# Patient Record
Sex: MALE | Race: WHITE | NOT HISPANIC OR LATINO | Employment: OTHER | URBAN - METROPOLITAN AREA
[De-identification: names, ages, dates, MRNs, and addresses within clinical notes are randomized per-mention and may not be internally consistent; named-entity substitution may affect disease eponyms.]

---

## 2023-03-13 ENCOUNTER — TELEPHONE (OUTPATIENT)
Dept: PRIMARY CARE | Facility: CLINIC | Age: 58
End: 2023-03-13

## 2023-03-14 ENCOUNTER — TELEPHONE (OUTPATIENT)
Dept: PRIMARY CARE | Facility: CLINIC | Age: 58
End: 2023-03-14

## 2023-03-14 NOTE — TELEPHONE ENCOUNTER
----- Message from Mily Leung DO sent at 3/7/2023  8:23 AM EST -----  Your blood counts are stable. Please remember to follow up with GI regarding your bleeding.

## 2023-04-25 ENCOUNTER — TELEPHONE (OUTPATIENT)
Dept: PRIMARY CARE | Facility: CLINIC | Age: 58
End: 2023-04-25

## 2023-04-27 ENCOUNTER — LAB (OUTPATIENT)
Dept: LAB | Facility: LAB | Age: 58
End: 2023-04-27
Payer: COMMERCIAL

## 2023-04-27 ENCOUNTER — OFFICE VISIT (OUTPATIENT)
Dept: PRIMARY CARE | Facility: CLINIC | Age: 58
End: 2023-04-27
Payer: COMMERCIAL

## 2023-04-27 VITALS
OXYGEN SATURATION: 98 % | SYSTOLIC BLOOD PRESSURE: 102 MMHG | RESPIRATION RATE: 18 BRPM | HEART RATE: 92 BPM | DIASTOLIC BLOOD PRESSURE: 64 MMHG

## 2023-04-27 DIAGNOSIS — R93.89 ABNORMAL X-RAY: ICD-10-CM

## 2023-04-27 DIAGNOSIS — R31.9 HEMATURIA, UNSPECIFIED TYPE: ICD-10-CM

## 2023-04-27 DIAGNOSIS — S91.109A OPEN WOUND OF TOE, INITIAL ENCOUNTER: ICD-10-CM

## 2023-04-27 DIAGNOSIS — E11.69 TYPE 2 DIABETES MELLITUS WITH OTHER SPECIFIED COMPLICATION, WITHOUT LONG-TERM CURRENT USE OF INSULIN (MULTI): ICD-10-CM

## 2023-04-27 DIAGNOSIS — R31.9 HEMATURIA, UNSPECIFIED TYPE: Primary | ICD-10-CM

## 2023-04-27 PROBLEM — K92.1 HEMATOCHEZIA: Status: ACTIVE | Noted: 2023-04-27

## 2023-04-27 PROBLEM — S46.012A STRAIN OF LEFT ROTATOR CUFF CAPSULE: Status: ACTIVE | Noted: 2023-04-27

## 2023-04-27 PROBLEM — I63.9 CEREBROVASCULAR ACCIDENT (CVA) (MULTI): Status: ACTIVE | Noted: 2023-04-27

## 2023-04-27 PROBLEM — R06.2 WHEEZING: Status: ACTIVE | Noted: 2023-04-27

## 2023-04-27 PROBLEM — L97.919: Status: ACTIVE | Noted: 2023-04-27

## 2023-04-27 PROBLEM — L02.91 ABSCESS: Status: ACTIVE | Noted: 2023-04-27
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
## 2023-04-27 PROBLEM — G47.00 INSOMNIA: Status: ACTIVE | Noted: 2023-04-27

## 2023-04-27 PROBLEM — M19.012 OSTEOARTHRITIS OF LEFT GLENOHUMERAL JOINT: Status: ACTIVE | Noted: 2023-04-27

## 2023-04-27 PROBLEM — S80.212A ABRASION, LEFT KNEE, INITIAL ENCOUNTER: Status: ACTIVE | Noted: 2023-04-27

## 2023-04-27 PROBLEM — M79.89 LEG SWELLING: Status: ACTIVE | Noted: 2023-04-27

## 2023-04-27 PROBLEM — M25.512 LEFT SHOULDER PAIN: Status: ACTIVE | Noted: 2023-04-27

## 2023-04-27 PROBLEM — I10 BENIGN ESSENTIAL HYPERTENSION: Status: ACTIVE | Noted: 2023-04-27

## 2023-04-27 PROBLEM — S78.112A: Status: ACTIVE | Noted: 2023-04-27

## 2023-04-27 PROBLEM — I63.239 CAROTID ARTERY STENOSIS WITH CEREBRAL INFARCTION (MULTI): Status: ACTIVE | Noted: 2023-04-27

## 2023-04-27 PROBLEM — G62.9 PERIPHERAL NEUROPATHY: Status: ACTIVE | Noted: 2023-04-27

## 2023-04-27 PROBLEM — Z96.612 STATUS POST TOTAL REPLACEMENT OF LEFT SHOULDER: Status: ACTIVE | Noted: 2023-04-27

## 2023-04-27 PROBLEM — E11.9 DIABETES MELLITUS (MULTI): Status: ACTIVE | Noted: 2023-04-27

## 2023-04-27 PROBLEM — K59.00 CONSTIPATION: Status: ACTIVE | Noted: 2023-04-27

## 2023-04-27 LAB
ALBUMIN (G/DL) IN SER/PLAS: 2.9 G/DL (ref 3.4–5)
ANION GAP IN SER/PLAS: 11 MMOL/L (ref 10–20)
BASOPHILS (10*3/UL) IN BLOOD BY AUTOMATED COUNT: 0.04 X10E9/L (ref 0–0.1)
BASOPHILS/100 LEUKOCYTES IN BLOOD BY AUTOMATED COUNT: 0.5 % (ref 0–2)
CALCIUM (MG/DL) IN SER/PLAS: 8.1 MG/DL (ref 8.6–10.3)
CARBON DIOXIDE, TOTAL (MMOL/L) IN SER/PLAS: 22 MMOL/L (ref 21–32)
CHLORIDE (MMOL/L) IN SER/PLAS: 107 MMOL/L (ref 98–107)
CREATININE (MG/DL) IN SER/PLAS: 1.87 MG/DL (ref 0.5–1.3)
EOSINOPHILS (10*3/UL) IN BLOOD BY AUTOMATED COUNT: 0.35 X10E9/L (ref 0–0.7)
EOSINOPHILS/100 LEUKOCYTES IN BLOOD BY AUTOMATED COUNT: 4.4 % (ref 0–6)
ERYTHROCYTE DISTRIBUTION WIDTH (RATIO) BY AUTOMATED COUNT: 14.8 % (ref 11.5–14.5)
ERYTHROCYTE MEAN CORPUSCULAR HEMOGLOBIN CONCENTRATION (G/DL) BY AUTOMATED: 30.6 G/DL (ref 32–36)
ERYTHROCYTE MEAN CORPUSCULAR VOLUME (FL) BY AUTOMATED COUNT: 83 FL (ref 80–100)
ERYTHROCYTES (10*6/UL) IN BLOOD BY AUTOMATED COUNT: 3.55 X10E12/L (ref 4.5–5.9)
GFR MALE: 41 ML/MIN/1.73M2
GLUCOSE (MG/DL) IN SER/PLAS: 121 MG/DL (ref 74–99)
HEMATOCRIT (%) IN BLOOD BY AUTOMATED COUNT: 29.4 % (ref 41–52)
HEMOGLOBIN (G/DL) IN BLOOD: 9 G/DL (ref 13.5–17.5)
IMMATURE GRANULOCYTES/100 LEUKOCYTES IN BLOOD BY AUTOMATED COUNT: 0.3 % (ref 0–0.9)
LEUKOCYTES (10*3/UL) IN BLOOD BY AUTOMATED COUNT: 8 X10E9/L (ref 4.4–11.3)
LYMPHOCYTES (10*3/UL) IN BLOOD BY AUTOMATED COUNT: 1.13 X10E9/L (ref 1.2–4.8)
LYMPHOCYTES/100 LEUKOCYTES IN BLOOD BY AUTOMATED COUNT: 14.2 % (ref 13–44)
MONOCYTES (10*3/UL) IN BLOOD BY AUTOMATED COUNT: 0.87 X10E9/L (ref 0.1–1)
MONOCYTES/100 LEUKOCYTES IN BLOOD BY AUTOMATED COUNT: 10.9 % (ref 2–10)
NEUTROPHILS (10*3/UL) IN BLOOD BY AUTOMATED COUNT: 5.57 X10E9/L (ref 1.2–7.7)
NEUTROPHILS/100 LEUKOCYTES IN BLOOD BY AUTOMATED COUNT: 69.7 % (ref 40–80)
PHOSPHATE (MG/DL) IN SER/PLAS: 4 MG/DL (ref 2.5–4.9)
PLATELETS (10*3/UL) IN BLOOD AUTOMATED COUNT: 318 X10E9/L (ref 150–450)
POTASSIUM (MMOL/L) IN SER/PLAS: 4.4 MMOL/L (ref 3.5–5.3)
SODIUM (MMOL/L) IN SER/PLAS: 136 MMOL/L (ref 136–145)
UREA NITROGEN (MG/DL) IN SER/PLAS: 34 MG/DL (ref 6–23)

## 2023-04-27 PROCEDURE — 36415 COLL VENOUS BLD VENIPUNCTURE: CPT

## 2023-04-27 PROCEDURE — 3078F DIAST BP <80 MM HG: CPT | Performed by: STUDENT IN AN ORGANIZED HEALTH CARE EDUCATION/TRAINING PROGRAM

## 2023-04-27 PROCEDURE — 99214 OFFICE O/P EST MOD 30 MIN: CPT | Performed by: STUDENT IN AN ORGANIZED HEALTH CARE EDUCATION/TRAINING PROGRAM

## 2023-04-27 PROCEDURE — 3074F SYST BP LT 130 MM HG: CPT | Performed by: STUDENT IN AN ORGANIZED HEALTH CARE EDUCATION/TRAINING PROGRAM

## 2023-04-27 PROCEDURE — 80069 RENAL FUNCTION PANEL: CPT

## 2023-04-27 PROCEDURE — 85025 COMPLETE CBC W/AUTO DIFF WBC: CPT

## 2023-04-27 RX ORDER — PANTOPRAZOLE SODIUM 40 MG/1
1 TABLET, DELAYED RELEASE ORAL DAILY
COMMUNITY
Start: 2023-02-13 | End: 2023-04-27

## 2023-04-27 RX ORDER — ERGOCALCIFEROL 1.25 MG/1
CAPSULE ORAL
COMMUNITY
End: 2023-04-27

## 2023-04-27 RX ORDER — BLOOD SUGAR DIAGNOSTIC
STRIP MISCELLANEOUS
COMMUNITY
Start: 2021-07-07 | End: 2024-03-29 | Stop reason: HOSPADM

## 2023-04-27 RX ORDER — FLASH GLUCOSE SENSOR
KIT MISCELLANEOUS
Qty: 1 EACH | Refills: 0 | Status: SHIPPED | OUTPATIENT
Start: 2023-04-27 | End: 2024-04-28 | Stop reason: SDUPTHER

## 2023-04-27 RX ORDER — CALCIUM CARBONATE/MULTIVITAMIN
1 TABLET,CHEWABLE ORAL DAILY
COMMUNITY
End: 2024-03-28 | Stop reason: WASHOUT

## 2023-04-27 RX ORDER — FLASH GLUCOSE SCANNING READER
EACH MISCELLANEOUS
Qty: 1 EACH | Refills: 0 | Status: ON HOLD | OUTPATIENT
Start: 2023-04-27

## 2023-04-27 RX ORDER — INSULIN GLARGINE 100 [IU]/ML
INJECTION, SOLUTION SUBCUTANEOUS
COMMUNITY
End: 2023-04-27

## 2023-04-27 RX ORDER — INSULIN LISPRO 100 [IU]/ML
INJECTION, SOLUTION INTRAVENOUS; SUBCUTANEOUS
COMMUNITY
Start: 2022-03-21 | End: 2023-04-27

## 2023-04-27 RX ORDER — HYDRALAZINE HYDROCHLORIDE 50 MG/1
1 TABLET, FILM COATED ORAL
COMMUNITY
Start: 2023-01-13 | End: 2023-10-03 | Stop reason: SDUPTHER

## 2023-04-27 RX ORDER — ACETAMINOPHEN 500 MG
1 TABLET ORAL NIGHTLY
COMMUNITY
Start: 2021-09-28 | End: 2023-10-19 | Stop reason: ALTCHOICE

## 2023-04-27 RX ORDER — SODIUM BICARBONATE 650 MG/1
1 TABLET ORAL 2 TIMES DAILY
COMMUNITY
Start: 2023-02-14 | End: 2023-04-27

## 2023-04-27 RX ORDER — DOCUSATE SODIUM 100 MG/1
1 CAPSULE, LIQUID FILLED ORAL DAILY
COMMUNITY
Start: 2022-06-01 | End: 2023-10-19 | Stop reason: ALTCHOICE

## 2023-04-27 RX ORDER — ATORVASTATIN CALCIUM 80 MG/1
80 TABLET, FILM COATED ORAL DAILY
COMMUNITY
End: 2023-04-27

## 2023-04-27 RX ORDER — APIXABAN 2.5 MG/1
2.5 TABLET, FILM COATED ORAL 2 TIMES DAILY
COMMUNITY
End: 2023-08-01 | Stop reason: ALTCHOICE

## 2023-04-27 RX ORDER — CALCIUM CITRATE/VITAMIN D3 200MG-6.25
TABLET ORAL 4 TIMES DAILY
COMMUNITY
Start: 2021-07-07 | End: 2023-11-24 | Stop reason: HOSPADM

## 2023-04-27 RX ORDER — CARVEDILOL 25 MG/1
6.25 TABLET ORAL
COMMUNITY
Start: 2023-04-14 | End: 2023-06-29

## 2023-04-27 RX ORDER — FUROSEMIDE 20 MG/1
60 TABLET ORAL DAILY
COMMUNITY
End: 2023-10-26 | Stop reason: ALTCHOICE

## 2023-04-27 RX ORDER — NAPROXEN SODIUM 220 MG/1
1 TABLET, FILM COATED ORAL DAILY
Status: ON HOLD | COMMUNITY
Start: 2021-06-18

## 2023-04-27 ASSESSMENT — ENCOUNTER SYMPTOMS
FEVER: 0
HEMATURIA: 1
FLANK PAIN: 1
FREQUENCY: 0

## 2023-04-27 ASSESSMENT — PATIENT HEALTH QUESTIONNAIRE - PHQ9
2. FEELING DOWN, DEPRESSED OR HOPELESS: SEVERAL DAYS
SUM OF ALL RESPONSES TO PHQ9 QUESTIONS 1 AND 2: 1
10. IF YOU CHECKED OFF ANY PROBLEMS, HOW DIFFICULT HAVE THESE PROBLEMS MADE IT FOR YOU TO DO YOUR WORK, TAKE CARE OF THINGS AT HOME, OR GET ALONG WITH OTHER PEOPLE: NOT DIFFICULT AT ALL
1. LITTLE INTEREST OR PLEASURE IN DOING THINGS: NOT AT ALL

## 2023-04-27 NOTE — PROGRESS NOTES
Subjective   Patient ID: Humphrey Waddell is a 58 y.o. male who presents for Blood in Urine (Pt is here for blood in his urine that he has had for a while.).    Hematuria  No recent antibiotic   Has noted a decrease in his urination       Blood in Urine  This is a new problem. The current episode started 1 to 4 weeks ago. The problem has been waxing and waning since onset. He describes the hematuria as gross hematuria. He reports no clotting in his urine stream. He is experiencing no pain. He describes his urine color as clear. Irritative symptoms do not include frequency or nocturia. Obstructive symptoms include a slower stream. Associated symptoms include flank pain. Pertinent negatives include no fever or genital pain. Risk factors include anticoagulant.       Review of Systems   Constitutional:  Negative for fever.   Genitourinary:  Positive for flank pain and hematuria. Negative for frequency and nocturia.       Objective   Physical Exam  Constitutional:       Appearance: Normal appearance.   Cardiovascular:      Rate and Rhythm: Normal rate and regular rhythm.      Heart sounds: No murmur heard.  Pulmonary:      Effort: Pulmonary effort is normal. No respiratory distress.      Breath sounds: Normal breath sounds. No wheezing.   Musculoskeletal:      Cervical back: Neck supple.      Left lower leg: No edema.   Skin:     Comments: Right great toe is swollen with a puncture wound    Neurological:      Mental Status: He is alert.         Assessment/Plan   Diagnoses and all orders for this visit:  Type 2 diabetes mellitus with other specified complication, without long-term current use of insulin (CMS/[facility])  -     FreeStyle Suzy sensor system (FreeStyle Suzy 2 Sensor) kit; Use as instructed  -     FreeStyle Suzy reader (FreeStyle Suzy 2 [place]) misc; Use as instructed  Hematuria, unspecified type  -     Renal function panel; Future  -     CBC and Auto Differential; Future  -     Urinalysis with Reflex Microscopic;  Future  Open wound of toe, initial encounter  -     Referral to Wound Clinic; Future  -     XR foot right 3+ views; Future

## 2023-04-30 RX ORDER — CLINDAMYCIN HYDROCHLORIDE 300 MG/1
300 CAPSULE ORAL 4 TIMES DAILY
Qty: 40 CAPSULE | Refills: 0 | Status: SHIPPED | OUTPATIENT
Start: 2023-04-30 | End: 2023-05-10

## 2023-06-23 LAB
ALBUMIN (G/DL) IN SER/PLAS: 3.2 G/DL (ref 3.4–5)
ANION GAP IN SER/PLAS: 14 MMOL/L (ref 10–20)
CALCIUM (MG/DL) IN SER/PLAS: 8.2 MG/DL (ref 8.6–10.3)
CARBON DIOXIDE, TOTAL (MMOL/L) IN SER/PLAS: 21 MMOL/L (ref 21–32)
CHLORIDE (MMOL/L) IN SER/PLAS: 109 MMOL/L (ref 98–107)
CREATININE (MG/DL) IN SER/PLAS: 2.47 MG/DL (ref 0.5–1.3)
GFR MALE: 29 ML/MIN/1.73M2
GLUCOSE (MG/DL) IN SER/PLAS: 112 MG/DL (ref 74–99)
PHOSPHATE (MG/DL) IN SER/PLAS: 4.1 MG/DL (ref 2.5–4.9)
POTASSIUM (MMOL/L) IN SER/PLAS: 4.4 MMOL/L (ref 3.5–5.3)
SODIUM (MMOL/L) IN SER/PLAS: 140 MMOL/L (ref 136–145)
UREA NITROGEN (MG/DL) IN SER/PLAS: 33 MG/DL (ref 6–23)

## 2023-06-29 DIAGNOSIS — I63.9 CEREBRAL INFARCTION, UNSPECIFIED (MULTI): ICD-10-CM

## 2023-06-29 DIAGNOSIS — I10 ESSENTIAL (PRIMARY) HYPERTENSION: ICD-10-CM

## 2023-06-29 RX ORDER — CARVEDILOL 25 MG/1
25 TABLET ORAL
Qty: 180 TABLET | Refills: 0 | Status: SHIPPED | OUTPATIENT
Start: 2023-06-29 | End: 2023-10-19 | Stop reason: ALTCHOICE

## 2023-06-29 RX ORDER — ATORVASTATIN CALCIUM 80 MG/1
80 TABLET, FILM COATED ORAL DAILY
Qty: 90 TABLET | Refills: 0 | Status: SHIPPED | OUTPATIENT
Start: 2023-06-29 | End: 2023-10-03 | Stop reason: SDUPTHER

## 2023-08-01 ENCOUNTER — PATIENT OUTREACH (OUTPATIENT)
Dept: PRIMARY CARE | Facility: CLINIC | Age: 58
End: 2023-08-01
Payer: COMMERCIAL

## 2023-08-01 DIAGNOSIS — N17.9 ACUTE RENAL FAILURE, UNSPECIFIED ACUTE RENAL FAILURE TYPE (CMS-HCC): ICD-10-CM

## 2023-08-01 DIAGNOSIS — N18.9 CHRONIC KIDNEY DISEASE, UNSPECIFIED CKD STAGE: ICD-10-CM

## 2023-08-01 RX ORDER — CLOPIDOGREL BISULFATE 75 MG/1
1 TABLET ORAL DAILY
COMMUNITY
Start: 2023-05-18 | End: 2023-10-03 | Stop reason: SDUPTHER

## 2023-08-01 RX ORDER — BISACODYL 10 MG/1
10 SUPPOSITORY RECTAL DAILY PRN
COMMUNITY
Start: 2023-07-28 | End: 2023-10-19 | Stop reason: ALTCHOICE

## 2023-08-01 RX ORDER — POLYETHYLENE GLYCOL 3350 17 G/17G
POWDER, FOR SOLUTION ORAL
COMMUNITY
Start: 2023-07-31 | End: 2023-10-19 | Stop reason: ALTCHOICE

## 2023-08-01 RX ORDER — NYSTATIN 100000 [USP'U]/G
POWDER TOPICAL
COMMUNITY
Start: 2023-07-31 | End: 2023-09-28

## 2023-08-01 RX ORDER — PANTOPRAZOLE SODIUM 40 MG/1
1 TABLET, DELAYED RELEASE ORAL DAILY
COMMUNITY
Start: 2023-07-28 | End: 2023-10-26 | Stop reason: ALTCHOICE

## 2023-08-01 NOTE — PROGRESS NOTES
Discharge Facility: [facility]  Discharge Diagnosis: Acute renal failure superimposed on chronic kidney disease  Admission Date: 7/22/2023  Discharge Date: 7/31/2023    PCP Appointment Date: Not scheduled  Specialist Appointment Date:   8/2/2023 09:00 Dr. Hazel Pettit, Gastroenterology  8/7/2023 10:20 Dr. Yakelin Greenberg, Cardiology,  Dr. Delatorre, Cardiology in 2 weeks,   8/14/2023 13:20 Dr. Agusto Elkins, Vascular [place],  8/16/2023 14:00 Dr. Keller, Vascular Surgery  8/23/2023 14:45 Dr. Fatemeh Ro, Podiatry  9/25/2023 13:00 Urology,  10/26/2023 16:20 Dr. Marco Daniels, Nephrology    Hospital Encounter and Summary: Linked     **Unable to contact pt to complete post discharge assessment. See brief summary below:    Wrap Up  Wrap Up Additional Comments: PMH of HFpEF, CAD, carotid stenosis, CVA, DM, anemia/GIB, DLD, PAD s/p Lt AKA, HTN, depression who was transferred to [facility] after he presented to [facility] ED with multiple complaints including SOB, chest pain, and generalized weakness. DX: acute renal failure superimposed on chronic kidney disease. Hospital course very complex. Recommended SNF placement due to recent left AKA and need for therapy and prosthetics. Pt refused and discharged to home with [facility]. (8/1/2023  2:41 PM)

## 2023-08-17 ENCOUNTER — PATIENT OUTREACH (OUTPATIENT)
Dept: PRIMARY CARE | Facility: CLINIC | Age: 58
End: 2023-08-17
Payer: COMMERCIAL

## 2023-08-17 NOTE — PROGRESS NOTES
Outreach call made to follow up after hospitalization for kidney disease. Pt declines to schedule hospital up with Dr. Leung or further TCM services. Multiple specialty appts scheduled.

## 2023-08-23 LAB
ERYTHROCYTE DISTRIBUTION WIDTH (RATIO) BY AUTOMATED COUNT: 15.3 % (ref 11.5–14.5)
ERYTHROCYTE MEAN CORPUSCULAR HEMOGLOBIN CONCENTRATION (G/DL) BY AUTOMATED: 31.9 G/DL (ref 32–36)
ERYTHROCYTE MEAN CORPUSCULAR VOLUME (FL) BY AUTOMATED COUNT: 88 FL (ref 80–100)
ERYTHROCYTES (10*6/UL) IN BLOOD BY AUTOMATED COUNT: 3.62 X10E12/L (ref 4.5–5.9)
HEMATOCRIT (%) IN BLOOD BY AUTOMATED COUNT: 31.7 % (ref 41–52)
HEMOGLOBIN (G/DL) IN BLOOD: 10.1 G/DL (ref 13.5–17.5)
LEUKOCYTES (10*3/UL) IN BLOOD BY AUTOMATED COUNT: 10.1 X10E9/L (ref 4.4–11.3)
NRBC (PER 100 WBCS) BY AUTOMATED COUNT: 0 /100 WBC (ref 0–0)
PLATELETS (10*3/UL) IN BLOOD AUTOMATED COUNT: 220 X10E9/L (ref 150–450)

## 2023-08-29 ENCOUNTER — HOSPITAL ENCOUNTER (OUTPATIENT)
Dept: DATA CONVERSION | Facility: HOSPITAL | Age: 58
End: 2023-08-29
Attending: INTERNAL MEDICINE | Admitting: INTERNAL MEDICINE
Payer: COMMERCIAL

## 2023-08-29 DIAGNOSIS — I25.82 CHRONIC TOTAL OCCLUSION OF CORONARY ARTERY: ICD-10-CM

## 2023-08-29 DIAGNOSIS — I70.232 ATHEROSCLEROSIS OF NATIVE ARTERIES OF RIGHT LEG WITH ULCERATION OF CALF (MULTI): ICD-10-CM

## 2023-08-29 DIAGNOSIS — I73.9 PERIPHERAL VASCULAR DISEASE, UNSPECIFIED (CMS-HCC): ICD-10-CM

## 2023-09-29 VITALS — HEIGHT: 76 IN | BODY MASS INDEX: 29.88 KG/M2 | WEIGHT: 245.37 LBS

## 2023-09-30 NOTE — H&P
History & Physical Reviewed:   I have reviewed the History and Physical dated:  22-Aug-2023   History and Physical reviewed and relevant findings noted. Patient examined to review pertinent physical  findings.: No significant changes   Home Medications Reviewed: no changes noted   Allergies Reviewed: no changes noted       Airway/Sedation Assessment:  ·  Oropharyngeal Classification Class III   ·  ASA PS Classification ASA III   ·  Sedation Plan moderate sedation       ERAS (Enhanced Recovery After Surgery):  ·  ERAS Patient: no     Consent:   COVID-19 Consent:  ·  COVID-19 Risk Consent Surgeon has reviewed key risks related to the risk of corazon COVID-19 and if they contract COVID-19 what the risks are.     Assessment/Plan:   ·  Assessment and Plan    Risks, benefits, and alternatives explained in full. Written informed consent obtained and placed in the chart. Proceed with peripheral agniography +/- intervention.      Electronic Signatures:  Agusto Elkins)  (Signed 29-Aug-2023 12:24)   Authored: History & Physical Reviewed, Airway/Sedation,  ERAS, Consent, Assessment/Plan, Note Completion      Last Updated: 29-Aug-2023 12:24 by Agusto Elkins)

## 2023-10-03 DIAGNOSIS — I63.9 CEREBRAL INFARCTION, UNSPECIFIED (MULTI): ICD-10-CM

## 2023-10-03 DIAGNOSIS — I25.119 CORONARY ARTERY DISEASE INVOLVING NATIVE HEART WITH ANGINA PECTORIS, UNSPECIFIED VESSEL OR LESION TYPE (CMS-HCC): Primary | ICD-10-CM

## 2023-10-03 RX ORDER — TORSEMIDE 20 MG/1
20 TABLET ORAL DAILY
Qty: 90 TABLET | Refills: 3 | Status: SHIPPED | OUTPATIENT
Start: 2023-10-03 | End: 2023-11-24 | Stop reason: HOSPADM

## 2023-10-03 RX ORDER — CLOPIDOGREL BISULFATE 75 MG/1
75 TABLET ORAL DAILY
Qty: 90 TABLET | Refills: 1 | Status: SHIPPED | OUTPATIENT
Start: 2023-10-03 | End: 2023-10-26 | Stop reason: ALTCHOICE

## 2023-10-03 RX ORDER — HYDRALAZINE HYDROCHLORIDE 50 MG/1
50 TABLET, FILM COATED ORAL EVERY 8 HOURS
Qty: 90 TABLET | Refills: 1 | Status: SHIPPED | OUTPATIENT
Start: 2023-10-03 | End: 2023-10-30

## 2023-10-03 RX ORDER — ATORVASTATIN CALCIUM 80 MG/1
80 TABLET, FILM COATED ORAL DAILY
Qty: 90 TABLET | Refills: 1 | Status: SHIPPED | OUTPATIENT
Start: 2023-10-03 | End: 2024-01-29 | Stop reason: SDUPTHER

## 2023-10-11 DIAGNOSIS — I25.10 CORONARY ARTERY DISEASE INVOLVING NATIVE CORONARY ARTERY OF NATIVE HEART, UNSPECIFIED WHETHER ANGINA PRESENT: ICD-10-CM

## 2023-10-18 PROBLEM — I25.10 ARTERIOSCLEROSIS OF CORONARY ARTERY: Status: ACTIVE | Noted: 2023-10-18

## 2023-10-18 PROBLEM — R53.1 ASTHENIA: Status: ACTIVE | Noted: 2023-10-18

## 2023-10-18 PROBLEM — S09.90XA INJURY OF HEAD: Status: ACTIVE | Noted: 2023-10-18

## 2023-10-18 PROBLEM — F32.A DEPRESSION, UNSPECIFIED: Status: ACTIVE | Noted: 2022-03-22

## 2023-10-18 PROBLEM — M54.9 BACKACHE: Status: ACTIVE | Noted: 2023-10-18

## 2023-10-18 PROBLEM — E11.49 TYPE 2 DIABETES MELLITUS WITH NEUROLOGIC COMPLICATION, WITHOUT LONG-TERM CURRENT USE OF INSULIN (MULTI): Status: ACTIVE | Noted: 2021-05-21

## 2023-10-18 PROBLEM — M62.81 MUSCLE WEAKNESS (GENERALIZED): Status: ACTIVE | Noted: 2022-03-22

## 2023-10-18 PROBLEM — R77.8 OTHER SPECIFIED ABNORMALITIES OF PLASMA PROTEINS: Status: ACTIVE | Noted: 2023-10-18

## 2023-10-18 PROBLEM — K85.90 ACUTE PANCREATITIS (HHS-HCC): Status: ACTIVE | Noted: 2023-10-18

## 2023-10-18 PROBLEM — R10.9 ABDOMINAL PAIN: Status: ACTIVE | Noted: 2023-10-18

## 2023-10-18 PROBLEM — R74.8 HIGH LEVEL OF CARDIAC MARKER: Status: ACTIVE | Noted: 2023-10-18

## 2023-10-18 PROBLEM — M72.6 NECROTIZING FASCIITIS (MULTI): Status: ACTIVE | Noted: 2021-03-07

## 2023-10-18 PROBLEM — I21.4 NSTEMI, INITIAL EPISODE OF CARE (MULTI): Status: ACTIVE | Noted: 2023-10-18

## 2023-10-18 PROBLEM — M79.18 MUSCULOSKELETAL PAIN: Status: ACTIVE | Noted: 2023-10-18

## 2023-10-18 PROBLEM — M79.673 FOOT PAIN: Status: ACTIVE | Noted: 2023-10-18

## 2023-10-18 PROBLEM — K62.5 RECTAL BLEEDING: Status: ACTIVE | Noted: 2023-10-18

## 2023-10-18 PROBLEM — R53.1 WEAKNESS: Status: ACTIVE | Noted: 2021-05-05

## 2023-10-18 PROBLEM — I77.9 PERIPHERAL ARTERIAL OCCLUSIVE DISEASE (CMS-HCC): Status: ACTIVE | Noted: 2023-04-28

## 2023-10-18 PROBLEM — I50.33 ACUTE ON CHRONIC DIASTOLIC HEART FAILURE (MULTI): Status: ACTIVE | Noted: 2023-10-18

## 2023-10-18 PROBLEM — N17.9 AKI (ACUTE KIDNEY INJURY) (CMS-HCC): Status: ACTIVE | Noted: 2023-10-18

## 2023-10-18 PROBLEM — E87.5 HYPERKALEMIA: Status: ACTIVE | Noted: 2023-10-18

## 2023-10-18 PROBLEM — S78.112A ABOVE KNEE AMPUTATION OF LEFT LOWER EXTREMITY (MULTI): Status: ACTIVE | Noted: 2021-03-15

## 2023-10-18 PROBLEM — K92.2 UPPER GASTROINTESTINAL HEMORRHAGE: Status: ACTIVE | Noted: 2023-10-18

## 2023-10-18 PROBLEM — E11.21 DIABETIC RENAL DISEASE (MULTI): Status: ACTIVE | Noted: 2023-10-18

## 2023-10-18 PROBLEM — D50.9 IRON DEFICIENCY ANEMIA: Status: ACTIVE | Noted: 2023-10-18

## 2023-10-18 PROBLEM — I10 HYPERTENSION: Status: ACTIVE | Noted: 2021-05-21

## 2023-10-18 PROBLEM — R51.9 HEADACHE: Status: ACTIVE | Noted: 2023-10-18

## 2023-10-18 PROBLEM — M19.90 CHRONIC OSTEOARTHRITIS: Status: ACTIVE | Noted: 2023-10-18

## 2023-10-18 PROBLEM — M54.50 ACUTE LOW BACK PAIN: Status: ACTIVE | Noted: 2023-10-18

## 2023-10-18 PROBLEM — M86.9 OSTEOMYELITIS (MULTI): Status: ACTIVE | Noted: 2023-10-18

## 2023-10-18 PROBLEM — R07.89 ATYPICAL CHEST PAIN: Status: ACTIVE | Noted: 2023-10-18

## 2023-10-18 PROBLEM — N18.30 CHRONIC KIDNEY DISEASE, STAGE 3 (MULTI): Status: ACTIVE | Noted: 2023-10-18

## 2023-10-18 PROBLEM — R31.9 HEMATURIA: Status: ACTIVE | Noted: 2023-10-18

## 2023-10-18 PROBLEM — F33.1 MAJOR DEPRESSIVE DISORDER, RECURRENT, MODERATE (MULTI): Status: ACTIVE | Noted: 2023-10-18

## 2023-10-18 PROBLEM — R06.00 DYSPNEA: Status: ACTIVE | Noted: 2023-10-18

## 2023-10-18 PROBLEM — M25.561 KNEE PAIN, RIGHT: Status: ACTIVE | Noted: 2021-03-24

## 2023-10-18 PROBLEM — S91.109A OPEN WOUND OF TOE: Status: ACTIVE | Noted: 2023-10-18

## 2023-10-18 PROBLEM — D64.9 ANEMIA: Status: ACTIVE | Noted: 2023-10-18

## 2023-10-18 PROBLEM — I65.23 ATHEROSCLEROSIS OF BOTH CAROTID ARTERIES: Status: ACTIVE | Noted: 2023-10-18

## 2023-10-18 PROBLEM — S81.809A OPEN WOUND OF LOWER EXTREMITY: Status: ACTIVE | Noted: 2023-10-18

## 2023-10-18 PROBLEM — F41.9 ANXIETY DISORDER: Status: ACTIVE | Noted: 2023-10-18

## 2023-10-18 PROBLEM — M19.012 PRIMARY OSTEOARTHRITIS, LEFT SHOULDER: Status: ACTIVE | Noted: 2022-03-22

## 2023-10-18 PROBLEM — R31.0 GROSS HEMATURIA: Status: ACTIVE | Noted: 2023-10-18

## 2023-10-18 RX ORDER — LABETALOL 100 MG/1
1 TABLET, FILM COATED ORAL EVERY 6 HOURS
COMMUNITY
Start: 2022-12-06 | End: 2023-10-26 | Stop reason: ALTCHOICE

## 2023-10-18 RX ORDER — INSULIN GLARGINE 100 [IU]/ML
2 INJECTION, SOLUTION SUBCUTANEOUS NIGHTLY
Status: ON HOLD | COMMUNITY
Start: 2021-05-19

## 2023-10-18 RX ORDER — GABAPENTIN 300 MG/1
2 CAPSULE ORAL 3 TIMES DAILY
COMMUNITY
Start: 2022-11-16 | End: 2023-10-26 | Stop reason: ALTCHOICE

## 2023-10-18 RX ORDER — ERGOCALCIFEROL 1.25 MG/1
50000 CAPSULE ORAL
COMMUNITY
Start: 2021-06-29 | End: 2023-10-31 | Stop reason: SDUPTHER

## 2023-10-18 RX ORDER — GUAIFENESIN 100 MG/5ML
SOLUTION ORAL
COMMUNITY
End: 2023-10-19 | Stop reason: ALTCHOICE

## 2023-10-18 RX ORDER — METFORMIN HYDROCHLORIDE 500 MG/1
1 TABLET ORAL 2 TIMES DAILY
COMMUNITY
Start: 2023-01-01 | End: 2023-10-26 | Stop reason: ALTCHOICE

## 2023-10-18 RX ORDER — UBIDECARENONE 30 MG
CAPSULE ORAL
COMMUNITY
End: 2023-10-19 | Stop reason: ALTCHOICE

## 2023-10-18 RX ORDER — HYDRALAZINE HYDROCHLORIDE 100 MG/1
100 TABLET, FILM COATED ORAL
COMMUNITY
Start: 2023-06-29 | End: 2023-10-19 | Stop reason: ALTCHOICE

## 2023-10-18 RX ORDER — AMLODIPINE BESYLATE 10 MG/1
10 TABLET ORAL DAILY
COMMUNITY
End: 2023-10-26 | Stop reason: ALTCHOICE

## 2023-10-18 RX ORDER — DIPHENHYDRAMINE HCL 25 MG
2 TABLET ORAL NIGHTLY
COMMUNITY
End: 2023-10-26 | Stop reason: ALTCHOICE

## 2023-10-18 RX ORDER — SOD SULF/POT CHLORIDE/MAG SULF 1.479 G
TABLET ORAL DAILY
COMMUNITY
Start: 2023-03-10 | End: 2023-10-19 | Stop reason: ALTCHOICE

## 2023-10-18 RX ORDER — HONEY 100 %
PASTE (ML) TOPICAL
COMMUNITY
End: 2023-10-19 | Stop reason: ALTCHOICE

## 2023-10-18 RX ORDER — SODIUM BICARBONATE 650 MG/1
650 TABLET ORAL EVERY 12 HOURS
COMMUNITY
End: 2023-10-19 | Stop reason: ALTCHOICE

## 2023-10-18 RX ORDER — ESCITALOPRAM OXALATE 10 MG/1
1 TABLET ORAL DAILY
COMMUNITY
Start: 2022-11-16 | End: 2023-10-26 | Stop reason: ALTCHOICE

## 2023-10-19 ENCOUNTER — TELEMEDICINE CLINICAL SUPPORT (OUTPATIENT)
Dept: PREADMISSION TESTING | Facility: HOSPITAL | Age: 58
End: 2023-10-19
Payer: COMMERCIAL

## 2023-10-19 RX ORDER — DEXTROMETHORPHAN HYDROBROMIDE, GUAIFENESIN 5; 100 MG/5ML; MG/5ML
1 LIQUID ORAL EVERY 8 HOURS PRN
Status: ON HOLD | COMMUNITY

## 2023-10-26 ENCOUNTER — PRE-ADMISSION TESTING (OUTPATIENT)
Dept: PREADMISSION TESTING | Facility: HOSPITAL | Age: 58
End: 2023-10-26
Payer: COMMERCIAL

## 2023-10-26 ENCOUNTER — LAB (OUTPATIENT)
Dept: LAB | Facility: LAB | Age: 58
End: 2023-10-26
Payer: COMMERCIAL

## 2023-10-26 ENCOUNTER — HOSPITAL ENCOUNTER (OUTPATIENT)
Dept: RADIOLOGY | Facility: HOSPITAL | Age: 58
Discharge: HOME | End: 2023-10-26
Payer: COMMERCIAL

## 2023-10-26 VITALS
SYSTOLIC BLOOD PRESSURE: 149 MMHG | HEART RATE: 86 BPM | TEMPERATURE: 94.3 F | OXYGEN SATURATION: 100 % | DIASTOLIC BLOOD PRESSURE: 65 MMHG | RESPIRATION RATE: 20 BRPM

## 2023-10-26 DIAGNOSIS — I25.10 CVD (CARDIOVASCULAR DISEASE): Primary | ICD-10-CM

## 2023-10-26 DIAGNOSIS — I25.10 CORONARY ARTERY DISEASE INVOLVING NATIVE CORONARY ARTERY OF NATIVE HEART, UNSPECIFIED WHETHER ANGINA PRESENT: ICD-10-CM

## 2023-10-26 DIAGNOSIS — E11.69 TYPE 2 DIABETES MELLITUS WITH OTHER SPECIFIED COMPLICATION, WITHOUT LONG-TERM CURRENT USE OF INSULIN (MULTI): ICD-10-CM

## 2023-10-26 DIAGNOSIS — I25.10 CVD (CARDIOVASCULAR DISEASE): ICD-10-CM

## 2023-10-26 LAB
ABO GROUP (TYPE) IN BLOOD: NORMAL
ANION GAP SERPL CALC-SCNC: 11 MMOL/L (ref 10–20)
ANTIBODY SCREEN: NORMAL
APPEARANCE UR: CLEAR
APTT PPP: 30 SECONDS (ref 27–38)
ATRIAL RATE: 85 BPM
BACTERIA #/AREA URNS AUTO: ABNORMAL /HPF
BASOPHILS # BLD AUTO: 0.04 X10*3/UL (ref 0–0.1)
BASOPHILS NFR BLD AUTO: 0.5 %
BILIRUB UR STRIP.AUTO-MCNC: NEGATIVE MG/DL
BUN SERPL-MCNC: 76 MG/DL (ref 6–23)
CALCIUM SERPL-MCNC: 8.5 MG/DL (ref 8.6–10.3)
CHLORIDE SERPL-SCNC: 104 MMOL/L (ref 98–107)
CO2 SERPL-SCNC: 25 MMOL/L (ref 21–32)
COLOR UR: ABNORMAL
CREAT SERPL-MCNC: 2.84 MG/DL (ref 0.5–1.3)
CRP SERPL-MCNC: 0.17 MG/DL
EOSINOPHIL # BLD AUTO: 0.48 X10*3/UL (ref 0–0.7)
EOSINOPHIL NFR BLD AUTO: 5.7 %
ERYTHROCYTE [DISTWIDTH] IN BLOOD BY AUTOMATED COUNT: 14.5 % (ref 11.5–14.5)
GFR SERPL CREATININE-BSD FRML MDRD: 25 ML/MIN/1.73M*2
GLUCOSE SERPL-MCNC: 124 MG/DL (ref 74–99)
GLUCOSE UR STRIP.AUTO-MCNC: ABNORMAL MG/DL
HCT VFR BLD AUTO: 28.4 % (ref 41–52)
HGB BLD-MCNC: 9.1 G/DL (ref 13.5–17.5)
IMM GRANULOCYTES # BLD AUTO: 0.05 X10*3/UL (ref 0–0.7)
IMM GRANULOCYTES NFR BLD AUTO: 0.6 % (ref 0–0.9)
INR PPP: 1 (ref 0.9–1.1)
KETONES UR STRIP.AUTO-MCNC: NEGATIVE MG/DL
LEUKOCYTE ESTERASE UR QL STRIP.AUTO: NEGATIVE
LYMPHOCYTES # BLD AUTO: 1.57 X10*3/UL (ref 1.2–4.8)
LYMPHOCYTES NFR BLD AUTO: 18.5 %
MCH RBC QN AUTO: 28.6 PG (ref 26–34)
MCHC RBC AUTO-ENTMCNC: 32 G/DL (ref 32–36)
MCV RBC AUTO: 89 FL (ref 80–100)
MONOCYTES # BLD AUTO: 0.77 X10*3/UL (ref 0.1–1)
MONOCYTES NFR BLD AUTO: 9.1 %
NEUTROPHILS # BLD AUTO: 5.56 X10*3/UL (ref 1.2–7.7)
NEUTROPHILS NFR BLD AUTO: 65.6 %
NITRITE UR QL STRIP.AUTO: NEGATIVE
NRBC BLD-RTO: 0 /100 WBCS (ref 0–0)
P AXIS: 57 DEGREES
P OFFSET: 185 MS
P ONSET: 135 MS
PH UR STRIP.AUTO: 5 [PH]
PLATELET # BLD AUTO: 253 X10*3/UL (ref 150–450)
PMV BLD AUTO: 9.1 FL (ref 7.5–11.5)
POTASSIUM SERPL-SCNC: 5.2 MMOL/L (ref 3.5–5.3)
PR INTERVAL: 184 MS
PROT UR STRIP.AUTO-MCNC: ABNORMAL MG/DL
PROTHROMBIN TIME: 11.3 SECONDS (ref 9.8–12.8)
Q ONSET: 227 MS
QRS COUNT: 14 BEATS
QRS DURATION: 144 MS
QT INTERVAL: 384 MS
QTC CALCULATION(BAZETT): 456 MS
QTC FREDERICIA: 431 MS
R AXIS: 79 DEGREES
RBC # BLD AUTO: 3.18 X10*6/UL (ref 4.5–5.9)
RBC # UR STRIP.AUTO: NEGATIVE /UL
RBC #/AREA URNS AUTO: ABNORMAL /HPF
RH FACTOR (ANTIGEN D): NORMAL
SODIUM SERPL-SCNC: 135 MMOL/L (ref 136–145)
SP GR UR STRIP.AUTO: 1.01
T AXIS: 59 DEGREES
T OFFSET: 419 MS
UROBILINOGEN UR STRIP.AUTO-MCNC: <2 MG/DL
VENTRICULAR RATE: 85 BPM
WBC # BLD AUTO: 8.5 X10*3/UL (ref 4.4–11.3)
WBC #/AREA URNS AUTO: ABNORMAL /HPF

## 2023-10-26 PROCEDURE — 71046 X-RAY EXAM CHEST 2 VIEWS: CPT | Performed by: RADIOLOGY

## 2023-10-26 PROCEDURE — 86140 C-REACTIVE PROTEIN: CPT

## 2023-10-26 PROCEDURE — 71046 X-RAY EXAM CHEST 2 VIEWS: CPT | Mod: FY

## 2023-10-26 PROCEDURE — 86900 BLOOD TYPING SEROLOGIC ABO: CPT

## 2023-10-26 PROCEDURE — 80048 BASIC METABOLIC PNL TOTAL CA: CPT

## 2023-10-26 PROCEDURE — 81001 URINALYSIS AUTO W/SCOPE: CPT

## 2023-10-26 PROCEDURE — 85025 COMPLETE CBC W/AUTO DIFF WBC: CPT

## 2023-10-26 PROCEDURE — 86850 RBC ANTIBODY SCREEN: CPT

## 2023-10-26 PROCEDURE — 99205 OFFICE O/P NEW HI 60 MIN: CPT | Performed by: PHYSICIAN ASSISTANT

## 2023-10-26 PROCEDURE — 86901 BLOOD TYPING SEROLOGIC RH(D): CPT

## 2023-10-26 PROCEDURE — 86920 COMPATIBILITY TEST SPIN: CPT

## 2023-10-26 PROCEDURE — 87081 CULTURE SCREEN ONLY: CPT | Mod: AHULAB | Performed by: PHYSICIAN ASSISTANT

## 2023-10-26 PROCEDURE — 85730 THROMBOPLASTIN TIME PARTIAL: CPT

## 2023-10-26 PROCEDURE — 93010 ELECTROCARDIOGRAM REPORT: CPT | Performed by: INTERNAL MEDICINE

## 2023-10-26 PROCEDURE — 93005 ELECTROCARDIOGRAM TRACING: CPT | Performed by: PHYSICIAN ASSISTANT

## 2023-10-26 PROCEDURE — 83036 HEMOGLOBIN GLYCOSYLATED A1C: CPT

## 2023-10-26 PROCEDURE — 85610 PROTHROMBIN TIME: CPT

## 2023-10-26 PROCEDURE — 36415 COLL VENOUS BLD VENIPUNCTURE: CPT

## 2023-10-26 RX ORDER — PENICILLIN V POTASSIUM 250 MG/1
250 TABLET, FILM COATED ORAL 4 TIMES DAILY
COMMUNITY
End: 2023-11-24 | Stop reason: HOSPADM

## 2023-10-26 RX ORDER — CHLORHEXIDINE GLUCONATE ORAL RINSE 1.2 MG/ML
SOLUTION DENTAL
Qty: 475 ML | Refills: 0 | Status: ON HOLD | OUTPATIENT
Start: 2023-10-26 | End: 2023-11-13 | Stop reason: ALTCHOICE

## 2023-10-26 RX ORDER — CLOPIDOGREL BISULFATE 75 MG/1
1 TABLET ORAL DAILY
COMMUNITY
End: 2024-01-12 | Stop reason: SDUPTHER

## 2023-10-26 ASSESSMENT — ENCOUNTER SYMPTOMS
CONFUSION: 0
NUMBNESS: 0
RHINORRHEA: 0
FEVER: 0
DIFFICULTY URINATING: 0
WOUND: 1
TROUBLE SWALLOWING: 0
NECK STIFFNESS: 0
NECK PAIN: 0
HEMOPTYSIS: 0
DYSPNEA WITH EXERTION: 0
ABDOMINAL PAIN: 0
VOMITING: 0
SHORTNESS OF BREATH: 0
LIMITED RANGE OF MOTION: 0
DYSURIA: 0
PALPITATIONS: 0
EXCESSIVE BLEEDING: 0
SINUS CONGESTION: 0
SKIN CHANGES: 0
ABDOMINAL DISTENTION: 0
CONSTIPATION: 0
WEAKNESS: 0
EYE PAIN: 0
BRUISES/BLEEDS EASILY: 1
CHILLS: 0
EYE DISCHARGE: 0
DOUBLE VISION: 0
BLOOD IN STOOL: 0
MYALGIAS: 1
UNEXPECTED WEIGHT CHANGE: 0
ARTHRALGIAS: 0
DYSPNEA AT REST: 0
WHEEZING: 0
DIARRHEA: 0
VISUAL CHANGE: 0
LIGHT-HEADEDNESS: 0
NAUSEA: 0
COUGH: 0

## 2023-10-26 ASSESSMENT — CHADS2 SCORE
CHADS2 SCORE: 4
DIABETES: YES
AGE GREATER THAN OR EQUAL TO 75: NO
PRIOR STROKE OR TIA OR THROMBOEMBOLISM: YES
CHF: NO
AGE GREATER THAN OR EQUAL TO 75: NO
HYPERTENSION: YES

## 2023-10-26 NOTE — PREPROCEDURE INSTRUCTIONS
Medication List            Accurate as of October 26, 2023  1:31 PM. Always use your most recent med list.                acetaminophen 650 mg ER tablet  Commonly known as: Tylenol 8 HOUR  Medication Adjustments for Surgery: Take morning of surgery with sip of water, no other fluids     aspirin 81 mg chewable tablet  Medication Adjustments for Surgery: Take morning of surgery with sip of water, no other fluids     atorvastatin 80 mg tablet  Commonly known as: Lipitor  Take 1 tablet (80 mg) by mouth once daily. Last rx prior to next refills  Medication Adjustments for Surgery: Take morning of surgery with sip of water, no other fluids     chlorhexidine 0.12 % solution  Commonly known as: Peridex  Swish for 30 seconds and spit 15mL of solution the night before and morning of surgery     clopidogrel 75 mg tablet  Commonly known as: Plavix  Medication Adjustments for Surgery: Stop 7 days before surgery     ergocalciferol 1.25 MG (90847 UT) capsule  Commonly known as: Vitamin D-2  Medication Adjustments for Surgery: Continue until night before surgery     Flintstones Plus Calcium tablet,chewable  Generic drug: multivitamin-calcium carb  Medication Adjustments for Surgery: Stop 7 days before surgery     FreeStyle Suzy 2 [place] misc  Generic drug: FreeStyle Suzy reader  Use as instructed     FreeStyle Suzy 2 Sensor kit  Generic drug: FreeStyle Suzy sensor system  Use as instructed     * FreeStyle Test strip  Generic drug: blood sugar diagnostic     * True Metrix Glucose Test Strip strip  Generic drug: blood sugar diagnostic     hydrALAZINE 50 mg tablet  Commonly known as: Apresoline  TAKE 1 TABLET BY MOUTH EVERY 8 HOURS  Medication Adjustments for Surgery: Take morning of surgery with sip of water, no other fluids     Lantus Solostar U-100 Insulin 100 unit/mL (3 mL) pen  Generic drug: insulin glargine  Notes to patient: Take 1/2 dose the night before surgery     melatonin 3 mg tablet  TAKE 1 TABLET BY MOUTH AT  BEDTIME AT AT 6:00 PM  Medication Adjustments for Surgery: Take morning of surgery with sip of water, no other fluids     penicillin V 250 mg tablet  Commonly known as: Veetid  Medication Adjustments for Surgery: Take morning of surgery with sip of water, no other fluids     torsemide 20 mg tablet  Commonly known as: Demadex  TAKE 1 TABLET BY MOUTH ONCE DAILY  Medication Adjustments for Surgery: Stop 3 days before surgery           * This list has 2 medication(s) that are the same as other medications prescribed for you. Read the directions carefully, and ask your doctor or other care provider to review them with you.                            PAT DISCHARGE INSTRUCTIONS    Check for symptoms daily prior to surgery and notify us if you have any of the following    Fever = 38.0 C (100.4 F)     New respiratory symptoms (e.g. cough, shortness of breath, respiratory distress, sore throat)   Recent loss of taste or smell   Flu like symptoms such as headache, fatigue or gastrointestinal symptoms      Please let your surgeon know if:      You develop any open sores, shingles, burning or painful urination as these may increase your risk of an infection.   You no longer wish to have the surgery.   Any other personal circumstances change that may lead to the need to cancel or defer this surgery-such as being sick or getting admitted to any hospital within one week of your planned procedure.    Your contact details change, such as a change of address or phone number.    Starting now:     Stop smoking. It is well known that quitting smoking can make a huge difference to your health and recovery from surgery. The longer you abstain from smoking, the better your chances of a healthy recovery. If you need help with quitting, call 1-800-QUIT-NOW to be connected to a trained counselor who will discuss the best methods to help you quit.       One week before your surgery:     Please stop all supplements 7 days prior to surgery.  Vitamins A, C and E must be stopped for 7 days but Vitamins B and D may be continued till the day before surgery.    Please stop taking NSAID pain medicine such as Advil and Motrin 7 days before surgery.    If you develop any fever, cough, cold, rashes, cuts, scratches, scrapes, urinary symptoms or infection anywhere on your body (including teeth and gums) prior to surgery, please call your surgeon’s office as soon as possible. This may require treatment to reduce the chance of cancellation on the day of surgery.    The day before your surgery:     DIET- Do not eat any solid food after midnight the night before surgery. Clear liquids (water, tea, black coffee and apple juice) are acceptable up to 2 hours before your surgery.    Get a good night’s rest. Use the special soap for bathing if you have been instructed to use one.    Arrival time is typically 2 hours prior to the time of surgery. The Pre-operative nursing team will call between the hours of 2 p.m. and 6 p.m. the business day before your surgery to provide you with your arrival time. If you have not received a phone call by 6 p.m., please call [phone] for assistance.    Scheduled surgery times may change and you will be notified if this occurs - please check your personal voicemail for any updates.    In the event of an illness or the need to cancel your surgery - Please notify your surgeon and/or [facility] operative services [phone].    On the morning of surgery:   Wear comfortable, loose fitting clothes which open in the front. Please do not wear moisturizers, creams, lotions or perfume.    Please bring with you to surgery:   Photo ID and insurance card   Current list of medicines and allergies   Pacemaker/ Defibrillator/Heart stent cards   CPAP machine and mask    Slings/ splints/ crutches   A copy of your complete advanced directive/DHPOA.    Please do NOT bring with you to surgery:   All jewelry and valuables should be left at  home.   Prosthetic devices such as contact lenses, hearing aids, dentures, eyelash extensions, hairpins and body piercings must be removed prior to going in to the surgical suite.    Parking and where to go when you arrive:      Free  parking is available in the front of the building for all patients scheduled for surgery. Please check in at the desk just behind the main entrance and let them know you are here for surgery and you will be directed to the 2nd floor operating suite.    After outpatient surgery:   A responsible adult MUST accompany you at the time of discharge and stay with you for 24 hours after your surgery. You may NOT drive yourself home after surgery.    Do not drive, operate machinery, make critical decisions or do activities that require co-ordination or balance until after a night’s sleep.   Do not drink alcoholic beverages for 24 hours.   Instructions for resuming your medications will be provided by your surgeon.      CALL YOUR DOCTOR AFTER SURGERY IF YOU HAVE:     Chills and/or a fever of 101° F or higher.    Redness, swelling, pus or drainage from your surgical wound or a bad smell from the wound.    Lightheadedness, fainting or confusion.    Persistent vomiting (throwing up) and are not able to eat or drink for 12 hours.    Three or more loose, watery bowel movements in 24 hours (diarrhea).   Difficulty or pain while urinating( after non-urological surgery)    Pain and swelling in your legs, especially if it is only on one side.    Difficulty breathing or are breathing faster than normal.    Any new concerning symptoms.

## 2023-10-26 NOTE — H&P (VIEW-ONLY)
"[facility] Evaluation       Name: Humphrey Waddell (Humphrey Waddell)  /Age: 1965/58 y.o.       Date of Consult: 10/26/23    Referring Provider: Dr. Delatorre    Surgery, Date, and Length: Creation Bypass Graft Coronary Artery X2-3 LIMA VEIN , 23, 330MIN    Humphrey Waddell is a 58 year-old male who presents to the [facility] for perioperative risk assessment prior to surgery.    Patient presents with a primary diagnosis of CAD.  coronary angiogram from May 2023 Dr. Livingston performed this study at [facility] [place]. This was in the setting of an acute coronary syndrome, and it revealed a left main stenosis of 50%, a mid LAD lesion of 70%, and a circumflex lesion of 95%. He underwent successful stenting of the OM 2. An echo from July demonstrates normal ventricular function with an ejection fraction of 60 to 65%. On this study there was no significant valvular abnormality, no mitral regurgitation and no aortic valve disease.    Per Dr. Delatorre's note:  \"He has many comorbidities and risk factors that make cardiac surgery problematic. The biggest concerns would be his lack of mobility due to his above-knee amputation. He does not ambulate despite having a prosthesis. He is able to transfer from the bed to wheelchair but that is about the limit of his physical capability. He is unable to stand. Secondly he has bilateral carotid stenoses of 70% or greater. He has chronic renal insufficiency and has had a prior stroke. All of these factors together suggest a significant risk of morbidity or mortality with coronary bypass surgery that I would estimate at 3 to 5%. \"       This note was created in part upon personal review of patient's medical records.      Patient is scheduled to have Creation Bypass Graft Coronary Artery X2-3 LIMA VEIN       Pt denies any past history of anesthetic complications such as PONV, awareness, prolonged sedation, dental damage, aspiration, cardiac arrest, difficult intubation, difficult I.V. access or unexpected " hospital admissions.  NO malignant hyperthermia and or pseudocholinesterase deficiency.  +history of blood transfusions ; 2023 due to anemia    The patient is not a [employer] and will accept blood and blood products if medically indicated.   Type and screen sent.      Past Medical History:   Diagnosis Date    Above-knee amputation of left lower extremity (CMS/[facility])     Adverse effect of anesthesia     confusion    Anemia     CAD (coronary artery disease)     Carotid artery disease (CMS/[facility])     bilateral    CVA (cerebral vascular accident) (CMS/[facility]) 2020    Depression     DM (diabetes mellitus) (CMS/[facility])     type 2 with neuropathy    GERD (gastroesophageal reflux disease)     High cholesterol     HTN (hypertension)     Leg ulcer (CMS/[facility])     chronic right lower extremity    Neuropathy     OA (osteoarthritis)     PAD (peripheral artery disease) (CMS/[facility])        Past Surgical History:   Procedure Laterality Date    CT HEAD ANGIO W AND WO IV CONTRAST  06/14/2021    [place] ANGIO W AND WO [place] [facility] [place]    CT NECK ANGIO W AND WO IV CONTRAST  06/14/2021    CT NECK ANGIO W AND WO IV CONTRAST [place] [facility] [place]    LEG SURGERY Right     MR HEAD ANGIO WO IV CONTRAST  12/11/2012    MR HEAD ANGIO WO IV CONTRAST [facility]    MR HEAD ANGIO WO IV CONTRAST  06/12/2021    MR HEAD ANGIO WO IV CONTRAST [facility]    MR HEAD ANGIO WO IV CONTRAST  05/25/2021    MR HEAD ANGIO WO IV CONTRAST [facility]PATIENT LEGAC[facility]    MR NECK ANGIO WO IV CONTRAST  07/18/2023    MR NECK ANGIO WO IV CONTRAST 7/18/2023 [facility] [id number] MRI    OTHER SURGICAL HISTORY  07/07/2021    Knee reconstruction    OTHER SURGICAL HISTORY  07/07/2021    Lower extremity amputation above knee    TOTAL SHOULDER ARTHROPLASTY Left 2020       Patient Sexual activity questions deferred to the physician.    Family History   Problem Relation Name Age of Onset    Other (cardiac disorder) Mother      Hypertension Mother       Esophageal cancer Mother      Hearing loss Father      Hypertension Father      Heart disease Father      COPD Sister       Social History     Socioeconomic History    Marital status:      Spouse name: Not on file    Number of children: Not on file    Years of education: Not on file    Highest education level: Not on file   Occupational History    Not on file   Tobacco Use    Smoking status: Former     Types: Cigars     Quit date: 2023     Years since quittin.5    Smokeless tobacco: Never   Vaping Use    Vaping Use: Never used   Substance and Sexual Activity    Alcohol use: Not Currently    Drug use: Not Currently     Types: Marijuana    Sexual activity: Defer   Other Topics Concern    Not on file   Social History Narrative    Not on file     Social Determinants of Health     Financial Resource Strain: Not on file   Food Insecurity: Not on file   Transportation Needs: Not on file   Physical Activity: Not on file   Stress: Not on file   Social Connections: Not on file   Intimate Partner Violence: Not on file   Housing Stability: Not on file        No Known Allergies    Prior to Admission medications    Medication Sig Start Date End Date Taking? Authorizing Provider   acetaminophen (Tylenol 8 HOUR) 650 mg ER tablet Take 1 tablet (650 mg) by mouth every 8 hours if needed for mild pain (1 - 3). Do not crush, chew, or split.    Historical Provider, MD   amLODIPine (Norvasc) 10 mg tablet Take 1 tablet (10 mg) by mouth once daily.    Historical Provider, MD   apixaban (Eliquis) 2.5 mg tablet Take 1 tablet (2.5 mg) by mouth 2 times a day. 22   Historical Provider, MD   aspirin 81 mg chewable tablet Chew 1 tablet (81 mg) once daily. 21   Historical Provider, MD   atorvastatin (Lipitor) 80 mg tablet Take 1 tablet (80 mg) by mouth once daily. Last rx prior to next refills 10/3/23 10/2/24  Gabriel Higgins MD   blood sugar diagnostic (True Metrix Glucose Test Strip) strip 4 times a day. 21    Historical Provider, MD   carvedilol (Coreg) 12.5 mg tablet TAKE 1 TABLET BY MOUTH TWO TIMES A DAY 7/31/23 7/30/24  CONG Calvo-CNP   cephalexin (Keflex) 500 mg capsule TAKE 1 CAPSULE BY MOUTH 2 TIMES A DAY 5/30/23 5/29/24  Blake Grover MD   chlorhexidine (Peridex) 0.12 % solution Swish for 30 seconds and spit 15mL of solution the night before and morning of surgery 10/26/23   Mariama Schmitt PA-C   clopidogrel (Plavix) 75 mg tablet Take 1 tablet (75 mg) by mouth once daily. 10/3/23 10/2/24  Gabriel Higgins MD   diphenhydrAMINE (Sominex) 25 mg tablet Take 2 tablets (50 mg) by mouth once daily at bedtime.    Historical Provider, MD   ergocalciferol (Vitamin D-2) 1.25 MG (56180 UT) capsule Take 1 capsule (50,000 Units) by mouth 1 (one) time per week. 6/29/21   Historical Provider, MD   escitalopram (Lexapro) 10 mg tablet Take 1 tablet (10 mg) by mouth once daily. 11/16/22   Historical Provider, MD   FreeStyle Suzy reader (FreeStyle Suzy 2 [place]) misc Use as instructed 4/27/23   Mily Leung, DO   FreeStyle Suzy sensor system (FreeStyle Suzy 2 Sensor) kit Use as instructed 4/27/23   Mily Leung, DO   FreeStyle Test strip TEST 3 TIMES DAILY. 7/7/21   Historical Provider, MD   furosemide (Lasix) 20 mg tablet Take 3 tablets (60 mg) by mouth once daily.    Historical Provider, MD   gabapentin (Neurontin) 300 mg capsule Take 2 capsules (600 mg) by mouth 3 times a day. 11/16/22   Historical Provider, MD   hydrALAZINE (Apresoline) 50 mg tablet TAKE 1 TABLET BY MOUTH EVERY 8 HOURS  Patient taking differently: Take 1 tablet (50 mg) by mouth 2 times a day. 10/3/23 10/2/24  Gabriel Higgins MD   insulin glargine (Lantus Solostar U-100 Insulin) 100 unit/mL (3 mL) pen Inject 4 Units under the skin once daily at bedtime. 5/19/21   Historical Provider, MD   labetalol (Normodyne) 100 mg tablet Take 1 tablet (100 mg) by mouth every 6 hours. 12/6/22   Historical Provider, MD   melatonin 3 mg tablet TAKE 1  TABLET BY MOUTH AT BEDTIME AT AT 6:00 PM  Patient taking differently: Take 2 mg by mouth once daily at bedtime. 7/31/23 7/30/24  CONG Calvo-CNP   metFORMIN (Glucophage) 500 mg tablet Take 1 tablet (500 mg) by mouth 2 times a day. 1/1/23   Historical Provider, MD   multivitamin-calcium carb (Flintstones Plus Calcium) tablet,chewable Chew 1 tablet once daily.    Historical Provider, MD   pantoprazole (ProtoNix) 40 mg EC tablet Take 1 tablet (40 mg) by mouth once daily. 7/28/23   Historical Provider, MD   torsemide (Demadex) 20 mg tablet TAKE 1 TABLET BY MOUTH ONCE DAILY 10/3/23 10/2/24  Gabriel Higgins MD        PAT ROS:   Constitutional:    Wheelchair bound   no fever   no chills   no unexpected weight change  Neuro/Psych:    no numbness   no weakness   no light-headedness   no confusion  Eyes:    no discharge   no pain   no vision loss   no diplopia   no visual disturbance  Ears:    no ear pain   no hearing loss   no tinnitus  Nose:    no nasal discharge   no sinus congestion   no epistaxis  Mouth:    no dental issues   no mouth pain   no oral bleeding   no mouth lesions  Throat:    no throat pain   no dysphagia  Neck:    no neck pain   no neck stiffness  Cardio:    no chest pain   no palpitations   no peripheral edema   no dyspnea   no SUAZO  Respiratory:    no cough   no wheezing   no hemoptysis   no shortness of breath  Endocrine:    no cold intolerance   no heat intolerance  GI:    no abdominal distention   no abdominal pain   no constipation   no diarrhea   no nausea   no vomiting   no blood in stool  :    no difficulty urinating   no dysuria   no oliguria  Musculoskeletal:    L AKA with prosthesis in place; b/l hand pain   no arthralgias   myalgias   no decreased ROM  Hematologic:    bruises/bleeds easily (Plavix + ASA)   no excessive bleeding   history of blood transfusion (anemia)   no blood clots  Skin:   Right anterior shin with two small abrasions from wheelchair footrest   no skin changes    sores/wound   no rash      Physical Exam  Constitutional:       General: He is not in acute distress.     Appearance: Normal appearance.   HENT:      Head: Normocephalic and atraumatic.      Nose: Nose normal. No congestion.      Mouth/Throat:      Mouth: Mucous membranes are moist.      Pharynx: No posterior oropharyngeal erythema.   Eyes:      Extraocular Movements: Extraocular movements intact.      Conjunctiva/sclera: Conjunctivae normal.   Cardiovascular:      Rate and Rhythm: Normal rate and regular rhythm.      Pulses: Normal pulses.      Heart sounds: Normal heart sounds. No murmur heard.  Pulmonary:      Effort: Pulmonary effort is normal. No respiratory distress.      Breath sounds: Normal breath sounds. No stridor. No wheezing.   Abdominal:      General: Bowel sounds are normal.      Palpations: Abdomen is soft. There is no mass.      Tenderness: There is no abdominal tenderness. There is no rebound.   Musculoskeletal:      Cervical back: Normal range of motion and neck supple.      Comments: Unable to bear weight; L AKA   Skin:     General: Skin is warm and dry.   Neurological:      General: No focal deficit present.      Mental Status: He is alert and oriented to person, place, and time.   Psychiatric:         Mood and Affect: Mood normal.         Behavior: Behavior normal.          PAT AIRWAY:   Airway:     Mallampati::  II    Neck ROM::  Full   Mutliple missing teeth; full upper denture      Visit Vitals  /65   Pulse 86   Temp 34.6 °C (94.3 °F)   Resp 20   SpO2 100%   Smoking Status Former        DASI Risk Score    No data to display       Caprini DVT Assessment    No data to display       Modified Frailty Index    No data to display       CHADS2 Stroke Risk  Current as of 27 minutes ago        N/A 3 - 100%: High Risk   2 - 3%: Medium Risk   0 - 2%: Low Risk     Last Change: N/A          This score determines the patient's risk of having a stroke if the patient has atrial fibrillation.         This score is not applicable to this patient. Components are not calculated.          Revised Cardiac Risk Index    No data to display       Apfel Simplified Score    No data to display       Risk Analysis Index Results This Encounter    No data found in the last 1 encounters.       LABS:    Lab Results   Component Value Date    WBC 8.5 10/26/2023    HGB 9.1 (L) 10/26/2023    HCT 28.4 (L) 10/26/2023    MCV 89 10/26/2023     10/26/2023      Lab Results   Component Value Date    GLUCOSE 124 (H) 10/26/2023    CALCIUM 8.5 (L) 10/26/2023     (L) 10/26/2023    K 5.2 10/26/2023    CO2 25 10/26/2023     10/26/2023    BUN 76 (H) 10/26/2023    CREATININE 2.84 (H) 10/26/2023      Coag 10/26/23  PT 11.3  INR 1.0  Ptt 30    UA 10/26/23  Proteinuria 2+(pt with h/o diabetes)  No hematurie  No Leukocyte esterase       EKG 10/26/23  NSR  RBBB  Abnormal EKG  Vent rate = 85 bpm    5/11/23 cardiac cath  Coronary Lesion Summary:  Vessel      Stenosis   Vessel Segment  Left Main 50% stenosis     entire  LAD       70% stenosis       mid  OM 2      95% stenosis proximal to mid    Recommendations:  Due to the moderate left main disease and the severe mid LAD disease, patient  will be evaluated in the near future for a MID-CAB for more complete  revascularization.     ____________________________________________________  CONCLUSIONS:  1. Successful OM2 stenting.       ECHO 7/24/23  CONCLUSIONS:  1. Left ventricular systolic function is normal with a 60-65% estimated ejection fraction.  2. Spectral Doppler shows an abnormal pattern of left ventricular diastolic filling.  3. Compared with the prior exam from 4/28/2023 ( [facility]) the Mild-Mod MR and segmental wall motion abnormalities previously seen are not appreciated on todays study. Note that OM2 was stented on 5/11/2023.    Cardiac cath 8/29/23  Cardiac Cath Transition of Care Summary:  Post Procedure Diagnosis: None.  Blood Loss:               Estimated blood loss during  the procedure was 25 mls.  Specimens Removed:        Number of specimen(s) removed: none.     ____________________________________________________  CONCLUSIONS:  1. Right critical limb ischemia, [place] class V.  2. Successful PTA_DCB of the entire right superficial femoral artery.  3. Aspirin 81 mg daily and clopidogrel 75 mg daily.       7/18/23 MR angio neck  Right carotid vessels:  There is expected flow signal in the  visualized portion of the common carotid artery.  There is diminished  flow signal in the bifurcation and proximal internal carotid artery,  however this is due at least in part to motion.     Left carotid vessels:   There is expected flow signal in the  visualized portion of the common carotid artery.  There is diminished  flow signal in the bifurcation and proximal internal carotid artery,  due at least in part to motion     Vertebral vessels:   Diminished flow signal throughout the right  vertebral artery may be secondary to congenital hypoplasia or  proximal narrowing. The visualized left vertebral artery demonstrates  expected flow signal.     IMPRESSION:  Evaluation is limited by artifact, the patient's known carotid  stenoses are not well characterized.    US carotid 5/3/23  CONCLUSIONS:  Right Carotid: Findings are consistent with greater than 70% stenosis of the right proximal ICA. Post stenotic turbulent flow seen by color Doppler. There are elevated velocities in the right ECA that are suggestive of disease. There is a >50% stenosis noted in the right external carotid artery. No evidence of hemodynamically significant stenosis of the right common carotid artery. The right vertebral artery demonstrates a high resistance waveform which may be suggestive of a more distal stenosis or occlusion. No evidence of hemodynamically significant stenosis in the right subclavian artery.  Left Carotid: Findings are consistent with greater than 70% stenosis of the left proximal ICA. Post stenotic  turbulent flow seen by color Doppler. There are elevated velocities in the left ECA that are suggestive of disease. There is a >50% stenosis noted in the left external carotid artery. No evidence of hemodynamically significant stenosis of the left common carotid artery. The left vertebral artery is patent with antegrade flow. There are elevated velocities in the left subclavian artery that are suggestive of disease.    Assessment and Plan:     Patient is a 58-year-old male scheduled for a Creation Bypass Graft Coronary Artery X2-3 LIMA VEIN with Dr. Delatorre on 11/13/23.  Patient has no active cardiac symptoms.   Patient denies any chest pain, tightness, heaviness, pressure, radiating pain, palpitations, irregular heartbeats, lightheadedness, cough, congestion, shortness of breath, SUAZO, PND, near syncope, weight loss or gain.   Functional capacity is quite limited due to being wheelchair bound.     RCRI    3 (type of surgery, insulin tx, crt > 2.0, prior MI/ACS), 15 % Risk of MACE    Cardiac:  HTN - Hold torsemide 3 days prior to dos   Encouraged lifestyle modifications, low-sodium diet, and increase activity as tolerated.  Monitor BP and follow up with managing physician for readings sustaining >140/90.     HLD - cont statin on dos     CAD - OM2 stent placed in May 2023  Left Main 50% stenosis     entire  LAD       70% stenosis       mid  OM 2      95% stenosis proximal to mid    Continus ASA 81mg during perioperative period.  Hold Plavix 7 days prior to dos.     Endocrine:  DMII - hold metformin 3 days prior to dos; 1/2 dose of lantus the night before surgery     Renal:  CKD  7/30/23 Crt 2.90; GFR 24  10/26/23 Crt 2.84; GFR 25    Neuro:  CVA - 2020 - Plavix Stoppage instructions pending from Dr. Singh    Vascular:  PAD - carotid stenosis and L AKA (2020)  Stoppage instructions pending from Dr. Singh    11/2/23 Dr. Elkins provided stoppage instructions - ok to hold Plavix 7 days prior to dos (which is what pt was  instructed to do at time of PAT visit last week)    Hematology:  Anemia  8/22/23 H/H 10.1/31.7%  10/26/23 H/H 9.1/28.4%    Patient instructed to ambulate as soon as possible postoperatively to decrease thromboembolic risk.   Initiate mechanical DVT prophylaxis as soon as possible and initiate chemical prophylaxis when deemed safe from a bleeding standpoint post surgery.     LABS: CBC, BMP, T&S, UA flex, Coag, CXR, MRSA and EKG ordered. Lab results reviewed and show anemia (stable), hyponatremia, CKDIV.    Followup: MRSA and CXR pending    STOP BANG: male, >50, obese = 3    Caprini: 5    Addendum 10/30/23:  CXR is reviewed and wnl    Narrative & Impression   Interpreted By:  Alisson Paz,   STUDY:  Chest, 2 views.      INDICATION:  Signs/Symptoms:preop protocol.      COMPARISON:  CT chest 09/18/2018. Chest radiograph 07/23/2023.      ACCESSION NUMBER(S):  [id number]      ORDERING CLINICIAN:  PATRICK VILLARREAL      FINDINGS:  The cardiomediastinal silhouette size is within normal limits.      There is no focal consolidation, edema or pneumothorax.  No sizeable pleural effusion.      IMPRESSION:  1. No acute cardiopulmonary process.           Risk assessment complete.  Patient is scheduled for a high surgical risk procedure.      Inpatient admission may be the most appropriate care setting for this patient’s surgery. This recommendation is based on thorough review of the patient’s medical records, discussion with the patient, and takes into consideration the patient’s age, medical comorbidities, and mobility impairment.  This allows for in-hospital perioperative monitoring of the patient’s vital signs, hematocrit and electrolytes, and assessment of their potential need for post-acute skilled nursing care.      Preoperative medication instructions were provided and reviewed with the patient.  Any additional testing or evaluation was explained to the patient.  Nothing by mouth instructions were discussed and patient's  questions were answered prior to conclusion to this encounter.  Patient verbalized understanding of preoperative instructions given in preadmission testing; discharge instructions available in EMR.    This note was dictated by a speech recognition.  Minor errors may have been detected in a speech recognition.

## 2023-10-26 NOTE — CPM/PAT H&P
"[facility]/Formerly [facility] Evaluation       Name: Humphrey Waddell (Humphrey Waddell)  /Age: 1965/58 y.o.       Date of Consult: 10/26/23    Referring Provider: Dr. Delatorre    Surgery, Date, and Length: Creation Bypass Graft Coronary Artery X2-3 LIMA VEIN , 23, 330MIN    Humphrey Waddell is a 58 year-old male who presents to the [facility] for perioperative risk assessment prior to surgery.    Patient presents with a primary diagnosis of CAD.  coronary angiogram from May 2023 Dr. Livingston performed this study [place] [facility] [place]. This was in the setting of an acute coronary syndrome, and it revealed a left main stenosis of 50%, a mid LAD lesion of 70%, and a circumflex lesion of 95%. He underwent successful stenting of the OM 2. An echo from July demonstrates normal ventricular function with an ejection fraction of 60 to 65%. On this study there was no significant valvular abnormality, no mitral regurgitation and no aortic valve disease.    Per Dr. Delatorre's note:  \"He has many comorbidities and risk factors that make cardiac surgery problematic. The biggest concerns would be his lack of mobility due to his above-knee amputation. He does not ambulate despite having a prosthesis. He is able to transfer from the bed to wheelchair but that is about the limit of his physical capability. He is unable to stand. Secondly he has bilateral carotid stenoses of 70% or greater. He has chronic renal insufficiency and has had a prior stroke. All of these factors together suggest a significant risk of morbidity or mortality with coronary bypass surgery that I would estimate at 3 to 5%. \"       This note was created in part upon personal review of patient's medical records.      Patient is scheduled to have Creation Bypass Graft Coronary Artery X2-3 LIMA VEIN       Pt denies any past history of anesthetic complications such as PONV, awareness, prolonged sedation, dental damage, aspiration, cardiac arrest, difficult intubation, difficult I.V. access or unexpected " hospital admissions.  NO malignant hyperthermia and or pseudocholinesterase deficiency.  +history of blood transfusions ; 2023 due to anemia    The patient is not a [employer] and will accept blood and blood products if medically indicated.   Type and screen sent.      Past Medical History:   Diagnosis Date    Above-knee amputation of left lower extremity (CMS/Formerly [facility])     Adverse effect of anesthesia     confusion    Anemia     CAD (coronary artery disease)     Carotid artery disease (CMS/Formerly [facility])     bilateral    CVA (cerebral vascular accident) (CMS/Formerly [facility]) 2020    Depression     DM (diabetes mellitus) (CMS/Formerly [facility])     type 2 with neuropathy    GERD (gastroesophageal reflux disease)     High cholesterol     HTN (hypertension)     Leg ulcer (CMS/Formerly [facility])     chronic right lower extremity    Neuropathy     OA (osteoarthritis)     PAD (peripheral artery disease) (CMS/Formerly [facility])        Past Surgical History:   Procedure Laterality Date    CT HEAD ANGIO W AND WO IV CONTRAST  06/14/2021    CT HEAD ANGIO W AND WO IV CONTRAST 6/14/2021 [facility] [place] [facility]    CT NECK ANGIO W AND WO IV CONTRAST  06/14/2021    CT NECK ANGIO W AND WO IV CONTRAST 6/14/2021 [facility] [place]    LEG SURGERY Right     MR HEAD ANGIO WO IV CONTRAST  12/11/2012    MR HEAD ANGIO WO IV CONTRAST [facility]    MR HEAD ANGIO WO IV CONTRAST  06/12/2021    MR HEAD ANGIO WO IV CONTRAST [facility]    MR HEAD ANGIO WO IV CONTRAST  05/25/2021    MR HEAD ANGIO WO IV CONTRAST [facility] INPATIENT LEGAC[facility]    MR NECK ANGIO WO IV CONTRAST  07/18/2023    MR NECK ANGIO WO IV CONTRAST 7/18/2023 [facility] [id number] MRI    OTHER SURGICAL HISTORY  07/07/2021    Knee reconstruction    OTHER SURGICAL HISTORY  07/07/2021    Lower extremity amputation above knee    TOTAL SHOULDER ARTHROPLASTY Left 2020       Patient Sexual activity questions deferred to the physician.    Family History   Problem Relation Name Age of Onset    Other (cardiac disorder) Mother      Hypertension Mother       Esophageal cancer Mother      Hearing loss Father      Hypertension Father      Heart disease Father      COPD Sister       Social History     Socioeconomic History    Marital status:      Spouse name: Not on file    Number of children: Not on file    Years of education: Not on file    Highest education level: Not on file   Occupational History    Not on file   Tobacco Use    Smoking status: Former     Types: Cigars     Quit date: 2023     Years since quittin.5    Smokeless tobacco: Never   Vaping Use    Vaping Use: Never used   Substance and Sexual Activity    Alcohol use: Not Currently    Drug use: Not Currently     Types: Marijuana    Sexual activity: Defer   Other Topics Concern    Not on file   Social History Narrative    Not on file     Social Determinants of Health     Financial Resource Strain: Not on file   Food Insecurity: Not on file   Transportation Needs: Not on file   Physical Activity: Not on file   Stress: Not on file   Social Connections: Not on file   Intimate Partner Violence: Not on file   Housing Stability: Not on file        No Known Allergies    Prior to Admission medications    Medication Sig Start Date End Date Taking? Authorizing Provider   acetaminophen (Tylenol 8 HOUR) 650 mg ER tablet Take 1 tablet (650 mg) by mouth every 8 hours if needed for mild pain (1 - 3). Do not crush, chew, or split.    Historical Provider, MD   amLODIPine (Norvasc) 10 mg tablet Take 1 tablet (10 mg) by mouth once daily.    Historical Provider, MD   apixaban (Eliquis) 2.5 mg tablet Take 1 tablet (2.5 mg) by mouth 2 times a day. 22   Historical Provider, MD   aspirin 81 mg chewable tablet Chew 1 tablet (81 mg) once daily. 21   Historical Provider, MD   atorvastatin (Lipitor) 80 mg tablet Take 1 tablet (80 mg) by mouth once daily. Last rx prior to next refills 10/3/23 10/2/24  Gabriel Higgins MD   blood sugar diagnostic (True Metrix Glucose Test Strip) strip 4 times a day. 21    Historical Provider, MD   carvedilol (Coreg) 12.5 mg tablet TAKE 1 TABLET BY MOUTH TWO TIMES A DAY 7/31/23 7/30/24  CONG Calvo-CNP   cephalexin (Keflex) 500 mg capsule TAKE 1 CAPSULE BY MOUTH 2 TIMES A DAY 5/30/23 5/29/24  Blake Grover MD   chlorhexidine (Peridex) 0.12 % solution Swish for 30 seconds and spit 15mL of solution the night before and morning of surgery 10/26/23   Mariama Schmitt PA-C   clopidogrel (Plavix) 75 mg tablet Take 1 tablet (75 mg) by mouth once daily. 10/3/23 10/2/24  Gabriel Higgins MD   diphenhydrAMINE (Sominex) 25 mg tablet Take 2 tablets (50 mg) by mouth once daily at bedtime.    Historical Provider, MD   ergocalciferol (Vitamin D-2) 1.25 MG (72736 UT) capsule Take 1 capsule (50,000 Units) by mouth 1 (one) time per week. 6/29/21   Historical Provider, MD   escitalopram (Lexapro) 10 mg tablet Take 1 tablet (10 mg) by mouth once daily. 11/16/22   Historical Provider, MD   FreeStyle Suzy reader (FreeStyle Suzy 2 [place]) misc Use as instructed 4/27/23   Mily Leung, DO   FreeStyle Suzy sensor system (FreeStyle Suzy 2 Sensor) kit Use as instructed 4/27/23   Mily Leung, DO   FreeStyle Test strip TEST 3 TIMES DAILY. 7/7/21   Historical Provider, MD   furosemide (Lasix) 20 mg tablet Take 3 tablets (60 mg) by mouth once daily.    Historical Provider, MD   gabapentin (Neurontin) 300 mg capsule Take 2 capsules (600 mg) by mouth 3 times a day. 11/16/22   Historical Provider, MD   hydrALAZINE (Apresoline) 50 mg tablet TAKE 1 TABLET BY MOUTH EVERY 8 HOURS  Patient taking differently: Take 1 tablet (50 mg) by mouth 2 times a day. 10/3/23 10/2/24  Gabriel Higgins MD   insulin glargine (Lantus Solostar U-100 Insulin) 100 unit/mL (3 mL) pen Inject 4 Units under the skin once daily at bedtime. 5/19/21   Historical Provider, MD   labetalol (Normodyne) 100 mg tablet Take 1 tablet (100 mg) by mouth every 6 hours. 12/6/22   Historical Provider, MD   melatonin 3 mg tablet TAKE 1  TABLET BY MOUTH AT BEDTIME AT AT 6:00 PM  Patient taking differently: Take 2 mg by mouth once daily at bedtime. 7/31/23 7/30/24  CONG Calvo-CNP   metFORMIN (Glucophage) 500 mg tablet Take 1 tablet (500 mg) by mouth 2 times a day. 1/1/23   Historical Provider, MD   multivitamin-calcium carb (Flintstones Plus Calcium) tablet,chewable Chew 1 tablet once daily.    Historical Provider, MD   pantoprazole (ProtoNix) 40 mg EC tablet Take 1 tablet (40 mg) by mouth once daily. 7/28/23   Historical Provider, MD   torsemide (Demadex) 20 mg tablet TAKE 1 TABLET BY MOUTH ONCE DAILY 10/3/23 10/2/24  Gabriel Higgins MD        PAT ROS:   Constitutional:    Wheelchair bound   no fever   no chills   no unexpected weight change  Neuro/Psych:    no numbness   no weakness   no light-headedness   no confusion  Eyes:    no discharge   no pain   no vision loss   no diplopia   no visual disturbance  Ears:    no ear pain   no hearing loss   no tinnitus  Nose:    no nasal discharge   no sinus congestion   no epistaxis  Mouth:    no dental issues   no mouth pain   no oral bleeding   no mouth lesions  Throat:    no throat pain   no dysphagia  Neck:    no neck pain   no neck stiffness  Cardio:    no chest pain   no palpitations   no peripheral edema   no dyspnea   no SUAZO  Respiratory:    no cough   no wheezing   no hemoptysis   no shortness of breath  Endocrine:    no cold intolerance   no heat intolerance  GI:    no abdominal distention   no abdominal pain   no constipation   no diarrhea   no nausea   no vomiting   no blood in stool  :    no difficulty urinating   no dysuria   no oliguria  Musculoskeletal:    L AKA with prosthesis in place; b/l hand pain   no arthralgias   myalgias   no decreased ROM  Hematologic:    bruises/bleeds easily (Plavix + ASA)   no excessive bleeding   history of blood transfusion (anemia)   no blood clots  Skin:   Right anterior shin with two small abrasions from wheelchair footrest   no skin changes    sores/wound   no rash      Physical Exam  Constitutional:       General: He is not in acute distress.     Appearance: Normal appearance.   HENT:      Head: Normocephalic and atraumatic.      Nose: Nose normal. No congestion.      Mouth/Throat:      Mouth: Mucous membranes are moist.      Pharynx: No posterior oropharyngeal erythema.   Eyes:      Extraocular Movements: Extraocular movements intact.      Conjunctiva/sclera: Conjunctivae normal.   Cardiovascular:      Rate and Rhythm: Normal rate and regular rhythm.      Pulses: Normal pulses.      Heart sounds: Normal heart sounds. No murmur heard.  Pulmonary:      Effort: Pulmonary effort is normal. No respiratory distress.      Breath sounds: Normal breath sounds. No stridor. No wheezing.   Abdominal:      General: Bowel sounds are normal.      Palpations: Abdomen is soft. There is no mass.      Tenderness: There is no abdominal tenderness. There is no rebound.   Musculoskeletal:      Cervical back: Normal range of motion and neck supple.      Comments: Unable to bear weight; L AKA   Skin:     General: Skin is warm and dry.   Neurological:      General: No focal deficit present.      Mental Status: He is alert and oriented to person, place, and time.   Psychiatric:         Mood and Affect: Mood normal.         Behavior: Behavior normal.          PAT AIRWAY:   Airway:     Mallampati::  II    Neck ROM::  Full   Mutliple missing teeth; full upper denture      Visit Vitals  /65   Pulse 86   Temp 34.6 °C (94.3 °F)   Resp 20   SpO2 100%   Smoking Status Former        DASI Risk Score    No data to display       Caprini DVT Assessment    No data to display       Modified Frailty Index    No data to display       CHADS2 Stroke Risk  Current as of 27 minutes ago        N/A 3 - 100%: High Risk   2 - 3%: Medium Risk   0 - 2%: Low Risk     Last Change: N/A          This score determines the patient's risk of having a stroke if the patient has atrial fibrillation.         This score is not applicable to this patient. Components are not calculated.          Revised Cardiac Risk Index    No data to display       Apfel Simplified Score    No data to display       Risk Analysis Index Results This Encounter    No data found in the last 1 encounters.       LABS:    Lab Results   Component Value Date    WBC 8.5 10/26/2023    HGB 9.1 (L) 10/26/2023    HCT 28.4 (L) 10/26/2023    MCV 89 10/26/2023     10/26/2023      Lab Results   Component Value Date    GLUCOSE 124 (H) 10/26/2023    CALCIUM 8.5 (L) 10/26/2023     (L) 10/26/2023    K 5.2 10/26/2023    CO2 25 10/26/2023     10/26/2023    BUN 76 (H) 10/26/2023    CREATININE 2.84 (H) 10/26/2023      Coag 10/26/23  PT 11.3  INR 1.0  Ptt 30    UA 10/26/23  Proteinuria 2+(pt with h/o diabetes)  No hematurie  No Leukocyte esterase       EKG 10/26/23  NSR  RBBB  Abnormal EKG  Vent rate = 85 bpm    5/11/23 cardiac cath  Coronary Lesion Summary:  Vessel      Stenosis   Vessel Segment  Left Main 50% stenosis     entire  LAD       70% stenosis       mid  OM 2      95% stenosis proximal to mid    Recommendations:  Due to the moderate left main disease and the severe mid LAD disease, patient  will be evaluated in the near future for a MID-CAB for more complete  revascularization.     ____________________________________________________  CONCLUSIONS:  1. Successful OM2 stenting.       ECHO 7/24/23  CONCLUSIONS:  1. Left ventricular systolic function is normal with a 60-65% estimated ejection fraction.  2. Spectral Doppler shows an abnormal pattern of left ventricular diastolic filling.  3. Compared with the prior exam from 4/28/2023 ( [facility]) the Mild-Mod MR and segmental wall motion abnormalities previously seen are not appreciated on todays study. Note that OM2 was stented on 5/11/2023.    Cardiac cath 8/29/23  Cardiac Cath Transition of Care Summary:  Post Procedure Diagnosis: None.  Blood Loss:               Estimated blood loss during  the procedure was 25 mls.  Specimens Removed:        Number of specimen(s) removed: none.     ____________________________________________________  CONCLUSIONS:  1. Right critical limb ischemia, Walsh class V.  2. Successful PTA_DCB of the entire right superficial femoral artery.  3. Aspirin 81 mg daily and clopidogrel 75 mg daily.       7/18/23 MR angio neck  Right carotid vessels:  There is expected flow signal in the  visualized portion of the common carotid artery.  There is diminished  flow signal in the bifurcation and proximal internal carotid artery,  however this is due at least in part to motion.     Left carotid vessels:   There is expected flow signal in the  visualized portion of the common carotid artery.  There is diminished  flow signal in the bifurcation and proximal internal carotid artery,  due at least in part to motion     Vertebral vessels:   Diminished flow signal throughout the right  vertebral artery may be secondary to congenital hypoplasia or  proximal narrowing. The visualized left vertebral artery demonstrates  expected flow signal.     IMPRESSION:  Evaluation is limited by artifact, the patient's known carotid  stenoses are not well characterized.    US carotid 5/3/23  CONCLUSIONS:  Right Carotid: Findings are consistent with greater than 70% stenosis of the right proximal ICA. Post stenotic turbulent flow seen by color Doppler. There are elevated velocities in the right ECA that are suggestive of disease. There is a >50% stenosis noted in the right external carotid artery. No evidence of hemodynamically significant stenosis of the right common carotid artery. The right vertebral artery demonstrates a high resistance waveform which may be suggestive of a more distal stenosis or occlusion. No evidence of hemodynamically significant stenosis in the right subclavian artery.  Left Carotid: Findings are consistent with greater than 70% stenosis of the left proximal ICA. Post stenotic  turbulent flow seen by color Doppler. There are elevated velocities in the left ECA that are suggestive of disease. There is a >50% stenosis noted in the left external carotid artery. No evidence of hemodynamically significant stenosis of the left common carotid artery. The left vertebral artery is patent with antegrade flow. There are elevated velocities in the left subclavian artery that are suggestive of disease.    Assessment and Plan:     Patient is a 58-year-old male scheduled for a Creation Bypass Graft Coronary Artery X2-3 LIMA VEIN with Dr. Delatorre on 11/13/23.  Patient has no active cardiac symptoms.   Patient denies any chest pain, tightness, heaviness, pressure, radiating pain, palpitations, irregular heartbeats, lightheadedness, cough, congestion, shortness of breath, SUAZO, PND, near syncope, weight loss or gain.   Functional capacity is quite limited due to being wheelchair bound.     RCRI    3 (type of surgery, insulin tx, crt > 2.0, prior MI/ACS), 15 % Risk of MACE    Cardiac:  HTN - Hold torsemide 3 days prior to dos   Encouraged lifestyle modifications, low-sodium diet, and increase activity as tolerated.  Monitor BP and follow up with managing physician for readings sustaining >140/90.     HLD - cont statin on dos     CAD - OM2 stent placed in May 2023  Left Main 50% stenosis     entire  LAD       70% stenosis       mid  OM 2      95% stenosis proximal to mid    Continus ASA 81mg during perioperative period.  Hold Plavix 7 days prior to dos.     Endocrine:  DMII - hold metformin 3 days prior to dos; 1/2 dose of lantus the night before surgery     Renal:  CKD  7/30/23 Crt 2.90; GFR 24  10/26/23 Crt 2.84; GFR 25    Neuro:  CVA - 2020 - Plavix Stoppage instructions pending from Dr. Singh    Vascular:  PAD - carotid stenosis and L AKA (2020)  Stoppage instructions pending from Dr. Singh    11/2/23 Dr. Elkins provided stoppage instructions - ok to hold Plavix 7 days prior to dos (which is what pt was  instructed to do at time of PAT visit last week)    Hematology:  Anemia  8/22/23 H/H 10.1/31.7%  10/26/23 H/H 9.1/28.4%    Patient instructed to ambulate as soon as possible postoperatively to decrease thromboembolic risk.   Initiate mechanical DVT prophylaxis as soon as possible and initiate chemical prophylaxis when deemed safe from a bleeding standpoint post surgery.     LABS: CBC, BMP, T&S, UA flex, Coag, CXR, MRSA and EKG ordered. Lab results reviewed and show anemia (stable), hyponatremia, CKDIV.    Followup: MRSA and CXR pending    STOP BANG: male, >50, obese = 3    Caprini: 5    Addendum 10/30/23:  CXR is reviewed and wnl    Narrative & Impression   Interpreted By:  Alisson Paz,   STUDY:  Chest, 2 views.      INDICATION:  Signs/Symptoms:preop protocol.      COMPARISON:  CT chest 09/18/2018. Chest radiograph 07/23/2023.      ACCESSION NUMBER(S):  [id number]      ORDERING CLINICIAN:  PATRICK VILLARREAL      FINDINGS:  The cardiomediastinal silhouette size is within normal limits.      There is no focal consolidation, edema or pneumothorax.  No sizeable pleural effusion.      IMPRESSION:  1. No acute cardiopulmonary process.           Risk assessment complete.  Patient is scheduled for a high surgical risk procedure.      Inpatient admission may be the most appropriate care setting for this patient’s surgery. This recommendation is based on thorough review of the patient’s medical records, discussion with the patient, and takes into consideration the patient’s age, medical comorbidities, and mobility impairment.  This allows for in-hospital perioperative monitoring of the patient’s vital signs, hematocrit and electrolytes, and assessment of their potential need for post-acute skilled nursing care.      Preoperative medication instructions were provided and reviewed with the patient.  Any additional testing or evaluation was explained to the patient.  Nothing by mouth instructions were discussed and patient's  questions were answered prior to conclusion to this encounter.  Patient verbalized understanding of preoperative instructions given in preadmission testing; discharge instructions available in EMR.    This note was dictated by a speech recognition.  Minor errors may have been detected in a speech recognition.

## 2023-10-27 DIAGNOSIS — I25.119 CORONARY ARTERY DISEASE INVOLVING NATIVE HEART WITH ANGINA PECTORIS, UNSPECIFIED VESSEL OR LESION TYPE (CMS-HCC): ICD-10-CM

## 2023-10-28 LAB — STAPHYLOCOCCUS SPEC CULT: NORMAL

## 2023-10-30 RX ORDER — HYDRALAZINE HYDROCHLORIDE 50 MG/1
50 TABLET, FILM COATED ORAL 2 TIMES DAILY
Qty: 180 TABLET | Refills: 3 | Status: SHIPPED | OUTPATIENT
Start: 2023-10-30 | End: 2023-11-24 | Stop reason: HOSPADM

## 2023-10-31 ENCOUNTER — OFFICE VISIT (OUTPATIENT)
Dept: PRIMARY CARE | Facility: CLINIC | Age: 58
End: 2023-10-31
Payer: COMMERCIAL

## 2023-10-31 VITALS
RESPIRATION RATE: 18 BRPM | DIASTOLIC BLOOD PRESSURE: 66 MMHG | SYSTOLIC BLOOD PRESSURE: 126 MMHG | HEART RATE: 79 BPM | OXYGEN SATURATION: 96 %

## 2023-10-31 DIAGNOSIS — Z01.818 PREOPERATIVE CLEARANCE: Primary | ICD-10-CM

## 2023-10-31 DIAGNOSIS — I77.9 PERIPHERAL ARTERIAL OCCLUSIVE DISEASE (CMS-HCC): ICD-10-CM

## 2023-10-31 DIAGNOSIS — E11.40 TYPE 2 DIABETES MELLITUS WITH DIABETIC NEUROPATHY, WITH LONG-TERM CURRENT USE OF INSULIN (MULTI): ICD-10-CM

## 2023-10-31 DIAGNOSIS — N18.9 CHRONIC KIDNEY DISEASE, UNSPECIFIED CKD STAGE: ICD-10-CM

## 2023-10-31 DIAGNOSIS — Z79.4 TYPE 2 DIABETES MELLITUS WITH DIABETIC NEUROPATHY, WITH LONG-TERM CURRENT USE OF INSULIN (MULTI): ICD-10-CM

## 2023-10-31 DIAGNOSIS — E11.69 TYPE 2 DIABETES MELLITUS WITH OTHER SPECIFIED COMPLICATION, WITHOUT LONG-TERM CURRENT USE OF INSULIN (MULTI): ICD-10-CM

## 2023-10-31 PROBLEM — G47.00 INSOMNIA, UNSPECIFIED: Status: ACTIVE | Noted: 2021-06-04

## 2023-10-31 PROBLEM — I69.351 HEMIPLEGIA AND HEMIPARESIS FOLLOWING CEREBRAL INFARCTION AFFECTING RIGHT DOMINANT SIDE (MULTI): Status: ACTIVE | Noted: 2021-06-04

## 2023-10-31 PROBLEM — F41.9 ANXIETY DISORDER, UNSPECIFIED: Status: ACTIVE | Noted: 2021-06-04

## 2023-10-31 PROBLEM — E78.5 HYPERLIPIDEMIA, UNSPECIFIED: Status: ACTIVE | Noted: 2021-06-04

## 2023-10-31 PROBLEM — R41.841 COGNITIVE COMMUNICATION DEFICIT: Status: ACTIVE | Noted: 2021-06-03

## 2023-10-31 PROBLEM — K85.90 ACUTE PANCREATITIS (HHS-HCC): Status: RESOLVED | Noted: 2023-10-18 | Resolved: 2023-10-31

## 2023-10-31 PROBLEM — Z86.16 PERSONAL HISTORY OF COVID-19: Status: ACTIVE | Noted: 2021-06-04

## 2023-10-31 PROBLEM — I10 ESSENTIAL (PRIMARY) HYPERTENSION: Status: ACTIVE | Noted: 2021-06-04

## 2023-10-31 PROBLEM — Z89.512 ACQUIRED ABSENCE OF LEFT LEG BELOW KNEE (MULTI): Status: ACTIVE | Noted: 2021-06-04

## 2023-10-31 LAB
EST. AVERAGE GLUCOSE BLD GHB EST-MCNC: 105 MG/DL
HBA1C MFR BLD: 5.3 %

## 2023-10-31 PROCEDURE — 3066F NEPHROPATHY DOC TX: CPT | Performed by: STUDENT IN AN ORGANIZED HEALTH CARE EDUCATION/TRAINING PROGRAM

## 2023-10-31 PROCEDURE — 3044F HG A1C LEVEL LT 7.0%: CPT | Performed by: STUDENT IN AN ORGANIZED HEALTH CARE EDUCATION/TRAINING PROGRAM

## 2023-10-31 PROCEDURE — 3078F DIAST BP <80 MM HG: CPT | Performed by: STUDENT IN AN ORGANIZED HEALTH CARE EDUCATION/TRAINING PROGRAM

## 2023-10-31 PROCEDURE — 99214 OFFICE O/P EST MOD 30 MIN: CPT | Performed by: STUDENT IN AN ORGANIZED HEALTH CARE EDUCATION/TRAINING PROGRAM

## 2023-10-31 PROCEDURE — 3074F SYST BP LT 130 MM HG: CPT | Performed by: STUDENT IN AN ORGANIZED HEALTH CARE EDUCATION/TRAINING PROGRAM

## 2023-10-31 PROCEDURE — 3008F BODY MASS INDEX DOCD: CPT | Performed by: STUDENT IN AN ORGANIZED HEALTH CARE EDUCATION/TRAINING PROGRAM

## 2023-10-31 RX ORDER — ERGOCALCIFEROL 1.25 MG/1
50000 CAPSULE ORAL
Qty: 12 CAPSULE | Refills: 0 | Status: SHIPPED | OUTPATIENT
Start: 2023-10-31 | End: 2024-03-05 | Stop reason: SDUPTHER

## 2023-10-31 RX ORDER — IBUPROFEN 600 MG/1
TABLET ORAL
COMMUNITY
Start: 2023-10-16 | End: 2023-11-24 | Stop reason: HOSPADM

## 2023-10-31 ASSESSMENT — PATIENT HEALTH QUESTIONNAIRE - PHQ9
2. FEELING DOWN, DEPRESSED OR HOPELESS: NOT AT ALL
SUM OF ALL RESPONSES TO PHQ9 QUESTIONS 1 AND 2: 0
1. LITTLE INTEREST OR PLEASURE IN DOING THINGS: NOT AT ALL

## 2023-10-31 NOTE — ASSESSMENT & PLAN NOTE
Pt has an extensive PMHx with poor functional capacity   Cardiac Risk for tooth extraction is intermediate   He is medically optimized   Will need cardiac and vascular approval prior to stopping the ASA/Plavix the week prior due to recent revascularization of the R leg

## 2023-10-31 NOTE — ASSESSMENT & PLAN NOTE
Controlled  Continue torsemide 20 mg daily  Patient has stopped his Coreg, amlodipine and labetalol

## 2023-10-31 NOTE — PROGRESS NOTES
History Of Present Illness  Humphrey Waddell is a 58 y.o. male presents for clearance for teeth pulling.      PMHX: Carotid stenosis, CVA, Type II MI 2/2 anemia/GI bleeding (February 2023), DM Type II, HLD, PVD, HTN, Depression.   HFpEF, CAD,  anemia/GIB, PAD s/p Lt AKA, CKD stage (previous HD but currently not)     Cardiac concerns:  diffuse proximal LAD disease, mid-LAD 70% stenosis, RCA 70% stenosis, OM2 is 90% stenosis   Pt is planned to have CABG 11/13/23      Diabetes:   Under good control A1C 5.0% 7/22/23  Using lantus 4units daily       CKD Stage 3b  H&H 9.1 & 28.47  GFR 2.84 Cr 2.84       HTN   126/66 mmHg     No signs of GI Bleeds     Right lower Limb PSA  S/p revascularization of the right leg 8/29/23 PTA-DCB of the R SFA  Aspirin 81 mg daily, clopidogrel 75 mg daily, and atorvastatin 80 mg nightly.       Past Medical History  He has a past medical history of Above-knee amputation of left lower extremity (CMS/[facility]), Adverse effect of anesthesia, Anemia, CAD (coronary artery disease), Carotid artery disease (CMS/HCC), CVA (cerebral vascular accident) (CMS/HCC) (2020), Depression, DM (diabetes mellitus) (CMS/HCC), GERD (gastroesophageal reflux disease), High cholesterol, HTN (hypertension), Leg ulcer (CMS/HCC), Neuropathy, OA (osteoarthritis), and PAD (peripheral artery disease) (CMS/[facility]).    Surgical History  He has a past surgical history that includes Other surgical history (07/07/2021); Other surgical history (07/07/2021); CT angio head w and wo IV contrast (06/14/2021); CT angio neck w and wo IV contrast (06/14/2021); MR angio neck wo IV contrast (07/18/2023); MR angio head wo IV contrast (12/11/2012); MR angio head wo IV contrast (06/12/2021); MR angio head wo IV contrast (05/25/2021); Total shoulder arthroplasty (Left, 2020); and Leg Surgery (Right).     Social History  He reports that he quit smoking about 7 months ago. His smoking use included cigars. He has never used smokeless tobacco. He reports that he  does not currently use alcohol. He reports that he does not currently use drugs after having used the following drugs: Marijuana.    Family History  Family History   Problem Relation Name Age of Onset    Other (cardiac disorder) Mother      Hypertension Mother      Esophageal cancer Mother      Hearing loss Father      Hypertension Father      Heart disease Father      COPD Sister          Allergies  Patient has no known allergies.    Review of Systems     Physical Exam  Vitals reviewed.   Constitutional:       Appearance: Normal appearance.   Cardiovascular:      Rate and Rhythm: Normal rate and regular rhythm.      Heart sounds: No murmur heard.  Pulmonary:      Effort: Pulmonary effort is normal. No respiratory distress.      Breath sounds: Normal breath sounds. No wheezing.   Musculoskeletal:      Cervical back: Neck supple.      Left lower leg: No edema.      Comments: Left BKA   Right leg minor abrasions mid chin, mupirocin currently using      Neurological:      Mental Status: He is alert.          Last Recorded Vitals  /66   Pulse 79   Resp 18   SpO2 96%     Relevant Results    Current Outpatient Medications:     acetaminophen (Tylenol 8 HOUR) 650 mg ER tablet, Take 1 tablet (650 mg) by mouth every 8 hours if needed for mild pain (1 - 3). Do not crush, chew, or split., Disp: , Rfl:     aspirin 81 mg chewable tablet, Chew 1 tablet (81 mg) once daily., Disp: , Rfl:     atorvastatin (Lipitor) 80 mg tablet, Take 1 tablet (80 mg) by mouth once daily. Last rx prior to next refills, Disp: 90 tablet, Rfl: 1    blood sugar diagnostic (True Metrix Glucose Test Strip) strip, 4 times a day., Disp: , Rfl:     clopidogrel (Plavix) 75 mg tablet, Take 1 tablet (75 mg) by mouth once daily., Disp: , Rfl:     FreeStyle Suzy reader (FreeStyle Suzy 2 [place]) misc, Use as instructed, Disp: 1 each, Rfl: 0    FreeStyle Suzy sensor system (FreeStyle Suzy 2 Sensor) kit, Use as instructed, Disp: 1 each, Rfl: 0     FreeStyle Test strip, TEST 3 TIMES DAILY., Disp: , Rfl:     hydrALAZINE (Apresoline) 50 mg tablet, Take 1 tablet (50 mg) by mouth 2 times a day., Disp: 180 tablet, Rfl: 3    insulin glargine (Lantus Solostar U-100 Insulin) 100 unit/mL (3 mL) pen, Inject 4 Units under the skin once daily at bedtime., Disp: , Rfl:     melatonin 3 mg tablet, TAKE 1 TABLET BY MOUTH AT BEDTIME AT AT 6:00 PM (Patient taking differently: Take 0.5 tablets (1.5 mg) by mouth once daily at bedtime.), Disp: 30 tablet, Rfl: 1    multivitamin-calcium carb (Flintstones Plus Calcium) tablet,chewable, Chew 1 tablet once daily., Disp: , Rfl:     torsemide (Demadex) 20 mg tablet, TAKE 1 TABLET BY MOUTH ONCE DAILY, Disp: 90 tablet, Rfl: 3    chlorhexidine (Peridex) 0.12 % solution, Swish for 30 seconds and spit 15mL of solution the night before and morning of surgery (Patient not taking: Reported on 10/31/2023), Disp: 475 mL, Rfl: 0    ergocalciferol (Vitamin D-2) 1.25 MG (87311 UT) capsule, Take 1 capsule (50,000 Units) by mouth 1 (one) time per week., Disp: 12 capsule, Rfl: 0    ibuprofen 600 mg tablet, PLEASE SEE ATTACHED FOR DETAILED DIRECTIONS, Disp: , Rfl:     penicillin V (Veetid) 250 mg tablet, Take 1 tablet (250 mg) by mouth 4 times a day., Disp: , Rfl:        Assessment/Plan   Problem List Items Addressed This Visit             ICD-10-CM    Peripheral arterial occlusive disease (CMS/[facility]) I77.9     S/p revascularization of the right leg 8/29/23 PTA-DCB of the R SFA  Aspirin 81 mg daily, clopidogrel 75 mg daily, and atorvastatin 80 mg nightly.          Type 2 diabetes mellitus with diabetic neuropathy, unspecified (CMS/HCC) E11.40     Under good control A1C 5.0% 7/22/23  Using lantus 4units daily          Preoperative clearance - Primary Z01.818     Pt has an extensive PMHx with poor functional capacity   Cardiac Risk for tooth extraction is intermediate   He is medically optimized   Will need cardiac and vascular approval prior to stopping the  ASA/Plavix the week prior due to recent revascularization of the R leg          Other Visit Diagnoses         Codes    Chronic kidney disease, unspecified CKD stage     N18.9    Relevant Medications    ergocalciferol (Vitamin D-2) 1.25 MG (66777 UT) capsule    Type 2 diabetes mellitus with other specified complication, without long-term current use of insulin (CMS/[facility])     E11.69    Relevant Orders    Hemoglobin A1C                   Mily Leung DO

## 2023-10-31 NOTE — ASSESSMENT & PLAN NOTE
S/p revascularization of the right leg 8/29/23 PTA-DCB of the R SFA  Aspirin 81 mg daily, clopidogrel 75 mg daily, and atorvastatin 80 mg nightly.

## 2023-11-10 ENCOUNTER — ANESTHESIA EVENT (OUTPATIENT)
Dept: OPERATING ROOM | Facility: HOSPITAL | Age: 58
End: 2023-11-10
Payer: COMMERCIAL

## 2023-11-13 ENCOUNTER — ANESTHESIA (OUTPATIENT)
Dept: OPERATING ROOM | Facility: HOSPITAL | Age: 58
End: 2023-11-13
Payer: COMMERCIAL

## 2023-11-13 ENCOUNTER — APPOINTMENT (OUTPATIENT)
Dept: RADIOLOGY | Facility: HOSPITAL | Age: 58
End: 2023-11-13
Payer: COMMERCIAL

## 2023-11-13 ENCOUNTER — HOSPITAL ENCOUNTER (OUTPATIENT)
Dept: CARDIOLOGY | Facility: HOSPITAL | Age: 58
Discharge: HOME | End: 2023-11-13
Payer: COMMERCIAL

## 2023-11-13 ENCOUNTER — ANESTHESIA EVENT (OUTPATIENT)
Dept: OPERATING ROOM | Facility: HOSPITAL | Age: 58
End: 2023-11-13
Payer: COMMERCIAL

## 2023-11-13 ENCOUNTER — APPOINTMENT (OUTPATIENT)
Dept: CARDIOLOGY | Facility: HOSPITAL | Age: 58
End: 2023-11-13
Payer: COMMERCIAL

## 2023-11-13 ENCOUNTER — HOSPITAL ENCOUNTER (INPATIENT)
Facility: HOSPITAL | Age: 58
LOS: 11 days | Discharge: ANOTHER HOSPITAL | End: 2023-11-24
Attending: THORACIC SURGERY (CARDIOTHORACIC VASCULAR SURGERY) | Admitting: THORACIC SURGERY (CARDIOTHORACIC VASCULAR SURGERY)
Payer: COMMERCIAL

## 2023-11-13 DIAGNOSIS — I25.10 ARTERIOSCLEROSIS OF CORONARY ARTERY: ICD-10-CM

## 2023-11-13 DIAGNOSIS — R07.9 CHEST PAIN, UNSPECIFIED TYPE: ICD-10-CM

## 2023-11-13 DIAGNOSIS — I25.10 CAD, MULTIPLE VESSEL: ICD-10-CM

## 2023-11-13 DIAGNOSIS — T81.10XA POSTPROCEDURAL SHOCK UNSPECIFIED, INITIAL ENCOUNTER: ICD-10-CM

## 2023-11-13 DIAGNOSIS — I21.4 NSTEMI, INITIAL EPISODE OF CARE (MULTI): ICD-10-CM

## 2023-11-13 DIAGNOSIS — R57.8 OTHER SHOCK (MULTI): ICD-10-CM

## 2023-11-13 DIAGNOSIS — I25.10 ATHEROSCLEROSIS OF NATIVE CORONARY ARTERY OF NATIVE HEART WITHOUT ANGINA PECTORIS: ICD-10-CM

## 2023-11-13 DIAGNOSIS — Z95.1 S/P CABG (CORONARY ARTERY BYPASS GRAFT): Primary | ICD-10-CM

## 2023-11-13 LAB
ABO GROUP (TYPE) IN BLOOD: NORMAL
ALBUMIN SERPL BCP-MCNC: 2.5 G/DL (ref 3.4–5)
ALBUMIN SERPL BCP-MCNC: 2.6 G/DL (ref 3.4–5)
ALBUMIN SERPL BCP-MCNC: 2.7 G/DL (ref 3.4–5)
ANION GAP BLDA CALCULATED.4IONS-SCNC: 10 MMO/L (ref 10–25)
ANION GAP BLDA CALCULATED.4IONS-SCNC: 10 MMO/L (ref 10–25)
ANION GAP BLDA CALCULATED.4IONS-SCNC: 13 MMO/L (ref 10–25)
ANION GAP BLDA CALCULATED.4IONS-SCNC: 13 MMO/L (ref 10–25)
ANION GAP BLDA CALCULATED.4IONS-SCNC: 9 MMO/L (ref 10–25)
ANION GAP BLDA CALCULATED.4IONS-SCNC: 9 MMO/L (ref 10–25)
ANION GAP SERPL CALC-SCNC: 11 MMOL/L (ref 10–20)
ANION GAP SERPL CALC-SCNC: 16 MMOL/L (ref 10–20)
ANION GAP SERPL CALC-SCNC: 16 MMOL/L (ref 10–20)
APTT PPP: 22 SECONDS (ref 27–38)
APTT PPP: 29 SECONDS (ref 27–38)
BASE EXCESS BLDA CALC-SCNC: -2.6 MMOL/L (ref -2–3)
BASE EXCESS BLDA CALC-SCNC: -3.8 MMOL/L (ref -2–3)
BASE EXCESS BLDA CALC-SCNC: -4.4 MMOL/L (ref -2–3)
BASE EXCESS BLDA CALC-SCNC: -4.8 MMOL/L (ref -2–3)
BASE EXCESS BLDA CALC-SCNC: -4.9 MMOL/L (ref -2–3)
BASE EXCESS BLDA CALC-SCNC: -8.3 MMOL/L (ref -2–3)
BODY TEMPERATURE: 37 DEGREES CELSIUS
BUN SERPL-MCNC: 44 MG/DL (ref 6–23)
BUN SERPL-MCNC: 45 MG/DL (ref 6–23)
BUN SERPL-MCNC: 45 MG/DL (ref 6–23)
CA-I BLD-SCNC: 1.17 MMOL/L (ref 1.1–1.33)
CA-I BLDA-SCNC: 1.13 MMOL/L (ref 1.1–1.33)
CA-I BLDA-SCNC: 1.15 MMOL/L (ref 1.1–1.33)
CA-I BLDA-SCNC: 1.16 MMOL/L (ref 1.1–1.33)
CA-I BLDA-SCNC: 1.21 MMOL/L (ref 1.1–1.33)
CA-I BLDA-SCNC: 1.24 MMOL/L (ref 1.1–1.33)
CA-I BLDA-SCNC: 1.33 MMOL/L (ref 1.1–1.33)
CALCIUM SERPL-MCNC: 7.7 MG/DL (ref 8.6–10.3)
CALCIUM SERPL-MCNC: 7.7 MG/DL (ref 8.6–10.3)
CALCIUM SERPL-MCNC: 7.8 MG/DL (ref 8.6–10.3)
CHLORIDE BLDA-SCNC: 108 MMOL/L (ref 98–107)
CHLORIDE BLDA-SCNC: 110 MMOL/L (ref 98–107)
CHLORIDE BLDA-SCNC: 110 MMOL/L (ref 98–107)
CHLORIDE BLDA-SCNC: 111 MMOL/L (ref 98–107)
CHLORIDE BLDA-SCNC: 111 MMOL/L (ref 98–107)
CHLORIDE BLDA-SCNC: 113 MMOL/L (ref 98–107)
CHLORIDE SERPL-SCNC: 106 MMOL/L (ref 98–107)
CHLORIDE SERPL-SCNC: 109 MMOL/L (ref 98–107)
CHLORIDE SERPL-SCNC: 110 MMOL/L (ref 98–107)
CO2 SERPL-SCNC: 18 MMOL/L (ref 21–32)
CO2 SERPL-SCNC: 21 MMOL/L (ref 21–32)
CO2 SERPL-SCNC: 22 MMOL/L (ref 21–32)
COHGB MFR BLDA: 0.7 %
COHGB MFR BLDA: 0.8 %
COHGB MFR BLDA: 1.1 %
CREAT SERPL-MCNC: 2.51 MG/DL (ref 0.5–1.3)
CREAT SERPL-MCNC: 2.6 MG/DL (ref 0.5–1.3)
CREAT SERPL-MCNC: 2.76 MG/DL (ref 0.5–1.3)
DO-HGB MFR BLDA: 0.2 % (ref 0–5)
DO-HGB MFR BLDA: 0.6 % (ref 0–5)
DO-HGB MFR BLDA: 1.3 % (ref 0–5)
ERYTHROCYTE [DISTWIDTH] IN BLOOD BY AUTOMATED COUNT: 14.1 % (ref 11.5–14.5)
ERYTHROCYTE [DISTWIDTH] IN BLOOD BY AUTOMATED COUNT: 14.4 % (ref 11.5–14.5)
ERYTHROCYTE [DISTWIDTH] IN BLOOD BY AUTOMATED COUNT: 14.4 % (ref 11.5–14.5)
GFR SERPL CREATININE-BSD FRML MDRD: 25 ML/MIN/1.73M*2
GFR SERPL CREATININE-BSD FRML MDRD: 27 ML/MIN/1.73M*2
GFR SERPL CREATININE-BSD FRML MDRD: 29 ML/MIN/1.73M*2
GLUCOSE BLD MANUAL STRIP-MCNC: 116 MG/DL (ref 74–99)
GLUCOSE BLD MANUAL STRIP-MCNC: 143 MG/DL (ref 74–99)
GLUCOSE BLD MANUAL STRIP-MCNC: 166 MG/DL (ref 74–99)
GLUCOSE BLD MANUAL STRIP-MCNC: 210 MG/DL (ref 74–99)
GLUCOSE BLD MANUAL STRIP-MCNC: 88 MG/DL (ref 74–99)
GLUCOSE BLDA-MCNC: 113 MG/DL (ref 74–99)
GLUCOSE BLDA-MCNC: 121 MG/DL (ref 74–99)
GLUCOSE BLDA-MCNC: 131 MG/DL (ref 74–99)
GLUCOSE BLDA-MCNC: 185 MG/DL (ref 74–99)
GLUCOSE BLDA-MCNC: 216 MG/DL (ref 74–99)
GLUCOSE BLDA-MCNC: 82 MG/DL (ref 74–99)
GLUCOSE SERPL-MCNC: 114 MG/DL (ref 74–99)
GLUCOSE SERPL-MCNC: 188 MG/DL (ref 74–99)
GLUCOSE SERPL-MCNC: 211 MG/DL (ref 74–99)
HCO3 BLDA-SCNC: 17.9 MMOL/L (ref 22–26)
HCO3 BLDA-SCNC: 20.5 MMOL/L (ref 22–26)
HCO3 BLDA-SCNC: 21 MMOL/L (ref 22–26)
HCO3 BLDA-SCNC: 21.1 MMOL/L (ref 22–26)
HCO3 BLDA-SCNC: 21.6 MMOL/L (ref 22–26)
HCO3 BLDA-SCNC: 22.5 MMOL/L (ref 22–26)
HCT VFR BLD AUTO: 23.7 % (ref 41–52)
HCT VFR BLD AUTO: 25.2 % (ref 41–52)
HCT VFR BLD AUTO: 26.6 % (ref 41–52)
HCT VFR BLD EST: 21 % (ref 41–52)
HCT VFR BLD EST: 22 % (ref 41–52)
HCT VFR BLD EST: 23 % (ref 41–52)
HCT VFR BLD EST: 23 % (ref 41–52)
HCT VFR BLD EST: 27 % (ref 41–52)
HCT VFR BLD EST: 27 % (ref 41–52)
HGB BLD-MCNC: 7.6 G/DL (ref 13.5–17.5)
HGB BLD-MCNC: 8.1 G/DL (ref 13.5–17.5)
HGB BLD-MCNC: 8.6 G/DL (ref 13.5–17.5)
HGB BLDA-MCNC: 7 G/DL (ref 13.5–17.5)
HGB BLDA-MCNC: 7 G/DL (ref 13.5–17.5)
HGB BLDA-MCNC: 7.2 G/DL (ref 13.5–17.5)
HGB BLDA-MCNC: 7.2 G/DL (ref 13.5–17.5)
HGB BLDA-MCNC: 7.5 G/DL (ref 13.5–17.5)
HGB BLDA-MCNC: 7.5 G/DL (ref 13.5–17.5)
HGB BLDA-MCNC: 7.7 G/DL (ref 13.5–17.5)
HGB BLDA-MCNC: 9 G/DL (ref 13.5–17.5)
HGB BLDA-MCNC: 9.1 G/DL (ref 13.5–17.5)
INHALED O2 CONCENTRATION: 40 %
INHALED O2 CONCENTRATION: 50 %
INHALED O2 CONCENTRATION: 60 %
INHALED O2 CONCENTRATION: 60 %
INHALED O2 CONCENTRATION: 80 %
INHALED O2 CONCENTRATION: 80 %
INR PPP: 1.1 (ref 0.9–1.1)
INR PPP: 1.2 (ref 0.9–1.1)
LACTATE BLDA-SCNC: 1 MMOL/L (ref 0.4–2)
LACTATE BLDA-SCNC: 1.1 MMOL/L (ref 0.4–2)
LACTATE BLDA-SCNC: 1.2 MMOL/L (ref 0.4–2)
LACTATE BLDA-SCNC: 1.5 MMOL/L (ref 0.4–2)
LACTATE BLDA-SCNC: 2.7 MMOL/L (ref 0.4–2)
LACTATE BLDA-SCNC: 2.9 MMOL/L (ref 0.4–2)
MAGNESIUM SERPL-MCNC: 2.9 MG/DL (ref 1.6–2.4)
MAGNESIUM SERPL-MCNC: 3 MG/DL (ref 1.6–2.4)
MCH RBC QN AUTO: 28.7 PG (ref 26–34)
MCH RBC QN AUTO: 29.2 PG (ref 26–34)
MCH RBC QN AUTO: 29.7 PG (ref 26–34)
MCHC RBC AUTO-ENTMCNC: 32.1 G/DL (ref 32–36)
MCHC RBC AUTO-ENTMCNC: 32.1 G/DL (ref 32–36)
MCHC RBC AUTO-ENTMCNC: 32.3 G/DL (ref 32–36)
MCV RBC AUTO: 89 FL (ref 80–100)
MCV RBC AUTO: 91 FL (ref 80–100)
MCV RBC AUTO: 92 FL (ref 80–100)
METHGB MFR BLDA: 0 % (ref 0–1.5)
METHGB MFR BLDA: 0 % (ref 0–1.5)
METHGB MFR BLDA: 0.7 % (ref 0–1.5)
NRBC BLD-RTO: 0 /100 WBCS (ref 0–0)
OXYHGB MFR BLDA: 97.4 % (ref 94–98)
OXYHGB MFR BLDA: 97.4 % (ref 94–98)
OXYHGB MFR BLDA: 97.6 % (ref 94–98)
OXYHGB MFR BLDA: 97.6 % (ref 94–98)
OXYHGB MFR BLDA: 98 % (ref 94–98)
OXYHGB MFR BLDA: 98.7 % (ref 94–98)
PCO2 BLDA: 38 MM HG (ref 38–42)
PCO2 BLDA: 39 MM HG (ref 38–42)
PCO2 BLDA: 40 MM HG (ref 38–42)
PCO2 BLDA: 42 MM HG (ref 38–42)
PEEP CMH2O: 5 CM H2O
PEEP CMH2O: 5 CM H2O
PH BLDA: 7.27 PH (ref 7.38–7.42)
PH BLDA: 7.31 PH (ref 7.38–7.42)
PH BLDA: 7.34 PH (ref 7.38–7.42)
PH BLDA: 7.37 PH (ref 7.38–7.42)
PHOSPHATE SERPL-MCNC: 2.9 MG/DL (ref 2.5–4.9)
PHOSPHATE SERPL-MCNC: 3.3 MG/DL (ref 2.5–4.9)
PHOSPHATE SERPL-MCNC: 4 MG/DL (ref 2.5–4.9)
PLATELET # BLD AUTO: 141 X10*3/UL (ref 150–450)
PLATELET # BLD AUTO: 197 X10*3/UL (ref 150–450)
PLATELET # BLD AUTO: 326 X10*3/UL (ref 150–450)
PO2 BLDA: 130 MM HG (ref 85–95)
PO2 BLDA: 164 MM HG (ref 85–95)
PO2 BLDA: 184 MM HG (ref 85–95)
PO2 BLDA: 210 MM HG (ref 85–95)
PO2 BLDA: 295 MM HG (ref 85–95)
PO2 BLDA: 307 MM HG (ref 85–95)
POTASSIUM BLDA-SCNC: 5.4 MMOL/L (ref 3.5–5.3)
POTASSIUM BLDA-SCNC: 5.5 MMOL/L (ref 3.5–5.3)
POTASSIUM BLDA-SCNC: 6.3 MMOL/L (ref 3.5–5.3)
POTASSIUM BLDA-SCNC: 7.3 MMOL/L (ref 3.5–5.3)
POTASSIUM SERPL-SCNC: 5.4 MMOL/L (ref 3.5–5.3)
POTASSIUM SERPL-SCNC: 5.4 MMOL/L (ref 3.5–5.3)
POTASSIUM SERPL-SCNC: 6.2 MMOL/L (ref 3.5–5.3)
PROTHROMBIN TIME: 12.2 SECONDS (ref 9.8–12.8)
PROTHROMBIN TIME: 13.2 SECONDS (ref 9.8–12.8)
RBC # BLD AUTO: 2.6 X10*6/UL (ref 4.5–5.9)
RBC # BLD AUTO: 2.73 X10*6/UL (ref 4.5–5.9)
RBC # BLD AUTO: 3 X10*6/UL (ref 4.5–5.9)
RH FACTOR (ANTIGEN D): NORMAL
SAO2 % BLDA: 100 % (ref 94–100)
SAO2 % BLDA: 99 % (ref 94–100)
SAO2 % BLDA: 99 % (ref 94–100)
SODIUM BLDA-SCNC: 135 MMOL/L (ref 136–145)
SODIUM BLDA-SCNC: 135 MMOL/L (ref 136–145)
SODIUM BLDA-SCNC: 136 MMOL/L (ref 136–145)
SODIUM BLDA-SCNC: 136 MMOL/L (ref 136–145)
SODIUM BLDA-SCNC: 137 MMOL/L (ref 136–145)
SODIUM BLDA-SCNC: 137 MMOL/L (ref 136–145)
SODIUM SERPL-SCNC: 137 MMOL/L (ref 136–145)
SODIUM SERPL-SCNC: 138 MMOL/L (ref 136–145)
SODIUM SERPL-SCNC: 138 MMOL/L (ref 136–145)
TIDAL VOLUME: 600 ML
TIDAL VOLUME: 600 ML
VENTILATOR MODE: ABNORMAL
VENTILATOR MODE: ABNORMAL
VENTILATOR RATE: 16 BPM
VENTILATOR RATE: 16 BPM
WBC # BLD AUTO: 13.8 X10*3/UL (ref 4.4–11.3)
WBC # BLD AUTO: 18.9 X10*3/UL (ref 4.4–11.3)
WBC # BLD AUTO: 24.4 X10*3/UL (ref 4.4–11.3)

## 2023-11-13 PROCEDURE — 85730 THROMBOPLASTIN TIME PARTIAL: CPT | Performed by: STUDENT IN AN ORGANIZED HEALTH CARE EDUCATION/TRAINING PROGRAM

## 2023-11-13 PROCEDURE — 2780000003 HC OR 278 NO HCPCS: Performed by: THORACIC SURGERY (CARDIOTHORACIC VASCULAR SURGERY)

## 2023-11-13 PROCEDURE — 85027 COMPLETE CBC AUTOMATED: CPT | Performed by: NURSE PRACTITIONER

## 2023-11-13 PROCEDURE — C9248 INJ, CLEVIDIPINE BUTYRATE: HCPCS | Performed by: ANESTHESIOLOGIST ASSISTANT

## 2023-11-13 PROCEDURE — A33535 PR CABG, ARTERIAL, THREE: Performed by: ANESTHESIOLOGIST ASSISTANT

## 2023-11-13 PROCEDURE — 82330 ASSAY OF CALCIUM: CPT

## 2023-11-13 PROCEDURE — 2500000002 HC RX 250 W HCPCS SELF ADMINISTERED DRUGS (ALT 637 FOR MEDICARE OP, ALT 636 FOR OP/ED): Performed by: NURSE PRACTITIONER

## 2023-11-13 PROCEDURE — 2500000005 HC RX 250 GENERAL PHARMACY W/O HCPCS: Performed by: NURSE PRACTITIONER

## 2023-11-13 PROCEDURE — 33533 CABG ARTERIAL SINGLE: CPT | Performed by: THORACIC SURGERY (CARDIOTHORACIC VASCULAR SURGERY)

## 2023-11-13 PROCEDURE — 82947 ASSAY GLUCOSE BLOOD QUANT: CPT

## 2023-11-13 PROCEDURE — 71045 X-RAY EXAM CHEST 1 VIEW: CPT | Performed by: RADIOLOGY

## 2023-11-13 PROCEDURE — 85730 THROMBOPLASTIN TIME PARTIAL: CPT | Performed by: NURSE PRACTITIONER

## 2023-11-13 PROCEDURE — 82805 BLOOD GASES W/O2 SATURATION: CPT | Performed by: NURSE PRACTITIONER

## 2023-11-13 PROCEDURE — 94002 VENT MGMT INPAT INIT DAY: CPT

## 2023-11-13 PROCEDURE — 83735 ASSAY OF MAGNESIUM: CPT | Performed by: STUDENT IN AN ORGANIZED HEALTH CARE EDUCATION/TRAINING PROGRAM

## 2023-11-13 PROCEDURE — 2500000004 HC RX 250 GENERAL PHARMACY W/ HCPCS (ALT 636 FOR OP/ED): Performed by: THORACIC SURGERY (CARDIOTHORACIC VASCULAR SURGERY)

## 2023-11-13 PROCEDURE — 84132 ASSAY OF SERUM POTASSIUM: CPT | Performed by: NURSE PRACTITIONER

## 2023-11-13 PROCEDURE — 82435 ASSAY OF BLOOD CHLORIDE: CPT | Performed by: NURSE PRACTITIONER

## 2023-11-13 PROCEDURE — 3E033XZ INTRODUCTION OF VASOPRESSOR INTO PERIPHERAL VEIN, PERCUTANEOUS APPROACH: ICD-10-PCS | Performed by: THORACIC SURGERY (CARDIOTHORACIC VASCULAR SURGERY)

## 2023-11-13 PROCEDURE — 02100Z9 BYPASS CORONARY ARTERY, ONE ARTERY FROM LEFT INTERNAL MAMMARY, OPEN APPROACH: ICD-10-PCS | Performed by: THORACIC SURGERY (CARDIOTHORACIC VASCULAR SURGERY)

## 2023-11-13 PROCEDURE — 3600000018 HC OR TIME - INITIAL BASE CHARGE - PROCEDURE LEVEL SIX: Performed by: THORACIC SURGERY (CARDIOTHORACIC VASCULAR SURGERY)

## 2023-11-13 PROCEDURE — C1713 ANCHOR/SCREW BN/BN,TIS/BN: HCPCS | Performed by: THORACIC SURGERY (CARDIOTHORACIC VASCULAR SURGERY)

## 2023-11-13 PROCEDURE — 82330 ASSAY OF CALCIUM: CPT | Performed by: NURSE PRACTITIONER

## 2023-11-13 PROCEDURE — 83050 HGB METHEMOGLOBIN QUAN: CPT

## 2023-11-13 PROCEDURE — 0WCD0ZZ EXTIRPATION OF MATTER FROM PERICARDIAL CAVITY, OPEN APPROACH: ICD-10-PCS | Performed by: THORACIC SURGERY (CARDIOTHORACIC VASCULAR SURGERY)

## 2023-11-13 PROCEDURE — 021109W BYPASS CORONARY ARTERY, TWO ARTERIES FROM AORTA WITH AUTOLOGOUS VENOUS TISSUE, OPEN APPROACH: ICD-10-PCS | Performed by: THORACIC SURGERY (CARDIOTHORACIC VASCULAR SURGERY)

## 2023-11-13 PROCEDURE — 84295 ASSAY OF SERUM SODIUM: CPT

## 2023-11-13 PROCEDURE — 71045 X-RAY EXAM CHEST 1 VIEW: CPT

## 2023-11-13 PROCEDURE — 3600000012 HC PERFUSION TIME - EACH INCREMENTAL 1 MINUTE: Performed by: THORACIC SURGERY (CARDIOTHORACIC VASCULAR SURGERY)

## 2023-11-13 PROCEDURE — 2500000004 HC RX 250 GENERAL PHARMACY W/ HCPCS (ALT 636 FOR OP/ED)

## 2023-11-13 PROCEDURE — 2500000004 HC RX 250 GENERAL PHARMACY W/ HCPCS (ALT 636 FOR OP/ED): Performed by: ANESTHESIOLOGIST ASSISTANT

## 2023-11-13 PROCEDURE — A4217 STERILE WATER/SALINE, 500 ML: HCPCS | Performed by: THORACIC SURGERY (CARDIOTHORACIC VASCULAR SURGERY)

## 2023-11-13 PROCEDURE — P9016 RBC LEUKOCYTES REDUCED: HCPCS

## 2023-11-13 PROCEDURE — 93010 ELECTROCARDIOGRAM REPORT: CPT | Performed by: INTERNAL MEDICINE

## 2023-11-13 PROCEDURE — 82330 ASSAY OF CALCIUM: CPT | Performed by: STUDENT IN AN ORGANIZED HEALTH CARE EDUCATION/TRAINING PROGRAM

## 2023-11-13 PROCEDURE — 2020000001 HC ICU ROOM DAILY

## 2023-11-13 PROCEDURE — 0W9D0ZZ DRAINAGE OF PERICARDIAL CAVITY, OPEN APPROACH: ICD-10-PCS | Performed by: THORACIC SURGERY (CARDIOTHORACIC VASCULAR SURGERY)

## 2023-11-13 PROCEDURE — 2720000007 HC OR 272 NO HCPCS: Performed by: THORACIC SURGERY (CARDIOTHORACIC VASCULAR SURGERY)

## 2023-11-13 PROCEDURE — 83735 ASSAY OF MAGNESIUM: CPT | Performed by: NURSE PRACTITIONER

## 2023-11-13 PROCEDURE — 82435 ASSAY OF BLOOD CHLORIDE: CPT | Performed by: STUDENT IN AN ORGANIZED HEALTH CARE EDUCATION/TRAINING PROGRAM

## 2023-11-13 PROCEDURE — 2500000004 HC RX 250 GENERAL PHARMACY W/ HCPCS (ALT 636 FOR OP/ED): Performed by: NURSE PRACTITIONER

## 2023-11-13 PROCEDURE — S0109 METHADONE ORAL 5MG: HCPCS | Performed by: ANESTHESIOLOGIST ASSISTANT

## 2023-11-13 PROCEDURE — 82435 ASSAY OF BLOOD CHLORIDE: CPT

## 2023-11-13 PROCEDURE — 37799 UNLISTED PX VASCULAR SURGERY: CPT | Performed by: STUDENT IN AN ORGANIZED HEALTH CARE EDUCATION/TRAINING PROGRAM

## 2023-11-13 PROCEDURE — 93306 TTE W/DOPPLER COMPLETE: CPT | Performed by: STUDENT IN AN ORGANIZED HEALTH CARE EDUCATION/TRAINING PROGRAM

## 2023-11-13 PROCEDURE — 02Q00ZZ REPAIR CORONARY ARTERY, ONE ARTERY, OPEN APPROACH: ICD-10-PCS | Performed by: THORACIC SURGERY (CARDIOTHORACIC VASCULAR SURGERY)

## 2023-11-13 PROCEDURE — 36430 TRANSFUSION BLD/BLD COMPNT: CPT

## 2023-11-13 PROCEDURE — 3700000001 HC GENERAL ANESTHESIA TIME - INITIAL BASE CHARGE: Performed by: THORACIC SURGERY (CARDIOTHORACIC VASCULAR SURGERY)

## 2023-11-13 PROCEDURE — 99291 CRITICAL CARE FIRST HOUR: CPT | Performed by: STUDENT IN AN ORGANIZED HEALTH CARE EDUCATION/TRAINING PROGRAM

## 2023-11-13 PROCEDURE — P9045 ALBUMIN (HUMAN), 5%, 250 ML: HCPCS | Mod: JZ

## 2023-11-13 PROCEDURE — 33518 CABG ARTERY-VEIN TWO: CPT | Performed by: THORACIC SURGERY (CARDIOTHORACIC VASCULAR SURGERY)

## 2023-11-13 PROCEDURE — 74018 RADEX ABDOMEN 1 VIEW: CPT | Mod: FY

## 2023-11-13 PROCEDURE — 0PU00JZ SUPPLEMENT STERNUM WITH SYNTHETIC SUBSTITUTE, OPEN APPROACH: ICD-10-PCS | Performed by: THORACIC SURGERY (CARDIOTHORACIC VASCULAR SURGERY)

## 2023-11-13 PROCEDURE — 85347 COAGULATION TIME ACTIVATED: CPT

## 2023-11-13 PROCEDURE — 3700000002 HC GENERAL ANESTHESIA TIME - EACH INCREMENTAL 1 MINUTE: Performed by: THORACIC SURGERY (CARDIOTHORACIC VASCULAR SURGERY)

## 2023-11-13 PROCEDURE — 85610 PROTHROMBIN TIME: CPT | Performed by: STUDENT IN AN ORGANIZED HEALTH CARE EDUCATION/TRAINING PROGRAM

## 2023-11-13 PROCEDURE — 84132 ASSAY OF SERUM POTASSIUM: CPT

## 2023-11-13 PROCEDURE — 2500000004 HC RX 250 GENERAL PHARMACY W/ HCPCS (ALT 636 FOR OP/ED): Performed by: STUDENT IN AN ORGANIZED HEALTH CARE EDUCATION/TRAINING PROGRAM

## 2023-11-13 PROCEDURE — P9045 ALBUMIN (HUMAN), 5%, 250 ML: HCPCS | Mod: JZ | Performed by: ANESTHESIOLOGIST ASSISTANT

## 2023-11-13 PROCEDURE — 33508 ENDOSCOPIC VEIN HARVEST: CPT | Performed by: THORACIC SURGERY (CARDIOTHORACIC VASCULAR SURGERY)

## 2023-11-13 PROCEDURE — 3600000011 HC PERFUSION TIME - INITIAL BASE CHARGE: Performed by: THORACIC SURGERY (CARDIOTHORACIC VASCULAR SURGERY)

## 2023-11-13 PROCEDURE — 99291 CRITICAL CARE FIRST HOUR: CPT | Performed by: NURSE PRACTITIONER

## 2023-11-13 PROCEDURE — 74018 RADEX ABDOMEN 1 VIEW: CPT | Performed by: RADIOLOGY

## 2023-11-13 PROCEDURE — 82375 ASSAY CARBOXYHB QUANT: CPT

## 2023-11-13 PROCEDURE — 06BP4ZZ EXCISION OF RIGHT SAPHENOUS VEIN, PERCUTANEOUS ENDOSCOPIC APPROACH: ICD-10-PCS | Performed by: THORACIC SURGERY (CARDIOTHORACIC VASCULAR SURGERY)

## 2023-11-13 PROCEDURE — 2500000005 HC RX 250 GENERAL PHARMACY W/O HCPCS: Performed by: ANESTHESIOLOGIST ASSISTANT

## 2023-11-13 PROCEDURE — 93306 TTE W/DOPPLER COMPLETE: CPT

## 2023-11-13 PROCEDURE — 2500000002 HC RX 250 W HCPCS SELF ADMINISTERED DRUGS (ALT 637 FOR MEDICARE OP, ALT 636 FOR OP/ED): Performed by: ANESTHESIOLOGIST ASSISTANT

## 2023-11-13 PROCEDURE — A4649 SURGICAL SUPPLIES: HCPCS | Performed by: THORACIC SURGERY (CARDIOTHORACIC VASCULAR SURGERY)

## 2023-11-13 PROCEDURE — 71045 X-RAY EXAM CHEST 1 VIEW: CPT | Mod: FY

## 2023-11-13 PROCEDURE — 5A1221Z PERFORMANCE OF CARDIAC OUTPUT, CONTINUOUS: ICD-10-PCS | Performed by: THORACIC SURGERY (CARDIOTHORACIC VASCULAR SURGERY)

## 2023-11-13 PROCEDURE — 2500000005 HC RX 250 GENERAL PHARMACY W/O HCPCS: Performed by: STUDENT IN AN ORGANIZED HEALTH CARE EDUCATION/TRAINING PROGRAM

## 2023-11-13 PROCEDURE — 37799 UNLISTED PX VASCULAR SURGERY: CPT | Performed by: NURSE PRACTITIONER

## 2023-11-13 PROCEDURE — A33535 PR CABG, ARTERIAL, THREE: Performed by: ANESTHESIOLOGY

## 2023-11-13 PROCEDURE — 85027 COMPLETE CBC AUTOMATED: CPT | Performed by: STUDENT IN AN ORGANIZED HEALTH CARE EDUCATION/TRAINING PROGRAM

## 2023-11-13 PROCEDURE — 3600000017 HC OR TIME - EACH INCREMENTAL 1 MINUTE - PROCEDURE LEVEL SIX: Performed by: THORACIC SURGERY (CARDIOTHORACIC VASCULAR SURGERY)

## 2023-11-13 PROCEDURE — 2500000005 HC RX 250 GENERAL PHARMACY W/O HCPCS

## 2023-11-13 PROCEDURE — 2500000001 HC RX 250 WO HCPCS SELF ADMINISTERED DRUGS (ALT 637 FOR MEDICARE OP): Performed by: STUDENT IN AN ORGANIZED HEALTH CARE EDUCATION/TRAINING PROGRAM

## 2023-11-13 PROCEDURE — 36415 COLL VENOUS BLD VENIPUNCTURE: CPT | Performed by: THORACIC SURGERY (CARDIOTHORACIC VASCULAR SURGERY)

## 2023-11-13 PROCEDURE — 2500000005 HC RX 250 GENERAL PHARMACY W/O HCPCS: Performed by: THORACIC SURGERY (CARDIOTHORACIC VASCULAR SURGERY)

## 2023-11-13 DEVICE — PLATE, LP BOX: Type: IMPLANTABLE DEVICE | Site: CHEST | Status: FUNCTIONAL

## 2023-11-13 DEVICE — SCREW, SD LOCKING, 2.3 X 14MM: Type: IMPLANTABLE DEVICE | Site: CHEST | Status: FUNCTIONAL

## 2023-11-13 DEVICE — IMPLANTABLE DEVICE: Type: IMPLANTABLE DEVICE | Site: STERNUM | Status: FUNCTIONAL

## 2023-11-13 DEVICE — LEAD, PACING, MYOCARDIAL, BIPOLAR, TEMPORARY: Type: IMPLANTABLE DEVICE | Site: HEART | Status: NON-FUNCTIONAL

## 2023-11-13 DEVICE — PLATE, LP MANUBRIUM: Type: IMPLANTABLE DEVICE | Site: CHEST | Status: FUNCTIONAL

## 2023-11-13 DEVICE — SCREW, SD LOCKING, 2.3 X 13MM: Type: IMPLANTABLE DEVICE | Site: STERNUM | Status: FUNCTIONAL

## 2023-11-13 DEVICE — SCREW, SD LOCKING, 2.3 X 12MM: Type: IMPLANTABLE DEVICE | Site: CHEST | Status: FUNCTIONAL

## 2023-11-13 RX ORDER — EPINEPHRINE HCL IN 0.9 % NACL 4MG/250ML
PLASTIC BAG, INJECTION (ML) INTRAVENOUS CONTINUOUS PRN
Status: DISCONTINUED | OUTPATIENT
Start: 2023-11-13 | End: 2023-11-13

## 2023-11-13 RX ORDER — MAGNESIUM SULFATE HEPTAHYDRATE 500 MG/ML
INJECTION, SOLUTION INTRAMUSCULAR; INTRAVENOUS AS NEEDED
Status: DISCONTINUED | OUTPATIENT
Start: 2023-11-13 | End: 2023-11-13

## 2023-11-13 RX ORDER — LIDOCAINE HYDROCHLORIDE 10 MG/ML
INJECTION INFILTRATION; PERINEURAL AS NEEDED
Status: DISCONTINUED | OUTPATIENT
Start: 2023-11-13 | End: 2023-11-13

## 2023-11-13 RX ORDER — DEXTROSE 50 % IN WATER (D50W) INTRAVENOUS SYRINGE
25 ONCE
Status: COMPLETED | OUTPATIENT
Start: 2023-11-13 | End: 2023-11-13

## 2023-11-13 RX ORDER — DIPHENHYDRAMINE HYDROCHLORIDE 50 MG/ML
INJECTION INTRAMUSCULAR; INTRAVENOUS AS NEEDED
Status: DISCONTINUED | OUTPATIENT
Start: 2023-11-13 | End: 2023-11-13

## 2023-11-13 RX ORDER — NOREPINEPHRINE BITARTRATE/D5W 8 MG/250ML
0-3.3 PLASTIC BAG, INJECTION (ML) INTRAVENOUS CONTINUOUS
Status: DISCONTINUED | OUTPATIENT
Start: 2023-11-13 | End: 2023-11-13

## 2023-11-13 RX ORDER — ROCURONIUM BROMIDE 10 MG/ML
INJECTION, SOLUTION INTRAVENOUS AS NEEDED
Status: DISCONTINUED | OUTPATIENT
Start: 2023-11-13 | End: 2023-11-13

## 2023-11-13 RX ORDER — ATORVASTATIN CALCIUM 80 MG/1
80 TABLET, FILM COATED ORAL NIGHTLY
Status: DISCONTINUED | OUTPATIENT
Start: 2023-11-13 | End: 2023-11-13

## 2023-11-13 RX ORDER — SODIUM CHLORIDE, SODIUM GLUCONATE, SODIUM ACETATE, POTASSIUM CHLORIDE AND MAGNESIUM CHLORIDE 30; 37; 368; 526; 502 MG/100ML; MG/100ML; MG/100ML; MG/100ML; MG/100ML
INJECTION, SOLUTION INTRAVENOUS CONTINUOUS PRN
Status: DISCONTINUED | OUTPATIENT
Start: 2023-11-13 | End: 2023-11-13

## 2023-11-13 RX ORDER — INDOMETHACIN 25 MG/1
CAPSULE ORAL AS NEEDED
Status: DISCONTINUED | OUTPATIENT
Start: 2023-11-13 | End: 2023-11-13

## 2023-11-13 RX ORDER — NALOXONE HYDROCHLORIDE 0.4 MG/ML
0.2 INJECTION, SOLUTION INTRAMUSCULAR; INTRAVENOUS; SUBCUTANEOUS EVERY 5 MIN PRN
Status: DISCONTINUED | OUTPATIENT
Start: 2023-11-13 | End: 2023-11-13 | Stop reason: SDUPTHER

## 2023-11-13 RX ORDER — NAPROXEN SODIUM 220 MG/1
81 TABLET, FILM COATED ORAL ONCE
Status: COMPLETED | OUTPATIENT
Start: 2023-11-13 | End: 2023-11-13

## 2023-11-13 RX ORDER — CALCIUM CHLORIDE INJECTION 100 MG/ML
INJECTION, SOLUTION INTRAVENOUS AS NEEDED
Status: DISCONTINUED | OUTPATIENT
Start: 2023-11-13 | End: 2023-11-13

## 2023-11-13 RX ORDER — DEXTROSE MONOHYDRATE 100 MG/ML
50 INJECTION, SOLUTION INTRAVENOUS CONTINUOUS
Status: DISCONTINUED | OUTPATIENT
Start: 2023-11-13 | End: 2023-11-13

## 2023-11-13 RX ORDER — FENTANYL CITRATE-0.9 % NACL/PF 10 MCG/ML
PLASTIC BAG, INJECTION (ML) INTRAVENOUS CONTINUOUS PRN
Status: DISCONTINUED | OUTPATIENT
Start: 2023-11-13 | End: 2023-11-13

## 2023-11-13 RX ORDER — ATORVASTATIN CALCIUM 80 MG/1
80 TABLET, FILM COATED ORAL DAILY
Status: DISCONTINUED | OUTPATIENT
Start: 2023-11-14 | End: 2023-11-24 | Stop reason: HOSPADM

## 2023-11-13 RX ORDER — ALBUMIN HUMAN 50 G/1000ML
SOLUTION INTRAVENOUS
Status: COMPLETED
Start: 2023-11-13 | End: 2023-11-14

## 2023-11-13 RX ORDER — PHENYLEPHRINE 10 MG/250 ML(40 MCG/ML)IN 0.9 % SOD.CHLORIDE INTRAVENOUS
.1-2 CONTINUOUS
Status: DISCONTINUED | OUTPATIENT
Start: 2023-11-13 | End: 2023-11-13

## 2023-11-13 RX ORDER — MIDAZOLAM HYDROCHLORIDE 1 MG/ML
INJECTION, SOLUTION INTRAMUSCULAR; INTRAVENOUS AS NEEDED
Status: DISCONTINUED | OUTPATIENT
Start: 2023-11-13 | End: 2023-11-13

## 2023-11-13 RX ORDER — ALBUMIN HUMAN 50 G/1000ML
12.5 SOLUTION INTRAVENOUS ONCE
Status: COMPLETED | OUTPATIENT
Start: 2023-11-13 | End: 2023-11-14

## 2023-11-13 RX ORDER — METHADONE HYDROCHLORIDE 10 MG/1
TABLET ORAL AS NEEDED
Status: DISCONTINUED | OUTPATIENT
Start: 2023-11-13 | End: 2023-11-13

## 2023-11-13 RX ORDER — PROPOFOL 10 MG/ML
INJECTION, EMULSION INTRAVENOUS
Status: COMPLETED
Start: 2023-11-13 | End: 2023-11-13

## 2023-11-13 RX ORDER — MUPIROCIN 20 MG/G
1 OINTMENT TOPICAL 2 TIMES DAILY
Status: DISCONTINUED | OUTPATIENT
Start: 2023-11-13 | End: 2023-11-15

## 2023-11-13 RX ORDER — AMOXICILLIN 250 MG
2 CAPSULE ORAL 2 TIMES DAILY
Status: DISCONTINUED | OUTPATIENT
Start: 2023-11-13 | End: 2023-11-24 | Stop reason: HOSPADM

## 2023-11-13 RX ORDER — FENTANYL CITRATE 50 UG/ML
INJECTION, SOLUTION INTRAMUSCULAR; INTRAVENOUS AS NEEDED
Status: DISCONTINUED | OUTPATIENT
Start: 2023-11-13 | End: 2023-11-13

## 2023-11-13 RX ORDER — PAPAVERINE HYDROCHLORIDE 30 MG/ML
INJECTION INTRAMUSCULAR; INTRAVENOUS AS NEEDED
Status: DISCONTINUED | OUTPATIENT
Start: 2023-11-13 | End: 2023-11-13 | Stop reason: HOSPADM

## 2023-11-13 RX ORDER — ACETAMINOPHEN 325 MG/1
650 TABLET ORAL EVERY 6 HOURS
Status: DISCONTINUED | OUTPATIENT
Start: 2023-11-13 | End: 2023-11-24 | Stop reason: HOSPADM

## 2023-11-13 RX ORDER — ESMOLOL HYDROCHLORIDE 10 MG/ML
INJECTION INTRAVENOUS AS NEEDED
Status: DISCONTINUED | OUTPATIENT
Start: 2023-11-13 | End: 2023-11-13

## 2023-11-13 RX ORDER — PHENYLEPHRINE HYDROCHLORIDE 10 MG/ML
INJECTION INTRAVENOUS AS NEEDED
Status: DISCONTINUED | OUTPATIENT
Start: 2023-11-13 | End: 2023-11-13

## 2023-11-13 RX ORDER — OXYCODONE HYDROCHLORIDE 5 MG/1
5 TABLET ORAL EVERY 4 HOURS PRN
Status: DISCONTINUED | OUTPATIENT
Start: 2023-11-13 | End: 2023-11-16

## 2023-11-13 RX ORDER — NALOXONE HYDROCHLORIDE 0.4 MG/ML
0.2 INJECTION, SOLUTION INTRAMUSCULAR; INTRAVENOUS; SUBCUTANEOUS EVERY 5 MIN PRN
Status: DISCONTINUED | OUTPATIENT
Start: 2023-11-13 | End: 2023-11-24 | Stop reason: HOSPADM

## 2023-11-13 RX ORDER — NAPROXEN SODIUM 220 MG/1
81 TABLET, FILM COATED ORAL DAILY
Status: DISCONTINUED | OUTPATIENT
Start: 2023-11-14 | End: 2023-11-24 | Stop reason: HOSPADM

## 2023-11-13 RX ORDER — PROTAMINE SULFATE 10 MG/ML
INJECTION, SOLUTION INTRAVENOUS AS NEEDED
Status: DISCONTINUED | OUTPATIENT
Start: 2023-11-13 | End: 2023-11-13

## 2023-11-13 RX ORDER — PANTOPRAZOLE SODIUM 40 MG/1
40 TABLET, DELAYED RELEASE ORAL
Status: DISCONTINUED | OUTPATIENT
Start: 2023-11-14 | End: 2023-11-24 | Stop reason: HOSPADM

## 2023-11-13 RX ORDER — HEPARIN SODIUM 1000 [USP'U]/ML
INJECTION, SOLUTION INTRAVENOUS; SUBCUTANEOUS AS NEEDED
Status: DISCONTINUED | OUTPATIENT
Start: 2023-11-13 | End: 2023-11-13 | Stop reason: HOSPADM

## 2023-11-13 RX ORDER — SODIUM CHLORIDE, SODIUM LACTATE, POTASSIUM CHLORIDE, CALCIUM CHLORIDE 600; 310; 30; 20 MG/100ML; MG/100ML; MG/100ML; MG/100ML
5 INJECTION, SOLUTION INTRAVENOUS CONTINUOUS
Status: DISCONTINUED | OUTPATIENT
Start: 2023-11-13 | End: 2023-11-15

## 2023-11-13 RX ORDER — ALBUMIN HUMAN 50 G/1000ML
SOLUTION INTRAVENOUS AS NEEDED
Status: DISCONTINUED | OUTPATIENT
Start: 2023-11-13 | End: 2023-11-13

## 2023-11-13 RX ORDER — PHENYLEPHRINE HCL IN 0.9% NACL 1 MG/10 ML
SYRINGE (ML) INTRAVENOUS
Status: DISPENSED
Start: 2023-11-13 | End: 2023-11-14

## 2023-11-13 RX ORDER — METHADONE HYDROCHLORIDE 10 MG/ML
INJECTION, SOLUTION INTRAMUSCULAR; INTRAVENOUS; SUBCUTANEOUS AS NEEDED
Status: DISCONTINUED | OUTPATIENT
Start: 2023-11-13 | End: 2023-11-13

## 2023-11-13 RX ORDER — ACETAMINOPHEN 650 MG/1
650 SUPPOSITORY RECTAL EVERY 6 HOURS
Status: DISCONTINUED | OUTPATIENT
Start: 2023-11-13 | End: 2023-11-15

## 2023-11-13 RX ORDER — CEFAZOLIN 1 G/1
INJECTION, POWDER, FOR SOLUTION INTRAVENOUS AS NEEDED
Status: DISCONTINUED | OUTPATIENT
Start: 2023-11-13 | End: 2023-11-13

## 2023-11-13 RX ORDER — POLYETHYLENE GLYCOL 3350 17 G/17G
17 POWDER, FOR SOLUTION ORAL DAILY
Status: DISCONTINUED | OUTPATIENT
Start: 2023-11-13 | End: 2023-11-24 | Stop reason: HOSPADM

## 2023-11-13 RX ORDER — LIDOCAINE 560 MG/1
1 PATCH PERCUTANEOUS; TOPICAL; TRANSDERMAL EVERY 24 HOURS
Status: DISCONTINUED | OUTPATIENT
Start: 2023-11-13 | End: 2023-11-15

## 2023-11-13 RX ORDER — CLOPIDOGREL BISULFATE 75 MG/1
75 TABLET ORAL DAILY
Status: DISCONTINUED | OUTPATIENT
Start: 2023-11-13 | End: 2023-11-15

## 2023-11-13 RX ORDER — ASPIRIN 300 MG/1
150 SUPPOSITORY RECTAL ONCE
Status: COMPLETED | OUTPATIENT
Start: 2023-11-13 | End: 2023-11-13

## 2023-11-13 RX ORDER — PROPOFOL 10 MG/ML
INJECTION, EMULSION INTRAVENOUS AS NEEDED
Status: DISCONTINUED | OUTPATIENT
Start: 2023-11-13 | End: 2023-11-13

## 2023-11-13 RX ORDER — CEFAZOLIN SODIUM 2 G/100ML
2 INJECTION, SOLUTION INTRAVENOUS EVERY 8 HOURS
Status: COMPLETED | OUTPATIENT
Start: 2023-11-13 | End: 2023-11-15

## 2023-11-13 RX ORDER — DEXTROSE 50 % IN WATER (D50W) INTRAVENOUS SYRINGE
25
Status: DISCONTINUED | OUTPATIENT
Start: 2023-11-13 | End: 2023-11-24 | Stop reason: HOSPADM

## 2023-11-13 RX ORDER — INSULIN LISPRO 100 [IU]/ML
0-15 INJECTION, SOLUTION INTRAVENOUS; SUBCUTANEOUS EVERY 4 HOURS
Status: DISCONTINUED | OUTPATIENT
Start: 2023-11-13 | End: 2023-11-15

## 2023-11-13 RX ORDER — LIDOCAINE HYDROCHLORIDE 20 MG/ML
INJECTION, SOLUTION INFILTRATION; PERINEURAL AS NEEDED
Status: DISCONTINUED | OUTPATIENT
Start: 2023-11-13 | End: 2023-11-13

## 2023-11-13 RX ORDER — DEXTROSE 50 % IN WATER (D50W) INTRAVENOUS SYRINGE
AS NEEDED
Status: DISCONTINUED | OUTPATIENT
Start: 2023-11-13 | End: 2023-11-13

## 2023-11-13 RX ORDER — PANTOPRAZOLE SODIUM 40 MG/10ML
40 INJECTION, POWDER, LYOPHILIZED, FOR SOLUTION INTRAVENOUS
Status: DISCONTINUED | OUTPATIENT
Start: 2023-11-14 | End: 2023-11-15

## 2023-11-13 RX ORDER — SODIUM CHLORIDE 0.9 G/100ML
IRRIGANT IRRIGATION AS NEEDED
Status: DISCONTINUED | OUTPATIENT
Start: 2023-11-13 | End: 2023-11-13 | Stop reason: HOSPADM

## 2023-11-13 RX ORDER — PROPOFOL 10 MG/ML
INJECTION, EMULSION INTRAVENOUS CONTINUOUS PRN
Status: DISCONTINUED | OUTPATIENT
Start: 2023-11-13 | End: 2023-11-13

## 2023-11-13 RX ORDER — PHENYLEPHRINE HCL IN 0.9% NACL 1 MG/10 ML
SYRINGE (ML) INTRAVENOUS AS NEEDED
Status: DISCONTINUED | OUTPATIENT
Start: 2023-11-13 | End: 2023-11-13

## 2023-11-13 RX ORDER — SODIUM BICARBONATE 1 MEQ/ML
SYRINGE (ML) INTRAVENOUS
Status: COMPLETED
Start: 2023-11-13 | End: 2023-11-13

## 2023-11-13 RX ORDER — NOREPINEPHRINE BITARTRATE 0.03 MG/ML
INJECTION, SOLUTION INTRAVENOUS CONTINUOUS PRN
Status: DISCONTINUED | OUTPATIENT
Start: 2023-11-13 | End: 2023-11-13

## 2023-11-13 RX ORDER — FUROSEMIDE 10 MG/ML
INJECTION INTRAMUSCULAR; INTRAVENOUS AS NEEDED
Status: DISCONTINUED | OUTPATIENT
Start: 2023-11-13 | End: 2023-11-13

## 2023-11-13 RX ORDER — NOREPINEPHRINE BITARTRATE/D5W 8 MG/250ML
.01-1 PLASTIC BAG, INJECTION (ML) INTRAVENOUS CONTINUOUS
Status: DISCONTINUED | OUTPATIENT
Start: 2023-11-13 | End: 2023-11-14

## 2023-11-13 RX ORDER — PROPOFOL 10 MG/ML
5-50 INJECTION, EMULSION INTRAVENOUS CONTINUOUS
Status: DISCONTINUED | OUTPATIENT
Start: 2023-11-13 | End: 2023-11-14

## 2023-11-13 RX ORDER — TALC
3 POWDER (GRAM) TOPICAL NIGHTLY PRN
Status: DISCONTINUED | OUTPATIENT
Start: 2023-11-13 | End: 2023-11-24 | Stop reason: HOSPADM

## 2023-11-13 RX ADMIN — ROCURONIUM BROMIDE 30 MG: 10 INJECTION, SOLUTION INTRAVENOUS at 19:52

## 2023-11-13 RX ADMIN — Medication 100 MCG: at 08:43

## 2023-11-13 RX ADMIN — CEFAZOLIN SODIUM 3 G: 1 POWDER, FOR SOLUTION INTRAMUSCULAR; INTRAVENOUS at 12:20

## 2023-11-13 RX ADMIN — FUROSEMIDE 10 MG: 10 INJECTION, SOLUTION INTRAMUSCULAR; INTRAVENOUS at 09:49

## 2023-11-13 RX ADMIN — ALBUMIN HUMAN 12.5 G: 0.05 INJECTION, SOLUTION INTRAVENOUS at 23:30

## 2023-11-13 RX ADMIN — ROCURONIUM BROMIDE 50 MG: 10 INJECTION, SOLUTION INTRAVENOUS at 12:10

## 2023-11-13 RX ADMIN — EPINEPHRINE 0.08 MCG/KG/MIN: 1 INJECTION INTRAMUSCULAR; INTRAVENOUS; SUBCUTANEOUS at 17:22

## 2023-11-13 RX ADMIN — PROPOFOL 50 MG: 10 INJECTION, EMULSION INTRAVENOUS at 07:53

## 2023-11-13 RX ADMIN — ALBUMIN HUMAN 12.5 G: 50 SOLUTION INTRAVENOUS at 23:30

## 2023-11-13 RX ADMIN — SODIUM BICARBONATE 50 MEQ: 84 INJECTION, SOLUTION INTRAVENOUS at 10:41

## 2023-11-13 RX ADMIN — PHENYLEPHRINE 10 MG/250 ML(40 MCG/ML)IN 0.9 % SOD.CHLORIDE INTRAVENOUS 0.3 MCG/KG/MIN: at 07:50

## 2023-11-13 RX ADMIN — FENTANYL CITRATE 250 MCG: 50 INJECTION, SOLUTION INTRAMUSCULAR; INTRAVENOUS at 07:53

## 2023-11-13 RX ADMIN — PROTAMINE SULFATE 400 MG: 10 INJECTION, SOLUTION INTRAVENOUS at 11:40

## 2023-11-13 RX ADMIN — FENTANYL CITRATE 100 MCG: 50 INJECTION, SOLUTION INTRAMUSCULAR; INTRAVENOUS at 19:00

## 2023-11-13 RX ADMIN — Medication 16 MCG: at 12:16

## 2023-11-13 RX ADMIN — MAGNESIUM SULFATE HEPTAHYDRATE 2 G: 500 INJECTION, SOLUTION INTRAMUSCULAR; INTRAVENOUS at 11:21

## 2023-11-13 RX ADMIN — ROCURONIUM BROMIDE 50 MG: 10 INJECTION, SOLUTION INTRAVENOUS at 18:04

## 2023-11-13 RX ADMIN — Medication 0.02 MCG/KG/MIN: at 18:33

## 2023-11-13 RX ADMIN — CLEVIPIDINE 0.25 MG: 0.5 EMULSION INTRAVENOUS at 11:45

## 2023-11-13 RX ADMIN — CEFAZOLIN SODIUM 3 G: 1 POWDER, FOR SOLUTION INTRAMUSCULAR; INTRAVENOUS at 08:21

## 2023-11-13 RX ADMIN — Medication 16 MCG: at 08:20

## 2023-11-13 RX ADMIN — FENTANYL CITRATE 100 MCG: 50 INJECTION, SOLUTION INTRAMUSCULAR; INTRAVENOUS at 08:49

## 2023-11-13 RX ADMIN — SODIUM CHLORIDE, POTASSIUM CHLORIDE, SODIUM LACTATE AND CALCIUM CHLORIDE 500 ML: 600; 310; 30; 20 INJECTION, SOLUTION INTRAVENOUS at 14:55

## 2023-11-13 RX ADMIN — CEFAZOLIN SODIUM 2 G: 2 INJECTION, SOLUTION INTRAVENOUS at 21:39

## 2023-11-13 RX ADMIN — HYDROMORPHONE HYDROCHLORIDE 0.2 MG: 0.2 INJECTION, SOLUTION INTRAMUSCULAR; INTRAVENOUS; SUBCUTANEOUS at 17:22

## 2023-11-13 RX ADMIN — MIDAZOLAM HYDROCHLORIDE 1 MG: 1 INJECTION, SOLUTION INTRAMUSCULAR; INTRAVENOUS at 07:45

## 2023-11-13 RX ADMIN — DIPHENHYDRAMINE HYDROCHLORIDE 25 MG: 50 INJECTION INTRAMUSCULAR; INTRAVENOUS at 08:36

## 2023-11-13 RX ADMIN — INSULIN HUMAN 10 UNITS: 100 INJECTION, SOLUTION PARENTERAL at 21:23

## 2023-11-13 RX ADMIN — PROPOFOL 40 MCG/KG/MIN: 10 INJECTION, EMULSION INTRAVENOUS at 13:03

## 2023-11-13 RX ADMIN — NOREPINEPHRINE BITARTRATE 0.02 MCG/KG/MIN: 0.03 INJECTION, SOLUTION INTRAVENOUS at 18:31

## 2023-11-13 RX ADMIN — SODIUM CHLORIDE, POTASSIUM CHLORIDE, SODIUM LACTATE AND CALCIUM CHLORIDE 5 ML/HR: 600; 310; 30; 20 INJECTION, SOLUTION INTRAVENOUS at 14:56

## 2023-11-13 RX ADMIN — Medication 100 MCG: at 09:53

## 2023-11-13 RX ADMIN — PHENYLEPHRINE HYDROCHLORIDE 22 ML: 10 INJECTION INTRAVENOUS at 11:39

## 2023-11-13 RX ADMIN — PROTAMINE SULFATE 25 MG: 10 INJECTION, SOLUTION INTRAVENOUS at 12:26

## 2023-11-13 RX ADMIN — ALBUMIN (HUMAN) 250 ML: 2.5 SOLUTION INTRAVENOUS at 18:45

## 2023-11-13 RX ADMIN — INSULIN HUMAN 10 UNITS: 100 INJECTION, SOLUTION PARENTERAL at 18:43

## 2023-11-13 RX ADMIN — CEFAZOLIN SODIUM 2 G: 1 POWDER, FOR SOLUTION INTRAMUSCULAR; INTRAVENOUS at 18:29

## 2023-11-13 RX ADMIN — INSULIN HUMAN 5 UNITS/HR: 100 INJECTION, SOLUTION PARENTERAL at 08:36

## 2023-11-13 RX ADMIN — HEPARIN SODIUM 40000 UNITS: 1000 INJECTION, SOLUTION INTRAVENOUS; SUBCUTANEOUS at 09:32

## 2023-11-13 RX ADMIN — LIDOCAINE HYDROCHLORIDE 100 MG: 10 INJECTION, SOLUTION INFILTRATION; PERINEURAL at 11:20

## 2023-11-13 RX ADMIN — HYDROMORPHONE HYDROCHLORIDE 0.2 MG: 0.2 INJECTION, SOLUTION INTRAMUSCULAR; INTRAVENOUS; SUBCUTANEOUS at 23:39

## 2023-11-13 RX ADMIN — TRANEXAMIC ACID 1.5 MG/KG/HR: 100 INJECTION, SOLUTION INTRAVENOUS at 08:07

## 2023-11-13 RX ADMIN — SODIUM CHLORIDE, SODIUM GLUCONATE, SODIUM ACETATE, POTASSIUM CHLORIDE AND MAGNESIUM CHLORIDE: 526; 502; 368; 37; 30 INJECTION, SOLUTION INTRAVENOUS at 18:29

## 2023-11-13 RX ADMIN — ESMOLOL HYDROCHLORIDE 20 MG: 10 INJECTION, SOLUTION INTRAVENOUS at 07:56

## 2023-11-13 RX ADMIN — LIDOCAINE HYDROCHLORIDE 100 MG: 20 INJECTION, SOLUTION INFILTRATION; PERINEURAL at 07:53

## 2023-11-13 RX ADMIN — NOREPINEPHRINE BITARTRATE 0.04 MCG/KG/MIN: 8 INJECTION, SOLUTION INTRAVENOUS at 14:57

## 2023-11-13 RX ADMIN — MIDAZOLAM HYDROCHLORIDE 2 MG: 1 INJECTION, SOLUTION INTRAMUSCULAR; INTRAVENOUS at 07:40

## 2023-11-13 RX ADMIN — Medication 100 MCG: at 07:53

## 2023-11-13 RX ADMIN — Medication 100 MCG: at 09:34

## 2023-11-13 RX ADMIN — CALCIUM CHLORIDE 500 MG: 100 INJECTION INTRAVENOUS; INTRAVENTRICULAR at 19:34

## 2023-11-13 RX ADMIN — CALCIUM CHLORIDE 1000 MG: 100 INJECTION INTRAVENOUS; INTRAVENTRICULAR at 11:41

## 2023-11-13 RX ADMIN — HYDROMORPHONE HYDROCHLORIDE 0.2 MG: 0.2 INJECTION, SOLUTION INTRAMUSCULAR; INTRAVENOUS; SUBCUTANEOUS at 20:50

## 2023-11-13 RX ADMIN — SODIUM CHLORIDE, SODIUM GLUCONATE, SODIUM ACETATE, POTASSIUM CHLORIDE AND MAGNESIUM CHLORIDE: 526; 502; 368; 37; 30 INJECTION, SOLUTION INTRAVENOUS at 08:15

## 2023-11-13 RX ADMIN — PROPOFOL 30 MCG/KG/MIN: 10 INJECTION, EMULSION INTRAVENOUS at 21:10

## 2023-11-13 RX ADMIN — SODIUM BICARBONATE 50 MEQ: 84 INJECTION INTRAVENOUS at 19:03

## 2023-11-13 RX ADMIN — DEXTROSE MONOHYDRATE 12.5 G: 500 INJECTION PARENTERAL at 10:19

## 2023-11-13 RX ADMIN — ESMOLOL HYDROCHLORIDE 20 MG: 10 INJECTION, SOLUTION INTRAVENOUS at 08:49

## 2023-11-13 RX ADMIN — Medication 40 PERCENT: at 20:44

## 2023-11-13 RX ADMIN — CLEVIPIDINE 0.12 MG: 0.5 EMULSION INTRAVENOUS at 20:18

## 2023-11-13 RX ADMIN — SODIUM ZIRCONIUM CYCLOSILICATE 10 G: 10 POWDER, FOR SUSPENSION ORAL at 21:40

## 2023-11-13 RX ADMIN — ASPIRIN 81 MG CHEWABLE TABLET 81 MG: 81 TABLET CHEWABLE at 16:21

## 2023-11-13 RX ADMIN — METHADONE HYDROCHLORIDE 15 MG: 10 TABLET ORAL at 07:49

## 2023-11-13 RX ADMIN — ROCURONIUM BROMIDE 50 MG: 10 INJECTION, SOLUTION INTRAVENOUS at 09:48

## 2023-11-13 RX ADMIN — Medication 16 MCG: at 08:08

## 2023-11-13 RX ADMIN — NOREPINEPHRINE BITARTRATE 0.04 MCG/KG/MIN: 0.03 INJECTION, SOLUTION INTRAVENOUS at 10:14

## 2023-11-13 RX ADMIN — SODIUM CHLORIDE, POTASSIUM CHLORIDE, SODIUM LACTATE AND CALCIUM CHLORIDE 1000 ML: 600; 310; 30; 20 INJECTION, SOLUTION INTRAVENOUS at 14:54

## 2023-11-13 RX ADMIN — VASOPRESSIN 0.03 UNITS/MIN: 20 INJECTION INTRAVENOUS at 18:33

## 2023-11-13 RX ADMIN — ROCURONIUM BROMIDE 80 MG: 10 INJECTION, SOLUTION INTRAVENOUS at 07:53

## 2023-11-13 RX ADMIN — Medication 16 MCG: at 10:00

## 2023-11-13 RX ADMIN — SODIUM BICARBONATE 50 MEQ: 84 INJECTION, SOLUTION INTRAVENOUS at 11:07

## 2023-11-13 RX ADMIN — PROPOFOL 35 MCG/KG/MIN: 10 INJECTION, EMULSION INTRAVENOUS at 14:56

## 2023-11-13 RX ADMIN — FENTANYL CITRATE 50 MCG: 50 INJECTION, SOLUTION INTRAMUSCULAR; INTRAVENOUS at 19:26

## 2023-11-13 RX ADMIN — MIDAZOLAM HYDROCHLORIDE 2 MG: 1 INJECTION, SOLUTION INTRAMUSCULAR; INTRAVENOUS at 07:33

## 2023-11-13 RX ADMIN — SODIUM CHLORIDE, SODIUM LACTATE, POTASSIUM CHLORIDE, AND CALCIUM CHLORIDE: 600; 310; 30; 20 INJECTION, SOLUTION INTRAVENOUS at 07:17

## 2023-11-13 RX ADMIN — DEXTROSE MONOHYDRATE 25 G: 25 INJECTION, SOLUTION INTRAVENOUS at 21:24

## 2023-11-13 RX ADMIN — SODIUM BICARBONATE 50 MEQ: 84 INJECTION INTRAVENOUS at 17:22

## 2023-11-13 RX ADMIN — FENTANYL CITRATE 100 MCG: 50 INJECTION, SOLUTION INTRAMUSCULAR; INTRAVENOUS at 19:34

## 2023-11-13 ASSESSMENT — PAIN SCALES - GENERAL
PAINLEVEL_OUTOF10: 0 - NO PAIN
PAIN_LEVEL: 0
PAINLEVEL_OUTOF10: 0 - NO PAIN

## 2023-11-13 ASSESSMENT — PAIN - FUNCTIONAL ASSESSMENT
PAIN_FUNCTIONAL_ASSESSMENT: CPOT (CRITICAL CARE PAIN OBSERVATION TOOL)
PAIN_FUNCTIONAL_ASSESSMENT: 0-10
PAIN_FUNCTIONAL_ASSESSMENT: CPOT (CRITICAL CARE PAIN OBSERVATION TOOL)
PAIN_FUNCTIONAL_ASSESSMENT: CPOT (CRITICAL CARE PAIN OBSERVATION TOOL)

## 2023-11-13 ASSESSMENT — COLUMBIA-SUICIDE SEVERITY RATING SCALE - C-SSRS
6. HAVE YOU EVER DONE ANYTHING, STARTED TO DO ANYTHING, OR PREPARED TO DO ANYTHING TO END YOUR LIFE?: NO
1. IN THE PAST MONTH, HAVE YOU WISHED YOU WERE DEAD OR WISHED YOU COULD GO TO SLEEP AND NOT WAKE UP?: NO
2. HAVE YOU ACTUALLY HAD ANY THOUGHTS OF KILLING YOURSELF?: NO

## 2023-11-13 NOTE — OP NOTE
Coronary Artery Bypass Graft LIMA->LAD; R EVH SVG->OM; R EVH SVG->PDA; POSTERIOR PERICARDIOTOMY Operative Note     Date: 2023  OR Location: [facility] A OR    Name: Humphrey Waddell : 1965, Age: 58 y.o., MRN: [id number], Sex: male    Diagnosis  Pre-op Diagnosis     * Atherosclerotic heart disease of native coronary artery without angina pectoris [I25.10] Post-op Diagnosis     * Atherosclerotic heart disease of native coronary artery without angina pectoris [I25.10]   PREOPERATIVE DIAGNOSIS:  Pre-Op Diagnosis Codes:     * Atherosclerotic heart disease of native coronary artery without angina pectoris [I25.10]  Severe peripheral vascular disease  Bilateral carotid artery disease  Left above-knee amputation  POSTOPERATIVE DIAGNOSIS:  same    OPERATION/PROCEDURE:  1. Coronary Artery Bypass Graft LIMA->LAD; R EVH SVG->OM; R EVH SVG->PDA; POSTERIOR PERICARDIOTOMY  66919 - IN CABG W/ARTERIAL GRAFT SINGLE ARTERIAL GRAFT  CABG x3, LIMA to LAD, vein graft to OM1, vein graft to PDA  2. Posterior pericardial window  3. Medi Stim flow probe assessment of bypass grafts  4. Endoscopic Vein Harvesting  5. Sternal plating     SURGEON:     * Francisco Delatorre - Primary     ASSISTANT(S):  Circulator: Bella Hoskins RN; Reg Mas RN  Relief Circulator: Kimberlee Morin RN  Relief Scrub: Gisel Jade RN  Scrub Person: Domo Castaneda RN  Surgeon(s) and Role:   SHERRILL Boss PA    ANESTHESIA TYPE:  General    ANESTHESIA STAFF  Anesthesiologist: Juan Khan MD  C-AA: BRUNILDA Vasquez  Perfusionist: Orion Tobar     OPERATIVE INDICATIONS:  The patient is a 58 y.o. year-old male with symptomatic coronary artery disease.  Coronary angiography demonstrated significant three-vessel disease.  Echocardiography demonstrated normal left ventricular function and no significant valvular heart disease.  The patient was referred for coronary artery bypass grafting.  The procedural risks and benefits  were discussed with the patient prior to surgery.  The patient understood this clearly and requested to proceed.    This was a difficult decision.  The patient has multiple comorbidities.  He was discussed extensively with several interventional cardiologists.  He was not felt to have any good percutaneous options with a 50% left main lesion and a complex heavily calcified proximal LAD lesion.  He had normal ventricular function and was felt to be acceptable, but high risk for standard coronary bypass surgery.    OPERATIVE FINDINGS:  There were no pericardial adhesions or effusions.  The ascending aorta was soft without evidence of atherosclerosis.  The heart demonstrated normal ventricular function.  The patient had severe diffuse coronary artery disease, however we were able to find locations on the LAD, obtuse marginal, and PDA that were suitable for grafting.  His tissues were friable, but at the end of the case we had excellent flows in all of his bypass grafts and normal ventricular function.  His radial arterial line was blunted, and we inserted a right femoral arterial line.  Vein was harvested from the right leg, it was acceptable quality.    OPERATIVE PROCEDURE:  The patient was brought to the operating room, placed on the operating table in supine position.  General endotracheal anesthesia and hemodynamic monitoring were established.  The patient was prepped and draped in standard fashion. A median sternotomy was performed.  The left internal mammary artery was harvested in a skeletonized manner. The right greater saphenous vein was harvested endoscopically and was of good.  The thymus was divided and the pericardium was opened and suspended.  The ascending aorta was palpated and there was no evidence of calcification.  The patient was heparinized.  The ascending aorta and right atrium were cannulated for cardiopulmonary bypass and an antegrade cardioplegia cannula was placed.  The patient was placed on  cardiopulmonary bypass, the ascending aorta was crossclamped, cold blood antegrade cardioplegia was given, and the heart was arrested in diastole.  During the remainder of the cross-clamp time, cold blood cardioplegia was administered every 10-15 minutes.  The following procedures were performed.    The heart was retracted and a posterior pericardial window was performed from the diaphragm to near the left inferior pulmonary vein.  The goal of this is to reduce the risk of postoperative atrial fibrillation and pericardial effusions.     GRAFTS:   Graft 1, saphenous vein graft to PDA:  This vessel was longitudinally and measured approximately 2.0 mm in diameter.  The target was Good, and a segment of reverse saphenous vein was anastomosed end-to-side using continuous 7-0 Prolene.  The proximal anastomosis was placed to the ascending aorta with 6-0 Prolene.    Graft 2, saphenous vein graft to OM:    This vessel was longitudinally and measured approximately 2.0 mm in diameter.  The target was Good, and a segment of reverse saphenous vein was anastomosed end-to-side using continuous 7-0 Prolene.  The proximal anastomosis was placed to the ascending aorta with 6-0 Prolene.    Graft 3, LIMA to LAD:    The LAD was opened in its mid to distal third and measured approximately 1.75 mm in diameter. The LIMA was anastomosed end-to-side using continuous 7-0 Prolene.     A terminal hot shot was given and the cross clamped was removed.  A biventricular pacing wire was placed, along with 3 chest tubes, one in the mediastinum and a one in each pleural cavity.    All bypasses were assessed thoroughly by Medi Stim Flow probe duplex.    Flow in the LIMA to LAD was 90 with a Pulsatility Index of 3.4.  Flow in the vein to the OM1 was 150 with a Pulsatility Index of 2.0.  Flow in the vein to the PDA was 75 with a Pulsatility Index of 1.5.    The patient was then weaned from cardiopulmonary bypass without difficulty.  The cannulas were  removed and the cannulation sites were over sewn with 4-0 Prolene.  We oversewed the cardioplegia site on the aorta with a pledgeted 4-0 Prolene.  We had bleeding from the proximal vein graft going to the OM, which was fixed with 6-0 Prolene.  Protamine was administered to reverse systemic the heparin effect. Hemostasis was achieved, and we then proceeded to chest closure.    The upper pericardium was re approximated with 2-0 Vicryl.  The sternum was closed with interrupted stainless steel wires and supplemented with sternal plates to provide additional stability.  The fascia layers were closed with 0 Vicryl, the subcutaneous layers were closed using 2-0 Vicryl and the skin was closed using subcuticular Vicryl.      The sponge and instrument counts were correct at the end of the procedure.  The patient remained stable and was transferred to the Intensive Care Unit in stable condition.  As the attending surgeon, I was present or immediately available for all aspects of the operation.      Francisco Delatorre MD, MSc, [facility]  [phone]

## 2023-11-13 NOTE — SIGNIFICANT EVENT
ABG drawn from artline, patient on vent VC+600 AC16 50% 5P    results relayed to Glendy Quiroz PAC   decrease FiO2 to 40%

## 2023-11-13 NOTE — SIGNIFICANT EVENT
Notified by RN that patient now on max dose epinephrine. Arrived to room, patient with significant hypotension (SBP ~80s-100, MAPs 60-70) with minimal responsiveness to 2.5L LR since OR and increasing doses of vasopressor medications (currently levo epi vaso at max dosage). STAT CBC, RFP, ABG, echo, EKG, CXR, 2u prbc and 1 amp bicarb ordered and given.    Findings:  ABG - pH 7.27 / pCO2 39 / pO2 130 / Hgb 7.7 / lactate 2.7  CBC - WBC 24.4 / Hgb 8.1 / hct25.2 / Plt 326  RFP - K 5.4 / Cr 2.6 / bicarb 18  Coags - PT 13.2 / INR 1.2  CXR similar to immediate postop, no evidence of tamponade or tension PTX  EKG similar to immediate postop, no new or significant ST elevation, T wave inversion or elevation, QRS widening  TTE with concern for newly hypokinetic anterior wall    Discussed with Dr. Delatorre at bedside. Decision made to take patient back to OR for exploration.

## 2023-11-13 NOTE — H&P
History Of Present Illness  Humphrey Waddell is a 58 y.o. male presenting with CAD, hx NSTEMI and HFpEF. PMHx includes NIDDM2, Lt AKA, chronic RLE ulcer, GERD, carotid stenosis, hx CVA, hx GIB, hx anemia req'ing blood transfusions (2023), and hx confusion of anesthesia. Pt admitted to [facility] ICU s/p CABGx3 (LIMA>LAD, SVG>OM1, SVG>PDA), posterior pericardotomy, sternal wires and plating with Dr. Delatorre on 11/13/23.     OR Course:  Other: Full RLE EVH wrapped, dry. Oozy intraop    ETT: easy mask easy bag, 7.5cm @ 21cm  Access: RIJ MAC TL, Lt brachial Monie, Lt 18g  Crystalloid: 1500mL  C/s: 250mL  UOP: 460mL  No products  Wires: V wires backup VVI 50, SR 90s  Drips: propofol @ 40  EF: preop 55-60%, normal function, mild MR // postop 65%    Versed: 5mg  Fentanyl: 350mcg last dose pre pump  Pa: 180mg last 50mg @ 1210  Abx: 3g Ancef @ 1220  Other: methadone 15mg PO    Past Medical History  Past Medical History:   Diagnosis Date    Above-knee amputation of left lower extremity (CMS/[facility])     Adverse effect of anesthesia     confusion    Anemia     CAD (coronary artery disease)     Carotid artery disease (CMS/[facility])     bilateral    CVA (cerebral vascular accident) (CMS/[facility]) 2020    Depression     DM (diabetes mellitus) (CMS/[facility])     type 2 with neuropathy    GERD (gastroesophageal reflux disease)     High cholesterol     HTN (hypertension)     Leg ulcer (CMS/HCC)     chronic right lower extremity    Neuropathy     OA (osteoarthritis)     PAD (peripheral artery disease) (CMS/[facility])        Surgical History  Past Surgical History:   Procedure Laterality Date    CT HEAD ANGIO W AND WO IV CONTRAST  06/14/2021    CT HEAD ANGIO W AND WO IV CONTRAST 6/14/2021 [facility] INPATIENT LEGACY    CT NECK ANGIO W AND WO IV CONTRAST  06/14/2021    CT NECK ANGIO W AND WO IV CONTRAST 6/14/2021 [facility] INPATIENT LEGACY    LEG SURGERY Right     MR HEAD ANGIO WO IV CONTRAST  12/11/2012    MR HEAD ANGIO WO IV CONTRAST [facility] CLINICAL LEGACY    MR HEAD ANGIO WO IV  CONTRAST  06/12/2021    MR HEAD ANGIO WO IV CONTRAST [facility] EMERGENCY LEGACY    MR HEAD ANGIO WO IV CONTRAST  05/25/2021    MR HEAD ANGIO WO IV CONTRAST [facility] INPATIENT LEGACY    MR NECK ANGIO WO IV CONTRAST  07/18/2023    MR NECK ANGIO WO IV CONTRAST 7/18/2023 [facility] [id number] MRI    OTHER SURGICAL HISTORY  07/07/2021    Knee reconstruction    OTHER SURGICAL HISTORY  07/07/2021    Lower extremity amputation above knee    TOTAL SHOULDER ARTHROPLASTY Left 2020        Social History  He reports that he quit smoking about 7 months ago. His smoking use included cigars. He has never used smokeless tobacco. He reports that he does not currently use alcohol. He reports that he does not currently use drugs after having used the following drugs: Marijuana.    Family History  Family History   Problem Relation Name Age of Onset    Other (cardiac disorder) Mother      Hypertension Mother      Esophageal cancer Mother      Hearing loss Father      Hypertension Father      Heart disease Father      COPD Sister          Allergies  Patient has no known allergies.    Review of Systems   Reason unable to perform ROS: Sedated and intubated.      Physical Exam  Constitutional:       General: He is not in acute distress.     Appearance: He is obese.      Comments: Intubated sedated   Cardiovascular:      Rate and Rhythm: Normal rate and regular rhythm.   Pulmonary:      Effort: No respiratory distress.      Breath sounds: No wheezing or rales.      Comments: Coarse, intubated. CTs with minimal sanguinous drainage  Abdominal:      General: There is no distension.      Palpations: Abdomen is soft. There is no mass.   Musculoskeletal:      Comments: Lt AKA. RLE wrapped with ACE entire leg, soft, dry   Skin:     General: Skin is warm and dry.      Comments: Well-approximated MSI stable to palpation           Last Recorded Vitals  Blood pressure (!) 189/71, pulse 98, temperature 36.3 °C (97.3 °F), temperature source Temporal, resp. rate 20, height  "1.93 m (6' 4\"), weight 117 kg (257 lb 8 oz), SpO2 99 %.    Relevant Results    Scheduled medications     Continuous medications  EPINEPHrine (Adrenalin) 4 mg in sodium chloride 0.9% 250 mL (0.016 mg/mL) infusion, 0.01-1 mcg/kg/min  insulin regular infusion for cardiac surgery, 0-15 Units/hr  norepinephrine (Levophed) 16 mg in sodium chloride 0.9% 250 mL (0.064 mg/mL) infusion, 0.01-0.5 mcg/kg/min  phenylephrine (Frank-Synephrine) 10 mg in dextrose 5 % in water (D5W) 250 mL (0.04 mg/mL) infusion, 0.1-2 mcg/kg/min      PRN medications     Assessment/Plan   Principal Problem:    CAD, multiple vessel  Humphrey Waddell is a 58 y.o. male presenting with CAD, hx NSTEMI and HFpEF. PMHx includes NIDDM2, Lt AKA, chronic RLE ulcer, GERD, carotid stenosis, hx CVA, hx GIB, hx anemia req'ing blood transfusions (2023), and hx confusion of anesthesia. Pt admitted to [facility] ICU s/p CABGx3 (LIMA>LAD, SVG>OM1, SVG>PDA), posterior pericardotomy, sternal wires and plating with Dr. Delatorre on 11/13/23.    Neuro: Sedated on propofol. Expected acute postop pain. Hx CVA. Anxiety, depression  - Serial neuro and pain assessments  - Scheduled Tylenol, PRN Oxy, PRN Dilaudid while CTs are in  - PT/OT/MITT  - Hold home melatonin 3mg    CV: S/p CABGx3 (LIMA>LAD, SVG>OM1, SVG>PDA). HFpEF. Hx NSTEMI. Bilateral carotid stenosis.  On propofol on arrival to ICU  LVEF pre 60%, post 65%  V wires backup VVI 50, SR 90s  - Continuous EKG and NIBP  - Maintain MAP 65-90  - Levophed  - Volume resuscitate as needed  - ASA/statin  - Hold BB until HDs permit  - Hold home Plavix, hydralazine, torsemide 20mg for now    Pulm: Intubated. Former smoker  - Stat postop CXR  - Wean FiO2 to SpO2 > 92%  - ABGs prn  - Bronch hygiene    GI:   - NPO until extubated and swallow eval  - Bowel regimen  - Zofran PRN    Renal: CKD (baseline Cr 2.84)  - RFP now and prn  - Replete lytes prn    Heme: Acute on chronic anemia  - CBC now and prn  - SCDs for DVTppx  - Lovenox POD1 if " appropriate    Endo: IDDM2  - Maintain BG < 180, ISS per ICU protocol  - Hold home Lantus 4u at bedtime, ? on home metformin    ID: Afebrile, no s/s infection  - Trend temp and WBCs q4  - Periop abx x 48hrs  - Hold home PCN V 250mg 4x daily    Dispo: admit to ICU       I spent 120 minutes in the professional and overall care of this patient.      Glendy Quiroz PA-C

## 2023-11-13 NOTE — BRIEF OP NOTE
Date: 2023  OR Location: [facility] OR    Name: Humphrey Waddell, : 1965, Age: 58 y.o., MRN: [id number], Sex: male    Diagnosis  Pre-op Diagnosis     * Atherosclerotic heart disease of native coronary artery without angina pectoris [I25.10] Post-op Diagnosis     * Atherosclerotic heart disease of native coronary artery without angina pectoris [I25.10]     Procedures  Coronary Artery Bypass Graft LIMA->LAD; R EVH SVG->OM; R EVH SVG->PDA; POSTERIOR PERICARDIOTOMY  71134 - [place] CABG W/ARTERIAL GRAFT SINGLE ARTERIAL GRAFT        Chest Tubes/Drains: Bilateral pleuralm mediastinal x 1 to single pleuravac       Pacing Wires: Ventricular     Sternotomy performed by: Juan Ramon    Conduit Harvested by: Ana  Closure:Nurys  Sternal Wires placed by: Juan Ramon  Sternal plating: Ana    Cardio Pulmonary Bypass Time:  91 min  Cross-clamp Time: 76 min      Is patient candidate for Emergency Re-sternotomy? Yes   -If yes, POD #10 is -    Surgeons      * Farncisco Delatorre - Primary    Resident/Fellow/Other Assistant:  Surgeon(s) and Role:  Nurys Perez SA RNFA    Procedure Summary  Anesthesia: General  ASA: III  Anesthesia Staff: Anesthesiologist: Juan Khan MD  C-AA: BRUNILDA Vasquez  Perfusionist: Orion Tobar  Estimated Blood Loss: 250mL  Intra-op Medications:   Medication Name Total Dose   sodium chloride 0.9 % irrigation solution 5,000 mL   papaverine injection 60 mg   heparin 1,000 unit/mL injection 50,000 Units   phenylephrine (Frank-Synephrine) 10 mg in sodium chloride 0.9% 250 mL (0.04 mg/mL) infusion (premix) 1.64 mg              Anesthesia Record               Intraprocedure I/O Totals          Intake    .00 mL    electrolyte-A (Plasmalyte-A) 1000.00 mL    Norepinephrine Drip 0.00 mL    The total shown is the total volume documented since Anesthesia Start was filed.    Clevidipine Drip 0.00 mL    The total shown is the total volume documented since Anesthesia Start was filed.     Propofol Drip 0.00 mL    The total shown is the total volume documented since Anesthesia Start was filed.    Phenylephrine Drip 0.00 mL    The total shown is the total volume documented since Anesthesia Start was filed.    Cell Saver 160 mL    Total Intake 1660 mL       Output    Urine 460 mL    Est. Blood Loss 250 mL    Total Output 710 mL       Net    Net Volume 950 mL          Specimen: No specimens collected     Staff:   Circulator: Bella Hoskins RN; Reg Mas RN  Relief Circulator: Kimberlee Morin RN  Relief Scrub: Gisel Jade RN  Scrub Person: Domo Castaneda RN          Findings: *      Complications:  None; patient tolerated the procedure well.     Disposition: ICU - extubated and stable.  Condition: stable  Specimens Collected: No specimens collected  Attending Attestation: I was present and scrubbed for the key portions of the procedure.    Francisco Delatorre  Phone Number: [phone]

## 2023-11-13 NOTE — ANESTHESIA PROCEDURE NOTES
Airway  Date/Time: 11/13/2023 7:56 AM  Urgency: elective    Airway not difficult    Staffing  Performed: BALDEV   Authorized by: Juan Khan MD    Performed by: BRUNILDA Vasquez  Patient location during procedure: OR    Indications and Patient Condition  Indications for airway management: anesthesia and airway protection  Spontaneous Ventilation: absent  Sedation level: deep  Preoxygenated: yes  Patient position: sniffing  Mask difficulty assessment: 1 - vent by mask  No planned trial extubation    Final Airway Details  Final airway type: endotracheal airway      Successful airway: ETT  Cuffed: yes   Successful intubation technique: direct laryngoscopy  Facilitating devices/methods: intubating stylet  Blade: Azul  Blade size: #4  ETT size (mm): 7.5  Cormack-Lehane Classification: grade I - full view of glottis  Placement verified by: chest auscultation and capnometry   Measured from: gums  ETT to gums (cm): 23  Number of attempts at approach: 1

## 2023-11-13 NOTE — ANESTHESIA PROCEDURE NOTES
Central Venous Line:    Date/Time: 11/13/2023 8:15 AM    A central venous line was placed in the OR for the following indication(s): central venous access and CVP monitoring.  Staffing  Performed: attending and BALDEV   Authorized by: Juan Khan MD    Performed by: BRUNILDA Vasquez    Sterility preparation included the following: provider hand hygiene performed prior to central venous catheter insertion, all 5 sterile barriers used (gloves, gown, cap, mask, large sterile drape) during central venous catheter insertion, antiseptic used during central venous catheter insertion and skin prep agent completely dried prior to procedure.  The patient was placed in Trendelenburg position.    Right internal jugular vein was prepped.    The site was prepped with Chlorhexidine.  Size: 9 Fr (8 Fr)   Length: 20  Catheter type: introducer   Number of Lumens: double lumen    This catheter was not an oximetric catheter.    During the procedure, the following specific steps were taken: target vein identified, needle advanced into vein and blood aspirated and guidewire advanced into vein.  Seldinger technique used.  Procedure performed using ultrasound guidance and surface landmarks.  Sterile gel and probe cover used in ultrasound-guided central venous catheter insertion.    Intravenous verification was obtained by ultrasound, venous blood return and manometry.      Post insertion care included: all ports aspirated, all ports flushed easily, guidewire removed intact, Biopatch applied, line sutured in place and dressing applied.    During the procedure the patient experienced: patient tolerated procedure well with no complications.           images stored in chart    Additional notes:  9Fr MAC w TL mini-MAC

## 2023-11-13 NOTE — ANESTHESIA POSTPROCEDURE EVALUATION
Patient: Humphrey Waddell    Procedure Summary       Date: 11/13/23 Room / Location: [facility] OR 10 / Virtual [facility] OR    Anesthesia Start: 0717 Anesthesia Stop: 1336    Procedure: Coronary Artery Bypass Graft LIMA->LAD; R EVH SVG->OM; R EVH SVG->PDA; POSTERIOR PERICARDIOTOMY Diagnosis:       Atherosclerotic heart disease of native coronary artery without angina pectoris      (Atherosclerotic heart disease of native coronary artery without angina pectoris [I25.10])    Surgeons: Francisco Delatorre MD Responsible Provider: Juan Khan MD    Anesthesia Type: general ASA Status: 3            Anesthesia Type: general    Vitals Value Taken Time   /59 11/13/23 1415   Temp 36.4 °C (97.5 °F) 11/13/23 1415   Pulse 91 11/13/23 1423   Resp 16 11/13/23 1423   SpO2 100 % 11/13/23 1423   Vitals shown include unvalidated device data.    Anesthesia Post Evaluation    Patient location during evaluation: ICU  Patient participation: complete - patient cannot participate  Level of consciousness: obtunded/minimal responses (sedated on propofol)  Pain management: adequate  Cardiovascular status: acceptable  Respiratory status: acceptable  Hydration status: acceptable  Comments: No nausea        No notable events documented.

## 2023-11-13 NOTE — ANESTHESIA PREPROCEDURE EVALUATION
Patient: Humphrey Waddell    Procedure Information       Date/Time: 11/13/23 0730    Procedure: Coronary Artery Bypass Graft x2-3; LIMA; VEIN    Location: [facility] A OR 10 / [facility] A OR    Surgeons: Francisco Delatorre MD            Relevant Problems   Anesthesia (within normal limits)      Cardiovascular   (+) Acute on chronic diastolic heart failure (CMS/[facility])   (+) Arteriosclerosis of coronary artery   (+) Atherosclerosis of both carotid arteries   (+) Atypical chest pain   (+) Benign essential hypertension   (+) CAD, multiple vessel   (+) Carotid artery stenosis with cerebral infarction (CMS/[facility])   (+) Essential (primary) hypertension   (+) Hyperlipidemia, unspecified   (+) Hypertension   (+) NSTEMI, initial episode of care (CMS/HCC)   (+) Peripheral arterial occlusive disease (CMS/[facility])      Endocrine   (+) Diabetic renal disease (CMS/[facility])   (+) Type 2 diabetes mellitus with diabetic neuropathy, unspecified (CMS/[facility])   (+) Type 2 diabetes mellitus with neurologic complication, without long-term current use of insulin (CMS/[facility])      GI   (+) Rectal bleeding   (+) Upper gastrointestinal hemorrhage      /Renal   (+) IDANIA (acute kidney injury) (CMS/[facility])   (+) Chronic kidney disease, stage 3 (CMS/[facility])   (+) Diabetic renal disease (CMS/[facility])      Neuro/Psych   (+) Anxiety disorder, unspecified   (+) Atherosclerosis of both carotid arteries   (+) Carotid artery stenosis with cerebral infarction (CMS/[facility])   (+) Cerebrovascular accident (CVA) (CMS/[facility])   (+) Depression, unspecified   (+) Major depressive disorder, recurrent, moderate (CMS/[facility])   (+) Peripheral neuropathy      Hematology   (+) Anemia   (+) Iron deficiency anemia      Musculoskeletal   (+) Chronic osteoarthritis   (+) Primary osteoarthritis, left shoulder      Infectious Disease   (+) Necrotizing fasciitis (CMS/[facility])   (+) Osteomyelitis (CMS/[facility])      Other   (+) Osteomyelitis (CMS/[facility])       Clinical information reviewed:   Tobacco  Allergies  Meds   Med Hx   Surg Hx   Fam Hx  Soc Hx        NPO Detail:  NPO/Void Status  Carbonhydrate Drink Given Prior to Surgery? : N  Date of Last Liquid: 11/13/23  Time of Last Liquid: 0600  Date of Last Solid: 11/12/23  Time of Last Solid: 2000  Last Intake Type: Clear fluids  Time of Last Void: 0629         Physical Exam    Airway  Mallampati: II  TM distance: >3 FB  Neck ROM: full     Cardiovascular   Rhythm: regular  Rate: normal     Dental   (+) upper dentures     Pulmonary   Breath sounds clear to auscultation     Abdominal            Anesthesia Plan    ASA 3     general   (Plan lefft brachial art line and right internal jugular CVP.)  intravenous induction   Postoperative administration of opioids is intended.  Anesthetic plan and risks discussed with patient.  Use of blood products discussed with patient who.    Plan discussed with CAA.

## 2023-11-13 NOTE — ANESTHESIA PROCEDURE NOTES
Peripheral IV  Date/Time: 11/13/2023 8:16 AM  Inserted by: BRUNILDA Vasquez    Placement  Needle size: 18 G  Laterality: left  Location: arm  Site prep: alcohol  Technique: anatomical landmarks  Attempts: 2

## 2023-11-13 NOTE — ANESTHESIA PROCEDURE NOTES
Arterial Line:    Date/Time: 11/13/2023 7:47 AM    Staffing  Performed: CAA and BALDEV   Authorized by: Juan Khan MD    Performed by: BRUNILDA Vasquez    An arterial line was placed. Procedure performed using ultrasound guidance.in the OR for the following indication(s): continuous blood pressure monitoring and blood sampling needed.    A 20 gauge (size), 1 and 3/4 inch (length), Arrow (type) catheter was placed into the Left brachial artery, secured by Tegaderm,   Seldinger technique used.  Events:  patient tolerated procedure well with no complications.       images stored in chart

## 2023-11-14 ENCOUNTER — APPOINTMENT (OUTPATIENT)
Dept: CARDIOLOGY | Facility: HOSPITAL | Age: 58
End: 2023-11-14
Payer: COMMERCIAL

## 2023-11-14 ENCOUNTER — APPOINTMENT (OUTPATIENT)
Dept: RADIOLOGY | Facility: HOSPITAL | Age: 58
End: 2023-11-14
Payer: COMMERCIAL

## 2023-11-14 LAB
ACT BLD: 105 SEC (ref 96–152)
ACT BLD: 106 SEC (ref 96–152)
ACT BLD: 107 SEC (ref 96–152)
ACT BLD: 440 SEC (ref 96–152)
ACT BLD: 470 SEC (ref 96–152)
ACT BLD: 490 SEC (ref 96–152)
ACT BLD: 563 SEC (ref 96–152)
ALBUMIN SERPL BCP-MCNC: 2.3 G/DL (ref 3.4–5)
ALBUMIN SERPL BCP-MCNC: 2.7 G/DL (ref 3.4–5)
ALBUMIN SERPL BCP-MCNC: 2.8 G/DL (ref 3.4–5)
ALBUMIN SERPL BCP-MCNC: 2.9 G/DL (ref 3.4–5)
ANION GAP BLDA CALCULATED.4IONS-SCNC: 10 MMO/L (ref 10–25)
ANION GAP BLDA CALCULATED.4IONS-SCNC: 12 MMO/L (ref 10–25)
ANION GAP BLDA CALCULATED.4IONS-SCNC: 12 MMO/L (ref 10–25)
ANION GAP BLDA CALCULATED.4IONS-SCNC: 16 MMO/L (ref 10–25)
ANION GAP BLDA CALCULATED.4IONS-SCNC: 8 MMO/L (ref 10–25)
ANION GAP BLDA CALCULATED.4IONS-SCNC: 8 MMO/L (ref 10–25)
ANION GAP SERPL CALC-SCNC: 13 MMOL/L (ref 10–20)
ANION GAP SERPL CALC-SCNC: 13 MMOL/L (ref 10–20)
ANION GAP SERPL CALC-SCNC: 14 MMOL/L (ref 10–20)
ANION GAP SERPL CALC-SCNC: 14 MMOL/L (ref 10–20)
ATRIAL RATE: 118 BPM
ATRIAL RATE: 99 BPM
BASE EXCESS BLDA CALC-SCNC: -3.2 MMOL/L (ref -2–3)
BASE EXCESS BLDA CALC-SCNC: -3.2 MMOL/L (ref -2–3)
BASE EXCESS BLDA CALC-SCNC: -3.3 MMOL/L (ref -2–3)
BASE EXCESS BLDA CALC-SCNC: -4.1 MMOL/L (ref -2–3)
BASE EXCESS BLDA CALC-SCNC: -4.5 MMOL/L (ref -2–3)
BASE EXCESS BLDA CALC-SCNC: -9.4 MMOL/L (ref -2–3)
BODY TEMPERATURE: 37 DEGREES CELSIUS
BUN SERPL-MCNC: 42 MG/DL (ref 6–23)
BUN SERPL-MCNC: 52 MG/DL (ref 6–23)
BUN SERPL-MCNC: 53 MG/DL (ref 6–23)
BUN SERPL-MCNC: 55 MG/DL (ref 6–23)
CA-I BLDA-SCNC: 1.05 MMOL/L (ref 1.1–1.33)
CA-I BLDA-SCNC: 1.12 MMOL/L (ref 1.1–1.33)
CA-I BLDA-SCNC: 1.13 MMOL/L (ref 1.1–1.33)
CA-I BLDA-SCNC: 1.16 MMOL/L (ref 1.1–1.33)
CA-I BLDA-SCNC: 1.2 MMOL/L (ref 1.1–1.33)
CA-I BLDA-SCNC: 1.23 MMOL/L (ref 1.1–1.33)
CALCIUM SERPL-MCNC: 7 MG/DL (ref 8.6–10.3)
CALCIUM SERPL-MCNC: 7.4 MG/DL (ref 8.6–10.3)
CALCIUM SERPL-MCNC: 7.5 MG/DL (ref 8.6–10.3)
CALCIUM SERPL-MCNC: 7.6 MG/DL (ref 8.6–10.3)
CHLORIDE BLDA-SCNC: 107 MMOL/L (ref 98–107)
CHLORIDE BLDA-SCNC: 108 MMOL/L (ref 98–107)
CHLORIDE BLDA-SCNC: 109 MMOL/L (ref 98–107)
CHLORIDE BLDA-SCNC: 110 MMOL/L (ref 98–107)
CHLORIDE BLDA-SCNC: 110 MMOL/L (ref 98–107)
CHLORIDE BLDA-SCNC: 111 MMOL/L (ref 98–107)
CHLORIDE SERPL-SCNC: 106 MMOL/L (ref 98–107)
CHLORIDE SERPL-SCNC: 107 MMOL/L (ref 98–107)
CHLORIDE SERPL-SCNC: 108 MMOL/L (ref 98–107)
CHLORIDE SERPL-SCNC: 110 MMOL/L (ref 98–107)
CO2 SERPL-SCNC: 20 MMOL/L (ref 21–32)
CO2 SERPL-SCNC: 23 MMOL/L (ref 21–32)
CO2 SERPL-SCNC: 24 MMOL/L (ref 21–32)
CO2 SERPL-SCNC: 25 MMOL/L (ref 21–32)
COHGB MFR BLDA: 0.6 %
COHGB MFR BLDA: 0.7 %
COHGB MFR BLDA: 0.8 %
COHGB MFR BLDA: 0.9 %
COHGB MFR BLDA: 1.2 %
CREAT SERPL-MCNC: 2.6 MG/DL (ref 0.5–1.3)
CREAT SERPL-MCNC: 3.17 MG/DL (ref 0.5–1.3)
CREAT SERPL-MCNC: 3.27 MG/DL (ref 0.5–1.3)
CREAT SERPL-MCNC: 3.46 MG/DL (ref 0.5–1.3)
DO-HGB MFR BLDA: 0.4 % (ref 0–5)
DO-HGB MFR BLDA: 0.5 % (ref 0–5)
DO-HGB MFR BLDA: 0.8 % (ref 0–5)
DO-HGB MFR BLDA: 1 % (ref 0–5)
DO-HGB MFR BLDA: 1.1 % (ref 0–5)
ERYTHROCYTE [DISTWIDTH] IN BLOOD BY AUTOMATED COUNT: 15.3 % (ref 11.5–14.5)
ERYTHROCYTE [DISTWIDTH] IN BLOOD BY AUTOMATED COUNT: 15.5 % (ref 11.5–14.5)
GFR SERPL CREATININE-BSD FRML MDRD: 19 ML/MIN/1.73M*2
GFR SERPL CREATININE-BSD FRML MDRD: 21 ML/MIN/1.73M*2
GFR SERPL CREATININE-BSD FRML MDRD: 22 ML/MIN/1.73M*2
GFR SERPL CREATININE-BSD FRML MDRD: 27 ML/MIN/1.73M*2
GLUCOSE BLD MANUAL STRIP-MCNC: 119 MG/DL (ref 74–99)
GLUCOSE BLD MANUAL STRIP-MCNC: 126 MG/DL (ref 74–99)
GLUCOSE BLD MANUAL STRIP-MCNC: 132 MG/DL (ref 74–99)
GLUCOSE BLD MANUAL STRIP-MCNC: 133 MG/DL (ref 74–99)
GLUCOSE BLD MANUAL STRIP-MCNC: 137 MG/DL (ref 74–99)
GLUCOSE BLD MANUAL STRIP-MCNC: 144 MG/DL (ref 74–99)
GLUCOSE BLDA-MCNC: 126 MG/DL (ref 74–99)
GLUCOSE BLDA-MCNC: 145 MG/DL (ref 74–99)
GLUCOSE BLDA-MCNC: 165 MG/DL (ref 74–99)
GLUCOSE BLDA-MCNC: 169 MG/DL (ref 74–99)
GLUCOSE BLDA-MCNC: 226 MG/DL (ref 74–99)
GLUCOSE BLDA-MCNC: 277 MG/DL (ref 74–99)
GLUCOSE SERPL-MCNC: 131 MG/DL (ref 74–99)
GLUCOSE SERPL-MCNC: 138 MG/DL (ref 74–99)
GLUCOSE SERPL-MCNC: 140 MG/DL (ref 74–99)
GLUCOSE SERPL-MCNC: 199 MG/DL (ref 74–99)
HCO3 BLDA-SCNC: 17.6 MMOL/L (ref 22–26)
HCO3 BLDA-SCNC: 21.6 MMOL/L (ref 22–26)
HCO3 BLDA-SCNC: 22 MMOL/L (ref 22–26)
HCO3 BLDA-SCNC: 22 MMOL/L (ref 22–26)
HCO3 BLDA-SCNC: 22.1 MMOL/L (ref 22–26)
HCO3 BLDA-SCNC: 22.4 MMOL/L (ref 22–26)
HCT VFR BLD AUTO: 21.5 % (ref 41–52)
HCT VFR BLD AUTO: 22 % (ref 41–52)
HCT VFR BLD EST: 21 % (ref 41–52)
HCT VFR BLD EST: 25 % (ref 41–52)
HCT VFR BLD EST: 25 % (ref 41–52)
HCT VFR BLD EST: 27 % (ref 41–52)
HCT VFR BLD EST: 27 % (ref 41–52)
HCT VFR BLD EST: 28 % (ref 41–52)
HGB BLD-MCNC: 6.7 G/DL (ref 13.5–17.5)
HGB BLD-MCNC: 7.1 G/DL (ref 13.5–17.5)
HGB BLDA-MCNC: 6.9 G/DL (ref 13.5–17.5)
HGB BLDA-MCNC: 6.9 G/DL (ref 13.5–17.5)
HGB BLDA-MCNC: 8.2 G/DL (ref 13.5–17.5)
HGB BLDA-MCNC: 8.2 G/DL (ref 13.5–17.5)
HGB BLDA-MCNC: 8.3 G/DL (ref 13.5–17.5)
HGB BLDA-MCNC: 8.9 G/DL (ref 13.5–17.5)
HGB BLDA-MCNC: 8.9 G/DL (ref 13.5–17.5)
HGB BLDA-MCNC: 9 G/DL (ref 13.5–17.5)
HGB BLDA-MCNC: 9 G/DL (ref 13.5–17.5)
HGB BLDA-MCNC: 9.3 G/DL (ref 13.5–17.5)
HGB BLDA-MCNC: 9.3 G/DL (ref 13.5–17.5)
INHALED O2 CONCENTRATION: 100 %
INHALED O2 CONCENTRATION: 40 %
INHALED O2 CONCENTRATION: 60 %
INHALED O2 CONCENTRATION: 80 %
LACTATE BLDA-SCNC: 0.6 MMOL/L (ref 0.4–2)
LACTATE BLDA-SCNC: 0.6 MMOL/L (ref 0.4–2)
LACTATE BLDA-SCNC: 1.9 MMOL/L (ref 0.4–2)
LACTATE BLDA-SCNC: 1.9 MMOL/L (ref 0.4–2)
LACTATE BLDA-SCNC: 3.7 MMOL/L (ref 0.4–2)
LACTATE BLDA-SCNC: 4.2 MMOL/L (ref 0.4–2)
MAGNESIUM SERPL-MCNC: 2.6 MG/DL (ref 1.6–2.4)
MCH RBC QN AUTO: 28.2 PG (ref 26–34)
MCH RBC QN AUTO: 28.5 PG (ref 26–34)
MCHC RBC AUTO-ENTMCNC: 31.2 G/DL (ref 32–36)
MCHC RBC AUTO-ENTMCNC: 32.3 G/DL (ref 32–36)
MCV RBC AUTO: 88 FL (ref 80–100)
MCV RBC AUTO: 90 FL (ref 80–100)
METHGB MFR BLDA: 0 % (ref 0–1.5)
METHGB MFR BLDA: 0.2 % (ref 0–1.5)
METHGB MFR BLDA: 0.4 % (ref 0–1.5)
NRBC BLD-RTO: 0 /100 WBCS (ref 0–0)
NRBC BLD-RTO: 0 /100 WBCS (ref 0–0)
OXYHGB MFR BLDA: 97.6 % (ref 94–98)
OXYHGB MFR BLDA: 97.9 % (ref 94–98)
OXYHGB MFR BLDA: 97.9 % (ref 94–98)
OXYHGB MFR BLDA: 98.1 % (ref 94–98)
OXYHGB MFR BLDA: 98.2 % (ref 94–98)
OXYHGB MFR BLDA: 98.2 % (ref 94–98)
OXYHGB MFR BLDA: 98.7 % (ref 94–98)
OXYHGB MFR BLDA: 98.7 % (ref 94–98)
P AXIS: 54 DEGREES
P AXIS: 7 DEGREES
P OFFSET: 157 MS
P OFFSET: 191 MS
P ONSET: 103 MS
P ONSET: 158 MS
PCO2 BLDA: 39 MM HG (ref 38–42)
PCO2 BLDA: 39 MM HG (ref 38–42)
PCO2 BLDA: 40 MM HG (ref 38–42)
PCO2 BLDA: 41 MM HG (ref 38–42)
PCO2 BLDA: 42 MM HG (ref 38–42)
PCO2 BLDA: 51 MM HG (ref 38–42)
PH BLDA: 7.23 PH (ref 7.38–7.42)
PH BLDA: 7.25 PH (ref 7.38–7.42)
PH BLDA: 7.33 PH (ref 7.38–7.42)
PH BLDA: 7.35 PH (ref 7.38–7.42)
PH BLDA: 7.36 PH (ref 7.38–7.42)
PH BLDA: 7.36 PH (ref 7.38–7.42)
PHOSPHATE SERPL-MCNC: 3.3 MG/DL (ref 2.5–4.9)
PHOSPHATE SERPL-MCNC: 3.6 MG/DL (ref 2.5–4.9)
PHOSPHATE SERPL-MCNC: 3.6 MG/DL (ref 2.5–4.9)
PHOSPHATE SERPL-MCNC: 4.2 MG/DL (ref 2.5–4.9)
PLATELET # BLD AUTO: 165 X10*3/UL (ref 150–450)
PLATELET # BLD AUTO: 192 X10*3/UL (ref 150–450)
PO2 BLDA: 157 MM HG (ref 85–95)
PO2 BLDA: 158 MM HG (ref 85–95)
PO2 BLDA: 198 MM HG (ref 85–95)
PO2 BLDA: 200 MM HG (ref 85–95)
PO2 BLDA: 239 MM HG (ref 85–95)
PO2 BLDA: 294 MM HG (ref 85–95)
POTASSIUM BLDA-SCNC: 5.3 MMOL/L (ref 3.5–5.3)
POTASSIUM BLDA-SCNC: 5.4 MMOL/L (ref 3.5–5.3)
POTASSIUM BLDA-SCNC: 5.4 MMOL/L (ref 3.5–5.3)
POTASSIUM BLDA-SCNC: 5.6 MMOL/L (ref 3.5–5.3)
POTASSIUM BLDA-SCNC: 5.8 MMOL/L (ref 3.5–5.3)
POTASSIUM BLDA-SCNC: 5.9 MMOL/L (ref 3.5–5.3)
POTASSIUM SERPL-SCNC: 5.7 MMOL/L (ref 3.5–5.3)
POTASSIUM SERPL-SCNC: 5.8 MMOL/L (ref 3.5–5.3)
POTASSIUM SERPL-SCNC: 5.8 MMOL/L (ref 3.5–5.3)
POTASSIUM SERPL-SCNC: 6.2 MMOL/L (ref 3.5–5.3)
PR INTERVAL: 126 MS
PR INTERVAL: 180 MS
Q ONSET: 193 MS
Q ONSET: 221 MS
QRS COUNT: 16 BEATS
QRS COUNT: 19 BEATS
QRS DURATION: 152 MS
QRS DURATION: 154 MS
QT INTERVAL: 354 MS
QT INTERVAL: 400 MS
QTC CALCULATION(BAZETT): 496 MS
QTC CALCULATION(BAZETT): 513 MS
QTC FREDERICIA: 443 MS
QTC FREDERICIA: 472 MS
R AXIS: 103 DEGREES
R AXIS: 104 DEGREES
RBC # BLD AUTO: 2.38 X10*6/UL (ref 4.5–5.9)
RBC # BLD AUTO: 2.49 X10*6/UL (ref 4.5–5.9)
SAO2 % BLDA: 100 % (ref 94–100)
SAO2 % BLDA: 100 % (ref 94–100)
SAO2 % BLDA: 99 % (ref 94–100)
SODIUM BLDA-SCNC: 135 MMOL/L (ref 136–145)
SODIUM BLDA-SCNC: 136 MMOL/L (ref 136–145)
SODIUM BLDA-SCNC: 137 MMOL/L (ref 136–145)
SODIUM BLDA-SCNC: 138 MMOL/L (ref 136–145)
SODIUM SERPL-SCNC: 138 MMOL/L (ref 136–145)
SODIUM SERPL-SCNC: 139 MMOL/L (ref 136–145)
T AXIS: 46 DEGREES
T AXIS: 52 DEGREES
T OFFSET: 393 MS
T OFFSET: 398 MS
VENTRICULAR RATE: 118 BPM
VENTRICULAR RATE: 99 BPM
WBC # BLD AUTO: 15.2 X10*3/UL (ref 4.4–11.3)
WBC # BLD AUTO: 17.5 X10*3/UL (ref 4.4–11.3)

## 2023-11-14 PROCEDURE — 82805 BLOOD GASES W/O2 SATURATION: CPT | Performed by: NURSE PRACTITIONER

## 2023-11-14 PROCEDURE — C9113 INJ PANTOPRAZOLE SODIUM, VIA: HCPCS | Performed by: STUDENT IN AN ORGANIZED HEALTH CARE EDUCATION/TRAINING PROGRAM

## 2023-11-14 PROCEDURE — 36430 TRANSFUSION BLD/BLD COMPNT: CPT

## 2023-11-14 PROCEDURE — 85027 COMPLETE CBC AUTOMATED: CPT | Performed by: STUDENT IN AN ORGANIZED HEALTH CARE EDUCATION/TRAINING PROGRAM

## 2023-11-14 PROCEDURE — 37799 UNLISTED PX VASCULAR SURGERY: CPT | Performed by: STUDENT IN AN ORGANIZED HEALTH CARE EDUCATION/TRAINING PROGRAM

## 2023-11-14 PROCEDURE — 71045 X-RAY EXAM CHEST 1 VIEW: CPT | Mod: FY

## 2023-11-14 PROCEDURE — 2500000001 HC RX 250 WO HCPCS SELF ADMINISTERED DRUGS (ALT 637 FOR MEDICARE OP): Performed by: STUDENT IN AN ORGANIZED HEALTH CARE EDUCATION/TRAINING PROGRAM

## 2023-11-14 PROCEDURE — 2500000005 HC RX 250 GENERAL PHARMACY W/O HCPCS: Performed by: STUDENT IN AN ORGANIZED HEALTH CARE EDUCATION/TRAINING PROGRAM

## 2023-11-14 PROCEDURE — 82435 ASSAY OF BLOOD CHLORIDE: CPT | Performed by: STUDENT IN AN ORGANIZED HEALTH CARE EDUCATION/TRAINING PROGRAM

## 2023-11-14 PROCEDURE — 2500000004 HC RX 250 GENERAL PHARMACY W/ HCPCS (ALT 636 FOR OP/ED): Performed by: STUDENT IN AN ORGANIZED HEALTH CARE EDUCATION/TRAINING PROGRAM

## 2023-11-14 PROCEDURE — P9016 RBC LEUKOCYTES REDUCED: HCPCS

## 2023-11-14 PROCEDURE — 2500000004 HC RX 250 GENERAL PHARMACY W/ HCPCS (ALT 636 FOR OP/ED)

## 2023-11-14 PROCEDURE — 94667 MNPJ CHEST WALL 1ST: CPT

## 2023-11-14 PROCEDURE — 2500000004 HC RX 250 GENERAL PHARMACY W/ HCPCS (ALT 636 FOR OP/ED): Performed by: NURSE PRACTITIONER

## 2023-11-14 PROCEDURE — 99291 CRITICAL CARE FIRST HOUR: CPT | Performed by: STUDENT IN AN ORGANIZED HEALTH CARE EDUCATION/TRAINING PROGRAM

## 2023-11-14 PROCEDURE — 96372 THER/PROPH/DIAG INJ SC/IM: CPT | Performed by: STUDENT IN AN ORGANIZED HEALTH CARE EDUCATION/TRAINING PROGRAM

## 2023-11-14 PROCEDURE — 97162 PT EVAL MOD COMPLEX 30 MIN: CPT | Mod: GP

## 2023-11-14 PROCEDURE — 2020000001 HC ICU ROOM DAILY

## 2023-11-14 PROCEDURE — 84132 ASSAY OF SERUM POTASSIUM: CPT | Performed by: STUDENT IN AN ORGANIZED HEALTH CARE EDUCATION/TRAINING PROGRAM

## 2023-11-14 PROCEDURE — 93005 ELECTROCARDIOGRAM TRACING: CPT

## 2023-11-14 PROCEDURE — 94660 CPAP INITIATION&MGMT: CPT

## 2023-11-14 PROCEDURE — 82947 ASSAY GLUCOSE BLOOD QUANT: CPT

## 2023-11-14 PROCEDURE — 2500000002 HC RX 250 W HCPCS SELF ADMINISTERED DRUGS (ALT 637 FOR MEDICARE OP, ALT 636 FOR OP/ED): Performed by: STUDENT IN AN ORGANIZED HEALTH CARE EDUCATION/TRAINING PROGRAM

## 2023-11-14 PROCEDURE — 97530 THERAPEUTIC ACTIVITIES: CPT | Mod: GO

## 2023-11-14 PROCEDURE — 94668 MNPJ CHEST WALL SBSQ: CPT

## 2023-11-14 PROCEDURE — 97165 OT EVAL LOW COMPLEX 30 MIN: CPT | Mod: GO

## 2023-11-14 PROCEDURE — 83735 ASSAY OF MAGNESIUM: CPT | Performed by: STUDENT IN AN ORGANIZED HEALTH CARE EDUCATION/TRAINING PROGRAM

## 2023-11-14 PROCEDURE — P9045 ALBUMIN (HUMAN), 5%, 250 ML: HCPCS | Mod: JZ

## 2023-11-14 PROCEDURE — 71045 X-RAY EXAM CHEST 1 VIEW: CPT | Performed by: RADIOLOGY

## 2023-11-14 PROCEDURE — 97112 NEUROMUSCULAR REEDUCATION: CPT | Mod: GP

## 2023-11-14 PROCEDURE — 94003 VENT MGMT INPAT SUBQ DAY: CPT

## 2023-11-14 RX ORDER — OXYCODONE HYDROCHLORIDE 5 MG/1
10 TABLET ORAL EVERY 4 HOURS PRN
Status: DISCONTINUED | OUTPATIENT
Start: 2023-11-14 | End: 2023-11-16

## 2023-11-14 RX ORDER — DEXTROSE MONOHYDRATE 100 MG/ML
50 INJECTION, SOLUTION INTRAVENOUS CONTINUOUS
Status: DISPENSED | OUTPATIENT
Start: 2023-11-14 | End: 2023-11-14

## 2023-11-14 RX ORDER — HEPARIN SODIUM 5000 [USP'U]/ML
5000 INJECTION, SOLUTION INTRAVENOUS; SUBCUTANEOUS EVERY 8 HOURS
Status: DISCONTINUED | OUTPATIENT
Start: 2023-11-14 | End: 2023-11-24 | Stop reason: HOSPADM

## 2023-11-14 RX ORDER — PROCHLORPERAZINE 25 MG/1
25 SUPPOSITORY RECTAL EVERY 12 HOURS PRN
Status: DISCONTINUED | OUTPATIENT
Start: 2023-11-14 | End: 2023-11-15

## 2023-11-14 RX ORDER — NOREPINEPHRINE BITARTRATE/D5W 8 MG/250ML
.01-1 PLASTIC BAG, INJECTION (ML) INTRAVENOUS CONTINUOUS
Status: DISCONTINUED | OUTPATIENT
Start: 2023-11-14 | End: 2023-11-15

## 2023-11-14 RX ORDER — PROCHLORPERAZINE MALEATE 10 MG
10 TABLET ORAL EVERY 6 HOURS PRN
Status: DISCONTINUED | OUTPATIENT
Start: 2023-11-14 | End: 2023-11-15

## 2023-11-14 RX ORDER — DEXTROSE 50 % IN WATER (D50W) INTRAVENOUS SYRINGE
25 ONCE
Status: COMPLETED | OUTPATIENT
Start: 2023-11-14 | End: 2023-11-14

## 2023-11-14 RX ORDER — ONDANSETRON HYDROCHLORIDE 2 MG/ML
INJECTION, SOLUTION INTRAVENOUS
Status: COMPLETED
Start: 2023-11-14 | End: 2023-11-14

## 2023-11-14 RX ORDER — PROCHLORPERAZINE EDISYLATE 5 MG/ML
10 INJECTION INTRAMUSCULAR; INTRAVENOUS EVERY 6 HOURS PRN
Status: DISCONTINUED | OUTPATIENT
Start: 2023-11-14 | End: 2023-11-15

## 2023-11-14 RX ORDER — ALBUMIN HUMAN 50 G/1000ML
12.5 SOLUTION INTRAVENOUS ONCE
Status: COMPLETED | OUTPATIENT
Start: 2023-11-14 | End: 2023-11-14

## 2023-11-14 RX ORDER — ALBUMIN HUMAN 50 G/1000ML
SOLUTION INTRAVENOUS
Status: COMPLETED
Start: 2023-11-14 | End: 2023-11-14

## 2023-11-14 RX ORDER — ONDANSETRON HYDROCHLORIDE 2 MG/ML
4 INJECTION, SOLUTION INTRAVENOUS EVERY 6 HOURS PRN
Status: DISCONTINUED | OUTPATIENT
Start: 2023-11-14 | End: 2023-11-24 | Stop reason: HOSPADM

## 2023-11-14 RX ADMIN — HYDROMORPHONE HYDROCHLORIDE 0.5 MG: 1 INJECTION, SOLUTION INTRAMUSCULAR; INTRAVENOUS; SUBCUTANEOUS at 04:49

## 2023-11-14 RX ADMIN — HYDROMORPHONE HYDROCHLORIDE 0.5 MG: 1 INJECTION, SOLUTION INTRAMUSCULAR; INTRAVENOUS; SUBCUTANEOUS at 12:52

## 2023-11-14 RX ADMIN — HEPARIN SODIUM 5000 UNITS: 5000 INJECTION INTRAVENOUS; SUBCUTANEOUS at 17:45

## 2023-11-14 RX ADMIN — PANTOPRAZOLE SODIUM 40 MG: 40 INJECTION, POWDER, FOR SOLUTION INTRAVENOUS at 06:15

## 2023-11-14 RX ADMIN — CEFAZOLIN SODIUM 2 G: 2 INJECTION, SOLUTION INTRAVENOUS at 11:44

## 2023-11-14 RX ADMIN — SODIUM ZIRCONIUM CYCLOSILICATE 10 G: 10 POWDER, FOR SUSPENSION ORAL at 05:15

## 2023-11-14 RX ADMIN — DEXTROSE MONOHYDRATE 25 G: 25 INJECTION, SOLUTION INTRAVENOUS at 10:23

## 2023-11-14 RX ADMIN — LIDOCAINE 1 PATCH: 4 PATCH TOPICAL at 13:45

## 2023-11-14 RX ADMIN — HYDROMORPHONE HYDROCHLORIDE 0.2 MG: 0.2 INJECTION, SOLUTION INTRAMUSCULAR; INTRAVENOUS; SUBCUTANEOUS at 00:12

## 2023-11-14 RX ADMIN — HYDROMORPHONE HYDROCHLORIDE 0.5 MG: 1 INJECTION, SOLUTION INTRAMUSCULAR; INTRAVENOUS; SUBCUTANEOUS at 02:12

## 2023-11-14 RX ADMIN — ONDANSETRON 4 MG: 2 INJECTION INTRAMUSCULAR; INTRAVENOUS at 04:49

## 2023-11-14 RX ADMIN — SODIUM ZIRCONIUM CYCLOSILICATE 10 G: 10 POWDER, FOR SUSPENSION ORAL at 17:45

## 2023-11-14 RX ADMIN — PROCHLORPERAZINE EDISYLATE 10 MG: 5 INJECTION INTRAMUSCULAR; INTRAVENOUS at 11:44

## 2023-11-14 RX ADMIN — INSULIN LISPRO 10 UNITS: 100 INJECTION, SOLUTION INTRAVENOUS; SUBCUTANEOUS at 01:02

## 2023-11-14 RX ADMIN — ACETAMINOPHEN 650 MG: 325 TABLET ORAL at 19:45

## 2023-11-14 RX ADMIN — INSULIN LISPRO 4 UNITS: 100 INJECTION, SOLUTION INTRAVENOUS; SUBCUTANEOUS at 21:23

## 2023-11-14 RX ADMIN — SODIUM ZIRCONIUM CYCLOSILICATE 10 G: 10 POWDER, FOR SUSPENSION ORAL at 12:51

## 2023-11-14 RX ADMIN — Medication 2.5 L/MIN: at 05:40

## 2023-11-14 RX ADMIN — HYDROMORPHONE HYDROCHLORIDE 0.5 MG: 1 INJECTION, SOLUTION INTRAMUSCULAR; INTRAVENOUS; SUBCUTANEOUS at 23:56

## 2023-11-14 RX ADMIN — ALBUMIN HUMAN 12.5 G: 50 SOLUTION INTRAVENOUS at 15:48

## 2023-11-14 RX ADMIN — MUPIROCIN 1 APPLICATION: 20 OINTMENT TOPICAL at 21:26

## 2023-11-14 RX ADMIN — Medication 40 PERCENT: at 01:57

## 2023-11-14 RX ADMIN — Medication 40 PERCENT: at 00:09

## 2023-11-14 RX ADMIN — PROPOFOL 20 MCG/KG/MIN: 10 INJECTION, EMULSION INTRAVENOUS at 01:09

## 2023-11-14 RX ADMIN — CEFAZOLIN SODIUM 2 G: 2 INJECTION, SOLUTION INTRAVENOUS at 05:18

## 2023-11-14 RX ADMIN — NOREPINEPHRINE BITARTRATE 0.03 MCG/KG/MIN: 8 INJECTION, SOLUTION INTRAVENOUS at 15:49

## 2023-11-14 RX ADMIN — CEFAZOLIN SODIUM 2 G: 2 INJECTION, SOLUTION INTRAVENOUS at 21:23

## 2023-11-14 RX ADMIN — SODIUM CHLORIDE, POTASSIUM CHLORIDE, SODIUM LACTATE AND CALCIUM CHLORIDE 250 ML: 600; 310; 30; 20 INJECTION, SOLUTION INTRAVENOUS at 04:00

## 2023-11-14 RX ADMIN — INSULIN HUMAN 10 UNITS: 100 INJECTION, SOLUTION PARENTERAL at 10:23

## 2023-11-14 RX ADMIN — HEPARIN SODIUM 5000 UNITS: 5000 INJECTION INTRAVENOUS; SUBCUTANEOUS at 11:37

## 2023-11-14 RX ADMIN — ALBUMIN HUMAN 12.5 G: 0.05 INJECTION, SOLUTION INTRAVENOUS at 15:48

## 2023-11-14 RX ADMIN — OXYCODONE HYDROCHLORIDE 10 MG: 5 TABLET ORAL at 12:51

## 2023-11-14 RX ADMIN — HYDROMORPHONE HYDROCHLORIDE 0.5 MG: 1 INJECTION, SOLUTION INTRAMUSCULAR; INTRAVENOUS; SUBCUTANEOUS at 02:49

## 2023-11-14 RX ADMIN — ACETAMINOPHEN 650 MG: 325 TABLET ORAL at 06:15

## 2023-11-14 ASSESSMENT — COGNITIVE AND FUNCTIONAL STATUS - GENERAL
DAILY ACTIVITIY SCORE: 10
PERSONAL GROOMING: A LOT
DRESSING REGULAR UPPER BODY CLOTHING: A LOT
TOILETING: TOTAL
CLIMB 3 TO 5 STEPS WITH RAILING: TOTAL
MOVING TO AND FROM BED TO CHAIR: TOTAL
HELP NEEDED FOR BATHING: A LOT
DRESSING REGULAR LOWER BODY CLOTHING: TOTAL
MOVING FROM LYING ON BACK TO SITTING ON SIDE OF FLAT BED WITH BEDRAILS: TOTAL
WALKING IN HOSPITAL ROOM: TOTAL
EATING MEALS: A LOT
MOBILITY SCORE: 6
STANDING UP FROM CHAIR USING ARMS: TOTAL
TURNING FROM BACK TO SIDE WHILE IN FLAT BAD: TOTAL

## 2023-11-14 ASSESSMENT — PAIN - FUNCTIONAL ASSESSMENT
PAIN_FUNCTIONAL_ASSESSMENT: 0-10
PAIN_FUNCTIONAL_ASSESSMENT: 0-10
PAIN_FUNCTIONAL_ASSESSMENT: CPOT (CRITICAL CARE PAIN OBSERVATION TOOL)
PAIN_FUNCTIONAL_ASSESSMENT: 0-10

## 2023-11-14 ASSESSMENT — ACTIVITIES OF DAILY LIVING (ADL)
ADL_ASSISTANCE: NEEDS ASSISTANCE
LACK_OF_TRANSPORTATION: NO
BATHING_ASSISTANCE: MINIMAL

## 2023-11-14 ASSESSMENT — PAIN SCALES - GENERAL
PAINLEVEL_OUTOF10: 0 - NO PAIN
PAINLEVEL_OUTOF10: 8
PAINLEVEL_OUTOF10: 6
PAINLEVEL_OUTOF10: 6

## 2023-11-14 ASSESSMENT — PAIN DESCRIPTION - DESCRIPTORS
DESCRIPTORS: ACHING
DESCRIPTORS: ACHING

## 2023-11-14 NOTE — BRIEF OP NOTE
Date: 2023  OR Location: [facility] OR    Name: Humphrey Waddell, : 1965, Age: 58 y.o., MRN: [id number], Sex: male    Diagnosis  Pre-op Diagnosis     * CAD, multiple vessel [I25.10] Post-op Diagnosis     * CAD, multiple vessel [I25.10]     Procedures  Creation Bypass Graft Coronary Artery  26649 - DE CORONARY ARTERY BYPASS 1 CORONARY VENOUS GRAFT    DE MEDIAST W/EXPL DRG RMVL FB/BX TTHRC APPR [48799]  Mediastinal exploration  Sternal closure with wires & plates    Chest Tubes/Drains: CT X3 (bilateral pleural & 1 mediastinal)       Pacing Wires: V wires     Sternotomy performed by: CONSUELO    Conduit Harvested by: CONSUELO    Sternal Wires & plates placed by: Kings HERNANDEZ    Arm/Leg/Groin Closure/Cutdown performed by: chest closure by Kings HERNANDEZ & Aida PA-C    Cardio Pulmonary Bypass Time:NA  Cross-clamp Time:NA   Circulatory Arrest Time:NA     Surgeons      * Francisco Kovacs, Melba Hall    Resident/Fellow/Other Assistant:  Surgeon(s) and Role:Kings HERNANDEZ, Aida MCCARTNEY-CLEMENT    Procedure Summary  Anesthesia: General  ASA: IV  Anesthesia Staff: Anesthesiologist: Juan Khan MD  C-AA: BRUNILDA Humphrey; BRUNILDA Lino  Perfusionist: Alex Barbosa  Estimated Blood Loss: 100mL  Intra-op Medications:   Medication Name Total Dose   EPINEPHrine (Adrenalin) 4,000 mcg in dextrose 5 % in water (D5W) 250 mL (16 mcg/mL) infusion 1.34 mg   HYDROmorphone PF (Dilaudid) injection 0.2 mg 0.2 mg   lactated Ringer's infusion 24.08 mL   norepinephrine (Levophed) 8 mg in dextrose 5% 250 mL (0.032 mg/mL) infusion (premix) 1.35 mg   oxygen (O2) therapy Cannot be calculated   phenylephrine in NS (Frank-Synephrine) syringe  - Omnicell Override Pull Cannot be calculated   propofol (Diprivan) infusion 1,114.43 mg   lactated Ringer's bolus 1,000 mL 1,000 mL   lactated Ringer's bolus 500 mL 500 mL   sodium bicarbonate  - Omnicell Override Pull 50 mEq   vasopressin (Vasostrict) 0.2 unit/mL infusion Cannot be calculated               Anesthesia Record               Intraprocedure I/O Totals          Intake    Norepinephrine Drip 0.00 mL    The total shown is the total volume documented since Anesthesia Start was filed.    Epinephrine Drip 0.00 mL    The total shown is the total volume documented since Anesthesia Start was filed.    Propofol Drip 0.00 mL    The total shown is the total volume documented since Anesthesia Start was filed.    Vasopressin Drip 0.00 mL    The total shown is the total volume documented since Anesthesia Start was filed.    Total Intake 0 mL       Output    Urine 70 mL    Est. Blood Loss 100 mL    Total Output 170 mL       Net    Net Volume -170 mL          Specimen: No specimens collected     Staff:   Circulator: Kimberlee Morin RN  Scrub Person: Gisel Jade RN          Findings: postoperative bleeding    Complications:  None; patient tolerated the procedure well.     Disposition: ICU - intubated and hemodynamically stable.  Condition: stable  Specimens Collected: No specimens collected  Attending Attestation: I was present and scrubbed for the key portions of the procedure.    Francisco Delatorre  Phone Number: [phone]

## 2023-11-14 NOTE — OP NOTE
PREOPERATIVE DIAGNOSIS:  Unexplained hypotension  Concern about early graft failure  Hypotension    POSTOPERATIVE DIAGNOSIS:  Localized tamponade  Bleeding from base of branch on vein graft to OM1    OPERATION/PROCEDURE:  1.  Mediastinal exploration  2.  Evacuation of pericardial tamponade  3.  Repair of vein graft to obtuse marginal branch  4.  Medistim flow probe analysis of bypass grafts  5.  Sternal plating    SURGEON:     Idalia Delatorre - Primary     ASSISTANT(S):  Circulator: Bella Hoskins RN; Reg Mas RN  Relief Circulator: Kimberlee Morin RN  Relief Scrub: Gisel Jade RN  Scrub Person: Domo Castaneda RN  Surgeon(s) and Role:   Edgar Parra    ANESTHESIA TYPE:  General    ANESTHESIA STAFF  Anesthesiologist: Juan Khan MD  C-AA: BRUNILDA Vasquez  Perfusionist: Orion Tobar       OPERATIVE INDICATIONS and FINDINGS:  This patient had undergone surgery earlier in the day, a triple bypass.  We had left the operating room with a mediastinum that appeared to be dry, with a posterior pericardial window.  There had been a period of significant hypertension, but no bleeding had been identified prior to closure.  Upon arrival to the ICU he was on minimal inotropic support, but this required escalation over the next 2 to 3 hours, adding Levophed and epinephrine and vasopressin.  An echo was done at the bedside which suggested hypokinesis of the anterior wall and hypokinesis of the left ventricle.  With this information, we felt that a graft had possibly thrombosed, and we felt the need to stabilize the situation and chose to bring him to the operating room after discussing options with our interventional cardiology colleagues.  In the operating room, we found that the left ventricle was in fact hyperdynamic, and there was significant clot on the lateral aspect of the left ventricle.  The sternum was opened, and ventricular function appeared to be good for  both ventricles.  However when we exposed the lateral aspect of the left ventricle, there was significant blood clot which was pressing on the left ventricle.  We identified the source of bleeding as a branch of the vein graft going to the OM 1.  It was bleeding from the base of this vein graft, the clip was still in position but the base of the branch had torn from the main vein itself.  The hole was small, perhaps the size of a 25-gauge needle, but it was pulsatile arterial blood.  This was repaired with a 7-0 Prolene.  There was no other significant area of bleeding.  We tested flows in all the grafts and they were excellent.  Flow in the LIMA was 70 mL/min with a pulse index of 2.0, flow in the vein graft to the OM1 was 100 mL/min with a pulse index of 2.3 and flow in the vein graft to the PDA was 85 mL/min with a pulse index of 2.0.    OPERATIVE PROCEDURE:  Under general anesthesia the patient was prepped and draped in the appropriate fashion.  The previous incision was opened, and the chest tubes were removed.  A sternal retractor was inserted.  The pericardium was opened and suspended.  We saw excellent biventricular function.  There was a small amount of clot in the anterior mediastinum which was cleared.  We tested flows in the graft which were excellent.  We saw significant hematoma on the anterior lateral aspect of the left ventricle.  In retracting the heart towards the right, we saw a large blood clots in the pericardial sac.  We removed all of these blood clots, and then identified the source which was bleeding from the vein graft going to the OM branch.  The site of bleeding was about skilled nursing between the aorta and the coronary artery.  It was at the base of 1 small clip that had been placed on a very small branch, and the bleeding was coming from underneath the clip on the side of the main vein.  We repaired this with a horizontal mattress of 7-0 Prolene.  There was another vasa vasorum branch that  was oozy and we repaired this as well but there was no other significant bleeding.    We irrigated with warm saline.  We then identified all of our surgical areas and could not see or identify any other source of bleeding.  We ran all the vein grafts and the mammary, with no bleeding from any site.    3 new chest tubes were placed.  The upper and lower parts of the pericardium were closed with interrupted sutures of 2-0 Vicryl.  The sternum was closed with interrupted stainless steel wires and supplemented with sternal plates to provide additional stability.  The fascia layers were closed with 0 Vicryl, the subcutaneous layers were closed using 2-0 Vicryl and the skin was closed using subcuticular Vicryl.      The sponge and instrument counts were correct at the end of the procedure.  The patient remained stable and was transferred to the Intensive Care Unit in stable condition.  As the attending surgeon, I was present or immediately available for all aspects of the operation.      Francisco Delatorre MD, MSc, [facility]  [phone]

## 2023-11-14 NOTE — PROGRESS NOTES
Interval Note    Humphrey Waddell is a 58 y.o. male Pmhx  with CAD, hx NSTEMI and HFpEF. PMHx includes NIDDM2, Lt AKA, chronic RLE ulcer, GERD, carotid stenosis, hx CVA, hx GIB, hx anemia req'ing blood transfusions (2023), and hx confusion of anesthesia. Pt admitted to [facility] ICU s/p CABGx3 (LIMA>LAD, SVG>OM1, SVG>PDA), posterior pericardotomy, sternal wires and plating with Dr. Delatorre on 11/13/23.     OR Course:  Other: Full RLE EVH wrapped, dry. Oozy intraop     ETT: easy mask easy bag, 7.5cm @ 21cm  Access: RIJ MAC TL, Lt brachial [place], Lt 18g  Crystalloid: 1500mL  C/s: 250mL  UOP: 460mL  No products  Wires: V wires backup VVI 50, SR 90s  Drips: propofol @ 40  EF: preop 55-60%, normal function, mild MR // postop 65%     Versed: 5mg  Fentanyl: 350mcg last dose pre pump  Pa: 180mg last 50mg @ 1210  Abx: 3g Ancef @ 1220  Other: methadone 15mg PO    Was Brought back to the OR for   Mediastinal exploration  Sternal closure with wires & plates     Arrived back Here  in the ICU @ 2038     Infusions: Propofol  Pacing: VVI @ 50     Past Medical History   Past Medical History:   Diagnosis Date    Above-knee amputation of left lower extremity (CMS/[facility])     Adverse effect of anesthesia     confusion    Anemia     CAD (coronary artery disease)     Carotid artery disease (CMS/[facility])     bilateral    CVA (cerebral vascular accident) (CMS/[facility]) 2020    Depression     DM (diabetes mellitus) (CMS/[facility])     type 2 with neuropathy    GERD (gastroesophageal reflux disease)     High cholesterol     HTN (hypertension)     Leg ulcer (CMS/HCC)     chronic right lower extremity    Neuropathy     OA (osteoarthritis)     PAD (peripheral artery disease) (CMS/[facility])         Surgical History  Past Surgical History:   Procedure Laterality Date    CT ANGIO NECK  06/14/2021    CT NECK ANGIO W AND WO IV CONTRAST 6/14/2021 [facility] INPATIENT LEGACY    CT HEAD ANGIO W AND WO IV CONTRAST  06/14/2021    CT HEAD ANGIO W AND WO IV CONTRAST 6/14/2021 [facility] INPATIENT  LEGACY    LEG SURGERY Right     MR HEAD ANGIO WO IV CONTRAST  12/11/2012    MR HEAD ANGIO WO IV CONTRAST [facility]    MR HEAD ANGIO WO IV CONTRAST  06/12/2021    MR HEAD ANGIO WO IV CONTRAST [facility]    MR HEAD ANGIO WO IV CONTRAST  05/25/2021    MR HEAD ANGIO WO IV CONTRAST [facility]    MR NECK ANGIO WO IV CONTRAST  07/18/2023    MR NECK ANGIO WO IV CONTRAST 7/18/2023 [id number] MRI    OTHER SURGICAL HISTORY  07/07/2021    Knee reconstruction    OTHER SURGICAL HISTORY  07/07/2021    Lower extremity amputation above knee    TOTAL SHOULDER ARTHROPLASTY Left 2020        Social History   reports that he quit smoking about 7 months ago. His smoking use included cigars. He has never used smokeless tobacco. He reports that he does not currently use alcohol. He reports that he does not currently use drugs after having used the following drugs: Marijuana.    Family History   Family History   Problem Relation Name Age of Onset    Other (cardiac disorder) Mother      Hypertension Mother      Esophageal cancer Mother      Hearing loss Father      Hypertension Father      Heart disease Father      COPD Sister         Allergies  Patient has no known allergies.     Home Meds  Medications Prior to Admission   Medication Sig Dispense Refill Last Dose    acetaminophen (Tylenol 8 HOUR) 650 mg ER tablet Take 1 tablet (650 mg) by mouth every 8 hours if needed for mild pain (1 - 3). Do not crush, chew, or split.   Past Month    aspirin 81 mg chewable tablet Chew 1 tablet (81 mg) once daily.   11/13/2023    atorvastatin (Lipitor) 80 mg tablet Take 1 tablet (80 mg) by mouth once daily. Last rx prior to next refills 90 tablet 1 11/12/2023    clopidogrel (Plavix) 75 mg tablet Take 1 tablet (75 mg) by mouth once daily.   Past Week    ergocalciferol (Vitamin D-2) 1.25 MG (07418 UT) capsule Take 1 capsule (50,000 Units) by mouth 1 (one) time per week. 12 capsule 0 Past Week    hydrALAZINE (Apresoline) 50 mg  tablet Take 1 tablet (50 mg) by mouth 2 times a day. 180 tablet 3 11/13/2023    ibuprofen 600 mg tablet PLEASE SEE ATTACHED FOR DETAILED DIRECTIONS   Past Month    insulin glargine (Lantus Solostar U-100 Insulin) 100 unit/mL (3 mL) pen Inject 4 Units under the skin once daily at bedtime.   11/12/2023    melatonin 3 mg tablet TAKE 1 TABLET BY MOUTH AT BEDTIME AT AT 6:00 PM (Patient taking differently: Take 0.5 tablets (1.5 mg) by mouth once daily at bedtime.) 30 tablet 1 11/12/2023    multivitamin-calcium carb (Flintstones Plus Calcium) tablet,chewable Chew 1 tablet once daily.   11/13/2023    torsemide (Demadex) 20 mg tablet TAKE 1 TABLET BY MOUTH ONCE DAILY 90 tablet 3 11/12/2023    blood sugar diagnostic (True Metrix Glucose Test Strip) strip 4 times a day.   Unknown    FreeStyle Suzy reader (FreeStyle Suzy 2 [place]) misc Use as instructed 1 each 0 Unknown    FreeStyle Suzy sensor system (FreeStyle Suzy 2 Sensor) kit Use as instructed 1 each 0 Unknown    FreeStyle Test strip TEST 3 TIMES DAILY.   Unknown    penicillin V (Veetid) 250 mg tablet Take 1 tablet (250 mg) by mouth 4 times a day.   Not Taking       Review of Systems    Scheduled Medications:   acetaminophen, 650 mg, oral, q6h   Or  acetaminophen, 650 mg, rectal, q6h  [START ON 11/14/2023] aspirin, 81 mg, oral, Daily  [START ON 11/14/2023] atorvastatin, 80 mg, oral, Daily  ceFAZolin, 2 g, intravenous, q8h  [Held by provider] clopidogrel, 75 mg, oral, Daily  insulin lispro, 0-15 Units, subcutaneous, q4h  lidocaine, 1 patch, transdermal, q24h  mupirocin, 1 Application, Each Nostril, BID  [START ON 11/14/2023] pantoprazole, 40 mg, oral, Daily before breakfast   Or  [START ON 11/14/2023] pantoprazole, 40 mg, intravenous, Daily before breakfast  phenylephrine in NS, , ,   polyethylene glycol, 17 g, oral, Daily  sennosides-docusate sodium, 2 tablet, oral, BID  sodium zirconium cyclosilicate, 10 g, oral, q8h         Continuous Medications:   EPINEPHrine  (Adrenalin) 4,000 mcg in dextrose 5 % in water (D5W) 250 mL (16 mcg/mL) infusion, 0.01-1 mcg/kg/min, Last Rate: 0.08 mcg/kg/min (11/13/23 1747)  lactated Ringer's, 5 mL/hr, Last Rate: 5 mL/hr (11/13/23 1723)  norepinephrine, 0.01-1 mcg/kg/min, Last Rate: 0.08 mcg/kg/min (11/13/23 1745)  propofol, 5-50 mcg/kg/min, Last Rate: 30 mcg/kg/min (11/13/23 2239)         PRN Medications:   PRN medications: dextrose **OR** glucagon, HYDROmorphone, [Held by provider] melatonin, naloxone, oxyCODONE, oxygen, [MAR Hold] oxygen, phenylephrine in NS    Objective   Vitals:  Most Recent:  Vitals:    11/13/23 2200   BP: 95/52   Pulse: 102   Resp: 16   Temp:    SpO2: 100%       24hr Min/Max:  Temp  Min: 36.3 °C (97.3 °F)  Max: 36.7 °C (98.1 °F)  Pulse  Min: 78  Max: 132  BP  Min: 56/35  Max: 189/71  Resp  Min: 14  Max: 20  SpO2  Min: 88 %  Max: 100 %    LDA:   CVC 11/13/23 Double lumen Right Internal jugular (Active)   Placement Date/Time: 11/13/23 (c) 0815   Hand Hygiene Performed Prior to CVC Insertion: Yes  Site Prep: Chlorhexidine   Site Prep Agent has Completely Dried Before Insertion: Yes  All 5 Sterile Barriers Used (Gloves, Gown, Cap, Mask, Large Sterile Indigo...   Number of days: 0       Arterial Line 11/13/23 (Active)   Placement Date/Time: 11/13/23 (c) 1949     Number of days: 0       ETT  7.5 mm (Active)   Placement Date/Time: 11/13/23 (c) 2506   Mask Ventilation: Vent by mask  Technique: Direct laryngoscopy  ETT Type: ETT - single  Single Lumen Tube Size: 7.5 mm  Cuffed: Yes  Laryngoscope: Azul  Blade Size: 4  Grade View: Full view of the glottis ...   Number of days: 0       Urethral Catheter Temperature probe;Straight-tip 16 Fr. (Active)   Placement Date/Time: 11/13/23 0800   Placed by: Brayan Perez  Catheter Type: Temperature probe;Straight-tip  Tube Size (Fr.): 16 Fr.  Catheter Balloon Size: 10 mL  Urine Returned: Yes   Number of days: 0       Chest Tube 1 Other (Comment) Other (Comment) 28 Fr (Active)    Placement Date/Time: 11/13/23 1200   Placed by: or  Hand Hygiene Completed: Yes  Tube Number: 1  Chest Tube Orientation: Other (Comment)  Chest Tube Location: (c) Other (Comment)  Chest Tube Drain Tube Size (Fr): 28 Fr  Chest Tube Drainage System: Suc...   Number of days: 0         Vent settings:  Vent Mode: Assist control/Volume control plus  FiO2 (%):  [40 %-50 %] 40 %  S RR:  [16] 16  S VT:  [600 mL] 600 mL  PEEP/CPAP (cm H2O):  [5 cm H20] 5 cm H20  MAP (cm H2O):  [8.7-11] 11    Hemodynamic parameters for last 24 hours:  CVP:  [16 mmHg-22 mmHg] 22 mmHg    I/O:  I/O last 2 completed shifts:  In: 2945.1 (25.2 mL/kg) [I.V.:1195.1 (10.2 mL/kg); Blood:250; IV Piggyback:1500]  Out: 1130 (9.7 mL/kg) [Urine:550 (0.2 mL/kg/hr); Blood:250; Chest Tube:330]  Weight: 117 kg     Physical Exam    Lab/Radiology/Diagnostic Review:  Results for orders placed or performed during the hospital encounter of 11/13/23 (from the past 24 hour(s))   VERIFY ABO/Rh Group Test   Result Value Ref Range    ABO TYPE A     Rh TYPE NEG    POCT GLUCOSE   Result Value Ref Range    POCT Glucose 143 (H) 74 - 99 mg/dL   Prepare RBC: 4 Units   Result Value Ref Range    PRODUCT CODE Z8025F89     Unit Number [id number]     Unit ABO A     Unit RH NEG     XM INTEP COMP     Dispense Status XM     Blood Expiration Date December 07, 2023 23:59 EST     PRODUCT BLOOD TYPE 0600     UNIT VOLUME 350     PRODUCT CODE S0198T28     Unit Number [id number]     Unit ABO A     Unit RH NEG     XM INTEP COMP     Dispense Status IS     Blood Expiration Date December 07, 2023 23:59 EST     PRODUCT BLOOD TYPE 0600     UNIT VOLUME 350     PRODUCT CODE F5131U55     Unit Number [id number]     Unit ABO A     Unit RH NEG     XM INTEP COMP     Dispense Status XM     Blood Expiration Date December 07, 2023 23:59 EST     PRODUCT BLOOD TYPE 0600     UNIT VOLUME 350     PRODUCT CODE A9179J51     Unit Number [id number]     Unit ABO A     Unit RH NEG     XM INTEP  COMP     Dispense Status IS     Blood Expiration Date December 07, 2023 23:59 EST     PRODUCT BLOOD TYPE 0600     UNIT VOLUME 350    POCT GLUCOSE   Result Value Ref Range    POCT Glucose 88 74 - 99 mg/dL   Blood Gas Arterial Full Panel   Result Value Ref Range    POCT pH, Arterial 7.31 (L) 7.38 - 7.42 pH    POCT pCO2, Arterial 42 38 - 42 mm Hg    POCT pO2, Arterial 184 (H) 85 - 95 mm Hg    POCT SO2, Arterial 100 94 - 100 %    POCT Oxy Hemoglobin, Arterial 98.0 94.0 - 98.0 %    POCT Hematocrit Calculated, Arterial 27.0 (L) 41.0 - 52.0 %    POCT Sodium, Arterial 136 136 - 145 mmol/L    POCT Potassium, Arterial 5.5 (H) 3.5 - 5.3 mmol/L    POCT Chloride, Arterial 110 (H) 98 - 107 mmol/L    POCT Ionized Calcium, Arterial 1.21 1.10 - 1.33 mmol/L    POCT Glucose, Arterial 113 (H) 74 - 99 mg/dL    POCT Lactate, Arterial 1.2 0.4 - 2.0 mmol/L    POCT Base Excess, Arterial -4.9 (L) -2.0 - 3.0 mmol/L    POCT HCO3 Calculated, Arterial 21.1 (L) 22.0 - 26.0 mmol/L    POCT Hemoglobin, Arterial 9.1 (L) 13.5 - 17.5 g/dL    POCT Anion Gap, Arterial 10 10 - 25 mmo/L    Patient Temperature 37.0 degrees Celsius    FiO2 50 %    Ventilator Mode A/C     Ventilator Rate 16 bpm    Tidal Volume 600 mL    Peep CHM2O 5.0 cm H2O   Calcium, Ionized   Result Value Ref Range    POCT Calcium, Ionized 1.17 1.1 - 1.33 mmol/L   Magnesium   Result Value Ref Range    Magnesium 3.00 (H) 1.60 - 2.40 mg/dL   CBC   Result Value Ref Range    WBC 18.9 (H) 4.4 - 11.3 x10*3/uL    nRBC 0.0 0.0 - 0.0 /100 WBCs    RBC 2.60 (L) 4.50 - 5.90 x10*6/uL    Hemoglobin 7.6 (L) 13.5 - 17.5 g/dL    Hematocrit 23.7 (L) 41.0 - 52.0 %    MCV 91 80 - 100 fL    MCH 29.2 26.0 - 34.0 pg    MCHC 32.1 32.0 - 36.0 g/dL    RDW 14.1 11.5 - 14.5 %    Platelets 197 150 - 450 x10*3/uL   Renal Function Panel   Result Value Ref Range    Glucose 114 (H) 74 - 99 mg/dL    Sodium 137 136 - 145 mmol/L    Potassium 5.4 (H) 3.5 - 5.3 mmol/L    Chloride 110 (H) 98 - 107 mmol/L    Bicarbonate 21 21 -  32 mmol/L    Anion Gap 11 10 - 20 mmol/L    Urea Nitrogen 45 (H) 6 - 23 mg/dL    Creatinine 2.51 (H) 0.50 - 1.30 mg/dL    eGFR 29 (L) >60 mL/min/1.73m*2    Calcium 7.8 (L) 8.6 - 10.3 mg/dL    Phosphorus 2.9 2.5 - 4.9 mg/dL    Albumin 2.7 (L) 3.4 - 5.0 g/dL   Coagulation Screen   Result Value Ref Range    Protime 13.2 (H) 9.8 - 12.8 seconds    INR 1.2 (H) 0.9 - 1.1    aPTT 29 27 - 38 seconds   POCT GLUCOSE   Result Value Ref Range    POCT Glucose 116 (H) 74 - 99 mg/dL   Light Blue Top   Result Value Ref Range    Extra Tube Hold for add-ons.    Blood Gas Arterial Full Panel   Result Value Ref Range    POCT pH, Arterial 7.27 (L) 7.38 - 7.42 pH    POCT pCO2, Arterial 39 38 - 42 mm Hg    POCT pO2, Arterial 130 (H) 85 - 95 mm Hg    POCT SO2, Arterial 100 94 - 100 %    POCT Oxy Hemoglobin, Arterial 97.6 94.0 - 98.0 %    POCT Hematocrit Calculated, Arterial 23.0 (L) 41.0 - 52.0 %    POCT Sodium, Arterial 135 (L) 136 - 145 mmol/L    POCT Potassium, Arterial 5.5 (H) 3.5 - 5.3 mmol/L    POCT Chloride, Arterial 110 (H) 98 - 107 mmol/L    POCT Ionized Calcium, Arterial 1.16 1.10 - 1.33 mmol/L    POCT Glucose, Arterial 216 (H) 74 - 99 mg/dL    POCT Lactate, Arterial 2.7 (H) 0.4 - 2.0 mmol/L    POCT Base Excess, Arterial -8.3 (L) -2.0 - 3.0 mmol/L    POCT HCO3 Calculated, Arterial 17.9 (L) 22.0 - 26.0 mmol/L    POCT Hemoglobin, Arterial 7.7 (L) 13.5 - 17.5 g/dL    POCT Anion Gap, Arterial 13 10 - 25 mmo/L    Patient Temperature 37.0 degrees Celsius    FiO2 40 %    Ventilator Mode A/C     Ventilator Rate 16 bpm    Tidal Volume 600 mL    Peep CHM2O 5.0 cm H2O   CBC   Result Value Ref Range    WBC 24.4 (H) 4.4 - 11.3 x10*3/uL    nRBC 0.0 0.0 - 0.0 /100 WBCs    RBC 2.73 (L) 4.50 - 5.90 x10*6/uL    Hemoglobin 8.1 (L) 13.5 - 17.5 g/dL    Hematocrit 25.2 (L) 41.0 - 52.0 %    MCV 92 80 - 100 fL    MCH 29.7 26.0 - 34.0 pg    MCHC 32.1 32.0 - 36.0 g/dL    RDW 14.4 11.5 - 14.5 %    Platelets 326 150 - 450 x10*3/uL   Renal Function Panel    Result Value Ref Range    Glucose 211 (H) 74 - 99 mg/dL    Sodium 138 136 - 145 mmol/L    Potassium 5.4 (H) 3.5 - 5.3 mmol/L    Chloride 109 (H) 98 - 107 mmol/L    Bicarbonate 18 (L) 21 - 32 mmol/L    Anion Gap 16 10 - 20 mmol/L    Urea Nitrogen 44 (H) 6 - 23 mg/dL    Creatinine 2.60 (H) 0.50 - 1.30 mg/dL    eGFR 27 (L) >60 mL/min/1.73m*2    Calcium 7.7 (L) 8.6 - 10.3 mg/dL    Phosphorus 3.3 2.5 - 4.9 mg/dL    Albumin 2.5 (L) 3.4 - 5.0 g/dL   Prepare RBC: 2 Units   Result Value Ref Range    PRODUCT CODE F0192R61     Unit Number [id number]     Unit ABO A     Unit RH NEG     XM INTEP COMP     Dispense Status XM     Blood Expiration Date November 17, 2023 23:59 EST     PRODUCT BLOOD TYPE 0600     UNIT VOLUME 400     PRODUCT CODE E0621C19     Unit Number [id number]     Unit ABO A     Unit RH NEG     XM INTEP COMP     Dispense Status XM     Blood Expiration Date December 07, 2023 23:59 EST     PRODUCT BLOOD TYPE 0600     UNIT VOLUME 350    POCT GLUCOSE   Result Value Ref Range    POCT Glucose 166 (H) 74 - 99 mg/dL   Blood Gas Arterial Full Panel   Result Value Ref Range    POCT pH, Arterial 7.34 (L) 7.38 - 7.42 pH    POCT pCO2, Arterial 39 38 - 42 mm Hg    POCT pO2, Arterial 295 (H) 85 - 95 mm Hg    POCT SO2, Arterial 100 94 - 100 %    POCT Oxy Hemoglobin, Arterial 97.6 94.0 - 98.0 %    POCT Hematocrit Calculated, Arterial 27.0 (L) 41.0 - 52.0 %    POCT Sodium, Arterial 136 136 - 145 mmol/L    POCT Potassium, Arterial 6.3 (HH) 3.5 - 5.3 mmol/L    POCT Chloride, Arterial 108 (H) 98 - 107 mmol/L    POCT Ionized Calcium, Arterial 1.13 1.10 - 1.33 mmol/L    POCT Glucose, Arterial 185 (H) 74 - 99 mg/dL    POCT Lactate, Arterial 2.9 (H) 0.4 - 2.0 mmol/L    POCT Base Excess, Arterial -4.4 (L) -2.0 - 3.0 mmol/L    POCT HCO3 Calculated, Arterial 21.0 (L) 22.0 - 26.0 mmol/L    POCT Hemoglobin, Arterial 9.0 (L) 13.5 - 17.5 g/dL    POCT Anion Gap, Arterial 13 10 - 25 mmo/L    Patient Temperature 37.0 degrees Celsius     FiO2 60 %   CBC   Result Value Ref Range    WBC 13.8 (H) 4.4 - 11.3 x10*3/uL    nRBC 0.0 0.0 - 0.0 /100 WBCs    RBC 3.00 (L) 4.50 - 5.90 x10*6/uL    Hemoglobin 8.6 (L) 13.5 - 17.5 g/dL    Hematocrit 26.6 (L) 41.0 - 52.0 %    MCV 89 80 - 100 fL    MCH 28.7 26.0 - 34.0 pg    MCHC 32.3 32.0 - 36.0 g/dL    RDW 14.4 11.5 - 14.5 %    Platelets 141 (L) 150 - 450 x10*3/uL   Renal Function Panel   Result Value Ref Range    Glucose 188 (H) 74 - 99 mg/dL    Sodium 138 136 - 145 mmol/L    Potassium 6.2 (HH) 3.5 - 5.3 mmol/L    Chloride 106 98 - 107 mmol/L    Bicarbonate 22 21 - 32 mmol/L    Anion Gap 16 10 - 20 mmol/L    Urea Nitrogen 45 (H) 6 - 23 mg/dL    Creatinine 2.76 (H) 0.50 - 1.30 mg/dL    eGFR 25 (L) >60 mL/min/1.73m*2    Calcium 7.7 (L) 8.6 - 10.3 mg/dL    Phosphorus 4.0 2.5 - 4.9 mg/dL    Albumin 2.6 (L) 3.4 - 5.0 g/dL   Magnesium   Result Value Ref Range    Magnesium 2.90 (H) 1.60 - 2.40 mg/dL   Coagulation Screen   Result Value Ref Range    Protime 12.2 9.8 - 12.8 seconds    INR 1.1 0.9 - 1.1    aPTT 22 (L) 27 - 38 seconds   POCT GLUCOSE   Result Value Ref Range    POCT Glucose 210 (H) 74 - 99 mg/dL     Anesthesia Intraoperative Transesophageal Echocardiogram    Result Date: 11/13/2023  [facility] Dept of Anesthesiology, [address] [place] [address]                     Tel [phone] and Fax [phone] TRANSESOPHAGEAL ECHOCARDIOGRAM REPORT  Patient Name:      LEXIE NULL    Negrita Physician:  [id number] Lopez MD Study Date:        11/13/2023   Ordering Provider:  [id number] KARIN LOPEZ MRN/PID:           [id number]     Fellow: Accession#:        [id number] Nurse: Date of Birth/Age: 1965     Sonographer: Gender:            M            Additional Staff: Admit Date:                     Encounter#:         [id number] Admission Status:               Departent Location: [place] Anesthesia Height:                         Study Type:         ANESTHESIA INTRAOPERATIVE                                                      LARS MONITORING ONLY Diagnosis/ICD: Other shock-R57.8; Postprocedural shock unspecified, initial                encounter-T81.10XA Indication:    chest exploration CPT Codes:     Echocardiography, LARS for monitoring-11700 PHYSICIAN INTERPRETATION: Left Ventricle: LV has no WMAs, and is small in size. There is significant clot present in the pericardium. There is normal left ventricular function noted globally. The left ventricular cavity size is decreased. Left Atrium: Left atrial appendage was not assessed. Right Ventricle: There is normal right ventricular global systolic function. Additional Comments: AFter chest opened and clot removed, heart filling better and able to wean pressors. Still no WMAs. In comparison to the previous echocardiogram(s): Not performed on intraoperative study.  CONCLUSIONS:  1. Normal left ventricular function.  2. The left ventricular cavity size is decreased. QUANTITATIVE DATA SUMMARY:  [id number] Juan Khan MD Electronically signed on 11/13/2023 at 8:06:02 PM ** Final **     XR chest 1 view    Result Date: 11/13/2023  Interpreted By:  Nunu Keith, STUDY: XR CHEST 1 VIEW;  11/13/2023 5:27 pm   INDICATION: Signs/Symptoms:hds unstable.   COMPARISON: 11/13/2023   ACCESSION NUMBER(S): [id number]   ORDERING CLINICIAN: YAS SCOTT   FINDINGS: AP radiograph of the chest was provided.   Postsurgical changes from median sternotomy as well as monitoring leads over the chest partially limits evaluation. There is appearance of bilateral chest tubes and median sternotomy 2. Endotracheal tube terminating approximately 5.5 cm above the kyle. Enteric tube with tip in the left upper quadrant. Right IJ central venous catheter.   CARDIOMEDIASTINAL SILHOUETTE: Enlarged, similar to prior imaging given differences in technique and inspiratory effort.   LUNGS: Perihilar interstitial opacities. No focal consolidation, pleural effusion or sizable pneumothorax seen.   ABDOMEN:  No remarkable upper abdominal findings.   BONES: No acute osseous changes.       1.  Findings consistent with recent postsurgical changes with multiple medical devices is detailed. 2. Low lung volumes with borderline cardiomediastinal enlargement and possible pulmonary vascular congestion. Edema or atypical infection not excluded.       MACRO: None   Signed by: Nunu Keith 11/13/2023 8:05 PM Dictation workstation:   [id number]    Transthoracic Echo (TTE) Complete    Result Date: 11/13/2023   [facility], [address] [place] [address]              Tel [phone] and Fax [phone] TRANSTHORACIC ECHOCARDIOGRAM REPORT  Patient Name:      LEXIE NULL          Reading Physician:   [id number] Richmond Smith MD Study Date:        11/13/2023         Ordering Provider:   [id number] GILLIAN EDGAR MRN/PID:           [id number]           Fellow: Accession#:        [id number]       Nurse: Date of Birth/Age: 1965 / 58      Sonographer:         Reuebn Staley RDCS                    years Gender:            M                  Additional Staff: Height:            193.00 cm          Admit Date:          11/13/2023 Weight:            117.00 kg          Admission Status:    Inpatient - STAT BSA:               2.47 m2            Encounter#:          [id number]                                       Department Location: [facility] ICU Blood Pressure: 56 /35 mmHg Study Type:    TRANSTHORACIC ECHO (TTE) COMPLETE Diagnosis/ICD: Non ST elevation (NSTEMI) myocardial infarction-I21.4 Indication:    NSTEMI CPT Code:      Doppler Limited-79716; Color Doppler-11767; Echo Limited-81789 Patient History: Pertinent History: CAD. S/p CABG;hypotension. Study Detail: The following Echo studies were performed: 2D, M-Mode, Doppler and               color flow. Technically challenging study due to body habitus.               Definity used as a contrast agent for endocardial  border               definition. Total contrast used for this procedure was 3 mL via IV               push.  PHYSICIAN INTERPRETATION: Left Ventricle: The left ventricular systolic function is mildly decreased, with an estimated ejection fraction of 45-50%. There are multiple wall motion abnormalities. The left ventricular cavity size is normal. Left ventricular diastolic filling was not assessed. LV Wall Scoring: The mid and apical anterior wall, mid anterolateral segment, apical lateral segment, and apex are hypokinetic. All remaining scored segments are normal. Left Atrium: The left atrium is normal in size. Right Ventricle: The right ventricle is normal in size. There is reduced right ventricular systolic function. Right Atrium: The right atrium is normal in size. Aortic Valve: The aortic valve appears structurally normal. There is no evidence of aortic valve regurgitation. Mitral Valve: The mitral valve is normal in structure. There is no evidence of mitral valve regurgitation. Tricuspid Valve: The tricuspid valve is structurally normal. No evidence of tricuspid regurgitation. Pulmonic Valve: The pulmonic valve was not assessed. Pulmonic valve regurgitation was not assessed. Pericardium: There is a small pericardial effusion posterior to the left ventricle. Aorta: The aortic root is normal. In comparison to the previous echocardiogram(s): Compared with study from 11/13/2023, there is a reduction in LVEF with regional wall motion abnormalities.  CONCLUSIONS:  1. Left ventricular systolic function is mildly decreased with a 45-50% estimated ejection fraction.  2. Mid and apical anterior wall, mid anterolateral segment, apical lateral segment, and apex are abnormal.  3. There are multiple wall motion abnormalities.  4. Poorly visualized anatomical structures due to suboptimal image quality.  5. There is reduced right ventricular systolic function. QUANTITATIVE DATA SUMMARY:  [id number] Richmond Smith MD Electronically  signed on 11/13/2023 at 5:45:15 PM  Wall Scoring  ** Final **     Anesthesia Intraoperative Transesophageal Echocardiogram    Result Date: 11/13/2023   [facility] - Dept of Anesthesiology, [address] [place] [address]                      Tel [phone] and Fax [phone] TRANSESOPHAGEAL ECHOCARDIOGRAM REPORT  Patient Name:     LEXIE NULL          Reading Physician:   [id number] Juan Lopez MD Study Date:       11/13/2023         Ordering Provider:   [id number] JUAN LOPEZ MRN/PID:          [id number]           Fellow: Accession#:       [id number]       Nurse: Date of           1965 / 58      Sonographer: Birth/Age:        years Gender:           M                  Additional Staff: BSA:              m2                 Encounter#:          [id number]                                      Department Location: [facility] OR Surgery Study Type:    ANESTHESIA INTRAOPERATIVE LARS Diagnosis/ICD: Atherosclerotic heart disease of native coronary artery without                angina pectoris-I25.10 Indication:    cabg CPT Code:      LARS Complete-96054; Doppler Limited-67150; Color Doppler-14609 PHYSICIAN INTERPRETATION: Left Ventricle: The left ventricular systolic function is normal, with an estimated ejection fraction of 55-60%. The left ventricular cavity size is normal. Left ventricular diastolic filling was not assessed. Left Atrium: The left atrium is mildly dilated. There is no evidence of a patent foramen ovale. There is no mass visualized in the left atrial appendage and the left atrial appendage Doppler velocities are normal. Right Ventricle: The right ventricle is normal in size. There is normal right ventricular global systolic function. Right Atrium: The right atrium is normal in size. Aortic Valve: The aortic valve appears structurally normal. There is no evidence of aortic valve stenosis. There is  no evidence of aortic valve regurgitation. Mitral Valve: The mitral valve is normal in structure. There is no evidence of mitral valve stenosis. There is mild mitral valve regurgitation. Tricuspid Valve: The tricuspid valve is structurally normal. There is trace tricuspid regurgitation. Pulmonic Valve: The pulmonic valve is structurally normal. There is no indication of pulmonic valve regurgitation. Pericardium: There is no pericardial effusion noted. Aorta: The aortic root is normal. In comparison to the previous echocardiogram(s): Not performed on intraoperative study.  CONCLUSIONS:  1. Left ventricular systolic function is normal with a 55-60% estimated ejection fraction.  2. Aortic valve stenosis is not present. POST CARDIOPULMONARY BYPASS REPORT: Patient has a normal sinus rhythm. Patient is on no inotropic support. Global LV function is normal. RV function is unchanged from pre-pump exam. No evidence of post aortic cannulation dissection.  QUANTITATIVE DATA SUMMARY:  [id number] Juan Khan MD Electronically signed on 11/13/2023 at 3:32:51 PM  ** Final **     XR chest 1 view    Result Date: 11/13/2023  Interpreted By:  Jeanine Gonzalez, STUDY: XR CHEST 1 VIEW;  11/13/2023 1:59 pm   INDICATION: Signs/Symptoms:Post op CABG.   COMPARISON: 10/26/2023   ACCESSION NUMBER(S): [id number]   ORDERING CLINICIAN: YAS SCOTT   FINDINGS: Artifact is present on the films. Scratch sternotomy wires and sternotomy plates are seen. Right-sided chest tube is identified. Right IJ catheter is seen with the distal tip overlying the SVC. Nasogastric tube is present with the distal tip off the film.   Endotracheal tube is seen with the distal tip above the kyle.   The heart is enlarged. No definite pleural effusion or pneumothorax is seen.   Perihilar streaky densities are noted, which may be related to atelectasis.       Cardiomegaly.   Perihilar streaky densities, which may be related to atelectasis.   No definite pneumothorax.        MACRO: None   Signed by: Jeanine Gonzalez 11/13/2023 2:33 PM Dictation workstation:   [id number]    XR chest 2 views    Result Date: 10/28/2023  Interpreted By:  Alisson Paz, STUDY: Chest, 2 views.   INDICATION: Signs/Symptoms:preop protocol.   COMPARISON: CT chest 09/18/2018. Chest radiograph 07/23/2023.   ACCESSION NUMBER(S): [id number]   ORDERING CLINICIAN: PATRICK VILLARREAL   FINDINGS: The cardiomediastinal silhouette size is within normal limits.   There is no focal consolidation, edema or pneumothorax. No sizeable pleural effusion.       1. No acute cardiopulmonary process.   MACRO: None.   Signed by: Alisson Paz 10/28/2023 1:03 PM Dictation workstation:   [id number]    ECG 12 lead (Clinic Performed)    Result Date: 10/26/2023  Normal sinus rhythm Right bundle branch block Abnormal ECG When compared with ECG of 13-JUN-2023 10:01, No significant change was found Confirmed by Elliott Contreras ([id number]) on 10/26/2023 4:23:47 PM        Past Cardiac Tests:  EKG:  ECG 12 lead (Clinic Performed) 10/26/2023    Echo:  Anesthesia Intraoperative Transesophageal Echocardiogram 11/13/2023      Transthoracic Echo (TTE) Complete 11/13/2023      Anesthesia Intraoperative Transesophageal Echocardiogram 11/13/2023    Ejection Fractions:  Pre-op 55-60%, normal Function, Mild MR    Post operative: Ef 65%    Cath:  No results found for this or any previous visit from the past 1095 days.    Stress Test:  No results found for this or any previous visit from the past 1095 days.    Cardiac Imaging:  No results found for this or any previous visit from the past 1095 days.      Assessment /Plan    Principal Problem:    CAD, multiple vessel    NEURO: Patient is intubated and sedated on propofol infusion. Expected acute postop pain  - Serial neuro and pain assessments   - wean sedation for extubation once hemodynamically stable  - Prn dilaudid for pain  - Scheduled Tylenol   - Lidoderm patches  - PT/OT Consult, OOB to chair once  extubated, MITT precautions     CV:  CAD with MVD       S/p  LV function EF 65%  - Maintain MAP goal MAP 70-90   - Propofol  - Titrate gtts to maintain:  MAP 70-80mmhg  - maintain & monitor CTs, CT to wall sxn @ -20cm  - pacing wires  VVI @50  - asa/ statin , BB when hemodynamics permit    PULM:  Currently intubated on ventilator  Postop CXR negative for acute process    - Begin CPAP trials and extubate when criteria met  - Wean FiO2 maintaining SpO2 >92%.   - ABGs PRN  - Acapella, IS once extubated    :  history of renal disease, baseline creatinine 2.8. Hyperkalemia  - Scr 2.76, UOP last hour was 30ml   - Continue gerardo catheter for strict I/Os.    - Check renal function panel and replete electrolytes as needed.  - Maintain a potassium > 4, magnesium > 2.  -Treat hyperkalemia, Serial RFP     GI:    - NPO  - Swallow eval prior to PO  - Bowel regimen- senokot and miralax  - Zofran PRN     HEME:  Acute post op blood loss anemia     - Monitor drain output volume and characteristics  - CBC, coags, and fibrinogen as clinically indicated  - Start 81mg ASA  - SCDs for DVT prophylaxis     ENDO:  No history of DM  - Maintain BG <180, insulin SSI per cardiac surgery protocol.    ID:  Afebrile, no current indications of infection.   - Periop Ancef 1.5g x 48 hours    Line: Arterial Line, RIJ CVC, ET, GERARDO, x2 Bilateral Pleural CT and 1 Meds          Dispo: Admit to CTICU         I spent  75 minutes in the professional and overall care of this patient.      Roshan Hastings, APRN-CNP

## 2023-11-14 NOTE — PROGRESS NOTES
Occupational Therapy    Evaluation/Treatment    Patient Name: Humphrey Waddell  MRN: [id number]  : 1965  Today's Date: 23  Time Calculation  Start Time: 1311  Stop Time: 1335  Time Calculation (min): 24 min       Assessment:  Prognosis: Good  End of Session Communication: Bedside nurse  End of Session Patient Position: Bed, 3 rail up, Alarm on  Prognosis: Good  Strengths: Housing layout, Access to adaptive/assistive products  Barriers to Participation: Ability to acquire knowledge, Insight into problems, Comorbidities  Plan:  Treatment Interventions: ADL retraining, Functional transfer training, Endurance training  OT Frequency: 3 times per week  OT Discharge Recommendations: High intensity level of continued care  Equipment Recommended upon Discharge: Slide board, Wheeled walker, Wheelchair  Treatment Interventions: ADL retraining, Functional transfer training, Endurance training    Subjective   Current Problem:  1. CAD, multiple vessel  Case Request Operating Room: Creation Bypass Graft Coronary Artery    Case Request Operating Room: Creation Bypass Graft Coronary Artery    Anesthesia Intraoperative Transesophageal Echocardiogram    Anesthesia Intraoperative Transesophageal Echocardiogram      2. Arteriosclerosis of coronary artery  Anesthesia Intraoperative Transesophageal Echocardiogram    Anesthesia Intraoperative Transesophageal Echocardiogram      3. Chest pain, unspecified type        4. NSTEMI, initial episode of care (CMS/HCC)  Transthoracic Echo (TTE) Complete    Transthoracic Echo (TTE) Complete      5. Atherosclerosis of native coronary artery of native heart without angina pectoris        6. Other shock (CMS/HCC)  Anesthesia Intraoperative Transesophageal Echocardiogram      7. Postprocedural shock unspecified, initial encounter  Anesthesia Intraoperative Transesophageal Echocardiogram        General:   OT Received On: 23  General  Reason for Referral: impaired ADLs with CABG x3 and return to  OR same day for mediastinal exploration with sternal wires/plates closure  Referred By: SVETA Quiroz  Past Medical History Relevant to Rehab: NSTEMI, CAD, L AKA, chronic RLE ulcer, GERD, carotid stenosis, HFpEF, CVA, GIB  Family/Caregiver Present: No  Co-Treatment: PT  Co-Treatment Reason: co-tx with OT to maximize safety and promote functional I.  Prior to Session Communication: Bedside nurse (RN Jak present at bedside during session)  Patient Position Received: Bed, 3 rail up, Alarm on  Preferred Learning Style: verbal, visual, auditory  General Comment: patient recieved in supine. supplemental O2, tele, pacer VVI @ 50, gerardo, chest tube to suction, gerardo, RIJ MAC, A-line, PIV, levo @ 0.06. recently provided with IV pain meds from RN.  Precautions:  Medical Precautions: Fall precautions, Cardiac precautions, Oxygen therapy device and L/min  Post-Surgical Precautions: Move in the Tube  Splinting:  (Cardiac pillow)  Vital Signs:  Heart Rate: 96 (96)  Heart Rate Source: Monitor  Resp: 16  SpO2: 95 %  BP: 107/61  MAP (mmHg): 81  BP Method: Arterial line  Patient Position: Lying  Pain:  Pain Assessment  Pain Assessment: 0-10  Pain Score:  (Pt. reported pain between 5-7/10 prior to mobility)  Pain Type: Surgical pain, Acute pain  Pain Location: Chest  Pain Descriptors: Aching  Pain Frequency: Constant/continuous  Pain Onset: Ongoing  Clinical Progression: Gradually worsening  Pain Interventions: Medication (See MAR)    Objective   Cognition:  Overall Cognitive Status: Impaired  Orientation Level: Oriented X4  Insight: Mild  Processing Speed: Delayed           Home Living:  Type of Home: Condo  Lives With: Significant other (Charline)  Home Adaptive Equipment: Wheelchair-power, Walker rolling or standard  Home Layout: One level  Home Access: No concerns  Bathroom Shower/Tub: Tub/shower unit  Bathroom Toilet: Standard  Bathroom Equipment: Tub transfer bench  Home Living Comments: reports home is wheelchair accessible for  "him and that he has needed equipment  Prior Function:  Receives Help From:  (Significant other)  ADL Assistance: Needs assistance  Bath: Minimal  Hand Dominance: Right  Prior Function Comments: patient nonambulatory at baseline; pivot transfers to/from power wheelchair with  FWW vs. slideboard per pt report; pivots to for shower transfers as well. previously employed as a nurse; no hobbies currently.  IADL History:  Occupation: Retired  Type of Occupation:  (Nurse)  ADL:  LE Dressing Assistance: Total  LE Dressing Deficit: Don/doff R sock  Activities of Daily Living: LE Dressing  LE Dressing: Yes  Sock Level of Assistance: Dependent  LE Dressing Where Assessed: Bed level  Activity Tolerance:  Endurance: Decreased tolerance for upright activites       Bed Mobility/Transfers: Bed Mobility  Bed Mobility: Yes  Bed Mobility 1  Bed Mobility 1: Supine to sitting  Level of Assistance 1: Dependent, Maximum verbal cues, Maximum tactile cues  Bed Mobility Comments 1: HOB elevated and cues with draw sheet for trunk and pelvic stability, cues to \"walk\" hips and RLE laterally to EOB with extended time for all tasks.  Bed Mobility 2  Bed Mobility  2: Sitting to supine  Level of Assistance 2: Maximum assistance, Maximum verbal cues, Maximum tactile cues  Bed Mobility Comments 2: Log roll  Bed Mobility 3  Bed Mobility 3: Rolling left  Level of Assistance 3: Maximum assistance, Maximum verbal cues, Maximum tactile cues  Bed Mobility 4  Bed Mobility 4: Scooting  Level of Assistance 4: Dependent, Maximum verbal cues, Maximum tactile cues  Bed Mobility Comments 4: Use of draw sheet         Sensation:  Sensation Comment: B UE baseline neuropathy        Hand Function:  Hand Function  Gross Grasp: Functional  Extremities: JAVI CALDWELL :  (3/5) and GURDEEP MACKENZIE:  (3-/5)      Outcome Measures: [facility] Daily Activity  Putting on and taking off regular lower body clothing: Total  Bathing (including washing, rinsing, drying): A lot  Putting on and " taking off regular upper body clothing: A lot  Toileting, which includes using toilet, bedpan or urinal: Total  Taking care of personal grooming such as brushing teeth: A lot  Eating Meals: A lot  Daily Activity - Total Score: 10        Education Documentation  Handouts, taught by April Stewart OT at 11/14/2023  3:50 PM.  Learner: Patient  Readiness: Acceptance  Method: Explanation, Demonstration, Handout  Response: Verbalizes Understanding, Demonstrated Understanding    Body Mechanics, taught by April Stewart OT at 11/14/2023  3:50 PM.  Learner: Patient  Readiness: Acceptance  Method: Explanation, Demonstration, Handout  Response: Verbalizes Understanding, Demonstrated Understanding    Precautions, taught by April Stewart OT at 11/14/2023  3:50 PM.  Learner: Patient  Readiness: Acceptance  Method: Explanation, Demonstration, Handout  Response: Verbalizes Understanding, Demonstrated Understanding    ADL Training, taught by April Stewart OT at 11/14/2023  3:50 PM.  Learner: Patient  Readiness: Acceptance  Method: Explanation, Demonstration, Handout  Response: Verbalizes Understanding, Demonstrated Understanding    Education Comments  No comments found.        OP EDUCATION:  Education  Individual(s) Educated: Patient    Goals:  Encounter Problems       Encounter Problems (Active)       ADLs       Patient with complete upper body dressing with minimal assist  level of assistance donning and doffing all UE clothes with PRN adaptive equipment while supported sitting       Start:  11/14/23            Patient with complete lower body dressing with moderate assist level of assistance donning and doffing all LE clothes  with shoe horn and sock-aid while supported sitting       Start:  11/14/23            Patient will complete daily grooming tasks brushing teeth, shaving, and washing face/hair with set-up and supervision level of assistance and PRN adaptive equipment while supported sitting.       Start:  11/14/23             Patient will complete toileting including hygiene clothing management/hygiene with moderate assist level of assistance and raised toilet seat and grab bars.       Start:  11/14/23                 TRANSFERS       Patient will perform bed mobility moderate assist level of assistance and bed rails and draw sheet in order to improve safety and independence with mobility       Start:  11/14/23            Patient will complete functional transfer with slide board transfer least restrictive device with contact guard assist level of assistance.       Start:  11/14/23

## 2023-11-14 NOTE — ANESTHESIA POSTPROCEDURE EVALUATION
Patient: Humphrey Waddell    Procedure Summary       Date: 11/13/23 Room / Location: [facility] OR 10 / Virtual [facility] OR    Anesthesia Start: 1814 Anesthesia Stop: 2041    Procedure: Creation Bypass Graft Coronary Artery (Chest) Diagnosis:       CAD, multiple vessel      (CAD, multiple vessel [I25.10])    Surgeons: Francisco Delatorre MD Responsible Provider: Juan Khan MD    Anesthesia Type: general ASA Status: 4            Anesthesia Type: general    Vitals Value Taken Time   /52 11/13/23 2048   Temp 36.7 11/13/23 2048   Pulse 115 11/13/23 2047   Resp 7 11/13/23 2047   SpO2 100 11/13/23 2048   Vitals shown include unvalidated device data.    Anesthesia Post Evaluation    Patient location during evaluation: ICU  Patient participation: complete - patient cannot participate  Post-procedure mental status: sedated.  Pain score: 0  Pain management: adequate  Airway patency: patent  Cardiovascular status: acceptable  Respiratory status: acceptable  Hydration status: acceptable  Postoperative Nausea and Vomiting: none        No notable events documented.

## 2023-11-14 NOTE — PROGRESS NOTES
Physical Therapy                 Therapy Communication Note    Patient Name: Humphrey Waddell  MRN: [id number]  Today's Date: 11/14/2023     Discipline: Physical Therapy    Missed Visit Reason: Missed Visit Reason:  (RN Jak reporting patient currently drowsy and requiring nursing care, following OR x2 on 11/13.  PT to defer at this time.)    Missed Time: Attempt    Comment:

## 2023-11-14 NOTE — ANESTHESIA PROCEDURE NOTES
Arterial Line:      Staffing  Performed: [facility]   Authorized by: Juan Khan MD    Performed by: BRUNILDA Humphrey    An arterial line was placed.  for the following indication(s): .      Additional notes:  Arterial line already in place

## 2023-11-14 NOTE — PROGRESS NOTES
"Humphrey Waddell is a 58 y.o. male on day 1 of admission presenting with CAD, multiple vessel.    Subjective   Sitting up in ICU bed  Extubated 0200  Nauseous, vomiting       Objective     Physical Exam  Constitutional:       Appearance: Normal appearance.      Comments: Appears nauseous, has vomited several times   Cardiovascular:      Rate and Rhythm: Normal rate and regular rhythm.   Pulmonary:      Effort: Pulmonary effort is normal.      Breath sounds: Normal breath sounds.   Abdominal:      General: There is no distension.      Palpations: Abdomen is soft.      Tenderness: There is no abdominal tenderness. There is no guarding.      Comments: obese   Genitourinary:     Comments: Clear yellow urine in Lee  Skin:     General: Skin is warm and dry.      Comments: Well-approximated MSI stable to palpation no erythema edema or purulence   Neurological:      General: No focal deficit present.      Mental Status: He is alert and oriented to person, place, and time.   Psychiatric:         Mood and Affect: Mood normal.         Behavior: Behavior normal.       Last Recorded Vitals  Blood pressure 136/78, pulse 86, temperature 37.6 °C (99.7 °F), temperature source Bladder, resp. rate 12, height 1.93 m (6' 3.98\"), weight 127 kg (280 lb 12.8 oz), SpO2 97 %.  Intake/Output last 3 Shifts:  I/O last 3 completed shifts:  In: 2945.1 (23.1 mL/kg) [I.V.:1195.1 (9.4 mL/kg); Blood:250; IV Piggyback:1500]  Out: 1820 (14.3 mL/kg) [Urine:880 (0.2 mL/kg/hr); Blood:350; Chest Tube:590]  Weight: 127.4 kg     Relevant Results                Scheduled medications  acetaminophen, 650 mg, oral, q6h   Or  acetaminophen, 650 mg, rectal, q6h  aspirin, 81 mg, oral, Daily  atorvastatin, 80 mg, oral, Daily  ceFAZolin, 2 g, intravenous, q8h  [Held by provider] clopidogrel, 75 mg, oral, Daily  heparin (porcine), 5,000 Units, subcutaneous, q8h  insulin lispro, 0-15 Units, subcutaneous, q4h  lidocaine, 1 patch, transdermal, q24h  mupirocin, 1 Application, " Each Nostril, BID  pantoprazole, 40 mg, oral, Daily before breakfast   Or  pantoprazole, 40 mg, intravenous, Daily before breakfast  polyethylene glycol, 17 g, oral, Daily  sennosides-docusate sodium, 2 tablet, oral, BID  sodium zirconium cyclosilicate, 10 g, oral, q8h      Continuous medications  dextrose 10 % in water (D10W), 50 mL/hr  lactated Ringer's, 5 mL/hr, Last Rate: 5 mL/hr (11/13/23 1723)  norepinephrine, 0.01-1 mcg/kg/min, Last Rate: Stopped (11/14/23 0815)      PRN medications  PRN medications: dextrose **OR** glucagon, HYDROmorphone, [Held by provider] melatonin, naloxone, ondansetron, oxyCODONE, oxyCODONE, oxygen    XR chest 1 view 11/14/2023    Narrative  Interpreted By:  Johanna Martinez,  STUDY:  XR CHEST 1 VIEW;  11/14/2023 4:55 am    INDICATION:  Signs/Symptoms:Post op cardiac surgery.    COMPARISON:  4:15 a.m. the same day    ACCESSION NUMBER(S):  [id number]    ORDERING CLINICIAN:  YAS SCOTT    FINDINGS:  Interval removal of ET tube and NG tube. Bilateral chest tubes, right  jugular central line and probable mediastinal drain remain in place.  Additional presumed overlying monitoring/support devices.  Questionable hazy left upper chest opacity versus overlapping soft  tissues/projection. No obvious pneumothorax. Curvilinear interface  over the right apex perhaps surgical/stent material. The cardiac  silhouette is within normal limits for size. Median sternotomy wires  and sternal plates again seen.    Impression  Interval removal of ET tube and NG tube. Questionable developing left  upper chest infiltrate versus overlapping soft tissues/projection.    MACRO:  None.    Signed by: Johanna Martinez 11/14/2023 8:06 AM  Dictation workstation:   [id number]           Assessment/Plan   Principal Problem:    CAD, multiple vessel  Humphrey Waddell is a 58 y.o. male presenting with CAD, hx NSTEMI and HFpEF. PMHx includes NIDDM2, Lt AKA, chronic RLE ulcer, GERD, carotid stenosis, hx CVA, hx GIB, hx anemia  req'ing blood transfusions (2023), and hx confusion of anesthesia. Pt admitted to [facility] ICU s/p CABGx3 (LIMA>LAD, SVG>OM1, SVG>PDA), posterior pericardotomy, sternal wires and plating with Dr. Delatorre on 11/13/23.     Neuro/MSK: Expected acute postop pain. Hx CVA. Lt AKA. Anxiety, depression  - Serial neuro and pain assessments  - Scheduled Tylenol, PRN Oxy, PRN Dilaudid while CTs are in  - PT/OT/MITT     -> Limited mobility at baseline: only transfers  - Hold home melatonin 3mg     CV: S/p CABGx3. HFpEF. Hx NSTEMI. Bilateral carotid stenosis.  LVEF pre 60%, post 65%  V wires backup VVI 40, SR 80s  - Continuous EKG and ABP  - Titrate drips to maintain SBP > 110, MAP > 70  - Maintain and monitor CT drainage; remove tomorrow if appropriate  - Volume resuscitate as needed  - ASA/statin  - Hold BB until HDs permit  - Hold home Plavix, hydralazine, torsemide 20mg for now     Pulm: Oxygenating well on 2.5L NC. Former smoker  - Wean FiO2 to SpO2 > 92%  - ABGs prn  - Bronch hygiene     GI: PONV  - Regular diet  - Bowel regimen  - Zofran, Compazine PRN  - PPI     Renal: Oliguric IDANIA on CKD (baseline Cr 2.84)  - RFP daily  - Replete lytes prn  - Maintain Lee     Heme: Acute on chronic anemia (baseline Hgb 9.1)  11/14 2u prbc  - CBC daily  - SCDs, SQH for DVTppx  - Transfuse if Hgb < 7.0 per Dr. Delatorre     Endo: IDDM2  A1C 5.3%  - Maintain BG < 180, ISS per ICU protocol  - Hold home Lantus 4u at bedtime     ID: Afebrile, no s/s infection  - Trend temp and WBCs q4  - Periop abx x 48hrs    Dispo: ICU       I spent 90 minutes in the professional and overall care of this patient.      Glendy Quiroz PA-C

## 2023-11-14 NOTE — PROGRESS NOTES
11/14/23 1642   Discharge Planning   Living Arrangements Spouse/significant other   Support Systems Spouse/significant other   Assistance Needed Assist   Type of Residence Private residence   Number of Stairs to Enter Residence 0   Number of Stairs Within Residence 0   Do you have animals or pets at home? No   Who is requesting discharge planning? Provider   Home or Post Acute Services In home services   Type of Home Care Services Home nursing visits;Home OT;Home PT   Patient expects to be discharged to: [facility]   Does the patient need discharge transport arranged? Yes   RoundTrip coordination needed? Yes   Has discharge transport been arranged? No   Financial Resource Strain   How hard is it for you to pay for the very basics like food, housing, medical care, and heating? Not hard   Housing Stability   In the last 12 months, was there a time when you were not able to pay the mortgage or rent on time? N   In the last 12 months, how many places have you lived? 1   In the last 12 months, was there a time when you did not have a steady place to sleep or slept in a shelter (including now)? N   Transportation Needs   In the past 12 months, has lack of transportation kept you from medical appointments or from getting medications? no   In the past 12 months, has lack of transportation kept you from meetings, work, or from getting things needed for daily living? No   Patient Choice   Provider Choice list and CMS website (https://medicare.gov/care-compare#search) for post-acute Quality and Resource Measure Data were provided and reviewed with: Patient   Patient / Family choosing to utilize agency / facility established prior to hospitalization No           Met with patient at bedside and explained my role as care coordinator. Patient lives with his significant other in a condominium. Patient has left [facility] and he is assisted with his care by his sig other Violetta ( [phone] ). He is getting prosthesis for his left leg. He  does have motorized wheelchair at home. No oxygen in use at home, no HD. His PCP is Dr. Madhu Minor and he seen her couple weeks ago. Pharmacy he uses is [facility] in [place]. Explained to patient about HHC after cardiac surgery and going home. Patient's choice was [facility]. Also waiting for PT/OT to see patient and for recommendations.

## 2023-11-14 NOTE — PROCEDURES
ARTERIAL LINE PLACEMENT      Indication(s):  -Ability to obtain multiple ABGs.  -Continuous BP monitoring in hemodynamically unstable patient.     Site: right brachial artery.     Catheter:    -20 Ga x 12 cm radiopaque FEP    Sedation: on propofol  Sterility: Chlorhexidine skin prep. Hat and mask on myself and assistant(s). Antiseptic hand foam. Sterile gloves and drape.  Local anesthesia: 0mL of 1% lidocaine subcutaneously.  Ultrasound-guided insertion:  -YES (with sterile ultrasound probe cover)     Seldinger technique with  -20 Ga x 2 in introducer needle. Guidewire thread easily into artery. Catheter thread easily over guidewire. Guidewire removed.  Arterial puncture x 1.     Catheter secured with  -Steri-Strip.  -sutures.  Transparent film dressing  -with CHG.  Sterility maintained throughout procedure. No complications. Patient tolerated well.     Other details: None.     ARROW Arterial Catheterization Kit  Lot # [id number]  Exp: 2024-12-31

## 2023-11-14 NOTE — SIGNIFICANT EVENT
01:57 Pt switched to CPAP/PS (+5/5 PS)  for spontaneous breathing trial per Roshan Hastings CNP.  02:26: Ran ABG sample from A-line and result given to MILAN Hastings without critical values.   02:38 Pt passed the SBT. Per Roshan Hastings CNP pt ok to extubate.    0239: Pt extubated, pt placed on 4L NC, spo2 97%, pt was verbal and no stridor noted, no respiratory distress.

## 2023-11-14 NOTE — ANESTHESIA PREPROCEDURE EVALUATION
Patient: Humphrey Waddell    Procedure Information       Anesthesia Start Date/Time: 11/13/23 1814    Procedure: Creation Bypass Graft Coronary Artery (Chest)    Location: [facility] OR 10 / Virtual [facility]    Surgeons: Francisco Delatorre MD            Relevant Problems   No relevant active problems       Clinical information reviewed:   Tobacco  Allergies  Meds  Problems  Med Hx  Surg Hx   Fam Hx  Soc   Hx        NPO Detail:  NPO/Void Status  Carbonhydrate Drink Given Prior to Surgery? : N  Date of Last Liquid: 11/13/23  Time of Last Liquid: 0600  Date of Last Solid: 11/12/23  Time of Last Solid: 2000  Last Intake Type: Clear fluids  Time of Last Void: 0629         PHYSICAL EXAM    Anesthesia Plan    ASA 4     general   (Patient known to me from this morning.  Unstable in ICU and returning to OR for exploration. Plan LARS and resuscitate. )  intravenous induction   Postoperative administration of opioids is intended.    Plan discussed with CAA.

## 2023-11-14 NOTE — PROGRESS NOTES
Physical Therapy    Physical Therapy Evaluation & Treatment    Patient Name: Humphrey Waddell  MRN: [id number]  Today's Date: 11/14/2023   Time Calculation  Start Time: 1312  Stop Time: 1335  Time Calculation (min): 23 min    Assessment/Plan   PT Assessment  PT Assessment Results: Decreased mobility, Decreased strength, Decreased range of motion, Decreased endurance, Impaired balance, Decreased coordination, Decreased safety awareness, Pain  Rehab Prognosis: Good  Barriers to Discharge: patient appeared drowsy, POD #1 s/p cardiac surgery with multiple lines/tubes  Evaluation/Treatment Tolerance: Patient limited by fatigue, Patient limited by pain  Medical Staff Made Aware: Yes  Strengths: Housing layout, Support and attitude of living partners, Access to adaptive/assistive products  Barriers to Participation: Ability to acquire knowledge, Comorbidities, Premorbid level of function  End of Session Communication: Bedside nurse (ABDOULAYE Quiroz PA-C)  Assessment Comment: Patient presents s/p CABG x3 with MITT, falls and cardiac precautions. Of note, pt with baseline L AKA and multiple medical comorbidities.  Patient is currently functioning below reported baseline and has new postop precautions and weakness, limited activity tolerance.  He will benefit from ongoing PT in house and followind DC to maximize functional I and safety.  End of Session Patient Position: Bed, 3 rail up, Alarm on  IP OR SWING BED PT PLAN  Inpatient or Swing Bed: Inpatient  PT Plan  Treatment/Interventions: Bed mobility, Transfer training, Balance training, Neuromuscular re-education, Strengthening, Endurance training, Therapeutic exercise, Therapeutic activity, Home exercise program, Positioning, Postural re-education, Wheelchair management  PT Plan: Skilled PT  PT Frequency: 4 times per week  PT Discharge Recommendations: High intensity level of continued care (patient will require physical A/S of caregivers with new complexity and MITT  precautions)  Equipment Recommended upon Discharge: Slide board, Wheeled walker, Wheelchair  PT Recommended Transfer Status: Total assist      Subjective     General Visit Information:  General  Reason for Referral: impaired mobility with CABG x3 and return to OR same day for mediastinal exploration with sternal wires/plates closure  Referred By: SVETA Quiroz  Past Medical History Relevant to Rehab: NSTEMI, CAD, L AKA, chronic RLE ulcer, GERD, carotid stenosis, HFpEF, CVA, GIB  Family/Caregiver Present: No  Co-Treatment: OT  Co-Treatment Reason: co-tx with OT to maximize safety and promote functional I.  Prior to Session Communication: Bedside nurse (RN Jak present at bedside during session)  Patient Position Received: Bed, 3 rail up, Alarm on  Preferred Learning Style: verbal, visual, auditory  General Comment: patient recieved in supine. supplemental O2, tele, pacer VVI @ 50, gerardo, chest tube to suction, gerardo, RIJ MAC, A-line, PIV, levo @ 0.06. recently provided with IV pain meds from RN.  Home Living:  Home Living  Type of Home: Condo  Lives With: Significant other (Charline)  Home Adaptive Equipment: Wheelchair-power, Walker rolling or standard  Home Layout: One level  Home Access: No concerns  Bathroom Shower/Tub: Tub/shower unit  Home Living Comments: reports home is wheelchair accessible for him and that he has needed equipment  Prior Level of Function:  Prior Function Per Pt/Caregiver Report  Receives Help From:  (significant other)  Prior Function Comments: patient nonambulatory at baseline; pivot transfers to/from power wheelchair with  FWW vs. slideboard per pt report; pivots to for shower transfers as well. previously employed as a nurse; no hobbies currently.  Precautions:  Precautions  Medical Precautions: Fall precautions, Cardiac precautions, Oxygen therapy device and L/min (LLE AKA)  Post-Surgical Precautions: Move in the Tube  Vital Signs:  Vital Signs  Heart Rate: 96  Heart Rate Source:  Monitor  Resp: 16  SpO2: 95 %  BP: 107/61  MAP (mmHg): 81  BP Method: Arterial line  Patient Position: Lying (vitals stable throughout: end of session - , RR 21, SpO2 95%, ABP 93/84 (88))    Objective   Pain:  Pain Assessment  Pain Assessment: 0-10  Pain Score:  (rated incisional pain 5-7/10 prior to mobility, reported increased pain (not rated) following mobility and return to supine.)  Pain Type: Surgical pain, Acute pain (also has baseline neuropathy and phantom limb pain)  Pain Location: Chest  Pain Descriptors: Aching  Pain Frequency: Constant/continuous  Pain Onset: Ongoing  Clinical Progression: Gradually worsening  Effect of Pain on Daily Activities: patient premedicated by RN; PT to tolerance  Pain Interventions: Medication (See MAR), Repositioned, Ambulation/increased activity, Rest, Distraction (encouraged compression of incision)  Response to Interventions: patient remained with elevated pain reports and provided with repositioning and PT to tolerance.  Cognition:  Cognition  Overall Cognitive Status: Impaired (delayed responses at times, followed ~50-75% simple commands with multimodal cues and repetition.)    General Assessments:  General Observation  General Observation: patient hemodynamically stable at EOB with sitting but reporting symptoms of dizziness; (+) postural instability despite cues and limited activity tolerance. pt required return to supine and repositoining for comfort.             Activity Tolerance  Endurance: Decreased tolerance for upright activites  Early Mobility/Exercise Safety Screen: Proceed with mobilization - No exclusion criteria met  Activity Tolerance Comments: fair-    Sensation  Sensation Comment: baseline neuropathy    Postural Control  Postural Control: Impaired  Trunk Control: retro lean  Righting Reactions: impaired, mod A to max A to correct to midline despite pt effort  Posture Comment: patient challenged with mobility attempts/sitting EOB this date, unable  "to balance without mod to max A    Static Sitting Balance  Static Sitting-Balance Support: Bilateral upper extremity supported (R foot supported)  Static Sitting-Level of Assistance: Moderate assistance  Static Sitting-Comment/Number of Minutes: 8  Dynamic Sitting Balance  Dynamic Sitting-Balance Support: Bilateral upper extremity supported (R foot supported)  Dynamic Sitting-Balance: Trunk control activities  Dynamic Sitting-Comments: max A  Functional Assessments:  Bed Mobility  Bed Mobility: Yes  Bed Mobility 1  Bed Mobility 1: Supine to sitting  Level of Assistance 1: Dependent, Maximum verbal cues, Maximum tactile cues  Bed Mobility Comments 1: HOB elevated and cues with draw sheet for trunk and pelvic stability, cues to \"walk\" hips and RLE laterally to EOB with extended time for all tasks. (patient unable attempt mobility without physical support, increased time and physical support.)  Bed Mobility 2  Bed Mobility  2: Sitting to supine  Level of Assistance 2: Maximum assistance, Maximum verbal cues, Maximum tactile cues (x2 person A)  Bed Mobility Comments 2: via partial logroll  Bed Mobility 3  Bed Mobility 3: Rolling left  Level of Assistance 3: Maximum assistance, Maximum verbal cues, Maximum tactile cues  Bed Mobility Comments 3: cues for repositoining  Bed Mobility 4  Bed Mobility 4: Scooting  Level of Assistance 4: Dependent, Maximum verbal cues, Maximum tactile cues  Bed Mobility Comments 4: use of draw sheet support with trunk assist    Transfers  Transfer: No    Ambulation/Gait Training  Ambulation/Gait Training Performed: No       Treatments:       Balance/Neuromuscular Re-Education  Balance/Neuromuscular Re-Education Activity Performed: Yes  Balance/Neuromuscular Re-Education Activity 1: patient performed static/dynamic sitting at EOB with heavy cues and assistance to correct for retropulsion or lateral instability; patient with mod to max A x1 to complete upright sitting. required max A x2 for " "reciprocal scooting attempts/repositioning to EOB with BUE and RLE support.    Bed Mobility  Bed Mobility: Yes  Bed Mobility 1  Bed Mobility 1: Supine to sitting  Level of Assistance 1: Dependent, Maximum verbal cues, Maximum tactile cues  Bed Mobility Comments 1: HOB elevated and cues with draw sheet for trunk and pelvic stability, cues to \"walk\" hips and RLE laterally to EOB with extended time for all tasks. (patient unable attempt mobility without physical support, increased time and physical support.)  Bed Mobility 2  Bed Mobility  2: Sitting to supine  Level of Assistance 2: Maximum assistance, Maximum verbal cues, Maximum tactile cues (x2 person A)  Bed Mobility Comments 2: via partial logroll  Bed Mobility 3  Bed Mobility 3: Rolling left  Level of Assistance 3: Maximum assistance, Maximum verbal cues, Maximum tactile cues  Bed Mobility Comments 3: cues for repositoining  Bed Mobility 4  Bed Mobility 4: Scooting  Level of Assistance 4: Dependent, Maximum verbal cues, Maximum tactile cues  Bed Mobility Comments 4: use of draw sheet support with trunk assist  Outcome Measures:  [facility] Basic Mobility  Turning from your back to your side while in a flat bed without using bedrails: Total  Moving from lying on your back to sitting on the side of a flat bed without using bedrails: Total  Moving to and from bed to chair (including a wheelchair): Total  Standing up from a chair using your arms (e.g. wheelchair or bedside chair): Total  To walk in hospital room: Total  Climbing 3-5 steps with railing: Total  Basic Mobility - Total Score: 6    Brief Confusion Assessment Method (bCAM)  Feature 1: Altered Mental Status or Fluctuating Course: Yes/unable to assess  CAM Result: CAM +                  FSS-ICU  Ambulation: Unable to attempt due to weakness  Rolling: Total assistance (performs 25% or requires another person)  Sitting: Maximal assistance (performs 25% - 49% of task)  Transfer Sit-to-Stand: Unable to " perform  Transfer Supine-to-Sit: Total assistance (performs 25% or requires another person)  Total Score: 4      ICU Mobility Screen  Early Mobility/Exercise Safety Screen: Proceed with mobilization - No exclusion criteria met  E = Exercise and Early Mobility  Early Mobility/Exercise Safety Screen: Proceed with mobilization - No exclusion criteria met  Current Activity: Sitting at edge of bed, Activity encouraged  Documentation of an Acceptable Level of Exercise/Mobilization Performed: Dangle - side of bed    Encounter Problems       Encounter Problems (Active)       Balance       STG - Maintains dynamic sitting balance with upper extremity support       Start:  11/14/23    Expected End:  11/28/23       INTERVENTIONS:  1. Practice sitting on the edge of a bed/mat with minimal support.  2. Educate patient about maintining total hip precautions while maintaining balance.  3. Educate patient about pressure relief.  4. Educate patient about use of assistive device.            Mobility       STG - Patient will propel the wheelchair       Start:  11/14/23    Expected End:  11/28/23       Utilizing safe technique with adherence to MITT precautions and with SBA support/VCs            Transfers       STG - Transfer from bed to chair       Start:  11/14/23    Expected End:  11/28/23       Mod A x1 with LRAD, using safe technique and adherence to MITT precautions,          STG - Patient will perform bed mobility       Start:  11/14/23    Expected End:  11/28/23       Via logroll with mod A and safe technique         Goal 1       Start:  11/14/23    Expected End:  11/28/23       Patient will demonstrate stable vitals response on room air with all activities.                Education Documentation  Handouts, taught by Jayde York, PT at 11/14/2023  2:41 PM.  Learner: Patient  Readiness: Acceptance  Method: Handout, Demonstration, Explanation  Response: Needs Reinforcement    Precautions, taught by Jayde York, PT at  11/14/2023  2:41 PM.  Learner: Patient  Readiness: Acceptance  Method: Handout, Demonstration, Explanation  Response: Needs Reinforcement    Body Mechanics, taught by Jayde York, PT at 11/14/2023  2:41 PM.  Learner: Patient  Readiness: Acceptance  Method: Handout, Demonstration, Explanation  Response: Needs Reinforcement    Home Exercise Program, taught by Jayde York, PT at 11/14/2023  2:41 PM.  Learner: Patient  Readiness: Acceptance  Method: Handout, Demonstration, Explanation  Response: Needs Reinforcement    Mobility Training, taught by Jayde York, PT at 11/14/2023  2:41 PM.  Learner: Patient  Readiness: Acceptance  Method: Handout, Demonstration, Explanation  Response: Needs Reinforcement    Education Comments  No comments found.

## 2023-11-15 LAB
ALBUMIN SERPL BCP-MCNC: 2.7 G/DL (ref 3.4–5)
ALBUMIN SERPL BCP-MCNC: 3 G/DL (ref 3.4–5)
ANION GAP SERPL CALC-SCNC: 12 MMOL/L (ref 10–20)
ANION GAP SERPL CALC-SCNC: 15 MMOL/L (ref 10–20)
APPEARANCE UR: ABNORMAL
BACTERIA #/AREA URNS AUTO: ABNORMAL /HPF
BILIRUB UR STRIP.AUTO-MCNC: NEGATIVE MG/DL
BLOOD EXPIRATION DATE: NORMAL
BLOOD EXPIRATION DATE: NORMAL
BUN SERPL-MCNC: 52 MG/DL (ref 6–23)
BUN SERPL-MCNC: 53 MG/DL (ref 6–23)
CALCIUM SERPL-MCNC: 7.6 MG/DL (ref 8.6–10.3)
CALCIUM SERPL-MCNC: 7.6 MG/DL (ref 8.6–10.3)
CHLORIDE SERPL-SCNC: 101 MMOL/L (ref 98–107)
CHLORIDE SERPL-SCNC: 104 MMOL/L (ref 98–107)
CO2 SERPL-SCNC: 24 MMOL/L (ref 21–32)
CO2 SERPL-SCNC: 25 MMOL/L (ref 21–32)
COLOR UR: YELLOW
CREAT SERPL-MCNC: 3.51 MG/DL (ref 0.5–1.3)
CREAT SERPL-MCNC: 3.55 MG/DL (ref 0.5–1.3)
CREAT UR-MCNC: 99.4 MG/DL (ref 20–370)
DISPENSE STATUS: NORMAL
DISPENSE STATUS: NORMAL
ERYTHROCYTE [DISTWIDTH] IN BLOOD BY AUTOMATED COUNT: 15.8 % (ref 11.5–14.5)
GFR SERPL CREATININE-BSD FRML MDRD: 19 ML/MIN/1.73M*2
GFR SERPL CREATININE-BSD FRML MDRD: 19 ML/MIN/1.73M*2
GLUCOSE BLD MANUAL STRIP-MCNC: 106 MG/DL (ref 74–99)
GLUCOSE BLD MANUAL STRIP-MCNC: 112 MG/DL (ref 74–99)
GLUCOSE BLD MANUAL STRIP-MCNC: 114 MG/DL (ref 74–99)
GLUCOSE BLD MANUAL STRIP-MCNC: 115 MG/DL (ref 74–99)
GLUCOSE BLD MANUAL STRIP-MCNC: 138 MG/DL (ref 74–99)
GLUCOSE BLD MANUAL STRIP-MCNC: 160 MG/DL (ref 74–99)
GLUCOSE SERPL-MCNC: 120 MG/DL (ref 74–99)
GLUCOSE SERPL-MCNC: 140 MG/DL (ref 74–99)
GLUCOSE UR STRIP.AUTO-MCNC: NEGATIVE MG/DL
HCT VFR BLD AUTO: 23.4 % (ref 41–52)
HGB BLD-MCNC: 7.5 G/DL (ref 13.5–17.5)
KETONES UR STRIP.AUTO-MCNC: NEGATIVE MG/DL
LEUKOCYTE ESTERASE UR QL STRIP.AUTO: NEGATIVE
MAGNESIUM SERPL-MCNC: 2.6 MG/DL (ref 1.6–2.4)
MCH RBC QN AUTO: 28.7 PG (ref 26–34)
MCHC RBC AUTO-ENTMCNC: 32.1 G/DL (ref 32–36)
MCV RBC AUTO: 90 FL (ref 80–100)
MUCOUS THREADS #/AREA URNS AUTO: ABNORMAL /LPF
NITRITE UR QL STRIP.AUTO: NEGATIVE
NRBC BLD-RTO: 0 /100 WBCS (ref 0–0)
PH UR STRIP.AUTO: 5 [PH]
PHOSPHATE SERPL-MCNC: 4.6 MG/DL (ref 2.5–4.9)
PHOSPHATE SERPL-MCNC: 4.7 MG/DL (ref 2.5–4.9)
PLATELET # BLD AUTO: 128 X10*3/UL (ref 150–450)
POTASSIUM SERPL-SCNC: 5.3 MMOL/L (ref 3.5–5.3)
POTASSIUM SERPL-SCNC: 5.3 MMOL/L (ref 3.5–5.3)
PRODUCT BLOOD TYPE: 600
PRODUCT BLOOD TYPE: 600
PRODUCT CODE: NORMAL
PRODUCT CODE: NORMAL
PROT UR STRIP.AUTO-MCNC: ABNORMAL MG/DL
RBC # BLD AUTO: 2.61 X10*6/UL (ref 4.5–5.9)
RBC # UR STRIP.AUTO: ABNORMAL /UL
RBC #/AREA URNS AUTO: ABNORMAL /HPF
SODIUM SERPL-SCNC: 135 MMOL/L (ref 136–145)
SODIUM SERPL-SCNC: 136 MMOL/L (ref 136–145)
SODIUM UR-SCNC: 34 MMOL/L
SODIUM/CREAT UR-RTO: 34 MMOL/G CREAT
SP GR UR STRIP.AUTO: 1.02
UNIT ABO: NORMAL
UNIT ABO: NORMAL
UNIT NUMBER: NORMAL
UNIT NUMBER: NORMAL
UNIT RH: NORMAL
UNIT RH: NORMAL
UNIT VOLUME: 350
UNIT VOLUME: 400
UROBILINOGEN UR STRIP.AUTO-MCNC: <2 MG/DL
WBC # BLD AUTO: 13.1 X10*3/UL (ref 4.4–11.3)
WBC #/AREA URNS AUTO: ABNORMAL /HPF
XM INTEP: NORMAL
XM INTEP: NORMAL

## 2023-11-15 PROCEDURE — 2500000001 HC RX 250 WO HCPCS SELF ADMINISTERED DRUGS (ALT 637 FOR MEDICARE OP): Performed by: REGISTERED NURSE

## 2023-11-15 PROCEDURE — 2060000001 HC INTERMEDIATE ICU ROOM DAILY

## 2023-11-15 PROCEDURE — 96372 THER/PROPH/DIAG INJ SC/IM: CPT | Performed by: STUDENT IN AN ORGANIZED HEALTH CARE EDUCATION/TRAINING PROGRAM

## 2023-11-15 PROCEDURE — 37799 UNLISTED PX VASCULAR SURGERY: CPT | Performed by: REGISTERED NURSE

## 2023-11-15 PROCEDURE — 6350000001 HC RX 635 EPOETIN >10,000 UNITS: Mod: JZ | Performed by: INTERNAL MEDICINE

## 2023-11-15 PROCEDURE — 81003 URINALYSIS AUTO W/O SCOPE: CPT | Performed by: NURSE PRACTITIONER

## 2023-11-15 PROCEDURE — 99291 CRITICAL CARE FIRST HOUR: CPT | Performed by: REGISTERED NURSE

## 2023-11-15 PROCEDURE — 96372 THER/PROPH/DIAG INJ SC/IM: CPT | Performed by: INTERNAL MEDICINE

## 2023-11-15 PROCEDURE — 2500000004 HC RX 250 GENERAL PHARMACY W/ HCPCS (ALT 636 FOR OP/ED): Performed by: NURSE PRACTITIONER

## 2023-11-15 PROCEDURE — 80069 RENAL FUNCTION PANEL: CPT | Performed by: REGISTERED NURSE

## 2023-11-15 PROCEDURE — 94660 CPAP INITIATION&MGMT: CPT

## 2023-11-15 PROCEDURE — 82947 ASSAY GLUCOSE BLOOD QUANT: CPT

## 2023-11-15 PROCEDURE — 85027 COMPLETE CBC AUTOMATED: CPT | Performed by: STUDENT IN AN ORGANIZED HEALTH CARE EDUCATION/TRAINING PROGRAM

## 2023-11-15 PROCEDURE — 2500000004 HC RX 250 GENERAL PHARMACY W/ HCPCS (ALT 636 FOR OP/ED): Performed by: REGISTERED NURSE

## 2023-11-15 PROCEDURE — 82570 ASSAY OF URINE CREATININE: CPT | Performed by: NURSE PRACTITIONER

## 2023-11-15 PROCEDURE — 80069 RENAL FUNCTION PANEL: CPT | Performed by: STUDENT IN AN ORGANIZED HEALTH CARE EDUCATION/TRAINING PROGRAM

## 2023-11-15 PROCEDURE — 94668 MNPJ CHEST WALL SBSQ: CPT

## 2023-11-15 PROCEDURE — 2500000002 HC RX 250 W HCPCS SELF ADMINISTERED DRUGS (ALT 637 FOR MEDICARE OP, ALT 636 FOR OP/ED): Performed by: REGISTERED NURSE

## 2023-11-15 PROCEDURE — 2500000004 HC RX 250 GENERAL PHARMACY W/ HCPCS (ALT 636 FOR OP/ED): Performed by: STUDENT IN AN ORGANIZED HEALTH CARE EDUCATION/TRAINING PROGRAM

## 2023-11-15 PROCEDURE — 37799 UNLISTED PX VASCULAR SURGERY: CPT | Performed by: STUDENT IN AN ORGANIZED HEALTH CARE EDUCATION/TRAINING PROGRAM

## 2023-11-15 PROCEDURE — 96372 THER/PROPH/DIAG INJ SC/IM: CPT | Performed by: REGISTERED NURSE

## 2023-11-15 PROCEDURE — 83735 ASSAY OF MAGNESIUM: CPT | Performed by: STUDENT IN AN ORGANIZED HEALTH CARE EDUCATION/TRAINING PROGRAM

## 2023-11-15 PROCEDURE — 2500000005 HC RX 250 GENERAL PHARMACY W/O HCPCS: Performed by: STUDENT IN AN ORGANIZED HEALTH CARE EDUCATION/TRAINING PROGRAM

## 2023-11-15 PROCEDURE — 2500000004 HC RX 250 GENERAL PHARMACY W/ HCPCS (ALT 636 FOR OP/ED): Performed by: INTERNAL MEDICINE

## 2023-11-15 PROCEDURE — 2500000001 HC RX 250 WO HCPCS SELF ADMINISTERED DRUGS (ALT 637 FOR MEDICARE OP): Performed by: STUDENT IN AN ORGANIZED HEALTH CARE EDUCATION/TRAINING PROGRAM

## 2023-11-15 RX ORDER — FUROSEMIDE 10 MG/ML
80 INJECTION INTRAMUSCULAR; INTRAVENOUS ONCE
Status: COMPLETED | OUTPATIENT
Start: 2023-11-15 | End: 2023-11-15

## 2023-11-15 RX ORDER — METOPROLOL TARTRATE 25 MG/1
25 TABLET, FILM COATED ORAL 2 TIMES DAILY
Status: DISCONTINUED | OUTPATIENT
Start: 2023-11-15 | End: 2023-11-17

## 2023-11-15 RX ORDER — INSULIN LISPRO 100 [IU]/ML
0-15 INJECTION, SOLUTION INTRAVENOUS; SUBCUTANEOUS
Status: DISCONTINUED | OUTPATIENT
Start: 2023-11-15 | End: 2023-11-24 | Stop reason: HOSPADM

## 2023-11-15 RX ORDER — FUROSEMIDE 10 MG/ML
40 INJECTION INTRAMUSCULAR; INTRAVENOUS ONCE
Status: COMPLETED | OUTPATIENT
Start: 2023-11-15 | End: 2023-11-15

## 2023-11-15 RX ADMIN — HYDROMORPHONE HYDROCHLORIDE 0.5 MG: 1 INJECTION, SOLUTION INTRAMUSCULAR; INTRAVENOUS; SUBCUTANEOUS at 03:06

## 2023-11-15 RX ADMIN — ATORVASTATIN CALCIUM 80 MG: 80 TABLET ORAL at 08:26

## 2023-11-15 RX ADMIN — ACETAMINOPHEN 650 MG: 325 TABLET ORAL at 21:25

## 2023-11-15 RX ADMIN — HYDROMORPHONE HYDROCHLORIDE 0.5 MG: 1 INJECTION, SOLUTION INTRAMUSCULAR; INTRAVENOUS; SUBCUTANEOUS at 06:45

## 2023-11-15 RX ADMIN — HEPARIN SODIUM 5000 UNITS: 5000 INJECTION INTRAVENOUS; SUBCUTANEOUS at 08:37

## 2023-11-15 RX ADMIN — PANTOPRAZOLE SODIUM 40 MG: 40 TABLET, DELAYED RELEASE ORAL at 08:26

## 2023-11-15 RX ADMIN — ACETAMINOPHEN 650 MG: 325 TABLET ORAL at 08:26

## 2023-11-15 RX ADMIN — ACETAMINOPHEN 650 MG: 325 TABLET ORAL at 13:16

## 2023-11-15 RX ADMIN — ACETAMINOPHEN 650 MG: 325 TABLET ORAL at 03:06

## 2023-11-15 RX ADMIN — ONDANSETRON 4 MG: 2 INJECTION INTRAMUSCULAR; INTRAVENOUS at 08:38

## 2023-11-15 RX ADMIN — LIDOCAINE 1 PATCH: 4 PATCH TOPICAL at 13:16

## 2023-11-15 RX ADMIN — OXYCODONE HYDROCHLORIDE 5 MG: 5 TABLET ORAL at 21:25

## 2023-11-15 RX ADMIN — EPOETIN ALFA-EPBX 10000 UNITS: 10000 INJECTION, SOLUTION INTRAVENOUS; SUBCUTANEOUS at 17:16

## 2023-11-15 RX ADMIN — CEFAZOLIN SODIUM 2 G: 2 INJECTION, SOLUTION INTRAVENOUS at 03:30

## 2023-11-15 RX ADMIN — POLYETHYLENE GLYCOL 3350 17 G: 17 POWDER, FOR SOLUTION ORAL at 08:26

## 2023-11-15 RX ADMIN — METOPROLOL TARTRATE 25 MG: 25 TABLET, FILM COATED ORAL at 21:25

## 2023-11-15 RX ADMIN — FUROSEMIDE 80 MG: 10 INJECTION, SOLUTION INTRAMUSCULAR; INTRAVENOUS at 17:16

## 2023-11-15 RX ADMIN — HEPARIN SODIUM 5000 UNITS: 5000 INJECTION INTRAVENOUS; SUBCUTANEOUS at 16:52

## 2023-11-15 RX ADMIN — METOPROLOL TARTRATE 25 MG: 25 TABLET, FILM COATED ORAL at 13:16

## 2023-11-15 RX ADMIN — HEPARIN SODIUM 5000 UNITS: 5000 INJECTION INTRAVENOUS; SUBCUTANEOUS at 03:29

## 2023-11-15 RX ADMIN — SENNOSIDES AND DOCUSATE SODIUM 2 TABLET: 8.6; 5 TABLET ORAL at 08:26

## 2023-11-15 RX ADMIN — OXYCODONE HYDROCHLORIDE 10 MG: 5 TABLET ORAL at 08:28

## 2023-11-15 RX ADMIN — ASPIRIN 81 MG CHEWABLE TABLET 81 MG: 81 TABLET CHEWABLE at 08:26

## 2023-11-15 RX ADMIN — INSULIN LISPRO 5 UNITS: 100 INJECTION, SOLUTION INTRAVENOUS; SUBCUTANEOUS at 12:30

## 2023-11-15 RX ADMIN — MUPIROCIN 1 APPLICATION: 20 OINTMENT TOPICAL at 08:40

## 2023-11-15 RX ADMIN — FUROSEMIDE 40 MG: 10 INJECTION, SOLUTION INTRAMUSCULAR; INTRAVENOUS at 10:23

## 2023-11-15 RX ADMIN — SODIUM ZIRCONIUM CYCLOSILICATE 10 G: 10 POWDER, FOR SUSPENSION ORAL at 10:23

## 2023-11-15 ASSESSMENT — COGNITIVE AND FUNCTIONAL STATUS - GENERAL
CLIMB 3 TO 5 STEPS WITH RAILING: TOTAL
HELP NEEDED FOR BATHING: A LOT
STANDING UP FROM CHAIR USING ARMS: TOTAL
MOVING FROM LYING ON BACK TO SITTING ON SIDE OF FLAT BED WITH BEDRAILS: A LOT
DAILY ACTIVITIY SCORE: 11
WALKING IN HOSPITAL ROOM: TOTAL
DRESSING REGULAR LOWER BODY CLOTHING: TOTAL
MOVING TO AND FROM BED TO CHAIR: A LOT
EATING MEALS: A LOT
DRESSING REGULAR UPPER BODY CLOTHING: A LOT
PERSONAL GROOMING: A LOT
MOBILITY SCORE: 9
TOILETING: A LOT
TURNING FROM BACK TO SIDE WHILE IN FLAT BAD: A LOT

## 2023-11-15 ASSESSMENT — PAIN SCALES - GENERAL
PAINLEVEL_OUTOF10: 2
PAINLEVEL_OUTOF10: 0 - NO PAIN
PAINLEVEL_OUTOF10: 6
PAINLEVEL_OUTOF10: 0 - NO PAIN
PAINLEVEL_OUTOF10: 6
PAINLEVEL_OUTOF10: 3
PAINLEVEL_OUTOF10: 7

## 2023-11-15 ASSESSMENT — PAIN DESCRIPTION - LOCATION: LOCATION: OTHER (COMMENT)

## 2023-11-15 NOTE — CARE PLAN
Problem: Pain  Goal: My pain/discomfort is manageable  Outcome: Progressing     Problem: ACS/CP/NSTEMI/STEMI  Goal: Promote self management  Outcome: Progressing   The patient's goals for the shift include      The clinical goals for the shift include ;patient will be heamodynamically stable during shift    Over the shift, the patient did make progress towards the goal. Patient stayed in sinus rhythm. Pain under good control. Chest tubes , central and arterial line , gerardo removed per orders.

## 2023-11-15 NOTE — PROGRESS NOTES
Occupational Therapy                 Therapy Communication Note    Patient Name: Humphrey Waddell  MRN: [id number]  Today's Date: 11/15/2023     Discipline: Occupational Therapy    Missed Visit Reason: Missed Visit Reason: Other (Comment) (OT attempted though RN pulling pt chest tubes. Reports pt was OOB to chair earlier and pt needs to lay flat x1 hour at this time.)    Missed Time: Attempt

## 2023-11-15 NOTE — CONSULTS
Reason For Consult  Kody on ckd    History Of Present Illness  Humphrey Waddell is a 58 y.o. male with PMH of HFpEF, CAD, carotid stenosis, CVA, DM, anemia, GIB, DJD, PAD s/p Lt AKA, HTN, depression, CKD (Creatinine 2.84 mg/dL), previous transfusion pRBC,  presenting for a scheduled CABG x3 LIMA to LAD Rt EVH SVG to OM Rt EVH SVG to PDA pericardial window and sternal plating on 11/13. OR course 1500ml crystalloid, 250mL Cellsaver. Upon arrival to ICU had significant hypotension requiring return to OR. EBL for return to OR was 170mL. Returned to ICU in stable condition. KODY noted oliguric. We are now consulted for renal care.      Past Medical History  He has a past medical history of Above-knee amputation of left lower extremity (CMS/[facility]), Adverse effect of anesthesia, Anemia, CAD (coronary artery disease), Carotid artery disease (CMS/[facility]), CVA (cerebral vascular accident) (CMS/[facility]) (2020), Depression, DM (diabetes mellitus) (CMS/[facility]), GERD (gastroesophageal reflux disease), High cholesterol, HTN (hypertension), Leg ulcer (CMS/[facility]), Neuropathy, OA (osteoarthritis), and PAD (peripheral artery disease) (CMS/[facility]).    Surgical History  He has a past surgical history that includes Other surgical history (07/07/2021); Other surgical history (07/07/2021); CT angio head w and wo IV contrast (06/14/2021); CT angio neck (06/14/2021); MR angio neck wo IV contrast (07/18/2023); MR angio head wo IV contrast (12/11/2012); MR angio head wo IV contrast (06/12/2021); MR angio head wo IV contrast (05/25/2021); Total shoulder arthroplasty (Left, 2020); and Leg Surgery (Right).     Social History  He reports that he quit smoking about 7 months ago. His smoking use included cigars. He has never used smokeless tobacco. He reports that he does not currently use alcohol. He reports that he does not currently use drugs after having used the following drugs: Marijuana.    Family History  Family History   Problem Relation Name Age of Onset    Other  (cardiac disorder) Mother      Hypertension Mother      Esophageal cancer Mother      Hearing loss Father      Hypertension Father      Heart disease Father      COPD Sister          Allergies  Patient has no known allergies.    Review of Systems  Review of Systems:  Negative for chills and fatigue. Negative for cough, chest tightness and shortness of breath. Negative for chest pain. Negative for abdominal pain and blood in stool. Negative for decreased urine volume, dysuria, flank pain and hematuria. Negative for dizziness. Negative for confusion.  All other systems negative.        Physical Exam  Physical Exam  Constitutional:    Alert and oriented x2-3, appears in no distress, spacey  HENT:      Clear oropharynx, moist mucous membranes  Cardiovascular:   Normal rate and regular rhythm; Normal pulses; Normal heart sounds. No murmur heard; No friction rub. CT x 3 no air leak noted, midsternal incision   Pulmonary:   Pulmonary effort is normal. No respiratory distress. Normal breath sounds. No stridor. No wheezing or rhonchi.    Abdominal:   Bowel sounds are normal. There is no distension. Abdomen is soft. There is no tenderness or guarding. Lee in place.   Musculoskeletal:      +1 edema. Moves all extremities, Lt ALA with intact stump site  Skin:  Skin is warm and dry. Capillary refill takes less than 2 seconds. No lesion or rash.   Neurological:   No focal deficit present. Alert and oriented to person, place, and time. Takes quite some time to answer questions  Psych:  withdrawn            I&O 24HR    Intake/Output Summary (Last 24 hours) at 11/15/2023 0929  Last data filed at 11/15/2023 0600  Gross per 24 hour   Intake 1468.05 ml   Output 1310 ml   Net 158.05 ml       Vitals 24HR  Heart Rate:  []   Temp:  [36.7 °C (98 °F)-37.8 °C (100 °F)]   Resp:  [0-23]   BP: ()/()   Weight:  [133 kg (292 lb 15.9 oz)]   SpO2:  [93 %-98 %]          Relevant Results  reviewed     Assessment/Plan       Humphrey  Nadira is a 58 y.o. male with PMH of HFpEF, CAD, carotid stenosis, CVA, DM, anemia, GIB, DJD, PAD s/p Lt AKA, HTN, depression, CKD (Creatinine 2.84 mg/dL), previous transfusion pRBC,  presenting for a scheduled CABG x3 LIMA to LAD Rt EVH SVG to OM Rt EVH SVG to PDA pericardial window and sternal plating on 11/13. OR course 1500ml crystalloid, 250mL Cellsaver. Upon arrival to ICU had significant hypotension requiring return to OR. EBL for return to OR was 170mL. Returned to ICU in stable condition. IDANIA noted oliguric. We are now consulted for renal care.      Will send urine studies and calculate FeNa. Creatinine looks to have plateaued. Mildly increased weight. Will give IVP Lasix. Labs as followed.     Principal Problem:    CAD, multiple vessel      Fatemeh Abraham, APRN-CNP    I have read the above and agree.  Mr. Waddell is a 58-year-old man with a history of coronary artery disease, and may he suffered an acute coronary syndrome, had a 50% left main, mid LAD at 70%, 95% circumflex.  He had a stenting of the OM 2.  In July echo revealed normal systolic function, no valvular abnormalities.  He was seen by CT surgery.  It is noted that he has had the above-the-knee amputation, that was a risk factor for his rehab.  He has diabetes, hypertension, stage 4 chronic kidney disease and the peripheral vascular disease.  He otherwise has known carotid stenosis, has had a stroke, GI bleed, anemia requiring blood transfusions.    On November 13 he underwent a three-vessel bypass, posterior pericardiotomy.  Blood pressure was high postoperatively, was on epinephrine, insulin infusion, norepinephrine and phenylephrine.  Blood pressures then dropped, late on November 13 he was on a max dose of epinephrine, blood pressures were in the 80s systolic, minimal responsiveness to 2.5 L of lactated Ringer's.  TTE suggested a newly hypokinetic anterior wall.  He went back to the operating room.  Drains were left in place.    By November 14  was extubated.  On November 15 had nausea and vomiting, was slow to respond.  Had received 1 unit of blood the night prior, norepinephrine was weaned off.    He has suffered acute kidney injury which is no surprise given his vascular instability, anemia.  He has significant chronic kidney disease this is unlikely to autoregulate.  I will give him a dose of erythropoietin, I will give him another dose of Lasix 80 mg IV now based on his exam.  I was in touch with his nephrologist, Dr. Nestor Parsons.    60 minutes in care of Mr. Waddell

## 2023-11-15 NOTE — CONSULTS
Diabetes Education Consult    Missed attempt, patient POD#2 CABG, Patient is still drowsy, RT working with patient for chest tube removal.  Will try to revisit for consult.     Ita Espinosa MSN, RN. [facility] [place]  Certified Diabetes Educator  [facility]  [phone]

## 2023-11-15 NOTE — PROGRESS NOTES
Physical Therapy                 Therapy Communication Note    Patient Name: Humphrey Waddell  MRN: [id number]  Today's Date: 11/15/2023     Discipline: Physical Therapy    Missed Visit Reason: Missed Visit Reason: Other (Comment) (RN present in room upon PT/OT entrance removing chest tube.)    Missed Time: Attempt    Comment:

## 2023-11-15 NOTE — PROGRESS NOTES
"Humphrey Waddell is a 58 y.o. male on day 2 of admission presenting with CAD, multiple vessel.    Subjective   Nausea/vomiting, only able to eat minimal amounts   Slow to respond   Pain controlled on regimen   Received 1u PRBCs overnight, levophed weaned off    Objective     Physical Exam  Constitutional: male patient, resting in bed, slow to respond   Neurological: A&Ox3, follows commands, no focal sensory deficits   Skin: Warm and dry throughout. MSI- well approximated, sternum stable. Right SVG site- well approximated, mild ecchymosis   Eyes: Anicteric sclera, PERRL  Head/Neck: Right IJ MAC, dressing c,d,i - no hematoma or bleeding  Respiratory/Thorax: Breath sounds clear with diminished bases.   Chest tube to -20cm suction, minimal serosanguinous drainage    Cardiovascular: S1, S2, regular rate and rhythm. V epicardial pacing wires present. No murmurs appreciated. Sternum stable   Gastrointestinal: Obese, Abdomen soft, nontender, nondistended, hypoactive bowel sounds  Genitourinary: Lee in place draining small clear, yellow urine  Musculoskeletal: left AKA, moves all other extremities   Extremities: +2 generalized edema, 1+ bilateral radial and DP. Capillary refill <3 seconds. Left brachial arterial line, dressing c,d,i - no hematoma   Last Recorded Vitals  Blood pressure 128/63, pulse 94, temperature 37.7 °C (99.9 °F), temperature source Bladder, resp. rate 15, height 1.93 m (6' 3.98\"), weight 133 kg (292 lb 15.9 oz), SpO2 94 %.  Intake/Output last 3 Shifts:  I/O last 3 completed shifts:  In: 1468.1 (11 mL/kg) [I.V.:18.1 (0.1 mL/kg); Blood:1100; IV Piggyback:350]  Out: 2150 (16.2 mL/kg) [Urine:1630 (0.3 mL/kg/hr); Blood:100; Chest Tube:420]  Weight: 132.9 kg     Relevant Results     Scheduled medications  acetaminophen, 650 mg, oral, q6h  aspirin, 81 mg, oral, Daily  atorvastatin, 80 mg, oral, Daily  [Held by provider] clopidogrel, 75 mg, oral, Daily  heparin (porcine), 5,000 Units, subcutaneous, q8h  insulin " lispro, 0-15 Units, subcutaneous, TID with meals  lidocaine, 1 patch, transdermal, q24h  mupirocin, 1 Application, Each Nostril, BID  pantoprazole, 40 mg, oral, Daily before breakfast  polyethylene glycol, 17 g, oral, Daily  sennosides-docusate sodium, 2 tablet, oral, BID  sodium zirconium cyclosilicate, 10 g, oral, q8h      Continuous medications  lactated Ringer's, 5 mL/hr, Last Rate: 5 mL/hr (11/13/23 1723)      PRN medications  PRN medications: dextrose **OR** glucagon, HYDROmorphone, [Held by provider] melatonin, naloxone, ondansetron, oxyCODONE, oxyCODONE, oxygen, prochlorperazine **OR** prochlorperazine **OR** prochlorperazine  Results for orders placed or performed during the hospital encounter of 11/13/23 (from the past 24 hour(s))   POCT GLUCOSE   Result Value Ref Range    POCT Glucose 133 (H) 74 - 99 mg/dL   Renal Function Panel   Result Value Ref Range    Glucose 131 (H) 74 - 99 mg/dL    Sodium 138 136 - 145 mmol/L    Potassium 5.8 (H) 3.5 - 5.3 mmol/L    Chloride 108 (H) 98 - 107 mmol/L    Bicarbonate 23 21 - 32 mmol/L    Anion Gap 13 10 - 20 mmol/L    Urea Nitrogen 55 (H) 6 - 23 mg/dL    Creatinine 3.27 (H) 0.50 - 1.30 mg/dL    eGFR 21 (L) >60 mL/min/1.73m*2    Calcium 7.4 (L) 8.6 - 10.3 mg/dL    Phosphorus 3.6 2.5 - 4.9 mg/dL    Albumin 2.8 (L) 3.4 - 5.0 g/dL   POCT GLUCOSE   Result Value Ref Range    POCT Glucose 119 (H) 74 - 99 mg/dL   Renal Function Panel   Result Value Ref Range    Glucose 138 (H) 74 - 99 mg/dL    Sodium 138 136 - 145 mmol/L    Potassium 5.7 (H) 3.5 - 5.3 mmol/L    Chloride 106 98 - 107 mmol/L    Bicarbonate 24 21 - 32 mmol/L    Anion Gap 14 10 - 20 mmol/L    Urea Nitrogen 52 (H) 6 - 23 mg/dL    Creatinine 3.46 (H) 0.50 - 1.30 mg/dL    eGFR 19 (L) >60 mL/min/1.73m*2    Calcium 7.6 (L) 8.6 - 10.3 mg/dL    Phosphorus 4.2 2.5 - 4.9 mg/dL    Albumin 2.7 (L) 3.4 - 5.0 g/dL   CBC   Result Value Ref Range    WBC 17.5 (H) 4.4 - 11.3 x10*3/uL    nRBC 0.0 0.0 - 0.0 /100 WBCs    RBC 2.38 (L)  4.50 - 5.90 x10*6/uL    Hemoglobin 6.7 (L) 13.5 - 17.5 g/dL    Hematocrit 21.5 (L) 41.0 - 52.0 %    MCV 90 80 - 100 fL    MCH 28.2 26.0 - 34.0 pg    MCHC 31.2 (L) 32.0 - 36.0 g/dL    RDW 15.5 (H) 11.5 - 14.5 %    Platelets 165 150 - 450 x10*3/uL   POCT GLUCOSE   Result Value Ref Range    POCT Glucose 144 (H) 74 - 99 mg/dL   POCT GLUCOSE   Result Value Ref Range    POCT Glucose 126 (H) 74 - 99 mg/dL   Magnesium   Result Value Ref Range    Magnesium 2.60 (H) 1.60 - 2.40 mg/dL   CBC   Result Value Ref Range    WBC 13.1 (H) 4.4 - 11.3 x10*3/uL    nRBC 0.0 0.0 - 0.0 /100 WBCs    RBC 2.61 (L) 4.50 - 5.90 x10*6/uL    Hemoglobin 7.5 (L) 13.5 - 17.5 g/dL    Hematocrit 23.4 (L) 41.0 - 52.0 %    MCV 90 80 - 100 fL    MCH 28.7 26.0 - 34.0 pg    MCHC 32.1 32.0 - 36.0 g/dL    RDW 15.8 (H) 11.5 - 14.5 %    Platelets 128 (L) 150 - 450 x10*3/uL   Renal Function Panel   Result Value Ref Range    Glucose 120 (H) 74 - 99 mg/dL    Sodium 136 136 - 145 mmol/L    Potassium 5.3 3.5 - 5.3 mmol/L    Chloride 104 98 - 107 mmol/L    Bicarbonate 25 21 - 32 mmol/L    Anion Gap 12 10 - 20 mmol/L    Urea Nitrogen 52 (H) 6 - 23 mg/dL    Creatinine 3.51 (H) 0.50 - 1.30 mg/dL    eGFR 19 (L) >60 mL/min/1.73m*2    Calcium 7.6 (L) 8.6 - 10.3 mg/dL    Phosphorus 4.6 2.5 - 4.9 mg/dL    Albumin 2.7 (L) 3.4 - 5.0 g/dL   Urinalysis with Reflex Microscopic   Result Value Ref Range    Color, Urine Yellow Straw, Yellow    Appearance, Urine Hazy (N) Clear    Specific Gravity, Urine 1.018 1.005 - 1.035    pH, Urine 5.0 5.0, 5.5, 6.0, 6.5, 7.0, 7.5, 8.0    Protein, Urine 100 (2+) (N) NEGATIVE mg/dL    Glucose, Urine NEGATIVE NEGATIVE mg/dL    Blood, Urine MODERATE (2+) (A) NEGATIVE    Ketones, Urine NEGATIVE NEGATIVE mg/dL    Bilirubin, Urine NEGATIVE NEGATIVE    Urobilinogen, Urine <2.0 <2.0 mg/dL    Nitrite, Urine NEGATIVE NEGATIVE    Leukocyte Esterase, Urine NEGATIVE NEGATIVE   Sodium, Urine Random   Result Value Ref Range    Sodium, Urine Random 34 mmol/L     Creatinine, Urine Random 99.4 20.0 - 370.0 mg/dL    Sodium/Creatinine Ratio 34 Not established. mmol/g Creat   Microscopic Only, Urine   Result Value Ref Range    WBC, Urine 1-5 1-5, NONE /HPF    RBC, Urine 11-20 (A) NONE, 1-2, 3-5 /HPF    Bacteria, Urine 1+ (A) NONE SEEN /HPF    Mucus, Urine 1+ Reference range not established. /LPF     XR chest 1 view   Final Result   Interval removal of ET tube and NG tube. Questionable developing left   upper chest infiltrate versus overlapping soft tissues/projection.        MACRO:   None.        Signed by: Johanna Martinez 11/14/2023 8:06 AM   Dictation workstation:   [id number]      XR chest abdomen for OG NG placement   Final Result   Medical devices as detailed.        Right upper lobe consolidation and/or collapse, new from prior seen   day imaging. Clinical correlation suggested.        Nonspecific, nonobstructive bowel gas pattern.        MACRO:   None        Signed by: Nunu Keith 11/13/2023 10:52 PM   Dictation workstation:   [id number]      Anesthesia Intraoperative Transesophageal Echocardiogram   Final Result      XR chest 1 view   Final Result   1.  Findings consistent with recent postsurgical changes with   multiple medical devices is detailed.   2. Low lung volumes with borderline cardiomediastinal enlargement and   possible pulmonary vascular congestion. Edema or atypical infection   not excluded.                  MACRO:   None        Signed by: Nunu Keith 11/13/2023 8:05 PM   Dictation workstation:   [id number]      Transthoracic Echo (TTE) Complete   Final Result      XR chest 1 view   Final Result   Cardiomegaly.        Perihilar streaky densities, which may be related to atelectasis.        No definite pneumothorax.                  MACRO:   None        Signed by: Jeanine Gonzalez 11/13/2023 2:33 PM   Dictation workstation:   [id number]      Anesthesia Intraoperative Transesophageal Echocardiogram   Final Result      XR chest 2 views    (Results  Pending)                      Assessment/Plan   Principal Problem:    CAD, multiple vessel    Humphrey Waddell is a 58 y.o. male presenting with CAD, hx NSTEMI and HFpEF. PMHx includes NIDDM2, Lt AKA, chronic RLE ulcer, GERD, carotid stenosis, hx CVA, hx GIB, hx anemia req'ing blood transfusions (2023), and hx confusion of anesthesia. Pt admitted to [facility] ICU s/p CABGx3 (LIMA>LAD, SVG>OM1, SVG>PDA), posterior pericardotomy, sternal wires and plating with Dr. Delatorre on 11/13/23.     Neuro/MSK: Expected acute postop pain. Hx CVA. Lt AKA. Anxiety, depression  - Serial neuro and pain assessments  - Scheduled Tylenol, PRN Oxy, PRN Dilaudid while CTs are in  - PT/OT/MITT     -> Limited mobility at baseline: only transfers  - Hold home melatonin 3mg     CV: S/p CABGx3. HFpEF. Hx NSTEMI. Bilateral carotid stenosis.  Postop vasoplegic shock requiring levophed- resolved, weaned off POD 2   LVEF pre 60%, post 65%  V wires- grounded, SR 80s  - Continuous EKG and ABP  - Titrate drips to maintain SBP > 110, MAP > 70  - Maintain and monitor CT drainage; remove today  - Volume resuscitate as needed  - ASA/statin  - Hold BB until HDs permit  - Hold home Plavix, hydralazine, torsemide 20mg for now     Pulm: Oxygenating well on 1L NC. Former smoker  - Wean FiO2 to SpO2 > 92%  - Bronch hygiene     GI: postoperative nausea  - Regular diet, add nutrition supplement   - Bowel regimen  - Zofran, Compazine PRN  - PPI     Renal: Oliguric IDANIA on CKD (baseline Cr 2.84)  - RFP daily  - Replete lytes prn  - Remove gerardo catheter today  - Nephrology following, appreciate recs     Heme: Acute on chronic anemia (baseline Hgb 9.1)  11/14 2u prbc  - CBC daily  - SCDs, SQH for DVTppx  - Transfuse if Hgb < 7.0 per Dr. Delatorre     Endo: IDDM2  A1C 5.3%  - Maintain BG < 180, ISS per ICU protocol  - Hold home Lantus 4u at bedtime     ID: Afebrile, no s/s infection  - Trend temp and WBCs q4  - Periop abx x 48hrs     Patient seen with . Plan of  care reviewed with patient and questions answered.   Dispo: transfer to SDU status later today        I spent 70 minutes in the professional and overall care of this patient.      Ariella Hoffmann, APRN-CNP, DNP

## 2023-11-16 ENCOUNTER — APPOINTMENT (OUTPATIENT)
Dept: RADIOLOGY | Facility: HOSPITAL | Age: 58
End: 2023-11-16
Payer: COMMERCIAL

## 2023-11-16 LAB
ALBUMIN SERPL BCP-MCNC: 2.7 G/DL (ref 3.4–5)
ANION GAP SERPL CALC-SCNC: 15 MMOL/L (ref 10–20)
BUN SERPL-MCNC: 56 MG/DL (ref 6–23)
CALCIUM SERPL-MCNC: 7.4 MG/DL (ref 8.6–10.3)
CHLORIDE SERPL-SCNC: 101 MMOL/L (ref 98–107)
CO2 SERPL-SCNC: 24 MMOL/L (ref 21–32)
CREAT SERPL-MCNC: 3.78 MG/DL (ref 0.5–1.3)
ERYTHROCYTE [DISTWIDTH] IN BLOOD BY AUTOMATED COUNT: 14.6 % (ref 11.5–14.5)
ERYTHROCYTE [DISTWIDTH] IN BLOOD BY AUTOMATED COUNT: 14.8 % (ref 11.5–14.5)
GFR SERPL CREATININE-BSD FRML MDRD: 17 ML/MIN/1.73M*2
GLUCOSE BLD MANUAL STRIP-MCNC: 102 MG/DL (ref 74–99)
GLUCOSE BLD MANUAL STRIP-MCNC: 115 MG/DL (ref 74–99)
GLUCOSE BLD MANUAL STRIP-MCNC: 127 MG/DL (ref 74–99)
GLUCOSE BLD MANUAL STRIP-MCNC: 99 MG/DL (ref 74–99)
GLUCOSE SERPL-MCNC: 106 MG/DL (ref 74–99)
HCT VFR BLD AUTO: 20.5 % (ref 41–52)
HCT VFR BLD AUTO: 20.8 % (ref 41–52)
HGB BLD-MCNC: 6.5 G/DL (ref 13.5–17.5)
HGB BLD-MCNC: 6.6 G/DL (ref 13.5–17.5)
MAGNESIUM SERPL-MCNC: 2.6 MG/DL (ref 1.6–2.4)
MCH RBC QN AUTO: 28.4 PG (ref 26–34)
MCH RBC QN AUTO: 29.1 PG (ref 26–34)
MCHC RBC AUTO-ENTMCNC: 31.3 G/DL (ref 32–36)
MCHC RBC AUTO-ENTMCNC: 32.2 G/DL (ref 32–36)
MCV RBC AUTO: 90 FL (ref 80–100)
MCV RBC AUTO: 91 FL (ref 80–100)
NRBC BLD-RTO: 0 /100 WBCS (ref 0–0)
NRBC BLD-RTO: 0 /100 WBCS (ref 0–0)
PHOSPHATE SERPL-MCNC: 4.8 MG/DL (ref 2.5–4.9)
PLATELET # BLD AUTO: 140 X10*3/UL (ref 150–450)
PLATELET # BLD AUTO: 151 X10*3/UL (ref 150–450)
POTASSIUM SERPL-SCNC: 5 MMOL/L (ref 3.5–5.3)
RBC # BLD AUTO: 2.27 X10*6/UL (ref 4.5–5.9)
RBC # BLD AUTO: 2.29 X10*6/UL (ref 4.5–5.9)
SODIUM SERPL-SCNC: 135 MMOL/L (ref 136–145)
WBC # BLD AUTO: 10.2 X10*3/UL (ref 4.4–11.3)
WBC # BLD AUTO: 10.3 X10*3/UL (ref 4.4–11.3)

## 2023-11-16 PROCEDURE — P9016 RBC LEUKOCYTES REDUCED: HCPCS

## 2023-11-16 PROCEDURE — 85027 COMPLETE CBC AUTOMATED: CPT | Performed by: REGISTERED NURSE

## 2023-11-16 PROCEDURE — 2500000004 HC RX 250 GENERAL PHARMACY W/ HCPCS (ALT 636 FOR OP/ED): Performed by: REGISTERED NURSE

## 2023-11-16 PROCEDURE — 2500000001 HC RX 250 WO HCPCS SELF ADMINISTERED DRUGS (ALT 637 FOR MEDICARE OP): Performed by: REGISTERED NURSE

## 2023-11-16 PROCEDURE — 82947 ASSAY GLUCOSE BLOOD QUANT: CPT | Performed by: REGISTERED NURSE

## 2023-11-16 PROCEDURE — 96372 THER/PROPH/DIAG INJ SC/IM: CPT | Performed by: REGISTERED NURSE

## 2023-11-16 PROCEDURE — 94660 CPAP INITIATION&MGMT: CPT

## 2023-11-16 PROCEDURE — 71046 X-RAY EXAM CHEST 2 VIEWS: CPT | Performed by: RADIOLOGY

## 2023-11-16 PROCEDURE — 36430 TRANSFUSION BLD/BLD COMPNT: CPT

## 2023-11-16 PROCEDURE — 71046 X-RAY EXAM CHEST 2 VIEWS: CPT | Mod: FY

## 2023-11-16 PROCEDURE — 99232 SBSQ HOSP IP/OBS MODERATE 35: CPT | Performed by: REGISTERED NURSE

## 2023-11-16 PROCEDURE — 36415 COLL VENOUS BLD VENIPUNCTURE: CPT | Performed by: REGISTERED NURSE

## 2023-11-16 PROCEDURE — 2500000004 HC RX 250 GENERAL PHARMACY W/ HCPCS (ALT 636 FOR OP/ED): Performed by: INTERNAL MEDICINE

## 2023-11-16 PROCEDURE — 2060000001 HC INTERMEDIATE ICU ROOM DAILY

## 2023-11-16 PROCEDURE — 82947 ASSAY GLUCOSE BLOOD QUANT: CPT

## 2023-11-16 PROCEDURE — 80069 RENAL FUNCTION PANEL: CPT | Performed by: REGISTERED NURSE

## 2023-11-16 PROCEDURE — 83735 ASSAY OF MAGNESIUM: CPT | Performed by: REGISTERED NURSE

## 2023-11-16 PROCEDURE — 94668 MNPJ CHEST WALL SBSQ: CPT

## 2023-11-16 RX ORDER — TRAMADOL HYDROCHLORIDE 50 MG/1
25 TABLET ORAL EVERY 12 HOURS PRN
Status: DISCONTINUED | OUTPATIENT
Start: 2023-11-16 | End: 2023-11-24 | Stop reason: HOSPADM

## 2023-11-16 RX ORDER — CLOPIDOGREL BISULFATE 75 MG/1
75 TABLET ORAL DAILY
Status: DISCONTINUED | OUTPATIENT
Start: 2023-11-16 | End: 2023-11-24 | Stop reason: HOSPADM

## 2023-11-16 RX ORDER — HYDRALAZINE HYDROCHLORIDE 20 MG/ML
10 INJECTION INTRAMUSCULAR; INTRAVENOUS ONCE
Status: COMPLETED | OUTPATIENT
Start: 2023-11-16 | End: 2023-11-16

## 2023-11-16 RX ORDER — FUROSEMIDE 10 MG/ML
80 INJECTION INTRAMUSCULAR; INTRAVENOUS 2 TIMES DAILY
Status: DISCONTINUED | OUTPATIENT
Start: 2023-11-16 | End: 2023-11-20

## 2023-11-16 RX ORDER — HYDRALAZINE HYDROCHLORIDE 50 MG/1
50 TABLET, FILM COATED ORAL 2 TIMES DAILY
Status: DISCONTINUED | OUTPATIENT
Start: 2023-11-16 | End: 2023-11-17

## 2023-11-16 RX ORDER — TRAMADOL HYDROCHLORIDE 50 MG/1
50 TABLET ORAL EVERY 12 HOURS PRN
Status: DISCONTINUED | OUTPATIENT
Start: 2023-11-16 | End: 2023-11-24 | Stop reason: HOSPADM

## 2023-11-16 RX ADMIN — CLOPIDOGREL 75 MG: 75 TABLET ORAL at 08:56

## 2023-11-16 RX ADMIN — OXYCODONE HYDROCHLORIDE 5 MG: 5 TABLET ORAL at 13:49

## 2023-11-16 RX ADMIN — ACETAMINOPHEN 650 MG: 325 TABLET ORAL at 01:45

## 2023-11-16 RX ADMIN — METOPROLOL TARTRATE 25 MG: 25 TABLET, FILM COATED ORAL at 21:22

## 2023-11-16 RX ADMIN — HYDRALAZINE HYDROCHLORIDE 10 MG: 20 INJECTION INTRAMUSCULAR; INTRAVENOUS at 12:11

## 2023-11-16 RX ADMIN — PANTOPRAZOLE SODIUM 40 MG: 40 TABLET, DELAYED RELEASE ORAL at 08:56

## 2023-11-16 RX ADMIN — SENNOSIDES AND DOCUSATE SODIUM 2 TABLET: 8.6; 5 TABLET ORAL at 08:56

## 2023-11-16 RX ADMIN — HEPARIN SODIUM 5000 UNITS: 5000 INJECTION INTRAVENOUS; SUBCUTANEOUS at 08:57

## 2023-11-16 RX ADMIN — METOPROLOL TARTRATE 25 MG: 25 TABLET, FILM COATED ORAL at 08:56

## 2023-11-16 RX ADMIN — FUROSEMIDE 80 MG: 10 INJECTION, SOLUTION INTRAMUSCULAR; INTRAVENOUS at 16:53

## 2023-11-16 RX ADMIN — ASPIRIN 81 MG CHEWABLE TABLET 81 MG: 81 TABLET CHEWABLE at 08:56

## 2023-11-16 RX ADMIN — ACETAMINOPHEN 650 MG: 325 TABLET ORAL at 13:48

## 2023-11-16 RX ADMIN — FUROSEMIDE 80 MG: 10 INJECTION, SOLUTION INTRAMUSCULAR; INTRAVENOUS at 08:57

## 2023-11-16 RX ADMIN — ACETAMINOPHEN 650 MG: 325 TABLET ORAL at 08:56

## 2023-11-16 RX ADMIN — IRON SUCROSE 200 MG: 20 INJECTION, SOLUTION INTRAVENOUS at 08:57

## 2023-11-16 RX ADMIN — HEPARIN SODIUM 5000 UNITS: 5000 INJECTION INTRAVENOUS; SUBCUTANEOUS at 01:45

## 2023-11-16 RX ADMIN — HYDRALAZINE HYDROCHLORIDE 50 MG: 50 TABLET, FILM COATED ORAL at 08:56

## 2023-11-16 RX ADMIN — HEPARIN SODIUM 5000 UNITS: 5000 INJECTION INTRAVENOUS; SUBCUTANEOUS at 16:53

## 2023-11-16 RX ADMIN — HYDRALAZINE HYDROCHLORIDE 50 MG: 50 TABLET, FILM COATED ORAL at 21:22

## 2023-11-16 RX ADMIN — ATORVASTATIN CALCIUM 80 MG: 80 TABLET ORAL at 08:56

## 2023-11-16 ASSESSMENT — PAIN - FUNCTIONAL ASSESSMENT
PAIN_FUNCTIONAL_ASSESSMENT: 0-10

## 2023-11-16 ASSESSMENT — COGNITIVE AND FUNCTIONAL STATUS - GENERAL
EATING MEALS: A LITTLE
TURNING FROM BACK TO SIDE WHILE IN FLAT BAD: A LOT
STANDING UP FROM CHAIR USING ARMS: TOTAL
TOILETING: A LOT
DRESSING REGULAR UPPER BODY CLOTHING: A LOT
DAILY ACTIVITIY SCORE: 14
MOBILITY SCORE: 9
WALKING IN HOSPITAL ROOM: TOTAL
DRESSING REGULAR LOWER BODY CLOTHING: A LOT
HELP NEEDED FOR BATHING: A LOT
MOVING FROM LYING ON BACK TO SITTING ON SIDE OF FLAT BED WITH BEDRAILS: A LOT
PERSONAL GROOMING: A LITTLE
CLIMB 3 TO 5 STEPS WITH RAILING: TOTAL
MOVING TO AND FROM BED TO CHAIR: A LOT

## 2023-11-16 ASSESSMENT — PAIN SCALES - GENERAL
PAINLEVEL_OUTOF10: 2
PAINLEVEL_OUTOF10: 5 - MODERATE PAIN
PAINLEVEL_OUTOF10: 0 - NO PAIN
PAINLEVEL_OUTOF10: 2
PAINLEVEL_OUTOF10: 10 - WORST POSSIBLE PAIN

## 2023-11-16 ASSESSMENT — VISUAL ACUITY: OU: 1

## 2023-11-16 NOTE — CONSULTS
Inpatient Diabetes Education Consult    Reason for Visit:  Humphrey Waddell is a 58 y.o. male who presents for CAD    Consulting Service/Provider: Francisco Delatorre MD    Visit Type: Initial visit    Visit Modality: In-person    Discharge Equipment/Supply Needs:       Patient has supplies at home:glucometer, freestyle suzy 2    Patient History and Assessment:  New diagnosis:  no  Previous diagnosis: Type 2  Patient known to Diabetes Education department: No  Treatment prior to hospital admission: Insulin: Long acting  Complications: cardiovascular disease and CAD s/p    PTA Medications:    Current Outpatient Medications   Medication Instructions    acetaminophen (TYLENOL 8 HOUR) 650 mg, oral, Every 8 hours PRN, Do not crush, chew, or split.    aspirin 81 mg chewable tablet 1 tablet, oral, Daily    atorvastatin (LIPITOR) 80 mg, oral, Daily, Last rx prior to next refills    blood sugar diagnostic (True Metrix Glucose Test Strip) strip 4 times daily    clopidogrel (PLAVIX) 75 mg, oral, Daily    ergocalciferol (VITAMIN D-2) 50,000 Units, oral, Weekly    FreeStyle Suzy reader (FreeStyle Suzy 2 [place]) misc Use as instructed    FreeStyle Suzy sensor system (FreeStyle Suzy 2 Sensor) kit Use as instructed    FreeStyle Test strip TEST 3 TIMES DAILY.    hydrALAZINE (APRESOLINE) 50 mg, oral, 2 times daily    ibuprofen 600 mg tablet PLEASE SEE ATTACHED FOR DETAILED DIRECTIONS    insulin glargine (LANTUS SOLOSTAR U-100 INSULIN) 4 Units, subcutaneous, Nightly    melatonin 3 mg tablet TAKE 1 TABLET BY MOUTH AT BEDTIME AT AT 6:00 PM    multivitamin-calcium carb (Flintstones Plus Calcium) tablet,chewable 1 tablet, oral, Daily    penicillin V (VEETID) 250 mg, oral, 4 times daily    torsemide (Demadex) 20 mg tablet TAKE 1 TABLET BY MOUTH ONCE DAILY       Glucose   Date/Time Value Ref Range Status   11/16/2023 04:10  (H) 74 - 99 mg/dL Final   11/15/2023 02:59  (H) 74 - 99 mg/dL Final   11/15/2023 03:20  (H) 74 - 99  "mg/dL Final   11/14/2023 05:47  (H) 74 - 99 mg/dL Final   11/14/2023 12:50  (H) 74 - 99 mg/dL Final   11/14/2023 09:24  (H) 74 - 99 mg/dL Final   11/13/2023 11:31  (H) 74 - 99 mg/dL Final   11/13/2023 09:05  (H) 74 - 99 mg/dL Final   11/13/2023 05:01  (H) 74 - 99 mg/dL Final   11/13/2023 02:17  (H) 74 - 99 mg/dL Final     No results found for: \"CPEPTIDE\"  Hemoglobin A1C   Date Value Ref Range Status   10/26/2023 5.3 see below % Final   07/22/2023 5.0 % Final     Comment:          Diagnosis of Diabetes-Adults   Non-Diabetic: < or = 5.6%   Increased risk for developing diabetes: 5.7-6.4%   Diagnostic of diabetes: > or = 6.5%  .       Monitoring of Diabetes                Age (y)     Therapeutic Goal (%)   Adults:          >18           <7.0   Pediatrics:    13-18           <7.5                   7-12           <8.0                   0- 6            7.5-8.5   American Diabetes Association. Diabetes Care 33(S1), Jan 2010.     05/03/2023 5.9 (A) % Final     Comment:          Diagnosis of Diabetes-Adults   Non-Diabetic: < or = 5.6%   Increased risk for developing diabetes: 5.7-6.4%   Diagnostic of diabetes: > or = 6.5%  .       Monitoring of Diabetes                Age (y)     Therapeutic Goal (%)   Adults:          >18           <7.0   Pediatrics:    13-18           <7.5                   7-12           <8.0                   0- 6            7.5-8.5   American Diabetes Association. Diabetes Care 33(S1), Jan 2010.     12/02/2022 5.7 4.0 - 6.0 % Final     Comment:     Hemoglobin A1C levels are related to mean blood glucose during the   preceding 2-3 months. The relationship table below may be used as a   general guide. Each 1% increase in HGB A1C is a reflection of an   increase in mean glucose of approximately 30 mg/dl.   Reference: Diabetes Care, volume 29, supplement 1 Jan. 2006                        HGB A1C ................. Approx. Mean Glucose   " _______________________________________________   6%   ...............................  120 mg/dl   7%   ...............................  150 mg/dl   8%   ...............................  180 mg/dl   9%   ...............................  210 mg/dl   10%  ...............................  240 mg/dl  Performed at [address] [address]     03/09/2022 5.9 (A) % Final     Comment:          Diagnosis of Diabetes-Adults   Non-Diabetic: < or = 5.6%   Increased risk for developing diabetes: 5.7-6.4%   Diagnostic of diabetes: > or = 6.5%  .       Monitoring of Diabetes                Age (y)     Therapeutic Goal (%)   Adults:          >18           <7.0   Pediatrics:    13-18           <7.5                   7-12           <8.0                   0- 6            7.5-8.5   American Diabetes Association. Diabetes Care 33(S1), Jan 2010.         Patient Learning/Readiness Assessment:  unable    Interventions/Topics Covered:  See After Visit Summary for handouts/information sheets provided to patient.   Patient was sitting up in chair sleeping and was not able to participate for diabetes education.      Additional topics covered: I provided the patient with the   Diabetes Management Booklet     Education Outcome/Recommendations:        Recommendations for Providers: Follow-up w/ PCP and/or Endocrinology    Additional Comments: This is the second attempt to consult Mr. Waddell on diabetes education.  Patient is unable to participate at this time.      Ita Espinosa MSN, RN. [facility]  Certified Diabetes Educator  [facility]  [phone]

## 2023-11-16 NOTE — PROGRESS NOTES
Physical Therapy                 Therapy Communication Note    Patient Name: Humphrey Waddell  MRN: [id number]  Today's Date: 11/16/2023     Discipline: Physical Therapy    Missed Visit Reason: Missed Visit Reason:  (pt with low H&H 6.2 &20.2 per RN pt to get 1 unit of PRBC)    Missed Time: Attempt

## 2023-11-16 NOTE — CONSULTS
Reason For Consult  Kody on ckd    History Of Present Illness  Humphrey Waddell is a 58 y.o. male with PMH of HFpEF, CAD, carotid stenosis, CVA, DM, anemia, GIB, DJD, PAD s/p Lt AKA, HTN, depression, CKD (Creatinine 2.84 mg/dL), previous transfusion pRBC,  presenting for a scheduled CABG x3 LIMA to LAD Rt EVH SVG to OM Rt EVH SVG to PDA pericardial window and sternal plating on 11/13. OR course 1500ml crystalloid, 250mL Cellsaver. Upon arrival to ICU had significant hypotension requiring return to OR. EBL for return to OR was 170mL. Returned to ICU in stable condition. KODY noted oliguric. We are now consulted for renal care.  No overnight events.  Responded to the Lasix.     Past Medical History  He has a past medical history of Above-knee amputation of left lower extremity (CMS/[facility]), Adverse effect of anesthesia, Anemia, CAD (coronary artery disease), Carotid artery disease (CMS/[facility]), CVA (cerebral vascular accident) (CMS/[facility]) (2020), Depression, DM (diabetes mellitus) (CMS/[facility]), GERD (gastroesophageal reflux disease), High cholesterol, HTN (hypertension), Leg ulcer (CMS/[facility]), Neuropathy, OA (osteoarthritis), and PAD (peripheral artery disease) (CMS/[facility]).    Surgical History  He has a past surgical history that includes Other surgical history (07/07/2021); Other surgical history (07/07/2021); CT angio head w and wo IV contrast (06/14/2021); CT angio neck (06/14/2021); MR angio neck wo IV contrast (07/18/2023); MR angio head wo IV contrast (12/11/2012); MR angio head wo IV contrast (06/12/2021); MR angio head wo IV contrast (05/25/2021); Total shoulder arthroplasty (Left, 2020); and Leg Surgery (Right).     Social History  He reports that he quit smoking about 7 months ago. His smoking use included cigars. He has never used smokeless tobacco. He reports that he does not currently use alcohol. He reports that he does not currently use drugs after having used the following drugs: Marijuana.    Family History  Family History    Problem Relation Name Age of Onset    Other (cardiac disorder) Mother      Hypertension Mother      Esophageal cancer Mother      Hearing loss Father      Hypertension Father      Heart disease Father      COPD Sister          Allergies  Patient has no known allergies.    Review of Systems  Review of Systems:  Negative for chills and fatigue. Negative for cough, chest tightness and shortness of breath. Negative for chest pain. Negative for abdominal pain and blood in stool. Negative for decreased urine volume, dysuria, flank pain and hematuria. Negative for dizziness. Negative for confusion.  All other systems negative.        Physical Exam  Physical Exam  Constitutional:    Alert and oriented x2-3, appears in no distress, spacey  HENT:      Clear oropharynx, moist mucous membranes  Cardiovascular:   Normal rate and regular rhythm; Normal pulses; Normal heart sounds. No murmur heard; No friction rub. CT x 3 no air leak noted, midsternal incision   Pulmonary:   Pulmonary effort is normal. No respiratory distress. Normal breath sounds. No stridor. No wheezing or rhonchi.    Abdominal:   Bowel sounds are normal. There is no distension. Abdomen is soft. There is no tenderness or guarding. Lee in place.   Musculoskeletal:      +1 edema. Moves all extremities, Lt ALA with intact stump site  Skin:  Skin is warm and dry. Capillary refill takes less than 2 seconds. No lesion or rash.   Neurological:   No focal deficit present. Alert and oriented to person, place, and time. Takes quite some time to answer questions  Psych:  withdrawn            I&O 24HR    Intake/Output Summary (Last 24 hours) at 11/16/2023 0835  Last data filed at 11/16/2023 0410  Gross per 24 hour   Intake 240 ml   Output 2245 ml   Net -2005 ml         Vitals 24HR  Heart Rate:  [71-90]   Temp:  [36.2 °C (97.2 °F)-37.3 °C (99.1 °F)]   Resp:  [15-23]   BP: (130-149)/(59-94)   Weight:  [125 kg (275 lb 5.7 oz)-127 kg (280 lb)]   SpO2:  [87 %-100 %]           Relevant Results  reviewed     Assessment/Plan     Mr. Waddell is a 58-year-old man with a history of coronary artery disease, and may he suffered an acute coronary syndrome, had a 50% left main, mid LAD at 70%, 95% circumflex.  He had a stenting of the OM 2.  In July echo revealed normal systolic function, no valvular abnormalities.  He was seen by CT surgery.  It is noted that he has had the above-the-knee amputation, that was a risk factor for his rehab.  He has diabetes, hypertension, stage 4 chronic kidney disease and the peripheral vascular disease.  He otherwise has known carotid stenosis, has had a stroke, GI bleed, anemia requiring blood transfusions.    On November 13 he underwent a three-vessel bypass, posterior pericardiotomy.  Blood pressure was high postoperatively, was on epinephrine, insulin infusion, norepinephrine and phenylephrine.  Blood pressures then dropped, late on November 13 he was on a max dose of epinephrine, blood pressures were in the 80s systolic, minimal responsiveness to 2.5 L of lactated Ringer's.  TTE suggested a newly hypokinetic anterior wall.  He went back to the operating room.  Drains were left in place.    By November 14 was extubated.  On November 15 had nausea and vomiting, was slow to respond.  Had received 1 unit of blood the night prior, norepinephrine was weaned off.    He has suffered acute kidney injury which is no surprise given his vascular instability, anemia.  He has significant chronic kidney disease and is unlikely to autoregulate.  We gave him a dose of erythropoietin, I will give him 200 mg of IV iron now.  Based on his exam he will get Lasix again.  Hoping to avoid a blood transfusion but it may be necessary.      Principal Problem:    CAD, multiple vessel    50 minutes in care of Mr. Waddell.    Juancho Mascorro MD

## 2023-11-16 NOTE — PROGRESS NOTES
Humphrey Waddell is a 58 y.o. male on day 3 of admission presenting with CAD, multiple vessel.    Subjective   Nausea/vomiting resolved.  Quicker to respond today.  Pain controlled on current regimen.   Hgb 6.6, receiving 1 unit PRBCs      Objective     Physical Exam  Constitutional:       General: He is awake.      Appearance: Normal appearance. He is obese.      Interventions: Nasal cannula in place.   HENT:      Head: Normocephalic.      Nose: Nose normal.      Mouth/Throat:      Mouth: Mucous membranes are moist.   Eyes:      General: Vision grossly intact. Gaze aligned appropriately.      Extraocular Movements: Extraocular movements intact.      Conjunctiva/sclera: Conjunctivae normal.   Neck:      Trachea: Trachea normal.   Cardiovascular:      Rate and Rhythm: Normal rate and regular rhythm.      Pulses: Normal pulses.           Dorsalis pedis pulses are 2+ on the right side.      Heart sounds: Normal heart sounds.   Pulmonary:      Effort: Pulmonary effort is normal.      Breath sounds: Examination of the right-lower field reveals wheezing. Examination of the left-lower field reveals wheezing. Decreased breath sounds and wheezing present.   Chest:      Comments: MSI REAL, clean, dry, intact, no drainage present.   Abdominal:      General: Bowel sounds are decreased. There is distension.      Palpations: Abdomen is soft.   Genitourinary:     Comments: External male catheter in place  Musculoskeletal:      Cervical back: Normal range of motion.      Right lower le+ Edema present.      Left lower le+ Edema present.      Left Lower Extremity: Left leg is amputated above knee.   Feet:      Right foot:      Skin integrity: Skin integrity normal.   Skin:     General: Skin is warm and dry.      Capillary Refill: Capillary refill takes less than 2 seconds.   Neurological:      General: No focal deficit present.      Mental Status: He is alert and oriented to person, place, and time. Mental status is at baseline.  "  Psychiatric:         Mood and Affect: Mood normal.         Behavior: Behavior normal. Behavior is cooperative.         Thought Content: Thought content normal.         Judgment: Judgment normal.         Last Recorded Vitals  Blood pressure 150/82, pulse 89, temperature 37.2 °C (98.9 °F), temperature source Temporal, resp. rate 19, height 1.93 m (6' 3.98\"), weight 127 kg (280 lb), SpO2 99 %.  Intake/Output last 3 Shifts:  I/O last 3 completed shifts:  In: 1460 (11.5 mL/kg) [P.O.:360; Blood:1100]  Out: 3180 (25 mL/kg) [Urine:3090 (0.7 mL/kg/hr); Chest Tube:90]  Weight: 127 kg     Relevant Results  Scheduled medications  acetaminophen, 650 mg, oral, q6h  aspirin, 81 mg, oral, Daily  atorvastatin, 80 mg, oral, Daily  clopidogrel, 75 mg, oral, Daily  furosemide, 80 mg, intravenous, BID  heparin (porcine), 5,000 Units, subcutaneous, q8h  hydrALAZINE, 50 mg, oral, BID  insulin lispro, 0-15 Units, subcutaneous, TID with meals  metoprolol tartrate, 25 mg, oral, BID  pantoprazole, 40 mg, oral, Daily before breakfast  polyethylene glycol, 17 g, oral, Daily  sennosides-docusate sodium, 2 tablet, oral, BID      Continuous medications     PRN medications  PRN medications: dextrose **OR** [DISCONTINUED] glucagon, [Held by provider] melatonin, naloxone, ondansetron, oxyCODONE, oxyCODONE, oxygen   Results for orders placed or performed during the hospital encounter of 11/13/23 (from the past 24 hour(s))   Urinalysis with Reflex Microscopic   Result Value Ref Range    Color, Urine Yellow Straw, Yellow    Appearance, Urine Hazy (N) Clear    Specific Gravity, Urine 1.018 1.005 - 1.035    pH, Urine 5.0 5.0, 5.5, 6.0, 6.5, 7.0, 7.5, 8.0    Protein, Urine 100 (2+) (N) NEGATIVE mg/dL    Glucose, Urine NEGATIVE NEGATIVE mg/dL    Blood, Urine MODERATE (2+) (A) NEGATIVE    Ketones, Urine NEGATIVE NEGATIVE mg/dL    Bilirubin, Urine NEGATIVE NEGATIVE    Urobilinogen, Urine <2.0 <2.0 mg/dL    Nitrite, Urine NEGATIVE NEGATIVE    Leukocyte " Esterase, Urine NEGATIVE NEGATIVE   Sodium, Urine Random   Result Value Ref Range    Sodium, Urine Random 34 mmol/L    Creatinine, Urine Random 99.4 20.0 - 370.0 mg/dL    Sodium/Creatinine Ratio 34 Not established. mmol/g Creat   Microscopic Only, Urine   Result Value Ref Range    WBC, Urine 1-5 1-5, NONE /HPF    RBC, Urine 11-20 (A) NONE, 1-2, 3-5 /HPF    Bacteria, Urine 1+ (A) NONE SEEN /HPF    Mucus, Urine 1+ Reference range not established. /LPF   POCT GLUCOSE   Result Value Ref Range    POCT Glucose 138 (H) 74 - 99 mg/dL   POCT GLUCOSE   Result Value Ref Range    POCT Glucose 160 (H) 74 - 99 mg/dL   Renal function panel   Result Value Ref Range    Glucose 140 (H) 74 - 99 mg/dL    Sodium 135 (L) 136 - 145 mmol/L    Potassium 5.3 3.5 - 5.3 mmol/L    Chloride 101 98 - 107 mmol/L    Bicarbonate 24 21 - 32 mmol/L    Anion Gap 15 10 - 20 mmol/L    Urea Nitrogen 53 (H) 6 - 23 mg/dL    Creatinine 3.55 (H) 0.50 - 1.30 mg/dL    eGFR 19 (L) >60 mL/min/1.73m*2    Calcium 7.6 (L) 8.6 - 10.3 mg/dL    Phosphorus 4.7 2.5 - 4.9 mg/dL    Albumin 3.0 (L) 3.4 - 5.0 g/dL   POCT GLUCOSE   Result Value Ref Range    POCT Glucose 115 (H) 74 - 99 mg/dL   POCT GLUCOSE   Result Value Ref Range    POCT Glucose 106 (H) 74 - 99 mg/dL   POCT GLUCOSE   Result Value Ref Range    POCT Glucose 114 (H) 74 - 99 mg/dL   POCT GLUCOSE   Result Value Ref Range    POCT Glucose 112 (H) 74 - 99 mg/dL   POCT GLUCOSE   Result Value Ref Range    POCT Glucose 115 (H) 74 - 99 mg/dL   Magnesium   Result Value Ref Range    Magnesium 2.60 (H) 1.60 - 2.40 mg/dL   CBC   Result Value Ref Range    WBC 10.2 4.4 - 11.3 x10*3/uL    nRBC 0.0 0.0 - 0.0 /100 WBCs    RBC 2.27 (L) 4.50 - 5.90 x10*6/uL    Hemoglobin 6.6 (L) 13.5 - 17.5 g/dL    Hematocrit 20.5 (L) 41.0 - 52.0 %    MCV 90 80 - 100 fL    MCH 29.1 26.0 - 34.0 pg    MCHC 32.2 32.0 - 36.0 g/dL    RDW 14.8 (H) 11.5 - 14.5 %    Platelets 140 (L) 150 - 450 x10*3/uL   Renal function panel   Result Value Ref Range     Glucose 106 (H) 74 - 99 mg/dL    Sodium 135 (L) 136 - 145 mmol/L    Potassium 5.0 3.5 - 5.3 mmol/L    Chloride 101 98 - 107 mmol/L    Bicarbonate 24 21 - 32 mmol/L    Anion Gap 15 10 - 20 mmol/L    Urea Nitrogen 56 (H) 6 - 23 mg/dL    Creatinine 3.78 (H) 0.50 - 1.30 mg/dL    eGFR 17 (L) >60 mL/min/1.73m*2    Calcium 7.4 (L) 8.6 - 10.3 mg/dL    Phosphorus 4.8 2.5 - 4.9 mg/dL    Albumin 2.7 (L) 3.4 - 5.0 g/dL   POCT GLUCOSE   Result Value Ref Range    POCT Glucose 102 (H) 74 - 99 mg/dL   CBC   Result Value Ref Range    WBC 10.3 4.4 - 11.3 x10*3/uL    nRBC 0.0 0.0 - 0.0 /100 WBCs    RBC 2.29 (L) 4.50 - 5.90 x10*6/uL    Hemoglobin 6.5 (LL) 13.5 - 17.5 g/dL    Hematocrit 20.8 (L) 41.0 - 52.0 %    MCV 91 80 - 100 fL    MCH 28.4 26.0 - 34.0 pg    MCHC 31.3 (L) 32.0 - 36.0 g/dL    RDW 14.6 (H) 11.5 - 14.5 %    Platelets 151 150 - 450 x10*3/uL     XR chest 2 views   Final Result   Status post recent CABG. All tubes and lines have been removed. No   pneumothorax.        Small bilateral pleural effusions with bibasilar atelectasis or small   infiltrates as described.        Cardiomegaly.        Signed by: Juancho Valencia 11/16/2023 9:16 AM   Dictation workstation:   [id number]      XR chest 1 view   Final Result   Interval removal of ET tube and NG tube. Questionable developing left   upper chest infiltrate versus overlapping soft tissues/projection.        MACRO:   None.        Signed by: Johanna Martinez 11/14/2023 8:06 AM   Dictation workstation:   [id number]      XR chest abdomen for OG NG placement   Final Result   Medical devices as detailed.        Right upper lobe consolidation and/or collapse, new from prior seen   day imaging. Clinical correlation suggested.        Nonspecific, nonobstructive bowel gas pattern.        MACRO:   None        Signed by: Nunu Keith 11/13/2023 10:52 PM   Dictation workstation:   [id number]      Anesthesia Intraoperative Transesophageal Echocardiogram   Final Result      XR chest 1 view    Final Result   1.  Findings consistent with recent postsurgical changes with   multiple medical devices is detailed.   2. Low lung volumes with borderline cardiomediastinal enlargement and   possible pulmonary vascular congestion. Edema or atypical infection   not excluded.                  MACRO:   None        Signed by: Nunu Keith 11/13/2023 8:05 PM   Dictation workstation:   [id number]      Transthoracic Echo (TTE) Complete   Final Result      XR chest 1 view   Final Result   Cardiomegaly.        Perihilar streaky densities, which may be related to atelectasis.        No definite pneumothorax.                  MACRO:   None        Signed by: Jeanine Gonzalez 11/13/2023 2:33 PM   Dictation workstation:   [id number]      Anesthesia Intraoperative Transesophageal Echocardiogram   Final Result           Assessment/Plan   Principal Problem:    CAD, multiple vessel    Humphrey Waddell is a 58 y.o. male presenting with CAD, hx NSTEMI and HFpEF. PMHx includes NIDDM2, Lt AKA, chronic RLE ulcer, GERD, carotid stenosis, hx CVA, hx GIB, hx anemia req'ing blood transfusions (2023), and hx confusion of anesthesia. Pt admitted to [facility] ICU s/p CABGx3 (LIMA>LAD, SVG>OM1, SVG>PDA), posterior pericardotomy, sternal wires and plating with Dr. Delatorre on 11/13/23. Postoperative vasoplegic shock. Levophed weaned off POD 2.      Neuro/MSK: Expected acute postop pain. Hx CVA. Lt AKA. Anxiety, depression  - Serial neuro and pain assessments  - Scheduled Tylenol, PRN Oxy, PRN Dilaudid while CTs are in  - PT/OT/MITT     -> Limited mobility at baseline: wheelchair bound   - Hold home melatonin 3mg     CV: S/p CABGx3. HFpEF. Hx NSTEMI. Bilateral carotid stenosis.  Postop vasoplegic shock requiring levophed- resolved, weaned off POD 2   LVEF pre 60%, post 65%  V wires- grounded, SR 80s  - Continuous EKG and ABP  - ASA/statin  - Metoprolol 25mg   - Restarted Plavix, hydralazine  -Hold home torsemide      Pulm: Oxygenating well on 1L NC.  Former smoker  - Wean FiO2 to SpO2 > 92%  - Bronch hygiene     GI: postoperative nausea- improving   - Regular diet, add nutrition supplement   - Bowel regimen  - Zofran, Compazine PRN  - PPI     Renal: Oliguric IDANIA on CKD (baseline Cr 2.84)  - RFP daily  - Replete lytes prn  -Lasix 80mg BID  - Nephrology following, appreciate recs     Heme: Acute on chronic anemia (baseline Hgb 9.1)  11/14 2u prbc, 11/15 1u prbc, 11/16 1u prbc  - CBC daily  - SCDs, SQH for DVTppx  - Transfuse if Hgb < 7.0 per Dr. Delatorre     Endo: IDDM2  A1C 5.3%  - Maintain BG < 180, ISS per ICU protocol  - Hold home Lantus 4u at bedtime     ID: Afebrile, no s/s infection  - Trend temp and WBCs q4  - Periop abx x 48hrs     Patient seen with Dr. Delatorre. Plan of care reviewed with patient and questions answered.   Dispo: SDU status, discharge to SNF when appropriate.            LEX COLLAZO    I was present with the student who participated in the documentation of this note. I personally saw and evaluated the patient and performed my own history and examination. I discussed the case with the student. I have reviewed, verified, and revised the note as necessary and agree with the content and plan as written by the student.     Constitutional: male patient, resting in bed, responding appropriately  Neurological: A&Ox3, follows commands, no focal sensory deficits   Skin: Warm and dry throughout. MSI- well approximated, sternum stable. Right SVG site- well approximated, mild ecchymosis   Eyes: Anicteric sclera, PERRL  Respiratory/Thorax: Breath sounds clear with diminished bases and expiratory wheezing  Cardiovascular: S1, S2, regular rate and rhythm. V epicardial pacing wires present. No murmurs appreciated. Sternum stable   Gastrointestinal: Obese, Abdomen soft, nontender, nondistended, hypoactive bowel sounds  Musculoskeletal: left AKA, moves all other extremities   Extremities: +2 generalized edema, 1+ bilateral radial and DP. Capillary refill  <3 seconds    Ariella Hoffmann, APRN-CNP, DNP

## 2023-11-16 NOTE — PROGRESS NOTES
Met with pt at bedside. Pt notified that [facility] is unable to accept. Pt reported that he would like to go home with [facility] and is not interested in any alternative AR.

## 2023-11-17 LAB
ABO GROUP (TYPE) IN BLOOD: NORMAL
ALBUMIN SERPL BCP-MCNC: 2.9 G/DL (ref 3.4–5)
ANION GAP SERPL CALC-SCNC: 11 MMOL/L (ref 10–20)
ANTIBODY SCREEN: NORMAL
BLOOD EXPIRATION DATE: NORMAL
BUN SERPL-MCNC: 56 MG/DL (ref 6–23)
CALCIUM SERPL-MCNC: 7.5 MG/DL (ref 8.6–10.3)
CHLORIDE SERPL-SCNC: 98 MMOL/L (ref 98–107)
CO2 SERPL-SCNC: 28 MMOL/L (ref 21–32)
CREAT SERPL-MCNC: 3.41 MG/DL (ref 0.5–1.3)
DISPENSE STATUS: NORMAL
ERYTHROCYTE [DISTWIDTH] IN BLOOD BY AUTOMATED COUNT: 14.5 % (ref 11.5–14.5)
GFR SERPL CREATININE-BSD FRML MDRD: 20 ML/MIN/1.73M*2
GLUCOSE BLD MANUAL STRIP-MCNC: 107 MG/DL (ref 74–99)
GLUCOSE BLD MANUAL STRIP-MCNC: 119 MG/DL (ref 74–99)
GLUCOSE BLD MANUAL STRIP-MCNC: 128 MG/DL (ref 74–99)
GLUCOSE BLD MANUAL STRIP-MCNC: 166 MG/DL (ref 74–99)
GLUCOSE SERPL-MCNC: 104 MG/DL (ref 74–99)
HCT VFR BLD AUTO: 26.9 % (ref 41–52)
HGB BLD-MCNC: 8.3 G/DL (ref 13.5–17.5)
MAGNESIUM SERPL-MCNC: 2.3 MG/DL (ref 1.6–2.4)
MCH RBC QN AUTO: 28.2 PG (ref 26–34)
MCHC RBC AUTO-ENTMCNC: 30.9 G/DL (ref 32–36)
MCV RBC AUTO: 92 FL (ref 80–100)
NRBC BLD-RTO: 0 /100 WBCS (ref 0–0)
PHOSPHATE SERPL-MCNC: 3.6 MG/DL (ref 2.5–4.9)
PLATELET # BLD AUTO: 162 X10*3/UL (ref 150–450)
POTASSIUM SERPL-SCNC: 4.3 MMOL/L (ref 3.5–5.3)
PRODUCT BLOOD TYPE: 600
PRODUCT CODE: NORMAL
RBC # BLD AUTO: 2.94 X10*6/UL (ref 4.5–5.9)
RH FACTOR (ANTIGEN D): NORMAL
SODIUM SERPL-SCNC: 133 MMOL/L (ref 136–145)
UNIT ABO: NORMAL
UNIT NUMBER: NORMAL
UNIT RH: NORMAL
UNIT VOLUME: 350
WBC # BLD AUTO: 10.3 X10*3/UL (ref 4.4–11.3)
XM INTEP: NORMAL

## 2023-11-17 PROCEDURE — 2060000001 HC INTERMEDIATE ICU ROOM DAILY

## 2023-11-17 PROCEDURE — 80069 RENAL FUNCTION PANEL: CPT | Performed by: REGISTERED NURSE

## 2023-11-17 PROCEDURE — 2500000002 HC RX 250 W HCPCS SELF ADMINISTERED DRUGS (ALT 637 FOR MEDICARE OP, ALT 636 FOR OP/ED): Performed by: REGISTERED NURSE

## 2023-11-17 PROCEDURE — 36415 COLL VENOUS BLD VENIPUNCTURE: CPT | Performed by: REGISTERED NURSE

## 2023-11-17 PROCEDURE — 2500000001 HC RX 250 WO HCPCS SELF ADMINISTERED DRUGS (ALT 637 FOR MEDICARE OP): Performed by: REGISTERED NURSE

## 2023-11-17 PROCEDURE — 2500000001 HC RX 250 WO HCPCS SELF ADMINISTERED DRUGS (ALT 637 FOR MEDICARE OP): Performed by: NURSE PRACTITIONER

## 2023-11-17 PROCEDURE — 9420000001 HC RT PATIENT EDUCATION 5 MIN

## 2023-11-17 PROCEDURE — 97530 THERAPEUTIC ACTIVITIES: CPT | Mod: GO

## 2023-11-17 PROCEDURE — 85027 COMPLETE CBC AUTOMATED: CPT | Performed by: REGISTERED NURSE

## 2023-11-17 PROCEDURE — 94660 CPAP INITIATION&MGMT: CPT

## 2023-11-17 PROCEDURE — 2500000001 HC RX 250 WO HCPCS SELF ADMINISTERED DRUGS (ALT 637 FOR MEDICARE OP): Performed by: PHYSICIAN ASSISTANT

## 2023-11-17 PROCEDURE — 83735 ASSAY OF MAGNESIUM: CPT | Performed by: REGISTERED NURSE

## 2023-11-17 PROCEDURE — 99231 SBSQ HOSP IP/OBS SF/LOW 25: CPT | Performed by: PHYSICIAN ASSISTANT

## 2023-11-17 PROCEDURE — 86850 RBC ANTIBODY SCREEN: CPT | Performed by: REGISTERED NURSE

## 2023-11-17 PROCEDURE — 2500000004 HC RX 250 GENERAL PHARMACY W/ HCPCS (ALT 636 FOR OP/ED): Performed by: REGISTERED NURSE

## 2023-11-17 PROCEDURE — 94668 MNPJ CHEST WALL SBSQ: CPT

## 2023-11-17 PROCEDURE — 82947 ASSAY GLUCOSE BLOOD QUANT: CPT

## 2023-11-17 PROCEDURE — 96372 THER/PROPH/DIAG INJ SC/IM: CPT | Performed by: REGISTERED NURSE

## 2023-11-17 PROCEDURE — 94760 N-INVAS EAR/PLS OXIMETRY 1: CPT

## 2023-11-17 PROCEDURE — 2500000004 HC RX 250 GENERAL PHARMACY W/ HCPCS (ALT 636 FOR OP/ED): Performed by: INTERNAL MEDICINE

## 2023-11-17 PROCEDURE — 97112 NEUROMUSCULAR REEDUCATION: CPT | Mod: GP

## 2023-11-17 RX ORDER — METOPROLOL TARTRATE 50 MG/1
50 TABLET ORAL 2 TIMES DAILY
Status: DISCONTINUED | OUTPATIENT
Start: 2023-11-17 | End: 2023-11-24 | Stop reason: HOSPADM

## 2023-11-17 RX ORDER — HYDRALAZINE HYDROCHLORIDE 50 MG/1
50 TABLET, FILM COATED ORAL 3 TIMES DAILY
Status: DISCONTINUED | OUTPATIENT
Start: 2023-11-17 | End: 2023-11-24 | Stop reason: HOSPADM

## 2023-11-17 RX ADMIN — PANTOPRAZOLE SODIUM 40 MG: 40 TABLET, DELAYED RELEASE ORAL at 06:00

## 2023-11-17 RX ADMIN — ACETAMINOPHEN 650 MG: 325 TABLET ORAL at 06:00

## 2023-11-17 RX ADMIN — ACETAMINOPHEN 650 MG: 325 TABLET ORAL at 20:18

## 2023-11-17 RX ADMIN — ACETAMINOPHEN 650 MG: 325 TABLET ORAL at 13:58

## 2023-11-17 RX ADMIN — FUROSEMIDE 80 MG: 10 INJECTION, SOLUTION INTRAMUSCULAR; INTRAVENOUS at 11:01

## 2023-11-17 RX ADMIN — HYDRALAZINE HYDROCHLORIDE 50 MG: 50 TABLET, FILM COATED ORAL at 11:03

## 2023-11-17 RX ADMIN — INSULIN LISPRO 5 UNITS: 100 INJECTION, SOLUTION INTRAVENOUS; SUBCUTANEOUS at 14:00

## 2023-11-17 RX ADMIN — ATORVASTATIN CALCIUM 80 MG: 80 TABLET ORAL at 11:03

## 2023-11-17 RX ADMIN — CLOPIDOGREL 75 MG: 75 TABLET ORAL at 11:03

## 2023-11-17 RX ADMIN — HYDRALAZINE HYDROCHLORIDE 50 MG: 50 TABLET, FILM COATED ORAL at 20:18

## 2023-11-17 RX ADMIN — HEPARIN SODIUM 5000 UNITS: 5000 INJECTION INTRAVENOUS; SUBCUTANEOUS at 17:11

## 2023-11-17 RX ADMIN — TRAMADOL HYDROCHLORIDE 50 MG: 50 TABLET, COATED ORAL at 13:37

## 2023-11-17 RX ADMIN — ASPIRIN 81 MG CHEWABLE TABLET 81 MG: 81 TABLET CHEWABLE at 11:03

## 2023-11-17 RX ADMIN — IRON SUCROSE 200 MG: 20 INJECTION, SOLUTION INTRAVENOUS at 11:02

## 2023-11-17 RX ADMIN — FUROSEMIDE 80 MG: 10 INJECTION, SOLUTION INTRAMUSCULAR; INTRAVENOUS at 17:11

## 2023-11-17 RX ADMIN — HYDRALAZINE HYDROCHLORIDE 50 MG: 50 TABLET, FILM COATED ORAL at 15:30

## 2023-11-17 RX ADMIN — METOPROLOL TARTRATE 50 MG: 50 TABLET, FILM COATED ORAL at 20:18

## 2023-11-17 RX ADMIN — HEPARIN SODIUM 5000 UNITS: 5000 INJECTION INTRAVENOUS; SUBCUTANEOUS at 06:00

## 2023-11-17 RX ADMIN — METOPROLOL TARTRATE 50 MG: 50 TABLET, FILM COATED ORAL at 13:01

## 2023-11-17 RX ADMIN — SENNOSIDES AND DOCUSATE SODIUM 2 TABLET: 8.6; 5 TABLET ORAL at 20:18

## 2023-11-17 ASSESSMENT — COGNITIVE AND FUNCTIONAL STATUS - GENERAL
DRESSING REGULAR UPPER BODY CLOTHING: A LITTLE
TURNING FROM BACK TO SIDE WHILE IN FLAT BAD: TOTAL
TOILETING: A LOT
STANDING UP FROM CHAIR USING ARMS: TOTAL
MOVING TO AND FROM BED TO CHAIR: TOTAL
WALKING IN HOSPITAL ROOM: TOTAL
MOVING FROM LYING ON BACK TO SITTING ON SIDE OF FLAT BED WITH BEDRAILS: A LOT
DRESSING REGULAR LOWER BODY CLOTHING: A LOT
CLIMB 3 TO 5 STEPS WITH RAILING: TOTAL
DAILY ACTIVITIY SCORE: 17
HELP NEEDED FOR BATHING: A LOT
MOBILITY SCORE: 7

## 2023-11-17 ASSESSMENT — PAIN - FUNCTIONAL ASSESSMENT
PAIN_FUNCTIONAL_ASSESSMENT: 0-10

## 2023-11-17 ASSESSMENT — PAIN SCALES - GENERAL
PAINLEVEL_OUTOF10: 8
PAINLEVEL_OUTOF10: 9
PAINLEVEL_OUTOF10: 4
PAINLEVEL_OUTOF10: 8
PAINLEVEL_OUTOF10: 9

## 2023-11-17 ASSESSMENT — PAIN DESCRIPTION - DESCRIPTORS: DESCRIPTORS: ACHING;SORE

## 2023-11-17 NOTE — PROGRESS NOTES
Physical Therapy    Physical Therapy Treatment    Patient Name: Humphrey Waddell  MRN: [id number]  Today's Date: 11/17/2023  Time Calculation  Start Time: 1405  Stop Time: 1423  Time Calculation (min): 18 min       Assessment/Plan   PT Assessment  PT Assessment Results: Decreased mobility, Decreased strength, Decreased range of motion, Decreased endurance, Impaired balance, Decreased coordination, Decreased safety awareness, Pain  Rehab Prognosis: Good  Evaluation/Treatment Tolerance: Patient limited by fatigue  Medical Staff Made Aware: Yes  End of Session Communication: Bedside nurse (SVETA Rodriguez)  Assessment Comment: patient POD #4 s/p CABG with impaired mobility/transfers, balance strength from baseline. he will benefit from ongoing PT in house and HIGH intensity PT at DC.  End of Session Patient Position: Alarm on, Up in chair  PT Plan  Inpatient/Swing Bed or Outpatient: Inpatient  PT Plan  Treatment/Interventions: Bed mobility, Transfer training, Balance training, Neuromuscular re-education, Strengthening, Endurance training, Therapeutic exercise, Therapeutic activity, Home exercise program, Positioning, Postural re-education, Wheelchair management  PT Plan: Skilled PT  PT Frequency: 4 times per week  PT Discharge Recommendations: High intensity level of continued care  Equipment Recommended upon Discharge: Slide board, Wheeled walker, Wheelchair  PT Recommended Transfer Status: Assist x2 (sit pivot vs. cardiac chair transfer with draw sheet (placed under pt in chair; RN aware))  PT - OK to Discharge: Yes (per PT POC; rec high intensity PT)      General Visit Information:   PT  Visit  PT Received On: 11/17/23  General  Co-Treatment: OT  Co-Treatment Reason: cotx with OT to promote functional transfers and safety; requiring 2 skilled hands on providers  Prior to Session Communication: Bedside nurse  Patient Position Received: Bed, 3 rail up, Alarm on  Preferred Learning Style: verbal, visual, auditory  General  "Comment: recieved in supine. patient cooperative with OOB activity; patient declined use of L AKA prosthesis \"I just took it off a little while ago.\"    Subjective   Precautions:  Precautions  Medical Precautions: Fall precautions, Cardiac precautions (L AKA with prosthesis)  Post-Surgical Precautions: Move in the Tube  Precautions Comment: reviewed precautions concurrent with activity (gerber avoiding \"chest fly\" UE position)  Vital Signs:  Vital Signs  Heart Rate: 81  Heart Rate Source: Monitor  Resp: 18    Objective   Pain:  Pain Assessment  Pain Assessment: 0-10  Pain Score: 9  Pain Type: Surgical pain  Pain Location: Chest  Pain Orientation:  (sternal incision)  Pain Descriptors: Aching, Sore  Pain Frequency: Constant/continuous  Pain Onset: Ongoing  Effect of Pain on Daily Activities: premedicated by RN for pain; PT to tolerance  Response to Interventions: decreased discomfort with rest and repositioning  Cognition:  Cognition  Overall Cognitive Status: Within Functional Limits  Orientation Level: Oriented X4  Postural Control:     Extremity/Trunk Assessments:              Activity Tolerance:  Activity Tolerance  Endurance: Decreased tolerance for upright activites  Early Mobility/Exercise Safety Screen: Proceed with mobilization - No exclusion criteria met  Activity Tolerance Comments: fair  Treatments:       Therapeutic Activity  Therapeutic Activity Performed: Yes  Therapeutic Activity 1: bed mobility, transfer training, postural corrections, paired breathing, postural corrections and MITT precautions reinforced throughout session. patient reports compliance with incentive spirometer, discussed diaphragmatic excursion with cardiac pillow spilinting    Balance/Neuromuscular Re-Education  Balance/Neuromuscular Re-Education Activity Performed: Yes  Balance/Neuromuscular Re-Education Activity 1: lateral transfer OOB to chair with support at trunk, pelvis and BUE repositioning to \"bump\" over to chair in small " increments, max A x2 with draw sheet support. reciprocal A/P scooting completed with postural cues and corrections to adhere to MITT precautions. good carryover and pt engagement    Bed Mobility  Bed Mobility: Yes  Bed Mobility 1  Bed Mobility 1: Supine to sitting  Level of Assistance 1: Dependent, Maximum verbal cues, Maximum tactile cues  Bed Mobility Comments 1: logroll technique encouraged with BUE support and hand over hand placement, draw sheet to advacne L hip forward prior to sitting up from R sidelying, HOB elevated for assistance, max A x2 to complete  Bed Mobility 2  Bed Mobility  2: Sitting to supine  Level of Assistance 2: Maximum assistance, Maximum verbal cues, Maximum tactile cues (x2 person A)  Bed Mobility Comments 2: scooting and repositioning with draw sheet and cues for hand placement/weightshifting mod A x2  Bed Mobility 3  Bed Mobility 3: Rolling left  Level of Assistance 3: Maximum assistance, Maximum verbal cues, Maximum tactile cues  Bed Mobility Comments 3: cues for repositoining  Bed Mobility 4  Bed Mobility 4: Scooting  Level of Assistance 4: Dependent, Maximum verbal cues, Maximum tactile cues  Bed Mobility Comments 4: use of draw sheet support with trunk assist    Transfers  Transfer: Yes  Transfer 1  Technique 1: Sit to stand, Stand to sit  Transfer Device 1: Gait belt  Transfer Level of Assistance 1: Arm in arm assistance, Maximum assistance, Minimal tactile cues, Maximum tactile cues  Trials/Comments 1: completed standing trials from elevated surface x2 with RLE block and cues for sequencing. pt with limited standing capability this date, elevated pain in chest.  Transfers 2  Transfer From 2: Bed to  Transfer to 2: Chair with drop arm  Technique 2: Sit pivot  Transfer Device 2:  (draw sheet)  Transfer Level of Assistance 2: Maximum assistance, Arm in arm assistance, Maximum verbal cues, Maximum tactile cues  Trials/Comments 2: RN present to support chair and assist with facilitating  transfer to bedside chair. cues for sequencing and repositionng    Outcome Measures:  [facility] Basic Mobility  Turning from your back to your side while in a flat bed without using bedrails: A lot  Moving from lying on your back to sitting on the side of a flat bed without using bedrails: Total  Moving to and from bed to chair (including a wheelchair): Total  Standing up from a chair using your arms (e.g. wheelchair or bedside chair): Total  To walk in hospital room: Total  Climbing 3-5 steps with railing: Total  Basic Mobility - Total Score: 7    Brief Confusion Assessment Method (bCAM)  Feature 1: Altered Mental Status or Fluctuating Course: No  Feature 2: Inattention: 0-1 error  CAM Result: CAM -      Prevention/Intervention  Prevention/Intervention: Early and frequent mobilization, Familiar objects/people at bedside, Maintain normal sleep/wake cycle (blinds open during day, out of bed for meals, decrease noise), Education and support family engagement, Promote least restrictive environment              FSS-ICU  Ambulation: Unable to attempt due to weakness  Rolling: Total assistance (performs 25% or requires another person)  Sitting: Minimal assistance (performs 75% or more of task)  Transfer Sit-to-Stand: Total assistance (performs 25% or requires another person)  Transfer Supine-to-Sit: Total assistance (performs 25% or requires another person)  Total Score: 7      ICU Mobility Screen  Early Mobility/Exercise Safety Screen: Proceed with mobilization - No exclusion criteria met  E = Exercise and Early Mobility  Early Mobility/Exercise Safety Screen: Proceed with mobilization - No exclusion criteria met  Current Activity: Sitting in chair  Documentation of an Acceptable Level of Exercise/Mobilization Performed: Dangle - side of bed, Active transfer - out of bed to chair, Stand at side of bed    Education Documentation  Handouts, taught by Jayde York, PT at 11/17/2023  3:54 PM.  Learner: Patient  Readiness:  Acceptance  Method: Explanation, Demonstration, Handout  Response: Demonstrated Understanding, Verbalizes Understanding, Needs Reinforcement    Precautions, taught by Jayde York PT at 11/17/2023  3:54 PM.  Learner: Patient  Readiness: Acceptance  Method: Explanation, Demonstration, Handout  Response: Demonstrated Understanding, Verbalizes Understanding, Needs Reinforcement    Body Mechanics, taught by Jayde York PT at 11/17/2023  3:54 PM.  Learner: Patient  Readiness: Acceptance  Method: Explanation, Demonstration, Handout  Response: Demonstrated Understanding, Verbalizes Understanding, Needs Reinforcement    Home Exercise Program, taught by Jayde York PT at 11/17/2023  3:54 PM.  Learner: Patient  Readiness: Acceptance  Method: Explanation, Demonstration, Handout  Response: Demonstrated Understanding, Verbalizes Understanding, Needs Reinforcement    Mobility Training, taught by Jayde York PT at 11/17/2023  3:54 PM.  Learner: Patient  Readiness: Acceptance  Method: Explanation, Demonstration, Handout  Response: Demonstrated Understanding, Verbalizes Understanding, Needs Reinforcement    Education Comments  No comments found.        OP EDUCATION:  Outpatient Education  Individual(s) Educated: Patient  Education Provided: Anatomy, Body Mechanics, Fall Risk, Home Exercise Program, POC, Post-Op Precautions, Posture  Patient/Caregiver Demonstrated Understanding: yes  Plan of Care Discussed and Agreed Upon: yes  Patient Response to Education: Patient/Caregiver Performed Return Demonstration of Exercises/Activities, Patient/Caregiver Verbalized Understanding of Information, Patient/Caregiver Asked Appropriate Questions    Encounter Problems       Encounter Problems (Active)       Balance       STG - Maintains dynamic sitting balance with upper extremity support (Progressing)       Start:  11/14/23    Expected End:  11/28/23       INTERVENTIONS:  1. Practice sitting on the edge of a bed/mat with  minimal support.  2. Educate patient about maintining total hip precautions while maintaining balance.  3. Educate patient about pressure relief.  4. Educate patient about use of assistive device.            Mobility       STG - Patient will propel the wheelchair (Not Progressing)       Start:  11/14/23    Expected End:  11/28/23       Utilizing safe technique with adherence to MITT precautions and with SBA support/VCs            Transfers       STG - Transfer from bed to chair (Progressing)       Start:  11/14/23    Expected End:  11/28/23       Mod A x1 with LRAD, using safe technique and adherence to MITT precautions,          STG - Patient will perform bed mobility (Progressing)       Start:  11/14/23    Expected End:  11/28/23       Via logroll with mod A and safe technique         Goal 1 (Progressing)       Start:  11/14/23    Expected End:  11/28/23       Patient will demonstrate stable vitals response on room air with all activities.

## 2023-11-17 NOTE — PROGRESS NOTES
"Humphrey Waddell is a 58 y.o. male on day 4 of admission presenting with CAD, multiple vessel.    Subjective   No acute events overnigh       Objective     Physical Exam  Constitutional:       General: He is not in acute distress.     Appearance: He is not toxic-appearing.   Eyes:      Pupils: Pupils are equal, round, and reactive to light.   Cardiovascular:      Rate and Rhythm: Normal rate and regular rhythm.      Pulses: Normal pulses.      Comments: Midsternal incision well approximated and stable to palpation, no erythema or drainage,  Pulmonary:      Effort: Pulmonary effort is normal.   Abdominal:      General: Abdomen is flat. There is no distension.      Palpations: Abdomen is soft.      Tenderness: There is no abdominal tenderness.   Skin:     General: Skin is warm and dry.      Comments: Right SVG sites well approximated without drainage or erythema   Neurological:      General: No focal deficit present.      Mental Status: He is alert and oriented to person, place, and time.   Psychiatric:         Mood and Affect: Mood normal.         Behavior: Behavior normal.         Last Recorded Vitals  Blood pressure 150/77, pulse 91, temperature 37.3 °C (99.1 °F), temperature source Temporal, resp. rate 15, height 1.93 m (6' 3.98\"), weight 123 kg (271 lb 8 oz), SpO2 97 %.  Intake/Output last 3 Shifts:  I/O last 3 completed shifts:  In: 810 (6.6 mL/kg) [P.O.:480; Blood:330]  Out: 4050 (32.9 mL/kg) [Urine:4050 (0.9 mL/kg/hr)]  Weight: 123.2 kg     Relevant Results  Scheduled medications  acetaminophen, 650 mg, oral, q6h  aspirin, 81 mg, oral, Daily  atorvastatin, 80 mg, oral, Daily  clopidogrel, 75 mg, oral, Daily  furosemide, 80 mg, intravenous, BID  heparin (porcine), 5,000 Units, subcutaneous, q8h  hydrALAZINE, 50 mg, oral, TID  insulin lispro, 0-15 Units, subcutaneous, TID with meals  metoprolol tartrate, 50 mg, oral, BID  pantoprazole, 40 mg, oral, Daily before breakfast  polyethylene glycol, 17 g, oral, " Daily  sennosides-docusate sodium, 2 tablet, oral, BID      Continuous medications     PRN medications  PRN medications: dextrose **OR** [DISCONTINUED] glucagon, [Held by provider] melatonin, naloxone, ondansetron, oxygen, traMADol, traMADol     LABS:  CMP:  Results from last 7 days   Lab Units 11/17/23  0513 11/16/23  0410 11/15/23  1459 11/15/23  0320 11/14/23  1747 11/14/23  1250 11/14/23  0924 11/14/23  0453 11/13/23  2331 11/13/23  2105 11/13/23  1701 11/13/23  1417   SODIUM mmol/L 133* 135* 135* 136 138 138 139  --  138 138 138 137   POTASSIUM mmol/L 4.3 5.0 5.3 5.3 5.7* 5.8* 6.2*  --  5.8* 6.2* 5.4* 5.4*   CHLORIDE mmol/L 98 101 101 104 106 108* 107  --  110* 106 109* 110*   CO2 mmol/L 28 24 24 25 24 23 25  --  20* 22 18* 21   ANION GAP mmol/L 11 15 15 12 14 13 13  --  14 16 16 11   BUN mg/dL 56* 56* 53* 52* 52* 55* 53*  --  42* 45* 44* 45*   CREATININE mg/dL 3.41* 3.78* 3.55* 3.51* 3.46* 3.27* 3.17*  --  2.60* 2.76* 2.60* 2.51*   EGFR mL/min/1.73m*2 20* 17* 19* 19* 19* 21* 22*  --  27* 25* 27* 29*   MAGNESIUM mg/dL 2.30 2.60*  --  2.60*  --   --   --  2.60*  --  2.90*  --  3.00*   ALBUMIN g/dL 2.9* 2.7* 3.0* 2.7* 2.7* 2.8* 2.9*  --  2.3* 2.6* 2.5* 2.7*     CBC:  Results from last 7 days   Lab Units 11/17/23  0513 11/16/23  0814 11/16/23  0410 11/15/23  0320 11/14/23  1747 11/14/23  0453 11/13/23  2105 11/13/23  1701   WBC AUTO x10*3/uL 10.3 10.3 10.2 13.1* 17.5* 15.2* 13.8* 24.4*   HEMOGLOBIN g/dL 8.3* 6.5* 6.6* 7.5* 6.7* 7.1* 8.6* 8.1*   HEMATOCRIT % 26.9* 20.8* 20.5* 23.4* 21.5* 22.0* 26.6* 25.2*   PLATELETS AUTO x10*3/uL 162 151 140* 128* 165 192 141* 326   MCV fL 92 91 90 90 90 88 89 92     COAG:   Results from last 7 days   Lab Units 11/13/23  2105 11/13/23  1417   INR  1.1 1.2*     HEME/ENDO:     CARDIAC:             Assessment/Plan   Principal Problem:    CAD, multiple vessel    Humphrey Waddell is a 58 y.o. male presenting with CAD, hx NSTEMI and HFpEF. PMHx includes NIDDM2, Lt AKA, chronic RLE ulcer, GERD,  carotid stenosis, hx CVA, hx GIB, hx anemia req'ing blood transfusions (2023), and hx confusion of anesthesia. Pt admitted to [facility] ICU s/p CABGx3 (LIMA>LAD, SVG>OM1, SVG>PDA), posterior pericardotomy, sternal wires and plating with Dr. Delatorre on 11/13/23. Postoperative vasoplegic shock. Levophed weaned off POD 2.      Neuro/MSK:   Expected acute postop pain well controlled  hx CVA  Lt AKA  Anxiety  depression  - Serial neuro and pain assessments  - Scheduled Tylenol  - Tramadol per patient request  - PT/OT/MITT     -> Limited mobility at baseline: wheelchair bound- ok to use prosthesis  - Hold home melatonin 3mg     CV:   S/p CABGx3  HFpEF  LVEF pre 60%, post 65%  HTN  - Continuous EKG and ABP  - ASA/statin  - Metoprolol 25mg   - Plavix  - Increase hydralazine to TID     Pulm:  Oxygenating well o nRA  - Bronch hygiene     GI:   postoperative nausea- resolved  - Regular diet, add nutrition supplement   - Bowel regimen  - Zofran, Compazine PRN  - PPI     Renal:    IDANIA on CKD (baseline Cr 2.84)  - RFP daily  - Replete lytes prn  - Nephrology following, continue lasix     Heme: Acute on chronic anemia (baseline Hgb 9.1)  11/14 2u prbc  11/15 1u prbc  11/16 1u prbc  - CBC daily  - SCDs, SQH for DVTppx     Endo:   IDDM2  A1C 5.3%  - Hold home Lantus 4u at bedtime     ID:   - Trend temp and WBCs q4  - Periop abx x 48hrs     Dispo: awaiting snf/rehab placement    Fletcher Rodriguez PA-C

## 2023-11-17 NOTE — PROGRESS NOTES
Reason For Consult  Kody on ckd    History Of Present Illness  Humphrey Waddell is a 58 y.o. male with PMH of HFpEF, CAD, carotid stenosis, CVA, DM, anemia, GIB, DJD, PAD s/p Lt AKA, HTN, depression, CKD (Creatinine 2.84 mg/dL), previous transfusion pRBC,  presenting for a scheduled CABG x3 LIMA to LAD Rt EVH SVG to OM Rt EVH SVG to PDA pericardial window and sternal plating on 11/13. OR course 1500ml crystalloid, 250mL Cellsaver. Upon arrival to ICU had significant hypotension requiring return to OR. EBL for return to OR was 170mL. Returned to ICU in stable condition. KODY noted oliguric. We are now consulted for renal care.  No overnight events.  Responded to the Lasix.     Past Medical History  He has a past medical history of Above-knee amputation of left lower extremity (CMS/Formerly [facility]), Adverse effect of anesthesia, Anemia, CAD (coronary artery disease), Carotid artery disease (CMS/Formerly [facility]), CVA (cerebral vascular accident) (CMS/Formerly [facility]) (2020), Depression, DM (diabetes mellitus) (CMS/Formerly [facility]), GERD (gastroesophageal reflux disease), High cholesterol, HTN (hypertension), Leg ulcer (CMS/Formerly [facility]), Neuropathy, OA (osteoarthritis), and PAD (peripheral artery disease) (CMS/Formerly [facility]).    Surgical History  He has a past surgical history that includes Other surgical history (07/07/2021); Other surgical history (07/07/2021); CT angio head w and wo IV contrast (06/14/2021); CT angio neck (06/14/2021); MR angio neck wo IV contrast (07/18/2023); MR angio head wo IV contrast (12/11/2012); MR angio head wo IV contrast (06/12/2021); MR angio head wo IV contrast (05/25/2021); Total shoulder arthroplasty (Left, 2020); and Leg Surgery (Right).     Social History  He reports that he quit smoking about 7 months ago. His smoking use included cigars. He has never used smokeless tobacco. He reports that he does not currently use alcohol. He reports that he does not currently use drugs after having used the following drugs: Marijuana.    Family History  Family History    Problem Relation Name Age of Onset    Other (cardiac disorder) Mother      Hypertension Mother      Esophageal cancer Mother      Hearing loss Father      Hypertension Father      Heart disease Father      COPD Sister          Allergies  Patient has no known allergies.    Review of Systems  Review of Systems:  Negative for chills and fatigue. Negative for cough, chest tightness and shortness of breath. Negative for chest pain. Negative for abdominal pain and blood in stool. Negative for decreased urine volume, dysuria, flank pain and hematuria. Negative for dizziness. Negative for confusion.  All other systems negative.        Physical Exam  Physical Exam  Constitutional:    Alert and oriented x2-3, appears in no distress, spacey  HENT:      Clear oropharynx, moist mucous membranes  Cardiovascular:   Normal rate and regular rhythm; Normal pulses; Normal heart sounds. No murmur heard; No friction rub. CT x 3 no air leak noted, midsternal incision   Pulmonary:   Pulmonary effort is normal. No respiratory distress. Normal breath sounds. No stridor. No wheezing or rhonchi.    Abdominal:   Bowel sounds are normal. There is no distension. Abdomen is soft. There is no tenderness or guarding. Lee in place.   Musculoskeletal:      +1 edema. Moves all extremities, Lt ALA with intact stump site  Skin:  Skin is warm and dry. Capillary refill takes less than 2 seconds. No lesion or rash.   Neurological:   No focal deficit present. Alert and oriented to person, place, and time. Takes quite some time to answer questions  Psych:  withdrawn            I&O 24HR    Intake/Output Summary (Last 24 hours) at 11/17/2023 0907  Last data filed at 11/17/2023 0400  Gross per 24 hour   Intake 570 ml   Output 3200 ml   Net -2630 ml         Vitals 24HR  Heart Rate:  [71-94]   Temp:  [36.4 °C (97.5 °F)-36.7 °C (98.1 °F)]   Resp:  [10-24]   BP: (134-173)/(54-98)   Weight:  [123 kg (271 lb 8 oz)]   SpO2:  [93 %-97 %]          Relevant  Results  reviewed     Assessment/Plan     Mr. Waddell is a 58-year-old man with a history of coronary artery disease, and may he suffered an acute coronary syndrome, had a 50% left main, mid LAD at 70%, 95% circumflex.  He had a stenting of the OM 2.  In July echo revealed normal systolic function, no valvular abnormalities.  He was seen by CT surgery.  It is noted that he has had the above-the-knee amputation, that was a risk factor for his rehab.  He has diabetes, hypertension, stage 4 chronic kidney disease and the peripheral vascular disease.  He otherwise has known carotid stenosis, has had a stroke, GI bleed, anemia requiring blood transfusions.    On November 13 he underwent a three-vessel bypass, posterior pericardiotomy.  Blood pressure was high postoperatively, was on epinephrine, insulin infusion, norepinephrine and phenylephrine.  Blood pressures then dropped, late on November 13 he was on a max dose of epinephrine, blood pressures were in the 80s systolic, minimal responsiveness to 2.5 L of lactated Ringer's.  TTE suggested a newly hypokinetic anterior wall.  He went back to the operating room.  Drains were left in place.    By November 14 was extubated.  On November 15 had nausea and vomiting, was slow to respond.  Had received 1 unit of blood the night prior, norepinephrine was weaned off.    He has suffered acute kidney injury which is no surprise given his vascular instability, anemia.  He has significant chronic kidney disease and is unlikely to autoregulate.  We gave him a dose of erythropoietin, I will give him 200 mg of IV iron again today.  We will continue the Lasix based on his exam, he is not certain what his weight was prior to the procedure.  At 1 point he was for 90 pounds, he is down to 270 pounds.  Acute kidney injury improving.    Principal Problem:    CAD, multiple vessel    50 minutes in care of Mr. Waddell.    Juancho Mascorro MD

## 2023-11-17 NOTE — PROGRESS NOTES
Occupational Therapy    Occupational Therapy Treatment    Name: Humphrey Waddell  MRN: [id number]  : 1965  Date: 23  Time Calculation  Start Time: 1404  Stop Time: 1423  Time Calculation (min): 19 min         Plan:  Treatment Interventions: ADL retraining, Compensatory technique education, UE strengthening/ROM, Functional transfer training, Endurance training  OT Frequency: 3 times per week  OT Discharge Recommendations: High intensity level of continued care  Equipment Recommended upon Discharge: Slide board, Wheeled walker, Wheelchair  OT - OK to Discharge: Yes (HIGH)    Subjective   Previous Visit Info:  OT Last Visit  OT Received On: 23  General:  General  Co-Treatment: PT  Co-Treatment Reason: co-tx with PT to maximize safety and promote functional I.  Prior to Session Communication: Bedside nurse, PCT/NA/CTA  Patient Position Received: Bed, 3 rail up, Alarm off, not on at start of session  General Comment: supine, agreeable to therapy with encouragement; declined to don prosthetic LE, prefers to attempt stand vs sit pivot to/from chair without prosthetic  Precautions:  Medical Precautions: Fall precautions, Cardiac precautions, Oxygen therapy device and L/min  Post-Surgical Precautions: Move in the Tube  Vitals:     Pain Assessment:  Pain Assessment  Pain Assessment: 0-10  Pain Score: 8  Pain Type: Surgical pain  Pain Location: Chest (incision)     Functional Standing Tolerance:     Bed Mobility/Transfers: Bed Mobility  Bed Mobility: Yes  Bed Mobility 1  Bed Mobility 1: Rolling right  Level of Assistance 1: Moderate assistance  Bed Mobility Comments 1: x2  Bed Mobility 2  Bed Mobility  2: Supine to sitting  Level of Assistance 2: Maximum assistance  Bed Mobility Comments 2: x2 (use of draw sheet, max v/t cues for log roll technique and integration of BUEs)    Transfers  Transfer: Yes  Transfer 1  Transfer From 1:  (edge of bed)  Transfer to 1: Cardiac chair (beltran rize chair with arm rest moved out of  the way)  Technique 1: Sit pivot  Transfer Device 1:  (tap sheet utilized, anterior and posterior support via sheet; max v/t cues for pt to lean and weight shift, pt assisting as able with UEs, cues for UE positioning for MITT adherence)  Transfer Level of Assistance 1: +2, Maximum assistance, Maximum verbal cues  Trials/Comments 1:  (attempted standing twice, though pt unable to safely clear from EOB)    Sitting Balance:  Static Sitting Balance  Static Sitting-Level of Assistance: Close supervision  Dynamic Sitting Balance  Dynamic Sitting-Balance:  (close supervision)     Strength:  Strength  Strength Comments: improved core strength    Outcome Measures:  [facility] Daily Activity  Putting on and taking off regular lower body clothing: A lot  Bathing (including washing, rinsing, drying): A lot  Putting on and taking off regular upper body clothing: A little  Toileting, which includes using toilet, bedpan or urinal: A lot  Taking care of personal grooming such as brushing teeth: None  Eating Meals: None  Daily Activity - Total Score: 17        Education Documentation  Body Mechanics, taught by Cezar Blanc OT at 11/17/2023  3:09 PM.  Learner: Patient  Readiness: Acceptance  Method: Explanation  Response: Verbalizes Understanding, Needs Reinforcement    Precautions, taught by Cezar Blanc OT at 11/17/2023  3:09 PM.  Learner: Patient  Readiness: Acceptance  Method: Explanation  Response: Verbalizes Understanding, Needs Reinforcement    Education Comments  No comments found.      Goals:  Encounter Problems       Encounter Problems (Active)       ADLs       Patient with complete upper body dressing with minimal assist  level of assistance donning and doffing all UE clothes with PRN adaptive equipment while supported sitting (Not Progressing)       Start:  11/14/23            Patient with complete lower body dressing with moderate assist level of assistance donning and doffing all LE clothes  with shoe horn and  sock-aid while supported sitting (Not Progressing)       Start:  11/14/23            Patient will complete daily grooming tasks brushing teeth, shaving, and washing face/hair with set-up and supervision level of assistance and PRN adaptive equipment while supported sitting. (Not Progressing)       Start:  11/14/23            Patient will complete toileting including hygiene clothing management/hygiene with moderate assist level of assistance and raised toilet seat and grab bars. (Not Progressing)       Start:  11/14/23                 TRANSFERS       Patient will perform bed mobility moderate assist level of assistance and bed rails and draw sheet in order to improve safety and independence with mobility (Progressing)       Start:  11/14/23            Patient will complete functional transfer with slide board transfer least restrictive device with contact guard assist level of assistance. (Progressing)       Start:  11/14/23

## 2023-11-17 NOTE — PROGRESS NOTES
Transitional Care Coordinator Note:  Met with patient at the bedside to discuss discharge plan.  [facility] cannot accept patient and he is not interested in [facility] or [facility] due to being to far from his home.  Patient is not interested in SNF.  He would like to get [facility] for RN, PT, OT upon discharge.  Patient not med ready at this time.  ADOD: 3 days  Ree Vera RN

## 2023-11-17 NOTE — DISCHARGE INSTRUCTIONS
Do not lift/push/pull more than 10lbs for 3 months (sternal precautions).    You may shower. Recommend using shower chair and showering with lukewarm water at first. Do not scrub incision; let soapy water run down. Pat to dry. Use fragrance-free soaps, detergents, etc. Do not apply scented oils, cologne, perfumes, detergents, soaps, etc to incision sites.    Do not drive in the front seat ( or Passenger side) until you are cleared by your surgeon in 4-6 weeks or while taking opioids.    Please call 911 or go to the Emergency Department if you experience any of the following: extreme shortness of breath, severe or crushing chest pain, coughing up bloody or frothy sputum, or any other concerning symptoms.  PLEASE TAKE ONLY MEDICINES THAT ARE LISTED ON YOUR DISCHARGE PAPERWORK.  Do not restart any other medicines that are not listed.  If you have any questions or concerns please call the ICU ([phone]) to speak with the Nurse Practitioner or Physician Assistant.    “Keep Your Move in the Tube!!”  and think of a T. You dinosaur!  Please refer to the “Move in the Tube” handout.  Load bearing activities can be completed if you are “staying in the tube.” If you are attempting load bearing activities, let pain be your guide with when trying an activity “out of the tube”. If an activity hurts or is uncomfortable go back to doing it while you stay “in the tube”.  There is no time limit to “stay in the tube”.    Non-load bearing activities which are your activities of daily living can be completed with your arms “out of the tube” as long as you remain pain free. Some activities of daily living examples are dressing, personal care, showering, washing hair, and toilet hygiene.    We are unable to provide any refills for narcotic pain medicine once you are discharged. We recommend you use acetaminophen as directed once you have finished your narcotic pain medicine.    Continue 15-20 minute walks 5 times a day, you have  no limitations on stairs.    Please keep a log of your temperature, blood pressure, and weights.  If you have diabetes please keep a log of your blood glucoses also.  Please bring your log to your follow up appointments.    Call the Cardiac Surgery Nurse Practitioner/ Physician Assistant ([phone]) if you gain 3 or more pounds in 1 day or 5 or more pounds in 1 week; shortness of breath not related to exercise, while lying flat, or that wakes you up from sleep; increase in swelling feet, ankles or abdomen.    If you begin to notice an increase your mouth feeling dry or dizziness when changing your position please call to speak with the nurse practitioner, you may need your furosemide (Lasix) adjusted.    Your temporary pacing wires were cut. It is safe to have a MRI if ever needed. Please call and schedule an appointment with your cardiac surgeon if you notice the wires poking through your skin or any drainage from the site where pacing wires were located.    Your sternum (breast bone) was closed with a sternal locking system.  The system is a combination of screws & plates that will stay in permanently except if your surgeon feels there would be a need to remove them.  This system helps improve the sternum healing, keeps the sternum together, decreases problems with your sternum healing and has been shown to improve the recovery period.

## 2023-11-18 LAB
ALBUMIN SERPL BCP-MCNC: 2.7 G/DL (ref 3.4–5)
ALBUMIN SERPL BCP-MCNC: 2.9 G/DL (ref 3.4–5)
ANION GAP SERPL CALC-SCNC: 13 MMOL/L (ref 10–20)
ANION GAP SERPL CALC-SCNC: 14 MMOL/L (ref 10–20)
BUN SERPL-MCNC: 57 MG/DL (ref 6–23)
BUN SERPL-MCNC: 57 MG/DL (ref 6–23)
CALCIUM SERPL-MCNC: 7.2 MG/DL (ref 8.6–10.3)
CALCIUM SERPL-MCNC: 7.4 MG/DL (ref 8.6–10.3)
CHLORIDE SERPL-SCNC: 97 MMOL/L (ref 98–107)
CHLORIDE SERPL-SCNC: 98 MMOL/L (ref 98–107)
CO2 SERPL-SCNC: 26 MMOL/L (ref 21–32)
CO2 SERPL-SCNC: 28 MMOL/L (ref 21–32)
CREAT SERPL-MCNC: 3.12 MG/DL (ref 0.5–1.3)
CREAT SERPL-MCNC: 3.19 MG/DL (ref 0.5–1.3)
ERYTHROCYTE [DISTWIDTH] IN BLOOD BY AUTOMATED COUNT: 13.9 % (ref 11.5–14.5)
ERYTHROCYTE [DISTWIDTH] IN BLOOD BY AUTOMATED COUNT: 14.2 % (ref 11.5–14.5)
GFR SERPL CREATININE-BSD FRML MDRD: 21 ML/MIN/1.73M*2
GFR SERPL CREATININE-BSD FRML MDRD: 22 ML/MIN/1.73M*2
GLUCOSE BLD MANUAL STRIP-MCNC: 114 MG/DL (ref 74–99)
GLUCOSE BLD MANUAL STRIP-MCNC: 119 MG/DL (ref 74–99)
GLUCOSE BLD MANUAL STRIP-MCNC: 127 MG/DL (ref 74–99)
GLUCOSE BLD MANUAL STRIP-MCNC: 180 MG/DL (ref 74–99)
GLUCOSE SERPL-MCNC: 105 MG/DL (ref 74–99)
GLUCOSE SERPL-MCNC: 219 MG/DL (ref 74–99)
HCT VFR BLD AUTO: 26 % (ref 41–52)
HCT VFR BLD AUTO: 28.2 % (ref 41–52)
HGB BLD-MCNC: 8.1 G/DL (ref 13.5–17.5)
HGB BLD-MCNC: 8.8 G/DL (ref 13.5–17.5)
HOLD SPECIMEN: NORMAL
MAGNESIUM SERPL-MCNC: 1.9 MG/DL (ref 1.6–2.4)
MAGNESIUM SERPL-MCNC: 2 MG/DL (ref 1.6–2.4)
MCH RBC QN AUTO: 28.2 PG (ref 26–34)
MCH RBC QN AUTO: 28.9 PG (ref 26–34)
MCHC RBC AUTO-ENTMCNC: 31.2 G/DL (ref 32–36)
MCHC RBC AUTO-ENTMCNC: 31.2 G/DL (ref 32–36)
MCV RBC AUTO: 91 FL (ref 80–100)
MCV RBC AUTO: 93 FL (ref 80–100)
NRBC BLD-RTO: 0 /100 WBCS (ref 0–0)
NRBC BLD-RTO: 0 /100 WBCS (ref 0–0)
PHOSPHATE SERPL-MCNC: 2.8 MG/DL (ref 2.5–4.9)
PHOSPHATE SERPL-MCNC: 2.9 MG/DL (ref 2.5–4.9)
PLATELET # BLD AUTO: 179 X10*3/UL (ref 150–450)
PLATELET # BLD AUTO: 189 X10*3/UL (ref 150–450)
POTASSIUM SERPL-SCNC: 3.8 MMOL/L (ref 3.5–5.3)
POTASSIUM SERPL-SCNC: 4.3 MMOL/L (ref 3.5–5.3)
RBC # BLD AUTO: 2.87 X10*6/UL (ref 4.5–5.9)
RBC # BLD AUTO: 3.04 X10*6/UL (ref 4.5–5.9)
SODIUM SERPL-SCNC: 134 MMOL/L (ref 136–145)
SODIUM SERPL-SCNC: 134 MMOL/L (ref 136–145)
WBC # BLD AUTO: 8.5 X10*3/UL (ref 4.4–11.3)
WBC # BLD AUTO: 8.6 X10*3/UL (ref 4.4–11.3)

## 2023-11-18 PROCEDURE — 2500000001 HC RX 250 WO HCPCS SELF ADMINISTERED DRUGS (ALT 637 FOR MEDICARE OP): Performed by: REGISTERED NURSE

## 2023-11-18 PROCEDURE — 96372 THER/PROPH/DIAG INJ SC/IM: CPT | Performed by: REGISTERED NURSE

## 2023-11-18 PROCEDURE — 82947 ASSAY GLUCOSE BLOOD QUANT: CPT

## 2023-11-18 PROCEDURE — 83735 ASSAY OF MAGNESIUM: CPT | Performed by: REGISTERED NURSE

## 2023-11-18 PROCEDURE — 80069 RENAL FUNCTION PANEL: CPT | Performed by: REGISTERED NURSE

## 2023-11-18 PROCEDURE — 2500000004 HC RX 250 GENERAL PHARMACY W/ HCPCS (ALT 636 FOR OP/ED): Performed by: REGISTERED NURSE

## 2023-11-18 PROCEDURE — 85027 COMPLETE CBC AUTOMATED: CPT | Performed by: NURSE PRACTITIONER

## 2023-11-18 PROCEDURE — 2500000001 HC RX 250 WO HCPCS SELF ADMINISTERED DRUGS (ALT 637 FOR MEDICARE OP): Performed by: NURSE PRACTITIONER

## 2023-11-18 PROCEDURE — 9420000001 HC RT PATIENT EDUCATION 5 MIN

## 2023-11-18 PROCEDURE — 99232 SBSQ HOSP IP/OBS MODERATE 35: CPT | Performed by: NURSE PRACTITIONER

## 2023-11-18 PROCEDURE — 36415 COLL VENOUS BLD VENIPUNCTURE: CPT | Performed by: NURSE PRACTITIONER

## 2023-11-18 PROCEDURE — 94668 MNPJ CHEST WALL SBSQ: CPT

## 2023-11-18 PROCEDURE — 2060000001 HC INTERMEDIATE ICU ROOM DAILY

## 2023-11-18 PROCEDURE — 2500000001 HC RX 250 WO HCPCS SELF ADMINISTERED DRUGS (ALT 637 FOR MEDICARE OP): Performed by: PHYSICIAN ASSISTANT

## 2023-11-18 PROCEDURE — 80069 RENAL FUNCTION PANEL: CPT | Performed by: NURSE PRACTITIONER

## 2023-11-18 PROCEDURE — 36415 COLL VENOUS BLD VENIPUNCTURE: CPT | Performed by: REGISTERED NURSE

## 2023-11-18 PROCEDURE — 83735 ASSAY OF MAGNESIUM: CPT | Performed by: NURSE PRACTITIONER

## 2023-11-18 PROCEDURE — 85027 COMPLETE CBC AUTOMATED: CPT | Performed by: REGISTERED NURSE

## 2023-11-18 PROCEDURE — 2500000002 HC RX 250 W HCPCS SELF ADMINISTERED DRUGS (ALT 637 FOR MEDICARE OP, ALT 636 FOR OP/ED): Performed by: REGISTERED NURSE

## 2023-11-18 RX ORDER — BISACODYL 10 MG/1
10 SUPPOSITORY RECTAL ONCE
Status: DISCONTINUED | OUTPATIENT
Start: 2023-11-18 | End: 2023-11-18

## 2023-11-18 RX ORDER — BISACODYL 5 MG
10 TABLET, DELAYED RELEASE (ENTERIC COATED) ORAL ONCE
Status: COMPLETED | OUTPATIENT
Start: 2023-11-18 | End: 2023-11-18

## 2023-11-18 RX ADMIN — ASPIRIN 81 MG CHEWABLE TABLET 81 MG: 81 TABLET CHEWABLE at 09:03

## 2023-11-18 RX ADMIN — FUROSEMIDE 80 MG: 10 INJECTION, SOLUTION INTRAMUSCULAR; INTRAVENOUS at 17:00

## 2023-11-18 RX ADMIN — INSULIN LISPRO 5 UNITS: 100 INJECTION, SOLUTION INTRAVENOUS; SUBCUTANEOUS at 12:00

## 2023-11-18 RX ADMIN — TRAMADOL HYDROCHLORIDE 50 MG: 50 TABLET, COATED ORAL at 05:34

## 2023-11-18 RX ADMIN — CLOPIDOGREL 75 MG: 75 TABLET ORAL at 09:03

## 2023-11-18 RX ADMIN — ACETAMINOPHEN 650 MG: 325 TABLET ORAL at 21:09

## 2023-11-18 RX ADMIN — METOPROLOL TARTRATE 50 MG: 50 TABLET, FILM COATED ORAL at 21:09

## 2023-11-18 RX ADMIN — METOPROLOL TARTRATE 50 MG: 50 TABLET, FILM COATED ORAL at 09:02

## 2023-11-18 RX ADMIN — POLYETHYLENE GLYCOL 3350 17 G: 17 POWDER, FOR SOLUTION ORAL at 09:03

## 2023-11-18 RX ADMIN — SENNOSIDES AND DOCUSATE SODIUM 2 TABLET: 8.6; 5 TABLET ORAL at 09:03

## 2023-11-18 RX ADMIN — HYDRALAZINE HYDROCHLORIDE 50 MG: 50 TABLET, FILM COATED ORAL at 21:09

## 2023-11-18 RX ADMIN — SENNOSIDES AND DOCUSATE SODIUM 2 TABLET: 8.6; 5 TABLET ORAL at 21:09

## 2023-11-18 RX ADMIN — ATORVASTATIN CALCIUM 80 MG: 80 TABLET ORAL at 09:02

## 2023-11-18 RX ADMIN — BISACODYL 10 MG: 5 TABLET, COATED ORAL at 09:03

## 2023-11-18 RX ADMIN — ACETAMINOPHEN 650 MG: 325 TABLET ORAL at 13:45

## 2023-11-18 RX ADMIN — PANTOPRAZOLE SODIUM 40 MG: 40 TABLET, DELAYED RELEASE ORAL at 07:00

## 2023-11-18 RX ADMIN — HEPARIN SODIUM 5000 UNITS: 5000 INJECTION INTRAVENOUS; SUBCUTANEOUS at 17:45

## 2023-11-18 RX ADMIN — ACETAMINOPHEN 650 MG: 325 TABLET ORAL at 09:09

## 2023-11-18 RX ADMIN — HYDRALAZINE HYDROCHLORIDE 50 MG: 50 TABLET, FILM COATED ORAL at 15:00

## 2023-11-18 RX ADMIN — FUROSEMIDE 80 MG: 10 INJECTION, SOLUTION INTRAMUSCULAR; INTRAVENOUS at 09:03

## 2023-11-18 RX ADMIN — HEPARIN SODIUM 5000 UNITS: 5000 INJECTION INTRAVENOUS; SUBCUTANEOUS at 09:03

## 2023-11-18 RX ADMIN — HYDRALAZINE HYDROCHLORIDE 50 MG: 50 TABLET, FILM COATED ORAL at 09:03

## 2023-11-18 ASSESSMENT — PAIN - FUNCTIONAL ASSESSMENT: PAIN_FUNCTIONAL_ASSESSMENT: 0-10

## 2023-11-18 ASSESSMENT — PAIN DESCRIPTION - LOCATION: LOCATION: BACK

## 2023-11-18 ASSESSMENT — PAIN SCALES - GENERAL
PAINLEVEL_OUTOF10: 0 - NO PAIN
PAINLEVEL_OUTOF10: 0 - NO PAIN
PAINLEVEL_OUTOF10: 7

## 2023-11-18 NOTE — PROGRESS NOTES
Reason For Consult  Kody on ckd    History Of Present Illness  Humphrey Waddell is a 58 y.o. male with PMH of HFpEF, CAD, carotid stenosis, CVA, DM, anemia, GIB, DJD, PAD s/p Lt AKA, HTN, depression, CKD (Creatinine 2.84 mg/dL), previous transfusion pRBC,  presenting for a scheduled CABG x3 LIMA to LAD Rt EVH SVG to OM Rt EVH SVG to PDA pericardial window and sternal plating on 11/13. OR course 1500ml crystalloid, 250mL Cellsaver. Upon arrival to ICU had significant hypotension requiring return to OR. EBL for return to OR was 170mL. Returned to ICU in stable condition. KODY noted oliguric. We are now consulted for renal care.      Resting comfortably in bed.  No acute events.  He does not offer specific complaints.  On Lasix drip at 20 mg/an hour.  Chart/labs/meds/imaging/notes/VS reviewed.    Physical Exam  Constitutional:    Alert and oriented x 3, appears in no distress  HENT:      Clear oropharynx, moist mucous membranes  Cardiovascular:   Normal rate and regular rhythm; Normal pulses; Normal heart sounds. No murmur heard; No friction rub, midsternal incision   Pulmonary:   Pulmonary effort is normal. No respiratory distress. Normal breath sounds. No stridor. No wheezing or rhonchi.    Abdominal:   Bowel sounds are normal. There is no distension. Abdomen is soft. There is no tenderness or guarding. Lee in place.   Musculoskeletal:      +1 edema. Moves all extremities, Lt ALA with intact stump site  Skin:  Skin is warm and dry. Capillary refill takes less than 2 seconds. No lesion or rash.   Neurological:   No focal deficit present. Alert and oriented to person, place, and time. Takes quite some time to answer questions  Psych:  withdrawn        I&O 24HR    Intake/Output Summary (Last 24 hours) at 11/18/2023 1416  Last data filed at 11/18/2023 1051  Gross per 24 hour   Intake --   Output 1700 ml   Net -1700 ml         Vitals 24HR  Heart Rate:  [65-87]   Temp:  [36.6 °C (97.9 °F)-37.2 °C (98.9 °F)]   Resp:  [14-18]   BP:  (129-179)/(62-86)   Weight:  [121 kg (267 lb 3.2 oz)]   SpO2:  [96 %-99 %]     Lab Results   Component Value Date    WBC 8.5 11/18/2023    HGB 8.1 (L) 11/18/2023    HCT 26.0 (L) 11/18/2023    MCV 91 11/18/2023     11/18/2023      Lab Results   Component Value Date    GLUCOSE 219 (H) 11/18/2023    CALCIUM 7.2 (L) 11/18/2023     (L) 11/18/2023    K 4.3 11/18/2023    CO2 28 11/18/2023    CL 97 (L) 11/18/2023    BUN 57 (H) 11/18/2023    CREATININE 3.19 (H) 11/18/2023        Assessment/Plan     Mr. Waddell is a 58-year-old man with a history of coronary artery disease, and may he suffered an acute coronary syndrome, had a 50% left main, mid LAD at 70%, 95% circumflex.  He had a stenting of the OM 2.  In July echo revealed normal systolic function, no valvular abnormalities.  He was seen by CT surgery.  It is noted that he has had the above-the-knee amputation, that was a risk factor for his rehab.  He has diabetes, hypertension, stage 4 chronic kidney disease and the peripheral vascular disease.  He otherwise has known carotid stenosis, has had a stroke, GI bleed, anemia requiring blood transfusions.    On November 13th he underwent a three-vessel bypass, posterior pericardiotomy.  Blood pressure was high postoperatively, was on epinephrine, insulin infusion, norepinephrine and phenylephrine.  Blood pressures then dropped, late on November 13 he was on a max dose of epinephrine, blood pressures were in the 80s systolic, minimal responsiveness to 2.5 L of lactated Ringer's.  TTE suggested a newly hypokinetic anterior wall.  He went back to the operating room.  Drains were left in place.    By November 14th he was extubated.  On November 15 had nausea and vomiting, was slow to respond.  Had received 1 unit of blood the night prior, norepinephrine was weaned off.    He suffered acute kidney injury which is no surprise given his vascular instability, anemia.  He has significant chronic kidney disease and is unlikely  to autoregulate.  He has received erythropoietin and IV iron.  We will continue the Lasix based on his exam, still has sacral edema. Acute kidney injury improving. Trend his RFP. Will follow.     Principal Problem:    CAD, multiple vessel      Darryl Lopez, DO

## 2023-11-18 NOTE — PROGRESS NOTES
"Humphrey Waddell is a 58 y.o. male on day 5 of admission presenting with CAD, multiple vessel.    Subjective   CATARINO reported       Objective     Physical Exam  Constitutional:       Appearance: Normal appearance. He is obese.   Cardiovascular:      Rate and Rhythm: Normal rate and regular rhythm.      Pulses: Normal pulses.   Pulmonary:      Effort: Pulmonary effort is normal.      Breath sounds: Normal air entry. Decreased breath sounds present.   Abdominal:      General: There is no distension.      Palpations: Abdomen is soft.      Tenderness: There is no abdominal tenderness.   Genitourinary:     Comments: External catheter clear yellow urine.  Musculoskeletal:      Right lower le+ Edema present.      Comments: Left AKA   Skin:     Capillary Refill: Capillary refill takes less than 2 seconds.      Comments: Sternotomy incision well approximated, stable to palpation, no erythema noted.   Right SVG incisions well approximated, no drainage noted   Neurological:      General: No focal deficit present.      Mental Status: He is alert and oriented to person, place, and time.   Psychiatric:         Mood and Affect: Mood normal.         Last Recorded Vitals  Blood pressure 179/72, pulse 74, temperature 37.2 °C (98.9 °F), resp. rate 16, height 1.93 m (6' 3.98\"), weight 121 kg (267 lb 3.2 oz), SpO2 96 %.  Intake/Output last 3 Shifts:  I/O last 3 completed shifts:  In: - (0 mL/kg)   Out: 3600 (29.7 mL/kg) [Urine:3600 (0.8 mL/kg/hr)]  Weight: 121.2 kg     Relevant Results           This patient currently has cardiac telemetry ordered; if you would like to modify or discontinue the telemetry order, click here to go to the orders activity to modify/discontinue the order.        Scheduled medications  acetaminophen, 650 mg, oral, q6h  aspirin, 81 mg, oral, Daily  atorvastatin, 80 mg, oral, Daily  bisacodyl, 10 mg, rectal, Once  clopidogrel, 75 mg, oral, Daily  furosemide, 80 mg, intravenous, BID  heparin (porcine), 5,000 Units, " subcutaneous, q8h  hydrALAZINE, 50 mg, oral, TID  insulin lispro, 0-15 Units, subcutaneous, TID with meals  metoprolol tartrate, 50 mg, oral, BID  pantoprazole, 40 mg, oral, Daily before breakfast  polyethylene glycol, 17 g, oral, Daily  sennosides-docusate sodium, 2 tablet, oral, BID      Continuous medications     PRN medications  PRN medications: dextrose **OR** [DISCONTINUED] glucagon, [Held by provider] melatonin, naloxone, ondansetron, oxygen, traMADol, traMADol  XR chest 2 views    Result Date: 11/16/2023  Interpreted By:  Juancho Valencia, STUDY: XR CHEST 2 VIEWS;  11/16/2023 8:57 am   INDICATION: Signs/Symptoms:POD3 CXR.   COMPARISON: CT scan chest from 09/18/2018. Previous chest x-rays, most recent from 11/14/2023.   ACCESSION NUMBER(S): [id number]   ORDERING CLINICIAN: MERCEDES TREADWELL   TECHNIQUE: PA and lateral views of the chest were obtained.   FINDINGS: MEDIASTINUM/LUNGS/MELINDA: Status post recent heart surgery. All tubes and lines have not been removed. There are areas of hazy and band like opacity at the lung bases, progressed on the left and new on the right. Cardiomegaly without gross vascular congestion. Mild blunting of the costophrenic sulci, best seen on the lateral view.   No pneumothorax. No tracheal deviation. No abnormal hilar fullness or gross mass on either side.   BONES: No lytic or blastic destructive bone lesion.   UPPER ABDOMEN: Grossly intact.       Status post recent CABG. All tubes and lines have been removed. No pneumothorax.   Small bilateral pleural effusions with bibasilar atelectasis or small infiltrates as described.   Cardiomegaly.   Signed by: Juancho Valencia 11/16/2023 9:16 AM Dictation workstation:   [id number]        Results for orders placed or performed during the hospital encounter of 11/13/23 (from the past 24 hour(s))   POCT GLUCOSE   Result Value Ref Range    POCT Glucose 166 (H) 74 - 99 mg/dL   POCT GLUCOSE   Result Value Ref Range    POCT Glucose 119 (H) 74 - 99 mg/dL    POCT GLUCOSE   Result Value Ref Range    POCT Glucose 128 (H) 74 - 99 mg/dL   POCT GLUCOSE   Result Value Ref Range    POCT Glucose 114 (H) 74 - 99 mg/dL   Magnesium   Result Value Ref Range    Magnesium 2.00 1.60 - 2.40 mg/dL   CBC   Result Value Ref Range    WBC 8.6 4.4 - 11.3 x10*3/uL    nRBC 0.0 0.0 - 0.0 /100 WBCs    RBC 3.04 (L) 4.50 - 5.90 x10*6/uL    Hemoglobin 8.8 (L) 13.5 - 17.5 g/dL    Hematocrit 28.2 (L) 41.0 - 52.0 %    MCV 93 80 - 100 fL    MCH 28.9 26.0 - 34.0 pg    MCHC 31.2 (L) 32.0 - 36.0 g/dL    RDW 14.2 11.5 - 14.5 %    Platelets 189 150 - 450 x10*3/uL   Renal function panel   Result Value Ref Range    Glucose 105 (H) 74 - 99 mg/dL    Sodium 134 (L) 136 - 145 mmol/L    Potassium 3.8 3.5 - 5.3 mmol/L    Chloride 98 98 - 107 mmol/L    Bicarbonate 26 21 - 32 mmol/L    Anion Gap 14 10 - 20 mmol/L    Urea Nitrogen 57 (H) 6 - 23 mg/dL    Creatinine 3.12 (H) 0.50 - 1.30 mg/dL    eGFR 22 (L) >60 mL/min/1.73m*2    Calcium 7.4 (L) 8.6 - 10.3 mg/dL    Phosphorus 2.8 2.5 - 4.9 mg/dL    Albumin 2.9 (L) 3.4 - 5.0 g/dL   POCT GLUCOSE   Result Value Ref Range    POCT Glucose 119 (H) 74 - 99 mg/dL   SST TOP   Result Value Ref Range    Extra Tube Hold for add-ons.    CBC   Result Value Ref Range    WBC 8.5 4.4 - 11.3 x10*3/uL    nRBC 0.0 0.0 - 0.0 /100 WBCs    RBC 2.87 (L) 4.50 - 5.90 x10*6/uL    Hemoglobin 8.1 (L) 13.5 - 17.5 g/dL    Hematocrit 26.0 (L) 41.0 - 52.0 %    MCV 91 80 - 100 fL    MCH 28.2 26.0 - 34.0 pg    MCHC 31.2 (L) 32.0 - 36.0 g/dL    RDW 13.9 11.5 - 14.5 %    Platelets 179 150 - 450 x10*3/uL           Assessment/Plan   Principal Problem:    CAD, multiple vessel    Humphrey Waddell is a 58 y.o. male presenting with CAD, hx NSTEMI and HFpEF. PMHx includes NIDDM2, Lt AKA, chronic RLE ulcer, GERD, carotid stenosis, hx CVA, hx GIB, hx anemia req'ing blood transfusions (2023), and hx confusion of anesthesia. Pt admitted to [facility] ICU s/p CABGx3 (LIMA>LAD, SVG>OM1, SVG>PDA), posterior pericardotomy, sternal  wires and plating with Dr. Delatorre on 11/13/23. Postoperative vasoplegic shock. Levophed weaned off POD 2.      Neuro/MSK:   Expected acute postop pain well controlled  hx CVA  Lt AKA  Anxiety  depression  - Serial neuro and pain assessments  - Scheduled Tylenol  - Tramadol per patient request  - PT/OT/MITT     -> Limited mobility at baseline: wheelchair bound- ok to use prosthesis  - Hold home melatonin 3mg     CV:   S/p CABGx3  HFpEF  LVEF pre 60%, post 65%  HTN  - Continuous EKG and ABP  - ASA/statin/plavix  - Metoprolol 50mg BID  - Hydralazine 50mg TID     Pulm:  Oxygenating well o nRA  - Bronch hygiene     GI:   postoperative nausea- resolved  - Regular diet c ensure  - Bowel regimen, no bm in couple of days, add one time dulcolax  - Zofran, Compazine PRN  - PPI     Renal:    IDANIA on CKD (baseline Cr 2.84)  - RFP daily  - Replete lytes prn  - Nephrology following, continue lasix     Heme: Acute on chronic anemia (baseline Hgb 9.1)  11/14 2u prbc  11/15 1u prbc  11/16 1u prbc  - CBC daily  - SCDs, SQH for DVTppx     Endo:   IDDM2  A1C 5.3%  - Continue to hold home Lantus 4u at bedtime     ID:   - Trend temp and WBCs q4  - Periop abx x 48hrs     Dispo: awaiting snf/rehab placement     Discussed with Dr. Delatorre via telephone rounds.      I spent 60 minutes in the professional and overall care of this patient.      Tico Frausto, APRN-CNP

## 2023-11-18 NOTE — CARE PLAN
Problem: Pain  Goal: My pain/discomfort is manageable  Outcome: Progressing     Problem: Safety  Goal: Patient will be injury free during hospitalization  Outcome: Progressing  Goal: I will remain free of falls  Outcome: Progressing     Problem: Daily Care  Goal: Daily care needs are met  Outcome: Progressing     Problem: Psychosocial Needs  Goal: Demonstrates ability to cope with hospitalization/illness  Outcome: Progressing     Problem: Skin  Goal: Prevent/manage excess moisture  Outcome: Progressing  Goal: Prevent/minimize sheer/friction injuries  Outcome: Progressing   The patient's goals for the shift include      The clinical goals for the shift include mobility

## 2023-11-19 LAB
ALBUMIN SERPL BCP-MCNC: 2.8 G/DL (ref 3.4–5)
ANION GAP SERPL CALC-SCNC: 16 MMOL/L (ref 10–20)
BUN SERPL-MCNC: 61 MG/DL (ref 6–23)
CALCIUM SERPL-MCNC: 7.3 MG/DL (ref 8.6–10.3)
CHLORIDE SERPL-SCNC: 98 MMOL/L (ref 98–107)
CO2 SERPL-SCNC: 25 MMOL/L (ref 21–32)
CREAT SERPL-MCNC: 3.16 MG/DL (ref 0.5–1.3)
ERYTHROCYTE [DISTWIDTH] IN BLOOD BY AUTOMATED COUNT: 14 % (ref 11.5–14.5)
GFR SERPL CREATININE-BSD FRML MDRD: 22 ML/MIN/1.73M*2
GLUCOSE BLD MANUAL STRIP-MCNC: 148 MG/DL (ref 74–99)
GLUCOSE BLD MANUAL STRIP-MCNC: 172 MG/DL (ref 74–99)
GLUCOSE BLD MANUAL STRIP-MCNC: 197 MG/DL (ref 74–99)
GLUCOSE SERPL-MCNC: 143 MG/DL (ref 74–99)
HCT VFR BLD AUTO: 28.3 % (ref 41–52)
HGB BLD-MCNC: 8.7 G/DL (ref 13.5–17.5)
MAGNESIUM SERPL-MCNC: 2 MG/DL (ref 1.6–2.4)
MCH RBC QN AUTO: 28.6 PG (ref 26–34)
MCHC RBC AUTO-ENTMCNC: 30.7 G/DL (ref 32–36)
MCV RBC AUTO: 93 FL (ref 80–100)
NRBC BLD-RTO: 0 /100 WBCS (ref 0–0)
PHOSPHATE SERPL-MCNC: 3.2 MG/DL (ref 2.5–4.9)
PLATELET # BLD AUTO: 207 X10*3/UL (ref 150–450)
POTASSIUM SERPL-SCNC: 3.9 MMOL/L (ref 3.5–5.3)
RBC # BLD AUTO: 3.04 X10*6/UL (ref 4.5–5.9)
SODIUM SERPL-SCNC: 135 MMOL/L (ref 136–145)
WBC # BLD AUTO: 9.3 X10*3/UL (ref 4.4–11.3)

## 2023-11-19 PROCEDURE — 94668 MNPJ CHEST WALL SBSQ: CPT

## 2023-11-19 PROCEDURE — 82947 ASSAY GLUCOSE BLOOD QUANT: CPT

## 2023-11-19 PROCEDURE — 2500000002 HC RX 250 W HCPCS SELF ADMINISTERED DRUGS (ALT 637 FOR MEDICARE OP, ALT 636 FOR OP/ED): Performed by: REGISTERED NURSE

## 2023-11-19 PROCEDURE — 2500000001 HC RX 250 WO HCPCS SELF ADMINISTERED DRUGS (ALT 637 FOR MEDICARE OP): Performed by: REGISTERED NURSE

## 2023-11-19 PROCEDURE — 2500000001 HC RX 250 WO HCPCS SELF ADMINISTERED DRUGS (ALT 637 FOR MEDICARE OP): Performed by: NURSE PRACTITIONER

## 2023-11-19 PROCEDURE — 94760 N-INVAS EAR/PLS OXIMETRY 1: CPT

## 2023-11-19 PROCEDURE — 36415 COLL VENOUS BLD VENIPUNCTURE: CPT | Performed by: REGISTERED NURSE

## 2023-11-19 PROCEDURE — 83735 ASSAY OF MAGNESIUM: CPT | Performed by: REGISTERED NURSE

## 2023-11-19 PROCEDURE — 2500000004 HC RX 250 GENERAL PHARMACY W/ HCPCS (ALT 636 FOR OP/ED): Performed by: REGISTERED NURSE

## 2023-11-19 PROCEDURE — 96372 THER/PROPH/DIAG INJ SC/IM: CPT | Performed by: REGISTERED NURSE

## 2023-11-19 PROCEDURE — 99232 SBSQ HOSP IP/OBS MODERATE 35: CPT | Performed by: NURSE PRACTITIONER

## 2023-11-19 PROCEDURE — 85027 COMPLETE CBC AUTOMATED: CPT | Performed by: REGISTERED NURSE

## 2023-11-19 PROCEDURE — 80069 RENAL FUNCTION PANEL: CPT | Performed by: NURSE PRACTITIONER

## 2023-11-19 PROCEDURE — 97112 NEUROMUSCULAR REEDUCATION: CPT | Mod: GP,CQ

## 2023-11-19 PROCEDURE — 2500000001 HC RX 250 WO HCPCS SELF ADMINISTERED DRUGS (ALT 637 FOR MEDICARE OP): Performed by: PHYSICIAN ASSISTANT

## 2023-11-19 PROCEDURE — 97110 THERAPEUTIC EXERCISES: CPT | Mod: GP,CQ

## 2023-11-19 PROCEDURE — 2060000001 HC INTERMEDIATE ICU ROOM DAILY

## 2023-11-19 RX ORDER — BISACODYL 5 MG
10 TABLET, DELAYED RELEASE (ENTERIC COATED) ORAL ONCE
Status: COMPLETED | OUTPATIENT
Start: 2023-11-19 | End: 2023-11-19

## 2023-11-19 RX ORDER — TALC
5 POWDER (GRAM) TOPICAL NIGHTLY
Status: DISCONTINUED | OUTPATIENT
Start: 2023-11-19 | End: 2023-11-24 | Stop reason: HOSPADM

## 2023-11-19 RX ADMIN — HYDRALAZINE HYDROCHLORIDE 50 MG: 50 TABLET, FILM COATED ORAL at 08:44

## 2023-11-19 RX ADMIN — INSULIN LISPRO 5 UNITS: 100 INJECTION, SOLUTION INTRAVENOUS; SUBCUTANEOUS at 13:16

## 2023-11-19 RX ADMIN — METOPROLOL TARTRATE 50 MG: 50 TABLET, FILM COATED ORAL at 08:44

## 2023-11-19 RX ADMIN — Medication 4.5 MG: at 20:09

## 2023-11-19 RX ADMIN — ACETAMINOPHEN 650 MG: 325 TABLET ORAL at 01:08

## 2023-11-19 RX ADMIN — CLOPIDOGREL 75 MG: 75 TABLET ORAL at 08:44

## 2023-11-19 RX ADMIN — INSULIN LISPRO 10 UNITS: 100 INJECTION, SOLUTION INTRAVENOUS; SUBCUTANEOUS at 16:38

## 2023-11-19 RX ADMIN — ACETAMINOPHEN 650 MG: 325 TABLET ORAL at 07:45

## 2023-11-19 RX ADMIN — HEPARIN SODIUM 5000 UNITS: 5000 INJECTION INTRAVENOUS; SUBCUTANEOUS at 01:08

## 2023-11-19 RX ADMIN — SENNOSIDES AND DOCUSATE SODIUM 2 TABLET: 8.6; 5 TABLET ORAL at 08:44

## 2023-11-19 RX ADMIN — HEPARIN SODIUM 5000 UNITS: 5000 INJECTION INTRAVENOUS; SUBCUTANEOUS at 17:45

## 2023-11-19 RX ADMIN — METOPROLOL TARTRATE 50 MG: 50 TABLET, FILM COATED ORAL at 20:09

## 2023-11-19 RX ADMIN — HYDRALAZINE HYDROCHLORIDE 50 MG: 50 TABLET, FILM COATED ORAL at 16:39

## 2023-11-19 RX ADMIN — BISACODYL 10 MG: 5 TABLET, COATED ORAL at 08:43

## 2023-11-19 RX ADMIN — ACETAMINOPHEN 650 MG: 325 TABLET ORAL at 20:08

## 2023-11-19 RX ADMIN — HEPARIN SODIUM 5000 UNITS: 5000 INJECTION INTRAVENOUS; SUBCUTANEOUS at 08:45

## 2023-11-19 RX ADMIN — SENNOSIDES AND DOCUSATE SODIUM 2 TABLET: 8.6; 5 TABLET ORAL at 20:09

## 2023-11-19 RX ADMIN — PANTOPRAZOLE SODIUM 40 MG: 40 TABLET, DELAYED RELEASE ORAL at 06:31

## 2023-11-19 RX ADMIN — POLYETHYLENE GLYCOL 3350 17 G: 17 POWDER, FOR SOLUTION ORAL at 08:44

## 2023-11-19 RX ADMIN — HYDRALAZINE HYDROCHLORIDE 50 MG: 50 TABLET, FILM COATED ORAL at 20:09

## 2023-11-19 RX ADMIN — FUROSEMIDE 80 MG: 10 INJECTION, SOLUTION INTRAMUSCULAR; INTRAVENOUS at 08:44

## 2023-11-19 RX ADMIN — ACETAMINOPHEN 650 MG: 325 TABLET ORAL at 13:16

## 2023-11-19 RX ADMIN — TRAMADOL HYDROCHLORIDE 50 MG: 50 TABLET, COATED ORAL at 17:12

## 2023-11-19 RX ADMIN — FUROSEMIDE 80 MG: 10 INJECTION, SOLUTION INTRAMUSCULAR; INTRAVENOUS at 16:39

## 2023-11-19 RX ADMIN — ATORVASTATIN CALCIUM 80 MG: 80 TABLET ORAL at 08:43

## 2023-11-19 RX ADMIN — ASPIRIN 81 MG CHEWABLE TABLET 81 MG: 81 TABLET CHEWABLE at 08:44

## 2023-11-19 RX ADMIN — INSULIN LISPRO 5 UNITS: 100 INJECTION, SOLUTION INTRAVENOUS; SUBCUTANEOUS at 08:45

## 2023-11-19 ASSESSMENT — COGNITIVE AND FUNCTIONAL STATUS - GENERAL
MOBILITY SCORE: 9
MOBILITY SCORE: 10
DRESSING REGULAR LOWER BODY CLOTHING: A LOT
STANDING UP FROM CHAIR USING ARMS: TOTAL
MOVING TO AND FROM BED TO CHAIR: TOTAL
MOVING TO AND FROM BED TO CHAIR: TOTAL
MOVING FROM LYING ON BACK TO SITTING ON SIDE OF FLAT BED WITH BEDRAILS: A LOT
HELP NEEDED FOR BATHING: A LOT
DRESSING REGULAR UPPER BODY CLOTHING: A LITTLE
CLIMB 3 TO 5 STEPS WITH RAILING: TOTAL
STANDING UP FROM CHAIR USING ARMS: TOTAL
WALKING IN HOSPITAL ROOM: TOTAL
TURNING FROM BACK TO SIDE WHILE IN FLAT BAD: A LITTLE
TURNING FROM BACK TO SIDE WHILE IN FLAT BAD: A LOT
WALKING IN HOSPITAL ROOM: A LOT
CLIMB 3 TO 5 STEPS WITH RAILING: TOTAL
DAILY ACTIVITIY SCORE: 16
TOILETING: A LOT
PERSONAL GROOMING: A LITTLE
MOVING FROM LYING ON BACK TO SITTING ON SIDE OF FLAT BED WITH BEDRAILS: A LITTLE

## 2023-11-19 ASSESSMENT — PAIN SCALES - GENERAL
PAINLEVEL_OUTOF10: 0 - NO PAIN
PAINLEVEL_OUTOF10: 0 - NO PAIN
PAINLEVEL_OUTOF10: 3
PAINLEVEL_OUTOF10: 10 - WORST POSSIBLE PAIN

## 2023-11-19 ASSESSMENT — PAIN - FUNCTIONAL ASSESSMENT: PAIN_FUNCTIONAL_ASSESSMENT: 0-10

## 2023-11-19 NOTE — PROGRESS NOTES
Reason For Consult  Kody on ckd    History Of Present Illness  Humphrey Waddell is a 58 y.o. male with PMH of HFpEF, CAD, carotid stenosis, CVA, DM, anemia, GIB, DJD, PAD s/p Lt AKA, HTN, depression, CKD (Creatinine 2.84 mg/dL), previous transfusion pRBC,  presenting for a scheduled CABG x3 LIMA to LAD Rt EVH SVG to OM Rt EVH SVG to PDA pericardial window and sternal plating on 11/13. OR course 1500ml crystalloid, 250mL Cellsaver. Upon arrival to ICU had significant hypotension requiring return to OR. EBL for return to OR was 170mL. Returned to ICU in stable condition. KODY noted oliguric. We are now consulted for renal care.      Sitting up in bed. Denies shortness of breath or chest discomfort.   No acute events.  He does not offer specific complaints.  Chart/labs/meds/imaging/notes/VS reviewed.    Physical Exam  Constitutional:    Alert and oriented x 3, appears in no distress  HENT:      Clear oropharynx, moist mucous membranes  Cardiovascular:   Normal rate and regular rhythm; Normal pulses; Normal heart sounds. No murmur heard; No friction rub, midsternal incision   Pulmonary:   Pulmonary effort is normal. No respiratory distress. Normal breath sounds. No stridor. No wheezing or rhonchi.    Abdominal:   Bowel sounds are normal. There is no distension. Abdomen is soft. There is no tenderness or guarding. Lee in place.   Musculoskeletal:      +1 edema. Moves all extremities, Lt ALA with intact stump site  Skin:  Skin is warm and dry. Capillary refill takes less than 2 seconds. No lesion or rash.   Neurological:   No focal deficit present. Alert and oriented to person, place, and time. Takes quite some time to answer questions  Psych:  withdrawn        I&O 24HR    Intake/Output Summary (Last 24 hours) at 11/19/2023 1133  Last data filed at 11/19/2023 0600  Gross per 24 hour   Intake --   Output 1850 ml   Net -1850 ml         Vitals 24HR  Heart Rate:  [69-79]   Temp:  [36.9 °C (98.5 °F)-37.2 °C (98.9 °F)]   Resp:  [18]    BP: (148-166)/(68-74)   Weight:  [121 kg (266 lb 14.4 oz)]   SpO2:  [95 %-100 %]     Lab Results   Component Value Date    WBC 9.3 11/19/2023    HGB 8.7 (L) 11/19/2023    HCT 28.3 (L) 11/19/2023    MCV 93 11/19/2023     11/19/2023      Lab Results   Component Value Date    GLUCOSE 143 (H) 11/19/2023    CALCIUM 7.3 (L) 11/19/2023     (L) 11/19/2023    K 3.9 11/19/2023    CO2 25 11/19/2023    CL 98 11/19/2023    BUN 61 (H) 11/19/2023    CREATININE 3.16 (H) 11/19/2023        Assessment/Plan     Mr. Waddell is a 58-year-old man with a history of coronary artery disease, and may he suffered an acute coronary syndrome, had a 50% left main, mid LAD at 70%, 95% circumflex.  He had a stenting of the OM 2.  In July echo revealed normal systolic function, no valvular abnormalities.  He was seen by CT surgery.  It is noted that he has had the above-the-knee amputation, that was a risk factor for his rehab.  He has diabetes, hypertension, stage 4 chronic kidney disease and the peripheral vascular disease.  He otherwise has known carotid stenosis, has had a stroke, GI bleed, anemia requiring blood transfusions.    On November 13th he underwent a three-vessel bypass, posterior pericardiotomy.  Blood pressure was high postoperatively, was on epinephrine, insulin infusion, norepinephrine and phenylephrine.  Blood pressures then dropped, late on November 13 he was on a max dose of epinephrine, blood pressures were in the 80s systolic, minimal responsiveness to 2.5 L of lactated Ringer's.  TTE suggested a newly hypokinetic anterior wall.  He went back to the operating room.  Drains were left in place. By November 14th he was extubated.  On November 15 had nausea and vomiting, was slow to respond.  Had received 1 unit of blood the night prior, norepinephrine was weaned off.    He suffered acute kidney injury which is no surprise given his vascular instability, anemia.  He has significant chronic kidney disease and is  unlikely to autoregulate.  He has received erythropoietin and IV iron.  He is receiving IVP Lasix 80 mg BID based on his exam, still has sacral edema. Acute kidney injury is slowly improving. I anticipate transitioning him to oral diuretics as early as tomorrow. Trend his RFP. Will follow.     Principal Problem:    CAD, multiple vessel      Darryl Lopez, DO

## 2023-11-19 NOTE — PROGRESS NOTES
"Humphrey Waddell is a 58 y.o. male on day 6 of admission presenting with CAD, multiple vessel.    Subjective   CATARINO reported  Post op pain controlled  No BM yet       Objective     Physical Exam  Constitutional:       General: He is not in acute distress.  Cardiovascular:      Rate and Rhythm: Normal rate and regular rhythm.      Pulses: Normal pulses.   Pulmonary:      Effort: Pulmonary effort is normal.      Breath sounds: Normal air entry. Decreased breath sounds present.   Abdominal:      General: Abdomen is flat. There is no distension.      Palpations: Abdomen is soft.      Tenderness: There is no abdominal tenderness.   Musculoskeletal:         General: Normal range of motion.      Comments: Left AKA   Skin:     General: Skin is warm and dry.      Capillary Refill: Capillary refill takes less than 2 seconds.      Comments: Sternotomy incision well approximated, stable to palpation, no erythema noted.   Right SVG incisions well approximated, no drainage noted      Neurological:      General: No focal deficit present.      Mental Status: He is alert and oriented to person, place, and time.   Psychiatric:         Mood and Affect: Mood normal.         Last Recorded Vitals  Blood pressure 148/70, pulse 69, temperature 37 °C (98.6 °F), resp. rate 16, height 1.93 m (6' 3.98\"), weight 121 kg (266 lb 14.4 oz), SpO2 95 %.  Intake/Output last 3 Shifts:  I/O last 3 completed shifts:  In: - (0 mL/kg)   Out: 3550 (29.3 mL/kg) [Urine:3550 (0.8 mL/kg/hr)]  Weight: 121.1 kg     Relevant Results           This patient currently has cardiac telemetry ordered; if you would like to modify or discontinue the telemetry order, click here to go to the orders activity to modify/discontinue the order.  Scheduled medications  acetaminophen, 650 mg, oral, q6h  aspirin, 81 mg, oral, Daily  atorvastatin, 80 mg, oral, Daily  clopidogrel, 75 mg, oral, Daily  furosemide, 80 mg, intravenous, BID  heparin (porcine), 5,000 Units, subcutaneous, " q8h  hydrALAZINE, 50 mg, oral, TID  insulin lispro, 0-15 Units, subcutaneous, TID with meals  metoprolol tartrate, 50 mg, oral, BID  pantoprazole, 40 mg, oral, Daily before breakfast  polyethylene glycol, 17 g, oral, Daily  sennosides-docusate sodium, 2 tablet, oral, BID      Continuous medications     PRN medications  PRN medications: dextrose **OR** [DISCONTINUED] glucagon, [Held by provider] melatonin, naloxone, ondansetron, oxygen, traMADol, traMADol        Results for orders placed or performed during the hospital encounter of 11/13/23 (from the past 24 hour(s))   POCT GLUCOSE   Result Value Ref Range    POCT Glucose 119 (H) 74 - 99 mg/dL   SST TOP   Result Value Ref Range    Extra Tube Hold for add-ons.    Renal Function Panel   Result Value Ref Range    Glucose 219 (H) 74 - 99 mg/dL    Sodium 134 (L) 136 - 145 mmol/L    Potassium 4.3 3.5 - 5.3 mmol/L    Chloride 97 (L) 98 - 107 mmol/L    Bicarbonate 28 21 - 32 mmol/L    Anion Gap 13 10 - 20 mmol/L    Urea Nitrogen 57 (H) 6 - 23 mg/dL    Creatinine 3.19 (H) 0.50 - 1.30 mg/dL    eGFR 21 (L) >60 mL/min/1.73m*2    Calcium 7.2 (L) 8.6 - 10.3 mg/dL    Phosphorus 2.9 2.5 - 4.9 mg/dL    Albumin 2.7 (L) 3.4 - 5.0 g/dL   CBC   Result Value Ref Range    WBC 8.5 4.4 - 11.3 x10*3/uL    nRBC 0.0 0.0 - 0.0 /100 WBCs    RBC 2.87 (L) 4.50 - 5.90 x10*6/uL    Hemoglobin 8.1 (L) 13.5 - 17.5 g/dL    Hematocrit 26.0 (L) 41.0 - 52.0 %    MCV 91 80 - 100 fL    MCH 28.2 26.0 - 34.0 pg    MCHC 31.2 (L) 32.0 - 36.0 g/dL    RDW 13.9 11.5 - 14.5 %    Platelets 179 150 - 450 x10*3/uL   Magnesium   Result Value Ref Range    Magnesium 1.90 1.60 - 2.40 mg/dL   POCT GLUCOSE   Result Value Ref Range    POCT Glucose 180 (H) 74 - 99 mg/dL   POCT GLUCOSE   Result Value Ref Range    POCT Glucose 127 (H) 74 - 99 mg/dL   CBC   Result Value Ref Range    WBC 9.3 4.4 - 11.3 x10*3/uL    nRBC 0.0 0.0 - 0.0 /100 WBCs    RBC 3.04 (L) 4.50 - 5.90 x10*6/uL    Hemoglobin 8.7 (L) 13.5 - 17.5 g/dL    Hematocrit  28.3 (L) 41.0 - 52.0 %    MCV 93 80 - 100 fL    MCH 28.6 26.0 - 34.0 pg    MCHC 30.7 (L) 32.0 - 36.0 g/dL    RDW 14.0 11.5 - 14.5 %    Platelets 207 150 - 450 x10*3/uL              Assessment/Plan   Principal Problem:    CAD, multiple vessel    Humphrey Waddell is a 58 y.o. male presenting with CAD, hx NSTEMI and HFpEF. PMHx includes NIDDM2, Lt AKA, chronic RLE ulcer, GERD, carotid stenosis, hx CVA, hx GIB, hx anemia req'ing blood transfusions (2023), and hx confusion of anesthesia. Pt admitted to [facility] ICU s/p CABGx3 (LIMA>LAD, SVG>OM1, SVG>PDA), posterior pericardotomy, sternal wires and plating with Dr. Delatorre on 11/13/23. Postoperative vasoplegic shock. Levophed weaned off POD 2.      Neuro/MSK:   Expected acute postop pain well controlled  hx CVA  Lt AKA  Anxiety  depression  - Serial neuro and pain assessments  - Scheduled Tylenol  - Tramadol per patient request  - PT/OT/MITT     -> Limited mobility at baseline: wheelchair bound- ok to use prosthesis  - Hold home melatonin 3mg     CV:   S/p CABGx3  HFpEF  LVEF pre 60%, post 65%  HTN  - Continuous EKG and ABP  - ASA/statin/plavix  - Metoprolol 50mg BID  - Hydralazine 50mg TID     Pulm:  Oxygenating well o nRA  - Bronch hygiene     GI:   - Regular diet c ensure  - Bowel regimen, no bm in couple of days, add one time dulcolax  - Zofran, Compazine PRN  - PPI     Renal:    IDANIA on CKD (baseline Cr 2.84), creatinine downtrending  - RFP daily  - Replete lytes prn  - Nephrology following, continue lasix     Heme: Acute on chronic anemia (baseline Hgb 9.1)  11/14 2u prbc  11/15 1u prbc  11/16 1u prbc  - CBC daily  - SCDs, SQH for DVTppx     Endo:   IDDM2  A1C 5.3%  - Continue to hold home Lantus 4u at bedtime     ID:   - Trend temp and WBCs q4     Dispo: awaiting snf/rehab placement        Discussed with Dr. Delatorre via telephone rounds.        I spent 60 minutes in the professional and overall care of this patient       I spent 60 minutes in the professional and overall  care of this patient.      Tico Frausto, APRN-CNP

## 2023-11-20 LAB
ALBUMIN SERPL BCP-MCNC: 2.7 G/DL (ref 3.4–5)
ANION GAP SERPL CALC-SCNC: 13 MMOL/L (ref 10–20)
BUN SERPL-MCNC: 60 MG/DL (ref 6–23)
CALCIUM SERPL-MCNC: 7 MG/DL (ref 8.6–10.3)
CHLORIDE SERPL-SCNC: 98 MMOL/L (ref 98–107)
CO2 SERPL-SCNC: 27 MMOL/L (ref 21–32)
CREAT SERPL-MCNC: 2.94 MG/DL (ref 0.5–1.3)
ERYTHROCYTE [DISTWIDTH] IN BLOOD BY AUTOMATED COUNT: 14 % (ref 11.5–14.5)
FERRITIN SERPL-MCNC: 441 NG/ML (ref 20–300)
GFR SERPL CREATININE-BSD FRML MDRD: 24 ML/MIN/1.73M*2
GLUCOSE BLD MANUAL STRIP-MCNC: 126 MG/DL (ref 74–99)
GLUCOSE BLD MANUAL STRIP-MCNC: 212 MG/DL (ref 74–99)
GLUCOSE SERPL-MCNC: 144 MG/DL (ref 74–99)
HCT VFR BLD AUTO: 26.6 % (ref 41–52)
HGB BLD-MCNC: 8.3 G/DL (ref 13.5–17.5)
IRON SATN MFR SERPL: 17 % (ref 25–45)
IRON SERPL-MCNC: 29 UG/DL (ref 35–150)
MAGNESIUM SERPL-MCNC: 1.9 MG/DL (ref 1.6–2.4)
MCH RBC QN AUTO: 28.3 PG (ref 26–34)
MCHC RBC AUTO-ENTMCNC: 31.2 G/DL (ref 32–36)
MCV RBC AUTO: 91 FL (ref 80–100)
NRBC BLD-RTO: 0 /100 WBCS (ref 0–0)
PHOSPHATE SERPL-MCNC: 3 MG/DL (ref 2.5–4.9)
PLATELET # BLD AUTO: 213 X10*3/UL (ref 150–450)
POTASSIUM SERPL-SCNC: 4.2 MMOL/L (ref 3.5–5.3)
RBC # BLD AUTO: 2.93 X10*6/UL (ref 4.5–5.9)
SODIUM SERPL-SCNC: 134 MMOL/L (ref 136–145)
TIBC SERPL-MCNC: 172 UG/DL (ref 240–445)
UIBC SERPL-MCNC: 143 UG/DL (ref 110–370)
WBC # BLD AUTO: 11 X10*3/UL (ref 4.4–11.3)

## 2023-11-20 PROCEDURE — 2500000004 HC RX 250 GENERAL PHARMACY W/ HCPCS (ALT 636 FOR OP/ED): Performed by: REGISTERED NURSE

## 2023-11-20 PROCEDURE — 2500000001 HC RX 250 WO HCPCS SELF ADMINISTERED DRUGS (ALT 637 FOR MEDICARE OP): Performed by: INTERNAL MEDICINE

## 2023-11-20 PROCEDURE — 96372 THER/PROPH/DIAG INJ SC/IM: CPT | Performed by: REGISTERED NURSE

## 2023-11-20 PROCEDURE — 83735 ASSAY OF MAGNESIUM: CPT | Performed by: REGISTERED NURSE

## 2023-11-20 PROCEDURE — 85027 COMPLETE CBC AUTOMATED: CPT | Performed by: REGISTERED NURSE

## 2023-11-20 PROCEDURE — 2500000002 HC RX 250 W HCPCS SELF ADMINISTERED DRUGS (ALT 637 FOR MEDICARE OP, ALT 636 FOR OP/ED): Performed by: STUDENT IN AN ORGANIZED HEALTH CARE EDUCATION/TRAINING PROGRAM

## 2023-11-20 PROCEDURE — 2500000001 HC RX 250 WO HCPCS SELF ADMINISTERED DRUGS (ALT 637 FOR MEDICARE OP): Performed by: PHYSICIAN ASSISTANT

## 2023-11-20 PROCEDURE — 2500000001 HC RX 250 WO HCPCS SELF ADMINISTERED DRUGS (ALT 637 FOR MEDICARE OP): Performed by: REGISTERED NURSE

## 2023-11-20 PROCEDURE — 94668 MNPJ CHEST WALL SBSQ: CPT

## 2023-11-20 PROCEDURE — 2060000001 HC INTERMEDIATE ICU ROOM DAILY

## 2023-11-20 PROCEDURE — 99232 SBSQ HOSP IP/OBS MODERATE 35: CPT | Performed by: STUDENT IN AN ORGANIZED HEALTH CARE EDUCATION/TRAINING PROGRAM

## 2023-11-20 PROCEDURE — 2500000001 HC RX 250 WO HCPCS SELF ADMINISTERED DRUGS (ALT 637 FOR MEDICARE OP): Performed by: NURSE PRACTITIONER

## 2023-11-20 PROCEDURE — 80069 RENAL FUNCTION PANEL: CPT | Performed by: STUDENT IN AN ORGANIZED HEALTH CARE EDUCATION/TRAINING PROGRAM

## 2023-11-20 PROCEDURE — 36415 COLL VENOUS BLD VENIPUNCTURE: CPT | Performed by: REGISTERED NURSE

## 2023-11-20 PROCEDURE — 82728 ASSAY OF FERRITIN: CPT | Mod: AHULAB | Performed by: INTERNAL MEDICINE

## 2023-11-20 PROCEDURE — 82947 ASSAY GLUCOSE BLOOD QUANT: CPT

## 2023-11-20 PROCEDURE — 83550 IRON BINDING TEST: CPT | Performed by: INTERNAL MEDICINE

## 2023-11-20 RX ORDER — LANOLIN ALCOHOL/MO/W.PET/CERES
400 CREAM (GRAM) TOPICAL DAILY
Status: DISCONTINUED | OUTPATIENT
Start: 2023-11-20 | End: 2023-11-24 | Stop reason: HOSPADM

## 2023-11-20 RX ORDER — DEXTROSE MONOHYDRATE 100 MG/ML
0.3 INJECTION, SOLUTION INTRAVENOUS ONCE AS NEEDED
Status: DISCONTINUED | OUTPATIENT
Start: 2023-11-20 | End: 2023-11-24 | Stop reason: HOSPADM

## 2023-11-20 RX ORDER — INSULIN GLARGINE 100 [IU]/ML
4 INJECTION, SOLUTION SUBCUTANEOUS NIGHTLY
Status: DISCONTINUED | OUTPATIENT
Start: 2023-11-20 | End: 2023-11-24 | Stop reason: HOSPADM

## 2023-11-20 RX ORDER — TORSEMIDE 20 MG/1
40 TABLET ORAL DAILY
Status: DISCONTINUED | OUTPATIENT
Start: 2023-11-20 | End: 2023-11-24 | Stop reason: HOSPADM

## 2023-11-20 RX ORDER — DEXTROSE 50 % IN WATER (D50W) INTRAVENOUS SYRINGE
25
Status: DISCONTINUED | OUTPATIENT
Start: 2023-11-20 | End: 2023-11-24 | Stop reason: HOSPADM

## 2023-11-20 RX ADMIN — METOPROLOL TARTRATE 50 MG: 50 TABLET, FILM COATED ORAL at 11:23

## 2023-11-20 RX ADMIN — HEPARIN SODIUM 5000 UNITS: 5000 INJECTION INTRAVENOUS; SUBCUTANEOUS at 11:26

## 2023-11-20 RX ADMIN — INSULIN GLARGINE 4 UNITS: 100 INJECTION, SOLUTION SUBCUTANEOUS at 20:42

## 2023-11-20 RX ADMIN — CLOPIDOGREL 75 MG: 75 TABLET ORAL at 11:13

## 2023-11-20 RX ADMIN — POLYETHYLENE GLYCOL 3350 17 G: 17 POWDER, FOR SOLUTION ORAL at 11:24

## 2023-11-20 RX ADMIN — ACETAMINOPHEN 650 MG: 325 TABLET ORAL at 11:24

## 2023-11-20 RX ADMIN — ACETAMINOPHEN 650 MG: 325 TABLET ORAL at 01:32

## 2023-11-20 RX ADMIN — ATORVASTATIN CALCIUM 80 MG: 80 TABLET ORAL at 09:00

## 2023-11-20 RX ADMIN — SENNOSIDES AND DOCUSATE SODIUM 2 TABLET: 8.6; 5 TABLET ORAL at 11:27

## 2023-11-20 RX ADMIN — HYDRALAZINE HYDROCHLORIDE 50 MG: 50 TABLET, FILM COATED ORAL at 11:25

## 2023-11-20 RX ADMIN — TORSEMIDE 40 MG: 20 TABLET ORAL at 11:13

## 2023-11-20 RX ADMIN — Medication 4.5 MG: at 20:18

## 2023-11-20 RX ADMIN — HYDRALAZINE HYDROCHLORIDE 50 MG: 50 TABLET, FILM COATED ORAL at 20:18

## 2023-11-20 RX ADMIN — ASPIRIN 81 MG CHEWABLE TABLET 81 MG: 81 TABLET CHEWABLE at 11:24

## 2023-11-20 RX ADMIN — ACETAMINOPHEN 650 MG: 325 TABLET ORAL at 20:18

## 2023-11-20 RX ADMIN — HEPARIN SODIUM 5000 UNITS: 5000 INJECTION INTRAVENOUS; SUBCUTANEOUS at 01:32

## 2023-11-20 RX ADMIN — PANTOPRAZOLE SODIUM 40 MG: 40 TABLET, DELAYED RELEASE ORAL at 06:06

## 2023-11-20 RX ADMIN — METOPROLOL TARTRATE 50 MG: 50 TABLET, FILM COATED ORAL at 20:18

## 2023-11-20 RX ADMIN — SENNOSIDES AND DOCUSATE SODIUM 2 TABLET: 8.6; 5 TABLET ORAL at 20:18

## 2023-11-20 RX ADMIN — HEPARIN SODIUM 5000 UNITS: 5000 INJECTION INTRAVENOUS; SUBCUTANEOUS at 17:45

## 2023-11-20 RX ADMIN — TRAMADOL HYDROCHLORIDE 50 MG: 50 TABLET, COATED ORAL at 12:24

## 2023-11-20 ASSESSMENT — PAIN SCALES - GENERAL
PAINLEVEL_OUTOF10: 0 - NO PAIN

## 2023-11-20 NOTE — PROGRESS NOTES
"Occupational Therapy                 Therapy Communication Note    Patient Name: uHmphrey Waddell  MRN: [id number]  Today's Date: 11/20/2023     Discipline: Occupational Therapy    Missed Visit Reason: Missed Visit Reason: Patient refused (Pt. edu on OT role/POC. Pt. verbalized having increased pain and discomfort d/t not having had a BM in \"awhile\", had one earlier this date which pt. stated as \"worst pain I have ever felt\". Pt. requesting OT to defer a this time. RN made aware.)    Missed Time: Attempt    "

## 2023-11-20 NOTE — PROGRESS NOTES
Humphrey Waddell is a 58 y.o. male on day 7 of admission presenting with CAD, multiple vessel.          This TCC got notice from patient's nurse that patient changed his mind and is requesting [facility]. Requested from [facility] to send referral to [facility]. Will follow for acceptance.

## 2023-11-20 NOTE — PROGRESS NOTES
"Humphrey Waddell is a 58 y.o. male on day 7 of admission presenting with CAD, multiple vessel.    Subjective   Feeling \"tired\"       Objective     Physical Exam  Cardiovascular:      Rate and Rhythm: Normal rate and regular rhythm.   Pulmonary:      Effort: Pulmonary effort is normal.      Breath sounds: Normal breath sounds.   Abdominal:      General: There is no distension.      Palpations: Abdomen is soft.      Tenderness: There is no abdominal tenderness.      Comments: Obese. BM yesterday   Musculoskeletal:      Comments: Lt AKA   Skin:     General: Skin is warm and dry.      Comments: Well approximated MSI stable to palpation no erythema edema or purulence   Neurological:      General: No focal deficit present.      Mental Status: He is alert and oriented to person, place, and time.   Psychiatric:         Mood and Affect: Mood normal.         Behavior: Behavior normal.           Last Recorded Vitals  Blood pressure 154/63, pulse 69, temperature 36.7 °C (98 °F), resp. rate 20, height 1.93 m (6' 3.98\"), weight 121 kg (267 lb), SpO2 95 %.  Intake/Output last 3 Shifts:  I/O last 3 completed shifts:  In: - (0 mL/kg)   Out: 4000 (33 mL/kg) [Urine:4000 (0.9 mL/kg/hr)]  Weight: 121.1 kg     Relevant Results           This patient currently has cardiac telemetry ordered; if you would like to modify or discontinue the telemetry order, click here to go to the orders activity to modify/discontinue the order.  Scheduled medications  acetaminophen, 650 mg, oral, q6h  aspirin, 81 mg, oral, Daily  atorvastatin, 80 mg, oral, Daily  clopidogrel, 75 mg, oral, Daily  furosemide, 80 mg, intravenous, BID  heparin (porcine), 5,000 Units, subcutaneous, q8h  hydrALAZINE, 50 mg, oral, TID  insulin lispro, 0-15 Units, subcutaneous, TID with meals  melatonin, 4.5 mg, oral, Nightly  metoprolol tartrate, 50 mg, oral, BID  pantoprazole, 40 mg, oral, Daily before breakfast  polyethylene glycol, 17 g, oral, Daily  sennosides-docusate sodium, 2 " tablet, oral, BID      Continuous medications     PRN medications  PRN medications: dextrose **OR** [DISCONTINUED] glucagon, [Held by provider] melatonin, naloxone, ondansetron, oxygen, traMADol, traMADol    LABS:  CMP:  Results from last 7 days   Lab Units 11/20/23  0506 11/19/23  0522 11/18/23  1027 11/18/23  0519 11/17/23  0513 11/16/23  0410 11/15/23  1459 11/15/23  0320 11/14/23  1747 11/14/23  1250 11/14/23  0924 11/14/23  0453 11/13/23  2331 11/13/23  2105 11/13/23  1701 11/13/23  1417   SODIUM mmol/L  --  135* 134* 134* 133* 135* 135* 136 138 138 139  --    < > 138   < > 137   POTASSIUM mmol/L  --  3.9 4.3 3.8 4.3 5.0 5.3 5.3 5.7* 5.8* 6.2*  --    < > 6.2*   < > 5.4*   CHLORIDE mmol/L  --  98 97* 98 98 101 101 104 106 108* 107  --    < > 106   < > 110*   CO2 mmol/L  --  25 28 26 28 24 24 25 24 23 25  --    < > 22   < > 21   ANION GAP mmol/L  --  16 13 14 11 15 15 12 14 13 13  --    < > 16   < > 11   BUN mg/dL  --  61* 57* 57* 56* 56* 53* 52* 52* 55* 53*  --    < > 45*   < > 45*   CREATININE mg/dL  --  3.16* 3.19* 3.12* 3.41* 3.78* 3.55* 3.51* 3.46* 3.27* 3.17*  --    < > 2.76*   < > 2.51*   EGFR mL/min/1.73m*2  --  22* 21* 22* 20* 17* 19* 19* 19* 21* 22*  --    < > 25*   < > 29*   MAGNESIUM mg/dL 1.90 2.00 1.90 2.00 2.30 2.60*  --  2.60*  --   --   --  2.60*  --  2.90*  --  3.00*   ALBUMIN g/dL  --  2.8* 2.7* 2.9* 2.9* 2.7* 3.0* 2.7* 2.7* 2.8* 2.9*  --    < > 2.6*   < > 2.7*    < > = values in this interval not displayed.     CBC:  Results from last 7 days   Lab Units 11/20/23  0505 11/19/23  0522 11/18/23  1027 11/18/23  0519 11/17/23  0513 11/16/23  0814 11/16/23  0410 11/15/23  0320   WBC AUTO x10*3/uL 11.0 9.3 8.5 8.6 10.3 10.3 10.2 13.1*   HEMOGLOBIN g/dL 8.3* 8.7* 8.1* 8.8* 8.3* 6.5* 6.6* 7.5*   HEMATOCRIT % 26.6* 28.3* 26.0* 28.2* 26.9* 20.8* 20.5* 23.4*   PLATELETS AUTO x10*3/uL 213 207 179 189 162 151 140* 128*   MCV fL 91 93 91 93 92 91 90 90     COAG:   Results from last 7 days   Lab Units  11/13/23  2105 11/13/23  1417   INR  1.1 1.2*     HEME/ENDO:     CARDIAC:              Assessment/Plan   Principal Problem:    CAD, multiple vessel    Humphrey Waddell is a 58 y.o. male presenting with CAD, hx NSTEMI and HFpEF. PMHx includes NIDDM2, Lt AKA, chronic RLE ulcer, GERD, carotid stenosis, hx CVA, hx GIB, hx anemia req'ing blood transfusions (2023), and hx confusion of anesthesia. Pt admitted to [facility] ICU s/p CABGx3 (LIMA>LAD, SVG>OM1, SVG>PDA), posterior pericardotomy, sternal wires and plating with Dr. Delatorre on 11/13/23. Postoperative vasoplegic shock. Levophed weaned off POD 2.      Neuro/MSK: Expected acute postop pain - well controlled. Hx CVA. Lt AKA. Anxiety, depression  - Serial neuro and pain assessments  - Scheduled Tylenol  - PRN Tramadol per patient request  - PT/OT/MITT     -> Limited mobility at baseline: wheelchair bound- ok to use prosthesis  - Home melatonin     CV: CAD s/p CABGx3. HFpEF. HTN  LVEF pre 60%, post 65%  - Continuous EKG and NIBP  - ASA/statin/plavix  - Metoprolol 50mg BID  - Home hydralazine 50mg TID     Pulm: Oxygenating well on RA  - Bronch hygiene     GI:   - Regular diet c ensure  - Bowel regimen  - Zofran PRN  - PPI     Renal: IDANIA on CKD (baseline Cr 2.84), Cr stable  - RFP daily  - Replete lytes prn  - Nephrology following     -> Continue IV lasix 80mg bid for now     Heme: Acute on chronic anemia (baseline Hgb 9.1)  11/14 2u prbc  11/15 1u prbc  11/16 1u prbc  - CBC daily  - SCDs, SQH for DVTppx     Endo: IDDM2  A1C 5.3%  - Resume home Lantus 4u  - ISS per protocol, POCT BG 4x daily     ID: Postop reactive leukocytosis - resolved. Afebrile, no s/s infection  - Trend temp and WBCs     Dispo: awaiting snf/rehab placement       I spent 60 minutes in the professional and overall care of this patient.      Glendy Quiroz PA-C

## 2023-11-20 NOTE — PROGRESS NOTES
Reason For Consult  Kody on ckd    History Of Present Illness  Humphrey Waddell is a 58 y.o. male with PMH of HFpEF, CAD, carotid stenosis, CVA, DM, anemia, GIB, DJD, PAD s/p Lt AKA, HTN, depression, CKD (Creatinine 2.84 mg/dL), previous transfusion pRBC,  presenting for a scheduled CABG x3 LIMA to LAD Rt EVH SVG to OM Rt EVH SVG to PDA pericardial window and sternal plating on 11/13. OR course 1500ml crystalloid, 250mL Cellsaver. Upon arrival to ICU had significant hypotension requiring return to OR. EBL for return to OR was 170mL. Returned to ICU in stable condition. KODY noted oliguric. We are now consulted for renal care.      Resting comfortably in bed.   No acute events.  He does not offer specific complaints.  Chart/labs/meds/imaging/notes/VS reviewed.    Physical Exam  Constitutional:    Alert and oriented x 3, appears in no distress  HENT:      Clear oropharynx, moist mucous membranes  Cardiovascular:   Normal rate and regular rhythm; Normal pulses; Normal heart sounds. No murmur heard; No friction rub, midsternal incision   Pulmonary:   Pulmonary effort is normal. No respiratory distress. Normal breath sounds. No stridor. No wheezing or rhonchi.    Abdominal:   Bowel sounds are normal. There is no distension. Abdomen is soft. There is no tenderness or guarding. Lee in place.   Musculoskeletal:      +1 edema. Moves all extremities, Lt ALA with intact stump site  Skin:  Skin is warm and dry. Capillary refill takes less than 2 seconds. No lesion or rash.   Neurological:   No focal deficit present. Alert and oriented to person, place, and time. Takes quite some time to answer questions  Psych:  withdrawn        I&O 24HR    Intake/Output Summary (Last 24 hours) at 11/20/2023 1054  Last data filed at 11/20/2023 0600  Gross per 24 hour   Intake --   Output 2150 ml   Net -2150 ml         Vitals 24HR  Heart Rate:  [66-78]   Temp:  [36.5 °C (97.7 °F)-36.8 °C (98.2 °F)]   Resp:  [20]   BP: (142-159)/(56-68)   Weight:  [121  kg (266 lb 14.4 oz)-121 kg (267 lb)]   SpO2:  [95 %-99 %]     Lab Results   Component Value Date    WBC 11.0 11/20/2023    HGB 8.3 (L) 11/20/2023    HCT 26.6 (L) 11/20/2023    MCV 91 11/20/2023     11/20/2023      Lab Results   Component Value Date    GLUCOSE 144 (H) 11/20/2023    CALCIUM 7.0 (L) 11/20/2023     (L) 11/20/2023    K 4.2 11/20/2023    CO2 27 11/20/2023    CL 98 11/20/2023    BUN 60 (H) 11/20/2023    CREATININE 2.94 (H) 11/20/2023      Scheduled medications  acetaminophen, 650 mg, oral, q6h  aspirin, 81 mg, oral, Daily  atorvastatin, 80 mg, oral, Daily  clopidogrel, 75 mg, oral, Daily  heparin (porcine), 5,000 Units, subcutaneous, q8h  hydrALAZINE, 50 mg, oral, TID  insulin glargine, 4 Units, subcutaneous, Nightly  insulin lispro, 0-15 Units, subcutaneous, TID with meals  melatonin, 4.5 mg, oral, Nightly  metoprolol tartrate, 50 mg, oral, BID  pantoprazole, 40 mg, oral, Daily before breakfast  polyethylene glycol, 17 g, oral, Daily  sennosides-docusate sodium, 2 tablet, oral, BID  torsemide, 40 mg, oral, Daily      Continuous medications     PRN medications  PRN medications: dextrose 10 % in water (D10W), dextrose **OR** [DISCONTINUED] glucagon, dextrose, glucagon, [Held by provider] melatonin, naloxone, ondansetron, oxygen, traMADol, traMADol     Assessment/Plan     Mr. Waddell is a 58-year-old man with a history of coronary artery disease, and may he suffered an acute coronary syndrome, had a 50% left main, mid LAD at 70%, 95% circumflex.  He had a stenting of the OM 2.  In July echo revealed normal systolic function, no valvular abnormalities.  He was seen by CT surgery.  It is noted that he has had the above-the-knee amputation, that was a risk factor for his rehab.  He has diabetes, hypertension, stage 4 chronic kidney disease and the peripheral vascular disease.  He otherwise has known carotid stenosis, has had a stroke, GI bleed, anemia requiring blood transfusions.    On November 13th  he underwent a three-vessel bypass, posterior pericardiotomy.  Blood pressure was high postoperatively, was on epinephrine, insulin infusion, norepinephrine and phenylephrine.  Blood pressures then dropped, late on November 13 he was on a max dose of epinephrine, blood pressures were in the 80s systolic, minimal responsiveness to 2.5 L of lactated Ringer's.  TTE suggested a newly hypokinetic anterior wall.  He went back to the operating room.  Drains were left in place. By November 14th he was extubated.  On November 15 had nausea and vomiting, was slow to respond.  Had received 1 unit of blood the night prior, norepinephrine was weaned off.    He suffered acute kidney injury which is no surprise given his vascular instability, anemia.  He has significant chronic kidney disease and is unlikely to autoregulate.  He has received erythropoietin and IV iron.  He has required IVP Lasix 80 mg BID due to hypervolemia. He is down approximately 10 L by net Is/Os and has much less edema on exam. His creatinine has improved to near the top end of his dilma. I will stop the IVP Lasix and transition his to torsemide 40 mg daily (previously on 20 mg daily). I will send iron stores, ferritin, vitamin D, intact PTH and quantify his proteinuria. His blood pressures are trending higher than we would want. We may need to adjust his medications. Trend his RFP. Will follow.     Principal Problem:    CAD, multiple vessel      Darryl Lopez, DO

## 2023-11-20 NOTE — PROGRESS NOTES
Physical Therapy                 Therapy Communication Note    Patient Name: Humphrey Waddell  MRN: [id number]  Today's Date: 11/20/2023     Discipline: Physical Therapy    Missed Visit Reason: Missed Visit Reason: Patient refused (2/2 abdominal discomfort; PT to defer at this time. OT notified RN.)    Missed Time: Attempt    Comment:

## 2023-11-21 LAB
25(OH)D3 SERPL-MCNC: 43 NG/ML (ref 30–100)
ALBUMIN SERPL BCP-MCNC: 2.8 G/DL (ref 3.4–5)
ANION GAP SERPL CALC-SCNC: 11 MMOL/L (ref 10–20)
BUN SERPL-MCNC: 58 MG/DL (ref 6–23)
CALCIUM SERPL-MCNC: 7.8 MG/DL (ref 8.6–10.3)
CHLORIDE SERPL-SCNC: 100 MMOL/L (ref 98–107)
CO2 SERPL-SCNC: 27 MMOL/L (ref 21–32)
CREAT SERPL-MCNC: 2.64 MG/DL (ref 0.5–1.3)
ERYTHROCYTE [DISTWIDTH] IN BLOOD BY AUTOMATED COUNT: 14.6 % (ref 11.5–14.5)
GFR SERPL CREATININE-BSD FRML MDRD: 27 ML/MIN/1.73M*2
GLUCOSE BLD MANUAL STRIP-MCNC: 116 MG/DL (ref 74–99)
GLUCOSE BLD MANUAL STRIP-MCNC: 121 MG/DL (ref 74–99)
GLUCOSE BLD MANUAL STRIP-MCNC: 131 MG/DL (ref 74–99)
GLUCOSE BLD MANUAL STRIP-MCNC: 229 MG/DL (ref 74–99)
GLUCOSE SERPL-MCNC: 116 MG/DL (ref 74–99)
HCT VFR BLD AUTO: 29.2 % (ref 41–52)
HGB BLD-MCNC: 8.9 G/DL (ref 13.5–17.5)
MCH RBC QN AUTO: 28.2 PG (ref 26–34)
MCHC RBC AUTO-ENTMCNC: 30.5 G/DL (ref 32–36)
MCV RBC AUTO: 92 FL (ref 80–100)
NRBC BLD-RTO: 0 /100 WBCS (ref 0–0)
PHOSPHATE SERPL-MCNC: 3.8 MG/DL (ref 2.5–4.9)
PLATELET # BLD AUTO: 227 X10*3/UL (ref 150–450)
POTASSIUM SERPL-SCNC: 4.2 MMOL/L (ref 3.5–5.3)
PTH-INTACT SERPL-MCNC: 176.9 PG/ML (ref 18.5–88)
RBC # BLD AUTO: 3.16 X10*6/UL (ref 4.5–5.9)
SODIUM SERPL-SCNC: 134 MMOL/L (ref 136–145)
WBC # BLD AUTO: 11.1 X10*3/UL (ref 4.4–11.3)

## 2023-11-21 PROCEDURE — 36415 COLL VENOUS BLD VENIPUNCTURE: CPT | Performed by: NURSE PRACTITIONER

## 2023-11-21 PROCEDURE — 96372 THER/PROPH/DIAG INJ SC/IM: CPT | Performed by: REGISTERED NURSE

## 2023-11-21 PROCEDURE — 2500000001 HC RX 250 WO HCPCS SELF ADMINISTERED DRUGS (ALT 637 FOR MEDICARE OP): Performed by: PHYSICIAN ASSISTANT

## 2023-11-21 PROCEDURE — 97535 SELF CARE MNGMENT TRAINING: CPT | Mod: GO

## 2023-11-21 PROCEDURE — 97530 THERAPEUTIC ACTIVITIES: CPT | Mod: GO

## 2023-11-21 PROCEDURE — 83970 ASSAY OF PARATHORMONE: CPT | Mod: AHULAB | Performed by: INTERNAL MEDICINE

## 2023-11-21 PROCEDURE — 36415 COLL VENOUS BLD VENIPUNCTURE: CPT | Mod: AHULAB | Performed by: INTERNAL MEDICINE

## 2023-11-21 PROCEDURE — 84100 ASSAY OF PHOSPHORUS: CPT | Performed by: NURSE PRACTITIONER

## 2023-11-21 PROCEDURE — 97112 NEUROMUSCULAR REEDUCATION: CPT | Mod: GP

## 2023-11-21 PROCEDURE — 82947 ASSAY GLUCOSE BLOOD QUANT: CPT

## 2023-11-21 PROCEDURE — 99232 SBSQ HOSP IP/OBS MODERATE 35: CPT | Performed by: STUDENT IN AN ORGANIZED HEALTH CARE EDUCATION/TRAINING PROGRAM

## 2023-11-21 PROCEDURE — 97530 THERAPEUTIC ACTIVITIES: CPT | Mod: GP

## 2023-11-21 PROCEDURE — 82306 VITAMIN D 25 HYDROXY: CPT | Mod: AHULAB | Performed by: INTERNAL MEDICINE

## 2023-11-21 PROCEDURE — 2500000001 HC RX 250 WO HCPCS SELF ADMINISTERED DRUGS (ALT 637 FOR MEDICARE OP): Performed by: INTERNAL MEDICINE

## 2023-11-21 PROCEDURE — 2500000001 HC RX 250 WO HCPCS SELF ADMINISTERED DRUGS (ALT 637 FOR MEDICARE OP): Performed by: REGISTERED NURSE

## 2023-11-21 PROCEDURE — 85027 COMPLETE CBC AUTOMATED: CPT | Performed by: NURSE PRACTITIONER

## 2023-11-21 PROCEDURE — 2500000004 HC RX 250 GENERAL PHARMACY W/ HCPCS (ALT 636 FOR OP/ED): Performed by: STUDENT IN AN ORGANIZED HEALTH CARE EDUCATION/TRAINING PROGRAM

## 2023-11-21 PROCEDURE — 2500000004 HC RX 250 GENERAL PHARMACY W/ HCPCS (ALT 636 FOR OP/ED): Performed by: INTERNAL MEDICINE

## 2023-11-21 PROCEDURE — 2060000001 HC INTERMEDIATE ICU ROOM DAILY

## 2023-11-21 PROCEDURE — 2500000001 HC RX 250 WO HCPCS SELF ADMINISTERED DRUGS (ALT 637 FOR MEDICARE OP): Performed by: NURSE PRACTITIONER

## 2023-11-21 PROCEDURE — 2500000004 HC RX 250 GENERAL PHARMACY W/ HCPCS (ALT 636 FOR OP/ED): Performed by: REGISTERED NURSE

## 2023-11-21 PROCEDURE — 2500000002 HC RX 250 W HCPCS SELF ADMINISTERED DRUGS (ALT 637 FOR MEDICARE OP, ALT 636 FOR OP/ED): Performed by: STUDENT IN AN ORGANIZED HEALTH CARE EDUCATION/TRAINING PROGRAM

## 2023-11-21 PROCEDURE — 94668 MNPJ CHEST WALL SBSQ: CPT

## 2023-11-21 RX ADMIN — POLYETHYLENE GLYCOL 3350 17 G: 17 POWDER, FOR SOLUTION ORAL at 13:25

## 2023-11-21 RX ADMIN — ACETAMINOPHEN 650 MG: 325 TABLET ORAL at 13:24

## 2023-11-21 RX ADMIN — SENNOSIDES AND DOCUSATE SODIUM 2 TABLET: 8.6; 5 TABLET ORAL at 13:25

## 2023-11-21 RX ADMIN — PANTOPRAZOLE SODIUM 40 MG: 40 TABLET, DELAYED RELEASE ORAL at 13:24

## 2023-11-21 RX ADMIN — HEPARIN SODIUM 5000 UNITS: 5000 INJECTION INTRAVENOUS; SUBCUTANEOUS at 01:32

## 2023-11-21 RX ADMIN — CLOPIDOGREL 75 MG: 75 TABLET ORAL at 13:24

## 2023-11-21 RX ADMIN — ASPIRIN 81 MG CHEWABLE TABLET 81 MG: 81 TABLET CHEWABLE at 13:24

## 2023-11-21 RX ADMIN — HEPARIN SODIUM 5000 UNITS: 5000 INJECTION INTRAVENOUS; SUBCUTANEOUS at 17:45

## 2023-11-21 RX ADMIN — TRAMADOL HYDROCHLORIDE 50 MG: 50 TABLET, COATED ORAL at 21:30

## 2023-11-21 RX ADMIN — IRON SUCROSE 200 MG: 20 INJECTION, SOLUTION INTRAVENOUS at 12:45

## 2023-11-21 RX ADMIN — MAGNESIUM OXIDE TAB 400 MG (241.3 MG ELEMENTAL MG) 400 MG: 400 (241.3 MG) TAB at 13:25

## 2023-11-21 RX ADMIN — ACETAMINOPHEN 650 MG: 325 TABLET ORAL at 01:32

## 2023-11-21 RX ADMIN — METOPROLOL TARTRATE 50 MG: 50 TABLET, FILM COATED ORAL at 09:00

## 2023-11-21 RX ADMIN — TORSEMIDE 40 MG: 20 TABLET ORAL at 13:25

## 2023-11-21 RX ADMIN — INSULIN GLARGINE 4 UNITS: 100 INJECTION, SOLUTION SUBCUTANEOUS at 21:24

## 2023-11-21 RX ADMIN — ACETAMINOPHEN 650 MG: 325 TABLET ORAL at 21:26

## 2023-11-21 RX ADMIN — HYDRALAZINE HYDROCHLORIDE 50 MG: 50 TABLET, FILM COATED ORAL at 15:00

## 2023-11-21 RX ADMIN — METOPROLOL TARTRATE 50 MG: 50 TABLET, FILM COATED ORAL at 21:25

## 2023-11-21 RX ADMIN — Medication 4.5 MG: at 21:25

## 2023-11-21 RX ADMIN — HYDRALAZINE HYDROCHLORIDE 50 MG: 50 TABLET, FILM COATED ORAL at 21:26

## 2023-11-21 RX ADMIN — ATORVASTATIN CALCIUM 80 MG: 80 TABLET ORAL at 13:25

## 2023-11-21 RX ADMIN — HEPARIN SODIUM 5000 UNITS: 5000 INJECTION INTRAVENOUS; SUBCUTANEOUS at 13:25

## 2023-11-21 ASSESSMENT — COGNITIVE AND FUNCTIONAL STATUS - GENERAL
MOVING FROM LYING ON BACK TO SITTING ON SIDE OF FLAT BED WITH BEDRAILS: A LOT
MOBILITY SCORE: 10
DRESSING REGULAR LOWER BODY CLOTHING: A LOT
PERSONAL GROOMING: A LITTLE
STANDING UP FROM CHAIR USING ARMS: A LOT
WALKING IN HOSPITAL ROOM: TOTAL
TOILETING: A LOT
CLIMB 3 TO 5 STEPS WITH RAILING: TOTAL
DRESSING REGULAR UPPER BODY CLOTHING: A LITTLE
DAILY ACTIVITIY SCORE: 16
TURNING FROM BACK TO SIDE WHILE IN FLAT BAD: A LOT
MOVING TO AND FROM BED TO CHAIR: A LOT
HELP NEEDED FOR BATHING: A LOT

## 2023-11-21 ASSESSMENT — PAIN - FUNCTIONAL ASSESSMENT
PAIN_FUNCTIONAL_ASSESSMENT: WONG-BAKER FACES
PAIN_FUNCTIONAL_ASSESSMENT: WONG-BAKER FACES

## 2023-11-21 ASSESSMENT — ACTIVITIES OF DAILY LIVING (ADL): HOME_MANAGEMENT_TIME_ENTRY: 23

## 2023-11-21 ASSESSMENT — PAIN DESCRIPTION - DESCRIPTORS: DESCRIPTORS: CRAMPING

## 2023-11-21 ASSESSMENT — PAIN SCALES - WONG BAKER
WONGBAKER_NUMERICALRESPONSE: HURTS EVEN MORE
WONGBAKER_NUMERICALRESPONSE: HURTS EVEN MORE

## 2023-11-21 ASSESSMENT — PAIN SCALES - GENERAL
PAINLEVEL_OUTOF10: 0 - NO PAIN
PAINLEVEL_OUTOF10: 0 - NO PAIN
PAINLEVEL_OUTOF10: 7
PAINLEVEL_OUTOF10: 0 - NO PAIN
PAINLEVEL_OUTOF10: 0 - NO PAIN

## 2023-11-21 NOTE — PROGRESS NOTES
Humphrey Waddell is a 58 y.o. male on day 8 of admission presenting with CAD, multiple vessel.         Revisited patient and [facility] We were waiting for PT/OT notes to be updated so facility can start auth. Patient is medically ready for discharge, pending auth.  Will continue to follow for discharge needs.

## 2023-11-21 NOTE — PROGRESS NOTES
Physical Therapy    Physical Therapy Treatment    Patient Name: Humphrey Waddell  MRN: [id number]  Today's Date: 11/21/2023  Time Calculation  Start Time: 1259  Stop Time: 1342  Time Calculation (min): 43 min       Assessment/Plan   PT Assessment  PT Assessment Results: Decreased mobility, Decreased strength, Decreased range of motion, Decreased endurance, Impaired balance, Decreased coordination, Decreased safety awareness, Pain  Rehab Prognosis: Good  Evaluation/Treatment Tolerance: Patient limited by fatigue, Patient limited by pain  Medical Staff Made Aware: Yes  End of Session Communication: Bedside nurse (Mohsen)  Assessment Comment: Patient remains in house s/o CABG x3 with MITT, falls and cardiac precautions. Patient is functioning below baseline and will benefit from ongoing PT interventions in house and following DC.  End of Session Patient Position: Up in chair, Alarm on  PT Plan  Inpatient/Swing Bed or Outpatient: Inpatient  PT Plan  Treatment/Interventions: Bed mobility, Transfer training, Balance training, Neuromuscular re-education, Strengthening, Endurance training, Therapeutic exercise, Therapeutic activity, Home exercise program, Positioning, Postural re-education, Wheelchair management  PT Plan: Skilled PT  PT Frequency: 4 times per week  PT Discharge Recommendations: High intensity level of continued care  Equipment Recommended upon Discharge: Slide board, Wheeled walker, Wheelchair  PT Recommended Transfer Status: Assist x2  PT - OK to Discharge: Yes (per POC)      General Visit Information:   PT  Visit  PT Received On: 11/21/23  Response to Previous Treatment: Patient with no complaints from previous session.  General  Missed Visit: Yes  Missed Visit Reason: Patient refused (2/2 abdominal discomfort; PT to defer at this time. OT notified RN.)  Co-Treatment: OT  Co-Treatment Reason: cotx with OT to promote functional transfers and safety; requiring 2 skilled hands on providers  Prior to Session  Communication: Bedside nurse  Patient Position Received: Bed, 3 rail up, Alarm on  Preferred Learning Style: verbal, visual, auditory, kinesthetic  General Comment: recieved in supine. room air, tele, PIV heplocked. LLE AKA prosthesis present but not fitting pt currently, per his report. patient reports he was OOB earlier this date as well.    Subjective   Precautions:  Precautions  Medical Precautions: Cardiac precautions, Fall precautions  Post-Surgical Precautions: Move in the Tube  Precautions Comment: L AKA; reinforced MITT precautions with all mobility, encouraged paired breathing as pt with tendency to hold his breath at times.  Vital Signs:  Vital Signs  Heart Rate: 82  Heart Rate Source: Monitor  Patient Position: Sitting    Objective   Pain:  Pain Assessment  Pain Assessment: Pena-Baker FACES  Pena-Baker FACES Pain Rating: Hurts even more  Pain Type: Surgical pain  Pain Location: Incision (abdomen - patient having BM during session x2; chest incision discomfort)  Pain Onset: Ongoing  Clinical Progression: Gradually improving  Effect of Pain on Daily Activities: PT to tolerance, improving abdominal discomfort with BM  Pain Interventions: Medication (See MAR), Repositioned, Ambulation/increased activity, Distraction, Emotional support, Rest  Cognition:  Cognition  Overall Cognitive Status: Within Functional Limits  Orientation Level: Oriented X4 (joking with staff)  Postural Control:  Postural Control  Postural Control: Impaired  Trunk Control: mild instability with dynamic sitting and static/dynamic standing  Righting Reactions: fair  Posture Comment: min A to mod A for righting      Activity Tolerance:  Activity Tolerance  Endurance: Decreased tolerance for upright activites  Early Mobility/Exercise Safety Screen: Proceed with mobilization - No exclusion criteria met  Activity Tolerance Comments: fair  Treatments:       Therapeutic Activity  Therapeutic Activity Performed: Yes  Therapeutic Activity 1: bed  "mobility, transfer training, postural corrections, paired breathing, postural corrections and MITT precautions reinforced throughout session.    Balance/Neuromuscular Re-Education  Balance/Neuromuscular Re-Education Activity Performed: Yes  Balance/Neuromuscular Re-Education Activity 1: static sitting at EOB x10 minutes with CGA to close superivision, cues for \"squaring up\" hips  Balance/Neuromuscular Re-Education Activity 2: reciprocal scooting attempts/repositioning to EOB with BUE and RLE support. CGA.  Balance/Neuromuscular Re-Education Activity 3: standing with walker and/or bedrail support, mod A to secure pt with standing x3 minutes with flexed posture    Bed Mobility  Bed Mobility: Yes  Bed Mobility 1  Bed Mobility 1: Rolling right, Rolling left  Level of Assistance 1: Minimum assistance, Moderate verbal cues, Moderate tactile cues  Bed Mobility Comments 1: multiple trials of L/R rolling and cues for sequencing, ensuring that he was fully sidelying to adhere to MITT precautions.  Bed Mobility 2  Bed Mobility  2: Supine to sitting  Level of Assistance 2: Moderate assistance, Moderate verbal cues, Moderate tactile cues  Bed Mobility Comments 2: cues for logroll technique, 2 trials with pt experiencing abdominal pain and needing to return to supine to finish having a BM during session. cues to avoid pulling with LUE and to ensure pt on his R side prior to commencing transfer  Bed Mobility 3  Bed Mobility 3: Scooting  Level of Assistance 3: Minimum assistance, Moderate verbal cues, Moderate tactile cues  Bed Mobility Comments 3: cues for sequencing and safety with adherence to MITT precautions  Bed Mobility 4  Bed Mobility 4: Scooting  Level of Assistance 4: Dependent, Maximum verbal cues, Maximum tactile cues  Bed Mobility Comments 4: use of draw sheet support with trunk assist    Ambulation/Gait Training  Ambulation/Gait Training Performed: No  Transfers  Transfer: Yes  Transfer 1  Technique 1: Sit to " stand  Transfer Device 1: Walker  Transfer Level of Assistance 1: Moderate assistance, +1 to manage equipment, Moderate tactile cues, Moderate verbal cues  Trials/Comments 1: from elevated surface height with cues for hand placement, safety/sequencing, flexed posture  Transfers 2  Transfer From 2: Bed to  Transfer to 2: Chair with drop arm  Technique 2: Stand to sit  Transfer Device 2: Walker  Transfer Level of Assistance 2: Moderate assistance, Moderate verbal cues, Moderate tactile cues, +1 to manage equipment  Trials/Comments 2: patient with impaired eccentric control, hand placement and sequencing, cues to maintain upright.  Transfers 3  Transfer From 3: Bed to  Transfer to 3: Chair with drop arm  Technique 3: Lateral  Transfer Level of Assistance 3: Moderate assistance, Moderate verbal cues, Moderate tactile cues, +1 to manage equipment  Trials/Comments 3: cues for avoiding reaching excessively, adhering to MITT precautions. advised to attempt small lateral scoots to/from chair on subsequent trials.    Outcome Measures:  [facility] Basic Mobility  Turning from your back to your side while in a flat bed without using bedrails: A lot  Moving from lying on your back to sitting on the side of a flat bed without using bedrails: A lot  Moving to and from bed to chair (including a wheelchair): A lot  Standing up from a chair using your arms (e.g. wheelchair or bedside chair): A lot  To walk in hospital room: Total  Climbing 3-5 steps with railing: Total  Basic Mobility - Total Score: 10    Prevention/Intervention  Prevention/Intervention: Early and frequent mobilization, Education and support family engagement, Promote least restrictive environment  FSS-ICU  Ambulation: Unable to attempt due to weakness  Rolling: Minimal assistance (performs 75% or more of task)  Sitting: Supervision or set-up only  Transfer Sit-to-Stand: Moderate assistance (performs 50 - 74% of task)  Transfer Supine-to-Sit: Moderate assistance (performs  50 - 74% of task)  Total Score: 15      ICU Mobility Screen  Early Mobility/Exercise Safety Screen: Proceed with mobilization - No exclusion criteria met  E = Exercise and Early Mobility  Early Mobility/Exercise Safety Screen: Proceed with mobilization - No exclusion criteria met  Current Activity: Sitting in chair  Documentation of an Acceptable Level of Exercise/Mobilization Performed: Active transfer - out of bed to chair, Stand at side of bed, Dangle - side of bed    Education Documentation  Handouts, taught by Jayde York PT at 11/21/2023  2:21 PM.  Learner: Patient  Readiness: Acceptance  Method: Explanation, Demonstration  Response: Verbalizes Understanding, Demonstrated Understanding    Precautions, taught by Jayde York PT at 11/21/2023  2:21 PM.  Learner: Patient  Readiness: Acceptance  Method: Explanation, Demonstration  Response: Verbalizes Understanding, Demonstrated Understanding    Body Mechanics, taught by Jayde York PT at 11/21/2023  2:21 PM.  Learner: Patient  Readiness: Acceptance  Method: Explanation, Demonstration  Response: Verbalizes Understanding, Demonstrated Understanding    Home Exercise Program, taught by Jayde York PT at 11/21/2023  2:21 PM.  Learner: Patient  Readiness: Acceptance  Method: Explanation, Demonstration  Response: Verbalizes Understanding, Demonstrated Understanding    Mobility Training, taught by Jayde York PT at 11/21/2023  2:21 PM.  Learner: Patient  Readiness: Acceptance  Method: Explanation, Demonstration  Response: Verbalizes Understanding, Demonstrated Understanding    Education Comments  No comments found.        OP EDUCATION:  Outpatient Education  Individual(s) Educated: Patient  Education Provided: Anatomy, Body Mechanics, Fall Risk, Home Exercise Program, POC, Post-Op Precautions, Posture  Risk and Benefits Discussed with Patient/Caregiver/Other: yes  Patient/Caregiver Demonstrated Understanding: yes  Patient Response to Education:  Patient/Caregiver Verbalized Understanding of Information, Patient/Caregiver Performed Return Demonstration of Exercises/Activities, Patient/Caregiver Asked Appropriate Questions    Encounter Problems       Encounter Problems (Active)       Balance       STG - Maintains dynamic sitting balance with upper extremity support (Progressing)       Start:  11/14/23    Expected End:  11/28/23       INTERVENTIONS:  1. Practice sitting on the edge of a bed/mat with minimal support.  2. Educate patient about maintining total hip precautions while maintaining balance.  3. Educate patient about pressure relief.  4. Educate patient about use of assistive device.            Mobility       STG - Patient will propel the wheelchair (Not Progressing)       Start:  11/14/23    Expected End:  11/28/23       Utilizing safe technique with adherence to MITT precautions and with SBA support/VCs            Transfers       STG - Transfer from bed to chair (Progressing)       Start:  11/14/23    Expected End:  11/28/23       Mod A x1 with LRAD, using safe technique and adherence to MITT precautions,          STG - Patient will perform bed mobility (Progressing)       Start:  11/14/23    Expected End:  11/28/23       Via logroll with mod A and safe technique         Goal 1 (Progressing)       Start:  11/14/23    Expected End:  11/28/23       Patient will demonstrate stable vitals response on room air with all activities.

## 2023-11-21 NOTE — PROGRESS NOTES
"Occupational Therapy    OT Treatment    Patient Name: Humphrey Waddell  MRN: [id number]  Today's Date: 11/21/2023  Time Calculation  Start Time: 1300  Stop Time: 1343  Time Calculation (min): 43 min         Assessment:  Prognosis: Good  End of Session Communication: Bedside nurse  End of Session Patient Position: Up in chair, Alarm on  Prognosis: Good  Strengths: Attitude of self, Access to adaptive/assistive products, Insight into problems, Housing layout  Barriers to Participation: Ability to acquire knowledge  Plan:  Treatment Interventions: ADL retraining, Compensatory technique education  OT Frequency: 3 times per week  OT Discharge Recommendations: High intensity level of continued care  Equipment Recommended upon Discharge: Slide board, Wheeled walker, Wheelchair  OT - OK to Discharge: Yes (HIGH)  Treatment Interventions: ADL retraining, Compensatory technique education    Subjective   Previous Visit Info:  OT Last Visit  OT Received On: 11/21/23  General:  General  Reason for Referral: impaired mobility with CABG x3 and return to OR same day for mediastinal exploration with sternal wires/plates closure  Referred By: SVETA Quiroz  Past Medical History Relevant to Rehab: NSTEMI, CAD, L AKA, chronic RLE ulcer, GERD, carotid stenosis, HFpEF, CVA, GIB  Missed Visit: Yes  Missed Visit Reason: Patient refused (Pt. edu on OT role/POC. Pt. verbalized having increased pain and discomfort d/t not having had a BM in \"awhile\", had one earlier this date which pt. stated as \"worst pain I have ever felt\". Pt. requesting OT to defer a this time. RN made aware.)  Family/Caregiver Present: No  Co-Treatment: PT  Co-Treatment Reason: cotx with PT to promote functional transfers and safety; requiring 2 skilled hands on providers  Prior to Session Communication: Bedside nurse  Patient Position Received: Bed, 3 rail up, Alarm on  Preferred Learning Style: verbal, visual, auditory  General Comment: Patient agreeable to OT but declines " transfers this session.  Precautions:  Medical Precautions: Cardiac precautions, Fall precautions  Post-Surgical Precautions: Move in the Tube  Precautions Comment: L AKA; reinforced MITT precautions with all mobility, encouraged paired breathing as pt with tendency to hold his breath at times.  Vital Signs:     Pain:  Pain Assessment  Pain Assessment: Pena-Baker FACES  Pena-Baker FACES Pain Rating: Hurts even more  Pain Type: Surgical pain  Pain Location: Abdomen (chest at sx site)  Pain Descriptors: Cramping  Pain Frequency: Constant/continuous  Clinical Progression: Gradually improving  Pain Interventions: Medication (See MAR)    Objective    Cognition:  Cognition  Overall Cognitive Status: Within Functional Limits  Orientation Level: Oriented X4  Attention: Within Functional Limits  Problem Solving: Within Functional Limits  Insight: Mild  Processing Speed: Delayed  Coordination:     Activities of Daily Living: Toileting  Toileting Level of Assistance: Dependent  Where Assessed: Bed level (Also in standing d/t 2x BM)  Functional Standing Tolerance:     Bed Mobility/Transfers: Bed Mobility  Bed Mobility: Yes  Bed Mobility 1  Bed Mobility 1: Rolling right, Rolling left  Level of Assistance 1: Minimum assistance, Moderate verbal cues, Moderate tactile cues  Bed Mobility Comments 1: multiple trials of L/R rolling and cues for sequencing, ensuring that he was fully sidelying to adhere to MITT precautions.  Bed Mobility 2  Bed Mobility  2: Supine to sitting  Level of Assistance 2: Moderate assistance, Moderate verbal cues, Moderate tactile cues  Bed Mobility Comments 2: cues for logroll technique, 2 trials with pt experiencing abdominal pain and needing to return to supine to finish having a BM during session. cues to avoid pulling with LUE and to ensure pt on his R side prior to commencing transfer  Bed Mobility 3  Bed Mobility 3: Scooting  Level of Assistance 3: Minimum assistance, Moderate verbal cues, Moderate  tactile cues  Bed Mobility 4  Bed Mobility 4: Scooting  Level of Assistance 4: Dependent, Maximum verbal cues, Maximum tactile cues  Bed Mobility Comments 4: Use of draw sheet    Transfers  Transfer: Yes  Transfer 1  Transfer to 1:  (Trurize chair)  Technique 1: Sit to stand  Transfer Device 1: Walker  Transfer Level of Assistance 1: Moderate assistance, +1 to manage equipment, Moderate tactile cues, Moderate verbal cues  Trials/Comments 1: from elevated surface height with cues for hand placement, safety/sequencing, flexed posture  Transfers 2  Technique 2: Stand to sit  Transfer Device 2: Walker  Transfer Level of Assistance 2: Moderate assistance, Moderate verbal cues, Moderate tactile cues, +1 to manage equipment  Trials/Comments 2: patient with impaired eccentric control, hand placement and sequencing, cues to maintain upright.  Transfers 3  Transfer to 3: Chair with drop arm  Technique 3: Lateral  Transfer Level of Assistance 3: Moderate assistance, Moderate verbal cues, Moderate tactile cues, +1 to manage equipment  Trials/Comments 3: cues for avoiding reaching excessively, adhering to MITT precautions. advised to attempt small lateral scoots to/from chair on subsequent trials.    Therapy/Activity: Therapeutic Activity  Therapeutic Activity Performed: Yes  Therapeutic Activity 1: bed mobility, transfer training, postural corrections, paired breathing, postural corrections and MITT precautions reinforced throughout session.         JAVI CALDWELL :  (3/5) and GURDEEP MACKENZIE:  (3-/5)        Outcome Measures:[facility] Daily Activity  Putting on and taking off regular lower body clothing: A lot  Bathing (including washing, rinsing, drying): A lot  Putting on and taking off regular upper body clothing: A little  Toileting, which includes using toilet, bedpan or urinal: A lot  Taking care of personal grooming such as brushing teeth: A little  Eating Meals: None  Daily Activity - Total Score: 16        Education  Documentation  Handouts, taught by April Stewart OT at 11/21/2023  2:31 PM.  Learner: Patient  Readiness: Acceptance  Method: Explanation, Demonstration  Response: Verbalizes Understanding, Demonstrated Understanding    Body Mechanics, taught by April Stewart OT at 11/21/2023  2:31 PM.  Learner: Patient  Readiness: Acceptance  Method: Explanation, Demonstration  Response: Verbalizes Understanding, Demonstrated Understanding    Precautions, taught by April Stewart OT at 11/21/2023  2:31 PM.  Learner: Patient  Readiness: Acceptance  Method: Explanation, Demonstration  Response: Verbalizes Understanding, Demonstrated Understanding    ADL Training, taught by April Stewart OT at 11/21/2023  2:31 PM.  Learner: Patient  Readiness: Acceptance  Method: Explanation, Demonstration  Response: Verbalizes Understanding, Demonstrated Understanding    Education Comments  No comments found.        OP EDUCATION:  Education  Individual(s) Educated: Patient    Goals:  Encounter Problems       Encounter Problems (Active)       ADLs       Patient with complete upper body dressing with minimal assist  level of assistance donning and doffing all UE clothes with PRN adaptive equipment while supported sitting (Not Progressing)       Start:  11/14/23            Patient with complete lower body dressing with moderate assist level of assistance donning and doffing all LE clothes  with shoe horn and sock-aid while supported sitting (Not Progressing)       Start:  11/14/23            Patient will complete daily grooming tasks brushing teeth, shaving, and washing face/hair with set-up and supervision level of assistance and PRN adaptive equipment while supported sitting. (Not Progressing)       Start:  11/14/23            Patient will complete toileting including hygiene clothing management/hygiene with moderate assist level of assistance and raised toilet seat and grab bars. (Not Progressing)       Start:  11/14/23                                TRANSFERS       Patient will perform bed mobility moderate assist level of assistance and bed rails and draw sheet in order to improve safety and independence with mobility (Progressing)       Start:  11/14/23            Patient will complete functional transfer with slide board transfer least restrictive device with contact guard assist level of assistance. (Progressing)       Start:  11/14/23

## 2023-11-21 NOTE — PROGRESS NOTES
"Humphrey Waddell is a 58 y.o. male on day 8 of admission presenting with CAD, multiple vessel.    Subjective   Feeling ok today, still awaiting placement  Multiple solid BMs today       Objective     Physical Exam  Constitutional:       Appearance: Normal appearance.   Cardiovascular:      Rate and Rhythm: Normal rate and regular rhythm.   Pulmonary:      Effort: Pulmonary effort is normal. No respiratory distress.      Breath sounds: No wheezing.   Abdominal:      Palpations: Abdomen is soft.      Comments: Obese. + BM   Skin:     General: Skin is warm and dry.      Comments: Well approx MSI stable to palpation no e/e/p   Neurological:      General: No focal deficit present.      Mental Status: He is alert and oriented to person, place, and time.       Last Recorded Vitals  Blood pressure 168/62, pulse 86, temperature 36.7 °C (98 °F), resp. rate 22, height 1.93 m (6' 3.98\"), weight 116 kg (256 lb 9.9 oz), SpO2 94 %.  Intake/Output last 3 Shifts:  I/O last 3 completed shifts:  In: 1460 (12.5 mL/kg) [P.O.:1460]  Out: 3300 (28.4 mL/kg) [Urine:3300 (0.8 mL/kg/hr)]  Weight: 116.4 kg     Relevant Results           This patient currently has cardiac telemetry ordered; if you would like to modify or discontinue the telemetry order, click here to go to the orders activity to modify/discontinue the order.  Scheduled medications  acetaminophen, 650 mg, oral, q6h  aspirin, 81 mg, oral, Daily  atorvastatin, 80 mg, oral, Daily  clopidogrel, 75 mg, oral, Daily  heparin (porcine), 5,000 Units, subcutaneous, q8h  hydrALAZINE, 50 mg, oral, TID  insulin glargine, 4 Units, subcutaneous, Nightly  insulin lispro, 0-15 Units, subcutaneous, TID with meals  magnesium oxide, 400 mg, oral, Daily  melatonin, 4.5 mg, oral, Nightly  metoprolol tartrate, 50 mg, oral, BID  pantoprazole, 40 mg, oral, Daily before breakfast  [Held by provider] polyethylene glycol, 17 g, oral, Daily  [Held by provider] sennosides-docusate sodium, 2 tablet, oral, " BID  torsemide, 40 mg, oral, Daily      Continuous medications     PRN medications  PRN medications: dextrose 10 % in water (D10W), dextrose **OR** [DISCONTINUED] glucagon, dextrose, glucagon, [Held by provider] melatonin, naloxone, ondansetron, oxygen, traMADol, traMADol           Assessment/Plan   Principal Problem:    CAD, multiple vessel    Humphrey Waddell is a 58 y.o. male presenting with CAD, hx NSTEMI and HFpEF. PMHx includes NIDDM2, Lt AKA, chronic RLE ulcer, GERD, carotid stenosis, hx CVA, hx GIB, hx anemia req'ing blood transfusions (2023), and hx confusion of anesthesia. Pt admitted to [facility] ICU s/p CABGx3 (LIMA>LAD, SVG>OM1, SVG>PDA), posterior pericardotomy, sternal wires and plating with Dr. Delatorre on 11/13/23. Postoperative vasoplegic shock. Levophed weaned off POD 2.      Neuro/MSK: Expected acute postop pain - well controlled. Hx CVA. Lt AKA. Anxiety, depression  - Serial neuro and pain assessments  - Scheduled Tylenol  - PRN Tramadol per patient request  - PT/OT/MITT     -> Limited mobility at baseline: wheelchair bound- ok to use prosthesis  - Home melatonin     CV: CAD s/p CABGx3. HFpEF. HTN  LVEF pre 60%, post 65%  - Continuous EKG and NIBP  - ASA/statin/plavix  - Metoprolol 50mg BID  - Home hydralazine 50mg TID     Pulm: Oxygenating well on RA  - Bronch hygiene     GI:   - Regular diet c ensure  - Hold bowel regimen d/t multiple solid BMs  - Zofran PRN  - PPI     Renal: IDANIA on CKD (baseline Cr 2.84) - resolved  - RFP daily  - Replete lytes prn  - Nephrology following     -> Torsemide 40mg     Heme: Acute on chronic anemia (baseline Hgb 9.1)  11/14 2u prbc  11/15 1u prbc  11/16 1u prbc  - CBC daily  - SCDs, SQH for DVTppx     Endo: IDDM2  A1C 5.3%  - Home Lantus 4u  - ISS per protocol, POCT BG 4x daily     ID: Postop reactive leukocytosis - resolved. Afebrile, no s/s infection  - Trend temp and WBCs     Dispo: awaiting snf/rehab placement       I spent 45 minutes in the professional and overall  care of this patient.      Glendy Quiroz PA-C

## 2023-11-21 NOTE — PROGRESS NOTES
Reason For Consult  Kody on ckd    History Of Present Illness  Humphrey Waddell is a 58 y.o. male with PMH of HFpEF, CAD, carotid stenosis, CVA, DM, anemia, GIB, DJD, PAD s/p Lt AKA, HTN, depression, CKD (Creatinine 2.84 mg/dL), previous transfusion pRBC,  presenting for a scheduled CABG x3 LIMA to LAD Rt EVH SVG to OM Rt EVH SVG to PDA pericardial window and sternal plating on 11/13. OR course 1500ml crystalloid, 250mL Cellsaver. Upon arrival to ICU had significant hypotension requiring return to OR. EBL for return to OR was 170mL. Returned to ICU in stable condition. KODY noted oliguric. We are now consulted for renal care.      Resting comfortably in bed.   No acute events.  He does not offer specific complaints.  Chart/labs/meds/imaging/notes/VS reviewed.    Physical Exam  Constitutional:    Alert and oriented x 3, appears in no distress  HENT:      Clear oropharynx, moist mucous membranes  Cardiovascular:   Normal rate and regular rhythm; Normal pulses; Normal heart sounds. No murmur heard; No friction rub, midsternal incision   Pulmonary:   Pulmonary effort is normal. No respiratory distress. Normal breath sounds. No stridor. No wheezing or rhonchi.    Abdominal:   Bowel sounds are normal. There is no distension. Abdomen is soft. There is no tenderness or guarding. Lee in place.   Musculoskeletal:      +1 edema. Moves all extremities, Lt ALA with intact stump site  Skin:  Skin is warm and dry. Capillary refill takes less than 2 seconds. No lesion or rash.   Neurological:   No focal deficit present. Alert and oriented to person, place, and time. Takes quite some time to answer questions  Psych:  withdrawn        I&O 24HR    Intake/Output Summary (Last 24 hours) at 11/21/2023 1233  Last data filed at 11/21/2023 0600  Gross per 24 hour   Intake 480 ml   Output 1450 ml   Net -970 ml         Vitals 24HR  Heart Rate:  [62-81]   Temp:  [36.5 °C (97.7 °F)-36.8 °C (98.2 °F)]   Resp:  [22]   BP: (118-151)/(71-94)   Weight:   [116 kg (256 lb 9.9 oz)]   SpO2:  [94 %-96 %]     Lab Results   Component Value Date    WBC 11.1 11/21/2023    HGB 8.9 (L) 11/21/2023    HCT 29.2 (L) 11/21/2023    MCV 92 11/21/2023     11/21/2023      Lab Results   Component Value Date    GLUCOSE 116 (H) 11/21/2023    CALCIUM 7.8 (L) 11/21/2023     (L) 11/21/2023    K 4.2 11/21/2023    CO2 27 11/21/2023     11/21/2023    BUN 58 (H) 11/21/2023    CREATININE 2.64 (H) 11/21/2023      Scheduled medications  acetaminophen, 650 mg, oral, q6h  aspirin, 81 mg, oral, Daily  atorvastatin, 80 mg, oral, Daily  clopidogrel, 75 mg, oral, Daily  heparin (porcine), 5,000 Units, subcutaneous, q8h  hydrALAZINE, 50 mg, oral, TID  insulin glargine, 4 Units, subcutaneous, Nightly  insulin lispro, 0-15 Units, subcutaneous, TID with meals  magnesium oxide, 400 mg, oral, Daily  melatonin, 4.5 mg, oral, Nightly  metoprolol tartrate, 50 mg, oral, BID  pantoprazole, 40 mg, oral, Daily before breakfast  polyethylene glycol, 17 g, oral, Daily  sennosides-docusate sodium, 2 tablet, oral, BID  torsemide, 40 mg, oral, Daily      Continuous medications     PRN medications  PRN medications: dextrose 10 % in water (D10W), dextrose **OR** [DISCONTINUED] glucagon, dextrose, glucagon, [Held by provider] melatonin, naloxone, ondansetron, oxygen, traMADol, traMADol     Assessment/Plan     Mr. Waddell is a 58-year-old man with a history of coronary artery disease, and may he suffered an acute coronary syndrome, had a 50% left main, mid LAD at 70%, 95% circumflex.  He had a stenting of the OM 2.  In July echo revealed normal systolic function, no valvular abnormalities.  He was seen by CT surgery.  It is noted that he has had the above-the-knee amputation, that was a risk factor for his rehab.  He has diabetes, hypertension, stage 4 chronic kidney disease and the peripheral vascular disease.  He otherwise has known carotid stenosis, has had a stroke, GI bleed, anemia requiring blood  transfusions.    On November 13th he underwent a three-vessel bypass, posterior pericardiotomy.  Blood pressure was high postoperatively, was on epinephrine, insulin infusion, norepinephrine and phenylephrine.  Blood pressures then dropped, late on November 13 he was on a max dose of epinephrine, blood pressures were in the 80s systolic, minimal responsiveness to 2.5 L of lactated Ringer's.  TTE suggested a newly hypokinetic anterior wall.  He went back to the operating room.  Drains were left in place. By November 14th he was extubated.  On November 15 had nausea and vomiting, was slow to respond.  Had received 1 unit of blood the night prior, norepinephrine was weaned off.    He suffered acute kidney injury which is no surprise given his vascular instability, anemia.  He has significant chronic kidney disease and is unlikely to autoregulate.  He has received erythropoietin and IV iron.  He has required IVP Lasix 80 mg BID due to hypervolemia. He is down approximately 10 L by net Is/Os and has much less edema on exam. His creatinine is back within his baseline. He is off IVP Lasix and back on torsemide at 40 mg daily (previously on 20 mg daily). I sent iron stores and ferritin and will give him 200 mg IV venofer. I anticipate giving him a dose of Epo. vitamin D is acceptable, intact PTH is pending. Proteinuria quantification is ordered. His blood pressures may be trending higher than we would want. We may need to adjust his medications. Trend his RFP. Will follow.     Principal Problem:    CAD, multiple vessel      Darryl Lopez, DO

## 2023-11-22 ENCOUNTER — APPOINTMENT (OUTPATIENT)
Dept: CARDIAC SURGERY | Facility: HOSPITAL | Age: 58
End: 2023-11-22
Payer: COMMERCIAL

## 2023-11-22 LAB
ALBUMIN SERPL BCP-MCNC: 2.7 G/DL (ref 3.4–5)
ANION GAP SERPL CALC-SCNC: 14 MMOL/L (ref 10–20)
BUN SERPL-MCNC: 59 MG/DL (ref 6–23)
CALCIUM SERPL-MCNC: 7.1 MG/DL (ref 8.6–10.3)
CHLORIDE SERPL-SCNC: 100 MMOL/L (ref 98–107)
CO2 SERPL-SCNC: 26 MMOL/L (ref 21–32)
CREAT SERPL-MCNC: 2.62 MG/DL (ref 0.5–1.3)
ERYTHROCYTE [DISTWIDTH] IN BLOOD BY AUTOMATED COUNT: 14.6 % (ref 11.5–14.5)
GFR SERPL CREATININE-BSD FRML MDRD: 27 ML/MIN/1.73M*2
GLUCOSE BLD MANUAL STRIP-MCNC: 113 MG/DL (ref 74–99)
GLUCOSE BLD MANUAL STRIP-MCNC: 135 MG/DL (ref 74–99)
GLUCOSE BLD MANUAL STRIP-MCNC: 176 MG/DL (ref 74–99)
GLUCOSE BLD MANUAL STRIP-MCNC: 177 MG/DL (ref 74–99)
GLUCOSE BLD MANUAL STRIP-MCNC: 186 MG/DL (ref 74–99)
GLUCOSE SERPL-MCNC: 143 MG/DL (ref 74–99)
HCT VFR BLD AUTO: 26.9 % (ref 41–52)
HGB BLD-MCNC: 8.5 G/DL (ref 13.5–17.5)
MCH RBC QN AUTO: 29.2 PG (ref 26–34)
MCHC RBC AUTO-ENTMCNC: 31.6 G/DL (ref 32–36)
MCV RBC AUTO: 92 FL (ref 80–100)
NRBC BLD-RTO: 0 /100 WBCS (ref 0–0)
PHOSPHATE SERPL-MCNC: 3.8 MG/DL (ref 2.5–4.9)
PLATELET # BLD AUTO: 259 X10*3/UL (ref 150–450)
POTASSIUM SERPL-SCNC: 4.2 MMOL/L (ref 3.5–5.3)
RBC # BLD AUTO: 2.91 X10*6/UL (ref 4.5–5.9)
SODIUM SERPL-SCNC: 136 MMOL/L (ref 136–145)
WBC # BLD AUTO: 10.3 X10*3/UL (ref 4.4–11.3)

## 2023-11-22 PROCEDURE — 2500000002 HC RX 250 W HCPCS SELF ADMINISTERED DRUGS (ALT 637 FOR MEDICARE OP, ALT 636 FOR OP/ED): Performed by: REGISTERED NURSE

## 2023-11-22 PROCEDURE — 2500000001 HC RX 250 WO HCPCS SELF ADMINISTERED DRUGS (ALT 637 FOR MEDICARE OP): Performed by: REGISTERED NURSE

## 2023-11-22 PROCEDURE — 97535 SELF CARE MNGMENT TRAINING: CPT | Mod: GO,CO

## 2023-11-22 PROCEDURE — 97530 THERAPEUTIC ACTIVITIES: CPT | Mod: GP,CQ

## 2023-11-22 PROCEDURE — 2500000004 HC RX 250 GENERAL PHARMACY W/ HCPCS (ALT 636 FOR OP/ED): Performed by: STUDENT IN AN ORGANIZED HEALTH CARE EDUCATION/TRAINING PROGRAM

## 2023-11-22 PROCEDURE — 2500000002 HC RX 250 W HCPCS SELF ADMINISTERED DRUGS (ALT 637 FOR MEDICARE OP, ALT 636 FOR OP/ED): Performed by: STUDENT IN AN ORGANIZED HEALTH CARE EDUCATION/TRAINING PROGRAM

## 2023-11-22 PROCEDURE — 2500000001 HC RX 250 WO HCPCS SELF ADMINISTERED DRUGS (ALT 637 FOR MEDICARE OP): Performed by: NURSE PRACTITIONER

## 2023-11-22 PROCEDURE — 2500000001 HC RX 250 WO HCPCS SELF ADMINISTERED DRUGS (ALT 637 FOR MEDICARE OP): Performed by: INTERNAL MEDICINE

## 2023-11-22 PROCEDURE — 2500000004 HC RX 250 GENERAL PHARMACY W/ HCPCS (ALT 636 FOR OP/ED): Performed by: REGISTERED NURSE

## 2023-11-22 PROCEDURE — 36415 COLL VENOUS BLD VENIPUNCTURE: CPT | Performed by: NURSE PRACTITIONER

## 2023-11-22 PROCEDURE — 84100 ASSAY OF PHOSPHORUS: CPT | Performed by: NURSE PRACTITIONER

## 2023-11-22 PROCEDURE — 85027 COMPLETE CBC AUTOMATED: CPT | Performed by: NURSE PRACTITIONER

## 2023-11-22 PROCEDURE — 94668 MNPJ CHEST WALL SBSQ: CPT

## 2023-11-22 PROCEDURE — 82947 ASSAY GLUCOSE BLOOD QUANT: CPT

## 2023-11-22 PROCEDURE — 96372 THER/PROPH/DIAG INJ SC/IM: CPT | Performed by: REGISTERED NURSE

## 2023-11-22 PROCEDURE — 2060000001 HC INTERMEDIATE ICU ROOM DAILY

## 2023-11-22 PROCEDURE — 2500000001 HC RX 250 WO HCPCS SELF ADMINISTERED DRUGS (ALT 637 FOR MEDICARE OP): Performed by: PHYSICIAN ASSISTANT

## 2023-11-22 PROCEDURE — 99231 SBSQ HOSP IP/OBS SF/LOW 25: CPT | Performed by: PHYSICIAN ASSISTANT

## 2023-11-22 PROCEDURE — 97530 THERAPEUTIC ACTIVITIES: CPT | Mod: GO,CO

## 2023-11-22 RX ADMIN — HEPARIN SODIUM 5000 UNITS: 5000 INJECTION INTRAVENOUS; SUBCUTANEOUS at 09:12

## 2023-11-22 RX ADMIN — HYDRALAZINE HYDROCHLORIDE 50 MG: 50 TABLET, FILM COATED ORAL at 20:07

## 2023-11-22 RX ADMIN — Medication 4.5 MG: at 20:06

## 2023-11-22 RX ADMIN — TORSEMIDE 40 MG: 20 TABLET ORAL at 09:12

## 2023-11-22 RX ADMIN — ASPIRIN 81 MG CHEWABLE TABLET 81 MG: 81 TABLET CHEWABLE at 09:12

## 2023-11-22 RX ADMIN — METOPROLOL TARTRATE 50 MG: 50 TABLET, FILM COATED ORAL at 09:12

## 2023-11-22 RX ADMIN — ACETAMINOPHEN 650 MG: 325 TABLET ORAL at 01:56

## 2023-11-22 RX ADMIN — MAGNESIUM OXIDE TAB 400 MG (241.3 MG ELEMENTAL MG) 400 MG: 400 (241.3 MG) TAB at 09:12

## 2023-11-22 RX ADMIN — HEPARIN SODIUM 5000 UNITS: 5000 INJECTION INTRAVENOUS; SUBCUTANEOUS at 01:56

## 2023-11-22 RX ADMIN — PANTOPRAZOLE SODIUM 40 MG: 40 TABLET, DELAYED RELEASE ORAL at 06:16

## 2023-11-22 RX ADMIN — HEPARIN SODIUM 5000 UNITS: 5000 INJECTION INTRAVENOUS; SUBCUTANEOUS at 17:42

## 2023-11-22 RX ADMIN — HYDRALAZINE HYDROCHLORIDE 50 MG: 50 TABLET, FILM COATED ORAL at 14:04

## 2023-11-22 RX ADMIN — ACETAMINOPHEN 650 MG: 325 TABLET ORAL at 19:58

## 2023-11-22 RX ADMIN — ACETAMINOPHEN 650 MG: 325 TABLET ORAL at 06:16

## 2023-11-22 RX ADMIN — ACETAMINOPHEN 650 MG: 325 TABLET ORAL at 13:26

## 2023-11-22 RX ADMIN — METOPROLOL TARTRATE 50 MG: 50 TABLET, FILM COATED ORAL at 20:07

## 2023-11-22 RX ADMIN — INSULIN LISPRO 5 UNITS: 100 INJECTION, SOLUTION INTRAVENOUS; SUBCUTANEOUS at 12:59

## 2023-11-22 RX ADMIN — TRAMADOL HYDROCHLORIDE 50 MG: 50 TABLET, COATED ORAL at 19:52

## 2023-11-22 RX ADMIN — CLOPIDOGREL 75 MG: 75 TABLET ORAL at 09:12

## 2023-11-22 RX ADMIN — INSULIN GLARGINE 4 UNITS: 100 INJECTION, SOLUTION SUBCUTANEOUS at 21:00

## 2023-11-22 RX ADMIN — ATORVASTATIN CALCIUM 80 MG: 80 TABLET ORAL at 09:12

## 2023-11-22 RX ADMIN — HYDRALAZINE HYDROCHLORIDE 50 MG: 50 TABLET, FILM COATED ORAL at 09:12

## 2023-11-22 SDOH — SOCIAL STABILITY: SOCIAL INSECURITY: WERE YOU ABLE TO COMPLETE ALL THE BEHAVIORAL HEALTH SCREENINGS?: YES

## 2023-11-22 SDOH — SOCIAL STABILITY: SOCIAL INSECURITY: ARE YOU OR HAVE YOU BEEN THREATENED OR ABUSED PHYSICALLY, EMOTIONALLY, OR SEXUALLY BY ANYONE?: NO

## 2023-11-22 SDOH — SOCIAL STABILITY: SOCIAL INSECURITY: DOES ANYONE TRY TO KEEP YOU FROM HAVING/CONTACTING OTHER FRIENDS OR DOING THINGS OUTSIDE YOUR HOME?: NO

## 2023-11-22 SDOH — SOCIAL STABILITY: SOCIAL INSECURITY: HAS ANYONE EVER THREATENED TO HURT YOUR FAMILY OR YOUR PETS?: NO

## 2023-11-22 SDOH — SOCIAL STABILITY: SOCIAL INSECURITY: ARE THERE ANY APPARENT SIGNS OF INJURIES/BEHAVIORS THAT COULD BE RELATED TO ABUSE/NEGLECT?: NO

## 2023-11-22 SDOH — SOCIAL STABILITY: SOCIAL INSECURITY: DO YOU FEEL ANYONE HAS EXPLOITED OR TAKEN ADVANTAGE OF YOU FINANCIALLY OR OF YOUR PERSONAL PROPERTY?: NO

## 2023-11-22 SDOH — SOCIAL STABILITY: SOCIAL INSECURITY: HAVE YOU HAD THOUGHTS OF HARMING ANYONE ELSE?: NO

## 2023-11-22 SDOH — SOCIAL STABILITY: SOCIAL INSECURITY: ABUSE: ADULT

## 2023-11-22 SDOH — SOCIAL STABILITY: SOCIAL INSECURITY: DO YOU FEEL UNSAFE GOING BACK TO THE PLACE WHERE YOU ARE LIVING?: NO

## 2023-11-22 ASSESSMENT — ACTIVITIES OF DAILY LIVING (ADL)
WALKS IN HOME: NEEDS ASSISTANCE
BATHING: NEEDS ASSISTANCE
GROOMING: NEEDS ASSISTANCE
ASSISTIVE_DEVICE: WALKER;PROSTHESIS
PATIENT'S MEMORY ADEQUATE TO SAFELY COMPLETE DAILY ACTIVITIES?: YES
DRESSING YOURSELF: NEEDS ASSISTANCE
HEARING - LEFT EAR: FUNCTIONAL
FEEDING YOURSELF: INDEPENDENT
JUDGMENT_ADEQUATE_SAFELY_COMPLETE_DAILY_ACTIVITIES: YES
HOME_MANAGEMENT_TIME_ENTRY: 8
TOILETING: NEEDS ASSISTANCE
HEARING - RIGHT EAR: FUNCTIONAL
ADEQUATE_TO_COMPLETE_ADL: YES

## 2023-11-22 ASSESSMENT — COGNITIVE AND FUNCTIONAL STATUS - GENERAL
DAILY ACTIVITIY SCORE: 15
HELP NEEDED FOR BATHING: A LOT
TOILETING: A LOT
MOVING FROM LYING ON BACK TO SITTING ON SIDE OF FLAT BED WITH BEDRAILS: A LOT
MOBILITY SCORE: 12
WALKING IN HOSPITAL ROOM: TOTAL
MOBILITY SCORE: 11
STANDING UP FROM CHAIR USING ARMS: A LITTLE
WALKING IN HOSPITAL ROOM: TOTAL
CLIMB 3 TO 5 STEPS WITH RAILING: TOTAL
PERSONAL GROOMING: A LITTLE
DRESSING REGULAR UPPER BODY CLOTHING: A LOT
TURNING FROM BACK TO SIDE WHILE IN FLAT BAD: A LOT
TURNING FROM BACK TO SIDE WHILE IN FLAT BAD: A LITTLE
MOBILITY SCORE: 12
DRESSING REGULAR UPPER BODY CLOTHING: A LITTLE
DRESSING REGULAR UPPER BODY CLOTHING: A LOT
DRESSING REGULAR LOWER BODY CLOTHING: A LOT
STANDING UP FROM CHAIR USING ARMS: A LOT
MOVING TO AND FROM BED TO CHAIR: A LOT
MOVING TO AND FROM BED TO CHAIR: A LITTLE
DRESSING REGULAR LOWER BODY CLOTHING: TOTAL
WALKING IN HOSPITAL ROOM: TOTAL
HELP NEEDED FOR BATHING: A LOT
TOILETING: A LOT
MOVING FROM LYING ON BACK TO SITTING ON SIDE OF FLAT BED WITH BEDRAILS: A LITTLE
DAILY ACTIVITIY SCORE: 16
CLIMB 3 TO 5 STEPS WITH RAILING: TOTAL
MOVING FROM LYING ON BACK TO SITTING ON SIDE OF FLAT BED WITH BEDRAILS: A LITTLE
TURNING FROM BACK TO SIDE WHILE IN FLAT BAD: A LOT
PERSONAL GROOMING: A LITTLE
TOILETING: TOTAL
STANDING UP FROM CHAIR USING ARMS: A LOT
PATIENT BASELINE BEDBOUND: NO
HELP NEEDED FOR BATHING: A LOT
DRESSING REGULAR LOWER BODY CLOTHING: A LOT
DAILY ACTIVITIY SCORE: 14
CLIMB 3 TO 5 STEPS WITH RAILING: TOTAL
MOVING TO AND FROM BED TO CHAIR: A LOT

## 2023-11-22 ASSESSMENT — LIFESTYLE VARIABLES
AUDIT-C TOTAL SCORE: 0
HOW OFTEN DO YOU HAVE 6 OR MORE DRINKS ON ONE OCCASION: NEVER
HOW OFTEN DO YOU HAVE A DRINK CONTAINING ALCOHOL: NEVER
HOW MANY STANDARD DRINKS CONTAINING ALCOHOL DO YOU HAVE ON A TYPICAL DAY: PATIENT DOES NOT DRINK
AUDIT-C TOTAL SCORE: 0
SKIP TO QUESTIONS 9-10: 1

## 2023-11-22 ASSESSMENT — PATIENT HEALTH QUESTIONNAIRE - PHQ9
2. FEELING DOWN, DEPRESSED OR HOPELESS: NOT AT ALL
SUM OF ALL RESPONSES TO PHQ9 QUESTIONS 1 & 2: 1
1. LITTLE INTEREST OR PLEASURE IN DOING THINGS: SEVERAL DAYS

## 2023-11-22 ASSESSMENT — PAIN - FUNCTIONAL ASSESSMENT
PAIN_FUNCTIONAL_ASSESSMENT: 0-10
PAIN_FUNCTIONAL_ASSESSMENT: FLACC (FACE, LEGS, ACTIVITY, CRY, CONSOLABILITY)
PAIN_FUNCTIONAL_ASSESSMENT: 0-10
PAIN_FUNCTIONAL_ASSESSMENT: 0-10

## 2023-11-22 ASSESSMENT — PAIN SCALES - GENERAL
PAINLEVEL_OUTOF10: 3
PAINLEVEL_OUTOF10: 10 - WORST POSSIBLE PAIN
PAINLEVEL_OUTOF10: 0 - NO PAIN

## 2023-11-22 NOTE — PROGRESS NOTES
Occupational Therapy    Occupational Therapy Treatment    Name: Humphrey Waddell  MRN: [id number]  : 1965  Date: 23  Time Calculation  Start Time: 918  Stop Time: 941  Time Calculation (min): 23 min      Plan:  Treatment Interventions: ADL retraining, Compensatory technique education  OT Frequency: 3 times per week  OT Discharge Recommendations: High intensity level of continued care  Equipment Recommended upon Discharge: Slide board, Wheeled walker, Wheelchair  OT - OK to Discharge: Yes (per POC)    Subjective   Previous Visit Info:  OT Last Visit  OT Received On: 23  General:  General  Reason for Referral: impaired mobility with CABG x3 and return to OR same day for mediastinal exploration with sternal wires/plates closure  Co-Treatment: PT  Co-Treatment Reason: cotx with PT to promote functional transfers and safety; requiring 2 skilled hands on providers  Prior to Session Communication: Bedside nurse  Patient Position Received: Up in chair  Preferred Learning Style: verbal, visual, kinesthetic  General Comment: Pt cooperate and compliant with session.  Precautions:  Post-Surgical Precautions: Move in the Tube  Vitals:  Vital Signs  Heart Rate: 88 (HR ranged from 63-88 BPM)  Heart Rate Source: Monitor  Pain Assessment:  Pain Assessment  Pain Assessment: FLACC (Face, Legs, Activity, Cry, Consolability)  Pain Score: 3  Pain Type: Surgical pain  Pain Location: Chest       Bed Mobility/Transfers: Transfers  Transfer: Yes  Transfer 1  Transfer From 1: Chair with drop arm to  Transfer to 1: Stand  Technique 1: Sit to stand, Stand to sit  Transfer Device 1: Walker  Transfer Level of Assistance 1: Minimum assistance, +2  Trials/Comments 1: x 2 with VC/TC for hand placement and optimal posture. Pt tolerated standing ~20/30 seconds. (Pt presents poor safety awareness and fixated on L shoulder pain.)    Therapy/Activity: Therapeutic Activity  Therapeutic Activity Performed: Yes  Therapeutic Activity 1: Pt  "demonstrates behaviors of depression, advising, \"I lost my twin boys a few years back and lost my brothers the day I was admitted here.\" Therapist provided therapeutic use of self listenging to pt's concerns by offering emotional support. Submitted music therapy consult. RN notified.    Outcome Measures:  [facility] Daily Activity  Putting on and taking off regular lower body clothing: Total  Bathing (including washing, rinsing, drying): A lot  Putting on and taking off regular upper body clothing: A little  Toileting, which includes using toilet, bedpan or urinal: A lot  Taking care of personal grooming such as brushing teeth: A little  Eating Meals: None  Daily Activity - Total Score: 15      Education Documentation  Handouts, taught by JOSÉ LUIS Armendariz at 11/22/2023 10:36 AM.  Learner: Patient  Readiness: Acceptance  Method: Explanation, Demonstration  Response: Needs Reinforcement, Verbalizes Understanding    Body Mechanics, taught by JOSÉ LUIS Armendariz at 11/22/2023 10:36 AM.  Learner: Patient  Readiness: Acceptance  Method: Explanation, Demonstration  Response: Needs Reinforcement, Verbalizes Understanding    Precautions, taught by JOSÉ LUIS Armendariz at 11/22/2023 10:36 AM.  Learner: Patient  Readiness: Acceptance  Method: Explanation, Demonstration  Response: Needs Reinforcement, Verbalizes Understanding    ADL Training, taught by JOSÉ LUIS Armendariz at 11/22/2023 10:36 AM.  Learner: Patient  Readiness: Acceptance  Method: Explanation, Demonstration  Response: Needs Reinforcement, Verbalizes Understanding    Education Comments  No comments found.      Goals:  Encounter Problems       Encounter Problems (Active)       ADLs       Patient with complete upper body dressing with minimal assist  level of assistance donning and doffing all UE clothes with PRN adaptive equipment while supported sitting (Not Progressing)       Start:  11/14/23            Patient with complete lower body dressing with " moderate assist level of assistance donning and doffing all LE clothes  with shoe horn and sock-aid while supported sitting (Progressing)       Start:  11/14/23            Patient will complete daily grooming tasks brushing teeth, shaving, and washing face/hair with set-up and supervision level of assistance and PRN adaptive equipment while supported sitting. (Not Progressing)       Start:  11/14/23            Patient will complete toileting including hygiene clothing management/hygiene with moderate assist level of assistance and raised toilet seat and grab bars. (Not Progressing)       Start:  11/14/23                   TRANSFERS       Patient will perform bed mobility moderate assist level of assistance and bed rails and draw sheet in order to improve safety and independence with mobility (Not Progressing)       Start:  11/14/23            Patient will complete functional transfer with slide board transfer least restrictive device with contact guard assist level of assistance. (Progressing)       Start:  11/14/23

## 2023-11-22 NOTE — PROGRESS NOTES
Physical Therapy    Physical Therapy Treatment    Patient Name: Humphrey Waddell  MRN: [id number]  Today's Date: 11/22/2023  Time Calculation  Start Time: 0917  Stop Time: 0940  Time Calculation (min): 23 min       Assessment/Plan      PT Plan  Inpatient/Swing Bed or Outpatient: Inpatient  PT Plan  Treatment/Interventions: Transfer training  PT Plan: Skilled PT  PT Frequency: 4 times per week  PT Discharge Recommendations: High intensity level of continued care  PT Recommended Transfer Status: Assist x1 (mod assist)  PT - OK to Discharge: Yes (per POC)      General Visit Information:   PT  Visit  PT Received On: 11/22/23  General  Reason for Referral: impaired mobility with CABG x3 and return to OR same day for mediastinal exploration with sternal wires/plates closure  Co-Treatment: OT  Co-Treatment Reason: cotx with PT to promote functional transfers and safety; requiring 2 skilled hands on providers  Prior to Session Communication: Bedside nurse  Patient Position Received: Up in chair  General Comment: pt req increased time with tasks 2/2 pain and fatigue. Pt also req redirecting throughout to focus on task at hand.    Subjective   Precautions:  Precautions  Post-Surgical Precautions: Move in the Tube  Vital Signs:  Vital Signs  Heart Rate: 88 (63-88)    Objective   Pain:  Pain Assessment  Pain Score:  (pt stated 10 however number not matching body lanage and expression)  Pain Type: Surgical pain  Pain Location: Chest (also reports pain in R shoulder)  Pain Interventions: Medication (See MAR), Prayers  Cognition:  Cognition  Overall Cognitive Status: Within Functional Limits  Postural Control:     Extremity/Trunk Assessments:    Activity Tolerance:     Treatments:  Therapeutic Activity  Therapeutic Activity Performed: Yes (pt performed static standing balance with UE support at RW  x2 trials.  rest break req between trials.)    Transfers  Transfer: Yes  Transfer 1  Technique 1: Sit to stand, Stand to sit  Transfer Device  1: Walker  Transfer Level of Assistance 1: Minimum assistance  Trials/Comments 1: VC for hand placement for safety and to maintain precautions    Outcome Measures:  [facility] Basic Mobility  Turning from your back to your side while in a flat bed without using bedrails: A lot  Moving from lying on your back to sitting on the side of a flat bed without using bedrails: A lot  Moving to and from bed to chair (including a wheelchair): A little  Standing up from a chair using your arms (e.g. wheelchair or bedside chair): A little  To walk in hospital room: Total  Climbing 3-5 steps with railing: Total  Basic Mobility - Total Score: 12    Education Documentation  Precautions, taught by Troy Leavitt PTA at 11/22/2023  3:17 PM.  Learner: Patient  Readiness: Acceptance  Method: Explanation  Response: Verbalizes Understanding    Mobility Training, taught by Troy Leavitt PTA at 11/22/2023  3:17 PM.  Learner: Patient  Readiness: Acceptance  Method: Explanation  Response: Verbalizes Understanding    Education Comments  No comments found.        OP EDUCATION:  Outpatient Education  Education Comment: educated pt at length on precautions, pt stated understanding    Encounter Problems       Encounter Problems (Active)       Balance       STG - Maintains dynamic sitting balance with upper extremity support (Progressing)       Start:  11/14/23    Expected End:  11/28/23       INTERVENTIONS:  1. Practice sitting on the edge of a bed/mat with minimal support.  2. Educate patient about maintining total hip precautions while maintaining balance.  3. Educate patient about pressure relief.  4. Educate patient about use of assistive device.            Mobility       STG - Patient will propel the wheelchair (Not Progressing)       Start:  11/14/23    Expected End:  11/28/23       Utilizing safe technique with adherence to MITT precautions and with SBA support/VCs            Transfers       STG - Transfer from bed to chair (Progressing)        Start:  11/14/23    Expected End:  11/28/23       Mod A x1 with LRAD, using safe technique and adherence to MITT precautions,          STG - Patient will perform bed mobility (Progressing)       Start:  11/14/23    Expected End:  11/28/23       Via logroll with mod A and safe technique         Goal 1 (Progressing)       Start:  11/14/23    Expected End:  11/28/23       Patient will demonstrate stable vitals response on room air with all activities.

## 2023-11-22 NOTE — PROGRESS NOTES
Humphrey Waddell is a 58 y.o. male on day 9 of admission presenting with CAD, multiple vessel.      Patient got auth, but per care port no bed available till Friday. SHERRILL Bynum made aware. Patient is med ready for discharge.
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
## 2023-11-22 NOTE — PROGRESS NOTES
"Humphrey Waddell is a 58 y.o. male on day 9 of admission presenting with CAD, multiple vessel.    Subjective   Overnight no issues       Objective     Physical Exam  Constitutional:       General: He is not in acute distress.     Appearance: He is not toxic-appearing.   Eyes:      Pupils: Pupils are equal, round, and reactive to light.   Cardiovascular:      Rate and Rhythm: Normal rate and regular rhythm.      Pulses: Normal pulses.      Comments: Midsternal incision well approximated with no drainage and stable to palpation, chest tube sites healing well  Pulmonary:      Effort: Pulmonary effort is normal.   Abdominal:      General: Abdomen is flat. There is no distension.      Palpations: Abdomen is soft.      Tenderness: There is no abdominal tenderness.   Skin:     General: Skin is warm and dry.      Comments: Right SVG site well approximated without erythema or drainage   Neurological:      General: No focal deficit present.      Mental Status: He is alert and oriented to person, place, and time.   Psychiatric:         Mood and Affect: Mood normal.         Behavior: Behavior normal.         Last Recorded Vitals  Blood pressure 152/63, pulse 66, temperature 36 °C (96.8 °F), resp. rate 18, height 1.93 m (6' 3.98\"), weight 116 kg (256 lb 9.9 oz), SpO2 97 %.  Intake/Output last 3 Shifts:  I/O last 3 completed shifts:  In: 1240 (10.7 mL/kg) [P.O.:1240]  Out: 4100 (35.2 mL/kg) [Urine:4100 (1 mL/kg/hr)]  Weight: 116.4 kg     Relevant Results  LABS:  CMP:  Results from last 7 days   Lab Units 11/22/23  0357 11/21/23  0529 11/20/23  0506 11/19/23  0522 11/18/23  1027 11/18/23  0519 11/17/23  0513 11/16/23  0410 11/15/23  1459   SODIUM mmol/L 136 134* 134* 135* 134* 134* 133* 135* 135*   POTASSIUM mmol/L 4.2 4.2 4.2 3.9 4.3 3.8 4.3 5.0 5.3   CHLORIDE mmol/L 100 100 98 98 97* 98 98 101 101   CO2 mmol/L 26 27 27 25 28 26 28 24 24   ANION GAP mmol/L 14 11 13 16 13 14 11 15 15   BUN mg/dL 59* 58* 60* 61* 57* 57* 56* 56* 53* "   CREATININE mg/dL 2.62* 2.64* 2.94* 3.16* 3.19* 3.12* 3.41* 3.78* 3.55*   EGFR mL/min/1.73m*2 27* 27* 24* 22* 21* 22* 20* 17* 19*   MAGNESIUM mg/dL  --   --  1.90 2.00 1.90 2.00 2.30 2.60*  --    ALBUMIN g/dL 2.7* 2.8* 2.7* 2.8* 2.7* 2.9* 2.9* 2.7* 3.0*     CBC:  Results from last 7 days   Lab Units 11/22/23  0357 11/21/23  0529 11/20/23  0505 11/19/23  0522 11/18/23  1027 11/18/23  0519 11/17/23  0513 11/16/23  0814   WBC AUTO x10*3/uL 10.3 11.1 11.0 9.3 8.5 8.6 10.3 10.3   HEMOGLOBIN g/dL 8.5* 8.9* 8.3* 8.7* 8.1* 8.8* 8.3* 6.5*   HEMATOCRIT % 26.9* 29.2* 26.6* 28.3* 26.0* 28.2* 26.9* 20.8*   PLATELETS AUTO x10*3/uL 259 227 213 207 179 189 162 151   MCV fL 92 92 91 93 91 93 92 91     COAG:     HEME/ENDO:     CARDIAC:       Scheduled medications  acetaminophen, 650 mg, oral, q6h  aspirin, 81 mg, oral, Daily  atorvastatin, 80 mg, oral, Daily  clopidogrel, 75 mg, oral, Daily  heparin (porcine), 5,000 Units, subcutaneous, q8h  hydrALAZINE, 50 mg, oral, TID  insulin glargine, 4 Units, subcutaneous, Nightly  insulin lispro, 0-15 Units, subcutaneous, TID with meals  magnesium oxide, 400 mg, oral, Daily  melatonin, 4.5 mg, oral, Nightly  metoprolol tartrate, 50 mg, oral, BID  pantoprazole, 40 mg, oral, Daily before breakfast  [Held by provider] polyethylene glycol, 17 g, oral, Daily  [Held by provider] sennosides-docusate sodium, 2 tablet, oral, BID  torsemide, 40 mg, oral, Daily      Continuous medications     PRN medications  PRN medications: dextrose 10 % in water (D10W), dextrose **OR** [DISCONTINUED] glucagon, dextrose, glucagon, [Held by provider] melatonin, naloxone, ondansetron, oxygen, traMADol, traMADol             Assessment/Plan   Principal Problem:    CAD, multiple vessel    Humphrey Waddell is a 58 y.o. male presenting with CAD, hx NSTEMI and HFpEF. PMHx includes NIDDM2, Lt AKA, chronic RLE ulcer, GERD, carotid stenosis, hx CVA, hx GIB, hx anemia req'ing blood transfusions (2023), and hx confusion of anesthesia. Pt  admitted to [facility] ICU s/p CABGx3 (LIMA>LAD, SVG>OM1, SVG>PDA), posterior pericardotomy, sternal wires and plating with Dr. Delatorre on 11/13/23. Postoperative vasoplegic shock. Levophed weaned off POD 2.      Neuro/MSK:   Expected acute postop pain - well controlled  Hx CVA  Lt AKA  Anxiety  depression  - Serial neuro and pain assessments  - Scheduled Tylenol  - PRN Tramadol per patient request  - PT/OT/MITT     -> Limited mobility at baseline: wheelchair bound- ok to use prosthesis  - Home melatonin     CV:   CAD s/p CABGx3  HFpEF. HTN  LVEF pre 60%, post 65%  Epicardial pacing wire cut during exam today, no immediate complications or rhythm changes  - Continuous EKG and NIBP  - ASA/statin/plavix  - Metoprolol 50mg BID  - Home hydralazine 50mg TID     Pulm:   Oxygenating well on RA  - Bronch hygiene     GI:   - Regular diet c ensure  - PPI     Renal:   - RFP daily  - Replete lytes prn  - Nephrology following     -> Torsemide 40mg     Heme: Acute on chronic anemia (baseline Hgb 9.1)  11/14 2u prbc  11/15 1u prbc  11/16 1u prbc  - CBC daily  - SCDs, SQH for DVTppx     Endo: IDDM2  A1C 5.3%  - Home Lantus 4u with ISS     Dispo: awaiting snf/rehab placement           I spent 35 minutes with patient care coordination of care and counseling      Fletcher Rodriguez PA-C

## 2023-11-23 PROBLEM — I25.10 CAD, MULTIPLE VESSEL: Status: RESOLVED | Noted: 2023-11-13 | Resolved: 2023-11-23

## 2023-11-23 PROBLEM — Z95.1 S/P CABG (CORONARY ARTERY BYPASS GRAFT): Status: ACTIVE | Noted: 2023-11-23

## 2023-11-23 LAB
ALBUMIN SERPL BCP-MCNC: 2.8 G/DL (ref 3.4–5)
ANION GAP SERPL CALC-SCNC: 12 MMOL/L (ref 10–20)
BUN SERPL-MCNC: 59 MG/DL (ref 6–23)
CALCIUM SERPL-MCNC: 7.5 MG/DL (ref 8.6–10.3)
CHLORIDE SERPL-SCNC: 100 MMOL/L (ref 98–107)
CO2 SERPL-SCNC: 26 MMOL/L (ref 21–32)
CREAT SERPL-MCNC: 2.84 MG/DL (ref 0.5–1.3)
ERYTHROCYTE [DISTWIDTH] IN BLOOD BY AUTOMATED COUNT: 14.5 % (ref 11.5–14.5)
GFR SERPL CREATININE-BSD FRML MDRD: 25 ML/MIN/1.73M*2
GLUCOSE BLD MANUAL STRIP-MCNC: 115 MG/DL (ref 74–99)
GLUCOSE BLD MANUAL STRIP-MCNC: 139 MG/DL (ref 74–99)
GLUCOSE BLD MANUAL STRIP-MCNC: 186 MG/DL (ref 74–99)
GLUCOSE BLD MANUAL STRIP-MCNC: 188 MG/DL (ref 74–99)
GLUCOSE SERPL-MCNC: 135 MG/DL (ref 74–99)
HCT VFR BLD AUTO: 29.5 % (ref 41–52)
HGB BLD-MCNC: 9.1 G/DL (ref 13.5–17.5)
MCH RBC QN AUTO: 29 PG (ref 26–34)
MCHC RBC AUTO-ENTMCNC: 30.8 G/DL (ref 32–36)
MCV RBC AUTO: 94 FL (ref 80–100)
NRBC BLD-RTO: 0 /100 WBCS (ref 0–0)
PHOSPHATE SERPL-MCNC: 3.6 MG/DL (ref 2.5–4.9)
PLATELET # BLD AUTO: 281 X10*3/UL (ref 150–450)
POTASSIUM SERPL-SCNC: 4.2 MMOL/L (ref 3.5–5.3)
RBC # BLD AUTO: 3.14 X10*6/UL (ref 4.5–5.9)
SODIUM SERPL-SCNC: 134 MMOL/L (ref 136–145)
WBC # BLD AUTO: 10.8 X10*3/UL (ref 4.4–11.3)

## 2023-11-23 PROCEDURE — 2500000001 HC RX 250 WO HCPCS SELF ADMINISTERED DRUGS (ALT 637 FOR MEDICARE OP): Performed by: PHYSICIAN ASSISTANT

## 2023-11-23 PROCEDURE — 2500000001 HC RX 250 WO HCPCS SELF ADMINISTERED DRUGS (ALT 637 FOR MEDICARE OP): Performed by: INTERNAL MEDICINE

## 2023-11-23 PROCEDURE — 2500000001 HC RX 250 WO HCPCS SELF ADMINISTERED DRUGS (ALT 637 FOR MEDICARE OP): Performed by: REGISTERED NURSE

## 2023-11-23 PROCEDURE — 36415 COLL VENOUS BLD VENIPUNCTURE: CPT | Performed by: NURSE PRACTITIONER

## 2023-11-23 PROCEDURE — 96372 THER/PROPH/DIAG INJ SC/IM: CPT | Performed by: REGISTERED NURSE

## 2023-11-23 PROCEDURE — 2500000002 HC RX 250 W HCPCS SELF ADMINISTERED DRUGS (ALT 637 FOR MEDICARE OP, ALT 636 FOR OP/ED): Performed by: STUDENT IN AN ORGANIZED HEALTH CARE EDUCATION/TRAINING PROGRAM

## 2023-11-23 PROCEDURE — 80069 RENAL FUNCTION PANEL: CPT | Performed by: NURSE PRACTITIONER

## 2023-11-23 PROCEDURE — 2500000002 HC RX 250 W HCPCS SELF ADMINISTERED DRUGS (ALT 637 FOR MEDICARE OP, ALT 636 FOR OP/ED): Performed by: REGISTERED NURSE

## 2023-11-23 PROCEDURE — 85027 COMPLETE CBC AUTOMATED: CPT | Performed by: NURSE PRACTITIONER

## 2023-11-23 PROCEDURE — 2060000001 HC INTERMEDIATE ICU ROOM DAILY

## 2023-11-23 PROCEDURE — 2500000001 HC RX 250 WO HCPCS SELF ADMINISTERED DRUGS (ALT 637 FOR MEDICARE OP): Performed by: NURSE PRACTITIONER

## 2023-11-23 PROCEDURE — 82947 ASSAY GLUCOSE BLOOD QUANT: CPT

## 2023-11-23 PROCEDURE — 2500000004 HC RX 250 GENERAL PHARMACY W/ HCPCS (ALT 636 FOR OP/ED): Performed by: STUDENT IN AN ORGANIZED HEALTH CARE EDUCATION/TRAINING PROGRAM

## 2023-11-23 PROCEDURE — 94760 N-INVAS EAR/PLS OXIMETRY 1: CPT

## 2023-11-23 PROCEDURE — 2500000004 HC RX 250 GENERAL PHARMACY W/ HCPCS (ALT 636 FOR OP/ED): Performed by: REGISTERED NURSE

## 2023-11-23 RX ORDER — METOPROLOL TARTRATE 50 MG/1
50 TABLET ORAL 2 TIMES DAILY
Start: 2023-11-23 | End: 2024-01-12 | Stop reason: SDUPTHER

## 2023-11-23 RX ORDER — TRAMADOL HYDROCHLORIDE 50 MG/1
25 TABLET ORAL EVERY 12 HOURS PRN
Qty: 15 TABLET | Refills: 0
Start: 2023-11-23 | End: 2024-03-28 | Stop reason: WASHOUT

## 2023-11-23 RX ORDER — LANOLIN ALCOHOL/MO/W.PET/CERES
400 CREAM (GRAM) TOPICAL DAILY
Start: 2023-11-24 | End: 2024-03-28 | Stop reason: WASHOUT

## 2023-11-23 RX ORDER — PANTOPRAZOLE SODIUM 40 MG/1
40 TABLET, DELAYED RELEASE ORAL
Start: 2023-11-24 | End: 2024-01-12 | Stop reason: SDUPTHER

## 2023-11-23 RX ORDER — ACETAMINOPHEN 500 MG
5 TABLET ORAL NIGHTLY
Start: 2023-11-23 | End: 2024-03-28 | Stop reason: DRUGHIGH

## 2023-11-23 RX ORDER — INSULIN LISPRO 100 [IU]/ML
0-15 INJECTION, SOLUTION INTRAVENOUS; SUBCUTANEOUS
Start: 2023-11-23 | End: 2024-03-28 | Stop reason: WASHOUT

## 2023-11-23 RX ADMIN — ACETAMINOPHEN 650 MG: 325 TABLET ORAL at 08:34

## 2023-11-23 RX ADMIN — HYDRALAZINE HYDROCHLORIDE 50 MG: 50 TABLET, FILM COATED ORAL at 21:07

## 2023-11-23 RX ADMIN — HEPARIN SODIUM 5000 UNITS: 5000 INJECTION INTRAVENOUS; SUBCUTANEOUS at 17:11

## 2023-11-23 RX ADMIN — INSULIN GLARGINE 4 UNITS: 100 INJECTION, SOLUTION SUBCUTANEOUS at 21:08

## 2023-11-23 RX ADMIN — ACETAMINOPHEN 650 MG: 325 TABLET ORAL at 21:07

## 2023-11-23 RX ADMIN — ASPIRIN 81 MG CHEWABLE TABLET 81 MG: 81 TABLET CHEWABLE at 08:34

## 2023-11-23 RX ADMIN — CLOPIDOGREL 75 MG: 75 TABLET ORAL at 08:35

## 2023-11-23 RX ADMIN — HEPARIN SODIUM 5000 UNITS: 5000 INJECTION INTRAVENOUS; SUBCUTANEOUS at 00:22

## 2023-11-23 RX ADMIN — PANTOPRAZOLE SODIUM 40 MG: 40 TABLET, DELAYED RELEASE ORAL at 08:35

## 2023-11-23 RX ADMIN — Medication 4.5 MG: at 21:07

## 2023-11-23 RX ADMIN — INSULIN LISPRO 10 UNITS: 100 INJECTION, SOLUTION INTRAVENOUS; SUBCUTANEOUS at 12:41

## 2023-11-23 RX ADMIN — MAGNESIUM OXIDE TAB 400 MG (241.3 MG ELEMENTAL MG) 400 MG: 400 (241.3 MG) TAB at 08:35

## 2023-11-23 RX ADMIN — HEPARIN SODIUM 5000 UNITS: 5000 INJECTION INTRAVENOUS; SUBCUTANEOUS at 08:37

## 2023-11-23 RX ADMIN — METOPROLOL TARTRATE 50 MG: 50 TABLET, FILM COATED ORAL at 21:07

## 2023-11-23 RX ADMIN — TORSEMIDE 40 MG: 20 TABLET ORAL at 08:35

## 2023-11-23 RX ADMIN — ACETAMINOPHEN 650 MG: 325 TABLET ORAL at 13:28

## 2023-11-23 RX ADMIN — ATORVASTATIN CALCIUM 80 MG: 80 TABLET ORAL at 08:33

## 2023-11-23 RX ADMIN — HYDRALAZINE HYDROCHLORIDE 50 MG: 50 TABLET, FILM COATED ORAL at 08:35

## 2023-11-23 RX ADMIN — METOPROLOL TARTRATE 50 MG: 50 TABLET, FILM COATED ORAL at 08:33

## 2023-11-23 RX ADMIN — HYDRALAZINE HYDROCHLORIDE 50 MG: 50 TABLET, FILM COATED ORAL at 15:44

## 2023-11-23 ASSESSMENT — PAIN SCALES - GENERAL
PAINLEVEL_OUTOF10: 0 - NO PAIN

## 2023-11-23 ASSESSMENT — PAIN - FUNCTIONAL ASSESSMENT
PAIN_FUNCTIONAL_ASSESSMENT: 0-10

## 2023-11-23 NOTE — PROGRESS NOTES
Reason For Consult  Kody on ckd    History Of Present Illness  Humphrey Waddell is a 58 y.o. male with PMH of HFpEF, CAD, carotid stenosis, CVA, DM, anemia, GIB, DJD, PAD s/p Lt AKA, HTN, depression, CKD (Creatinine 2.84 mg/dL), previous transfusion pRBC,  presenting for a scheduled CABG x3 LIMA to LAD Rt EVH SVG to OM Rt EVH SVG to PDA pericardial window and sternal plating on 11/13. OR course 1500ml crystalloid, 250mL Cellsaver. Upon arrival to ICU had significant hypotension requiring return to OR. EBL for return to OR was 170mL. Returned to ICU in stable condition. KODY noted oliguric. We are now consulted for renal care.      No overnight events.  Chart/labs/meds/imaging/notes/VS reviewed.    Physical Exam  Constitutional:    Alert and oriented x 3, appears in no distress  HENT:      Clear oropharynx, moist mucous membranes  Cardiovascular:   Normal rate and regular rhythm; Normal pulses; Normal heart sounds. No murmur heard; No friction rub, midsternal incision   Pulmonary:   Pulmonary effort is normal. No respiratory distress. Normal breath sounds. No stridor. No wheezing or rhonchi.    Abdominal:   Bowel sounds are normal. There is no distension. Abdomen is soft. There is no tenderness or guarding. Lee in place.   Musculoskeletal:      +1 edema. Moves all extremities, Lt ALA with intact stump site  Skin:  Skin is warm and dry. Capillary refill takes less than 2 seconds. No lesion or rash.   Neurological:   No focal deficit present. Alert and oriented to person, place, and time. Takes quite some time to answer questions  Psych:  withdrawn        I&O 24HR    Intake/Output Summary (Last 24 hours) at 11/23/2023 0956  Last data filed at 11/23/2023 0000  Gross per 24 hour   Intake --   Output 1500 ml   Net -1500 ml         Vitals 24HR  Heart Rate:  [63-74]   Temp:  [36.3 °C (97.4 °F)-36.8 °C (98.2 °F)]   Resp:  [16]   BP: (123-175)/(60-75)   SpO2:  [97 %-100 %]     Lab Results   Component Value Date    WBC 10.8  11/23/2023    HGB 9.1 (L) 11/23/2023    HCT 29.5 (L) 11/23/2023    MCV 94 11/23/2023     11/23/2023      Lab Results   Component Value Date    GLUCOSE 135 (H) 11/23/2023    CALCIUM 7.5 (L) 11/23/2023     (L) 11/23/2023    K 4.2 11/23/2023    CO2 26 11/23/2023     11/23/2023    BUN 59 (H) 11/23/2023    CREATININE 2.84 (H) 11/23/2023      Scheduled medications  acetaminophen, 650 mg, oral, q6h  amiodarone in dextrose,iso-osm, , ,   aspirin, 81 mg, oral, Daily  atorvastatin, 80 mg, oral, Daily  clopidogrel, 75 mg, oral, Daily  heparin (porcine), 5,000 Units, subcutaneous, q8h  hydrALAZINE, 50 mg, oral, TID  insulin glargine, 4 Units, subcutaneous, Nightly  insulin lispro, 0-15 Units, subcutaneous, TID with meals  magnesium oxide, 400 mg, oral, Daily  melatonin, 4.5 mg, oral, Nightly  metoprolol tartrate, 50 mg, oral, BID  pantoprazole, 40 mg, oral, Daily before breakfast  [Held by provider] polyethylene glycol, 17 g, oral, Daily  [Held by provider] sennosides-docusate sodium, 2 tablet, oral, BID  torsemide, 40 mg, oral, Daily      Continuous medications     PRN medications  PRN medications: amiodarone in dextrose,iso-osm, dextrose 10 % in water (D10W), dextrose **OR** [DISCONTINUED] glucagon, dextrose, glucagon, [Held by provider] melatonin, naloxone, ondansetron, oxygen, traMADol, traMADol     Assessment/Plan     Mr. Waddell is a 58-year-old man with a history of coronary artery disease, and may he suffered an acute coronary syndrome, had a 50% left main, mid LAD at 70%, 95% circumflex.  He had a stenting of the OM 2.  In July echo revealed normal systolic function, no valvular abnormalities.  He was seen by CT surgery.  It is noted that he has had the above-the-knee amputation, that was a risk factor for his rehab.  He has diabetes, hypertension, stage 4 chronic kidney disease and the peripheral vascular disease.  He otherwise has known carotid stenosis, has had a stroke, GI bleed, anemia requiring  blood transfusions.    On November 13th he underwent a three-vessel bypass, posterior pericardiotomy.  Blood pressure was high postoperatively, was on epinephrine, insulin infusion, norepinephrine and phenylephrine.  Blood pressures then dropped, late on November 13 he was on a max dose of epinephrine, blood pressures were in the 80s systolic, minimal responsiveness to 2.5 L of lactated Ringer's.  TTE suggested a newly hypokinetic anterior wall.  He went back to the operating room.  Drains were left in place. By November 14th he was extubated.  On November 15 had nausea and vomiting, was slow to respond.  Had received 1 unit of blood the night prior, norepinephrine was weaned off.    He suffered acute kidney injury which is no surprise given his vascular instability, anemia.  He has significant chronic kidney disease and is unlikely to autoregulate.  He has received erythropoietin and IV iron.  He has required IVP Lasix 80 mg BID due to hypervolemia. He is down approximately 10 L by net Is/Os and has much less edema on exam. His creatinine is back within his baseline. He is off IVP Lasix and back on torsemide at 40 mg daily (previously on 20 mg daily).  He was given IV iron, hemoglobin is coming up.  I sent iron stores and ferritin and will give him 200 mg IV venofer.     Principal Problem:    CAD, multiple vessel      Juancho Mascorro MD

## 2023-11-24 VITALS
BODY MASS INDEX: 31.92 KG/M2 | DIASTOLIC BLOOD PRESSURE: 62 MMHG | WEIGHT: 262.1 LBS | RESPIRATION RATE: 18 BRPM | TEMPERATURE: 98.1 F | OXYGEN SATURATION: 97 % | SYSTOLIC BLOOD PRESSURE: 138 MMHG | HEART RATE: 70 BPM | HEIGHT: 76 IN

## 2023-11-24 LAB
BODY SURFACE AREA: 2.52 M2
ERYTHROCYTE [DISTWIDTH] IN BLOOD BY AUTOMATED COUNT: 14.4 % (ref 11.5–14.5)
GLUCOSE BLD MANUAL STRIP-MCNC: 114 MG/DL (ref 74–99)
GLUCOSE BLD MANUAL STRIP-MCNC: 173 MG/DL (ref 74–99)
HCT VFR BLD AUTO: 28.4 % (ref 41–52)
HGB BLD-MCNC: 8.7 G/DL (ref 13.5–17.5)
MCH RBC QN AUTO: 28.3 PG (ref 26–34)
MCHC RBC AUTO-ENTMCNC: 30.6 G/DL (ref 32–36)
MCV RBC AUTO: 93 FL (ref 80–100)
NRBC BLD-RTO: 0 /100 WBCS (ref 0–0)
PLATELET # BLD AUTO: 290 X10*3/UL (ref 150–450)
RBC # BLD AUTO: 3.07 X10*6/UL (ref 4.5–5.9)
WBC # BLD AUTO: 11.3 X10*3/UL (ref 4.4–11.3)

## 2023-11-24 PROCEDURE — 96372 THER/PROPH/DIAG INJ SC/IM: CPT | Performed by: INTERNAL MEDICINE

## 2023-11-24 PROCEDURE — 96372 THER/PROPH/DIAG INJ SC/IM: CPT | Performed by: REGISTERED NURSE

## 2023-11-24 PROCEDURE — 82947 ASSAY GLUCOSE BLOOD QUANT: CPT

## 2023-11-24 PROCEDURE — 2500000001 HC RX 250 WO HCPCS SELF ADMINISTERED DRUGS (ALT 637 FOR MEDICARE OP): Performed by: INTERNAL MEDICINE

## 2023-11-24 PROCEDURE — 2500000002 HC RX 250 W HCPCS SELF ADMINISTERED DRUGS (ALT 637 FOR MEDICARE OP, ALT 636 FOR OP/ED): Performed by: REGISTERED NURSE

## 2023-11-24 PROCEDURE — 36415 COLL VENOUS BLD VENIPUNCTURE: CPT | Performed by: INTERNAL MEDICINE

## 2023-11-24 PROCEDURE — 85027 COMPLETE CBC AUTOMATED: CPT | Performed by: INTERNAL MEDICINE

## 2023-11-24 PROCEDURE — 2500000004 HC RX 250 GENERAL PHARMACY W/ HCPCS (ALT 636 FOR OP/ED): Performed by: REGISTERED NURSE

## 2023-11-24 PROCEDURE — 2500000001 HC RX 250 WO HCPCS SELF ADMINISTERED DRUGS (ALT 637 FOR MEDICARE OP): Performed by: REGISTERED NURSE

## 2023-11-24 PROCEDURE — 99239 HOSP IP/OBS DSCHRG MGMT >30: CPT | Performed by: NURSE PRACTITIONER

## 2023-11-24 PROCEDURE — 6350000001 HC RX 635 EPOETIN >10,000 UNITS: Mod: JZ | Performed by: INTERNAL MEDICINE

## 2023-11-24 PROCEDURE — 2500000004 HC RX 250 GENERAL PHARMACY W/ HCPCS (ALT 636 FOR OP/ED): Performed by: STUDENT IN AN ORGANIZED HEALTH CARE EDUCATION/TRAINING PROGRAM

## 2023-11-24 PROCEDURE — 2500000001 HC RX 250 WO HCPCS SELF ADMINISTERED DRUGS (ALT 637 FOR MEDICARE OP): Performed by: PHYSICIAN ASSISTANT

## 2023-11-24 PROCEDURE — 2500000001 HC RX 250 WO HCPCS SELF ADMINISTERED DRUGS (ALT 637 FOR MEDICARE OP): Performed by: NURSE PRACTITIONER

## 2023-11-24 RX ADMIN — EPOETIN ALFA-EPBX 10000 UNITS: 10000 INJECTION, SOLUTION INTRAVENOUS; SUBCUTANEOUS at 14:56

## 2023-11-24 RX ADMIN — HYDRALAZINE HYDROCHLORIDE 50 MG: 50 TABLET, FILM COATED ORAL at 08:47

## 2023-11-24 RX ADMIN — TORSEMIDE 40 MG: 20 TABLET ORAL at 08:47

## 2023-11-24 RX ADMIN — ATORVASTATIN CALCIUM 80 MG: 80 TABLET ORAL at 08:47

## 2023-11-24 RX ADMIN — PANTOPRAZOLE SODIUM 40 MG: 40 TABLET, DELAYED RELEASE ORAL at 06:10

## 2023-11-24 RX ADMIN — HEPARIN SODIUM 5000 UNITS: 5000 INJECTION INTRAVENOUS; SUBCUTANEOUS at 02:26

## 2023-11-24 RX ADMIN — METOPROLOL TARTRATE 50 MG: 50 TABLET, FILM COATED ORAL at 08:47

## 2023-11-24 RX ADMIN — HEPARIN SODIUM 5000 UNITS: 5000 INJECTION INTRAVENOUS; SUBCUTANEOUS at 08:47

## 2023-11-24 RX ADMIN — TRAMADOL HYDROCHLORIDE 50 MG: 50 TABLET, COATED ORAL at 08:47

## 2023-11-24 RX ADMIN — INSULIN LISPRO 5 UNITS: 100 INJECTION, SOLUTION INTRAVENOUS; SUBCUTANEOUS at 12:20

## 2023-11-24 RX ADMIN — ACETAMINOPHEN 650 MG: 325 TABLET ORAL at 12:30

## 2023-11-24 RX ADMIN — HYDRALAZINE HYDROCHLORIDE 50 MG: 50 TABLET, FILM COATED ORAL at 14:56

## 2023-11-24 RX ADMIN — CLOPIDOGREL 75 MG: 75 TABLET ORAL at 08:47

## 2023-11-24 RX ADMIN — ACETAMINOPHEN 650 MG: 325 TABLET ORAL at 06:10

## 2023-11-24 RX ADMIN — ASPIRIN 81 MG CHEWABLE TABLET 81 MG: 81 TABLET CHEWABLE at 08:47

## 2023-11-24 RX ADMIN — MAGNESIUM OXIDE TAB 400 MG (241.3 MG ELEMENTAL MG) 400 MG: 400 (241.3 MG) TAB at 08:47

## 2023-11-24 ASSESSMENT — PAIN - FUNCTIONAL ASSESSMENT
PAIN_FUNCTIONAL_ASSESSMENT: 0-10
PAIN_FUNCTIONAL_ASSESSMENT: 0-10

## 2023-11-24 ASSESSMENT — PAIN DESCRIPTION - DESCRIPTORS: DESCRIPTORS: CRAMPING

## 2023-11-24 ASSESSMENT — PAIN SCALES - PAIN ASSESSMENT IN ADVANCED DEMENTIA (PAINAD)
BREATHING: NORMAL
FACIALEXPRESSION: SMILING OR INEXPRESSIVE
CONSOLABILITY: NO NEED TO CONSOLE
TOTALSCORE: 0
BODYLANGUAGE: RELAXED

## 2023-11-24 ASSESSMENT — PAIN SCALES - GENERAL
PAINLEVEL_OUTOF10: 2
PAINLEVEL_OUTOF10: 0 - NO PAIN
PAINLEVEL_OUTOF10: 0 - NO PAIN

## 2023-11-24 ASSESSMENT — PAIN SCALES - WONG BAKER: WONGBAKER_NUMERICALRESPONSE: NO HURT

## 2023-11-24 NOTE — PROGRESS NOTES
Reason For Consult  Kody on ckd    History Of Present Illness  Humphrey Waddell is a 58 y.o. male with PMH of HFpEF, CAD, carotid stenosis, CVA, DM, anemia, GIB, DJD, PAD s/p Lt AKA, HTN, depression, CKD (Creatinine 2.84 mg/dL), previous transfusion pRBC,  presenting for a scheduled CABG x3 LIMA to LAD Rt EVH SVG to OM Rt EVH SVG to PDA pericardial window and sternal plating on 11/13. OR course 1500ml crystalloid, 250mL Cellsaver. Upon arrival to ICU had significant hypotension requiring return to OR. EBL for return to OR was 170mL. Returned to ICU in stable condition. KODY noted oliguric. We are now consulted for renal care.      No overnight events.  Chart/labs/meds/imaging/notes/VS reviewed.    Physical Exam  Constitutional:    Alert and oriented x 3, appears in no distress  HENT:      Clear oropharynx, moist mucous membranes  Cardiovascular:   Normal rate and regular rhythm; Normal pulses; Normal heart sounds. No murmur heard; No friction rub, midsternal incision   Pulmonary:   Pulmonary effort is normal. No respiratory distress. Normal breath sounds. No stridor. No wheezing or rhonchi.    Abdominal:   Bowel sounds are normal. There is no distension. Abdomen is soft. There is no tenderness or guarding. Lee in place.   Musculoskeletal:      +1 edema. Moves all extremities, Lt ALA with intact stump site  Skin:  Skin is warm and dry. Capillary refill takes less than 2 seconds. No lesion or rash.   Neurological:   No focal deficit present. Alert and oriented to person, place, and time. Takes quite some time to answer questions  Psych:  withdrawn        I&O 24HR    Intake/Output Summary (Last 24 hours) at 11/24/2023 1301  Last data filed at 11/24/2023 0355  Gross per 24 hour   Intake 300 ml   Output 1050 ml   Net -750 ml         Vitals 24HR  Heart Rate:  [62-79]   Temp:  [36.3 °C (97.3 °F)-36.7 °C (98.1 °F)]   Resp:  [16-20]   BP: (123-173)/(53-77)   Weight:  [119 kg (262 lb 1.6 oz)]   SpO2:  [95 %-98 %]     Lab Results    Component Value Date    WBC 11.3 11/24/2023    HGB 8.7 (L) 11/24/2023    HCT 28.4 (L) 11/24/2023    MCV 93 11/24/2023     11/24/2023      Lab Results   Component Value Date    GLUCOSE 135 (H) 11/23/2023    CALCIUM 7.5 (L) 11/23/2023     (L) 11/23/2023    K 4.2 11/23/2023    CO2 26 11/23/2023     11/23/2023    BUN 59 (H) 11/23/2023    CREATININE 2.84 (H) 11/23/2023      Scheduled medications  acetaminophen, 650 mg, oral, q6h  aspirin, 81 mg, oral, Daily  atorvastatin, 80 mg, oral, Daily  clopidogrel, 75 mg, oral, Daily  heparin (porcine), 5,000 Units, subcutaneous, q8h  hydrALAZINE, 50 mg, oral, TID  insulin glargine, 4 Units, subcutaneous, Nightly  insulin lispro, 0-15 Units, subcutaneous, TID with meals  magnesium oxide, 400 mg, oral, Daily  melatonin, 4.5 mg, oral, Nightly  metoprolol tartrate, 50 mg, oral, BID  pantoprazole, 40 mg, oral, Daily before breakfast  [Held by provider] polyethylene glycol, 17 g, oral, Daily  [Held by provider] sennosides-docusate sodium, 2 tablet, oral, BID  torsemide, 40 mg, oral, Daily      Continuous medications     PRN medications  PRN medications: dextrose 10 % in water (D10W), dextrose **OR** [DISCONTINUED] glucagon, dextrose, glucagon, [Held by provider] melatonin, naloxone, ondansetron, oxygen, traMADol, traMADol     Assessment/Plan     Mr. Waddell is a 58-year-old man with a history of coronary artery disease, and may he suffered an acute coronary syndrome, had a 50% left main, mid LAD at 70%, 95% circumflex.  He had a stenting of the OM 2.  In July echo revealed normal systolic function, no valvular abnormalities.  He was seen by CT surgery.  It is noted that he has had the above-the-knee amputation, that was a risk factor for his rehab.  He has diabetes, hypertension, stage 4 chronic kidney disease and the peripheral vascular disease.  He otherwise has known carotid stenosis, has had a stroke, GI bleed, anemia requiring blood transfusions.    On November  13th he underwent a three-vessel bypass, posterior pericardiotomy.  Blood pressure was high postoperatively, was on epinephrine, insulin infusion, norepinephrine and phenylephrine.  Blood pressures then dropped, late on November 13 he was on a max dose of epinephrine, blood pressures were in the 80s systolic, minimal responsiveness to 2.5 L of lactated Ringer's.  TTE suggested a newly hypokinetic anterior wall.  He went back to the operating room.  Drains were left in place. By November 14th he was extubated.  On November 15 had nausea and vomiting, was slow to respond.  Had received 1 unit of blood the night prior, norepinephrine was weaned off.    He suffered acute kidney injury which is no surprise given his vascular instability, anemia.  He has significant chronic kidney disease and is unlikely to autoregulate.  He has received erythropoietin and IV iron.  He has required IVP Lasix 80 mg BID due to hypervolemia. He is down approximately 10 L by net Is/Os and has much less edema on exam. His creatinine is back within his baseline. He is off IVP Lasix and back on torsemide at 40 mg daily (previously on 20 mg daily).  He was given IV iron, hemoglobin is coming up.  I gave him intravenous iron yesterday, I will make sure he gets a dose of erythropoietin.  I will alert Dr. Shahid that he is going to [facility] rehab.    Active Problems:    Benign essential hypertension    Type 2 diabetes mellitus with neurologic complication, without long-term current use of insulin (CMS/[facility])    Diabetic renal disease (CMS/[facility])    S/P CABG (coronary artery bypass graft)      Juancho Mascorro MD

## 2023-11-24 NOTE — PROGRESS NOTES
Physical Therapy                 Therapy Communication Note    Patient Name: Humphrey Waddell  MRN: [id number]  Today's Date: 11/24/2023     Discipline: Physical Therapy    Missed Visit Reason: Missed Visit Reason:  (pt stated that he had a bad night and wants to rest until he is discharging.  RN notified)    Missed Time: Attempt

## 2023-11-24 NOTE — PROGRESS NOTES
Humphrey Waddell is a 58 y.o. male on day 11 of admission presenting with CAD, multiple vessel.          Patient has active discharge orders. Requested transport from [facility] and was confirmed for 1 pm. Diane NP, bedside nurse, , facility, patient and sig other at bedside and they all made aware of transport set pu for 1 pm to go to [facility].

## 2023-11-24 NOTE — DISCHARGE SUMMARY
Discharge Diagnosis  CAD, multiple vessel      Test Results Pending At Discharge  Pending Labs       Order Current Status    Extra Tubes Preliminary result    Extra Tubes Preliminary result    Light Blue Top Preliminary result    PST Top Preliminary result            Hospital Course   Humphrey Waddell is a 58 y.o. male presenting with CAD, hx NSTEMI and HFpEF. PMHx includes NIDDM2, Lt AKA, chronic RLE ulcer, GERD, carotid stenosis, hx CVA, hx GIB, hx anemia req'ing blood transfusions (2023), and hx confusion of anesthesia. Pt admitted to [facility] ICU s/p CABGx3 (LIMA>LAD, SVG>OM1, SVG>PDA), posterior pericardotomy, sternal wires and plating with Dr. Delatorre on 11/13/23. Postoperative vasoplegic shock. Levophed weaned off POD 2.      Neuro/MSK:   Expected acute postop pain - well controlled  Hx CVA  Lt AKA  Anxiety  depression  - Serial neuro and pain assessments  - Scheduled Tylenol  - PRN Tramadol per patient request  - PT/OT/MITT     -> Limited mobility at baseline: wheelchair bound- ok to use prosthesis  - Home melatonin     CV:   CAD s/p CABGx3  HFpEF. HTN  LVEF pre 60%, post 65%  Epicardial pacing wire cut, no immediate complications or rhythm changes  - Continuous EKG and NIBP  - ASA/statin/plavix  - Metoprolol 50mg BID  - Home hydralazine 50mg TID     Pulm:   Oxygenating well on RA  - Bronch hygiene     GI:   - Regular diet c ensure  - PPI     Renal:   - RFP daily  - Replete lytes prn  - Nephrology following     -> Torsemide 40mg     Heme: Acute on chronic anemia (baseline Hgb 9.1)  11/14 2u prbc  11/15 1u prbc  11/16 1u prbc  - CBC daily  - SCDs, SQH for DVTppx     Endo: IDDM2  A1C 5.3%  - Home Lantus 4u with ISS     Discharge to [facility] acute rehab    Pertinent Physical Exam At Time of Discharge  Physical Exam  Constitutional:       General: He is not in acute distress.     Appearance: He is obese. He is not ill-appearing.   Cardiovascular:      Rate and Rhythm: Normal rate and regular rhythm.      Pulses: Normal  pulses.      Comments: MSI stable w/o click, well approximated w/o drainage or hematoma   Right leg graft sites well approximated without hematoma, redness or drainage  Pulmonary:      Effort: Pulmonary effort is normal.      Comments: Chest tube sites with granulated tissue, w/o drainage or redness  Abdominal:      General: There is no distension.      Palpations: Abdomen is soft.      Tenderness: There is no abdominal tenderness.   Genitourinary:     Comments: Incontinent   Musculoskeletal:      Comments: baseline   Skin:     General: Skin is warm and dry.   Neurological:      General: No focal deficit present.      Mental Status: He is alert.   Psychiatric:         Mood and Affect: Mood normal.         Behavior: Behavior normal.         Home Medications     Medication List      START taking these medications     insulin lispro 100 unit/mL injection; Commonly known as: HumaLOG; Inject   0-0.15 mL (0-15 Units) under the skin 3 times a day with meals. Take as   directed per insulin instructions.   magnesium oxide 400 mg (241.3 mg magnesium) tablet; Commonly known as:   Mag-Ox; Take 1 tablet (400 mg) by mouth once daily. Do not start before   November 24, 2023.   metoprolol tartrate 50 mg tablet; Commonly known as: Lopressor; Take 1   tablet by mouth 2 times a day.   pantoprazole 40 mg EC tablet; Commonly known as: ProtoNix; Take 1 tablet   (40 mg) by mouth once daily in the morning. Take before meals. Do not   crush, chew, or split. Do not start before November 24, 2023.   traMADol 50 mg tablet; Commonly known as: Ultram; Take 0.5 tablets (25   mg) by mouth every 12 hours if needed for moderate pain (4 - 6) or severe   pain (7 - 10).     CHANGE how you take these medications     FreeStyle Test strip; Generic drug: blood sugar diagnostic; What   changed: Another medication with the same name was removed. Continue   taking this medication, and follow the directions you see here.   melatonin 5 mg tablet; Take 1 tablet  (5 mg) by mouth once daily at   bedtime.; What changed: medication strength, how much to take, how to take   this, when to take this   torsemide 40 mg tablet; Take 40 mg by mouth once daily. Do not start   before November 24, 2023.; What changed: medication strength, how much to   take     CONTINUE taking these medications     acetaminophen 650 mg ER tablet; Commonly known as: Tylenol 8 HOUR   aspirin 81 mg chewable tablet   atorvastatin 80 mg tablet; Commonly known as: Lipitor; Take 1 tablet (80   mg) by mouth once daily. Last rx prior to next refills   clopidogrel 75 mg tablet; Commonly known as: Plavix   ergocalciferol 1.25 MG (94782 UT) capsule; Commonly known as: Vitamin   D-2; Take 1 capsule (50,000 Units) by mouth 1 (one) time per week.   Flintstones Plus Calcium tablet,chewable; Generic drug:   multivitamin-calcium carb   FreeStyle Suzy 2 Lamoni misc; Generic drug: FreeStyle Suzy reader; Use   as instructed   FreeStyle Suzy 2 Sensor kit; Generic drug: FreeStyle Suzy sensor   system; Use as instructed   Lantus Solostar U-100 Insulin 100 unit/mL (3 mL) pen; Generic drug:   insulin glargine     STOP taking these medications     hydrALAZINE 50 mg tablet; Commonly known as: Apresoline   ibuprofen 600 mg tablet   penicillin V 250 mg tablet; Commonly known as: Veetid       Outpatient Follow-Up  Future Appointments   Date Time Provider Department Center   11/27/2023  1:45 PM DO DIANA Rosenberg [facility]   12/19/2023  1:30 PM Francisco Delatorre MD [id number] [facility]   10/11/2024  8:00 AM GEA VASC 1 [facility] TAT[facility]    10/11/2024  9:00 AM Agusto Elkins MD [id number] [facility]       Mariama Murray, APRN-CNP

## 2023-11-24 NOTE — PROGRESS NOTES
"Occupational Therapy                 Therapy Communication Note    Patient Name: Humphrey Waddell  MRN: [id number]  Today's Date: 11/24/2023     Discipline: Occupational Therapy    Missed Visit Reason: Missed Visit Reason: Patient refused (Attempted OT tx, per conversation with PTA, \"pt stated that he had a bad night and wants to rest until he is discharging. Doesn't want to be bothered by anyone. RN notified\")    Missed Time: Attempt      "

## 2023-11-30 ENCOUNTER — APPOINTMENT (OUTPATIENT)
Dept: CARDIAC SURGERY | Facility: HOSPITAL | Age: 58
End: 2023-11-30
Payer: COMMERCIAL

## 2023-12-01 NOTE — DOCUMENTATION CLARIFICATION NOTE
"    PATIENT:               LEXIE NULL  ACCT #:                  [id number]  MRN:                       [id number]  :                       1965  ADMIT DATE:       2023 5:54 AM  DISCH DATE:        2023 3:15 PM  RESPONDING PROVIDER #:        [id number]          PROVIDER RESPONSE TEXT:    Low platelets not requiring treatment or evaluation    CDI QUERY TEXT:    [facility]_Abnormal Studies    Based on your assessment of the patient and the clinical information, please provide the requested documentation by clicking on the appropriate radio button and enter any additional information if prompted.    Question: Is there a diagnosis indicative of the lab values or image study    When answering this query, please exercise your independent professional judgment. The fact that a question is being asked, does not imply that any particular answer is desired or expected.    The patient's clinical indicators include:  Clinical Information: 58 year old male admitted  for CABG. Clarification is requested for abnormal lab findings.    Clinical Indicators:    PLATELETS  - 23: 197, 326, 141, 192, 165, 128, 140, 151    OP NOTE 23: \" We had bleeding from the proximal vein graft going to the OM, which was fixed with 6-0 Prolene. Protamine was administered to reverse systemic the heparin effect.\"    Treatment: PRBC x 3 units , PRBC x 1 unit , monitoring platelets    Risk Factors: surgery, intraop bleeding, postop anemia  Options provided:  -- Thrombocytopenia is clinically significant and required treatment/monitoring  -- Low platelets not requiring treatment or evaluation  -- Other - I will add my own diagnosis  -- Refer to Clinical Documentation Reviewer    Query created by: Kavya Navas on 2023 11:40 AM      Electronically signed by:  MERCEDES GAR-CNP 2023 3:28 PM          "

## 2023-12-04 ENCOUNTER — HOME HEALTH ADMISSION (OUTPATIENT)
Dept: HOME HEALTH SERVICES | Facility: HOME HEALTH | Age: 58
End: 2023-12-04
Payer: COMMERCIAL

## 2023-12-13 DIAGNOSIS — I25.10 CORONARY ARTERY DISEASE INVOLVING NATIVE CORONARY ARTERY OF NATIVE HEART WITHOUT ANGINA PECTORIS: ICD-10-CM

## 2023-12-13 LAB
HOLD SPECIMEN: NORMAL
HOLD SPECIMEN: NORMAL

## 2023-12-18 RX ORDER — SODIUM BICARBONATE 650 MG/1
2 TABLET ORAL 2 TIMES DAILY
COMMUNITY
Start: 2023-12-15 | End: 2024-01-29 | Stop reason: WASHOUT

## 2023-12-18 RX ORDER — LIDOCAINE 560 MG/1
1 PATCH PERCUTANEOUS; TOPICAL; TRANSDERMAL
COMMUNITY
Start: 2023-12-15 | End: 2023-12-25

## 2023-12-19 ENCOUNTER — OFFICE VISIT (OUTPATIENT)
Dept: CARDIAC SURGERY | Facility: HOSPITAL | Age: 58
End: 2023-12-19
Payer: COMMERCIAL

## 2023-12-19 ENCOUNTER — HOSPITAL ENCOUNTER (OUTPATIENT)
Dept: RADIOLOGY | Facility: HOSPITAL | Age: 58
Discharge: HOME | End: 2023-12-19
Payer: COMMERCIAL

## 2023-12-19 ENCOUNTER — HOSPITAL ENCOUNTER (OUTPATIENT)
Dept: CARDIOLOGY | Facility: HOSPITAL | Age: 58
Discharge: HOME | End: 2023-12-19
Payer: COMMERCIAL

## 2023-12-19 VITALS
OXYGEN SATURATION: 97 % | SYSTOLIC BLOOD PRESSURE: 108 MMHG | HEIGHT: 76 IN | WEIGHT: 234 LBS | BODY MASS INDEX: 28.49 KG/M2 | DIASTOLIC BLOOD PRESSURE: 56 MMHG | HEART RATE: 71 BPM

## 2023-12-19 DIAGNOSIS — I25.10 CORONARY ARTERY DISEASE INVOLVING NATIVE CORONARY ARTERY OF NATIVE HEART WITHOUT ANGINA PECTORIS: ICD-10-CM

## 2023-12-19 PROCEDURE — 71046 X-RAY EXAM CHEST 2 VIEWS: CPT

## 2023-12-19 PROCEDURE — 3060F POS MICROALBUMINURIA REV: CPT | Performed by: THORACIC SURGERY (CARDIOTHORACIC VASCULAR SURGERY)

## 2023-12-19 PROCEDURE — 71046 X-RAY EXAM CHEST 2 VIEWS: CPT | Performed by: RADIOLOGY

## 2023-12-19 PROCEDURE — 3044F HG A1C LEVEL LT 7.0%: CPT | Performed by: THORACIC SURGERY (CARDIOTHORACIC VASCULAR SURGERY)

## 2023-12-19 PROCEDURE — 3074F SYST BP LT 130 MM HG: CPT | Performed by: THORACIC SURGERY (CARDIOTHORACIC VASCULAR SURGERY)

## 2023-12-19 PROCEDURE — 3066F NEPHROPATHY DOC TX: CPT | Performed by: THORACIC SURGERY (CARDIOTHORACIC VASCULAR SURGERY)

## 2023-12-19 PROCEDURE — 93010 ELECTROCARDIOGRAM REPORT: CPT | Performed by: STUDENT IN AN ORGANIZED HEALTH CARE EDUCATION/TRAINING PROGRAM

## 2023-12-19 PROCEDURE — 3008F BODY MASS INDEX DOCD: CPT | Performed by: THORACIC SURGERY (CARDIOTHORACIC VASCULAR SURGERY)

## 2023-12-19 PROCEDURE — 99024 POSTOP FOLLOW-UP VISIT: CPT | Performed by: THORACIC SURGERY (CARDIOTHORACIC VASCULAR SURGERY)

## 2023-12-19 PROCEDURE — 93005 ELECTROCARDIOGRAM TRACING: CPT

## 2023-12-19 PROCEDURE — 3078F DIAST BP <80 MM HG: CPT | Performed by: THORACIC SURGERY (CARDIOTHORACIC VASCULAR SURGERY)

## 2023-12-19 ASSESSMENT — PAIN SCALES - GENERAL: PAINLEVEL: 0-NO PAIN

## 2023-12-19 NOTE — PROGRESS NOTES
Chief Complaint  POST OP Evaluation    HPI:   Mr. Humphrey Waddell is a 58 y.o. male, who presents for post-operative evaluation.   He is now 1 month out from his operation, and is recovering relatively well.  He has resumed his baseline activities and his appetite has returned to normal.  He has no chest pain, no shortness of breath and denies palpitations, dizziness, or syncope.     Duane has several important comorbidities, and his mobility is limited.  He was discharged to an acute rehab facility where he spent approximately 2 weeks.  From the discharge summary of that facility it appears that he received excellent care involving OT PT and other support services.  He continues to receive physical therapy at home.  He is able to manage transfers from his wheelchair to a bed using a board.  He is unable to use his arms very much because of bilateral shoulder issues.    He does have an ongoing carotid stenosis that is being followed by vascular surgery.  He may require an intervention at some point, I will leave this to the discretion of our vascular surgery colleagues.    Chest x-ray today is normal.  No pleural effusion or edema.  EKG is normal.    Past Medical History:   Diagnosis Date    Above-knee amputation of left lower extremity (CMS/[facility])     Adverse effect of anesthesia     confusion    Anemia     CAD (coronary artery disease)     Carotid artery disease (CMS/HCC)     bilateral    CVA (cerebral vascular accident) (CMS/[facility]) 2020    Depression     DM (diabetes mellitus) (CMS/HCC)     type 2 with neuropathy    GERD (gastroesophageal reflux disease)     High cholesterol     HTN (hypertension)     Leg ulcer (CMS/HCC)     chronic right lower extremity    Neuropathy     OA (osteoarthritis)     PAD (peripheral artery disease) (CMS/[facility])        Past Surgical History:   Procedure Laterality Date    CT ANGIO NECK  06/14/2021    CT NECK ANGIO W AND WO IV CONTRAST 6/14/2021 [facility] [place] [facility]    CT HEAD ANGIO W AND WO IV  CONTRAST  2021    CT HEAD ANGIO W [place] IV [place] [facility] [place]    LEG SURGERY Right     MR HEAD ANGIO WO IV CONTRAST  2012    MR HEAD ANGIO WO IV CONTRAST [facility]    MR HEAD ANGIO WO IV CONTRAST  2021    MR HEAD ANGIO WO IV CONTRAST [facility]    MR HEAD ANGIO WO IV CONTRAST  2021    MR HEAD ANGIO WO IV CONTRAST [facility]    MR NECK ANGIO WO IV CONTRAST  2023    MR NECK ANGIO WO IV CONTRAST 2023 [facility] [id number] MRI    OTHER SURGICAL HISTORY  2021    Knee reconstruction    OTHER SURGICAL HISTORY  2021    Lower extremity amputation above knee    TOTAL SHOULDER ARTHROPLASTY Left 2020       Family History   Problem Relation Name Age of Onset    Other (cardiac disorder) Mother      Hypertension Mother      Esophageal cancer Mother      Hearing loss Father      Hypertension Father      Heart disease Father      COPD Sister         Social History     Socioeconomic History    Marital status:      Spouse name: Not on file    Number of children: Not on file    Years of education: Not on file    Highest education level: Not on file   Occupational History    Not on file   Tobacco Use    Smoking status: Former     Types: Cigars     Quit date: 2023     Years since quittin.7    Smokeless tobacco: Never   Vaping Use    Vaping Use: Never used   Substance and Sexual Activity    Alcohol use: Not Currently    Drug use: Not Currently     Types: Marijuana    Sexual activity: Defer   Other Topics Concern    Not on file   Social History Narrative    Not on file     Social Determinants of Health     Financial Resource Strain: Low Risk  (2023)    Overall Financial Resource Strain (CARDIA)     Difficulty of Paying Living Expenses: Not hard at all   Food Insecurity: Not on file   Transportation Needs: No Transportation Needs (2023)    PRAPARE - Transportation     Lack of Transportation (Medical): No     Lack of  Transportation (Non-Medical): No   Physical Activity: Not on file   Stress: Not on file   Social Connections: Not on file   Intimate Partner Violence: Not on file   Housing Stability: Low Risk  (11/14/2023)    Housing Stability Vital Sign     Unable to Pay for Housing in the Last Year: No     Number of Places Lived in the Last Year: 1     Unstable Housing in the Last Year: No       No Known Allergies    Outpatient Encounter Medications as of 12/19/2023   Medication Sig Dispense Refill    lidocaine 4 % patch Place 1 patch on the skin once daily.      sodium bicarbonate 650 mg tablet Take 2 tablets (1,300 mg) by mouth twice a day.      acetaminophen (Tylenol 8 HOUR) 650 mg ER tablet Take 1 tablet (650 mg) by mouth every 8 hours if needed for mild pain (1 - 3). Do not crush, chew, or split.      aspirin 81 mg chewable tablet Chew 1 tablet (81 mg) once daily.      atorvastatin (Lipitor) 80 mg tablet Take 1 tablet (80 mg) by mouth once daily. Last rx prior to next refills 90 tablet 1    clopidogrel (Plavix) 75 mg tablet Take 1 tablet (75 mg) by mouth once daily.      ergocalciferol (Vitamin D-2) 1.25 MG (83395 UT) capsule Take 1 capsule (50,000 Units) by mouth 1 (one) time per week. 12 capsule 0    FreeStyle Suzy reader (FreeStyle Suzy 2 [place]) misc Use as instructed 1 each 0    FreeStyle Suzy sensor system (FreeStyle Suzy 2 Sensor) kit Use as instructed 1 each 0    FreeStyle Test strip TEST 3 TIMES DAILY.      insulin glargine (Lantus Solostar U-100 Insulin) 100 unit/mL (3 mL) pen Inject 4 Units under the skin once daily at bedtime.      insulin lispro (HumaLOG) 100 unit/mL injection Inject 0-0.15 mL (0-15 Units) under the skin 3 times a day with meals. Take as directed per insulin instructions.      magnesium oxide (Mag-Ox) 400 mg (241.3 mg magnesium) tablet Take 1 tablet (400 mg) by mouth once daily. Do not start before November 24, 2023.      melatonin 5 mg tablet Take 1 tablet (5 mg) by mouth once daily at  bedtime.      metoprolol tartrate (Lopressor) 50 mg tablet Take 1 tablet by mouth 2 times a day.      multivitamin-calcium carb (Flintstones Plus Calcium) tablet,chewable Chew 1 tablet once daily.      pantoprazole (ProtoNix) 40 mg EC tablet Take 1 tablet (40 mg) by mouth once daily in the morning. Take before meals. Do not crush, chew, or split. Do not start before November 24, 2023.      torsemide 40 mg tablet Take 40 mg by mouth once daily. Do not start before November 24, 2023.      traMADol (Ultram) 50 mg tablet Take 0.5 tablets (25 mg) by mouth every 12 hours if needed for moderate pain (4 - 6) or severe pain (7 - 10). 15 tablet 0     No facility-administered encounter medications on file as of 12/19/2023.       Physical Exam  Vitals reviewed.   Constitutional:       Appearance: He is obese.   Cardiovascular:      Rate and Rhythm: Normal rate and regular rhythm.      Pulses: Normal pulses.      Heart sounds: Normal heart sounds.   Pulmonary:      Effort: Pulmonary effort is normal.      Breath sounds: Normal breath sounds.   Neurological:      Mental Status: He is alert and oriented to person, place, and time.         Encounter Date: 11/13/23   Electrocardiogram, 12-lead PRN ACS symptoms   Result Value    Ventricular Rate 99    Atrial Rate 99    NE Interval 180    QRS Duration 154    QT Interval 400    QTC Calculation(Bazett) 513    P Axis 54    R Axis 104    T Axis 46    QRS Count 16    Q Onset 193    P Onset 103    P Offset 157    T Offset 393    QTC Fredericia 472    Narrative    Sinus rhythm with occasional Premature ventricular complexes  Right bundle branch block  Abnormal ECG  Confirmed by Alfonzo Anne ([id number]) on 11/14/2023 8:59:44 AM       Assessment and Plan:    Mr. Humphrey Waddell is a 58 y.o. male, who is recovering relatively well after surgery.  In fact, given his multiple comorbidities, I am extremely pleased with his progress.  I was quite concerned about his perioperative risk, and he has  survived the operation, the hospitalization and a stent and rehabilitation.  I have released him  from sternal precautions.  He is able to use his arms for transfer and no other heavy lifting activities.  Unfortunately his mobility is extremely limited.  He will continue cardiovascular management with his cardiologist, Dr. Higgins.  I have not made any changes to his medications today.  I am always happy to see him for any reason, but at the current time have not scheduled any further follow-up visits.

## 2023-12-20 ENCOUNTER — HOME CARE VISIT (OUTPATIENT)
Dept: HOME HEALTH SERVICES | Facility: HOME HEALTH | Age: 58
End: 2023-12-20
Payer: COMMERCIAL

## 2023-12-20 VITALS
TEMPERATURE: 96.8 F | HEART RATE: 68 BPM | RESPIRATION RATE: 16 BRPM | OXYGEN SATURATION: 97 % | DIASTOLIC BLOOD PRESSURE: 64 MMHG | WEIGHT: 237 LBS | HEIGHT: 76 IN | SYSTOLIC BLOOD PRESSURE: 122 MMHG | BODY MASS INDEX: 28.86 KG/M2

## 2023-12-20 PROCEDURE — G0299 HHS/HOSPICE OF RN EA 15 MIN: HCPCS

## 2023-12-20 PROCEDURE — 0023 HH SOC

## 2023-12-20 SDOH — ECONOMIC STABILITY: FOOD INSECURITY: MEALS PER DAY: 2

## 2023-12-20 ASSESSMENT — ENCOUNTER SYMPTOMS
PAIN LOCATION - PAIN FREQUENCY: FREQUENT
LAST BOWEL MOVEMENT: 66827
FATIGUE: 1
OCCASIONAL FEELINGS OF UNSTEADINESS: 1
MUSCLE WEAKNESS: 1
CHANGE IN APPETITE: DECREASED
LOSS OF VISUAL FIELD: 1
LIMITED RANGE OF MOTION: 1
DIZZINESS: 1
LOSS OF SENSATION IN FEET: 1
PERSON REPORTING PAIN: PATIENT
DYSPNEA ACTIVITY LEVEL: WHILE SPEAKING
LOWEST PAIN SEVERITY IN PAST 24 HOURS: 5/10
PAIN SEVERITY GOAL: 0/10
PAIN LOCATION - EXACERBATING FACTORS: MOVEMENT
SUBJECTIVE PAIN PROGRESSION: WAXING AND WANING
SHORTNESS OF BREATH: 1
DEPRESSION: 0
STOOL FREQUENCY: DAILY
HIGHEST PAIN SEVERITY IN PAST 24 HOURS: 8/10
PAIN LOCATION - PAIN SEVERITY: 8/10
PAIN LOCATION - PAIN DURATION: DAILY
PAIN LOCATION: CHEST
APPETITE LEVEL: FAIR
PAIN: 1
PAIN LOCATION - PAIN QUALITY: PRESSURE PAIN

## 2023-12-20 ASSESSMENT — PAIN SCALES - PAIN ASSESSMENT IN ADVANCED DEMENTIA (PAINAD)
FACIALEXPRESSION: 0 - SMILING OR INEXPRESSIVE.
BREATHING: 0
BODYLANGUAGE: 0 - RELAXED.
TOTALSCORE: 0
CONSOLABILITY: 0
FACIALEXPRESSION: 0
NEGVOCALIZATION: 0 - NONE.
BODYLANGUAGE: 0
CONSOLABILITY: 0 - NO NEED TO CONSOLE.
NEGVOCALIZATION: 0

## 2023-12-20 ASSESSMENT — ACTIVITIES OF DAILY LIVING (ADL)
CURRENT_FUNCTION: ONE PERSON
CURRENT_FUNCTION: STAND BY ASSIST
AMBULATION ASSISTANCE: NON-AMBULATORY
OASIS_M1830: 06
ENTERING_EXITING_HOME: TOTAL DEPENDENCE

## 2023-12-21 ENCOUNTER — HOME CARE VISIT (OUTPATIENT)
Dept: HOME HEALTH SERVICES | Facility: HOME HEALTH | Age: 58
End: 2023-12-21
Payer: COMMERCIAL

## 2023-12-21 PROCEDURE — G0151 HHCP-SERV OF PT,EA 15 MIN: HCPCS

## 2023-12-21 ASSESSMENT — ACTIVITIES OF DAILY LIVING (ADL)
CURRENT_FUNCTION: MAXIMUM ASSIST
AMBULATION ASSISTANCE: 1
PHYSICAL TRANSFERS ASSESSED: 1
CURRENT_FUNCTION: ONE PERSON

## 2023-12-21 ASSESSMENT — ENCOUNTER SYMPTOMS
PAIN: 1
PAIN LOCATION: RIGHT SHOULDER
PAIN LOCATION - PAIN QUALITY: ACHING
PAIN LOCATION - RELIEVING FACTORS: LIDOCAINE PATCH
PAIN LOCATION: LEFT SHOULDER
PAIN LOCATION: CHEST
PAIN LOCATION - PAIN QUALITY: ACHING
LOWEST PAIN SEVERITY IN PAST 24 HOURS: 5/10
PAIN SEVERITY GOAL: 0/10
HIGHEST PAIN SEVERITY IN PAST 24 HOURS: 9/10
SUBJECTIVE PAIN PROGRESSION: GRADUALLY IMPROVING
PERSON REPORTING PAIN: PATIENT
PAIN LOCATION - PAIN SEVERITY: 8/10
PAIN LOCATION - PAIN QUALITY: ACHING

## 2023-12-21 NOTE — HOME HEALTH
"Upon my arrival patient found between his power wheelchair and the couch, stating that he is \"stuck\" and that he was attempting to get to the couch about 3 hours ago when his sliding board fell between the two surfaces and he was unable to move since then.  He realized after the fact that he failed to place something between the skin of his thigh and the sliding board and this friction led to the sliding board being shifted and then he became caught.  Of course assisted him (max assist of one) to continue to sit-pivot onto the couch and we then discussed his status and his needs for Home Care Physical Therapy.  This patient is well known to this therapist due to episodes which took place over the past few years, SP amputation L AKA about 3 years ago.    3 way bypass within the past month.  Went to [facility] in [place].  P.T. was great.  Nursing sucked.  Left AKA about 3 years ago.  Has prosthetic but does not wear it since there are problems with the fitting.  Patient expressed frustration with the Prosthetic company and how many changes and adjustments are necessary but I indicated that this is very common until things get settled with the prosthesis.  He begrudgingly accepted my point of view.    [facility] to get it fixed up."

## 2023-12-22 ENCOUNTER — HOME CARE VISIT (OUTPATIENT)
Dept: HOME HEALTH SERVICES | Facility: HOME HEALTH | Age: 58
End: 2023-12-22
Payer: COMMERCIAL

## 2023-12-22 VITALS — HEART RATE: 68 BPM | DIASTOLIC BLOOD PRESSURE: 64 MMHG | SYSTOLIC BLOOD PRESSURE: 133 MMHG | RESPIRATION RATE: 18 BRPM

## 2023-12-22 PROCEDURE — G0153 HHCP-SVS OF S/L PATH,EA 15MN: HCPCS

## 2023-12-22 ASSESSMENT — ENCOUNTER SYMPTOMS
PERSON REPORTING PAIN: PATIENT
DENIES PAIN: 1

## 2023-12-23 SDOH — HEALTH STABILITY: PHYSICAL HEALTH: EXERCISE COMMENTS: OMMENDATIONS SPECIFICALLY WILL BE INDICATED AND PROVIDED IN WRITING DURING FUTURE VISITS.

## 2023-12-23 SDOH — HEALTH STABILITY: PHYSICAL HEALTH
EXERCISE COMMENTS: SPOKE WITH PATIENT TODAY IN GENERAL TERMS ABOUT LIKELY HOME PROGRAM ACTIVITIES WHICH CAN CERTAINLY INCLUDE TRADITIONAL SEATED, SUPINE EXERCISES BUT EVEN STANDING WITH PROSTHETIC (AS LONG AS IT FITS HIM AND IS USEFUL).  INDICATED THAT HOME PROGRAM REC

## 2023-12-23 ASSESSMENT — ENCOUNTER SYMPTOMS
DEPRESSION: 1
OCCASIONAL FEELINGS OF UNSTEADINESS: 1
LOSS OF SENSATION IN FEET: 1

## 2023-12-23 ASSESSMENT — ACTIVITIES OF DAILY LIVING (ADL): AMBULATION_DISTANCE/DURATION_TOLERATED: 0

## 2023-12-28 ENCOUNTER — HOME CARE VISIT (OUTPATIENT)
Dept: HOME HEALTH SERVICES | Facility: HOME HEALTH | Age: 58
End: 2023-12-28
Payer: COMMERCIAL

## 2023-12-28 VITALS
DIASTOLIC BLOOD PRESSURE: 70 MMHG | OXYGEN SATURATION: 97 % | HEART RATE: 72 BPM | SYSTOLIC BLOOD PRESSURE: 131 MMHG | RESPIRATION RATE: 18 BRPM

## 2023-12-28 VITALS
TEMPERATURE: 96.9 F | RESPIRATION RATE: 16 BRPM | DIASTOLIC BLOOD PRESSURE: 62 MMHG | HEART RATE: 69 BPM | SYSTOLIC BLOOD PRESSURE: 102 MMHG | OXYGEN SATURATION: 98 %

## 2023-12-28 PROCEDURE — G0299 HHS/HOSPICE OF RN EA 15 MIN: HCPCS

## 2023-12-28 PROCEDURE — G0153 HHCP-SVS OF S/L PATH,EA 15MN: HCPCS

## 2023-12-28 PROCEDURE — G0156 HHCP-SVS OF AIDE,EA 15 MIN: HCPCS

## 2023-12-28 SDOH — ECONOMIC STABILITY: FOOD INSECURITY: MEALS PER DAY: 2

## 2023-12-28 ASSESSMENT — ENCOUNTER SYMPTOMS
PAIN LOCATION - PAIN FREQUENCY: CONSTANT
STOOL FREQUENCY: LESS THAN DAILY
PAIN SEVERITY GOAL: 0/10
PAIN LOCATION - PAIN QUALITY: ACHE
PAIN LOCATION - RELIEVING FACTORS: PAIN MEDS AND REST
LAST BOWEL MOVEMENT: 66835
PAIN: 1
HIGHEST PAIN SEVERITY IN PAST 24 HOURS: 9/10
PAIN LOCATION - PAIN DURATION: DAILY
FATIGUES EASILY: 1
LOWEST PAIN SEVERITY IN PAST 24 HOURS: 5/10
SUBJECTIVE PAIN PROGRESSION: WAXING AND WANING
PAIN LOCATION - PAIN FREQUENCY: CONSTANT
PAIN LOCATION - PAIN SEVERITY: 7/10
PAIN LOCATION - PAIN QUALITY: ACHING
PERSON REPORTING PAIN: PATIENT
PAIN LOCATION: RIGHT SHOULDER
PAIN LOCATION: LEFT SHOULDER
APPETITE LEVEL: GOOD
PERSON REPORTING PAIN: PATIENT
PAIN LOCATION: GENERALIZED
PAIN LOCATION - PAIN QUALITY: ACHING
PAIN LOCATION - PAIN FREQUENCY: FREQUENT
PAIN LOCATION - RELIEVING FACTORS: LIDOCAINE PATCH
PAIN LOCATION - PAIN SEVERITY: 7/10
PAIN LOCATION - EXACERBATING FACTORS: MOVEMENT
PAIN: 1
SUBJECTIVE PAIN PROGRESSION: UNCHANGED
PAIN LOCATION - PAIN SEVERITY: 7/10
PAIN LOCATION - RELIEVING FACTORS: LIDOCAINE PATCH

## 2023-12-28 ASSESSMENT — PAIN SCALES - PAIN ASSESSMENT IN ADVANCED DEMENTIA (PAINAD)
CONSOLABILITY: 0
NEGVOCALIZATION: 0 - NONE.
NEGVOCALIZATION: 0
BODYLANGUAGE: 0 - RELAXED.
TOTALSCORE: 0
CONSOLABILITY: 0 - NO NEED TO CONSOLE.
FACIALEXPRESSION: 0 - SMILING OR INEXPRESSIVE.
BODYLANGUAGE: 0
FACIALEXPRESSION: 0
BREATHING: 0

## 2023-12-28 ASSESSMENT — ACTIVITIES OF DAILY LIVING (ADL)
CURRENT_FUNCTION: ONE PERSON
AMBULATION ASSISTANCE: ONE PERSON
AMBULATION ASSISTANCE: STAND BY ASSIST
CURRENT_FUNCTION: STAND BY ASSIST

## 2023-12-29 ENCOUNTER — HOME CARE VISIT (OUTPATIENT)
Dept: HOME HEALTH SERVICES | Facility: HOME HEALTH | Age: 58
End: 2023-12-29
Payer: COMMERCIAL

## 2023-12-29 VITALS
DIASTOLIC BLOOD PRESSURE: 80 MMHG | OXYGEN SATURATION: 100 % | HEART RATE: 74 BPM | TEMPERATURE: 97.2 F | SYSTOLIC BLOOD PRESSURE: 126 MMHG | RESPIRATION RATE: 18 BRPM

## 2023-12-29 PROCEDURE — G0153 HHCP-SVS OF S/L PATH,EA 15MN: HCPCS

## 2023-12-29 PROCEDURE — G0157 HHC PT ASSISTANT EA 15: HCPCS

## 2023-12-29 PROCEDURE — G0152 HHCP-SERV OF OT,EA 15 MIN: HCPCS

## 2023-12-29 ASSESSMENT — ENCOUNTER SYMPTOMS
LOWEST PAIN SEVERITY IN PAST 24 HOURS: 3/10
PAIN SEVERITY GOAL: 0/10
HIGHEST PAIN SEVERITY IN PAST 24 HOURS: 7/10
SUBJECTIVE PAIN PROGRESSION: GRADUALLY IMPROVING
PAIN: 1

## 2023-12-29 ASSESSMENT — PAIN SCALES - PAIN ASSESSMENT IN ADVANCED DEMENTIA (PAINAD)
CONSOLABILITY: 0 - NO NEED TO CONSOLE.
CONSOLABILITY: 0
BREATHING: 0
TOTALSCORE: 0
BODYLANGUAGE: 0
FACIALEXPRESSION: 0
FACIALEXPRESSION: 0 - SMILING OR INEXPRESSIVE.
NEGVOCALIZATION: 0
BODYLANGUAGE: 0 - RELAXED.
NEGVOCALIZATION: 0 - NONE.

## 2023-12-29 ASSESSMENT — ACTIVITIES OF DAILY LIVING (ADL)
BATHING ASSESSED: 1
TOILETING: 1

## 2023-12-30 ENCOUNTER — APPOINTMENT (OUTPATIENT)
Dept: HOME HEALTH SERVICES | Facility: HOME HEALTH | Age: 58
End: 2023-12-30
Payer: COMMERCIAL

## 2023-12-30 LAB
ATRIAL RATE: 71 BPM
P AXIS: 66 DEGREES
P OFFSET: 154 MS
P ONSET: 103 MS
PR INTERVAL: 182 MS
Q ONSET: 194 MS
QRS COUNT: 12 BEATS
QRS DURATION: 148 MS
QT INTERVAL: 420 MS
QTC CALCULATION(BAZETT): 456 MS
QTC FREDERICIA: 444 MS
R AXIS: 93 DEGREES
T AXIS: 60 DEGREES
T OFFSET: 404 MS
VENTRICULAR RATE: 71 BPM

## 2023-12-30 SDOH — ECONOMIC STABILITY: HOUSING INSECURITY: HOME SAFETY: USES POWER WHEELCHAIR IN THE HOME

## 2023-12-30 ASSESSMENT — ENCOUNTER SYMPTOMS
PERSON REPORTING PAIN: PATIENT
PAIN LOCATION - PAIN FREQUENCY: CONSTANT
PAIN LOCATION - EXACERBATING FACTORS: COLD
PAIN LOCATION - PAIN FREQUENCY: CONSTANT
PAIN: 1
PAIN LOCATION - PAIN QUALITY: ACHING
PAIN LOCATION - RELIEVING FACTORS: LIDOCAINE PATCH
PAIN LOCATION: LEFT SHOULDER
SUBJECTIVE PAIN PROGRESSION: UNCHANGED
PAIN LOCATION - EXACERBATING FACTORS: COLD
PAIN LOCATION: RIGHT SHOULDER
PAIN LOCATION - PAIN SEVERITY: 10/10
PAIN LOCATION - RELIEVING FACTORS: LIDOCAINE PATCH
PAIN LOCATION - PAIN QUALITY: ACHING
PAIN LOCATION - PAIN SEVERITY: 10/10

## 2024-01-01 VITALS
RESPIRATION RATE: 16 BRPM | DIASTOLIC BLOOD PRESSURE: 65 MMHG | HEART RATE: 85 BPM | TEMPERATURE: 97.8 F | OXYGEN SATURATION: 99 % | SYSTOLIC BLOOD PRESSURE: 138 MMHG

## 2024-01-01 SDOH — HEALTH STABILITY: PHYSICAL HEALTH: EXERCISE COMMENTS: PT HAS SOME DIFFICULTY WITH UE MVMTS DUE TO SORENESS/TIGHTNESS

## 2024-01-01 SDOH — HEALTH STABILITY: PHYSICAL HEALTH: PHYSICAL EXERCISE: SEATED

## 2024-01-01 SDOH — HEALTH STABILITY: PHYSICAL HEALTH: EXERCISE ACTIVITIES SETS: 1

## 2024-01-01 SDOH — HEALTH STABILITY: PHYSICAL HEALTH: EXERCISE TYPE: BUE CARDIAC WARM UP EXS, RLE EXS

## 2024-01-01 SDOH — HEALTH STABILITY: PHYSICAL HEALTH: PHYSICAL EXERCISE: 10

## 2024-01-01 SDOH — HEALTH STABILITY: PHYSICAL HEALTH: EXERCISE ACTIVITY: BUE PRO, SUPINATION, HANDS CLOSED/OPEN, ELBOW FLEX, SH SHRUGS AND ROWS

## 2024-01-01 SDOH — HEALTH STABILITY: PHYSICAL HEALTH: EXERCISE ACTIVITY: RLE APS, LAQS, MARCHES, SIDE STEP OUTS

## 2024-01-01 ASSESSMENT — ENCOUNTER SYMPTOMS
PAIN LOCATION - PAIN QUALITY: ACHING
PAIN LOCATION: CHEST
SUBJECTIVE PAIN PROGRESSION: UNCHANGED
PAIN LOCATION - RELIEVING FACTORS: REST, PAIN MEDS
PAIN LOCATION - EXACERBATING FACTORS: MVMT
PAIN LOCATION - RELIEVING FACTORS: REST PAIN MEDS
PAIN LOCATION - PAIN FREQUENCY: CONSTANT
PAIN LOCATION - PAIN FREQUENCY: INTERMITTENT
LOWEST PAIN SEVERITY IN PAST 24 HOURS: 7/10
PAIN LOCATION - PAIN QUALITY: ACHING
PAIN LOCATION - PAIN SEVERITY: 7/10
PAIN LOCATION - RELIEVING FACTORS: REST, PAIN MEDS
PAIN LOCATION: LEFT SHOULDER
PAIN LOCATION - PAIN SEVERITY: 7/10
PAIN LOCATION: RIGHT SHOULDER
PAIN LOCATION - EXACERBATING FACTORS: MVMT
PAIN LOCATION - PAIN FREQUENCY: INTERMITTENT
HIGHEST PAIN SEVERITY IN PAST 24 HOURS: 7/10
PAIN SEVERITY GOAL: 4/10
PAIN LOCATION - PAIN SEVERITY: 7/10
PAIN LOCATION - PAIN QUALITY: ACHING

## 2024-01-02 ENCOUNTER — HOME CARE VISIT (OUTPATIENT)
Dept: HOME HEALTH SERVICES | Facility: HOME HEALTH | Age: 59
End: 2024-01-02
Payer: COMMERCIAL

## 2024-01-02 VITALS — HEART RATE: 63 BPM | RESPIRATION RATE: 18 BRPM | OXYGEN SATURATION: 97 %

## 2024-01-02 PROCEDURE — G0153 HHCP-SVS OF S/L PATH,EA 15MN: HCPCS

## 2024-01-02 ASSESSMENT — ENCOUNTER SYMPTOMS
PERSON REPORTING PAIN: PATIENT
DENIES PAIN: 1

## 2024-01-03 ENCOUNTER — HOME CARE VISIT (OUTPATIENT)
Dept: HOME HEALTH SERVICES | Facility: HOME HEALTH | Age: 59
End: 2024-01-03
Payer: COMMERCIAL

## 2024-01-03 VITALS
TEMPERATURE: 97.2 F | SYSTOLIC BLOOD PRESSURE: 128 MMHG | OXYGEN SATURATION: 97 % | DIASTOLIC BLOOD PRESSURE: 80 MMHG | RESPIRATION RATE: 18 BRPM | HEART RATE: 69 BPM

## 2024-01-03 PROCEDURE — G0157 HHC PT ASSISTANT EA 15: HCPCS

## 2024-01-03 ASSESSMENT — ENCOUNTER SYMPTOMS
PAIN LOCATION - RELIEVING FACTORS: REST, MEDICATION
PERSON REPORTING PAIN: PATIENT
PAIN LOCATION: CHEST
PAIN SEVERITY GOAL: 0/10
PAIN LOCATION - PAIN QUALITY: SHARP
PAIN LOCATION - PAIN SEVERITY: 8/10
PAIN LOCATION - PAIN FREQUENCY: INTERMITTENT
HIGHEST PAIN SEVERITY IN PAST 24 HOURS: 7/10
PAIN LOCATION - EXACERBATING FACTORS: ACTIVITY
PAIN LOCATION - PAIN DURATION: INTERMITTENT
LOWEST PAIN SEVERITY IN PAST 24 HOURS: 3/10
PAIN: 1
SUBJECTIVE PAIN PROGRESSION: UNCHANGED

## 2024-01-04 ENCOUNTER — HOME CARE VISIT (OUTPATIENT)
Dept: HOME HEALTH SERVICES | Facility: HOME HEALTH | Age: 59
End: 2024-01-04
Payer: COMMERCIAL

## 2024-01-04 VITALS
OXYGEN SATURATION: 100 % | HEART RATE: 60 BPM | RESPIRATION RATE: 18 BRPM | TEMPERATURE: 97.2 F | SYSTOLIC BLOOD PRESSURE: 122 MMHG | DIASTOLIC BLOOD PRESSURE: 62 MMHG

## 2024-01-04 PROCEDURE — G0300 HHS/HOSPICE OF LPN EA 15 MIN: HCPCS

## 2024-01-04 PROCEDURE — G0156 HHCP-SVS OF AIDE,EA 15 MIN: HCPCS

## 2024-01-04 ASSESSMENT — PAIN SCALES - PAIN ASSESSMENT IN ADVANCED DEMENTIA (PAINAD)
NEGVOCALIZATION: 0
CONSOLABILITY: 0
TOTALSCORE: 0
FACIALEXPRESSION: 0
BODYLANGUAGE: 0 - RELAXED.
FACIALEXPRESSION: 0 - SMILING OR INEXPRESSIVE.
NEGVOCALIZATION: 0 - NONE.
CONSOLABILITY: 0 - NO NEED TO CONSOLE.
BODYLANGUAGE: 0
BREATHING: 0

## 2024-01-04 ASSESSMENT — ENCOUNTER SYMPTOMS
BOWEL PATTERN NORMAL: 1
CHANGE IN APPETITE: UNCHANGED
APPETITE LEVEL: FAIR
LAST BOWEL MOVEMENT: 66843
NAUSEA: 1
STOOL FREQUENCY: DAILY

## 2024-01-05 ENCOUNTER — HOME CARE VISIT (OUTPATIENT)
Dept: HOME HEALTH SERVICES | Facility: HOME HEALTH | Age: 59
End: 2024-01-05
Payer: COMMERCIAL

## 2024-01-05 VITALS
DIASTOLIC BLOOD PRESSURE: 63 MMHG | OXYGEN SATURATION: 98 % | HEART RATE: 61 BPM | RESPIRATION RATE: 18 BRPM | SYSTOLIC BLOOD PRESSURE: 140 MMHG

## 2024-01-05 VITALS
RESPIRATION RATE: 18 BRPM | TEMPERATURE: 97.1 F | SYSTOLIC BLOOD PRESSURE: 128 MMHG | DIASTOLIC BLOOD PRESSURE: 82 MMHG | HEART RATE: 61 BPM

## 2024-01-05 PROCEDURE — G0153 HHCP-SVS OF S/L PATH,EA 15MN: HCPCS

## 2024-01-05 PROCEDURE — G0157 HHC PT ASSISTANT EA 15: HCPCS

## 2024-01-05 ASSESSMENT — ENCOUNTER SYMPTOMS
PAIN LOCATION - PAIN DURATION: INTERMITTENT
PAIN LOCATION: CHEST
PAIN: 1
PAIN LOCATION - RELIEVING FACTORS: REST, MEDICATION
PAIN LOCATION - PAIN FREQUENCY: INTERMITTENT
LOWEST PAIN SEVERITY IN PAST 24 HOURS: 1/10
PERSON REPORTING PAIN: PATIENT
PAIN LOCATION - EXACERBATING FACTORS: ACTIVITY
SUBJECTIVE PAIN PROGRESSION: GRADUALLY IMPROVING
HIGHEST PAIN SEVERITY IN PAST 24 HOURS: 2/10
PAIN LOCATION - PAIN QUALITY: PRESSURE
DENIES PAIN: 1
PAIN LOCATION - PAIN SEVERITY: 2/10
PERSON REPORTING PAIN: PATIENT

## 2024-01-09 ENCOUNTER — HOME CARE VISIT (OUTPATIENT)
Dept: HOME HEALTH SERVICES | Facility: HOME HEALTH | Age: 59
End: 2024-01-09
Payer: COMMERCIAL

## 2024-01-09 VITALS
OXYGEN SATURATION: 96 % | RESPIRATION RATE: 18 BRPM | HEART RATE: 55 BPM | SYSTOLIC BLOOD PRESSURE: 148 MMHG | DIASTOLIC BLOOD PRESSURE: 68 MMHG

## 2024-01-09 PROCEDURE — G0153 HHCP-SVS OF S/L PATH,EA 15MN: HCPCS

## 2024-01-09 ASSESSMENT — ENCOUNTER SYMPTOMS
DENIES PAIN: 1
PERSON REPORTING PAIN: PATIENT

## 2024-01-10 ENCOUNTER — HOME CARE VISIT (OUTPATIENT)
Dept: HOME HEALTH SERVICES | Facility: HOME HEALTH | Age: 59
End: 2024-01-10
Payer: COMMERCIAL

## 2024-01-10 VITALS
HEART RATE: 64 BPM | SYSTOLIC BLOOD PRESSURE: 130 MMHG | TEMPERATURE: 97.4 F | RESPIRATION RATE: 18 BRPM | DIASTOLIC BLOOD PRESSURE: 76 MMHG | OXYGEN SATURATION: 97 %

## 2024-01-10 VITALS
SYSTOLIC BLOOD PRESSURE: 110 MMHG | OXYGEN SATURATION: 97 % | DIASTOLIC BLOOD PRESSURE: 62 MMHG | HEART RATE: 62 BPM | TEMPERATURE: 98.1 F | RESPIRATION RATE: 18 BRPM

## 2024-01-10 PROCEDURE — G0157 HHC PT ASSISTANT EA 15: HCPCS

## 2024-01-10 PROCEDURE — G0299 HHS/HOSPICE OF RN EA 15 MIN: HCPCS

## 2024-01-10 ASSESSMENT — ACTIVITIES OF DAILY LIVING (ADL)
BATHING ASSESSED: 1
CURRENT_FUNCTION: INDEPENDENT
BATHING_CURRENT_FUNCTION: MINIMUM ASSIST
BATHING_CURRENT_FUNCTION: ONE PERSON
PREPARING MEALS: DEPENDENT
TRANSPORTATION: DEPENDENT
ORAL_CARE_ASSESSED: 1
LIGHT HOUSEKEEPING: DEPENDENT
TOILETING: 1
HOUSEKEEPING ASSESSED: 1
PHYSICAL TRANSFERS ASSESSED: 1
LAUNDRY ASSESSED: 1
DRESSING_LB_CURRENT_FUNCTION: INDEPENDENT
SHOPPING: DEPENDENT
TELEPHONE USE ASSESSED: 1
TOILETING: INDEPENDENT
FEEDING: INDEPENDENT
TRANSPORTATION ASSESSED: 1
DRESSING_UB_CURRENT_FUNCTION: INDEPENDENT
GROOMING ASSESSED: 1
FEEDING ASSESSED: 1
GROOMING_CURRENT_FUNCTION: INDEPENDENT
USING THE TELPHONE: INDEPENDENT
ORAL_CARE_CURRENT_FUNCTION: INDEPENDENT
SHOPPING ASSESSED: 1
LAUNDRY: INDEPENDENT

## 2024-01-10 ASSESSMENT — ENCOUNTER SYMPTOMS
PAIN LOCATION: LEFT SHOULDER
FATIGUE: 1
PAIN LOCATION - PAIN DURATION: CONTINUOUS
PAIN LOCATION - EXACERBATING FACTORS: ANYTHING
PERSON REPORTING PAIN: PATIENT
DEPRESSED MOOD: 1
DESCRIPTION OF MEMORY LOSS: IMMEDIATE
DENIES PAIN: 1
CHEST PAIN QUALITY: ACHING
COUGH CHARACTERISTICS: DRY
LOWEST PAIN SEVERITY IN PAST 24 HOURS: 6/10
COUGH: 1
DESCRIPTION OF MEMORY LOSS: SHORT TERM
PAIN LOCATION - PAIN QUALITY: ACHY
PERSON REPORTING PAIN: PATIENT
PAIN LOCATION: CHEST
MUSCLE WEAKNESS: 1
DESCRIPTION OF MEMORY LOSS: LONG TERM
FORGETFULNESS: 1
PAIN LOCATION - PAIN SEVERITY: 6/10
APPETITE LEVEL: POOR
CHANGE IN APPETITE: DECREASED
PAIN LOCATION - PAIN DURATION: CONTINUOUS
SUBJECTIVE PAIN PROGRESSION: UNCHANGED
PAIN LOCATION - EXACERBATING FACTORS: ANYTHING
PAIN SEVERITY GOAL: 0/10
PAIN LOCATION - PAIN SEVERITY: 6/10
PAIN: 1
PAIN LOCATION: RIGHT SHOULDER
PAIN LOCATION - PAIN FREQUENCY: CONSTANT
FATIGUES EASILY: 1
PAIN LOCATION - PAIN FREQUENCY: CONSTANT
PAIN LOCATION - PAIN QUALITY: ACHY
DEPRESSION: 1
PAIN LOCATION - PAIN FREQUENCY: CONSTANT
LIMITED RANGE OF MOTION: 1
PAIN LOCATION - PAIN DURATION: CONTINUOUS
PAIN LOCATION - EXACERBATING FACTORS: ANYTHING
HIGHEST PAIN SEVERITY IN PAST 24 HOURS: 6/10
CHEST TIGHTNESS: 1
PAIN LOCATION - PAIN SEVERITY: 6/10
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
## 2024-01-11 ENCOUNTER — HOME CARE VISIT (OUTPATIENT)
Dept: HOME HEALTH SERVICES | Facility: HOME HEALTH | Age: 59
End: 2024-01-11
Payer: COMMERCIAL

## 2024-01-11 PROCEDURE — G0153 HHCP-SVS OF S/L PATH,EA 15MN: HCPCS

## 2024-01-11 ASSESSMENT — ENCOUNTER SYMPTOMS
PERSON REPORTING PAIN: PATIENT
PAIN LOCATION - PAIN FREQUENCY: FREQUENT
PAIN LOCATION: ABDOMEN
PAIN LOCATION - PAIN SEVERITY: 10/10
PAIN LOCATION - PAIN QUALITY: SHARP
PAIN: 1

## 2024-01-12 ENCOUNTER — HOME CARE VISIT (OUTPATIENT)
Dept: HOME HEALTH SERVICES | Facility: HOME HEALTH | Age: 59
End: 2024-01-12
Payer: COMMERCIAL

## 2024-01-12 VITALS
OXYGEN SATURATION: 98 % | RESPIRATION RATE: 18 BRPM | TEMPERATURE: 97.3 F | HEART RATE: 57 BPM | DIASTOLIC BLOOD PRESSURE: 62 MMHG | SYSTOLIC BLOOD PRESSURE: 110 MMHG

## 2024-01-12 DIAGNOSIS — Z95.1 S/P CABG (CORONARY ARTERY BYPASS GRAFT): ICD-10-CM

## 2024-01-12 PROCEDURE — G0157 HHC PT ASSISTANT EA 15: HCPCS

## 2024-01-12 RX ORDER — METOPROLOL TARTRATE 50 MG/1
50 TABLET ORAL 2 TIMES DAILY
Qty: 60 TABLET | Refills: 0 | Status: SHIPPED | OUTPATIENT
Start: 2024-01-12 | End: 2024-01-23 | Stop reason: SDUPTHER

## 2024-01-12 RX ORDER — CLOPIDOGREL BISULFATE 75 MG/1
75 TABLET ORAL DAILY
Qty: 30 TABLET | Refills: 0 | Status: SHIPPED | OUTPATIENT
Start: 2024-01-12 | End: 2024-01-23 | Stop reason: SDUPTHER

## 2024-01-12 RX ORDER — PANTOPRAZOLE SODIUM 40 MG/1
40 TABLET, DELAYED RELEASE ORAL
Qty: 30 TABLET | Refills: 0 | Status: SHIPPED | OUTPATIENT
Start: 2024-01-12 | End: 2024-01-23 | Stop reason: SDUPTHER

## 2024-01-12 ASSESSMENT — ENCOUNTER SYMPTOMS
DENIES PAIN: 1
PERSON REPORTING PAIN: PATIENT

## 2024-01-15 ENCOUNTER — HOME CARE VISIT (OUTPATIENT)
Dept: HOME HEALTH SERVICES | Facility: HOME HEALTH | Age: 59
End: 2024-01-15
Payer: COMMERCIAL

## 2024-01-15 VITALS
TEMPERATURE: 97.3 F | DIASTOLIC BLOOD PRESSURE: 72 MMHG | RESPIRATION RATE: 18 BRPM | HEART RATE: 72 BPM | SYSTOLIC BLOOD PRESSURE: 118 MMHG | OXYGEN SATURATION: 99 %

## 2024-01-15 PROCEDURE — G0157 HHC PT ASSISTANT EA 15: HCPCS

## 2024-01-15 ASSESSMENT — ENCOUNTER SYMPTOMS
DENIES PAIN: 1
PERSON REPORTING PAIN: PATIENT

## 2024-01-17 ENCOUNTER — HOME CARE VISIT (OUTPATIENT)
Dept: HOME HEALTH SERVICES | Facility: HOME HEALTH | Age: 59
End: 2024-01-17
Payer: COMMERCIAL

## 2024-01-17 ENCOUNTER — TELEPHONE (OUTPATIENT)
Dept: PRIMARY CARE | Facility: CLINIC | Age: 59
End: 2024-01-17
Payer: COMMERCIAL

## 2024-01-17 NOTE — TELEPHONE ENCOUNTER
Rebeca calling to speak with Dr Leung to confirm some information with her regarding Humphrey.  He is telling her that he has been seeing Dr Leung and going to the urgent care and she needs confirmation

## 2024-01-18 ENCOUNTER — HOME CARE VISIT (OUTPATIENT)
Dept: HOME HEALTH SERVICES | Facility: HOME HEALTH | Age: 59
End: 2024-01-18
Payer: COMMERCIAL

## 2024-01-18 VITALS
HEART RATE: 64 BPM | DIASTOLIC BLOOD PRESSURE: 76 MMHG | RESPIRATION RATE: 18 BRPM | OXYGEN SATURATION: 97 % | TEMPERATURE: 97.2 F | SYSTOLIC BLOOD PRESSURE: 124 MMHG

## 2024-01-18 VITALS
DIASTOLIC BLOOD PRESSURE: 80 MMHG | TEMPERATURE: 98.6 F | SYSTOLIC BLOOD PRESSURE: 168 MMHG | RESPIRATION RATE: 18 BRPM | OXYGEN SATURATION: 99 % | HEART RATE: 96 BPM

## 2024-01-18 PROCEDURE — G0157 HHC PT ASSISTANT EA 15: HCPCS

## 2024-01-18 PROCEDURE — G0299 HHS/HOSPICE OF RN EA 15 MIN: HCPCS

## 2024-01-18 PROCEDURE — G0153 HHCP-SVS OF S/L PATH,EA 15MN: HCPCS

## 2024-01-18 ASSESSMENT — ENCOUNTER SYMPTOMS
FATIGUE: 1
PAIN LOCATION - PAIN FREQUENCY: CONSTANT
CHEST TIGHTNESS: 1
PAIN LOCATION - PAIN FREQUENCY: CONSTANT
DESCRIPTION OF MEMORY LOSS: LONG TERM
PAIN LOCATION - PAIN QUALITY: INTENSE
DEPRESSION: 1
PAIN LOCATION - PAIN QUALITY: INTENSE
PAIN LOCATION - PAIN DURATION: CONTINUOUS
FORGETFULNESS: 1
DENIES PAIN: 1
SUBJECTIVE PAIN PROGRESSION: UNCHANGED
PERSON REPORTING PAIN: PATIENT
LOWER EXTREMITY EDEMA: 1
LAST BOWEL MOVEMENT: 66856
PAIN LOCATION - PAIN SEVERITY: 10/10
PERSON REPORTING PAIN: PATIENT
APPETITE LEVEL: POOR
PAIN LOCATION: LEFT SHOULDER
PAIN SEVERITY GOAL: 0/10
LOWEST PAIN SEVERITY IN PAST 24 HOURS: 6/10
DRY SKIN: 1
CHANGE IN APPETITE: UNCHANGED
PERSON REPORTING PAIN: PATIENT
PAIN: 1
PAIN: 1
DESCRIPTION OF MEMORY LOSS: IMMEDIATE
PAIN LOCATION - PAIN SEVERITY: 10/10
PAIN LOCATION - PAIN DURATION: CONTINUOUS
HYPERTENSION: 1
DESCRIPTION OF MEMORY LOSS: SHORT TERM
FATIGUES EASILY: 1
LIMITED RANGE OF MOTION: 1
MUSCLE WEAKNESS: 1
HIGHEST PAIN SEVERITY IN PAST 24 HOURS: 10/10
DEPRESSED MOOD: 1
PAIN LOCATION: RIGHT SHOULDER

## 2024-01-18 ASSESSMENT — ACTIVITIES OF DAILY LIVING (ADL)
TRANSPORTATION ASSESSED: 1
SHOPPING ASSESSED: 1
LIGHT HOUSEKEEPING: DEPENDENT
LAUNDRY: DEPENDENT
LAUNDRY ASSESSED: 1
TRANSPORTATION: DEPENDENT
SHOPPING: DEPENDENT
PREPARING MEALS: DEPENDENT
HOUSEKEEPING ASSESSED: 1

## 2024-01-18 NOTE — TELEPHONE ENCOUNTER
Spoke with Rebeca, relayed message to her.  She said she put in a message in [facility] that she wanted to make sure you saw about things that are going on with him

## 2024-01-18 NOTE — HOME HEALTH
Could not find HH folder, pt called his wife, provided new folder.  Pt changes the subject when things are discussed he is uncomfortable with.   Pt feels his greatest loss is losing his physical stamigna. 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  Offered / educated  there is depression and anxiety related to post cardiac surgery and participating on cardiac rehab or counseling is important.

## 2024-01-22 ENCOUNTER — HOME CARE VISIT (OUTPATIENT)
Dept: HOME HEALTH SERVICES | Facility: HOME HEALTH | Age: 59
End: 2024-01-22
Payer: COMMERCIAL

## 2024-01-22 VITALS
SYSTOLIC BLOOD PRESSURE: 126 MMHG | DIASTOLIC BLOOD PRESSURE: 76 MMHG | TEMPERATURE: 97.2 F | RESPIRATION RATE: 18 BRPM | OXYGEN SATURATION: 96 % | HEART RATE: 62 BPM

## 2024-01-22 PROCEDURE — G0157 HHC PT ASSISTANT EA 15: HCPCS

## 2024-01-22 PROCEDURE — 0023 HH SOC

## 2024-01-22 ASSESSMENT — ENCOUNTER SYMPTOMS
DENIES PAIN: 1
PERSON REPORTING PAIN: PATIENT

## 2024-01-23 ENCOUNTER — APPOINTMENT (OUTPATIENT)
Dept: CARDIOLOGY | Facility: HOSPITAL | Age: 59
End: 2024-01-23
Payer: COMMERCIAL

## 2024-01-23 DIAGNOSIS — Z95.1 S/P CABG (CORONARY ARTERY BYPASS GRAFT): ICD-10-CM

## 2024-01-24 ENCOUNTER — HOME CARE VISIT (OUTPATIENT)
Dept: HOME HEALTH SERVICES | Facility: HOME HEALTH | Age: 59
End: 2024-01-24
Payer: COMMERCIAL

## 2024-01-24 VITALS
OXYGEN SATURATION: 97 % | HEART RATE: 74 BPM | RESPIRATION RATE: 20 BRPM | DIASTOLIC BLOOD PRESSURE: 70 MMHG | SYSTOLIC BLOOD PRESSURE: 148 MMHG | TEMPERATURE: 97.9 F

## 2024-01-24 PROCEDURE — G0299 HHS/HOSPICE OF RN EA 15 MIN: HCPCS

## 2024-01-24 PROCEDURE — G0156 HHCP-SVS OF AIDE,EA 15 MIN: HCPCS

## 2024-01-24 PROCEDURE — 0023 HH SOC

## 2024-01-24 RX ORDER — CLOPIDOGREL BISULFATE 75 MG/1
75 TABLET ORAL DAILY
Qty: 30 TABLET | Refills: 0 | Status: SHIPPED | OUTPATIENT
Start: 2024-01-24 | End: 2024-01-29 | Stop reason: SDUPTHER

## 2024-01-24 RX ORDER — METOPROLOL TARTRATE 50 MG/1
50 TABLET ORAL 2 TIMES DAILY
Qty: 60 TABLET | Refills: 1 | Status: SHIPPED | OUTPATIENT
Start: 2024-01-24 | End: 2024-01-29 | Stop reason: SDUPTHER

## 2024-01-24 RX ORDER — PANTOPRAZOLE SODIUM 40 MG/1
40 TABLET, DELAYED RELEASE ORAL
Qty: 30 TABLET | Refills: 0 | Status: SHIPPED | OUTPATIENT
Start: 2024-01-24 | End: 2024-05-07 | Stop reason: SDUPTHER

## 2024-01-24 SDOH — ECONOMIC STABILITY: GENERAL

## 2024-01-24 ASSESSMENT — ENCOUNTER SYMPTOMS
FATIGUE: 1
PAIN LOCATION: RIGHT SHOULDER
PAIN LOCATION - PAIN SEVERITY: 6/10
PAIN LOCATION - RELIEVING FACTORS: LIDOCAINE PATCH
COUGH: 1
DESCRIPTION OF MEMORY LOSS: IMMEDIATE
FATIGUES EASILY: 1
LOWEST PAIN SEVERITY IN PAST 24 HOURS: 0/10
PAIN LOCATION - RELIEVING FACTORS: LIDOCAINE PATCH
DYSPNEA ON EXERTION: 1
PAIN: 1
CHANGE IN APPETITE: UNCHANGED
PAIN SEVERITY GOAL: 0/10
LIMITED RANGE OF MOTION: 1
DESCRIPTION OF MEMORY LOSS: SHORT TERM
APPETITE LEVEL: FAIR
DRY SKIN: 1
PAIN LOCATION - PAIN FREQUENCY: WITH ACTIVITY
CHEST TIGHTNESS: 1
PERSON REPORTING PAIN: PATIENT
PAIN LOCATION - PAIN DURATION: VARIES
CHEST PAIN QUALITY: ACHING
SUBJECTIVE PAIN PROGRESSION: UNCHANGED
PAIN LOCATION: LEFT SHOULDER
PAIN LOCATION - EXACERBATING FACTORS: DOING THINGS
DESCRIPTION OF MEMORY LOSS: LONG TERM
SHORTNESS OF BREATH: 1
DIZZINESS: 1
LAST BOWEL MOVEMENT: 66862
PAIN LOCATION - PAIN DURATION: VARIES
FORGETFULNESS: 1
COUGH CHARACTERISTICS: NON-PRODUCTIVE
PAIN LOCATION - PAIN FREQUENCY: WITH ACTIVITY
PAIN LOCATION - EXACERBATING FACTORS: DOING MORE
PAIN LOCATION: CHEST
PAIN LOCATION - EXACERBATING FACTORS: DOING THINGS
PAIN LOCATION - PAIN FREQUENCY: WITH ACTIVITY
PAIN LOCATION - PAIN DURATION: VARIES
PAIN LOCATION - PAIN SEVERITY: 6/10
HIGHEST PAIN SEVERITY IN PAST 24 HOURS: 6/10
MUSCLE WEAKNESS: 1
PAIN LOCATION - PAIN SEVERITY: 6/10
DEPRESSED MOOD: 1

## 2024-01-24 ASSESSMENT — ACTIVITIES OF DAILY LIVING (ADL)
GROOMING ASSESSED: 1
BATHING ASSESSED: 1
BATHING_CURRENT_FUNCTION: MODERATE ASSIST
MONEY MANAGEMENT (EXPENSES/BILLS): NEEDS ASSISTANCE
GROOMING_CURRENT_FUNCTION: MINIMUM ASSIST

## 2024-01-24 NOTE — HOME HEALTH
Pt reports he has been doing more, but also more sore from this. Pt reports his wife sets up meds and he is being compliant.  Pt noted he would like a Suzy monitor, sent message to PCP.  Addressed pt did go to [facility] ER a few weeks ago but left after the triage. Hence it is why nothing shows up for ER visit. He also notes he went to see PCP this week but it was cancelled however they did reorder the meds. Pt unable to at this time get into your phone to receive the messages they needed to cancell. Pt has a new appt.  Provided pt with the [employer] based company that offers senior services and transportation, pt willing to in future use [employer].

## 2024-01-25 ENCOUNTER — HOME CARE VISIT (OUTPATIENT)
Dept: HOME HEALTH SERVICES | Facility: HOME HEALTH | Age: 59
End: 2024-01-25
Payer: COMMERCIAL

## 2024-01-25 PROCEDURE — G0151 HHCP-SERV OF PT,EA 15 MIN: HCPCS

## 2024-01-25 ASSESSMENT — ENCOUNTER SYMPTOMS
PAIN LOCATION - PAIN DURATION: CONSTANT
PAIN SEVERITY GOAL: 2/10
SUBJECTIVE PAIN PROGRESSION: GRADUALLY IMPROVING
PAIN LOCATION - PAIN SEVERITY: 6/10
PAIN: 1
LOWEST PAIN SEVERITY IN PAST 24 HOURS: 6/10
PERSON REPORTING PAIN: PATIENT
HIGHEST PAIN SEVERITY IN PAST 24 HOURS: 10/10
PAIN LOCATION - PAIN FREQUENCY: CONSTANT
PAIN LOCATION - EXACERBATING FACTORS: UPRIGHT ACTIVITY
PAIN LOCATION - PAIN QUALITY: ACHING
PAIN LOCATION: CHEST

## 2024-01-25 ASSESSMENT — ACTIVITIES OF DAILY LIVING (ADL)
MODE OF TRANSPORTATION: POWER SCOOTER
TRANSPORTATION: NEEDS ASSISTANCE
AMBULATION ASSISTANCE: 1
TRANSPORTATION ASSESSED: 1

## 2024-01-25 NOTE — HOME HEALTH
I am getting better.  I have been wearing my leg.  That is a big deal.  I wore it from 6am to 10pm.  I actually went to the bathroom.  My leg is really swollen right now.  I am talking about my right leg, not my prosthetic.      I went to the doctor about my leg and got a whole new prosthetic.  I went from a 38 to a 32,  It is the lowest I can go.  I did not know that I would lose that much weight.    Working on [employer].      Independently pivoting to and from [employer].  Challenges include right shoulder and chest from one month ago open heart surgery.  Patient reports that he  during surgery.

## 2024-01-29 ENCOUNTER — LAB (OUTPATIENT)
Dept: LAB | Facility: LAB | Age: 59
End: 2024-01-29
Payer: COMMERCIAL

## 2024-01-29 ENCOUNTER — OFFICE VISIT (OUTPATIENT)
Dept: CARDIOLOGY | Facility: HOSPITAL | Age: 59
End: 2024-01-29
Payer: COMMERCIAL

## 2024-01-29 VITALS — DIASTOLIC BLOOD PRESSURE: 86 MMHG | HEART RATE: 70 BPM | OXYGEN SATURATION: 97 % | SYSTOLIC BLOOD PRESSURE: 165 MMHG

## 2024-01-29 DIAGNOSIS — Z95.1 S/P CABG (CORONARY ARTERY BYPASS GRAFT): ICD-10-CM

## 2024-01-29 DIAGNOSIS — I10 ESSENTIAL HYPERTENSION: Primary | ICD-10-CM

## 2024-01-29 DIAGNOSIS — I63.9 CEREBRAL INFARCTION, UNSPECIFIED (MULTI): ICD-10-CM

## 2024-01-29 LAB
ANION GAP SERPL CALC-SCNC: 16 MMOL/L (ref 10–20)
BUN SERPL-MCNC: 52 MG/DL (ref 6–23)
CALCIUM SERPL-MCNC: 8.6 MG/DL (ref 8.6–10.3)
CHLORIDE SERPL-SCNC: 107 MMOL/L (ref 98–107)
CO2 SERPL-SCNC: 21 MMOL/L (ref 21–32)
CREAT SERPL-MCNC: 2.63 MG/DL (ref 0.5–1.3)
EGFRCR SERPLBLD CKD-EPI 2021: 27 ML/MIN/1.73M*2
ERYTHROCYTE [DISTWIDTH] IN BLOOD BY AUTOMATED COUNT: 14.5 % (ref 11.5–14.5)
GLUCOSE SERPL-MCNC: 120 MG/DL (ref 74–99)
HCT VFR BLD AUTO: 33.7 % (ref 41–52)
HGB BLD-MCNC: 10 G/DL (ref 13.5–17.5)
MCH RBC QN AUTO: 28.7 PG (ref 26–34)
MCHC RBC AUTO-ENTMCNC: 29.7 G/DL (ref 32–36)
MCV RBC AUTO: 97 FL (ref 80–100)
NRBC BLD-RTO: 0 /100 WBCS (ref 0–0)
PLATELET # BLD AUTO: 262 X10*3/UL (ref 150–450)
POTASSIUM SERPL-SCNC: 5.8 MMOL/L (ref 3.5–5.3)
RBC # BLD AUTO: 3.48 X10*6/UL (ref 4.5–5.9)
SODIUM SERPL-SCNC: 138 MMOL/L (ref 136–145)
WBC # BLD AUTO: 10.6 X10*3/UL (ref 4.4–11.3)

## 2024-01-29 PROCEDURE — 3077F SYST BP >= 140 MM HG: CPT | Performed by: NURSE PRACTITIONER

## 2024-01-29 PROCEDURE — 36415 COLL VENOUS BLD VENIPUNCTURE: CPT

## 2024-01-29 PROCEDURE — 3008F BODY MASS INDEX DOCD: CPT | Performed by: NURSE PRACTITIONER

## 2024-01-29 PROCEDURE — 99214 OFFICE O/P EST MOD 30 MIN: CPT | Performed by: NURSE PRACTITIONER

## 2024-01-29 PROCEDURE — 85027 COMPLETE CBC AUTOMATED: CPT

## 2024-01-29 PROCEDURE — 80048 BASIC METABOLIC PNL TOTAL CA: CPT

## 2024-01-29 PROCEDURE — 3079F DIAST BP 80-89 MM HG: CPT | Performed by: NURSE PRACTITIONER

## 2024-01-29 PROCEDURE — 1036F TOBACCO NON-USER: CPT | Performed by: NURSE PRACTITIONER

## 2024-01-29 PROCEDURE — 3066F NEPHROPATHY DOC TX: CPT | Performed by: NURSE PRACTITIONER

## 2024-01-29 RX ORDER — ATORVASTATIN CALCIUM 80 MG/1
80 TABLET, FILM COATED ORAL DAILY
Qty: 90 TABLET | Refills: 3 | Status: SHIPPED | OUTPATIENT
Start: 2024-01-29 | End: 2025-01-28

## 2024-01-29 RX ORDER — CLOPIDOGREL BISULFATE 75 MG/1
75 TABLET ORAL DAILY
Qty: 90 TABLET | Refills: 3 | Status: SHIPPED | OUTPATIENT
Start: 2024-01-29 | End: 2024-06-11 | Stop reason: HOSPADM

## 2024-01-29 RX ORDER — HYDRALAZINE HYDROCHLORIDE 100 MG/1
100 TABLET, FILM COATED ORAL 3 TIMES DAILY
Qty: 270 TABLET | Refills: 3 | Status: SHIPPED | OUTPATIENT
Start: 2024-01-29 | End: 2024-04-17 | Stop reason: HOSPADM

## 2024-01-29 RX ORDER — METOPROLOL TARTRATE 50 MG/1
50 TABLET ORAL 2 TIMES DAILY
Qty: 180 TABLET | Refills: 3 | Status: SHIPPED | OUTPATIENT
Start: 2024-01-29 | End: 2025-01-28

## 2024-01-29 RX ORDER — HYDRALAZINE HYDROCHLORIDE 100 MG/1
100 TABLET, FILM COATED ORAL 3 TIMES DAILY
COMMUNITY
End: 2024-01-29 | Stop reason: SDUPTHER

## 2024-01-29 RX ORDER — GABAPENTIN 300 MG/1
300 CAPSULE ORAL 3 TIMES DAILY
COMMUNITY
End: 2024-03-05 | Stop reason: SDUPTHER

## 2024-01-29 ASSESSMENT — ENCOUNTER SYMPTOMS
RESPIRATORY NEGATIVE: 1
EYES NEGATIVE: 1
MYALGIAS: 1
PSYCHIATRIC NEGATIVE: 1
HEMATOLOGIC/LYMPHATIC NEGATIVE: 1
ENDOCRINE NEGATIVE: 1
DEPRESSION: 0
NEUROLOGICAL NEGATIVE: 1
GASTROINTESTINAL NEGATIVE: 1

## 2024-01-29 NOTE — PROGRESS NOTES
Referred by Dr. Montanez ref. provider found for Chest Pain (Constant on L side of chest for x2 weeks.) and Edema (R leg, x1 week.)     History Of Present Illness:    Humphrey Waddell is a very pleasant 58 year old gentleman with a history of carotid artery stenosis, CVA, DM, HLD, PVD, CAD s/p CABG and CKD, he is here for a follow up visit. The patient is seen in collaboration with Dr. Higgins. Mr. Waddell complains of shoulder pain, recently had shoulder surgery. Denies shortness of breath, complains of chest pressure all the time, he feels the pain is worse on exertion. He denies shortness of breath, occasional heart palpitations. The chest pain is mid sternal. He complains of itching along the incision site. He does not sleep well at night, the Tramadol had helped. He ran out of the Torsemide, has noticed increased swelling in his leg.     Review of Systems   Constitutional: Positive for malaise/fatigue.   HENT: Negative.     Eyes: Negative.    Cardiovascular:  Positive for chest pain.   Respiratory: Negative.     Endocrine: Negative.    Hematologic/Lymphatic: Negative.    Skin: Negative.    Musculoskeletal:  Positive for muscle weakness and myalgias.   Gastrointestinal: Negative.    Neurological: Negative.    Psychiatric/Behavioral: Negative.              Past Medical History:  He has a past medical history of Above-knee amputation of left lower extremity (CMS/[facility]), Adverse effect of anesthesia, Anemia, CAD (coronary artery disease), Carotid artery disease (CMS/[facility]), CVA (cerebral vascular accident) (CMS/HCC) (2020), Depression, DM (diabetes mellitus) (CMS/[facility]), GERD (gastroesophageal reflux disease), High cholesterol, HTN (hypertension), Leg ulcer (CMS/[facility]), Neuropathy, OA (osteoarthritis), and PAD (peripheral artery disease) (CMS/[facility]).    Past Surgical History:  He has a past surgical history that includes Other surgical history (07/07/2021); Other surgical history (07/07/2021); CT angio head w and wo IV contrast (06/14/2021);  CT angio neck (06/14/2021); MR angio neck wo IV contrast (07/18/2023); MR angio head wo IV contrast (12/11/2012); MR angio head wo IV contrast (06/12/2021); MR angio head wo IV contrast (05/25/2021); Total shoulder arthroplasty (Left, 2020); and Leg Surgery (Right).      Social History:  He reports that he quit smoking about 9 months ago. His smoking use included cigars. He has never used smokeless tobacco. He reports that he does not currently use alcohol. He reports that he does not currently use drugs after having used the following drugs: Marijuana.    Family History:  Family History   Problem Relation Name Age of Onset    Other (cardiac disorder) Mother      Hypertension Mother      Esophageal cancer Mother      Hearing loss Father      Hypertension Father      Heart disease Father      COPD Sister          Allergies:  Patient has no known allergies.    Outpatient Medications:  Current Outpatient Medications   Medication Instructions    acetaminophen (Tylenol 8 HOUR) 650 mg ER tablet 1 tablet, oral, Every 8 hours PRN, Do not crush, chew, or split.    aspirin 81 mg chewable tablet 1 tablet, oral, Daily    atorvastatin (LIPITOR) 80 mg, oral, Daily, Last rx prior to next refills    clopidogrel (PLAVIX) 75 mg, oral, Daily    ergocalciferol (VITAMIN D-2) 50,000 Units, oral, Weekly    FreeStyle Suzy reader (FreeStyle Suzy 2 [place]) misc Use as instructed    FreeStyle Suzy sensor system (FreeStyle Suzy 2 Sensor) kit Use as instructed    FreeStyle Test strip TEST 3 TIMES DAILY.    gabapentin (NEURONTIN) 300 mg, oral, 3 times daily, Patient is taking BID    hydrALAZINE (APRESOLINE) 100 mg, oral, 3 times daily, Patient is only taking BID.    insulin glargine (LANTUS SOLOSTAR U-100 INSULIN) 4 Units, subcutaneous, Nightly    insulin lispro (HUMALOG) 0-15 Units, subcutaneous, 3 times daily with meals, Take as directed per insulin instructions.    magnesium oxide (MAG-OX) 400 mg, oral, Daily    melatonin 5 mg, oral,  Nightly    metoprolol tartrate (LOPRESSOR) 50 mg, oral, 2 times daily    multivitamin-calcium carb (Flintstones Plus Calcium) tablet,chewable 1 tablet, oral, Daily    pantoprazole (PROTONIX) 40 mg, oral, Daily before breakfast, Do not crush, chew, or split.    torsemide 40 mg tablet 1 tablet, oral, Daily    traMADol (ULTRAM) 25 mg, oral, Every 12 hours PRN        Last Recorded Vitals:  Vitals:    01/29/24 0830   BP: 165/86   Pulse: 70   SpO2: 97%       Physical Exam:  Physical Exam  Vitals reviewed.   HENT:      Head: Normocephalic.      Nose: Nose normal.   Eyes:      Pupils: Pupils are equal, round, and reactive to light.   Cardiovascular:      Rate and Rhythm: Normal rate and regular rhythm.   Pulmonary:      Effort: Pulmonary effort is normal.      Breath sounds: Normal breath sounds.   Abdominal:      General: Abdomen is flat.      Palpations: Abdomen is soft.   Musculoskeletal:         General: Normal range of motion.      Cervical back: Normal range of motion.   Skin:     General: Skin is warm and dry.   Neurological:      General: No focal deficit present.      Mental Status: He is alert and oriented to person, place, and time.   Psychiatric:         Mood and Affect: Mood normal.          Last Labs:  CBC -  Lab Results   Component Value Date    WBC 11.3 11/24/2023    HGB 8.7 (L) 11/24/2023    HCT 28.4 (L) 11/24/2023    MCV 93 11/24/2023     11/24/2023       CMP -  Lab Results   Component Value Date    CALCIUM 7.5 (L) 11/23/2023    PHOS 3.6 11/23/2023    PROT 6.2 (L) 07/24/2023    ALBUMIN 2.8 (L) 11/23/2023    AST 14 07/22/2023    ALT 11 07/22/2023    ALKPHOS 52 07/22/2023    BILITOT 0.8 07/22/2023       LIPID PANEL -   Lab Results   Component Value Date    CHOL 181 04/29/2023    TRIG 108 04/29/2023    HDL 32.8 (A) 04/29/2023    CHHDL 5.5 (A) 04/29/2023    LDLF 127 (H) 04/29/2023    VLDL 22 04/29/2023       RENAL FUNCTION PANEL -   Lab Results   Component Value Date    GLUCOSE 135 (H) 11/23/2023    NA  134 (L) 11/23/2023    K 4.2 11/23/2023     11/23/2023    CO2 26 11/23/2023    ANIONGAP 12 11/23/2023    BUN 59 (H) 11/23/2023    CREATININE 2.84 (H) 11/23/2023    GFRMALE CANCELED 08/01/2023    CALCIUM 7.5 (L) 11/23/2023    PHOS 3.6 11/23/2023    ALBUMIN 2.8 (L) 11/23/2023        Lab Results   Component Value Date     (H) 07/22/2023    HGBA1C 5.3 10/26/2023       Last Cardiology Tests:  ECG:    Echo:  Transthoracic Echo (TTE) Complete 11/24/2023  1. Left ventricular systolic function is mildly decreased with a 45-50% estimated ejection fraction.   2. Mid and apical anterior wall, mid anterolateral segment, apical lateral segment, and apex are abnormal.   3. There are multiple wall motion abnormalities.   4. Poorly visualized anatomical structures due to suboptimal image quality.   5. There is reduced right ventricular systolic function.    LARS 11/13/2023  1. Left ventricular systolic function is normal with a 55-60% estimated ejection fraction.   2. Aortic valve stenosis is not present.     POST CARDIOPULMONARY BYPASS REPORT:  Patient has a normal sinus rhythm. Patient is on no inotropic support. Global LV function is normal. RV function is unchanged from pre-pump exam. No evidence of post aortic cannulation dissection.    Echo 4/28/23:  1. Left ventricular systolic function is low normal with a 50-55% estimated ejection fraction.  2. Mid inferolateral segment, mid anterolateral segment, and apical lateral segment are abnormal.  3. Spectral Doppler shows a pseudonormal pattern of left ventricular diastolic filling.  4. There is moderate to severe concentric left ventricular hypertrophy.  5. Mild to moderate mitral valve regurgitation.      Ejection Fractions:  LVEF 45-50%  Cath:  PCI 5/11/23:  1. Successful OM2 stenting.    LHC 5/2/23:  1. Left main: 30% diffuse disease.  2. LAD: 40-50% proximal stenosis, 75% md-vessel disease.  3. LCx: 95% mid-vessel disease.  4. RCA: 60% proximal disease, 40%  mid-vessel disease.  5. LVEDP 34mmHg, no aortic stenosis on LV-Ao gradient.  Stress Test:    Cardiac Imaging:      Assessment/Plan   Very pleasant 58 year-old gentleman with history of Carotid stenosis, CVA, Type II MI 2/2 anemia/GI bleeding (February 2023), DM Type II, HLD, PVD, HTN, CKD, Depression, and CAD s/p PCI to Lcx and CABG x3 (LIMA-LAD, SVG-OM1, SVG-PDA) 11/2023, he complains of chest pain, no specific in nature. He will have blood work today to evaluate his anemia. Complains of increased peripheral edema, the Torsemide will be restarted.  Heart rate and blood pressure is well controlled today.      Plan:  -follow up in June   -follow up with vascular   -blood work today   -continue current ASA, plavix, hydralazine, and atorvastatin   -follow up with nephrology   -call with any questions   -restart Torsemide     Dorothy Qiu, APRN-CNP

## 2024-01-30 ENCOUNTER — HOME CARE VISIT (OUTPATIENT)
Dept: HOME HEALTH SERVICES | Facility: HOME HEALTH | Age: 59
End: 2024-01-30
Payer: COMMERCIAL

## 2024-01-30 DIAGNOSIS — Z95.1 S/P CABG (CORONARY ARTERY BYPASS GRAFT): ICD-10-CM

## 2024-01-30 PROCEDURE — G0156 HHCP-SVS OF AIDE,EA 15 MIN: HCPCS

## 2024-01-30 RX ORDER — TORSEMIDE 40 MG/1
1 TABLET, FILM COATED ORAL DAILY
Qty: 90 TABLET | Refills: 3 | Status: SHIPPED | OUTPATIENT
Start: 2024-01-30 | End: 2024-02-01 | Stop reason: SDUPTHER

## 2024-02-01 ENCOUNTER — HOME CARE VISIT (OUTPATIENT)
Dept: HOME HEALTH SERVICES | Facility: HOME HEALTH | Age: 59
End: 2024-02-01
Payer: COMMERCIAL

## 2024-02-01 ENCOUNTER — TELEPHONE (OUTPATIENT)
Dept: CARDIOLOGY | Facility: HOSPITAL | Age: 59
End: 2024-02-01
Payer: COMMERCIAL

## 2024-02-01 VITALS
HEART RATE: 79 BPM | TEMPERATURE: 97.3 F | OXYGEN SATURATION: 94 % | DIASTOLIC BLOOD PRESSURE: 64 MMHG | SYSTOLIC BLOOD PRESSURE: 158 MMHG

## 2024-02-01 DIAGNOSIS — Z95.1 S/P CABG (CORONARY ARTERY BYPASS GRAFT): ICD-10-CM

## 2024-02-01 PROCEDURE — G0299 HHS/HOSPICE OF RN EA 15 MIN: HCPCS

## 2024-02-01 ASSESSMENT — ENCOUNTER SYMPTOMS
PAIN LOCATION - PAIN SEVERITY: 8/10
PAIN LOCATION: LEFT SHOULDER
PAIN LOCATION - EXACERBATING FACTORS: USE
PAIN LOCATION - PAIN FREQUENCY: FREQUENT
PAIN LOCATION - PAIN SEVERITY: 6/10
PAIN SEVERITY GOAL: 0/10
PAIN LOCATION - PAIN FREQUENCY: FREQUENT
PAIN LOCATION - PAIN DURATION: CONSTANT
SUBJECTIVE PAIN PROGRESSION: UNCHANGED
PAIN LOCATION - EXACERBATING FACTORS: USE
PERSON REPORTING PAIN: PATIENT
PAIN: 1
PAIN LOCATION - PAIN SEVERITY: 8/10
LOWEST PAIN SEVERITY IN PAST 24 HOURS: 4/10
PAIN LOCATION - PAIN QUALITY: ACHY
PAIN LOCATION: CHEST
HIGHEST PAIN SEVERITY IN PAST 24 HOURS: 8/10
PAIN LOCATION - EXACERBATING FACTORS: USE
PAIN LOCATION - PAIN DURATION: CONSTANT
PAIN LOCATION: RIGHT SHOULDER
PAIN LOCATION - PAIN DURATION: CONSTANT
PAIN LOCATION - PAIN FREQUENCY: FREQUENT

## 2024-02-01 ASSESSMENT — ACTIVITIES OF DAILY LIVING (ADL)
OASIS_M1830: 03
FEEDING ASSESSED: 1
CURRENT_FUNCTION: MODERATE ASSIST
DRESSING_UB_CURRENT_FUNCTION: MINIMUM ASSIST
AMBULATION ASSISTANCE: TWO PERSON
ORAL_CARE_CURRENT_FUNCTION: NEEDS ASSISTANCE
HOME_HEALTH_OASIS: 02
LIGHT HOUSEKEEPING: DEPENDENT
LAUNDRY ASSESSED: 1
GROOMING ASSESSED: 1
TOILETING: 1
GROOMING_CURRENT_FUNCTION: CONTACT GUARD ASSIST
TRANSPORTATION: DEPENDENT
TELEPHONE USE ASSESSED: 1
BATHING_CURRENT_FUNCTION: ONE PERSON
AMBULATION ASSISTANCE: 1
ORAL_CARE_ASSESSED: 1
SHOPPING ASSESSED: 1
LAUNDRY: DEPENDENT
TRANSPORTATION ASSESSED: 1
TOILETING: MINIMUM ASSIST
HOUSEKEEPING ASSESSED: 1
DRESSING_LB_CURRENT_FUNCTION: MODERATE ASSIST
SHOPPING: DEPENDENT
PREPARING MEALS: NEEDS ASSISTANCE
BATHING ASSESSED: 1
PHYSICAL TRANSFERS ASSESSED: 1
FEEDING: INDEPENDENT
USING THE TELPHONE: INDEPENDENT

## 2024-02-01 NOTE — TELEPHONE ENCOUNTER
----- Message from PAT Knott sent at 1/29/2024  4:12 PM EST -----  Please call and review his blood work   Creatine has improved  Blood count has improved  Have him take the Toresemide for five days in a row and see if it helps with the chest pain   Thanks   ----- Message -----  From: Lab, Background User  Sent: 1/29/2024   2:19 PM EST  To: PAT Knott

## 2024-02-02 RX ORDER — TORSEMIDE 40 MG/1
1 TABLET, FILM COATED ORAL DAILY
Qty: 90 TABLET | Refills: 3 | Status: SHIPPED | OUTPATIENT
Start: 2024-02-02 | End: 2024-03-05 | Stop reason: SDUPTHER

## 2024-02-05 DIAGNOSIS — Z95.1 S/P CABG (CORONARY ARTERY BYPASS GRAFT): ICD-10-CM

## 2024-02-05 DIAGNOSIS — I25.10 CORONARY ARTERY DISEASE INVOLVING NATIVE CORONARY ARTERY OF NATIVE HEART WITHOUT ANGINA PECTORIS: ICD-10-CM

## 2024-02-09 ENCOUNTER — APPOINTMENT (OUTPATIENT)
Dept: VASCULAR SURGERY | Facility: CLINIC | Age: 59
End: 2024-02-09
Payer: COMMERCIAL

## 2024-02-26 ENCOUNTER — OFFICE VISIT (OUTPATIENT)
Dept: VASCULAR SURGERY | Facility: HOSPITAL | Age: 59
End: 2024-02-26
Payer: COMMERCIAL

## 2024-02-26 VITALS
HEART RATE: 71 BPM | SYSTOLIC BLOOD PRESSURE: 150 MMHG | HEIGHT: 76 IN | WEIGHT: 240 LBS | BODY MASS INDEX: 29.22 KG/M2 | DIASTOLIC BLOOD PRESSURE: 83 MMHG

## 2024-02-26 DIAGNOSIS — I65.23 ATHEROSCLEROSIS OF BOTH CAROTID ARTERIES: Primary | ICD-10-CM

## 2024-02-26 PROCEDURE — 3008F BODY MASS INDEX DOCD: CPT | Performed by: SURGERY

## 2024-02-26 PROCEDURE — 3079F DIAST BP 80-89 MM HG: CPT | Performed by: SURGERY

## 2024-02-26 PROCEDURE — 3077F SYST BP >= 140 MM HG: CPT | Performed by: SURGERY

## 2024-02-26 PROCEDURE — 1036F TOBACCO NON-USER: CPT | Performed by: SURGERY

## 2024-02-26 PROCEDURE — 99213 OFFICE O/P EST LOW 20 MIN: CPT | Performed by: SURGERY

## 2024-02-26 NOTE — PROGRESS NOTES
Subjective   Patient ID: Humphrey Waddell is a 58 y.o. male who presents for Follow-up, Carotid Artery Disease, and Peripheral Vascular Disease.  HPI  Patient here for carotid stenosis  No tia amarousis, hemiparalysis or plegia noted  Patient with left aka  PAD stable  No current pain or discomfort  Patient has had complex medical issues with multiple procedures    Review of Systems  Review of Systems    Constitutional:  no generalized malaise overall feels well, energy levels intact, no complaints specifically noted  HEENT:  No blurry vision, no visual aides noted, no hearing loss no ear ache no nose bleeds noted, no dysphagia, no congestion otherwise no pertinent positives noted  Cardiovascular:  no palpitations, chest pain or heaviness noted, no leg swelling, no numbness or tingling in the lower extremity noted  Respiratory:  no shortness of breath, no productive cough noted, no conversation dyspnea or difficulty breathing  Gastrointestinal:  no abdominal pain, no nausea or vomiting, appetite intact, no bowel irregularities noted  Genitourinary:   no urinary incontinence, frequency or urgency issues noted, no hematuria or burning sensation issues  Musculoskeletal:  No muscle aches or pains, no joint discomfort noted, no back pain noted otherwise feels well  Skin: no ulcerations, skin color issues or wounds upper or lower extremities  Neurologic: no dizziness, no hemiplegia, no hemiparesis, no obvious visual deficits noted  Psychiatric: no depression, no memory loss noted, no suicidal ideation  Endocrine: no weight loss or gain, no temperature concerns hot or cold intolerance  Hemogolotic/Lymphatic: no bruising, excessive bleeding, no swelling in the groins or neck noted      Objective   Physical Exam  Physical exam    Constitutional: alert and in no acute distress verbal  Eyes: No erythema swelling or discharge noted  Neck: supple, symmetric, trachea midline, no masses noted  Cardiovascular: Carotid pulses 2+, no  obvious bruit, no Jugular distension noted, no thrill, heart regular rate, lower extremity vascular exam intact,   Pulmonary:  Bilateral breath sounds intact, clear with rales rhonchi or wheeze  Abdomen: soft non tender, no pulsatile masses noted, no rebound rigidity or guarding noted  Skin: intact warm no abnormal turgor  Psychiatric: alert without any obvious cognitive issues, oriented to person, place, and time  Left aka    Assessment/Plan   Carotid stenosis based on duplex apprx 1 year ago with > 70% stenosis left worse than right  No acute cva noted  PAD stable  S/p CABG  Check with nephrology to see if cta neck is possible  Patient may need carotid intervention downtown         Chris Lebron DO 02/26/24 3:54 PM

## 2024-03-05 ENCOUNTER — TELEPHONE (OUTPATIENT)
Dept: PRIMARY CARE | Facility: CLINIC | Age: 59
End: 2024-03-05

## 2024-03-05 ENCOUNTER — OFFICE VISIT (OUTPATIENT)
Dept: PRIMARY CARE | Facility: CLINIC | Age: 59
End: 2024-03-05
Payer: COMMERCIAL

## 2024-03-05 VITALS — OXYGEN SATURATION: 95 % | HEART RATE: 76 BPM | RESPIRATION RATE: 17 BRPM

## 2024-03-05 DIAGNOSIS — Z09 HOSPITAL DISCHARGE FOLLOW-UP: Primary | ICD-10-CM

## 2024-03-05 DIAGNOSIS — E11.69 TYPE 2 DIABETES MELLITUS WITH OTHER SPECIFIED COMPLICATION, WITHOUT LONG-TERM CURRENT USE OF INSULIN (MULTI): ICD-10-CM

## 2024-03-05 DIAGNOSIS — Z95.1 S/P CABG (CORONARY ARTERY BYPASS GRAFT): ICD-10-CM

## 2024-03-05 DIAGNOSIS — N18.9 CHRONIC KIDNEY DISEASE, UNSPECIFIED CKD STAGE: ICD-10-CM

## 2024-03-05 PROCEDURE — 3008F BODY MASS INDEX DOCD: CPT | Performed by: STUDENT IN AN ORGANIZED HEALTH CARE EDUCATION/TRAINING PROGRAM

## 2024-03-05 PROCEDURE — 99214 OFFICE O/P EST MOD 30 MIN: CPT | Performed by: STUDENT IN AN ORGANIZED HEALTH CARE EDUCATION/TRAINING PROGRAM

## 2024-03-05 PROCEDURE — 3044F HG A1C LEVEL LT 7.0%: CPT | Performed by: STUDENT IN AN ORGANIZED HEALTH CARE EDUCATION/TRAINING PROGRAM

## 2024-03-05 RX ORDER — TORSEMIDE 40 MG/1
1 TABLET, FILM COATED ORAL DAILY
Qty: 90 TABLET | Refills: 3 | Status: SHIPPED | OUTPATIENT
Start: 2024-03-05 | End: 2024-03-07

## 2024-03-05 RX ORDER — ERGOCALCIFEROL 1.25 MG/1
50000 CAPSULE ORAL
Qty: 12 CAPSULE | Refills: 0 | Status: SHIPPED | OUTPATIENT
Start: 2024-03-05 | End: 2024-03-29 | Stop reason: HOSPADM

## 2024-03-05 RX ORDER — GABAPENTIN 300 MG/1
300 CAPSULE ORAL 3 TIMES DAILY
Qty: 270 CAPSULE | Refills: 1 | Status: SHIPPED | OUTPATIENT
Start: 2024-03-05 | End: 2024-03-29 | Stop reason: HOSPADM

## 2024-03-05 NOTE — PROGRESS NOTES
Subjective   Patient ID: Humphrey Waddell is a 59 y.o. male who presents for Follow-up (Pt is here for a follow up and has a couple of questions. Pt has not taken his medication this morning. Pt BP is 130/100.).      Pt was admitted to [facility]  from 11/13/24-11/234/23 for scheduled CABG x3 LIMA to LAD Rt EVH SVG to OM Rt EVH SVG to PDA pericardial window and sternal plating on 11/13/23.  Course was complicated by post operative vasoplegic shock which lead to the discovery of a hypokinetic anterior wall.   Pt required transfusion of 4 units PRBC.  IDANIA on CKD stage 4       Rosblock- CTA okay for CTA for carotids?, and torsemide instructions post 30 days     Should have been transferred to outpt PT       Objective   Physical Exam  Constitutional:       Appearance: Normal appearance.   Cardiovascular:      Rate and Rhythm: Normal rate and regular rhythm.      Heart sounds: No murmur heard.  Pulmonary:      Effort: Pulmonary effort is normal. No respiratory distress.      Breath sounds: Wheezing present.   Musculoskeletal:      Cervical back: Neck supple.      Left lower leg: No edema.      Comments: Left AKA   Neurological:      Mental Status: He is alert.       Assessment/Plan   Problem List Items Addressed This Visit             ICD-10-CM    S/P CABG (coronary artery bypass graft) Z95.1     Other Visit Diagnoses         Codes    Type 2 diabetes mellitus with other specified complication, without long-term current use of insulin (CMS/[facility])    -  Primary E11.69    Chronic kidney disease, unspecified CKD stage     N18.9        Hospital discharge follow up  Will consult with nephrology regarding torsemide instructions   Pt to be transitioned to outpt physical therapy   Pending evaluation for carotid intervention     CKD stage IV  Torsemide 40mg daily   BMP ordered   Will contact nephrology regarding clearance for procedure     CAD s/p CABGx3  HFpEF. HTN  LVEF pre 60%, post 65%  Epicardial pacing wire cut, no  immediate complications or rhythm changes  - Continuous EKG and NIBP  - ASA/statin/plavix  - Metoprolol 50mg BID  - Home hydralazine 50mg TID    Anemia of Chronic Disease   S/p PRBC 4 units during Nov 23 admission   Baseline Hb 9.1    DM   A1cof 5.3%  2 units Lantus nightly   Gabapentin 300mg 1 tab BID     RTC in 1-2 months or sooner if needed          Mily Leung DO 03/05/24 8:49 AM

## 2024-03-06 NOTE — TELEPHONE ENCOUNTER
Great. Please let him know I talked to nephro. They will not clear until he has the lab work completed. So he will need to get it done when he can.

## 2024-03-07 ENCOUNTER — TELEPHONE (OUTPATIENT)
Dept: PRIMARY CARE | Facility: CLINIC | Age: 59
End: 2024-03-07
Payer: COMMERCIAL

## 2024-03-07 DIAGNOSIS — Z95.1 S/P CABG (CORONARY ARTERY BYPASS GRAFT): ICD-10-CM

## 2024-03-07 RX ORDER — TORSEMIDE 40 MG/1
1 TABLET, FILM COATED ORAL DAILY
Qty: 90 TABLET | Refills: 3 | OUTPATIENT
Start: 2024-03-07

## 2024-03-07 RX ORDER — TORSEMIDE 20 MG/1
TABLET ORAL
Qty: 60 TABLET | Refills: 1 | Status: SHIPPED | OUTPATIENT
Start: 2024-03-07 | End: 2024-04-17 | Stop reason: HOSPADM

## 2024-03-20 ENCOUNTER — TELEPHONE (OUTPATIENT)
Dept: PRIMARY CARE | Facility: CLINIC | Age: 59
End: 2024-03-20

## 2024-03-28 ENCOUNTER — APPOINTMENT (OUTPATIENT)
Dept: CARDIOLOGY | Facility: HOSPITAL | Age: 59
End: 2024-03-28
Payer: COMMERCIAL

## 2024-03-28 ENCOUNTER — APPOINTMENT (OUTPATIENT)
Dept: RADIOLOGY | Facility: HOSPITAL | Age: 59
End: 2024-03-28
Payer: COMMERCIAL

## 2024-03-28 ENCOUNTER — HOSPITAL ENCOUNTER (INPATIENT)
Facility: HOSPITAL | Age: 59
LOS: 1 days | Discharge: ANOTHER HOSPITAL | End: 2024-03-29
Attending: STUDENT IN AN ORGANIZED HEALTH CARE EDUCATION/TRAINING PROGRAM | Admitting: INTERNAL MEDICINE
Payer: COMMERCIAL

## 2024-03-28 ENCOUNTER — HOSPITAL ENCOUNTER (INPATIENT)
Facility: HOSPITAL | Age: 59
End: 2024-03-28
Attending: SURGERY | Admitting: SURGERY
Payer: COMMERCIAL

## 2024-03-28 DIAGNOSIS — Z95.1 S/P CABG (CORONARY ARTERY BYPASS GRAFT): Primary | ICD-10-CM

## 2024-03-28 DIAGNOSIS — R07.9 CHEST PAIN, UNSPECIFIED TYPE: ICD-10-CM

## 2024-03-28 LAB
ALBUMIN SERPL-MCNC: 3.3 G/DL (ref 3.5–5)
ALBUMIN SERPL-MCNC: 3.4 G/DL (ref 3.5–5)
ALP BLD-CCNC: 64 U/L (ref 35–125)
ALP BLD-CCNC: 68 U/L (ref 35–125)
ALT SERPL-CCNC: 14 U/L (ref 5–40)
ALT SERPL-CCNC: 14 U/L (ref 5–40)
AMYLASE SERPL-CCNC: 94 U/L (ref 28–100)
ANION GAP SERPL CALC-SCNC: 10 MMOL/L
AST SERPL-CCNC: 14 U/L (ref 5–40)
AST SERPL-CCNC: 18 U/L (ref 5–40)
BASOPHILS # BLD AUTO: 0.03 X10*3/UL (ref 0–0.1)
BASOPHILS NFR BLD AUTO: 0.3 %
BILIRUB DIRECT SERPL-MCNC: <0.2 MG/DL (ref 0–0.2)
BILIRUB SERPL-MCNC: 0.3 MG/DL (ref 0.1–1.2)
BILIRUB SERPL-MCNC: 0.4 MG/DL (ref 0.1–1.2)
BUN SERPL-MCNC: 61 MG/DL (ref 8–25)
CALCIUM SERPL-MCNC: 8.5 MG/DL (ref 8.5–10.4)
CHLORIDE SERPL-SCNC: 108 MMOL/L (ref 97–107)
CK SERPL-CCNC: 109 U/L (ref 24–195)
CO2 SERPL-SCNC: 21 MMOL/L (ref 24–31)
CREAT SERPL-MCNC: 2.9 MG/DL (ref 0.4–1.6)
EGFRCR SERPLBLD CKD-EPI 2021: 24 ML/MIN/1.73M*2
EOSINOPHIL # BLD AUTO: 0.59 X10*3/UL (ref 0–0.7)
EOSINOPHIL NFR BLD AUTO: 5.9 %
ERYTHROCYTE [DISTWIDTH] IN BLOOD BY AUTOMATED COUNT: 13.7 % (ref 11.5–14.5)
FLUAV RNA RESP QL NAA+PROBE: NOT DETECTED
FLUBV RNA RESP QL NAA+PROBE: NOT DETECTED
GLUCOSE BLD MANUAL STRIP-MCNC: 140 MG/DL (ref 74–99)
GLUCOSE SERPL-MCNC: 136 MG/DL (ref 65–99)
HCT VFR BLD AUTO: 31.4 % (ref 41–52)
HGB BLD-MCNC: 9.6 G/DL (ref 13.5–17.5)
IMM GRANULOCYTES # BLD AUTO: 0.03 X10*3/UL (ref 0–0.7)
IMM GRANULOCYTES NFR BLD AUTO: 0.3 % (ref 0–0.9)
LIPASE SERPL-CCNC: 66 U/L (ref 16–63)
LYMPHOCYTES # BLD AUTO: 1.26 X10*3/UL (ref 1.2–4.8)
LYMPHOCYTES NFR BLD AUTO: 12.6 %
MAGNESIUM SERPL-MCNC: 2.2 MG/DL (ref 1.6–3.1)
MCH RBC QN AUTO: 26.7 PG (ref 26–34)
MCHC RBC AUTO-ENTMCNC: 30.6 G/DL (ref 32–36)
MCV RBC AUTO: 87 FL (ref 80–100)
MONOCYTES # BLD AUTO: 1.02 X10*3/UL (ref 0.1–1)
MONOCYTES NFR BLD AUTO: 10.2 %
NEUTROPHILS # BLD AUTO: 7.09 X10*3/UL (ref 1.2–7.7)
NEUTROPHILS NFR BLD AUTO: 70.7 %
NRBC BLD-RTO: 0 /100 WBCS (ref 0–0)
NT-PROBNP SERPL-MCNC: 3526 PG/ML (ref 0–177)
PLATELET # BLD AUTO: 248 X10*3/UL (ref 150–450)
POTASSIUM SERPL-SCNC: 4.8 MMOL/L (ref 3.4–5.1)
PROT SERPL-MCNC: 6.7 G/DL (ref 5.9–7.9)
PROT SERPL-MCNC: 7.6 G/DL (ref 5.9–7.9)
RBC # BLD AUTO: 3.6 X10*6/UL (ref 4.5–5.9)
SARS-COV-2 RNA RESP QL NAA+PROBE: NOT DETECTED
SODIUM SERPL-SCNC: 139 MMOL/L (ref 133–145)
TROPONIN T SERPL-MCNC: 133 NG/L
TROPONIN T SERPL-MCNC: 136 NG/L
TROPONIN T SERPL-MCNC: 143 NG/L
WBC # BLD AUTO: 10 X10*3/UL (ref 4.4–11.3)

## 2024-03-28 PROCEDURE — 2500000002 HC RX 250 W HCPCS SELF ADMINISTERED DRUGS (ALT 637 FOR MEDICARE OP, ALT 636 FOR OP/ED): Performed by: NURSE PRACTITIONER

## 2024-03-28 PROCEDURE — 71046 X-RAY EXAM CHEST 2 VIEWS: CPT

## 2024-03-28 PROCEDURE — 82550 ASSAY OF CK (CPK): CPT | Performed by: NURSE PRACTITIONER

## 2024-03-28 PROCEDURE — 99285 EMERGENCY DEPT VISIT HI MDM: CPT | Mod: 25

## 2024-03-28 PROCEDURE — 76705 ECHO EXAM OF ABDOMEN: CPT

## 2024-03-28 PROCEDURE — 84484 ASSAY OF TROPONIN QUANT: CPT | Performed by: STUDENT IN AN ORGANIZED HEALTH CARE EDUCATION/TRAINING PROGRAM

## 2024-03-28 PROCEDURE — 83690 ASSAY OF LIPASE: CPT | Performed by: NURSE PRACTITIONER

## 2024-03-28 PROCEDURE — 85025 COMPLETE CBC W/AUTO DIFF WBC: CPT | Performed by: STUDENT IN AN ORGANIZED HEALTH CARE EDUCATION/TRAINING PROGRAM

## 2024-03-28 PROCEDURE — 74176 CT ABD & PELVIS W/O CONTRAST: CPT

## 2024-03-28 PROCEDURE — 82150 ASSAY OF AMYLASE: CPT | Performed by: NURSE PRACTITIONER

## 2024-03-28 PROCEDURE — 2500000001 HC RX 250 WO HCPCS SELF ADMINISTERED DRUGS (ALT 637 FOR MEDICARE OP): Performed by: NURSE PRACTITIONER

## 2024-03-28 PROCEDURE — 93005 ELECTROCARDIOGRAM TRACING: CPT

## 2024-03-28 PROCEDURE — 2500000004 HC RX 250 GENERAL PHARMACY W/ HCPCS (ALT 636 FOR OP/ED): Performed by: NURSE PRACTITIONER

## 2024-03-28 PROCEDURE — 36415 COLL VENOUS BLD VENIPUNCTURE: CPT | Performed by: STUDENT IN AN ORGANIZED HEALTH CARE EDUCATION/TRAINING PROGRAM

## 2024-03-28 PROCEDURE — 76705 ECHO EXAM OF ABDOMEN: CPT | Performed by: RADIOLOGY

## 2024-03-28 PROCEDURE — 93010 ELECTROCARDIOGRAM REPORT: CPT | Performed by: INTERNAL MEDICINE

## 2024-03-28 PROCEDURE — 2060000001 HC INTERMEDIATE ICU ROOM DAILY

## 2024-03-28 PROCEDURE — 83735 ASSAY OF MAGNESIUM: CPT | Performed by: STUDENT IN AN ORGANIZED HEALTH CARE EDUCATION/TRAINING PROGRAM

## 2024-03-28 PROCEDURE — 96374 THER/PROPH/DIAG INJ IV PUSH: CPT

## 2024-03-28 PROCEDURE — 74176 CT ABD & PELVIS W/O CONTRAST: CPT | Performed by: RADIOLOGY

## 2024-03-28 PROCEDURE — 2500000004 HC RX 250 GENERAL PHARMACY W/ HCPCS (ALT 636 FOR OP/ED): Performed by: PHYSICIAN ASSISTANT

## 2024-03-28 PROCEDURE — 71250 CT THORAX DX C-: CPT | Performed by: RADIOLOGY

## 2024-03-28 PROCEDURE — 2500000001 HC RX 250 WO HCPCS SELF ADMINISTERED DRUGS (ALT 637 FOR MEDICARE OP): Performed by: PHYSICIAN ASSISTANT

## 2024-03-28 PROCEDURE — 96375 TX/PRO/DX INJ NEW DRUG ADDON: CPT

## 2024-03-28 PROCEDURE — 80053 COMPREHEN METABOLIC PANEL: CPT | Performed by: STUDENT IN AN ORGANIZED HEALTH CARE EDUCATION/TRAINING PROGRAM

## 2024-03-28 PROCEDURE — 83880 ASSAY OF NATRIURETIC PEPTIDE: CPT | Performed by: PHYSICIAN ASSISTANT

## 2024-03-28 PROCEDURE — 83036 HEMOGLOBIN GLYCOSYLATED A1C: CPT | Performed by: NURSE PRACTITIONER

## 2024-03-28 PROCEDURE — 87636 SARSCOV2 & INF A&B AMP PRB: CPT | Performed by: STUDENT IN AN ORGANIZED HEALTH CARE EDUCATION/TRAINING PROGRAM

## 2024-03-28 PROCEDURE — 71046 X-RAY EXAM CHEST 2 VIEWS: CPT | Performed by: RADIOLOGY

## 2024-03-28 PROCEDURE — 84075 ASSAY ALKALINE PHOSPHATASE: CPT | Performed by: NURSE PRACTITIONER

## 2024-03-28 PROCEDURE — 2500000005 HC RX 250 GENERAL PHARMACY W/O HCPCS: Performed by: NURSE PRACTITIONER

## 2024-03-28 PROCEDURE — 82947 ASSAY GLUCOSE BLOOD QUANT: CPT

## 2024-03-28 RX ORDER — BISMUTH SUBSALICYLATE 262 MG
1 TABLET,CHEWABLE ORAL DAILY
COMMUNITY
End: 2024-03-29 | Stop reason: HOSPADM

## 2024-03-28 RX ORDER — METOPROLOL TARTRATE 50 MG/1
50 TABLET ORAL 2 TIMES DAILY
Status: DISCONTINUED | OUTPATIENT
Start: 2024-03-28 | End: 2024-03-29 | Stop reason: HOSPADM

## 2024-03-28 RX ORDER — ATORVASTATIN CALCIUM 80 MG/1
80 TABLET, FILM COATED ORAL NIGHTLY
Status: DISCONTINUED | OUTPATIENT
Start: 2024-03-28 | End: 2024-03-29 | Stop reason: HOSPADM

## 2024-03-28 RX ORDER — MORPHINE SULFATE 2 MG/ML
2 INJECTION, SOLUTION INTRAMUSCULAR; INTRAVENOUS ONCE
Status: COMPLETED | OUTPATIENT
Start: 2024-03-28 | End: 2024-03-28

## 2024-03-28 RX ORDER — CLOPIDOGREL BISULFATE 75 MG/1
75 TABLET ORAL DAILY
Status: DISCONTINUED | OUTPATIENT
Start: 2024-03-29 | End: 2024-03-29 | Stop reason: HOSPADM

## 2024-03-28 RX ORDER — GABAPENTIN 300 MG/1
300 CAPSULE ORAL 2 TIMES DAILY
Status: DISCONTINUED | OUTPATIENT
Start: 2024-03-28 | End: 2024-03-29 | Stop reason: HOSPADM

## 2024-03-28 RX ORDER — INSULIN GLARGINE 100 [IU]/ML
2 INJECTION, SOLUTION SUBCUTANEOUS
Status: DISCONTINUED | OUTPATIENT
Start: 2024-03-28 | End: 2024-03-29 | Stop reason: HOSPADM

## 2024-03-28 RX ORDER — TORSEMIDE 20 MG/1
40 TABLET ORAL ONCE
Status: COMPLETED | OUTPATIENT
Start: 2024-03-28 | End: 2024-03-28

## 2024-03-28 RX ORDER — TRAMADOL HYDROCHLORIDE 50 MG/1
50 TABLET ORAL EVERY 6 HOURS PRN
Status: DISCONTINUED | OUTPATIENT
Start: 2024-03-28 | End: 2024-03-29 | Stop reason: HOSPADM

## 2024-03-28 RX ORDER — NAPROXEN SODIUM 220 MG/1
81 TABLET, FILM COATED ORAL DAILY
Status: DISCONTINUED | OUTPATIENT
Start: 2024-03-29 | End: 2024-03-29 | Stop reason: HOSPADM

## 2024-03-28 RX ORDER — PROCHLORPERAZINE EDISYLATE 5 MG/ML
5 INJECTION INTRAMUSCULAR; INTRAVENOUS ONCE
Status: DISCONTINUED | OUTPATIENT
Start: 2024-03-28 | End: 2024-03-29 | Stop reason: HOSPADM

## 2024-03-28 RX ORDER — TORSEMIDE 20 MG/1
40 TABLET ORAL DAILY
Status: DISCONTINUED | OUTPATIENT
Start: 2024-03-29 | End: 2024-03-29 | Stop reason: HOSPADM

## 2024-03-28 RX ORDER — POLYETHYLENE GLYCOL 3350 17 G/17G
17 POWDER, FOR SOLUTION ORAL DAILY PRN
Status: DISCONTINUED | OUTPATIENT
Start: 2024-03-28 | End: 2024-03-29 | Stop reason: HOSPADM

## 2024-03-28 RX ORDER — DEXTROSE 50 % IN WATER (D50W) INTRAVENOUS SYRINGE
12.5
Status: DISCONTINUED | OUTPATIENT
Start: 2024-03-28 | End: 2024-03-29 | Stop reason: HOSPADM

## 2024-03-28 RX ORDER — DEXTROSE 50 % IN WATER (D50W) INTRAVENOUS SYRINGE
25
Status: DISCONTINUED | OUTPATIENT
Start: 2024-03-28 | End: 2024-03-29 | Stop reason: HOSPADM

## 2024-03-28 RX ORDER — CHOLECALCIFEROL (VITAMIN D3) 25 MCG
1000 TABLET ORAL 2 TIMES DAILY
Status: DISCONTINUED | OUTPATIENT
Start: 2024-03-28 | End: 2024-03-29 | Stop reason: HOSPADM

## 2024-03-28 RX ORDER — ONDANSETRON HYDROCHLORIDE 2 MG/ML
4 INJECTION, SOLUTION INTRAVENOUS ONCE
Status: DISCONTINUED | OUTPATIENT
Start: 2024-03-28 | End: 2024-03-28

## 2024-03-28 RX ORDER — LIDOCAINE 560 MG/1
1 PATCH PERCUTANEOUS; TOPICAL; TRANSDERMAL DAILY
Status: DISCONTINUED | OUTPATIENT
Start: 2024-03-28 | End: 2024-03-29 | Stop reason: HOSPADM

## 2024-03-28 RX ORDER — HEPARIN SODIUM 5000 [USP'U]/ML
5000 INJECTION, SOLUTION INTRAVENOUS; SUBCUTANEOUS EVERY 8 HOURS
Status: DISCONTINUED | OUTPATIENT
Start: 2024-03-28 | End: 2024-03-29 | Stop reason: HOSPADM

## 2024-03-28 RX ORDER — ACETAMINOPHEN 325 MG/1
650 TABLET ORAL EVERY 4 HOURS PRN
Status: DISCONTINUED | OUTPATIENT
Start: 2024-03-28 | End: 2024-03-29 | Stop reason: HOSPADM

## 2024-03-28 RX ORDER — ONDANSETRON HYDROCHLORIDE 2 MG/ML
4 INJECTION, SOLUTION INTRAVENOUS EVERY 8 HOURS PRN
Status: DISCONTINUED | OUTPATIENT
Start: 2024-03-28 | End: 2024-03-29 | Stop reason: HOSPADM

## 2024-03-28 RX ORDER — CHOLECALCIFEROL (VITAMIN D3) 25 MCG
1000 TABLET ORAL DAILY
COMMUNITY

## 2024-03-28 RX ORDER — INSULIN LISPRO 100 [IU]/ML
0-10 INJECTION, SOLUTION INTRAVENOUS; SUBCUTANEOUS
Status: DISCONTINUED | OUTPATIENT
Start: 2024-03-29 | End: 2024-03-29 | Stop reason: HOSPADM

## 2024-03-28 RX ORDER — HYDRALAZINE HYDROCHLORIDE 50 MG/1
100 TABLET, FILM COATED ORAL 2 TIMES DAILY
Status: DISCONTINUED | OUTPATIENT
Start: 2024-03-28 | End: 2024-03-29 | Stop reason: HOSPADM

## 2024-03-28 RX ORDER — DIPHENHYDRAMINE HYDROCHLORIDE 50 MG/ML
25 INJECTION INTRAMUSCULAR; INTRAVENOUS ONCE
Status: COMPLETED | OUTPATIENT
Start: 2024-03-28 | End: 2024-03-28

## 2024-03-28 RX ORDER — PANTOPRAZOLE SODIUM 40 MG/1
40 TABLET, DELAYED RELEASE ORAL
Status: DISCONTINUED | OUTPATIENT
Start: 2024-03-29 | End: 2024-03-29 | Stop reason: HOSPADM

## 2024-03-28 RX ORDER — TALC
1 POWDER (GRAM) TOPICAL NIGHTLY
COMMUNITY

## 2024-03-28 RX ORDER — TALC
3 POWDER (GRAM) TOPICAL NIGHTLY
Status: DISCONTINUED | OUTPATIENT
Start: 2024-03-28 | End: 2024-03-29 | Stop reason: HOSPADM

## 2024-03-28 RX ADMIN — INSULIN GLARGINE 2 UNITS: 100 INJECTION, SOLUTION SUBCUTANEOUS at 23:51

## 2024-03-28 RX ADMIN — METOPROLOL TARTRATE 50 MG: 50 TABLET ORAL at 23:49

## 2024-03-28 RX ADMIN — LIDOCAINE 1 PATCH: 4 PATCH TOPICAL at 23:58

## 2024-03-28 RX ADMIN — MORPHINE SULFATE 2 MG: 2 INJECTION, SOLUTION INTRAMUSCULAR; INTRAVENOUS at 14:31

## 2024-03-28 RX ADMIN — Medication 3 MG: at 23:50

## 2024-03-28 RX ADMIN — TRAMADOL HYDROCHLORIDE 50 MG: 50 TABLET, COATED ORAL at 23:50

## 2024-03-28 RX ADMIN — GABAPENTIN 300 MG: 300 CAPSULE ORAL at 23:50

## 2024-03-28 RX ADMIN — NITROGLYCERIN 2 INCH: 20 OINTMENT TOPICAL at 15:09

## 2024-03-28 RX ADMIN — DIPHENHYDRAMINE HYDROCHLORIDE 25 MG: 50 INJECTION, SOLUTION INTRAMUSCULAR; INTRAVENOUS at 14:34

## 2024-03-28 RX ADMIN — TORSEMIDE 40 MG: 20 TABLET ORAL at 23:49

## 2024-03-28 RX ADMIN — HYDRALAZINE HYDROCHLORIDE 100 MG: 50 TABLET ORAL at 23:57

## 2024-03-28 RX ADMIN — ATORVASTATIN CALCIUM 80 MG: 80 TABLET, FILM COATED ORAL at 23:50

## 2024-03-28 RX ADMIN — HEPARIN SODIUM 5000 UNITS: 5000 INJECTION, SOLUTION INTRAVENOUS; SUBCUTANEOUS at 23:50

## 2024-03-28 SDOH — SOCIAL STABILITY: SOCIAL INSECURITY: ARE THERE ANY APPARENT SIGNS OF INJURIES/BEHAVIORS THAT COULD BE RELATED TO ABUSE/NEGLECT?: NO

## 2024-03-28 SDOH — SOCIAL STABILITY: SOCIAL INSECURITY: HAS ANYONE EVER THREATENED TO HURT YOUR FAMILY OR YOUR PETS?: NO

## 2024-03-28 SDOH — SOCIAL STABILITY: SOCIAL INSECURITY: DO YOU FEEL UNSAFE GOING BACK TO THE PLACE WHERE YOU ARE LIVING?: NO

## 2024-03-28 SDOH — SOCIAL STABILITY: SOCIAL INSECURITY: ABUSE: ADULT

## 2024-03-28 SDOH — SOCIAL STABILITY: SOCIAL INSECURITY: DO YOU FEEL ANYONE HAS EXPLOITED OR TAKEN ADVANTAGE OF YOU FINANCIALLY OR OF YOUR PERSONAL PROPERTY?: NO

## 2024-03-28 SDOH — SOCIAL STABILITY: SOCIAL INSECURITY: ARE YOU OR HAVE YOU BEEN THREATENED OR ABUSED PHYSICALLY, EMOTIONALLY, OR SEXUALLY BY ANYONE?: NO

## 2024-03-28 SDOH — SOCIAL STABILITY: SOCIAL INSECURITY: HAVE YOU HAD THOUGHTS OF HARMING ANYONE ELSE?: NO

## 2024-03-28 SDOH — SOCIAL STABILITY: SOCIAL INSECURITY: DOES ANYONE TRY TO KEEP YOU FROM HAVING/CONTACTING OTHER FRIENDS OR DOING THINGS OUTSIDE YOUR HOME?: NO

## 2024-03-28 SDOH — SOCIAL STABILITY: SOCIAL INSECURITY: WERE YOU ABLE TO COMPLETE ALL THE BEHAVIORAL HEALTH SCREENINGS?: YES

## 2024-03-28 ASSESSMENT — PATIENT HEALTH QUESTIONNAIRE - PHQ9
2. FEELING DOWN, DEPRESSED OR HOPELESS: NOT AT ALL
SUM OF ALL RESPONSES TO PHQ9 QUESTIONS 1 & 2: 0
1. LITTLE INTEREST OR PLEASURE IN DOING THINGS: NOT AT ALL

## 2024-03-28 ASSESSMENT — ENCOUNTER SYMPTOMS
NAUSEA: 1
VOMITING: 1
ABDOMINAL PAIN: 1

## 2024-03-28 ASSESSMENT — PAIN - FUNCTIONAL ASSESSMENT
PAIN_FUNCTIONAL_ASSESSMENT: 0-10
PAIN_FUNCTIONAL_ASSESSMENT: 0-10

## 2024-03-28 ASSESSMENT — ACTIVITIES OF DAILY LIVING (ADL)
ADEQUATE_TO_COMPLETE_ADL: YES
GROOMING: INDEPENDENT
JUDGMENT_ADEQUATE_SAFELY_COMPLETE_DAILY_ACTIVITIES: YES
HEARING - RIGHT EAR: FUNCTIONAL
BATHING: INDEPENDENT
DRESSING YOURSELF: INDEPENDENT
WALKS IN HOME: INDEPENDENT
HEARING - LEFT EAR: FUNCTIONAL
LACK_OF_TRANSPORTATION: NO
ASSISTIVE_DEVICE: WHEELCHAIR
TOILETING: INDEPENDENT
FEEDING YOURSELF: INDEPENDENT
PATIENT'S MEMORY ADEQUATE TO SAFELY COMPLETE DAILY ACTIVITIES?: YES

## 2024-03-28 ASSESSMENT — COGNITIVE AND FUNCTIONAL STATUS - GENERAL
MOVING FROM LYING ON BACK TO SITTING ON SIDE OF FLAT BED WITH BEDRAILS: A LOT
CLIMB 3 TO 5 STEPS WITH RAILING: TOTAL
DAILY ACTIVITIY SCORE: 24
MOBILITY SCORE: 10
STANDING UP FROM CHAIR USING ARMS: A LOT
PATIENT BASELINE BEDBOUND: NO
WALKING IN HOSPITAL ROOM: TOTAL
TURNING FROM BACK TO SIDE WHILE IN FLAT BAD: A LOT
MOVING TO AND FROM BED TO CHAIR: A LOT

## 2024-03-28 ASSESSMENT — LIFESTYLE VARIABLES
AUDIT-C TOTAL SCORE: 0
HOW OFTEN DO YOU HAVE 6 OR MORE DRINKS ON ONE OCCASION: NEVER
SKIP TO QUESTIONS 9-10: 1
HOW OFTEN DO YOU HAVE A DRINK CONTAINING ALCOHOL: NEVER
AUDIT-C TOTAL SCORE: 0
HOW MANY STANDARD DRINKS CONTAINING ALCOHOL DO YOU HAVE ON A TYPICAL DAY: PATIENT DOES NOT DRINK

## 2024-03-28 ASSESSMENT — HEART SCORE
HISTORY: MODERATELY SUSPICIOUS
HEART SCORE: 6
AGE: 45-64
ECG: NON-SPECIFIC REPOLARIZATION DISTURBANCE
RISK FACTORS: >2 RISK FACTORS OR HX OF ATHEROSCLEROTIC DISEASE
TROPONIN: 1-3 TIMES NORMAL LIMIT

## 2024-03-28 ASSESSMENT — PAIN DESCRIPTION - DESCRIPTORS: DESCRIPTORS: SHARP

## 2024-03-28 ASSESSMENT — COLUMBIA-SUICIDE SEVERITY RATING SCALE - C-SSRS
2. HAVE YOU ACTUALLY HAD ANY THOUGHTS OF KILLING YOURSELF?: NO
1. IN THE PAST MONTH, HAVE YOU WISHED YOU WERE DEAD OR WISHED YOU COULD GO TO SLEEP AND NOT WAKE UP?: NO
6. HAVE YOU EVER DONE ANYTHING, STARTED TO DO ANYTHING, OR PREPARED TO DO ANYTHING TO END YOUR LIFE?: NO

## 2024-03-28 ASSESSMENT — PAIN DESCRIPTION - LOCATION: LOCATION: CHEST

## 2024-03-28 ASSESSMENT — PAIN SCALES - GENERAL
PAINLEVEL_OUTOF10: 9
PAINLEVEL_OUTOF10: 3

## 2024-03-28 ASSESSMENT — PAIN DESCRIPTION - FREQUENCY: FREQUENCY: CONSTANT/CONTINUOUS

## 2024-03-28 ASSESSMENT — PAIN DESCRIPTION - PAIN TYPE: TYPE: ACUTE PAIN

## 2024-03-28 NOTE — ED PROVIDER NOTES
HPI   Chief Complaint   Patient presents with    Chest Pain     Has been off lasix for 1 month. Recent CABAG        This is a 59-year-old male with a past medical history of coronary artery disease, hypertension, NSTEMI, hyperlipidemia, and heart failure who presents emergency department complaint of onset of chest pain.  Patient states that the pain started at 10:30 AM this morning.  He was sitting when the pain started.  The pain is in the center of his chest.  He has had nausea with 1 episode of vomiting due to the pain.  EMS brought patient to the ED.  They gave him 4 baby aspirin and 1 nitroglycerin and route.  Patient states that the nitroglycerin did help the chest pain somewhat.  He states that he is short of breath along with having the chest pain.  He had a coronary artery bypass graft x 3 in November 2023.      Please see HPI for pertinent positive and negative ROS.                     No data recorded HEART Score: 6                   Patient History   Past Medical History:   Diagnosis Date    Above-knee amputation of left lower extremity (CMS/[facility])     Adverse effect of anesthesia     confusion    Anemia     CAD (coronary artery disease)     Carotid artery disease (CMS/[facility])     bilateral    CVA (cerebral vascular accident) (CMS/[facility]) 2020    Depression     DM (diabetes mellitus) (CMS/HCC)     type 2 with neuropathy    GERD (gastroesophageal reflux disease)     High cholesterol     HTN (hypertension)     Leg ulcer (CMS/HCC)     chronic right lower extremity    Neuropathy     OA (osteoarthritis)     PAD (peripheral artery disease) (CMS/[facility])      Past Surgical History:   Procedure Laterality Date    CT ANGIO NECK  06/14/2021    CT NECK ANGIO W AND WO IV CONTRAST 6/14/2021 [facility] INPATIENT LEGAC[facility]    CT HEAD ANGIO W AND WO IV CONTRAST  06/14/2021    CT HEAD ANGIO W AND WO IV CONTRAST 6/14/2021 [facility] INPATIENT LEGACY    LEG SURGERY Right     MR HEAD ANGIO WO IV CONTRAST  12/11/2012    MR HEAD ANGIO WO IV CONTRAST [facility]  CLINICAL LEGAC[facility]    MR HEAD ANGIO WO IV CONTRAST  2021    MR HEAD ANGIO WO IV CONTRAST [facility]ERGENCY [facility]GAC[facility]    MR HEAD ANGIO WO IV CONTRAST  2021    MR HEAD ANGIO WO IV CONTRAST [facility] INPATIENT LEGACY    MR NECK ANGIO WO IV CONTRAST  2023    MR NECK ANGIO WO IV CONTRAST 2023 [facility] [id number] MRI    OTHER SURGICAL HISTORY  2021    Knee reconstruction    OTHER SURGICAL HISTORY  2021    Lower extremity amputation above knee    TOTAL SHOULDER ARTHROPLASTY Left      Family History   Problem Relation Name Age of Onset    Other (cardiac disorder) Mother      Hypertension Mother      Esophageal cancer Mother      Hearing loss Father      Hypertension Father      Heart disease Father      COPD Sister       Social History     Tobacco Use    Smoking status: Former     Types: Cigars     Quit date: 2023     Years since quittin.9    Smokeless tobacco: Never   Vaping Use    Vaping Use: Never used   Substance Use Topics    Alcohol use: Not Currently    Drug use: Not Currently     Types: Marijuana       Physical Exam   ED Triage Vitals [24 1406]   Temperature Heart Rate Respirations BP   36.6 °C (97.8 °F) 70 18 179/76      Pulse Ox Temp Source Heart Rate Source Patient Position   94 % Oral Monitor --      BP Location FiO2 (%)     -- --       Physical Exam  GENERAL APPEARANCE: Awake and alert. No acute respiratory distress.  Patient appears pale  VITAL SIGNS: As per the nurses' triage record.  HEENT: Normocephalic, atraumatic. Extraocular muscles are intact. Conjunctiva are pink. Negative scleral icterus. Mucous membranes are moist. Tongue in the midline. Oropharynx clear, uvula midline.   NECK: Soft, nontender and supple, full gross ROM, no meningeal signs.  CHEST: Nontender to palpation. Clear to auscultation bilaterally. No rales, rhonchi, or wheezing. Symmetric rise and fall of chest wall.   HEART: Clear S1 and S2. Regular rate and rhythm. Strong and equal pulses in the  extremities.  ABDOMEN: Soft, obese, nondistended, positive bowel sounds, no palpable masses.  MUSCULOSKELETAL: The calves are nontender to palpation. Full gross active range of motion. Ambulating on own with no acute difficulties  NEUROLOGICAL: Awake, alert and oriented x 3. Motor power intact in the upper and lower extremities. Sensation is intact to light touch in the upper and lower extremities. Patient answering questions appropriately.   IMMUNOLOGICAL: No lymphatic streaking noted  DERMATOLOGIC: Warm and dry without petechiae, rashes, or ecchymosis noted on visible skin.   PYSCH: Cooperative with appropriate mood and affect.  ED Course & MDM   ED Course as of 03/28/24 2030   Thu Mar 28, 2024   1400 EKG Time:1355  EKG Interpretation time:1400  EKG Interpretation: EKG shows sinus rhythm with occasional PVC, right bundle branch block, normal axis, QTc 509.  No evidence of STEMI.    EKG was interpreted by myself independently [JL]   1500 Initial Troponin T , there is no previous troponin T HS in the patient's chart.  Spoke with cardiology on-call, Dr. Jarquin discussed troponin T, and remaining labs.  He advised to hold on starting heparin until repeat troponin is back due to patient's kidney function, and due to the fact we do not have a previous troponin T to compare this current troponin to. He did recommend nitroglycerin paste 2 inches which was ordered [SC]      ED Course User Index  [JL] Richmond Langford DO  [SC] Camryn Raines PA-C         Diagnoses as of 03/28/24 2030   Chest pain, unspecified type       Medical Decision Making  Parts of this chart have been completed using voice recognition software. Please excuse any errors of transcription.  My thought process and reason for plan has been formulated from the time that I saw the patient until the time of disposition and is not specific to one specific moment during their visit and furthermore my MDM encompasses this entire chart and not  only this text box.      HPI: Detailed above.    Exam: A medically appropriate exam performed, outlined above, given the known history and presentation.    History obtained from: Patient    EKG: See my supervising physician's EKG interpretation    Medications given during visit:  Medications   prochlorperazine (Compazine) injection 5 mg (5 mg intravenous Not Given 3/28/24 1430)   morphine injection 2 mg (2 mg intravenous Given 3/28/24 1431)   diphenhydrAMINE (BENADryl) injection 25 mg (25 mg intravenous Given 3/28/24 1434)   nitroglycerin (Nacho-Bid) 2 % ointment 2 inch (2 inches transdermal Given 3/28/24 1509)        Diagnostic/tests  Labs Reviewed   CBC WITH AUTO DIFFERENTIAL - Abnormal       Result Value    WBC 10.0      nRBC 0.0      RBC 3.60 (*)     Hemoglobin 9.6 (*)     Hematocrit 31.4 (*)     MCV 87      MCH 26.7      MCHC 30.6 (*)     RDW 13.7      Platelets 248      Neutrophils % 70.7      Immature Granulocytes %, Automated 0.3      Lymphocytes % 12.6      Monocytes % 10.2      Eosinophils % 5.9      Basophils % 0.3      Neutrophils Absolute 7.09      Immature Granulocytes Absolute, Automated 0.03      Lymphocytes Absolute 1.26      Monocytes Absolute 1.02 (*)     Eosinophils Absolute 0.59      Basophils Absolute 0.03     COMPREHENSIVE METABOLIC PANEL - Abnormal    Glucose 136 (*)     Sodium 139      Potassium 4.8      Chloride 108 (*)     Bicarbonate 21 (*)     Urea Nitrogen 61 (*)     Creatinine 2.90 (*)     eGFR 24 (*)     Calcium 8.5      Albumin 3.4 (*)     Alkaline Phosphatase 68      Total Protein 7.6      AST 14      Bilirubin, Total 0.4      ALT 14      Anion Gap 10     SERIAL TROPONIN, INITIAL ([facility]) - Abnormal    Troponin T, High Sensitivity 133 (*)    SERIAL TROPONIN,  2 HOUR ([facility]) - Abnormal    Troponin T, High Sensitivity 136 (*)    N-TERMINAL PROBNP - Abnormal    PROBNP 3,526 (*)     Narrative:     Reference ranges are based on clinical submission data. These ranges represent the 95th  percentile of normal cut-off points. As NT Pro- BNP values approach 1000 pg/ml, clinical symptoms are more likely associated with CHF.   MAGNESIUM - Normal    Magnesium 2.20     SARS-COV-2 AND INFLUENZA A/B PCR - Normal    Flu A Result Not Detected      Flu B Result Not Detected      Coronavirus 2019, PCR Not Detected      Narrative:     This assay has received FDA Emergency Use Authorization (EUA) and  is only authorized for the duration of time that circumstances exist to justify the authorization of the emergency use of in vitro diagnostic tests for the detection of SARS-CoV-2 virus and/or diagnosis of COVID-19 infection under section 564(b)(1) of the Act, 21 U.S.C. 360bbb-3(b)(1). Testing for SARS-CoV-2 is only recommended for patients who meet current clinical and/or epidemiological criteria as defined by federal, state, or local public health directives. This assay is an in vitro diagnostic nucleic acid amplification test for the qualitative detection of SARS-CoV-2, Influenza A, and Influenza B from nasopharyngeal specimens and has been validated for use at [facility]. Negative results do not preclude COVID-19 infections or Influenza A/B infections, and should not be used as the sole basis for diagnosis, treatment, or other management decisions. If Influenza A/B and RSV PCR results are negative, testing for Parainfluenza virus, Adenovirus and Metapneumovirus is routinely performed for [facility] pediatric oncology and intensive care inpatients, and is available on other patients by placing an add-on request.    TROPONIN T SERIES, HIGH SENSITIVITY (0, 2 HR, 6 HR)    Narrative:     The following orders were created for panel order Troponin T Series, High Sensitivity (0, 2HR, 6HR).  Procedure                               Abnormality         Status                     ---------                               -----------         ------                     Serial Troponin, Initial...[[id number]]   Abnormal            Final result               Serial Troponin, 2 Hour ...[[id number]]  Abnormal            Final result               Serial Troponin, 6 Hour ...[[id number]]                                                   Please view results for these tests on the individual orders.   SERIAL TROPONIN, 6 HOUR ([facility])      US right upper quadrant   Final Result   Cholelithiasis. Positive sonographic Galicia's sign, however no   gallbladder wall edema or pericholecystic fluid. If there is high   clinical suspicion for acute cholecystitis, further evaluation could   be obtained with hepatobiliary scan. Hepatic steatosis.        Signed by: Jose A Perez 3/28/2024 3:36 PM   Dictation workstation:   [id number]      XR chest 2 views   Final Result   Limited study. Left lower lobe infiltrates/atelectatic changes;   correlate clinically and follow-up as needed.        Signed by: Jose A Perez 3/28/2024 3:10 PM   Dictation workstation:   [id number]           Considerations/further MDM:  Patient was seen in conjucntion with my supervising physician,  Dr. Langford. Please refer to his note.    Patient is afebrile, hypertensive, no tachycardia, no tachypnea, 94% on room air    Differential diagnosis includes but is not limited to acute coronary syndrome including myocardial infarction versus pneumothorax versus pneumonia versus aortic dissection versus pulmonary embolism    Negative viral swabs.  Initial troponin T , repeat Troponin T .  Patient has stable renal function with evidence of CKD.  Creatinine today is 2.9.  proBNP elevated at 3526.  Patient has no hypoxia or shortness of breath currently.    After repeat troponin T HS resulted, Dr. Jarquin contacted and he recommends admission.  Hospitalist was consulted however he recommended reaching out to [facility] because that is where patient had coronary artery bypass graft done in November 2023 and he was concerned there may be a blockage of one  of the bypass grafts.  I spoke with cardiologist at [facility], Dr. Riley.  He agreed that transfer to [facility] was reasonable due to patient's elevated troponin T HS, and history of CABG at [facility].     At the end of my shift, patient remains in the emergency department pending available bed at [facility].  Please refer to oncoming physician for any further management or disposition of the patient while he remains in the emergency department.    Critical care time was billed at 25 minutes by me and is nonconcurrent with other providers involved.  Time excludes other separately billable procedures.  Critical care time billed due to multiple cardiology consultations with elevated troponins.    Procedure  Procedures     Camryn Raines PA-C  03/28/24 2030

## 2024-03-28 NOTE — PROGRESS NOTES
Pharmacy Medication History Review    Humphrey Waddell is a 59 y.o. male admitted for chest pain. Pharmacy reviewed the patient's cvvqk-iz-dhzyohisu medications and allergies for accuracy. Pt is unfamiliar with his home medications, spoke with wife on phone as primary informant.    The list below reflects the updated PTA list. Please review each medication in order reconciliation for additional clarification and justification.  Prior to Admission Medications   Prescriptions Last Dose   FreeStyle Suzy reader (FreeStyle Suzy 2 [place]) misc    Sig: Use as instructed   FreeStyle Suzy sensor system (FreeStyle Suzy 2 Sensor) kit    Sig: Use as instructed   FreeStyle Test strip    Sig: TEST 3 TIMES DAILY.   acetaminophen (Tylenol 8 HOUR) 650 mg ER tablet Past Month   Sig: Take 1 tablet (650 mg) by mouth every 8 hours if needed for mild pain (1 - 3). Do not crush, chew, or split.   aspirin 81 mg chewable tablet 3/28/2024   Sig: Chew 1 tablet (81 mg) once daily.   atorvastatin (Lipitor) 80 mg tablet 3/27/2024   Sig: Take 1 tablet (80 mg) by mouth once daily. Last rx prior to next refills   Patient taking differently: Take 1 tablet (80 mg) by mouth once daily. At bedtime   cholecalciferol (Vitamin D3) 25 MCG (1000 UT) tablet 3/28/2024   Sig: Take 1 tablet (1,000 Units) by mouth 2 times a day.   clopidogrel (Plavix) 75 mg tablet 3/27/2024   Sig: Take 1 tablet (75 mg) by mouth once daily.   ergocalciferol (Vitamin D-2) 1.25 MG (94613 UT) capsule Past Week   Sig: Take 1 capsule (50,000 Units) by mouth 1 (one) time per week.   gabapentin (Neurontin) 300 mg capsule 3/28/2024   Sig: Take 1 capsule (300 mg) by mouth 3 times a day. Patient is taking BID   Patient taking differently: Take 1 capsule (300 mg) by mouth 2 times a day. 1 PO BID   hydrALAZINE (Apresoline) 100 mg tablet 3/28/2024   Sig: Take 1 tablet (100 mg) by mouth 3 times a day. Patient is only taking BID.   Patient taking differently: Take 1 tablet (100 mg) by mouth 2  times a day. Patient is only taking BID.   insulin glargine (Lantus Solostar U-100 Insulin) 100 unit/mL (3 mL) pen 3/27/2024   Sig: Inject 2 Units under the skin once daily at bedtime.   melatonin 3 mg tablet 3/27/2024   Sig: Take 1 tablet (3 mg) by mouth once daily at bedtime.   metoprolol tartrate (Lopressor) 50 mg tablet 3/28/2024   Sig: Take 1 tablet by mouth 2 times a day.   multivitamin tablet 3/28/2024   Sig: Take 1 tablet by mouth once daily.   torsemide (Demadex) 20 mg tablet Past Month   Sig: Take 2 tablets daily   Patient taking differently: Take 2 tablets (40 mg) by mouth once daily. Take 2 tablets daily      Facility-Administered Medications: None          The list below reflects the updated allergy list. Please review each documented allergy for additional clarification and justification.  Allergies  Reviewed by Melissa John CPhT on 3/28/2024   No Known Allergies         Below are additional concerns with the patient's PTA list.  - Wife states pt took ASA 81 mg x 2 tablets yesterday and today.  - Currently taking gabapentin and hydralazine as 1 PO BID vs. 1 PO TID as prescribed.  - Weekly high-dose ergocalciferol due every Sunday.  - Pt was on torsemide 20 mg (2 tablets once-daily -> 40 mg/day), but ran out last month and has not been able to refill due to insurance rejecting coverage.       Gaston Lloyd, PharmD

## 2024-03-29 ENCOUNTER — APPOINTMENT (OUTPATIENT)
Dept: RADIOLOGY | Facility: HOSPITAL | Age: 59
End: 2024-03-29
Payer: COMMERCIAL

## 2024-03-29 ENCOUNTER — HOSPITAL ENCOUNTER (INPATIENT)
Facility: HOSPITAL | Age: 59
LOS: 10 days | Discharge: HOME | End: 2024-04-08
Attending: SURGERY | Admitting: SURGERY
Payer: COMMERCIAL

## 2024-03-29 VITALS
BODY MASS INDEX: 34.6 KG/M2 | TEMPERATURE: 97.7 F | HEART RATE: 69 BPM | HEIGHT: 76 IN | SYSTOLIC BLOOD PRESSURE: 147 MMHG | RESPIRATION RATE: 17 BRPM | OXYGEN SATURATION: 98 % | WEIGHT: 284.17 LBS | DIASTOLIC BLOOD PRESSURE: 50 MMHG

## 2024-03-29 DIAGNOSIS — Z01.818 PREOPERATIVE CLEARANCE: ICD-10-CM

## 2024-03-29 DIAGNOSIS — I10 ESSENTIAL (PRIMARY) HYPERTENSION: ICD-10-CM

## 2024-03-29 DIAGNOSIS — Z95.1 S/P CABG (CORONARY ARTERY BYPASS GRAFT): ICD-10-CM

## 2024-03-29 DIAGNOSIS — M79.89 LEG SWELLING: ICD-10-CM

## 2024-03-29 DIAGNOSIS — K81.9 CHOLECYSTITIS: Primary | ICD-10-CM

## 2024-03-29 DIAGNOSIS — R60.0 LOCALIZED EDEMA: ICD-10-CM

## 2024-03-29 DIAGNOSIS — Z89.512 ACQUIRED ABSENCE OF LEFT LEG BELOW KNEE (MULTI): ICD-10-CM

## 2024-03-29 DIAGNOSIS — K59.01 SLOW TRANSIT CONSTIPATION: ICD-10-CM

## 2024-03-29 LAB
ALBUMIN SERPL-MCNC: 3 G/DL (ref 3.5–5)
ALP BLD-CCNC: 63 U/L (ref 35–125)
ALT SERPL-CCNC: 12 U/L (ref 5–40)
ANION GAP SERPL CALC-SCNC: 8 MMOL/L
AST SERPL-CCNC: 12 U/L (ref 5–40)
BILIRUB SERPL-MCNC: 0.3 MG/DL (ref 0.1–1.2)
BUN SERPL-MCNC: 65 MG/DL (ref 8–25)
CALCIUM SERPL-MCNC: 8.2 MG/DL (ref 8.5–10.4)
CHLORIDE SERPL-SCNC: 108 MMOL/L (ref 97–107)
CO2 SERPL-SCNC: 23 MMOL/L (ref 24–31)
CREAT SERPL-MCNC: 3.3 MG/DL (ref 0.4–1.6)
EGFRCR SERPLBLD CKD-EPI 2021: 20 ML/MIN/1.73M*2
ERYTHROCYTE [DISTWIDTH] IN BLOOD BY AUTOMATED COUNT: 14 % (ref 11.5–14.5)
EST. AVERAGE GLUCOSE BLD GHB EST-MCNC: 128 MG/DL
GLUCOSE BLD MANUAL STRIP-MCNC: 117 MG/DL (ref 74–99)
GLUCOSE BLD MANUAL STRIP-MCNC: 123 MG/DL (ref 74–99)
GLUCOSE BLD MANUAL STRIP-MCNC: 143 MG/DL (ref 74–99)
GLUCOSE BLD MANUAL STRIP-MCNC: 163 MG/DL (ref 74–99)
GLUCOSE SERPL-MCNC: 151 MG/DL (ref 65–99)
HBA1C MFR BLD: 6.1 %
HCT VFR BLD AUTO: 27.9 % (ref 41–52)
HGB BLD-MCNC: 8.3 G/DL (ref 13.5–17.5)
MCH RBC QN AUTO: 26.5 PG (ref 26–34)
MCHC RBC AUTO-ENTMCNC: 29.7 G/DL (ref 32–36)
MCV RBC AUTO: 89 FL (ref 80–100)
NRBC BLD-RTO: 0 /100 WBCS (ref 0–0)
PLATELET # BLD AUTO: 218 X10*3/UL (ref 150–450)
POTASSIUM SERPL-SCNC: 5.1 MMOL/L (ref 3.4–5.1)
PROT SERPL-MCNC: 6.2 G/DL (ref 5.9–7.9)
RBC # BLD AUTO: 3.13 X10*6/UL (ref 4.5–5.9)
SODIUM SERPL-SCNC: 139 MMOL/L (ref 133–145)
WBC # BLD AUTO: 9.1 X10*3/UL (ref 4.4–11.3)

## 2024-03-29 PROCEDURE — 1100000001 HC PRIVATE ROOM DAILY

## 2024-03-29 PROCEDURE — 85027 COMPLETE CBC AUTOMATED: CPT | Performed by: NURSE PRACTITIONER

## 2024-03-29 PROCEDURE — 99223 1ST HOSP IP/OBS HIGH 75: CPT | Performed by: NURSE PRACTITIONER

## 2024-03-29 PROCEDURE — 82947 ASSAY GLUCOSE BLOOD QUANT: CPT

## 2024-03-29 PROCEDURE — 2500000001 HC RX 250 WO HCPCS SELF ADMINISTERED DRUGS (ALT 637 FOR MEDICARE OP): Performed by: NURSE PRACTITIONER

## 2024-03-29 PROCEDURE — 84075 ASSAY ALKALINE PHOSPHATASE: CPT | Performed by: NURSE PRACTITIONER

## 2024-03-29 PROCEDURE — A9537 TC99M MEBROFENIN: HCPCS | Performed by: NURSE PRACTITIONER

## 2024-03-29 PROCEDURE — 2500000004 HC RX 250 GENERAL PHARMACY W/ HCPCS (ALT 636 FOR OP/ED): Performed by: NURSE PRACTITIONER

## 2024-03-29 PROCEDURE — 78226 HEPATOBILIARY SYSTEM IMAGING: CPT | Performed by: STUDENT IN AN ORGANIZED HEALTH CARE EDUCATION/TRAINING PROGRAM

## 2024-03-29 PROCEDURE — 2500000004 HC RX 250 GENERAL PHARMACY W/ HCPCS (ALT 636 FOR OP/ED): Performed by: INTERNAL MEDICINE

## 2024-03-29 PROCEDURE — 78227 HEPATOBIL SYST IMAGE W/DRUG: CPT

## 2024-03-29 PROCEDURE — 3430000001 HC RX 343 DIAGNOSTIC RADIOPHARMACEUTICALS: Performed by: NURSE PRACTITIONER

## 2024-03-29 PROCEDURE — 36415 COLL VENOUS BLD VENIPUNCTURE: CPT | Performed by: NURSE PRACTITIONER

## 2024-03-29 RX ORDER — CLOPIDOGREL BISULFATE 75 MG/1
75 TABLET ORAL DAILY
Status: DISCONTINUED | OUTPATIENT
Start: 2024-03-30 | End: 2024-04-08 | Stop reason: HOSPADM

## 2024-03-29 RX ORDER — DEXTROSE 50 % IN WATER (D50W) INTRAVENOUS SYRINGE
12.5
Start: 2024-03-29 | End: 2024-04-08 | Stop reason: HOSPADM

## 2024-03-29 RX ORDER — HEPARIN SODIUM 5000 [USP'U]/ML
5000 INJECTION, SOLUTION INTRAVENOUS; SUBCUTANEOUS EVERY 8 HOURS
Status: DISCONTINUED | OUTPATIENT
Start: 2024-03-30 | End: 2024-04-08

## 2024-03-29 RX ORDER — PANTOPRAZOLE SODIUM 40 MG/10ML
40 INJECTION, POWDER, LYOPHILIZED, FOR SOLUTION INTRAVENOUS 2 TIMES DAILY
Status: DISCONTINUED | OUTPATIENT
Start: 2024-03-30 | End: 2024-04-08 | Stop reason: HOSPADM

## 2024-03-29 RX ORDER — NALOXONE HYDROCHLORIDE 0.4 MG/ML
0.2 INJECTION, SOLUTION INTRAMUSCULAR; INTRAVENOUS; SUBCUTANEOUS EVERY 5 MIN PRN
Status: DISCONTINUED | OUTPATIENT
Start: 2024-03-29 | End: 2024-04-08 | Stop reason: HOSPADM

## 2024-03-29 RX ORDER — ACETAMINOPHEN 325 MG/1
650 TABLET ORAL EVERY 4 HOURS PRN
Status: DISCONTINUED | OUTPATIENT
Start: 2024-03-29 | End: 2024-04-08 | Stop reason: HOSPADM

## 2024-03-29 RX ORDER — KIT FOR THE PREPARATION OF TECHNETIUM TC 99M MEBROFENIN 45 MG/10ML
5.5 INJECTION, POWDER, LYOPHILIZED, FOR SOLUTION INTRAVENOUS
Status: COMPLETED | OUTPATIENT
Start: 2024-03-29 | End: 2024-03-29

## 2024-03-29 RX ORDER — SODIUM CHLORIDE, SODIUM LACTATE, POTASSIUM CHLORIDE, CALCIUM CHLORIDE 600; 310; 30; 20 MG/100ML; MG/100ML; MG/100ML; MG/100ML
125 INJECTION, SOLUTION INTRAVENOUS CONTINUOUS
Status: DISCONTINUED | OUTPATIENT
Start: 2024-03-30 | End: 2024-04-01

## 2024-03-29 RX ORDER — DEXTROSE 50 % IN WATER (D50W) INTRAVENOUS SYRINGE
25
Status: DISCONTINUED | OUTPATIENT
Start: 2024-03-29 | End: 2024-04-08 | Stop reason: HOSPADM

## 2024-03-29 RX ORDER — HEPARIN SODIUM 5000 [USP'U]/ML
5000 INJECTION, SOLUTION INTRAVENOUS; SUBCUTANEOUS EVERY 8 HOURS
Start: 2024-03-29 | End: 2024-04-08 | Stop reason: HOSPADM

## 2024-03-29 RX ORDER — METOPROLOL TARTRATE 50 MG/1
50 TABLET ORAL 2 TIMES DAILY
Status: DISCONTINUED | OUTPATIENT
Start: 2024-03-30 | End: 2024-04-08 | Stop reason: HOSPADM

## 2024-03-29 RX ORDER — INSULIN LISPRO 100 [IU]/ML
0-10 INJECTION, SOLUTION INTRAVENOUS; SUBCUTANEOUS
Start: 2024-03-30

## 2024-03-29 RX ORDER — INSULIN LISPRO 100 [IU]/ML
0-5 INJECTION, SOLUTION INTRAVENOUS; SUBCUTANEOUS EVERY 4 HOURS
Status: DISCONTINUED | OUTPATIENT
Start: 2024-03-30 | End: 2024-04-08 | Stop reason: HOSPADM

## 2024-03-29 RX ORDER — LIDOCAINE 560 MG/1
1 PATCH PERCUTANEOUS; TOPICAL; TRANSDERMAL DAILY
Start: 2024-03-30 | End: 2024-04-24 | Stop reason: SDUPTHER

## 2024-03-29 RX ORDER — TALC
3 POWDER (GRAM) TOPICAL NIGHTLY
Status: DISCONTINUED | OUTPATIENT
Start: 2024-03-30 | End: 2024-04-08 | Stop reason: HOSPADM

## 2024-03-29 RX ORDER — NAPROXEN SODIUM 220 MG/1
81 TABLET, FILM COATED ORAL DAILY
Status: DISCONTINUED | OUTPATIENT
Start: 2024-03-30 | End: 2024-04-08 | Stop reason: HOSPADM

## 2024-03-29 RX ORDER — TORSEMIDE 20 MG/1
40 TABLET ORAL DAILY
Status: DISCONTINUED | OUTPATIENT
Start: 2024-03-30 | End: 2024-04-08 | Stop reason: HOSPADM

## 2024-03-29 RX ORDER — DEXTROSE 50 % IN WATER (D50W) INTRAVENOUS SYRINGE
12.5
Status: DISCONTINUED | OUTPATIENT
Start: 2024-03-29 | End: 2024-04-08 | Stop reason: HOSPADM

## 2024-03-29 RX ORDER — TRAMADOL HYDROCHLORIDE 50 MG/1
50 TABLET ORAL EVERY 4 HOURS PRN
Status: DISCONTINUED | OUTPATIENT
Start: 2024-03-29 | End: 2024-04-08 | Stop reason: HOSPADM

## 2024-03-29 RX ORDER — OXYCODONE HYDROCHLORIDE 5 MG/1
5 TABLET ORAL EVERY 4 HOURS PRN
Status: DISCONTINUED | OUTPATIENT
Start: 2024-03-29 | End: 2024-04-08 | Stop reason: HOSPADM

## 2024-03-29 RX ORDER — SINCALIDE 5 UG/5ML
2.5 INJECTION, POWDER, LYOPHILIZED, FOR SOLUTION INTRAVENOUS
Status: DISCONTINUED | OUTPATIENT
Start: 2024-03-29 | End: 2024-03-29 | Stop reason: HOSPADM

## 2024-03-29 RX ORDER — DEXTROSE 50 % IN WATER (D50W) INTRAVENOUS SYRINGE
25
Start: 2024-03-29 | End: 2024-04-08 | Stop reason: HOSPADM

## 2024-03-29 RX ORDER — CHOLECALCIFEROL (VITAMIN D3) 25 MCG
1000 TABLET ORAL 2 TIMES DAILY
Status: DISCONTINUED | OUTPATIENT
Start: 2024-03-30 | End: 2024-04-08 | Stop reason: HOSPADM

## 2024-03-29 RX ADMIN — HYDRALAZINE HYDROCHLORIDE 100 MG: 50 TABLET ORAL at 08:44

## 2024-03-29 RX ADMIN — METOPROLOL TARTRATE 50 MG: 50 TABLET ORAL at 08:44

## 2024-03-29 RX ADMIN — PIPERACILLIN SODIUM AND TAZOBACTAM SODIUM 2.25 G: 2; .25 INJECTION, SOLUTION INTRAVENOUS at 08:44

## 2024-03-29 RX ADMIN — PANTOPRAZOLE SODIUM 40 MG: 40 TABLET, DELAYED RELEASE ORAL at 08:48

## 2024-03-29 RX ADMIN — HEPARIN SODIUM 5000 UNITS: 5000 INJECTION, SOLUTION INTRAVENOUS; SUBCUTANEOUS at 08:47

## 2024-03-29 RX ADMIN — METOPROLOL TARTRATE 50 MG: 50 TABLET ORAL at 21:16

## 2024-03-29 RX ADMIN — ASPIRIN 81 MG: 81 TABLET, CHEWABLE ORAL at 08:44

## 2024-03-29 RX ADMIN — GABAPENTIN 300 MG: 300 CAPSULE ORAL at 21:16

## 2024-03-29 RX ADMIN — GABAPENTIN 300 MG: 300 CAPSULE ORAL at 08:44

## 2024-03-29 RX ADMIN — KIT FOR THE PREPARATION OF TECHNETIUM TC 99M MEBROFENIN 5.5 MILLICURIE: 45 INJECTION, POWDER, LYOPHILIZED, FOR SOLUTION INTRAVENOUS at 17:21

## 2024-03-29 RX ADMIN — PIPERACILLIN SODIUM AND TAZOBACTAM SODIUM 2.25 G: 2; .25 INJECTION, SOLUTION INTRAVENOUS at 04:33

## 2024-03-29 RX ADMIN — CHOLECALCIFEROL TAB 25 MCG (1000 UNIT) 1000 UNITS: 25 TAB at 09:00

## 2024-03-29 RX ADMIN — ATORVASTATIN CALCIUM 80 MG: 80 TABLET, FILM COATED ORAL at 21:16

## 2024-03-29 RX ADMIN — TRAMADOL HYDROCHLORIDE 50 MG: 50 TABLET, COATED ORAL at 21:18

## 2024-03-29 RX ADMIN — PIPERACILLIN SODIUM AND TAZOBACTAM SODIUM 2.25 G: 2; .25 INJECTION, SOLUTION INTRAVENOUS at 21:16

## 2024-03-29 RX ADMIN — CLOPIDOGREL BISULFATE 75 MG: 75 TABLET ORAL at 08:44

## 2024-03-29 RX ADMIN — HYDRALAZINE HYDROCHLORIDE 100 MG: 50 TABLET ORAL at 21:16

## 2024-03-29 ASSESSMENT — ENCOUNTER SYMPTOMS
FEVER: 0
FATIGUE: 1
NAUSEA: 1
BLOOD IN STOOL: 0
ABDOMINAL PAIN: 1
CHILLS: 0
WEAKNESS: 1
VOMITING: 1

## 2024-03-29 ASSESSMENT — PAIN - FUNCTIONAL ASSESSMENT
PAIN_FUNCTIONAL_ASSESSMENT: 0-10

## 2024-03-29 ASSESSMENT — PAIN SCALES - GENERAL
PAINLEVEL_OUTOF10: 0 - NO PAIN
PAINLEVEL_OUTOF10: 6
PAINLEVEL_OUTOF10: 5 - MODERATE PAIN
PAINLEVEL_OUTOF10: 7

## 2024-03-29 NOTE — CONSULTS
Inpatient consult to Acute Care Surgery  Consult performed by: CONG Hansen-CNP  Consult ordered by: CONG Quiñonez-CNP      Reason For Consult  Cholelithiasis + Galicia      Location [facility]      History Of Present Illness  Humphrey Waddell is a 59 y.o. male on day 1 of admission presenting with Chest pain, unspecified type and admitted with chest pain unspecified type, right upper quadrant pain/N/V, status post CABG/hypertension, CKD, history of CVA type 2 diabetes, S/P left AKA 2/2 cellulitis and chronic wounds.  We were asked to see the patient as above.    The patient presents with a chief complaint of chest pain and pressure that started yesterday morning around 10 AM.  This was midsternal and stabbing, constant, progressively worse, no radiation, describes as intense, upon ER presentation was a 10, is currently a 7, that he never has had before.  He does have a history of CAD and CABG x 3 and then later that same day he had mediastinal exploration, evacuation of pericardial tamponade, repair of vein graft to obtuse marginal branch, medistim flow probe analysis of bypass grafts, sternal plating 11/13/2023 at  [facility].  He is not sure if he ever had a MI before he had his CABG.    Patient denies any abdominal pain prior to admission, since admission, or now.  Says that he did have some nausea yesterday before his chest pain started.  He is not nauseated now.  Denies any vomiting.    Denies ever previously being diagnosed with gallbladder disease before.  Denies any food intolerances in general.  Denies any food intolerances to foods that are heavy, fatty, greasy or fried.  Denies ever having jaundice or icterus.    Denies ever having an EGD or colonoscopy however both are listed in the computer as I put in his below past medical history.  Denies eating anything out of the ordinary, any sick contacts or any recent travels.  Is passing gas.  Last bowel movement was yesterday morning and was normal  without blood.    The patient has a history of a CVA in 2020 that he denies any deficits from and says that is why he takes Plavix that he is on prior to admission and is also currently on here.  He is additionally on heparin every 8 hours here.  Please see the remainder of the patient's past medical history as listed below.  Denies any history of A-fib or CHF.  Please also see his below past surgical history.  Please also see his labs and imaging that are noted.      His 3/28 CT identified a small fat-containing periumbilical hernia and bilateral inguinal hernias that the patient did not know he had these and they don't cause him any symptoms.      His lipase is a little bit elevated.  The patient denies ever having pancreatitis before.  He denies ever being a heavy drinker.  He denies any current alcohol use.  He denies any new medications.  His home medication list is noted and includes Tylenol, aspirin, Lipitor, vitamin D3, Plavix, vitamin D2, Neurontin, hydralazine, Lantus, melatonin, Lopressor, multivitamin, Protonix, Demadex.      Past Medical History  Adverse effect of anesthesia-that he says was confusion x 1 time and does not recall when  Anemia  CAD  NSTEMI 4/2023 is listed in computer  CKD  CHF is listed in computer  Carotid stenosis  CVA in 2020-denies any deficits from-says that is why he is on Plavix  Depression  Diabetes type 2 with neuropathy  GERD  High cholesterol  Hypertension  Ulcer chronic right lower extremity  OA  Obesity  PAD  Ex cigar smoker  Current marijuana smoker  Small fat-containing periumbilical hernia and moderate right and large left inguinal hernias containing fat on the right and portion of the sigmoid on the left without evidence of obstruction or inflammation on 3/29/2024 CT    In computer  2/13/2023 EGD done by Dr. Schwarz for the indications of acute posthemorrhagic anemia, melena, suspected upper GI bleeding impression:  Impression:       - Normal esophagus.       -  Z-line, 45 cm from the incisors.       - Erosive gastropathy with no bleeding and no stigmata of recent        bleeding. Biopsied.       - Erythematous mucosa in the antrum.       - Normal duodenal bulb, first portion of the duodenum, second portion of        the duodenum and third portion of the duodenum.  I do not see the pathology in [facility]     colonoscopy done by Dr Ritter done for the indications of screening and first colonoscopy impression:  Anal canal-digital rectal exam was done and normal otherwise  Rectum-medium sized external hemorrhoids seen on retroflexion  Descending colon-sessile, x 2 polyp of size 6 to 8 mm.  Polypectomy done by cold snare  Ascending colon-right colon retroflexion done and normal.  Sessile polyp of size millimeters.  Polypectomy done by cold snare  Ileocecal valve-lipoma of IC valve  Entire colon-moderate amount of semisolid stool on right sided of colon which was lavaged resulting in adequate views      Surgical History  CABG x 3 and then later the same day he had a mediastinal exploration, evacuation of pericardial tamponade, repair of vein graft to obtuse marginal branch, medistim flow probe analysis of bypass grafts, sternal plating 2023 at [facility]  Leg surgery on the right 2023  Knee reconstruction 2021  Left AKA 3/7/2021 for necrotizing fasciitis of left lower extremity and sepsis  3/10/2021 amputation revision with wound closure  Left total shoulder arthroplasty   Patient denies any other surgeries or any abdominal surgeries      Social History  From home with his wife  Has 3 children  Retired nurse  Never cigarette smoker  Quit smoking cigars about a year ago and prior to that he smoked cigars since being in his 20s about 2 a day  Denies any alcohol use  Smokes marijuana but denies any drugs otherwise      Family History  His parents are  and he says they both had cholecystectomies  His mother  at age 66-did have esophageal cancer-he is not  sure when it was diagnosed and she was a smoker      Allergies  No Known Allergies       Review of Systems  1) Anesthetic complications: No known personal or family history of anesthetic complications other than saying he had confusion after getting general anesthesia 1 time but does not recall when.  2) General: No significant unintentional weight gain, fevers, chills, weakness.  Positive for fatigue and appetite loss-both for 2 years.  Says that he has unintentionally lost a lot of weight over the past 2 years going from 408 pounds to 240 pounds  3) HEENT: Negative.  4) Cardiac: Chest pain as per HPI.  5) Pulmonary: No asthma, sob.  Says does have a little bit of wheezing  6) GI: Denies any abd pain, vomiting, changes in bowel habits. Nausea as per HPI  7) Hematologic: No known personal or family history of bleeding diathesis.  8) Endocrine: No thyroid disorders.  Positive diabetes  9) : No dysuria, hematuria, or frequency.  10) Musculoskeletal: No acute muscle/bone/joint pain/stiffness/swelling.  11) Neuro: No history of seizures.  History CVA  12) Psych: No anxiety.  Positive depression      Physical Exam  1) VS-noted as documented.  2) General-laying in bed in no acute distress. Not septic appearing. Pleasant and cooperative. Looks fine and comfortable.   3) Neuro-Awake, alert, oriented to person, place, and time. Speech is normal. Affect is normal. Follows commands appropriately.  4) HEENT-Head normocephalic and externally atraumatic. Conjunctiva pink. Sclera anicteric. MMM.  5) Heart-RRR.  Sinus rhythm on the monitor with rate of 64  Blood pressure not on the monitor  6) Lungs-Clear on 2 L-says he does not normally wear oxygen. Speaks in complete sentences and does not have any accessory muscle use.  7) Extremities-Right lower extremity edema. The patient's extremities are warm.  Left lower extremity amputation  8) Skin-Warm and dry. No diaphoresis or jaundice. CABG scars are all without any issues.  9)  "Psych-Normal mood. Appropriate affect.   10) Abdomen-Soft. Positive bowel sounds. Non distended. Tenderness:  Left lower 0  Left upper 0  Epigastric 0  Periumbilical 3  Suprapubic 0  Right lower 3  Right upper 3  Negative Galicia's  No guarding/rebound  Benign periumbilical hernia  Obese  11) -benign bilateral inguinal hernias      Vital signs in last 24 hours:  Temp:  [36.2 °C (97.2 °F)-36.8 °C (98.2 °F)] 36.3 °C (97.3 °F)  Heart Rate:  [62-75] 62  Resp:  [14-20] 15  BP: (110-179)/(59-84) 130/59  Heart Rate:  [62-75]   Temp:  [36.2 °C (97.2 °F)-36.8 °C (98.2 °F)]   Resp:  [14-20]   BP: (110-179)/(59-84)   Height:  [193 cm (6' 4\")]   Weight:  [107 kg (235 lb)-129 kg (284 lb 2.8 oz)]   SpO2:  [90 %-99 %]      Intake/Output last 3 Shifts:  I/O last 3 completed shifts:  In: 530 (4.1 mL/kg) [P.O.:480; IV Piggyback:50]  Out: - (0 mL/kg)   Weight: 128.9 kg     Scheduled medications  aspirin, 81 mg, oral, Daily  atorvastatin, 80 mg, oral, Nightly  cholecalciferol, 1,000 Units, oral, BID  clopidogrel, 75 mg, oral, Daily  gabapentin, 300 mg, oral, BID  heparin (porcine), 5,000 Units, subcutaneous, q8h  hydrALAZINE, 100 mg, oral, BID  insulin glargine, 2 Units, subcutaneous, q24h ESTUARDO  insulin lispro, 0-10 Units, subcutaneous, TID with meals  lidocaine, 1 patch, transdermal, Daily  melatonin, 3 mg, oral, Nightly  metoprolol tartrate, 50 mg, oral, BID  pantoprazole, 40 mg, oral, Daily before breakfast  piperacillin-tazobactam, 2.25 g, intravenous, q6h  prochlorperazine, 5 mg, intravenous, Once  torsemide, 40 mg, oral, Daily      Continuous medications     PRN medications  PRN medications: acetaminophen, dextrose, dextrose, glucagon, ondansetron, polyethylene glycol, traMADol    Relevant Results  Results from last 7 days   Lab Units 03/29/24  0549 03/28/24  1412   WBC AUTO x10*3/uL 9.1 10.0   HEMOGLOBIN g/dL 8.3* 9.6*   HEMATOCRIT % 27.9* 31.4*   PLATELETS AUTO x10*3/uL 218 248      Results from last 7 days   Lab Units " 03/29/24  0549 03/28/24  1412   SODIUM mmol/L 139 139   POTASSIUM mmol/L 5.1 4.8   CHLORIDE mmol/L 108* 108*   CO2 mmol/L 23* 21*   BUN mg/dL 65* 61*   CREATININE mg/dL 3.30* 2.90*   GLUCOSE mg/dL 151* 136*   CALCIUM mg/dL 8.2* 8.5        ECG 12 lead    Result Date: 3/29/2024  Poor data quality, interpretation may be adversely affected Sinus rhythm with occasional and consecutive Premature ventricular complexes and Fusion complexes Right bundle branch block Abnormal ECG No previous ECGs available    ECG 12 lead    Result Date: 3/29/2024  Normal sinus rhythm Right bundle branch block Abnormal ECG When compared with ECG of 19-DEC-2023 14:10, No significant change was found    CT chest abdomen pelvis wo IV contrast    Result Date: 3/29/2024  Interpreted By:  Ravinder Velazco, STUDY: CT CHEST ABDOMEN PELVIS WO CONTRAST;  3/28/2024 11:01 pm   INDICATION: Signs/Symptoms:s/p sternotomy, sternal pain   COMPARISON: 09/18/2018   ACCESSION NUMBER(S): [id number]   ORDERING CLINICIAN: LYNNE CASTANEDA   TECHNIQUE: CT of the chest, abdomen, and pelvis was performed. Contiguous axial images were obtained through the chest, abdomen and pelvis.  Coronal and sagittal reconstructions were performed. No intravenous contrast.   FINDINGS:   CHEST:   LOWER NECK AND CHEST WALL:  There is midline surgical scarring overlying the median sternotomy site with associated fat stranding and without organized focal fluid collection. Mildly prominent left axillary lymph nodes which appear unchanged from 09/18/2018. Mild gynecomastia. MEDIASTINUM/MELINDA:Minimally prominent mediastinal lymph nodes which are likely reactive, for example a prevascular lymph node measures 1 cm on axial image 30/281. There is fat stranding along the retrosternal soft tissues within normal postsurgical change. Esophagus is unremarkable. CARDIOVASCULAR:  Cardiac chamber size within normal limits. No pericardial effusion.  Aortic caliber normal. Main pulmonary artery  measures 3.2 cm diameter which is borderline enlarged may be seen with pulmonary hypertension. Extensive native coronary atherosclerotic calcification. Postsurgical change related to CABG. LUNGS, AIRWAYS, AND PLEURA:  Large left pleural effusion and partial compressive atelectasis/consolidation of the left lower lobe. Additional regions of scattered subsegmental atelectasis. Trace right pleural effusion. Punctate calcification within the right costophrenic sulcus. Mild mosaic attenuation of the lung parenchyma. Scattered sub 4 mm pulmonary nodules, unchanged from 2018. MUSCULOSKELETAL: Median sternotomy postsurgical change. Left shoulder arthroplasty, partially visualized. Left subscapularis joint recess osteochondral body. Subacute fracture of the left anterior 2nd rib Moderate multilevel spinal degenerative change.     ABDOMEN:   LIVER: Normal. BILE DUCTS: Normal caliber. GALLBLADDER: Cholelithiasis. PANCREAS: Within normal limits. SPLEEN: Within normal limits. ADRENALS: Within normal limits. KIDNEYS, URETERS, and BLADDER: No hydronephrosis or renal calculi. Ureters are non-dilated.  Urinary bladder within normal limits. REPRODUCTIVE: No pelvic masses. VESSELS: Mild atherosclerosis. No abdominal aortic aneurysm. RETROPERITONEUM and LYMPH NODES: No lymphadenopathy. BOWEL: The stomach is unremarkable. Small bowel is non-dilated. Normal appendix. Large bowel is normal. PERITONEUM: No ascites or free air, no fluid collection. BODY WALL: Small fat containing periumbilical hernia. Moderate right and large left inguinal hernias containing fat on the right and portion of the sigmoid colon on the left without evidence of obstruction or inflammation. MUSCULOSKELETAL: No acute osseous abnormality or suspicious osseous lesions. Moderate multilevel spinal degenerative change. Mild grade 1 anterolisthesis of L5 on S1. Chronic L5 pars interarticularis defects.       Chest: 1. Postsurgical change related to recent median  sternotomy and CABG. The appearance of the surgical site is within normal postsurgical range. 2. Large left pleural effusion and left lower lobe compressive atelectasis/consolidation. Pneumonia is not excluded. 3. Trace endotracheal debris. Please correlate for aspiration. 4. Subacute fracture of the left anterior 2nd rib. 5. Main pulmonary artery measures 3.2 cm diameter which is borderline enlarged may be seen with pulmonary hypertension.   Abdomen/pelvis: 1. No acute abdominal or pelvic abnormality. 2. Cholelithiasis. 3. Colon containing left inguinal hernia without evidence of obstruction or inflammation.   Signed by: Ravinder Velazco 3/29/2024 12:26 AM Dictation workstation:   [id number]    US right upper quadrant    Result Date: 3/28/2024  Interpreted By:  Jose A Preez, STUDY: US RIGHT UPPER QUADRANT   INDICATION: Signs/Symptoms:RUQ pain   COMPARISON: CT scan from July 2023.   ACCESSION NUMBER(S): [id number]   ORDERING CLINICIAN: KRISTI VERDIN   TECHNIQUE: Real-time ultrasound of the right upper quadrant was performed.   FINDINGS: The liver is normal in size, with fatty infiltration of the parenchyma, however there are no focal lesions. The gallbladder is appropriately distended, containing small mobile echogenic foci with posterior shadowing consistent with small gallstones. There is no gallbladder wall edema or pericholecystic fluid collections, however during the exam positive sonographic Galicia's sign was elicited. There is no intra or extrahepatic biliary duct dilatation; the common bile duct measures 5.4 mm. The head and body of the pancreas are normal in appearance. The pancreatic tail is obscured by gas. The right kidney is unremarkable.       Cholelithiasis. Positive sonographic Galicia's sign, however no gallbladder wall edema or pericholecystic fluid. If there is high clinical suspicion for acute cholecystitis, further evaluation could be obtained with hepatobiliary scan. Hepatic  steatosis.   Signed by: Jose A Perez 3/28/2024 3:36 PM Dictation workstation:   [id number]    XR chest 2 views    Result Date: 3/28/2024  Interpreted By:  Jose A Perez, STUDY: XR CHEST 2 VIEWS; 3/28/2024 3:03 pm   INDICATION: Signs/Symptoms:Chest Pain   COMPARISON: December 2023.   ACCESSION NUMBER(S): [id number]   ORDERING CLINICIAN: MARTIN GANDARA   TECHNIQUE: Number of films: AP and lateral views of the chest were obtained.   FINDINGS: AP and lateral views were obtained. The study is limited due to patient's body habitus/underpenetration. The heart and mediastinum are normal. Increased retrocardiac density is noted due to mild infiltrates/atelectatic changes in the left lower lobe. No pneumothorax is seen. Degenerative change and disc disease involve the spine. Arthroplasty hardware is again noted in the left shoulder.       Limited study. Left lower lobe infiltrates/atelectatic changes; correlate clinically and follow-up as needed.   Signed by: Jose A Perez 3/28/2024 3:10 PM Dictation workstation:   [id number]      Assessment/Plan   Principal Problem:    Chest pain, unspecified type  Abnormal EKG  Elevated troponins-133->136->143  Elevated probnp 3526  CAD with hx of NSTEMI 4/2023 listed in computer  Hx CHF listed in computer  Hx CABG x 3 and then later the same day he had mediastinal exploration, evacuation of pericardial tamponade, repair of vein graft to obtuse marginal branch, medistim flow probe analysis of bypass grafts, sternal plating 11/13/2023 at [facility]  Await cardiology consult  Patient is supposed to possibly be being transferred to [facility] when a bed is available where he had his CABG 11/2023      Cholelithiasis  3/28 imaging as above  VSS  LFTs normal  WBC is 9.1  On a regular diabetic diet  AM CBC and CMP ordered  3/29 GI consult noted  Await results of today's HIDA scan  Await cardiology consult      Elevated lipase 66  3/28 CT states pancreas is within normal limits  Amylase and lipase  ordered for the morning      Small fat containing periumbilical hernia  Fat containing on CT  Patient did not know he had  Asymptomatic  Benign  No acute surgical indications at present      Bilateral inguinal hernias  Patient did not know he had  Asymptomatic  Benign  3/28 CT:  Moderate right and large left inguinal hernias containing fat on the right and portion of the sigmoid colon on the left without evidence of obstruction or inflammation.   No acute surgical indications at present      Abnormal 3/28 cxr-left lower lobe infiltrate/atelectatic changes  Abnormal 3/28 CT chest abdomen pelvis:  IMPRESSION:  Chest:  2. Large left pleural effusion and left lower lobe compressive  atelectasis/consolidation. Pneumonia is not excluded.  3. Trace endotracheal debris. Please correlate for aspiration.  4. Subacute fracture of the left anterior 2nd rib.  5. Main pulmonary artery measures 3.2 cm diameter which is borderline  enlarged may be seen with pulmonary hypertension.  Treatment per IM-is on Zosyn for suspected pneumonia      History of CVA in 2020  On Plavix for prior to admission and is also on it here  Treatment per IM      Anemia  3/29 GI consult noted  Treatment per IM      Diabetes type 2  3/28 hemoglobin A1c 6.1  Treatment per IM      GERD  Treatment per IM      PAD  Treatment per IM      History of adverse effect of anesthesia  States it was confusion 1 time and he does not recall when      Berta Mcginnis, APRREBECCA-CNP      Patient care managed in conjunction with Berta Mcginnis. Discussed patient and plan.   Patient presented with chest pain, concern for cornell-cholecystic fluid, elevated LFTs, with cardiac history.   Admitted to [facility], consult placed to General Surgery    Plan:  - HIDA pending to evaluate for cholecystitis  - Continue Zosyn  - Patient on Plavix, if HIDA positive for cholecystitis this will need to be held  - Will need 6 day hold of Plavix prior to surgery vs 3 hold to consider IR drain  - No  General surgeon in house for the weekend, will be able to re-evaluate Monday. If acute surgical needs identified- patient will need transferred to another institution

## 2024-03-29 NOTE — H&P
History Of Present Illness  Humphrey Waddell is a 59 y.o. male presenting with chest pressure.  Patient is a 59-year-old male patient with past medical history of left AKA , CKD, depression, hypertension, anemia, GI bleed, CVA, diabetes, carotid stenosis, CAD, and CABG x 3; who presented at [facility] in [place] with chest pressure starting this morning at 10 AM.  Patient reports chest pain 10 out of 10, nonradiating, it is right along his sternotomy site, prior to developing pain patient reports nausea and vomiting, he also reports significant sneezing and coughing spells prior to development of this chest pressure.  911 was called, patient received nitro and aspirin by EMS, ED workup showed elevated troponin of 133 and 136.  Patient recently had CABG x 3 in November 2023 and decision was made to try to transfer patient to [facility] for further management of this chest pain.  [facility] currently does not have a bed and therefore the hospitalist service was contacted for inpatient admission until bed becomes available at [facility].  Currently patient is alert oriented to x 3, still reporting 7 out of 10 chest pressure, on exam midsternal incision is tender to touch and patient also reporting right upper quadrant tenderness.  Denies shortness of breath, no further vomiting reported.  ED course: Creatinine 2.9 which is around his baseline, proBNP 3526, troponin 133 and 136, chest x-ray with possible left lower lobe infiltrate or atelectasis  Patient will be admitted for further cardiology evaluation while waiting on bed at [facility].    Past Medical History  Past Medical History:   Diagnosis Date    Above-knee amputation of left lower extremity (CMS/[facility])     Adverse effect of anesthesia     confusion    Anemia     CAD (coronary artery disease)     Carotid artery disease (CMS/[facility])     bilateral    CVA (cerebral vascular accident) (CMS/[facility]) 2020    Depression     DM (diabetes mellitus) (CMS/[facility])     type 2  with neuropathy    GERD (gastroesophageal reflux disease)     High cholesterol     HTN (hypertension)     Leg ulcer (CMS/[facility])     chronic right lower extremity    Neuropathy     OA (osteoarthritis)     PAD (peripheral artery disease) (CMS/[facility])        Surgical History  Past Surgical History:   Procedure Laterality Date    CARDIAC SURGERY      CABG 11/2023    CT ANGIO NECK  06/14/2021    CT NECK ANGIO W AND WO IV CONTRAST 6/14/2021 [facility] [place] [facility]    CT HEAD ANGIO W AND WO IV CONTRAST  06/14/2021    CT HEAD ANGIO W AND WO IV CONTRAST 6/14/2021 [facility] [place] [facility]    LEG SURGERY Right     MR HEAD ANGIO WO IV CONTRAST  12/11/2012    MR HEAD ANGIO WO IV CONTRAST [facility]    MR HEAD ANGIO WO IV CONTRAST  06/12/2021    MR HEAD ANGIO WO IV CONTRAST [facility]    MR HEAD ANGIO WO IV CONTRAST  05/25/2021    MR HEAD ANGIO WO IV CONTRAST [facility] INPATIENT LEGACY    MR NECK ANGIO WO IV CONTRAST  07/18/2023    MR NECK ANGIO WO IV CONTRAST 7/18/2023 [id number] MRI    OTHER SURGICAL HISTORY  07/07/2021    Knee reconstruction    OTHER SURGICAL HISTORY  07/07/2021    Lower extremity amputation above knee    TOTAL SHOULDER ARTHROPLASTY Left 2020        Social History  He reports that he quit smoking about a year ago. His smoking use included cigars. He has never used smokeless tobacco. He reports that he does not currently use alcohol. He reports that he does not currently use drugs after having used the following drugs: Marijuana.    Family History  Family History   Problem Relation Name Age of Onset    Other (cardiac disorder) Mother      Hypertension Mother      Esophageal cancer Mother      Hearing loss Father      Hypertension Father      Heart disease Father      COPD Sister          Allergies  Patient has no known allergies.    Review of Systems   Cardiovascular:  Positive for chest pain and leg swelling.   Gastrointestinal:  Positive for abdominal pain, nausea and vomiting.   All other systems reviewed  and are negative.       Physical Exam  Vitals reviewed.   Constitutional:       Appearance: He is ill-appearing.   HENT:      Head: Normocephalic and atraumatic.      Nose: Nose normal.      Mouth/Throat:      Mouth: Mucous membranes are dry.   Eyes:      Extraocular Movements: Extraocular movements intact.   Cardiovascular:      Rate and Rhythm: Regular rhythm.   Pulmonary:      Effort: Pulmonary effort is normal.   Abdominal:      General: Bowel sounds are normal.      Tenderness: There is abdominal tenderness.   Musculoskeletal:      Cervical back: Neck supple.      Right lower leg: Edema present.      Comments: S/p left AKA   Skin:     General: Skin is warm.      Coloration: Skin is pale.   Neurological:      Mental Status: He is alert and oriented to person, place, and time.          Last Recorded Vitals  Blood pressure 130/63, pulse 75, temperature 36.6 °C (97.8 °F), temperature source Oral, resp. rate 20, weight 107 kg (235 lb), SpO2 98 %.    Relevant Results      Results for orders placed or performed during the hospital encounter of 03/28/24 (from the past 24 hour(s))   CBC and Auto Differential   Result Value Ref Range    WBC 10.0 4.4 - 11.3 x10*3/uL    nRBC 0.0 0.0 - 0.0 /100 WBCs    RBC 3.60 (L) 4.50 - 5.90 x10*6/uL    Hemoglobin 9.6 (L) 13.5 - 17.5 g/dL    Hematocrit 31.4 (L) 41.0 - 52.0 %    MCV 87 80 - 100 fL    MCH 26.7 26.0 - 34.0 pg    MCHC 30.6 (L) 32.0 - 36.0 g/dL    RDW 13.7 11.5 - 14.5 %    Platelets 248 150 - 450 x10*3/uL    Neutrophils % 70.7 40.0 - 80.0 %    Immature Granulocytes %, Automated 0.3 0.0 - 0.9 %    Lymphocytes % 12.6 13.0 - 44.0 %    Monocytes % 10.2 2.0 - 10.0 %    Eosinophils % 5.9 0.0 - 6.0 %    Basophils % 0.3 0.0 - 2.0 %    Neutrophils Absolute 7.09 1.20 - 7.70 x10*3/uL    Immature Granulocytes Absolute, Automated 0.03 0.00 - 0.70 x10*3/uL    Lymphocytes Absolute 1.26 1.20 - 4.80 x10*3/uL    Monocytes Absolute 1.02 (H) 0.10 - 1.00 x10*3/uL    Eosinophils Absolute 0.59 0.00  - 0.70 x10*3/uL    Basophils Absolute 0.03 0.00 - 0.10 x10*3/uL   Comprehensive Metabolic Panel   Result Value Ref Range    Glucose 136 (H) 65 - 99 mg/dL    Sodium 139 133 - 145 mmol/L    Potassium 4.8 3.4 - 5.1 mmol/L    Chloride 108 (H) 97 - 107 mmol/L    Bicarbonate 21 (L) 24 - 31 mmol/L    Urea Nitrogen 61 (H) 8 - 25 mg/dL    Creatinine 2.90 (H) 0.40 - 1.60 mg/dL    eGFR 24 (L) >60 mL/min/1.73m*2    Calcium 8.5 8.5 - 10.4 mg/dL    Albumin 3.4 (L) 3.5 - 5.0 g/dL    Alkaline Phosphatase 68 35 - 125 U/L    Total Protein 7.6 5.9 - 7.9 g/dL    AST 14 5 - 40 U/L    Bilirubin, Total 0.4 0.1 - 1.2 mg/dL    ALT 14 5 - 40 U/L    Anion Gap 10 <=19 mmol/L   Magnesium   Result Value Ref Range    Magnesium 2.20 1.60 - 3.10 mg/dL   Serial Troponin, Initial ([facility])   Result Value Ref Range    Troponin T, High Sensitivity 133 (HH) <=14 ng/L   NT Pro-BNP   Result Value Ref Range    PROBNP 3,526 (H) 0 - 177 pg/mL   Sars-CoV-2 and Influenza A/B PCR   Result Value Ref Range    Flu A Result Not Detected Not Detected    Flu B Result Not Detected Not Detected    Coronavirus 2019, PCR Not Detected Not Detected   Serial Troponin, 2 Hour ([facility])   Result Value Ref Range    Troponin T, High Sensitivity 136 (HH) <=14 ng/L      US right upper quadrant    Result Date: 3/28/2024  Interpreted By:  Jose A Perez, STUDY: US RIGHT UPPER QUADRANT   INDICATION: Signs/Symptoms:RUQ pain   COMPARISON: CT scan from July 2023.   ACCESSION NUMBER(S): [id number]   ORDERING CLINICIAN: KRISTI VERDIN   TECHNIQUE: Real-time ultrasound of the right upper quadrant was performed.   FINDINGS: The liver is normal in size, with fatty infiltration of the parenchyma, however there are no focal lesions. The gallbladder is appropriately distended, containing small mobile echogenic foci with posterior shadowing consistent with small gallstones. There is no gallbladder wall edema or pericholecystic fluid collections, however during the exam positive sonographic  Galicia's sign was elicited. There is no intra or extrahepatic biliary duct dilatation; the common bile duct measures 5.4 mm. The head and body of the pancreas are normal in appearance. The pancreatic tail is obscured by gas. The right kidney is unremarkable.       Cholelithiasis. Positive sonographic Galicia's sign, however no gallbladder wall edema or pericholecystic fluid. If there is high clinical suspicion for acute cholecystitis, further evaluation could be obtained with hepatobiliary scan. Hepatic steatosis.   Signed by: Jose A Perez 3/28/2024 3:36 PM Dictation workstation:   [id number]    XR chest 2 views    Result Date: 3/28/2024  Interpreted By:  Jose A Perez, STUDY: XR CHEST 2 VIEWS; 3/28/2024 3:03 pm   INDICATION: Signs/Symptoms:Chest Pain   COMPARISON: December 2023.   ACCESSION NUMBER(S): [id number]   ORDERING CLINICIAN: MARTIN GANDARA   TECHNIQUE: Number of films: AP and lateral views of the chest were obtained.   FINDINGS: AP and lateral views were obtained. The study is limited due to patient's body habitus/underpenetration. The heart and mediastinum are normal. Increased retrocardiac density is noted due to mild infiltrates/atelectatic changes in the left lower lobe. No pneumothorax is seen. Degenerative change and disc disease involve the spine. Arthroplasty hardware is again noted in the left shoulder.       Limited study. Left lower lobe infiltrates/atelectatic changes; correlate clinically and follow-up as needed.   Signed by: Jose A Perez 3/28/2024 3:10 PM Dictation workstation:   [id number]         Assessment/Plan   Principal Problem:    Chest pain, unspecified type  - r/o sternal injury having s/p sternotomy in 11/2023 and c/o of sternal tenderness, patient reports N/V/cough/sneezing/heavy use of upper extremities. Patient is WC bound 2/2 left AKA  - STAT CT chest/a/p wo contrast  - pain medication  - trend troponin- 133,136, 3rd pending  - repeat EKG- w/o changes  - consult  cardiology  - telemetry    RUQ pain/N/V  - check CT a/p  - Zofran  - check LFTs, amylase and lipase    Status post CABG/hypertension  -Resume home medications including aspirin and Plavix  -Last echo 11/23 showed EF 45 to 50%, reduced right ventricular systolic function, multiple wall motion abnormalities  -Patient has been out of his torsemide-which we will resume    CKD  -Creatinine is at baseline  -Hold nephrotoxic medications  -Follows with Dr. Shahid    HX of CVA  -Will continue with aspirin and Plavix, continue with statin    Type 2 diabetes  -Patient takes 2 units of Lantus at night and no sliding scale at home  -Will resume home Lantus and will add a sliding scale  -Check hemoglobin A1c  -Diabetic diet  -Hypoglycemic protocol    S/P left AKA 2/2 cellulitis and chronic wounds  -Wheelchair-bound  -PT, OT, fall precaution    DVT risk  -Subcu heparin and SCD    Discharge plan  Anticipated transfer to [facility] once bed is available  For now continue to follow-up with CT results, and lab results  Telemetry  Fall precaution    Case discussed with attending physician       I spent 75 minutes in the professional and overall care of this patient.      Janak Vazquez, APRN-CNP

## 2024-03-29 NOTE — CARE PLAN
The patient's goals for the shift include getting sleep and having no chest pain    The clinical goals for the shift include pt to remain safe during this shift.    Over the shift, the patient was medicated and slept during this shift. Bed alarm on and call light within reach for assistance.

## 2024-03-29 NOTE — PROGRESS NOTES
Humphrey Waddell is a 59 y.o. male on day 1 of admission presenting with Chest pain, unspecified type.      Subjective   Patient        Objective     Last Recorded Vitals  /64 (BP Location: Left arm, Patient Position: Lying)   Pulse 67   Temp 36.5 °C (97.7 °F) (Temporal)   Resp 16   Wt 129 kg (284 lb 2.8 oz)   SpO2 96%   Intake/Output last 3 Shifts:    Intake/Output Summary (Last 24 hours) at 3/29/2024 1619  Last data filed at 3/29/2024 1255  Gross per 24 hour   Intake 710 ml   Output 650 ml   Net 60 ml       Admission Weight  Weight: 107 kg (235 lb) (03/28/24 1406)    Daily Weight  03/28/24 : 129 kg (284 lb 2.8 oz)    Image Results      Physical Exam  Constitutional:       Appearance: Normal appearance.   Cardiovascular:      Rate and Rhythm: Normal rate and regular rhythm.      Pulses: Normal pulses.      Heart sounds: Normal heart sounds.   Pulmonary:      Effort: Pulmonary effort is normal.      Breath sounds: Normal breath sounds.   Abdominal:      Tenderness: There is abdominal tenderness. There is no right CVA tenderness or left CVA tenderness.      Comments: Left upper quad tenderness separate from tenderness in chest   Musculoskeletal:      Left lower leg: No edema.      Comments: Left AKA   Skin:     General: Skin is warm and dry.   Neurological:      Mental Status: He is alert and oriented to person, place, and time.         Relevant Results             Results for orders placed or performed during the hospital encounter of 03/28/24 (from the past 24 hour(s))   ECG 12 lead   Result Value Ref Range    Ventricular Rate 69 BPM    Atrial Rate 69 BPM    WV Interval 188 ms    QRS Duration 150 ms    QT Interval 438 ms    QTC Calculation(Bazett) 469 ms    P Axis 46 degrees    R Axis 98 degrees    T Axis 68 degrees    QRS Count 12 beats    Q Onset 223 ms    P Onset 129 ms    P Offset 187 ms    T Offset 442 ms    QTC Fredericia 459 ms   Serial Troponin, 6 Hour ([facility])   Result Value Ref Range    Troponin T, High  Sensitivity 143 (HH) <=14 ng/L   Creatine Kinase   Result Value Ref Range    Creatine Kinase 109 24 - 195 U/L   Hepatic function panel   Result Value Ref Range    AST 18 5 - 40 U/L    ALT 14 5 - 40 U/L    Alkaline Phosphatase 64 35 - 125 U/L    Bilirubin, Total 0.3 0.1 - 1.2 mg/dL    Bilirubin, Direct <0.2 0.0 - 0.2 mg/dL    Total Protein 6.7 5.9 - 7.9 g/dL    Albumin 3.3 (L) 3.5 - 5.0 g/dL   Lipase   Result Value Ref Range    Lipase 66 (H) 16 - 63 U/L   Amylase   Result Value Ref Range    Amylase 94 28 - 100 U/L   POCT GLUCOSE   Result Value Ref Range    POCT Glucose 140 (H) 74 - 99 mg/dL   CBC   Result Value Ref Range    WBC 9.1 4.4 - 11.3 x10*3/uL    nRBC 0.0 0.0 - 0.0 /100 WBCs    RBC 3.13 (L) 4.50 - 5.90 x10*6/uL    Hemoglobin 8.3 (L) 13.5 - 17.5 g/dL    Hematocrit 27.9 (L) 41.0 - 52.0 %    MCV 89 80 - 100 fL    MCH 26.5 26.0 - 34.0 pg    MCHC 29.7 (L) 32.0 - 36.0 g/dL    RDW 14.0 11.5 - 14.5 %    Platelets 218 150 - 450 x10*3/uL   Comprehensive metabolic panel   Result Value Ref Range    Glucose 151 (H) 65 - 99 mg/dL    Sodium 139 133 - 145 mmol/L    Potassium 5.1 3.4 - 5.1 mmol/L    Chloride 108 (H) 97 - 107 mmol/L    Bicarbonate 23 (L) 24 - 31 mmol/L    Urea Nitrogen 65 (H) 8 - 25 mg/dL    Creatinine 3.30 (H) 0.40 - 1.60 mg/dL    eGFR 20 (L) >60 mL/min/1.73m*2    Calcium 8.2 (L) 8.5 - 10.4 mg/dL    Albumin 3.0 (L) 3.5 - 5.0 g/dL    Alkaline Phosphatase 63 35 - 125 U/L    Total Protein 6.2 5.9 - 7.9 g/dL    AST 12 5 - 40 U/L    Bilirubin, Total 0.3 0.1 - 1.2 mg/dL    ALT 12 5 - 40 U/L    Anion Gap 8 <=19 mmol/L   POCT GLUCOSE   Result Value Ref Range    POCT Glucose 117 (H) 74 - 99 mg/dL   POCT GLUCOSE   Result Value Ref Range    POCT Glucose 143 (H) 74 - 99 mg/dL       Assessment/Plan                  Principal Problem:    Chest pain, unspecified type    Chest pain, unspecified type  - r/o sternal injury having s/p sternotomy in 11/2023 and c/o of sternal tenderness, patient reports N/V/cough/sneezing/heavy  use of upper extremities. Patient is WC bound 2/2 left AKA  CT chest/a/p wo contrast; stable sternotomy, see report  Pain medication  Trend troponin- 133,136, 143  Cardiology consult pending    Repeat EKG- w/o changes  Telemetry     RUQ pain/N/V  CT a/p  Zofran  LFTs normal, amylase normal and lipase slightly elevated     Status post CABG/hypertension  Resume home medications including aspirin and Plavix  Last echo 11/23 showed EF 45 to 50%, reduced right ventricular systolic function, multiple wall motion abnormalities       CKD  Creatinine elevated slightly above normal at 3.3   Hold torsemide   -Hold nephrotoxic medications  Follows with Dr. Shahid, if there is no improvement in renal function tomorrow will consult     HX of CVA  Will continue with aspirin, statin and Plavix.      Type 2 diabetes  Patient takes 2 units of Lantus at night and no sliding scale at home  Will resume home Lantus and will add a sliding scale  Hemoglobin A1c 6.1  Diabetic diet  Hypoglycemic protocol     S/P left AKA 2/2 cellulitis and chronic wounds  Wheelchair-bound  PT, OT, fall precaution     DVT risk  Subcu heparin and SCD    Cholelithiasis on CT with [facility]  Surgery consult following  HIDA ordered    Plan of Care  Anticipated transfer to [facility] once bed is available                  Mariama Jacobson, CONG-CNP

## 2024-03-29 NOTE — CONSULTS
Consults    Reason For Consult  Epigastric Pain    History Of Present Illness  Humphrey Waddell is a 59 y.o. male presenting with chest discomfort. CT c/a/p showing left pleural effusion. LFTs and lipase are normal; however, he had + Galicia sign on US. He does have RUQ pain today. CT without significant wall thickening. He denies hx gallbladder issues. He denies timing related to fatty foods     Found evidence normocytic anemia with hgb 8.3. He denies dark, tarry, bloody stools or emesis. EGD/colonoscopy per Dr Schwarz in 2023 showed erosive gastritis and colon polyps x 3       Past Medical History  He has a past medical history of Above-knee amputation of left lower extremity (CMS/[facility]), Adverse effect of anesthesia, Anemia, CAD (coronary artery disease), Carotid artery disease (CMS/[facility]), CVA (cerebral vascular accident) (CMS/[facility]) (2020), Depression, DM (diabetes mellitus) (CMS/[facility]), GERD (gastroesophageal reflux disease), High cholesterol, HTN (hypertension), Leg ulcer (CMS/[facility]), Neuropathy, OA (osteoarthritis), and PAD (peripheral artery disease) (CMS/[facility]).    Surgical History  He has a past surgical history that includes Other surgical history (07/07/2021); Other surgical history (07/07/2021); CT angio head w and wo IV contrast (06/14/2021); CT angio neck (06/14/2021); MR angio neck wo IV contrast (07/18/2023); MR angio head wo IV contrast (12/11/2012); MR angio head wo IV contrast (06/12/2021); MR angio head wo IV contrast (05/25/2021); Total shoulder arthroplasty (Left, 2020); Leg Surgery (Right); and Cardiac surgery.     Social History  He reports that he quit smoking about a year ago. His smoking use included cigars. He has never used smokeless tobacco. He reports that he does not currently use alcohol. He reports that he does not currently use drugs after having used the following drugs: Marijuana.    Family History  Family History   Problem Relation Name Age of Onset    Other (cardiac disorder) Mother       "Hypertension Mother      Esophageal cancer Mother      Hearing loss Father      Hypertension Father      Heart disease Father      COPD Sister          Allergies  Patient has no known allergies.    Review of Systems   Constitutional:  Positive for fatigue. Negative for chills and fever.   Gastrointestinal:  Positive for abdominal pain, nausea and vomiting. Negative for blood in stool.   Neurological:  Positive for weakness.        Physical Exam  Vitals reviewed.   Constitutional:       General: He is awake.      Appearance: Normal appearance. He is obese.   HENT:      Head: Normocephalic and atraumatic.      Mouth/Throat:      Mouth: Mucous membranes are moist.   Cardiovascular:      Rate and Rhythm: Normal rate and regular rhythm.   Pulmonary:      Effort: Pulmonary effort is normal.      Breath sounds: Normal breath sounds.   Abdominal:      General: There is no distension.      Palpations: Abdomen is soft.      Tenderness: There is abdominal tenderness. There is no guarding.   Musculoskeletal:      Cervical back: Normal range of motion and neck supple.   Skin:     General: Skin is warm and dry.      Coloration: Skin is pale.   Neurological:      General: No focal deficit present.      Mental Status: He is alert and oriented to person, place, and time. Mental status is at baseline.   Psychiatric:         Attention and Perception: Attention and perception normal.         Mood and Affect: Mood normal.         Behavior: Behavior normal.          Last Recorded Vitals  Blood pressure 130/59, pulse 62, temperature 36.3 °C (97.3 °F), temperature source Temporal, resp. rate 15, height 1.93 m (6' 4\"), weight 129 kg (284 lb 2.8 oz), SpO2 97 %.    Relevant Results  Results for orders placed or performed during the hospital encounter of 03/28/24 (from the past 24 hour(s))   ECG 12 lead   Result Value Ref Range    Ventricular Rate 79 BPM    Atrial Rate 79 BPM    MS Interval 184 ms    QRS Duration 150 ms    QT Interval 444 ms "    QTC Calculation(Bazett) 509 ms    P Axis 55 degrees    R Axis 84 degrees    T Axis 55 degrees    QRS Count 12 beats    Q Onset 222 ms    P Onset 130 ms    P Offset 182 ms    T Offset 444 ms    QTC Fredericia 486 ms   CBC and Auto Differential   Result Value Ref Range    WBC 10.0 4.4 - 11.3 x10*3/uL    nRBC 0.0 0.0 - 0.0 /100 WBCs    RBC 3.60 (L) 4.50 - 5.90 x10*6/uL    Hemoglobin 9.6 (L) 13.5 - 17.5 g/dL    Hematocrit 31.4 (L) 41.0 - 52.0 %    MCV 87 80 - 100 fL    MCH 26.7 26.0 - 34.0 pg    MCHC 30.6 (L) 32.0 - 36.0 g/dL    RDW 13.7 11.5 - 14.5 %    Platelets 248 150 - 450 x10*3/uL    Neutrophils % 70.7 40.0 - 80.0 %    Immature Granulocytes %, Automated 0.3 0.0 - 0.9 %    Lymphocytes % 12.6 13.0 - 44.0 %    Monocytes % 10.2 2.0 - 10.0 %    Eosinophils % 5.9 0.0 - 6.0 %    Basophils % 0.3 0.0 - 2.0 %    Neutrophils Absolute 7.09 1.20 - 7.70 x10*3/uL    Immature Granulocytes Absolute, Automated 0.03 0.00 - 0.70 x10*3/uL    Lymphocytes Absolute 1.26 1.20 - 4.80 x10*3/uL    Monocytes Absolute 1.02 (H) 0.10 - 1.00 x10*3/uL    Eosinophils Absolute 0.59 0.00 - 0.70 x10*3/uL    Basophils Absolute 0.03 0.00 - 0.10 x10*3/uL   Comprehensive Metabolic Panel   Result Value Ref Range    Glucose 136 (H) 65 - 99 mg/dL    Sodium 139 133 - 145 mmol/L    Potassium 4.8 3.4 - 5.1 mmol/L    Chloride 108 (H) 97 - 107 mmol/L    Bicarbonate 21 (L) 24 - 31 mmol/L    Urea Nitrogen 61 (H) 8 - 25 mg/dL    Creatinine 2.90 (H) 0.40 - 1.60 mg/dL    eGFR 24 (L) >60 mL/min/1.73m*2    Calcium 8.5 8.5 - 10.4 mg/dL    Albumin 3.4 (L) 3.5 - 5.0 g/dL    Alkaline Phosphatase 68 35 - 125 U/L    Total Protein 7.6 5.9 - 7.9 g/dL    AST 14 5 - 40 U/L    Bilirubin, Total 0.4 0.1 - 1.2 mg/dL    ALT 14 5 - 40 U/L    Anion Gap 10 <=19 mmol/L   Magnesium   Result Value Ref Range    Magnesium 2.20 1.60 - 3.10 mg/dL   Serial Troponin, Initial ([facility])   Result Value Ref Range    Troponin T, High Sensitivity 133 (HH) <=14 ng/L   NT Pro-BNP   Result Value Ref Range     PROBNP 3,526 (H) 0 - 177 pg/mL   Hemoglobin A1C   Result Value Ref Range    Hemoglobin A1C 6.1 (H) See below %    Estimated Average Glucose 128 Not Established mg/dL   Sars-CoV-2 and Influenza A/B PCR   Result Value Ref Range    Flu A Result Not Detected Not Detected    Flu B Result Not Detected Not Detected    Coronavirus 2019, PCR Not Detected Not Detected   Serial Troponin, 2 Hour ([facility])   Result Value Ref Range    Troponin T, High Sensitivity 136 (HH) <=14 ng/L   ECG 12 lead   Result Value Ref Range    Ventricular Rate 69 BPM    Atrial Rate 69 BPM    WY Interval 188 ms    QRS Duration 150 ms    QT Interval 438 ms    QTC Calculation(Bazett) 469 ms    P Axis 46 degrees    R Axis 98 degrees    T Axis 68 degrees    QRS Count 12 beats    Q Onset 223 ms    P Onset 129 ms    P Offset 187 ms    T Offset 442 ms    QTC Fredericia 459 ms   Serial Troponin, 6 Hour ([facility])   Result Value Ref Range    Troponin T, High Sensitivity 143 (HH) <=14 ng/L   Creatine Kinase   Result Value Ref Range    Creatine Kinase 109 24 - 195 U/L   Hepatic function panel   Result Value Ref Range    AST 18 5 - 40 U/L    ALT 14 5 - 40 U/L    Alkaline Phosphatase 64 35 - 125 U/L    Bilirubin, Total 0.3 0.1 - 1.2 mg/dL    Bilirubin, Direct <0.2 0.0 - 0.2 mg/dL    Total Protein 6.7 5.9 - 7.9 g/dL    Albumin 3.3 (L) 3.5 - 5.0 g/dL   Lipase   Result Value Ref Range    Lipase 66 (H) 16 - 63 U/L   Amylase   Result Value Ref Range    Amylase 94 28 - 100 U/L   POCT GLUCOSE   Result Value Ref Range    POCT Glucose 140 (H) 74 - 99 mg/dL   CBC   Result Value Ref Range    WBC 9.1 4.4 - 11.3 x10*3/uL    nRBC 0.0 0.0 - 0.0 /100 WBCs    RBC 3.13 (L) 4.50 - 5.90 x10*6/uL    Hemoglobin 8.3 (L) 13.5 - 17.5 g/dL    Hematocrit 27.9 (L) 41.0 - 52.0 %    MCV 89 80 - 100 fL    MCH 26.5 26.0 - 34.0 pg    MCHC 29.7 (L) 32.0 - 36.0 g/dL    RDW 14.0 11.5 - 14.5 %    Platelets 218 150 - 450 x10*3/uL   Comprehensive metabolic panel   Result Value Ref Range    Glucose 151 (H)  65 - 99 mg/dL    Sodium 139 133 - 145 mmol/L    Potassium 5.1 3.4 - 5.1 mmol/L    Chloride 108 (H) 97 - 107 mmol/L    Bicarbonate 23 (L) 24 - 31 mmol/L    Urea Nitrogen 65 (H) 8 - 25 mg/dL    Creatinine 3.30 (H) 0.40 - 1.60 mg/dL    eGFR 20 (L) >60 mL/min/1.73m*2    Calcium 8.2 (L) 8.5 - 10.4 mg/dL    Albumin 3.0 (L) 3.5 - 5.0 g/dL    Alkaline Phosphatase 63 35 - 125 U/L    Total Protein 6.2 5.9 - 7.9 g/dL    AST 12 5 - 40 U/L    Bilirubin, Total 0.3 0.1 - 1.2 mg/dL    ALT 12 5 - 40 U/L    Anion Gap 8 <=19 mmol/L   POCT GLUCOSE   Result Value Ref Range    POCT Glucose 117 (H) 74 - 99 mg/dL     ECG 12 lead    Result Date: 3/29/2024  1 Poor data quality, interpretation may be adversely affected Sinus rhythm with occasional and consecutive Premature ventricular complexes and Fusion complexes Right bundle branch block Abnormal ECG No previous ECGs available    ECG 12 lead    Result Date: 3/29/2024  Normal sinus rhythm Right bundle branch block Abnormal ECG When compared with ECG of 19-DEC-2023 14:10, No significant change was found    CT chest abdomen pelvis wo IV contrast    Result Date: 3/29/2024  Interpreted By:  Ravinder Velazco, STUDY: CT CHEST ABDOMEN PELVIS WO CONTRAST;  3/28/2024 11:01 pm   INDICATION: Signs/Symptoms:s/p sternotomy, sternal pain   COMPARISON: 09/18/2018   ACCESSION NUMBER(S): [id number]   ORDERING CLINICIAN: LYNNE CASTANEDA   TECHNIQUE: CT of the chest, abdomen, and pelvis was performed. Contiguous axial images were obtained through the chest, abdomen and pelvis.  Coronal and sagittal reconstructions were performed. No intravenous contrast.   FINDINGS:   CHEST:   LOWER NECK AND CHEST WALL:  There is midline surgical scarring overlying the median sternotomy site with associated fat stranding and without organized focal fluid collection. Mildly prominent left axillary lymph nodes which appear unchanged from 09/18/2018. Mild gynecomastia. MEDIASTINUM/MELINDA:Minimally prominent mediastinal lymph  nodes which are likely reactive, for example a prevascular lymph node measures 1 cm on axial image 30/281. There is fat stranding along the retrosternal soft tissues within normal postsurgical change. Esophagus is unremarkable. CARDIOVASCULAR:  Cardiac chamber size within normal limits. No pericardial effusion.  Aortic caliber normal. Main pulmonary artery measures 3.2 cm diameter which is borderline enlarged may be seen with pulmonary hypertension. Extensive native coronary atherosclerotic calcification. Postsurgical change related to CABG. LUNGS, AIRWAYS, AND PLEURA:  Large left pleural effusion and partial compressive atelectasis/consolidation of the left lower lobe. Additional regions of scattered subsegmental atelectasis. Trace right pleural effusion. Punctate calcification within the right costophrenic sulcus. Mild mosaic attenuation of the lung parenchyma. Scattered sub 4 mm pulmonary nodules, unchanged from 2018. MUSCULOSKELETAL: Median sternotomy postsurgical change. Left shoulder arthroplasty, partially visualized. Left subscapularis joint recess osteochondral body. Subacute fracture of the left anterior 2nd rib Moderate multilevel spinal degenerative change.     ABDOMEN:   LIVER: Normal. BILE DUCTS: Normal caliber. GALLBLADDER: Cholelithiasis. PANCREAS: Within normal limits. SPLEEN: Within normal limits. ADRENALS: Within normal limits. KIDNEYS, URETERS, and BLADDER: No hydronephrosis or renal calculi. Ureters are non-dilated.  Urinary bladder within normal limits. REPRODUCTIVE: No pelvic masses. VESSELS: Mild atherosclerosis. No abdominal aortic aneurysm. RETROPERITONEUM and LYMPH NODES: No lymphadenopathy. BOWEL: The stomach is unremarkable. Small bowel is non-dilated. Normal appendix. Large bowel is normal. PERITONEUM: No ascites or free air, no fluid collection. BODY WALL: Small fat containing periumbilical hernia. Moderate right and large left inguinal hernias containing fat on the right and portion  of the sigmoid colon on the left without evidence of obstruction or inflammation. MUSCULOSKELETAL: No acute osseous abnormality or suspicious osseous lesions. Moderate multilevel spinal degenerative change. Mild grade 1 anterolisthesis of L5 on S1. Chronic L5 pars interarticularis defects.       Chest: 1. Postsurgical change related to recent median sternotomy and CABG. The appearance of the surgical site is within normal postsurgical range. 2. Large left pleural effusion and left lower lobe compressive atelectasis/consolidation. Pneumonia is not excluded. 3. Trace endotracheal debris. Please correlate for aspiration. 4. Subacute fracture of the left anterior 2nd rib. 5. Main pulmonary artery measures 3.2 cm diameter which is borderline enlarged may be seen with pulmonary hypertension.   Abdomen/pelvis: 1. No acute abdominal or pelvic abnormality. 2. Cholelithiasis. 3. Colon containing left inguinal hernia without evidence of obstruction or inflammation.   Signed by: Ravnider Velazco 3/29/2024 12:26 AM Dictation workstation:   [id number]    US right upper quadrant    Result Date: 3/28/2024  Interpreted By:  Jose A Perez, STUDY: US RIGHT UPPER QUADRANT   INDICATION: Signs/Symptoms:RUQ pain   COMPARISON: CT scan from July 2023.   ACCESSION NUMBER(S): [id number]   ORDERING CLINICIAN: KRISTI VERDIN   TECHNIQUE: Real-time ultrasound of the right upper quadrant was performed.   FINDINGS: The liver is normal in size, with fatty infiltration of the parenchyma, however there are no focal lesions. The gallbladder is appropriately distended, containing small mobile echogenic foci with posterior shadowing consistent with small gallstones. There is no gallbladder wall edema or pericholecystic fluid collections, however during the exam positive sonographic Galicia's sign was elicited. There is no intra or extrahepatic biliary duct dilatation; the common bile duct measures 5.4 mm. The head and body of the pancreas are  normal in appearance. The pancreatic tail is obscured by gas. The right kidney is unremarkable.       Cholelithiasis. Positive sonographic Galicia's sign, however no gallbladder wall edema or pericholecystic fluid. If there is high clinical suspicion for acute cholecystitis, further evaluation could be obtained with hepatobiliary scan. Hepatic steatosis.   Signed by: Jose A Perez 3/28/2024 3:36 PM Dictation workstation:   [id number]    XR chest 2 views    Result Date: 3/28/2024  Interpreted By:  Jose A Perez, STUDY: XR CHEST 2 VIEWS; 3/28/2024 3:03 pm   INDICATION: Signs/Symptoms:Chest Pain   COMPARISON: December 2023.   ACCESSION NUMBER(S): [id number]   ORDERING CLINICIAN: MARTIN GANDARA   TECHNIQUE: Number of films: AP and lateral views of the chest were obtained.   FINDINGS: AP and lateral views were obtained. The study is limited due to patient's body habitus/underpenetration. The heart and mediastinum are normal. Increased retrocardiac density is noted due to mild infiltrates/atelectatic changes in the left lower lobe. No pneumothorax is seen. Degenerative change and disc disease involve the spine. Arthroplasty hardware is again noted in the left shoulder.       Limited study. Left lower lobe infiltrates/atelectatic changes; correlate clinically and follow-up as needed.   Signed by: Jose A Perez 3/28/2024 3:10 PM Dictation workstation:   [id number]        Assessment/Plan     Epigastric Pain (LFTs and lipase are normal)    -He does have this large left pleural effusion; however, he did have + Galicia sign with active RUQ discomfort. Will plan HIDA today    -Surgery consult    -PPI daily     Anemia (normocytic 8.3)    -EGD/colonoscopy per Dr Schwarz 2023. He did have some erosive gastritis. Would continue high dose PPI to assess for symptom improvement    -No overt bleeding reported at this time. If having melena or bloody stools, we can consider repeat EGD     I spent 30 minutes in the professional  and overall care of this patient.

## 2024-03-30 LAB
ABO GROUP (TYPE) IN BLOOD: NORMAL
ALBUMIN SERPL BCP-MCNC: 3 G/DL (ref 3.4–5)
ALP SERPL-CCNC: 49 U/L (ref 33–136)
ALT SERPL W P-5'-P-CCNC: 10 U/L (ref 10–52)
ANION GAP SERPL CALC-SCNC: 13 MMOL/L (ref 10–20)
ANTIBODY SCREEN: NORMAL
AST SERPL W P-5'-P-CCNC: 9 U/L (ref 9–39)
BILIRUB SERPL-MCNC: 0.4 MG/DL (ref 0–1.2)
BUN SERPL-MCNC: 65 MG/DL (ref 6–23)
CALCIUM SERPL-MCNC: 8.2 MG/DL (ref 8.6–10.6)
CHLORIDE SERPL-SCNC: 107 MMOL/L (ref 98–107)
CO2 SERPL-SCNC: 23 MMOL/L (ref 21–32)
CREAT SERPL-MCNC: 3.48 MG/DL (ref 0.5–1.3)
EGFRCR SERPLBLD CKD-EPI 2021: 19 ML/MIN/1.73M*2
ERYTHROCYTE [DISTWIDTH] IN BLOOD BY AUTOMATED COUNT: 14.2 % (ref 11.5–14.5)
GLUCOSE BLD MANUAL STRIP-MCNC: 105 MG/DL (ref 74–99)
GLUCOSE BLD MANUAL STRIP-MCNC: 108 MG/DL (ref 74–99)
GLUCOSE BLD MANUAL STRIP-MCNC: 128 MG/DL (ref 74–99)
GLUCOSE BLD MANUAL STRIP-MCNC: 133 MG/DL (ref 74–99)
GLUCOSE BLD MANUAL STRIP-MCNC: 92 MG/DL (ref 74–99)
GLUCOSE SERPL-MCNC: 101 MG/DL (ref 74–99)
HCT VFR BLD AUTO: 25 % (ref 41–52)
HGB BLD-MCNC: 8 G/DL (ref 13.5–17.5)
MAGNESIUM SERPL-MCNC: 2.02 MG/DL (ref 1.6–2.4)
MCH RBC QN AUTO: 26.9 PG (ref 26–34)
MCHC RBC AUTO-ENTMCNC: 32 G/DL (ref 32–36)
MCV RBC AUTO: 84 FL (ref 80–100)
NRBC BLD-RTO: 0 /100 WBCS (ref 0–0)
PHOSPHATE SERPL-MCNC: 5 MG/DL (ref 2.5–4.9)
PLATELET # BLD AUTO: 213 X10*3/UL (ref 150–450)
POTASSIUM SERPL-SCNC: 4.8 MMOL/L (ref 3.5–5.3)
PROT SERPL-MCNC: 6.1 G/DL (ref 6.4–8.2)
RBC # BLD AUTO: 2.97 X10*6/UL (ref 4.5–5.9)
RH FACTOR (ANTIGEN D): NORMAL
SODIUM SERPL-SCNC: 138 MMOL/L (ref 136–145)
WBC # BLD AUTO: 9.3 X10*3/UL (ref 4.4–11.3)

## 2024-03-30 PROCEDURE — 84100 ASSAY OF PHOSPHORUS: CPT

## 2024-03-30 PROCEDURE — 2500000001 HC RX 250 WO HCPCS SELF ADMINISTERED DRUGS (ALT 637 FOR MEDICARE OP)

## 2024-03-30 PROCEDURE — 99223 1ST HOSP IP/OBS HIGH 75: CPT | Performed by: SURGERY

## 2024-03-30 PROCEDURE — 80053 COMPREHEN METABOLIC PANEL: CPT

## 2024-03-30 PROCEDURE — 85027 COMPLETE CBC AUTOMATED: CPT

## 2024-03-30 PROCEDURE — 82947 ASSAY GLUCOSE BLOOD QUANT: CPT

## 2024-03-30 PROCEDURE — C9113 INJ PANTOPRAZOLE SODIUM, VIA: HCPCS

## 2024-03-30 PROCEDURE — 1100000001 HC PRIVATE ROOM DAILY

## 2024-03-30 PROCEDURE — 36415 COLL VENOUS BLD VENIPUNCTURE: CPT

## 2024-03-30 PROCEDURE — 86901 BLOOD TYPING SEROLOGIC RH(D): CPT

## 2024-03-30 PROCEDURE — 83735 ASSAY OF MAGNESIUM: CPT

## 2024-03-30 PROCEDURE — 2500000001 HC RX 250 WO HCPCS SELF ADMINISTERED DRUGS (ALT 637 FOR MEDICARE OP): Performed by: STUDENT IN AN ORGANIZED HEALTH CARE EDUCATION/TRAINING PROGRAM

## 2024-03-30 PROCEDURE — 2500000004 HC RX 250 GENERAL PHARMACY W/ HCPCS (ALT 636 FOR OP/ED)

## 2024-03-30 PROCEDURE — 99231 SBSQ HOSP IP/OBS SF/LOW 25: CPT | Performed by: SURGERY

## 2024-03-30 RX ORDER — HYDRALAZINE HYDROCHLORIDE 50 MG/1
100 TABLET, FILM COATED ORAL 3 TIMES DAILY
Status: DISCONTINUED | OUTPATIENT
Start: 2024-03-30 | End: 2024-04-08 | Stop reason: HOSPADM

## 2024-03-30 RX ADMIN — OXYCODONE HYDROCHLORIDE 5 MG: 5 TABLET ORAL at 19:54

## 2024-03-30 RX ADMIN — SODIUM CHLORIDE, POTASSIUM CHLORIDE, SODIUM LACTATE AND CALCIUM CHLORIDE 125 ML/HR: 600; 310; 30; 20 INJECTION, SOLUTION INTRAVENOUS at 01:17

## 2024-03-30 RX ADMIN — HEPARIN SODIUM 5000 UNITS: 5000 INJECTION INTRAVENOUS; SUBCUTANEOUS at 01:39

## 2024-03-30 RX ADMIN — OXYCODONE HYDROCHLORIDE 5 MG: 5 TABLET ORAL at 01:29

## 2024-03-30 RX ADMIN — PANTOPRAZOLE SODIUM 40 MG: 40 INJECTION, POWDER, FOR SOLUTION INTRAVENOUS at 08:38

## 2024-03-30 RX ADMIN — HEPARIN SODIUM 5000 UNITS: 5000 INJECTION INTRAVENOUS; SUBCUTANEOUS at 16:05

## 2024-03-30 RX ADMIN — HYDRALAZINE HYDROCHLORIDE 100 MG: 50 TABLET ORAL at 20:04

## 2024-03-30 RX ADMIN — Medication 1000 UNITS: at 20:04

## 2024-03-30 RX ADMIN — PIPERACILLIN SODIUM AND TAZOBACTAM SODIUM 2.25 G: 2; .25 INJECTION, SOLUTION INTRAVENOUS at 01:30

## 2024-03-30 RX ADMIN — HYDRALAZINE HYDROCHLORIDE 100 MG: 50 TABLET ORAL at 17:05

## 2024-03-30 RX ADMIN — Medication 1000 UNITS: at 01:38

## 2024-03-30 RX ADMIN — METOPROLOL TARTRATE 50 MG: 50 TABLET, FILM COATED ORAL at 08:37

## 2024-03-30 RX ADMIN — Medication 1000 UNITS: at 08:37

## 2024-03-30 RX ADMIN — ASPIRIN 81 MG CHEWABLE TABLET 81 MG: 81 TABLET CHEWABLE at 08:37

## 2024-03-30 RX ADMIN — PIPERACILLIN SODIUM AND TAZOBACTAM SODIUM 2.25 G: 2; .25 INJECTION, SOLUTION INTRAVENOUS at 06:54

## 2024-03-30 RX ADMIN — MELATONIN 3 MG: 3 TAB ORAL at 20:04

## 2024-03-30 RX ADMIN — OXYCODONE HYDROCHLORIDE 5 MG: 5 TABLET ORAL at 08:37

## 2024-03-30 RX ADMIN — MELATONIN 3 MG: 3 TAB ORAL at 01:39

## 2024-03-30 RX ADMIN — METOPROLOL TARTRATE 50 MG: 50 TABLET, FILM COATED ORAL at 20:04

## 2024-03-30 RX ADMIN — PIPERACILLIN SODIUM AND TAZOBACTAM SODIUM 2.25 G: 2; .25 INJECTION, SOLUTION INTRAVENOUS at 19:55

## 2024-03-30 RX ADMIN — SODIUM CHLORIDE, POTASSIUM CHLORIDE, SODIUM LACTATE AND CALCIUM CHLORIDE 125 ML/HR: 600; 310; 30; 20 INJECTION, SOLUTION INTRAVENOUS at 11:34

## 2024-03-30 RX ADMIN — ACETAMINOPHEN 650 MG: 325 TABLET ORAL at 19:54

## 2024-03-30 RX ADMIN — OXYCODONE HYDROCHLORIDE 5 MG: 5 TABLET ORAL at 13:50

## 2024-03-30 RX ADMIN — TORSEMIDE 40 MG: 20 TABLET ORAL at 08:37

## 2024-03-30 RX ADMIN — PANTOPRAZOLE SODIUM 40 MG: 40 INJECTION, POWDER, FOR SOLUTION INTRAVENOUS at 20:04

## 2024-03-30 RX ADMIN — HEPARIN SODIUM 5000 UNITS: 5000 INJECTION INTRAVENOUS; SUBCUTANEOUS at 08:38

## 2024-03-30 RX ADMIN — PIPERACILLIN SODIUM AND TAZOBACTAM SODIUM 2.25 G: 2; .25 INJECTION, SOLUTION INTRAVENOUS at 13:50

## 2024-03-30 RX ADMIN — PANTOPRAZOLE SODIUM 40 MG: 40 INJECTION, POWDER, FOR SOLUTION INTRAVENOUS at 01:39

## 2024-03-30 ASSESSMENT — PAIN SCALES - GENERAL
PAINLEVEL_OUTOF10: 6
PAINLEVEL_OUTOF10: 0 - NO PAIN
PAINLEVEL_OUTOF10: 8
PAINLEVEL_OUTOF10: 6
PAINLEVEL_OUTOF10: 8
PAINLEVEL_OUTOF10: 8

## 2024-03-30 ASSESSMENT — PAIN DESCRIPTION - ORIENTATION: ORIENTATION: RIGHT

## 2024-03-30 ASSESSMENT — COGNITIVE AND FUNCTIONAL STATUS - GENERAL
STANDING UP FROM CHAIR USING ARMS: A LOT
WALKING IN HOSPITAL ROOM: TOTAL
CLIMB 3 TO 5 STEPS WITH RAILING: TOTAL
WALKING IN HOSPITAL ROOM: TOTAL
STANDING UP FROM CHAIR USING ARMS: A LOT
TURNING FROM BACK TO SIDE WHILE IN FLAT BAD: A LITTLE
MOBILITY SCORE: 14
MOVING TO AND FROM BED TO CHAIR: A LITTLE
DRESSING REGULAR LOWER BODY CLOTHING: A LITTLE
DAILY ACTIVITIY SCORE: 21
CLIMB 3 TO 5 STEPS WITH RAILING: TOTAL
TOILETING: A LITTLE
HELP NEEDED FOR BATHING: A LITTLE
TOILETING: A LITTLE
MOVING TO AND FROM BED TO CHAIR: A LITTLE
DAILY ACTIVITIY SCORE: 22
TURNING FROM BACK TO SIDE WHILE IN FLAT BAD: A LITTLE
HELP NEEDED FOR BATHING: A LITTLE
MOBILITY SCORE: 14

## 2024-03-30 ASSESSMENT — PAIN DESCRIPTION - LOCATION: LOCATION: SHOULDER

## 2024-03-30 ASSESSMENT — PAIN - FUNCTIONAL ASSESSMENT
PAIN_FUNCTIONAL_ASSESSMENT: 0-10

## 2024-03-30 ASSESSMENT — PAIN DESCRIPTION - DESCRIPTORS: DESCRIPTORS: ACHING

## 2024-03-30 NOTE — SIGNIFICANT EVENT
ACS Updated Plan of Care     Recent CABG with vein graft x 3 in November 2023 and was on DAPT for limb ischemia s/p L AKA and b/l cortical strokes. Patient reports last dose of Plavix 3/28 morning. Abdominal pain improving.     Plan:  -OR 4/1 or 4/2 for cholecystectomy, allow for Plavix washout period   -Started on CLD   -Continue nilasyn     Sandra Barnhart MD   PGY1, [facility]  Acute care surgery

## 2024-03-30 NOTE — CONSULTS
Inpatient consult to Cardiology  Consult performed by: Eliot Jarquin MD  Consult ordered by: Janak Vazquez, APRN-CNP  Reason for consult: Elevated troponins and atypical chest pain        History Of Present Illness:    Humphrey Waddell is a 59 y.o. male presenting with day 1 of admission presenting with Chest pain, unspecified type and admitted with chest pain unspecified type, right upper quadrant pain/N/V, status post CABG/hypertension, CKD, history of CVA type 2 diabetes, S/P left AKA 2/2 cellulitis and chronic wounds.  We were asked to see the patient as above.     The patient presents with a chief complaint of chest pain and pressure that started yesterday morning around 10 AM.  This was midsternal and stabbing, constant, progressively worse, no radiation, describes as intense, upon ER presentation was a 10, is currently a 7, that he never has had before.  He does have a history of CAD and CABG x 3 and then later that same day he had mediastinal exploration, evacuation of pericardial tamponade, repair of vein graft to obtuse marginal branch, medistim flow probe analysis of bypass grafts, sternal plating 11/13/2023 at  [facility].  He is not sure if he ever had a MI before he had his CABG.     Patient denies any abdominal pain prior to admission, since admission, or now.  Says that he did have some nausea yesterday before his chest pain started.  He is not nauseated now.  Denies any vomiting.     Denies ever previously being diagnosed with gallbladder disease before.  Denies any food intolerances in general.  Denies any food intolerances to foods that are heavy, fatty, greasy or fried.  Denies ever having jaundice or icterus.     Denies ever having an EGD or colonoscopy however both are listed in the computer as I put in his below past medical history.  Denies eating anything out of the ordinary, any sick contacts or any recent travels.  Is passing gas.  Last bowel movement was yesterday morning and was normal without  blood.     The patient has a history of a CVA in 2020 that he denies any deficits from and says that is why he takes Plavix that he is on prior to admission and is also currently on here.  He is additionally on heparin every 8 hours here.  Please see the remainder of the patient's past medical history as listed below.  Denies any history of A-fib or CHF.  Please also see his below past surgical history.  Please also see his labs and imaging that are noted.       His 3/28 CT identified a small fat-containing periumbilical hernia and bilateral inguinal hernias that the patient did not know he had these and they don't cause him any symptoms.       His lipase is a little bit elevated.  The patient denies ever having pancreatitis before.  He denies ever being a heavy drinker.  He denies any current alcohol use.  He denies any new medications.  His home medication list is noted and includes Tylenol, aspirin, Lipitor, vitamin D3, Plavix, vitamin D2, Neurontin, hydralazine, Lantus, melatonin, Lopressor, multivitamin, Protonix, Demadex.  Patient's wife at the bedside informs me that patient was supposed to take torsemide presenters company would not cover for the torsemide and that she has been giving him higher dose of Lasix until 2 days ago when she ran out of the Lasix and has not able to get a refill of the furosemide and patient admits that he has gained a few pounds the last few days.  He usually follows with Jen Higgins for his cardiac care in [facility].  Recommend compliance significantly.  After having left above-knee amputation but recently rescued dog who is 6 years of age and believes that that would help him to be more active.  He recently transfers out of the electronic wheelchair      .     Last Recorded Vitals:  Vitals:    03/29/24 0436 03/29/24 0717 03/29/24 1117 03/29/24 1602   BP: 110/65 128/78 130/59 145/64   BP Location: Right arm Right arm Right arm Left arm   Patient Position: Lying Lying Lying  Lying   Pulse: 64 68 62 67   Resp: 16 14 15 16   Temp: 36.8 °C (98.2 °F) 36.2 °C (97.2 °F) 36.3 °C (97.3 °F) 36.5 °C (97.7 °F)   TempSrc: Temporal Temporal Temporal Temporal   SpO2: 94% 95% 97% 96%   Weight:       Height:           Last Labs:  CBC - 3/29/2024:  5:49 AM  9.1 8.3 218    27.9      CMP - 3/29/2024:  5:49 AM  8.2 6.2 12 --- 0.3   3.6 3.0 12 63      PTT - 11/13/2023:  9:05 PM  1.1   12.2 22     Troponin I   Date/Time Value Ref Range Status   07/22/2023 05:28 AM 17 0 - 53 ng/L Final     Comment:     .  Less than 99th percentile of normal range cutoff-  Female and children under 18 years old <35 ng/L; Male <54 ng/L: Negative  Repeat testing should be performed if clinically indicated.   .  Female and children under 18 years old  ng/L; Male  ng/L:  Consistent with possible cardiac damage and possible increased clinical   risk. Serial measurements may help to assess extent of myocardial damage.   .  >120 ng/L: Consistent with cardiac damage, increased clinical risk and  myocardial infarction. Serial measurements may help assess extent of   myocardial damage.   .   NOTE: Children less than 1 year old may have higher baseline troponin   levels and results should be interpreted in conjunction with the overall   clinical context.  .  NOTE: Troponin I testing is performed using a different   testing methodology at [facility] than at other   [facility] hospitals. Direct result comparisons should only   be made within the same method.     05/03/2023 10:40  (H) 0 - 53 ng/L Final     Comment:     .  Less than 99th percentile of normal range cutoff-  Female and children under 18 years old <35 ng/L; Male <54 ng/L: Negative  Repeat testing should be performed if clinically indicated.   .  Female and children under 18 years old  ng/L; Male  ng/L:  Consistent with possible cardiac damage and possible increased clinical   risk. Serial measurements may help to assess extent of  myocardial damage.   .  >120 ng/L: Consistent with cardiac damage, increased clinical risk and  myocardial infarction. Serial measurements may help assess extent of   myocardial damage.   .   NOTE: Children less than 1 year old may have higher baseline troponin   levels and results should be interpreted in conjunction with the overall   clinical context.  .  NOTE: Troponin I testing is performed using a different   testing methodology at [facility] than at other   system [facility]. Direct result comparisons should only   be made within the same method.     04/28/2023 03:42 AM 2,404 (H) 0 - 20 ng/L Final     Comment:     .  Less than 99th percentile of normal range cutoff-  Female and children under 18 years old <14 ng/L; Male <21 ng/L: Negative  Repeat testing should be performed if clinically indicated.   .  Female and children under 18 years old 14-50 ng/L; Male 21-50 ng/L:  Consistent with possible cardiac damage and possible increased clinical   risk. Serial measurements may help to assess extent of myocardial damage.   .  >50 ng/L: Consistent with cardiac damage, increased clinical risk and  myocardial infarction. Serial measurements may help assess extent of   myocardial damage.   .   NOTE: Children less than 1 year old may have higher baseline troponin   levels and results should be interpreted in conjunction with the overall   clinical context.   .  NOTE: Troponin I testing is performed using a different   testing methodology at [facility] than at other   [facility]. Direct result comparisons should only   be made within the same method.  This is a critical result.    Per Laboratory policy, critical results for this test   only qualify to the call list once per 24 hours.        BNP   Date/Time Value Ref Range Status   07/22/2023 05:28  (H) 0 - 99 pg/mL Final     Comment:     .  <100 pg/mL - Heart failure unlikely  100-299 pg/mL - Intermediate probability of acute  heart  .               failure exacerbation. Correlate with clinical  .               context and patient history.    >=300 pg/mL - Heart Failure likely. Correlate with clinical  .               context and patient history.   Biotin interference may cause falsely decreased results.   Patients taking a Biotin dose of up to 5 mg/day should   refrain from taking Biotin for 24 hours before sample   collection. Providers may contact their local laboratory   for further information.     04/28/2023 12:59  (H) 0 - 99 pg/mL Final     Comment:     .  <100 pg/mL - Heart failure unlikely  100-299 pg/mL - Intermediate probability of acute heart  .               failure exacerbation. Correlate with clinical  .               context and patient history.    >=300 pg/mL - Heart Failure likely. Correlate with clinical  .               context and patient history.  BNP testing is performed using different testing   methodology at [facility] than at other   [facility]. Direct result comparisons should   only be made within the same method.       Hemoglobin A1C   Date/Time Value Ref Range Status   03/28/2024 02:12 PM 6.1 (H) See below % Final   10/26/2023 02:14 PM 5.3 see below % Final     VLDL   Date/Time Value Ref Range Status   04/29/2023 06:54 AM 22 0 - 40 mg/dL Final   08/15/2022 07:00 AM 36 0 - 40 mg/dL Final   03/18/2022 06:23 AM 23 0 - 40 mg/dL Final      Last I/O:  I/O last 3 completed shifts:  In: 710 (5.5 mL/kg) [P.O.:660; IV Piggyback:50]  Out: 650 (5 mL/kg) [Urine:650 (0.1 mL/kg/hr)]  Weight: 128.9 kg     Past Cardiology Tests (Last 3 Years):  EKG:  ECG 12 lead 03/28/2024 (Preliminary)      ECG 12 lead 03/28/2024 (Preliminary)      ECG 12 lead (Ancillary Performed) 12/19/2023      Electrocardiogram, 12-lead PRN ACS symptoms 11/14/2023      Electrocardiogram, 12-lead PRN ACS symptoms 11/14/2023      ECG 12 lead (Clinic Performed) 10/26/2023    Echo:  Transthoracic Echo (TTE) Complete  "11/24/2023      Anesthesia Intraoperative Transesophageal Echocardiogram 11/13/2023      Anesthesia Intraoperative Transesophageal Echocardiogram 11/13/2023    Ejection Fractions:  No results found for: \"EF\"  Cath:  No results found for this or any previous visit from the past 1095 days.    Stress Test:  No results found for this or any previous visit from the past 1095 days.    Cardiac Imaging:  XR chest abdomen for OG NG placement 11/13/2023      Past Medical History:  He has a past medical history of Above-knee amputation of left lower extremity (CMS/[facility]), Adverse effect of anesthesia, Anemia, CAD (coronary artery disease), Carotid artery disease (CMS/[facility]), CVA (cerebral vascular accident) (CMS/[facility]) (2020), Depression, DM (diabetes mellitus) (CMS/[facility]), GERD (gastroesophageal reflux disease), High cholesterol, HTN (hypertension), Leg ulcer (CMS/[facility]), Neuropathy, OA (osteoarthritis), and PAD (peripheral artery disease) (CMS/[facility]).    Past Surgical History:  He has a past surgical history that includes Other surgical history (07/07/2021); Other surgical history (07/07/2021); CT angio head w and wo IV contrast (06/14/2021); CT angio neck (06/14/2021); MR angio neck wo IV contrast (07/18/2023); MR angio head wo IV contrast (12/11/2012); MR angio head wo IV contrast (06/12/2021); MR angio head wo IV contrast (05/25/2021); Total shoulder arthroplasty (Left, 2020); Leg Surgery (Right); and Cardiac surgery.      Social History:  He reports that he quit smoking about a year ago. His smoking use included cigars. He has never used smokeless tobacco. He reports that he does not currently use alcohol. He reports that he does not currently use drugs after having used the following drugs: Marijuana.    Family History:  Family History   Problem Relation Name Age of Onset    Other (cardiac disorder) Mother      Hypertension Mother      Esophageal cancer Mother      Hearing loss Father      Hypertension Father      Heart disease " Father      COPD Sister          Allergies:  Patient has no known allergies.    Inpatient Medications:  Scheduled medications   Medication Dose Route Frequency    aspirin  81 mg oral Daily    atorvastatin  80 mg oral Nightly    cholecalciferol  1,000 Units oral BID    clopidogrel  75 mg oral Daily    gabapentin  300 mg oral BID    heparin (porcine)  5,000 Units subcutaneous q8h    hydrALAZINE  100 mg oral BID    insulin glargine  2 Units subcutaneous q24h ESTUARDO    insulin lispro  0-10 Units subcutaneous TID with meals    lidocaine  1 patch transdermal Daily    melatonin  3 mg oral Nightly    metoprolol tartrate  50 mg oral BID    pantoprazole  40 mg oral Daily before breakfast    piperacillin-tazobactam  2.25 g intravenous q6h    prochlorperazine  5 mg intravenous Once    sincalide  2.5 mcg intravenous Once in imaging    [Held by provider] torsemide  40 mg oral Daily     PRN medications   Medication    acetaminophen    dextrose    dextrose    glucagon    ondansetron    polyethylene glycol    traMADol     Continuous Medications   Medication Dose Last Rate     Outpatient Medications:  Current Outpatient Medications   Medication Instructions    acetaminophen (Tylenol 8 HOUR) 650 mg ER tablet 1 tablet, oral, Every 8 hours PRN, Do not crush, chew, or split.    aspirin 81 mg chewable tablet 1 tablet, oral, Daily    atorvastatin (LIPITOR) 80 mg, oral, Daily, Last rx prior to next refills    cholecalciferol (VITAMIN D3) 1,000 Units, oral, 2 times daily    clopidogrel (PLAVIX) 75 mg, oral, Daily    ergocalciferol (VITAMIN D-2) 50,000 Units, oral, Weekly    FreeStyle Suzy reader (FreeStyle Suzy 2 [place]) misc Use as instructed    FreeStyle Suzy sensor system (FreeStyle Suzy 2 Sensor) kit Use as instructed    FreeStyle Test strip TEST 3 TIMES DAILY.    gabapentin (NEURONTIN) 300 mg, oral, 3 times daily, Patient is taking BID    hydrALAZINE (APRESOLINE) 100 mg, oral, 3 times daily, Patient is only taking BID.    insulin  glargine (LANTUS SOLOSTAR U-100 INSULIN) 2 Units, subcutaneous, Nightly    melatonin 3 mg, oral, Nightly    metoprolol tartrate (LOPRESSOR) 50 mg, oral, 2 times daily    multivitamin tablet 1 tablet, oral, Daily    pantoprazole (PROTONIX) 40 mg, oral, Daily before breakfast, Do not crush, chew, or split.    torsemide (Demadex) 20 mg tablet Take 2 tablets daily       Physical Exam:  HEENT FRANKY neck is supple lungs clear to auscultation anteriorly heart S1-S2 present with no murmurs abdomen obese and soft he does have tenderness in the right upper quadrant.  Left above-knee amputation and right lower extremity has 1+ pitting edema   Assessment/Plan   1.  Chest pain.  His troponins were elevated which are false positive secondary to underlying chronic kidney disease.  For now I see no indication to pursue any workup for ischemic heart disease.  2.  Right upper quadrant tenderness and pain secondary to cholecystitis.  Patient will be transferred to facility where he will be evaluated by general surgery and Dr. Whitehead hospitalist is facilitating this transfer.  3.  Acute on chronic LV systolic and diastolic dysfunction.  This is partly secondary to noncompliance to diuretic as patient ran out of the was made for the last 2 days and this will be resumed.  He will follow-up with  Posthospital discharge  Peripheral IV 03/28/24 22 G Right;Posterior Hand (Active)   Site Assessment Clean;Dry;Intact 03/29/24 0730   Dressing Type Transparent 03/29/24 0730   Line Status Saline locked;Flushed;Capped 03/29/24 0730   Dressing Status Clean;Dry 03/29/24 0730   Number of days: 1       Peripheral IV 03/28/24 20 G Left;Posterior Hand (Active)   Site Assessment Clean;Dry;Intact 03/29/24 0730   Dressing Type Transparent 03/29/24 0730   Line Status Capped;Saline locked 03/29/24 0730   Dressing Status Clean;Dry 03/29/24 0730   Number of days: 1       Code Status:  Full Code    I spent 40 minutes in the professional and overall  care of this patient.        Eliot Jarquin MD

## 2024-03-30 NOTE — H&P
[facility]  ACUTE CARE SURGERY - HISTORY AND PHYSICAL / CONSULT    Patient Name: Humphrey Waddell  MRN: [id number]  Admit Date: 329  : 1965  AGE: 59 y.o.   GENDER: male  ==============================================================================  TODAY'S ASSESSMENT AND PLAN OF CARE:  59-year-old male with past medical history significant for CABG with vein graft x 3 in 2023 (postoperative course complicated by mediastinal exploration and evacuation of pericardial tamponade/repair of vein graft), hyperlipidemia, hypertension, CKD, history of CVA  with no deficits, type 2 diabetes, left AKA admitted directly from outside hospital with concern for cholecystitis on HIDA scan (cystic duct obstruction and acute cholecystitis revealed on scan).  Patient is otherwise comfortable and asymptomatic at this time.    Given recent CABG, patient not a candidate for cholecystectomy at this time.  Will admit for likely IR percutaneous cholecystostomy given HIDA scan findings.    Neuro: Tylenol, tramadol, Oxy as needed  CV: Home metoprolol, home torsemide  Respiratory: Incentive spirometry  GI: N.p.o., insulin sliding scale, Protonix, LR maintenance IVF  : Voiding  Heme: Holding home Plavix, continue home [id number]  ID: [id number]  PPx: Subcu heparin, SCDs    Discussed with ACS fellow. Plans not final until note signed by attending.    Gume Maharaj M.D.  FREYA/ PGY-1 Surgery Resident    ==============================================================================  CHIEF COMPLAINT/REASON FOR CONSULT:  59-year-old male with past medical history significant for CABGx3 2023 (postoperative course complicated by mediastinal exploration and evacuation of pericardial tamponade/repair of vein graft), hyperlipidemia, hypertension, CKD, history of CVA 2020 with no deficits, type 2 diabetes, left AKA secondary cellulitis and chronic wounds who presented to [facility] ED 3/28 via EMS with  chest pain.  At [facility], pt had No leukocytosis, normal electrolytes, baseline creatinine (2.9), however troponin elevated at 133.  EKG with no evidence of STEMI.  Cholelithiasis identified on right upper quadrant ultrasound but no evidence of cholecystitis.  Repeat troponins were stable at 136.  CT scan revealed small fat-containing periumbilical hernia and bilateral inguinal hernias that the patient was not aware of previously and do not cause him any problems.  Patient was ultimately admitted to [facility] for further evaluation and workup of atypical chest pain.    On first day of hospital admission at [facility] (3/29) troponins were deemed to be falsely elevated secondary to underlying chronic kidney disease.  Patient was started on Zosyn for possible pneumonia.  GI was consulted due to positive Galicia sign on ultrasound 3/28.  LFTs normal and tolerating diet.  Lipase elevated to 66.  HIDA scan was performed as per GI recommendations.  Scan revealed cystic duct obstruction and acute cholecystitis.  Patient was subsequently transferred to [facility] for ACS care.    On presentation to [facility] patient endorses right upper quadrant pain with consumption of fatty foods.  Patient is unsure how long the symptoms have been going on as they may have previously been masked by/confused with chest pain secondary to coronary artery disease requiring CABG in November.  Patient endorses mild RUQ abdominal pain on palpation but otherwise denies nausea, vomiting, shortness of breath, chest pain, abdominal.  Appears comfortable a this time with no acute concerns.    PAST MEDICAL HISTORY:   PMH: CABGx3 November 2023 (postoperative course complicated by mediastinal exploration and evacuation of pericardial tamponade/repair of vein graft), hyperlipidemia hypertension, CKD, history of CVA 2020 with no deficits, type 2 diabetes, left AKA secondary cellulitis and chronic wounds   Past Medical History:   Diagnosis Date    Above-knee  amputation of left lower extremity (CMS/[facility])     Adverse effect of anesthesia     confusion    Anemia     CAD (coronary artery disease)     Carotid artery disease (CMS/HCC)     bilateral    CVA (cerebral vascular accident) (CMS/HCC)     Depression     DM (diabetes mellitus) (CMS/[facility])     type 2 with neuropathy    GERD (gastroesophageal reflux disease)     High cholesterol     HTN (hypertension)     Leg ulcer (CMS/HCC)     chronic right lower extremity    Neuropathy     OA (osteoarthritis)     PAD (peripheral artery disease) (CMS/[facility])      Last menstrual period: NA    PSH: AKA, CABG with RLE vein graft  Past Surgical History:   Procedure Laterality Date    CARDIAC SURGERY      CABG 2023    CT ANGIO NECK  2021    CT NECK ANGIO W AND WO IV CONTRAST 2021 [facility] INPATIENT LEGACY    CT HEAD ANGIO W AND WO IV CONTRAST  2021    CT HEAD ANGIO W AND WO IV CONTRAST 2021 [facility] INPATIENT LEGACY    LEG SURGERY Right     MR HEAD ANGIO WO IV CONTRAST  2012    MR HEAD ANGIO WO IV CONTRAST [facility]    MR HEAD ANGIO WO IV CONTRAST  2021    MR HEAD ANGIO WO IV CONTRAST [facility] EMERGENCY LEGACY    MR HEAD ANGIO WO IV CONTRAST  2021    MR HEAD ANGIO WO IV CONTRAST [facility] INPATIENT LEGACY    MR NECK ANGIO WO IV CONTRAST  2023    MR NECK ANGIO WO IV CONTRAST 2023 [facility] [id number] MRI    OTHER SURGICAL HISTORY  2021    Knee reconstruction    OTHER SURGICAL HISTORY  2021    Lower extremity amputation above knee    TOTAL SHOULDER ARTHROPLASTY Left      FH: NC  Family History   Problem Relation Name Age of Onset    Other (cardiac disorder) Mother      Hypertension Mother      Esophageal cancer Mother      Hearing loss Father      Hypertension Father      Heart disease Father      COPD Sister       SOCIAL HISTORY:    Smoking:    Social History     Tobacco Use   Smoking Status Former    Types: Cigars    Quit date: 2023    Years since quittin.9   Smokeless Tobacco Never        Alcohol:    Social History     Substance and Sexual Activity   Alcohol Use Not Currently       MEDICATIONS:   Prior to Admission medications    Medication Sig Start Date End Date Taking? Authorizing Provider   acetaminophen (Tylenol 8 HOUR) 650 mg ER tablet Take 1 tablet (650 mg) by mouth every 8 hours if needed for mild pain (1 - 3). Do not crush, chew, or split.    Historical Provider, MD   aspirin 81 mg chewable tablet Chew 1 tablet (81 mg) once daily. 6/18/21   Historical Provider, MD   atorvastatin (Lipitor) 80 mg tablet Take 1 tablet (80 mg) by mouth once daily. Last rx prior to next refills 1/29/24 1/28/25  CONG Knott-CNP   cholecalciferol (Vitamin D3) 25 MCG (1000 UT) tablet Take 1 tablet (1,000 Units) by mouth 2 times a day.    Historical Provider, MD   clopidogrel (Plavix) 75 mg tablet Take 1 tablet (75 mg) by mouth once daily. 1/29/24 1/28/25  CONG Knott-CNP   dextrose 50 % injection Infuse 50 mL (25 g) into a venous catheter every 15 minutes if needed (For blood glucose less than or equal to 40 mg/dL). 3/29/24   Nalini Whitehead MD   dextrose 50 % injection Infuse 25 mL (12.5 g) into a venous catheter every 15 minutes if needed (For blood glucose less than or equal to 70 mg/dL). 3/29/24   Nalini Whitehead MD   FreeStyle Suzy reader (FreeStyle Suzy 2 [place]) misc Use as instructed 4/27/23   Mily Leung DO   FreeStyle Suzy sensor system (FreeStyle Suzy 2 Sensor) kit Use as instructed 4/27/23   Mily Leung DO   glucagon (Glucagen) 1 mg injection Inject 1 mg into the muscle every 15 minutes if needed for low blood sugar - see comments (For blood glucose less than or equal to 40 mg/dL and no IV access). 3/29/24   Nalini Whitehead MD   heparin sodium,porcine (heparin, porcine,) 5,000 unit/mL injection Inject 1 mL (5,000 Units) under the skin every 8 hours. 3/29/24   Nalini Whitehead MD   hydrALAZINE (Apresoline) 100 mg tablet Take 1 tablet  (100 mg) by mouth 3 times a day. Patient is only taking BID. 1/29/24 1/28/25  CONG Knott-CNP   insulin glargine (Lantus Solostar U-100 Insulin) 100 unit/mL (3 mL) pen Inject 2 Units under the skin once daily at bedtime. 5/19/21   Historical Provider, MD   insulin lispro (HumaLOG) 100 unit/mL injection Inject 0-0.1 mL (0-10 Units) under the skin 3 times a day with meals. Take as directed per insulin instructions. Do not start before March 30, 2024. 3/30/24   Nalini Whitehead MD   lidocaine 4 % patch Place 1 patch over 12 hours on the skin once daily. Remove & discard patch within 12 hours or as directed by MD. Do not start before March 30, 2024. 3/30/24   Nalini Whitehead MD   melatonin 3 mg tablet Take 1 tablet (3 mg) by mouth once daily at bedtime.    Historical Provider, MD   metoprolol tartrate (Lopressor) 50 mg tablet Take 1 tablet by mouth 2 times a day. 1/29/24 1/28/25  CONG Knott-CNP   pantoprazole (ProtoNix) 40 mg EC tablet Take 1 tablet (40 mg) by mouth once daily in the morning. Take before meals. Do not crush, chew, or split. 1/24/24 2/23/24  Gabriel Higgins MD   torsemide (Demadex) 20 mg tablet Take 2 tablets daily  Patient taking differently: Take 2 tablets (40 mg) by mouth once daily. Take 2 tablets daily 3/7/24   Mily Leung,    ergocalciferol (Vitamin D-2) 1.25 MG (07684 UT) capsule Take 1 capsule (50,000 Units) by mouth 1 (one) time per week. 3/5/24 3/29/24  Mliy Leung, DO   FreeStyle Test strip TEST 3 TIMES DAILY. 7/7/21 3/29/24  Historical Provider, MD   gabapentin (Neurontin) 300 mg capsule Take 1 capsule (300 mg) by mouth 3 times a day. Patient is taking BID  Patient taking differently: Take 1 capsule (300 mg) by mouth 2 times a day. 1 PO BID 3/5/24 3/29/24  Mily Leung DO   insulin lispro (HumaLOG) 100 unit/mL injection Inject 0-0.15 mL (0-15 Units) under the skin 3 times a day with meals. Take as directed per insulin instructions.  Patient  not taking: Reported on 3/5/2024 11/23/23 3/28/24  Fletcher Rodriguez PA-C   magnesium oxide (Mag-Ox) 400 mg (241.3 mg magnesium) tablet Take 1 tablet (400 mg) by mouth once daily. Do not start before November 24, 2023.  Patient not taking: Reported on 3/5/2024 11/24/23 3/28/24  Fletcher Rodriguez PA-C   melatonin 5 mg tablet Take 1 tablet (5 mg) by mouth once daily at bedtime. 11/23/23 3/28/24  Fletcher Rodriguez PA-C   multivitamin tablet Take 1 tablet by mouth once daily.  3/29/24  Historical Provider, MD   multivitamin-calcium carb (Flintstones Plus Calcium) tablet,chewable Chew 1 tablet once daily.  3/28/24  Historical Provider, MD   traMADol (Ultram) 50 mg tablet Take 0.5 tablets (25 mg) by mouth every 12 hours if needed for moderate pain (4 - 6) or severe pain (7 - 10). 11/23/23 3/28/24  Fletcher Rodriguez PA-C     ALLERGIES:   No Known Allergies    REVIEW OF SYSTEMS:  12-point ROS performed and negative unless otherwise stated in HPI    PHYSICAL EXAM:  Physical Exam  Constitutional:       General: He is not in acute distress.     Appearance: Normal appearance. He is obese. He is not ill-appearing, toxic-appearing or diaphoretic.   HENT:      Head: Normocephalic.      Right Ear: External ear normal.      Left Ear: External ear normal.      Nose: Nose normal.      Mouth/Throat:      Mouth: Mucous membranes are moist.   Eyes:      Pupils: Pupils are equal, round, and reactive to light.   Cardiovascular:      Rate and Rhythm: Normal rate.   Pulmonary:      Effort: Pulmonary effort is normal.      Comments: On RA 99%  Abdominal:      General: Abdomen is flat. There is no distension.      Palpations: Abdomen is soft. There is no mass.      Comments: Mild tenderness to deep palpation RUQ   Musculoskeletal:      Cervical back: Normal range of motion.      Comments: LLE AKA   Skin:     Comments: Well healed sternotomy site   Neurological:      General: No focal deficit present.      Mental Status: He is alert and  oriented to person, place, and time. Mental status is at baseline.   Psychiatric:         Mood and Affect: Mood normal.         Behavior: Behavior normal.         Thought Content: Thought content normal.         Judgment: Judgment normal.         IMAGING SUMMARY:  3/29 NITIN  IMPRESSION:  1. Nonvisualization of radiotracer activity in the gallbladder by 2  hours after radiotracer injection, suggestive of cystic duct  obstruction and acute cholecystitis.        LABS:  Results from last 7 days   Lab Units 03/29/24  0549 03/28/24  1412   WBC AUTO x10*3/uL 9.1 10.0   HEMOGLOBIN g/dL 8.3* 9.6*   HEMATOCRIT % 27.9* 31.4*   PLATELETS AUTO x10*3/uL 218 248   NEUTROS PCT AUTO %  --  70.7   LYMPHS PCT AUTO %  --  12.6   MONOS PCT AUTO %  --  10.2   EOS PCT AUTO %  --  5.9         Results from last 7 days   Lab Units 03/29/24  0549 03/28/24 2158 03/28/24  1412   SODIUM mmol/L 139  --  139   POTASSIUM mmol/L 5.1  --  4.8   CHLORIDE mmol/L 108*  --  108*   CO2 mmol/L 23*  --  21*   BUN mg/dL 65*  --  61*   CREATININE mg/dL 3.30*  --  2.90*   CALCIUM mg/dL 8.2*  --  8.5   PROTEIN TOTAL g/dL 6.2 6.7 7.6   BILIRUBIN TOTAL mg/dL 0.3 0.3 0.4   ALK PHOS U/L 63 64 68   ALT U/L 12 14 14   AST U/L 12 18 14   GLUCOSE mg/dL 151*  --  136*     Results from last 7 days   Lab Units 03/29/24  0549 03/28/24 2158 03/28/24  1412   BILIRUBIN TOTAL mg/dL 0.3 0.3 0.4   BILIRUBIN DIRECT mg/dL  --  <0.2  --              I have reviewed all laboratory and imaging results ordered/pertinent for this encounter.

## 2024-03-30 NOTE — DISCHARGE SUMMARY
Discharge Diagnosis  Patient Active Problem List   Diagnosis    Abrasion, left knee, initial encounter    Abscess    Benign essential hypertension    Cerebrovascular accident (CVA) (CMS/[facility])    Chronic ulcer of right lower extremity (CMS/[facility])    Constipation    Type 2 diabetes mellitus with neurologic complication, without long-term current use of insulin (CMS/[facility])    Fall, accidental    Insomnia, unspecified    Left shoulder pain    Leg swelling    Primary osteoarthritis, left shoulder    Peripheral neuropathy    Status post total replacement of left shoulder    Strain of left rotator cuff capsule    Above knee amputation of left lower extremity (CMS/[facility])    Wheezing    Hematochezia    Carotid artery stenosis with cerebral infarction (CMS/[facility])    Acute low back pain    Acute on chronic diastolic heart failure (CMS/[facility])    IDANIA (acute kidney injury) (CMS/[facility])    Anemia    Anxiety disorder, unspecified    Arteriosclerosis of coronary artery    Asthenia    Weakness    Atherosclerosis of both carotid arteries    Atypical chest pain    Abdominal pain    Backache    Chronic kidney disease, stage 3 (CMS/[facility])    Chronic osteoarthritis    Depression, unspecified    Diabetic renal disease (CMS/[facility])    Dyspnea    Foot pain    Gross hematuria    Hematuria    High level of cardiac marker    Hyperkalemia    Hypertension    Injury of head    Knee pain, right    Major depressive disorder, recurrent, moderate (CMS/[facility])    Muscle weakness (generalized)    Headache    Musculoskeletal pain    Necrotizing fasciitis (CMS/[facility])    NSTEMI, initial episode of care (CMS/[facility])    Open wound of lower extremity    Open wound of toe    Osteomyelitis (CMS/[facility])    Peripheral arterial occlusive disease (CMS/[facility])    Rectal bleeding    Upper gastrointestinal hemorrhage    Other specified abnormalities of plasma proteins    Iron deficiency anemia    Cognitive communication deficit    Hemiplegia and hemiparesis following cerebral infarction affecting  right dominant side (CMS/[facility])    Hyperlipidemia, unspecified    Personal history of COVID-19    Type 2 diabetes mellitus with diabetic neuropathy, unspecified (CMS/[facility])    Essential (primary) hypertension    Acquired absence of left leg below knee (CMS/[facility])    Preoperative clearance    S/P CABG (coronary artery bypass graft)    Chest pain, unspecified type       Acute cholecystitis  Issues Requiring Follow-Up  Transfer to acute care surgery [facility]    Discharge Meds     Your medication list        START taking these medications        Instructions Last Dose Given Next Dose Due   dextrose 50 % injection      Infuse 50 mL (25 g) into a venous catheter every 15 minutes if needed (For blood glucose less than or equal to 40 mg/dL).       dextrose 50 % injection      Infuse 25 mL (12.5 g) into a venous catheter every 15 minutes if needed (For blood glucose less than or equal to 70 mg/dL).       glucagon 1 mg injection  Commonly known as: Glucagen      Inject 1 mg into the muscle every 15 minutes if needed for low blood sugar - see comments (For blood glucose less than or equal to 40 mg/dL and no IV access).       heparin (porcine) 5,000 unit/mL injection      Inject 1 mL (5,000 Units) under the skin every 8 hours.       insulin lispro 100 unit/mL injection  Commonly known as: HumaLOG  Start taking on: March 30, 2024      Inject 0-0.1 mL (0-10 Units) under the skin 3 times a day with meals. Take as directed per insulin instructions. Do not start before March 30, 2024.       lidocaine 4 % patch  Start taking on: March 30, 2024      Place 1 patch over 12 hours on the skin once daily. Remove & discard patch within 12 hours or as directed by MD. Do not start before March 30, 2024.              CHANGE how you take these medications        Instructions Last Dose Given Next Dose Due   atorvastatin 80 mg tablet  Commonly known as: Lipitor  What changed: additional instructions      Take 1 tablet (80 mg) by mouth once daily.  Last rx prior to next refills       hydrALAZINE 100 mg tablet  Commonly known as: Apresoline  What changed: when to take this      Take 1 tablet (100 mg) by mouth 3 times a day. Patient is only taking BID.       torsemide 20 mg tablet  Commonly known as: Demadex  What changed:   how much to take  how to take this  when to take this      Take 2 tablets daily              CONTINUE taking these medications        Instructions Last Dose Given Next Dose Due   acetaminophen 650 mg ER tablet  Commonly known as: Tylenol 8 HOUR           aspirin 81 mg chewable tablet           clopidogrel 75 mg tablet  Commonly known as: Plavix      Take 1 tablet (75 mg) by mouth once daily.       FreeStyle Suzy 2 [place] misc  Generic drug: flash glucose scanning reader      Use as instructed       FreeStyle Suzy 2 Sensor kit  Generic drug: flash glucose sensor kit      Use as instructed       Lantus Solostar U-100 Insulin 100 unit/mL (3 mL) pen  Generic drug: insulin glargine           melatonin 3 mg tablet           metoprolol tartrate 50 mg tablet  Commonly known as: Lopressor      Take 1 tablet by mouth 2 times a day.       pantoprazole 40 mg EC tablet  Commonly known as: ProtoNix      Take 1 tablet (40 mg) by mouth once daily in the morning. Take before meals. Do not crush, chew, or split.       Vitamin D3 25 MCG (1000 UT) tablet  Generic drug: cholecalciferol                  STOP taking these medications      ergocalciferol 1.25 MG (68093 UT) capsule  Commonly known as: Vitamin D-2        FreeStyle Test strip  Generic drug: blood sugar diagnostic        gabapentin 300 mg capsule  Commonly known as: Neurontin        multivitamin tablet                  Where to Get Your Medications        These medications were sent to [facility]/pharmacy #[facility] [address] AT [place] [id number]      Phone: [phone]   glucagon 1 mg injection       Information about where to get these  medications is not yet available    Ask your nurse or doctor about these medications  dextrose 50 % injection  dextrose 50 % injection  heparin (porcine) 5,000 unit/mL injection  insulin lispro 100 unit/mL injection  lidocaine 4 % patch         Test Results Pending At Discharge  Pending Labs       No current pending labs.            Hospital Course  This patient admitted with atypical chest pain.  Initially ER transfer him to [facility] under cardiology in view of his recent CABG procedure.  There were no beds so we were asked to admit the patient here while waiting for a bed to be available.  Our impression during admission was that this is atypical chest pain additional workup was initiated.  HIDA scan ordered by GI consultant cystic duct obstruction suggestive of acute cholecystitis.  This resulted just this evening.  The patient remains nontoxic-appearing he was started on Zosyn since last night for possible pneumonia anyway.  He does have mild right upper quadrant tenderness.  Discussed with acute care surgery [facility] and accepted awaiting for transfer later tonight.  He is made n.p.o.    Pertinent Physical Exam At Time of Discharge  Alert oriented x 3 no distress  Chest clear  Heart regular  Abdomen soft mild right upper quadrant tenderness no guarding no rebound  Neurologic exam gross nonfocal  Extremities no edema left AKA    Outpatient Follow-Up  To be determined    Time spent on discharge arrangement:  45 minutes    Nalini Whitehead MD

## 2024-03-30 NOTE — CARE PLAN
The patient's goals for the shift include  patient will rate his pain less than 7/10 by the end of the shift.     The clinical goals for the shift include patient will remain safe and free from injury throughout shift.

## 2024-03-31 ENCOUNTER — APPOINTMENT (OUTPATIENT)
Dept: RADIOLOGY | Facility: HOSPITAL | Age: 59
End: 2024-03-31
Payer: COMMERCIAL

## 2024-03-31 LAB
ALBUMIN SERPL BCP-MCNC: 3.1 G/DL (ref 3.4–5)
ANION GAP SERPL CALC-SCNC: 14 MMOL/L (ref 10–20)
ATRIAL RATE: 69 BPM
ATRIAL RATE: 79 BPM
BASOPHILS # BLD AUTO: 0.04 X10*3/UL (ref 0–0.1)
BASOPHILS NFR BLD AUTO: 0.5 %
BUN SERPL-MCNC: 57 MG/DL (ref 6–23)
CALCIUM SERPL-MCNC: 8.3 MG/DL (ref 8.6–10.6)
CHLORIDE SERPL-SCNC: 107 MMOL/L (ref 98–107)
CO2 SERPL-SCNC: 21 MMOL/L (ref 21–32)
CREAT SERPL-MCNC: 3.59 MG/DL (ref 0.5–1.3)
EGFRCR SERPLBLD CKD-EPI 2021: 18 ML/MIN/1.73M*2
EOSINOPHIL # BLD AUTO: 0.63 X10*3/UL (ref 0–0.7)
EOSINOPHIL NFR BLD AUTO: 7.2 %
ERYTHROCYTE [DISTWIDTH] IN BLOOD BY AUTOMATED COUNT: 14.1 % (ref 11.5–14.5)
GLUCOSE BLD MANUAL STRIP-MCNC: 101 MG/DL (ref 74–99)
GLUCOSE BLD MANUAL STRIP-MCNC: 104 MG/DL (ref 74–99)
GLUCOSE BLD MANUAL STRIP-MCNC: 105 MG/DL (ref 74–99)
GLUCOSE BLD MANUAL STRIP-MCNC: 132 MG/DL (ref 74–99)
GLUCOSE BLD MANUAL STRIP-MCNC: 135 MG/DL (ref 74–99)
GLUCOSE BLD MANUAL STRIP-MCNC: 96 MG/DL (ref 74–99)
GLUCOSE SERPL-MCNC: 89 MG/DL (ref 74–99)
HCT VFR BLD AUTO: 28.2 % (ref 41–52)
HGB BLD-MCNC: 8.5 G/DL (ref 13.5–17.5)
IMM GRANULOCYTES # BLD AUTO: 0.03 X10*3/UL (ref 0–0.7)
IMM GRANULOCYTES NFR BLD AUTO: 0.3 % (ref 0–0.9)
LYMPHOCYTES # BLD AUTO: 1.02 X10*3/UL (ref 1.2–4.8)
LYMPHOCYTES NFR BLD AUTO: 11.6 %
MAGNESIUM SERPL-MCNC: 2.06 MG/DL (ref 1.6–2.4)
MCH RBC QN AUTO: 27.2 PG (ref 26–34)
MCHC RBC AUTO-ENTMCNC: 30.1 G/DL (ref 32–36)
MCV RBC AUTO: 90 FL (ref 80–100)
MONOCYTES # BLD AUTO: 0.88 X10*3/UL (ref 0.1–1)
MONOCYTES NFR BLD AUTO: 10 %
NEUTROPHILS # BLD AUTO: 6.17 X10*3/UL (ref 1.2–7.7)
NEUTROPHILS NFR BLD AUTO: 70.4 %
NRBC BLD-RTO: 0 /100 WBCS (ref 0–0)
P AXIS: 46 DEGREES
P AXIS: 55 DEGREES
P OFFSET: 182 MS
P OFFSET: 187 MS
P ONSET: 129 MS
P ONSET: 130 MS
PHOSPHATE SERPL-MCNC: 3.9 MG/DL (ref 2.5–4.9)
PLATELET # BLD AUTO: 218 X10*3/UL (ref 150–450)
POTASSIUM SERPL-SCNC: 5 MMOL/L (ref 3.5–5.3)
PR INTERVAL: 184 MS
PR INTERVAL: 188 MS
Q ONSET: 222 MS
Q ONSET: 223 MS
QRS COUNT: 12 BEATS
QRS COUNT: 12 BEATS
QRS DURATION: 150 MS
QRS DURATION: 150 MS
QT INTERVAL: 438 MS
QT INTERVAL: 444 MS
QTC CALCULATION(BAZETT): 469 MS
QTC CALCULATION(BAZETT): 509 MS
QTC FREDERICIA: 459 MS
QTC FREDERICIA: 486 MS
R AXIS: 84 DEGREES
R AXIS: 98 DEGREES
RBC # BLD AUTO: 3.12 X10*6/UL (ref 4.5–5.9)
SODIUM SERPL-SCNC: 137 MMOL/L (ref 136–145)
T AXIS: 55 DEGREES
T AXIS: 68 DEGREES
T OFFSET: 442 MS
T OFFSET: 444 MS
VENTRICULAR RATE: 69 BPM
VENTRICULAR RATE: 79 BPM
WBC # BLD AUTO: 8.8 X10*3/UL (ref 4.4–11.3)

## 2024-03-31 PROCEDURE — C9113 INJ PANTOPRAZOLE SODIUM, VIA: HCPCS

## 2024-03-31 PROCEDURE — 1200000002 HC GENERAL ROOM WITH TELEMETRY DAILY

## 2024-03-31 PROCEDURE — 82947 ASSAY GLUCOSE BLOOD QUANT: CPT

## 2024-03-31 PROCEDURE — 2500000001 HC RX 250 WO HCPCS SELF ADMINISTERED DRUGS (ALT 637 FOR MEDICARE OP)

## 2024-03-31 PROCEDURE — 70450 CT HEAD/BRAIN W/O DYE: CPT | Performed by: RADIOLOGY

## 2024-03-31 PROCEDURE — 80069 RENAL FUNCTION PANEL: CPT

## 2024-03-31 PROCEDURE — 2500000004 HC RX 250 GENERAL PHARMACY W/ HCPCS (ALT 636 FOR OP/ED)

## 2024-03-31 PROCEDURE — 83735 ASSAY OF MAGNESIUM: CPT

## 2024-03-31 PROCEDURE — 99231 SBSQ HOSP IP/OBS SF/LOW 25: CPT | Performed by: SURGERY

## 2024-03-31 PROCEDURE — 70450 CT HEAD/BRAIN W/O DYE: CPT

## 2024-03-31 PROCEDURE — 85025 COMPLETE CBC W/AUTO DIFF WBC: CPT

## 2024-03-31 PROCEDURE — 2500000001 HC RX 250 WO HCPCS SELF ADMINISTERED DRUGS (ALT 637 FOR MEDICARE OP): Performed by: STUDENT IN AN ORGANIZED HEALTH CARE EDUCATION/TRAINING PROGRAM

## 2024-03-31 PROCEDURE — 36415 COLL VENOUS BLD VENIPUNCTURE: CPT

## 2024-03-31 RX ORDER — ONDANSETRON HYDROCHLORIDE 2 MG/ML
INJECTION, SOLUTION INTRAVENOUS
Status: COMPLETED
Start: 2024-03-31 | End: 2024-03-31

## 2024-03-31 RX ORDER — LABETALOL HYDROCHLORIDE 5 MG/ML
20 INJECTION, SOLUTION INTRAVENOUS ONCE
Status: DISCONTINUED | OUTPATIENT
Start: 2024-03-31 | End: 2024-04-01

## 2024-03-31 RX ORDER — ONDANSETRON HYDROCHLORIDE 2 MG/ML
4 INJECTION, SOLUTION INTRAVENOUS ONCE
Status: COMPLETED | OUTPATIENT
Start: 2024-03-31 | End: 2024-03-31

## 2024-03-31 RX ORDER — LABETALOL HYDROCHLORIDE 5 MG/ML
INJECTION, SOLUTION INTRAVENOUS
Status: COMPLETED
Start: 2024-03-31 | End: 2024-03-31

## 2024-03-31 RX ADMIN — OXYCODONE HYDROCHLORIDE 5 MG: 5 TABLET ORAL at 04:47

## 2024-03-31 RX ADMIN — LABETALOL HYDROCHLORIDE: 5 INJECTION INTRAVENOUS at 16:15

## 2024-03-31 RX ADMIN — ONDANSETRON 4 MG: 2 INJECTION, SOLUTION INTRAMUSCULAR; INTRAVENOUS at 16:05

## 2024-03-31 RX ADMIN — Medication 1000 UNITS: at 20:55

## 2024-03-31 RX ADMIN — Medication 1000 UNITS: at 08:25

## 2024-03-31 RX ADMIN — METOPROLOL TARTRATE 50 MG: 50 TABLET, FILM COATED ORAL at 20:55

## 2024-03-31 RX ADMIN — PIPERACILLIN SODIUM AND TAZOBACTAM SODIUM 2.25 G: 2; .25 INJECTION, SOLUTION INTRAVENOUS at 20:56

## 2024-03-31 RX ADMIN — HEPARIN SODIUM 5000 UNITS: 5000 INJECTION INTRAVENOUS; SUBCUTANEOUS at 17:48

## 2024-03-31 RX ADMIN — HYDRALAZINE HYDROCHLORIDE 100 MG: 50 TABLET ORAL at 15:11

## 2024-03-31 RX ADMIN — HEPARIN SODIUM 5000 UNITS: 5000 INJECTION INTRAVENOUS; SUBCUTANEOUS at 00:29

## 2024-03-31 RX ADMIN — PANTOPRAZOLE SODIUM 40 MG: 40 INJECTION, POWDER, FOR SOLUTION INTRAVENOUS at 08:25

## 2024-03-31 RX ADMIN — OXYCODONE HYDROCHLORIDE 5 MG: 5 TABLET ORAL at 20:56

## 2024-03-31 RX ADMIN — SODIUM CHLORIDE, POTASSIUM CHLORIDE, SODIUM LACTATE AND CALCIUM CHLORIDE 125 ML/HR: 600; 310; 30; 20 INJECTION, SOLUTION INTRAVENOUS at 12:16

## 2024-03-31 RX ADMIN — SODIUM CHLORIDE, POTASSIUM CHLORIDE, SODIUM LACTATE AND CALCIUM CHLORIDE 125 ML/HR: 600; 310; 30; 20 INJECTION, SOLUTION INTRAVENOUS at 21:08

## 2024-03-31 RX ADMIN — OXYCODONE HYDROCHLORIDE 5 MG: 5 TABLET ORAL at 00:33

## 2024-03-31 RX ADMIN — METOPROLOL TARTRATE 50 MG: 50 TABLET, FILM COATED ORAL at 08:24

## 2024-03-31 RX ADMIN — ONDANSETRON HYDROCHLORIDE 4 MG: 2 INJECTION, SOLUTION INTRAVENOUS at 16:05

## 2024-03-31 RX ADMIN — TORSEMIDE 40 MG: 20 TABLET ORAL at 08:24

## 2024-03-31 RX ADMIN — MELATONIN 3 MG: 3 TAB ORAL at 20:55

## 2024-03-31 RX ADMIN — ASPIRIN 81 MG CHEWABLE TABLET 81 MG: 81 TABLET CHEWABLE at 08:25

## 2024-03-31 RX ADMIN — OXYCODONE HYDROCHLORIDE 5 MG: 5 TABLET ORAL at 13:52

## 2024-03-31 RX ADMIN — PIPERACILLIN SODIUM AND TAZOBACTAM SODIUM 2.25 G: 2; .25 INJECTION, SOLUTION INTRAVENOUS at 02:26

## 2024-03-31 RX ADMIN — HEPARIN SODIUM 5000 UNITS: 5000 INJECTION INTRAVENOUS; SUBCUTANEOUS at 08:25

## 2024-03-31 RX ADMIN — PANTOPRAZOLE SODIUM 40 MG: 40 INJECTION, POWDER, FOR SOLUTION INTRAVENOUS at 20:56

## 2024-03-31 RX ADMIN — PIPERACILLIN SODIUM AND TAZOBACTAM SODIUM 2.25 G: 2; .25 INJECTION, SOLUTION INTRAVENOUS at 14:23

## 2024-03-31 RX ADMIN — PIPERACILLIN SODIUM AND TAZOBACTAM SODIUM 2.25 G: 2; .25 INJECTION, SOLUTION INTRAVENOUS at 08:23

## 2024-03-31 RX ADMIN — HYDRALAZINE HYDROCHLORIDE 100 MG: 50 TABLET ORAL at 20:55

## 2024-03-31 RX ADMIN — HYDRALAZINE HYDROCHLORIDE 100 MG: 50 TABLET ORAL at 08:24

## 2024-03-31 ASSESSMENT — PAIN SCALES - GENERAL
PAINLEVEL_OUTOF10: 8
PAINLEVEL_OUTOF10: 9
PAINLEVEL_OUTOF10: 8
PAINLEVEL_OUTOF10: 9
PAINLEVEL_OUTOF10: 7
PAINLEVEL_OUTOF10: 8

## 2024-03-31 ASSESSMENT — PAIN - FUNCTIONAL ASSESSMENT
PAIN_FUNCTIONAL_ASSESSMENT: 0-10

## 2024-03-31 ASSESSMENT — COGNITIVE AND FUNCTIONAL STATUS - GENERAL
STANDING UP FROM CHAIR USING ARMS: A LOT
DAILY ACTIVITIY SCORE: 20
CLIMB 3 TO 5 STEPS WITH RAILING: TOTAL
TOILETING: A LITTLE
MOVING TO AND FROM BED TO CHAIR: A LITTLE
TURNING FROM BACK TO SIDE WHILE IN FLAT BAD: A LITTLE
STANDING UP FROM CHAIR USING ARMS: A LOT
MOBILITY SCORE: 13
MOVING FROM LYING ON BACK TO SITTING ON SIDE OF FLAT BED WITH BEDRAILS: A LITTLE
WALKING IN HOSPITAL ROOM: TOTAL
HELP NEEDED FOR BATHING: A LITTLE
DRESSING REGULAR LOWER BODY CLOTHING: A LITTLE
TOILETING: A LITTLE
MOVING TO AND FROM BED TO CHAIR: A LITTLE
DRESSING REGULAR UPPER BODY CLOTHING: A LITTLE
TURNING FROM BACK TO SIDE WHILE IN FLAT BAD: A LITTLE
TURNING FROM BACK TO SIDE WHILE IN FLAT BAD: A LITTLE
MOVING FROM LYING ON BACK TO SITTING ON SIDE OF FLAT BED WITH BEDRAILS: A LITTLE
DRESSING REGULAR UPPER BODY CLOTHING: A LITTLE
DRESSING REGULAR LOWER BODY CLOTHING: A LITTLE
CLIMB 3 TO 5 STEPS WITH RAILING: TOTAL
HELP NEEDED FOR BATHING: A LITTLE
STANDING UP FROM CHAIR USING ARMS: A LOT
TURNING FROM BACK TO SIDE WHILE IN FLAT BAD: A LITTLE
HELP NEEDED FOR BATHING: A LITTLE
DAILY ACTIVITIY SCORE: 20
CLIMB 3 TO 5 STEPS WITH RAILING: TOTAL
DRESSING REGULAR UPPER BODY CLOTHING: A LITTLE
DRESSING REGULAR LOWER BODY CLOTHING: A LITTLE
DRESSING REGULAR LOWER BODY CLOTHING: A LITTLE
CLIMB 3 TO 5 STEPS WITH RAILING: TOTAL
HELP NEEDED FOR BATHING: A LITTLE
TOILETING: A LITTLE
MOBILITY SCORE: 13
DRESSING REGULAR UPPER BODY CLOTHING: A LITTLE
WALKING IN HOSPITAL ROOM: TOTAL
DAILY ACTIVITIY SCORE: 20
WALKING IN HOSPITAL ROOM: TOTAL
DAILY ACTIVITIY SCORE: 20
WALKING IN HOSPITAL ROOM: TOTAL
MOBILITY SCORE: 13
STANDING UP FROM CHAIR USING ARMS: A LOT
TOILETING: A LITTLE
MOVING FROM LYING ON BACK TO SITTING ON SIDE OF FLAT BED WITH BEDRAILS: A LITTLE
MOVING FROM LYING ON BACK TO SITTING ON SIDE OF FLAT BED WITH BEDRAILS: A LITTLE
MOBILITY SCORE: 13

## 2024-03-31 ASSESSMENT — PAIN DESCRIPTION - LOCATION
LOCATION: STERNUM
LOCATION: STERNUM

## 2024-03-31 ASSESSMENT — PAIN DESCRIPTION - DESCRIPTORS
DESCRIPTORS: ACHING
DESCRIPTORS: ACHING

## 2024-03-31 NOTE — SIGNIFICANT EVENT
ACS Update    Patient hypertensive with SBP in low 200's, high 190's on recheck. Notified by nursing that he is also c/o R-sided HA and blurry vision. On arrival to bedside, patient is in no acute distress. Is slow to respond, but states that his headache has been present for the past few days. Endorses baseline blurry vision that seems worse at the present moment. Also endorsing nausea. AAOx3, neuro intact but movements appear delayed without any focal deficits.     Code white subsequently called. Patient last received his scheduled 100 mg hydralazine around 1500. Decision made to give patient 20 IV labetalol to further manage HTN. After administration of just 10 mg, patient's BP responded well, coming down to 168/84.     Given symptomatic nature of HTN, plan is as follows:  - CT head noncontrast  - Cardiology consult  - Zofran prn for management of nausea    Discussed with chief, Dr. Enriquez.    Kathy Bowers MD  PGY-2 General Surgery  ACS [id number]

## 2024-03-31 NOTE — PROGRESS NOTES
3/31/24 0949 Transitional Care Coordinator Notes:    Patient awaiting clearance for a possible cholecystectomy in 1-2 days. Will continue to follow for updates.                       Assessment/Plan   Principal Problem:    Cholecystitis    Discharge Plans: discharge to home           Lizy Magaña RN

## 2024-03-31 NOTE — SIGNIFICANT EVENT
Rapid Response Nurse Note   03/31/24 1605   Onset Documentation   Rapid Response Initiated By Physician   Location/Room [facility]  (LT9)   Pager Time 1532   Arrival Time 1542   Level II Called Yes  (Requested assistance regarding recommendations for further antihypertensive medication therapy)   Primary Reason for Call   (Headache 7/10; blurry vision; Hypertension not successfully controlled with Hydralazine IVP)     Rapid Response called by Dr. Bowers (surgical resident) for hypertension, as high as 210 systolic, ongoing today despite multiple doses of Hydralazine IVP, as well as acute HA and blurry vision complaints. Dr. Bowers paged Rapid Response for assistance/recommendations for further antihypertensive medication therapy and for additional assessment of patient's symptoms. Patient awake, alert and oriented, pending surgical intervention for gallbladder, on liquid diet. Maintenance fluids running at 125cc/hr-->discontinued during event and will stay d/c'ed per Dr. Bowers. DACR was telephoned and responded to bedside as well.     Vitals: T 36.8 C; HR 72; RR 16; 195/79; 93% 1L via NC    Patient does wear glasses normally but does not have them on today, he stated the blurry vision is worse than normal and HA is located behind his R eye, 7/10 pain. Patient received Oxycodone ~1350 today for abdominal pain per RN. Patient stated that he is also nauseated--this is not a new symptom given his diagnosis he, and he denies vomiting today. Zofran 4 mg IVP given by Rapid RN @ 1605. Patient does not have facial drop, slurring of speech or inability to move extremities. Pupils 3 mm bilaterally and are round, reactive, brisk. Orders placed for Labetalol 20 mg IVP.     Patient to remain on current division at this time. CT Head orders placed stat. Division RN educated on when to call Rapid or BAT (stroke alert) if symptoms worsen or persist. Patient has Tramadol PO medication for pain.      Vitals end of event: Hr 78; /78; SpO2 96%  1L  Patient verbalized HA pain has decreased and is now tolerable 3/10

## 2024-03-31 NOTE — PROGRESS NOTES
58 yo male with acute cholecystitis.   Still with pain. Tolerating some PO.   Care discussed at the bedside with his wife with his permission.   On exam, chronically ill appearing. NAD.  Given recent CABG, DAPT use and vascular history, will obtain perioperative medicine consult for risk stratification.  Continue ABX and monitor exam. Anticipate cholecystectomy in 1-2 days after Plavix held.

## 2024-03-31 NOTE — CARE PLAN
The patient's goals for the shift include      The clinical goals for the shift include patient will maintain systollic b.p. at  less than 160 after interventions during shift.    Over the shift, the patient did not make progress towards goal. Systollic b.p.s 185, 163, and 184 on three separate occasions overnight. Barriers include ineffective pain management and need for IV maintenance fluids at 125cc/hr.  Routine Hydralazine and Metoprolol administered. Pain interventions ongoing. Recommendations to address barriers include breakthrough pain medication and modify b.p. medications with additional PRN in place.

## 2024-03-31 NOTE — PROGRESS NOTES
58 yo male with acute cholecystitis.   Still with pain. Has been intense pain for 2-3 days.   Tolerating some PO.   On exam, chronically ill appearing. Abd is soft, ND and with RUQ tenderness.   Given recent CABG, DAPT use and vascular history, will obtain perioperative medicine consult for risk stratification.  Continue ABX and monitor exam. Anticipate cholecystectomy in 1-2 days after Plavix held.

## 2024-04-01 ENCOUNTER — APPOINTMENT (OUTPATIENT)
Dept: CARDIOLOGY | Facility: HOSPITAL | Age: 59
End: 2024-04-01
Payer: COMMERCIAL

## 2024-04-01 LAB
ALBUMIN SERPL BCP-MCNC: 3.1 G/DL (ref 3.4–5)
ANION GAP SERPL CALC-SCNC: 12 MMOL/L (ref 10–20)
ATRIAL RATE: 68 BPM
BASOPHILS # BLD AUTO: 0.01 X10*3/UL (ref 0–0.1)
BASOPHILS NFR BLD AUTO: 0.1 %
BNP SERPL-MCNC: 459 PG/ML (ref 0–99)
BUN SERPL-MCNC: 52 MG/DL (ref 6–23)
CALCIUM SERPL-MCNC: 8.4 MG/DL (ref 8.6–10.6)
CARDIAC TROPONIN I PNL SERPL HS: 21 NG/L (ref 0–53)
CHLORIDE SERPL-SCNC: 107 MMOL/L (ref 98–107)
CHLORIDE UR-SCNC: 104 MMOL/L
CHLORIDE/CREATININE (MMOL/G) IN URINE: 216 MMOL/G CREAT (ref 23–275)
CO2 SERPL-SCNC: 25 MMOL/L (ref 21–32)
CREAT SERPL-MCNC: 3.4 MG/DL (ref 0.5–1.3)
CREAT UR-MCNC: 48.1 MG/DL (ref 20–370)
EGFRCR SERPLBLD CKD-EPI 2021: 20 ML/MIN/1.73M*2
EOSINOPHIL # BLD AUTO: 0.38 X10*3/UL (ref 0–0.7)
EOSINOPHIL NFR BLD AUTO: 5.3 %
ERYTHROCYTE [DISTWIDTH] IN BLOOD BY AUTOMATED COUNT: 14 % (ref 11.5–14.5)
GLUCOSE BLD MANUAL STRIP-MCNC: 102 MG/DL (ref 74–99)
GLUCOSE BLD MANUAL STRIP-MCNC: 121 MG/DL (ref 74–99)
GLUCOSE BLD MANUAL STRIP-MCNC: 125 MG/DL (ref 74–99)
GLUCOSE BLD MANUAL STRIP-MCNC: 88 MG/DL (ref 74–99)
GLUCOSE BLD MANUAL STRIP-MCNC: 89 MG/DL (ref 74–99)
GLUCOSE BLD MANUAL STRIP-MCNC: 90 MG/DL (ref 74–99)
GLUCOSE SERPL-MCNC: 116 MG/DL (ref 74–99)
HCT VFR BLD AUTO: 27.6 % (ref 41–52)
HGB BLD-MCNC: 7.9 G/DL (ref 13.5–17.5)
IMM GRANULOCYTES # BLD AUTO: 0.02 X10*3/UL (ref 0–0.7)
IMM GRANULOCYTES NFR BLD AUTO: 0.3 % (ref 0–0.9)
LYMPHOCYTES # BLD AUTO: 0.78 X10*3/UL (ref 1.2–4.8)
LYMPHOCYTES NFR BLD AUTO: 10.9 %
MAGNESIUM SERPL-MCNC: 1.97 MG/DL (ref 1.6–2.4)
MCH RBC QN AUTO: 26.2 PG (ref 26–34)
MCHC RBC AUTO-ENTMCNC: 28.6 G/DL (ref 32–36)
MCV RBC AUTO: 92 FL (ref 80–100)
MONOCYTES # BLD AUTO: 0.77 X10*3/UL (ref 0.1–1)
MONOCYTES NFR BLD AUTO: 10.8 %
NEUTROPHILS # BLD AUTO: 5.18 X10*3/UL (ref 1.2–7.7)
NEUTROPHILS NFR BLD AUTO: 72.6 %
NRBC BLD-RTO: 0 /100 WBCS (ref 0–0)
P AXIS: 79 DEGREES
P OFFSET: 162 MS
P ONSET: 102 MS
PHOSPHATE SERPL-MCNC: 4.6 MG/DL (ref 2.5–4.9)
PLATELET # BLD AUTO: 201 X10*3/UL (ref 150–450)
POTASSIUM SERPL-SCNC: 5 MMOL/L (ref 3.5–5.3)
POTASSIUM UR-SCNC: 15 MMOL/L
POTASSIUM/CREAT UR-RTO: 31 MMOL/G CREAT
PR INTERVAL: 184 MS
Q ONSET: 194 MS
QRS COUNT: 11 BEATS
QRS DURATION: 152 MS
QT INTERVAL: 458 MS
QTC CALCULATION(BAZETT): 487 MS
QTC FREDERICIA: 477 MS
R AXIS: 105 DEGREES
RBC # BLD AUTO: 3.01 X10*6/UL (ref 4.5–5.9)
SODIUM SERPL-SCNC: 139 MMOL/L (ref 136–145)
SODIUM UR-SCNC: 103 MMOL/L
SODIUM/CREAT UR-RTO: 214 MMOL/G CREAT
T AXIS: 46 DEGREES
T OFFSET: 423 MS
VENTRICULAR RATE: 68 BPM
WBC # BLD AUTO: 7.1 X10*3/UL (ref 4.4–11.3)

## 2024-04-01 PROCEDURE — C9113 INJ PANTOPRAZOLE SODIUM, VIA: HCPCS

## 2024-04-01 PROCEDURE — 2500000004 HC RX 250 GENERAL PHARMACY W/ HCPCS (ALT 636 FOR OP/ED)

## 2024-04-01 PROCEDURE — 94640 AIRWAY INHALATION TREATMENT: CPT

## 2024-04-01 PROCEDURE — 80069 RENAL FUNCTION PANEL: CPT

## 2024-04-01 PROCEDURE — 83880 ASSAY OF NATRIURETIC PEPTIDE: CPT

## 2024-04-01 PROCEDURE — 82436 ASSAY OF URINE CHLORIDE: CPT | Performed by: ANESTHESIOLOGY

## 2024-04-01 PROCEDURE — 84484 ASSAY OF TROPONIN QUANT: CPT | Performed by: ANESTHESIOLOGY

## 2024-04-01 PROCEDURE — 83735 ASSAY OF MAGNESIUM: CPT

## 2024-04-01 PROCEDURE — 82947 ASSAY GLUCOSE BLOOD QUANT: CPT

## 2024-04-01 PROCEDURE — 93005 ELECTROCARDIOGRAM TRACING: CPT

## 2024-04-01 PROCEDURE — 2500000001 HC RX 250 WO HCPCS SELF ADMINISTERED DRUGS (ALT 637 FOR MEDICARE OP)

## 2024-04-01 PROCEDURE — 2500000001 HC RX 250 WO HCPCS SELF ADMINISTERED DRUGS (ALT 637 FOR MEDICARE OP): Performed by: STUDENT IN AN ORGANIZED HEALTH CARE EDUCATION/TRAINING PROGRAM

## 2024-04-01 PROCEDURE — 2500000002 HC RX 250 W HCPCS SELF ADMINISTERED DRUGS (ALT 637 FOR MEDICARE OP, ALT 636 FOR OP/ED)

## 2024-04-01 PROCEDURE — 1200000002 HC GENERAL ROOM WITH TELEMETRY DAILY

## 2024-04-01 PROCEDURE — 85025 COMPLETE CBC W/AUTO DIFF WBC: CPT

## 2024-04-01 PROCEDURE — 93010 ELECTROCARDIOGRAM REPORT: CPT | Performed by: INTERNAL MEDICINE

## 2024-04-01 RX ORDER — IPRATROPIUM BROMIDE AND ALBUTEROL SULFATE 2.5; .5 MG/3ML; MG/3ML
3 SOLUTION RESPIRATORY (INHALATION)
Status: DISCONTINUED | OUTPATIENT
Start: 2024-04-02 | End: 2024-04-04

## 2024-04-01 RX ORDER — IPRATROPIUM BROMIDE AND ALBUTEROL SULFATE 2.5; .5 MG/3ML; MG/3ML
3 SOLUTION RESPIRATORY (INHALATION)
Status: DISCONTINUED | OUTPATIENT
Start: 2024-04-01 | End: 2024-04-01

## 2024-04-01 RX ORDER — POLYETHYLENE GLYCOL 3350 17 G/17G
17 POWDER, FOR SOLUTION ORAL DAILY
Status: DISCONTINUED | OUTPATIENT
Start: 2024-04-01 | End: 2024-04-04

## 2024-04-01 RX ORDER — FUROSEMIDE 10 MG/ML
80 INJECTION INTRAMUSCULAR; INTRAVENOUS ONCE
Status: COMPLETED | OUTPATIENT
Start: 2024-04-01 | End: 2024-04-01

## 2024-04-01 RX ORDER — FUROSEMIDE 10 MG/ML
80 INJECTION INTRAMUSCULAR; INTRAVENOUS ONCE
Status: DISCONTINUED | OUTPATIENT
Start: 2024-04-01 | End: 2024-04-01

## 2024-04-01 RX ORDER — BISACODYL 10 MG/1
10 SUPPOSITORY RECTAL DAILY
Status: DISCONTINUED | OUTPATIENT
Start: 2024-04-01 | End: 2024-04-08 | Stop reason: HOSPADM

## 2024-04-01 RX ORDER — LABETALOL HYDROCHLORIDE 5 MG/ML
20 INJECTION, SOLUTION INTRAVENOUS ONCE
Status: COMPLETED | OUTPATIENT
Start: 2024-04-01 | End: 2024-04-01

## 2024-04-01 RX ADMIN — HYDRALAZINE HYDROCHLORIDE 100 MG: 50 TABLET ORAL at 09:01

## 2024-04-01 RX ADMIN — PIPERACILLIN SODIUM AND TAZOBACTAM SODIUM 2.25 G: 2; .25 INJECTION, SOLUTION INTRAVENOUS at 02:26

## 2024-04-01 RX ADMIN — ASPIRIN 81 MG CHEWABLE TABLET 81 MG: 81 TABLET CHEWABLE at 09:01

## 2024-04-01 RX ADMIN — HYDRALAZINE HYDROCHLORIDE 100 MG: 50 TABLET ORAL at 21:42

## 2024-04-01 RX ADMIN — OXYCODONE HYDROCHLORIDE 5 MG: 5 TABLET ORAL at 09:58

## 2024-04-01 RX ADMIN — HEPARIN SODIUM 5000 UNITS: 5000 INJECTION INTRAVENOUS; SUBCUTANEOUS at 15:27

## 2024-04-01 RX ADMIN — OXYCODONE HYDROCHLORIDE 5 MG: 5 TABLET ORAL at 15:27

## 2024-04-01 RX ADMIN — PANTOPRAZOLE SODIUM 40 MG: 40 INJECTION, POWDER, FOR SOLUTION INTRAVENOUS at 21:42

## 2024-04-01 RX ADMIN — PIPERACILLIN SODIUM AND TAZOBACTAM SODIUM 2.25 G: 2; .25 INJECTION, SOLUTION INTRAVENOUS at 05:26

## 2024-04-01 RX ADMIN — LABETALOL HYDROCHLORIDE 20 MG: 5 INJECTION, SOLUTION INTRAVENOUS at 17:36

## 2024-04-01 RX ADMIN — FUROSEMIDE 80 MG: 10 INJECTION, SOLUTION INTRAVENOUS at 15:27

## 2024-04-01 RX ADMIN — TORSEMIDE 40 MG: 20 TABLET ORAL at 09:01

## 2024-04-01 RX ADMIN — IPRATROPIUM BROMIDE AND ALBUTEROL SULFATE 3 ML: .5; 3 SOLUTION RESPIRATORY (INHALATION) at 20:56

## 2024-04-01 RX ADMIN — HYDRALAZINE HYDROCHLORIDE 100 MG: 50 TABLET ORAL at 15:27

## 2024-04-01 RX ADMIN — HEPARIN SODIUM 5000 UNITS: 5000 INJECTION INTRAVENOUS; SUBCUTANEOUS at 02:26

## 2024-04-01 RX ADMIN — Medication 1000 UNITS: at 09:01

## 2024-04-01 RX ADMIN — PANTOPRAZOLE SODIUM 40 MG: 40 INJECTION, POWDER, FOR SOLUTION INTRAVENOUS at 09:01

## 2024-04-01 RX ADMIN — SODIUM CHLORIDE, POTASSIUM CHLORIDE, SODIUM LACTATE AND CALCIUM CHLORIDE 125 ML/HR: 600; 310; 30; 20 INJECTION, SOLUTION INTRAVENOUS at 05:27

## 2024-04-01 RX ADMIN — PIPERACILLIN SODIUM AND TAZOBACTAM SODIUM 2.25 G: 2; .25 INJECTION, SOLUTION INTRAVENOUS at 12:48

## 2024-04-01 RX ADMIN — Medication 1000 UNITS: at 21:42

## 2024-04-01 RX ADMIN — METOPROLOL TARTRATE 50 MG: 50 TABLET, FILM COATED ORAL at 21:42

## 2024-04-01 RX ADMIN — METOPROLOL TARTRATE 50 MG: 50 TABLET, FILM COATED ORAL at 09:01

## 2024-04-01 RX ADMIN — HEPARIN SODIUM 5000 UNITS: 5000 INJECTION INTRAVENOUS; SUBCUTANEOUS at 09:01

## 2024-04-01 RX ADMIN — MELATONIN 3 MG: 3 TAB ORAL at 21:42

## 2024-04-01 RX ADMIN — IPRATROPIUM BROMIDE AND ALBUTEROL SULFATE 3 ML: .5; 3 SOLUTION RESPIRATORY (INHALATION) at 15:21

## 2024-04-01 RX ADMIN — PIPERACILLIN SODIUM AND TAZOBACTAM SODIUM 2.25 G: 2; .25 INJECTION, SOLUTION INTRAVENOUS at 17:36

## 2024-04-01 ASSESSMENT — COGNITIVE AND FUNCTIONAL STATUS - GENERAL
DRESSING REGULAR UPPER BODY CLOTHING: A LITTLE
DRESSING REGULAR UPPER BODY CLOTHING: A LITTLE
MOVING FROM LYING ON BACK TO SITTING ON SIDE OF FLAT BED WITH BEDRAILS: A LITTLE
TURNING FROM BACK TO SIDE WHILE IN FLAT BAD: A LITTLE
DRESSING REGULAR UPPER BODY CLOTHING: A LITTLE
DRESSING REGULAR LOWER BODY CLOTHING: A LITTLE
HELP NEEDED FOR BATHING: A LITTLE
CLIMB 3 TO 5 STEPS WITH RAILING: TOTAL
MOVING TO AND FROM BED TO CHAIR: A LITTLE
MOVING TO AND FROM BED TO CHAIR: A LITTLE
TURNING FROM BACK TO SIDE WHILE IN FLAT BAD: A LITTLE
MOBILITY SCORE: 13
DRESSING REGULAR UPPER BODY CLOTHING: A LITTLE
TURNING FROM BACK TO SIDE WHILE IN FLAT BAD: A LITTLE
CLIMB 3 TO 5 STEPS WITH RAILING: TOTAL
MOVING FROM LYING ON BACK TO SITTING ON SIDE OF FLAT BED WITH BEDRAILS: A LITTLE
MOBILITY SCORE: 13
DRESSING REGULAR UPPER BODY CLOTHING: A LITTLE
MOBILITY SCORE: 13
WALKING IN HOSPITAL ROOM: TOTAL
CLIMB 3 TO 5 STEPS WITH RAILING: TOTAL
DAILY ACTIVITIY SCORE: 20
STANDING UP FROM CHAIR USING ARMS: A LOT
HELP NEEDED FOR BATHING: A LITTLE
DRESSING REGULAR UPPER BODY CLOTHING: A LITTLE
TURNING FROM BACK TO SIDE WHILE IN FLAT BAD: A LITTLE
MOVING TO AND FROM BED TO CHAIR: A LITTLE
STANDING UP FROM CHAIR USING ARMS: A LOT
TURNING FROM BACK TO SIDE WHILE IN FLAT BAD: A LITTLE
STANDING UP FROM CHAIR USING ARMS: A LOT
MOVING FROM LYING ON BACK TO SITTING ON SIDE OF FLAT BED WITH BEDRAILS: A LITTLE
MOVING TO AND FROM BED TO CHAIR: A LITTLE
DAILY ACTIVITIY SCORE: 20
TOILETING: A LITTLE
MOVING TO AND FROM BED TO CHAIR: A LITTLE
MOVING FROM LYING ON BACK TO SITTING ON SIDE OF FLAT BED WITH BEDRAILS: A LITTLE
STANDING UP FROM CHAIR USING ARMS: A LOT
TOILETING: A LITTLE
DRESSING REGULAR LOWER BODY CLOTHING: A LITTLE
WALKING IN HOSPITAL ROOM: TOTAL
CLIMB 3 TO 5 STEPS WITH RAILING: TOTAL
WALKING IN HOSPITAL ROOM: TOTAL
MOVING TO AND FROM BED TO CHAIR: A LITTLE
DRESSING REGULAR LOWER BODY CLOTHING: A LITTLE
TURNING FROM BACK TO SIDE WHILE IN FLAT BAD: A LITTLE
DAILY ACTIVITIY SCORE: 20
DRESSING REGULAR UPPER BODY CLOTHING: A LITTLE
DAILY ACTIVITIY SCORE: 20
MOBILITY SCORE: 13
MOVING TO AND FROM BED TO CHAIR: A LITTLE
DRESSING REGULAR LOWER BODY CLOTHING: A LITTLE
STANDING UP FROM CHAIR USING ARMS: A LOT
WALKING IN HOSPITAL ROOM: TOTAL
TOILETING: A LITTLE
STANDING UP FROM CHAIR USING ARMS: A LOT
WALKING IN HOSPITAL ROOM: TOTAL
STANDING UP FROM CHAIR USING ARMS: A LOT
TURNING FROM BACK TO SIDE WHILE IN FLAT BAD: A LITTLE
MOVING TO AND FROM BED TO CHAIR: A LITTLE
MOVING TO AND FROM BED TO CHAIR: A LITTLE
CLIMB 3 TO 5 STEPS WITH RAILING: TOTAL
TOILETING: A LITTLE
HELP NEEDED FOR BATHING: A LITTLE
MOVING FROM LYING ON BACK TO SITTING ON SIDE OF FLAT BED WITH BEDRAILS: A LITTLE
MOVING FROM LYING ON BACK TO SITTING ON SIDE OF FLAT BED WITH BEDRAILS: A LITTLE
STANDING UP FROM CHAIR USING ARMS: A LOT
WALKING IN HOSPITAL ROOM: TOTAL
DRESSING REGULAR LOWER BODY CLOTHING: A LITTLE
STANDING UP FROM CHAIR USING ARMS: A LOT
MOBILITY SCORE: 13
MOBILITY SCORE: 13
DAILY ACTIVITIY SCORE: 20
STANDING UP FROM CHAIR USING ARMS: A LOT
DRESSING REGULAR LOWER BODY CLOTHING: A LITTLE
WALKING IN HOSPITAL ROOM: TOTAL
MOBILITY SCORE: 13
MOBILITY SCORE: 13
MOVING FROM LYING ON BACK TO SITTING ON SIDE OF FLAT BED WITH BEDRAILS: A LITTLE
TURNING FROM BACK TO SIDE WHILE IN FLAT BAD: A LITTLE
TOILETING: A LITTLE
CLIMB 3 TO 5 STEPS WITH RAILING: TOTAL
TOILETING: A LITTLE
CLIMB 3 TO 5 STEPS WITH RAILING: TOTAL
DRESSING REGULAR LOWER BODY CLOTHING: A LITTLE
HELP NEEDED FOR BATHING: A LITTLE
CLIMB 3 TO 5 STEPS WITH RAILING: TOTAL
HELP NEEDED FOR BATHING: A LITTLE
HELP NEEDED FOR BATHING: A LITTLE
DRESSING REGULAR LOWER BODY CLOTHING: A LITTLE
DRESSING REGULAR UPPER BODY CLOTHING: A LITTLE
WALKING IN HOSPITAL ROOM: TOTAL
HELP NEEDED FOR BATHING: A LITTLE
TURNING FROM BACK TO SIDE WHILE IN FLAT BAD: A LITTLE
HELP NEEDED FOR BATHING: A LITTLE
MOBILITY SCORE: 13
MOVING FROM LYING ON BACK TO SITTING ON SIDE OF FLAT BED WITH BEDRAILS: A LITTLE
WALKING IN HOSPITAL ROOM: TOTAL
MOVING TO AND FROM BED TO CHAIR: A LITTLE
HELP NEEDED FOR BATHING: A LITTLE
CLIMB 3 TO 5 STEPS WITH RAILING: TOTAL
HELP NEEDED FOR BATHING: A LITTLE
DAILY ACTIVITIY SCORE: 20
DRESSING REGULAR LOWER BODY CLOTHING: A LITTLE
DRESSING REGULAR LOWER BODY CLOTHING: A LITTLE
DRESSING REGULAR UPPER BODY CLOTHING: A LITTLE
TOILETING: A LITTLE
DAILY ACTIVITIY SCORE: 20
TOILETING: A LITTLE
TURNING FROM BACK TO SIDE WHILE IN FLAT BAD: A LITTLE
TOILETING: A LITTLE
DRESSING REGULAR UPPER BODY CLOTHING: A LITTLE
DAILY ACTIVITIY SCORE: 20
MOVING FROM LYING ON BACK TO SITTING ON SIDE OF FLAT BED WITH BEDRAILS: A LITTLE
DAILY ACTIVITIY SCORE: 20
MOVING FROM LYING ON BACK TO SITTING ON SIDE OF FLAT BED WITH BEDRAILS: A LITTLE
TOILETING: A LITTLE
CLIMB 3 TO 5 STEPS WITH RAILING: TOTAL
WALKING IN HOSPITAL ROOM: TOTAL

## 2024-04-01 ASSESSMENT — PAIN - FUNCTIONAL ASSESSMENT
PAIN_FUNCTIONAL_ASSESSMENT: 0-10

## 2024-04-01 ASSESSMENT — ENCOUNTER SYMPTOMS
COUGH: 1
NUMBNESS: 1
ABDOMINAL PAIN: 1
SHORTNESS OF BREATH: 1
BLURRED VISION: 0
WEIGHT GAIN: 1
HEMATURIA: 1
NERVOUS/ANXIOUS: 1

## 2024-04-01 ASSESSMENT — PAIN SCALES - GENERAL
PAINLEVEL_OUTOF10: 5 - MODERATE PAIN
PAINLEVEL_OUTOF10: 5 - MODERATE PAIN
PAINLEVEL_OUTOF10: 0 - NO PAIN
PAINLEVEL_OUTOF10: 9
PAINLEVEL_OUTOF10: 10 - WORST POSSIBLE PAIN
PAINLEVEL_OUTOF10: 7
PAINLEVEL_OUTOF10: 9

## 2024-04-01 ASSESSMENT — PAIN DESCRIPTION - DESCRIPTORS: DESCRIPTORS: ACHING

## 2024-04-01 NOTE — PROGRESS NOTES
[facility]  ACUTE CARE SURGERY - PROGRESS NOTE    Patient Name: Humphrey Waddell  MRN: [id number]  Admit Date: 329  : 1965  AGE: 59 y.o.   GENDER: male  ==============================================================================  TODAY'S ASSESSMENT AND PLAN OF CARE:  59-year-old male with past medical history significant for CABG with vein graft x 3 in 2023 (postoperative course complicated by mediastinal exploration and evacuation of pericardial tamponade/repair of vein graft), hyperlipidemia, hypertension, CKD, history of CVA  with no deficits, type 2 diabetes, left AKA admitted directly from outside hospital with concern for cholecystitis on HIDA scan (cystic duct obstruction and acute cholecystitis revealed on scan).  Patient is otherwise comfortable and asymptomatic at this time.     Given recent CABG, DAPT use and vascular history, will obtain perioperative medicine consult for risk stratification.    Plan:  Neuro: Tylenol, tramadol, Oxy as needed  CV:   - Home metoprolol, home torsemide  - s/p Labetalol x1 for HTN emergency 3/31  Respiratory: Incentive spirometry  GI:   - N.p.o., insulin sliding scale, Protonix, Zofran, LR maintenance IVF  - C/S perioperative medicine for risk stratification  - Anticipate cholecystectomy   : Voiding  Heme: Holding home Plavix, continue home ASA81  ID: [email or web address]  PPx: Subcu heparin, SCDs  ==============================================================================  CHIEF COMPLAINT / EVENTS LAST 24HRS / HPI:  Ovn 3/31, code white called on patient for SBP~ 200's w/ c/o R-sided HA, blurry vision, and nausea. Decision made to give patient 20 IV labetalol to further manage HTN. After administration of just 10 mg, patient's BP responded well, coming down to 168/84. CT head noncontrast negative.     This morning patient doing well. SBP ~150s-160s    MEDICAL HISTORY / ROS:   Admission history and ROS reviewed. Pertinent changes as  follows:  none    PHYSICAL EXAM:  Heart Rate:  [62-85]   Temp:  [36.1 °C (97 °F)-37.1 °C (98.8 °F)]   Resp:  [16-20]   BP: (125-210)/(74-96)   SpO2:  [92 %-98 %]     Physical Exam  Constitutional:       General: He is not in acute distress.     Appearance: Normal appearance. He is obese. He is not ill-appearing, toxic-appearing or diaphoretic.   HENT:      Head: Normocephalic.      Right Ear: External ear normal.      Left Ear: External ear normal.      Nose: Nose normal.      Mouth/Throat:      Mouth: Mucous membranes are moist.   Eyes:      Pupils: Pupils are equal, round, and reactive to light.   Cardiovascular:      Rate and Rhythm: Normal rate.   Pulmonary: /     Effort: Pulmonary effort is normal.      Comments: On RA 99%  Abdominal:      General: Abdomen is flat. There is no distension.      Palpations: Abdomen is soft. There is no mass.      Comments: Mild tenderness to deep palpation RUQ   Musculoskeletal:      Cervical back: Normal range of motion.      Comments: LLE AKA   Skin:     Comments: Well healed sternotomy site   Neurological:      General: No focal deficit present.      Mental Status: He is alert and oriented to person, place, and time. Mental status is at baseline.   Psychiatric:         Mood and Affect: Mood normal.         Behavior: Behavior normal.         Thought Content: Thought content normal.         Judgment: Judgment normal.      IMAGING SUMMARY:  (summary of new imaging findings, not a copy of dictation)  3/29 HIDA  IMPRESSION:  1. Nonvisualization of radiotracer activity in the gallbladder by 2  hours after radiotracer injection, suggestive of cystic duct  obstruction and acute cholecystitis.     LABS:  Results from last 7 days   Lab Units 04/01/24  0602 03/31/24  0637 03/30/24  0356 03/29/24  0549 03/28/24  1412   WBC AUTO x10*3/uL 7.1 8.8 9.3   < > 10.0   HEMOGLOBIN g/dL 7.9* 8.5* 8.0*   < > 9.6*   HEMATOCRIT % 27.6* 28.2* 25.0*   < > 31.4*   PLATELETS AUTO x10*3/uL 201 218 213   <  > 248   NEUTROS PCT AUTO % 72.6 70.4  --   --  70.7   LYMPHS PCT AUTO % 10.9 11.6  --   --  12.6   MONOS PCT AUTO % 10.8 10.0  --   --  10.2   EOS PCT AUTO % 5.3 7.2  --   --  5.9    < > = values in this interval not displayed.         Results from last 7 days   Lab Units 04/01/24  0602 03/31/24  0637 03/30/24 0356 03/29/24  0549 03/28/24 2158   SODIUM mmol/L 139 137 138 139  --    POTASSIUM mmol/L 5.0 5.0 4.8 5.1  --    CHLORIDE mmol/L 107 107 107 108*  --    CO2 mmol/L 25 21 23 23*  --    BUN mg/dL 52* 57* 65* 65*  --    CREATININE mg/dL 3.40* 3.59* 3.48* 3.30*  --    CALCIUM mg/dL 8.4* 8.3* 8.2* 8.2*  --    PROTEIN TOTAL g/dL  --   --  6.1* 6.2 6.7   BILIRUBIN TOTAL mg/dL  --   --  0.4 0.3 0.3   ALK PHOS U/L  --   --  49 63 64   ALT U/L  --   --  10 12 14   AST U/L  --   --  9 12 18   GLUCOSE mg/dL 116* 89 101* 151*  --      Results from last 7 days   Lab Units 03/30/24  0356 03/29/24 0549 03/28/24 2158   BILIRUBIN TOTAL mg/dL 0.4 0.3 0.3   BILIRUBIN DIRECT mg/dL  --   --  <0.2           I have reviewed all medications, laboratory results, and imaging pertinent for today's encounter.

## 2024-04-01 NOTE — CARE PLAN
The patient's goals for the shift include      The clinical goals for the shift include Patient will remain safe and free from injury throughout shift.      Problem: Fall/Injury  Goal: Not fall by end of shift  Outcome: Progressing

## 2024-04-01 NOTE — CARE PLAN
The patient's goals for the shift include    Problem: Fall/Injury  Goal: Not fall by end of shift  Outcome: Progressing     Problem: Skin  Goal: Decreased wound size/increased tissue granulation at next dressing change  Outcome: Progressing       The clinical goals for the shift include pt will remain safe during shift

## 2024-04-01 NOTE — CARE PLAN
Problem: Skin  Goal: Decreased wound size/increased tissue granulation at next dressing change  Outcome: Progressing  Flowsheets (Taken 4/1/2024 0410)  Decreased wound size/increased tissue granulation at next dressing change: Promote sleep for wound healing   The patient's goals for the shift include      The clinical goals for the shift include pt will remain safe during shift

## 2024-04-01 NOTE — CONSULTS
"Reason For Consult  Perioperative risk stratification    Subjective   Humphrey Waddell is a 59 y.o. male who is admitted for with cystic duct obstruction on HIDA scan raising concern for cholecystitis. This consultation is requested for preoperative evaluation.     He initially presented to [facility] with complaints of chest pain, shortness of breath, nausea, vomiting and decreased p.o. intake for approximately 2 weeks.  A cardiac workup at that time was unremarkable for any ischemic disease. Chest imaging was notable for left-sided pleural effusion and a left lower lobe infiltrate versus atelectasis for which he was started on Zosyn for suspected pneumonia. A right upper quadrant ultrasound was concerning for cholelithiasis, which prompted a HIDA scan revealing cystic duct obstruction and subsequent transfer to [facility].     Prior to this hospitalization, patient's wife notes he was having issues acquiring his torsemide prescription due to issues acquiring it and therefore began taking a higher dose of furosemide which subsequently ran out a few days before presenting to OSH for this episode.  Otherwise patient and his wife endorsed compliance with his dual antiplatelet therapy, as well as his insulin regimen. His last dose of Plavix was 3/29.     The patient does also endorse being prescribed an albuterol inhaler for wheezing in March of this year from his PCP, but is not sure the last time he has had to use it. Per wife, he has mentioned he would but she does not believe that he has.     Presently he still endorses chest pain, shortness of breath as well as feeling \"swollen\". Baseline weight is 260-270lbs at the time of discharge from rehab. At home, he primarily uses a wheelchair to move around. He states that his wife helps him with ADLs including grocery shopping, as well as getting his prescriptions.    At baseline patient has the following medical issues:  -CAD s/p CABG (11/23; LIMA ->LAD, SVG ->OM1, SVG ->PDA) " complicated by takeback and PCI to OM2 (4/23)  -T2DM with neuropathy on insulin  -CVA (2020, no residual deficits)  -L sided AKA secondary to cellulitis and PAD  -HTN   -HLD    Review of Systems  Review of Systems   Constitutional: Positive for weight gain.   HENT: Negative.     Eyes:  Negative for blurred vision.   Cardiovascular:  Positive for chest pain and leg swelling.   Respiratory:  Positive for cough and shortness of breath.    Gastrointestinal:  Positive for abdominal pain.   Genitourinary:  Positive for hematuria.   Neurological:  Positive for numbness.   Psychiatric/Behavioral:  The patient is nervous/anxious.      General: Laying in bed wearing nasal cannula.  Eyes: EOMI  ENMT: no apparent injury, no lesions seen, MMM  Head/neck: NCAT  Cardiac: regular rate in chart  Pulm: Wheezing and crackles present over the left side. On 2L NC. Conversational dyspnea.  GI: Soft. Negative Galicia sign. No rebound tenderness or guarding.   :   Msk: CYR  Extremities: 2-3+ pitting edema in the RLE. LLE amputation site appears healthy.  Skin: warm, dry, no lesions noted  Neuro: AOx2 to place and person. Not oriented to time. Unsure of scheduled surgery or the reason for his current hospitalization.  Psych: Anxious, frequent apologies    ECG: NSR with complete RBBB, chronic. No acute ischemic changes.     Echocardiogram:   Results for orders placed during the hospital encounter of 11/13/23    Transthoracic Echo (TTE) Complete    [facility], [address] [place] [address]  Tel [phone] and Fax [phone]    TRANSTHORACIC ECHOCARDIOGRAM REPORT      Patient Name:      LEXIE NULL          Reading Physician:   [id number] Richmond Smith MD  Study Date:        11/13/2023         Ordering Provider:   [id number] GILLIAN EDGAR  MRN/PID:           [id number]           Fellow:  Accession#:        [id number]       Nurse:  Date of Birth/Age: 1965 / 58      Sonographer:         Reuben Staley  RDCS  years  Gender:            M                  Additional Staff:  Height:            193.00 cm          Admit Date:          11/13/2023  Weight:            117.00 kg          Admission Status:    Inpatient - STAT  BSA:               2.47 m2            Encounter#:          [id number]  Department [facility]: [facility]  Blood Pressure: 56 /35 mmHg    Study Type:    TRANSTHORACIC ECHO (TTE) COMPLETE  Diagnosis/ICD: Non ST elevation (NSTEMI) myocardial infarction-I21.4  Indication:    NSTEMI  CPT Code:      Doppler Limited-74181; Color Doppler-49428; Echo Limited-72344    Patient History:  Pertinent History: CAD. S/p CABG;hypotension.    Study Detail: The following Echo studies were performed: 2D, M-Mode, Doppler and  color flow. Technically challenging study due to body habitus.  Definity used as a contrast agent for endocardial border  definition. Total contrast used for this procedure was 3 mL via IV  push.      PHYSICIAN INTERPRETATION:  Left Ventricle: The left ventricular systolic function is mildly decreased, with an estimated ejection fraction of 45-50%. There are multiple wall motion abnormalities. The left ventricular cavity size is normal. Left ventricular diastolic filling was not assessed.  LV Wall Scoring:  The mid and apical anterior wall, mid anterolateral segment, apical lateral  segment, and apex are hypokinetic. All remaining scored segments are normal.    Left Atrium: The left atrium is normal in size.  Right Ventricle: The right ventricle is normal in size. There is reduced right ventricular systolic function.  Right Atrium: The right atrium is normal in size.  Aortic Valve: The aortic valve appears structurally normal. There is no evidence of aortic valve regurgitation.  Mitral Valve: The mitral valve is normal in structure. There is no evidence of mitral valve regurgitation.  Tricuspid Valve: The tricuspid valve is structurally normal. No evidence of tricuspid regurgitation.  Pulmonic Valve: The  pulmonic valve was not assessed. Pulmonic valve regurgitation was not assessed.  Pericardium: There is a small pericardial effusion posterior to the left ventricle.  Aorta: The aortic root is normal.  In comparison to the previous echocardiogram(s): Compared with study from 11/13/2023, there is a reduction in LVEF with regional wall motion abnormalities.      CONCLUSIONS:  1. Left ventricular systolic function is mildly decreased with a 45-50% estimated ejection fraction.  2. Mid and apical anterior wall, mid anterolateral segment, apical lateral segment, and apex are abnormal.  3. There are multiple wall motion abnormalities.  4. Poorly visualized anatomical structures due to suboptimal image quality.  5. There is reduced right ventricular systolic function.    QUANTITATIVE DATA SUMMARY:    [id number] Richmond Smith MD  Electronically signed on 11/13/2023 at 5:45:15 PM      Wall Scoring        ** Final **     Imaging  Chest x-ray: As compared to CXR from 12/15/23, there is increased density around the L lower lobe.     CT CAP: Moderate to large pleural effusion on the left side, occupying the entire posterior left side on a coronal slice. There are atelectatic changes of the left lower lobe.       Lab Review   Lab Results   Component Value Date     04/01/2024    K 5.0 04/01/2024     04/01/2024    CO2 25 04/01/2024    BUN 52 (H) 04/01/2024    CREATININE 3.40 (H) 04/01/2024    GLUCOSE 116 (H) 04/01/2024    CALCIUM 8.4 (L) 04/01/2024     Lab Results   Component Value Date    WBC 7.1 04/01/2024    HGB 7.9 (L) 04/01/2024    HCT 27.6 (L) 04/01/2024    MCV 92 04/01/2024     04/01/2024        Assessment/Plan   59 y.o. male with planned surgery as above.    Known risk factors for perioperative complications: Renal dysfunction, heart failure  Difficulty with intubation is not anticipated.    Cardiac Risk Estimation:     Surgery: Cholecystectomy     Timing- Time sensitive    On my evaluation pt does not have any  evidence of ACS/Fatal arrhythmias/ Severe valvular disease. He does however have evidence of acute decompensated heart failure.    Most recent EKG reviewed and it shows NSR with complete RBBB, chronic. No acute ischemic changes.   Most recent Echo report reviewed and it shows normal LVEF without wall motion abnormalities.     RCRI risk score is 6  Based on [facility] activity Status Index the Functional Capacity is < 4METS    Based on above info pt is not currently optimized to proceed with surgery.    Ensure pt received Beta blocker on the day of surgery     1. Preoperative workup as follows echocardiography to exclude impaired ventricular function.  2. Change in medication regimen before surgery: increase dose of diuretics  3. Prophylaxis for cardiac events with perioperative beta-blockers:  continue .  4. Invasive hemodynamic monitoring perioperatively: at the discretion of anesthesiologist.  5. Deep vein thrombosis prophylaxis postoperatively:regimen to be chosen by surgical team.  6. Surveillance for postoperative MI with ECG immediately postoperatively and on postoperative days 1 and 2 AND troponin levels 24 hours postoperatively and on day 4 or hospital discharge (whichever comes first): strongly advised.  7. Other measures: Postoperative incentive spirometry to prevent pneumonia.  Adjustment of dosing of applicable medications for renal insufficiency.    #Somnolence  -Arousable to verbal stimulation however unable to sustain conversation  -Patient has received approx 45mg of oxycodone over the past 48 hours, which in the context of renal impairment may be accumulating. Given new O2 requirement, it may be beneficial to limit narcotic usage if patient continues to remain somnolent    #CAD s/p CABG (11/23; LIMA ->LAD, SVG ->OM1, SVG ->PDA) complicated by takeback and PCI to OM2 (4/23)  #PAD  #Acute decompensated heart failure  -On DAPT with Plavix and ASA, last dose of Plavix was 3/29.   -EKG shows chronic changes,  and post takeback echo reveals normal LVEF without wall motion abnormalities.  -Baseline BNP in the 700s, elevated to 4000s at [facility]  -Per pt's wife, baseline weight post discharge from rehab was 250-260lbs. Currently 284lbs.   -Pitting edema in RLE 2-3+  -Recommend adding on 80mg IV lasix today to diurese 1-1.5L and strict I&Os  -Would also obtain a repeat limited echo to assess LVEF    #New hypoxia, currently on 2L NC  -Imaging with evidence of mod-large left sided pleural effusion and left lower lobe atelectatic changes  -Hypoxia likely multifactorial   -Volume overload/atelectasis from pleural effusion vs positioning in bed   -Narcotic usage may potentially be a factor as well  -Recommend incentive spirometry and OOB, duonebs q6hrs for 48hrs, multimodal pain management  -In the setting of new onset hypoxia and LE edema, would recommend a LE duplex US to r/o thrombus  -Would also recommend a left sided thoracentesis on the evening of 4/3 (5 day Plavix washout) to drain pleural effusion prior to OR    #IDANIA on CKD stage IV (baseline GFR 22-27)  -Likely related to decreased perfusion from ADHF  -Urine electrolytes pending    #Anemia of chronic disease  -Anemia stable, at baseline     At this time, the patient is not medically optimized for this surgery    Note not final until signed by attending    Merlin Gomez, DO

## 2024-04-01 NOTE — NURSING NOTE
Patient c/o chest head pain 7/10/ pain medication given and MD was called. BS done ( 89) VS: 189/81, 71 HR; 96% on 3L O2 . Patient resting at this time. Bed change and external cath. Placed.

## 2024-04-02 ENCOUNTER — APPOINTMENT (OUTPATIENT)
Dept: VASCULAR MEDICINE | Facility: HOSPITAL | Age: 59
End: 2024-04-02
Payer: COMMERCIAL

## 2024-04-02 LAB
ALBUMIN SERPL BCP-MCNC: 3.1 G/DL (ref 3.4–5)
ALBUMIN SERPL BCP-MCNC: 3.2 G/DL (ref 3.4–5)
ANION GAP SERPL CALC-SCNC: 13 MMOL/L (ref 10–20)
ANION GAP SERPL CALC-SCNC: 15 MMOL/L (ref 10–20)
BASOPHILS # BLD AUTO: 0.02 X10*3/UL (ref 0–0.1)
BASOPHILS NFR BLD AUTO: 0.3 %
BUN SERPL-MCNC: 49 MG/DL (ref 6–23)
BUN SERPL-MCNC: 50 MG/DL (ref 6–23)
CALCIUM SERPL-MCNC: 8.5 MG/DL (ref 8.6–10.6)
CALCIUM SERPL-MCNC: 8.8 MG/DL (ref 8.6–10.6)
CHLORIDE SERPL-SCNC: 102 MMOL/L (ref 98–107)
CHLORIDE SERPL-SCNC: 106 MMOL/L (ref 98–107)
CO2 SERPL-SCNC: 25 MMOL/L (ref 21–32)
CO2 SERPL-SCNC: 27 MMOL/L (ref 21–32)
CREAT SERPL-MCNC: 3.64 MG/DL (ref 0.5–1.3)
CREAT SERPL-MCNC: 3.74 MG/DL (ref 0.5–1.3)
EGFRCR SERPLBLD CKD-EPI 2021: 18 ML/MIN/1.73M*2
EGFRCR SERPLBLD CKD-EPI 2021: 18 ML/MIN/1.73M*2
EOSINOPHIL # BLD AUTO: 0.37 X10*3/UL (ref 0–0.7)
EOSINOPHIL NFR BLD AUTO: 5.7 %
ERYTHROCYTE [DISTWIDTH] IN BLOOD BY AUTOMATED COUNT: 13.9 % (ref 11.5–14.5)
GLUCOSE BLD MANUAL STRIP-MCNC: 123 MG/DL (ref 74–99)
GLUCOSE BLD MANUAL STRIP-MCNC: 74 MG/DL (ref 74–99)
GLUCOSE BLD MANUAL STRIP-MCNC: 79 MG/DL (ref 74–99)
GLUCOSE BLD MANUAL STRIP-MCNC: 82 MG/DL (ref 74–99)
GLUCOSE BLD MANUAL STRIP-MCNC: 83 MG/DL (ref 74–99)
GLUCOSE BLD MANUAL STRIP-MCNC: 85 MG/DL (ref 74–99)
GLUCOSE SERPL-MCNC: 131 MG/DL (ref 74–99)
GLUCOSE SERPL-MCNC: 87 MG/DL (ref 74–99)
HCT VFR BLD AUTO: 25.1 % (ref 41–52)
HGB BLD-MCNC: 7.7 G/DL (ref 13.5–17.5)
IMM GRANULOCYTES # BLD AUTO: 0.01 X10*3/UL (ref 0–0.7)
IMM GRANULOCYTES NFR BLD AUTO: 0.2 % (ref 0–0.9)
LYMPHOCYTES # BLD AUTO: 0.78 X10*3/UL (ref 1.2–4.8)
LYMPHOCYTES NFR BLD AUTO: 12 %
MAGNESIUM SERPL-MCNC: 1.86 MG/DL (ref 1.6–2.4)
MAGNESIUM SERPL-MCNC: 1.87 MG/DL (ref 1.6–2.4)
MCH RBC QN AUTO: 27.1 PG (ref 26–34)
MCHC RBC AUTO-ENTMCNC: 30.7 G/DL (ref 32–36)
MCV RBC AUTO: 88 FL (ref 80–100)
MONOCYTES # BLD AUTO: 0.64 X10*3/UL (ref 0.1–1)
MONOCYTES NFR BLD AUTO: 9.9 %
NEUTROPHILS # BLD AUTO: 4.67 X10*3/UL (ref 1.2–7.7)
NEUTROPHILS NFR BLD AUTO: 71.9 %
NRBC BLD-RTO: 0 /100 WBCS (ref 0–0)
PHOSPHATE SERPL-MCNC: 4.3 MG/DL (ref 2.5–4.9)
PHOSPHATE SERPL-MCNC: 4.7 MG/DL (ref 2.5–4.9)
PLATELET # BLD AUTO: 182 X10*3/UL (ref 150–450)
POTASSIUM SERPL-SCNC: 4.6 MMOL/L (ref 3.5–5.3)
POTASSIUM SERPL-SCNC: 4.7 MMOL/L (ref 3.5–5.3)
RBC # BLD AUTO: 2.84 X10*6/UL (ref 4.5–5.9)
SODIUM SERPL-SCNC: 139 MMOL/L (ref 136–145)
SODIUM SERPL-SCNC: 139 MMOL/L (ref 136–145)
WBC # BLD AUTO: 6.5 X10*3/UL (ref 4.4–11.3)

## 2024-04-02 PROCEDURE — 94640 AIRWAY INHALATION TREATMENT: CPT

## 2024-04-02 PROCEDURE — 82947 ASSAY GLUCOSE BLOOD QUANT: CPT

## 2024-04-02 PROCEDURE — 36415 COLL VENOUS BLD VENIPUNCTURE: CPT

## 2024-04-02 PROCEDURE — 2500000004 HC RX 250 GENERAL PHARMACY W/ HCPCS (ALT 636 FOR OP/ED)

## 2024-04-02 PROCEDURE — 99222 1ST HOSP IP/OBS MODERATE 55: CPT | Performed by: STUDENT IN AN ORGANIZED HEALTH CARE EDUCATION/TRAINING PROGRAM

## 2024-04-02 PROCEDURE — 80069 RENAL FUNCTION PANEL: CPT

## 2024-04-02 PROCEDURE — 93970 EXTREMITY STUDY: CPT

## 2024-04-02 PROCEDURE — 2500000002 HC RX 250 W HCPCS SELF ADMINISTERED DRUGS (ALT 637 FOR MEDICARE OP, ALT 636 FOR OP/ED): Performed by: SURGERY

## 2024-04-02 PROCEDURE — 83735 ASSAY OF MAGNESIUM: CPT

## 2024-04-02 PROCEDURE — 93970 EXTREMITY STUDY: CPT | Performed by: SURGERY

## 2024-04-02 PROCEDURE — 0F9430Z DRAINAGE OF GALLBLADDER WITH DRAINAGE DEVICE, PERCUTANEOUS APPROACH: ICD-10-PCS | Performed by: STUDENT IN AN ORGANIZED HEALTH CARE EDUCATION/TRAINING PROGRAM

## 2024-04-02 PROCEDURE — 2500000004 HC RX 250 GENERAL PHARMACY W/ HCPCS (ALT 636 FOR OP/ED): Performed by: ANESTHESIOLOGY

## 2024-04-02 PROCEDURE — C9113 INJ PANTOPRAZOLE SODIUM, VIA: HCPCS

## 2024-04-02 PROCEDURE — 85025 COMPLETE CBC W/AUTO DIFF WBC: CPT

## 2024-04-02 PROCEDURE — 2500000001 HC RX 250 WO HCPCS SELF ADMINISTERED DRUGS (ALT 637 FOR MEDICARE OP)

## 2024-04-02 PROCEDURE — 1200000002 HC GENERAL ROOM WITH TELEMETRY DAILY

## 2024-04-02 PROCEDURE — 2500000001 HC RX 250 WO HCPCS SELF ADMINISTERED DRUGS (ALT 637 FOR MEDICARE OP): Performed by: STUDENT IN AN ORGANIZED HEALTH CARE EDUCATION/TRAINING PROGRAM

## 2024-04-02 RX ORDER — NIFEDIPINE 60 MG/1
60 TABLET, FILM COATED, EXTENDED RELEASE ORAL
Status: DISCONTINUED | OUTPATIENT
Start: 2024-04-03 | End: 2024-04-08 | Stop reason: HOSPADM

## 2024-04-02 RX ORDER — ATORVASTATIN CALCIUM 80 MG/1
80 TABLET, FILM COATED ORAL NIGHTLY
Status: DISCONTINUED | OUTPATIENT
Start: 2024-04-02 | End: 2024-04-08 | Stop reason: HOSPADM

## 2024-04-02 RX ORDER — MAGNESIUM SULFATE HEPTAHYDRATE 40 MG/ML
2 INJECTION, SOLUTION INTRAVENOUS ONCE
Status: COMPLETED | OUTPATIENT
Start: 2024-04-02 | End: 2024-04-02

## 2024-04-02 RX ORDER — FUROSEMIDE 10 MG/ML
80 INJECTION INTRAMUSCULAR; INTRAVENOUS ONCE
Status: COMPLETED | OUTPATIENT
Start: 2024-04-02 | End: 2024-04-02

## 2024-04-02 RX ORDER — AMLODIPINE BESYLATE 10 MG/1
10 TABLET ORAL DAILY
Status: DISCONTINUED | OUTPATIENT
Start: 2024-04-02 | End: 2024-04-02

## 2024-04-02 RX ORDER — NIFEDIPINE 30 MG/1
30 TABLET, FILM COATED, EXTENDED RELEASE ORAL ONCE
Status: COMPLETED | OUTPATIENT
Start: 2024-04-02 | End: 2024-04-03

## 2024-04-02 RX ADMIN — ATORVASTATIN CALCIUM 80 MG: 80 TABLET, FILM COATED ORAL at 21:46

## 2024-04-02 RX ADMIN — IPRATROPIUM BROMIDE AND ALBUTEROL SULFATE 3 ML: .5; 3 SOLUTION RESPIRATORY (INHALATION) at 23:10

## 2024-04-02 RX ADMIN — OXYCODONE HYDROCHLORIDE 5 MG: 5 TABLET ORAL at 12:35

## 2024-04-02 RX ADMIN — METOPROLOL TARTRATE 50 MG: 50 TABLET, FILM COATED ORAL at 21:46

## 2024-04-02 RX ADMIN — TORSEMIDE 40 MG: 20 TABLET ORAL at 09:14

## 2024-04-02 RX ADMIN — PANTOPRAZOLE SODIUM 40 MG: 40 INJECTION, POWDER, FOR SOLUTION INTRAVENOUS at 09:14

## 2024-04-02 RX ADMIN — HEPARIN SODIUM 5000 UNITS: 5000 INJECTION INTRAVENOUS; SUBCUTANEOUS at 09:14

## 2024-04-02 RX ADMIN — ASPIRIN 81 MG CHEWABLE TABLET 81 MG: 81 TABLET CHEWABLE at 09:17

## 2024-04-02 RX ADMIN — MELATONIN 3 MG: 3 TAB ORAL at 21:46

## 2024-04-02 RX ADMIN — HYDRALAZINE HYDROCHLORIDE 100 MG: 50 TABLET ORAL at 15:15

## 2024-04-02 RX ADMIN — PANTOPRAZOLE SODIUM 40 MG: 40 INJECTION, POWDER, FOR SOLUTION INTRAVENOUS at 21:46

## 2024-04-02 RX ADMIN — Medication 1000 UNITS: at 21:46

## 2024-04-02 RX ADMIN — OXYCODONE HYDROCHLORIDE 5 MG: 5 TABLET ORAL at 00:00

## 2024-04-02 RX ADMIN — METOPROLOL TARTRATE 50 MG: 50 TABLET, FILM COATED ORAL at 09:14

## 2024-04-02 RX ADMIN — HEPARIN SODIUM 5000 UNITS: 5000 INJECTION INTRAVENOUS; SUBCUTANEOUS at 17:33

## 2024-04-02 RX ADMIN — PIPERACILLIN SODIUM AND TAZOBACTAM SODIUM 2.25 G: 2; .25 INJECTION, SOLUTION INTRAVENOUS at 17:33

## 2024-04-02 RX ADMIN — PIPERACILLIN SODIUM AND TAZOBACTAM SODIUM 2.25 G: 2; .25 INJECTION, SOLUTION INTRAVENOUS at 12:34

## 2024-04-02 RX ADMIN — HYDRALAZINE HYDROCHLORIDE 100 MG: 50 TABLET ORAL at 09:16

## 2024-04-02 RX ADMIN — HYDRALAZINE HYDROCHLORIDE 100 MG: 50 TABLET ORAL at 21:46

## 2024-04-02 RX ADMIN — HEPARIN SODIUM 5000 UNITS: 5000 INJECTION INTRAVENOUS; SUBCUTANEOUS at 00:03

## 2024-04-02 RX ADMIN — Medication 1000 UNITS: at 09:15

## 2024-04-02 RX ADMIN — PIPERACILLIN SODIUM AND TAZOBACTAM SODIUM 2.25 G: 2; .25 INJECTION, SOLUTION INTRAVENOUS at 00:03

## 2024-04-02 RX ADMIN — MAGNESIUM SULFATE HEPTAHYDRATE 2 G: 40 INJECTION, SOLUTION INTRAVENOUS at 21:46

## 2024-04-02 RX ADMIN — FUROSEMIDE 80 MG: 10 INJECTION, SOLUTION INTRAMUSCULAR; INTRAVENOUS at 10:00

## 2024-04-02 RX ADMIN — PIPERACILLIN SODIUM AND TAZOBACTAM SODIUM 2.25 G: 2; .25 INJECTION, SOLUTION INTRAVENOUS at 05:49

## 2024-04-02 ASSESSMENT — COGNITIVE AND FUNCTIONAL STATUS - GENERAL
CLIMB 3 TO 5 STEPS WITH RAILING: TOTAL
STANDING UP FROM CHAIR USING ARMS: A LOT
STANDING UP FROM CHAIR USING ARMS: A LOT
WALKING IN HOSPITAL ROOM: TOTAL
CLIMB 3 TO 5 STEPS WITH RAILING: TOTAL
HELP NEEDED FOR BATHING: A LITTLE
DRESSING REGULAR LOWER BODY CLOTHING: A LITTLE
DRESSING REGULAR UPPER BODY CLOTHING: A LITTLE
MOVING FROM LYING ON BACK TO SITTING ON SIDE OF FLAT BED WITH BEDRAILS: A LITTLE
WALKING IN HOSPITAL ROOM: TOTAL
TURNING FROM BACK TO SIDE WHILE IN FLAT BAD: A LITTLE
DRESSING REGULAR UPPER BODY CLOTHING: A LITTLE
DRESSING REGULAR LOWER BODY CLOTHING: A LITTLE
TURNING FROM BACK TO SIDE WHILE IN FLAT BAD: A LITTLE
CLIMB 3 TO 5 STEPS WITH RAILING: TOTAL
TURNING FROM BACK TO SIDE WHILE IN FLAT BAD: A LITTLE
WALKING IN HOSPITAL ROOM: TOTAL
MOVING FROM LYING ON BACK TO SITTING ON SIDE OF FLAT BED WITH BEDRAILS: A LITTLE
MOVING TO AND FROM BED TO CHAIR: A LITTLE
WALKING IN HOSPITAL ROOM: TOTAL
MOVING FROM LYING ON BACK TO SITTING ON SIDE OF FLAT BED WITH BEDRAILS: A LITTLE
MOVING FROM LYING ON BACK TO SITTING ON SIDE OF FLAT BED WITH BEDRAILS: A LITTLE
CLIMB 3 TO 5 STEPS WITH RAILING: TOTAL
MOVING TO AND FROM BED TO CHAIR: A LITTLE
HELP NEEDED FOR BATHING: A LITTLE
TOILETING: A LITTLE
WALKING IN HOSPITAL ROOM: TOTAL
MOVING TO AND FROM BED TO CHAIR: A LITTLE
MOVING FROM LYING ON BACK TO SITTING ON SIDE OF FLAT BED WITH BEDRAILS: A LITTLE
HELP NEEDED FOR BATHING: A LITTLE
DRESSING REGULAR UPPER BODY CLOTHING: A LITTLE
MOVING TO AND FROM BED TO CHAIR: A LITTLE
WALKING IN HOSPITAL ROOM: TOTAL
STANDING UP FROM CHAIR USING ARMS: A LOT
DRESSING REGULAR LOWER BODY CLOTHING: A LITTLE
TOILETING: A LITTLE
MOVING FROM LYING ON BACK TO SITTING ON SIDE OF FLAT BED WITH BEDRAILS: A LITTLE
TOILETING: A LITTLE
HELP NEEDED FOR BATHING: A LITTLE
DRESSING REGULAR UPPER BODY CLOTHING: A LITTLE
TURNING FROM BACK TO SIDE WHILE IN FLAT BAD: A LITTLE
TOILETING: A LITTLE
HELP NEEDED FOR BATHING: A LITTLE
TOILETING: A LITTLE
TURNING FROM BACK TO SIDE WHILE IN FLAT BAD: A LITTLE
DRESSING REGULAR LOWER BODY CLOTHING: A LITTLE
STANDING UP FROM CHAIR USING ARMS: A LOT
TURNING FROM BACK TO SIDE WHILE IN FLAT BAD: A LITTLE
DRESSING REGULAR LOWER BODY CLOTHING: A LITTLE
DRESSING REGULAR LOWER BODY CLOTHING: A LITTLE
MOVING FROM LYING ON BACK TO SITTING ON SIDE OF FLAT BED WITH BEDRAILS: A LITTLE
HELP NEEDED FOR BATHING: A LITTLE
TURNING FROM BACK TO SIDE WHILE IN FLAT BAD: A LITTLE
HELP NEEDED FOR BATHING: A LITTLE
CLIMB 3 TO 5 STEPS WITH RAILING: TOTAL
WALKING IN HOSPITAL ROOM: TOTAL
WALKING IN HOSPITAL ROOM: TOTAL
MOVING TO AND FROM BED TO CHAIR: A LITTLE
TOILETING: A LITTLE
MOVING TO AND FROM BED TO CHAIR: A LITTLE
CLIMB 3 TO 5 STEPS WITH RAILING: TOTAL
MOVING TO AND FROM BED TO CHAIR: A LITTLE
TURNING FROM BACK TO SIDE WHILE IN FLAT BAD: A LITTLE
DRESSING REGULAR LOWER BODY CLOTHING: A LITTLE
DRESSING REGULAR UPPER BODY CLOTHING: A LITTLE
DRESSING REGULAR UPPER BODY CLOTHING: A LITTLE
TOILETING: A LITTLE
MOVING TO AND FROM BED TO CHAIR: A LITTLE
STANDING UP FROM CHAIR USING ARMS: A LOT
CLIMB 3 TO 5 STEPS WITH RAILING: TOTAL
HELP NEEDED FOR BATHING: A LITTLE
CLIMB 3 TO 5 STEPS WITH RAILING: TOTAL
MOVING FROM LYING ON BACK TO SITTING ON SIDE OF FLAT BED WITH BEDRAILS: A LITTLE
STANDING UP FROM CHAIR USING ARMS: A LOT
DRESSING REGULAR LOWER BODY CLOTHING: A LITTLE
TOILETING: A LITTLE

## 2024-04-02 ASSESSMENT — PAIN DESCRIPTION - ORIENTATION: ORIENTATION: RIGHT;LEFT

## 2024-04-02 ASSESSMENT — PAIN SCALES - GENERAL
PAINLEVEL_OUTOF10: 8
PAINLEVEL_OUTOF10: 9
PAINLEVEL_OUTOF10: 7
PAINLEVEL_OUTOF10: 7
PAINLEVEL_OUTOF10: 0 - NO PAIN
PAINLEVEL_OUTOF10: 0 - NO PAIN

## 2024-04-02 ASSESSMENT — PAIN - FUNCTIONAL ASSESSMENT
PAIN_FUNCTIONAL_ASSESSMENT: 0-10

## 2024-04-02 ASSESSMENT — PAIN DESCRIPTION - LOCATION
LOCATION: NECK
LOCATION: HEAD

## 2024-04-02 ASSESSMENT — PAIN DESCRIPTION - DESCRIPTORS: DESCRIPTORS: ACHING

## 2024-04-02 NOTE — CONSULTS
Reason For Consult  ADHF    History Of Present Illness  59-year-old male with past medical history significant for NSTEMI with OM2 PCI in 5/23, CABG with vein graftx3 9(LIMA ->LAD, SVG ->OM1, SVG ->PDA in 11/23) complicated by pericardial tamponade requiring mediastinal exploration and repair of OM graft,  hyperlipidemia, hypertension, CKD (B/L unclear, could be 2-3), history of CVA 2020 with no deficits, type 2 diabetes, left AKA admitted directly from outside hospital with concern for cholecystitis on HIDA scan (cystic duct obstruction and acute cholecystitis revealed on scan).     Perioperative medicine consulted for preoperative risk startification. Patient appeared to be fluid overload on exam so cardiology consulted for management of ADHF.  Plavix held since 3/29.    Hospital course has been complicated by persistent HTN with SBP in 190s. Patient currently on hydralazine 100mg TID and metop tartrate 50mg BID. Was given 80mg IV lasix overnight for new O2 req (net -ve -0.8L this am). CT Chest 3/28 with large L sided pleural effusion. .    Per wife, patient ran out of his torsemide since Jan. Has been gaining weight since then. On interview, patient denies any CP, orthopnea or PND. Is not very functionally active but has had some SUAZO    Cardiac Testing:  -ECG 4/1: NSR, RBBB    -TTE 11/23:   1. Left ventricular systolic function is mildly decreased with a 45-50% estimated ejection fraction.   2. Mid and apical anterior wall, mid anterolateral segment, apical lateral segment, and apex are abnormal.   3. There are multiple wall motion abnormalities.   4. Poorly visualized anatomical structures due to suboptimal image quality.   5. There is reduced right ventricular systolic function.    Unstable angina 5/23:  Vessel      Stenosis   Vessel Segment  Left Main 50% stenosis     entire  LAD       70% stenosis       mid  OM 2      95% stenosis proximal to mid  Successful OM2 stenting.      TTE 5/23:  1. Left  ventricular systolic function is normal with a 60-65% estimated ejection fraction.  2. Spectral Doppler shows an abnormal pattern of left ventricular diastolic filling.  3. Compared with the prior exam from 4/28/2023 ( [facility]) the Mild-Mod MR and segmental wall motion abnormalities previously seen are not appreciated on todays study. Note that OM2 was stented on 5/11/2023.     TTE 4/23:  1. Left ventricular systolic function is low normal with a 50-55% estimated ejection fraction.  2. Mid inferolateral segment, mid anterolateral segment, and apical lateral segment are abnormal.  3. Spectral Doppler shows a pseudonormal pattern of left ventricular diastolic filling.  4. There is moderate to severe concentric left ventricular hypertrophy.  5. Mild to moderate mitral valve regurgitation.  Past Medical History  He has a past medical history of Above-knee amputation of left lower extremity (CMS/Formerly [facility]), Adverse effect of anesthesia, Anemia, CAD (coronary artery disease), Carotid artery disease (CMS/Formerly [facility]), CVA (cerebral vascular accident) (CMS/Formerly [facility]) (2020), Depression, DM (diabetes mellitus) (CMS/Formerly [facility]), GERD (gastroesophageal reflux disease), High cholesterol, HTN (hypertension), Leg ulcer (CMS/Formerly [facility]), Neuropathy, OA (osteoarthritis), and PAD (peripheral artery disease) (CMS/Formerly [facility]).    Surgical History  He has a past surgical history that includes Other surgical history (07/07/2021); Other surgical history (07/07/2021); CT angio head w and wo IV contrast (06/14/2021); CT angio neck (06/14/2021); MR angio neck wo IV contrast (07/18/2023); MR angio head wo IV contrast (12/11/2012); MR angio head wo IV contrast (06/12/2021); MR angio head wo IV contrast (05/25/2021); Total shoulder arthroplasty (Left, 2020); Leg Surgery (Right); and Cardiac surgery.     Social History  He reports that he quit smoking about a year ago. His smoking use included cigars. He has never used smokeless tobacco. He reports that he does not currently use  alcohol. He reports that he does not currently use drugs after having used the following drugs: Marijuana.    Family History  Family History   Problem Relation Name Age of Onset    Other (cardiac disorder) Mother      Hypertension Mother      Esophageal cancer Mother      Hearing loss Father      Hypertension Father      Heart disease Father      COPD Sister          Allergies  Patient has no known allergies.    Review of Systems  12 point ROS reviewed and -ve      Physical Exam  Gen: well appearing  Neuro: AAOx3, CN 2-12 intact, no FND  HEENT: PEERL, EOMI, sclera anicteric, no conjunctival injection, MMM, no oropharyngeal lesions  Neck: elevated JVD  Resp: CTAB, decreased L sided breath sounds, no wheezing/crackles/ rales  CV: RRR, normal S1/S2, no murmurs/ rubs/gallops  GI: non-tender, non-distended, normal BSs in all 4 quadrants  MSK: warm and well perfused, 1+ R sided edema, L AKA   Skin: warm and dry, no lesions, no rashes        Last Recorded Vitals  Blood pressure (!) 196/80, pulse 74, temperature 36.5 °C (97.7 °F), temperature source Temporal, resp. rate 18, SpO2 93 %.    Relevant Results  LABS:  CMP:  Results from last 7 days   Lab Units 04/02/24  0525 04/01/24  0602 03/31/24  0637 03/30/24  0356 03/29/24  0549 03/28/24  2158 03/28/24  1412   SODIUM mmol/L 139 139 137 138 139  --  139   POTASSIUM mmol/L 4.7 5.0 5.0 4.8 5.1  --  4.8   CHLORIDE mmol/L 106 107 107 107 108*  --  108*   CO2 mmol/L 25 25 21 23 23*  --  21*   ANION GAP mmol/L 13 12 14 13 8  --  10   BUN mg/dL 50* 52* 57* 65* 65*  --  61*   CREATININE mg/dL 3.64* 3.40* 3.59* 3.48* 3.30*  --  2.90*   EGFR mL/min/1.73m*2 18* 20* 18* 19* 20*  --  24*   MAGNESIUM mg/dL 1.86 1.97 2.06 2.02  --   --  2.20   ALBUMIN g/dL 3.1* 3.1* 3.1* 3.0* 3.0* 3.3* 3.4*   ALT U/L  --   --   --  10 12 14 14   AST U/L  --   --   --  9 12 18 14   BILIRUBIN TOTAL mg/dL  --   --   --  0.4 0.3 0.3 0.4   LIPASE U/L  --   --   --   --   --  66*  --      CBC:  Results from last  "7 days   Lab Units 04/02/24  0525 04/01/24  0602 03/31/24  0637 03/30/24  0356 03/29/24  0549 03/28/24  1412   WBC AUTO x10*3/uL 6.5 7.1 8.8 9.3 9.1 10.0   HEMOGLOBIN g/dL 7.7* 7.9* 8.5* 8.0* 8.3* 9.6*   HEMATOCRIT % 25.1* 27.6* 28.2* 25.0* 27.9* 31.4*   PLATELETS AUTO x10*3/uL 182 201 218 213 218 248   MCV fL 88 92 90 84 89 87     COAG:     ABO: No results found for: \"ABO\"  HEME/ENDO:  Results from last 7 days   Lab Units 03/28/24  1412   HEMOGLOBIN A1C % 6.1*      CARDIAC:   Results from last 7 days   Lab Units 04/01/24  0602   TROPHS ng/L 21   BNP pg/mL 459*     Recent Labs     04/29/23  0654 08/15/22  0700 03/18/22  0623   CHOL 181 145 150   LDLF 127* 77 90   HDL 32.8* 31.6* 36.7*   TRIG 108 180* 115     Scheduled medications  aspirin, 81 mg, oral, Daily  bisacodyl, 10 mg, rectal, Daily  cholecalciferol, 1,000 Units, oral, BID  [Held by provider] clopidogrel, 75 mg, oral, Daily  heparin (porcine), 5,000 Units, subcutaneous, q8h  hydrALAZINE, 100 mg, oral, TID  insulin lispro, 0-5 Units, subcutaneous, q4h  ipratropium-albuteroL, 3 mL, nebulization, BID  melatonin, 3 mg, oral, Nightly  metoprolol tartrate, 50 mg, oral, BID  pantoprazole, 40 mg, intravenous, BID  perflutren lipid microspheres, 0.5-10 mL of dilution, intravenous, Once in imaging  perflutren protein A microsphere, 0.5 mL, intravenous, Once in imaging  piperacillin-tazobactam, 2.25 g, intravenous, q6h  polyethylene glycol, 17 g, oral, Daily  sulfur hexafluoride microsphr, 2 mL, intravenous, Once in imaging  torsemide, 40 mg, oral, Daily      Continuous medications     PRN medications  PRN medications: acetaminophen, dextrose, dextrose, glucagon, glucagon, naloxone, oxyCODONE, traMADol       Assessment/Plan   59-year-old male with past medical history significant for NSTEMI with OM2 PCI in 5/23, CABG with vein graftx3 9(LIMA ->LAD, SVG ->OM1, SVG ->PDA in 11/23) complicated by pericardial tamponade requiring mediastinal exploration and repair of OM " graft,  hyperlipidemia, hypertension, CKD (B/L unclear, could be 2-3), history of CVA 2020 with no deficits, type 2 diabetes, left AKA admitted directly from outside hospital with concern for cholecystitis on HIDA scan (cystic duct obstruction and acute cholecystitis revealed on scan).  Cardiology consulted due to concern for ADHF.    Patient does appear to be in clinical ADHF. Likely etiology is medication non-compliance (patient has been out of torsemide for 4 months) as well as uncontrolled HTN.    -Obtain formal TTE to assess for new any reduction in EF  -Diurese with 80mg IV lasix for goal net negative 1-2L. Can give another 80mg IV lasix if patient does not reach goal by 6pm  -BID RFP and Mg. Goal K>4 and Mg>2  -Continue metoprolol tartrate 50mg BID  -Initiate nifedipine 60mg for BP control  -Continue hydralazine 100mg TID   -Patient has an unknown allergy to coreg which wife does not remember but is adamant he cannot tolerate it   -Hold torsemide in the setting of IV diuresis  -Continue ASA 81mg daily  -Restart home atorvastatin 80mg   -Will discuss re-initiation of plavix after cholecystectomy.    Thank you for the consult. Cardiology will continue to follow. Staffed with Dr. Tony Prater  PGY-V, Cardiology Fellow    General Cardiology Consult Pager: [phone] (weekday 7AM-6PM and weekend 7AM-2PM) and other: [phone]  EP Consult Pager: [phone] (weekday 7AM-6PM and weekend 7AM-2PM) and other: [phone]  EP Device Nurse Pager: [phone] (weekday 7AM-4PM)  Advanced Heart Failure Consult Pager: [phone] anytime  CICU Fellow Pager: [phone] anytime  Endovascular/Limb Salvage Team Pager: [phone] for day coverage (8am-5pm)  Interventional Cardiology Fellow Pager: [phone] for night and weekend coverage  Structural Heart Team Pager: [phone] anytime  Night coverage: [facility] [id number]

## 2024-04-02 NOTE — CARE PLAN
The patient's goals for the shift include  pt will remain safe during shift    The clinical goals for the shift include pt will remain safe during shift

## 2024-04-02 NOTE — PROGRESS NOTES
Humphrey Waddell is a 59 y.o. male on day 4 of admission presenting with Cholecystitis.    Subjective   Mr. Waddell states he is doing better today, endorses his SOB has gotten slightly better. He states he does not feeling wheezy today. Endorses his pain has also improved. Denies n/v.     Overnight had 2 episodes of hypertension with systolics in the 180s-190s.       Objective     Physical Exam  Constitutional:       Appearance: Normal appearance.   Eyes:      Conjunctiva/sclera: Conjunctivae normal.   Cardiovascular:      Rate and Rhythm: Normal rate and regular rhythm.   Pulmonary:      Comments: Decreased breath sounds on the left side, crackles present. No wheezes  Abdominal:      Tenderness: There is no abdominal tenderness. There is no guarding.   Musculoskeletal:      Right lower leg: Edema present.   Skin:     General: Skin is warm and dry.   Neurological:      Mental Status: He is alert and oriented to person, place, and time.   Psychiatric:         Mood and Affect: Mood normal.         Last Recorded Vitals  Blood pressure (!) 184/76, pulse 74, temperature 37 °C (98.6 °F), temperature source Temporal, resp. rate 18, SpO2 93 %.  Intake/Output last 3 Shifts:  I/O last 3 completed shifts:  In: -   Out: 850 [Urine:850]    Relevant Results      Results for orders placed or performed during the hospital encounter of 03/29/24 (from the past 24 hour(s))   POCT GLUCOSE   Result Value Ref Range    POCT Glucose 90 74 - 99 mg/dL   POCT GLUCOSE   Result Value Ref Range    POCT Glucose 89 74 - 99 mg/dL   Urine electrolytes   Result Value Ref Range    Sodium, Urine Random 103 mmol/L    Sodium/Creatinine Ratio 214 Not established. mmol/g Creat    Potassium, Urine Random 15 mmol/L    Potassium/Creatinine Ratio 31 Not established mmol/g Creat    Chloride, Urine Random 104 mmol/L    Chloride/Creatinine Ratio 216 23 - 275 mmol/g creat    Creatinine, Urine Random 48.1 20.0 - 370.0 mg/dL   POCT GLUCOSE   Result Value Ref Range    POCT  Glucose 88 74 - 99 mg/dL   POCT GLUCOSE   Result Value Ref Range    POCT Glucose 85 74 - 99 mg/dL   POCT GLUCOSE   Result Value Ref Range    POCT Glucose 83 74 - 99 mg/dL   Renal Function Panel   Result Value Ref Range    Glucose 87 74 - 99 mg/dL    Sodium 139 136 - 145 mmol/L    Potassium 4.7 3.5 - 5.3 mmol/L    Chloride 106 98 - 107 mmol/L    Bicarbonate 25 21 - 32 mmol/L    Anion Gap 13 10 - 20 mmol/L    Urea Nitrogen 50 (H) 6 - 23 mg/dL    Creatinine 3.64 (H) 0.50 - 1.30 mg/dL    eGFR 18 (L) >60 mL/min/1.73m*2    Calcium 8.5 (L) 8.6 - 10.6 mg/dL    Phosphorus 4.7 2.5 - 4.9 mg/dL    Albumin 3.1 (L) 3.4 - 5.0 g/dL   Magnesium   Result Value Ref Range    Magnesium 1.86 1.60 - 2.40 mg/dL   CBC and Auto Differential   Result Value Ref Range    WBC 6.5 4.4 - 11.3 x10*3/uL    nRBC 0.0 0.0 - 0.0 /100 WBCs    RBC 2.84 (L) 4.50 - 5.90 x10*6/uL    Hemoglobin 7.7 (L) 13.5 - 17.5 g/dL    Hematocrit 25.1 (L) 41.0 - 52.0 %    MCV 88 80 - 100 fL    MCH 27.1 26.0 - 34.0 pg    MCHC 30.7 (L) 32.0 - 36.0 g/dL    RDW 13.9 11.5 - 14.5 %    Platelets 182 150 - 450 x10*3/uL    Neutrophils % 71.9 40.0 - 80.0 %    Immature Granulocytes %, Automated 0.2 0.0 - 0.9 %    Lymphocytes % 12.0 13.0 - 44.0 %    Monocytes % 9.9 2.0 - 10.0 %    Eosinophils % 5.7 0.0 - 6.0 %    Basophils % 0.3 0.0 - 2.0 %    Neutrophils Absolute 4.67 1.20 - 7.70 x10*3/uL    Immature Granulocytes Absolute, Automated 0.01 0.00 - 0.70 x10*3/uL    Lymphocytes Absolute 0.78 (L) 1.20 - 4.80 x10*3/uL    Monocytes Absolute 0.64 0.10 - 1.00 x10*3/uL    Eosinophils Absolute 0.37 0.00 - 0.70 x10*3/uL    Basophils Absolute 0.02 0.00 - 0.10 x10*3/uL   POCT GLUCOSE   Result Value Ref Range    POCT Glucose 79 74 - 99 mg/dL   POCT GLUCOSE   Result Value Ref Range    POCT Glucose 82 74 - 99 mg/dL          Assessment/Plan   #CAD s/p CABG (11/23; LIMA ->LAD, SVG ->OM1, SVG ->PDA) complicated by takeback and PCI to OM2 (4/23)  #PAD  #Acute decompensated heart failure  -On DAPT with  Plavix and ASA, last dose of Plavix was 3/29.   -EKG shows chronic changes, and post takeback echo reveals normal LVEF without wall motion abnormalities.  -Baseline BNP in the 700s, elevated to 4000s at [facility]  -Per pt's wife, baseline weight post discharge from rehab was 250-260lbs. Currently 284lbs.              -Pitting edema in RLE 2-3+  -Troponin's elevated at OSH, likely in the setting of HF exacerbation  -Recommend continuing IV lasix diuresis for a goal of net negative 1 to 1.5L  -HF consult   -Pending echo     #HTN  -Home regimen is metoprolol 50mg BID, Hydralazine 100mg TID (pt reports only taking it BID), and torsemide 40mg daily  -Addition of Amlodipine 10mg daily  -Based on HF team recommendations, can consider low dose ACEi/ARB    #Hypoxia, currently on 2L NC  -Imaging with evidence of mod-large left sided pleural effusion and left lower lobe atelectatic changes  -Hypoxia likely multifactorial              -Volume overload/atelectasis from pleural effusion vs positioning in bed              -Narcotic usage may potentially be a factor as well  -Recommend incentive spirometry and OOB, duonebs q6hrs for 48hrs, multimodal pain management  -LE duplex US prelim report without evidence of acute DVT.  -Would also recommend a left sided thoracentesis on the evening of 4/3 (5 day Plavix washout) to drain pleural effusion prior to OR     #IDANIA on CKD stage IV (baseline GFR 22-27)  -Likely related to decreased perfusion from ADHF  -FENa: 5%; likely altered due to diuretic usage    #Cholecystitis  -Pending percutaneous drain by IR tomorrow after 5 day plavix washout    At this point, the patient is still in decompensated heart failure and not yet optimized for abdominal surgery with general anesthesia.    Patient seen and discussed with Dr. Delmi Gomez,

## 2024-04-02 NOTE — PROGRESS NOTES
[facility]  ACUTE CARE SURGERY - PROGRESS NOTE    Patient Name: Humphrey Waddell  MRN: [id number]  Admit Date: 329  : 1965  AGE: 59 y.o.   GENDER: male  ==============================================================================  TODAY'S ASSESSMENT AND PLAN OF CARE:  59-year-old male with past medical history significant for CABG with vein graft x 3 in 2023 (postoperative course complicated by mediastinal exploration and evacuation of pericardial tamponade/repair of vein graft), hyperlipidemia, hypertension, CKD, history of CVA  with no deficits, type 2 diabetes, left AKA admitted directly from outside hospital with concern for cholecystitis on HIDA scan (cystic duct obstruction and acute cholecystitis revealed on scan).  Patient is otherwise comfortable and asymptomatic at this time.  Perioperative Medicine consult for risk stratification and optimization. Appreciate Recs    Plan:    Neuro: acute abdominal pain   - Tylenol, tramadol, Oxy as needed    CV: Hx of CABG, on DAPT   #Acute Decompensated Heart failure   - Home metoprolol, home torsemide, hydralazine, ASA  - Last dose of Plavix on 3/29   Given 80mg IV Lasix x1 doses yesterday for diuresis  - Follow up Limited Echo to assess LVEF  - Cardiology consult today     Respiratory: New Hypoxia  - Left sided pleural effusion and left lower lobe atelectatic changes  - Continue IS, duonebs q6 hours for 48 hours  - plan for left sided thoracentesis on the evening of 4/3 (awaiting Plavix washout)     GI: acute cholecystitis   - NPO  - Regular diet post IR procedure   - Constipation: continue bowel regimen  - PPI     : IDANIA on CKD Stage IV  - Strict I&Os  - Follow up urine electrolytes  - RFP daily    Heme: Chronic Anemia  - Continue to trend H/H   - lower extremity edema- follow up LE duplex to rule out DVT    ID: acute cholecystitis  - Continue IV Zosyn   - IR today for perc babs tube placement     PPx: Subcu  heparin, SCDs    Dispo: continue RNF    Patient discussed with Attending Dr. Harshal Aldana APRN-[facility]  Acute Care Surgery  Pager [phone]   ==============================================================================  CHIEF COMPLAINT / EVENTS LAST 24HRS / HPI:  Overnight patient had episode of SBP in the 170s- given his home hydralazine and responded appropriately with repeat SBP 130s.   Continues to have mild RUQ abdominal tenderness, denies nausea     MEDICAL HISTORY / ROS:   Admission history and ROS reviewed. Pertinent changes as follows:  none    PHYSICAL EXAM:  Heart Rate:  [64-86]   Temp:  [36.4 °C (97.5 °F)-36.9 °C (98.4 °F)]   Resp:  [18-19]   BP: (137-196)/(70-82)   SpO2:  [93 %-99 %]     Physical Exam  Constitutional:       General: He is not in acute distress.     Appearance: Normal appearance. He is obese.   HENT:      Head: Normocephalic.      Mouth: Mucous membranes are moist.   Eyes:      Pupils: Pupils are equal, round, and reactive to light.   Cardiovascular:      Rate and Rhythm: Normal rate.   Pulmonary: /     Effort: Pulmonary effort is normal. Equal chest expansion      Comments: On NC  Abdominal:      General: obese     Palpations: Abdomen is soft.      Comments: Mild tenderness to deep palpation RUQ   Musculoskeletal:      Cervical back: Normal range of motion.      Comments: FREDIE AKA   Skin:     Comments: Well healed sternotomy site   Neurological:      General: No focal deficit present.      Mental Status: He is alert and oriented to person, place, and time. Mental status is at baseline.   Psychiatric:         Mood and Affect: Mood normal.         Behavior: Behavior normal.         Thought Content: Thought content normal.         Judgment: Judgment normal.      IMAGING SUMMARY:  (summary of new imaging findings, not a copy of dictation)      LABS:  Results from last 7 days   Lab Units 04/02/24  0525 04/01/24  0602 03/31/24  0637   WBC AUTO x10*3/uL 6.5 7.1 8.8   HEMOGLOBIN g/dL 7.7*  7.9* 8.5*   HEMATOCRIT % 25.1* 27.6* 28.2*   PLATELETS AUTO x10*3/uL 182 201 218   NEUTROS PCT AUTO % 71.9 72.6 70.4   LYMPHS PCT AUTO % 12.0 10.9 11.6   MONOS PCT AUTO % 9.9 10.8 10.0   EOS PCT AUTO % 5.7 5.3 7.2           Results from last 7 days   Lab Units 04/02/24  0525 04/01/24  0602 03/31/24  0637 03/30/24  0356 03/29/24  0549 03/28/24  2158   SODIUM mmol/L 139 139 137 138 139  --    POTASSIUM mmol/L 4.7 5.0 5.0 4.8 5.1  --    CHLORIDE mmol/L 106 107 107 107 108*  --    CO2 mmol/L 25 25 21 23 23*  --    BUN mg/dL 50* 52* 57* 65* 65*  --    CREATININE mg/dL 3.64* 3.40* 3.59* 3.48* 3.30*  --    CALCIUM mg/dL 8.5* 8.4* 8.3* 8.2* 8.2*  --    PROTEIN TOTAL g/dL  --   --   --  6.1* 6.2 6.7   BILIRUBIN TOTAL mg/dL  --   --   --  0.4 0.3 0.3   ALK PHOS U/L  --   --   --  49 63 64   ALT U/L  --   --   --  10 12 14   AST U/L  --   --   --  9 12 18   GLUCOSE mg/dL 87 116* 89 101* 151*  --        Results from last 7 days   Lab Units 03/30/24  0356 03/29/24  0549 03/28/24  2158   BILIRUBIN TOTAL mg/dL 0.4 0.3 0.3   BILIRUBIN DIRECT mg/dL  --   --  <0.2             I have reviewed all medications, laboratory results, and imaging pertinent for today's encounter.

## 2024-04-02 NOTE — CARE PLAN
The patient's goals for the shift include      The clinical goals for the shift include pt will remain safe during shift      Problem: Skin  Goal: Decreased wound size/increased tissue granulation at next dressing change  Flowsheets (Taken 4/2/2024 0210)  Decreased wound size/increased tissue granulation at next dressing change: Promote sleep for wound healing

## 2024-04-03 ENCOUNTER — APPOINTMENT (OUTPATIENT)
Dept: RADIOLOGY | Facility: HOSPITAL | Age: 59
End: 2024-04-03
Payer: COMMERCIAL

## 2024-04-03 ENCOUNTER — APPOINTMENT (OUTPATIENT)
Dept: CARDIOLOGY | Facility: HOSPITAL | Age: 59
End: 2024-04-03
Payer: COMMERCIAL

## 2024-04-03 LAB
ALBUMIN SERPL BCP-MCNC: 3 G/DL (ref 3.4–5)
ALBUMIN SERPL BCP-MCNC: 3.1 G/DL (ref 3.4–5)
ANION GAP SERPL CALC-SCNC: 15 MMOL/L (ref 10–20)
ANION GAP SERPL CALC-SCNC: 17 MMOL/L (ref 10–20)
APPEARANCE UR: CLEAR
APTT PPP: 30 SECONDS (ref 27–38)
BASOPHILS # BLD AUTO: 0.03 X10*3/UL (ref 0–0.1)
BASOPHILS NFR BLD AUTO: 0.4 %
BILIRUB UR STRIP.AUTO-MCNC: NEGATIVE MG/DL
BUN SERPL-MCNC: 48 MG/DL (ref 6–23)
BUN SERPL-MCNC: 49 MG/DL (ref 6–23)
CALCIUM SERPL-MCNC: 8.3 MG/DL (ref 8.6–10.6)
CALCIUM SERPL-MCNC: 8.6 MG/DL (ref 8.6–10.6)
CHLORIDE SERPL-SCNC: 101 MMOL/L (ref 98–107)
CHLORIDE SERPL-SCNC: 102 MMOL/L (ref 98–107)
CO2 SERPL-SCNC: 26 MMOL/L (ref 21–32)
CO2 SERPL-SCNC: 27 MMOL/L (ref 21–32)
COLOR UR: COLORLESS
CREAT SERPL-MCNC: 3.86 MG/DL (ref 0.5–1.3)
CREAT SERPL-MCNC: 3.87 MG/DL (ref 0.5–1.3)
EGFRCR SERPLBLD CKD-EPI 2021: 17 ML/MIN/1.73M*2
EGFRCR SERPLBLD CKD-EPI 2021: 17 ML/MIN/1.73M*2
EOSINOPHIL # BLD AUTO: 0.48 X10*3/UL (ref 0–0.7)
EOSINOPHIL NFR BLD AUTO: 6.3 %
ERYTHROCYTE [DISTWIDTH] IN BLOOD BY AUTOMATED COUNT: 13.9 % (ref 11.5–14.5)
GLUCOSE BLD MANUAL STRIP-MCNC: 105 MG/DL (ref 74–99)
GLUCOSE BLD MANUAL STRIP-MCNC: 147 MG/DL (ref 74–99)
GLUCOSE BLD MANUAL STRIP-MCNC: 170 MG/DL (ref 74–99)
GLUCOSE BLD MANUAL STRIP-MCNC: 89 MG/DL (ref 74–99)
GLUCOSE BLD MANUAL STRIP-MCNC: 93 MG/DL (ref 74–99)
GLUCOSE BLD MANUAL STRIP-MCNC: 94 MG/DL (ref 74–99)
GLUCOSE BLD MANUAL STRIP-MCNC: 95 MG/DL (ref 74–99)
GLUCOSE SERPL-MCNC: 100 MG/DL (ref 74–99)
GLUCOSE SERPL-MCNC: 83 MG/DL (ref 74–99)
GLUCOSE UR STRIP.AUTO-MCNC: NORMAL MG/DL
HCT VFR BLD AUTO: 26.5 % (ref 41–52)
HGB BLD-MCNC: 8.4 G/DL (ref 13.5–17.5)
IMM GRANULOCYTES # BLD AUTO: 0.02 X10*3/UL (ref 0–0.7)
IMM GRANULOCYTES NFR BLD AUTO: 0.3 % (ref 0–0.9)
INR PPP: 1.1 (ref 0.9–1.1)
KETONES UR STRIP.AUTO-MCNC: NEGATIVE MG/DL
LEFT VENTRICLE INTERNAL DIMENSION DIASTOLE: 4.7 CM (ref 3.5–6)
LEFT VENTRICULAR OUTFLOW TRACT DIAMETER: 2.1 CM
LEUKOCYTE ESTERASE UR QL STRIP.AUTO: NEGATIVE
LYMPHOCYTES # BLD AUTO: 1.04 X10*3/UL (ref 1.2–4.8)
LYMPHOCYTES NFR BLD AUTO: 13.7 %
MAGNESIUM SERPL-MCNC: 2.17 MG/DL (ref 1.6–2.4)
MAGNESIUM SERPL-MCNC: 2.27 MG/DL (ref 1.6–2.4)
MCH RBC QN AUTO: 26.6 PG (ref 26–34)
MCHC RBC AUTO-ENTMCNC: 31.7 G/DL (ref 32–36)
MCV RBC AUTO: 84 FL (ref 80–100)
MITRAL VALVE E/A RATIO: 0.91
MITRAL VALVE E/E' RATIO: 16.15
MONOCYTES # BLD AUTO: 0.87 X10*3/UL (ref 0.1–1)
MONOCYTES NFR BLD AUTO: 11.4 %
NEUTROPHILS # BLD AUTO: 5.16 X10*3/UL (ref 1.2–7.7)
NEUTROPHILS NFR BLD AUTO: 67.9 %
NITRITE UR QL STRIP.AUTO: NEGATIVE
NRBC BLD-RTO: 0 /100 WBCS (ref 0–0)
PH UR STRIP.AUTO: 5.5 [PH]
PHOSPHATE SERPL-MCNC: 4.4 MG/DL (ref 2.5–4.9)
PHOSPHATE SERPL-MCNC: 4.8 MG/DL (ref 2.5–4.9)
PLATELET # BLD AUTO: 252 X10*3/UL (ref 150–450)
POTASSIUM SERPL-SCNC: 4.2 MMOL/L (ref 3.5–5.3)
POTASSIUM SERPL-SCNC: 4.5 MMOL/L (ref 3.5–5.3)
PROT UR STRIP.AUTO-MCNC: ABNORMAL MG/DL
PROTHROMBIN TIME: 12.5 SECONDS (ref 9.8–12.8)
RBC # BLD AUTO: 3.16 X10*6/UL (ref 4.5–5.9)
RBC # UR STRIP.AUTO: NEGATIVE /UL
RBC #/AREA URNS AUTO: NORMAL /HPF
RIGHT VENTRICLE FREE WALL PEAK S': 7.53 CM/S
SODIUM SERPL-SCNC: 139 MMOL/L (ref 136–145)
SODIUM SERPL-SCNC: 140 MMOL/L (ref 136–145)
SP GR UR STRIP.AUTO: 1.01
TRICUSPID ANNULAR PLANE SYSTOLIC EXCURSION: 1.5 CM
UROBILINOGEN UR STRIP.AUTO-MCNC: NORMAL MG/DL
WBC # BLD AUTO: 7.6 X10*3/UL (ref 4.4–11.3)
WBC #/AREA URNS AUTO: NORMAL /HPF

## 2024-04-03 PROCEDURE — 2500000004 HC RX 250 GENERAL PHARMACY W/ HCPCS (ALT 636 FOR OP/ED)

## 2024-04-03 PROCEDURE — 99152 MOD SED SAME PHYS/QHP 5/>YRS: CPT

## 2024-04-03 PROCEDURE — 80069 RENAL FUNCTION PANEL: CPT

## 2024-04-03 PROCEDURE — 83735 ASSAY OF MAGNESIUM: CPT

## 2024-04-03 PROCEDURE — 32555 ASPIRATE PLEURA W/ IMAGING: CPT

## 2024-04-03 PROCEDURE — 47531 INJECTION FOR CHOLANGIOGRAM: CPT | Performed by: STUDENT IN AN ORGANIZED HEALTH CARE EDUCATION/TRAINING PROGRAM

## 2024-04-03 PROCEDURE — 47533 PLMT BILIARY DRAINAGE CATH: CPT | Performed by: STUDENT IN AN ORGANIZED HEALTH CARE EDUCATION/TRAINING PROGRAM

## 2024-04-03 PROCEDURE — 2500000002 HC RX 250 W HCPCS SELF ADMINISTERED DRUGS (ALT 637 FOR MEDICARE OP, ALT 636 FOR OP/ED)

## 2024-04-03 PROCEDURE — 2500000001 HC RX 250 WO HCPCS SELF ADMINISTERED DRUGS (ALT 637 FOR MEDICARE OP): Performed by: STUDENT IN AN ORGANIZED HEALTH CARE EDUCATION/TRAINING PROGRAM

## 2024-04-03 PROCEDURE — 2500000001 HC RX 250 WO HCPCS SELF ADMINISTERED DRUGS (ALT 637 FOR MEDICARE OP)

## 2024-04-03 PROCEDURE — 32555 ASPIRATE PLEURA W/ IMAGING: CPT | Performed by: STUDENT IN AN ORGANIZED HEALTH CARE EDUCATION/TRAINING PROGRAM

## 2024-04-03 PROCEDURE — C1769 GUIDE WIRE: HCPCS

## 2024-04-03 PROCEDURE — 2720000007 HC OR 272 NO HCPCS

## 2024-04-03 PROCEDURE — 94640 AIRWAY INHALATION TREATMENT: CPT

## 2024-04-03 PROCEDURE — 2500000004 HC RX 250 GENERAL PHARMACY W/ HCPCS (ALT 636 FOR OP/ED): Performed by: STUDENT IN AN ORGANIZED HEALTH CARE EDUCATION/TRAINING PROGRAM

## 2024-04-03 PROCEDURE — 93321 DOPPLER ECHO F-UP/LMTD STD: CPT | Performed by: INTERNAL MEDICINE

## 2024-04-03 PROCEDURE — 47490 INCISION OF GALLBLADDER: CPT | Performed by: STUDENT IN AN ORGANIZED HEALTH CARE EDUCATION/TRAINING PROGRAM

## 2024-04-03 PROCEDURE — 36415 COLL VENOUS BLD VENIPUNCTURE: CPT

## 2024-04-03 PROCEDURE — 85025 COMPLETE CBC W/AUTO DIFF WBC: CPT

## 2024-04-03 PROCEDURE — 85610 PROTHROMBIN TIME: CPT | Performed by: NURSE PRACTITIONER

## 2024-04-03 PROCEDURE — 93325 DOPPLER ECHO COLOR FLOW MAPG: CPT | Performed by: INTERNAL MEDICINE

## 2024-04-03 PROCEDURE — 99152 MOD SED SAME PHYS/QHP 5/>YRS: CPT | Performed by: STUDENT IN AN ORGANIZED HEALTH CARE EDUCATION/TRAINING PROGRAM

## 2024-04-03 PROCEDURE — 93308 TTE F-UP OR LMTD: CPT | Performed by: INTERNAL MEDICINE

## 2024-04-03 PROCEDURE — 76937 US GUIDE VASCULAR ACCESS: CPT | Performed by: STUDENT IN AN ORGANIZED HEALTH CARE EDUCATION/TRAINING PROGRAM

## 2024-04-03 PROCEDURE — C1729 CATH, DRAINAGE: HCPCS

## 2024-04-03 PROCEDURE — 82947 ASSAY GLUCOSE BLOOD QUANT: CPT

## 2024-04-03 PROCEDURE — 2500000002 HC RX 250 W HCPCS SELF ADMINISTERED DRUGS (ALT 637 FOR MEDICARE OP, ALT 636 FOR OP/ED): Performed by: SURGERY

## 2024-04-03 PROCEDURE — C9113 INJ PANTOPRAZOLE SODIUM, VIA: HCPCS

## 2024-04-03 PROCEDURE — 81001 URINALYSIS AUTO W/SCOPE: CPT | Performed by: ANESTHESIOLOGY

## 2024-04-03 PROCEDURE — 2500000004 HC RX 250 GENERAL PHARMACY W/ HCPCS (ALT 636 FOR OP/ED): Performed by: NURSE PRACTITIONER

## 2024-04-03 PROCEDURE — 1100000001 HC PRIVATE ROOM DAILY

## 2024-04-03 PROCEDURE — 93325 DOPPLER ECHO COLOR FLOW MAPG: CPT

## 2024-04-03 PROCEDURE — 99233 SBSQ HOSP IP/OBS HIGH 50: CPT | Performed by: NURSE PRACTITIONER

## 2024-04-03 PROCEDURE — 99153 MOD SED SAME PHYS/QHP EA: CPT

## 2024-04-03 RX ORDER — FUROSEMIDE 10 MG/ML
80 INJECTION INTRAMUSCULAR; INTRAVENOUS
Status: DISCONTINUED | OUTPATIENT
Start: 2024-04-03 | End: 2024-04-08 | Stop reason: HOSPADM

## 2024-04-03 RX ORDER — MIDAZOLAM HYDROCHLORIDE 1 MG/ML
INJECTION INTRAMUSCULAR; INTRAVENOUS
Status: COMPLETED | OUTPATIENT
Start: 2024-04-03 | End: 2024-04-03

## 2024-04-03 RX ORDER — ISOSORBIDE DINITRATE 10 MG/1
10 TABLET ORAL
Status: DISCONTINUED | OUTPATIENT
Start: 2024-04-03 | End: 2024-04-03

## 2024-04-03 RX ORDER — ONDANSETRON HYDROCHLORIDE 2 MG/ML
INJECTION, SOLUTION INTRAVENOUS
Status: COMPLETED | OUTPATIENT
Start: 2024-04-03 | End: 2024-04-03

## 2024-04-03 RX ORDER — ISOSORBIDE DINITRATE 20 MG/1
20 TABLET ORAL
Status: DISCONTINUED | OUTPATIENT
Start: 2024-04-03 | End: 2024-04-08 | Stop reason: HOSPADM

## 2024-04-03 RX ADMIN — PIPERACILLIN SODIUM AND TAZOBACTAM SODIUM 2.25 G: 2; .25 INJECTION, SOLUTION INTRAVENOUS at 05:29

## 2024-04-03 RX ADMIN — HYDRALAZINE HYDROCHLORIDE 100 MG: 50 TABLET ORAL at 10:41

## 2024-04-03 RX ADMIN — PIPERACILLIN SODIUM AND TAZOBACTAM SODIUM 2.25 G: 2; .25 INJECTION, SOLUTION INTRAVENOUS at 18:42

## 2024-04-03 RX ADMIN — IPRATROPIUM BROMIDE AND ALBUTEROL SULFATE 3 ML: .5; 3 SOLUTION RESPIRATORY (INHALATION) at 21:19

## 2024-04-03 RX ADMIN — ASPIRIN 81 MG CHEWABLE TABLET 81 MG: 81 TABLET CHEWABLE at 10:56

## 2024-04-03 RX ADMIN — Medication 1000 UNITS: at 10:56

## 2024-04-03 RX ADMIN — INSULIN LISPRO 1 UNITS: 100 INJECTION, SOLUTION INTRAVENOUS; SUBCUTANEOUS at 20:36

## 2024-04-03 RX ADMIN — MELATONIN 3 MG: 3 TAB ORAL at 20:11

## 2024-04-03 RX ADMIN — ATORVASTATIN CALCIUM 80 MG: 80 TABLET, FILM COATED ORAL at 20:10

## 2024-04-03 RX ADMIN — FUROSEMIDE 80 MG: 10 INJECTION, SOLUTION INTRAVENOUS at 09:22

## 2024-04-03 RX ADMIN — ISOSORBIDE DINITRATE 20 MG: 20 TABLET ORAL at 10:41

## 2024-04-03 RX ADMIN — PIPERACILLIN SODIUM AND TAZOBACTAM SODIUM 2.25 G: 2; .25 INJECTION, SOLUTION INTRAVENOUS at 00:09

## 2024-04-03 RX ADMIN — FUROSEMIDE 80 MG: 10 INJECTION, SOLUTION INTRAVENOUS at 18:38

## 2024-04-03 RX ADMIN — MIDAZOLAM HYDROCHLORIDE 1 MG: 1 INJECTION, SOLUTION INTRAMUSCULAR; INTRAVENOUS at 17:29

## 2024-04-03 RX ADMIN — METOPROLOL TARTRATE 50 MG: 50 TABLET, FILM COATED ORAL at 20:11

## 2024-04-03 RX ADMIN — HEPARIN SODIUM 5000 UNITS: 5000 INJECTION INTRAVENOUS; SUBCUTANEOUS at 00:09

## 2024-04-03 RX ADMIN — NIFEDIPINE 60 MG: 60 TABLET, FILM COATED, EXTENDED RELEASE ORAL at 10:41

## 2024-04-03 RX ADMIN — PIPERACILLIN SODIUM AND TAZOBACTAM SODIUM 2.25 G: 2; .25 INJECTION, SOLUTION INTRAVENOUS at 12:17

## 2024-04-03 RX ADMIN — HYDRALAZINE HYDROCHLORIDE 100 MG: 50 TABLET ORAL at 18:39

## 2024-04-03 RX ADMIN — ISOSORBIDE DINITRATE 10 MG: 10 TABLET ORAL at 04:38

## 2024-04-03 RX ADMIN — PANTOPRAZOLE SODIUM 40 MG: 40 INJECTION, POWDER, FOR SOLUTION INTRAVENOUS at 10:41

## 2024-04-03 RX ADMIN — ONDANSETRON 4 MG: 2 INJECTION, SOLUTION INTRAMUSCULAR; INTRAVENOUS at 17:53

## 2024-04-03 RX ADMIN — NIFEDIPINE 30 MG: 30 TABLET, FILM COATED, EXTENDED RELEASE ORAL at 00:14

## 2024-04-03 RX ADMIN — HEPARIN SODIUM 5000 UNITS: 5000 INJECTION INTRAVENOUS; SUBCUTANEOUS at 10:41

## 2024-04-03 RX ADMIN — PANTOPRAZOLE SODIUM 40 MG: 40 INJECTION, POWDER, FOR SOLUTION INTRAVENOUS at 20:10

## 2024-04-03 RX ADMIN — OXYCODONE HYDROCHLORIDE 5 MG: 5 TABLET ORAL at 00:14

## 2024-04-03 RX ADMIN — METOPROLOL TARTRATE 50 MG: 50 TABLET, FILM COATED ORAL at 10:41

## 2024-04-03 RX ADMIN — ISOSORBIDE DINITRATE 20 MG: 20 TABLET ORAL at 18:54

## 2024-04-03 RX ADMIN — TORSEMIDE 40 MG: 20 TABLET ORAL at 10:41

## 2024-04-03 RX ADMIN — Medication 1000 UNITS: at 20:11

## 2024-04-03 RX ADMIN — HEPARIN SODIUM 5000 UNITS: 5000 INJECTION INTRAVENOUS; SUBCUTANEOUS at 18:39

## 2024-04-03 RX ADMIN — MIDAZOLAM HYDROCHLORIDE 1 MG: 1 INJECTION, SOLUTION INTRAMUSCULAR; INTRAVENOUS at 17:48

## 2024-04-03 RX ADMIN — PERFLUTREN 2 ML OF DILUTION: 6.52 INJECTION, SUSPENSION INTRAVENOUS at 14:11

## 2024-04-03 ASSESSMENT — COGNITIVE AND FUNCTIONAL STATUS - GENERAL
MOBILITY SCORE: 13
TOILETING: A LITTLE
DRESSING REGULAR LOWER BODY CLOTHING: A LITTLE
DRESSING REGULAR UPPER BODY CLOTHING: A LITTLE
WALKING IN HOSPITAL ROOM: TOTAL
MOVING TO AND FROM BED TO CHAIR: A LITTLE
DRESSING REGULAR LOWER BODY CLOTHING: A LITTLE
HELP NEEDED FOR BATHING: A LITTLE
CLIMB 3 TO 5 STEPS WITH RAILING: TOTAL
STANDING UP FROM CHAIR USING ARMS: A LOT
STANDING UP FROM CHAIR USING ARMS: TOTAL
WALKING IN HOSPITAL ROOM: TOTAL
STANDING UP FROM CHAIR USING ARMS: A LOT
TOILETING: A LITTLE
CLIMB 3 TO 5 STEPS WITH RAILING: TOTAL
WALKING IN HOSPITAL ROOM: TOTAL
TOILETING: A LITTLE
DRESSING REGULAR LOWER BODY CLOTHING: A LITTLE
MOVING TO AND FROM BED TO CHAIR: A LITTLE
DRESSING REGULAR LOWER BODY CLOTHING: A LITTLE
WALKING IN HOSPITAL ROOM: TOTAL
TURNING FROM BACK TO SIDE WHILE IN FLAT BAD: A LITTLE
CLIMB 3 TO 5 STEPS WITH RAILING: TOTAL
DAILY ACTIVITIY SCORE: 20
DRESSING REGULAR UPPER BODY CLOTHING: A LITTLE
DRESSING REGULAR UPPER BODY CLOTHING: A LITTLE
CLIMB 3 TO 5 STEPS WITH RAILING: TOTAL
TOILETING: A LITTLE
MOBILITY SCORE: 13
MOVING TO AND FROM BED TO CHAIR: A LITTLE
DAILY ACTIVITIY SCORE: 20
TOILETING: A LITTLE
MOBILITY SCORE: 13
MOVING FROM LYING ON BACK TO SITTING ON SIDE OF FLAT BED WITH BEDRAILS: A LITTLE
MOVING FROM LYING ON BACK TO SITTING ON SIDE OF FLAT BED WITH BEDRAILS: A LITTLE
HELP NEEDED FOR BATHING: A LITTLE
MOVING TO AND FROM BED TO CHAIR: A LITTLE
MOVING FROM LYING ON BACK TO SITTING ON SIDE OF FLAT BED WITH BEDRAILS: A LITTLE
MOVING TO AND FROM BED TO CHAIR: A LOT
DRESSING REGULAR UPPER BODY CLOTHING: A LITTLE
WALKING IN HOSPITAL ROOM: TOTAL
TURNING FROM BACK TO SIDE WHILE IN FLAT BAD: A LITTLE
DAILY ACTIVITIY SCORE: 22
TURNING FROM BACK TO SIDE WHILE IN FLAT BAD: A LITTLE
STANDING UP FROM CHAIR USING ARMS: A LOT
CLIMB 3 TO 5 STEPS WITH RAILING: TOTAL
MOVING FROM LYING ON BACK TO SITTING ON SIDE OF FLAT BED WITH BEDRAILS: A LITTLE
TURNING FROM BACK TO SIDE WHILE IN FLAT BAD: A LITTLE
HELP NEEDED FOR BATHING: A LITTLE
STANDING UP FROM CHAIR USING ARMS: A LOT
DRESSING REGULAR LOWER BODY CLOTHING: A LITTLE
HELP NEEDED FOR BATHING: A LITTLE

## 2024-04-03 ASSESSMENT — PAIN SCALES - GENERAL
PAINLEVEL_OUTOF10: 0 - NO PAIN
PAINLEVEL_OUTOF10: 7

## 2024-04-03 ASSESSMENT — PAIN SCALES - PAIN ASSESSMENT IN ADVANCED DEMENTIA (PAINAD)
BODYLANGUAGE: RELAXED
TOTALSCORE: 0
BREATHING: NORMAL
CONSOLABILITY: NO NEED TO CONSOLE
FACIALEXPRESSION: SMILING OR INEXPRESSIVE

## 2024-04-03 ASSESSMENT — PAIN SCALES - WONG BAKER
WONGBAKER_NUMERICALRESPONSE: NO HURT

## 2024-04-03 ASSESSMENT — PAIN - FUNCTIONAL ASSESSMENT
PAIN_FUNCTIONAL_ASSESSMENT: 0-10

## 2024-04-03 ASSESSMENT — PAIN DESCRIPTION - LOCATION: LOCATION: HEAD

## 2024-04-03 NOTE — CARE PLAN
The patient's goals for the shift include      The clinical goals for the shift include pt will remain safe during shift      Problem: Fall/Injury  Goal: Not fall by end of shift  Outcome: Progressing     Problem: Skin  Goal: Decreased wound size/increased tissue granulation at next dressing change  Outcome: Progressing

## 2024-04-03 NOTE — NURSING NOTE
Bp 195/79 prior to scheduled meds given. Dr aware. Recheck Bp in 1 hour and relay results to Dr per Dr request.

## 2024-04-03 NOTE — CARE PLAN
The patient's goals for the shift include      The clinical goals for the shift include pt will remain safe during shift      Problem: Fall/Injury  Goal: Not fall by end of shift  Outcome: Progressing     Problem: Skin  Goal: Decreased wound size/increased tissue granulation at next dressing change  Outcome: Progressing  Flowsheets (Taken 4/3/2024 0245)  Decreased wound size/increased tissue granulation at next dressing change: Promote sleep for wound healing

## 2024-04-03 NOTE — PROGRESS NOTES
Subjective:  Incomplete I/Os after lasix last evening (despite purwick)  Remains hypervolemic  SR 80s  SBP improving      Objective:    Cardiac Testing:  -ECG 4/1: NSR, RBBB    -TTE 11/23:   1. Left ventricular systolic function is mildly decreased with a 45-50% estimated ejection fraction.   2. Mid and apical anterior wall, mid anterolateral segment, apical lateral segment, and apex are abnormal.   3. There are multiple wall motion abnormalities.   4. Poorly visualized anatomical structures due to suboptimal image quality.   5. There is reduced right ventricular systolic function.    Unstable angina 5/23:  Vessel      Stenosis   Vessel Segment  Left Main 50% stenosis     entire  LAD       70% stenosis       mid  OM 2      95% stenosis proximal to mid  Successful OM2 stenting.      TTE 5/23:  1. Left ventricular systolic function is normal with a 60-65% estimated ejection fraction.  2. Spectral Doppler shows an abnormal pattern of left ventricular diastolic filling.  3. Compared with the prior exam from 4/28/2023 ( [facility]) the Mild-Mod MR and segmental wall motion abnormalities previously seen are not appreciated on todays study. Note that OM2 was stented on 5/11/2023.     TTE 4/23:  1. Left ventricular systolic function is low normal with a 50-55% estimated ejection fraction.  2. Mid inferolateral segment, mid anterolateral segment, and apical lateral segment are abnormal.  3. Spectral Doppler shows a pseudonormal pattern of left ventricular diastolic filling.  4. There is moderate to severe concentric left ventricular hypertrophy.  5. Mild to moderate mitral valve regurgitation.          Physical Exam  General: Lying in bed, room air, NAD  Skin:  warm and dry  HEENT: EOMI. MMM  Cardiovascular: RRR. JVD to jaw at 30 degrees   Respiratory: Reduced a/e bibasilar L>R  Gastrointestinal: soft, non-distended  Genitourinary: pale yellow urine per purwick   Extremities: 1+RLE edema, L AKA   Neurological: no gross focal  "deficits  Psychiatric: flat affect        Last Recorded Vitals  Blood pressure 145/79, pulse 80, temperature 36.5 °C (97.7 °F), temperature source Temporal, resp. rate 18, SpO2 95 %.    Relevant Results  LABS:  CMP:  Results from last 7 days   Lab Units 04/03/24 0521 04/02/24 1912 04/02/24  0525 04/01/24  0602 03/31/24  0637 03/30/24  0356 03/29/24  0549 03/28/24  2158 03/28/24  1412   SODIUM mmol/L 139 139 139 139 137 138 139  --  139   POTASSIUM mmol/L 4.2 4.6 4.7 5.0 5.0 4.8 5.1  --  4.8   CHLORIDE mmol/L 102 102 106 107 107 107 108*  --  108*   CO2 mmol/L 26 27 25 25 21 23 23*  --  21*   ANION GAP mmol/L 15 15 13 12 14 13 8  --  10   BUN mg/dL 48* 49* 50* 52* 57* 65* 65*  --  61*   CREATININE mg/dL 3.87* 3.74* 3.64* 3.40* 3.59* 3.48* 3.30*  --  2.90*   EGFR mL/min/1.73m*2 17* 18* 18* 20* 18* 19* 20*  --  24*   MAGNESIUM mg/dL 2.27 1.87 1.86 1.97 2.06 2.02  --   --  2.20   ALBUMIN g/dL 3.0* 3.2* 3.1* 3.1* 3.1* 3.0* 3.0* 3.3* 3.4*   ALT U/L  --   --   --   --   --  10 12 14 14   AST U/L  --   --   --   --   --  9 12 18 14   BILIRUBIN TOTAL mg/dL  --   --   --   --   --  0.4 0.3 0.3 0.4   LIPASE U/L  --   --   --   --   --   --   --  66*  --        CBC:  Results from last 7 days   Lab Units 04/03/24 0521 04/02/24  0525 04/01/24  0602 03/31/24  0637 03/30/24  0356 03/29/24  0549 03/28/24  1412   WBC AUTO x10*3/uL 7.6 6.5 7.1 8.8 9.3 9.1 10.0   HEMOGLOBIN g/dL 8.4* 7.7* 7.9* 8.5* 8.0* 8.3* 9.6*   HEMATOCRIT % 26.5* 25.1* 27.6* 28.2* 25.0* 27.9* 31.4*   PLATELETS AUTO x10*3/uL 252 182 201 218 213 218 248   MCV fL 84 88 92 90 84 89 87       COAG:   Results from last 7 days   Lab Units 04/03/24  0521   INR  1.1     ABO: No results found for: \"ABO\"  HEME/ENDO:  Results from last 7 days   Lab Units 03/28/24  1412   HEMOGLOBIN A1C % 6.1*        CARDIAC:   Results from last 7 days   Lab Units 04/01/24  0602   TROPHS ng/L 21   BNP pg/mL 459*       Recent Labs     04/29/23  0654 08/15/22  0700 03/18/22  0623   CHOL 181 145 " 150   LDLF 127* 77 90   HDL 32.8* 31.6* 36.7*   TRIG 108 180* 115       Scheduled medications  aspirin, 81 mg, oral, Daily  atorvastatin, 80 mg, oral, Nightly  bisacodyl, 10 mg, rectal, Daily  cholecalciferol, 1,000 Units, oral, BID  [Held by provider] clopidogrel, 75 mg, oral, Daily  furosemide, 80 mg, intravenous, BID with meals  heparin (porcine), 5,000 Units, subcutaneous, q8h  hydrALAZINE, 100 mg, oral, TID  insulin lispro, 0-5 Units, subcutaneous, q4h  ipratropium-albuteroL, 3 mL, nebulization, BID  isosorbide dinitrate, 20 mg, oral, TID  melatonin, 3 mg, oral, Nightly  metoprolol tartrate, 50 mg, oral, BID  NIFEdipine ER, 60 mg, oral, Daily before breakfast  pantoprazole, 40 mg, intravenous, BID  perflutren protein A microsphere, 0.5 mL, intravenous, Once in imaging  piperacillin-tazobactam, 2.25 g, intravenous, q6h  polyethylene glycol, 17 g, oral, Daily  sulfur hexafluoride microsphr, 2 mL, intravenous, Once in imaging  torsemide, 40 mg, oral, Daily      Continuous medications     PRN medications  PRN medications: acetaminophen, dextrose, dextrose, glucagon, glucagon, naloxone, oxyCODONE, traMADol       Assessment/Plan   59-year-old male with past medical history significant for NSTEMI with OM2 PCI in 5/23, CABG with vein graftx3 9(LIMA ->LAD, SVG ->OM1, SVG ->PDA in 11/23) complicated by pericardial tamponade requiring mediastinal exploration and repair of OM graft,  hyperlipidemia, hypertension, CKD (B/L unclear, could be 2-3), history of CVA 2020 with no deficits, type 2 diabetes, left AKA admitted directly from outside hospital with concern for cholecystitis on HIDA scan (cystic duct obstruction and acute cholecystitis revealed on scan).  Cardiology consulted due to concern for ADHF.    Patient does appear to be in clinical ADHF. Likely etiology is medication non-compliance (patient has been out of torsemide for 4 months) as well as uncontrolled HTN.      -Lasix 80mg IV BID with meals   - 2 gram sodium  diet, 2 liter fluid restriction, strict I&O, daily standing weights    -BID RFP and Mg. Goal K>4 and Mg>2  -Continue metoprolol tartrate 50mg BID, nifedipine 60mg, hydralazine 100mg TID   -Patient has an unknown allergy to coreg which wife does not remember but is adamant he cannot tolerate it   - Add Isordil 20 mg TID   -Hold torsemide in the setting of IV diuresis  -Continue ASA 81mg daily  -Restart home atorvastatin 80mg   -Will discuss re-initiation of plavix after cholecystectomy.      Cardiology will follow  Case discussed with Dr. Tony Dinero, APRN-CNP  Cardiology Consults    Please call with any questions  Pager [phone] M-F 8a-5p; Saturday 8a-2p  Pager [phone] all other times

## 2024-04-03 NOTE — PROGRESS NOTES
[facility]  ACUTE CARE SURGERY - PROGRESS NOTE    Patient Name: Humphrey Waddell  MRN: [id number]  Admit Date: 329  : 1965  AGE: 59 y.o.   GENDER: male  ==============================================================================  TODAY'S ASSESSMENT AND PLAN OF CARE:  59-year-old male with past medical history significant for CABG with vein graft x 3 in 2023 (postoperative course complicated by mediastinal exploration and evacuation of pericardial tamponade/repair of vein graft), hyperlipidemia, hypertension, CKD, history of CVA  with no deficits, type 2 diabetes, left AKA admitted directly from outside hospital with concern for cholecystitis on HIDA scan (cystic duct obstruction and acute cholecystitis revealed on scan).  Patient's hospital course c/b hypertension, pleural effusion, and possible decompensated HF.    Plan:  Neuro: acute abdominal pain   - Tylenol, tramadol, Oxy as needed    CV: Hx of CABG, on DAPT   #Concern for acute decompensated HF  - Home metoprolol, home torsemide, hydralazine, ASA  - Last dose of Plavix on 3/29   - Cardiology following     - S/p 80 mg Iasix --> net negative 1.1L     - BID RFP and Mag     - Nifedipine 60mg  - Follow up Limited Echo to assess LVEF  Respiratory: New Hypoxia  - Left sided pleural effusion and left lower lobe atelectatic changes    - Plan for left sided thoracentesis on the evening of 4/3 (awaiting Plavix washout)   - Continue IS, duonebs q6 hours for 48 hours    GI: acute cholecystitis   - NPO  - Percutaneous cholecystostomy for management of cholecystitis  - Regular diet post IR procedure   - Constipation: continue bowel regimen  - PPI     : IDANIA on CKD Stage IV  - Strict I&Os  - RFP BID per cards recs    Heme: Chronic Anemia  - Continue to trend H/H   - BLE DVT US negative     ID: acute cholecystitis  - Continue IV Zosyn     PPx: Subcu heparin, SCDs    Dispo: continue RNF    Patient discussed with attending  Dr. Caputo.    Kathy Bowers MD  PGY-2 General Surgery   ACS [id number]  ==============================================================================  CHIEF COMPLAINT / EVENTS LAST 24HRS / HPI:  Patient hypertensive overnight, given additional dosage of nifedipine per cardiology recs. Continues to endorse RUQ pain this AM.    MEDICAL HISTORY / ROS:   Admission history and ROS reviewed. Pertinent changes as follows:  none    PHYSICAL EXAM:  Heart Rate:  [64-84]   Temp:  [36.4 °C (97.5 °F)-37.1 °C (98.8 °F)]   Resp:  [16-18]   BP: (145-195)/(72-83)   SpO2:  [93 %-95 %]     Physical Exam  Constitutional:       General: He is not in acute distress.     Appearance: Normal appearance. He is obese.   HENT:      Head: Normocephalic.      Mouth: Mucous membranes are moist.   Eyes:      Pupils: Pupils are equal, round, and reactive to light.   Cardiovascular:      Rate and Rhythm: Normal rate.   Pulmonary: /     Effort: Pulmonary effort is normal. Equal chest expansion      Comments: On NC  Abdominal:      General: obese     Palpations: Abdomen is soft.      Comments: Mild tenderness to deep palpation RUQ   Musculoskeletal:      Cervical back: Normal range of motion.      Comments: LLE AKA   Skin:     Comments: Well healed sternotomy site   Neurological:      General: No focal deficit present.      Mental Status: He is alert and oriented to person, place, and time. Mental status is at baseline.   Psychiatric:         Mood and Affect: Mood normal.         Behavior: Behavior normal.         Thought Content: Thought content normal.         Judgment: Judgment normal.      IMAGING SUMMARY:  (summary of new imaging findings, not a copy of dictation)      LABS:  Results from last 7 days   Lab Units 04/03/24  0521 04/02/24  0525 04/01/24  0602   WBC AUTO x10*3/uL 7.6 6.5 7.1   HEMOGLOBIN g/dL 8.4* 7.7* 7.9*   HEMATOCRIT % 26.5* 25.1* 27.6*   PLATELETS AUTO x10*3/uL 252 182 201   NEUTROS PCT AUTO % 67.9 71.9 72.6   LYMPHS PCT AUTO % 13.7  12.0 10.9   MONOS PCT AUTO % 11.4 9.9 10.8   EOS PCT AUTO % 6.3 5.7 5.3       Results from last 7 days   Lab Units 04/03/24  0521   APTT seconds 30   INR  1.1     Results from last 7 days   Lab Units 04/03/24  0521 04/02/24  1912 04/02/24  0525 03/31/24  0637 03/30/24  0356 03/29/24  0549 03/28/24  2158   SODIUM mmol/L 139 139 139   < > 138 139  --    POTASSIUM mmol/L 4.2 4.6 4.7   < > 4.8 5.1  --    CHLORIDE mmol/L 102 102 106   < > 107 108*  --    CO2 mmol/L 26 27 25   < > 23 23*  --    BUN mg/dL 48* 49* 50*   < > 65* 65*  --    CREATININE mg/dL 3.87* 3.74* 3.64*   < > 3.48* 3.30*  --    CALCIUM mg/dL 8.3* 8.8 8.5*   < > 8.2* 8.2*  --    PROTEIN TOTAL g/dL  --   --   --   --  6.1* 6.2 6.7   BILIRUBIN TOTAL mg/dL  --   --   --   --  0.4 0.3 0.3   ALK PHOS U/L  --   --   --   --  49 63 64   ALT U/L  --   --   --   --  10 12 14   AST U/L  --   --   --   --  9 12 18   GLUCOSE mg/dL 83 131* 87   < > 101* 151*  --     < > = values in this interval not displayed.       Results from last 7 days   Lab Units 03/30/24  0356 03/29/24  0549 03/28/24 2158   BILIRUBIN TOTAL mg/dL 0.4 0.3 0.3   BILIRUBIN DIRECT mg/dL  --   --  <0.2           I have reviewed all medications, laboratory results, and imaging pertinent for today's encounter.

## 2024-04-03 NOTE — POST-PROCEDURE NOTE
Interventional Radiology Brief Postprocedure Note    Attending: Torres Jones MD    Assistant: Juancho Rivers MD    Diagnosis: Cholecystitis and left pleural effusion    Description of procedure: Successful and uncomplicated placement of 8fr cholecystotomy pigtail drain under ultrasound guidance. Drain left to bag drainage. Left thoracentesis was performed with 1.2 L of yellow fluid removed.  See PACS for full procedural report.      Anesthesia:  MAC    Complications: None    Estimated Blood Loss: minimal    Medications  As of 04/03/24 1810      cholecalciferol (Vitamin D-3) tablet 1,000 Units (Units) Total dose:  10,000 Units Dosing weight:  129      Date/Time Rate/Dose/Volume Action       03/30/24  0138 1,000 Units Given      0837 1,000 Units Given      2004 1,000 Units Given     03/31/24  0825 1,000 Units Given      2055 1,000 Units Given     04/01/24  0901 1,000 Units Given      2142 1,000 Units Given     04/02/24  0915 1,000 Units Given      2146 1,000 Units Given     04/03/24  1056 1,000 Units Given               melatonin tablet 3 mg (mg) Total dose:  15 mg Dosing weight:  129      Date/Time Rate/Dose/Volume Action       03/30/24  0139 3 mg Given      2004 3 mg Given     03/31/24 2055 3 mg Given     04/01/24  2142 3 mg Given     04/02/24  2146 3 mg Given               metoprolol tartrate (Lopressor) tablet 50 mg (mg) Total dose:  450 mg*   *Administration not included in total     Date/Time Rate/Dose/Volume Action       03/30/24  0015 *50 mg Missed      0837 50 mg Given      2004 50 mg Given     03/31/24  0824 50 mg Given      2055 50 mg Given     04/01/24  0901 50 mg Given      2142 50 mg Given     04/02/24  0914 50 mg Given      2146 50 mg Given     04/03/24  1041 50 mg Given               torsemide (Demadex) tablet 40 mg (mg) Total dose:  200 mg      Date/Time Rate/Dose/Volume Action       03/30/24  0837 40 mg Given     03/31/24  0824 40 mg Given     04/01/24  0901 40 mg Given     04/02/24  0914 40 mg  Given     04/03/24  1041 40 mg Given               aspirin chewable tablet 81 mg (mg) Total dose:  405 mg      Date/Time Rate/Dose/Volume Action       03/29/24  2345 *Not included in total Held by provider     03/30/24  0159 *Not included in total Unheld by provider      0837 81 mg Given     03/31/24  0825 81 mg Given     04/01/24  0901 81 mg Given     04/02/24  0917 81 mg Given     04/03/24  1056 81 mg Given               clopidogrel (Plavix) tablet 75 mg (mg) Total dose:  Cannot be calculated*   *Administration dose not documented     Date/Time Rate/Dose/Volume Action       03/29/24  2345 *Not included in total Held by provider     03/30/24  0900 *Not included in total Automatically Held     03/31/24 0900 *Not included in total Automatically Held     04/01/24  0900 *75 mg Missed     04/02/24  0900 *Not included in total Automatically Held     04/03/24  0900 *75 mg Missed               heparin (porcine) injection 5,000 Units (Units) Total dose:  70,000 Units Dosing weight:  129      Date/Time Rate/Dose/Volume Action       03/30/24  0139 5,000 Units Given      0838 5,000 Units Given      1605 5,000 Units Given     03/31/24  0029 5,000 Units Given      0825 5,000 Units Given      1748 5,000 Units Given     04/01/24  0226 5,000 Units Given      0901 5,000 Units Given      1527 5,000 Units Given     04/02/24  0003 5,000 Units Given      0914 5,000 Units Given      1733 5,000 Units Given     04/03/24  0009 5,000 Units Given      1041 5,000 Units Given               lactated Ringer's infusion (mL/hr) Total volume:  3,333.33 mL* Dosing weight:  129   *From user-documented volume     Date/Time Rate/Dose/Volume Action       03/30/24  0117 125 mL/hr New Bag      0500 125 mL/hr - 464.58 mL Rate Verify      0657 125 mL/hr - 243.75 mL Rate Verify      1134 125 mL/hr New Bag      1400 1,000 mL      03/31/24  0700 1,625 mL       1216 125 mL/hr New Bag      2108 125 mL/hr New Bag     04/01/24  0527 125 mL/hr New Bag      1148  *Not included in total Held by provider      1258  Stopped      1258 *Not included in total Unheld by provider               insulin lispro (HumaLOG) injection 0-5 Units (Units) Total dose:  Cannot be calculated* Dosing weight:  129   *Administration dose not documented     Date/Time Rate/Dose/Volume Action       03/30/24  0015 *Not included in total Missed      0415 *Not included in total Missed      0815 *Not included in total Missed      1215 *Not included in total Missed      1615 *Not included in total Missed      2015 *Not included in total Missed     03/31/24  0015 *Not included in total Missed      0415 *Not included in total Missed      0815 *Not included in total Missed      1215 *Not included in total Missed      1615 *Not included in total Missed      2015 *Not included in total Missed     04/01/24  0015 *Not included in total Missed      0415 *Not included in total Missed      0815 *Not included in total Missed      1215 *Not included in total Missed      1615 *Not included in total Missed      2015 *Not included in total Missed     04/02/24  0015 *Not included in total Missed      0415 *Not included in total Missed      0815 *Not included in total Missed      1215 *Not included in total Missed      1615 *Not included in total Missed      2015 *Not included in total Missed     04/03/24  0015 *Not included in total Missed      0415 *Not included in total Missed      0815 *Not included in total Missed      1215 *Not included in total Missed               pantoprazole (ProtoNix) injection 40 mg (mg) Total dose:  400 mg Dosing weight:  129      Date/Time Rate/Dose/Volume Action       03/30/24  0139 40 mg Given      0838 40 mg Given      2004 40 mg Given     03/31/24  0825 40 mg Given      2056 40 mg Given     04/01/24  0901 40 mg Given      2142 40 mg Given     04/02/24  0914 40 mg Given      2146 40 mg Given     04/03/24  1041 40 mg Given               acetaminophen (Tylenol) tablet 650 mg (mg) Total dose:   650 mg Dosing weight:  129      Date/Time Rate/Dose/Volume Action       03/30/24  1954 650 mg Given               oxyCODONE (Roxicodone) immediate release tablet 5 mg (mg) Total dose:  65 mg Dosing weight:  129      Date/Time Rate/Dose/Volume Action       03/30/24  0129 5 mg Given      0837 5 mg Given      1350 5 mg Given      1954 5 mg Given     03/31/24  0033 5 mg Given      0447 5 mg Given      1352 5 mg Given      2056 5 mg Given     04/01/24  0958 5 mg Given      1527 5 mg Given     04/02/24  0000 5 mg Given      1235 5 mg Given     04/03/24  0014 5 mg Given               piperacillin-tazobactam-dextrose (Zosyn) IV 2.25 g (mL/hr) Total dose:  4.5 g* Dosing weight:  129   *From user-documented volume     Date/Time Rate/Dose/Volume Action       03/30/24  0130 2.25 g - 100 mL/hr (over 30 min) New Bag      0200  (over 30 min) Stopped      0500 100 mL/hr (over 30 min) - 50 mL Rate Verify      0654 2.25 g - 100 mL/hr (over 30 min) New Bag      0724 50 mL Stopped      1350 2.25 g - 100 mL/hr (over 30 min) New Bag      1447  (over 30 min) Stopped      1955 2.25 g - 100 mL/hr (over 30 min) New Bag      2025  (over 30 min) Stopped     03/31/24  0226 2.25 g - 100 mL/hr (over 30 min) New Bag      0256  (over 30 min) Stopped      0823 2.25 g - 100 mL/hr (over 30 min) New Bag      0853  (over 30 min) Stopped      1423 2.25 g - 100 mL/hr (over 30 min) New Bag      1453  (over 30 min) Stopped      2056 2.25 g - 100 mL/hr (over 30 min) New Bag      2126  (over 30 min) Stopped     04/01/24  0226 2.25 g - 100 mL/hr (over 30 min) New Bag      0256  (over 30 min) Stopped      0526 2.25 g - 100 mL/hr (over 30 min) New Bag      0556  (over 30 min) Stopped      1248 2.25 g - 100 mL/hr (over 30 min) New Bag      1318  (over 30 min) Stopped      1736 2.25 g - 100 mL/hr (over 30 min) New Bag      1806  (over 30 min) Stopped     04/02/24  0003 2.25 g - 100 mL/hr (over 30 min) New Bag      0033  (over 30 min) Stopped      0549 2.25 g -  100 mL/hr (over 30 min) New Bag      0619  (over 30 min) Stopped      1234 2.25 g - 100 mL/hr (over 30 min) New Bag      1304  (over 30 min) Stopped      1733 2.25 g - 100 mL/hr (over 30 min) New Bag      1803  (over 30 min) Stopped     04/03/24  0009 2.25 g - 100 mL/hr (over 30 min) New Bag      0039  (over 30 min) Stopped      0529 2.25 g - 100 mL/hr (over 30 min) New Bag      0559  (over 30 min) Stopped      1217 2.25 g - 100 mL/hr (over 30 min) New Bag      1247  (over 30 min) Stopped               hydrALAZINE (Apresoline) tablet 100 mg (mg) Total dose:  1,200 mg Dosing weight:  129      Date/Time Rate/Dose/Volume Action       03/30/24  1705 100 mg Given      2004 100 mg Given     03/31/24  0824 100 mg Given      1511 100 mg Given      2055 100 mg Given     04/01/24  0901 100 mg Given      1527 100 mg Given      2142 100 mg Given     04/02/24  0916 100 mg Given      1515 100 mg Given      2146 100 mg Given     04/03/24  1041 100 mg Given               ondansetron (Zofran) injection 4 mg (mg) Total dose:  4 mg Dosing weight:  129      Date/Time Rate/Dose/Volume Action       03/31/24  1605 4 mg Given               ondansetron (Zofran) injection  - Omnicell Override Pull (mg) Total dose:  4 mg      Date/Time Rate/Dose/Volume Action       03/31/24  1605 4 mg Given               ondansetron (Zofran) injection (mg) Total dose:  4 mg      Date/Time Rate/Dose/Volume Action       04/03/24  1753 4 mg Given               labetaloL (Normodyne,Trandate) injection  - Omnicell Override Pull Total dose:  Cannot be calculated*   *Administration does not have dose documented     Date/Time Rate/Dose/Volume Action       03/31/24  1615  Given               labetaloL (Normodyne,Trandate) injection 20 mg (mg) Total dose:  20 mg Dosing weight:  129      Date/Time Rate/Dose/Volume Action       04/01/24  1736 20 mg Given               ipratropium-albuteroL (Duo-Neb) 0.5-2.5 mg/3 mL nebulizer solution 3 mL (mL) Total volume:  9 mL Dosing  weight:  129      Date/Time Rate/Dose/Volume Action       04/01/24  1521 3 mL Given      2056 3 mL Given     04/02/24  1014 *3 mL Missed      2310 3 mL Given     04/03/24  1001 *3 mL Missed               furosemide (Lasix) injection 80 mg (mg) Total dose:  240 mg Dosing weight:  129      Date/Time Rate/Dose/Volume Action       04/01/24  1527 80 mg Given     04/02/24  1000 80 mg Given     04/03/24  0922 80 mg Given               polyethylene glycol (Glycolax, Miralax) packet 17 g (g) Total dose:  0 g* Dosing weight:  129   *Administration not included in total     Date/Time Rate/Dose/Volume Action       04/01/24  1645 *17 g Missed     04/02/24  0900 *17 g Missed     04/03/24  0900 *17 g Missed               bisacodyl (Dulcolax) suppository 10 mg (mg) Total dose:  0 mg* Dosing weight:  129   *Administration not included in total     Date/Time Rate/Dose/Volume Action       04/01/24  1645 *10 mg Missed     04/02/24  0900 *10 mg Missed     04/03/24  0900 *10 mg Missed               perflutren lipid microspheres (Definity) injection 0.5-10 mL of dilution (mL of dilution) Total dose:  2 mL of dilution Dosing weight:  129      Date/Time Rate/Dose/Volume Action       04/03/24  1411 2 mL of dilution Given               amLODIPine (Norvasc) tablet 10 mg (mg) Total dose:  0 mg* Dosing weight:  129   *Administration not included in total     Date/Time Rate/Dose/Volume Action       04/02/24  1000 *10 mg Missed               NIFEdipine ER (Adalat CC) 24 hr tablet 60 mg (mg) Total dose:  60 mg Dosing weight:  129      Date/Time Rate/Dose/Volume Action       04/03/24  1041 60 mg Given               atorvastatin (Lipitor) tablet 80 mg (mg) Total dose:  80 mg Dosing weight:  129      Date/Time Rate/Dose/Volume Action       04/02/24  2146 80 mg Given               magnesium sulfate IV 2 g (mL/hr) Total dose:  2 g* Dosing weight:  129   *From user-documented volume     Date/Time Rate/Dose/Volume Action       04/02/24  2146 2 g - 50  mL/hr (over 60 min) New Bag      2246  (over 60 min) Stopped               NIFEdipine ER (Adalat CC) 24 hr tablet 30 mg (mg) Total dose:  30 mg Dosing weight:  129      Date/Time Rate/Dose/Volume Action       04/03/24  0014 30 mg Given               isosorbide dinitrate (Isordil) tablet 10 mg (mg) Total dose:  10 mg Dosing weight:  129      Date/Time Rate/Dose/Volume Action       04/03/24  0438 10 mg Given               isosorbide dinitrate (Isordil) tablet 20 mg (mg) Total dose:  20 mg Dosing weight:  129      Date/Time Rate/Dose/Volume Action       04/03/24  1041 20 mg Given               midazolam (Versed) injection (mg) Total dose:  2 mg      Date/Time Rate/Dose/Volume Action       04/03/24  1729 1 mg Given      1748 1 mg Given                   * Cannot find log *      See detailed result report with images in PACS.    The patient tolerated the procedure well without incident or complication and is in stable condition.

## 2024-04-03 NOTE — PRE-PROCEDURE NOTE
Interventional Radiology Pre-Procedure Note    Indication for procedure: The primary encounter diagnosis was Cholecystitis. Diagnoses of S/P CABG (coronary artery bypass graft), Leg swelling, Preoperative clearance, and Localized edema were also pertinent to this visit.    Planned Procedure: Percutaneous cholecystostomy & Thoracentesis    Relevant review of systems: NA    Relevant Labs:   Lab Results   Component Value Date    CREATININE 3.86 (H) 04/03/2024    EGFR 17 (L) 04/03/2024    INR 1.1 04/03/2024    PROTIME 12.5 04/03/2024       Planned Sedation/Anesthesia: Moderate    Airway assessment: normal    Directed physical examination:    Aox3  Normal rate of respirations  Mild tenderness to palpation in RUQ    Mallampati: II (hard and soft palate, upper portion of tonsils anduvula visible)    ASA Score: ASA 3 - Patient with moderate systemic disease with functional limitations    Benefits, risks and alternatives of procedure and planned sedation have been discussed with the patient and/or their representative. All questions answered and they agree to proceed.     Jeremy Bhat DO  Interventional Radiology  Nursing Quarterback: [id number], IR pager: [phone]     NON-Urgent on call weekends and after hours weekdays (5pm - 5am) IR pager: [phone]  Urgent & emergent on call weekends and after hours weekdays (5pm-7am) IR pager: [phone]

## 2024-04-04 LAB
ALBUMIN SERPL BCP-MCNC: 3 G/DL (ref 3.4–5)
ALBUMIN SERPL BCP-MCNC: 3.1 G/DL (ref 3.4–5)
ANION GAP SERPL CALC-SCNC: 15 MMOL/L (ref 10–20)
ANION GAP SERPL CALC-SCNC: 16 MMOL/L (ref 10–20)
BASOPHILS # BLD AUTO: 0.02 X10*3/UL (ref 0–0.1)
BASOPHILS NFR BLD AUTO: 0.2 %
BUN SERPL-MCNC: 51 MG/DL (ref 6–23)
BUN SERPL-MCNC: 55 MG/DL (ref 6–23)
CALCIUM SERPL-MCNC: 8.1 MG/DL (ref 8.6–10.6)
CALCIUM SERPL-MCNC: 8.3 MG/DL (ref 8.6–10.6)
CHLORIDE SERPL-SCNC: 100 MMOL/L (ref 98–107)
CHLORIDE SERPL-SCNC: 98 MMOL/L (ref 98–107)
CO2 SERPL-SCNC: 27 MMOL/L (ref 21–32)
CO2 SERPL-SCNC: 28 MMOL/L (ref 21–32)
CREAT SERPL-MCNC: 4.09 MG/DL (ref 0.5–1.3)
CREAT SERPL-MCNC: 4.41 MG/DL (ref 0.5–1.3)
EGFRCR SERPLBLD CKD-EPI 2021: 14 ML/MIN/1.73M*2
EGFRCR SERPLBLD CKD-EPI 2021: 16 ML/MIN/1.73M*2
EOSINOPHIL # BLD AUTO: 0.32 X10*3/UL (ref 0–0.7)
EOSINOPHIL NFR BLD AUTO: 3.8 %
ERYTHROCYTE [DISTWIDTH] IN BLOOD BY AUTOMATED COUNT: 14.2 % (ref 11.5–14.5)
GLUCOSE BLD MANUAL STRIP-MCNC: 118 MG/DL (ref 74–99)
GLUCOSE BLD MANUAL STRIP-MCNC: 118 MG/DL (ref 74–99)
GLUCOSE BLD MANUAL STRIP-MCNC: 145 MG/DL (ref 74–99)
GLUCOSE BLD MANUAL STRIP-MCNC: 151 MG/DL (ref 74–99)
GLUCOSE BLD MANUAL STRIP-MCNC: 191 MG/DL (ref 74–99)
GLUCOSE SERPL-MCNC: 103 MG/DL (ref 74–99)
GLUCOSE SERPL-MCNC: 153 MG/DL (ref 74–99)
HCT VFR BLD AUTO: 26.9 % (ref 41–52)
HGB BLD-MCNC: 8.6 G/DL (ref 13.5–17.5)
IMM GRANULOCYTES # BLD AUTO: 0.02 X10*3/UL (ref 0–0.7)
IMM GRANULOCYTES NFR BLD AUTO: 0.2 % (ref 0–0.9)
LYMPHOCYTES # BLD AUTO: 0.85 X10*3/UL (ref 1.2–4.8)
LYMPHOCYTES NFR BLD AUTO: 10.1 %
MAGNESIUM SERPL-MCNC: 2.09 MG/DL (ref 1.6–2.4)
MAGNESIUM SERPL-MCNC: 2.1 MG/DL (ref 1.6–2.4)
MCH RBC QN AUTO: 27.6 PG (ref 26–34)
MCHC RBC AUTO-ENTMCNC: 32 G/DL (ref 32–36)
MCV RBC AUTO: 86 FL (ref 80–100)
MONOCYTES # BLD AUTO: 0.93 X10*3/UL (ref 0.1–1)
MONOCYTES NFR BLD AUTO: 11 %
NEUTROPHILS # BLD AUTO: 6.29 X10*3/UL (ref 1.2–7.7)
NEUTROPHILS NFR BLD AUTO: 74.7 %
NRBC BLD-RTO: 0 /100 WBCS (ref 0–0)
PHOSPHATE SERPL-MCNC: 4.2 MG/DL (ref 2.5–4.9)
PHOSPHATE SERPL-MCNC: 4.7 MG/DL (ref 2.5–4.9)
PLATELET # BLD AUTO: 240 X10*3/UL (ref 150–450)
POTASSIUM SERPL-SCNC: 4.1 MMOL/L (ref 3.5–5.3)
POTASSIUM SERPL-SCNC: 4.1 MMOL/L (ref 3.5–5.3)
RBC # BLD AUTO: 3.12 X10*6/UL (ref 4.5–5.9)
SODIUM SERPL-SCNC: 137 MMOL/L (ref 136–145)
SODIUM SERPL-SCNC: 139 MMOL/L (ref 136–145)
WBC # BLD AUTO: 8.4 X10*3/UL (ref 4.4–11.3)

## 2024-04-04 PROCEDURE — 36415 COLL VENOUS BLD VENIPUNCTURE: CPT

## 2024-04-04 PROCEDURE — C9113 INJ PANTOPRAZOLE SODIUM, VIA: HCPCS

## 2024-04-04 PROCEDURE — 2500000002 HC RX 250 W HCPCS SELF ADMINISTERED DRUGS (ALT 637 FOR MEDICARE OP, ALT 636 FOR OP/ED)

## 2024-04-04 PROCEDURE — 80069 RENAL FUNCTION PANEL: CPT

## 2024-04-04 PROCEDURE — 2500000004 HC RX 250 GENERAL PHARMACY W/ HCPCS (ALT 636 FOR OP/ED)

## 2024-04-04 PROCEDURE — 2500000001 HC RX 250 WO HCPCS SELF ADMINISTERED DRUGS (ALT 637 FOR MEDICARE OP): Performed by: STUDENT IN AN ORGANIZED HEALTH CARE EDUCATION/TRAINING PROGRAM

## 2024-04-04 PROCEDURE — 2500000001 HC RX 250 WO HCPCS SELF ADMINISTERED DRUGS (ALT 637 FOR MEDICARE OP)

## 2024-04-04 PROCEDURE — 99233 SBSQ HOSP IP/OBS HIGH 50: CPT | Performed by: NURSE PRACTITIONER

## 2024-04-04 PROCEDURE — 2500000002 HC RX 250 W HCPCS SELF ADMINISTERED DRUGS (ALT 637 FOR MEDICARE OP, ALT 636 FOR OP/ED): Performed by: SURGERY

## 2024-04-04 PROCEDURE — 83735 ASSAY OF MAGNESIUM: CPT

## 2024-04-04 PROCEDURE — 82947 ASSAY GLUCOSE BLOOD QUANT: CPT

## 2024-04-04 PROCEDURE — 85025 COMPLETE CBC W/AUTO DIFF WBC: CPT

## 2024-04-04 PROCEDURE — 97161 PT EVAL LOW COMPLEX 20 MIN: CPT | Mod: GP

## 2024-04-04 PROCEDURE — 1100000001 HC PRIVATE ROOM DAILY

## 2024-04-04 PROCEDURE — 97165 OT EVAL LOW COMPLEX 30 MIN: CPT | Mod: GO

## 2024-04-04 RX ORDER — POLYETHYLENE GLYCOL 3350 17 G/17G
17 POWDER, FOR SOLUTION ORAL 2 TIMES DAILY
Status: DISCONTINUED | OUTPATIENT
Start: 2024-04-04 | End: 2024-04-08 | Stop reason: HOSPADM

## 2024-04-04 RX ORDER — IPRATROPIUM BROMIDE AND ALBUTEROL SULFATE 2.5; .5 MG/3ML; MG/3ML
3 SOLUTION RESPIRATORY (INHALATION) EVERY 4 HOURS PRN
Status: DISCONTINUED | OUTPATIENT
Start: 2024-04-04 | End: 2024-04-08 | Stop reason: HOSPADM

## 2024-04-04 RX ADMIN — PIPERACILLIN SODIUM AND TAZOBACTAM SODIUM 2.25 G: 2; .25 INJECTION, SOLUTION INTRAVENOUS at 23:55

## 2024-04-04 RX ADMIN — HYDRALAZINE HYDROCHLORIDE 100 MG: 50 TABLET ORAL at 09:02

## 2024-04-04 RX ADMIN — HEPARIN SODIUM 5000 UNITS: 5000 INJECTION INTRAVENOUS; SUBCUTANEOUS at 02:11

## 2024-04-04 RX ADMIN — PANTOPRAZOLE SODIUM 40 MG: 40 INJECTION, POWDER, FOR SOLUTION INTRAVENOUS at 20:26

## 2024-04-04 RX ADMIN — TORSEMIDE 40 MG: 20 TABLET ORAL at 09:03

## 2024-04-04 RX ADMIN — PIPERACILLIN SODIUM AND TAZOBACTAM SODIUM 2.25 G: 2; .25 INJECTION, SOLUTION INTRAVENOUS at 06:18

## 2024-04-04 RX ADMIN — HYDRALAZINE HYDROCHLORIDE 100 MG: 50 TABLET ORAL at 02:12

## 2024-04-04 RX ADMIN — ATORVASTATIN CALCIUM 80 MG: 80 TABLET, FILM COATED ORAL at 20:25

## 2024-04-04 RX ADMIN — PANTOPRAZOLE SODIUM 40 MG: 40 INJECTION, POWDER, FOR SOLUTION INTRAVENOUS at 09:02

## 2024-04-04 RX ADMIN — HYDRALAZINE HYDROCHLORIDE 100 MG: 50 TABLET ORAL at 20:26

## 2024-04-04 RX ADMIN — Medication 1000 UNITS: at 09:02

## 2024-04-04 RX ADMIN — POLYETHYLENE GLYCOL 3350 17 G: 17 POWDER, FOR SOLUTION ORAL at 20:25

## 2024-04-04 RX ADMIN — ISOSORBIDE DINITRATE 20 MG: 20 TABLET ORAL at 15:56

## 2024-04-04 RX ADMIN — FUROSEMIDE 80 MG: 10 INJECTION, SOLUTION INTRAVENOUS at 09:02

## 2024-04-04 RX ADMIN — HEPARIN SODIUM 5000 UNITS: 5000 INJECTION INTRAVENOUS; SUBCUTANEOUS at 15:56

## 2024-04-04 RX ADMIN — HYDRALAZINE HYDROCHLORIDE 100 MG: 50 TABLET ORAL at 15:56

## 2024-04-04 RX ADMIN — PIPERACILLIN SODIUM AND TAZOBACTAM SODIUM 2.25 G: 2; .25 INJECTION, SOLUTION INTRAVENOUS at 17:38

## 2024-04-04 RX ADMIN — METOPROLOL TARTRATE 50 MG: 50 TABLET, FILM COATED ORAL at 09:03

## 2024-04-04 RX ADMIN — HEPARIN SODIUM 5000 UNITS: 5000 INJECTION INTRAVENOUS; SUBCUTANEOUS at 23:55

## 2024-04-04 RX ADMIN — INSULIN LISPRO 1 UNITS: 100 INJECTION, SOLUTION INTRAVENOUS; SUBCUTANEOUS at 12:30

## 2024-04-04 RX ADMIN — ISOSORBIDE DINITRATE 20 MG: 20 TABLET ORAL at 09:02

## 2024-04-04 RX ADMIN — IPRATROPIUM BROMIDE AND ALBUTEROL SULFATE 3 ML: .5; 3 SOLUTION RESPIRATORY (INHALATION) at 11:33

## 2024-04-04 RX ADMIN — METOPROLOL TARTRATE 50 MG: 50 TABLET, FILM COATED ORAL at 20:25

## 2024-04-04 RX ADMIN — ASPIRIN 81 MG CHEWABLE TABLET 81 MG: 81 TABLET CHEWABLE at 09:03

## 2024-04-04 RX ADMIN — NIFEDIPINE 60 MG: 60 TABLET, FILM COATED, EXTENDED RELEASE ORAL at 09:02

## 2024-04-04 RX ADMIN — PIPERACILLIN SODIUM AND TAZOBACTAM SODIUM 2.25 G: 2; .25 INJECTION, SOLUTION INTRAVENOUS at 11:44

## 2024-04-04 RX ADMIN — PIPERACILLIN SODIUM AND TAZOBACTAM SODIUM 2.25 G: 2; .25 INJECTION, SOLUTION INTRAVENOUS at 00:18

## 2024-04-04 RX ADMIN — Medication 1000 UNITS: at 20:25

## 2024-04-04 RX ADMIN — HEPARIN SODIUM 5000 UNITS: 5000 INJECTION INTRAVENOUS; SUBCUTANEOUS at 09:02

## 2024-04-04 RX ADMIN — MELATONIN 3 MG: 3 TAB ORAL at 20:25

## 2024-04-04 RX ADMIN — ISOSORBIDE DINITRATE 20 MG: 20 TABLET ORAL at 18:21

## 2024-04-04 ASSESSMENT — COGNITIVE AND FUNCTIONAL STATUS - GENERAL
HELP NEEDED FOR BATHING: A LOT
TOILETING: A LITTLE
WALKING IN HOSPITAL ROOM: TOTAL
MOVING FROM LYING ON BACK TO SITTING ON SIDE OF FLAT BED WITH BEDRAILS: A LOT
CLIMB 3 TO 5 STEPS WITH RAILING: TOTAL
DAILY ACTIVITIY SCORE: 22
DRESSING REGULAR UPPER BODY CLOTHING: A LITTLE
EATING MEALS: A LITTLE
STANDING UP FROM CHAIR USING ARMS: TOTAL
DAILY ACTIVITIY SCORE: 15
MOBILITY SCORE: 9
TURNING FROM BACK TO SIDE WHILE IN FLAT BAD: A LOT
TOILETING: A LOT
DRESSING REGULAR LOWER BODY CLOTHING: A LITTLE
DRESSING REGULAR LOWER BODY CLOTHING: A LOT
MOVING TO AND FROM BED TO CHAIR: A LOT
PERSONAL GROOMING: A LITTLE

## 2024-04-04 ASSESSMENT — ACTIVITIES OF DAILY LIVING (ADL)
ADL_ASSISTANCE: NEEDS ASSISTANCE
ADL_ASSISTANCE: INDEPENDENT
BATHING_ASSISTANCE: STAND BY
BATHING_ASSISTANCE: MODERATE

## 2024-04-04 ASSESSMENT — PAIN - FUNCTIONAL ASSESSMENT
PAIN_FUNCTIONAL_ASSESSMENT: 0-10

## 2024-04-04 ASSESSMENT — PAIN SCALES - GENERAL
PAINLEVEL_OUTOF10: 0 - NO PAIN
PAINLEVEL_OUTOF10: 9
PAINLEVEL_OUTOF10: 0 - NO PAIN
PAINLEVEL_OUTOF10: 0 - NO PAIN

## 2024-04-04 ASSESSMENT — PAIN SCALES - WONG BAKER
WONGBAKER_NUMERICALRESPONSE: NO HURT
WONGBAKER_NUMERICALRESPONSE: NO HURT

## 2024-04-04 NOTE — PROGRESS NOTES
Subjective:  Continues to have Incomplete I/Os   S/p left thoracentesis for 1.2L yellow fluid yesterday  SR 70s  -120s      Objective:    Cardiac Testing:  -ECG 4/1: NSR, RBBB    -TTE 11/23:   1. Left ventricular systolic function is mildly decreased with a 45-50% estimated ejection fraction.   2. Mid and apical anterior wall, mid anterolateral segment, apical lateral segment, and apex are abnormal.   3. There are multiple wall motion abnormalities.   4. Poorly visualized anatomical structures due to suboptimal image quality.   5. There is reduced right ventricular systolic function.    Unstable angina 5/23:  Vessel      Stenosis   Vessel Segment  Left Main 50% stenosis     entire  LAD       70% stenosis       mid  OM 2      95% stenosis proximal to mid  Successful OM2 stenting.      TTE 5/23:  1. Left ventricular systolic function is normal with a 60-65% estimated ejection fraction.  2. Spectral Doppler shows an abnormal pattern of left ventricular diastolic filling.  3. Compared with the prior exam from 4/28/2023 ( [facility]) the Mild-Mod MR and segmental wall motion abnormalities previously seen are not appreciated on todays study. Note that OM2 was stented on 5/11/2023.     TTE 4/23:  1. Left ventricular systolic function is low normal with a 50-55% estimated ejection fraction.  2. Mid inferolateral segment, mid anterolateral segment, and apical lateral segment are abnormal.  3. Spectral Doppler shows a pseudonormal pattern of left ventricular diastolic filling.  4. There is moderate to severe concentric left ventricular hypertrophy.  5. Mild to moderate mitral valve regurgitation.          Physical Exam  General: sitting on edge of bed, 2L nc, NAD  Skin:  warm and dry  HEENT: EOMI. MMM  Cardiovascular: RRR. JVD difficult to assess at 90 degrees   Respiratory: Reduced a/e bibasilar L>R  Gastrointestinal: soft, non-distended  Extremities: 1+RLE edema, L AKA   Neurological: no gross focal  "deficits  Psychiatric: flat affect        Last Recorded Vitals  Blood pressure 112/66, pulse 87, temperature 36.8 °C (98.2 °F), temperature source Temporal, resp. rate 18, SpO2 90 %.    Relevant Results  LABS:  CMP:  Results from last 7 days   Lab Units 04/04/24  0532 04/03/24  1520 04/03/24  0521 04/02/24  1912 04/02/24  0525 04/01/24  0602 03/31/24  0637 03/30/24  0356 03/29/24  0549 03/28/24  2158   SODIUM mmol/L 139 140 139 139 139 139 137 138 139  --    POTASSIUM mmol/L 4.1 4.5 4.2 4.6 4.7 5.0 5.0 4.8 5.1  --    CHLORIDE mmol/L 100 101 102 102 106 107 107 107 108*  --    CO2 mmol/L 28 27 26 27 25 25 21 23 23*  --    ANION GAP mmol/L 15 17 15 15 13 12 14 13 8  --    BUN mg/dL 51* 49* 48* 49* 50* 52* 57* 65* 65*  --    CREATININE mg/dL 4.09* 3.86* 3.87* 3.74* 3.64* 3.40* 3.59* 3.48* 3.30*  --    EGFR mL/min/1.73m*2 16* 17* 17* 18* 18* 20* 18* 19* 20*  --    MAGNESIUM mg/dL 2.09 2.17 2.27 1.87 1.86 1.97 2.06 2.02  --   --    ALBUMIN g/dL 3.0* 3.1* 3.0* 3.2* 3.1* 3.1* 3.1* 3.0* 3.0* 3.3*   ALT U/L  --   --   --   --   --   --   --  10 12 14   AST U/L  --   --   --   --   --   --   --  9 12 18   BILIRUBIN TOTAL mg/dL  --   --   --   --   --   --   --  0.4 0.3 0.3   LIPASE U/L  --   --   --   --   --   --   --   --   --  66*       CBC:  Results from last 7 days   Lab Units 04/04/24  0532 04/03/24  0521 04/02/24  0525 04/01/24  0602 03/31/24  0637 03/30/24  0356 03/29/24  0549   WBC AUTO x10*3/uL 8.4 7.6 6.5 7.1 8.8 9.3 9.1   HEMOGLOBIN g/dL 8.6* 8.4* 7.7* 7.9* 8.5* 8.0* 8.3*   HEMATOCRIT % 26.9* 26.5* 25.1* 27.6* 28.2* 25.0* 27.9*   PLATELETS AUTO x10*3/uL 240 252 182 201 218 213 218   MCV fL 86 84 88 92 90 84 89       COAG:   Results from last 7 days   Lab Units 04/03/24  0521   INR  1.1       ABO: No results found for: \"ABO\"  HEME/ENDO:         CARDIAC:   Results from last 7 days   Lab Units 04/01/24  0602   TROPHS ng/L 21   BNP pg/mL 459*       Recent Labs     04/29/23  0654 08/15/22  0700 03/18/22  0623   CHOL " 181 145 150   LDLF 127* 77 90   HDL 32.8* 31.6* 36.7*   TRIG 108 180* 115       Scheduled medications  aspirin, 81 mg, oral, Daily  atorvastatin, 80 mg, oral, Nightly  bisacodyl, 10 mg, rectal, Daily  cholecalciferol, 1,000 Units, oral, BID  [Held by provider] clopidogrel, 75 mg, oral, Daily  furosemide, 80 mg, intravenous, BID with meals  heparin (porcine), 5,000 Units, subcutaneous, q8h  hydrALAZINE, 100 mg, oral, TID  insulin lispro, 0-5 Units, subcutaneous, q4h  iohexol, 50 mL, miscellaneous, Once in imaging  isosorbide dinitrate, 20 mg, oral, TID  melatonin, 3 mg, oral, Nightly  metoprolol tartrate, 50 mg, oral, BID  NIFEdipine ER, 60 mg, oral, Daily before breakfast  pantoprazole, 40 mg, intravenous, BID  perflutren protein A microsphere, 0.5 mL, intravenous, Once in imaging  piperacillin-tazobactam, 2.25 g, intravenous, q6h  polyethylene glycol, 17 g, oral, BID  sulfur hexafluoride microsphr, 2 mL, intravenous, Once in imaging  torsemide, 40 mg, oral, Daily      Continuous medications     PRN medications  PRN medications: acetaminophen, dextrose, dextrose, glucagon, glucagon, ipratropium-albuteroL, naloxone, oxyCODONE, traMADol       Assessment/Plan   59-year-old male with past medical history significant for NSTEMI with OM2 PCI in 5/23, CABG with vein graftx3 9(LIMA ->LAD, SVG ->OM1, SVG ->PDA in 11/23) complicated by pericardial tamponade requiring mediastinal exploration and repair of OM graft,  hyperlipidemia, hypertension, CKD (B/L unclear, could be 2-3), history of CVA 2020 with no deficits, type 2 diabetes, left AKA admitted directly from outside hospital with concern for cholecystitis on HIDA scan (cystic duct obstruction and acute cholecystitis revealed on scan).  Cardiology consulted due to concern for ADHF.    Patient does appear to be in clinical ADHF. Likely etiology is medication non-compliance (patient has been out of torsemide for 4 months) as well as uncontrolled HTN.    Recommendations:  -  Please have nursing check to see if he is able to lie flat for 15-20 minutes without orthopnea  - No further IV diuresis today. Please  also hold PO Torsemide  - 2 gram sodium diet, 2 liter fluid restriction, strict I&O, daily standing weights    -BID RFP and Mg. Goal K>4 and Mg>2  -Continue metoprolol tartrate 50mg BID, nifedipine 60mg, hydralazine 100mg TID, Isordil 20 mg TID   -Patient has an unknown allergy to coreg which wife does not remember but is adamant he cannot tolerate it   -Continue ASA 81mg daily, atorvastatin 80mg   -Will discuss re-initiation of plavix after cholecystectomy.      Cardiology will follow  Case discussed with CONG De La Cruz-CNP  Cardiology Consults    Please call with any questions  Pager [phone] M-F 8a-5p; Saturday 8a-2p  Pager [phone] all other times

## 2024-04-04 NOTE — PROGRESS NOTES
Occupational Therapy    Evaluation    Patient Name: Humphrey Waddell  MRN: [id number]  Today's Date: 4/4/2024  Time Calculation  Start Time: 1603  Stop Time: 1619  Time Calculation (min): 16 min    Assessment  IP OT Assessment  OT Assessment: Pt's ability to complete ADLs currently limited by activity tolerance, functional strength, and dynamic balance  Prognosis: Fair  Barriers to Discharge: None  Evaluation/Treatment Tolerance: Patient limited by fatigue  Medical Staff Made Aware: Yes  End of Session Communication: Bedside nurse  End of Session Patient Position: Bed, 3 rail up, Alarm on  Plan:  Treatment Interventions: ADL retraining, Functional transfer training, UE strengthening/ROM, Endurance training, Patient/family training, Equipment evaluation/education, Compensatory technique education  OT Frequency: 2 times per week  OT Discharge Recommendations: Moderate intensity level of continued care  OT Recommended Transfer Status: Assist of 2  OT - OK to Discharge: Yes    Subjective   Current Problem:  1. Cholecystitis        2. S/P CABG (coronary artery bypass graft)  Transthoracic Echo (TTE) Limited      3. Leg swelling  Vascular US lower extremity venous duplex bilateral    Vascular US lower extremity venous duplex bilateral      4. Preoperative clearance  Transthoracic Echo (TTE) Limited    Transthoracic Echo (TTE) Limited      5. Localized edema  Vascular US lower extremity venous duplex bilateral        General:  Reason for Referral: admitted for with cystic duct obstruction on HIDA scan raising concern for cholecystitis  Past Medical History Relevant to Rehab: left AKA , CKD, depression, hypertension, anemia, GI bleed, CVA, diabetes, carotid stenosis, CAD, and CABG x 3  Prior to Session Communication: Bedside nurse  Patient Position Received: Bed, 3 rail up, Alarm on  Family/Caregiver Present: No  General Comment: Pt pleasant and agreeable to OT evaluation.   Precautions:  Medical Precautions: Fall precautions  Vital  Signs:     Pain:  Pain Assessment  Pain Assessment: 0-10  Pain Score: 0 - No pain  Lines/Tubes/Drains:  Closed/Suction Drain 1 Lateral RUQ Other (Comment) 8 Fr. (Active)   Number of days: 0       External Urinary Catheter Male (Active)   Number of days: 140         Objective   Cognition:  Overall Cognitive Status: Within Functional Limits  Orientation Level: Oriented X4           Home Living:  Type of Home: Condo  Lives With: Spouse  Home Adaptive Equipment:  (walker, motorized wheelchair, shower chair, bed rails)  Home Layout: One level  Home Access: Level entry  Bathroom Shower/Tub: Tub/shower unit  Bathroom Toilet: Standard  Bathroom Equipment: Tub transfer bench   Prior Function:  Level of [place]: Needs assistance with ADLs, Needs assistance with homemaking, Needs assistance with functional transfers  Receives Help From: Family  ADL Assistance: Needs assistance  Bath: Stand by  Dressing: Stand by  Homemaking Assistance: Needs assistance  Meal Prep: Moderate  Laundry: Moderate  Cleaning: Moderate  Ambulatory Assistance: Needs assistance  Transfers: Stand by  Vocational: Unemployed  Leisure: Fishing, hunting  Prior Function Comments: Wife provides setup assist for dressing and SBA for transfers  IADL History:  Homemaking Responsibilities: No  Occupation: Unemployed  Leisure and Hobbies: Fishing, hunting  ADL:  Eating Assistance: Independent (Anticipated)  Grooming Assistance: Stand by  Grooming Deficit:  (brushed teeth and washed face sitting EOB with SUP for safety and seutp)  Bathing Assistance: Moderate (Anticipated)  UE Dressing Assistance: Stand by (Anticipated)  UE Dressing Deficit: Setup  LE Dressing Assistance: Moderate (Anticipated)  Toileting Assistance with Device: Maximal (Anticipated)  Activity Tolerance:  Endurance: Decreased tolerance for upright activites  Balance:  Static Sitting Balance  Static Sitting-Balance Support: No upper extremity supported  Static Sitting-Level of Assistance:  Distant supervision  Static Sitting-Comment/Number of Minutes: EOB  Bed Mobility/Transfers: Bed Mobility  Bed Mobility: Yes  Bed Mobility 1  Bed Mobility 1: Sitting to supine, Supine to sitting  Level of Assistance 1: Close supervision  Bed Mobility Comments 1: SBA, use of bed rails with HOB slightly elevated.   and Transfers  Transfer: No  IADL's:   Homemaking Responsibilities: No  Occupation: Unemployed  Leisure and Hobbies: Fishing, hunting  Vision: Vision - Basic Assessment  Current Vision: Wears glasses all the time   and    Sensation:  Light Touch: No apparent deficits  Strength:  Strength Comments: B UEs grossly 4/5  Perception:  Inattention/Neglect: Appears intact  Coordination:  Movements are Fluid and Coordinated: Yes   Hand Function:  Hand Function  Gross Grasp: Functional  Coordination: Functional  Extremities: RUE   RUE : Exceptions to WFL (4/5), LUE   LUE: Exceptions to WFL (4/5),  , and      Outcome Measures: [facility] Daily Activity  Putting on and taking off regular lower body clothing: A lot  Bathing (including washing, rinsing, drying): A lot  Putting on and taking off regular upper body clothing: A little  Toileting, which includes using toilet, bedpan or urinal: A lot  Taking care of personal grooming such as brushing teeth: A little  Eating Meals: A little  Daily Activity - Total Score: 15         ,     OT Adult Other Outcome Measures  4AT: 4 AT -    Education Documentation  Body Mechanics, taught by Be Baumann OT at 4/4/2024  4:54 PM.  Learner: Patient  Readiness: Acceptance  Method: Explanation, Demonstration  Response: Verbalizes Understanding, Demonstrated Understanding    Precautions, taught by Be Baumann OT at 4/4/2024  4:54 PM.  Learner: Patient  Readiness: Acceptance  Method: Explanation, Demonstration  Response: Verbalizes Understanding, Demonstrated Understanding    ADL Training, taught by Be Baumann OT at 4/4/2024  4:54 PM.  Learner: Patient  Readiness: Acceptance  Method:  Explanation, Demonstration  Response: Verbalizes Understanding, Demonstrated Understanding    Education Comments  No comments found.    Goals:     Encounter Problems       Encounter Problems (Active)       ADLs       Patient with complete lower body dressing with independent level of assistance donning and doffing all LE clothes  with no adaptive equipment while supine in bed (Progressing)       Start:  04/04/24    Expected End:  04/25/24            Patient will complete daily grooming tasks brushing teeth and washing face/hair with independent level of assistance and PRN adaptive equipment while edge of bed . (Progressing)       Start:  04/04/24    Expected End:  04/25/24            Patient will complete toileting including hygiene clothing management/hygiene with supervision level of assistance and bedside commode. (Progressing)       Start:  04/04/24    Expected End:  04/25/24               BALANCE       Patientt will maintain dynamic sitting balance during ADL task with supervision level of assistance drop down in order to demonstrate decreased risk of falling and improved postural control. (Progressing)       Start:  04/04/24    Expected End:  04/25/24               TRANSFERS       Patient will complete functional transfers with least restrictive device with stand by assist level of assistance. (Progressing)       Start:  04/04/24    Expected End:  04/25/24 04/04/24 at 4:54 PM   GENEVIEVE CARRENO, OT   Rehab Office: [phone]         Labs/EKG/Imaging Studies/Medications

## 2024-04-04 NOTE — PROGRESS NOTES
[facility]  ACUTE CARE SURGERY - PROGRESS NOTE    Patient Name: Humphrey Waddell  MRN: [id number]  Admit Date: 329  : 1965  AGE: 59 y.o.   GENDER: male  ==============================================================================  TODAY'S ASSESSMENT AND PLAN OF CARE:  59-year-old male with past medical history significant for CABG with vein graft x 3 in 2023 (postoperative course complicated by mediastinal exploration and evacuation of pericardial tamponade/repair of vein graft), hyperlipidemia, hypertension, CKD, history of CVA  with no deficits, type 2 diabetes, left AKA admitted directly from outside hospital with concern for cholecystitis on HIDA scan (cystic duct obstruction and acute cholecystitis revealed on scan).  Patient's hospital course c/b hypertension, pleural effusion, and possible decompensated HF.    Plan:  Neuro: acute abdominal pain   - Tylenol, tramadol, Oxy as needed    CV: Hx of CABG, on DAPT   #Concern for acute decompensated HF  - Home metoprolol, home torsemide, hydralazine, ASA  - Last dose of Plavix on 3/29   - Cardiology following     - 80 mg IV lasix BID with meals     - BID RFP and Mag     - Isordil 20 mg TID     - Nifedipine 60mg  - Follow up Limited Echo to assess LVEF: EF 60-65%    Respiratory: New Hypoxia  - Left sided pleural effusion and left lower lobe atelectatic changes    - S/p L-sided thoracentesis with IR on 4/3, drained 1.2L  - Continue IS, duonebs q6 hours for 48 hours    GI: acute cholecystitis   - Regular diet  - S/p perc babs tube placement on 4/3  - Constipation: continue bowel regimen  - PPI     : IDANIA on CKD Stage IV  - Strict I&Os  - RFP BID per cards recs    Heme: Chronic Anemia  - Continue to trend H/H   - BLE DVT US negative     ID: acute cholecystitis  - Continue IV Zosyn     PPx: SQH, SCDs    Dispo: continue RNF    Patient discussed with attending Dr. Caputo.    Kathy Bowers MD  PGY-2 General Surgery   ACS  [id number]  ==============================================================================  CHIEF COMPLAINT / EVENTS LAST 24HRS / HPI:  Patient underwent L-sided thoracentesis as well as per babs tube placement with IR yesterday. Tolerated both procedures well. Unable to discern if pain has improved, feels sore from procedures. Blood pressure better controlled.    MEDICAL HISTORY / ROS:   Admission history and ROS reviewed. Pertinent changes as follows:  none    PHYSICAL EXAM:  Heart Rate:  [62-87]   Temp:  [36.1 °C (97 °F)-36.7 °C (98.1 °F)]   Resp:  [10-18]   BP: (109-167)/(64-89)   SpO2:  [90 %-97 %]     General: NAD, resting comfortably  HEENT: NCAT  Resp: nonlabored breathing on 2L NC  CV: RRR  Abd: soft, relatively less TTP in RUQ, cholecystostomy tube with bilious output  : no gerardo  MSK: moves all extremities  Ext: no LE edema  Psych: appropriate mood and behavior     IMAGING SUMMARY:  No new imaging.    LABS:  Results from last 7 days   Lab Units 04/04/24  0532 04/03/24  0521 04/02/24  0525   WBC AUTO x10*3/uL 8.4 7.6 6.5   HEMOGLOBIN g/dL 8.6* 8.4* 7.7*   HEMATOCRIT % 26.9* 26.5* 25.1*   PLATELETS AUTO x10*3/uL 240 252 182   NEUTROS PCT AUTO % 74.7 67.9 71.9   LYMPHS PCT AUTO % 10.1 13.7 12.0   MONOS PCT AUTO % 11.0 11.4 9.9   EOS PCT AUTO % 3.8 6.3 5.7       Results from last 7 days   Lab Units 04/03/24  0521   APTT seconds 30   INR  1.1       Results from last 7 days   Lab Units 04/04/24  0532 04/03/24  1520 04/03/24  0521 03/31/24  0637 03/30/24  0356 03/29/24  0549 03/28/24  2158   SODIUM mmol/L 139 140 139   < > 138 139  --    POTASSIUM mmol/L 4.1 4.5 4.2   < > 4.8 5.1  --    CHLORIDE mmol/L 100 101 102   < > 107 108*  --    CO2 mmol/L 28 27 26   < > 23 23*  --    BUN mg/dL 51* 49* 48*   < > 65* 65*  --    CREATININE mg/dL 4.09* 3.86* 3.87*   < > 3.48* 3.30*  --    CALCIUM mg/dL 8.1* 8.6 8.3*   < > 8.2* 8.2*  --    PROTEIN TOTAL g/dL  --   --   --   --  6.1* 6.2 6.7   BILIRUBIN TOTAL mg/dL  --   --    --   --  0.4 0.3 0.3   ALK PHOS U/L  --   --   --   --  49 63 64   ALT U/L  --   --   --   --  10 12 14   AST U/L  --   --   --   --  9 12 18   GLUCOSE mg/dL 103* 100* 83   < > 101* 151*  --     < > = values in this interval not displayed.       Results from last 7 days   Lab Units 03/30/24  0356 03/29/24  0549 03/28/24  2158   BILIRUBIN TOTAL mg/dL 0.4 0.3 0.3   BILIRUBIN DIRECT mg/dL  --   --  <0.2           I have reviewed all medications, laboratory results, and imaging pertinent for today's encounter.

## 2024-04-04 NOTE — CARE PLAN
The patient's goals for the shift include      The clinical goals for the shift include Patient will remain free from any new falls    Over the shift, the patient did not make progress toward the following goals. Barriers to progression include patient currently does not have prosthetic for leg AKA at bedside. Recommendations to address these barriers include patient will work with PT/OT and use prosthetic to assist with movement and ambulation.

## 2024-04-04 NOTE — CARE PLAN
The patient's goals for the shift include      The clinical goals for the shift include pt will remain safe during shift    Over the shift, the patient did not make progress toward the following goals. Barriers to progression include patients prosthetic not at bedside . Recommendations to address these barriers include monitor patient for any needs for ambulation to avoid any falls.

## 2024-04-04 NOTE — SIGNIFICANT EVENT
Post-Procedure Check    S:    Pod 0 from IR L thoracentesis and perc babs tube placement  -pain well controlled  -No N/V, tolerated pork for dinner after procedure  -No chest pain or sob  -no flatus or bm at this time  -communicated with RN to bring IS, educate patient, and place SCDs    O:   Vital signs are stable, normotensive, afebrile, no new or worsening oxygen requirement, not tachycardic  Visit Vitals  /66   Pulse 78   Temp 36.7 °C (98.1 °F)   Resp 18        Constitutional: no acute distress  Skin: warm and dry overall   Neuro: A/O x4, no gross deficits   HEENT: Atraumatic, no scleral icterus  Cardiac: RRR on peripheral pulse check  Pulmonary: Unlabored respirations on RA (off at bedside even though charted as on)  Abdomen: soft, Non distended, moderate tenderness to palpation RUQ near drain. R perch babs drain with bilious output. L thoracentesis site with dressing CDI, no erythema    A/P:  Overall, patient is doing well postoperatively with no acute concerns.  Will continue to optimize pain control as needed.  Will continue to monitor clinical exam, vitals, I&O's, and labs when available.  Will follow up on the patient in the a.m. or sooner as needed.  -IS  -SCDs  -Regular diet    Dario Cardoso MD  PGY1  General Surgery  Acute Care Surgery, Pager [phone]

## 2024-04-04 NOTE — PROGRESS NOTES
Physical Therapy    Physical Therapy Evaluation    Patient Name: Humphrey Waddell  MRN: [id number]  Today's Date: 4/4/2024   Time Calculation  Start Time: 0844  Stop Time: 0855  Time Calculation (min): 11 min    Assessment/Plan   PT Assessment  PT Assessment Results: Decreased strength, Impaired balance, Decreased mobility, Pain  Rehab Prognosis: Good  End of Session Communication: Bedside nurse  End of Session Patient Position: Alarm on (seated EOB eating breakfast; call light in reach, RN informed)  IP OR SWING BED PT PLAN  Inpatient or Swing Bed: Inpatient  PT Plan  Treatment/Interventions: Bed mobility, Transfer training, Balance training, Strengthening, Therapeutic exercise  PT Plan: Skilled PT  PT Frequency: 3 times per week  PT Discharge Recommendations: Moderate intensity level of continued care  PT - OK to Discharge: Yes      Subjective   General Visit Information:  Reason for Referral: admitted for with cystic duct obstruction on HIDA scan raising concern for cholecystitis  Past Medical History Relevant to Rehab: left AKA , CKD, depression, hypertension, anemia, GI bleed, CVA, diabetes, carotid stenosis, CAD, and CABG x 3  Prior to Session Communication: Bedside nurse  Patient Position Received: Bed, 3 rail up, Alarm off, not on at start of session   Home Living:  Home Living  Type of Home: House  Lives With: Spouse  Home Adaptive Equipment:  (walker, motorized wheelchair, shower chair)  Home Layout: One level  Home Access: No concerns  Prior Level of Function:  Prior Function Per Pt/Caregiver Report  ADL Assistance: Independent  Homemaking Assistance: Needs assistance  Ambulatory Assistance:  (non-ambulatory; pivots to wheelchair independently)  Precautions:  Precautions  Medical Precautions: Fall precautions       Objective     Pain:  Pain Assessment  Pain Assessment: 0-10  Pain Score: 9  Pain Location: Abdomen  Cognition:  Cognition  Overall Cognitive Status: Within Functional Limits      Extremity/Trunk  Assessments:  Strength:     RUE   RUE : Within Functional Limits  LUE   LUE: Within Functional Limits  RLE   RLE : Exceptions to WFL  Strength RLE  R Hip Flexion: 4/5  R Knee Extension: 4/5  R Ankle Dorsiflexion: 5/5       General Assessments:     Static Sitting Balance  Static Sitting-Level of Assistance: Close supervision  Dynamic Sitting Balance  Dynamic Sitting-Comments: SBA       Functional Assessments:  Bed Mobility  Bed Mobility: Yes  Bed Mobility 1  Bed Mobility 1: Supine to sitting  Level of Assistance 1: Moderate assistance  Bed Mobility Comments 1: HOB elevated, verbal cues for sequencing, use of bed rails  Transfers  Transfer: No (pt declined to attempt transfers)       Outcome Measures:  [facility] Basic Mobility  Turning from your back to your side while in a flat bed without using bedrails: A lot  Moving from lying on your back to sitting on the side of a flat bed without using bedrails: A lot  Moving to and from bed to chair (including a wheelchair): A lot  Standing up from a chair using your arms (e.g. wheelchair or bedside chair): Total  To walk in hospital room: Total  Climbing 3-5 steps with railing: Total  Basic Mobility - Total Score: 9          Encounter Problems       Encounter Problems (Active)       Balance       no LOB with transfers       Start:  04/04/24    Expected End:  04/25/24               PT Transfers       STG - Transfer from bed to chair min assist with LRD       Start:  04/04/24    Expected End:  04/25/24            STG - Patient will perform bed mobility CGA       Start:  04/04/24    Expected End:  04/25/24                   Education Documentation  Precautions, taught by Margarita Soto, PT at 4/4/2024 10:35 AM.  Learner: Patient  Readiness: Acceptance  Method: Explanation  Response: Verbalizes Understanding    Mobility Training, taught by Margarita Soto, PT at 4/4/2024 10:35 AM.  Learner: Patient  Readiness: Acceptance  Method: Explanation  Response: Verbalizes  Understanding    Education Comments  No comments found.            04/04/24 at 10:35 AM   Margarita Soto, PT

## 2024-04-05 ENCOUNTER — APPOINTMENT (OUTPATIENT)
Dept: RADIOLOGY | Facility: HOSPITAL | Age: 59
End: 2024-04-05
Payer: COMMERCIAL

## 2024-04-05 LAB
ALBUMIN SERPL BCP-MCNC: 2.9 G/DL (ref 3.4–5)
ALBUMIN SERPL BCP-MCNC: 3.1 G/DL (ref 3.4–5)
ANION GAP SERPL CALC-SCNC: 14 MMOL/L (ref 10–20)
ANION GAP SERPL CALC-SCNC: 14 MMOL/L (ref 10–20)
BASOPHILS # BLD AUTO: 0.04 X10*3/UL (ref 0–0.1)
BASOPHILS NFR BLD AUTO: 0.4 %
BUN SERPL-MCNC: 58 MG/DL (ref 6–23)
BUN SERPL-MCNC: 60 MG/DL (ref 6–23)
CALCIUM SERPL-MCNC: 8.1 MG/DL (ref 8.6–10.6)
CALCIUM SERPL-MCNC: 8.2 MG/DL (ref 8.6–10.6)
CHLORIDE SERPL-SCNC: 100 MMOL/L (ref 98–107)
CHLORIDE SERPL-SCNC: 100 MMOL/L (ref 98–107)
CO2 SERPL-SCNC: 27 MMOL/L (ref 21–32)
CO2 SERPL-SCNC: 28 MMOL/L (ref 21–32)
CREAT SERPL-MCNC: 4.54 MG/DL (ref 0.5–1.3)
CREAT SERPL-MCNC: 4.57 MG/DL (ref 0.5–1.3)
EGFRCR SERPLBLD CKD-EPI 2021: 14 ML/MIN/1.73M*2
EGFRCR SERPLBLD CKD-EPI 2021: 14 ML/MIN/1.73M*2
EOSINOPHIL # BLD AUTO: 0.51 X10*3/UL (ref 0–0.7)
EOSINOPHIL NFR BLD AUTO: 5.5 %
ERYTHROCYTE [DISTWIDTH] IN BLOOD BY AUTOMATED COUNT: 14.2 % (ref 11.5–14.5)
GLUCOSE BLD MANUAL STRIP-MCNC: 122 MG/DL (ref 74–99)
GLUCOSE BLD MANUAL STRIP-MCNC: 130 MG/DL (ref 74–99)
GLUCOSE BLD MANUAL STRIP-MCNC: 153 MG/DL (ref 74–99)
GLUCOSE BLD MANUAL STRIP-MCNC: 166 MG/DL (ref 74–99)
GLUCOSE BLD MANUAL STRIP-MCNC: 197 MG/DL (ref 74–99)
GLUCOSE SERPL-MCNC: 136 MG/DL (ref 74–99)
GLUCOSE SERPL-MCNC: 162 MG/DL (ref 74–99)
HCT VFR BLD AUTO: 26.8 % (ref 41–52)
HGB BLD-MCNC: 8.3 G/DL (ref 13.5–17.5)
IMM GRANULOCYTES # BLD AUTO: 0.03 X10*3/UL (ref 0–0.7)
IMM GRANULOCYTES NFR BLD AUTO: 0.3 % (ref 0–0.9)
LYMPHOCYTES # BLD AUTO: 0.98 X10*3/UL (ref 1.2–4.8)
LYMPHOCYTES NFR BLD AUTO: 10.5 %
MAGNESIUM SERPL-MCNC: 2.04 MG/DL (ref 1.6–2.4)
MAGNESIUM SERPL-MCNC: 2.1 MG/DL (ref 1.6–2.4)
MCH RBC QN AUTO: 27.2 PG (ref 26–34)
MCHC RBC AUTO-ENTMCNC: 31 G/DL (ref 32–36)
MCV RBC AUTO: 88 FL (ref 80–100)
MONOCYTES # BLD AUTO: 1.1 X10*3/UL (ref 0.1–1)
MONOCYTES NFR BLD AUTO: 11.8 %
NEUTROPHILS # BLD AUTO: 6.66 X10*3/UL (ref 1.2–7.7)
NEUTROPHILS NFR BLD AUTO: 71.5 %
NRBC BLD-RTO: 0 /100 WBCS (ref 0–0)
PHOSPHATE SERPL-MCNC: 3.4 MG/DL (ref 2.5–4.9)
PHOSPHATE SERPL-MCNC: 4.2 MG/DL (ref 2.5–4.9)
PLATELET # BLD AUTO: 220 X10*3/UL (ref 150–450)
POTASSIUM SERPL-SCNC: 4.1 MMOL/L (ref 3.5–5.3)
POTASSIUM SERPL-SCNC: 4.1 MMOL/L (ref 3.5–5.3)
RBC # BLD AUTO: 3.05 X10*6/UL (ref 4.5–5.9)
SODIUM SERPL-SCNC: 137 MMOL/L (ref 136–145)
SODIUM SERPL-SCNC: 138 MMOL/L (ref 136–145)
WBC # BLD AUTO: 9.3 X10*3/UL (ref 4.4–11.3)

## 2024-04-05 PROCEDURE — 2500000004 HC RX 250 GENERAL PHARMACY W/ HCPCS (ALT 636 FOR OP/ED)

## 2024-04-05 PROCEDURE — 80069 RENAL FUNCTION PANEL: CPT

## 2024-04-05 PROCEDURE — 36415 COLL VENOUS BLD VENIPUNCTURE: CPT

## 2024-04-05 PROCEDURE — 83735 ASSAY OF MAGNESIUM: CPT

## 2024-04-05 PROCEDURE — 2500000002 HC RX 250 W HCPCS SELF ADMINISTERED DRUGS (ALT 637 FOR MEDICARE OP, ALT 636 FOR OP/ED)

## 2024-04-05 PROCEDURE — 2500000001 HC RX 250 WO HCPCS SELF ADMINISTERED DRUGS (ALT 637 FOR MEDICARE OP)

## 2024-04-05 PROCEDURE — 1100000001 HC PRIVATE ROOM DAILY

## 2024-04-05 PROCEDURE — 32555 ASPIRATE PLEURA W/ IMAGING: CPT | Performed by: NURSE PRACTITIONER

## 2024-04-05 PROCEDURE — 85025 COMPLETE CBC W/AUTO DIFF WBC: CPT

## 2024-04-05 PROCEDURE — 82947 ASSAY GLUCOSE BLOOD QUANT: CPT

## 2024-04-05 PROCEDURE — C9113 INJ PANTOPRAZOLE SODIUM, VIA: HCPCS

## 2024-04-05 PROCEDURE — P9045 ALBUMIN (HUMAN), 5%, 250 ML: HCPCS | Mod: JZ

## 2024-04-05 PROCEDURE — 99232 SBSQ HOSP IP/OBS MODERATE 35: CPT | Performed by: SURGERY

## 2024-04-05 PROCEDURE — 0W9B3ZZ DRAINAGE OF LEFT PLEURAL CAVITY, PERCUTANEOUS APPROACH: ICD-10-PCS | Performed by: NURSE PRACTITIONER

## 2024-04-05 PROCEDURE — 2500000001 HC RX 250 WO HCPCS SELF ADMINISTERED DRUGS (ALT 637 FOR MEDICARE OP): Performed by: STUDENT IN AN ORGANIZED HEALTH CARE EDUCATION/TRAINING PROGRAM

## 2024-04-05 PROCEDURE — 99233 SBSQ HOSP IP/OBS HIGH 50: CPT | Performed by: NURSE PRACTITIONER

## 2024-04-05 PROCEDURE — 32555 ASPIRATE PLEURA W/ IMAGING: CPT

## 2024-04-05 RX ORDER — ALBUMIN HUMAN 50 G/1000ML
25 SOLUTION INTRAVENOUS ONCE
Status: COMPLETED | OUTPATIENT
Start: 2024-04-05 | End: 2024-04-05

## 2024-04-05 RX ADMIN — ISOSORBIDE DINITRATE 20 MG: 20 TABLET ORAL at 09:06

## 2024-04-05 RX ADMIN — ISOSORBIDE DINITRATE 20 MG: 20 TABLET ORAL at 18:21

## 2024-04-05 RX ADMIN — INSULIN LISPRO 1 UNITS: 100 INJECTION, SOLUTION INTRAVENOUS; SUBCUTANEOUS at 20:47

## 2024-04-05 RX ADMIN — HEPARIN SODIUM 5000 UNITS: 5000 INJECTION INTRAVENOUS; SUBCUTANEOUS at 15:27

## 2024-04-05 RX ADMIN — Medication 1000 UNITS: at 20:48

## 2024-04-05 RX ADMIN — NIFEDIPINE 60 MG: 60 TABLET, FILM COATED, EXTENDED RELEASE ORAL at 06:03

## 2024-04-05 RX ADMIN — HEPARIN SODIUM 5000 UNITS: 5000 INJECTION INTRAVENOUS; SUBCUTANEOUS at 09:07

## 2024-04-05 RX ADMIN — POLYETHYLENE GLYCOL 3350 17 G: 17 POWDER, FOR SOLUTION ORAL at 20:47

## 2024-04-05 RX ADMIN — Medication 1000 UNITS: at 09:06

## 2024-04-05 RX ADMIN — HYDRALAZINE HYDROCHLORIDE 100 MG: 50 TABLET ORAL at 09:07

## 2024-04-05 RX ADMIN — PIPERACILLIN SODIUM AND TAZOBACTAM SODIUM 2.25 G: 2; .25 INJECTION, SOLUTION INTRAVENOUS at 06:03

## 2024-04-05 RX ADMIN — HYDRALAZINE HYDROCHLORIDE 100 MG: 50 TABLET ORAL at 15:22

## 2024-04-05 RX ADMIN — METOPROLOL TARTRATE 50 MG: 50 TABLET, FILM COATED ORAL at 09:06

## 2024-04-05 RX ADMIN — PIPERACILLIN SODIUM AND TAZOBACTAM SODIUM 2.25 G: 2; .25 INJECTION, SOLUTION INTRAVENOUS at 17:54

## 2024-04-05 RX ADMIN — PANTOPRAZOLE SODIUM 40 MG: 40 INJECTION, POWDER, FOR SOLUTION INTRAVENOUS at 09:07

## 2024-04-05 RX ADMIN — METOPROLOL TARTRATE 50 MG: 50 TABLET, FILM COATED ORAL at 20:48

## 2024-04-05 RX ADMIN — ASPIRIN 81 MG CHEWABLE TABLET 81 MG: 81 TABLET CHEWABLE at 09:06

## 2024-04-05 RX ADMIN — BISACODYL 10 MG: 10 SUPPOSITORY RECTAL at 09:07

## 2024-04-05 RX ADMIN — ATORVASTATIN CALCIUM 80 MG: 80 TABLET, FILM COATED ORAL at 20:48

## 2024-04-05 RX ADMIN — PIPERACILLIN SODIUM AND TAZOBACTAM SODIUM 2.25 G: 2; .25 INJECTION, SOLUTION INTRAVENOUS at 11:27

## 2024-04-05 RX ADMIN — MELATONIN 3 MG: 3 TAB ORAL at 20:48

## 2024-04-05 RX ADMIN — HYDRALAZINE HYDROCHLORIDE 100 MG: 50 TABLET ORAL at 20:47

## 2024-04-05 RX ADMIN — INSULIN LISPRO 1 UNITS: 100 INJECTION, SOLUTION INTRAVENOUS; SUBCUTANEOUS at 15:32

## 2024-04-05 RX ADMIN — ALBUMIN HUMAN 25 G: 0.05 INJECTION, SOLUTION INTRAVENOUS at 09:09

## 2024-04-05 RX ADMIN — POLYETHYLENE GLYCOL 3350 17 G: 17 POWDER, FOR SOLUTION ORAL at 09:06

## 2024-04-05 RX ADMIN — PANTOPRAZOLE SODIUM 40 MG: 40 INJECTION, POWDER, FOR SOLUTION INTRAVENOUS at 20:47

## 2024-04-05 ASSESSMENT — COGNITIVE AND FUNCTIONAL STATUS - GENERAL
MOBILITY SCORE: 10
STANDING UP FROM CHAIR USING ARMS: A LOT
HELP NEEDED FOR BATHING: A LOT
CLIMB 3 TO 5 STEPS WITH RAILING: TOTAL
WALKING IN HOSPITAL ROOM: TOTAL
MOVING FROM LYING ON BACK TO SITTING ON SIDE OF FLAT BED WITH BEDRAILS: A LOT
DAILY ACTIVITIY SCORE: 15
EATING MEALS: A LITTLE
EATING MEALS: A LITTLE
WALKING IN HOSPITAL ROOM: TOTAL
DRESSING REGULAR LOWER BODY CLOTHING: A LOT
DRESSING REGULAR LOWER BODY CLOTHING: A LOT
MOVING TO AND FROM BED TO CHAIR: A LOT
STANDING UP FROM CHAIR USING ARMS: A LOT
TURNING FROM BACK TO SIDE WHILE IN FLAT BAD: A LOT
DAILY ACTIVITIY SCORE: 15
PERSONAL GROOMING: A LITTLE
HELP NEEDED FOR BATHING: A LOT
MOVING FROM LYING ON BACK TO SITTING ON SIDE OF FLAT BED WITH BEDRAILS: A LOT
DRESSING REGULAR UPPER BODY CLOTHING: A LITTLE
DRESSING REGULAR UPPER BODY CLOTHING: A LITTLE
MOVING TO AND FROM BED TO CHAIR: A LOT
TOILETING: A LOT
TURNING FROM BACK TO SIDE WHILE IN FLAT BAD: A LOT
MOBILITY SCORE: 10
CLIMB 3 TO 5 STEPS WITH RAILING: TOTAL
TOILETING: A LOT
PERSONAL GROOMING: A LITTLE

## 2024-04-05 ASSESSMENT — PAIN SCALES - GENERAL
PAINLEVEL_OUTOF10: 0 - NO PAIN

## 2024-04-05 ASSESSMENT — PAIN SCALES - WONG BAKER
WONGBAKER_NUMERICALRESPONSE: NO HURT
WONGBAKER_NUMERICALRESPONSE: NO HURT

## 2024-04-05 ASSESSMENT — PAIN - FUNCTIONAL ASSESSMENT: PAIN_FUNCTIONAL_ASSESSMENT: 0-10

## 2024-04-05 NOTE — PROGRESS NOTES
Humphrey Waddell is a 59 y.o. male on day 7 of admission presenting with Cholecystitis.      DC Plannin/5:   Went in and met with the pt, confirmed demographics.   PT/OT rec'd Mod for snf. Pt refusing snf, would like to go home with home care.    Home Care choice for home going needs. [facility]     :   Pt ready for DC.   Pt needs: PT/OT.   [facility] made aware.       ADOD:     This TCC will continue to follow for home going needs and safe DC plan.       DOROTHY NAVA

## 2024-04-05 NOTE — POST-PROCEDURE NOTE
INTERVENTIONAL RADIOLOGY ADVANCED PRACTICE PROCEDURE  [facility]    A time out was performed and Left Hemithorax was examined with US and appropriate entry point was confirmed and marked.   The patient was prepped and draped in a sterile manner, 1% lidocaine was used to anesthesize the skin and subcutaneous tissue.   A 5F Centesis needle was then introduced through the skin into the pleural space, the centesis catheter was then threaded without difficulty.   900 ml of madalyn fluid was removed without difficulty. The catheter was then removed.   No immediate complications were noted during and immediately following the procedure.

## 2024-04-05 NOTE — CARE PLAN
The patient's goals for the shift include      The clinical goals for the shift include Patient will maintain oxygen demand at room air    Over the shift, the patient did not make progress toward the following goals. Barriers to progression include patient position while in bed. Recommendations to address these barriers include patient will position himself for optimal oxygenation.

## 2024-04-05 NOTE — PROGRESS NOTES
[facility]  ACUTE CARE SURGERY - PROGRESS NOTE    Patient Name: Humphrey Waddell  MRN: [id number]  Admit Date: 329  : 1965  AGE: 59 y.o.   GENDER: male  ==============================================================================  TODAY'S ASSESSMENT AND PLAN OF CARE:  59-year-old male with past medical history significant for CABG with vein graft x 3 in 2023 (postoperative course complicated by mediastinal exploration and evacuation of pericardial tamponade/repair of vein graft), hyperlipidemia, hypertension, CKD, history of CVA  with no deficits, type 2 diabetes, left AKA admitted directly from outside hospital with concern for cholecystitis on HIDA scan (cystic duct obstruction and acute cholecystitis revealed on scan).  Patient's hospital course c/b hypertension, pleural effusion, and possible decompensated HF.    Plan:  Neuro: acute abdominal pain   - Tylenol, tramadol, Oxy as needed    CV: Hx of CABG, on DAPT   #Concern for acute decompensated HF  - Home metoprolol, home torsemide, hydralazine, ASA  - Last dose of Plavix on 3/29   - Cardiology following     - Holding off on further diuresis     - BID RFP and Mag     - Isordil 20 mg TID     - Nifedipine 60mg  - Follow up Limited Echo to assess LVEF: EF 60-65%    Respiratory: New Hypoxia  - Left sided pleural effusion and left lower lobe atelectatic changes    - S/p L-sided thoracentesis with IR on 4/3, drained 1.2L  - Continue IS, duonebs q6 hours for 48 hours    GI: acute cholecystitis   - Regular diet  - S/p perc babs tube placement on 4/3, monitor output  - Constipation: continue bowel regimen  - PPI     : IDANIA on CKD Stage IV  - Decreased UOP, will bolus 500 ml albumin this AM  - Strict I&Os  - RFP BID per cards recs    Heme: Chronic Anemia  - Continue to trend H/H   - BLE DVT US negative     ID: acute cholecystitis  - Continue IV Zosyn until     PPx: SQH, SCDs    Dispo: continue RNF    Patient  discussed with attending Dr. Caputo.    Kathy Bowers MD  PGY-2 General Surgery   ACS [id number]  ==============================================================================  CHIEF COMPLAINT / EVENTS LAST 24HRS / HPI:  Patient has had signficantly lower UOP over last 24 hrs despite heavy diuresis. Bladder scanned, 425 in bladder. Again voiding spontaneously. RUQ pain present but controlled.     MEDICAL HISTORY / ROS:   Admission history and ROS reviewed. Pertinent changes as follows:  none    PHYSICAL EXAM:  Heart Rate:  [71-88]   Temp:  [36.4 °C (97.5 °F)-36.8 °C (98.2 °F)]   Resp:  [16-18]   BP: ()/(51-72)   SpO2:  [90 %-93 %]     General: NAD, resting comfortably  HEENT: NCAT  Resp: nonlabored breathing on RA  CV: RRR  Abd: soft, relatively less TTP in RUQ, cholecystostomy tube with bilious output  : no gerardo  MSK: moves all extremities  Ext: no LE edema  Psych: appropriate mood and behavior     IMAGING SUMMARY:  No new imaging.    LABS:  Results from last 7 days   Lab Units 04/05/24  0616 04/04/24  0532 04/03/24  0521   WBC AUTO x10*3/uL 9.3 8.4 7.6   HEMOGLOBIN g/dL 8.3* 8.6* 8.4*   HEMATOCRIT % 26.8* 26.9* 26.5*   PLATELETS AUTO x10*3/uL 220 240 252   NEUTROS PCT AUTO % 71.5 74.7 67.9   LYMPHS PCT AUTO % 10.5 10.1 13.7   MONOS PCT AUTO % 11.8 11.0 11.4   EOS PCT AUTO % 5.5 3.8 6.3       Results from last 7 days   Lab Units 04/03/24  0521   APTT seconds 30   INR  1.1       Results from last 7 days   Lab Units 04/05/24  0616 04/04/24  1818 04/04/24  0532 03/31/24  0637 03/30/24  0356   SODIUM mmol/L 137 137 139   < > 138   POTASSIUM mmol/L 4.1 4.1 4.1   < > 4.8   CHLORIDE mmol/L 100 98 100   < > 107   CO2 mmol/L 27 27 28   < > 23   BUN mg/dL 58* 55* 51*   < > 65*   CREATININE mg/dL 4.57* 4.41* 4.09*   < > 3.48*   CALCIUM mg/dL 8.2* 8.3* 8.1*   < > 8.2*   PROTEIN TOTAL g/dL  --   --   --   --  6.1*   BILIRUBIN TOTAL mg/dL  --   --   --   --  0.4   ALK PHOS U/L  --   --   --   --  49   ALT U/L  --   --   --    --  10   AST U/L  --   --   --   --  9   GLUCOSE mg/dL 136* 153* 103*   < > 101*    < > = values in this interval not displayed.       Results from last 7 days   Lab Units 03/30/24  0356   BILIRUBIN TOTAL mg/dL 0.4           I have reviewed all medications, laboratory results, and imaging pertinent for today's encounter.

## 2024-04-05 NOTE — PROGRESS NOTES
Subjective:    S/p left thoracentesis for another 900 ml madalyn fluid today  SR 70s  -120s    Low urine output  Creatinine up to 4.57    Objective:    Cardiac Testing:  -ECG 4/1: NSR, RBBB    -TTE 11/23:   1. Left ventricular systolic function is mildly decreased with a 45-50% estimated ejection fraction.   2. Mid and apical anterior wall, mid anterolateral segment, apical lateral segment, and apex are abnormal.   3. There are multiple wall motion abnormalities.   4. Poorly visualized anatomical structures due to suboptimal image quality.   5. There is reduced right ventricular systolic function.    Unstable angina 5/23:  Vessel      Stenosis   Vessel Segment  Left Main 50% stenosis     entire  LAD       70% stenosis       mid  OM 2      95% stenosis proximal to mid  Successful OM2 stenting.      TTE 5/23:  1. Left ventricular systolic function is normal with a 60-65% estimated ejection fraction.  2. Spectral Doppler shows an abnormal pattern of left ventricular diastolic filling.  3. Compared with the prior exam from 4/28/2023 ( [facility]) the Mild-Mod MR and segmental wall motion abnormalities previously seen are not appreciated on todays study. Note that OM2 was stented on 5/11/2023.     TTE 4/23:  1. Left ventricular systolic function is low normal with a 50-55% estimated ejection fraction.  2. Mid inferolateral segment, mid anterolateral segment, and apical lateral segment are abnormal.  3. Spectral Doppler shows a pseudonormal pattern of left ventricular diastolic filling.  4. There is moderate to severe concentric left ventricular hypertrophy.  5. Mild to moderate mitral valve regurgitation.          Physical Exam  General: sitting on edge of bed, 2L nc, NAD  Skin:  warm and dry  HEENT: EOMI. MMM  Cardiovascular: RRR. JVD difficult to assess at 90 degrees   Respiratory: Reduced a/e bibasilar L>R  Gastrointestinal: soft, non-distended  Extremities: 1+RLE edema, L AKA   Neurological: no gross focal  "deficits  Psychiatric: flat affect        Last Recorded Vitals  Blood pressure 132/73, pulse 74, temperature 36.5 °C (97.7 °F), temperature source Temporal, resp. rate 18, SpO2 90 %.    Relevant Results  LABS:  CMP:  Results from last 7 days   Lab Units 04/05/24 0616 04/04/24  1818 04/04/24  0532 04/03/24  1520 04/03/24  0521 04/02/24  1912 04/02/24  0525 04/01/24  0602 03/31/24  0637 03/30/24  0356   SODIUM mmol/L 137 137 139 140 139 139 139 139 137 138   POTASSIUM mmol/L 4.1 4.1 4.1 4.5 4.2 4.6 4.7 5.0 5.0 4.8   CHLORIDE mmol/L 100 98 100 101 102 102 106 107 107 107   CO2 mmol/L 27 27 28 27 26 27 25 25 21 23   ANION GAP mmol/L 14 16 15 17 15 15 13 12 14 13   BUN mg/dL 58* 55* 51* 49* 48* 49* 50* 52* 57* 65*   CREATININE mg/dL 4.57* 4.41* 4.09* 3.86* 3.87* 3.74* 3.64* 3.40* 3.59* 3.48*   EGFR mL/min/1.73m*2 14* 14* 16* 17* 17* 18* 18* 20* 18* 19*   MAGNESIUM mg/dL 2.10 2.10 2.09 2.17 2.27 1.87 1.86 1.97 2.06 2.02   ALBUMIN g/dL 3.1* 3.1* 3.0* 3.1* 3.0* 3.2* 3.1* 3.1* 3.1* 3.0*   ALT U/L  --   --   --   --   --   --   --   --   --  10   AST U/L  --   --   --   --   --   --   --   --   --  9   BILIRUBIN TOTAL mg/dL  --   --   --   --   --   --   --   --   --  0.4       CBC:  Results from last 7 days   Lab Units 04/05/24 0616 04/04/24  0532 04/03/24  0521 04/02/24  0525 04/01/24  0602 03/31/24  0637 03/30/24  0356   WBC AUTO x10*3/uL 9.3 8.4 7.6 6.5 7.1 8.8 9.3   HEMOGLOBIN g/dL 8.3* 8.6* 8.4* 7.7* 7.9* 8.5* 8.0*   HEMATOCRIT % 26.8* 26.9* 26.5* 25.1* 27.6* 28.2* 25.0*   PLATELETS AUTO x10*3/uL 220 240 252 182 201 218 213   MCV fL 88 86 84 88 92 90 84       COAG:   Results from last 7 days   Lab Units 04/03/24  0521   INR  1.1       ABO: No results found for: \"ABO\"  HEME/ENDO:         CARDIAC:   Results from last 7 days   Lab Units 04/01/24  0602   TROPHS ng/L 21   BNP pg/mL 459*       Recent Labs     04/29/23  0654 08/15/22  0700 03/18/22  0623   CHOL 181 145 150   LDLF 127* 77 90   HDL 32.8* 31.6* 36.7*   TRIG 108 " 180* 115       Scheduled medications  aspirin, 81 mg, oral, Daily  atorvastatin, 80 mg, oral, Nightly  bisacodyl, 10 mg, rectal, Daily  cholecalciferol, 1,000 Units, oral, BID  [Held by provider] clopidogrel, 75 mg, oral, Daily  [Held by provider] furosemide, 80 mg, intravenous, BID with meals  heparin (porcine), 5,000 Units, subcutaneous, q8h  hydrALAZINE, 100 mg, oral, TID  insulin lispro, 0-5 Units, subcutaneous, q4h  iohexol, 50 mL, miscellaneous, Once in imaging  isosorbide dinitrate, 20 mg, oral, TID  melatonin, 3 mg, oral, Nightly  metoprolol tartrate, 50 mg, oral, BID  NIFEdipine ER, 60 mg, oral, Daily before breakfast  pantoprazole, 40 mg, intravenous, BID  perflutren protein A microsphere, 0.5 mL, intravenous, Once in imaging  piperacillin-tazobactam, 2.25 g, intravenous, q6h  polyethylene glycol, 17 g, oral, BID  sulfur hexafluoride microsphr, 2 mL, intravenous, Once in imaging  [Held by provider] torsemide, 40 mg, oral, Daily      Continuous medications     PRN medications  PRN medications: acetaminophen, dextrose, dextrose, glucagon, glucagon, ipratropium-albuteroL, naloxone, oxyCODONE, traMADol       Assessment/Plan   59-year-old male with past medical history significant for NSTEMI with OM2 PCI in 5/23, CABG with vein graftx3 9(LIMA ->LAD, SVG ->OM1, SVG ->PDA in 11/23) complicated by pericardial tamponade requiring mediastinal exploration and repair of OM graft,  hyperlipidemia, hypertension, CKD (B/L unclear, could be 2-3), history of CVA 2020 with no deficits, type 2 diabetes, left AKA admitted directly from outside hospital with concern for cholecystitis on HIDA scan (cystic duct obstruction and acute cholecystitis revealed on scan).  Cardiology consulted due to concern for ADHF.    Patient does appear to be in clinical ADHF. Likely etiology is medication non-compliance (patient has been out of torsemide for 4 months) as well as uncontrolled HTN.    Recommendations:  - Please have nursing check to  see if he is able to lie flat for 15-20 minutes without orthopnea  - Continue diuretic holiday  - liberalize po fluids   - 2 gram sodium diet,  strict I&O, daily standing weights    -keep K>4 and Mg>2  -Continue metoprolol tartrate 50mg BID, nifedipine 60mg, hydralazine 100mg TID, Isordil 20 mg TID   - would place holding parameter of at least 120 mmHg on antihypertensives (for adequate renal perfusion)  -Patient has an unknown allergy to coreg which wife does not remember but is adamant he cannot tolerate it   -Continue ASA 81mg daily, atorvastatin 80mg   -Will discuss re-initiation of plavix after cholecystectomy.      Cardiology will follow  Case discussed with CONG De La Cruz-CNP  Cardiology Consults    Please call with any questions  Pager [phone] M-F 8a-5p; Saturday 8a-2p  Pager [phone] all other times

## 2024-04-06 LAB
ALBUMIN SERPL BCP-MCNC: 2.9 G/DL (ref 3.4–5)
ALBUMIN SERPL BCP-MCNC: 3.4 G/DL (ref 3.4–5)
ANION GAP SERPL CALC-SCNC: 15 MMOL/L (ref 10–20)
ANION GAP SERPL CALC-SCNC: 18 MMOL/L (ref 10–20)
BASOPHILS # BLD AUTO: 0.03 X10*3/UL (ref 0–0.1)
BASOPHILS NFR BLD AUTO: 0.3 %
BUN SERPL-MCNC: 59 MG/DL (ref 6–23)
BUN SERPL-MCNC: 59 MG/DL (ref 6–23)
CALCIUM SERPL-MCNC: 7.7 MG/DL (ref 8.6–10.6)
CALCIUM SERPL-MCNC: 8.6 MG/DL (ref 8.6–10.6)
CHLORIDE SERPL-SCNC: 100 MMOL/L (ref 98–107)
CHLORIDE SERPL-SCNC: 101 MMOL/L (ref 98–107)
CO2 SERPL-SCNC: 24 MMOL/L (ref 21–32)
CO2 SERPL-SCNC: 25 MMOL/L (ref 21–32)
CREAT SERPL-MCNC: 4.62 MG/DL (ref 0.5–1.3)
CREAT SERPL-MCNC: 4.73 MG/DL (ref 0.5–1.3)
EGFRCR SERPLBLD CKD-EPI 2021: 13 ML/MIN/1.73M*2
EGFRCR SERPLBLD CKD-EPI 2021: 14 ML/MIN/1.73M*2
EOSINOPHIL # BLD AUTO: 0.49 X10*3/UL (ref 0–0.7)
EOSINOPHIL NFR BLD AUTO: 5.3 %
ERYTHROCYTE [DISTWIDTH] IN BLOOD BY AUTOMATED COUNT: 14.2 % (ref 11.5–14.5)
GLUCOSE BLD MANUAL STRIP-MCNC: 118 MG/DL (ref 74–99)
GLUCOSE BLD MANUAL STRIP-MCNC: 129 MG/DL (ref 74–99)
GLUCOSE BLD MANUAL STRIP-MCNC: 133 MG/DL (ref 74–99)
GLUCOSE BLD MANUAL STRIP-MCNC: 136 MG/DL (ref 74–99)
GLUCOSE BLD MANUAL STRIP-MCNC: 140 MG/DL (ref 74–99)
GLUCOSE BLD MANUAL STRIP-MCNC: 148 MG/DL (ref 74–99)
GLUCOSE BLD MANUAL STRIP-MCNC: 161 MG/DL (ref 74–99)
GLUCOSE BLD MANUAL STRIP-MCNC: 192 MG/DL (ref 74–99)
GLUCOSE SERPL-MCNC: 142 MG/DL (ref 74–99)
GLUCOSE SERPL-MCNC: 172 MG/DL (ref 74–99)
HCT VFR BLD AUTO: 25.5 % (ref 41–52)
HGB BLD-MCNC: 8 G/DL (ref 13.5–17.5)
IMM GRANULOCYTES # BLD AUTO: 0.04 X10*3/UL (ref 0–0.7)
IMM GRANULOCYTES NFR BLD AUTO: 0.4 % (ref 0–0.9)
LYMPHOCYTES # BLD AUTO: 0.93 X10*3/UL (ref 1.2–4.8)
LYMPHOCYTES NFR BLD AUTO: 10.2 %
MAGNESIUM SERPL-MCNC: 2.1 MG/DL (ref 1.6–2.4)
MAGNESIUM SERPL-MCNC: 2.33 MG/DL (ref 1.6–2.4)
MCH RBC QN AUTO: 26.8 PG (ref 26–34)
MCHC RBC AUTO-ENTMCNC: 31.4 G/DL (ref 32–36)
MCV RBC AUTO: 86 FL (ref 80–100)
MONOCYTES # BLD AUTO: 0.97 X10*3/UL (ref 0.1–1)
MONOCYTES NFR BLD AUTO: 10.6 %
NEUTROPHILS # BLD AUTO: 6.7 X10*3/UL (ref 1.2–7.7)
NEUTROPHILS NFR BLD AUTO: 73.2 %
NRBC BLD-RTO: 0 /100 WBCS (ref 0–0)
PHOSPHATE SERPL-MCNC: 3.5 MG/DL (ref 2.5–4.9)
PHOSPHATE SERPL-MCNC: 3.8 MG/DL (ref 2.5–4.9)
PLATELET # BLD AUTO: 216 X10*3/UL (ref 150–450)
POTASSIUM SERPL-SCNC: 4.1 MMOL/L (ref 3.5–5.3)
POTASSIUM SERPL-SCNC: 4.5 MMOL/L (ref 3.5–5.3)
RBC # BLD AUTO: 2.98 X10*6/UL (ref 4.5–5.9)
SODIUM SERPL-SCNC: 137 MMOL/L (ref 136–145)
SODIUM SERPL-SCNC: 137 MMOL/L (ref 136–145)
WBC # BLD AUTO: 9.2 X10*3/UL (ref 4.4–11.3)

## 2024-04-06 PROCEDURE — 36415 COLL VENOUS BLD VENIPUNCTURE: CPT

## 2024-04-06 PROCEDURE — 2500000004 HC RX 250 GENERAL PHARMACY W/ HCPCS (ALT 636 FOR OP/ED)

## 2024-04-06 PROCEDURE — 85025 COMPLETE CBC W/AUTO DIFF WBC: CPT

## 2024-04-06 PROCEDURE — C9113 INJ PANTOPRAZOLE SODIUM, VIA: HCPCS

## 2024-04-06 PROCEDURE — 83735 ASSAY OF MAGNESIUM: CPT

## 2024-04-06 PROCEDURE — 1200000002 HC GENERAL ROOM WITH TELEMETRY DAILY

## 2024-04-06 PROCEDURE — 99232 SBSQ HOSP IP/OBS MODERATE 35: CPT | Performed by: SURGERY

## 2024-04-06 PROCEDURE — 2500000002 HC RX 250 W HCPCS SELF ADMINISTERED DRUGS (ALT 637 FOR MEDICARE OP, ALT 636 FOR OP/ED)

## 2024-04-06 PROCEDURE — 80069 RENAL FUNCTION PANEL: CPT

## 2024-04-06 PROCEDURE — 2500000001 HC RX 250 WO HCPCS SELF ADMINISTERED DRUGS (ALT 637 FOR MEDICARE OP): Performed by: STUDENT IN AN ORGANIZED HEALTH CARE EDUCATION/TRAINING PROGRAM

## 2024-04-06 PROCEDURE — 2500000001 HC RX 250 WO HCPCS SELF ADMINISTERED DRUGS (ALT 637 FOR MEDICARE OP)

## 2024-04-06 PROCEDURE — 82947 ASSAY GLUCOSE BLOOD QUANT: CPT

## 2024-04-06 RX ADMIN — Medication 1000 UNITS: at 20:54

## 2024-04-06 RX ADMIN — ASPIRIN 81 MG CHEWABLE TABLET 81 MG: 81 TABLET CHEWABLE at 09:58

## 2024-04-06 RX ADMIN — TRAMADOL HYDROCHLORIDE 50 MG: 50 TABLET, COATED ORAL at 04:00

## 2024-04-06 RX ADMIN — PANTOPRAZOLE SODIUM 40 MG: 40 INJECTION, POWDER, FOR SOLUTION INTRAVENOUS at 09:59

## 2024-04-06 RX ADMIN — HEPARIN SODIUM 5000 UNITS: 5000 INJECTION INTRAVENOUS; SUBCUTANEOUS at 00:17

## 2024-04-06 RX ADMIN — ACETAMINOPHEN 650 MG: 325 TABLET ORAL at 09:58

## 2024-04-06 RX ADMIN — PIPERACILLIN SODIUM AND TAZOBACTAM SODIUM 2.25 G: 2; .25 INJECTION, SOLUTION INTRAVENOUS at 12:33

## 2024-04-06 RX ADMIN — PANTOPRAZOLE SODIUM 40 MG: 40 INJECTION, POWDER, FOR SOLUTION INTRAVENOUS at 20:55

## 2024-04-06 RX ADMIN — INSULIN LISPRO 1 UNITS: 100 INJECTION, SOLUTION INTRAVENOUS; SUBCUTANEOUS at 17:54

## 2024-04-06 RX ADMIN — POLYETHYLENE GLYCOL 3350 17 G: 17 POWDER, FOR SOLUTION ORAL at 20:55

## 2024-04-06 RX ADMIN — PIPERACILLIN SODIUM AND TAZOBACTAM SODIUM 2.25 G: 2; .25 INJECTION, SOLUTION INTRAVENOUS at 00:17

## 2024-04-06 RX ADMIN — ISOSORBIDE DINITRATE 20 MG: 20 TABLET ORAL at 15:25

## 2024-04-06 RX ADMIN — HEPARIN SODIUM 5000 UNITS: 5000 INJECTION INTRAVENOUS; SUBCUTANEOUS at 15:25

## 2024-04-06 RX ADMIN — ATORVASTATIN CALCIUM 80 MG: 80 TABLET, FILM COATED ORAL at 20:54

## 2024-04-06 RX ADMIN — METOPROLOL TARTRATE 50 MG: 50 TABLET, FILM COATED ORAL at 20:54

## 2024-04-06 RX ADMIN — ISOSORBIDE DINITRATE 20 MG: 20 TABLET ORAL at 09:59

## 2024-04-06 RX ADMIN — HEPARIN SODIUM 5000 UNITS: 5000 INJECTION INTRAVENOUS; SUBCUTANEOUS at 23:49

## 2024-04-06 RX ADMIN — NIFEDIPINE 60 MG: 60 TABLET, FILM COATED, EXTENDED RELEASE ORAL at 05:49

## 2024-04-06 RX ADMIN — PIPERACILLIN SODIUM AND TAZOBACTAM SODIUM 2.25 G: 2; .25 INJECTION, SOLUTION INTRAVENOUS at 17:53

## 2024-04-06 RX ADMIN — ISOSORBIDE DINITRATE 20 MG: 20 TABLET ORAL at 18:25

## 2024-04-06 RX ADMIN — HYDRALAZINE HYDROCHLORIDE 100 MG: 50 TABLET ORAL at 09:58

## 2024-04-06 RX ADMIN — PIPERACILLIN SODIUM AND TAZOBACTAM SODIUM 2.25 G: 2; .25 INJECTION, SOLUTION INTRAVENOUS at 23:49

## 2024-04-06 RX ADMIN — Medication 1000 UNITS: at 09:58

## 2024-04-06 RX ADMIN — HYDRALAZINE HYDROCHLORIDE 100 MG: 50 TABLET ORAL at 15:25

## 2024-04-06 RX ADMIN — HEPARIN SODIUM 5000 UNITS: 5000 INJECTION INTRAVENOUS; SUBCUTANEOUS at 09:59

## 2024-04-06 RX ADMIN — HYDRALAZINE HYDROCHLORIDE 100 MG: 50 TABLET ORAL at 20:54

## 2024-04-06 RX ADMIN — METOPROLOL TARTRATE 50 MG: 50 TABLET, FILM COATED ORAL at 09:58

## 2024-04-06 RX ADMIN — PIPERACILLIN SODIUM AND TAZOBACTAM SODIUM 2.25 G: 2; .25 INJECTION, SOLUTION INTRAVENOUS at 05:42

## 2024-04-06 RX ADMIN — OXYCODONE HYDROCHLORIDE 5 MG: 5 TABLET ORAL at 03:04

## 2024-04-06 RX ADMIN — MELATONIN 3 MG: 3 TAB ORAL at 20:54

## 2024-04-06 ASSESSMENT — PAIN SCALES - WONG BAKER
WONGBAKER_NUMERICALRESPONSE: HURTS LITTLE MORE
WONGBAKER_NUMERICALRESPONSE: HURTS LITTLE MORE

## 2024-04-06 ASSESSMENT — COGNITIVE AND FUNCTIONAL STATUS - GENERAL
CLIMB 3 TO 5 STEPS WITH RAILING: TOTAL
STANDING UP FROM CHAIR USING ARMS: A LOT
MOBILITY SCORE: 10
WALKING IN HOSPITAL ROOM: TOTAL
DRESSING REGULAR LOWER BODY CLOTHING: A LOT
TOILETING: A LOT
HELP NEEDED FOR BATHING: A LOT
PERSONAL GROOMING: A LITTLE
EATING MEALS: A LITTLE
MOVING FROM LYING ON BACK TO SITTING ON SIDE OF FLAT BED WITH BEDRAILS: A LOT
TURNING FROM BACK TO SIDE WHILE IN FLAT BAD: A LOT
DAILY ACTIVITIY SCORE: 15
DRESSING REGULAR UPPER BODY CLOTHING: A LITTLE
MOVING TO AND FROM BED TO CHAIR: A LOT

## 2024-04-06 ASSESSMENT — PAIN SCALES - PAIN ASSESSMENT IN ADVANCED DEMENTIA (PAINAD)
BODYLANGUAGE: RELAXED
TOTALSCORE: 0
CONSOLABILITY: NO NEED TO CONSOLE
TOTALSCORE: 3
BREATHING: NORMAL
BODYLANGUAGE: TENSE, DISTRESSED PACING, FIDGETING
BREATHING: NORMAL
FACIALEXPRESSION: SMILING OR INEXPRESSIVE
CONSOLABILITY: NO NEED TO CONSOLE
FACIALEXPRESSION: FACIAL GRIMACING

## 2024-04-06 ASSESSMENT — PAIN - FUNCTIONAL ASSESSMENT
PAIN_FUNCTIONAL_ASSESSMENT: 0-10

## 2024-04-06 ASSESSMENT — PAIN DESCRIPTION - ORIENTATION
ORIENTATION: LOWER;MID
ORIENTATION: LOWER;MID

## 2024-04-06 ASSESSMENT — PAIN DESCRIPTION - LOCATION
LOCATION: BACK
LOCATION: BACK

## 2024-04-06 ASSESSMENT — PAIN SCALES - GENERAL
PAINLEVEL_OUTOF10: 6
PAINLEVEL_OUTOF10: 0 - NO PAIN
PAINLEVEL_OUTOF10: 2
PAINLEVEL_OUTOF10: 7

## 2024-04-06 NOTE — PROGRESS NOTES
[facility]  ACUTE CARE SURGERY - PROGRESS NOTE    Patient Name: Humphrey Waddell  MRN: [id number]  Admit Date: 329  : 1965  AGE: 59 y.o.   GENDER: male  ==============================================================================  TODAY'S ASSESSMENT AND PLAN OF CARE:  59-year-old male with past medical history significant for CABG with vein graft x 3 in 2023 (postoperative course complicated by mediastinal exploration and evacuation of pericardial tamponade/repair of vein graft), hyperlipidemia, hypertension, CKD, history of CVA  with no deficits, type 2 diabetes, left AKA admitted directly from outside hospital with concern for cholecystitis on HIDA scan (cystic duct obstruction and acute cholecystitis revealed on scan).  Patient's hospital course c/b hypertension, pleural effusion, and possible decompensated HF.    Plan:  Neuro: acute abdominal pain   - Tylenol, tramadol, Oxy as needed    CV: Hx of CABG, on DAPT   #Concern for acute decompensated HF  - Home metoprolol, home torsemide, hydralazine, ASA  - Last dose of Plavix on 3/29   - Cardiology following     - Holding off on further diuresis     - BID RFP and Mag     - Isordil 20 mg TID     - Nifedipine 60mg  - Follow up Limited Echo to assess LVEF: EF 60-65%    Respiratory: New Hypoxia  - Left sided pleural effusion and left lower lobe atelectatic changes    - S/p L-sided thoracentesis with IR on 4/3, drained 1.2L  - Continue IS, duonebs q6 hours for 48 hours    GI: acute cholecystitis   - Regular diet  - S/p perc babs tube placement on 4/3, monitor output  - Constipation: continue bowel regimen  - PPI     : IDANIA on CKD Stage IV  - Decreased UOP, will bolus 500 ml albumin this AM  - Strict I&Os  - RFP BID per cards recs    Heme: Chronic Anemia  - Continue to trend H/H   - BLE DVT US negative     ID: acute cholecystitis  - Continue IV Zosyn until     PPx: SQH, SCDs    Dispo: continue RNF    Patient  discussed with attending Dr. Caputo.    Madhu Carter,   PGY-5 General Surgery   ACS [id number]  ==============================================================================  CHIEF COMPLAINT / EVENTS LAST 24HRS / HPI:  Patient has had signficantly lower UOP over last 24 hrs despite heavy diuresis. Bladder scanned, 425 in bladder. Again voiding spontaneously. RUQ pain present but controlled and significantly improved.    MEDICAL HISTORY / ROS:   Admission history and ROS reviewed. Pertinent changes as follows:  none    PHYSICAL EXAM:  Heart Rate:  [65-93]   Temp:  [36.4 °C (97.5 °F)-36.6 °C (97.9 °F)]   Resp:  [18]   BP: (125-144)/(67-76)   SpO2:  [90 %-95 %]     General: NAD, resting comfortably  HEENT: NCAT  Resp: nonlabored breathing on RA  CV: RRR  Abd: soft, relatively less TTP in RUQ, cholecystostomy tube with bilious output  : no gerardo  MSK: moves all extremities  Ext: no LE edema  Psych: appropriate mood and behavior     IMAGING SUMMARY:  No new imaging.    LABS:  Results from last 7 days   Lab Units 04/06/24 0515 04/05/24  0616 04/04/24  0532   WBC AUTO x10*3/uL 9.2 9.3 8.4   HEMOGLOBIN g/dL 8.0* 8.3* 8.6*   HEMATOCRIT % 25.5* 26.8* 26.9*   PLATELETS AUTO x10*3/uL 216 220 240   NEUTROS PCT AUTO % 73.2 71.5 74.7   LYMPHS PCT AUTO % 10.2 10.5 10.1   MONOS PCT AUTO % 10.6 11.8 11.0   EOS PCT AUTO % 5.3 5.5 3.8       Results from last 7 days   Lab Units 04/03/24  0521   APTT seconds 30   INR  1.1       Results from last 7 days   Lab Units 04/06/24 0515 04/05/24 1952 04/05/24  0616   SODIUM mmol/L 137 138 137   POTASSIUM mmol/L 4.1 4.1 4.1   CHLORIDE mmol/L 101 100 100   CO2 mmol/L 25 28 27   BUN mg/dL 59* 60* 58*   CREATININE mg/dL 4.62* 4.54* 4.57*   CALCIUM mg/dL 7.7* 8.1* 8.2*   GLUCOSE mg/dL 142* 162* 136*

## 2024-04-06 NOTE — CARE PLAN
The patient's goals for the shift include      The clinical goals for the shift include Monitor patients urine output and bladder scan when needed    Over the shift, the patient did not make progress toward the following goals. Barriers to progression include patient inability to feel the urge to urinate. Recommendations to address these barriers include bladder scan and encourage frequent urination.

## 2024-04-07 LAB
ALBUMIN SERPL BCP-MCNC: 2.9 G/DL (ref 3.4–5)
ANION GAP SERPL CALC-SCNC: 15 MMOL/L (ref 10–20)
BASOPHILS # BLD AUTO: 0.03 X10*3/UL (ref 0–0.1)
BASOPHILS NFR BLD AUTO: 0.4 %
BUN SERPL-MCNC: 59 MG/DL (ref 6–23)
CALCIUM SERPL-MCNC: 8.1 MG/DL (ref 8.6–10.6)
CHLORIDE SERPL-SCNC: 103 MMOL/L (ref 98–107)
CO2 SERPL-SCNC: 26 MMOL/L (ref 21–32)
CREAT SERPL-MCNC: 4.31 MG/DL (ref 0.5–1.3)
EGFRCR SERPLBLD CKD-EPI 2021: 15 ML/MIN/1.73M*2
EOSINOPHIL # BLD AUTO: 0.55 X10*3/UL (ref 0–0.7)
EOSINOPHIL NFR BLD AUTO: 6.4 %
ERYTHROCYTE [DISTWIDTH] IN BLOOD BY AUTOMATED COUNT: 14.2 % (ref 11.5–14.5)
GLUCOSE BLD MANUAL STRIP-MCNC: 124 MG/DL (ref 74–99)
GLUCOSE BLD MANUAL STRIP-MCNC: 133 MG/DL (ref 74–99)
GLUCOSE BLD MANUAL STRIP-MCNC: 154 MG/DL (ref 74–99)
GLUCOSE BLD MANUAL STRIP-MCNC: 177 MG/DL (ref 74–99)
GLUCOSE SERPL-MCNC: 136 MG/DL (ref 74–99)
HCT VFR BLD AUTO: 25.6 % (ref 41–52)
HGB BLD-MCNC: 7.7 G/DL (ref 13.5–17.5)
IMM GRANULOCYTES # BLD AUTO: 0.03 X10*3/UL (ref 0–0.7)
IMM GRANULOCYTES NFR BLD AUTO: 0.4 % (ref 0–0.9)
LYMPHOCYTES # BLD AUTO: 1.01 X10*3/UL (ref 1.2–4.8)
LYMPHOCYTES NFR BLD AUTO: 11.8 %
MAGNESIUM SERPL-MCNC: 2.17 MG/DL (ref 1.6–2.4)
MCH RBC QN AUTO: 27.2 PG (ref 26–34)
MCHC RBC AUTO-ENTMCNC: 30.1 G/DL (ref 32–36)
MCV RBC AUTO: 91 FL (ref 80–100)
MONOCYTES # BLD AUTO: 0.76 X10*3/UL (ref 0.1–1)
MONOCYTES NFR BLD AUTO: 8.9 %
NEUTROPHILS # BLD AUTO: 6.16 X10*3/UL (ref 1.2–7.7)
NEUTROPHILS NFR BLD AUTO: 72.1 %
NRBC BLD-RTO: 0 /100 WBCS (ref 0–0)
PHOSPHATE SERPL-MCNC: 3.8 MG/DL (ref 2.5–4.9)
PLATELET # BLD AUTO: 234 X10*3/UL (ref 150–450)
POTASSIUM SERPL-SCNC: 4.5 MMOL/L (ref 3.5–5.3)
RBC # BLD AUTO: 2.83 X10*6/UL (ref 4.5–5.9)
SODIUM SERPL-SCNC: 139 MMOL/L (ref 136–145)
WBC # BLD AUTO: 8.5 X10*3/UL (ref 4.4–11.3)

## 2024-04-07 PROCEDURE — 36415 COLL VENOUS BLD VENIPUNCTURE: CPT

## 2024-04-07 PROCEDURE — 1200000002 HC GENERAL ROOM WITH TELEMETRY DAILY

## 2024-04-07 PROCEDURE — 83735 ASSAY OF MAGNESIUM: CPT

## 2024-04-07 PROCEDURE — 2500000004 HC RX 250 GENERAL PHARMACY W/ HCPCS (ALT 636 FOR OP/ED)

## 2024-04-07 PROCEDURE — 2500000001 HC RX 250 WO HCPCS SELF ADMINISTERED DRUGS (ALT 637 FOR MEDICARE OP)

## 2024-04-07 PROCEDURE — C9113 INJ PANTOPRAZOLE SODIUM, VIA: HCPCS

## 2024-04-07 PROCEDURE — 2500000002 HC RX 250 W HCPCS SELF ADMINISTERED DRUGS (ALT 637 FOR MEDICARE OP, ALT 636 FOR OP/ED)

## 2024-04-07 PROCEDURE — 2500000001 HC RX 250 WO HCPCS SELF ADMINISTERED DRUGS (ALT 637 FOR MEDICARE OP): Performed by: STUDENT IN AN ORGANIZED HEALTH CARE EDUCATION/TRAINING PROGRAM

## 2024-04-07 PROCEDURE — 82947 ASSAY GLUCOSE BLOOD QUANT: CPT

## 2024-04-07 PROCEDURE — 80069 RENAL FUNCTION PANEL: CPT

## 2024-04-07 PROCEDURE — 85025 COMPLETE CBC W/AUTO DIFF WBC: CPT

## 2024-04-07 RX ADMIN — ATORVASTATIN CALCIUM 80 MG: 80 TABLET, FILM COATED ORAL at 20:43

## 2024-04-07 RX ADMIN — ASPIRIN 81 MG CHEWABLE TABLET 81 MG: 81 TABLET CHEWABLE at 09:40

## 2024-04-07 RX ADMIN — PIPERACILLIN SODIUM AND TAZOBACTAM SODIUM 2.25 G: 2; .25 INJECTION, SOLUTION INTRAVENOUS at 18:13

## 2024-04-07 RX ADMIN — PANTOPRAZOLE SODIUM 40 MG: 40 INJECTION, POWDER, FOR SOLUTION INTRAVENOUS at 20:43

## 2024-04-07 RX ADMIN — NIFEDIPINE 60 MG: 60 TABLET, FILM COATED, EXTENDED RELEASE ORAL at 06:18

## 2024-04-07 RX ADMIN — HYDRALAZINE HYDROCHLORIDE 100 MG: 50 TABLET ORAL at 20:44

## 2024-04-07 RX ADMIN — HEPARIN SODIUM 5000 UNITS: 5000 INJECTION INTRAVENOUS; SUBCUTANEOUS at 18:13

## 2024-04-07 RX ADMIN — ISOSORBIDE DINITRATE 20 MG: 20 TABLET ORAL at 18:13

## 2024-04-07 RX ADMIN — Medication 1000 UNITS: at 09:40

## 2024-04-07 RX ADMIN — PIPERACILLIN SODIUM AND TAZOBACTAM SODIUM 2.25 G: 2; .25 INJECTION, SOLUTION INTRAVENOUS at 13:41

## 2024-04-07 RX ADMIN — PANTOPRAZOLE SODIUM 40 MG: 40 INJECTION, POWDER, FOR SOLUTION INTRAVENOUS at 09:41

## 2024-04-07 RX ADMIN — POLYETHYLENE GLYCOL 3350 17 G: 17 POWDER, FOR SOLUTION ORAL at 09:41

## 2024-04-07 RX ADMIN — HEPARIN SODIUM 5000 UNITS: 5000 INJECTION INTRAVENOUS; SUBCUTANEOUS at 09:41

## 2024-04-07 RX ADMIN — Medication 1000 UNITS: at 20:43

## 2024-04-07 RX ADMIN — PIPERACILLIN SODIUM AND TAZOBACTAM SODIUM 2.25 G: 2; .25 INJECTION, SOLUTION INTRAVENOUS at 06:19

## 2024-04-07 RX ADMIN — MELATONIN 3 MG: 3 TAB ORAL at 20:43

## 2024-04-07 RX ADMIN — METOPROLOL TARTRATE 50 MG: 50 TABLET, FILM COATED ORAL at 20:43

## 2024-04-07 RX ADMIN — METOPROLOL TARTRATE 50 MG: 50 TABLET, FILM COATED ORAL at 09:40

## 2024-04-07 RX ADMIN — ISOSORBIDE DINITRATE 20 MG: 20 TABLET ORAL at 09:00

## 2024-04-07 ASSESSMENT — PAIN SCALES - GENERAL
PAINLEVEL_OUTOF10: 0 - NO PAIN

## 2024-04-07 ASSESSMENT — PAIN - FUNCTIONAL ASSESSMENT
PAIN_FUNCTIONAL_ASSESSMENT: 0-10
PAIN_FUNCTIONAL_ASSESSMENT: 0-10

## 2024-04-07 NOTE — PROGRESS NOTES
[facility]  ACUTE CARE SURGERY - PROGRESS NOTE    Patient Name: Humphrey Waddell  MRN: [id number]  Admit Date: 329  : 1965  AGE: 59 y.o.   GENDER: male  ==============================================================================  TODAY'S ASSESSMENT AND PLAN OF CARE:  59-year-old male with past medical history significant for CABG with vein graft x 3 in 2023 (postoperative course complicated by mediastinal exploration and evacuation of pericardial tamponade/repair of vein graft), hyperlipidemia, hypertension, CKD, history of CVA  with no deficits, type 2 diabetes, left AKA admitted directly from outside hospital with concern for cholecystitis on HIDA scan (cystic duct obstruction and acute cholecystitis revealed on scan).  Patient's hospital course c/b hypertension, pleural effusion, and possible decompensated HF.    Plan:  Neuro: acute abdominal pain   - Tylenol, tramadol, Oxy as needed    CV: Hx of CABG, on DAPT   #Concern for acute decompensated HF  - Home metoprolol, home torsemide, hydralazine, ASA  - Last dose of Plavix on 3/29   - Cardiology following     - Holding off on further diuresis     - BID RFP and Mag     - Isordil 20 mg TID     - Nifedipine 60mg  - Follow up Limited Echo to assess LVEF: EF 60-65%    Respiratory: New Hypoxia  - Left sided pleural effusion and left lower lobe atelectatic changes    - S/p L-sided thoracentesis with IR on 4/3, drained 1.2L  - Continue IS, duonebs q6 hours for 48 hours    GI: acute cholecystitis   - Regular diet  - S/p perc babs tube placement on 4/3, monitor output  - Constipation: continue bowel regimen  - PPI     : IDANIA on CKD Stage IV  - Making adequate urine  - Cr downtrending  - Strict I&Os    Heme: Chronic Anemia  - Continue to trend H/H   - BLE DVT US negative     ID: acute cholecystitis  - Continue IV Zosyn until     PPx: SQH, SCDs    Dispo: medically clear for discharge, pending SNF    Patient discussed  with attending Dr. Caputo.    Madhu Carter, DO  PGY-5 General Surgery   ACS [id number]  ==============================================================================  CHIEF COMPLAINT / EVENTS LAST 24HRS / HPI:  NAEO.    MEDICAL HISTORY / ROS:   Admission history and ROS reviewed. Pertinent changes as follows:  none    PHYSICAL EXAM:  Heart Rate:  [65-80]   Temp:  [36.2 °C (97.2 °F)-38.2 °C (100.8 °F)]   Resp:  [17-18]   BP: (105-147)/(57-75)   SpO2:  [91 %-92 %]     General: NAD, resting comfortably  HEENT: NCAT  Resp: nonlabored breathing on RA  CV: RRR  Abd: soft, relatively less TTP in RUQ, cholecystostomy tube with bilious output  : no gerardo  MSK: moves all extremities  Ext: no LE edema  Psych: appropriate mood and behavior     IMAGING SUMMARY:  No new imaging.    LABS:  Results from last 7 days   Lab Units 04/07/24  0603 04/06/24  0515 04/05/24  0616   WBC AUTO x10*3/uL 8.5 9.2 9.3   HEMOGLOBIN g/dL 7.7* 8.0* 8.3*   HEMATOCRIT % 25.6* 25.5* 26.8*   PLATELETS AUTO x10*3/uL 234 216 220   NEUTROS PCT AUTO % 72.1 73.2 71.5   LYMPHS PCT AUTO % 11.8 10.2 10.5   MONOS PCT AUTO % 8.9 10.6 11.8   EOS PCT AUTO % 6.4 5.3 5.5       Results from last 7 days   Lab Units 04/03/24  0521   APTT seconds 30   INR  1.1       Results from last 7 days   Lab Units 04/07/24  0603 04/06/24  1552 04/06/24  0515   SODIUM mmol/L 139 137 137   POTASSIUM mmol/L 4.5 4.5 4.1   CHLORIDE mmol/L 103 100 101   CO2 mmol/L 26 24 25   BUN mg/dL 59* 59* 59*   CREATININE mg/dL 4.31* 4.73* 4.62*   CALCIUM mg/dL 8.1* 8.6 7.7*   GLUCOSE mg/dL 136* 172* 142*

## 2024-04-08 ENCOUNTER — HOME HEALTH ADMISSION (OUTPATIENT)
Dept: HOME HEALTH SERVICES | Facility: HOME HEALTH | Age: 59
End: 2024-04-08
Payer: COMMERCIAL

## 2024-04-08 VITALS
SYSTOLIC BLOOD PRESSURE: 135 MMHG | RESPIRATION RATE: 18 BRPM | HEART RATE: 72 BPM | DIASTOLIC BLOOD PRESSURE: 69 MMHG | TEMPERATURE: 98.4 F | OXYGEN SATURATION: 94 %

## 2024-04-08 LAB
ALBUMIN SERPL BCP-MCNC: 3 G/DL (ref 3.4–5)
ANION GAP SERPL CALC-SCNC: 15 MMOL/L (ref 10–20)
BASOPHILS # BLD AUTO: 0.03 X10*3/UL (ref 0–0.1)
BASOPHILS NFR BLD AUTO: 0.3 %
BUN SERPL-MCNC: 52 MG/DL (ref 6–23)
CALCIUM SERPL-MCNC: 8.2 MG/DL (ref 8.6–10.6)
CHLORIDE SERPL-SCNC: 106 MMOL/L (ref 98–107)
CO2 SERPL-SCNC: 24 MMOL/L (ref 21–32)
CREAT SERPL-MCNC: 4.35 MG/DL (ref 0.5–1.3)
EGFRCR SERPLBLD CKD-EPI 2021: 15 ML/MIN/1.73M*2
EOSINOPHIL # BLD AUTO: 0.51 X10*3/UL (ref 0–0.7)
EOSINOPHIL NFR BLD AUTO: 5.9 %
ERYTHROCYTE [DISTWIDTH] IN BLOOD BY AUTOMATED COUNT: 14.5 % (ref 11.5–14.5)
GLUCOSE BLD MANUAL STRIP-MCNC: 111 MG/DL (ref 74–99)
GLUCOSE BLD MANUAL STRIP-MCNC: 111 MG/DL (ref 74–99)
GLUCOSE BLD MANUAL STRIP-MCNC: 147 MG/DL (ref 74–99)
GLUCOSE SERPL-MCNC: 100 MG/DL (ref 74–99)
HCT VFR BLD AUTO: 25.7 % (ref 41–52)
HGB BLD-MCNC: 7.9 G/DL (ref 13.5–17.5)
IMM GRANULOCYTES # BLD AUTO: 0.03 X10*3/UL (ref 0–0.7)
IMM GRANULOCYTES NFR BLD AUTO: 0.3 % (ref 0–0.9)
LYMPHOCYTES # BLD AUTO: 1.28 X10*3/UL (ref 1.2–4.8)
LYMPHOCYTES NFR BLD AUTO: 14.8 %
MAGNESIUM SERPL-MCNC: 2.23 MG/DL (ref 1.6–2.4)
MCH RBC QN AUTO: 27.2 PG (ref 26–34)
MCHC RBC AUTO-ENTMCNC: 30.7 G/DL (ref 32–36)
MCV RBC AUTO: 89 FL (ref 80–100)
MONOCYTES # BLD AUTO: 0.85 X10*3/UL (ref 0.1–1)
MONOCYTES NFR BLD AUTO: 9.8 %
NEUTROPHILS # BLD AUTO: 5.97 X10*3/UL (ref 1.2–7.7)
NEUTROPHILS NFR BLD AUTO: 68.9 %
NRBC BLD-RTO: 0 /100 WBCS (ref 0–0)
PHOSPHATE SERPL-MCNC: 3.6 MG/DL (ref 2.5–4.9)
PLATELET # BLD AUTO: 258 X10*3/UL (ref 150–450)
POTASSIUM SERPL-SCNC: 4.9 MMOL/L (ref 3.5–5.3)
RBC # BLD AUTO: 2.9 X10*6/UL (ref 4.5–5.9)
SODIUM SERPL-SCNC: 140 MMOL/L (ref 136–145)
WBC # BLD AUTO: 8.7 X10*3/UL (ref 4.4–11.3)

## 2024-04-08 PROCEDURE — 2500000004 HC RX 250 GENERAL PHARMACY W/ HCPCS (ALT 636 FOR OP/ED)

## 2024-04-08 PROCEDURE — 83735 ASSAY OF MAGNESIUM: CPT

## 2024-04-08 PROCEDURE — 36415 COLL VENOUS BLD VENIPUNCTURE: CPT

## 2024-04-08 PROCEDURE — 2500000001 HC RX 250 WO HCPCS SELF ADMINISTERED DRUGS (ALT 637 FOR MEDICARE OP)

## 2024-04-08 PROCEDURE — 99239 HOSP IP/OBS DSCHRG MGMT >30: CPT | Performed by: SURGERY

## 2024-04-08 PROCEDURE — 85025 COMPLETE CBC W/AUTO DIFF WBC: CPT

## 2024-04-08 PROCEDURE — 80069 RENAL FUNCTION PANEL: CPT

## 2024-04-08 PROCEDURE — C9113 INJ PANTOPRAZOLE SODIUM, VIA: HCPCS

## 2024-04-08 PROCEDURE — 2500000002 HC RX 250 W HCPCS SELF ADMINISTERED DRUGS (ALT 637 FOR MEDICARE OP, ALT 636 FOR OP/ED)

## 2024-04-08 PROCEDURE — 99233 SBSQ HOSP IP/OBS HIGH 50: CPT | Performed by: NURSE PRACTITIONER

## 2024-04-08 PROCEDURE — 2500000001 HC RX 250 WO HCPCS SELF ADMINISTERED DRUGS (ALT 637 FOR MEDICARE OP): Performed by: STUDENT IN AN ORGANIZED HEALTH CARE EDUCATION/TRAINING PROGRAM

## 2024-04-08 PROCEDURE — 82947 ASSAY GLUCOSE BLOOD QUANT: CPT

## 2024-04-08 RX ORDER — POLYETHYLENE GLYCOL 3350 17 G/17G
17 POWDER, FOR SOLUTION ORAL 2 TIMES DAILY
Qty: 60 PACKET | Refills: 0 | Status: ON HOLD | OUTPATIENT
Start: 2024-04-08 | End: 2024-04-17

## 2024-04-08 RX ORDER — TRAMADOL HYDROCHLORIDE 50 MG/1
50 TABLET ORAL EVERY 6 HOURS PRN
Qty: 15 TABLET | Refills: 0 | Status: SHIPPED | OUTPATIENT
Start: 2024-04-08 | End: 2024-06-11 | Stop reason: HOSPADM

## 2024-04-08 RX ORDER — NIFEDIPINE 60 MG/1
60 TABLET, FILM COATED, EXTENDED RELEASE ORAL
Qty: 30 TABLET | Refills: 0 | Status: SHIPPED | OUTPATIENT
Start: 2024-04-09 | End: 2024-05-07 | Stop reason: SDUPTHER

## 2024-04-08 RX ORDER — TRAMADOL HYDROCHLORIDE 50 MG/1
50 TABLET ORAL EVERY 4 HOURS PRN
Qty: 15 TABLET | Refills: 0 | Status: SHIPPED | OUTPATIENT
Start: 2024-04-08 | End: 2024-04-08 | Stop reason: SDUPTHER

## 2024-04-08 RX ORDER — ISOSORBIDE DINITRATE 20 MG/1
20 TABLET ORAL
Qty: 90 TABLET | Refills: 0 | Status: SHIPPED | OUTPATIENT
Start: 2024-04-08 | End: 2024-05-07 | Stop reason: SDUPTHER

## 2024-04-08 RX ADMIN — ISOSORBIDE DINITRATE 20 MG: 20 TABLET ORAL at 13:50

## 2024-04-08 RX ADMIN — HEPARIN SODIUM 5000 UNITS: 5000 INJECTION INTRAVENOUS; SUBCUTANEOUS at 00:29

## 2024-04-08 RX ADMIN — HYDRALAZINE HYDROCHLORIDE 100 MG: 50 TABLET ORAL at 08:28

## 2024-04-08 RX ADMIN — Medication 1000 UNITS: at 08:29

## 2024-04-08 RX ADMIN — HEPARIN SODIUM 5000 UNITS: 5000 INJECTION INTRAVENOUS; SUBCUTANEOUS at 08:28

## 2024-04-08 RX ADMIN — NIFEDIPINE 60 MG: 60 TABLET, FILM COATED, EXTENDED RELEASE ORAL at 06:30

## 2024-04-08 RX ADMIN — POLYETHYLENE GLYCOL 3350 17 G: 17 POWDER, FOR SOLUTION ORAL at 08:29

## 2024-04-08 RX ADMIN — ISOSORBIDE DINITRATE 20 MG: 20 TABLET ORAL at 08:29

## 2024-04-08 RX ADMIN — ASPIRIN 81 MG CHEWABLE TABLET 81 MG: 81 TABLET CHEWABLE at 08:28

## 2024-04-08 RX ADMIN — HYDRALAZINE HYDROCHLORIDE 100 MG: 50 TABLET ORAL at 13:51

## 2024-04-08 RX ADMIN — PANTOPRAZOLE SODIUM 40 MG: 40 INJECTION, POWDER, FOR SOLUTION INTRAVENOUS at 08:29

## 2024-04-08 RX ADMIN — METOPROLOL TARTRATE 50 MG: 50 TABLET, FILM COATED ORAL at 08:28

## 2024-04-08 NOTE — CARE PLAN
Problem: Balance  Goal: no LOB with transfers  Outcome: Adequate for Discharge     Problem: PT Transfers  Goal: STG - Transfer from bed to chair min assist with LRD  Outcome: Adequate for Discharge  Goal: STG - Patient will perform bed mobility CGA  Outcome: Adequate for Discharge     Problem: ADLs  Goal: Patient with complete lower body dressing with independent level of assistance donning and doffing all LE clothes  with no adaptive equipment while supine in bed  Outcome: Adequate for Discharge  Goal: Patient will complete daily grooming tasks brushing teeth and washing face/hair with independent level of assistance and PRN adaptive equipment while edge of bed .  Outcome: Adequate for Discharge  Goal: Patient will complete toileting including hygiene clothing management/hygiene with supervision level of assistance and bedside commode.  Outcome: Adequate for Discharge     Problem: TRANSFERS  Goal: Patient will complete functional transfers with least restrictive device with stand by assist level of assistance.  Outcome: Adequate for Discharge     Problem: BALANCE  Goal: Patientt will maintain dynamic sitting balance during ADL task with supervision level of assistance drop down in order to demonstrate decreased risk of falling and improved postural control.  Outcome: Adequate for Discharge

## 2024-04-08 NOTE — PROGRESS NOTES
Care Coordinator Note:    Plan: patient is POD #2 CABG. Chest tube and gerardo intact.     Status: Inpatient  [facility]  Disposition: PT OT rec HIGH. Patient is agreable and list was given. FOC is [facility] in [place]. Referral sent for review. Will need auth.     Barrier:  ADOD: 11/18 or 11/19    Naomi Pereyra TCC      0 = swallows foods/liquids without difficulty

## 2024-04-08 NOTE — DISCHARGE SUMMARY
Discharge Diagnosis  Cholecystitis    Issues Requiring Follow-Up  Cholecystitis s/p percutaneous cholecystostomy pigtail drain placement by IR - follow up with ACS for drain assessment, possible removal    Test Results Pending At Discharge  none    Hospital Course  59-year-old male with past medical history significant for CABGx3 November 2023 (postoperative course complicated by mediastinal exploration and evacuation of pericardial tamponade/repair of vein graft), hyperlipidemia, hypertension, CKD, history of CVA 2020 with no deficits, type 2 diabetes, left AKA secondary cellulitis and chronic wounds who presented to [facility] ED 3/28 via EMS with chest pain.  At [facility], pt had No leukocytosis, normal electrolytes, baseline creatinine (2.9), however troponin elevated at 133.  EKG with no evidence of STEMI.  Cholelithiasis identified on right upper quadrant ultrasound but no evidence of cholecystitis.  Repeat troponins were stable at 136.  CT scan revealed small fat-containing periumbilical hernia and bilateral inguinal hernias that the patient was not aware of previously and do not cause him any problems.  Patient was ultimately admitted to [facility] for further evaluation and workup of atypical chest pain.     On first day of hospital admission at [facility] (3/29) troponins were deemed to be falsely elevated secondary to underlying chronic kidney disease.  Patient was started on Zosyn for possible pneumonia.  GI was consulted due to positive Galicia sign on ultrasound 3/28.  LFTs normal and tolerating diet.  Lipase elevated to 66.  HIDA scan was performed as per GI recommendations.  Scan revealed cystic duct obstruction and acute cholecystitis.  Patient was subsequently transferred to [facility] for ACS care.     On presentation to [facility] patient endorses right upper quadrant pain with consumption of fatty foods.  Patient is unsure how long the symptoms have been going on as they may have previously been  masked by/confused with chest pain secondary to coronary artery disease requiring CABG in November. Given recent CABG, patient determined not a candidate for cholecystectomy however medicine was consulted for periop evaluation and medical optimization. Patient with RCRI risk score of 6. Patient also noted to be in possible acute decompensated heart failure along with a new hypoxia requiring supplemental oxygen. Noted to have left sided pleural effusion and left lower lobe consolidation.  Patient also having intermittent hypertensive episodes with SBP in the 190s requiring intermittent IV anti-hypertensive's.   Cardiology consulted and patient did not appear to be in clinical ADHF. Likely etiology is medication non-compliance (patient has been out of torsemide for 4 months) as well as uncontrolled HTN. Patient started on IV lasix for diuresis. Formal ECHO obtained showing EF of 60-65%. Patient continued on BID Lasix and started on Nifedipine and Isordil for BP control.   On 4/3 patient underwent IR procedure for cholecystostomy tube placement and Left thoracentesis. 1.2 of L yellow fluid removed from thoracentesis. On 4/4 patient was able to be weaned off IV diuresis. Patient continued to progress well the following days.    On 4/8, patient was deemed medically ready for discharge to home with [facility]. He was restarted on ASA/Plavix prior to discharge. Transportation was arranged. Patient will need to follow up in ACS clinic for drain/cholecystostomy tube assessment 1-2 weeks after discharge. C will visit patient 2-3x weekly to assist in management of percutaneous cholecystostomy pigtail drain.     Pertinent Physical Exam At Time of Discharge  Physical Exam  General: NAD, resting comfortably  HEENT: NCAT  Resp: nonlabored breathing on RA  CV: RRR  Abd: soft, relatively less TTP in RUQ, cholecystostomy tube with bilious output  : no gerardo  MSK: moves all extremities  Ext: no LE edema  Psych: appropriate mood and  behavior    Home Medications     Medication List      START taking these medications     isosorbide dinitrate 20 mg tablet; Commonly known as: Isordil; Take 1   tablet (20 mg) by mouth 3 times a day.   NIFEdipine ER 60 mg 24 hr tablet; Commonly known as: Adalat CC; Take 1   tablet (60 mg) by mouth once daily in the morning. Take before meals. Do   not crush, chew, or split. Do not start before April 9, 2024.; Start   taking on: April 9, 2024   polyethylene glycol 17 gram packet; Commonly known as: Glycolax,   Miralax; Take 17 g by mouth 2 times a day.   traMADol 50 mg tablet; Commonly known as: Ultram; Take 1 tablet (50 mg)   by mouth every 6 hours if needed for severe pain (7 - 10).     CHANGE how you take these medications     torsemide 20 mg tablet; Commonly known as: Demadex; Take 2 tablets   daily; What changed: how much to take, how to take this, when to take this     CONTINUE taking these medications     acetaminophen 650 mg ER tablet; Commonly known as: Tylenol 8 HOUR   aspirin 81 mg chewable tablet   atorvastatin 80 mg tablet; Commonly known as: Lipitor; Take 1 tablet (80   mg) by mouth once daily. Last rx prior to next refills   clopidogrel 75 mg tablet; Commonly known as: Plavix; Take 1 tablet (75   mg) by mouth once daily.   FreeStyle Suzy 2 [place] misc; Generic drug: flash glucose scanning   reader; Use as instructed   FreeStyle Suzy 2 Sensor kit; Generic drug: flash glucose sensor kit;   Use as instructed   hydrALAZINE 100 mg tablet; Commonly known as: Apresoline; Take 1 tablet   (100 mg) by mouth 3 times a day. Patient is only taking BID.   insulin lispro 100 unit/mL injection; Commonly known as: HumaLOG; Inject   0-0.1 mL (0-10 Units) under the skin 3 times a day with meals. Take as   directed per insulin instructions. Do not start before March 30, 2024.   Lantus Solostar U-100 Insulin 100 unit/mL (3 mL) pen; Generic drug:   insulin glargine   lidocaine 4 % patch; Place 1 patch over 12 hours on the  skin once daily.   Remove & discard patch within 12 hours or as directed by MD. Do not start   before March 30, 2024.   melatonin 3 mg tablet   metoprolol tartrate 50 mg tablet; Commonly known as: Lopressor; Take 1   tablet by mouth 2 times a day.   pantoprazole 40 mg EC tablet; Commonly known as: ProtoNix; Take 1 tablet   (40 mg) by mouth once daily in the morning. Take before meals. Do not   crush, chew, or split.   Vitamin D3 25 MCG (1000 UT) tablet; Generic drug: cholecalciferol     STOP taking these medications     dextrose 50 % injection   glucagon 1 mg injection; Commonly known as: Glucagen   heparin (porcine) 5,000 unit/mL injection       Outpatient Follow-Up  Future Appointments   Date Time Provider Department Center   5/6/2024 11:00 AM Mily Leung DO [id number] [facility]   6/4/2024  8:20 AM Gabriel Higgins MD [id number] [facility]   10/11/2024  8:00 AM TATIANA VAS 1 [facility] [place]    10/11/2024  9:00 AM Agusto Elkins MD [id number] [facility]       Sharron Castaneda MD    I saw and evaluated the patient. I personally obtained the key and critical portions of the history and physical exam. I reviewed the resident’s documentation and discussed the patient with the resident. I agree with the resident’s medical decision making as documented in the resident’s note.    Richmond York MD  Trauma, Critical Care, and Acute Care Surgery  Pager: [phone]

## 2024-04-08 NOTE — PROGRESS NOTES
[facility]  ACUTE CARE SURGERY - PROGRESS NOTE    Patient Name: Humphrey Waddell  MRN: [id number]  Admit Date: 329  : 1965  AGE: 59 y.o.   GENDER: male  ==============================================================================  TODAY'S ASSESSMENT AND PLAN OF CARE:  59-year-old male with past medical history significant for CABG with vein graft x 3 in 2023 (postoperative course complicated by mediastinal exploration and evacuation of pericardial tamponade/repair of vein graft), hyperlipidemia, hypertension, CKD, history of CVA  with no deficits, type 2 diabetes, left AKA admitted directly from outside hospital with concern for cholecystitis on HIDA scan (cystic duct obstruction and acute cholecystitis revealed on scan).  Patient's hospital course c/b hypertension, pleural effusion, and possible decompensated HF.    Plan:  Neuro: acute abdominal pain   - Tylenol, tramadol, Oxy as needed    CV: Hx of CABG, on DAPT   #Concern for acute decompensated HF  - Home metoprolol, home torsemide, hydralazine, ASA  - Last dose of Plavix on 3/29   - Cardiology following     - Holding off on further diuresis     - BID RFP and Mag     - Isordil 20 mg TID     - Nifedipine 60mg  - Follow up Limited Echo to assess LVEF: EF 60-65%  - Restart home Plavix today, continue ASA    Respiratory: New Hypoxia  - Left sided pleural effusion and left lower lobe atelectatic changes    - S/p L-sided thoracentesis with IR on 4/3, drained 1.2L  - Continue IS, duonebs q6 hours for 48 hours    GI: acute cholecystitis   - Regular diet  - S/p perc babs tube placement on 4/3, monitor output  - Constipation: continue bowel regimen  - PPI     : IDANIA on CKD Stage IV  - Making adequate urine  - Cr downtrending  - Strict I&Os    Heme: Chronic Anemia  - Continue to trend H/H   - BLE DVT US negative     ID: acute cholecystitis  - IV Zosyn completed on     PPx: SQH, SCDs    Dispo: medically clear for  discharge to home with [facility]; patient declined SNF    Patient seen and discussed with attending Dr. Jaylen Castaneda MD  General Surgery Resident  ACS [id number]  ==============================================================================  CHIEF COMPLAINT / EVENTS LAST 24HRS / HPI:  NAEO    MEDICAL HISTORY / ROS:   Admission history and ROS reviewed. Pertinent changes as follows:  none    PHYSICAL EXAM:  Heart Rate:  [71-85]   Temp:  [36.6 °C (97.9 °F)-38.2 °C (100.8 °F)]   Resp:  [18]   BP: (113-166)/(69-76)   SpO2:  [92 %-94 %]     General: NAD, resting comfortably  HEENT: NCAT  Resp: nonlabored breathing on RA  CV: RRR  Abd: soft, relatively less TTP in RUQ, cholecystostomy tube with bilious output  : no gerardo  MSK: moves all extremities  Ext: no LE edema  Psych: appropriate mood and behavior     IMAGING SUMMARY:  No new imaging.    LABS:  Results from last 7 days   Lab Units 04/08/24  0716 04/07/24  0603 04/06/24  0515   WBC AUTO x10*3/uL 8.7 8.5 9.2   HEMOGLOBIN g/dL 7.9* 7.7* 8.0*   HEMATOCRIT % 25.7* 25.6* 25.5*   PLATELETS AUTO x10*3/uL 258 234 216   NEUTROS PCT AUTO % 68.9 72.1 73.2   LYMPHS PCT AUTO % 14.8 11.8 10.2   MONOS PCT AUTO % 9.8 8.9 10.6   EOS PCT AUTO % 5.9 6.4 5.3     Results from last 7 days   Lab Units 04/03/24  0521   APTT seconds 30   INR  1.1     Results from last 7 days   Lab Units 04/08/24  0716 04/07/24  0603 04/06/24  1552   SODIUM mmol/L 140 139 137   POTASSIUM mmol/L 4.9 4.5 4.5   CHLORIDE mmol/L 106 103 100   CO2 mmol/L 24 26 24   BUN mg/dL 52* 59* 59*   CREATININE mg/dL 4.35* 4.31* 4.73*   CALCIUM mg/dL 8.2* 8.1* 8.6   GLUCOSE mg/dL 100* 136* 172*                 I saw and evaluated the patient. I personally obtained the key and critical portions of the history and physical exam. I reviewed the resident’s documentation and discussed the patient with the resident. I agree with the resident’s medical decision making as documented in the resident’s note.    59M with h/o CABG  (11/2023), CKD, CVA, DM2, admitted with acute cholecystitis and decompensated HF and now s/p perc babs tube. Doing well. Perc babs tube with bile in bag. Mildly tender in RUQ. Restart plavix.    Richmond York MD  Trauma, Critical Care, and Acute Care Surgery  Pager: [phone]

## 2024-04-08 NOTE — HOSPITAL COURSE
59-year-old male with past medical history significant for CABGx3 November 2023 (postoperative course complicated by mediastinal exploration and evacuation of pericardial tamponade/repair of vein graft), hyperlipidemia, hypertension, CKD, history of CVA 2020 with no deficits, type 2 diabetes, left AKA secondary cellulitis and chronic wounds who presented to [facility] ED 3/28 via EMS with chest pain.  At [facility], pt had No leukocytosis, normal electrolytes, baseline creatinine (2.9), however troponin elevated at 133.  EKG with no evidence of STEMI.  Cholelithiasis identified on right upper quadrant ultrasound but no evidence of cholecystitis.  Repeat troponins were stable at 136.  CT scan revealed small fat-containing periumbilical hernia and bilateral inguinal hernias that the patient was not aware of previously and do not cause him any problems.  Patient was ultimately admitted to [facility] for further evaluation and workup of atypical chest pain.     On first day of hospital admission at [facility] (3/29) troponins were deemed to be falsely elevated secondary to underlying chronic kidney disease.  Patient was started on Zosyn for possible pneumonia.  GI was consulted due to positive Galicia sign on ultrasound 3/28.  LFTs normal and tolerating diet.  Lipase elevated to 66.  HIDA scan was performed as per GI recommendations.  Scan revealed cystic duct obstruction and acute cholecystitis.  Patient was subsequently transferred to [facility] for ACS care.     On presentation to [facility] patient endorses right upper quadrant pain with consumption of fatty foods.  Patient is unsure how long the symptoms have been going on as they may have previously been masked by/confused with chest pain secondary to coronary artery disease requiring CABG in November. Given recent CABG, patient determined not a candidate for cholecystectomy however medicine was consulted for periop evaluation and medical optimization. Patient with  RCRI risk score of 6. Patient also noted to be in possible acute decompensated heart failure along with a new hypoxia requiring supplemental oxygen. Noted to have left sided pleural effusion and left lower lobe consolidation.  Patient also having intermittent hypertensive episodes with SBP in the 190s requiring intermittent IV anti-hypertensive's.   Cardiology consulted and patient did not appear to be in clinical ADHF. Likely etiology is medication non-compliance (patient has been out of torsemide for 4 months) as well as uncontrolled HTN. Patient started on IV lasix for diuresis. Formal ECHO obtained showing EF of 60-65%. Patient continued on BID Lasix and started on Nifedipine and Isordil for BP control.   On 4/3 patient underwent IR procedure for cholecystostomy tube placement and Left thoracentesis. 1.2 of L yellow fluid removed from thoracentesis. On 4/4 patient was able to be weaned off IV diuresis.

## 2024-04-09 ENCOUNTER — TELEPHONE (OUTPATIENT)
Dept: PRIMARY CARE | Facility: CLINIC | Age: 59
End: 2024-04-09
Payer: COMMERCIAL

## 2024-04-09 ENCOUNTER — DOCUMENTATION (OUTPATIENT)
Dept: HOME HEALTH SERVICES | Facility: HOME HEALTH | Age: 59
End: 2024-04-09
Payer: COMMERCIAL

## 2024-04-09 NOTE — HH CARE COORDINATION
Home Care received a Referral for Physical Therapy and Occupational Therapy. We have processed the referral for a Start of Care on 04/10.     If you have any questions or concerns, please feel free to contact us at [phone]. Follow the prompts, enter your five digit zip code, and you will be directed to your care team on E[facility] 1.

## 2024-04-10 ENCOUNTER — PATIENT OUTREACH (OUTPATIENT)
Dept: PRIMARY CARE | Facility: CLINIC | Age: 59
End: 2024-04-10
Payer: COMMERCIAL

## 2024-04-10 DIAGNOSIS — K81.9 CHOLECYSTITIS: ICD-10-CM

## 2024-04-10 NOTE — PROGRESS NOTES
Discharge Facility: [facility]  Discharge Diagnosis: Cholecystitis   Admission Date: 3/29/2024  Discharge Date: 4/8/2024    PCP Appointment Date: Declined to schedule hospital follow up at this time  Specialist Appointment Date: 6/4/2024 08:20 Dr. Gabriel Higgins, Cardiology  Hospital Encounter and Summary: Linked   See discharge assessment below for further details    Engagement  Call Start Time: 0951 (4/10/2024  9:40 AM)    Medications  Medications reviewed with patient/caregiver?: Yes (Reviewed with spouse. NEW: isosorbide, nifedipine, miralax, and Tramadol.) (4/10/2024  9:40 AM)  Is the patient having any side effects they believe may be caused by any medication additions or changes?: No (4/10/2024  9:40 AM)  Does the patient have all medications ordered at discharge?: No (Spouse picking up Miralax from the pharmacy today. Med not available for  with the rest of the patient's medications.) (4/10/2024  9:40 AM)    Appointments  Does the patient have a primary care provider?: Yes (4/10/2024  9:40 AM)  Care Management Interventions: Verified appointment date/time/provider (Declines to schedule hospital follow up. Needs to schedule with specialist first.) (4/10/2024  9:40 AM)  Has the patient kept scheduled appointments due by today?: Not applicable (4/10/2024  9:40 AM)    Self Management  What is the home health agency?: [facility] (4/10/2024  9:40 AM)  Has home health visited the patient within 72 hours of discharge?: Call prior to 72 hours (4/10/2024  9:40 AM)  Home Health Interventions: Called home health agency (Verified referral received. Agency unable to contact family-no answer/no voicemail. Called spouse back and provided agency phone number for scheduling.) (4/10/2024  9:40 AM)  What Durable Medical Equipment (DME) was ordered?: Patient needs supplies for his prosthetic leg (previous issue prior to hospitalization) (4/10/2024  9:40 AM)  Has all Durable Medical Equipment (DME) been delivered?: No (4/10/2024   9:40 AM)  DME Interventions: Called Durable Medical Equipment (DME) agency (Called [facility]. Had required form emailed. PCP office not receiving fax. Forwarded email to Dr. Leung for completion.) (4/10/2024  9:40 AM)    Patient Teaching  Does the patient have access to their discharge instructions?: Yes (Reviewed with spouse.) (4/10/2024  9:40 AM)  Care Management Interventions: Reviewed instructions with patient (4/10/2024  9:40 AM)  What is the patient's perception of their health status since discharge?: Improving (Per spouse, patient very fatigued, but doing okay. Pain has been manageable.) (4/10/2024  9:40 AM)    Wrap Up  Wrap Up Additional Comments: 59yoM [facility] to [facility] with chest pain. EKG with no evidence of STEMI. Cholelithiasis identified on right upper quadrant ultrasound but no evidence of cholecystitis. GI consulted. HIDA scan revealed cystic duct obstruction and acute cholecystitis.  Patient was subsequently transferred to [facility] for ACS care. Patient also found to have left sided pleural effusion and left lower lobe consolidation. Additionally had intermittent hypertensive episodes with systolic BP in the 190's. Cardiology consulted. Patient deemed to high risk for surgery and had an IR procedure for cholecystostomy tube placement and left thoracentesis. Patient discharged to home with [facility] to follow. Repeat renal function panel due in one week. Follow up in ACS clinic for drain/cholecystostomy tube assessment 1-2 weeks after discharge recommended. (4/10/2024  9:40 AM)

## 2024-04-12 ENCOUNTER — HOME CARE VISIT (OUTPATIENT)
Dept: HOME HEALTH SERVICES | Facility: HOME HEALTH | Age: 59
End: 2024-04-12
Payer: COMMERCIAL

## 2024-04-12 VITALS
TEMPERATURE: 98.7 F | HEART RATE: 66 BPM | BODY MASS INDEX: 34.58 KG/M2 | SYSTOLIC BLOOD PRESSURE: 156 MMHG | RESPIRATION RATE: 20 BRPM | HEIGHT: 76 IN | DIASTOLIC BLOOD PRESSURE: 68 MMHG | OXYGEN SATURATION: 95 % | WEIGHT: 284 LBS

## 2024-04-12 PROCEDURE — 0023 HH SOC

## 2024-04-12 PROCEDURE — G0299 HHS/HOSPICE OF RN EA 15 MIN: HCPCS

## 2024-04-12 ASSESSMENT — ACTIVITIES OF DAILY LIVING (ADL)
AMBULATION ASSISTANCE: NON-AMBULATORY
TRANSPORTATION: DEPENDENT
LAUNDRY ASSESSED: 1
GROOMING_CURRENT_FUNCTION: MODERATE ASSIST
ORAL_CARE_CURRENT_FUNCTION: NEEDS ASSISTANCE
SHOPPING: DEPENDENT
LAUNDRY: DEPENDENT
ENTERING_EXITING_HOME: MODERATE ASSIST
ORAL_CARE_ASSESSED: 1
HOUSEKEEPING ASSESSED: 1
OASIS_M1830: 03
TOILETING: ONE PERSON
FEEDING: SUPERVISION
BATHING_CURRENT_FUNCTION: ONE PERSON
BATHING ASSESSED: 1
TRANSPORTATION ASSESSED: 1
TELEPHONE USE ASSESSED: 1
TOILETING: 1
FEEDING ASSESSED: 1
GROOMING ASSESSED: 1
BATHING_CURRENT_FUNCTION: MODERATE ASSIST
PREPARING MEALS: DEPENDENT
PHYSICAL TRANSFERS ASSESSED: 1
CURRENT_FUNCTION: MODERATE ASSIST
USING THE TELPHONE: INDEPENDENT
DRESSING_UB_CURRENT_FUNCTION: MODERATE ASSIST
LIGHT HOUSEKEEPING: DEPENDENT
SHOPPING ASSESSED: 1
AMBULATION ASSISTANCE: 1
DRESSING_LB_CURRENT_FUNCTION: MAXIMUM ASSIST
TOILETING: MODERATE ASSIST

## 2024-04-12 ASSESSMENT — ENCOUNTER SYMPTOMS
PAIN LOCATION - PAIN DURATION: CONTINUOUS
PAIN LOCATION - PAIN DURATION: CONTINUOUS
PAIN LOCATION - EXACERBATING FACTORS: DOING TOO MUCH
ABDOMINAL PAIN: 1
PAIN LOCATION - PAIN QUALITY: SHARP
FATIGUES EASILY: 1
MUSCLE WEAKNESS: 1
LOWEST PAIN SEVERITY IN PAST 24 HOURS: 6/10
PAIN LOCATION - PAIN SEVERITY: 8/10
PERSON REPORTING PAIN: PATIENT
PAIN LOCATION - PAIN FREQUENCY: CONSTANT
DESCRIPTION OF MEMORY LOSS: IMMEDIATE
APPETITE LEVEL: FAIR
PAIN LOCATION - PAIN SEVERITY: 6/10
LOSS OF SENSATION IN FEET: 1
OCCASIONAL FEELINGS OF UNSTEADINESS: 1
PAIN LOCATION - PAIN QUALITY: ACHY
BOWEL PATTERN NORMAL: 1
LIMITED RANGE OF MOTION: 1
PAIN: 1
STOOL FREQUENCY: DAILY
CHANGE IN APPETITE: DECREASED
DEPRESSION: 1
DRY SKIN: 1
SUBJECTIVE PAIN PROGRESSION: GRADUALLY WORSENING
LAST BOWEL MOVEMENT: 66942
HYPERTENSION: 1
DESCRIPTION OF MEMORY LOSS: SHORT TERM
PAIN LOCATION - PAIN FREQUENCY: CONSTANT
DESCRIPTION OF MEMORY LOSS: LONG TERM
DEPRESSED MOOD: 1
HIGHEST PAIN SEVERITY IN PAST 24 HOURS: 8/10
PAIN LOCATION: ABDOMEN
FORGETFULNESS: 1
FATIGUE: 1
PAIN LOCATION - EXACERBATING FACTORS: DOING TOO MUCH
PAIN LOCATION: CHEST
PAIN SEVERITY GOAL: 0/10

## 2024-04-12 ASSESSMENT — LIFESTYLE VARIABLES: SMOKING_STATUS: NEVER SMOKED

## 2024-04-15 ENCOUNTER — APPOINTMENT (OUTPATIENT)
Dept: CARDIOLOGY | Facility: HOSPITAL | Age: 59
End: 2024-04-15
Payer: COMMERCIAL

## 2024-04-15 ENCOUNTER — APPOINTMENT (OUTPATIENT)
Dept: RADIOLOGY | Facility: HOSPITAL | Age: 59
End: 2024-04-15
Payer: COMMERCIAL

## 2024-04-15 ENCOUNTER — HOME CARE VISIT (OUTPATIENT)
Dept: HOME HEALTH SERVICES | Facility: HOME HEALTH | Age: 59
End: 2024-04-15
Payer: COMMERCIAL

## 2024-04-15 ENCOUNTER — HOSPITAL ENCOUNTER (OUTPATIENT)
Facility: HOSPITAL | Age: 59
Setting detail: OBSERVATION
LOS: 1 days | Discharge: HOME HEALTH | End: 2024-04-17
Attending: EMERGENCY MEDICINE | Admitting: INTERNAL MEDICINE
Payer: COMMERCIAL

## 2024-04-15 ENCOUNTER — LAB (OUTPATIENT)
Dept: LAB | Facility: LAB | Age: 59
End: 2024-04-15
Payer: COMMERCIAL

## 2024-04-15 ENCOUNTER — TELEPHONE (OUTPATIENT)
Dept: PRIMARY CARE | Facility: CLINIC | Age: 59
End: 2024-04-15

## 2024-04-15 VITALS
HEART RATE: 70 BPM | OXYGEN SATURATION: 97 % | RESPIRATION RATE: 18 BRPM | DIASTOLIC BLOOD PRESSURE: 86 MMHG | TEMPERATURE: 98.4 F | SYSTOLIC BLOOD PRESSURE: 180 MMHG

## 2024-04-15 DIAGNOSIS — E87.5 HYPERKALEMIA: Primary | ICD-10-CM

## 2024-04-15 DIAGNOSIS — E86.1 HYPOTENSION DUE TO HYPOVOLEMIA: ICD-10-CM

## 2024-04-15 DIAGNOSIS — R55 NEAR SYNCOPE: ICD-10-CM

## 2024-04-15 DIAGNOSIS — K59.01 SLOW TRANSIT CONSTIPATION: ICD-10-CM

## 2024-04-15 DIAGNOSIS — F32.A DEPRESSION, UNSPECIFIED DEPRESSION TYPE: ICD-10-CM

## 2024-04-15 DIAGNOSIS — I69.351 HEMIPLEGIA AND HEMIPARESIS FOLLOWING CEREBRAL INFARCTION AFFECTING RIGHT DOMINANT SIDE (MULTI): ICD-10-CM

## 2024-04-15 DIAGNOSIS — I15.9 SECONDARY HYPERTENSION: ICD-10-CM

## 2024-04-15 DIAGNOSIS — N17.9 AKI (ACUTE KIDNEY INJURY) (CMS-HCC): ICD-10-CM

## 2024-04-15 DIAGNOSIS — I95.89 HYPOTENSION DUE TO HYPOVOLEMIA: ICD-10-CM

## 2024-04-15 DIAGNOSIS — I50.9 CONGESTIVE HEART FAILURE, UNSPECIFIED HF CHRONICITY, UNSPECIFIED HEART FAILURE TYPE (MULTI): ICD-10-CM

## 2024-04-15 DIAGNOSIS — I10 ESSENTIAL (PRIMARY) HYPERTENSION: ICD-10-CM

## 2024-04-15 LAB
ALBUMIN SERPL-MCNC: 3.6 G/DL (ref 3.5–5)
ALBUMIN SERPL-MCNC: 3.6 G/DL (ref 3.5–5)
ALP BLD-CCNC: 62 U/L (ref 35–125)
ALT SERPL-CCNC: 14 U/L (ref 5–40)
ANION GAP SERPL CALC-SCNC: 13 MMOL/L
ANION GAP SERPL CALC-SCNC: 8 MMOL/L
AST SERPL-CCNC: 17 U/L (ref 5–40)
BASOPHILS # BLD AUTO: 0.03 X10*3/UL (ref 0–0.1)
BASOPHILS NFR BLD AUTO: 0.3 %
BILIRUB SERPL-MCNC: 0.3 MG/DL (ref 0.1–1.2)
BUN SERPL-MCNC: 49 MG/DL (ref 8–25)
BUN SERPL-MCNC: 49 MG/DL (ref 8–25)
CALCIUM SERPL-MCNC: 8.7 MG/DL (ref 8.5–10.4)
CALCIUM SERPL-MCNC: 9.1 MG/DL (ref 8.5–10.4)
CHLORIDE SERPL-SCNC: 102 MMOL/L (ref 97–107)
CHLORIDE SERPL-SCNC: 106 MMOL/L (ref 97–107)
CO2 SERPL-SCNC: 20 MMOL/L (ref 24–31)
CO2 SERPL-SCNC: 22 MMOL/L (ref 24–31)
CREAT SERPL-MCNC: 2.9 MG/DL (ref 0.4–1.6)
CREAT SERPL-MCNC: 3.1 MG/DL (ref 0.4–1.6)
EGFRCR SERPLBLD CKD-EPI 2021: 22 ML/MIN/1.73M*2
EGFRCR SERPLBLD CKD-EPI 2021: 24 ML/MIN/1.73M*2
EOSINOPHIL # BLD AUTO: 0.61 X10*3/UL (ref 0–0.7)
EOSINOPHIL NFR BLD AUTO: 6.6 %
ERYTHROCYTE [DISTWIDTH] IN BLOOD BY AUTOMATED COUNT: 14.5 % (ref 11.5–14.5)
FLUAV RNA RESP QL NAA+PROBE: NOT DETECTED
FLUBV RNA RESP QL NAA+PROBE: NOT DETECTED
GLUCOSE BLD MANUAL STRIP-MCNC: 117 MG/DL (ref 74–99)
GLUCOSE SERPL-MCNC: 154 MG/DL (ref 65–99)
GLUCOSE SERPL-MCNC: 216 MG/DL (ref 65–99)
HCT VFR BLD AUTO: 29.9 % (ref 41–52)
HGB BLD-MCNC: 9.5 G/DL (ref 13.5–17.5)
IMM GRANULOCYTES # BLD AUTO: 0.02 X10*3/UL (ref 0–0.7)
IMM GRANULOCYTES NFR BLD AUTO: 0.2 % (ref 0–0.9)
LYMPHOCYTES # BLD AUTO: 1.62 X10*3/UL (ref 1.2–4.8)
LYMPHOCYTES NFR BLD AUTO: 17.6 %
MCH RBC QN AUTO: 27.3 PG (ref 26–34)
MCHC RBC AUTO-ENTMCNC: 31.8 G/DL (ref 32–36)
MCV RBC AUTO: 86 FL (ref 80–100)
MONOCYTES # BLD AUTO: 0.86 X10*3/UL (ref 0.1–1)
MONOCYTES NFR BLD AUTO: 9.3 %
NEUTROPHILS # BLD AUTO: 6.08 X10*3/UL (ref 1.2–7.7)
NEUTROPHILS NFR BLD AUTO: 66 %
NRBC BLD-RTO: 0 /100 WBCS (ref 0–0)
PHOSPHATE SERPL-MCNC: 3.6 MG/DL (ref 2.5–4.5)
PLATELET # BLD AUTO: 325 X10*3/UL (ref 150–450)
POTASSIUM SERPL-SCNC: 5.2 MMOL/L (ref 3.4–5.1)
POTASSIUM SERPL-SCNC: 6.1 MMOL/L (ref 3.4–5.1)
PROT SERPL-MCNC: 7.7 G/DL (ref 5.9–7.9)
RBC # BLD AUTO: 3.48 X10*6/UL (ref 4.5–5.9)
SARS-COV-2 RNA RESP QL NAA+PROBE: NOT DETECTED
SODIUM SERPL-SCNC: 132 MMOL/L (ref 133–145)
SODIUM SERPL-SCNC: 139 MMOL/L (ref 133–145)
TROPONIN T SERPL-MCNC: 148 NG/L
WBC # BLD AUTO: 9.2 X10*3/UL (ref 4.4–11.3)

## 2024-04-15 PROCEDURE — 93005 ELECTROCARDIOGRAM TRACING: CPT

## 2024-04-15 PROCEDURE — 80053 COMPREHEN METABOLIC PANEL: CPT

## 2024-04-15 PROCEDURE — 82947 ASSAY GLUCOSE BLOOD QUANT: CPT

## 2024-04-15 PROCEDURE — 71045 X-RAY EXAM CHEST 1 VIEW: CPT

## 2024-04-15 PROCEDURE — 36415 COLL VENOUS BLD VENIPUNCTURE: CPT | Performed by: INTERNAL MEDICINE

## 2024-04-15 PROCEDURE — 2500000002 HC RX 250 W HCPCS SELF ADMINISTERED DRUGS (ALT 637 FOR MEDICARE OP, ALT 636 FOR OP/ED): Performed by: PHYSICIAN ASSISTANT

## 2024-04-15 PROCEDURE — 96375 TX/PRO/DX INJ NEW DRUG ADDON: CPT

## 2024-04-15 PROCEDURE — G0299 HHS/HOSPICE OF RN EA 15 MIN: HCPCS

## 2024-04-15 PROCEDURE — 80069 RENAL FUNCTION PANEL: CPT

## 2024-04-15 PROCEDURE — 87636 SARSCOV2 & INF A&B AMP PRB: CPT | Performed by: PHYSICIAN ASSISTANT

## 2024-04-15 PROCEDURE — 96365 THER/PROPH/DIAG IV INF INIT: CPT

## 2024-04-15 PROCEDURE — 94640 AIRWAY INHALATION TREATMENT: CPT | Mod: 59

## 2024-04-15 PROCEDURE — 2500000004 HC RX 250 GENERAL PHARMACY W/ HCPCS (ALT 636 FOR OP/ED): Mod: JZ | Performed by: PHYSICIAN ASSISTANT

## 2024-04-15 PROCEDURE — 99285 EMERGENCY DEPT VISIT HI MDM: CPT | Mod: 25

## 2024-04-15 PROCEDURE — 93010 ELECTROCARDIOGRAM REPORT: CPT | Performed by: INTERNAL MEDICINE

## 2024-04-15 PROCEDURE — 2500000002 HC RX 250 W HCPCS SELF ADMINISTERED DRUGS (ALT 637 FOR MEDICARE OP, ALT 636 FOR OP/ED): Performed by: EMERGENCY MEDICINE

## 2024-04-15 PROCEDURE — 71045 X-RAY EXAM CHEST 1 VIEW: CPT | Performed by: RADIOLOGY

## 2024-04-15 PROCEDURE — 85025 COMPLETE CBC W/AUTO DIFF WBC: CPT | Performed by: INTERNAL MEDICINE

## 2024-04-15 PROCEDURE — 84484 ASSAY OF TROPONIN QUANT: CPT | Performed by: PHYSICIAN ASSISTANT

## 2024-04-15 RX ORDER — FUROSEMIDE 10 MG/ML
80 INJECTION INTRAMUSCULAR; INTRAVENOUS ONCE
Status: COMPLETED | OUTPATIENT
Start: 2024-04-15 | End: 2024-04-15

## 2024-04-15 RX ORDER — CALCIUM GLUCONATE 20 MG/ML
1 INJECTION, SOLUTION INTRAVENOUS EVERY 4 HOURS
Status: DISCONTINUED | OUTPATIENT
Start: 2024-04-15 | End: 2024-04-16 | Stop reason: DRUGHIGH

## 2024-04-15 RX ORDER — ALBUTEROL SULFATE 0.83 MG/ML
2.5 SOLUTION RESPIRATORY (INHALATION) ONCE
Status: COMPLETED | OUTPATIENT
Start: 2024-04-15 | End: 2024-04-15

## 2024-04-15 RX ORDER — ALBUTEROL SULFATE 0.83 MG/ML
2.5 SOLUTION RESPIRATORY (INHALATION) EVERY 20 MIN
Status: COMPLETED | OUTPATIENT
Start: 2024-04-15 | End: 2024-04-15

## 2024-04-15 RX ORDER — PREDNISONE 20 MG/1
40 TABLET ORAL ONCE
Status: COMPLETED | OUTPATIENT
Start: 2024-04-15 | End: 2024-04-15

## 2024-04-15 RX ADMIN — FUROSEMIDE 80 MG: 10 INJECTION, SOLUTION INTRAMUSCULAR; INTRAVENOUS at 22:35

## 2024-04-15 RX ADMIN — ALBUTEROL SULFATE 2.5 MG: 2.5 SOLUTION RESPIRATORY (INHALATION) at 22:40

## 2024-04-15 RX ADMIN — CALCIUM GLUCONATE 1 G: 20 INJECTION, SOLUTION INTRAVENOUS at 22:35

## 2024-04-15 RX ADMIN — INSULIN HUMAN 4 UNITS: 100 INJECTION, SOLUTION PARENTERAL at 22:55

## 2024-04-15 RX ADMIN — ALBUTEROL SULFATE 2.5 MG: 2.5 SOLUTION RESPIRATORY (INHALATION) at 22:35

## 2024-04-15 RX ADMIN — PREDNISONE 40 MG: 20 TABLET ORAL at 22:35

## 2024-04-15 RX ADMIN — ALBUTEROL SULFATE 2.5 MG: 2.5 SOLUTION RESPIRATORY (INHALATION) at 23:42

## 2024-04-15 ASSESSMENT — ENCOUNTER SYMPTOMS
MUSCLE WEAKNESS: 1
DIZZINESS: 1
DESCRIPTION OF MEMORY LOSS: IMMEDIATE
HYPERTENSION: 1
ABDOMINAL PAIN: 1
APPETITE LEVEL: FAIR
DEPRESSION: 1
PAIN LOCATION: ABDOMEN
FATIGUES EASILY: 1
DESCRIPTION OF MEMORY LOSS: LONG TERM
COUGH: 1
PAIN SEVERITY GOAL: 0/10
PAIN LOCATION - PAIN QUALITY: DULL ACHY
COUGH CHARACTERISTICS: MOIST
PERSON REPORTING PAIN: PATIENT
PAIN: 1
LAST BOWEL MOVEMENT: 66945
DEPRESSED MOOD: 1
LOWER EXTREMITY EDEMA: 1
LIMITED RANGE OF MOTION: 1
COUGH CHARACTERISTICS: STRONG
LOWEST PAIN SEVERITY IN PAST 24 HOURS: 6/10
DRY SKIN: 1
FATIGUE: 1
SUBJECTIVE PAIN PROGRESSION: UNCHANGED
PAIN LOCATION - PAIN FREQUENCY: CONSTANT
FORGETFULNESS: 1
CHANGE IN APPETITE: UNCHANGED
STOOL FREQUENCY: DAILY
PAIN LOCATION - PAIN DURATION: CONTINUOUS
TREMORS: 1
PAIN LOCATION - PAIN SEVERITY: 8/10
HIGHEST PAIN SEVERITY IN PAST 24 HOURS: 8/10
DIARRHEA: 1
DESCRIPTION OF MEMORY LOSS: SHORT TERM

## 2024-04-15 ASSESSMENT — ACTIVITIES OF DAILY LIVING (ADL)
PREPARING MEALS: NEEDS ASSISTANCE
FEEDING ASSESSED: 1
FEEDING: SUPERVISION

## 2024-04-15 ASSESSMENT — PAIN DESCRIPTION - FREQUENCY: FREQUENCY: CONSTANT/CONTINUOUS

## 2024-04-15 ASSESSMENT — PAIN SCALES - GENERAL: PAINLEVEL_OUTOF10: 8

## 2024-04-15 ASSESSMENT — PAIN DESCRIPTION - DESCRIPTORS: DESCRIPTORS: ACHING

## 2024-04-15 ASSESSMENT — PAIN DESCRIPTION - LOCATION: LOCATION: ABDOMEN

## 2024-04-15 ASSESSMENT — PAIN DESCRIPTION - PAIN TYPE: TYPE: ACUTE PAIN

## 2024-04-15 ASSESSMENT — PAIN DESCRIPTION - ONSET: ONSET: PROGRESSIVE

## 2024-04-15 ASSESSMENT — PAIN DESCRIPTION - PROGRESSION: CLINICAL_PROGRESSION: NOT CHANGED

## 2024-04-15 ASSESSMENT — PAIN DESCRIPTION - ORIENTATION: ORIENTATION: RIGHT;UPPER

## 2024-04-15 ASSESSMENT — PAIN - FUNCTIONAL ASSESSMENT: PAIN_FUNCTIONAL_ASSESSMENT: 0-10

## 2024-04-15 NOTE — PROGRESS NOTES
Called and spoke with patient and wife to inform them of critical high potassium of 6.1. Reviewed creatinine as well still very elevated, but trending down. Discussed with patient and wife that he needs to go to the ER to have further evaluation of his lab work, especially due to his recent CABG and his recent admission to the hospital for cholecystitis. They verbalized understanding and will go to [facility].

## 2024-04-16 ENCOUNTER — HOME CARE VISIT (OUTPATIENT)
Dept: HOME HEALTH SERVICES | Facility: HOME HEALTH | Age: 59
End: 2024-04-16
Payer: COMMERCIAL

## 2024-04-16 ENCOUNTER — APPOINTMENT (OUTPATIENT)
Dept: HOME HEALTH SERVICES | Facility: HOME HEALTH | Age: 59
End: 2024-04-16
Payer: COMMERCIAL

## 2024-04-16 LAB
ANION GAP SERPL CALC-SCNC: 12 MMOL/L
ANION GAP SERPL CALC-SCNC: 15 MMOL/L
APPEARANCE UR: CLEAR
BILIRUB UR STRIP.AUTO-MCNC: NEGATIVE MG/DL
BUN SERPL-MCNC: 49 MG/DL (ref 8–25)
BUN SERPL-MCNC: 52 MG/DL (ref 8–25)
CALCIUM SERPL-MCNC: 8.6 MG/DL (ref 8.5–10.4)
CALCIUM SERPL-MCNC: 9.1 MG/DL (ref 8.5–10.4)
CHLORIDE SERPL-SCNC: 102 MMOL/L (ref 97–107)
CHLORIDE SERPL-SCNC: 104 MMOL/L (ref 97–107)
CK SERPL-CCNC: 80 U/L (ref 24–195)
CK SERPL-CCNC: 95 U/L (ref 24–195)
CO2 SERPL-SCNC: 20 MMOL/L (ref 24–31)
CO2 SERPL-SCNC: 20 MMOL/L (ref 24–31)
COLOR UR: ABNORMAL
CREAT SERPL-MCNC: 3 MG/DL (ref 0.4–1.6)
CREAT SERPL-MCNC: 3.1 MG/DL (ref 0.4–1.6)
EGFRCR SERPLBLD CKD-EPI 2021: 22 ML/MIN/1.73M*2
EGFRCR SERPLBLD CKD-EPI 2021: 23 ML/MIN/1.73M*2
GLUCOSE BLD MANUAL STRIP-MCNC: 134 MG/DL (ref 74–99)
GLUCOSE BLD MANUAL STRIP-MCNC: 164 MG/DL (ref 74–99)
GLUCOSE BLD MANUAL STRIP-MCNC: 175 MG/DL (ref 74–99)
GLUCOSE BLD MANUAL STRIP-MCNC: 176 MG/DL (ref 74–99)
GLUCOSE BLD MANUAL STRIP-MCNC: 223 MG/DL (ref 74–99)
GLUCOSE SERPL-MCNC: 142 MG/DL (ref 65–99)
GLUCOSE SERPL-MCNC: 205 MG/DL (ref 65–99)
GLUCOSE UR STRIP.AUTO-MCNC: NORMAL MG/DL
KETONES UR STRIP.AUTO-MCNC: NEGATIVE MG/DL
LEUKOCYTE ESTERASE UR QL STRIP.AUTO: NEGATIVE
MUCOUS THREADS #/AREA URNS AUTO: NORMAL /LPF
NITRITE UR QL STRIP.AUTO: NEGATIVE
PH UR STRIP.AUTO: 6 [PH]
POTASSIUM SERPL-SCNC: 4.7 MMOL/L (ref 3.4–5.1)
POTASSIUM SERPL-SCNC: 4.8 MMOL/L (ref 3.4–5.1)
PROT UR STRIP.AUTO-MCNC: ABNORMAL MG/DL
RBC # UR STRIP.AUTO: NEGATIVE /UL
RBC #/AREA URNS AUTO: NORMAL /HPF
SODIUM SERPL-SCNC: 136 MMOL/L (ref 133–145)
SODIUM SERPL-SCNC: 137 MMOL/L (ref 133–145)
SP GR UR STRIP.AUTO: 1.01
TROPONIN T SERPL-MCNC: 153 NG/L
UROBILINOGEN UR STRIP.AUTO-MCNC: NORMAL MG/DL
WBC #/AREA URNS AUTO: NORMAL /HPF

## 2024-04-16 PROCEDURE — 80048 BASIC METABOLIC PNL TOTAL CA: CPT | Performed by: INTERNAL MEDICINE

## 2024-04-16 PROCEDURE — 2500000004 HC RX 250 GENERAL PHARMACY W/ HCPCS (ALT 636 FOR OP/ED): Performed by: EMERGENCY MEDICINE

## 2024-04-16 PROCEDURE — 82550 ASSAY OF CK (CPK): CPT | Performed by: INTERNAL MEDICINE

## 2024-04-16 PROCEDURE — 2500000002 HC RX 250 W HCPCS SELF ADMINISTERED DRUGS (ALT 637 FOR MEDICARE OP, ALT 636 FOR OP/ED): Performed by: INTERNAL MEDICINE

## 2024-04-16 PROCEDURE — 84484 ASSAY OF TROPONIN QUANT: CPT | Performed by: EMERGENCY MEDICINE

## 2024-04-16 PROCEDURE — 81001 URINALYSIS AUTO W/SCOPE: CPT | Performed by: PHYSICIAN ASSISTANT

## 2024-04-16 PROCEDURE — G0378 HOSPITAL OBSERVATION PER HR: HCPCS

## 2024-04-16 PROCEDURE — 96372 THER/PROPH/DIAG INJ SC/IM: CPT | Performed by: PHYSICIAN ASSISTANT

## 2024-04-16 PROCEDURE — 2500000004 HC RX 250 GENERAL PHARMACY W/ HCPCS (ALT 636 FOR OP/ED): Performed by: INTERNAL MEDICINE

## 2024-04-16 PROCEDURE — 96361 HYDRATE IV INFUSION ADD-ON: CPT

## 2024-04-16 PROCEDURE — 2500000001 HC RX 250 WO HCPCS SELF ADMINISTERED DRUGS (ALT 637 FOR MEDICARE OP): Performed by: INTERNAL MEDICINE

## 2024-04-16 PROCEDURE — 80048 BASIC METABOLIC PNL TOTAL CA: CPT | Performed by: PHYSICIAN ASSISTANT

## 2024-04-16 PROCEDURE — 36415 COLL VENOUS BLD VENIPUNCTURE: CPT | Performed by: EMERGENCY MEDICINE

## 2024-04-16 PROCEDURE — 2500000002 HC RX 250 W HCPCS SELF ADMINISTERED DRUGS (ALT 637 FOR MEDICARE OP, ALT 636 FOR OP/ED): Performed by: NURSE PRACTITIONER

## 2024-04-16 PROCEDURE — 82552 ASSAY OF CPK IN BLOOD: CPT | Performed by: INTERNAL MEDICINE

## 2024-04-16 PROCEDURE — 2500000004 HC RX 250 GENERAL PHARMACY W/ HCPCS (ALT 636 FOR OP/ED): Performed by: PHYSICIAN ASSISTANT

## 2024-04-16 PROCEDURE — 99222 1ST HOSP IP/OBS MODERATE 55: CPT | Performed by: INTERNAL MEDICINE

## 2024-04-16 PROCEDURE — 36415 COLL VENOUS BLD VENIPUNCTURE: CPT | Performed by: INTERNAL MEDICINE

## 2024-04-16 PROCEDURE — 82947 ASSAY GLUCOSE BLOOD QUANT: CPT | Mod: 59

## 2024-04-16 RX ORDER — CLOPIDOGREL BISULFATE 75 MG/1
75 TABLET ORAL DAILY
Status: DISCONTINUED | OUTPATIENT
Start: 2024-04-16 | End: 2024-04-17 | Stop reason: HOSPADM

## 2024-04-16 RX ORDER — ACETAMINOPHEN 325 MG/1
650 TABLET ORAL EVERY 4 HOURS PRN
Status: DISCONTINUED | OUTPATIENT
Start: 2024-04-16 | End: 2024-04-17 | Stop reason: HOSPADM

## 2024-04-16 RX ORDER — ATORVASTATIN CALCIUM 80 MG/1
80 TABLET, FILM COATED ORAL NIGHTLY
Status: DISCONTINUED | OUTPATIENT
Start: 2024-04-16 | End: 2024-04-17 | Stop reason: HOSPADM

## 2024-04-16 RX ORDER — INSULIN GLARGINE 100 [IU]/ML
2 INJECTION, SOLUTION SUBCUTANEOUS NIGHTLY
Status: DISCONTINUED | OUTPATIENT
Start: 2024-04-16 | End: 2024-04-17 | Stop reason: HOSPADM

## 2024-04-16 RX ORDER — HEPARIN SODIUM 5000 [USP'U]/ML
5000 INJECTION, SOLUTION INTRAVENOUS; SUBCUTANEOUS EVERY 12 HOURS
Status: DISCONTINUED | OUTPATIENT
Start: 2024-04-16 | End: 2024-04-17 | Stop reason: HOSPADM

## 2024-04-16 RX ORDER — TORSEMIDE 20 MG/1
40 TABLET ORAL DAILY
Status: DISCONTINUED | OUTPATIENT
Start: 2024-04-16 | End: 2024-04-16

## 2024-04-16 RX ORDER — ACETAMINOPHEN 160 MG/5ML
650 SOLUTION ORAL EVERY 4 HOURS PRN
Status: DISCONTINUED | OUTPATIENT
Start: 2024-04-16 | End: 2024-04-17 | Stop reason: HOSPADM

## 2024-04-16 RX ORDER — NAPROXEN SODIUM 220 MG/1
81 TABLET, FILM COATED ORAL DAILY
Status: DISCONTINUED | OUTPATIENT
Start: 2024-04-16 | End: 2024-04-17 | Stop reason: HOSPADM

## 2024-04-16 RX ORDER — DICYCLOMINE HYDROCHLORIDE 10 MG/ML
20 INJECTION INTRAMUSCULAR ONCE
Status: COMPLETED | OUTPATIENT
Start: 2024-04-16 | End: 2024-04-16

## 2024-04-16 RX ORDER — TORSEMIDE 20 MG/1
60 TABLET ORAL DAILY
Status: DISCONTINUED | OUTPATIENT
Start: 2024-04-17 | End: 2024-04-17 | Stop reason: HOSPADM

## 2024-04-16 RX ORDER — POLYETHYLENE GLYCOL 3350 17 G/17G
17 POWDER, FOR SOLUTION ORAL DAILY
Status: DISCONTINUED | OUTPATIENT
Start: 2024-04-16 | End: 2024-04-17 | Stop reason: HOSPADM

## 2024-04-16 RX ORDER — PANTOPRAZOLE SODIUM 40 MG/1
40 TABLET, DELAYED RELEASE ORAL
Status: DISCONTINUED | OUTPATIENT
Start: 2024-04-16 | End: 2024-04-17 | Stop reason: HOSPADM

## 2024-04-16 RX ORDER — INSULIN LISPRO 100 [IU]/ML
0-10 INJECTION, SOLUTION INTRAVENOUS; SUBCUTANEOUS
Status: DISCONTINUED | OUTPATIENT
Start: 2024-04-16 | End: 2024-04-17 | Stop reason: HOSPADM

## 2024-04-16 RX ORDER — TRAMADOL HYDROCHLORIDE 50 MG/1
50 TABLET ORAL EVERY 6 HOURS PRN
Status: DISCONTINUED | OUTPATIENT
Start: 2024-04-16 | End: 2024-04-17 | Stop reason: HOSPADM

## 2024-04-16 RX ORDER — DEXTROSE 50 % IN WATER (D50W) INTRAVENOUS SYRINGE
12.5
Status: DISCONTINUED | OUTPATIENT
Start: 2024-04-16 | End: 2024-04-17 | Stop reason: HOSPADM

## 2024-04-16 RX ORDER — METOPROLOL TARTRATE 50 MG/1
50 TABLET ORAL 2 TIMES DAILY
Status: DISCONTINUED | OUTPATIENT
Start: 2024-04-16 | End: 2024-04-17 | Stop reason: HOSPADM

## 2024-04-16 RX ORDER — ESCITALOPRAM OXALATE 10 MG/1
10 TABLET ORAL DAILY
Status: DISCONTINUED | OUTPATIENT
Start: 2024-04-16 | End: 2024-04-17 | Stop reason: HOSPADM

## 2024-04-16 RX ORDER — ACETAMINOPHEN 650 MG/1
650 SUPPOSITORY RECTAL EVERY 4 HOURS PRN
Status: DISCONTINUED | OUTPATIENT
Start: 2024-04-16 | End: 2024-04-17 | Stop reason: HOSPADM

## 2024-04-16 RX ORDER — SODIUM BICARBONATE 650 MG/1
1300 TABLET ORAL 2 TIMES DAILY
Status: DISCONTINUED | OUTPATIENT
Start: 2024-04-16 | End: 2024-04-17 | Stop reason: HOSPADM

## 2024-04-16 RX ORDER — DEXTROSE 50 % IN WATER (D50W) INTRAVENOUS SYRINGE
25
Status: DISCONTINUED | OUTPATIENT
Start: 2024-04-16 | End: 2024-04-17 | Stop reason: HOSPADM

## 2024-04-16 RX ADMIN — TORSEMIDE 40 MG: 20 TABLET ORAL at 08:56

## 2024-04-16 RX ADMIN — TRAMADOL HYDROCHLORIDE 50 MG: 50 TABLET, FILM COATED ORAL at 20:51

## 2024-04-16 RX ADMIN — METOPROLOL TARTRATE 50 MG: 50 TABLET, FILM COATED ORAL at 04:03

## 2024-04-16 RX ADMIN — DICYCLOMINE HYDROCHLORIDE 20 MG: 20 INJECTION, SOLUTION INTRAMUSCULAR at 00:09

## 2024-04-16 RX ADMIN — HEPARIN SODIUM 5000 UNITS: 5000 INJECTION, SOLUTION INTRAVENOUS; SUBCUTANEOUS at 06:23

## 2024-04-16 RX ADMIN — SODIUM BICARBONATE 650 MG TABLET 1300 MG: at 13:24

## 2024-04-16 RX ADMIN — METOPROLOL TARTRATE 50 MG: 50 TABLET, FILM COATED ORAL at 20:49

## 2024-04-16 RX ADMIN — ASPIRIN 81 MG CHEWABLE TABLET 81 MG: 81 TABLET CHEWABLE at 08:56

## 2024-04-16 RX ADMIN — PANTOPRAZOLE SODIUM 40 MG: 40 TABLET, DELAYED RELEASE ORAL at 06:23

## 2024-04-16 RX ADMIN — ESCITALOPRAM OXALATE 10 MG: 10 TABLET ORAL at 08:56

## 2024-04-16 RX ADMIN — CLOPIDOGREL BISULFATE 75 MG: 75 TABLET ORAL at 08:56

## 2024-04-16 RX ADMIN — ACETAMINOPHEN 650 MG: 325 TABLET ORAL at 04:05

## 2024-04-16 RX ADMIN — INSULIN LISPRO 2 UNITS: 100 INJECTION, SOLUTION INTRAVENOUS; SUBCUTANEOUS at 16:57

## 2024-04-16 RX ADMIN — ATORVASTATIN CALCIUM 80 MG: 80 TABLET, FILM COATED ORAL at 20:49

## 2024-04-16 RX ADMIN — INSULIN GLARGINE 2 UNITS: 100 INJECTION, SOLUTION SUBCUTANEOUS at 04:03

## 2024-04-16 RX ADMIN — INSULIN GLARGINE 2 UNITS: 100 INJECTION, SOLUTION SUBCUTANEOUS at 20:50

## 2024-04-16 RX ADMIN — SODIUM BICARBONATE 650 MG TABLET 1300 MG: at 20:49

## 2024-04-16 RX ADMIN — SODIUM CHLORIDE 250 ML: 900 INJECTION, SOLUTION INTRAVENOUS at 01:40

## 2024-04-16 RX ADMIN — ACETAMINOPHEN 650 MG: 325 TABLET ORAL at 17:05

## 2024-04-16 SDOH — HEALTH STABILITY: PHYSICAL HEALTH: ON AVERAGE, HOW MANY MINUTES DO YOU ENGAGE IN EXERCISE AT THIS LEVEL?: 0 MIN

## 2024-04-16 SDOH — HEALTH STABILITY: MENTAL HEALTH: HOW OFTEN DO YOU HAVE 6 OR MORE DRINKS ON ONE OCCASION?: NEVER

## 2024-04-16 SDOH — ECONOMIC STABILITY: INCOME INSECURITY: IN THE PAST 12 MONTHS, HAS THE ELECTRIC, GAS, OIL, OR WATER COMPANY THREATENED TO SHUT OFF SERVICE IN YOUR HOME?: NO

## 2024-04-16 SDOH — ECONOMIC STABILITY: FOOD INSECURITY: WITHIN THE PAST 12 MONTHS, THE FOOD YOU BOUGHT JUST DIDN'T LAST AND YOU DIDN'T HAVE MONEY TO GET MORE.: NEVER TRUE

## 2024-04-16 SDOH — ECONOMIC STABILITY: HOUSING INSECURITY
IN THE LAST 12 MONTHS, WAS THERE A TIME WHEN YOU DID NOT HAVE A STEADY PLACE TO SLEEP OR SLEPT IN A SHELTER (INCLUDING NOW)?: NO

## 2024-04-16 SDOH — SOCIAL STABILITY: SOCIAL INSECURITY
WITHIN THE LAST YEAR, HAVE YOU BEEN KICKED, HIT, SLAPPED, OR OTHERWISE PHYSICALLY HURT BY YOUR PARTNER OR EX-PARTNER?: NO

## 2024-04-16 SDOH — SOCIAL STABILITY: SOCIAL NETWORK: ARE YOU MARRIED, WIDOWED, DIVORCED, SEPARATED, NEVER MARRIED, OR LIVING WITH A PARTNER?: MARRIED

## 2024-04-16 SDOH — SOCIAL STABILITY: SOCIAL INSECURITY: ABUSE: ADULT

## 2024-04-16 SDOH — HEALTH STABILITY: MENTAL HEALTH
HOW OFTEN DO YOU NEED TO HAVE SOMEONE HELP YOU WHEN YOU READ INSTRUCTIONS, PAMPHLETS, OR OTHER WRITTEN MATERIAL FROM YOUR DOCTOR OR PHARMACY?: NEVER

## 2024-04-16 SDOH — SOCIAL STABILITY: SOCIAL NETWORK
DO YOU BELONG TO ANY CLUBS OR ORGANIZATIONS SUCH AS CHURCH GROUPS UNIONS, FRATERNAL OR ATHLETIC GROUPS, OR SCHOOL GROUPS?: NO

## 2024-04-16 SDOH — SOCIAL STABILITY: SOCIAL NETWORK: HOW OFTEN DO YOU GET TOGETHER WITH FRIENDS OR RELATIVES?: THREE TIMES A WEEK

## 2024-04-16 SDOH — HEALTH STABILITY: MENTAL HEALTH: HOW OFTEN DO YOU HAVE A DRINK CONTAINING ALCOHOL?: NEVER

## 2024-04-16 SDOH — SOCIAL STABILITY: SOCIAL INSECURITY
WITHIN THE LAST YEAR, HAVE TO BEEN RAPED OR FORCED TO HAVE ANY KIND OF SEXUAL ACTIVITY BY YOUR PARTNER OR EX-PARTNER?: NO

## 2024-04-16 SDOH — SOCIAL STABILITY: SOCIAL INSECURITY: DOES ANYONE TRY TO KEEP YOU FROM HAVING/CONTACTING OTHER FRIENDS OR DOING THINGS OUTSIDE YOUR HOME?: NO

## 2024-04-16 SDOH — SOCIAL STABILITY: SOCIAL INSECURITY: ARE THERE ANY APPARENT SIGNS OF INJURIES/BEHAVIORS THAT COULD BE RELATED TO ABUSE/NEGLECT?: NO

## 2024-04-16 SDOH — ECONOMIC STABILITY: TRANSPORTATION INSECURITY
IN THE PAST 12 MONTHS, HAS THE LACK OF TRANSPORTATION KEPT YOU FROM MEDICAL APPOINTMENTS OR FROM GETTING MEDICATIONS?: NO

## 2024-04-16 SDOH — HEALTH STABILITY: MENTAL HEALTH
STRESS IS WHEN SOMEONE FEELS TENSE, NERVOUS, ANXIOUS, OR CAN'T SLEEP AT NIGHT BECAUSE THEIR MIND IS TROUBLED. HOW STRESSED ARE YOU?: VERY MUCH

## 2024-04-16 SDOH — HEALTH STABILITY: MENTAL HEALTH: EXPERIENCED ANY OF THE FOLLOWING LIFE EVENTS: LIFE-THREATENING ILLNESS OR INJURY;DEATH OF FAMILY/FRIEND

## 2024-04-16 SDOH — SOCIAL STABILITY: SOCIAL INSECURITY: WITHIN THE LAST YEAR, HAVE YOU BEEN AFRAID OF YOUR PARTNER OR EX-PARTNER?: NO

## 2024-04-16 SDOH — SOCIAL STABILITY: SOCIAL INSECURITY: WERE YOU ABLE TO COMPLETE ALL THE BEHAVIORAL HEALTH SCREENINGS?: YES

## 2024-04-16 SDOH — ECONOMIC STABILITY: INCOME INSECURITY: IN THE LAST 12 MONTHS, WAS THERE A TIME WHEN YOU WERE NOT ABLE TO PAY THE MORTGAGE OR RENT ON TIME?: NO

## 2024-04-16 SDOH — SOCIAL STABILITY: SOCIAL INSECURITY: DO YOU FEEL UNSAFE GOING BACK TO THE PLACE WHERE YOU ARE LIVING?: NO

## 2024-04-16 SDOH — SOCIAL STABILITY: SOCIAL INSECURITY: HAS ANYONE EVER THREATENED TO HURT YOUR FAMILY OR YOUR PETS?: NO

## 2024-04-16 SDOH — SOCIAL STABILITY: SOCIAL INSECURITY: WITHIN THE LAST YEAR, HAVE YOU BEEN HUMILIATED OR EMOTIONALLY ABUSED IN OTHER WAYS BY YOUR PARTNER OR EX-PARTNER?: NO

## 2024-04-16 SDOH — SOCIAL STABILITY: SOCIAL NETWORK: IN A TYPICAL WEEK, HOW MANY TIMES DO YOU TALK ON THE PHONE WITH FAMILY, FRIENDS, OR NEIGHBORS?: THREE TIMES A WEEK

## 2024-04-16 SDOH — ECONOMIC STABILITY: FOOD INSECURITY: WITHIN THE PAST 12 MONTHS, YOU WORRIED THAT YOUR FOOD WOULD RUN OUT BEFORE YOU GOT MONEY TO BUY MORE.: NEVER TRUE

## 2024-04-16 SDOH — SOCIAL STABILITY: SOCIAL INSECURITY: DO YOU FEEL ANYONE HAS EXPLOITED OR TAKEN ADVANTAGE OF YOU FINANCIALLY OR OF YOUR PERSONAL PROPERTY?: NO

## 2024-04-16 SDOH — SOCIAL STABILITY: SOCIAL INSECURITY: HAVE YOU HAD THOUGHTS OF HARMING ANYONE ELSE?: NO

## 2024-04-16 SDOH — ECONOMIC STABILITY: TRANSPORTATION INSECURITY
IN THE PAST 12 MONTHS, HAS LACK OF TRANSPORTATION KEPT YOU FROM MEETINGS, WORK, OR FROM GETTING THINGS NEEDED FOR DAILY LIVING?: NO

## 2024-04-16 SDOH — HEALTH STABILITY: PHYSICAL HEALTH: ON AVERAGE, HOW MANY DAYS PER WEEK DO YOU ENGAGE IN MODERATE TO STRENUOUS EXERCISE (LIKE A BRISK WALK)?: 0 DAYS

## 2024-04-16 SDOH — SOCIAL STABILITY: SOCIAL NETWORK: HOW OFTEN DO YOU ATTEND CHURCH OR RELIGIOUS SERVICES?: 1 TO 4 TIMES PER YEAR

## 2024-04-16 SDOH — SOCIAL STABILITY: SOCIAL NETWORK: HOW OFTEN DO YOU ATTENT MEETINGS OF THE CLUB OR ORGANIZATION YOU BELONG TO?: NEVER

## 2024-04-16 SDOH — ECONOMIC STABILITY: INCOME INSECURITY: HOW HARD IS IT FOR YOU TO PAY FOR THE VERY BASICS LIKE FOOD, HOUSING, MEDICAL CARE, AND HEATING?: NOT HARD AT ALL

## 2024-04-16 SDOH — SOCIAL STABILITY: SOCIAL INSECURITY: ARE YOU OR HAVE YOU BEEN THREATENED OR ABUSED PHYSICALLY, EMOTIONALLY, OR SEXUALLY BY ANYONE?: NO

## 2024-04-16 SDOH — HEALTH STABILITY: MENTAL HEALTH: HOW MANY STANDARD DRINKS CONTAINING ALCOHOL DO YOU HAVE ON A TYPICAL DAY?: PATIENT DOES NOT DRINK

## 2024-04-16 ASSESSMENT — COGNITIVE AND FUNCTIONAL STATUS - GENERAL
CLIMB 3 TO 5 STEPS WITH RAILING: A LITTLE
PATIENT BASELINE BEDBOUND: NO
MOBILITY SCORE: 22
TURNING FROM BACK TO SIDE WHILE IN FLAT BAD: A LOT
CLIMB 3 TO 5 STEPS WITH RAILING: A LITTLE
PERSONAL GROOMING: A LITTLE
DRESSING REGULAR UPPER BODY CLOTHING: A LITTLE
DAILY ACTIVITIY SCORE: 19
EATING MEALS: A LITTLE
TOILETING: A LITTLE
HELP NEEDED FOR BATHING: A LITTLE
WALKING IN HOSPITAL ROOM: TOTAL
CLIMB 3 TO 5 STEPS WITH RAILING: TOTAL
DAILY ACTIVITIY SCORE: 22
WALKING IN HOSPITAL ROOM: A LITTLE
MOVING TO AND FROM BED TO CHAIR: A LOT
DRESSING REGULAR LOWER BODY CLOTHING: A LITTLE
STANDING UP FROM CHAIR USING ARMS: A LOT
MOVING FROM LYING ON BACK TO SITTING ON SIDE OF FLAT BED WITH BEDRAILS: A LITTLE
DAILY ACTIVITIY SCORE: 24
MOBILITY SCORE: 11
MOBILITY SCORE: 22
PERSONAL GROOMING: A LITTLE
WALKING IN HOSPITAL ROOM: A LITTLE

## 2024-04-16 ASSESSMENT — LIFESTYLE VARIABLES
AUDIT-C TOTAL SCORE: 0
SKIP TO QUESTIONS 9-10: 1
SKIP TO QUESTIONS 9-10: 1
PRESCIPTION_ABUSE_PAST_12_MONTHS: NO
HOW MANY STANDARD DRINKS CONTAINING ALCOHOL DO YOU HAVE ON A TYPICAL DAY: PATIENT DOES NOT DRINK
AUDIT-C TOTAL SCORE: 0
HOW OFTEN DO YOU HAVE 6 OR MORE DRINKS ON ONE OCCASION: NEVER
SUBSTANCE_ABUSE_PAST_12_MONTHS: NO
HOW OFTEN DO YOU HAVE A DRINK CONTAINING ALCOHOL: NEVER
AUDIT-C TOTAL SCORE: 0

## 2024-04-16 ASSESSMENT — ACTIVITIES OF DAILY LIVING (ADL)
WALKS IN HOME: NEEDS ASSISTANCE
FEEDING YOURSELF: INDEPENDENT
TOILETING: NEEDS ASSISTANCE
HEARING - LEFT EAR: FUNCTIONAL
FEEDING YOURSELF: INDEPENDENT
TOILETING: NEEDS ASSISTANCE
HEARING - LEFT EAR: FUNCTIONAL
JUDGMENT_ADEQUATE_SAFELY_COMPLETE_DAILY_ACTIVITIES: YES
GROOMING: NEEDS ASSISTANCE
DRESSING YOURSELF: NEEDS ASSISTANCE
WALKS IN HOME: NEEDS ASSISTANCE
ADEQUATE_TO_COMPLETE_ADL: YES
PATIENT'S MEMORY ADEQUATE TO SAFELY COMPLETE DAILY ACTIVITIES?: YES
ASSISTIVE_DEVICE: WHEELCHAIR
JUDGMENT_ADEQUATE_SAFELY_COMPLETE_DAILY_ACTIVITIES: YES
LACK_OF_TRANSPORTATION: NO
ASSISTIVE_DEVICE: WHEELCHAIR
BATHING: NEEDS ASSISTANCE
ADEQUATE_TO_COMPLETE_ADL: YES
HEARING - RIGHT EAR: FUNCTIONAL
LACK_OF_TRANSPORTATION: NO
DRESSING YOURSELF: NEEDS ASSISTANCE
PATIENT'S MEMORY ADEQUATE TO SAFELY COMPLETE DAILY ACTIVITIES?: YES
HEARING - RIGHT EAR: FUNCTIONAL
GROOMING: NEEDS ASSISTANCE

## 2024-04-16 ASSESSMENT — ENCOUNTER SYMPTOMS
DYSPNEA ON EXERTION: 0
WEIGHT LOSS: 0
DIZZINESS: 0
MYALGIAS: 0
SHORTNESS OF BREATH: 0
WEAKNESS: 0
WHEEZING: 0
CLAUDICATION: 0
WEIGHT GAIN: 0
PALPITATIONS: 0
PND: 0
FEVER: 0
SYNCOPE: 0
NEAR-SYNCOPE: 0
IRREGULAR HEARTBEAT: 0
DIAPHORESIS: 0
ORTHOPNEA: 0
COUGH: 0

## 2024-04-16 ASSESSMENT — PAIN - FUNCTIONAL ASSESSMENT
PAIN_FUNCTIONAL_ASSESSMENT: 0-10

## 2024-04-16 ASSESSMENT — PATIENT HEALTH QUESTIONNAIRE - PHQ9
1. LITTLE INTEREST OR PLEASURE IN DOING THINGS: MORE THAN HALF THE DAYS
SUM OF ALL RESPONSES TO PHQ9 QUESTIONS 1 & 2: 4
2. FEELING DOWN, DEPRESSED OR HOPELESS: MORE THAN HALF THE DAYS

## 2024-04-16 ASSESSMENT — PAIN SCALES - GENERAL
PAINLEVEL_OUTOF10: 8
PAINLEVEL_OUTOF10: 3
PAINLEVEL_OUTOF10: 0 - NO PAIN
PAINLEVEL_OUTOF10: 4
PAINLEVEL_OUTOF10: 8

## 2024-04-16 ASSESSMENT — PAIN DESCRIPTION - LOCATION: LOCATION: HEAD

## 2024-04-16 NOTE — CARE PLAN
The patient's goals for the shift include safety  Problem: Pain  Goal: Takes deep breaths with improved pain control throughout the shift  Outcome: Progressing  Goal: Turns in bed with improved pain control throughout the shift  Outcome: Progressing  Goal: Walks with improved pain control throughout the shift  Outcome: Progressing  Goal: Performs ADL's with improved pain control throughout shift  Outcome: Progressing  Goal: Participates in PT with improved pain control throughout the shift  Outcome: Progressing  Goal: Free from opioid side effects throughout the shift  Outcome: Progressing  Goal: Free from acute confusion related to pain meds throughout the shift  Outcome: Progressing       The clinical goals for the shift include monitor labs

## 2024-04-16 NOTE — ED PROVIDER NOTES
HPI   Chief Complaint   Patient presents with    Abnormal Potassium     Pt home health nurse came in today and delicia blood. Pt was advised via phone call that pt potassium was high and to go to the ER.       This is a 59-year-old male who presents to the emergency department due to elevated potassium.  The patient states that he had a appointment with his primary care doctor today and was called and told his potassium was high and he needed to come to the emergency department.  He states that he has had weakness but this has been ongoing.  Patient had NSTEMI with CABG in November 2023.  He then had episode of acute cholecystitis at the end of March.  Laparoscopic cholecystectomy was not done due to recent history of NSTEMI.  Therefore he has a drain in place.  He states that he has ongoing chest discomfort but it has not changed from baseline.  No shortness of breath.  No changes in his abdominal discomfort.      Please see HPI for pertinent positive and negative ROS.                   No data recorded                   Patient History   Past Medical History:   Diagnosis Date    Above-knee amputation of left lower extremity (Multi)     Adverse effect of anesthesia     confusion    Anemia     CAD (coronary artery disease)     Carotid artery disease (CMS-HCC)     bilateral    CVA (cerebral vascular accident) (Multi) 2020    Depression     DM (diabetes mellitus) (Multi)     type 2 with neuropathy    GERD (gastroesophageal reflux disease)     High cholesterol     HTN (hypertension)     Leg ulcer (Multi)     chronic right lower extremity    Neuropathy     OA (osteoarthritis)     PAD (peripheral artery disease) (CMS-[facility])     Renal disorder      Past Surgical History:   Procedure Laterality Date    CARDIAC SURGERY      CABG 11/2023    CT ANGIO NECK  06/14/2021    CT NECK ANGIO W AND WO IV CONTRAST 6/14/2021 [facility] [place]    CT HEAD ANGIO W AND WO IV CONTRAST  06/14/2021    CT HEAD ANGIO W AND WO IV CONTRAST 6/14/2021  [facility] [place] [facility]PATIENT [facility]GAC[facility]    LEG SURGERY Right     MR HEAD ANGIO WO IV CONTRAST  2012    MR HEAD ANGIO WO IV CONTRAST [facility]    MR HEAD ANGIO WO IV CONTRAST  2021    MR HEAD ANGIO WO IV CONTRAST [facility]    MR HEAD ANGIO WO IV CONTRAST  2021    MR HEAD ANGIO WO IV CONTRAST [facility]    MR NECK ANGIO WO IV CONTRAST  2023    MR NECK ANGIO WO IV CONTRAST 2023 [facility] [id number] MRI    OTHER SURGICAL HISTORY  2021    Knee reconstruction    OTHER SURGICAL HISTORY  2021    Lower extremity amputation above knee    TOTAL SHOULDER ARTHROPLASTY Left 2020     Family History   Problem Relation Name Age of Onset    Other (cardiac disorder) Mother      Hypertension Mother      Esophageal cancer Mother      Hearing loss Father      Hypertension Father      Heart disease Father      COPD Sister       Social History     Tobacco Use    Smoking status: Former     Types: Cigars     Quit date: 2022     Years since quittin.4    Smokeless tobacco: Never   Vaping Use    Vaping status: Never Used   Substance Use Topics    Alcohol use: Not Currently    Drug use: Not Currently     Types: Marijuana       Physical Exam   ED Triage Vitals [04/15/24 2036]   Temperature Heart Rate Respirations BP   36.7 °C (98.1 °F) 79 16 122/70      Pulse Ox Temp Source Heart Rate Source Patient Position   97 % Temporal Monitor Sitting      BP Location FiO2 (%)     Right arm --       Physical Exam  GENERAL APPEARANCE: Awake and alert. No acute distress.   VITAL SIGNS: As per the nurses' triage record.  HEENT: Normocephalic, atraumatic. Extraocular muscles are intact. Conjunctiva are pink. Negative scleral icterus. Mucous membranes are moist. Tongue in the midline. Oropharynx clear, uvula midline.   NECK: Soft, nontender and supple, full gross ROM, no meningeal signs.  CHEST: Nontender to palpation.  Bibasilar wheezing.  Symmetric rise and fall of chest wall.   HEART: Clear S1 and S2.  Regular rate and rhythm. Strong and equal pulses in the extremities.  ABDOMEN: Soft, nontender, nondistended, positive bowel sounds, no palpable masses.  Drain in place on left lateral aspect of trunk without surrounding erythema or edema.  Drain is draining clear green liquid  MUSCULOSKELETAL: Left leg amputated.  Right leg is nontender to palpation.  Pitting edema of right ankle.  Full gross active range of motion.   NEUROLOGICAL: Awake, alert and oriented x 3. Motor power intact in the upper and lower extremities. Sensation is intact to light touch in the upper and lower extremities. Patient answering questions appropriately.   IMMUNOLOGICAL: No lymphatic streaking noted  DERMATOLOGIC: Warm and dry without petechiae, rashes, or ecchymosis noted on visible skin.   PYSCH: Cooperative with appropriate mood and affect.  ED Course & MDM   Diagnoses as of 04/16/24 0104   Hyperkalemia       Medical Decision Making  Parts of this chart have been completed using voice recognition software. Please excuse any errors of transcription.  My thought process and reason for plan has been formulated from the time that I saw the patient until the time of disposition and is not specific to one specific moment during their visit and furthermore my MDM encompasses this entire chart and not only this text box.      HPI: Detailed above.    Exam: A medically appropriate exam performed, outlined above, given the known history and presentation.    History obtained from: Patient    EKG: See my supervising physician's EKG interpretation      Medications given during visit:  Medications   calcium gluconate in NS IV 1 g (0 g intravenous Stopped 4/15/24 2335)   albuterol 2.5 mg /3 mL (0.083 %) nebulizer solution 2.5 mg (2.5 mg nebulization Given 4/15/24 2235)   furosemide (Lasix) injection 80 mg (80 mg intravenous Given 4/15/24 2235)   predniSONE (Deltasone) tablet 40 mg (40 mg oral Given 4/15/24 2235)   albuterol 2.5 mg /3 mL (0.083 %)  nebulizer solution 2.5 mg (2.5 mg nebulization Given 4/15/24 7348)   insulin regular (HumuLIN R,NovoLIN R) injection 4 Units (4 Units intravenous Given 4/15/24 5775)   dicyclomine (Bentyl) injection 20 mg (20 mg intramuscular Given 4/16/24 0009)        Diagnostic/tests  Labs Reviewed   CBC WITH AUTO DIFFERENTIAL - Abnormal       Result Value    WBC 9.2      nRBC 0.0      RBC 3.48 (*)     Hemoglobin 9.5 (*)     Hematocrit 29.9 (*)     MCV 86      MCH 27.3      MCHC 31.8 (*)     RDW 14.5      Platelets 325      Neutrophils % 66.0      Immature Granulocytes %, Automated 0.2      Lymphocytes % 17.6      Monocytes % 9.3      Eosinophils % 6.6      Basophils % 0.3      Neutrophils Absolute 6.08      Immature Granulocytes Absolute, Automated 0.02      Lymphocytes Absolute 1.62      Monocytes Absolute 0.86      Eosinophils Absolute 0.61      Basophils Absolute 0.03     COMPREHENSIVE METABOLIC PANEL - Abnormal    Glucose 216 (*)     Sodium 132 (*)     Potassium 5.2 (*)     Chloride 102      Bicarbonate 22 (*)     Urea Nitrogen 49 (*)     Creatinine 2.90 (*)     eGFR 24 (*)     Calcium 8.7      Albumin 3.6      Alkaline Phosphatase 62      Total Protein 7.7      AST 17      Bilirubin, Total 0.3      ALT 14      Anion Gap 8     SERIAL TROPONIN, INITIAL ([facility]) - Abnormal    Troponin T, High Sensitivity 148 (*)    POCT GLUCOSE - Abnormal    POCT Glucose 117 (*)    SARS-COV-2 AND INFLUENZA A/B PCR - Normal    Flu A Result Not Detected      Flu B Result Not Detected      Coronavirus 2019, PCR Not Detected      Narrative:     This assay has received FDA Emergency Use Authorization (EUA) and  is only authorized for the duration of time that circumstances exist to justify the authorization of the emergency use of in vitro diagnostic tests for the detection of SARS-CoV-2 virus and/or diagnosis of COVID-19 infection under section 564(b)(1) of the Act, 21 U.S.C. 360bbb-3(b)(1). Testing for SARS-CoV-2 is only recommended for patients who  meet current clinical and/or epidemiological criteria as defined by federal, state, or local public health directives. This assay is an in vitro diagnostic nucleic acid amplification test for the qualitative detection of SARS-CoV-2, Influenza A, and Influenza B from nasopharyngeal specimens and has been validated for use at [facility]. Negative results do not preclude COVID-19 infections or Influenza A/B infections, and should not be used as the sole basis for diagnosis, treatment, or other management decisions. If Influenza A/B and RSV PCR results are negative, testing for Parainfluenza virus, Adenovirus and Metapneumovirus is routinely performed for [facility] pediatric oncology and intensive care inpatients, and is available on other patients by placing an add-on request.    TROPONIN T SERIES, HIGH SENSITIVITY (0, 2 HR, 6 HR)    Narrative:     The following orders were created for panel order Troponin T Series, High Sensitivity (0, 2HR, 6HR).  Procedure                               Abnormality         Status                     ---------                               -----------         ------                     Serial Troponin, Initial...[[id number]]  Abnormal            Final result                 Please view results for these tests on the individual orders.   URINALYSIS WITH REFLEX CULTURE AND MICROSCOPIC    Narrative:     The following orders were created for panel order Urinalysis with Reflex Culture and Microscopic.  Procedure                               Abnormality         Status                     ---------                               -----------         ------                     Urinalysis with Reflex C...[[id number]]                      In process                 Extra Urine Gray Tube[[id number]]                                                         Please view results for these tests on the individual orders.   URINALYSIS WITH REFLEX CULTURE AND MICROSCOPIC   EXTRA URINE GRAY  TUBE   TROPONIN T SERIES, HIGH SENSITIVITY (0, 2 HR, 6 HR)    Narrative:     The following orders were created for panel order Troponin T Series, High Sensitivity (0, 2HR, 6HR).  Procedure                               Abnormality         Status                     ---------                               -----------         ------                     Serial Troponin, Initial...[[id number]]                      In process                 Serial Troponin, 2 Hour ...[[id number]]                                                   Please view results for these tests on the individual orders.   SERIAL TROPONIN, INITIAL ([facility])   SERIAL TROPONIN,  2 HOUR ([facility])   BASIC METABOLIC PANEL   POCT GLUCOSE METER   URINALYSIS MICROSCOPIC WITH REFLEX CULTURE      XR chest 1 view   Final Result   Trace left pleural effusion with associated atelectasis.        MACRO:   None        Signed by: Parminder Murray 4/15/2024 9:32 PM   Dictation workstation:   [id number]           Considerations/further MDM:  Patient was seen in conjucntion with my supervising physician,  Dr. Carpio. Please refer to his note.    Differential diagnosis includes was not limited to hyperkalemia versus other electrolyte disturbance versus acute coronary syndrome versus worsening kidney function versus viral syndrome versus pneumonia    Vital signs are stable on presentation.    Potassium at 11 AM today was 6.1.  Repeat potassium in the emergency department 5.2.  CBC shows normocytic anemia that is improved from previous CBCs.  Stable and actually improving renal function.  Initial troponin T  -CKD.  Negative viral swabs.  Due to borderline hyperkalemia, patient was treated with IV insulin regular 4 units, albuterol 5 mg, Lasix 80 mg IV, prednisone 40 mg due to wheezing.  Patient was also complaining of abdominal cramping and was therefore given Bentyl 20 mg.  Patient had no tenderness to palpation on abdominal exam.  I do not suspect that there is an acute  process going on in his abdomen.  Chest x-ray shows trace left pleural effusion without associated atelectasis.    Patient's nephrologist, Dr. Esquivel was attempted to be paged multiple times for hyperkalemia without success.  Therefore, after treatment of hyperkalemia a BMP was redrawn and is pending at the end of my shift along with patient's repeat troponin and urinalysis.    Please refer to Dr. Blank's note for further management and disposition of this patient while he remains in the emergency department.    Critical care time was billed at 20 minutes by me and is nonconcurrent with other providers involved.  Time excludes other separately billable procedures.  Critical care time billed to hyperkalemia.    Procedure  Procedures     Camryn Raines PA-C  04/16/24 0104

## 2024-04-16 NOTE — PROGRESS NOTES
04/16/24 1627   Physical Activity   On average, how many days per week do you engage in moderate to strenuous exercise (like a brisk walk)? 0 days   On average, how many minutes do you engage in exercise at this level? 0 min   Financial Resource Strain   How hard is it for you to pay for the very basics like food, housing, medical care, and heating? Not hard   Housing Stability   In the last 12 months, was there a time when you were not able to pay the mortgage or rent on time? N   In the last 12 months, was there a time when you did not have a steady place to sleep or slept in a shelter (including now)? N   Transportation Needs   In the past 12 months, has lack of transportation kept you from medical appointments or from getting medications? no   In the past 12 months, has lack of transportation kept you from meetings, work, or from getting things needed for daily living? No   Food Insecurity   Within the past 12 months, you worried that your food would run out before you got the money to buy more. Never true   Within the past 12 months, the food you bought just didn't last and you didn't have money to get more. Never true   Stress   Do you feel stress - tense, restless, nervous, or anxious, or unable to sleep at night because your mind is troubled all the time - these days? Very much   Social Connections   In a typical week, how many times do you talk on the phone with family, friends, or neighbors? Three   How often do you get together with friends or relatives? Three times   How often do you attend Mosque or Restorationist services? 1 to 4   Do you belong to any clubs or organizations such as Mosque groups, unions, fraternal or athletic groups, or school groups? No   How often do you attend meetings of the clubs or organizations you belong to? Never   Are you , , , , never , or living with a partner?    Intimate Partner Violence   Within the last year, have you been  afraid of your partner or ex-partner? No   Within the last year, have you been humiliated or emotionally abused in other ways by your partner or ex-partner? No   Within the last year, have you been kicked, hit, slapped, or otherwise physically hurt by your partner or ex-partner? No   Within the last year, have you been raped or forced to have any kind of sexual activity by your partner or ex-partner? No   Alcohol Use   Q1: How often do you have a drink containing alcohol? Never   Q2: How many drinks containing alcohol do you have on a typical day when you are drinking? None   Q3: How often do you have six or more drinks on one occasion? Never   Utilities   In the past 12 months has the electric, gas, oil, or water company threatened to shut off services in your home? No   Health Literacy   How often do you need to have someone help you when you read instructions, pamphlets, or other written material from your doctor or pharmacy? Never     He reports that he quit smoking about 17 months ago. His smoking use included cigars. He has never used smokeless tobacco. He reports that he does not currently use alcohol. He reports that he does not currently use drugs after having used the following drugs: Marijuana.

## 2024-04-16 NOTE — PROGRESS NOTES
04/16/24 1606   [facility] Disability Status   Are you deaf or do you have serious difficulty hearing? N   Are you blind or do you have serious difficulty seeing, even when wearing glasses? N   Because of a physical, mental, or emotional condition, do you have serious difficulty concentrating, remembering, or making decisions? (5 years old or older) N   Do you have serious difficulty walking or climbing stairs? Y   Do you have serious difficulty dressing or bathing? Y   Because of a physical, mental, or emotional condition, do you have serious difficulty doing errands alone such as visiting the doctor? N

## 2024-04-16 NOTE — ED TRIAGE NOTES
Abnormal Labs Reviewed   CBC WITH AUTO DIFFERENTIAL - Abnormal; Notable for the following components:       Result Value    RBC 3.48 (*)     Hemoglobin 9.5 (*)     Hematocrit 29.9 (*)     MCHC 31.8 (*)     All other components within normal limits   COMPREHENSIVE METABOLIC PANEL - Abnormal; Notable for the following components:    Glucose 216 (*)     Sodium 132 (*)     Potassium 5.2 (*)     Bicarbonate 22 (*)     Urea Nitrogen 49 (*)     Creatinine 2.90 (*)     eGFR 24 (*)     All other components within normal limits   SERIAL TROPONIN, INITIAL ([facility]) - Abnormal; Notable for the following components:    Troponin T, High Sensitivity 148 (*)     All other components within normal limits    Dr. Blank notified of patient's troponin of 148

## 2024-04-16 NOTE — PROGRESS NOTES
04/16/24 1607   Discharge Planning   Living Arrangements Spouse/significant other   Support Systems Spouse/significant other;Family members   Assistance Needed Requires assistance with ADLS, mobilty, receives help from wife.   Type of Residence Private residence  (lives in a Condo one level, no steps)   Number of Stairs to Enter Residence 0   Number of Stairs Within Residence 0   Do you have animals or pets at home? Yes   Type of Animals or Pets dog   Who is requesting discharge planning? Provider   Home or Post Acute Services None   Patient expects to be discharged to: Home with no needs   Does the patient need discharge transport arranged? No   Financial Resource Strain   How hard is it for you to pay for the very basics like food, housing, medical care, and heating? Not hard   Housing Stability   In the last 12 months, was there a time when you were not able to pay the mortgage or rent on time? N   In the last 12 months, was there a time when you did not have a steady place to sleep or slept in a shelter (including now)? N   Transportation Needs   In the past 12 months, has lack of transportation kept you from medical appointments or from getting medications? no   In the past 12 months, has lack of transportation kept you from meetings, work, or from getting things needed for daily living? No      Humphrey Waddell is a 59 y.o. male presenting with hyperkalemia.    He has chronic renal insufficiency and he takes torsemide every day.  He does not take any medications that should be raising his potassium but he does have chronic renal insufficiency and sees Dr. Gutiérrez.  He had a home care nurse draw labs yesterday and his potassium was 6.1.  Because of that he was told to come to the emergency room.  Repeat here showed potassium of 5.2.  Despite that almost normal level he got some fairly aggressive treatment in the ER including calcium gluconate insulin dextrose, albuterol some IV fluids and a shot of Lasix. Repeat  potassium after that was 4.9.  However after all that he did have some low blood pressure.  So has high cardiac enzymes.      Patient lives with spouse in a one level Condo. Patient requires assistance with ADLs and mobility. In the last couple weeks he has had decreasing strength decreasing mobility. He has a  above-knee amputation of left lower extremity, has a prosthetic but states he is unable to ambulate. He does have a wheelchair that he uses and a walker to help transfer. He mainly stays at home   He is suffered the loss of 2 of his brothers within the last year as well as some major medical problems and he has not been his normal self in terms of his personality for many months.  Presents with a very sad quiet affect which is not his normal personality.   Patient does have  Home Health Care and will need to resume care.     PLAN: Discharge to home with  Home Health Care- resume, will need an internal home care order

## 2024-04-16 NOTE — H&P
History Of Present Illness  Humphrey Waddell is a 59 y.o. male presenting with hyperkalemia.  He has chronic renal insufficiency and he takes torsemide every day.  He does not take any medications that should be raising his potassium but he does have chronic renal insufficiency and sees Dr. Jaylen vera.  He had a home care nurse draw labs yesterday and his potassium was 6.1.  Because of that he was told to come to the emergency room.  Repeat here showed potassium of 5.2.  Despite that almost normal level he got some fairly aggressive treatment in the ER including calcium gluconate insulin dextrose, albuterol some IV fluids and a shot of Lasix.,  Repeat potassium after that was 4.9.  However after all that he did have some low blood pressure.  So has high cardiac enzymes.  He has had chest pain ever since November when he had CABG.  There is nothing new about his chest pain today.  2 weeks ago he was sent to [facility] for acute cholecystitis.  Cholecystostomy tube was placed instead of doing a cholecystectomy.  At that time his cardiac enzymes were elevated as well and I think that might of had something to do with them opting for this less invasive procedure.  In the last couple weeks he has had decreasing strength decreasing mobility..  He is suffered the loss of 2 of his brothers within the last year as well as some major medical problems and he has not been his normal self in terms of his personality for many months.  Presents with a very sad quiet affect which is not his normal personality but it is how he has been in recent months     Past Medical History  He has a past medical history of Above-knee amputation of left lower extremity (Multi), Adverse effect of anesthesia, Anemia, CAD (coronary artery disease), Carotid artery disease (CMS-[facility]), CVA (cerebral vascular accident) (Multi) (2020), Depression, DM (diabetes mellitus) (Multi), GERD (gastroesophageal reflux disease), High cholesterol, HTN  (hypertension), Leg ulcer (Multi), Neuropathy, OA (osteoarthritis), PAD (peripheral artery disease) (CMS-[facility]), and Renal disorder.    Surgical History  He has a past surgical history that includes Other surgical history (07/07/2021); Other surgical history (07/07/2021); CT angio head w and wo IV contrast (06/14/2021); CT angio neck (06/14/2021); MR angio neck wo IV contrast (07/18/2023); MR angio head wo IV contrast (12/11/2012); MR angio head wo IV contrast (06/12/2021); MR angio head wo IV contrast (05/25/2021); Total shoulder arthroplasty (Left, 2020); Leg Surgery (Right); and Cardiac surgery.     Social History  He reports that he quit smoking about 17 months ago. His smoking use included cigars. He has never used smokeless tobacco. He reports that he does not currently use alcohol. He reports that he does not currently use drugs after having used the following drugs: Marijuana.    Family History  Family History   Problem Relation Name Age of Onset    Other (cardiac disorder) Mother      Hypertension Mother      Esophageal cancer Mother      Hearing loss Father      Hypertension Father      Heart disease Father      COPD Sister          Allergies  Patient has no known allergies.    Review of Systems see HPI for pertinent positives and negatives     Physical Exam he is alert oriented but speaks quietly softly has a sad affect  Head atraumatic normocephalic  Pupils equal round reactive to light extraocular motion intact  Mouth normal-appearing tongue and oropharynx  Neck supple without thyromegaly  Lymph nodes no cervical or axillary lymphadenopathy  Heart regular and tacky  Lungs clear to auscultation  Abdomen soft nontender nondistended, cholecystostomy tube coming out of the right upper quadrant  He has a below the knee amputation on the left on the right he has 2+ pitting bilateral leg edema  Neuro cranial nerves intact good tone and strength in arms he does have some weakness in his right leg with some  difficulty lifting it up off the bed  Skin no rashes or ulcerations  Musculoskeletal normal passive range of motion shoulders elbows hips and knees     Last Recorded Vitals  /66   Pulse (!) 119   Temp 37.2 °C (99 °F) (Temporal)   Resp 16   Wt 127 kg (280 lb)   SpO2 95%     Relevant Results  EKG shows left bundle branch block.  Troponins are 148 153 and 153 2 weeks ago they were 133 136 and 143.  Creatinine 2.6 which is the best it has been in the last year potassium went from 6.1-4.9      Assessment/Plan   1 hyperkalemia-I do not have an explanation other than his chronic renal insufficiency.  It is predictable that this will happen again.  He takes torsemide 40 mg a day with no potassium supplementation.  You would think that would help keep his potassium low.  Consulting his nephrologist to see what else can be done about this.    #2-positive cardiac enzymes.  I seriously doubt he is having acute coronary problem.  Will continue aspirin and Plavix will check CK levels and consult cardiology..  After given 80 mg of IV Lasix in the ER we had some low blood pressures so I am giving his metoprolol but I am holding hydralazine and nifedipine and isosorbide mononitrate.  These can be reintroduced later today if his blood pressure warrants  #3 impaired mobility-he has a BKA on the left and he is been in a state of decline over the last 8 months.  I do not have a specific medical remedy for this.  Will consult PT and OT.  #4-this patient is clearly depressed.  I discussed this with him and his wife.  We are starting Lexapro.  Somebody will have to follow-up with this in a few weeks and decide if it is working and decide upon increasing the dose at that time.    Gaston Douglas MD

## 2024-04-16 NOTE — PROGRESS NOTES
"Humphrey Waddell is a 59 y.o. male initially evaluated by the evening team with Dr. Carpio    Subjective   The patient had a potassium level of 6.1 outpatient.  Repeat potassium here is 5.2.  As he is still hyperkalemic he was given albuterol as well as Lasix.  Handoff to me pending repeat lab work.       Objective     Physical Exam  Vitals and nursing note reviewed.   Constitutional:       General: He is not in acute distress.     Appearance: He is well-developed. He is obese. He is ill-appearing and diaphoretic. He is not toxic-appearing.   HENT:      Head: Normocephalic and atraumatic.   Eyes:      Conjunctiva/sclera: Conjunctivae normal.   Cardiovascular:      Rate and Rhythm: Regular rhythm. Tachycardia present.      Heart sounds: No murmur heard.  Pulmonary:      Effort: Respiratory distress present.      Breath sounds: Normal breath sounds.   Abdominal:      Palpations: Abdomen is soft.      Tenderness: There is no abdominal tenderness.   Musculoskeletal:         General: No swelling.      Cervical back: Neck supple.   Skin:     General: Skin is warm.      Capillary Refill: Capillary refill takes less than 2 seconds.   Neurological:      Mental Status: He is alert.   Psychiatric:         Mood and Affect: Mood normal.         Last Recorded Vitals  Blood pressure 99/60, pulse (!) 120, temperature 36.7 °C (98.1 °F), temperature source Temporal, resp. rate 14, height 1.93 m (6' 4\"), weight 127 kg (280 lb), SpO2 97%.                         Assessment/Plan   59-year-old male presenting with a chief complaint of hyperkalemia.  His lab work today is not consistent with significant hyperkalemia.  He was ordered treatments by the previous provider.  After the patient had given a urine sample after being treated with furosemide he became hypotensive and tachycardic.  The patient became very diaphoretic and had a near syncopal episode.  He did not fall or hit his head.  The case was discussed with Dr. Douglas who is the " hospitalist tonight who accepts the patient for further management.    Clementina Goncalves MD

## 2024-04-16 NOTE — ED PROVIDER NOTES
THIS IS MY JENNIFER/resident SUPERVISORY AND SHARED VISIT NOTE:    I personally saw the patient      History: Potassium.  No shortness of breath.  Exam: Cardiovascular examination: Regular rate and rhythm normal S1 and S2.  Pulmonary examination: Scattered wheezing.    MDM: Patient with mild hyperkalemia on her blood draw here.  Ordered albuterol to help with his wheezing and hyperkalemia.  Ordered Lasix IV that will help his hyperkalemia and trace pleural effusion.  Ordered consultation with Dr. Nestor vera.    I independently interpreted the following study(s) EKG which show poor tracing repeat EKG.  Heart rate 92 right axis deviation normal sinus rhythm prolonged QT interval.  No STEMI criteria.          Lenard Carpio MD  04/15/24 2231    Signed out to Dr.Emily muhammad with rpt EKG troponin pending     Lenard Carpio MD  04/15/24 2233

## 2024-04-16 NOTE — NURSING NOTE
Assumed care of patient. Patient in bed resting comfortably. No signs of distress or pain. Call light and belongings within reach, bed alarm on.

## 2024-04-16 NOTE — CONSULTS
CONSULT: Nephrology SERVICE    SERVICE DATE: 4/16/2024   SERVICE TIME:  12:39 PM    REASON FOR CONSULT: Hyperkalemia  REQUESTING PHYSICIAN: Dr. Douglas  PRIMARY CARE PHYSICIAN: Mily Leung, DO    ASSESSMENT AND PLAN  59-year-old with numerous medical problems and recent gallbladder drain in place admitted with hyperkalemia.  1.  Hyperkalemia  2.  Acute renal failure  3.  Chronic kidney disease unspecified  4.  Acute metabolic acidosis  5.  Essential hypertension  6.  Fluid overload    Strange hyperkalemia, seems intermittent but can be severe.  Check a.m. cortisol level.  Give him bicarbonate supplementation oral, he should go home on this.  He is on no obvious medications that I see.  Increase torsemide to 60 mg daily as his maintenance dose.  Baseline creatinine unclear.  He does seem fluid overloaded and will benefit from increased torsemide from a volume and kaliuretic standpoint.  Thank you for the consultation.  I will follow him.     SUBJECTIVE  Mr. Waddell is a 59 y.o. man with a history of hypertension, diabetes, coronary artery disease status post CABG, and chronic kidney disease admitted with hyperkalemia, consulted for this reason.  He had outpatient blood work that demonstrated a potassium of 6.1.  The potassium was 5.2 during the current presentation.  He has not been particularly compliant in the outpatient setting, though I do have a follow-up with him arranged at the end of this month.  I have not seen him in my office in quite some time.  He recently had a hospitalization at [facility] for cholecystitis.  He has a gallbladder tube in place.  He has no major pain.  He has no vomiting or diarrhea.  He does feel in general very weak.  He is prone to hyperkalemia.  About a month ago he had an outpatient reading of 5.7 that I tried to treat medically by increasing his torsemide dose.  He does not take any potassium supplements, does not take any renal and/angiotensin/aldosterone inhibitors.   He is not on mineralocorticoid receptor antagonist.  He is quite clear on this.  Difficult to tell what his baseline serum creatinine is, he has had repeated bouts of acute renal failure.  Creatinine recently in the outpatient setting was in the low fours, currently in the low threes.    PAST MEDICAL HISTORY:   Past Medical History:   Diagnosis Date    Above-knee amputation of left lower extremity (Multi)     Adverse effect of anesthesia     confusion    Anemia     CAD (coronary artery disease)     Carotid artery disease (CMS-HCC)     bilateral    CVA (cerebral vascular accident) (Multi) 2020    Depression     DM (diabetes mellitus) (Multi)     type 2 with neuropathy    GERD (gastroesophageal reflux disease)     High cholesterol     HTN (hypertension)     Leg ulcer (Multi)     chronic right lower extremity    Neuropathy     OA (osteoarthritis)     PAD (peripheral artery disease) (CMS-HCC)     Renal disorder      PAST SURGICAL HISTORY:   Past Surgical History:   Procedure Laterality Date    CARDIAC SURGERY      CABG 11/2023    CT ANGIO NECK  06/14/2021    CT NECK ANGIO W AND WO IV CONTRAST 6/14/2021 [facility]    CT HEAD ANGIO W AND WO IV CONTRAST  06/14/2021    CT HEAD ANGIO W AND WO IV CONTRAST 6/14/2021 [facility]    LEG SURGERY Right     MR HEAD ANGIO WO IV CONTRAST  12/11/2012    MR HEAD ANGIO WO IV CONTRAST [facility]    MR HEAD ANGIO WO IV CONTRAST  06/12/2021    MR HEAD ANGIO WO IV CONTRAST [facility]    MR HEAD ANGIO WO IV CONTRAST  05/25/2021    MR HEAD ANGIO WO IV CONTRAST [facility]    MR NECK ANGIO WO IV CONTRAST  07/18/2023    MR NECK ANGIO WO IV CONTRAST 7/18/2023 [facility] [id number] MRI    OTHER SURGICAL HISTORY  07/07/2021    Knee reconstruction    OTHER SURGICAL HISTORY  07/07/2021    Lower extremity amputation above knee    TOTAL SHOULDER ARTHROPLASTY Left 2020     FAMILY HISTORY:   Family History   Problem Relation Name Age of Onset    Other (cardiac  "disorder) Mother      Hypertension Mother      Esophageal cancer Mother      Hearing loss Father      Hypertension Father      Heart disease Father      COPD Sister       SOCIAL HISTORY:   Social History     Tobacco Use    Smoking status: Former     Types: Cigars     Quit date: 2022     Years since quittin.4    Smokeless tobacco: Never   Vaping Use    Vaping status: Never Used   Substance Use Topics    Alcohol use: Not Currently    Drug use: Not Currently     Types: Marijuana     MEDICATIONS:  aspirin, 81 mg, oral, Daily  atorvastatin, 80 mg, oral, Nightly  clopidogrel, 75 mg, oral, Daily  escitalopram, 10 mg, oral, Daily  heparin, 5,000 Units, subcutaneous, q12h  insulin glargine, 2 Units, subcutaneous, Nightly  metoprolol tartrate, 50 mg, oral, BID  pantoprazole, 40 mg, oral, Daily before breakfast  polyethylene glycol, 17 g, oral, Daily  torsemide, 40 mg, oral, Daily           PRN medications: acetaminophen **OR** acetaminophen **OR** acetaminophen, traMADol   CURRENT ALLERGIES:   Allergies as of 04/15/2024    (No Known Allergies)       COMPLETE REVIEW OF SYSTEMS:    Full ROS was negative unless mentioned above    OBJECTIVE  PHYSICAL EXAM  Heart Rate:  []   Temp:  [36.7 °C (98.1 °F)-37.2 °C (99 °F)]   Resp:  [14-20]   BP: ()/(46-73)   Height:  [193 cm (6' 4\")]   Weight:  [127 kg (280 lb)]   SpO2:  [94 %-98 %]    Body mass index is 34.08 kg/m².  Obese and unhealthy appearing white man  Pale skin  Hearing intact  Phonation intact  Moist mucosa  Normal S1/normal S2, midline sternotomy scar  Lungs are clear to auscultation bilaterally  Abdomen is soft, nondistended, nontender, positive bowel sounds  No Lee catheter in place, no suprapubic tenderness to palpation  Right lower extremity edema, left-sided BKA  Moves 4 limbs spontaneously  No obvious joint deformities  No lymphadenopathy  Appropriate affect    DATA:   Labs:  Results for orders placed or performed during the hospital encounter of " 04/15/24 (from the past 96 hour(s))   ECG 12 lead   Result Value Ref Range    Ventricular Rate 92 BPM    Atrial Rate 92 BPM    MD Interval 198 ms    QRS Duration 142 ms    QT Interval 404 ms    QTC Calculation(Bazett) 499 ms    P Axis 60 degrees    R Axis 101 degrees    T Axis 37 degrees    QRS Count 15 beats    Q Onset 191 ms    P Onset 92 ms    P Offset 148 ms    T Offset 393 ms    QTC Fredericia 465 ms   CBC and Auto Differential   Result Value Ref Range    WBC 9.2 4.4 - 11.3 x10*3/uL    nRBC 0.0 0.0 - 0.0 /100 WBCs    RBC 3.48 (L) 4.50 - 5.90 x10*6/uL    Hemoglobin 9.5 (L) 13.5 - 17.5 g/dL    Hematocrit 29.9 (L) 41.0 - 52.0 %    MCV 86 80 - 100 fL    MCH 27.3 26.0 - 34.0 pg    MCHC 31.8 (L) 32.0 - 36.0 g/dL    RDW 14.5 11.5 - 14.5 %    Platelets 325 150 - 450 x10*3/uL    Neutrophils % 66.0 40.0 - 80.0 %    Immature Granulocytes %, Automated 0.2 0.0 - 0.9 %    Lymphocytes % 17.6 13.0 - 44.0 %    Monocytes % 9.3 2.0 - 10.0 %    Eosinophils % 6.6 0.0 - 6.0 %    Basophils % 0.3 0.0 - 2.0 %    Neutrophils Absolute 6.08 1.20 - 7.70 x10*3/uL    Immature Granulocytes Absolute, Automated 0.02 0.00 - 0.70 x10*3/uL    Lymphocytes Absolute 1.62 1.20 - 4.80 x10*3/uL    Monocytes Absolute 0.86 0.10 - 1.00 x10*3/uL    Eosinophils Absolute 0.61 0.00 - 0.70 x10*3/uL    Basophils Absolute 0.03 0.00 - 0.10 x10*3/uL   Comprehensive metabolic panel   Result Value Ref Range    Glucose 216 (H) 65 - 99 mg/dL    Sodium 132 (L) 133 - 145 mmol/L    Potassium 5.2 (H) 3.4 - 5.1 mmol/L    Chloride 102 97 - 107 mmol/L    Bicarbonate 22 (L) 24 - 31 mmol/L    Urea Nitrogen 49 (H) 8 - 25 mg/dL    Creatinine 2.90 (H) 0.40 - 1.60 mg/dL    eGFR 24 (L) >60 mL/min/1.73m*2    Calcium 8.7 8.5 - 10.4 mg/dL    Albumin 3.6 3.5 - 5.0 g/dL    Alkaline Phosphatase 62 35 - 125 U/L    Total Protein 7.7 5.9 - 7.9 g/dL    AST 17 5 - 40 U/L    Bilirubin, Total 0.3 0.1 - 1.2 mg/dL    ALT 14 5 - 40 U/L    Anion Gap 8 <=19 mmol/L   Serial Troponin, Initial ([facility])    Result Value Ref Range    Troponin T, High Sensitivity 148 (HH) <=14 ng/L   Sars-CoV-2 and Influenza A/B PCR   Result Value Ref Range    Flu A Result Not Detected Not Detected    Flu B Result Not Detected Not Detected    Coronavirus 2019, PCR Not Detected Not Detected   ECG 12 lead   Result Value Ref Range    Ventricular Rate 91 BPM    Atrial Rate 91 BPM    HI Interval 186 ms    QRS Duration 148 ms    QT Interval 398 ms    QTC Calculation(Bazett) 489 ms    P Axis 50 degrees    R Axis 98 degrees    T Axis 47 degrees    QRS Count 15 beats    Q Onset 195 ms    P Onset 102 ms    P Offset 155 ms    T Offset 394 ms    QTC Fredericia 457 ms   POCT GLUCOSE   Result Value Ref Range    POCT Glucose 117 (H) 74 - 99 mg/dL   Urinalysis with Reflex Culture and Microscopic   Result Value Ref Range    Color, Urine Light-Yellow Light-Yellow, Yellow, Dark-Yellow    Appearance, Urine Clear Clear    Specific Gravity, Urine 1.009 1.005 - 1.035    pH, Urine 6.0 5.0, 5.5, 6.0, 6.5, 7.0, 7.5, 8.0    Protein, Urine 100 (2+) (A) NEGATIVE, 10 (TRACE), 20 (TRACE) mg/dL    Glucose, Urine Normal Normal mg/dL    Blood, Urine NEGATIVE NEGATIVE    Ketones, Urine NEGATIVE NEGATIVE mg/dL    Bilirubin, Urine NEGATIVE NEGATIVE    Urobilinogen, Urine Normal Normal mg/dL    Nitrite, Urine NEGATIVE NEGATIVE    Leukocyte Esterase, Urine NEGATIVE NEGATIVE   Urinalysis Microscopic   Result Value Ref Range    WBC, Urine 1-5 1-5, NONE /HPF    RBC, Urine 3-5 NONE, 1-2, 3-5 /HPF    Mucus, Urine FEW Reference range not established. /LPF   Serial Troponin, Initial ([facility])   Result Value Ref Range    Troponin T, High Sensitivity 153 (HH) <=14 ng/L   Basic metabolic panel   Result Value Ref Range    Glucose 142 (H) 65 - 99 mg/dL    Sodium 136 133 - 145 mmol/L    Potassium 4.7 3.4 - 5.1 mmol/L    Chloride 104 97 - 107 mmol/L    Bicarbonate 20 (L) 24 - 31 mmol/L    Urea Nitrogen 49 (H) 8 - 25 mg/dL    Creatinine 3.00 (H) 0.40 - 1.60 mg/dL    eGFR 23 (L) >60  mL/min/1.73m*2    Calcium 9.1 8.5 - 10.4 mg/dL    Anion Gap 12 <=19 mmol/L   Creatine Kinase   Result Value Ref Range    Creatine Kinase 95 24 - 195 U/L   POCT GLUCOSE   Result Value Ref Range    POCT Glucose 176 (H) 74 - 99 mg/dL   Creatine Kinase   Result Value Ref Range    Creatine Kinase 80 24 - 195 U/L   Basic Metabolic Panel   Result Value Ref Range    Glucose 205 (H) 65 - 99 mg/dL    Sodium 137 133 - 145 mmol/L    Potassium 4.8 3.4 - 5.1 mmol/L    Chloride 102 97 - 107 mmol/L    Bicarbonate 20 (L) 24 - 31 mmol/L    Urea Nitrogen 52 (H) 8 - 25 mg/dL    Creatinine 3.10 (H) 0.40 - 1.60 mg/dL    eGFR 22 (L) >60 mL/min/1.73m*2    Calcium 8.6 8.5 - 10.4 mg/dL    Anion Gap 15 <=19 mmol/L   POCT GLUCOSE   Result Value Ref Range    POCT Glucose 223 (H) 74 - 99 mg/dL   POCT GLUCOSE   Result Value Ref Range    POCT Glucose 175 (H) 74 - 99 mg/dL       SIGNATURE: Nestor Parsons MD PATIENT NAME: Humphrey Waddell   DATE: April 16, 2024 MRN: [id number]   TIME: 12:39 PM PAGER: [phone]

## 2024-04-16 NOTE — PROGRESS NOTES
"Humphrey Waddell is a 59 y.o. male on day 0 of admission presenting with Hyperkalemia.      Subjective   Pt seen and examined. Pt is confused and is tearful, he states \"she didn't come to  get me this  morning\" when asked  what he was referring  to pt thinks his wife was supposed to pick him up this morning. No other complaints voiced.        Objective     Last Recorded Vitals  /52 (BP Location: Right arm, Patient Position: Lying)   Pulse 88   Temp 37 °C (98.6 °F) (Oral)   Resp 18   Wt 127 kg (280 lb)   SpO2 98%   Intake/Output last 3 Shifts:    Intake/Output Summary (Last 24 hours) at 4/16/2024 1318  Last data filed at 4/16/2024 1200  Gross per 24 hour   Intake 50 ml   Output 625 ml   Net -575 ml       Admission Weight  Weight: 127 kg (280 lb) (04/15/24 2036)    Daily Weight  04/15/24 : 127 kg (280 lb)    Image Results      Physical Exam  Constitutional:       General: He is not in acute distress.     Appearance: He is obese. He is ill-appearing. He is not toxic-appearing.   Cardiovascular:      Rate and Rhythm: Normal rate and regular rhythm.      Pulses: Normal pulses.      Heart sounds: Normal heart sounds.   Pulmonary:      Effort: Pulmonary effort is normal. No respiratory distress.      Breath sounds: Normal breath sounds.   Abdominal:      General: Bowel sounds are normal.      Palpations: Abdomen is soft.   Neurological:      General: No focal deficit present.      Mental Status: He is alert. He is disoriented.         Relevant Results  Scheduled medications  aspirin, 81 mg, oral, Daily  atorvastatin, 80 mg, oral, Nightly  clopidogrel, 75 mg, oral, Daily  escitalopram, 10 mg, oral, Daily  heparin, 5,000 Units, subcutaneous, q12h  insulin glargine, 2 Units, subcutaneous, Nightly  metoprolol tartrate, 50 mg, oral, BID  pantoprazole, 40 mg, oral, Daily before breakfast  polyethylene glycol, 17 g, oral, Daily  sodium bicarbonate, 1,300 mg, oral, BID  [START ON 4/17/2024] torsemide, 60 mg, oral, " Daily      Continuous medications     PRN medications  PRN medications: acetaminophen **OR** acetaminophen **OR** acetaminophen, traMADol              Assessment/Plan                  Principal Problem:    Hyperkalemia    Hyperkalemia  -nephrology following  -monitor     CKD  -nephrology following  -renally dose medications  -hold nephrotoxic agents    Elevated troponin  -cardiology consult  -telemetry    DM II  -monitor blood glucose  -diabetic diet  -ISS      Plan  Above plan discussed with pt and he is in agreement            Leena Harrison, CONG-CNP

## 2024-04-16 NOTE — CONSULTS
Inpatient consult to Cardiology  Consult performed by: Edinson Burgess DO  Consult ordered by: Gaston Douglas MD  Reason for consult: Hyperkalemia, myocardial injury        History Of Present Illness:    Humphrey Waddell is a 59 y.o. male presenting with hyperkalemia.  He has atherosclerotic heart disease with history of NSTEMI with OM2 PCI in 5/23, CABG with vein graftx3 9(LIMA ->LAD, SVG ->OM1, SVG ->PDA in 11/23) complicated by pericardial tamponade requiring mediastinal exploration and repair of OM graft,  hyperlipidemia, hypertension, CKD.  His echocardiogram in November around the time of his revascularization showed an ejection fraction of 45 to 50% with anterolateral and apical wall motion abnormalities.  He presented at the end of March to [facility] with complaints of chest/upper abdominal pain and was ultimately diagnosed with acute cholecystitis.  He was transferred to [facility].  He was found acutely decompensated from a heart failure standpoint.  His echocardiogram this hospital stay actually shows recovery of left ventricular systolic dysfunction with an ejection fraction of 60%.  He was diuresed.  His hospital stay was also complicated by acute kidney injury on top of what looks to be stage IV chronic kidney disease as well as hypertensive urgency.  A cholecystostomy tube was placed with plans for outpatient reevaluation in 2 weeks.  He presents with hyperkalemia, serum potassium was 6.1 on outpatient evaluation.  He was aggressively treated in the emergency room with calcium gluconate, insulin and D50, albuterol, IV Lasix.  Serum potassium is reasonable on reassessment.  High-sensitivity troponins were drawn and are elevated to a similar degree as they were during his last hospital stay.  The trend is insignificant.  Kidney function is actually slightly improved in comparison to his prior hospitalization with a slight increase likely in response to IV Lasix.  His EKG shows sinus rhythm with a right  bundle branch block, old in comparison to prior studies.    Review of Systems   Constitutional: Negative for diaphoresis, fever, weight gain and weight loss.   Eyes:  Negative for visual disturbance.   Cardiovascular:  Negative for chest pain, claudication, dyspnea on exertion, irregular heartbeat, leg swelling, near-syncope, orthopnea, palpitations, paroxysmal nocturnal dyspnea and syncope.   Respiratory:  Negative for cough, shortness of breath and wheezing.    Musculoskeletal:  Negative for muscle weakness and myalgias.   Neurological:  Negative for dizziness and weakness.   All other systems reviewed and are negative.        Last Recorded Vitals:  Vitals:    04/16/24 0213 04/16/24 0230 04/16/24 0319 04/16/24 0738   BP: (!) 106/46 115/66 115/66 141/62   BP Location:   Right arm Right arm   Patient Position:   Lying Lying   Pulse: (!) 118 (!) 119 (!) 119 108   Resp: 16  16 20   Temp:  37.2 °C (99 °F) 37.2 °C (99 °F) 37.2 °C (99 °F)   TempSrc:  Temporal Temporal Oral   SpO2: 94% 95% 95% 97%   Weight:       Height:           Last Labs:  CBC - 4/15/2024:  9:05 PM  9.2 9.5 325    29.9      CMP - 4/16/2024:  4:15 AM  8.6 7.7 17 --- 0.3   3.6 3.6 14 62      PTT - 4/3/2024:  5:21 AM  1.1   12.5 30     Troponin I, High Sensitivity   Date/Time Value Ref Range Status   04/01/2024 06:02 AM 21 0 - 53 ng/L Final     Troponin I   Date/Time Value Ref Range Status   07/22/2023 05:28 AM 17 0 - 53 ng/L Final     Comment:     .  Less than 99th percentile of normal range cutoff-  Female and children under 18 years old <35 ng/L; Male <54 ng/L: Negative  Repeat testing should be performed if clinically indicated.   .  Female and children under 18 years old  ng/L; Male  ng/L:  Consistent with possible cardiac damage and possible increased clinical   risk. Serial measurements may help to assess extent of myocardial damage.   .  >120 ng/L: Consistent with cardiac damage, increased clinical risk and  myocardial infarction.  Serial measurements may help assess extent of   myocardial damage.   .   NOTE: Children less than 1 year old may have higher baseline troponin   levels and results should be interpreted in conjunction with the overall   clinical context.  .  NOTE: Troponin I testing is performed using a different   testing methodology at [facility] than at other   [facility]. Direct result comparisons should only   be made within the same method.     05/03/2023 10:40  (H) 0 - 53 ng/L Final     Comment:     .  Less than 99th percentile of normal range cutoff-  Female and children under 18 years old <35 ng/L; Male <54 ng/L: Negative  Repeat testing should be performed if clinically indicated.   .  Female and children under 18 years old  ng/L; Male  ng/L:  Consistent with possible cardiac damage and possible increased clinical   risk. Serial measurements may help to assess extent of myocardial damage.   .  >120 ng/L: Consistent with cardiac damage, increased clinical risk and  myocardial infarction. Serial measurements may help assess extent of   myocardial damage.   .   NOTE: Children less than 1 year old may have higher baseline troponin   levels and results should be interpreted in conjunction with the overall   clinical context.  .  NOTE: Troponin I testing is performed using a different   testing methodology at [facility] than at other   [facility]. Direct result comparisons should only   be made within the same method.     04/28/2023 03:42 AM 2,404 (H) 0 - 20 ng/L Final     Comment:     .  Less than 99th percentile of normal range cutoff-  Female and children under 18 years old <14 ng/L; Male <21 ng/L: Negative  Repeat testing should be performed if clinically indicated.   .  Female and children under 18 years old 14-50 ng/L; Male 21-50 ng/L:  Consistent with possible cardiac damage and possible increased clinical   risk. Serial measurements may help to assess extent of  myocardial damage.   .  >50 ng/L: Consistent with cardiac damage, increased clinical risk and  myocardial infarction. Serial measurements may help assess extent of   myocardial damage.   .   NOTE: Children less than 1 year old may have higher baseline troponin   levels and results should be interpreted in conjunction with the overall   clinical context.   .  NOTE: Troponin I testing is performed using a different   testing methodology at [facility] than at other   [facility]. Direct result comparisons should only   be made within the same method.  This is a critical result.    Per Laboratory policy, critical results for this test   only qualify to the call list once per 24 hours.        BNP   Date/Time Value Ref Range Status   04/01/2024 06:02  (H) 0 - 99 pg/mL Final   07/22/2023 05:28  (H) 0 - 99 pg/mL Final     Comment:     .  <100 pg/mL - Heart failure unlikely  100-299 pg/mL - Intermediate probability of acute heart  .               failure exacerbation. Correlate with clinical  .               context and patient history.    >=300 pg/mL - Heart Failure likely. Correlate with clinical  .               context and patient history.   Biotin interference may cause falsely decreased results.   Patients taking a Biotin dose of up to 5 mg/day should   refrain from taking Biotin for 24 hours before sample   collection. Providers may contact their local laboratory   for further information.     04/28/2023 12:59  (H) 0 - 99 pg/mL Final     Comment:     .  <100 pg/mL - Heart failure unlikely  100-299 pg/mL - Intermediate probability of acute heart  .               failure exacerbation. Correlate with clinical  .               context and patient history.    >=300 pg/mL - Heart Failure likely. Correlate with clinical  .               context and patient history.  BNP testing is performed using different testing   methodology at [facility] than at other   [facility].  "Direct result comparisons should   only be made within the same method.       Hemoglobin A1C   Date/Time Value Ref Range Status   03/28/2024 02:12 PM 6.1 (H) See below % Final   10/26/2023 02:14 PM 5.3 see below % Final     VLDL   Date/Time Value Ref Range Status   04/29/2023 06:54 AM 22 0 - 40 mg/dL Final   08/15/2022 07:00 AM 36 0 - 40 mg/dL Final   03/18/2022 06:23 AM 23 0 - 40 mg/dL Final      Last I/O:  I/O last 3 completed shifts:  In: 50 (0.4 mL/kg) [IV Piggyback:50]  Out: - (0 mL/kg)   Weight: 127 kg     Past Cardiology Tests (Last 3 Years):  EKG:  ECG 12 lead 04/15/2024 (Preliminary)      ECG 12 lead 04/15/2024 (Preliminary)      ECG 12 Lead 04/01/2024      ECG 12 lead 03/28/2024      ECG 12 lead 03/28/2024      ECG 12 lead (Ancillary Performed) 12/19/2023      Electrocardiogram, 12-lead PRN ACS symptoms 11/14/2023      Electrocardiogram, 12-lead PRN ACS symptoms 11/14/2023      ECG 12 lead (Clinic Performed) 10/26/2023    Echo:  Transthoracic Echo (TTE) Limited 04/03/2024      Transthoracic Echo (TTE) Complete 11/24/2023      Anesthesia Intraoperative Transesophageal Echocardiogram 11/13/2023      Anesthesia Intraoperative Transesophageal Echocardiogram 11/13/2023    Ejection Fractions:  No results found for: \"EF\"  Cath:  No results found for this or any previous visit from the past 1095 days.    Stress Test:  No results found for this or any previous visit from the past 1095 days.    Cardiac Imaging:  XR chest abdomen for OG NG placement 11/13/2023      Past Medical History:  He has a past medical history of Above-knee amputation of left lower extremity (Multi), Adverse effect of anesthesia, Anemia, CAD (coronary artery disease), Carotid artery disease ([facility]), CVA (cerebral vascular accident) (Multi) (2020), Depression, DM (diabetes mellitus) (Multi), GERD (gastroesophageal reflux disease), High cholesterol, HTN (hypertension), Leg ulcer (Multi), Neuropathy, OA (osteoarthritis), PAD (peripheral artery " disease) (CMS-HCC), and Renal disorder.    Past Surgical History:  He has a past surgical history that includes Other surgical history (07/07/2021); Other surgical history (07/07/2021); CT angio head w and wo IV contrast (06/14/2021); CT angio neck (06/14/2021); MR angio neck wo IV contrast (07/18/2023); MR angio head wo IV contrast (12/11/2012); MR angio head wo IV contrast (06/12/2021); MR angio head wo IV contrast (05/25/2021); Total shoulder arthroplasty (Left, 2020); Leg Surgery (Right); and Cardiac surgery.      Social History:  He reports that he quit smoking about 17 months ago. His smoking use included cigars. He has never used smokeless tobacco. He reports that he does not currently use alcohol. He reports that he does not currently use drugs after having used the following drugs: Marijuana.    Family History:  Family History   Problem Relation Name Age of Onset    Other (cardiac disorder) Mother      Hypertension Mother      Esophageal cancer Mother      Hearing loss Father      Hypertension Father      Heart disease Father      COPD Sister          Allergies:  Carvedilol    Inpatient Medications:  Scheduled medications   Medication Dose Route Frequency    aspirin  81 mg oral Daily    atorvastatin  80 mg oral Nightly    clopidogrel  75 mg oral Daily    escitalopram  10 mg oral Daily    heparin  5,000 Units subcutaneous q12h    insulin glargine  2 Units subcutaneous Nightly    metoprolol tartrate  50 mg oral BID    pantoprazole  40 mg oral Daily before breakfast    polyethylene glycol  17 g oral Daily    torsemide  40 mg oral Daily     PRN medications   Medication    acetaminophen    Or    acetaminophen    Or    acetaminophen    traMADol     Continuous Medications   Medication Dose Last Rate     Outpatient Medications:  Current Outpatient Medications   Medication Instructions    acetaminophen (Tylenol 8 HOUR) 650 mg ER tablet 1 tablet, oral, Every 8 hours PRN, Do not crush, chew, or split.    aspirin 81 mg  chewable tablet 1 tablet, oral, Daily    atorvastatin (LIPITOR) 80 mg, oral, Daily, Last rx prior to next refills    cholecalciferol (VITAMIN D3) 1,000 Units, oral, 2 times daily    clopidogrel (PLAVIX) 75 mg, oral, Daily    FreeStyle Suzy reader (FreeStyle Suzy 2 [place]) misc Use as instructed    FreeStyle Suzy sensor system (FreeStyle Suzy 2 Sensor) kit Use as instructed    hydrALAZINE (APRESOLINE) 100 mg, oral, 3 times daily, Patient is only taking BID.    insulin glargine (LANTUS SOLOSTAR U-100 INSULIN) 2 Units, subcutaneous, Nightly    insulin lispro (HUMALOG) 0-10 Units, subcutaneous, 3 times daily with meals, Take as directed per insulin instructions.    isosorbide dinitrate (ISORDIL) 20 mg, oral, 3 times daily (0900,1400,1900)    lidocaine 4 % patch 1 patch, transdermal, Daily, Remove & discard patch within 12 hours or as directed by MD.    melatonin 3 mg tablet 1 tablet, oral, Nightly    metoprolol tartrate (LOPRESSOR) 50 mg, oral, 2 times daily    NIFEdipine ER (ADALAT CC) 60 mg, oral, Daily before breakfast, Do not crush, chew, or split.    pantoprazole (PROTONIX) 40 mg, oral, Daily before breakfast, Do not crush, chew, or split.    polyethylene glycol (GLYCOLAX, MIRALAX) 17 g, oral, 2 times daily    torsemide (Demadex) 20 mg tablet Take 2 tablets daily    traMADol (ULTRAM) 50 mg, oral, Every 6 hours PRN       Physical Exam:   Gen: NAD   Neck: no JVD, carotid upstroke is brisk and without delay   Heart: rrr, s1s2+ no mrg   Lungs: CTA   Ext: warm no edema     Assessment/Plan   Hyperkalemia  Stage IV chronic kidney disease  Atherosclerotic heart disease  Ischemic cardiomyopathy  Chronic diastolic heart failure  Chronic myocardial injury  History of three-vessel CABG (LIMA-LAD, SVG-OM, SVG-PDA) 11/2023  History of cerebrovascular disease    4/16: I do not suspect that his troponin elevation represents an acute cardiac process such as acute coronary syndrome.  This is more likely reflective of chronic  myocardial injury in the setting of stage IV chronic kidney disease.  I do not think that he needs further IV diuresis and would plan on restarting an oral regimen tomorrow.  Dr. Parsons is his nephrologist and is involved, will defer this to him.  I would continue his high potency statin and dual antiplatelet therapy given his NSTEMI in November 2023.  Would add back his nifedipine and hydralazine if his blood pressure requires, isosorbide would be my last consideration as he is not complaining of any chest discomfort.  No further cardiac workup or intervention is suggested at this time.         Code Status:  Full Code    I spent 45 minutes in the professional and overall care of this patient.        Edinson Burgess, DO

## 2024-04-16 NOTE — PROGRESS NOTES
04/16/24 1625   Discharge Planning   Living Arrangements Spouse/significant other   Support Systems Spouse/significant other;Family members   Type of Residence Private residence   Home or Post Acute Services In home services   Type of Home Care Services Home health aide;Home nursing visits;Home PT   Patient expects to be discharged to: Home with  [facility] - to resume, will need an internal home care order.

## 2024-04-17 ENCOUNTER — PHARMACY VISIT (OUTPATIENT)
Dept: PHARMACY | Facility: CLINIC | Age: 59
End: 2024-04-17
Payer: MEDICAID

## 2024-04-17 VITALS
HEART RATE: 63 BPM | BODY MASS INDEX: 34.1 KG/M2 | WEIGHT: 280 LBS | TEMPERATURE: 98.6 F | OXYGEN SATURATION: 98 % | SYSTOLIC BLOOD PRESSURE: 145 MMHG | RESPIRATION RATE: 16 BRPM | HEIGHT: 76 IN | DIASTOLIC BLOOD PRESSURE: 58 MMHG

## 2024-04-17 LAB
ALBUMIN SERPL-MCNC: 3.1 G/DL (ref 3.5–5)
ANION GAP SERPL CALC-SCNC: 10 MMOL/L
BUN SERPL-MCNC: 57 MG/DL (ref 8–25)
CALCIUM SERPL-MCNC: 8.4 MG/DL (ref 8.5–10.4)
CHLORIDE SERPL-SCNC: 102 MMOL/L (ref 97–107)
CO2 SERPL-SCNC: 21 MMOL/L (ref 24–31)
CORTIS AM PEAK SERPL-MSCNC: 4.8 UG/DL (ref 5–20)
CREAT SERPL-MCNC: 3.1 MG/DL (ref 0.4–1.6)
EGFRCR SERPLBLD CKD-EPI 2021: 22 ML/MIN/1.73M*2
GLUCOSE BLD MANUAL STRIP-MCNC: 102 MG/DL (ref 74–99)
GLUCOSE BLD MANUAL STRIP-MCNC: 104 MG/DL (ref 74–99)
GLUCOSE SERPL-MCNC: 103 MG/DL (ref 65–99)
PHOSPHATE SERPL-MCNC: 4.3 MG/DL (ref 2.5–4.5)
POTASSIUM SERPL-SCNC: 5 MMOL/L (ref 3.4–5.1)
SODIUM SERPL-SCNC: 133 MMOL/L (ref 133–145)

## 2024-04-17 PROCEDURE — 2500000001 HC RX 250 WO HCPCS SELF ADMINISTERED DRUGS (ALT 637 FOR MEDICARE OP): Performed by: INTERNAL MEDICINE

## 2024-04-17 PROCEDURE — 2500000004 HC RX 250 GENERAL PHARMACY W/ HCPCS (ALT 636 FOR OP/ED): Performed by: INTERNAL MEDICINE

## 2024-04-17 PROCEDURE — 80069 RENAL FUNCTION PANEL: CPT | Performed by: INTERNAL MEDICINE

## 2024-04-17 PROCEDURE — G0378 HOSPITAL OBSERVATION PER HR: HCPCS

## 2024-04-17 PROCEDURE — 82947 ASSAY GLUCOSE BLOOD QUANT: CPT | Mod: 59

## 2024-04-17 PROCEDURE — 96372 THER/PROPH/DIAG INJ SC/IM: CPT | Performed by: INTERNAL MEDICINE

## 2024-04-17 PROCEDURE — 36415 COLL VENOUS BLD VENIPUNCTURE: CPT | Performed by: INTERNAL MEDICINE

## 2024-04-17 PROCEDURE — 82533 TOTAL CORTISOL: CPT | Mod: TRILAB,WESLAB | Performed by: INTERNAL MEDICINE

## 2024-04-17 PROCEDURE — RXMED WILLOW AMBULATORY MEDICATION CHARGE

## 2024-04-17 RX ORDER — SODIUM BICARBONATE 650 MG/1
1300 TABLET ORAL 2 TIMES DAILY
Qty: 120 TABLET | Refills: 0 | Status: SHIPPED | OUTPATIENT
Start: 2024-04-17 | End: 2024-06-11 | Stop reason: HOSPADM

## 2024-04-17 RX ORDER — POLYETHYLENE GLYCOL 3350 17 G/17G
17 POWDER, FOR SOLUTION ORAL EVERY 12 HOURS PRN
Start: 2024-04-17

## 2024-04-17 RX ORDER — TORSEMIDE 20 MG/1
60 TABLET ORAL DAILY
Qty: 3 TABLET | Refills: 0 | Status: SHIPPED | OUTPATIENT
Start: 2024-04-17

## 2024-04-17 RX ORDER — ESCITALOPRAM OXALATE 10 MG/1
10 TABLET ORAL DAILY
Qty: 30 TABLET | Refills: 0 | Status: SHIPPED | OUTPATIENT
Start: 2024-04-17 | End: 2024-05-20

## 2024-04-17 RX ADMIN — SODIUM BICARBONATE 650 MG TABLET 1300 MG: at 09:27

## 2024-04-17 RX ADMIN — PANTOPRAZOLE SODIUM 40 MG: 40 TABLET, DELAYED RELEASE ORAL at 06:29

## 2024-04-17 RX ADMIN — METOPROLOL TARTRATE 50 MG: 50 TABLET, FILM COATED ORAL at 09:28

## 2024-04-17 RX ADMIN — CLOPIDOGREL BISULFATE 75 MG: 75 TABLET ORAL at 09:28

## 2024-04-17 RX ADMIN — ASPIRIN 81 MG CHEWABLE TABLET 81 MG: 81 TABLET CHEWABLE at 09:28

## 2024-04-17 RX ADMIN — ESCITALOPRAM OXALATE 10 MG: 10 TABLET ORAL at 09:28

## 2024-04-17 RX ADMIN — HEPARIN SODIUM 5000 UNITS: 5000 INJECTION, SOLUTION INTRAVENOUS; SUBCUTANEOUS at 06:28

## 2024-04-17 RX ADMIN — TORSEMIDE 60 MG: 20 TABLET ORAL at 09:27

## 2024-04-17 ASSESSMENT — COGNITIVE AND FUNCTIONAL STATUS - GENERAL
WALKING IN HOSPITAL ROOM: A LITTLE
DAILY ACTIVITIY SCORE: 22
DAILY ACTIVITIY SCORE: 24
PERSONAL GROOMING: A LITTLE
MOBILITY SCORE: 22
CLIMB 3 TO 5 STEPS WITH RAILING: A LITTLE
EATING MEALS: A LITTLE

## 2024-04-17 ASSESSMENT — PAIN - FUNCTIONAL ASSESSMENT: PAIN_FUNCTIONAL_ASSESSMENT: 0-10

## 2024-04-17 ASSESSMENT — PAIN SCALES - GENERAL: PAINLEVEL_OUTOF10: 0 - NO PAIN

## 2024-04-17 NOTE — NURSING NOTE
This RN reviewed discharge paperwork with patient, wife and bedside and will transport patient home.

## 2024-04-17 NOTE — CARE PLAN
Problem: Skin  Goal: Decreased wound size/increased tissue granulation at next dressing change  Outcome: Progressing  Flowsheets (Taken 4/17/2024 0001)  Decreased wound size/increased tissue granulation at next dressing change: Promote sleep for wound healing     Problem: Skin  Goal: Participates in plan/prevention/treatment measures  Outcome: Progressing   The patient's goals for the shift include Get some rest    The clinical goals for the shift include monitor labs

## 2024-04-17 NOTE — PROGRESS NOTES
04/17/24 1334   Discharge Planning   Living Arrangements Spouse/significant other   Support Systems Spouse/significant other;Family members   Assistance Needed independent with some assistance   Type of Residence Private residence   Home or Post Acute Services In home services   Type of Home Care Services Home health aide;Home nursing visits;Home PT   Patient expects to be discharged to: Home and resume  Home Health Care   Does the patient need discharge transport arranged? No  (wife provided transport)     Patient is being discharge to home and resume  Home Care

## 2024-04-17 NOTE — PROGRESS NOTES
CONSULT PROGRESS NOTES    SERVICE DATE: 4/17/2024   SERVICE TIME: 11:19 AM    CONSULTING SERVICE: Nephrology    ASSESSMENT AND PLAN   59-year-old with numerous medical problems and recent gallbladder drain in place admitted with hyperkalemia.  1.  Hyperkalemia  2.  Acute renal failure  3.  Chronic kidney disease unspecified  4.  Acute metabolic acidosis  5.  Essential hypertension  6.  Fluid overload     Strange hyperkalemia, seems intermittent but can be severe.  Awaiting a.m. cortisol level.  He should go home on bicarbonate supplementation.  He is on no obvious medications that I see.  I would also use an increased torsemide dose of 60 mg daily.  Baseline creatinine unclear.  He does seem fluid overloaded and will benefit from increased torsemide from a volume and kaliuretic standpoint.  Case discussed with Leena Harrison CNP.  No objection to discharge.  I have outpatient follow-up with him in 2 weeks.    SUBJECTIVE  INTERVAL HPI: He has no acute issues, just feels chronically tired, fatigued, weak.    MEDICATIONS:  aspirin, 81 mg, oral, Daily  atorvastatin, 80 mg, oral, Nightly  clopidogrel, 75 mg, oral, Daily  escitalopram, 10 mg, oral, Daily  heparin, 5,000 Units, subcutaneous, q12h  insulin glargine, 2 Units, subcutaneous, Nightly  insulin lispro, 0-10 Units, subcutaneous, TID with meals  metoprolol tartrate, 50 mg, oral, BID  pantoprazole, 40 mg, oral, Daily before breakfast  polyethylene glycol, 17 g, oral, Daily  sodium bicarbonate, 1,300 mg, oral, BID  torsemide, 60 mg, oral, Daily           PRN medications: acetaminophen **OR** acetaminophen **OR** acetaminophen, dextrose, dextrose, glucagon, glucagon, traMADol     OBJECTIVE  PHYSICAL EXAM:   Heart Rate:  [52-88]   Temp:  [36.6 °C (97.9 °F)-37.3 °C (99.1 °F)]   Resp:  [16-20]   BP: (149-168)/(57-77)   SpO2:  [94 %-98 %]   Body mass index is 34.08 kg/m².  Obese and unhealthy appearing white man  Pale skin  Hearing intact  Phonation intact  Moist  mucosa  Normal S1/normal S2, midline sternotomy scar  Lungs are clear to auscultation bilaterally  Abdomen is soft, nondistended, nontender, positive bowel sounds  No Lee catheter in place, no suprapubic tenderness to palpation  Right lower extremity edema, left-sided BKA  Moves 4 limbs spontaneously  No obvious joint deformities  No lymphadenopathy  Appropriate affect    DATA:   Labs:  Results for orders placed or performed during the hospital encounter of 04/15/24 (from the past 96 hour(s))   ECG 12 lead   Result Value Ref Range    Ventricular Rate 92 BPM    Atrial Rate 92 BPM    NY Interval 198 ms    QRS Duration 142 ms    QT Interval 404 ms    QTC Calculation(Bazett) 499 ms    P Axis 60 degrees    R Axis 101 degrees    T Axis 37 degrees    QRS Count 15 beats    Q Onset 191 ms    P Onset 92 ms    P Offset 148 ms    T Offset 393 ms    QTC Fredericia 465 ms   CBC and Auto Differential   Result Value Ref Range    WBC 9.2 4.4 - 11.3 x10*3/uL    nRBC 0.0 0.0 - 0.0 /100 WBCs    RBC 3.48 (L) 4.50 - 5.90 x10*6/uL    Hemoglobin 9.5 (L) 13.5 - 17.5 g/dL    Hematocrit 29.9 (L) 41.0 - 52.0 %    MCV 86 80 - 100 fL    MCH 27.3 26.0 - 34.0 pg    MCHC 31.8 (L) 32.0 - 36.0 g/dL    RDW 14.5 11.5 - 14.5 %    Platelets 325 150 - 450 x10*3/uL    Neutrophils % 66.0 40.0 - 80.0 %    Immature Granulocytes %, Automated 0.2 0.0 - 0.9 %    Lymphocytes % 17.6 13.0 - 44.0 %    Monocytes % 9.3 2.0 - 10.0 %    Eosinophils % 6.6 0.0 - 6.0 %    Basophils % 0.3 0.0 - 2.0 %    Neutrophils Absolute 6.08 1.20 - 7.70 x10*3/uL    Immature Granulocytes Absolute, Automated 0.02 0.00 - 0.70 x10*3/uL    Lymphocytes Absolute 1.62 1.20 - 4.80 x10*3/uL    Monocytes Absolute 0.86 0.10 - 1.00 x10*3/uL    Eosinophils Absolute 0.61 0.00 - 0.70 x10*3/uL    Basophils Absolute 0.03 0.00 - 0.10 x10*3/uL   Comprehensive metabolic panel   Result Value Ref Range    Glucose 216 (H) 65 - 99 mg/dL    Sodium 132 (L) 133 - 145 mmol/L    Potassium 5.2 (H) 3.4 - 5.1 mmol/L     Chloride 102 97 - 107 mmol/L    Bicarbonate 22 (L) 24 - 31 mmol/L    Urea Nitrogen 49 (H) 8 - 25 mg/dL    Creatinine 2.90 (H) 0.40 - 1.60 mg/dL    eGFR 24 (L) >60 mL/min/1.73m*2    Calcium 8.7 8.5 - 10.4 mg/dL    Albumin 3.6 3.5 - 5.0 g/dL    Alkaline Phosphatase 62 35 - 125 U/L    Total Protein 7.7 5.9 - 7.9 g/dL    AST 17 5 - 40 U/L    Bilirubin, Total 0.3 0.1 - 1.2 mg/dL    ALT 14 5 - 40 U/L    Anion Gap 8 <=19 mmol/L   Serial Troponin, Initial ([facility])   Result Value Ref Range    Troponin T, High Sensitivity 148 (HH) <=14 ng/L   Sars-CoV-2 and Influenza A/B PCR   Result Value Ref Range    Flu A Result Not Detected Not Detected    Flu B Result Not Detected Not Detected    Coronavirus 2019, PCR Not Detected Not Detected   ECG 12 lead   Result Value Ref Range    Ventricular Rate 91 BPM    Atrial Rate 91 BPM    ME Interval 186 ms    QRS Duration 148 ms    QT Interval 398 ms    QTC Calculation(Bazett) 489 ms    P Axis 50 degrees    R Axis 98 degrees    T Axis 47 degrees    QRS Count 15 beats    Q Onset 195 ms    P Onset 102 ms    P Offset 155 ms    T Offset 394 ms    QTC Fredericia 457 ms   POCT GLUCOSE   Result Value Ref Range    POCT Glucose 117 (H) 74 - 99 mg/dL   Urinalysis with Reflex Culture and Microscopic   Result Value Ref Range    Color, Urine Light-Yellow Light-Yellow, Yellow, Dark-Yellow    Appearance, Urine Clear Clear    Specific Gravity, Urine 1.009 1.005 - 1.035    pH, Urine 6.0 5.0, 5.5, 6.0, 6.5, 7.0, 7.5, 8.0    Protein, Urine 100 (2+) (A) NEGATIVE, 10 (TRACE), 20 (TRACE) mg/dL    Glucose, Urine Normal Normal mg/dL    Blood, Urine NEGATIVE NEGATIVE    Ketones, Urine NEGATIVE NEGATIVE mg/dL    Bilirubin, Urine NEGATIVE NEGATIVE    Urobilinogen, Urine Normal Normal mg/dL    Nitrite, Urine NEGATIVE NEGATIVE    Leukocyte Esterase, Urine NEGATIVE NEGATIVE   Urinalysis Microscopic   Result Value Ref Range    WBC, Urine 1-5 1-5, NONE /HPF    RBC, Urine 3-5 NONE, 1-2, 3-5 /HPF    Mucus, Urine FEW  Reference range not established. /LPF   Serial Troponin, Initial ([facility])   Result Value Ref Range    Troponin T, High Sensitivity 153 (HH) <=14 ng/L   Basic metabolic panel   Result Value Ref Range    Glucose 142 (H) 65 - 99 mg/dL    Sodium 136 133 - 145 mmol/L    Potassium 4.7 3.4 - 5.1 mmol/L    Chloride 104 97 - 107 mmol/L    Bicarbonate 20 (L) 24 - 31 mmol/L    Urea Nitrogen 49 (H) 8 - 25 mg/dL    Creatinine 3.00 (H) 0.40 - 1.60 mg/dL    eGFR 23 (L) >60 mL/min/1.73m*2    Calcium 9.1 8.5 - 10.4 mg/dL    Anion Gap 12 <=19 mmol/L   Creatine Kinase   Result Value Ref Range    Creatine Kinase 95 24 - 195 U/L   POCT GLUCOSE   Result Value Ref Range    POCT Glucose 176 (H) 74 - 99 mg/dL   Creatine Kinase   Result Value Ref Range    Creatine Kinase 80 24 - 195 U/L   Basic Metabolic Panel   Result Value Ref Range    Glucose 205 (H) 65 - 99 mg/dL    Sodium 137 133 - 145 mmol/L    Potassium 4.8 3.4 - 5.1 mmol/L    Chloride 102 97 - 107 mmol/L    Bicarbonate 20 (L) 24 - 31 mmol/L    Urea Nitrogen 52 (H) 8 - 25 mg/dL    Creatinine 3.10 (H) 0.40 - 1.60 mg/dL    eGFR 22 (L) >60 mL/min/1.73m*2    Calcium 8.6 8.5 - 10.4 mg/dL    Anion Gap 15 <=19 mmol/L   POCT GLUCOSE   Result Value Ref Range    POCT Glucose 223 (H) 74 - 99 mg/dL   POCT GLUCOSE   Result Value Ref Range    POCT Glucose 175 (H) 74 - 99 mg/dL   POCT GLUCOSE   Result Value Ref Range    POCT Glucose 164 (H) 74 - 99 mg/dL   POCT GLUCOSE   Result Value Ref Range    POCT Glucose 134 (H) 74 - 99 mg/dL   Renal Function Panel   Result Value Ref Range    Glucose 103 (H) 65 - 99 mg/dL    Sodium 133 133 - 145 mmol/L    Potassium 5.0 3.4 - 5.1 mmol/L    Chloride 102 97 - 107 mmol/L    Bicarbonate 21 (L) 24 - 31 mmol/L    Urea Nitrogen 57 (H) 8 - 25 mg/dL    Creatinine 3.10 (H) 0.40 - 1.60 mg/dL    eGFR 22 (L) >60 mL/min/1.73m*2    Calcium 8.4 (L) 8.5 - 10.4 mg/dL    Phosphorus 4.3 2.5 - 4.5 mg/dL    Albumin 3.1 (L) 3.5 - 5.0 g/dL    Anion Gap 10 <=19 mmol/L   POCT GLUCOSE    Result Value Ref Range    POCT Glucose 102 (H) 74 - 99 mg/dL         SIGNATURE: Nestor Parsons MD PATIENT NAME: Humphrey Waddell   DATE: April 17, 2024 MRN: [id number]   TIME: 11:19 AM PAGER: [phone]

## 2024-04-17 NOTE — DISCHARGE SUMMARY
Discharge Diagnosis  Hyperkalemia    Issues Requiring Follow-Up  Hyperkalemia  Depression    Discharge Meds     Your medication list        START taking these medications        Instructions Last Dose Given Next Dose Due   escitalopram 10 mg tablet  Commonly known as: Lexapro      Take 1 tablet (10 mg) by mouth once daily.       sodium bicarbonate 650 mg tablet      Take 2 tablets (1,300 mg) by mouth 2 times a day.              CHANGE how you take these medications        Instructions Last Dose Given Next Dose Due   polyethylene glycol 17 gram packet  Commonly known as: Glycolax, Miralax  What changed:   when to take this  reasons to take this      Take 17 g by mouth every 12 hours if needed (constipation).       torsemide 60 mg tablet  What changed:   medication strength  how much to take  how to take this  when to take this  additional instructions      Take 60 mg by mouth once daily.              CONTINUE taking these medications        Instructions Last Dose Given Next Dose Due   acetaminophen 650 mg ER tablet  Commonly known as: Tylenol 8 HOUR           aspirin 81 mg chewable tablet           atorvastatin 80 mg tablet  Commonly known as: Lipitor      Take 1 tablet (80 mg) by mouth once daily. Last rx prior to next refills       clopidogrel 75 mg tablet  Commonly known as: Plavix      Take 1 tablet (75 mg) by mouth once daily.       FreeStyle Suzy 2 [facility]  Generic drug: flash glucose scanning reader      Use as instructed       FreeStyle Suzy 2 Sensor kit  Generic drug: flash glucose sensor kit      Use as instructed       insulin lispro 100 unit/mL injection  Commonly known as: HumaLOG      Inject 0-0.1 mL (0-10 Units) under the skin 3 times a day with meals. Take as directed per insulin instructions. Do not start before March 30, 2024.       isosorbide dinitrate 20 mg tablet  Commonly known as: Isordil      Take 1 tablet (20 mg) by mouth 3 times a day.       Lanluis antonio Solostar U-100 Insulin 100 unit/mL (3  mL) pen  Generic drug: insulin glargine           lidocaine 4 % patch      Place 1 patch over 12 hours on the skin once daily. Remove & discard patch within 12 hours or as directed by MD. Do not start before March 30, 2024.       melatonin 3 mg tablet           metoprolol tartrate 50 mg tablet  Commonly known as: Lopressor      Take 1 tablet by mouth 2 times a day.       NIFEdipine ER 60 mg 24 hr tablet  Commonly known as: Adalat CC      Take 1 tablet (60 mg) by mouth once daily in the morning. Take before meals. Do not crush, chew, or split. Do not start before April 9, 2024.       pantoprazole 40 mg EC tablet  Commonly known as: ProtoNix      Take 1 tablet (40 mg) by mouth once daily in the morning. Take before meals. Do not crush, chew, or split.       traMADol 50 mg tablet  Commonly known as: Ultram      Take 1 tablet (50 mg) by mouth every 6 hours if needed for severe pain (7 - 10).       Vitamin D3 25 MCG (1000 UT) tablet  Generic drug: cholecalciferol                  STOP taking these medications      hydrALAZINE 100 mg tablet  Commonly known as: Apresoline                  Where to Get Your Medications        These medications were sent to [facility]  [address], [address], [place] [address]      Hours: 9 AM to 6 PM Mon-Fri, 9 AM to 1 PM Sat Phone: [phone]   escitalopram 10 mg tablet  sodium bicarbonate 650 mg tablet  torsemide 60 mg tablet       Information about where to get these medications is not yet available    Ask your nurse or doctor about these medications  polyethylene glycol 17 gram packet         Test Results Pending At Discharge  Pending Labs       Order Current Status    Extra Urine Gray Tube Collected (04/16/24 0029)    CK isoenzymes In process    Cortisol AM In process    Troponin T Series, High Sensitivity (0, 2HR, 6HR) In process    Urinalysis with Reflex Culture and Microscopic In process            Hospital Course  HPI from admission  Humphrey Waddell is a 59 y.o.  male presenting with hyperkalemia.  He has chronic renal insufficiency and he takes torsemide every day.  He does not take any medications that should be raising his potassium but he does have chronic renal insufficiency and sees Dr. Jaylen vera.  He had a home care nurse draw labs yesterday and his potassium was 6.1.  Because of that he was told to come to the emergency room.  Repeat here showed potassium of 5.2.  Despite that almost normal level he got some fairly aggressive treatment in the ER including calcium gluconate insulin dextrose, albuterol some IV fluids and a shot of Lasix.,  Repeat potassium after that was 4.9.  However after all that he did have some low blood pressure.  So has high cardiac enzymes.  He has had chest pain ever since November when he had CABG.  There is nothing new about his chest pain today.  2 weeks ago he was sent to [facility] for acute cholecystitis.  Cholecystostomy tube was placed instead of doing a cholecystectomy.  At that time his cardiac enzymes were elevated as well and I think that might of had something to do with them opting for this less invasive procedure.  In the last couple weeks he has had decreasing strength decreasing mobility..  He is suffered the loss of 2 of his brothers within the last year as well as some major medical problems and he has not been his normal self in terms of his personality for many months.  Presents with a very sad quiet affect which is not his normal personality but it is how he has been in recent months     During hospital course patient was seen by nephrology.  He has a scheduled appointment with nephrology on an outpatient basis per Dr. Parsons.  His hyperkalemia has resolved.  He has been started on bicarb and increased dose of torsemide.  Patient has also been started on Lexapro for his depression.  He will need to follow-up with the PCP I have discussed this at length with him.  Patient was receiving  home health  care prior to admission and will continue upon discharge.    Case and plan discussed collaborating physician    Pertinent Physical Exam At Time of Discharge  Physical Exam  Constitutional:       General: He is not in acute distress.     Appearance: He is obese. He is ill-appearing. He is not toxic-appearing.   Cardiovascular:      Rate and Rhythm: Normal rate and regular rhythm.      Pulses: Normal pulses.      Heart sounds: Normal heart sounds.   Pulmonary:      Effort: Pulmonary effort is normal.      Breath sounds: Normal breath sounds.   Abdominal:      General: Bowel sounds are normal.      Palpations: Abdomen is soft.   Skin:     General: Skin is warm.   Neurological:      General: No focal deficit present.      Mental Status: He is alert and oriented to person, place, and time. Mental status is at baseline.         Outpatient Follow-Up  Future Appointments   Date Time Provider Department Center   4/18/2024 To Be Determined Sera Richter RN [facility]   4/19/2024 To Be Determined Yandy Goel [facility]   4/19/2024 11:00 AM Donis Willis PT [facility]   4/22/2024 To Be Determined Sera Richter RN [facility]   4/23/2024 To Be Determined Yandy Goel [facility]   4/25/2024 To Be Determined Sera Richter RN [facility]   4/26/2024 To Be Determined Yandy Goel [facility]   4/29/2024 To Be Determined Sera Richter RN [facility]   4/30/2024 To Be Determined Yandy Goel [facility]   5/2/2024 To Be Determined Sera Richter RN [facility]   5/3/2024 To Be Determined Yandy Goel [facility]   5/6/2024 11:00 AM Mily Leung, DO [id number] [facility]   5/7/2024 To Be Determined Sera Richter RN [facility]   5/8/2024 10:30 AM KE[facility]ON [id number] [id number]   5/9/2024 To Be Determined Sera Richter RN [facility]   5/13/2024 To Be Determined Sera Richter RN [facility]   5/20/2024 To Be Determined Sera Richter RN [facility]   5/27/2024 To Be Determined Sera Richter RN [facility]   6/3/2024 To Be Determined Sera Richter RN [facility]   6/4/2024  8:20 AM Gabriel Higgins MD [id number] [facility]   10/11/2024  8:00 AM GEA VASC 1 GEAVSC [facility]    10/11/2024  9:00 AM Agusto Elkins MD [id number] [facility]         Leena Harrison, APRN-CNP

## 2024-04-17 NOTE — PROGRESS NOTES
Pt screen based on CHF admitting dx from ED. No active CHF noted by cardiology at this time. Will defer assessment at this time.      Please consult dietitian if pt's nutrition status declines during this admission.

## 2024-04-17 NOTE — PROGRESS NOTES
Music Therapy Note    Humphrey Waddell     Therapy Session  Referral Type: New referral this admission  Visit Type: New visit  Session Start Time: 0957  Intervention Delivery: In-person  Conflict of Service: Asleep               Treatment/Interventions            Narrative  Follow-up: MT will follow-up if Pt remains admitted.    Education Documentation  No documentation found.

## 2024-04-18 ENCOUNTER — PATIENT OUTREACH (OUTPATIENT)
Dept: PRIMARY CARE | Facility: CLINIC | Age: 59
End: 2024-04-18
Payer: COMMERCIAL

## 2024-04-18 DIAGNOSIS — E87.5 HYPERKALEMIA: ICD-10-CM

## 2024-04-18 NOTE — PROGRESS NOTES
Discharge Facility: [facility]   Discharge Diagnosis: Hyperkalemia  Admission Date: 4/16/2024  Discharge Date: 4/17/2024    PCP Appointment Date: 5/2/2024 12:40  Specialist Appointment Date: Dr. Nestor Parsons, Nephrology and Dorothy Qiu, Cardiology to be scheduled,   5/8/2024 10:30 General Surgery post-op  Hospital Encounter and Summary: Linked   See discharge assessment below for further details    Engagement  Call Start Time: 1529 (4/18/2024 10:10 AM)    Medications  Medications reviewed with patient/caregiver?: Yes (Reviewed with spouse. NEW: escitalopram and sodium bicarbonate CHANGED: Torsemide increased and Miralax decreased to as needed STOPPED: Hydralazine) (4/18/2024 10:10 AM)  Is the patient having any side effects they believe may be caused by any medication additions or changes?: No (4/18/2024 10:10 AM)  Does the patient have all medications ordered at discharge?: Yes (4/18/2024 10:10 AM)  Care Management Interventions: No intervention needed (4/18/2024 10:10 AM)    Appointments  Does the patient have a primary care provider?: Yes (4/18/2024 10:10 AM)  Care Management Interventions: Verified appointment date/time/provider (4/18/2024 10:10 AM)  Has the patient kept scheduled appointments due by today?: Not applicable (4/18/2024 10:10 AM)    Self Management  What is the home health agency?: [facility] (4/18/2024 10:10 AM)  Has home health visited the patient within 72 hours of discharge?: Yes (Start of care scheduled for 4/19/2024) (4/18/2024 10:10 AM)  Home Health Interventions: -- (N/A) (4/18/2024 10:10 AM)  What Durable Medical Equipment (DME) was ordered?: N/A (4/18/2024 10:10 AM)  Has all Durable Medical Equipment (DME) been delivered?: -- (N/A) (4/18/2024 10:10 AM)  DME Interventions: -- (N/A) (4/18/2024 10:10 AM)    Patient Teaching  Does the patient have access to their discharge instructions?: Yes (4/18/2024 10:10 AM)  Care Management Interventions: Reviewed instructions with patient (4/18/2024  10:10 AM)  What is the patient's perception of their health status since discharge?: Improving (Per spouse, patient is feeling better. His condition has returned to baseline.) (4/18/2024 10:10 AM)  Is the patient/caregiver able to teach back the hierarchy of who to call/visit for symptoms/problems? PCP, Specialist, Home Health nurse, Urgent Care, ED, 911: Yes (4/18/2024 10:10 AM)    Wrap Up  Wrap Up Additional Comments: 59yoM above-knee amputation of left lower extremity, anemia, CAD, CVA, depression, DM, GERD, HTN, neuropathy, PAD, CKD admitted for hyperkalemia. Cardiology and nephrology consulted. Patient discharged to home with [facility] to resume services. Rx's for escitalopram and sodium bicarbonate given. Torsemide increased. Hydralazine stopped. Miralax decreased to as needed. (4/18/2024 10:10 AM)          .

## 2024-04-19 ENCOUNTER — HOME CARE VISIT (OUTPATIENT)
Dept: HOME HEALTH SERVICES | Facility: HOME HEALTH | Age: 59
End: 2024-04-19
Payer: COMMERCIAL

## 2024-04-19 LAB
ATRIAL RATE: 91 BPM
ATRIAL RATE: 92 BPM
CK BB CFR SERPL ELPH: 0 % (ref 0–0)
CK MACRO1 CFR SERPL: 0 % (ref 0–0)
CK MACRO2 CFR SERPL: 0 % (ref 0–0)
CK MB CFR SERPL ELPH: 0 % (ref 0–4)
CK MM CFR SERPL ELPH: 100 % (ref 96–100)
CK SERPL-CCNC: 74 U/L (ref 39–308)
P AXIS: 50 DEGREES
P AXIS: 60 DEGREES
P OFFSET: 148 MS
P OFFSET: 155 MS
P ONSET: 102 MS
P ONSET: 92 MS
PR INTERVAL: 186 MS
PR INTERVAL: 198 MS
Q ONSET: 191 MS
Q ONSET: 195 MS
QRS COUNT: 15 BEATS
QRS COUNT: 15 BEATS
QRS DURATION: 142 MS
QRS DURATION: 148 MS
QT INTERVAL: 398 MS
QT INTERVAL: 404 MS
QTC CALCULATION(BAZETT): 489 MS
QTC CALCULATION(BAZETT): 499 MS
QTC FREDERICIA: 457 MS
QTC FREDERICIA: 465 MS
R AXIS: 101 DEGREES
R AXIS: 98 DEGREES
T AXIS: 37 DEGREES
T AXIS: 47 DEGREES
T OFFSET: 393 MS
T OFFSET: 394 MS
VENTRICULAR RATE: 91 BPM
VENTRICULAR RATE: 92 BPM

## 2024-04-19 PROCEDURE — G0180 MD CERTIFICATION HHA PATIENT: HCPCS | Performed by: SURGERY

## 2024-04-19 PROCEDURE — G0151 HHCP-SERV OF PT,EA 15 MIN: HCPCS

## 2024-04-19 PROCEDURE — G0299 HHS/HOSPICE OF RN EA 15 MIN: HCPCS

## 2024-04-19 ASSESSMENT — ENCOUNTER SYMPTOMS
LOWEST PAIN SEVERITY IN PAST 24 HOURS: 6/10
PAIN LOCATION - PAIN SEVERITY: 8/10
PAIN LOCATION: CHEST
HIGHEST PAIN SEVERITY IN PAST 24 HOURS: 8/10
PAIN: 1
SUBJECTIVE PAIN PROGRESSION: GRADUALLY IMPROVING
PAIN SEVERITY GOAL: 5/10
PERSON REPORTING PAIN: PATIENT

## 2024-04-19 NOTE — HOME HEALTH
"Nurse present for session upon my arrival.  Patient seated on couch with Hoveround nearby.  New Dog Lisette near the patient. I just got my prosthetic.  I am going to the [facility].    Transfer:  Sit pivot transfer from couch to mechanical or power wheelchair with no more than supervision of one.  Patient reports that he does this on his own \"all of the time.\"    Patient invited me to a back bedroom where he showed his sock, sleeve and prosthetic.  I offered to observe him don and doff it but, he explained, he would \"rather not\" and simply wanted to demonstrate his ability to get around his environment and plan to return to plans to attend outpatient P.T. whose plans were in place prior to recent medical challenges, especially the two day stay at the Emergency Room due to potassium abnormalities."

## 2024-04-20 ENCOUNTER — APPOINTMENT (OUTPATIENT)
Dept: HOME HEALTH SERVICES | Facility: HOME HEALTH | Age: 59
End: 2024-04-20
Payer: COMMERCIAL

## 2024-04-20 VITALS
DIASTOLIC BLOOD PRESSURE: 60 MMHG | OXYGEN SATURATION: 96 % | HEART RATE: 102 BPM | RESPIRATION RATE: 20 BRPM | SYSTOLIC BLOOD PRESSURE: 98 MMHG | TEMPERATURE: 98.5 F

## 2024-04-20 ASSESSMENT — ENCOUNTER SYMPTOMS
PAIN LOCATION: CHEST
LOWEST PAIN SEVERITY IN PAST 24 HOURS: 6/10
PAIN LOCATION - PAIN SEVERITY: 8/10
PAIN SEVERITY GOAL: 5/10
DIZZINESS: 1
DEPRESSED MOOD: 1
HIGHEST PAIN SEVERITY IN PAST 24 HOURS: 8/10
APPETITE LEVEL: POOR
LAST BOWEL MOVEMENT: 66948

## 2024-04-20 ASSESSMENT — ACTIVITIES OF DAILY LIVING (ADL)
TRANSPORTATION: DEPENDENT
PHYSICAL TRANSFERS ASSESSED: 1
CURRENT_FUNCTION: STAND BY ASSIST
TRANSPORTATION ASSESSED: 1

## 2024-04-22 ENCOUNTER — HOME CARE VISIT (OUTPATIENT)
Dept: HOME HEALTH SERVICES | Facility: HOME HEALTH | Age: 59
End: 2024-04-22
Payer: COMMERCIAL

## 2024-04-22 VITALS
HEART RATE: 67 BPM | OXYGEN SATURATION: 97 % | RESPIRATION RATE: 20 BRPM | SYSTOLIC BLOOD PRESSURE: 140 MMHG | TEMPERATURE: 98.6 F | DIASTOLIC BLOOD PRESSURE: 86 MMHG

## 2024-04-22 PROCEDURE — G0299 HHS/HOSPICE OF RN EA 15 MIN: HCPCS

## 2024-04-22 ASSESSMENT — ENCOUNTER SYMPTOMS
PAIN: 1
MUSCLE WEAKNESS: 1
PAIN LOCATION - EXACERBATING FACTORS: ANYTHING
FATIGUES EASILY: 1
PAIN LOCATION - PAIN SEVERITY: 7/10
LIMITED RANGE OF MOTION: 1
SUBJECTIVE PAIN PROGRESSION: UNCHANGED
PAIN LOCATION: ABDOMEN
PERSON REPORTING PAIN: PATIENT
DESCRIPTION OF MEMORY LOSS: IMMEDIATE
PAIN LOCATION - PAIN FREQUENCY: CONSTANT
LAST BOWEL MOVEMENT: 66952
DESCRIPTION OF MEMORY LOSS: SHORT TERM
PAIN LOCATION: CHEST
CHANGE IN APPETITE: DECREASED
HIGHEST PAIN SEVERITY IN PAST 24 HOURS: 7/10
DEPRESSED MOOD: 1
PAIN LOCATION - PAIN SEVERITY: 7/10
FATIGUE: 1
APPETITE LEVEL: POOR
PAIN SEVERITY GOAL: 0/10
PAIN LOCATION - PAIN DURATION: CONTINUOUS
LOWEST PAIN SEVERITY IN PAST 24 HOURS: 5/10
PAIN LOCATION - EXACERBATING FACTORS: ANYTHING
PAIN LOCATION - PAIN FREQUENCY: CONSTANT
PAIN LOCATION - PAIN DURATION: CONTINUOUS
STOOL FREQUENCY: LESS THAN DAILY
FORGETFULNESS: 1

## 2024-04-22 ASSESSMENT — ACTIVITIES OF DAILY LIVING (ADL)
CURRENT_FUNCTION: STAND BY ASSIST
PREPARING MEALS: NEEDS ASSISTANCE
TOILETING: CONTACT GUARD ASSIST
DRESSING_LB_CURRENT_FUNCTION: MINIMUM ASSIST
ORAL_CARE_ASSESSED: 1
GROOMING_CURRENT_FUNCTION: CONTACT GUARD ASSIST
DRESSING_UB_CURRENT_FUNCTION: CONTACT GUARD ASSIST
PHYSICAL TRANSFERS ASSESSED: 1
ORAL_CARE_CURRENT_FUNCTION: INDEPENDENT
TOILETING: 1
BATHING_CURRENT_FUNCTION: ONE PERSON
GROOMING ASSESSED: 1
BATHING ASSESSED: 1

## 2024-04-23 ENCOUNTER — HOME CARE VISIT (OUTPATIENT)
Dept: HOME HEALTH SERVICES | Facility: HOME HEALTH | Age: 59
End: 2024-04-23
Payer: COMMERCIAL

## 2024-04-24 ENCOUNTER — LAB (OUTPATIENT)
Dept: LAB | Facility: LAB | Age: 59
End: 2024-04-24
Payer: COMMERCIAL

## 2024-04-24 ENCOUNTER — HOME CARE VISIT (OUTPATIENT)
Dept: HOME HEALTH SERVICES | Facility: HOME HEALTH | Age: 59
End: 2024-04-24
Payer: COMMERCIAL

## 2024-04-24 ENCOUNTER — OFFICE VISIT (OUTPATIENT)
Dept: PRIMARY CARE | Facility: CLINIC | Age: 59
End: 2024-04-24
Payer: COMMERCIAL

## 2024-04-24 VITALS
OXYGEN SATURATION: 98 % | RESPIRATION RATE: 16 BRPM | DIASTOLIC BLOOD PRESSURE: 63 MMHG | SYSTOLIC BLOOD PRESSURE: 124 MMHG | HEART RATE: 57 BPM

## 2024-04-24 DIAGNOSIS — E87.5 HYPERKALEMIA: ICD-10-CM

## 2024-04-24 DIAGNOSIS — Z09 HOSPITAL DISCHARGE FOLLOW-UP: ICD-10-CM

## 2024-04-24 DIAGNOSIS — F33.0 MILD EPISODE OF RECURRENT MAJOR DEPRESSIVE DISORDER (CMS-HCC): ICD-10-CM

## 2024-04-24 DIAGNOSIS — Z95.1 S/P CABG (CORONARY ARTERY BYPASS GRAFT): ICD-10-CM

## 2024-04-24 DIAGNOSIS — Z09 HOSPITAL DISCHARGE FOLLOW-UP: Primary | ICD-10-CM

## 2024-04-24 LAB
ALBUMIN SERPL BCP-MCNC: 3.3 G/DL (ref 3.4–5)
ANION GAP SERPL CALC-SCNC: 15 MMOL/L (ref 10–20)
BUN SERPL-MCNC: 74 MG/DL (ref 6–23)
CALCIUM SERPL-MCNC: 8.3 MG/DL (ref 8.6–10.3)
CHLORIDE SERPL-SCNC: 101 MMOL/L (ref 98–107)
CO2 SERPL-SCNC: 26 MMOL/L (ref 21–32)
CREAT SERPL-MCNC: 3.54 MG/DL (ref 0.5–1.3)
EGFRCR SERPLBLD CKD-EPI 2021: 19 ML/MIN/1.73M*2
GLUCOSE SERPL-MCNC: 137 MG/DL (ref 74–99)
PHOSPHATE SERPL-MCNC: 3.8 MG/DL (ref 2.5–4.9)
POTASSIUM SERPL-SCNC: 4.4 MMOL/L (ref 3.5–5.3)
SODIUM SERPL-SCNC: 138 MMOL/L (ref 136–145)

## 2024-04-24 PROCEDURE — 3074F SYST BP LT 130 MM HG: CPT | Performed by: STUDENT IN AN ORGANIZED HEALTH CARE EDUCATION/TRAINING PROGRAM

## 2024-04-24 PROCEDURE — 3044F HG A1C LEVEL LT 7.0%: CPT | Performed by: STUDENT IN AN ORGANIZED HEALTH CARE EDUCATION/TRAINING PROGRAM

## 2024-04-24 PROCEDURE — 3078F DIAST BP <80 MM HG: CPT | Performed by: STUDENT IN AN ORGANIZED HEALTH CARE EDUCATION/TRAINING PROGRAM

## 2024-04-24 PROCEDURE — 3060F POS MICROALBUMINURIA REV: CPT | Performed by: STUDENT IN AN ORGANIZED HEALTH CARE EDUCATION/TRAINING PROGRAM

## 2024-04-24 PROCEDURE — 3008F BODY MASS INDEX DOCD: CPT | Performed by: STUDENT IN AN ORGANIZED HEALTH CARE EDUCATION/TRAINING PROGRAM

## 2024-04-24 PROCEDURE — 99213 OFFICE O/P EST LOW 20 MIN: CPT | Performed by: STUDENT IN AN ORGANIZED HEALTH CARE EDUCATION/TRAINING PROGRAM

## 2024-04-24 PROCEDURE — 36415 COLL VENOUS BLD VENIPUNCTURE: CPT

## 2024-04-24 PROCEDURE — 80069 RENAL FUNCTION PANEL: CPT

## 2024-04-24 RX ORDER — LIDOCAINE 560 MG/1
1 PATCH PERCUTANEOUS; TOPICAL; TRANSDERMAL DAILY
Qty: 24 PATCH | Refills: 3 | Status: SHIPPED | OUTPATIENT
Start: 2024-04-24

## 2024-04-24 ASSESSMENT — PATIENT HEALTH QUESTIONNAIRE - PHQ9
1. LITTLE INTEREST OR PLEASURE IN DOING THINGS: NOT AT ALL
2. FEELING DOWN, DEPRESSED OR HOPELESS: NOT AT ALL
SUM OF ALL RESPONSES TO PHQ9 QUESTIONS 1 AND 2: 0

## 2024-04-24 NOTE — PROGRESS NOTES
Subjective   Patient ID: Humphrey Waddell is a 59 y.o. male who presents for Follow-up (Pt is here for a hospital follow up at [facility].).    Patient presents for hospital follow-up from [facility] from 4/15/2024 to 4/17/2024 for hyperkalemia and depression.    Patient was started on escitalopram 10 mg daily and sodium bicarbonate  Torsemide increased to 60 mg daily  Sodium bicarbonate started at 1300 mg twice daily  Nifedipine 60 mg daily added    Patient's GFR is down to 22 with a creatinine of 3.10    Exiting potassium was 5.0 we will have a renal function testing done today    Nephrology appointment tomorrow AM 8:40     General surgery appointment fu 5/8/24    Lee in place       Objective   Physical Exam  Constitutional:       Appearance: Normal appearance. He is obese.   Cardiovascular:      Rate and Rhythm: Normal rate and regular rhythm.      Heart sounds: No murmur heard.  Pulmonary:      Effort: Pulmonary effort is normal. No respiratory distress.      Breath sounds: Normal breath sounds. No wheezing.   Musculoskeletal:      Cervical back: Neck supple.      Left lower leg: No edema.   Neurological:      Mental Status: He is alert.         Assessment/Plan   Diagnoses and all orders for this visit:  Hospital discharge follow-up  -     Renal function panel; Future  Mild episode of recurrent major depressive disorder (CMS-HCC)  -     Referral to Psychology; Future  Hyperkalemia  -     Renal function panel; Future  S/P CABG (coronary artery bypass graft)  -     lidocaine 4 % patch; Place 1 patch over 12 hours on the skin once daily. Remove & discard patch within 12 hours or as directed by MD.    Hospital Discharge follow up.   Patient presents for hospital follow-up from [facility] from 4/15/2024 to 4/17/2024 for hyperkalemia and depression.  Med rec completed   Continue  escitalopram 10 mg daily and sodium bicarbonate  Torsemide increased to 60 mg daily  Sodium bicarbonate started at  1300 mg twice daily  Nifedipine 60 mg daily added  Patient's GFR is down to 22 with a creatinine of 3.10  Exiting potassium was 5.0 we will have a renal function testing done today  Nephrology appointment tomorrow AM 8:40   General surgery appointment fu 5/8/24  Jesus in place   The need for PT inquired due to pt continued imbalance with his prosthetic.          Mily Leung,  04/24/24 11:49 AM

## 2024-04-25 ENCOUNTER — HOME CARE VISIT (OUTPATIENT)
Dept: HOME HEALTH SERVICES | Facility: HOME HEALTH | Age: 59
End: 2024-04-25
Payer: COMMERCIAL

## 2024-04-25 DIAGNOSIS — I63.9 CEREBROVASCULAR ACCIDENT (CVA), UNSPECIFIED MECHANISM (MULTI): Primary | ICD-10-CM

## 2024-04-25 DIAGNOSIS — I50.33 ACUTE ON CHRONIC DIASTOLIC HEART FAILURE (MULTI): ICD-10-CM

## 2024-04-25 DIAGNOSIS — E11.40 TYPE 2 DIABETES MELLITUS WITH DIABETIC NEUROPATHY, WITHOUT LONG-TERM CURRENT USE OF INSULIN (MULTI): ICD-10-CM

## 2024-04-25 PROCEDURE — G0299 HHS/HOSPICE OF RN EA 15 MIN: HCPCS

## 2024-04-25 ASSESSMENT — ENCOUNTER SYMPTOMS
LAST BOWEL MOVEMENT: 66954
DENIES PAIN: 1
PERSON REPORTING PAIN: PATIENT
APPETITE LEVEL: POOR

## 2024-04-26 ENCOUNTER — HOME CARE VISIT (OUTPATIENT)
Dept: HOME HEALTH SERVICES | Facility: HOME HEALTH | Age: 59
End: 2024-04-26
Payer: COMMERCIAL

## 2024-04-26 VITALS
DIASTOLIC BLOOD PRESSURE: 86 MMHG | TEMPERATURE: 98.2 F | OXYGEN SATURATION: 96 % | SYSTOLIC BLOOD PRESSURE: 122 MMHG | HEART RATE: 66 BPM | RESPIRATION RATE: 20 BRPM

## 2024-04-26 VITALS
RESPIRATION RATE: 18 BRPM | SYSTOLIC BLOOD PRESSURE: 132 MMHG | DIASTOLIC BLOOD PRESSURE: 78 MMHG | OXYGEN SATURATION: 97 % | HEART RATE: 52 BPM | TEMPERATURE: 97.3 F

## 2024-04-26 PROCEDURE — G0157 HHC PT ASSISTANT EA 15: HCPCS | Mod: CQ

## 2024-04-26 ASSESSMENT — ACTIVITIES OF DAILY LIVING (ADL)
AMBULATION ASSISTANCE: NON-AMBULATORY
CURRENT_FUNCTION: STAND BY ASSIST

## 2024-04-26 ASSESSMENT — ENCOUNTER SYMPTOMS
PERSON REPORTING PAIN: PATIENT
LOWER EXTREMITY EDEMA: 1
FORGETFULNESS: 1
DESCRIPTION OF MEMORY LOSS: SHORT TERM
FATIGUES EASILY: 1
FATIGUE: 1
DESCRIPTION OF MEMORY LOSS: LONG TERM
DENIES PAIN: 1
DEPRESSED MOOD: 1

## 2024-04-28 DIAGNOSIS — E11.69 TYPE 2 DIABETES MELLITUS WITH OTHER SPECIFIED COMPLICATION, WITHOUT LONG-TERM CURRENT USE OF INSULIN (MULTI): ICD-10-CM

## 2024-04-28 RX ORDER — FLASH GLUCOSE SENSOR
KIT MISCELLANEOUS
Qty: 6 EACH | Refills: 0 | Status: SHIPPED | OUTPATIENT
Start: 2024-04-28

## 2024-04-29 ENCOUNTER — HOME CARE VISIT (OUTPATIENT)
Dept: HOME HEALTH SERVICES | Facility: HOME HEALTH | Age: 59
End: 2024-04-29
Payer: COMMERCIAL

## 2024-04-29 ENCOUNTER — TELEPHONE (OUTPATIENT)
Dept: PRIMARY CARE | Facility: CLINIC | Age: 59
End: 2024-04-29
Payer: COMMERCIAL

## 2024-04-29 VITALS
OXYGEN SATURATION: 94 % | TEMPERATURE: 98.6 F | SYSTOLIC BLOOD PRESSURE: 112 MMHG | RESPIRATION RATE: 20 BRPM | HEART RATE: 58 BPM | DIASTOLIC BLOOD PRESSURE: 58 MMHG

## 2024-04-29 VITALS
OXYGEN SATURATION: 97 % | DIASTOLIC BLOOD PRESSURE: 72 MMHG | TEMPERATURE: 97.4 F | RESPIRATION RATE: 18 BRPM | SYSTOLIC BLOOD PRESSURE: 124 MMHG | HEART RATE: 74 BPM

## 2024-04-29 DIAGNOSIS — R26.89 POOR BALANCE: Primary | ICD-10-CM

## 2024-04-29 PROCEDURE — G0299 HHS/HOSPICE OF RN EA 15 MIN: HCPCS

## 2024-04-29 PROCEDURE — G0157 HHC PT ASSISTANT EA 15: HCPCS | Mod: CQ

## 2024-04-29 ASSESSMENT — ENCOUNTER SYMPTOMS
FORGETFULNESS: 1
DESCRIPTION OF MEMORY LOSS: SHORT TERM
SUBJECTIVE PAIN PROGRESSION: UNCHANGED
FATIGUE: 1
DEPRESSED MOOD: 1
PAIN LOCATION - PAIN QUALITY: CRAMPY
STOOL FREQUENCY: LESS THAN DAILY
PAIN: 1
CRAMPS: 1
PAIN SEVERITY GOAL: 0/10
DIZZINESS: 1
PAIN LOCATION - RELIEVING FACTORS: LAXATIVE
PAIN LOCATION - PAIN SEVERITY: 3/10
PAIN LOCATION - EXACERBATING FACTORS: CONSTIPATION
CONSTIPATION: 1
APPETITE LEVEL: GOOD
LAST BOWEL MOVEMENT: 66959
PAIN LOCATION - PAIN FREQUENCY: CONSTANT
PAIN LOCATION - PAIN SEVERITY: 5/10
PAIN: 1
PAIN LOCATION: ABDOMEN
NAUSEA: 1
DESCRIPTION OF MEMORY LOSS: IMMEDIATE
PAIN LOCATION - PAIN QUALITY: PRESSURE
PAIN LOCATION - EXACERBATING FACTORS: CONSTIPATION
FATIGUES EASILY: 1
LOWEST PAIN SEVERITY IN PAST 24 HOURS: 3/10
ABDOMINAL PAIN: 1
PAIN LOCATION: ABDOMEN
PAIN SEVERITY GOAL: 0/10
CHANGE IN APPETITE: INCREASED
HIGHEST PAIN SEVERITY IN PAST 24 HOURS: 3/10
PAIN LOCATION - RELIEVING FACTORS: HAVING A BM
PERSON REPORTING PAIN: PATIENT
PAIN LOCATION - PAIN DURATION: CONSTANT
LOWEST PAIN SEVERITY IN PAST 24 HOURS: 0/10
PAIN LOCATION - PAIN FREQUENCY: CONSTANT
HIGHEST PAIN SEVERITY IN PAST 24 HOURS: 5/10
PERSON REPORTING PAIN: PATIENT
LOWER EXTREMITY EDEMA: 1

## 2024-04-30 ENCOUNTER — HOME CARE VISIT (OUTPATIENT)
Dept: HOME HEALTH SERVICES | Facility: HOME HEALTH | Age: 59
End: 2024-04-30
Payer: COMMERCIAL

## 2024-04-30 PROCEDURE — G0156 HHCP-SVS OF AIDE,EA 15 MIN: HCPCS

## 2024-05-02 ENCOUNTER — APPOINTMENT (OUTPATIENT)
Dept: PRIMARY CARE | Facility: CLINIC | Age: 59
End: 2024-05-02
Payer: COMMERCIAL

## 2024-05-02 ENCOUNTER — HOME CARE VISIT (OUTPATIENT)
Dept: HOME HEALTH SERVICES | Facility: HOME HEALTH | Age: 59
End: 2024-05-02
Payer: COMMERCIAL

## 2024-05-02 VITALS
SYSTOLIC BLOOD PRESSURE: 116 MMHG | OXYGEN SATURATION: 94 % | RESPIRATION RATE: 18 BRPM | TEMPERATURE: 97.7 F | HEART RATE: 54 BPM | DIASTOLIC BLOOD PRESSURE: 80 MMHG

## 2024-05-02 PROCEDURE — G0299 HHS/HOSPICE OF RN EA 15 MIN: HCPCS

## 2024-05-02 ASSESSMENT — ENCOUNTER SYMPTOMS
PAIN LOCATION - EXACERBATING FACTORS: CONSTIPATION
FORGETFULNESS: 1
FATIGUE: 1
FATIGUES EASILY: 1
PERSON REPORTING PAIN: PATIENT
DESCRIPTION OF MEMORY LOSS: LONG TERM
APPETITE LEVEL: POOR
STOOL FREQUENCY: LESS THAN DAILY
PAIN LOCATION: ABDOMEN
LAST BOWEL MOVEMENT: 66958
PAIN LOCATION - PAIN SEVERITY: 3/10
DESCRIPTION OF MEMORY LOSS: SHORT TERM
HIGHEST PAIN SEVERITY IN PAST 24 HOURS: 3/10
LOWEST PAIN SEVERITY IN PAST 24 HOURS: 3/10
PAIN SEVERITY GOAL: 0/10
PAIN LOCATION - PAIN FREQUENCY: CONSTANT
PAIN: 1
DEPRESSION: 1
ABDOMINAL PAIN: 1
LIMITED RANGE OF MOTION: 1
PAIN LOCATION - PAIN QUALITY: ACHY
SUBJECTIVE PAIN PROGRESSION: UNCHANGED
DEPRESSED MOOD: 1
CHANGE IN APPETITE: ABSENT
MUSCLE WEAKNESS: 1
DESCRIPTION OF MEMORY LOSS: IMMEDIATE
CONSTIPATION: 1
PAIN LOCATION - PAIN DURATION: CONTINUOUS

## 2024-05-02 ASSESSMENT — ACTIVITIES OF DAILY LIVING (ADL)
AMBULATION ASSISTANCE: ONE PERSON
CURRENT_FUNCTION: ONE PERSON

## 2024-05-03 ENCOUNTER — HOME CARE VISIT (OUTPATIENT)
Dept: HOME HEALTH SERVICES | Facility: HOME HEALTH | Age: 59
End: 2024-05-03
Payer: COMMERCIAL

## 2024-05-03 PROCEDURE — G0156 HHCP-SVS OF AIDE,EA 15 MIN: HCPCS

## 2024-05-06 ENCOUNTER — HOME CARE VISIT (OUTPATIENT)
Dept: HOME HEALTH SERVICES | Facility: HOME HEALTH | Age: 59
End: 2024-05-06
Payer: COMMERCIAL

## 2024-05-06 ENCOUNTER — APPOINTMENT (OUTPATIENT)
Dept: PRIMARY CARE | Facility: CLINIC | Age: 59
End: 2024-05-06
Payer: COMMERCIAL

## 2024-05-06 VITALS
RESPIRATION RATE: 18 BRPM | OXYGEN SATURATION: 97 % | SYSTOLIC BLOOD PRESSURE: 124 MMHG | DIASTOLIC BLOOD PRESSURE: 76 MMHG | HEART RATE: 62 BPM | TEMPERATURE: 97.3 F

## 2024-05-06 VITALS
SYSTOLIC BLOOD PRESSURE: 166 MMHG | RESPIRATION RATE: 18 BRPM | TEMPERATURE: 98 F | OXYGEN SATURATION: 97 % | HEART RATE: 60 BPM | DIASTOLIC BLOOD PRESSURE: 60 MMHG

## 2024-05-06 PROCEDURE — G0157 HHC PT ASSISTANT EA 15: HCPCS | Mod: CQ

## 2024-05-06 PROCEDURE — G0299 HHS/HOSPICE OF RN EA 15 MIN: HCPCS

## 2024-05-06 ASSESSMENT — ENCOUNTER SYMPTOMS
CHANGE IN APPETITE: DECREASED
PAIN LOCATION - EXACERBATING FACTORS: CONSTIPATION
PAIN LOCATION - PAIN FREQUENCY: INTERMITTENT
SUBJECTIVE PAIN PROGRESSION: UNCHANGED
PERSON REPORTING PAIN: PATIENT
DESCRIPTION OF MEMORY LOSS: LONG TERM
LIMITED RANGE OF MOTION: 1
PAIN LOCATION: ABDOMEN
FORGETFULNESS: 1
PAIN SEVERITY GOAL: 0/10
BOWEL INCONTINENCE: 1
PERSON REPORTING PAIN: PATIENT
HIGHEST PAIN SEVERITY IN PAST 24 HOURS: 4/10
PAIN LOCATION - PAIN DURATION: INTERMITTENT
DESCRIPTION OF MEMORY LOSS: IMMEDIATE
FATIGUE: 1
PAIN LOCATION - PAIN SEVERITY: 4/10
CONSTIPATION: 1
FATIGUES EASILY: 1
PAIN LOCATION - PAIN QUALITY: SHARP
HYPERTENSION: 1
DENIES PAIN: 1
LOWER EXTREMITY EDEMA: 1
LOWEST PAIN SEVERITY IN PAST 24 HOURS: 4/10
LAST BOWEL MOVEMENT: 66962
HEADACHES: 1
STOOL FREQUENCY: LESS THAN DAILY
PAIN: 1
MUSCLE WEAKNESS: 1
DEPRESSED MOOD: 1
DESCRIPTION OF MEMORY LOSS: SHORT TERM
APPETITE LEVEL: POOR

## 2024-05-06 ASSESSMENT — ACTIVITIES OF DAILY LIVING (ADL)
AMBULATION ASSISTANCE: NON-AMBULATORY
CURRENT_FUNCTION: ONE PERSON

## 2024-05-07 ENCOUNTER — HOME CARE VISIT (OUTPATIENT)
Dept: HOME HEALTH SERVICES | Facility: HOME HEALTH | Age: 59
End: 2024-05-07
Payer: COMMERCIAL

## 2024-05-07 DIAGNOSIS — E11.69 TYPE 2 DIABETES MELLITUS WITH OTHER SPECIFIED COMPLICATION, WITHOUT LONG-TERM CURRENT USE OF INSULIN (MULTI): Primary | ICD-10-CM

## 2024-05-07 DIAGNOSIS — I10 ESSENTIAL (PRIMARY) HYPERTENSION: ICD-10-CM

## 2024-05-07 DIAGNOSIS — Z95.1 S/P CABG (CORONARY ARTERY BYPASS GRAFT): ICD-10-CM

## 2024-05-07 RX ORDER — NIFEDIPINE 60 MG/1
60 TABLET, FILM COATED, EXTENDED RELEASE ORAL
Qty: 90 TABLET | Refills: 1 | Status: SHIPPED | OUTPATIENT
Start: 2024-05-07 | End: 2024-11-03

## 2024-05-07 RX ORDER — GABAPENTIN 300 MG/1
300 CAPSULE ORAL 3 TIMES DAILY
Qty: 90 CAPSULE | Refills: 5 | Status: SHIPPED | OUTPATIENT
Start: 2024-05-07 | End: 2024-11-03

## 2024-05-07 RX ORDER — PANTOPRAZOLE SODIUM 40 MG/1
40 TABLET, DELAYED RELEASE ORAL
Qty: 90 TABLET | Refills: 1 | Status: SHIPPED | OUTPATIENT
Start: 2024-05-07 | End: 2024-11-03

## 2024-05-07 RX ORDER — ISOSORBIDE DINITRATE 20 MG/1
20 TABLET ORAL
Qty: 270 TABLET | Refills: 1 | Status: SHIPPED | OUTPATIENT
Start: 2024-05-07 | End: 2024-11-03

## 2024-05-08 ENCOUNTER — PATIENT OUTREACH (OUTPATIENT)
Dept: PRIMARY CARE | Facility: CLINIC | Age: 59
End: 2024-05-08

## 2024-05-08 ENCOUNTER — APPOINTMENT (OUTPATIENT)
Dept: SURGERY | Facility: CLINIC | Age: 59
End: 2024-05-08
Payer: COMMERCIAL

## 2024-05-08 NOTE — PROGRESS NOTES
Call regarding appt. With Dr. Leung after hospitalization. Spoke with patient's spouse. Per spouse, the patient seems to be doing better. He is ready to transition to outpatient PT for gait training and will be discharging from [facility] soon. No questions or concerns for Dr. Leung at this time.

## 2024-05-09 ENCOUNTER — HOME CARE VISIT (OUTPATIENT)
Dept: HOME HEALTH SERVICES | Facility: HOME HEALTH | Age: 59
End: 2024-05-09
Payer: COMMERCIAL
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
## 2024-05-10 ENCOUNTER — HOME CARE VISIT (OUTPATIENT)
Dept: HOME HEALTH SERVICES | Facility: HOME HEALTH | Age: 59
End: 2024-05-10
Payer: COMMERCIAL

## 2024-05-10 PROCEDURE — G0151 HHCP-SERV OF PT,EA 15 MIN: HCPCS

## 2024-05-10 SDOH — HEALTH STABILITY: PHYSICAL HEALTH

## 2024-05-10 SDOH — HEALTH STABILITY: PHYSICAL HEALTH: EXERCISE COMMENTS: DE.  HE HAS BECOME INDEPENDENT WITH THEM.

## 2024-05-10 SDOH — HEALTH STABILITY: PHYSICAL HEALTH
EXERCISE COMMENTS: D MAX ASSIST OF ONE FOR THE FIRST TRIAL BUT THEN MODERATE ASSIST OF ONE FOR THE TRIALS AFTER THAT.    DISCUSSION OF HOME EXERCISE PROGRAM.  PATIENT REPORTS THAT HE INDEED DID SOME BASIC CIRCULATION, ROM AND STRENTHENING DURING THE VISITS THAT WERE MA

## 2024-05-10 ASSESSMENT — ENCOUNTER SYMPTOMS
PAIN LOCATION - RELIEVING FACTORS: REST
SUBJECTIVE PAIN PROGRESSION: GRADUALLY IMPROVING
LOWEST PAIN SEVERITY IN PAST 24 HOURS: 5/10
PAIN LOCATION: LEFT SHOULDER
PAIN LOCATION - PAIN SEVERITY: 8/10
PAIN: 1
PERSON REPORTING PAIN: PATIENT
PAIN LOCATION - PAIN SEVERITY: 8/10
PAIN SEVERITY GOAL: 0/10
PAIN LOCATION - EXACERBATING FACTORS: WEATHER, ARTHRITIS
HIGHEST PAIN SEVERITY IN PAST 24 HOURS: 8/10
PAIN LOCATION: RIGHT SHOULDER
PAIN LOCATION - RELIEVING FACTORS: REST
PAIN LOCATION - EXACERBATING FACTORS: WEATHER, ARTHRITIS

## 2024-05-10 ASSESSMENT — ACTIVITIES OF DAILY LIVING (ADL)
AMBULATION ASSISTANCE: 1
TRANSPORTATION ASSESSED: 1
AMBULATION ASSISTANCE: ONE PERSON
TRANSPORTATION: NEEDS ASSISTANCE

## 2024-05-10 NOTE — HOME HEALTH
Bowel blockage resolved within the past few days, I am busy trying to get a therapeutic dose of medicine to control the bowels.   I got all of my new stuff for my leg yesterday.  Sock, boot itself.  It is the last one I can get.  I have not put it on yet.    It takes 3 weeks to get approved.  My doctor has to send an order and then Oleg to get coordinated.  It will take those few weeks.    Miyl Leung, DO Family Medicine    Standing.  x 2    48 seconds, 69 seconds each time.  Patient needed max assist of one for the first trial but then moderate assist of one for the trials after that.    Discussion of home exercise program.  Patient reports that he indeed did some basic circulation, ROM and strenthening during the visits that were made.  He has become independent with them.

## 2024-05-13 ENCOUNTER — HOME CARE VISIT (OUTPATIENT)
Dept: HOME HEALTH SERVICES | Facility: HOME HEALTH | Age: 59
End: 2024-05-13
Payer: COMMERCIAL

## 2024-05-13 PROCEDURE — G0299 HHS/HOSPICE OF RN EA 15 MIN: HCPCS

## 2024-05-13 PROCEDURE — 0023 HH SOC

## 2024-05-14 VITALS
TEMPERATURE: 98.1 F | HEART RATE: 56 BPM | DIASTOLIC BLOOD PRESSURE: 74 MMHG | OXYGEN SATURATION: 92 % | SYSTOLIC BLOOD PRESSURE: 170 MMHG | RESPIRATION RATE: 18 BRPM

## 2024-05-14 ASSESSMENT — ENCOUNTER SYMPTOMS
LOWEST PAIN SEVERITY IN PAST 24 HOURS: 4/10
HEADACHES: 1
PAIN LOCATION - PAIN FREQUENCY: CONSTANT
CHANGE IN APPETITE: UNCHANGED
DEPRESSION: 1
HYPERTENSION: 1
PAIN LOCATION - PAIN DURATION: CONTINUOUS
PAIN LOCATION - PAIN DURATION: CONTINUOUS
PAIN LOCATION: RIGHT SHOULDER
FATIGUE: 1
APPETITE LEVEL: POOR
DESCRIPTION OF MEMORY LOSS: SHORT TERM
CHEST PAIN QUALITY: ACHING
PAIN LOCATION - PAIN SEVERITY: 8/10
LIMITED RANGE OF MOTION: 1
MUSCLE WEAKNESS: 1
PAIN LOCATION - PAIN SEVERITY: 8/10
HIGHEST PAIN SEVERITY IN PAST 24 HOURS: 10/10
SUBJECTIVE PAIN PROGRESSION: UNCHANGED
PAIN SEVERITY GOAL: 0/10
PAIN: 1
FATIGUES EASILY: 1
PAIN LOCATION: LEFT SHOULDER
PAIN LOCATION - PAIN FREQUENCY: CONSTANT
PERSON REPORTING PAIN: PATIENT
DESCRIPTION OF MEMORY LOSS: IMMEDIATE
LAST BOWEL MOVEMENT: 66971
DEPRESSED MOOD: 1
DESCRIPTION OF MEMORY LOSS: LONG TERM
FORGETFULNESS: 1

## 2024-05-14 ASSESSMENT — ACTIVITIES OF DAILY LIVING (ADL)
AMBULATION ASSISTANCE: NON-AMBULATORY
CURRENT_FUNCTION: ONE PERSON

## 2024-05-15 ENCOUNTER — HOME CARE VISIT (OUTPATIENT)
Dept: HOME HEALTH SERVICES | Facility: HOME HEALTH | Age: 59
End: 2024-05-15
Payer: COMMERCIAL

## 2024-05-15 PROCEDURE — G0156 HHCP-SVS OF AIDE,EA 15 MIN: HCPCS

## 2024-05-17 DIAGNOSIS — F32.A DEPRESSION, UNSPECIFIED DEPRESSION TYPE: ICD-10-CM

## 2024-05-20 DIAGNOSIS — N17.9 AKI (ACUTE KIDNEY INJURY) (CMS-HCC): ICD-10-CM

## 2024-05-20 RX ORDER — ESCITALOPRAM OXALATE 10 MG/1
10 TABLET ORAL DAILY
Qty: 90 TABLET | Refills: 1 | Status: SHIPPED | OUTPATIENT
Start: 2024-05-20

## 2024-05-20 RX ORDER — SODIUM BICARBONATE 650 MG/1
1300 TABLET ORAL 2 TIMES DAILY
Qty: 360 TABLET | Refills: 1 | Status: CANCELLED | OUTPATIENT
Start: 2024-05-20 | End: 2024-11-16

## 2024-05-21 ENCOUNTER — HOME CARE VISIT (OUTPATIENT)
Dept: HOME HEALTH SERVICES | Facility: HOME HEALTH | Age: 59
End: 2024-05-21
Payer: COMMERCIAL

## 2024-05-21 VITALS
DIASTOLIC BLOOD PRESSURE: 78 MMHG | SYSTOLIC BLOOD PRESSURE: 130 MMHG | OXYGEN SATURATION: 95 % | TEMPERATURE: 98.3 F | HEART RATE: 63 BPM | RESPIRATION RATE: 18 BRPM

## 2024-05-21 PROCEDURE — G0299 HHS/HOSPICE OF RN EA 15 MIN: HCPCS

## 2024-05-21 ASSESSMENT — ENCOUNTER SYMPTOMS
FATIGUES EASILY: 1
LOWER EXTREMITY EDEMA: 1
FORGETFULNESS: 1
DESCRIPTION OF MEMORY LOSS: SHORT TERM
SKIN LESIONS: 1
DESCRIPTION OF MEMORY LOSS: LONG TERM
FATIGUE: 1
LIMITED RANGE OF MOTION: 1
DEPRESSED MOOD: 1
MUSCLE WEAKNESS: 1
LAST BOWEL MOVEMENT: 66980
APPETITE LEVEL: FAIR
CHANGE IN APPETITE: VARYING

## 2024-05-21 ASSESSMENT — ACTIVITIES OF DAILY LIVING (ADL)
CURRENT_FUNCTION: ONE PERSON
AMBULATION ASSISTANCE: ONE PERSON

## 2024-05-22 ENCOUNTER — CLINICAL SUPPORT (OUTPATIENT)
Dept: SURGERY | Facility: CLINIC | Age: 59
End: 2024-05-22
Payer: COMMERCIAL

## 2024-05-22 ENCOUNTER — CLINICAL SUPPORT (OUTPATIENT)
Dept: EMERGENCY MEDICINE | Facility: HOSPITAL | Age: 59
End: 2024-05-22
Payer: COMMERCIAL

## 2024-05-22 ENCOUNTER — HOSPITAL ENCOUNTER (INPATIENT)
Facility: HOSPITAL | Age: 59
LOS: 17 days | Discharge: NURSING, REHAB OR LONG-TERM CARE | End: 2024-06-11
Attending: EMERGENCY MEDICINE | Admitting: SURGERY
Payer: COMMERCIAL

## 2024-05-22 ENCOUNTER — HOME CARE VISIT (OUTPATIENT)
Dept: HOME HEALTH SERVICES | Facility: HOME HEALTH | Age: 59
End: 2024-05-22
Payer: COMMERCIAL

## 2024-05-22 ENCOUNTER — APPOINTMENT (OUTPATIENT)
Dept: RADIOLOGY | Facility: HOSPITAL | Age: 59
End: 2024-05-22
Payer: COMMERCIAL

## 2024-05-22 VITALS
BODY MASS INDEX: 30.44 KG/M2 | WEIGHT: 250 LBS | RESPIRATION RATE: 16 BRPM | HEIGHT: 76 IN | HEART RATE: 59 BPM | SYSTOLIC BLOOD PRESSURE: 121 MMHG | DIASTOLIC BLOOD PRESSURE: 71 MMHG

## 2024-05-22 DIAGNOSIS — R07.9 CHEST PAIN, UNSPECIFIED TYPE: ICD-10-CM

## 2024-05-22 DIAGNOSIS — K81.9 CHOLECYSTITIS: Primary | ICD-10-CM

## 2024-05-22 DIAGNOSIS — Z13.6 ENCOUNTER FOR SCREENING FOR CARDIOVASCULAR DISORDERS: ICD-10-CM

## 2024-05-22 DIAGNOSIS — R94.31 PROLONGED QT INTERVAL: ICD-10-CM

## 2024-05-22 DIAGNOSIS — R10.11 RIGHT UPPER QUADRANT ABDOMINAL PAIN: Primary | ICD-10-CM

## 2024-05-22 DIAGNOSIS — E11.69 TYPE 2 DIABETES MELLITUS WITH OTHER SPECIFIED COMPLICATION, WITHOUT LONG-TERM CURRENT USE OF INSULIN (MULTI): Primary | ICD-10-CM

## 2024-05-22 DIAGNOSIS — K81.9 CHOLECYSTITIS: ICD-10-CM

## 2024-05-22 LAB
ABO GROUP (TYPE) IN BLOOD: NORMAL
ALBUMIN SERPL BCP-MCNC: 3.2 G/DL (ref 3.4–5)
ALP SERPL-CCNC: 58 U/L (ref 33–136)
ALT SERPL W P-5'-P-CCNC: 13 U/L (ref 10–52)
ANION GAP SERPL CALC-SCNC: 15 MMOL/L (ref 10–20)
ANTIBODY SCREEN: NORMAL
APTT PPP: 25 SECONDS (ref 27–38)
AST SERPL W P-5'-P-CCNC: 22 U/L (ref 9–39)
BASOPHILS # BLD AUTO: 0.05 X10*3/UL (ref 0–0.1)
BASOPHILS NFR BLD AUTO: 0.5 %
BILIRUB SERPL-MCNC: 0.4 MG/DL (ref 0–1.2)
BUN SERPL-MCNC: 65 MG/DL (ref 6–23)
CALCIUM SERPL-MCNC: 7 MG/DL (ref 8.6–10.6)
CARDIAC TROPONIN I PNL SERPL HS: 19 NG/L (ref 0–53)
CHLORIDE SERPL-SCNC: 101 MMOL/L (ref 98–107)
CO2 SERPL-SCNC: 24 MMOL/L (ref 21–32)
CREAT SERPL-MCNC: 3.18 MG/DL (ref 0.5–1.3)
EGFRCR SERPLBLD CKD-EPI 2021: 21 ML/MIN/1.73M*2
EOSINOPHIL # BLD AUTO: 0.43 X10*3/UL (ref 0–0.7)
EOSINOPHIL NFR BLD AUTO: 4.7 %
ERYTHROCYTE [DISTWIDTH] IN BLOOD BY AUTOMATED COUNT: 13.8 % (ref 11.5–14.5)
GLUCOSE SERPL-MCNC: 138 MG/DL (ref 74–99)
HCT VFR BLD AUTO: 28.7 % (ref 41–52)
HGB BLD-MCNC: 9.5 G/DL (ref 13.5–17.5)
IMM GRANULOCYTES # BLD AUTO: 0.04 X10*3/UL (ref 0–0.7)
IMM GRANULOCYTES NFR BLD AUTO: 0.4 % (ref 0–0.9)
INR PPP: 1.1 (ref 0.9–1.1)
LIPASE SERPL-CCNC: 23 U/L (ref 9–82)
LYMPHOCYTES # BLD AUTO: 1.44 X10*3/UL (ref 1.2–4.8)
LYMPHOCYTES NFR BLD AUTO: 15.7 %
MCH RBC QN AUTO: 26.8 PG (ref 26–34)
MCHC RBC AUTO-ENTMCNC: 33.1 G/DL (ref 32–36)
MCV RBC AUTO: 81 FL (ref 80–100)
MONOCYTES # BLD AUTO: 0.69 X10*3/UL (ref 0.1–1)
MONOCYTES NFR BLD AUTO: 7.5 %
NEUTROPHILS # BLD AUTO: 6.5 X10*3/UL (ref 1.2–7.7)
NEUTROPHILS NFR BLD AUTO: 71.2 %
NRBC BLD-RTO: 0 /100 WBCS (ref 0–0)
PLATELET # BLD AUTO: 237 X10*3/UL (ref 150–450)
POTASSIUM SERPL-SCNC: 4.1 MMOL/L (ref 3.5–5.3)
PROT SERPL-MCNC: 6.9 G/DL (ref 6.4–8.2)
PROTHROMBIN TIME: 12.6 SECONDS (ref 9.8–12.8)
RBC # BLD AUTO: 3.54 X10*6/UL (ref 4.5–5.9)
RH FACTOR (ANTIGEN D): NORMAL
SODIUM SERPL-SCNC: 136 MMOL/L (ref 136–145)
WBC # BLD AUTO: 9.2 X10*3/UL (ref 4.4–11.3)

## 2024-05-22 PROCEDURE — 85610 PROTHROMBIN TIME: CPT

## 2024-05-22 PROCEDURE — 36415 COLL VENOUS BLD VENIPUNCTURE: CPT

## 2024-05-22 PROCEDURE — 3060F POS MICROALBUMINURIA REV: CPT | Performed by: PHYSICIAN ASSISTANT

## 2024-05-22 PROCEDURE — 96374 THER/PROPH/DIAG INJ IV PUSH: CPT

## 2024-05-22 PROCEDURE — 85025 COMPLETE CBC W/AUTO DIFF WBC: CPT | Performed by: EMERGENCY MEDICINE

## 2024-05-22 PROCEDURE — 3074F SYST BP LT 130 MM HG: CPT | Performed by: PHYSICIAN ASSISTANT

## 2024-05-22 PROCEDURE — 96375 TX/PRO/DX INJ NEW DRUG ADDON: CPT

## 2024-05-22 PROCEDURE — 93005 ELECTROCARDIOGRAM TRACING: CPT

## 2024-05-22 PROCEDURE — 86920 COMPATIBILITY TEST SPIN: CPT

## 2024-05-22 PROCEDURE — 1036F TOBACCO NON-USER: CPT | Performed by: PHYSICIAN ASSISTANT

## 2024-05-22 PROCEDURE — 83690 ASSAY OF LIPASE: CPT | Performed by: EMERGENCY MEDICINE

## 2024-05-22 PROCEDURE — 80053 COMPREHEN METABOLIC PANEL: CPT | Performed by: EMERGENCY MEDICINE

## 2024-05-22 PROCEDURE — 74176 CT ABD & PELVIS W/O CONTRAST: CPT

## 2024-05-22 PROCEDURE — 71045 X-RAY EXAM CHEST 1 VIEW: CPT

## 2024-05-22 PROCEDURE — 84484 ASSAY OF TROPONIN QUANT: CPT

## 2024-05-22 PROCEDURE — 71045 X-RAY EXAM CHEST 1 VIEW: CPT | Performed by: RADIOLOGY

## 2024-05-22 PROCEDURE — 3044F HG A1C LEVEL LT 7.0%: CPT | Performed by: PHYSICIAN ASSISTANT

## 2024-05-22 PROCEDURE — 74176 CT ABD & PELVIS W/O CONTRAST: CPT | Performed by: RADIOLOGY

## 2024-05-22 PROCEDURE — 99222 1ST HOSP IP/OBS MODERATE 55: CPT | Performed by: STUDENT IN AN ORGANIZED HEALTH CARE EDUCATION/TRAINING PROGRAM

## 2024-05-22 PROCEDURE — 99285 EMERGENCY DEPT VISIT HI MDM: CPT | Performed by: EMERGENCY MEDICINE

## 2024-05-22 PROCEDURE — 3008F BODY MASS INDEX DOCD: CPT | Performed by: PHYSICIAN ASSISTANT

## 2024-05-22 PROCEDURE — 2500000004 HC RX 250 GENERAL PHARMACY W/ HCPCS (ALT 636 FOR OP/ED): Mod: SE

## 2024-05-22 PROCEDURE — 99285 EMERGENCY DEPT VISIT HI MDM: CPT | Mod: 25

## 2024-05-22 PROCEDURE — 93010 ELECTROCARDIOGRAM REPORT: CPT | Performed by: EMERGENCY MEDICINE

## 2024-05-22 PROCEDURE — 3078F DIAST BP <80 MM HG: CPT | Performed by: PHYSICIAN ASSISTANT

## 2024-05-22 PROCEDURE — 36415 COLL VENOUS BLD VENIPUNCTURE: CPT | Performed by: EMERGENCY MEDICINE

## 2024-05-22 PROCEDURE — 86901 BLOOD TYPING SEROLOGIC RH(D): CPT

## 2024-05-22 PROCEDURE — 99214 OFFICE O/P EST MOD 30 MIN: CPT | Performed by: PHYSICIAN ASSISTANT

## 2024-05-22 RX ORDER — MORPHINE SULFATE 4 MG/ML
4 INJECTION INTRAVENOUS ONCE
Status: COMPLETED | OUTPATIENT
Start: 2024-05-22 | End: 2024-05-22

## 2024-05-22 RX ORDER — IBUPROFEN 200 MG
1 CAPSULE ORAL 3 TIMES DAILY
Qty: 200 STRIP | Refills: 1 | Status: SHIPPED | OUTPATIENT
Start: 2024-05-22

## 2024-05-22 RX ORDER — ONDANSETRON HYDROCHLORIDE 2 MG/ML
4 INJECTION, SOLUTION INTRAVENOUS ONCE
Status: COMPLETED | OUTPATIENT
Start: 2024-05-22 | End: 2024-05-22

## 2024-05-22 RX ORDER — HYDROMORPHONE HYDROCHLORIDE 1 MG/ML
0.5 INJECTION, SOLUTION INTRAMUSCULAR; INTRAVENOUS; SUBCUTANEOUS ONCE
Status: COMPLETED | OUTPATIENT
Start: 2024-05-22 | End: 2024-05-22

## 2024-05-22 RX ADMIN — SODIUM CHLORIDE 1000 ML: 9 INJECTION, SOLUTION INTRAVENOUS at 21:20

## 2024-05-22 RX ADMIN — ONDANSETRON 4 MG: 2 INJECTION INTRAMUSCULAR; INTRAVENOUS at 21:20

## 2024-05-22 RX ADMIN — HYDROMORPHONE HYDROCHLORIDE 0.5 MG: 1 INJECTION, SOLUTION INTRAMUSCULAR; INTRAVENOUS; SUBCUTANEOUS at 22:42

## 2024-05-22 RX ADMIN — MORPHINE SULFATE 4 MG: 4 INJECTION INTRAVENOUS at 21:20

## 2024-05-22 ASSESSMENT — PAIN DESCRIPTION - PAIN TYPE
TYPE: ACUTE PAIN
TYPE: ACUTE PAIN

## 2024-05-22 ASSESSMENT — PAIN - FUNCTIONAL ASSESSMENT
PAIN_FUNCTIONAL_ASSESSMENT: 0-10
PAIN_FUNCTIONAL_ASSESSMENT: 0-10

## 2024-05-22 ASSESSMENT — PAIN DESCRIPTION - LOCATION
LOCATION: ABDOMEN
LOCATION: ABDOMEN

## 2024-05-22 ASSESSMENT — LIFESTYLE VARIABLES
EVER HAD A DRINK FIRST THING IN THE MORNING TO STEADY YOUR NERVES TO GET RID OF A HANGOVER: NO
HAVE YOU EVER FELT YOU SHOULD CUT DOWN ON YOUR DRINKING: NO
TOTAL SCORE: 0
HAVE PEOPLE ANNOYED YOU BY CRITICIZING YOUR DRINKING: NO
EVER FELT BAD OR GUILTY ABOUT YOUR DRINKING: NO

## 2024-05-22 ASSESSMENT — PAIN SCALES - GENERAL
PAINLEVEL_OUTOF10: 10 - WORST POSSIBLE PAIN
PAINLEVEL: 10-WORST PAIN EVER
PAINLEVEL_OUTOF10: 10 - WORST POSSIBLE PAIN
PAINLEVEL_OUTOF10: 10 - WORST POSSIBLE PAIN

## 2024-05-22 ASSESSMENT — PAIN DESCRIPTION - PROGRESSION: CLINICAL_PROGRESSION: NOT CHANGED

## 2024-05-22 NOTE — ED TRIAGE NOTES
Pt to ED with c/o gallbladder issues. Pt had a tube placed in gallbladder in April, tube fell out a couple of weeks ago. Pt endorsing chills and pain in his abdomen and back. 10/10. Pt took tylenol at home before. Hx Triple bypass, CKD, Type2 DM

## 2024-05-22 NOTE — H&P (VIEW-ONLY)
[facility]  TRAUMA CLINIC PROGRESS NOTE    Patient Name: Humphrey Waddell  MRN: [id number]  Admit Date:   : 1965  AGE: 59 y.o.   GENDER: male  ==============================================================================  CHIEF COMPLAINT:   Cholecystitis     OTHER MEDICAL PROBLEMS:  CABGx3 2023 (postoperative course complicated by mediastinal exploration and evacuation of pericardial tamponade/repair of vein graft)  hyperlipidemia  hypertension  CKD  CVA  with no deficits  type 2 diabetes  left AKA secondary cellulitis and chronic wounds     INCIDENTAL FINDINGS:  NA    PROCEDURES:  2024: uncomplicated placement of 8fr cholecystotomy pigtail drain     PATHOLOGY:  NA  ==============================================================================  TODAY'S ASSESSMENT AND PLAN OF CARE:  ABDOMINAL PAIN  Patient and significant other agreed to being seen in the ED and further evaluated for acute on chronic cholecystitis   FOLLOW UP/CALL  - No trauma/ACS follow up   - Return to clinic or ER sooner if pt. has any development of erythema, drainage, swelling, pain, fevers, or chills  - If you have questions or concerns that are not urgent, please feel free to call  [phone].  - Call [phone] to make additional appointment(s) as needed if unable to reschedule in office today    ==============================================================================  HISTORY OF PRESENT ILLNESS  Humphrey Waddell is a 59-year-old male with past medical history significant for CABGx3 2023 (postoperative course complicated by mediastinal exploration and evacuation of pericardial tamponade/repair of vein graft), hyperlipidemia, hypertension, CKD, history of CVA  with no deficits, type 2 diabetes, left AKA secondary cellulitis and chronic wounds who presented [facility] ED 3/28 via EMS with chest pain.  An incidental finding of acute cholecystitis was noted. Patient was then  transferred to [facility]. Due to patients cardiac risk score he was not a candidate for cholecystectomy and instead received a cholecystosomy tube on 4/3. He also received left thoracentisis at that time.     Mr. Waddell states that he is not feeling well today and refers to pain in his 'right kidney'. He reports he get chills and had them as early as this morning.  The cholecystostomy tube fell out after 3 weeks of being in.  Patient states that he did feel better when the cholecystostomy tube was in.  He is not able to eat things other than chicken and burgers. However, the burgers cause him pain as described as gas pain. He describes his pain as stomach and back kidney pain and worse after he eats. His last BM was yesterday and without difficulty urinating.   Patient denies N/V.   MEDICAL HISTORY / ROS:  Admission history and ROS reviewed.   Patient denies:   headache;  dizziness; chest pain; shortness of breath; nausea/vomiting/diarrhea/constipation; /tingling/weakness of extremities.   Pertinent changes as follows:  Abdominal pain    PHYSICAL EXAM:  Morbidity obese, GCS 15, A+OX3, RRR, S1, S2, CTA=, no increased WOB. TTP over RUQ, firmness felt along RUQ, without guarding or rebound tenderness. LLE amputation, no extremity edema noted. 2+pp. Healed drain site on right LQU lateral side.     LABS:  No results found for this or any previous visit (from the past 24 hour(s)).  MEDICATIONS:  Current Outpatient Medications   Medication Sig Dispense Refill    acetaminophen (Tylenol 8 HOUR) 650 mg ER tablet Take 1 tablet (650 mg) by mouth every 8 hours if needed for mild pain (1 - 3). Do not crush, chew, or split.      aspirin 81 mg chewable tablet Chew 1 tablet (81 mg) once daily.      atorvastatin (Lipitor) 80 mg tablet Take 1 tablet (80 mg) by mouth once daily. Last rx prior to next refills 90 tablet 3    cholecalciferol (Vitamin D3) 25 MCG (1000 UT) tablet Take 1 tablet (1,000 Units) by mouth 2 times a day.      clopidogrel  (Plavix) 75 mg tablet Take 1 tablet (75 mg) by mouth once daily. 90 tablet 3    escitalopram (Lexapro) 10 mg tablet TAKE 1 TABLET BY MOUTH EVERY DAY 90 tablet 1    flash glucose sensor kit (FreeStyle Suzy 2 Sensor) kit Use as instructed 6 each 0    FreeStyle Suzy reader (FreeStyle Suzy 2 [place]) misc Use as instructed 1 each 0    gabapentin (Neurontin) 300 mg capsule Take 1 capsule (300 mg) by mouth 3 times a day. 90 capsule 5    insulin glargine (Lantus Solostar U-100 Insulin) 100 unit/mL (3 mL) pen Inject 2 Units under the skin once daily at bedtime.      insulin lispro (HumaLOG) 100 unit/mL injection Inject 0-0.1 mL (0-10 Units) under the skin 3 times a day with meals. Take as directed per insulin instructions. Do not start before March 30, 2024.      isosorbide dinitrate (Isordil) 20 mg tablet Take 1 tablet (20 mg) by mouth 3 times a day. 270 tablet 1    lidocaine 4 % patch Place 1 patch over 12 hours on the skin once daily. Remove & discard patch within 12 hours or as directed by MD. 24 patch 3    melatonin 3 mg tablet Take 1 tablet (3 mg) by mouth once daily at bedtime.      metoprolol tartrate (Lopressor) 50 mg tablet Take 1 tablet by mouth 2 times a day. 180 tablet 3    NIFEdipine ER (Adalat CC) 60 mg 24 hr tablet Take 1 tablet (60 mg) by mouth once daily in the morning. Take before meals. Do not crush, chew, or split. 90 tablet 1    pantoprazole (ProtoNix) 40 mg EC tablet Take 1 tablet (40 mg) by mouth once daily in the morning. Take before meals. Do not crush, chew, or split. 90 tablet 1    polyethylene glycol (Glycolax, Miralax) 17 gram packet Take 17 g by mouth every 12 hours if needed (constipation).      torsemide (Demadex) 20 mg tablet Take 3 tablets (60 mg) by mouth once daily. 3 tablet 0    traMADol (Ultram) 50 mg tablet Take 1 tablet (50 mg) by mouth every 6 hours if needed for severe pain (7 - 10). 15 tablet 0     No current facility-administered medications for this visit.       IMAGING  SUMMARY:  (summary of new imaging findings, not a copy of dictation)  NA    I have reviewed all laboratory and imaging results ordered/pertinent for today's encounter.

## 2024-05-22 NOTE — PROGRESS NOTES
[facility]  TRAUMA CLINIC PROGRESS NOTE    Patient Name: Humphrey Waddell  MRN: [id number]  Admit Date:   : 1965  AGE: 59 y.o.   GENDER: male  ==============================================================================  CHIEF COMPLAINT:   Cholecystitis     OTHER MEDICAL PROBLEMS:  CABGx3 2023 (postoperative course complicated by mediastinal exploration and evacuation of pericardial tamponade/repair of vein graft)  hyperlipidemia  hypertension  CKD  CVA  with no deficits  type 2 diabetes  left AKA secondary cellulitis and chronic wounds     INCIDENTAL FINDINGS:  NA    PROCEDURES:  2024: uncomplicated placement of 8fr cholecystotomy pigtail drain     PATHOLOGY:  NA  ==============================================================================  TODAY'S ASSESSMENT AND PLAN OF CARE:  ABDOMINAL PAIN  Patient and significant other agreed to being seen in the ED and further evaluated for acute on chronic cholecystitis   FOLLOW UP/CALL  - No trauma/ACS follow up   - Return to clinic or ER sooner if pt. has any development of erythema, drainage, swelling, pain, fevers, or chills  - If you have questions or concerns that are not urgent, please feel free to call  [phone].  - Call [phone] to make additional appointment(s) as needed if unable to reschedule in office today    ==============================================================================  HISTORY OF PRESENT ILLNESS  Humphrey Waddell is a 59-year-old male with past medical history significant for CABGx3 2023 (postoperative course complicated by mediastinal exploration and evacuation of pericardial tamponade/repair of vein graft), hyperlipidemia, hypertension, CKD, history of CVA  with no deficits, type 2 diabetes, left AKA secondary cellulitis and chronic wounds who presented to [facility] ED 3/28 via EMS with chest pain.  An incidental finding of acute cholecystitis was noted. Patient was then  transferred to [facility]. Due to patients cardiac risk score he was not a candidate for cholecystectomy and instead received a cholecystosomy tube on 4/3. He also received left thoracentisis at that time.     Mr. Waddell states that he is not feeling well today and refers to pain in his 'right kidney'. He reports he get chills and had them as early as this morning.  The cholecystostomy tube fell out after 3 weeks of being in.  Patient states that he did feel better when the cholecystostomy tube was in.  He is not able to eat things other than chicken and burgers. However, the burgers cause him pain as described as gas pain. He describes his pain as stomach and back kidney pain and worse after he eats. His last BM was yesterday and without difficulty urinating.   Patient denies N/V.   MEDICAL HISTORY / ROS:  Admission history and ROS reviewed.   Patient denies:   headache;  dizziness; chest pain; shortness of breath; nausea/vomiting/diarrhea/constipation; /tingling/weakness of extremities.   Pertinent changes as follows:  Abdominal pain    PHYSICAL EXAM:  Morbidity obese, GCS 15, A+OX3, RRR, S1, S2, CTA=, no increased WOB. TTP over RUQ, firmness felt along RUQ, without guarding or rebound tenderness. LLE amputation, no extremity edema noted. 2+pp. Healed drain site on right LQU lateral side.     LABS:  No results found for this or any previous visit (from the past 24 hour(s)).  MEDICATIONS:  Current Outpatient Medications   Medication Sig Dispense Refill    acetaminophen (Tylenol 8 HOUR) 650 mg ER tablet Take 1 tablet (650 mg) by mouth every 8 hours if needed for mild pain (1 - 3). Do not crush, chew, or split.      aspirin 81 mg chewable tablet Chew 1 tablet (81 mg) once daily.      atorvastatin (Lipitor) 80 mg tablet Take 1 tablet (80 mg) by mouth once daily. Last rx prior to next refills 90 tablet 3    cholecalciferol (Vitamin D3) 25 MCG (1000 UT) tablet Take 1 tablet (1,000 Units) by mouth 2 times a day.      clopidogrel  (Plavix) 75 mg tablet Take 1 tablet (75 mg) by mouth once daily. 90 tablet 3    escitalopram (Lexapro) 10 mg tablet TAKE 1 TABLET BY MOUTH EVERY DAY 90 tablet 1    flash glucose sensor kit (FreeStyle Suzy 2 Sensor) kit Use as instructed 6 each 0    FreeStyle Suzy reader (FreeStyle Suzy 2 Stowell) misc Use as instructed 1 each 0    gabapentin (Neurontin) 300 mg capsule Take 1 capsule (300 mg) by mouth 3 times a day. 90 capsule 5    insulin glargine (Lantus Solostar U-100 Insulin) 100 unit/mL (3 mL) pen Inject 2 Units under the skin once daily at bedtime.      insulin lispro (HumaLOG) 100 unit/mL injection Inject 0-0.1 mL (0-10 Units) under the skin 3 times a day with meals. Take as directed per insulin instructions. Do not start before March 30, 2024.      isosorbide dinitrate (Isordil) 20 mg tablet Take 1 tablet (20 mg) by mouth 3 times a day. 270 tablet 1    lidocaine 4 % patch Place 1 patch over 12 hours on the skin once daily. Remove & discard patch within 12 hours or as directed by MD. 24 patch 3    melatonin 3 mg tablet Take 1 tablet (3 mg) by mouth once daily at bedtime.      metoprolol tartrate (Lopressor) 50 mg tablet Take 1 tablet by mouth 2 times a day. 180 tablet 3    NIFEdipine ER (Adalat CC) 60 mg 24 hr tablet Take 1 tablet (60 mg) by mouth once daily in the morning. Take before meals. Do not crush, chew, or split. 90 tablet 1    pantoprazole (ProtoNix) 40 mg EC tablet Take 1 tablet (40 mg) by mouth once daily in the morning. Take before meals. Do not crush, chew, or split. 90 tablet 1    polyethylene glycol (Glycolax, Miralax) 17 gram packet Take 17 g by mouth every 12 hours if needed (constipation).      torsemide (Demadex) 20 mg tablet Take 3 tablets (60 mg) by mouth once daily. 3 tablet 0    traMADol (Ultram) 50 mg tablet Take 1 tablet (50 mg) by mouth every 6 hours if needed for severe pain (7 - 10). 15 tablet 0     No current facility-administered medications for this visit.       IMAGING  SUMMARY:  (summary of new imaging findings, not a copy of dictation)  NA    I have reviewed all laboratory and imaging results ordered/pertinent for today's encounter.

## 2024-05-23 ENCOUNTER — APPOINTMENT (OUTPATIENT)
Dept: CARDIOLOGY | Facility: HOSPITAL | Age: 59
End: 2024-05-23
Payer: COMMERCIAL

## 2024-05-23 ENCOUNTER — APPOINTMENT (OUTPATIENT)
Dept: RADIOLOGY | Facility: HOSPITAL | Age: 59
End: 2024-05-23
Payer: COMMERCIAL

## 2024-05-23 LAB
ALBUMIN SERPL BCP-MCNC: 2.8 G/DL (ref 3.4–5)
ALBUMIN SERPL BCP-MCNC: 2.9 G/DL (ref 3.4–5)
ALP SERPL-CCNC: 53 U/L (ref 33–136)
ALT SERPL W P-5'-P-CCNC: 14 U/L (ref 10–52)
ANION GAP SERPL CALC-SCNC: 15 MMOL/L (ref 10–20)
ANION GAP SERPL CALC-SCNC: 15 MMOL/L (ref 10–20)
AST SERPL W P-5'-P-CCNC: 13 U/L (ref 9–39)
ATRIAL RATE: 69 BPM
BASOPHILS # BLD AUTO: 0.03 X10*3/UL (ref 0–0.1)
BASOPHILS NFR BLD AUTO: 0.4 %
BILIRUB SERPL-MCNC: 0.4 MG/DL (ref 0–1.2)
BUN SERPL-MCNC: 54 MG/DL (ref 6–23)
BUN SERPL-MCNC: 59 MG/DL (ref 6–23)
CALCIUM SERPL-MCNC: 6.7 MG/DL (ref 8.6–10.6)
CALCIUM SERPL-MCNC: 6.8 MG/DL (ref 8.6–10.6)
CARDIAC TROPONIN I PNL SERPL HS: 20 NG/L (ref 0–53)
CHLORIDE SERPL-SCNC: 101 MMOL/L (ref 98–107)
CHLORIDE SERPL-SCNC: 103 MMOL/L (ref 98–107)
CO2 SERPL-SCNC: 27 MMOL/L (ref 21–32)
CO2 SERPL-SCNC: 27 MMOL/L (ref 21–32)
CREAT SERPL-MCNC: 2.88 MG/DL (ref 0.5–1.3)
CREAT SERPL-MCNC: 3.14 MG/DL (ref 0.5–1.3)
EGFRCR SERPLBLD CKD-EPI 2021: 22 ML/MIN/1.73M*2
EGFRCR SERPLBLD CKD-EPI 2021: 24 ML/MIN/1.73M*2
EJECTION FRACTION APICAL 4 CHAMBER: 65.2
EOSINOPHIL # BLD AUTO: 0.36 X10*3/UL (ref 0–0.7)
EOSINOPHIL NFR BLD AUTO: 4.7 %
ERYTHROCYTE [DISTWIDTH] IN BLOOD BY AUTOMATED COUNT: 13.7 % (ref 11.5–14.5)
GLUCOSE BLD MANUAL STRIP-MCNC: 102 MG/DL (ref 74–99)
GLUCOSE BLD MANUAL STRIP-MCNC: 115 MG/DL (ref 74–99)
GLUCOSE BLD MANUAL STRIP-MCNC: 147 MG/DL (ref 74–99)
GLUCOSE BLD MANUAL STRIP-MCNC: 150 MG/DL (ref 74–99)
GLUCOSE SERPL-MCNC: 101 MG/DL (ref 74–99)
GLUCOSE SERPL-MCNC: 91 MG/DL (ref 74–99)
HCT VFR BLD AUTO: 29.3 % (ref 41–52)
HGB BLD-MCNC: 8.9 G/DL (ref 13.5–17.5)
IMM GRANULOCYTES # BLD AUTO: 0.02 X10*3/UL (ref 0–0.7)
IMM GRANULOCYTES NFR BLD AUTO: 0.3 % (ref 0–0.9)
LEFT ATRIUM VOLUME AREA LENGTH INDEX BSA: 42.5 ML/M2
LEFT VENTRICLE INTERNAL DIMENSION DIASTOLE: 5.27 CM (ref 3.5–6)
LEFT VENTRICULAR OUTFLOW TRACT DIAMETER: 2.24 CM
LV EJECTION FRACTION BIPLANE: 63 %
LYMPHOCYTES # BLD AUTO: 1.48 X10*3/UL (ref 1.2–4.8)
LYMPHOCYTES NFR BLD AUTO: 19.5 %
MAGNESIUM SERPL-MCNC: 1.18 MG/DL (ref 1.6–2.4)
MAGNESIUM SERPL-MCNC: 1.2 MG/DL (ref 1.6–2.4)
MCH RBC QN AUTO: 26.6 PG (ref 26–34)
MCHC RBC AUTO-ENTMCNC: 30.4 G/DL (ref 32–36)
MCV RBC AUTO: 88 FL (ref 80–100)
MONOCYTES # BLD AUTO: 0.66 X10*3/UL (ref 0.1–1)
MONOCYTES NFR BLD AUTO: 8.7 %
NEUTROPHILS # BLD AUTO: 5.05 X10*3/UL (ref 1.2–7.7)
NEUTROPHILS NFR BLD AUTO: 66.4 %
NRBC BLD-RTO: 0 /100 WBCS (ref 0–0)
P AXIS: 56 DEGREES
P OFFSET: 192 MS
P ONSET: 131 MS
PHOSPHATE SERPL-MCNC: 4.7 MG/DL (ref 2.5–4.9)
PLATELET # BLD AUTO: 217 X10*3/UL (ref 150–450)
POTASSIUM SERPL-SCNC: 3.8 MMOL/L (ref 3.5–5.3)
POTASSIUM SERPL-SCNC: 4.4 MMOL/L (ref 3.5–5.3)
PR INTERVAL: 184 MS
PROT SERPL-MCNC: 5.9 G/DL (ref 6.4–8.2)
Q ONSET: 223 MS
QRS COUNT: 11 BEATS
QRS DURATION: 154 MS
QT INTERVAL: 466 MS
QTC CALCULATION(BAZETT): 499 MS
QTC FREDERICIA: 488 MS
R AXIS: 107 DEGREES
RBC # BLD AUTO: 3.34 X10*6/UL (ref 4.5–5.9)
SODIUM SERPL-SCNC: 139 MMOL/L (ref 136–145)
SODIUM SERPL-SCNC: 141 MMOL/L (ref 136–145)
T AXIS: 69 DEGREES
T OFFSET: 456 MS
VENTRICULAR RATE: 69 BPM
WBC # BLD AUTO: 7.6 X10*3/UL (ref 4.4–11.3)

## 2024-05-23 PROCEDURE — 36415 COLL VENOUS BLD VENIPUNCTURE: CPT

## 2024-05-23 PROCEDURE — 76705 ECHO EXAM OF ABDOMEN: CPT | Performed by: RADIOLOGY

## 2024-05-23 PROCEDURE — 96365 THER/PROPH/DIAG IV INF INIT: CPT | Mod: 59

## 2024-05-23 PROCEDURE — 93308 TTE F-UP OR LMTD: CPT | Performed by: INTERNAL MEDICINE

## 2024-05-23 PROCEDURE — 2500000001 HC RX 250 WO HCPCS SELF ADMINISTERED DRUGS (ALT 637 FOR MEDICARE OP): Mod: SE

## 2024-05-23 PROCEDURE — 93325 DOPPLER ECHO COLOR FLOW MAPG: CPT | Performed by: INTERNAL MEDICINE

## 2024-05-23 PROCEDURE — 2500000004 HC RX 250 GENERAL PHARMACY W/ HCPCS (ALT 636 FOR OP/ED): Mod: SE

## 2024-05-23 PROCEDURE — 82947 ASSAY GLUCOSE BLOOD QUANT: CPT

## 2024-05-23 PROCEDURE — 83735 ASSAY OF MAGNESIUM: CPT

## 2024-05-23 PROCEDURE — G0378 HOSPITAL OBSERVATION PER HR: HCPCS

## 2024-05-23 PROCEDURE — 80053 COMPREHEN METABOLIC PANEL: CPT

## 2024-05-23 PROCEDURE — 2500000006 HC RX 250 W HCPCS SELF ADMINISTERED DRUGS (ALT 637 FOR ALL PAYERS): Mod: SE

## 2024-05-23 PROCEDURE — 85025 COMPLETE CBC W/AUTO DIFF WBC: CPT

## 2024-05-23 PROCEDURE — 76705 ECHO EXAM OF ABDOMEN: CPT

## 2024-05-23 PROCEDURE — 93308 TTE F-UP OR LMTD: CPT

## 2024-05-23 PROCEDURE — 80069 RENAL FUNCTION PANEL: CPT | Mod: CCI | Performed by: PHYSICIAN ASSISTANT

## 2024-05-23 PROCEDURE — 99221 1ST HOSP IP/OBS SF/LOW 40: CPT | Performed by: PHYSICIAN ASSISTANT

## 2024-05-23 PROCEDURE — 99255 IP/OBS CONSLTJ NEW/EST HI 80: CPT | Performed by: STUDENT IN AN ORGANIZED HEALTH CARE EDUCATION/TRAINING PROGRAM

## 2024-05-23 PROCEDURE — 93321 DOPPLER ECHO F-UP/LMTD STD: CPT | Performed by: INTERNAL MEDICINE

## 2024-05-23 PROCEDURE — 83735 ASSAY OF MAGNESIUM: CPT | Performed by: PHYSICIAN ASSISTANT

## 2024-05-23 PROCEDURE — 36415 COLL VENOUS BLD VENIPUNCTURE: CPT | Performed by: PHYSICIAN ASSISTANT

## 2024-05-23 PROCEDURE — 96372 THER/PROPH/DIAG INJ SC/IM: CPT

## 2024-05-23 RX ORDER — ONDANSETRON HYDROCHLORIDE 2 MG/ML
4 INJECTION, SOLUTION INTRAVENOUS EVERY 6 HOURS PRN
Status: DISCONTINUED | OUTPATIENT
Start: 2024-05-23 | End: 2024-06-11 | Stop reason: HOSPADM

## 2024-05-23 RX ORDER — HEPARIN SODIUM 5000 [USP'U]/ML
5000 INJECTION, SOLUTION INTRAVENOUS; SUBCUTANEOUS EVERY 8 HOURS
Status: DISCONTINUED | OUTPATIENT
Start: 2024-05-23 | End: 2024-05-23

## 2024-05-23 RX ORDER — PANTOPRAZOLE SODIUM 40 MG/1
40 TABLET, DELAYED RELEASE ORAL
Status: DISCONTINUED | OUTPATIENT
Start: 2024-05-23 | End: 2024-06-11 | Stop reason: HOSPADM

## 2024-05-23 RX ORDER — NAPROXEN SODIUM 220 MG/1
81 TABLET, FILM COATED ORAL DAILY
Status: DISCONTINUED | OUTPATIENT
Start: 2024-05-23 | End: 2024-06-11 | Stop reason: HOSPADM

## 2024-05-23 RX ORDER — HEPARIN SODIUM 5000 [USP'U]/ML
5000 INJECTION, SOLUTION INTRAVENOUS; SUBCUTANEOUS EVERY 8 HOURS
Status: DISCONTINUED | OUTPATIENT
Start: 2024-05-23 | End: 2024-06-11 | Stop reason: HOSPADM

## 2024-05-23 RX ORDER — SODIUM BICARBONATE 650 MG/1
1300 TABLET ORAL EVERY MORNING
Status: DISCONTINUED | OUTPATIENT
Start: 2024-05-23 | End: 2024-05-26

## 2024-05-23 RX ORDER — SODIUM BICARBONATE 650 MG/1
650 TABLET ORAL
Status: ON HOLD | COMMUNITY
End: 2024-05-23 | Stop reason: SDUPTHER

## 2024-05-23 RX ORDER — INSULIN LISPRO 100 [IU]/ML
0-10 INJECTION, SOLUTION INTRAVENOUS; SUBCUTANEOUS
Status: DISCONTINUED | OUTPATIENT
Start: 2024-05-23 | End: 2024-06-11 | Stop reason: HOSPADM

## 2024-05-23 RX ORDER — TALC
3 POWDER (GRAM) TOPICAL NIGHTLY
Status: DISCONTINUED | OUTPATIENT
Start: 2024-05-23 | End: 2024-06-11 | Stop reason: HOSPADM

## 2024-05-23 RX ORDER — SODIUM BICARBONATE 650 MG/1
650 TABLET ORAL
Status: DISCONTINUED | OUTPATIENT
Start: 2024-05-23 | End: 2024-05-26

## 2024-05-23 RX ORDER — ATORVASTATIN CALCIUM 80 MG/1
80 TABLET, FILM COATED ORAL DAILY
Status: DISCONTINUED | OUTPATIENT
Start: 2024-05-23 | End: 2024-06-11 | Stop reason: HOSPADM

## 2024-05-23 RX ORDER — CHOLECALCIFEROL (VITAMIN D3) 25 MCG
1000 TABLET ORAL 2 TIMES DAILY
Status: DISCONTINUED | OUTPATIENT
Start: 2024-05-23 | End: 2024-06-11 | Stop reason: HOSPADM

## 2024-05-23 RX ORDER — DEXTROSE 50 % IN WATER (D50W) INTRAVENOUS SYRINGE
12.5
Status: DISCONTINUED | OUTPATIENT
Start: 2024-05-23 | End: 2024-06-11 | Stop reason: HOSPADM

## 2024-05-23 RX ORDER — GABAPENTIN 300 MG/1
300 CAPSULE ORAL 3 TIMES DAILY
Status: DISCONTINUED | OUTPATIENT
Start: 2024-05-23 | End: 2024-05-28

## 2024-05-23 RX ORDER — ESCITALOPRAM OXALATE 10 MG/1
10 TABLET ORAL DAILY
Status: DISCONTINUED | OUTPATIENT
Start: 2024-05-23 | End: 2024-06-11 | Stop reason: HOSPADM

## 2024-05-23 RX ORDER — ACETAMINOPHEN 325 MG/1
650 TABLET ORAL EVERY 6 HOURS
Status: DISCONTINUED | OUTPATIENT
Start: 2024-05-23 | End: 2024-06-11 | Stop reason: HOSPADM

## 2024-05-23 RX ORDER — ISOSORBIDE DINITRATE 20 MG/1
20 TABLET ORAL
Status: DISCONTINUED | OUTPATIENT
Start: 2024-05-23 | End: 2024-06-11 | Stop reason: HOSPADM

## 2024-05-23 RX ORDER — POLYETHYLENE GLYCOL 3350 17 G/17G
17 POWDER, FOR SOLUTION ORAL DAILY PRN
Status: DISCONTINUED | OUTPATIENT
Start: 2024-05-23 | End: 2024-05-26

## 2024-05-23 RX ORDER — METOPROLOL TARTRATE 50 MG/1
50 TABLET ORAL 2 TIMES DAILY
Status: DISCONTINUED | OUTPATIENT
Start: 2024-05-23 | End: 2024-06-11 | Stop reason: HOSPADM

## 2024-05-23 RX ORDER — OXYCODONE HYDROCHLORIDE 5 MG/1
5 TABLET ORAL EVERY 4 HOURS PRN
Status: DISCONTINUED | OUTPATIENT
Start: 2024-05-23 | End: 2024-05-26

## 2024-05-23 RX ORDER — TRAMADOL HYDROCHLORIDE 50 MG/1
50 TABLET ORAL EVERY 6 HOURS PRN
Status: DISCONTINUED | OUTPATIENT
Start: 2024-05-23 | End: 2024-05-23

## 2024-05-23 RX ORDER — NALOXONE HYDROCHLORIDE 0.4 MG/ML
0.2 INJECTION, SOLUTION INTRAMUSCULAR; INTRAVENOUS; SUBCUTANEOUS EVERY 5 MIN PRN
Status: DISCONTINUED | OUTPATIENT
Start: 2024-05-23 | End: 2024-06-11 | Stop reason: HOSPADM

## 2024-05-23 RX ORDER — DEXTROSE 50 % IN WATER (D50W) INTRAVENOUS SYRINGE
25
Status: DISCONTINUED | OUTPATIENT
Start: 2024-05-23 | End: 2024-06-11 | Stop reason: HOSPADM

## 2024-05-23 RX ORDER — NIFEDIPINE 60 MG/1
60 TABLET, FILM COATED, EXTENDED RELEASE ORAL
Status: DISCONTINUED | OUTPATIENT
Start: 2024-05-23 | End: 2024-05-26

## 2024-05-23 RX ADMIN — ISOSORBIDE DINITRATE 20 MG: 20 TABLET ORAL at 15:42

## 2024-05-23 RX ADMIN — ACETAMINOPHEN 650 MG: 325 TABLET ORAL at 21:51

## 2024-05-23 RX ADMIN — Medication 1000 UNITS: at 21:51

## 2024-05-23 RX ADMIN — PANTOPRAZOLE SODIUM 40 MG: 40 TABLET, DELAYED RELEASE ORAL at 09:12

## 2024-05-23 RX ADMIN — PIPERACILLIN SODIUM AND TAZOBACTAM SODIUM 2.25 G: 2; .25 INJECTION, SOLUTION INTRAVENOUS at 21:49

## 2024-05-23 RX ADMIN — ACETAMINOPHEN 650 MG: 325 TABLET ORAL at 15:42

## 2024-05-23 RX ADMIN — GABAPENTIN 300 MG: 300 CAPSULE ORAL at 21:51

## 2024-05-23 RX ADMIN — ATORVASTATIN CALCIUM 80 MG: 80 TABLET, FILM COATED ORAL at 09:12

## 2024-05-23 RX ADMIN — ISOSORBIDE DINITRATE 20 MG: 20 TABLET ORAL at 09:00

## 2024-05-23 RX ADMIN — Medication 1000 UNITS: at 09:12

## 2024-05-23 RX ADMIN — MELATONIN 3 MG: 3 TAB ORAL at 21:51

## 2024-05-23 RX ADMIN — PIPERACILLIN SODIUM AND TAZOBACTAM SODIUM 2.25 G: 2; .25 INJECTION, SOLUTION INTRAVENOUS at 09:25

## 2024-05-23 RX ADMIN — HEPARIN SODIUM 5000 UNITS: 5000 INJECTION INTRAVENOUS; SUBCUTANEOUS at 21:49

## 2024-05-23 RX ADMIN — ASPIRIN 81 MG CHEWABLE TABLET 81 MG: 81 TABLET CHEWABLE at 09:12

## 2024-05-23 RX ADMIN — METOPROLOL TARTRATE 50 MG: 50 TABLET, FILM COATED ORAL at 09:12

## 2024-05-23 RX ADMIN — SODIUM BICARBONATE 1300 MG: 650 TABLET ORAL at 09:12

## 2024-05-23 RX ADMIN — ACETAMINOPHEN 650 MG: 325 TABLET ORAL at 10:30

## 2024-05-23 RX ADMIN — ACETAMINOPHEN 650 MG: 325 TABLET ORAL at 05:14

## 2024-05-23 RX ADMIN — SODIUM BICARBONATE 650 MG: 650 TABLET ORAL at 15:43

## 2024-05-23 RX ADMIN — GABAPENTIN 300 MG: 300 CAPSULE ORAL at 15:42

## 2024-05-23 RX ADMIN — ISOSORBIDE DINITRATE 20 MG: 20 TABLET ORAL at 18:53

## 2024-05-23 RX ADMIN — PIPERACILLIN SODIUM AND TAZOBACTAM SODIUM 2.25 G: 2; .25 INJECTION, SOLUTION INTRAVENOUS at 15:42

## 2024-05-23 RX ADMIN — PIPERACILLIN SODIUM AND TAZOBACTAM SODIUM 3.38 G: 3; .375 INJECTION, SOLUTION INTRAVENOUS at 01:58

## 2024-05-23 RX ADMIN — NIFEDIPINE 60 MG: 60 TABLET, FILM COATED, EXTENDED RELEASE ORAL at 09:12

## 2024-05-23 RX ADMIN — ESCITALOPRAM OXALATE 10 MG: 10 TABLET ORAL at 09:12

## 2024-05-23 RX ADMIN — PERFLUTREN 2 ML OF DILUTION: 6.52 INJECTION, SUSPENSION INTRAVENOUS at 14:22

## 2024-05-23 RX ADMIN — TRAMADOL HYDROCHLORIDE 50 MG: 50 TABLET, COATED ORAL at 01:59

## 2024-05-23 RX ADMIN — METOPROLOL TARTRATE 50 MG: 50 TABLET, FILM COATED ORAL at 21:51

## 2024-05-23 RX ADMIN — HEPARIN SODIUM 5000 UNITS: 5000 INJECTION INTRAVENOUS; SUBCUTANEOUS at 15:43

## 2024-05-23 RX ADMIN — OXYCODONE HYDROCHLORIDE 5 MG: 5 TABLET ORAL at 21:51

## 2024-05-23 RX ADMIN — GABAPENTIN 300 MG: 300 CAPSULE ORAL at 09:12

## 2024-05-23 SDOH — SOCIAL STABILITY: SOCIAL INSECURITY: HAVE YOU HAD ANY THOUGHTS OF HARMING ANYONE ELSE?: NO

## 2024-05-23 SDOH — SOCIAL STABILITY: SOCIAL INSECURITY: DO YOU FEEL UNSAFE GOING BACK TO THE PLACE WHERE YOU ARE LIVING?: NO

## 2024-05-23 SDOH — SOCIAL STABILITY: SOCIAL INSECURITY: WERE YOU ABLE TO COMPLETE ALL THE BEHAVIORAL HEALTH SCREENINGS?: YES

## 2024-05-23 SDOH — SOCIAL STABILITY: SOCIAL INSECURITY: ARE THERE ANY APPARENT SIGNS OF INJURIES/BEHAVIORS THAT COULD BE RELATED TO ABUSE/NEGLECT?: NO

## 2024-05-23 SDOH — SOCIAL STABILITY: SOCIAL INSECURITY: ARE YOU OR HAVE YOU BEEN THREATENED OR ABUSED PHYSICALLY, EMOTIONALLY, OR SEXUALLY BY ANYONE?: NO

## 2024-05-23 SDOH — SOCIAL STABILITY: SOCIAL INSECURITY: HAS ANYONE EVER THREATENED TO HURT YOUR FAMILY OR YOUR PETS?: NO

## 2024-05-23 SDOH — SOCIAL STABILITY: SOCIAL INSECURITY: ABUSE: ADULT

## 2024-05-23 SDOH — SOCIAL STABILITY: SOCIAL INSECURITY: DO YOU FEEL ANYONE HAS EXPLOITED OR TAKEN ADVANTAGE OF YOU FINANCIALLY OR OF YOUR PERSONAL PROPERTY?: NO

## 2024-05-23 SDOH — SOCIAL STABILITY: SOCIAL INSECURITY: DOES ANYONE TRY TO KEEP YOU FROM HAVING/CONTACTING OTHER FRIENDS OR DOING THINGS OUTSIDE YOUR HOME?: NO

## 2024-05-23 ASSESSMENT — ACTIVITIES OF DAILY LIVING (ADL)
PATIENT'S MEMORY ADEQUATE TO SAFELY COMPLETE DAILY ACTIVITIES?: YES
HEARING - LEFT EAR: FUNCTIONAL
ADEQUATE_TO_COMPLETE_ADL: YES
GROOMING: NEEDS ASSISTANCE
FEEDING YOURSELF: INDEPENDENT
HEARING - RIGHT EAR: FUNCTIONAL
BATHING: NEEDS ASSISTANCE
TOILETING: NEEDS ASSISTANCE
JUDGMENT_ADEQUATE_SAFELY_COMPLETE_DAILY_ACTIVITIES: YES
ASSISTIVE_DEVICE: WHEELCHAIR
DRESSING YOURSELF: NEEDS ASSISTANCE
WALKS IN HOME: NEEDS ASSISTANCE

## 2024-05-23 ASSESSMENT — COGNITIVE AND FUNCTIONAL STATUS - GENERAL
MOVING TO AND FROM BED TO CHAIR: A LITTLE
MOVING FROM LYING ON BACK TO SITTING ON SIDE OF FLAT BED WITH BEDRAILS: A LOT
STANDING UP FROM CHAIR USING ARMS: TOTAL
PATIENT BASELINE BEDBOUND: NO
TURNING FROM BACK TO SIDE WHILE IN FLAT BAD: A LOT

## 2024-05-23 ASSESSMENT — PAIN DESCRIPTION - ORIENTATION: ORIENTATION: RIGHT;UPPER

## 2024-05-23 ASSESSMENT — LIFESTYLE VARIABLES
SUBSTANCE_ABUSE_PAST_12_MONTHS: NO
PRESCIPTION_ABUSE_PAST_12_MONTHS: NO

## 2024-05-23 ASSESSMENT — PAIN SCALES - GENERAL: PAINLEVEL_OUTOF10: 10 - WORST POSSIBLE PAIN

## 2024-05-23 ASSESSMENT — PAIN SCALES - PAIN ASSESSMENT IN ADVANCED DEMENTIA (PAINAD): TOTALSCORE: MEDICATION (SEE MAR)

## 2024-05-23 NOTE — ED PROVIDER NOTES
EMERGENCY DEPARTMENT ENCOUNTER      Pt Name: Humphrey Waddell  MRN: [id number]  Birthdate 1965  Date of evaluation: 5/22/2024  Provider: Jose A Fuentes MD    CHIEF COMPLAINT       Chief Complaint   Patient presents with    Abdominal Pain    Chills         HISTORY OF PRESENT ILLNESS    59-year-old male with history of IDDM 2, obesity, CAD, and chronic renal insufficiency presenting to the ED with complaint of right upper abdominal pain.  States that this morning he woke up with severe right upper abdominal pain that has been intermittent over the past 3 to 7 days.  States that he did not take anything for the pain.  He did recently have a drain placed to his gallbladder 4/2024 without surgery due to elevated cardiac enzymes.  Reports that he was advised to come to the ED for pain management and possible CT imaging.  Pain is a 10/10.  Denies fevers, chills, shortness of breath, lightheadedness, diarrhea, or urinary symptoms.      History provided by:  Patient      Nursing Notes were reviewed.    PAST MEDICAL HISTORY     Past Medical History:   Diagnosis Date    Above-knee amputation of left lower extremity (Multi)     Adverse effect of anesthesia     confusion    Anemia     CAD (coronary artery disease)     Carotid artery disease ([facility])     bilateral    CVA (cerebral vascular accident) (Multi) 2020    Depression     DM (diabetes mellitus) (Multi)     type 2 with neuropathy    GERD (gastroesophageal reflux disease)     High cholesterol     HTN (hypertension)     Leg ulcer (Multi)     chronic right lower extremity    Neuropathy     OA (osteoarthritis)     PAD (peripheral artery disease) (CMS-[facility])     Renal disorder          SURGICAL HISTORY       Past Surgical History:   Procedure Laterality Date    CARDIAC SURGERY      CABG 11/2023    CT ANGIO NECK  06/14/2021    CT NECK ANGIO W AND WO IV CONTRAST 6/14/2021 [facility] INPATIENT LEGACY    CT HEAD ANGIO W AND WO IV CONTRAST  06/14/2021    CT HEAD ANGIO W AND WO IV CONTRAST  6/14/2021 [facility] INPATIENT LEGACY    LEG SURGERY Right     MR HEAD ANGIO WO IV CONTRAST  12/11/2012    MR HEAD ANGIO WO IV CONTRAST [facility]    MR HEAD ANGIO WO IV CONTRAST  06/12/2021    MR HEAD ANGIO WO IV CONTRAST [facility]    MR HEAD ANGIO WO IV CONTRAST  05/25/2021    MR HEAD ANGIO WO IV CONTRAST [facility]PATIENT [facility]GAC[facility]    MR NECK ANGIO WO IV CONTRAST  07/18/2023    MR NECK ANGIO WO IV CONTRAST 7/18/2023 [id number] MRI    OTHER SURGICAL HISTORY  07/07/2021    Knee reconstruction    OTHER SURGICAL HISTORY  07/07/2021    Lower extremity amputation above knee    TOTAL SHOULDER ARTHROPLASTY Left 2020         CURRENT MEDICATIONS       Previous Medications    ACETAMINOPHEN (TYLENOL 8 HOUR) 650 MG ER TABLET    Take 1 tablet (650 mg) by mouth every 8 hours if needed for mild pain (1 - 3). Do not crush, chew, or split.    ASPIRIN 81 MG CHEWABLE TABLET    Chew 1 tablet (81 mg) once daily.    ATORVASTATIN (LIPITOR) 80 MG TABLET    Take 1 tablet (80 mg) by mouth once daily. Last rx prior to next refills    CHOLECALCIFEROL (VITAMIN D3) 25 MCG (1000 UT) TABLET    Take 1 tablet (1,000 Units) by mouth 2 times a day.    CLOPIDOGREL (PLAVIX) 75 MG TABLET    Take 1 tablet (75 mg) by mouth once daily.    ESCITALOPRAM (LEXAPRO) 10 MG TABLET    TAKE 1 TABLET BY MOUTH EVERY DAY    FLASH GLUCOSE SENSOR KIT (FREESTYLE VIVI 2 SENSOR) KIT    Use as instructed    FREESTYLE VIVI READER (FREESTYLE VIVI 2 READER) MISC    Use as instructed    GABAPENTIN (NEURONTIN) 300 MG CAPSULE    Take 1 capsule (300 mg) by mouth 3 times a day.    INSULIN GLARGINE (LANTUS SOLOSTAR U-100 INSULIN) 100 UNIT/ML (3 ML) PEN    Inject 2 Units under the skin once daily at bedtime.    INSULIN LISPRO (HUMALOG) 100 UNIT/ML INJECTION    Inject 0-0.1 mL (0-10 Units) under the skin 3 times a day with meals. Take as directed per insulin instructions. Do not start before March 30, 2024.    ISOSORBIDE DINITRATE (ISORDIL) 20 MG TABLET    Take 1 tablet  (20 mg) by mouth 3 times a day.    LIDOCAINE 4 % PATCH    Place 1 patch over 12 hours on the skin once daily. Remove & discard patch within 12 hours or as directed by MD.    MELATONIN 3 MG TABLET    Take 1 tablet (3 mg) by mouth once daily at bedtime.    METOPROLOL TARTRATE (LOPRESSOR) 50 MG TABLET    Take 1 tablet by mouth 2 times a day.    NIFEDIPINE ER (ADALAT CC) 60 MG 24 HR TABLET    Take 1 tablet (60 mg) by mouth once daily in the morning. Take before meals. Do not crush, chew, or split.    PANTOPRAZOLE (PROTONIX) 40 MG EC TABLET    Take 1 tablet (40 mg) by mouth once daily in the morning. Take before meals. Do not crush, chew, or split.    POLYETHYLENE GLYCOL (GLYCOLAX, MIRALAX) 17 GRAM PACKET    Take 17 g by mouth every 12 hours if needed (constipation).    SODIUM BICARBONATE 650 MG TABLET    Take 2 tablets (1,300 mg) by mouth 2 times a day.    TORSEMIDE (DEMADEX) 20 MG TABLET    Take 3 tablets (60 mg) by mouth once daily.    TRAMADOL (ULTRAM) 50 MG TABLET    Take 1 tablet (50 mg) by mouth every 6 hours if needed for severe pain (7 - 10).       ALLERGIES     Carvedilol    FAMILY HISTORY       Family History   Problem Relation Name Age of Onset    Other (cardiac disorder) Mother      Hypertension Mother      Esophageal cancer Mother      Hearing loss Father      Hypertension Father      Heart disease Father      COPD Sister            SOCIAL HISTORY       Social History     Socioeconomic History    Marital status:      Spouse name: Not on file    Number of children: Not on file    Years of education: Not on file    Highest education level: Not on file   Occupational History    Not on file   Tobacco Use    Smoking status: Former     Types: Cigars     Quit date: 2022     Years since quittin.5    Smokeless tobacco: Never   Vaping Use    Vaping status: Never Used   Substance and Sexual Activity    Alcohol use: Not Currently    Drug use: Not Currently     Types: Marijuana    Sexual activity: Defer    Other Topics Concern    Not on file   Social History Narrative    Not on file     Social Determinants of Health     Financial Resource Strain: Low Risk  (4/16/2024)    Overall Financial Resource Strain (CARDIA)     Difficulty of Paying Living Expenses: Not hard at all   Food Insecurity: No Food Insecurity (4/16/2024)    Hunger Vital Sign     Worried About Running Out of Food in the Last Year: Never true     Ran Out of Food in the Last Year: Never true   Transportation Needs: No Transportation Needs (4/16/2024)    PRAPARE - Transportation     Lack of Transportation (Medical): No     Lack of Transportation (Non-Medical): No   Recent Concern: Transportation Needs - Unmet Transportation Needs (4/12/2024)    OASIS : Transportation     Lack of Transportation (Medical): Yes     Lack of Transportation (Non-Medical): No     Patient Unable or Declines to Respond: No   Physical Activity: Inactive (4/16/2024)    Exercise Vital Sign     Days of Exercise per Week: 0 days     Minutes of Exercise per Session: 0 min   Stress: Stress Concern Present (4/16/2024)    [employer] - Occupational Stress Questionnaire     Feeling of Stress : Very much   Social Connections: Moderately Integrated (4/16/2024)    Social Connection and Isolation Panel [NHANES]     Frequency of Communication with Friends and Family: Three times a week     Frequency of Social Gatherings with Friends and Family: Three times a week     Attends Holiness Services: 1 to 4 times per year     Active Member of Clubs or Organizations: No     Attends Club or Organization Meetings: Never     Marital Status:    Recent Concern: Social Connections - Feeling Socially Isolated (4/12/2024)    OASIS : Social Isolation     Frequency of experiencing loneliness or isolation: Always   Intimate Partner Violence: Not At Risk (4/16/2024)    Humiliation, Afraid, Rape, and Kick questionnaire     Fear of Current or Ex-Partner: No      Emotionally Abused: No     Physically Abused: No     Sexually Abused: No   Housing Stability: Low Risk  (4/16/2024)    Housing Stability Vital Sign     Unable to Pay for Housing in the Last Year: No     Number of Places Lived in the Last Year: 1     Unstable Housing in the Last Year: No       SCREENINGS                        PHYSICAL EXAM    (up to 7 for level 4, 8 or more for level 5)     ED Triage Vitals [05/22/24 1336]   Temperature Heart Rate Respirations BP   36.4 °C (97.6 °F) 61 16 150/72      Pulse Ox Temp Source Heart Rate Source Patient Position   95 % Temporal Monitor Sitting      BP Location FiO2 (%)     Right arm --       Physical Exam  Vitals and nursing note reviewed.   Constitutional:       General: He is not in acute distress.     Appearance: He is well-developed. He is obese. He is diaphoretic.   HENT:      Head: Normocephalic and atraumatic.      Right Ear: External ear normal.      Left Ear: External ear normal.      Nose: Nose normal. No congestion or rhinorrhea.      Mouth/Throat:      Mouth: Mucous membranes are moist.      Pharynx: No oropharyngeal exudate or posterior oropharyngeal erythema.   Eyes:      General:         Right eye: No discharge.         Left eye: No discharge.      Extraocular Movements: Extraocular movements intact.      Conjunctiva/sclera: Conjunctivae normal.   Cardiovascular:      Rate and Rhythm: Normal rate and regular rhythm.      Pulses: Normal pulses.      Heart sounds: No murmur heard.     No friction rub.   Pulmonary:      Effort: Pulmonary effort is normal. No respiratory distress.      Breath sounds: Normal breath sounds. No stridor. No wheezing or rales.   Abdominal:      General: There is no distension.      Palpations: Abdomen is soft.      Tenderness: There is abdominal tenderness in the right upper quadrant. There is no right CVA tenderness, left CVA tenderness or guarding.   Musculoskeletal:         General: No swelling, tenderness, deformity or signs of  injury. Normal range of motion.      Cervical back: Neck supple.      Right lower leg: No edema.      Left lower leg: No edema.   Skin:     General: Skin is warm.      Capillary Refill: Capillary refill takes less than 2 seconds.      Coloration: Skin is not jaundiced or pale.      Findings: No lesion or rash.   Neurological:      General: No focal deficit present.      Mental Status: He is alert. Mental status is at baseline.      Cranial Nerves: No cranial nerve deficit.      Sensory: No sensory deficit.      Motor: No weakness.   Psychiatric:         Mood and Affect: Mood normal.         Behavior: Behavior normal.         Thought Content: Thought content normal.         Judgment: Judgment normal.          DIAGNOSTIC RESULTS     LABS:  Labs Reviewed   CBC WITH AUTO DIFFERENTIAL - Abnormal       Result Value    WBC 9.2      nRBC 0.0      RBC 3.54 (*)     Hemoglobin 9.5 (*)     Hematocrit 28.7 (*)     MCV 81      MCH 26.8      MCHC 33.1      RDW 13.8      Platelets 237      Neutrophils % 71.2      Immature Granulocytes %, Automated 0.4      Lymphocytes % 15.7      Monocytes % 7.5      Eosinophils % 4.7      Basophils % 0.5      Neutrophils Absolute 6.50      Immature Granulocytes Absolute, Automated 0.04      Lymphocytes Absolute 1.44      Monocytes Absolute 0.69      Eosinophils Absolute 0.43      Basophils Absolute 0.05     COMPREHENSIVE METABOLIC PANEL - Abnormal    Glucose 138 (*)     Sodium 136      Potassium 4.1      Chloride 101      Bicarbonate 24      Anion Gap 15      Urea Nitrogen 65 (*)     Creatinine 3.18 (*)     eGFR 21 (*)     Calcium 7.0 (*)     Albumin 3.2 (*)     Alkaline Phosphatase 58      Total Protein 6.9      AST 22      Bilirubin, Total 0.4      ALT 13     LIPASE - Normal    Lipase 23      Narrative:     Venipuncture immediately after or during the administration of Metamizole may lead to falsely low results. Testing should be performed immediately prior to Metamizole dosing.       All other  labs were within normal range or not returned as of this dictation.    Imaging  US gallbladder    (Results Pending)   CT abdomen pelvis wo IV contrast    (Results Pending)        Procedures  Procedures     EMERGENCY DEPARTMENT COURSE/MDM:     ED Course as of 05/22/24 2355   Wed May 22, 2024   2152 HEMOGLOBIN(!): 9.5  At baseline [ES]   2333 EKG Interpretation: Rate: 68. Rhythm: Sinus.  RBBB.  No delta-wave, GILMAR, deep TWI or biphasic t-waves in V1-V5, or LBBB. QTc: prolonged at 506 ms. RBBB seen in prior EKG 4/15/2024. [ES]      ED Course User Index  [ES] Jose A Fuentes MD         Diagnoses as of 05/22/24 2355   Prolonged QT interval        Medical Decision Making  59-year-old male with history of IDDM 2, obesity, CAD, and chronic renal insufficiency presenting to the ED with complaint of right upper abdominal pain. Patient is afebrile, hemodynamically stable on room air and in no acute distress. Pain and tenderness to RUQ. No palpitations or SOB. Morphine and zofran provided. Cardiac workup, RUQ US, and CT a/p wo contrast ordered due to CKD. Patient reports no relief from morphine in which 0.5 dilaudid provided.    No leukocytosis, acute anemia, hepatitis, or electrolyte abnormalities on labs. EKG interpretation per ED course. Troponin WNL x1.    Patient handed-off to coresident, Dr. Garcia, pending imaging and surgery recommendations.        Patient and or family in agreement and understanding of treatment plan.  All questions answered.      I reviewed the case with the senior resident, Dr. Jeffries. Patient and/or patient´s representative was counseled regarding labs, imaging, likely diagnosis, and plan. All questions were answered.    ED Medications administered this visit:    Medications   ondansetron (Zofran) injection 4 mg (has no administration in time range)   morphine injection 4 mg (has no administration in time range)   sodium chloride 0.9 % bolus 1,000 mL (has no administration in time range)       New  Prescriptions from this visit:    New Prescriptions    BLOOD SUGAR DIAGNOSTIC (BLOOD GLUCOSE TEST) STRIP    1 strip 3 times a day.       Follow-up:  No follow-up provider specified.      Final Impression: No diagnosis found.      (Please note that portions of this note were completed with a voice recognition program.  Efforts were made to edit the dictations but occasionally words are mis-transcribed.)     Jose A Fuentes MD  Resident  05/23/24 0000

## 2024-05-23 NOTE — NURSING NOTE
Pt. Arrived via cart from ed. Alert and oriented x4. Able to communicate needs. Very pleasant. Belongings with patient via bedside. Denies pain or discomfort. Call light in reach.

## 2024-05-23 NOTE — PROGRESS NOTES
Humphrey Waddell is a 59 y.o. male on day 0 of admission presenting with Cholecystitis.      DC Plannin/23:   Went in and met with the pt, confirmed demographics.   Pt lives at home with wife and dog.   Pt was seen by [facility] prior to admission and would like to continue service at [facility].    Home Care choice for home going needs. [place] [facility].     :   Pt waxing and waning.   PT/OT rec'd Mod for snf.     :  Gave snf list to pt. For back up plan if [facility] does not accept. [facility] currently wants him to be able to transfer to a chair and he can not.     :   [facility] accepting. Precert started.     6/10:   Precert pending.     :   Got Auth.   Requested transportation.   Updated AR/RN/Team.     ADOD:     This TCC will continue to follow for home going needs and safe DC plan.        DOROTHY NAVA

## 2024-05-23 NOTE — PROGRESS NOTES
Emergency Medicine Transition of Care Note.    I received Humphrey Waddell in signout from Dr. Jorge.  Please see the previous ED provider note for all HPI, PE and MDM up to the time of signout at 2300. This is in addition to the primary record.    In brief Humphrey Waddell is an 59 y.o. male presenting for   Chief Complaint   Patient presents with    Abdominal Pain    Chills     At the time of signout we were awaiting: ACS recs    Medical Decision Making    Patient was signed out to me pending final ACS recs.  After surgery evaluation plan for admission to ACS service with HIDA scan in the a.m.  We will hold his antiplatelets in prep for intervention and give Zosyn.  Plan for repeat percutaneous choleostomy versus cholecystectomy with surgery.   Please see ED course for updates on patient status, results, and disposition.     ED Course as of 05/23/24 0143   Wed May 22, 2024   2152 HEMOGLOBIN(!): 9.5  At baseline [ES]   2333 EKG Interpretation: Rate: 68. Rhythm: Sinus.  RBBB.  No delta-wave, GILMAR, deep TWI or biphasic t-waves in V1-V5, or LBBB. QTc: prolonged at 506 ms. RBBB seen in prior EKG 4/15/2024. [ES]   Thu May 23, 2024   0100 Resident supervision attestation.  I saw this patient with the resident, I agree with the following.  In brief, this is a 59-year-old male with past medical history of DM, CKD, CAD status post CABG, cholecystitis status post recent percutaneous cholecystostomy tube that came out on its own approximately 1 month ago.  Presenting to the emergency department today with concern for worsening right upper quadrant abdominal pain since yesterday evening, endorsing nausea without vomiting, no fevers.  Was seen in clinic today, sent to the ER for further workup.  Denies any associated chest pain, shortness of breath, states it feels similar to most recent episode of cholecystitis.  Abdominal exam is soft, does have tenderness to palpation overlying the right lower and right upper quadrants with mild guarding,  no rebound.  Will plan on CT of the abdomen pelvis, consult to ACS as well as cardiac workup. [SB]      ED Course User Index  [ES] Jose A Fuentes MD  [SB] Akhil Jeffries DO         Diagnoses as of 05/23/24 0143   Prolonged QT interval   Cholecystitis           Final diagnoses:   [R94.31] Prolonged QT interval   [K81.9] Cholecystitis           Procedure  Procedures    Patient seen and discussed with Dr. Mary Garcia DO  PGY-2 Emergency Medicine

## 2024-05-23 NOTE — CONSULTS
Reason For Consult  DAPT management in setting of acute cholecystitis    History Of Present Illness  58yo M with PMHx of 3v CABGx3 11/2023 (LIMA ->LAD, SVG ->OM1, SVG ->PDA in 11/23) complicated by pericardial tamponade requiring mediastinal exploration and repair of OM graft, HFpEF (EF 60-65% 4/2024), HTN, HLD, CKD (baseline Cr ~3.1), CLI 8/23 s/p PTA_DCB of the entire right superficial femoral artery (on DAPT), CVA 2020 with no deficits, T2DM, left AKA who was previously admitted to the ACS service in 3/2024 for cholecystitis and underwent percutaneous cholecystostomy tube on 4/3 (tube then fell out about a month ago). Now presents with two days of severe right sided abdominal pain and chills at home. CT again shows gallstone lodged at neck of cystic duct with no GB wall thickening or pericholecystic fluid. WBC 9.2. Patient is afebrile and hemodynamically normal with RUQ abdominal pain on exam. In setting of gallstone persistently lodged near cystic duct, patient will likely require either cholecystectomy or lifelong cholecystostomy tube for management of chronic cholecystitis/biliary colic.     Currently holding plavix. Cardiology consulted for assistance with holding plavix in the perioperative setting. Patient is on plavix as he had a PA-DCB done with Dr. Singh in 8/23 (has not been seen by endovascular since then)    Was seen by cardiology consult team in 4/24 when he first presented with acute cholecystitis. At the time, patient was very hypertensive and fluid overloaded due to medication non-compliance.    Patient denies any complaints    Cardiac Testing:  -ECG 5/22: NSR with RBBB    -TTE 5/24:   1. Left ventricular systolic function is normal with a 55% estimated ejection fraction.   2. Poorly visualized anatomical structures due to suboptimal image quality.    -TTE 4/24:   1. Left ventricular systolic function is normal with a 60-65% estimated ejection fraction.   2. Poorly visualized anatomical structures  due to suboptimal image quality.   3. Moderately increased left ventricular septal thickness.   4. Spectral Doppler shows an impaired relaxation pattern of left ventricular diastolic filling.   5. The left ventricular posterior wall thickness is moderately increased.   6. There is mildly reduced right ventricular systolic function.   7. Compared with the prior exam from 11/13/2023 ( [facility]) prior study was techniclly difficult with patient  bpm, but limited echocontrast images demonstrated normal LV systolic function at that time without significant wall motion abnormalities. Bernard's HR today 70 bpm.     -TTE 11/23:   1. Left ventricular systolic function is mildly decreased with a 45-50% estimated ejection fraction.   2. Mid and apical anterior wall, mid anterolateral segment, apical lateral segment, and apex are abnormal.   3. There are multiple wall motion abnormalities.   4. Poorly visualized anatomical structures due to suboptimal image quality.   5. There is reduced right ventricular systolic function.    -TTE 7/23:  1. Left ventricular systolic function is normal with a 60-65% estimated ejection fraction.  2. Spectral Doppler shows an abnormal pattern of left ventricular diastolic filling.  3. Compared with the prior exam from 4/28/2023 ( [facility]) the Mild-Mod MR and segmental wall motion abnormalities previously seen are not appreciated on todays study. Note that OM2 was stented on 5/11/2023.    -[facility] 5/11/23 for NSTEMI:    Left Main 50% stenosis     entire  LAD       70% stenosis       mid  OM 2      95% stenosis proximal to mid  Successful OM2 stenting.       Home meds:  ASA 81mg   Atorva 80mg  Plavix 75mg  Isosordil 20mg TID  Metop tartrate 50mg BID   Nifedipine 60  Torsemide 20mg        Past Medical History  He has a past medical history of Above-knee amputation of left lower extremity (Multi), Adverse effect of anesthesia, Anemia, CAD (coronary artery disease), Carotid artery disease (CMS-[facility]), CVA  (cerebral vascular accident) (Multi) (2020), Depression, DM (diabetes mellitus) (Multi), GERD (gastroesophageal reflux disease), High cholesterol, HTN (hypertension), Leg ulcer (Multi), Neuropathy, OA (osteoarthritis), PAD (peripheral artery disease) (CMS-HCC), and Renal disorder.    Surgical History  He has a past surgical history that includes Other surgical history (07/07/2021); Other surgical history (07/07/2021); CT angio head w and wo IV contrast (06/14/2021); CT angio neck (06/14/2021); MR angio neck wo IV contrast (07/18/2023); MR angio head wo IV contrast (12/11/2012); MR angio head wo IV contrast (06/12/2021); MR angio head wo IV contrast (05/25/2021); Total shoulder arthroplasty (Left, 2020); Leg Surgery (Right); and Cardiac surgery.     Social History  He reports that he quit smoking about 18 months ago. His smoking use included cigars. He has never used smokeless tobacco. He reports that he does not currently use alcohol. He reports that he does not currently use drugs after having used the following drugs: Marijuana.    Family History  Family History   Problem Relation Name Age of Onset    Other (cardiac disorder) Mother      Hypertension Mother      Esophageal cancer Mother      Hearing loss Father      Hypertension Father      Heart disease Father      COPD Sister          Allergies  Carvedilol    Review of Systems  12 point ROS reviewed and -ve     Physical Exam  Gen: well appearing  Neuro: AAOx3, CN 2-12 intact, no FND  HEENT: PEERL, EOMI, sclera anicteric, no conjunctival injection, MMM, no oropharyngeal lesions  Neck: no elevated JVD  Resp: CTAB, normal breath sounds, no wheezing/crackles/ rales  CV: RRR, normal S1/S2, no murmurs/ rubs/gallops  GI: non-tender, non-distended, normal BSs in all 4 quadrants  MSK: warm and well perfused, no edema  Skin: warm and dry, no lesions, no rashes     Last Recorded Vitals  Blood pressure (!) 162/104, pulse 85, temperature 37.1 °C (98.8 °F), temperature source  "Temporal, resp. rate 16, height 1.93 m (6' 4\"), weight 113 kg (250 lb), SpO2 96%.    Relevant Results  LABS:  CMP:  Results from last 7 days   Lab Units 05/23/24  0543 05/22/24  1402   SODIUM mmol/L 139 136   POTASSIUM mmol/L 3.8 4.1   CHLORIDE mmol/L 101 101   CO2 mmol/L 27 24   ANION GAP mmol/L 15 15   BUN mg/dL 59* 65*   CREATININE mg/dL 2.88* 3.18*   EGFR mL/min/1.73m*2 24* 21*   MAGNESIUM mg/dL 1.18*  --    ALBUMIN g/dL 2.9* 3.2*   ALT U/L 14 13   AST U/L 13 22   BILIRUBIN TOTAL mg/dL 0.4 0.4   LIPASE U/L  --  23     CBC:  Results from last 7 days   Lab Units 05/23/24  0543 05/22/24  1402   WBC AUTO x10*3/uL 7.6 9.2   HEMOGLOBIN g/dL 8.9* 9.5*   HEMATOCRIT % 29.3* 28.7*   PLATELETS AUTO x10*3/uL 217 237   MCV fL 88 81     COAG:   Results from last 7 days   Lab Units 05/22/24  2235   INR  1.1     ABO:   ABO TYPE   Date Value Ref Range Status   05/22/2024 A  Final     HEME/ENDO:     CARDIAC:   Results from last 7 days   Lab Units 05/22/24  2356 05/22/24  2235   TROPHS ng/L 20 19     Recent Labs     04/29/23  0654 08/15/22  0700 03/18/22  0623   CHOL 181 145 150   LDLF 127* 77 90   HDL 32.8* 31.6* 36.7*   TRIG 108 180* 115     Scheduled medications  acetaminophen, 650 mg, oral, q6h  aspirin, 81 mg, oral, Daily  atorvastatin, 80 mg, oral, Daily  cholecalciferol, 1,000 Units, oral, BID  escitalopram, 10 mg, oral, Daily  gabapentin, 300 mg, oral, TID  heparin (porcine), 5,000 Units, subcutaneous, q8h  insulin lispro, 0-10 Units, subcutaneous, TID  isosorbide dinitrate, 20 mg, oral, TID  melatonin, 3 mg, oral, Nightly  metoprolol tartrate, 50 mg, oral, BID  NIFEdipine ER, 60 mg, oral, Daily before breakfast  pantoprazole, 40 mg, oral, Daily before breakfast  perflutren protein A microsphere, 0.5 mL, intravenous, Once in imaging  piperacillin-tazobactam, 2.25 g, intravenous, q6h  sodium bicarbonate, 1,300 mg, oral, q AM  sodium bicarbonate, 650 mg, oral, Daily with lunch  sulfur hexafluoride microsphr, 2 mL, " intravenous, Once in imaging      Continuous medications     PRN medications  PRN medications: dextrose, dextrose, glucagon, naloxone, ondansetron, oxyCODONE, polyethylene glycol       Assessment/Plan   60yo M with PMHx of 3v CABGx3 11/2023 (LIMA ->LAD, SVG ->OM1, SVG ->PDA in 11/23) complicated by pericardial tamponade requiring mediastinal exploration and repair of OM graft, HFpEF (EF 60-65% 4/2024), HTN, HLD, CKD (baseline Cr ~3.1), CLI 8/23 s/p PTA_DCB of the entire right superficial femoral artery (on DAPT), CVA 2020 with no deficits, T2DM, left AKA who was previously admitted to the ACS service in 3/2024 for cholecystitis and underwent percutaneous cholecystostomy tube on 4/3 (tube then fell out about a month ago). Now presents with two days of severe right sided abdominal pain and chills at home. Planned for inpatient laparoscopic cholecystectomy. Cardiology consulted for assistance with DAPT    Patient has no indication to be on plavix from a cardiology perspective. He had an OM2 stent placed on 5/11/23 and he is more than 1 year out of it. He had CLI in 8/23 for which he underwent PTA with a  DCB    -Continue ASA 81mg daily for life  -Ok to stop plavix. No indication to resume on discharge  -Continue atorva 80mg, metoprolol, nifedipine and isosordil  -RCRI 3 points - Class IV risk. Patient is at moderate risk for low-moderate risk procedure. No acute drop in EF on TTE. Benefit of surgery outweighs risks as patient can go into septic shock if surgery is not done. No prior preop workup required by cardiology     Thank you for the consult. Cardiology will continue to follow    I spent 45 minutes in the professional and overall care of this patient.      Dominique Prater MD

## 2024-05-23 NOTE — H&P
[facility]  ACUTE CARE SURGERY - HISTORY AND PHYSICAL / CONSULT    Patient Name: Humphrey Waddell  MRN: [id number]  Admit Date: 522  : 1965  AGE: 59 y.o.   GENDER: male  ==============================================================================  TODAY'S ASSESSMENT AND PLAN OF CARE:  60yo M with PMHx of 3v CABGx3 2023, HFpEF (EF 60-65% 2024), HTN, HLD, CKD (baseline Cr ~3.1), CVA  with no deficits, T2DM, left AKA who was previously admitted to the ACS service in 3/2024 for cholecystitis and underwent percutaneous cholecystostomy tube on 4/3 (tube then fell out about a month ago). Now presents with two days of severe right sided abdominal pain and chills at home. CT again shows gallstone lodged at neck of cystic duct with no GB wall thickening or pericholecystic fluid. WBC 9.2. Patient is afebrile and hemodynamically normal with RUQ abdominal pain on exam. In setting of gallstone persistently lodged near cystic duct, patient will likely require either cholecystectomy or lifelong cholecystostomy tube for management of chronic cholecystitis/biliary colic.    Plan:    - Admit to ACS  - Continue ASA, hold PLX  - Diabetic diet  - Zosyn (renally dosed)  - Will discuss perc bbas vs cholecystectomy  - SCDs, SQH    Discussed with attending Dr. Lam Anderson MD  Acute Care Surgery [id number]    ==============================================================================  CHIEF COMPLAINT/REASON FOR CONSULT:  Humphrey Waddell is a 60yo M with PMHx of 3v CABGx3 2023 (postoperative course complicated by pericardial tamponade requiring mediastinal exploration for evacuation of tamponade and repair of vein graft), HFpEF (EF 60-65% 2024), HTN, HLD, CKD (baseline Cr ~3.1), CVA  with no deficits, T2DM, left AKA 2/2 cellulitis and chronic wounds who was previously admitted to the ACS service in 3/2024 for management of cholecystitis. Patient presented to [facility] ED 3/28  with chest pain and was noted on CT to have a gallstone lodged at neck of cystic duct without signs of GB inflammation. HIDA then obtained and was concerning for cystic duct obstruction with cholecystitis. Patient was transferred to [facility] for management and due to cardiac risk score was deemed not to be a candidate for cholecystectomy. He underwent placement of an 8Fr cholecystosomy tube and L thoracentesis with IR on 4/3. Discharged 4/8.    Today patient was seen in ACS clinic for 2 days of severe right sided abdominal pain and chills and decided to come to the ED afterward because his pain was unbearable. States his perc babs tube fell out about a month ago. The tube helped his symptoms while it was in place, but after it fell out he continued to have mild RUQ discomfort. States the pain then acutely worsened 2 days ago and is now 10/10. He denies n/v. He is tolerating a regular diet but has a decreased appetite. Also endorses chills at home for the past 2 days; no known fevers. Denies CP, SOB, issues urinating.    PAST MEDICAL HISTORY:   PMH:   Past Medical History:   Diagnosis Date    Above-knee amputation of left lower extremity (Multi)     Adverse effect of anesthesia     confusion    Anemia     CAD (coronary artery disease)     Carotid artery disease (CMS-[facility])     bilateral    CVA (cerebral vascular accident) (Multi) 2020    Depression     DM (diabetes mellitus) (Multi)     type 2 with neuropathy    GERD (gastroesophageal reflux disease)     High cholesterol     HTN (hypertension)     Leg ulcer (Multi)     chronic right lower extremity    Neuropathy     OA (osteoarthritis)     PAD (peripheral artery disease) (CMS-[facility])     Renal disorder      PSH:   Past Surgical History:   Procedure Laterality Date    CARDIAC SURGERY      CABG 11/2023    CT ANGIO NECK  06/14/2021    CT NECK ANGIO W AND WO IV CONTRAST 6/14/2021 [facility] [place] INPATIENT LEGACY    CT HEAD ANGIO W AND WO IV CONTRAST  06/14/2021    CT HEAD ANGIO W AND WO  IV CONTRAST 2021 [facility] [place] [place] INPATIENT LEGACY    LEG SURGERY Right     MR HEAD ANGIO WO IV CONTRAST  2012    MR HEAD ANGIO WO IV CONTRAST [facility]    MR HEAD ANGIO WO IV CONTRAST  2021    MR HEAD ANGIO WO IV CONTRAST [facility]    MR HEAD ANGIO WO IV CONTRAST  2021    MR HEAD ANGIO WO IV CONTRAST [facility]    MR NECK ANGIO WO IV CONTRAST  2023    MR NECK ANGIO WO IV CONTRAST 2023 [facility] [id number] MRI    OTHER SURGICAL HISTORY  2021    Knee reconstruction    OTHER SURGICAL HISTORY  2021    Lower extremity amputation above knee    TOTAL SHOULDER ARTHROPLASTY Left      FH:   Family History   Problem Relation Name Age of Onset    Other (cardiac disorder) Mother      Hypertension Mother      Esophageal cancer Mother      Hearing loss Father      Hypertension Father      Heart disease Father      COPD Sister       SOCIAL HISTORY:    Smoking:    Social History     Tobacco Use   Smoking Status Former    Types: Cigars    Quit date: 2022    Years since quittin.5   Smokeless Tobacco Never       Alcohol:    Social History     Substance and Sexual Activity   Alcohol Use Not Currently       MEDICATIONS:   Prior to Admission medications    Medication Sig Start Date End Date Taking? Authorizing Provider   acetaminophen (Tylenol 8 HOUR) 650 mg ER tablet Take 1 tablet (650 mg) by mouth every 8 hours if needed for mild pain (1 - 3). Do not crush, chew, or split.    Historical Provider, MD   aspirin 81 mg chewable tablet Chew 1 tablet (81 mg) once daily. 21   Historical Provider, MD   atorvastatin (Lipitor) 80 mg tablet Take 1 tablet (80 mg) by mouth once daily. Last rx prior to next refills 24  Dorothy Nielsen, APRN-CNP   blood sugar diagnostic (Blood Glucose Test) strip 1 strip 3 times a day. 24   Mily Leung,    cholecalciferol (Vitamin D3) 25 MCG (1000 UT) tablet Take 1 tablet (1,000 Units) by mouth 2 times a day.     Historical Provider, MD   clopidogrel (Plavix) 75 mg tablet Take 1 tablet (75 mg) by mouth once daily. 1/29/24 1/28/25  PAT Knott   escitalopram (Lexapro) 10 mg tablet TAKE 1 TABLET BY MOUTH EVERY DAY 5/20/24   Mily Leung DO   flash glucose sensor kit (FreeStyle Suzy 2 Sensor) kit Use as instructed 4/28/24   Mily Leung DO   FreeStyle Suzy reader (FreeStyle Suzy 2 [place]) misc Use as instructed 4/27/23   Mily Leung DO   gabapentin (Neurontin) 300 mg capsule Take 1 capsule (300 mg) by mouth 3 times a day. 5/7/24 11/3/24  Mily Leung DO   insulin glargine (Lantus Solostar U-100 Insulin) 100 unit/mL (3 mL) pen Inject 2 Units under the skin once daily at bedtime. 5/19/21   Historical Provider, MD   insulin lispro (HumaLOG) 100 unit/mL injection Inject 0-0.1 mL (0-10 Units) under the skin 3 times a day with meals. Take as directed per insulin instructions. Do not start before March 30, 2024.  Patient not taking: Reported on 5/22/2024 3/30/24   Nalini Whitehead MD   isosorbide dinitrate (Isordil) 20 mg tablet Take 1 tablet (20 mg) by mouth 3 times a day. 5/7/24 11/3/24  Mily Leung DO   lidocaine 4 % patch Place 1 patch over 12 hours on the skin once daily. Remove & discard patch within 12 hours or as directed by MD. 4/24/24   Mily Leung DO   melatonin 3 mg tablet Take 1 tablet (3 mg) by mouth once daily at bedtime.    Historical Provider, MD   metoprolol tartrate (Lopressor) 50 mg tablet Take 1 tablet by mouth 2 times a day. 1/29/24 1/28/25  PAT Knott   NIFEdipine ER (Adalat CC) 60 mg 24 hr tablet Take 1 tablet (60 mg) by mouth once daily in the morning. Take before meals. Do not crush, chew, or split. 5/7/24 11/3/24  Mily C Ray, DO   pantoprazole (ProtoNix) 40 mg EC tablet Take 1 tablet (40 mg) by mouth once daily in the morning. Take before meals. Do not crush, chew, or split. 5/7/24 11/3/24  Mily Leung, DO   polyethylene glycol (Glycolax,  Miralax) 17 gram packet Take 17 g by mouth every 12 hours if needed (constipation). 4/17/24   PAT Addison   sodium bicarbonate 650 mg tablet Take 2 tablets (1,300 mg) by mouth 2 times a day. 4/17/24 5/22/24  PAT Addison   torsemide (Demadex) 20 mg tablet Take 3 tablets (60 mg) by mouth once daily. 4/17/24   PAT Addison   traMADol (Ultram) 50 mg tablet Take 1 tablet (50 mg) by mouth every 6 hours if needed for severe pain (7 - 10). 4/8/24   Sharron Castaneda MD   escitalopram (Lexapro) 10 mg tablet Take 1 tablet (10 mg) by mouth once daily. 4/17/24 5/20/24  PAT Addison     ALLERGIES:   Allergies   Allergen Reactions    Carvedilol Other     REVIEW OF SYSTEMS:  12-point ROS negative except per HPI    PHYSICAL EXAM:  General: non toxic appearing, NAD  HEENT: normocephalic, sclerae anicteric  CV: regular rate  Pulm: nonlabored effort on RA  Abd: soft, nondistended, mildly TTP in RUQ and RLQ, non peritonitic  Groin: left inguinal hernia soft, no overlying skin changes  Neuro: awake and alert, no focal deficits  Skin: warm and dry, non jaundiced    IMAGING SUMMARY:  CT A/P showing gallstone lodged at neck of cystic duct, similar to CT in 3/2024, without GB wall thickening or pericholecystic fluid, large left inguinal hernia containing bowel    LABS:  Results from last 7 days   Lab Units 05/22/24  1402   WBC AUTO x10*3/uL 9.2   HEMOGLOBIN g/dL 9.5*   HEMATOCRIT % 28.7*   PLATELETS AUTO x10*3/uL 237   NEUTROS PCT AUTO % 71.2   LYMPHS PCT AUTO % 15.7   MONOS PCT AUTO % 7.5   EOS PCT AUTO % 4.7     Results from last 7 days   Lab Units 05/22/24  2235   APTT seconds 25*   INR  1.1     Results from last 7 days   Lab Units 05/22/24  1402   SODIUM mmol/L 136   POTASSIUM mmol/L 4.1   CHLORIDE mmol/L 101   CO2 mmol/L 24   BUN mg/dL 65*   CREATININE mg/dL 3.18*   CALCIUM mg/dL 7.0*   PROTEIN TOTAL g/dL 6.9   BILIRUBIN TOTAL mg/dL 0.4   ALK PHOS U/L 58   ALT U/L 13   AST U/L 22   GLUCOSE  mg/dL 138*     Results from last 7 days   Lab Units 05/22/24  1402   BILIRUBIN TOTAL mg/dL 0.4           I have reviewed all laboratory and imaging results ordered/pertinent for this encounter.

## 2024-05-23 NOTE — PROGRESS NOTES
[facility]  ACUTE CARE SURGERY - PROGRESS NOTE    Patient Name: Humphrey Waddell  MRN: [id number]  Admit Date: 522  : 1965  AGE: 59 y.o.   GENDER: male  ==============================================================================  TODAY'S ASSESSMENT AND PLAN OF CARE:  58yo M with PMHx of 3v CABGx3 2023, HFpEF (EF 60-65% 2024), HTN, HLD, CKD (baseline Cr ~3.1), CVA  with no deficits, T2DM, left AKA who was previously admitted to the ACS service in 3/2024 for cholecystitis and underwent percutaneous cholecystostomy tube on 4/3 (tube then fell out about a month ago). Now presents with two days of severe right sided abdominal pain and chills at home. CT again shows gallstone lodged at neck of cystic duct with no GB wall thickening or pericholecystic fluid. WBC 9.2. Patient is afebrile and hemodynamically normal with RUQ abdominal pain on exam. In setting of gallstone persistently lodged near cystic duct, patient will likely require either cholecystectomy or lifelong cholecystostomy tube for management of chronic cholecystitis/biliary colic.    Plan:    Neuro:   - pain control with oxycodone, gabapentin, tylenol     CV/pulm:   - Continue home metoprolol and nifedipine      GI:   - Diabetic diet   - Continue pantoprazole   - Plan to discuss holding ASA and Plavix with cardiology for operative planning      : CKD stage III  - No IVF at this time      Heme:   - Daily labs   - Heparin for DVT ppx      ID:   - continue zosyn (renally dosed)  at this time      DVT ppx: heparin, SCDs      Discussed with attending Dr. Jaylen Rose, PATammyC  Acute Care Surgery [id number]    ==============================================================================  CHIEF COMPLAINT/REASON FOR CONSULT:  Humphrey Waddell is a 58yo M with PMHx of 3v CABGx3 2023 (postoperative course complicated by pericardial tamponade requiring mediastinal exploration for evacuation of tamponade and repair of vein  graft), HFpEF (EF 60-65% 4/2024), HTN, HLD, CKD (baseline Cr ~3.1), CVA 2020 with no deficits, T2DM, left AKA 2/2 cellulitis and chronic wounds who was previously admitted to the ACS service in 3/2024 for management of cholecystitis. Patient presented to [facility] ED 3/28 with chest pain and was noted on CT to have a gallstone lodged at neck of cystic duct without signs of GB inflammation. HIDA then obtained and was concerning for cystic duct obstruction with cholecystitis. Patient was transferred to [facility] for management and due to cardiac risk score was deemed not to be a candidate for cholecystectomy. He underwent placement of an 8Fr cholecystosomy tube and L thoracentesis with IR on 4/3. Discharged 4/8.    Today patient was seen in ACS clinic for 2 days of severe right sided abdominal pain and chills and decided to come to the ED afterward because his pain was unbearable. States his perc babs tube fell out about a month ago. The tube helped his symptoms while it was in place, but after it fell out he continued to have mild RUQ discomfort. States the pain then acutely worsened 2 days ago and is now 10/10. He denies n/v. He is tolerating a regular diet but has a decreased appetite. Also endorses chills at home for the past 2 days; no known fevers. Denies CP, SOB, issues urinating.    PAST MEDICAL HISTORY:   PMH:   Past Medical History:   Diagnosis Date    Above-knee amputation of left lower extremity (Multi)     Adverse effect of anesthesia     confusion    Anemia     CAD (coronary artery disease)     Carotid artery disease (CMS-HCC)     bilateral    CVA (cerebral vascular accident) (Multi) 2020    Depression     DM (diabetes mellitus) (Multi)     type 2 with neuropathy    GERD (gastroesophageal reflux disease)     High cholesterol     HTN (hypertension)     Leg ulcer (Multi)     chronic right lower extremity    Neuropathy     OA (osteoarthritis)     PAD (peripheral artery disease) (CMS-HCC)     Renal disorder       PSH:   Past Surgical History:   Procedure Laterality Date    CARDIAC SURGERY      CABG 2023    CT ANGIO NECK  2021    CT NECK ANGIO W AND WO IV CONTRAST 2021 [facility] INPATIENT LEGACY    CT HEAD ANGIO W AND WO IV CONTRAST  2021    CT HEAD ANGIO W AND WO IV CONTRAST 2021 [facility] INPATIENT LEGACY    LEG SURGERY Right     MR HEAD ANGIO WO IV CONTRAST  2012    MR HEAD ANGIO WO IV CONTRAST [facility]    MR HEAD ANGIO WO IV CONTRAST  2021    MR HEAD ANGIO WO IV CONTRAST [facility]    MR HEAD ANGIO WO IV CONTRAST  2021    MR HEAD ANGIO WO IV CONTRAST [facility]    MR NECK ANGIO WO IV CONTRAST  2023    MR NECK ANGIO WO IV CONTRAST 2023 [facility] [id number] MRI    OTHER SURGICAL HISTORY  2021    Knee reconstruction    OTHER SURGICAL HISTORY  2021    Lower extremity amputation above knee    TOTAL SHOULDER ARTHROPLASTY Left      FH:   Family History   Problem Relation Name Age of Onset    Other (cardiac disorder) Mother      Hypertension Mother      Esophageal cancer Mother      Hearing loss Father      Hypertension Father      Heart disease Father      COPD Sister       SOCIAL HISTORY:    Smoking:    Social History     Tobacco Use   Smoking Status Former    Types: Cigars    Quit date: 2022    Years since quittin.5   Smokeless Tobacco Never       Alcohol:    Social History     Substance and Sexual Activity   Alcohol Use Not Currently       MEDICATIONS:   Prior to Admission medications    Medication Sig Start Date End Date Taking? Authorizing Provider   acetaminophen (Tylenol 8 HOUR) 650 mg ER tablet Take 1 tablet (650 mg) by mouth every 8 hours if needed for mild pain (1 - 3). Do not crush, chew, or split.    Historical Provider, MD   aspirin 81 mg chewable tablet Chew 1 tablet (81 mg) once daily. 21   Historical Provider, MD   atorvastatin (Lipitor) 80 mg tablet Take 1 tablet (80 mg) by mouth once daily. Last rx prior to next  refills 1/29/24 1/28/25  CONG Knott-CNP   blood sugar diagnostic (Blood Glucose Test) strip 1 strip 3 times a day. 5/22/24   Mily Leung DO   cholecalciferol (Vitamin D3) 25 MCG (1000 UT) tablet Take 1 tablet (1,000 Units) by mouth 2 times a day.    Historical Provider, MD   clopidogrel (Plavix) 75 mg tablet Take 1 tablet (75 mg) by mouth once daily. 1/29/24 1/28/25  PAT Knott   escitalopram (Lexapro) 10 mg tablet TAKE 1 TABLET BY MOUTH EVERY DAY 5/20/24   Mily Leung DO   flash glucose sensor kit (FreeStyle Suzy 2 Sensor) kit Use as instructed 4/28/24   Mily Leung DO   FreeStyle Suzy reader (FreeStyle Suzy 2 [place]) misc Use as instructed 4/27/23   Mily Leung DO   gabapentin (Neurontin) 300 mg capsule Take 1 capsule (300 mg) by mouth 3 times a day. 5/7/24 11/3/24  Mily Leung DO   insulin glargine (Lantus Solostar U-100 Insulin) 100 unit/mL (3 mL) pen Inject 2 Units under the skin once daily at bedtime. 5/19/21   Historical Provider, MD   insulin lispro (HumaLOG) 100 unit/mL injection Inject 0-0.1 mL (0-10 Units) under the skin 3 times a day with meals. Take as directed per insulin instructions. Do not start before March 30, 2024.  Patient not taking: Reported on 5/22/2024 3/30/24   Nalini Whitehead MD   isosorbide dinitrate (Isordil) 20 mg tablet Take 1 tablet (20 mg) by mouth 3 times a day. 5/7/24 11/3/24  Mily Leung DO   lidocaine 4 % patch Place 1 patch over 12 hours on the skin once daily. Remove & discard patch within 12 hours or as directed by MD. 4/24/24   Mily Leung DO   melatonin 3 mg tablet Take 1 tablet (3 mg) by mouth once daily at bedtime.    Historical Provider, MD   metoprolol tartrate (Lopressor) 50 mg tablet Take 1 tablet by mouth 2 times a day. 1/29/24 1/28/25  Dorothy Nielsen, APRN-CNP   NIFEdipine ER (Adalat CC) 60 mg 24 hr tablet Take 1 tablet (60 mg) by mouth once daily in the morning. Take before meals. Do not crush, chew,  "or split. 5/7/24 11/3/24  Mily Leung, DO   pantoprazole (ProtoNix) 40 mg EC tablet Take 1 tablet (40 mg) by mouth once daily in the morning. Take before meals. Do not crush, chew, or split. 5/7/24 11/3/24  Mily Leung DO   polyethylene glycol (Glycolax, Miralax) 17 gram packet Take 17 g by mouth every 12 hours if needed (constipation). 4/17/24   PAT Addison   sodium bicarbonate 650 mg tablet Take 2 tablets (1,300 mg) by mouth 2 times a day. 4/17/24 5/22/24  PAT Addison   torsemide (Demadex) 20 mg tablet Take 3 tablets (60 mg) by mouth once daily. 4/17/24   PAT Addison   traMADol (Ultram) 50 mg tablet Take 1 tablet (50 mg) by mouth every 6 hours if needed for severe pain (7 - 10). 4/8/24   Sharron Castaneda MD   escitalopram (Lexapro) 10 mg tablet Take 1 tablet (10 mg) by mouth once daily. 4/17/24 5/20/24  PAT Addison     ALLERGIES:   Allergies   Allergen Reactions    Carvedilol Itching     \"Scratchy throat\"     REVIEW OF SYSTEMS:  12-point ROS negative except per HPI    PHYSICAL EXAM:  General: non toxic appearing, NAD  HEENT: normocephalic, sclerae anicteric  CV: regular rate  Pulm: nonlabored effort on RA  Abd: soft, nondistended, mildly TTP in RUQ and RLQ, non peritonitic  Groin: left inguinal hernia soft, no overlying skin changes  Neuro: awake and alert, no focal deficits  Skin: warm and dry, non jaundiced    IMAGING SUMMARY:  CT A/P showing gallstone lodged at neck of cystic duct, similar to CT in 3/2024, without GB wall thickening or pericholecystic fluid, large left inguinal hernia containing bowel    LABS:  Results from last 7 days   Lab Units 05/23/24  0543 05/22/24  1402   WBC AUTO x10*3/uL 7.6 9.2   HEMOGLOBIN g/dL 8.9* 9.5*   HEMATOCRIT % 29.3* 28.7*   PLATELETS AUTO x10*3/uL 217 237   NEUTROS PCT AUTO % 66.4 71.2   LYMPHS PCT AUTO % 19.5 15.7   MONOS PCT AUTO % 8.7 7.5   EOS PCT AUTO % 4.7 4.7     Results from last 7 days   Lab Units 05/22/24  2235 "   APTT seconds 25*   INR  1.1     Results from last 7 days   Lab Units 05/23/24  0543 05/22/24  1402   SODIUM mmol/L 139 136   POTASSIUM mmol/L 3.8 4.1   CHLORIDE mmol/L 101 101   CO2 mmol/L 27 24   BUN mg/dL 59* 65*   CREATININE mg/dL 2.88* 3.18*   CALCIUM mg/dL 6.7* 7.0*   PROTEIN TOTAL g/dL 5.9* 6.9   BILIRUBIN TOTAL mg/dL 0.4 0.4   ALK PHOS U/L 53 58   ALT U/L 14 13   AST U/L 13 22   GLUCOSE mg/dL 101* 138*     Results from last 7 days   Lab Units 05/23/24  0543 05/22/24  1402   BILIRUBIN TOTAL mg/dL 0.4 0.4           I have reviewed all laboratory and imaging results ordered/pertinent for this encounter.

## 2024-05-23 NOTE — PROGRESS NOTES
Pharmacy Medication History Review    Humphrey Waddell is a 59 y.o. male admitted for Cholecystitis. Pharmacy reviewed the patient's [id number] medications and allergies for accuracy.    The list below reflects the updated PTA list. Comments regarding how patient may be taking medications differently can be found in the Admit Orders Activity  Prior to Admission Medications   Prescriptions Last Dose Informant Taking?   FreeStyle Suzy reader (FreeStyle Suzy 2 [place]) misc  Self No   Sig: Use as instructed   NIFEdipine ER (Adalat CC) 60 mg 24 hr tablet 2024 Self Yes   Sig: Take 1 tablet (60 mg) by mouth once daily in the morning. Take before meals. Do not crush, chew, or split.   acetaminophen (Tylenol 8 HOUR) 650 mg ER tablet 2024 Self Yes   Sig: Take 1 tablet (650 mg) by mouth every 8 hours if needed for mild pain (1 - 3). Do not crush, chew, or split.   aspirin 81 mg chewable tablet 2024 Self Yes   Sig: Chew 1 tablet (81 mg) once daily.   atorvastatin (Lipitor) 80 mg tablet 2024 Self Yes   Sig: Take 1 tablet (80 mg) by mouth once daily. Last rx prior to next refills   blood sugar diagnostic (Blood Glucose Test) strip  Self No   Si strip 3 times a day.   cholecalciferol (Vitamin D3) 25 MCG (1000 UT) tablet 2024 Self Yes   Sig: Take 1 tablet (1,000 Units) by mouth once daily.   clopidogrel (Plavix) 75 mg tablet 2024 Self Yes   Sig: Take 1 tablet (75 mg) by mouth once daily.   escitalopram (Lexapro) 10 mg tablet 2024 Self Yes   Sig: TAKE 1 TABLET BY MOUTH EVERY DAY   flash glucose sensor kit (FreeStyle Suzy 2 Sensor) kit 2024 Self Yes   Sig: Use as instructed   gabapentin (Neurontin) 300 mg capsule 2024 Self Yes   Sig: Take 1 capsule (300 mg) by mouth 3 times a day.   insulin glargine (Lantus Solostar U-100 Insulin) 100 unit/mL (3 mL) pen 2024 Self Yes   Sig: Inject 2 Units under the skin once daily at bedtime.   insulin lispro (HumaLOG) 100 unit/mL injection   "at not taking Self No   Sig: Inject 0-0.1 mL (0-10 Units) under the skin 3 times a day with meals. Take as directed per insulin instructions. Do not start before March 30, 2024.   Patient reported not taking   isosorbide dinitrate (Isordil) 20 mg tablet 5/22/2024 Self Yes   Sig: Take 1 tablet (20 mg) by mouth 3 times a day.   lidocaine 4 % patch 5/22/2024 Self Yes   Sig: Place 1 patch over 12 hours on the skin once daily. Remove & discard patch within 12 hours or as directed by MD.   melatonin 3 mg tablet 5/21/2024 Self Yes   Sig: Take 1 tablet (3 mg) by mouth once daily at bedtime.   metoprolol tartrate (Lopressor) 50 mg tablet 5/22/2024 Self Yes   Sig: Take 1 tablet by mouth 2 times a day.   pantoprazole (ProtoNix) 40 mg EC tablet 5/22/2024 Self Yes   Sig: Take 1 tablet (40 mg) by mouth once daily in the morning. Take before meals. Do not crush, chew, or split.   polyethylene glycol (Glycolax, Miralax) 17 gram packet Past Month Self Yes   Sig: Take 17 g by mouth every 12 hours if needed (constipation).   sodium bicarbonate 650 mg tablet 5/22/2024 Self Yes   Sig: Take 2 tablets (1,300 mg) by mouth 2 times a day.   Patient taking differently: Take 2 tablets (1,300 mg) by mouth once daily in the morning.   torsemide (Demadex) 20 mg tablet 5/22/2024 Self Yes   Sig: Take 3 tablets (60 mg) by mouth once daily.   traMADol (Ultram) 50 mg tablet out of medication Self No   Sig: Take 1 tablet (50 mg) by mouth every 6 hours if needed for severe pain (7 - 10).      Facility-Administered Medications: None        The list below reflects the updated allergy list. Please review each documented allergy for additional clarification and justification.  Allergies  Reviewed by Fang Lopes, PharmD on 5/23/2024        Severity Reactions Comments    Carvedilol Not Specified Itching \"Scratchy throat\"            Patient accepts M2B at discharge. Pharmacy has been updated to [facility].    Sources used to complete the med history include " "  Radha MONTESINOS fill history, care everywhere  Patient interview, patient was pleasant on interview, however was unsure of the dosing of some medications: he uses a pill box to organize his medications, and fills it based on label directions    Below are additional concerns with the patient's PTA list.  Patient states allergy to carvedilol, however has been tolerating metoprolol at home without issue  No recent fill history available for Lantus, however patient states he has extra Lantus at home that he has been using  Patient requested refills for Lidocaine patches and tramadol on discharge  Last Freestyle Suzy sensor was placed yesterday     Medications ADDED:  None   Medications CHANGED:  None   Medications REMOVED:   None     Fang Lopes, PharmD, [facility] PGY1 Pharmacy Resident  [facility]  Please reach out via [employer] Secure Chat for questions, or if no response call [phone] or [employer] \"[facility]\"    "

## 2024-05-24 LAB
ALBUMIN SERPL BCP-MCNC: 2.8 G/DL (ref 3.4–5)
ALP SERPL-CCNC: 49 U/L (ref 33–136)
ALT SERPL W P-5'-P-CCNC: 14 U/L (ref 10–52)
ANION GAP SERPL CALC-SCNC: 13 MMOL/L (ref 10–20)
AST SERPL W P-5'-P-CCNC: 15 U/L (ref 9–39)
BILIRUB SERPL-MCNC: 0.4 MG/DL (ref 0–1.2)
BUN SERPL-MCNC: 57 MG/DL (ref 6–23)
CALCIUM SERPL-MCNC: 6.8 MG/DL (ref 8.6–10.6)
CHLORIDE SERPL-SCNC: 103 MMOL/L (ref 98–107)
CO2 SERPL-SCNC: 28 MMOL/L (ref 21–32)
CREAT SERPL-MCNC: 3.3 MG/DL (ref 0.5–1.3)
EGFRCR SERPLBLD CKD-EPI 2021: 20 ML/MIN/1.73M*2
ERYTHROCYTE [DISTWIDTH] IN BLOOD BY AUTOMATED COUNT: 13.8 % (ref 11.5–14.5)
GLUCOSE BLD MANUAL STRIP-MCNC: 104 MG/DL (ref 74–99)
GLUCOSE BLD MANUAL STRIP-MCNC: 146 MG/DL (ref 74–99)
GLUCOSE BLD MANUAL STRIP-MCNC: 165 MG/DL (ref 74–99)
GLUCOSE BLD MANUAL STRIP-MCNC: 168 MG/DL (ref 74–99)
GLUCOSE SERPL-MCNC: 113 MG/DL (ref 74–99)
HCT VFR BLD AUTO: 28.6 % (ref 41–52)
HGB BLD-MCNC: 9.2 G/DL (ref 13.5–17.5)
MAGNESIUM SERPL-MCNC: 1.23 MG/DL (ref 1.6–2.4)
MCH RBC QN AUTO: 27.9 PG (ref 26–34)
MCHC RBC AUTO-ENTMCNC: 32.2 G/DL (ref 32–36)
MCV RBC AUTO: 87 FL (ref 80–100)
NRBC BLD-RTO: 0 /100 WBCS (ref 0–0)
PLATELET # BLD AUTO: 227 X10*3/UL (ref 150–450)
POTASSIUM SERPL-SCNC: 4.1 MMOL/L (ref 3.5–5.3)
PROT SERPL-MCNC: 6 G/DL (ref 6.4–8.2)
RBC # BLD AUTO: 3.3 X10*6/UL (ref 4.5–5.9)
SODIUM SERPL-SCNC: 140 MMOL/L (ref 136–145)
WBC # BLD AUTO: 7.7 X10*3/UL (ref 4.4–11.3)

## 2024-05-24 PROCEDURE — 2500000001 HC RX 250 WO HCPCS SELF ADMINISTERED DRUGS (ALT 637 FOR MEDICARE OP): Mod: SE

## 2024-05-24 PROCEDURE — 2500000004 HC RX 250 GENERAL PHARMACY W/ HCPCS (ALT 636 FOR OP/ED): Mod: SE

## 2024-05-24 PROCEDURE — G0378 HOSPITAL OBSERVATION PER HR: HCPCS

## 2024-05-24 PROCEDURE — 99221 1ST HOSP IP/OBS SF/LOW 40: CPT | Performed by: PHYSICIAN ASSISTANT

## 2024-05-24 PROCEDURE — 80053 COMPREHEN METABOLIC PANEL: CPT

## 2024-05-24 PROCEDURE — 83735 ASSAY OF MAGNESIUM: CPT

## 2024-05-24 PROCEDURE — 36415 COLL VENOUS BLD VENIPUNCTURE: CPT

## 2024-05-24 PROCEDURE — 2500000004 HC RX 250 GENERAL PHARMACY W/ HCPCS (ALT 636 FOR OP/ED): Mod: JZ,SE

## 2024-05-24 PROCEDURE — 2500000005 HC RX 250 GENERAL PHARMACY W/O HCPCS: Mod: SE

## 2024-05-24 PROCEDURE — 82947 ASSAY GLUCOSE BLOOD QUANT: CPT

## 2024-05-24 PROCEDURE — 85027 COMPLETE CBC AUTOMATED: CPT

## 2024-05-24 PROCEDURE — 2500000002 HC RX 250 W HCPCS SELF ADMINISTERED DRUGS (ALT 637 FOR MEDICARE OP, ALT 636 FOR OP/ED): Mod: SE

## 2024-05-24 PROCEDURE — 96372 THER/PROPH/DIAG INJ SC/IM: CPT

## 2024-05-24 RX ORDER — MAGNESIUM SULFATE HEPTAHYDRATE 40 MG/ML
2 INJECTION, SOLUTION INTRAVENOUS ONCE
Status: COMPLETED | OUTPATIENT
Start: 2024-05-24 | End: 2024-05-24

## 2024-05-24 RX ORDER — LIDOCAINE 560 MG/1
2 PATCH PERCUTANEOUS; TOPICAL; TRANSDERMAL DAILY
Status: DISCONTINUED | OUTPATIENT
Start: 2024-05-24 | End: 2024-06-11 | Stop reason: HOSPADM

## 2024-05-24 RX ORDER — MAGNESIUM SULFATE HEPTAHYDRATE 40 MG/ML
4 INJECTION, SOLUTION INTRAVENOUS ONCE
Qty: 100 ML | Refills: 0 | Status: DISCONTINUED | OUTPATIENT
Start: 2024-05-24 | End: 2024-05-24

## 2024-05-24 RX ORDER — MAGNESIUM SULFATE HEPTAHYDRATE 40 MG/ML
4 INJECTION, SOLUTION INTRAVENOUS ONCE
Qty: 100 ML | Refills: 0 | Status: COMPLETED | OUTPATIENT
Start: 2024-05-24 | End: 2024-05-24

## 2024-05-24 RX ADMIN — METOPROLOL TARTRATE 50 MG: 50 TABLET, FILM COATED ORAL at 20:10

## 2024-05-24 RX ADMIN — Medication 1000 UNITS: at 09:44

## 2024-05-24 RX ADMIN — PIPERACILLIN SODIUM AND TAZOBACTAM SODIUM 2.25 G: 2; .25 INJECTION, SOLUTION INTRAVENOUS at 15:00

## 2024-05-24 RX ADMIN — ESCITALOPRAM OXALATE 10 MG: 10 TABLET ORAL at 09:46

## 2024-05-24 RX ADMIN — ISOSORBIDE DINITRATE 20 MG: 20 TABLET ORAL at 20:10

## 2024-05-24 RX ADMIN — ACETAMINOPHEN 650 MG: 325 TABLET ORAL at 09:44

## 2024-05-24 RX ADMIN — OXYCODONE HYDROCHLORIDE 5 MG: 5 TABLET ORAL at 20:01

## 2024-05-24 RX ADMIN — HEPARIN SODIUM 5000 UNITS: 5000 INJECTION INTRAVENOUS; SUBCUTANEOUS at 12:37

## 2024-05-24 RX ADMIN — GABAPENTIN 300 MG: 300 CAPSULE ORAL at 20:01

## 2024-05-24 RX ADMIN — SODIUM BICARBONATE 650 MG: 650 TABLET ORAL at 12:37

## 2024-05-24 RX ADMIN — ACETAMINOPHEN 650 MG: 325 TABLET ORAL at 22:45

## 2024-05-24 RX ADMIN — INSULIN LISPRO 2 UNITS: 100 INJECTION, SOLUTION INTRAVENOUS; SUBCUTANEOUS at 18:22

## 2024-05-24 RX ADMIN — ACETAMINOPHEN 650 MG: 325 TABLET ORAL at 03:51

## 2024-05-24 RX ADMIN — ISOSORBIDE DINITRATE 20 MG: 20 TABLET ORAL at 09:46

## 2024-05-24 RX ADMIN — HEPARIN SODIUM 5000 UNITS: 5000 INJECTION INTRAVENOUS; SUBCUTANEOUS at 20:10

## 2024-05-24 RX ADMIN — MAGNESIUM SULFATE HEPTAHYDRATE 4 G: 40 INJECTION, SOLUTION INTRAVENOUS at 16:00

## 2024-05-24 RX ADMIN — Medication 1000 UNITS: at 20:10

## 2024-05-24 RX ADMIN — SODIUM BICARBONATE 1300 MG: 650 TABLET ORAL at 09:44

## 2024-05-24 RX ADMIN — MAGNESIUM SULFATE HEPTAHYDRATE 2 G: 40 INJECTION, SOLUTION INTRAVENOUS at 12:36

## 2024-05-24 RX ADMIN — GABAPENTIN 300 MG: 300 CAPSULE ORAL at 09:44

## 2024-05-24 RX ADMIN — PIPERACILLIN SODIUM AND TAZOBACTAM SODIUM 2.25 G: 2; .25 INJECTION, SOLUTION INTRAVENOUS at 03:50

## 2024-05-24 RX ADMIN — ATORVASTATIN CALCIUM 80 MG: 80 TABLET, FILM COATED ORAL at 09:44

## 2024-05-24 RX ADMIN — PIPERACILLIN SODIUM AND TAZOBACTAM SODIUM 2.25 G: 2; .25 INJECTION, SOLUTION INTRAVENOUS at 20:01

## 2024-05-24 RX ADMIN — ACETAMINOPHEN 650 MG: 325 TABLET ORAL at 17:19

## 2024-05-24 RX ADMIN — LIDOCAINE 2 PATCH: 4 PATCH TOPICAL at 09:44

## 2024-05-24 RX ADMIN — PIPERACILLIN SODIUM AND TAZOBACTAM SODIUM 2.25 G: 2; .25 INJECTION, SOLUTION INTRAVENOUS at 09:55

## 2024-05-24 RX ADMIN — METOPROLOL TARTRATE 50 MG: 50 TABLET, FILM COATED ORAL at 09:44

## 2024-05-24 RX ADMIN — NIFEDIPINE 60 MG: 60 TABLET, FILM COATED, EXTENDED RELEASE ORAL at 09:46

## 2024-05-24 RX ADMIN — ASPIRIN 81 MG CHEWABLE TABLET 81 MG: 81 TABLET CHEWABLE at 09:44

## 2024-05-24 RX ADMIN — GABAPENTIN 300 MG: 300 CAPSULE ORAL at 15:00

## 2024-05-24 RX ADMIN — PANTOPRAZOLE SODIUM 40 MG: 40 TABLET, DELAYED RELEASE ORAL at 09:55

## 2024-05-24 RX ADMIN — CALCIUM GLUCONATE 3 G: 98 INJECTION, SOLUTION INTRAVENOUS at 17:18

## 2024-05-24 RX ADMIN — ISOSORBIDE DINITRATE 20 MG: 20 TABLET ORAL at 15:00

## 2024-05-24 RX ADMIN — MELATONIN 3 MG: 3 TAB ORAL at 20:10

## 2024-05-24 RX ADMIN — INSULIN LISPRO 2 UNITS: 100 INJECTION, SOLUTION INTRAVENOUS; SUBCUTANEOUS at 13:35

## 2024-05-24 RX ADMIN — HEPARIN SODIUM 5000 UNITS: 5000 INJECTION INTRAVENOUS; SUBCUTANEOUS at 03:51

## 2024-05-24 ASSESSMENT — COGNITIVE AND FUNCTIONAL STATUS - GENERAL
DRESSING REGULAR LOWER BODY CLOTHING: A LITTLE
DRESSING REGULAR LOWER BODY CLOTHING: A LITTLE
PERSONAL GROOMING: A LITTLE
STANDING UP FROM CHAIR USING ARMS: A LOT
MOVING TO AND FROM BED TO CHAIR: A LOT
HELP NEEDED FOR BATHING: A LITTLE
DRESSING REGULAR UPPER BODY CLOTHING: A LITTLE
DAILY ACTIVITIY SCORE: 17
MOBILITY SCORE: 11
EATING MEALS: A LITTLE
MOVING TO AND FROM BED TO CHAIR: A LOT
MOVING FROM LYING ON BACK TO SITTING ON SIDE OF FLAT BED WITH BEDRAILS: A LITTLE
STANDING UP FROM CHAIR USING ARMS: A LOT
DAILY ACTIVITIY SCORE: 17
TOILETING: A LOT
WALKING IN HOSPITAL ROOM: TOTAL
CLIMB 3 TO 5 STEPS WITH RAILING: TOTAL
MOBILITY SCORE: 11
MOVING FROM LYING ON BACK TO SITTING ON SIDE OF FLAT BED WITH BEDRAILS: A LITTLE
TURNING FROM BACK TO SIDE WHILE IN FLAT BAD: A LOT
PATIENT BASELINE BEDBOUND: YES
CLIMB 3 TO 5 STEPS WITH RAILING: TOTAL
TOILETING: A LOT
DRESSING REGULAR UPPER BODY CLOTHING: A LITTLE
WALKING IN HOSPITAL ROOM: TOTAL
HELP NEEDED FOR BATHING: A LITTLE
EATING MEALS: A LITTLE
PERSONAL GROOMING: A LITTLE
TURNING FROM BACK TO SIDE WHILE IN FLAT BAD: A LOT

## 2024-05-24 ASSESSMENT — ENCOUNTER SYMPTOMS
PERSON REPORTING PAIN: PATIENT
DENIES PAIN: 1

## 2024-05-24 ASSESSMENT — PAIN SCALES - GENERAL: PAINLEVEL_OUTOF10: 4

## 2024-05-24 NOTE — CARE PLAN
The patient's goals for the shift include      The clinical goals for the shift include pt. will remain pain free.    Over the shift, the patient did make progress toward the following goals. Barriers to progression include amputation. Recommendations to address these barriers include       Problem: Pain  Goal: My pain/discomfort is manageable  Outcome: Progressing     Problem: Safety  Goal: Patient will be injury free during hospitalization  Outcome: Progressing  Goal: I will remain free of falls  Outcome: Progressing     Problem: Daily Care  Goal: Daily care needs are met  Outcome: Progressing     Problem: Psychosocial Needs  Goal: Demonstrates ability to cope with hospitalization/illness  Outcome: Progressing  Goal: Collaborate with me, my family, and caregiver to identify my specific goals  Outcome: Progressing     Problem: Discharge Barriers  Goal: My discharge needs are met  Outcome: Progressing

## 2024-05-24 NOTE — PROGRESS NOTES
[facility]  ACUTE CARE SURGERY - PROGRESS NOTE    Patient Name: Humphrey Waddell  MRN: [id number]  Admit Date: 522  : 1965  AGE: 59 y.o.   GENDER: male  ==============================================================================  TODAY'S ASSESSMENT AND PLAN OF CARE:  58yo M with PMHx of 3v CABGx3 2023, HFpEF (EF 60-65% 2024), HTN, HLD, CKD (baseline Cr ~3.1), CVA  with no deficits, T2DM, left AKA who was previously admitted to the ACS service in 3/2024 for cholecystitis and underwent percutaneous cholecystostomy tube on 4/3 (tube then fell out about a month ago). Now presents with two days of severe right sided abdominal pain and chills at home. CT again shows gallstone lodged at neck of cystic duct with no GB wall thickening or pericholecystic fluid. WBC 9.2. Patient is afebrile and hemodynamically normal with RUQ abdominal pain on exam. In setting of gallstone persistently lodged near cystic duct, patient will likely require either cholecystectomy or lifelong cholecystostomy tube for management of chronic cholecystitis/biliary colic.    Plan:    Neuro:   - pain control with oxycodone, gabapentin, tylenol     CV/pulm:   - Continue home metoprolol and nifedipine      GI:   - Plan for OR on  due to recent use of plavix   - Diabetic diet   - Continue pantoprazole   - Plan to discuss holding ASA and Plavix with cardiology for operative planning      : CKD stage III  - No IVF at this time      Heme:   - Daily labs   - Heparin for DVT ppx      ID:   - continue zosyn (renally dosed)  at this time      DVT ppx: heparin, SCDs    Dispo: Pending OR on  for open cholecystectomy   Discussed with attending Dr. Jaylen Rose, PATammyC  Acute Care Surgery [id number]    ==============================================================================  CHIEF COMPLAINT/REASON FOR CONSULT:  Patient states that overnight he was unable to sleep due to chatting at the  nurses station.       PAST MEDICAL HISTORY:   PMH:   Past Medical History:   Diagnosis Date    Above-knee amputation of left lower extremity (Multi)     Adverse effect of anesthesia     confusion    Anemia     CAD (coronary artery disease)     Carotid artery disease (CMS-HCC)     bilateral    CVA (cerebral vascular accident) (Multi) 2020    Depression     DM (diabetes mellitus) (Multi)     type 2 with neuropathy    GERD (gastroesophageal reflux disease)     High cholesterol     HTN (hypertension)     Leg ulcer (Multi)     chronic right lower extremity    Neuropathy     OA (osteoarthritis)     PAD (peripheral artery disease) (CMS-HCC)     Renal disorder      PSH:   Past Surgical History:   Procedure Laterality Date    CARDIAC SURGERY      CABG 11/2023    CT ANGIO NECK  06/14/2021    CT NECK ANGIO W AND WO IV CONTRAST 6/14/2021 [facility] [place] INPATIENT LEGACY    CT HEAD ANGIO W AND WO IV CONTRAST  06/14/2021    CT HEAD ANGIO W AND WO IV CONTRAST 6/14/2021 [facility] [place] INPATIENT LEGACY    LEG SURGERY Right     MR HEAD ANGIO WO IV CONTRAST  12/11/2012    MR HEAD ANGIO WO IV CONTRAST [facility]    MR HEAD ANGIO WO IV CONTRAST  06/12/2021    MR HEAD ANGIO WO IV CONTRAST [facility]    MR HEAD ANGIO WO IV CONTRAST  05/25/2021    MR HEAD ANGIO WO IV CONTRAST [facility] INPATIENT LEGACY    MR NECK ANGIO WO IV CONTRAST  07/18/2023    MR NECK ANGIO WO IV CONTRAST 7/18/2023 [facility] [id number] MRI    OTHER SURGICAL HISTORY  07/07/2021    Knee reconstruction    OTHER SURGICAL HISTORY  07/07/2021    Lower extremity amputation above knee    TOTAL SHOULDER ARTHROPLASTY Left 2020     FH:   Family History   Problem Relation Name Age of Onset    Other (cardiac disorder) Mother      Hypertension Mother      Esophageal cancer Mother      Hearing loss Father      Hypertension Father      Heart disease Father      COPD Sister       SOCIAL HISTORY:    Smoking:    Social History     Tobacco Use   Smoking Status Former    Types: Cigars    Quit date:  2022    Years since quittin.5   Smokeless Tobacco Never       Alcohol:    Social History     Substance and Sexual Activity   Alcohol Use Not Currently       MEDICATIONS:   Prior to Admission medications    Medication Sig Start Date End Date Taking? Authorizing Provider   acetaminophen (Tylenol 8 HOUR) 650 mg ER tablet Take 1 tablet (650 mg) by mouth every 8 hours if needed for mild pain (1 - 3). Do not crush, chew, or split.    Historical Provider, MD   aspirin 81 mg chewable tablet Chew 1 tablet (81 mg) once daily. 21   Historical Provider, MD   atorvastatin (Lipitor) 80 mg tablet Take 1 tablet (80 mg) by mouth once daily. Last rx prior to next refills 24  PAT Knott   blood sugar diagnostic (Blood Glucose Test) strip 1 strip 3 times a day. 24   Mily Leung DO   cholecalciferol (Vitamin D3) 25 MCG (1000 UT) tablet Take 1 tablet (1,000 Units) by mouth 2 times a day.    Historical Provider, MD   clopidogrel (Plavix) 75 mg tablet Take 1 tablet (75 mg) by mouth once daily. 24  PAT Knott   escitalopram (Lexapro) 10 mg tablet TAKE 1 TABLET BY MOUTH EVERY DAY 24   Mily Leung DO   flash glucose sensor kit (FreeStyle Suzy 2 Sensor) kit Use as instructed 24   Mily Leung DO   FreeStyle Suzy reader (FreeStyle Suzy 2 [place]) misc Use as instructed 23   Mily Leung DO   gabapentin (Neurontin) 300 mg capsule Take 1 capsule (300 mg) by mouth 3 times a day. 5/7/24 11/3/24  Mily Leung DO   insulin glargine (Lantus Solostar U-100 Insulin) 100 unit/mL (3 mL) pen Inject 2 Units under the skin once daily at bedtime. 21   Historical Provider, MD   insulin lispro (HumaLOG) 100 unit/mL injection Inject 0-0.1 mL (0-10 Units) under the skin 3 times a day with meals. Take as directed per insulin instructions. Do not start before 2024.  Patient not taking: Reported on 2024 3/30/24   Nalini Whitehead MD  "  isosorbide dinitrate (Isordil) 20 mg tablet Take 1 tablet (20 mg) by mouth 3 times a day. 5/7/24 11/3/24  Mily Leung DO   lidocaine 4 % patch Place 1 patch over 12 hours on the skin once daily. Remove & discard patch within 12 hours or as directed by MD. 4/24/24   Mily Leung DO   melatonin 3 mg tablet Take 1 tablet (3 mg) by mouth once daily at bedtime.    Historical Provider, MD   metoprolol tartrate (Lopressor) 50 mg tablet Take 1 tablet by mouth 2 times a day. 1/29/24 1/28/25  CONG Knott-CNP   NIFEdipine ER (Adalat CC) 60 mg 24 hr tablet Take 1 tablet (60 mg) by mouth once daily in the morning. Take before meals. Do not crush, chew, or split. 5/7/24 11/3/24  Mily Lueng DO   pantoprazole (ProtoNix) 40 mg EC tablet Take 1 tablet (40 mg) by mouth once daily in the morning. Take before meals. Do not crush, chew, or split. 5/7/24 11/3/24  Mily Leung DO   polyethylene glycol (Glycolax, Miralax) 17 gram packet Take 17 g by mouth every 12 hours if needed (constipation). 4/17/24   PAT Addison   sodium bicarbonate 650 mg tablet Take 2 tablets (1,300 mg) by mouth 2 times a day. 4/17/24 5/22/24  PAT Addison   torsemide (Demadex) 20 mg tablet Take 3 tablets (60 mg) by mouth once daily. 4/17/24   PAT Addison   traMADol (Ultram) 50 mg tablet Take 1 tablet (50 mg) by mouth every 6 hours if needed for severe pain (7 - 10). 4/8/24   Sharron Castaneda MD   escitalopram (Lexapro) 10 mg tablet Take 1 tablet (10 mg) by mouth once daily. 4/17/24 5/20/24  PAT Addsion     ALLERGIES:   Allergies   Allergen Reactions    Carvedilol Itching     \"Scratchy throat\"     REVIEW OF SYSTEMS:  12-point ROS negative except per HPI    PHYSICAL EXAM:  General: non toxic appearing, NAD  HEENT: normocephalic, sclerae anicteric  CV: regular rate  Pulm: nonlabored effort on RA  Abd: soft, nondistended, mildly TTP in RUQ and RLQ, non peritonitic  Groin: left inguinal hernia " soft, no overlying skin changes  Neuro: awake and alert, no focal deficits  Skin: warm and dry, non jaundiced    IMAGING SUMMARY:  CT A/P showing gallstone lodged at neck of cystic duct, similar to CT in 3/2024, without GB wall thickening or pericholecystic fluid, large left inguinal hernia containing bowel    LABS:  Results from last 7 days   Lab Units 05/23/24  0543 05/22/24  1402   WBC AUTO x10*3/uL 7.6 9.2   HEMOGLOBIN g/dL 8.9* 9.5*   HEMATOCRIT % 29.3* 28.7*   PLATELETS AUTO x10*3/uL 217 237   NEUTROS PCT AUTO % 66.4 71.2   LYMPHS PCT AUTO % 19.5 15.7   MONOS PCT AUTO % 8.7 7.5   EOS PCT AUTO % 4.7 4.7     Results from last 7 days   Lab Units 05/22/24  2235   APTT seconds 25*   INR  1.1     Results from last 7 days   Lab Units 05/23/24  0543 05/22/24  1402   SODIUM mmol/L 139 136   POTASSIUM mmol/L 3.8 4.1   CHLORIDE mmol/L 101 101   CO2 mmol/L 27 24   BUN mg/dL 59* 65*   CREATININE mg/dL 2.88* 3.18*   CALCIUM mg/dL 6.7* 7.0*   PROTEIN TOTAL g/dL 5.9* 6.9   BILIRUBIN TOTAL mg/dL 0.4 0.4   ALK PHOS U/L 53 58   ALT U/L 14 13   AST U/L 13 22   GLUCOSE mg/dL 101* 138*     Results from last 7 days   Lab Units 05/23/24  0543 05/22/24  1402   BILIRUBIN TOTAL mg/dL 0.4 0.4           I have reviewed all laboratory and imaging results ordered/pertinent for this encounter.

## 2024-05-25 LAB
ALBUMIN SERPL BCP-MCNC: 2.8 G/DL (ref 3.4–5)
ALP SERPL-CCNC: 49 U/L (ref 33–136)
ALT SERPL W P-5'-P-CCNC: 11 U/L (ref 10–52)
ANION GAP SERPL CALC-SCNC: 13 MMOL/L (ref 10–20)
AST SERPL W P-5'-P-CCNC: 14 U/L (ref 9–39)
BILIRUB SERPL-MCNC: 0.3 MG/DL (ref 0–1.2)
BLOOD EXPIRATION DATE: NORMAL
BLOOD EXPIRATION DATE: NORMAL
BUN SERPL-MCNC: 55 MG/DL (ref 6–23)
CALCIUM SERPL-MCNC: 7.4 MG/DL (ref 8.6–10.6)
CHLORIDE SERPL-SCNC: 103 MMOL/L (ref 98–107)
CO2 SERPL-SCNC: 26 MMOL/L (ref 21–32)
CREAT SERPL-MCNC: 3.69 MG/DL (ref 0.5–1.3)
DISPENSE STATUS: NORMAL
DISPENSE STATUS: NORMAL
EGFRCR SERPLBLD CKD-EPI 2021: 18 ML/MIN/1.73M*2
ERYTHROCYTE [DISTWIDTH] IN BLOOD BY AUTOMATED COUNT: 13.5 % (ref 11.5–14.5)
GLUCOSE BLD MANUAL STRIP-MCNC: 131 MG/DL (ref 74–99)
GLUCOSE BLD MANUAL STRIP-MCNC: 140 MG/DL (ref 74–99)
GLUCOSE BLD MANUAL STRIP-MCNC: 141 MG/DL (ref 74–99)
GLUCOSE SERPL-MCNC: 133 MG/DL (ref 74–99)
HCT VFR BLD AUTO: 27.2 % (ref 41–52)
HGB BLD-MCNC: 8.6 G/DL (ref 13.5–17.5)
MAGNESIUM SERPL-MCNC: 2.49 MG/DL (ref 1.6–2.4)
MCH RBC QN AUTO: 27 PG (ref 26–34)
MCHC RBC AUTO-ENTMCNC: 31.6 G/DL (ref 32–36)
MCV RBC AUTO: 86 FL (ref 80–100)
NRBC BLD-RTO: 0 /100 WBCS (ref 0–0)
PLATELET # BLD AUTO: 227 X10*3/UL (ref 150–450)
POTASSIUM SERPL-SCNC: 4.3 MMOL/L (ref 3.5–5.3)
PRODUCT BLOOD TYPE: 600
PRODUCT BLOOD TYPE: 600
PRODUCT CODE: NORMAL
PRODUCT CODE: NORMAL
PROT SERPL-MCNC: 6 G/DL (ref 6.4–8.2)
RBC # BLD AUTO: 3.18 X10*6/UL (ref 4.5–5.9)
SODIUM SERPL-SCNC: 138 MMOL/L (ref 136–145)
UNIT ABO: NORMAL
UNIT ABO: NORMAL
UNIT NUMBER: NORMAL
UNIT NUMBER: NORMAL
UNIT RH: NORMAL
UNIT RH: NORMAL
UNIT VOLUME: 350
UNIT VOLUME: 350
WBC # BLD AUTO: 8.6 X10*3/UL (ref 4.4–11.3)
XM INTEP: NORMAL
XM INTEP: NORMAL

## 2024-05-25 PROCEDURE — 99231 SBSQ HOSP IP/OBS SF/LOW 25: CPT | Performed by: SURGERY

## 2024-05-25 PROCEDURE — 1100000001 HC PRIVATE ROOM DAILY

## 2024-05-25 PROCEDURE — 36415 COLL VENOUS BLD VENIPUNCTURE: CPT

## 2024-05-25 PROCEDURE — 85027 COMPLETE CBC AUTOMATED: CPT

## 2024-05-25 PROCEDURE — 82947 ASSAY GLUCOSE BLOOD QUANT: CPT

## 2024-05-25 PROCEDURE — 84520 ASSAY OF UREA NITROGEN: CPT

## 2024-05-25 PROCEDURE — 96372 THER/PROPH/DIAG INJ SC/IM: CPT

## 2024-05-25 PROCEDURE — 2500000002 HC RX 250 W HCPCS SELF ADMINISTERED DRUGS (ALT 637 FOR MEDICARE OP, ALT 636 FOR OP/ED): Mod: SE

## 2024-05-25 PROCEDURE — 83735 ASSAY OF MAGNESIUM: CPT

## 2024-05-25 PROCEDURE — 2500000005 HC RX 250 GENERAL PHARMACY W/O HCPCS: Mod: SE

## 2024-05-25 PROCEDURE — 2500000001 HC RX 250 WO HCPCS SELF ADMINISTERED DRUGS (ALT 637 FOR MEDICARE OP)

## 2024-05-25 PROCEDURE — 2500000004 HC RX 250 GENERAL PHARMACY W/ HCPCS (ALT 636 FOR OP/ED): Mod: SE

## 2024-05-25 RX ADMIN — SODIUM BICARBONATE 1300 MG: 650 TABLET ORAL at 09:09

## 2024-05-25 RX ADMIN — PANTOPRAZOLE SODIUM 40 MG: 40 TABLET, DELAYED RELEASE ORAL at 09:10

## 2024-05-25 RX ADMIN — LIDOCAINE 2 PATCH: 4 PATCH TOPICAL at 09:10

## 2024-05-25 RX ADMIN — ISOSORBIDE DINITRATE 20 MG: 20 TABLET ORAL at 15:27

## 2024-05-25 RX ADMIN — Medication 1000 UNITS: at 20:15

## 2024-05-25 RX ADMIN — ACETAMINOPHEN 650 MG: 325 TABLET ORAL at 17:14

## 2024-05-25 RX ADMIN — ISOSORBIDE DINITRATE 20 MG: 20 TABLET ORAL at 20:15

## 2024-05-25 RX ADMIN — ATORVASTATIN CALCIUM 80 MG: 80 TABLET, FILM COATED ORAL at 09:09

## 2024-05-25 RX ADMIN — METOPROLOL TARTRATE 50 MG: 50 TABLET, FILM COATED ORAL at 20:15

## 2024-05-25 RX ADMIN — PIPERACILLIN SODIUM AND TAZOBACTAM SODIUM 2.25 G: 2; .25 INJECTION, SOLUTION INTRAVENOUS at 13:23

## 2024-05-25 RX ADMIN — ACETAMINOPHEN 650 MG: 325 TABLET ORAL at 04:06

## 2024-05-25 RX ADMIN — ESCITALOPRAM OXALATE 10 MG: 10 TABLET ORAL at 09:10

## 2024-05-25 RX ADMIN — Medication 1000 UNITS: at 09:09

## 2024-05-25 RX ADMIN — MELATONIN 3 MG: 3 TAB ORAL at 20:15

## 2024-05-25 RX ADMIN — GABAPENTIN 300 MG: 300 CAPSULE ORAL at 15:27

## 2024-05-25 RX ADMIN — OXYCODONE HYDROCHLORIDE 5 MG: 5 TABLET ORAL at 15:27

## 2024-05-25 RX ADMIN — NIFEDIPINE 60 MG: 60 TABLET, FILM COATED, EXTENDED RELEASE ORAL at 11:20

## 2024-05-25 RX ADMIN — PIPERACILLIN SODIUM AND TAZOBACTAM SODIUM 2.25 G: 2; .25 INJECTION, SOLUTION INTRAVENOUS at 02:33

## 2024-05-25 RX ADMIN — ISOSORBIDE DINITRATE 20 MG: 20 TABLET ORAL at 09:09

## 2024-05-25 RX ADMIN — PIPERACILLIN SODIUM AND TAZOBACTAM SODIUM 2.25 G: 2; .25 INJECTION, SOLUTION INTRAVENOUS at 17:53

## 2024-05-25 RX ADMIN — HEPARIN SODIUM 5000 UNITS: 5000 INJECTION INTRAVENOUS; SUBCUTANEOUS at 20:15

## 2024-05-25 RX ADMIN — METOPROLOL TARTRATE 50 MG: 50 TABLET, FILM COATED ORAL at 09:10

## 2024-05-25 RX ADMIN — ACETAMINOPHEN 650 MG: 325 TABLET ORAL at 21:32

## 2024-05-25 RX ADMIN — ASPIRIN 81 MG CHEWABLE TABLET 81 MG: 81 TABLET CHEWABLE at 09:10

## 2024-05-25 RX ADMIN — GABAPENTIN 300 MG: 300 CAPSULE ORAL at 09:09

## 2024-05-25 RX ADMIN — HEPARIN SODIUM 5000 UNITS: 5000 INJECTION INTRAVENOUS; SUBCUTANEOUS at 04:06

## 2024-05-25 RX ADMIN — OXYCODONE HYDROCHLORIDE 5 MG: 5 TABLET ORAL at 19:28

## 2024-05-25 RX ADMIN — HEPARIN SODIUM 5000 UNITS: 5000 INJECTION INTRAVENOUS; SUBCUTANEOUS at 12:43

## 2024-05-25 RX ADMIN — PIPERACILLIN SODIUM AND TAZOBACTAM SODIUM 2.25 G: 2; .25 INJECTION, SOLUTION INTRAVENOUS at 20:15

## 2024-05-25 RX ADMIN — SODIUM BICARBONATE 650 MG: 650 TABLET ORAL at 12:43

## 2024-05-25 RX ADMIN — GABAPENTIN 300 MG: 300 CAPSULE ORAL at 20:15

## 2024-05-25 RX ADMIN — ACETAMINOPHEN 650 MG: 325 TABLET ORAL at 11:20

## 2024-05-25 SDOH — SOCIAL STABILITY: SOCIAL INSECURITY: DOES ANYONE TRY TO KEEP YOU FROM HAVING/CONTACTING OTHER FRIENDS OR DOING THINGS OUTSIDE YOUR HOME?: NO

## 2024-05-25 SDOH — ECONOMIC STABILITY: INCOME INSECURITY: IN THE LAST 12 MONTHS, WAS THERE A TIME WHEN YOU WERE NOT ABLE TO PAY THE MORTGAGE OR RENT ON TIME?: NO

## 2024-05-25 SDOH — SOCIAL STABILITY: SOCIAL INSECURITY: DO YOU FEEL UNSAFE GOING BACK TO THE PLACE WHERE YOU ARE LIVING?: NO

## 2024-05-25 SDOH — SOCIAL STABILITY: SOCIAL INSECURITY: ARE THERE ANY APPARENT SIGNS OF INJURIES/BEHAVIORS THAT COULD BE RELATED TO ABUSE/NEGLECT?: NO

## 2024-05-25 SDOH — SOCIAL STABILITY: SOCIAL INSECURITY: DO YOU FEEL ANYONE HAS EXPLOITED OR TAKEN ADVANTAGE OF YOU FINANCIALLY OR OF YOUR PERSONAL PROPERTY?: NO

## 2024-05-25 SDOH — ECONOMIC STABILITY: INCOME INSECURITY: HOW HARD IS IT FOR YOU TO PAY FOR THE VERY BASICS LIKE FOOD, HOUSING, MEDICAL CARE, AND HEATING?: NOT HARD AT ALL

## 2024-05-25 SDOH — SOCIAL STABILITY: SOCIAL INSECURITY: HAS ANYONE EVER THREATENED TO HURT YOUR FAMILY OR YOUR PETS?: NO

## 2024-05-25 SDOH — ECONOMIC STABILITY: HOUSING INSECURITY: IN THE LAST 12 MONTHS, HOW MANY PLACES HAVE YOU LIVED?: 1

## 2024-05-25 SDOH — SOCIAL STABILITY: SOCIAL INSECURITY: ABUSE: ADULT

## 2024-05-25 SDOH — SOCIAL STABILITY: SOCIAL INSECURITY: WERE YOU ABLE TO COMPLETE ALL THE BEHAVIORAL HEALTH SCREENINGS?: YES

## 2024-05-25 SDOH — SOCIAL STABILITY: SOCIAL INSECURITY: HAVE YOU HAD ANY THOUGHTS OF HARMING ANYONE ELSE?: NO

## 2024-05-25 SDOH — SOCIAL STABILITY: SOCIAL INSECURITY: HAVE YOU HAD THOUGHTS OF HARMING ANYONE ELSE?: NO

## 2024-05-25 ASSESSMENT — LIFESTYLE VARIABLES
HOW OFTEN DO YOU HAVE 6 OR MORE DRINKS ON ONE OCCASION: NEVER
HOW MANY STANDARD DRINKS CONTAINING ALCOHOL DO YOU HAVE ON A TYPICAL DAY: PATIENT DOES NOT DRINK
AUDIT-C TOTAL SCORE: 0
SKIP TO QUESTIONS 9-10: 1
AUDIT-C TOTAL SCORE: 0
HOW OFTEN DO YOU HAVE A DRINK CONTAINING ALCOHOL: NEVER

## 2024-05-25 ASSESSMENT — PAIN SCALES - GENERAL
PAINLEVEL_OUTOF10: 10 - WORST POSSIBLE PAIN
PAINLEVEL_OUTOF10: 4
PAINLEVEL_OUTOF10: 0 - NO PAIN
PAINLEVEL_OUTOF10: 3
PAINLEVEL_OUTOF10: 10 - WORST POSSIBLE PAIN

## 2024-05-25 ASSESSMENT — COGNITIVE AND FUNCTIONAL STATUS - GENERAL
MOBILITY SCORE: 11
STANDING UP FROM CHAIR USING ARMS: A LOT
MOBILITY SCORE: 11
DRESSING REGULAR LOWER BODY CLOTHING: A LITTLE
EATING MEALS: A LITTLE
MOVING TO AND FROM BED TO CHAIR: A LOT
TURNING FROM BACK TO SIDE WHILE IN FLAT BAD: A LOT
CLIMB 3 TO 5 STEPS WITH RAILING: TOTAL
EATING MEALS: A LITTLE
PERSONAL GROOMING: A LITTLE
MOVING FROM LYING ON BACK TO SITTING ON SIDE OF FLAT BED WITH BEDRAILS: A LITTLE
DRESSING REGULAR UPPER BODY CLOTHING: A LITTLE
HELP NEEDED FOR BATHING: A LITTLE
CLIMB 3 TO 5 STEPS WITH RAILING: TOTAL
PERSONAL GROOMING: A LITTLE
TURNING FROM BACK TO SIDE WHILE IN FLAT BAD: A LOT
DAILY ACTIVITIY SCORE: 17
MOVING FROM LYING ON BACK TO SITTING ON SIDE OF FLAT BED WITH BEDRAILS: A LITTLE
MOVING TO AND FROM BED TO CHAIR: A LOT
DRESSING REGULAR UPPER BODY CLOTHING: A LITTLE
WALKING IN HOSPITAL ROOM: TOTAL
WALKING IN HOSPITAL ROOM: TOTAL
DRESSING REGULAR LOWER BODY CLOTHING: A LITTLE
HELP NEEDED FOR BATHING: A LITTLE
STANDING UP FROM CHAIR USING ARMS: A LOT
TOILETING: A LOT
TOILETING: A LOT
DAILY ACTIVITIY SCORE: 17

## 2024-05-25 ASSESSMENT — PATIENT HEALTH QUESTIONNAIRE - PHQ9
1. LITTLE INTEREST OR PLEASURE IN DOING THINGS: NOT AT ALL
2. FEELING DOWN, DEPRESSED OR HOPELESS: NOT AT ALL
SUM OF ALL RESPONSES TO PHQ9 QUESTIONS 1 & 2: 0

## 2024-05-25 ASSESSMENT — PAIN DESCRIPTION - LOCATION: LOCATION: BACK

## 2024-05-25 ASSESSMENT — PAIN - FUNCTIONAL ASSESSMENT
PAIN_FUNCTIONAL_ASSESSMENT: 0-10

## 2024-05-25 ASSESSMENT — ACTIVITIES OF DAILY LIVING (ADL): LACK_OF_TRANSPORTATION: NO

## 2024-05-25 NOTE — CARE PLAN
Problem: Skin  Goal: Decreased wound size/increased tissue granulation at next dressing change  Outcome: Progressing  Goal: Participates in plan/prevention/treatment measures  Outcome: Progressing  Goal: Prevent/manage excess moisture  Outcome: Progressing  Goal: Prevent/minimize sheer/friction injuries  Outcome: Progressing  Goal: Promote/optimize nutrition  Outcome: Progressing  Goal: Promote skin healing  Outcome: Progressing

## 2024-05-25 NOTE — PROGRESS NOTES
[facility]  ACUTE CARE SURGERY - PROGRESS NOTE    Patient Name: Humphrey Waddell  MRN: [id number]  Admit Date: 522  : 1965  AGE: 59 y.o.   GENDER: male  ==============================================================================  TODAY'S ASSESSMENT AND PLAN OF CARE:  58yo M with PMHx of 3v CABGx3 2023, HFpEF (EF 60-65% 2024), HTN, HLD, CKD (baseline Cr ~3.1), CVA  with no deficits, T2DM, left AKA who was previously admitted to the ACS service in 3/2024 for cholecystitis and underwent percutaneous cholecystostomy tube on 4/3 (tube then fell out about a month ago). Now presents with two days of severe right sided abdominal pain and chills at home. CT again shows gallstone lodged at neck of cystic duct with no GB wall thickening or pericholecystic fluid. WBC 9.2. Patient is afebrile and hemodynamically normal with RUQ abdominal pain on exam. Will hold plavix and plan for open cholecystectomy on .    Plan:    Neuro:   - pain control with oxycodone, gabapentin, tylenol     CV/pulm:   - Continue home metoprolol and nifedipine      GI:   - Plan for OR on  due to recent use of plavix   - Diabetic diet   - Continue pantoprazole      : CKD stage III  - No IVF at this time      Heme:   - Daily labs   - Heparin for DVT ppx      ID:   - continue zosyn (renally dosed)  at this time      DVT ppx: heparin, SCDs    Dispo: Pending OR on  for open cholecystectomy     Richmond York MD  Trauma, Critical Care, and Acute Care Surgery  Pager: [phone]    ==============================================================================  CHIEF COMPLAINT/REASON FOR CONSULT:  Doing well, pain controlled.      PAST MEDICAL HISTORY:   PMH:   Past Medical History:   Diagnosis Date    Above-knee amputation of left lower extremity (Multi)     Adverse effect of anesthesia     confusion    Anemia     CAD (coronary artery disease)     Carotid artery disease (CMS-HCC)     bilateral     CVA (cerebral vascular accident) (Multi) 2020    Depression     DM (diabetes mellitus) (Multi)     type 2 with neuropathy    GERD (gastroesophageal reflux disease)     High cholesterol     HTN (hypertension)     Leg ulcer (Multi)     chronic right lower extremity    Neuropathy     OA (osteoarthritis)     PAD (peripheral artery disease) (CMS-HCC)     Renal disorder      PSH:   Past Surgical History:   Procedure Laterality Date    CARDIAC SURGERY      CABG 2023    CT ANGIO NECK  2021    CT NECK ANGIO W AND WO IV CONTRAST 2021 [facility] [place] [facility]PATIENT [facility]GAC[facility]    CT HEAD ANGIO W AND WO IV CONTRAST  2021    CT HEAD ANGIO W AND WO IV CONTRAST 2021 [facility] [place] [facility]    LEG SURGERY Right     MR HEAD ANGIO WO IV CONTRAST  2012    MR HEAD ANGIO WO IV CONTRAST [facility]    MR HEAD ANGIO WO IV CONTRAST  2021    MR HEAD ANGIO WO IV CONTRAST [facility]    MR HEAD ANGIO WO IV CONTRAST  2021    MR HEAD ANGIO WO IV CONTRAST [facility]PATIENT LEGAC[facility]    MR NECK ANGIO WO IV CONTRAST  2023    MR NECK ANGIO WO IV CONTRAST 2023 [facility] [id number] MRI    OTHER SURGICAL HISTORY  2021    Knee reconstruction    OTHER SURGICAL HISTORY  2021    Lower extremity amputation above knee    TOTAL SHOULDER ARTHROPLASTY Left      FH:   Family History   Problem Relation Name Age of Onset    Other (cardiac disorder) Mother      Hypertension Mother      Esophageal cancer Mother      Hearing loss Father      Hypertension Father      Heart disease Father      COPD Sister       SOCIAL HISTORY:    Smoking:    Social History     Tobacco Use   Smoking Status Former    Types: Cigars    Quit date: 2022    Years since quittin.5   Smokeless Tobacco Never       Alcohol:    Social History     Substance and Sexual Activity   Alcohol Use Not Currently       MEDICATIONS:   Prior to Admission medications    Medication Sig Start Date End Date Taking? Authorizing Provider   acetaminophen  (Tylenol 8 HOUR) 650 mg ER tablet Take 1 tablet (650 mg) by mouth every 8 hours if needed for mild pain (1 - 3). Do not crush, chew, or split.    Historical Provider, MD   aspirin 81 mg chewable tablet Chew 1 tablet (81 mg) once daily. 6/18/21   Historical Provider, MD   atorvastatin (Lipitor) 80 mg tablet Take 1 tablet (80 mg) by mouth once daily. Last rx prior to next refills 1/29/24 1/28/25  CONG Knott-CNP   blood sugar diagnostic (Blood Glucose Test) strip 1 strip 3 times a day. 5/22/24   Mily Leung DO   cholecalciferol (Vitamin D3) 25 MCG (1000 UT) tablet Take 1 tablet (1,000 Units) by mouth 2 times a day.    Historical Provider, MD   clopidogrel (Plavix) 75 mg tablet Take 1 tablet (75 mg) by mouth once daily. 1/29/24 1/28/25  PAT Knott   escitalopram (Lexapro) 10 mg tablet TAKE 1 TABLET BY MOUTH EVERY DAY 5/20/24   Mily Leung DO   flash glucose sensor kit (FreeStyle Suzy 2 Sensor) kit Use as instructed 4/28/24   Mily Leung DO   FreeStyle Suzy reader (FreeStyle Suzy 2 [place]) misc Use as instructed 4/27/23   Mily Leung DO   gabapentin (Neurontin) 300 mg capsule Take 1 capsule (300 mg) by mouth 3 times a day. 5/7/24 11/3/24  Mily Leung DO   insulin glargine (Lantus Solostar U-100 Insulin) 100 unit/mL (3 mL) pen Inject 2 Units under the skin once daily at bedtime. 5/19/21   Historical Provider, MD   insulin lispro (HumaLOG) 100 unit/mL injection Inject 0-0.1 mL (0-10 Units) under the skin 3 times a day with meals. Take as directed per insulin instructions. Do not start before March 30, 2024.  Patient not taking: Reported on 5/22/2024 3/30/24   Nalini Whitehead MD   isosorbide dinitrate (Isordil) 20 mg tablet Take 1 tablet (20 mg) by mouth 3 times a day. 5/7/24 11/3/24  Mily Leung, DO   lidocaine 4 % patch Place 1 patch over 12 hours on the skin once daily. Remove & discard patch within 12 hours or as directed by MD. 4/24/24   Mily Leung, DO  "  melatonin 3 mg tablet Take 1 tablet (3 mg) by mouth once daily at bedtime.    Historical Provider, MD   metoprolol tartrate (Lopressor) 50 mg tablet Take 1 tablet by mouth 2 times a day. 1/29/24 1/28/25  PAT Knott   NIFEdipine ER (Adalat CC) 60 mg 24 hr tablet Take 1 tablet (60 mg) by mouth once daily in the morning. Take before meals. Do not crush, chew, or split. 5/7/24 11/3/24  Mily Leung, DO   pantoprazole (ProtoNix) 40 mg EC tablet Take 1 tablet (40 mg) by mouth once daily in the morning. Take before meals. Do not crush, chew, or split. 5/7/24 11/3/24  Mily Leung, DO   polyethylene glycol (Glycolax, Miralax) 17 gram packet Take 17 g by mouth every 12 hours if needed (constipation). 4/17/24   PAT Addison   sodium bicarbonate 650 mg tablet Take 2 tablets (1,300 mg) by mouth 2 times a day. 4/17/24 5/22/24  PAT Addison   torsemide (Demadex) 20 mg tablet Take 3 tablets (60 mg) by mouth once daily. 4/17/24   PAT Addison   traMADol (Ultram) 50 mg tablet Take 1 tablet (50 mg) by mouth every 6 hours if needed for severe pain (7 - 10). 4/8/24   Sharron Castaneda MD   escitalopram (Lexapro) 10 mg tablet Take 1 tablet (10 mg) by mouth once daily. 4/17/24 5/20/24  PAT Addison     ALLERGIES:   Allergies   Allergen Reactions    Carvedilol Itching     \"Scratchy throat\"     REVIEW OF SYSTEMS:  12-point ROS negative except per HPI    PHYSICAL EXAM:  General: non toxic appearing, NAD  HEENT: normocephalic, sclerae anicteric  CV: regular rate  Pulm: nonlabored effort on RA  Abd: soft, nondistended, mildly TTP in RUQ without peritonitis.  Neuro: awake and alert, no focal deficits  Skin: warm and dry, non jaundiced    IMAGING SUMMARY:  CT A/P showing gallstone lodged at neck of cystic duct, similar to CT in 3/2024, without GB wall thickening or pericholecystic fluid, large left inguinal hernia containing bowel    LABS:  Results from last 7 days   Lab Units " 05/23/24  0543 05/22/24  1402   WBC AUTO x10*3/uL 7.6 9.2   HEMOGLOBIN g/dL 8.9* 9.5*   HEMATOCRIT % 29.3* 28.7*   PLATELETS AUTO x10*3/uL 217 237   NEUTROS PCT AUTO % 66.4 71.2   LYMPHS PCT AUTO % 19.5 15.7   MONOS PCT AUTO % 8.7 7.5   EOS PCT AUTO % 4.7 4.7     Results from last 7 days   Lab Units 05/22/24  2235   APTT seconds 25*   INR  1.1     Results from last 7 days   Lab Units 05/23/24  0543 05/22/24  1402   SODIUM mmol/L 139 136   POTASSIUM mmol/L 3.8 4.1   CHLORIDE mmol/L 101 101   CO2 mmol/L 27 24   BUN mg/dL 59* 65*   CREATININE mg/dL 2.88* 3.18*   CALCIUM mg/dL 6.7* 7.0*   PROTEIN TOTAL g/dL 5.9* 6.9   BILIRUBIN TOTAL mg/dL 0.4 0.4   ALK PHOS U/L 53 58   ALT U/L 14 13   AST U/L 13 22   GLUCOSE mg/dL 101* 138*     Results from last 7 days   Lab Units 05/23/24  0543 05/22/24  1402   BILIRUBIN TOTAL mg/dL 0.4 0.4           I have reviewed all laboratory and imaging results ordered/pertinent for this encounter.     Richmond York MD  Trauma, Critical Care, and Acute Care Surgery  Pager: [phone]

## 2024-05-25 NOTE — CARE PLAN
The patient's goals for the shift include  ambulation    The clinical goals for the shift include safety        Problem: Pain  Goal: My pain/discomfort is manageable  Outcome: Progressing     Problem: Safety  Goal: Patient will be injury free during hospitalization  Outcome: Progressing  Goal: I will remain free of falls  Outcome: Progressing     Problem: Daily Care  Goal: Daily care needs are met  Outcome: Progressing     Problem: Psychosocial Needs  Goal: Demonstrates ability to cope with hospitalization/illness  Outcome: Progressing  Goal: Collaborate with me, my family, and caregiver to identify my specific goals  Outcome: Progressing     Problem: Discharge Barriers  Goal: My discharge needs are met  Outcome: Progressing

## 2024-05-26 ENCOUNTER — ANESTHESIA EVENT (OUTPATIENT)
Dept: OPERATING ROOM | Facility: HOSPITAL | Age: 59
End: 2024-05-26
Payer: COMMERCIAL

## 2024-05-26 ENCOUNTER — ANESTHESIA (OUTPATIENT)
Dept: OPERATING ROOM | Facility: HOSPITAL | Age: 59
End: 2024-05-26
Payer: COMMERCIAL

## 2024-05-26 PROBLEM — Z79.01 ANTICOAGULANT LONG-TERM USE: Status: ACTIVE | Noted: 2024-05-26

## 2024-05-26 PROBLEM — Z95.5 STENTED CORONARY ARTERY: Status: ACTIVE | Noted: 2024-05-26

## 2024-05-26 LAB
ABO GROUP (TYPE) IN BLOOD: NORMAL
ALBUMIN SERPL BCP-MCNC: 2.8 G/DL (ref 3.4–5)
ALP SERPL-CCNC: 48 U/L (ref 33–136)
ALT SERPL W P-5'-P-CCNC: 11 U/L (ref 10–52)
ANION GAP SERPL CALC-SCNC: 12 MMOL/L (ref 10–20)
ANTIBODY SCREEN: NORMAL
AST SERPL W P-5'-P-CCNC: 12 U/L (ref 9–39)
BILIRUB SERPL-MCNC: 0.3 MG/DL (ref 0–1.2)
BUN SERPL-MCNC: 54 MG/DL (ref 6–23)
CALCIUM SERPL-MCNC: 7.3 MG/DL (ref 8.6–10.6)
CHLORIDE SERPL-SCNC: 102 MMOL/L (ref 98–107)
CO2 SERPL-SCNC: 27 MMOL/L (ref 21–32)
CREAT SERPL-MCNC: 3.55 MG/DL (ref 0.5–1.3)
EGFRCR SERPLBLD CKD-EPI 2021: 19 ML/MIN/1.73M*2
ERYTHROCYTE [DISTWIDTH] IN BLOOD BY AUTOMATED COUNT: 13.7 % (ref 11.5–14.5)
GLUCOSE BLD MANUAL STRIP-MCNC: 126 MG/DL (ref 74–99)
GLUCOSE BLD MANUAL STRIP-MCNC: 128 MG/DL (ref 74–99)
GLUCOSE BLD MANUAL STRIP-MCNC: 134 MG/DL (ref 74–99)
GLUCOSE SERPL-MCNC: 128 MG/DL (ref 74–99)
HCT VFR BLD AUTO: 26.7 % (ref 41–52)
HGB BLD-MCNC: 8.3 G/DL (ref 13.5–17.5)
MAGNESIUM SERPL-MCNC: 2.27 MG/DL (ref 1.6–2.4)
MCH RBC QN AUTO: 26.8 PG (ref 26–34)
MCHC RBC AUTO-ENTMCNC: 31.1 G/DL (ref 32–36)
MCV RBC AUTO: 86 FL (ref 80–100)
NRBC BLD-RTO: 0 /100 WBCS (ref 0–0)
PLATELET # BLD AUTO: 229 X10*3/UL (ref 150–450)
POTASSIUM SERPL-SCNC: 4.1 MMOL/L (ref 3.5–5.3)
PROT SERPL-MCNC: 5.9 G/DL (ref 6.4–8.2)
RBC # BLD AUTO: 3.1 X10*6/UL (ref 4.5–5.9)
RH FACTOR (ANTIGEN D): NORMAL
SODIUM SERPL-SCNC: 137 MMOL/L (ref 136–145)
WBC # BLD AUTO: 7.6 X10*3/UL (ref 4.4–11.3)

## 2024-05-26 PROCEDURE — 2500000004 HC RX 250 GENERAL PHARMACY W/ HCPCS (ALT 636 FOR OP/ED): Performed by: STUDENT IN AN ORGANIZED HEALTH CARE EDUCATION/TRAINING PROGRAM

## 2024-05-26 PROCEDURE — 2780000003 HC OR 278 NO HCPCS: Performed by: SURGERY

## 2024-05-26 PROCEDURE — 88304 TISSUE EXAM BY PATHOLOGIST: CPT | Mod: TC,SUR | Performed by: STUDENT IN AN ORGANIZED HEALTH CARE EDUCATION/TRAINING PROGRAM

## 2024-05-26 PROCEDURE — 3700000002 HC GENERAL ANESTHESIA TIME - EACH INCREMENTAL 1 MINUTE: Performed by: SURGERY

## 2024-05-26 PROCEDURE — 3600000008 HC OR TIME - EACH INCREMENTAL 1 MINUTE - PROCEDURE LEVEL THREE: Performed by: SURGERY

## 2024-05-26 PROCEDURE — 85027 COMPLETE CBC AUTOMATED: CPT

## 2024-05-26 PROCEDURE — 3600000003 HC OR TIME - INITIAL BASE CHARGE - PROCEDURE LEVEL THREE: Performed by: SURGERY

## 2024-05-26 PROCEDURE — 88304 TISSUE EXAM BY PATHOLOGIST: CPT | Performed by: STUDENT IN AN ORGANIZED HEALTH CARE EDUCATION/TRAINING PROGRAM

## 2024-05-26 PROCEDURE — 47600 CHOLECYSTECTOMY: CPT | Performed by: SURGERY

## 2024-05-26 PROCEDURE — 36415 COLL VENOUS BLD VENIPUNCTURE: CPT | Performed by: ANESTHESIOLOGY

## 2024-05-26 PROCEDURE — 36620 INSERTION CATHETER ARTERY: CPT

## 2024-05-26 PROCEDURE — 83735 ASSAY OF MAGNESIUM: CPT

## 2024-05-26 PROCEDURE — A47600 PR REMOVAL GALLBLADDER: Performed by: ANESTHESIOLOGY

## 2024-05-26 PROCEDURE — 1100000001 HC PRIVATE ROOM DAILY

## 2024-05-26 PROCEDURE — 2500000001 HC RX 250 WO HCPCS SELF ADMINISTERED DRUGS (ALT 637 FOR MEDICARE OP): Performed by: STUDENT IN AN ORGANIZED HEALTH CARE EDUCATION/TRAINING PROGRAM

## 2024-05-26 PROCEDURE — 76937 US GUIDE VASCULAR ACCESS: CPT

## 2024-05-26 PROCEDURE — 7100000001 HC RECOVERY ROOM TIME - INITIAL BASE CHARGE: Performed by: SURGERY

## 2024-05-26 PROCEDURE — 2720000007 HC OR 272 NO HCPCS: Performed by: SURGERY

## 2024-05-26 PROCEDURE — 2500000004 HC RX 250 GENERAL PHARMACY W/ HCPCS (ALT 636 FOR OP/ED)

## 2024-05-26 PROCEDURE — 7100000002 HC RECOVERY ROOM TIME - EACH INCREMENTAL 1 MINUTE: Performed by: SURGERY

## 2024-05-26 PROCEDURE — 82947 ASSAY GLUCOSE BLOOD QUANT: CPT

## 2024-05-26 PROCEDURE — 86901 BLOOD TYPING SEROLOGIC RH(D): CPT | Performed by: ANESTHESIOLOGY

## 2024-05-26 PROCEDURE — 2500000005 HC RX 250 GENERAL PHARMACY W/O HCPCS

## 2024-05-26 PROCEDURE — 80053 COMPREHEN METABOLIC PANEL: CPT

## 2024-05-26 PROCEDURE — 2500000004 HC RX 250 GENERAL PHARMACY W/ HCPCS (ALT 636 FOR OP/ED): Mod: JZ | Performed by: SURGERY

## 2024-05-26 PROCEDURE — 0FT40ZZ RESECTION OF GALLBLADDER, OPEN APPROACH: ICD-10-PCS | Performed by: SURGERY

## 2024-05-26 PROCEDURE — 3700000001 HC GENERAL ANESTHESIA TIME - INITIAL BASE CHARGE: Performed by: SURGERY

## 2024-05-26 PROCEDURE — 36415 COLL VENOUS BLD VENIPUNCTURE: CPT

## 2024-05-26 RX ORDER — LIDOCAINE HCL/PF 100 MG/5ML
SYRINGE (ML) INTRAVENOUS AS NEEDED
Status: DISCONTINUED | OUTPATIENT
Start: 2024-05-26 | End: 2024-05-26

## 2024-05-26 RX ORDER — HYDROMORPHONE HYDROCHLORIDE 1 MG/ML
0.4 INJECTION, SOLUTION INTRAMUSCULAR; INTRAVENOUS; SUBCUTANEOUS EVERY 5 MIN PRN
Status: DISCONTINUED | OUTPATIENT
Start: 2024-05-26 | End: 2024-05-26 | Stop reason: HOSPADM

## 2024-05-26 RX ORDER — ROCURONIUM BROMIDE 10 MG/ML
INJECTION, SOLUTION INTRAVENOUS AS NEEDED
Status: DISCONTINUED | OUTPATIENT
Start: 2024-05-26 | End: 2024-05-26

## 2024-05-26 RX ORDER — BUPIVACAINE HYDROCHLORIDE 5 MG/ML
INJECTION, SOLUTION EPIDURAL; INTRACAUDAL AS NEEDED
Status: DISCONTINUED | OUTPATIENT
Start: 2024-05-26 | End: 2024-05-26 | Stop reason: HOSPADM

## 2024-05-26 RX ORDER — MAGNESIUM SULFATE HEPTAHYDRATE 500 MG/ML
INJECTION, SOLUTION INTRAMUSCULAR; INTRAVENOUS AS NEEDED
Status: DISCONTINUED | OUTPATIENT
Start: 2024-05-26 | End: 2024-05-26

## 2024-05-26 RX ORDER — ETOMIDATE 2 MG/ML
INJECTION INTRAVENOUS AS NEEDED
Status: DISCONTINUED | OUTPATIENT
Start: 2024-05-26 | End: 2024-05-26

## 2024-05-26 RX ORDER — NALOXONE HYDROCHLORIDE 0.4 MG/ML
0.2 INJECTION, SOLUTION INTRAMUSCULAR; INTRAVENOUS; SUBCUTANEOUS AS NEEDED
Status: DISCONTINUED | OUTPATIENT
Start: 2024-05-26 | End: 2024-06-11 | Stop reason: HOSPADM

## 2024-05-26 RX ORDER — HYDROMORPHONE HYDROCHLORIDE 0.2 MG/ML
0.2 INJECTION INTRAMUSCULAR; INTRAVENOUS; SUBCUTANEOUS EVERY 5 MIN PRN
Status: DISCONTINUED | OUTPATIENT
Start: 2024-05-26 | End: 2024-05-26 | Stop reason: HOSPADM

## 2024-05-26 RX ORDER — OXYCODONE HYDROCHLORIDE 5 MG/1
10 TABLET ORAL EVERY 4 HOURS PRN
Status: DISCONTINUED | OUTPATIENT
Start: 2024-05-26 | End: 2024-05-26 | Stop reason: HOSPADM

## 2024-05-26 RX ORDER — HYDROMORPHONE HCL/0.9% NACL/PF 15 MG/30ML
PATIENT CONTROLLED ANALGESIA SYRINGE INTRAVENOUS CONTINUOUS
Status: DISCONTINUED | OUTPATIENT
Start: 2024-05-26 | End: 2024-05-27

## 2024-05-26 RX ORDER — PROPOFOL 10 MG/ML
INJECTION, EMULSION INTRAVENOUS AS NEEDED
Status: DISCONTINUED | OUTPATIENT
Start: 2024-05-26 | End: 2024-05-26

## 2024-05-26 RX ORDER — ONDANSETRON HYDROCHLORIDE 2 MG/ML
4 INJECTION, SOLUTION INTRAVENOUS ONCE AS NEEDED
Status: DISCONTINUED | OUTPATIENT
Start: 2024-05-26 | End: 2024-05-26 | Stop reason: HOSPADM

## 2024-05-26 RX ORDER — ONDANSETRON HYDROCHLORIDE 2 MG/ML
INJECTION, SOLUTION INTRAVENOUS AS NEEDED
Status: DISCONTINUED | OUTPATIENT
Start: 2024-05-26 | End: 2024-05-26

## 2024-05-26 RX ORDER — OXYCODONE HYDROCHLORIDE 5 MG/1
5 TABLET ORAL EVERY 4 HOURS PRN
Status: DISCONTINUED | OUTPATIENT
Start: 2024-05-26 | End: 2024-05-26 | Stop reason: HOSPADM

## 2024-05-26 RX ORDER — HYDROMORPHONE HYDROCHLORIDE 1 MG/ML
INJECTION, SOLUTION INTRAMUSCULAR; INTRAVENOUS; SUBCUTANEOUS CONTINUOUS PRN
Status: DISCONTINUED | OUTPATIENT
Start: 2024-05-26 | End: 2024-05-26

## 2024-05-26 RX ORDER — FENTANYL CITRATE 50 UG/ML
INJECTION, SOLUTION INTRAMUSCULAR; INTRAVENOUS AS NEEDED
Status: DISCONTINUED | OUTPATIENT
Start: 2024-05-26 | End: 2024-05-26

## 2024-05-26 RX ORDER — SUCCINYLCHOLINE CHLORIDE 20 MG/ML
INJECTION INTRAMUSCULAR; INTRAVENOUS AS NEEDED
Status: DISCONTINUED | OUTPATIENT
Start: 2024-05-26 | End: 2024-05-26

## 2024-05-26 RX ORDER — LIDOCAINE HYDROCHLORIDE 10 MG/ML
0.1 INJECTION INFILTRATION; PERINEURAL ONCE
Status: DISCONTINUED | OUTPATIENT
Start: 2024-05-26 | End: 2024-05-26 | Stop reason: HOSPADM

## 2024-05-26 RX ADMIN — GABAPENTIN 300 MG: 300 CAPSULE ORAL at 20:14

## 2024-05-26 RX ADMIN — ISOSORBIDE DINITRATE 20 MG: 20 TABLET ORAL at 20:14

## 2024-05-26 RX ADMIN — MAGNESIUM SULFATE HEPTAHYDRATE 1 G: 500 INJECTION, SOLUTION INTRAMUSCULAR; INTRAVENOUS at 09:00

## 2024-05-26 RX ADMIN — ONDANSETRON 4 MG: 2 INJECTION INTRAMUSCULAR; INTRAVENOUS at 09:50

## 2024-05-26 RX ADMIN — FENTANYL CITRATE 25 MCG: 50 INJECTION, SOLUTION INTRAMUSCULAR; INTRAVENOUS at 08:05

## 2024-05-26 RX ADMIN — ATORVASTATIN CALCIUM 80 MG: 80 TABLET, FILM COATED ORAL at 14:17

## 2024-05-26 RX ADMIN — TAZOBACTAM SODIUM AND PIPERACILLIN SODIUM 2.25 G: 250; 2 INJECTION, SOLUTION INTRAVENOUS at 08:36

## 2024-05-26 RX ADMIN — ROCURONIUM BROMIDE 30 MG: 10 INJECTION INTRAVENOUS at 08:10

## 2024-05-26 RX ADMIN — SUCCINYLCHOLINE CHLORIDE 160 MG: 20 INJECTION, SOLUTION INTRAMUSCULAR; INTRAVENOUS at 08:07

## 2024-05-26 RX ADMIN — MELATONIN 3 MG: 3 TAB ORAL at 20:14

## 2024-05-26 RX ADMIN — PROPOFOL 30 MG: 10 INJECTION, EMULSION INTRAVENOUS at 10:07

## 2024-05-26 RX ADMIN — HEPARIN SODIUM 5000 UNITS: 5000 INJECTION INTRAVENOUS; SUBCUTANEOUS at 20:14

## 2024-05-26 RX ADMIN — ESCITALOPRAM OXALATE 10 MG: 10 TABLET ORAL at 14:18

## 2024-05-26 RX ADMIN — PIPERACILLIN SODIUM AND TAZOBACTAM SODIUM 2.25 G: 2; .25 INJECTION, SOLUTION INTRAVENOUS at 02:36

## 2024-05-26 RX ADMIN — METOPROLOL TARTRATE 50 MG: 50 TABLET, FILM COATED ORAL at 20:14

## 2024-05-26 RX ADMIN — GABAPENTIN 300 MG: 300 CAPSULE ORAL at 14:18

## 2024-05-26 RX ADMIN — PIPERACILLIN SODIUM AND TAZOBACTAM SODIUM 2.25 G: 2; .25 INJECTION, SOLUTION INTRAVENOUS at 14:18

## 2024-05-26 RX ADMIN — SODIUM CHLORIDE: 9 INJECTION, SOLUTION INTRAVENOUS at 10:03

## 2024-05-26 RX ADMIN — SUGAMMADEX 200 MG: 100 INJECTION, SOLUTION INTRAVENOUS at 10:18

## 2024-05-26 RX ADMIN — FENTANYL CITRATE 25 MCG: 50 INJECTION, SOLUTION INTRAMUSCULAR; INTRAVENOUS at 10:11

## 2024-05-26 RX ADMIN — METOPROLOL TARTRATE 50 MG: 50 TABLET, FILM COATED ORAL at 14:17

## 2024-05-26 RX ADMIN — PROPOFOL 100 MG: 10 INJECTION, EMULSION INTRAVENOUS at 08:06

## 2024-05-26 RX ADMIN — Medication 1000 UNITS: at 20:14

## 2024-05-26 RX ADMIN — DEXAMETHASONE SODIUM PHOSPHATE 4 MG: 4 INJECTION INTRA-ARTICULAR; INTRALESIONAL; INTRAMUSCULAR; INTRAVENOUS; SOFT TISSUE at 08:36

## 2024-05-26 RX ADMIN — FENTANYL CITRATE 50 MCG: 50 INJECTION, SOLUTION INTRAMUSCULAR; INTRAVENOUS at 09:50

## 2024-05-26 RX ADMIN — LIDOCAINE HYDROCHLORIDE 100 MG: 20 INJECTION INTRAVENOUS at 08:05

## 2024-05-26 RX ADMIN — ASPIRIN 81 MG CHEWABLE TABLET 81 MG: 81 TABLET CHEWABLE at 14:17

## 2024-05-26 RX ADMIN — ROCURONIUM BROMIDE 20 MG: 10 INJECTION INTRAVENOUS at 08:47

## 2024-05-26 RX ADMIN — SODIUM CHLORIDE, SODIUM LACTATE, POTASSIUM CHLORIDE, AND CALCIUM CHLORIDE: 600; 310; 30; 20 INJECTION, SOLUTION INTRAVENOUS at 07:21

## 2024-05-26 RX ADMIN — PANTOPRAZOLE SODIUM 40 MG: 40 TABLET, DELAYED RELEASE ORAL at 14:18

## 2024-05-26 RX ADMIN — Medication: at 10:54

## 2024-05-26 RX ADMIN — PROPOFOL 50 MG: 10 INJECTION, EMULSION INTRAVENOUS at 08:47

## 2024-05-26 RX ADMIN — ACETAMINOPHEN 650 MG: 325 TABLET ORAL at 21:29

## 2024-05-26 RX ADMIN — PIPERACILLIN SODIUM AND TAZOBACTAM SODIUM 2.25 G: 2; .25 INJECTION, SOLUTION INTRAVENOUS at 20:14

## 2024-05-26 RX ADMIN — ACETAMINOPHEN 650 MG: 325 TABLET ORAL at 16:40

## 2024-05-26 RX ADMIN — Medication 20 MG: at 08:50

## 2024-05-26 RX ADMIN — ISOSORBIDE DINITRATE 20 MG: 20 TABLET ORAL at 14:17

## 2024-05-26 RX ADMIN — HEPARIN SODIUM 5000 UNITS: 5000 INJECTION INTRAVENOUS; SUBCUTANEOUS at 04:55

## 2024-05-26 RX ADMIN — ROCURONIUM BROMIDE 10 MG: 10 INJECTION INTRAVENOUS at 08:07

## 2024-05-26 SDOH — HEALTH STABILITY: MENTAL HEALTH: CURRENT SMOKER: 0

## 2024-05-26 ASSESSMENT — PAIN - FUNCTIONAL ASSESSMENT
PAIN_FUNCTIONAL_ASSESSMENT: 0-10

## 2024-05-26 ASSESSMENT — PAIN SCALES - GENERAL
PAINLEVEL_OUTOF10: 10 - WORST POSSIBLE PAIN
PAINLEVEL_OUTOF10: 7
PAINLEVEL_OUTOF10: 4
PAINLEVEL_OUTOF10: 0 - NO PAIN

## 2024-05-26 ASSESSMENT — COGNITIVE AND FUNCTIONAL STATUS - GENERAL
EATING MEALS: A LITTLE
HELP NEEDED FOR BATHING: A LITTLE
MOVING FROM LYING ON BACK TO SITTING ON SIDE OF FLAT BED WITH BEDRAILS: A LITTLE
MOBILITY SCORE: 11
MOBILITY SCORE: 11
STANDING UP FROM CHAIR USING ARMS: A LOT
CLIMB 3 TO 5 STEPS WITH RAILING: TOTAL
PERSONAL GROOMING: A LITTLE
HELP NEEDED FOR BATHING: A LITTLE
DRESSING REGULAR UPPER BODY CLOTHING: A LITTLE
MOVING TO AND FROM BED TO CHAIR: A LOT
DRESSING REGULAR LOWER BODY CLOTHING: A LITTLE
MOVING FROM LYING ON BACK TO SITTING ON SIDE OF FLAT BED WITH BEDRAILS: A LITTLE
STANDING UP FROM CHAIR USING ARMS: A LOT
PERSONAL GROOMING: A LITTLE
MOVING TO AND FROM BED TO CHAIR: A LOT
CLIMB 3 TO 5 STEPS WITH RAILING: TOTAL
WALKING IN HOSPITAL ROOM: TOTAL
EATING MEALS: A LITTLE
TURNING FROM BACK TO SIDE WHILE IN FLAT BAD: A LOT
TOILETING: A LOT
TURNING FROM BACK TO SIDE WHILE IN FLAT BAD: A LOT
TOILETING: A LOT
DAILY ACTIVITIY SCORE: 17
WALKING IN HOSPITAL ROOM: TOTAL
DRESSING REGULAR LOWER BODY CLOTHING: A LITTLE
DAILY ACTIVITIY SCORE: 17
DRESSING REGULAR UPPER BODY CLOTHING: A LITTLE

## 2024-05-26 ASSESSMENT — PAIN DESCRIPTION - DESCRIPTORS: DESCRIPTORS: ACHING

## 2024-05-26 NOTE — OP NOTE
Cholecystectomy Operative Note     Date: 2024 - 2024  OR Location: [facility] OR    Name: Humphrey Waddell YOB: 1965, Age: 59 y.o., MRN: [id number], Sex: male    Diagnosis  Pre-op Diagnosis     * Cholecystitis [K81.9] Post-op Diagnosis     * Cholecystitis [K81.9]     Procedures  Cholecystectomy  74232 - NC CHOLECYSTECTOMY      Surgeons      * Richmond York - Primary    Resident/Fellow/Other Assistant:  Surgeons and Role:     * Emory Staley MD - Resident - Assisting    Procedure Summary  Anesthesia: General  ASA: III  Anesthesia Staff: Anesthesiologist: Gabriel Sosa DO  Anesthesia Resident: Kayden Esparza MD  Estimated Blood Loss: 25mL  Intra-op Medications:   Administrations occurring from 0730 to 1155 on 24:   Medication Name Total Dose   bupivacaine PF (Marcaine) 0.5 % (5 mg/mL) injection 30 mL   acetaminophen (Tylenol) tablet 650 mg Cannot be calculated   aspirin chewable tablet 81 mg Cannot be calculated   atorvastatin (Lipitor) tablet 80 mg Cannot be calculated   cholecalciferol (Vitamin D-3) tablet 1,000 Units Cannot be calculated   escitalopram (Lexapro) tablet 10 mg Cannot be calculated   gabapentin (Neurontin) capsule 300 mg Cannot be calculated   insulin lispro (HumaLOG) injection 0-10 Units Cannot be calculated   isosorbide dinitrate (Isordil) tablet 20 mg Cannot be calculated   lidocaine 4 % patch 2 patch Cannot be calculated   metoprolol tartrate (Lopressor) tablet 50 mg Cannot be calculated   NIFEdipine ER (Adalat CC) 24 hr tablet 60 mg Cannot be calculated   pantoprazole (ProtoNix) EC tablet 40 mg Cannot be calculated   piperacillin-tazobactam-dextrose (Zosyn) IV 2.25 g Cannot be calculated   sodium bicarbonate tablet 1,300 mg Cannot be calculated   sodium bicarbonate tablet 650 mg Cannot be calculated              Anesthesia Record               Intraprocedure I/O Totals          Intake    LR bolus 1000.00 mL    Total Intake 1000 mL       Output    Urine 190 mL    Est. Blood Loss  25 mL    Total Output 215 mL       Net    Net Volume 785 mL          Specimen:   ID Type Source Tests Collected by Time   1 : Gallbladder Tissue GALLBLADDER CHOLECYSTECTOMY SURGICAL PATHOLOGY EXAM Richmond York MD 5/26/2024 0938   A :  Blood Blood, Venous TYPE AND SCREEN (Canceled) Susan Esparza 5/26/2024 0853        Staff:   Circulator: Wilma  Scrub Person: Blake         Drains and/or Catheters:   Urethral Catheter 16 Fr. (Active)     Findings: Fibrotic tract of prior cholecystostomy tube. Moderate adhesions in the right upper quadrant. Gallbladder thickened and mildly inflamed.     Indications: Humphrey Waddell is an 59 y.o. male who is having surgery for Cholecystitis [K81.9]. He had a prior cholecystostomy tube that fell out and now presents with recurrent right upper quadrant pain and po intolerance. In discussion with cardiology, his plavix was held and we now plan for cholecystectomy. The patient was seen in the preoperative area. The risks, benefits, complications, treatment options, non-operative alternatives, expected recovery and outcomes were discussed with the patient. The possibilities of reaction to medication, pulmonary aspiration, injury to surrounding structures, bleeding, recurrent infection, the need for additional procedures, failure to diagnose a condition, and creating a complication requiring transfusion or operation were discussed with the patient. The patient concurred with the proposed plan, giving informed consent.  The site of surgery was properly noted/marked if necessary per policy. The patient has been actively warmed in preoperative area. Preoperative antibiotics have been ordered and given within 1 hours of incision. Venous thrombosis prophylaxis have been ordered including bilateral sequential compression devices    Procedure Details: Following informed consent the patient was taken to the operating room and placed in the supine position.  General endotracheal anesthesia was  induced, perioperative antibiotics were given, and the abdomen was prepped and draped in a sterile fashion.  A right subcostal incision was made approximately 2 fingerbreadths below the costal margin, the abdominal fascia and muscle was divided with electrocautery, and the abdomen was entered sharply.  Fortunately there were minimal adhesions to the anterior abdominal wall.  Several filmy adhesions between the colon and gallbladder were divided, and the colon and small intestine were carefully retracted inferiorly.  The gallbladder was grasped and elevated, and was  from the liver using a top-down approach.  The cystic duct and artery were identified.  The cystic artery was secured with two 2-0 Vicryl ties on the downside, a clip on the upside, and divided.  Fat was cleared from the cystic duct.  The duct itself was grasped with a right angle, and divided.  The gallbladder and contents were sent as specimen.  The cystic duct was secured with a 3-0 silk stick tie, and an additional 2-0 Vicryl tie.  The abdomen was thoroughly irrigated.  The gallbladder fossa was inspected.  The ties on the cystic duct and artery both appear to be secure.  There was a small amount of oozing from the liver which was controlled with electrocautery.  A piece of Surgicel was left in the gallbladder fossa.  The anterior and posterior layers of abdominal wall fascia were closed separately with running #1 PDS.  The wound was irrigated, local anesthetic was injected, and the skin was closed with staples.  The patient tolerated the procedure well was extubated and was taken to the PACU in stable condition.    Complications:  None; patient tolerated the procedure well.    Disposition: PACU - hemodynamically stable.  Condition: stable     Attending Attestation: I was present and scrubbed for the entire procedure.    Richmond York  Phone Number: [phone]

## 2024-05-26 NOTE — ANESTHESIA PROCEDURE NOTES
Arterial Line:    Date/Time: 5/26/2024 8:36 AM    Staffing  Performed: resident   Authorized by: Gabriel Sosa DO    Performed by: Kayden Esparza MD    An arterial line was placed. Procedure performed using ultrasound guidance.in the OR for the following indication(s): continuous blood pressure monitoring.    A 20 gauge (size) (length), Angiocath (type) catheter was placed into the Left radial artery, secured by Tegaderm,   Seldinger technique not used.  Events:  patient tolerated procedure well with no complications.      Additional notes:  Attempt at radial arterial line under ultrasound guidance on right unsucessful. Successful on left with one attempt

## 2024-05-26 NOTE — ANESTHESIA PROCEDURE NOTES
Airway  Date/Time: 5/26/2024 8:09 AM  Urgency: elective    Airway not difficult    Staffing  Performed: resident   Authorized by: Gabriel Sosa DO    Performed by: Kayden Esparza MD  Patient location during procedure: OR    Indications and Patient Condition  Indications for airway management: anesthesia  Spontaneous Ventilation: absent  Sedation level: deep  Preoxygenated: yes  Patient position: sniffing  Mask difficulty assessment: 3 - difficult mask (inadequate, unstable or two providers) +/- NMBA (able to mask with two hands and oral airway)  Planned trial extubation    Final Airway Details  Final airway type: endotracheal airway      Successful airway: ETT  Cuffed: yes   Successful intubation technique: direct laryngoscopy  Facilitating devices/methods: intubating stylet  Endotracheal tube insertion site: oral  Blade type: glidescope S4.  Blade size: #4  ETT size (mm): 7.5  Cormack-Lehane Classification: grade I - full view of glottis  Placement verified by: chest auscultation and capnometry   Measured from: lips  ETT to lips (cm): 22  Number of attempts at approach: 1

## 2024-05-26 NOTE — ANESTHESIA PREPROCEDURE EVALUATION
Patient: Humphrey Waddell    Procedure Information       Date/Time: 05/26/24 0730    Procedure: Cholecystectomy (Abdomen)    Location: [facility] OR 11 / [facility] OR    Surgeons: Richmond York MD            Relevant Problems   Anesthesia (within normal limits)      Cardiac   3v CABGx3 11/2023 (LIMA ->LAD, SVG ->OM1, SVG ->PDA in 11/23) complicated by pericardial tamponade requiring mediastinal exploration and repair of OM rosanne   (+) Arteriosclerosis of coronary artery   (+) Atypical chest pain   (+) Benign essential hypertension   (+) Chest pain, unspecified type   (+) Essential (primary) hypertension   (+) Hyperlipidemia, unspecified   (+) Hypertension   (+) NSTEMI, initial episode of care (Multi)   (+) Peripheral arterial occlusive disease (CMS-HCC) (CLI 8/23 s/p PTA_DCB of the entire right superficial femoral artery (on DAPT))      Neuro   (+) Anxiety disorder, unspecified   (+) Atherosclerosis of both carotid arteries   (+) Carotid artery stenosis with cerebral infarction (Multi)   (+) Cerebrovascular accident (CVA) (Multi)   (+) Depression, unspecified   (+) Major depressive disorder, recurrent, moderate (Multi)   (+) Peripheral neuropathy      GI   (+) Rectal bleeding   (+) Upper gastrointestinal hemorrhage      Liver   (+) Cholecystitis      Endocrine   (+) Diabetic renal disease (Multi)   (+) Type 2 diabetes mellitus with diabetic neuropathy, unspecified (Multi)   (+) Type 2 diabetes mellitus with neurologic complication, without long-term current use of insulin (Multi)      Hematology   (+) Anemia   (+) Anticoagulant long-term use   (+) Iron deficiency anemia      Musculoskeletal   (+) Chronic osteoarthritis   (+) Primary osteoarthritis, left shoulder      ID   (+) Necrotizing fasciitis (Multi)   (+) Osteomyelitis (Multi)      Circulatory   (+) S/P CABG (coronary artery bypass graft)     Vitals:    05/26/24 0700   BP: 139/60   Pulse: 59   Resp: 18   Temp: 36.1 °C (97 °F)   SpO2: 95%       Past Surgical  History:   Procedure Laterality Date    CARDIAC SURGERY      CABG 11/2023    CT ANGIO NECK  06/14/2021    CT NECK ANGIO W AND WO IV CONTRAST 6/14/2021 [facility] INPATIENT LEGAC[facility]    CT HEAD ANGIO W AND WO IV CONTRAST  06/14/2021    CT HEAD ANGIO W AND WO IV CONTRAST 6/14/2021 [facility] INPATIENT LEGACY    LEG SURGERY Right     MR HEAD ANGIO WO IV CONTRAST  12/11/2012    MR HEAD ANGIO WO IV CONTRAST [facility]    MR HEAD ANGIO WO IV CONTRAST  06/12/2021    MR HEAD ANGIO WO IV CONTRAST [facility]    MR HEAD ANGIO WO IV CONTRAST  05/25/2021    MR HEAD ANGIO WO IV CONTRAST [facility] INPATIENT LEGACY    MR NECK ANGIO WO IV CONTRAST  07/18/2023    MR NECK ANGIO WO IV CONTRAST 7/18/2023 [facility] [id number] MRI    OTHER SURGICAL HISTORY  07/07/2021    Knee reconstruction    OTHER SURGICAL HISTORY  07/07/2021    Lower extremity amputation above knee    TOTAL SHOULDER ARTHROPLASTY Left 2020     Past Medical History:   Diagnosis Date    Above-knee amputation of left lower extremity (Multi)     Adverse effect of anesthesia     confusion    Anemia     CAD (coronary artery disease)     Carotid artery disease (CMS-HCC)     bilateral    CVA (cerebral vascular accident) (Multi) 2020    Depression     DM (diabetes mellitus) (Multi)     type 2 with neuropathy    GERD (gastroesophageal reflux disease)     High cholesterol     HTN (hypertension)     Leg ulcer (Multi)     chronic right lower extremity    Neuropathy     OA (osteoarthritis)     PAD (peripheral artery disease) (CMS-HCC)     Renal disorder        Current Facility-Administered Medications:     [Transfer Hold] acetaminophen (Tylenol) tablet 650 mg, 650 mg, oral, q6h, Susan Anderson MD, 650 mg at 05/25/24 2132    [Transfer Hold] aspirin chewable tablet 81 mg, 81 mg, oral, Daily, Susan Anderson MD, 81 mg at 05/25/24 0910    [Transfer Hold] atorvastatin (Lipitor) tablet 80 mg, 80 mg, oral, Daily, Susan Anderson MD, 80 mg at 05/25/24 0909    [Transfer Hold]  cholecalciferol (Vitamin D-3) tablet 1,000 Units, 1,000 Units, oral, BID, Susan Anderson MD, 1,000 Units at 05/25/24 2015    [Transfer Hold] dextrose 50 % injection 12.5 g, 12.5 g, intravenous, q15 min PRN, Susan Anderson MD    [Transfer Hold] dextrose 50 % injection 25 g, 25 g, intravenous, q15 min PRN, Susan Anderson MD    [Transfer Hold] escitalopram (Lexapro) tablet 10 mg, 10 mg, oral, Daily, Susan Anderson MD, 10 mg at 05/25/24 0910    [Transfer Hold] gabapentin (Neurontin) capsule 300 mg, 300 mg, oral, TID, Susan Anderson MD, 300 mg at 05/25/24 2015    [Transfer Hold] glucagon (Glucagen) injection 1 mg, 1 mg, intramuscular, q15 min PRN, Susan Anderson MD    [Transfer Hold] heparin (porcine) injection 5,000 Units, 5,000 Units, subcutaneous, q8h, Susan Anderson MD, 5,000 Units at 05/26/24 0455    [Transfer Hold] insulin lispro (HumaLOG) injection 0-10 Units, 0-10 Units, subcutaneous, TID, Susan Anderson MD, 2 Units at 05/24/24 1822    [Transfer Hold] isosorbide dinitrate (Isordil) tablet 20 mg, 20 mg, oral, TID, Susan Anderson MD, 20 mg at 05/25/24 2015    [Transfer Hold] lidocaine 4 % patch 2 patch, 2 patch, transdermal, Daily, Jovita Romo MD, 2 patch at 05/25/24 0910    [Transfer Hold] melatonin tablet 3 mg, 3 mg, oral, Nightly, Susan Anderson MD, 3 mg at 05/25/24 2015    [Transfer Hold] metoprolol tartrate (Lopressor) tablet 50 mg, 50 mg, oral, BID, Susan Anderson MD, 50 mg at 05/25/24 2015    [Transfer Hold] naloxone (Narcan) injection 0.2 mg, 0.2 mg, intravenous, q5 min PRN, Susan Anderson MD    [Transfer Hold] NIFEdipine ER (Adalat CC) 24 hr tablet 60 mg, 60 mg, oral, Daily before breakfast, Susan Anderson MD, 60 mg at 05/25/24 1120    [Transfer Hold] ondansetron (Zofran) injection 4 mg, 4 mg, intravenous, q6h PRN, Susan Anderson MD    [Transfer Hold] oxyCODONE (Roxicodone) immediate release  tablet 5 mg, 5 mg, oral, q4h PRN, Susan Anderson MD, 5 mg at 05/25/24 1928    [Transfer Hold] pantoprazole (ProtoNix) EC tablet 40 mg, 40 mg, oral, Daily before breakfast, Susan Anderson MD, 40 mg at 05/25/24 0910    [Transfer Hold] perflutren protein A microsphere (Optison) injection 0.5 mL, 0.5 mL, intravenous, Once in imaging, Valeri García MD    [Transfer Hold] piperacillin-tazobactam-dextrose (Zosyn) IV 2.25 g, 2.25 g, intravenous, q6h, Susan Anderson MD, Stopped at 05/26/24 0306    [Transfer Hold] polyethylene glycol (Glycolax, Miralax) packet 17 g, 17 g, oral, Daily PRN, Susan Anderson MD    [Transfer Hold] sodium bicarbonate tablet 1,300 mg, 1,300 mg, oral, q AM, Susan Anderson MD, 1,300 mg at 05/25/24 0909    [Transfer Hold] sodium bicarbonate tablet 650 mg, 650 mg, oral, Daily with lunch, Susan Anderson MD, 650 mg at 05/25/24 1243    [Transfer Hold] sulfur hexafluoride microsphr (Lumason) injection 24.28 mg, 2 mL, intravenous, Once in imaging, Valeri García MD  Prior to Admission medications    Medication Sig Start Date End Date Taking? Authorizing Provider   acetaminophen (Tylenol 8 HOUR) 650 mg ER tablet Take 1 tablet (650 mg) by mouth every 8 hours if needed for mild pain (1 - 3). Do not crush, chew, or split.   Yes Historical Provider, MD   aspirin 81 mg chewable tablet Chew 1 tablet (81 mg) once daily. 6/18/21  Yes Historical Provider, MD   atorvastatin (Lipitor) 80 mg tablet Take 1 tablet (80 mg) by mouth once daily. Last rx prior to next refills 1/29/24 1/28/25 Yes Dorothy Nielsen, APRN-CNP   cholecalciferol (Vitamin D3) 25 MCG (1000 UT) tablet Take 1 tablet (1,000 Units) by mouth once daily.   Yes Historical Provider, MD   clopidogrel (Plavix) 75 mg tablet Take 1 tablet (75 mg) by mouth once daily. 1/29/24 1/28/25 Yes Dorothy Nielsen APRN-CNP   escitalopram (Lexapro) 10 mg tablet TAKE 1 TABLET BY MOUTH EVERY DAY 5/20/24  Yes  Mily Leung, DO   flash glucose sensor kit (FreeStyle Suzy 2 Sensor) kit Use as instructed 4/28/24  Yes Mily Leung DO   gabapentin (Neurontin) 300 mg capsule Take 1 capsule (300 mg) by mouth 3 times a day. 5/7/24 11/3/24 Yes Mily Leung DO   insulin glargine (Lantus Solostar U-100 Insulin) 100 unit/mL (3 mL) pen Inject 2 Units under the skin once daily at bedtime. 5/19/21  Yes Historical Provider, MD   isosorbide dinitrate (Isordil) 20 mg tablet Take 1 tablet (20 mg) by mouth 3 times a day. 5/7/24 11/3/24 Yes Mily Leung DO   lidocaine 4 % patch Place 1 patch over 12 hours on the skin once daily. Remove & discard patch within 12 hours or as directed by MD. 4/24/24  Yes Mily Leung DO   metoprolol tartrate (Lopressor) 50 mg tablet Take 1 tablet by mouth 2 times a day. 1/29/24 1/28/25 Yes PAT Knott   NIFEdipine ER (Adalat CC) 60 mg 24 hr tablet Take 1 tablet (60 mg) by mouth once daily in the morning. Take before meals. Do not crush, chew, or split. 5/7/24 11/3/24 Yes Mily Leung DO   pantoprazole (ProtoNix) 40 mg EC tablet Take 1 tablet (40 mg) by mouth once daily in the morning. Take before meals. Do not crush, chew, or split. 5/7/24 11/3/24 Yes Mily Leung DO   polyethylene glycol (Glycolax, Miralax) 17 gram packet Take 17 g by mouth every 12 hours if needed (constipation). 4/17/24  Yes PAT Addison   sodium bicarbonate 650 mg tablet Take 2 tablets (1,300 mg) by mouth 2 times a day.  Patient taking differently: Take 2 tablets (1,300 mg) by mouth once daily in the morning. 4/17/24 5/23/24 Yes PAT Addison   torsemide (Demadex) 20 mg tablet Take 3 tablets (60 mg) by mouth once daily. 4/17/24  Yes ANTHONY AddisonCNP   traMADol (Ultram) 50 mg tablet Take 1 tablet (50 mg) by mouth every 6 hours if needed for severe pain (7 - 10). 4/8/24  Yes Sharron Castaneda MD   blood sugar diagnostic (Blood Glucose Test) strip 1 strip 3 times a day. 5/22/24   Mily  "CLEMENT Leung,    FreeStyle Suzy reader (FreeStyle Suzy 2 [place]) misc Use as instructed 23   Mily Leung DO   insulin lispro (HumaLOG) 100 unit/mL injection Inject 0-0.1 mL (0-10 Units) under the skin 3 times a day with meals. Take as directed per insulin instructions. Do not start before 2024. 3/30/24   Nalini Whitehead MD   melatonin 3 mg tablet Take 1 tablet (3 mg) by mouth once daily at bedtime.    Historical Provider, MD   escitalopram (Lexapro) 10 mg tablet Take 1 tablet (10 mg) by mouth once daily. 24  Leena Harrison, APRN-CNP   sodium bicarbonate 650 mg tablet Take 1 tablet (650 mg) by mouth once daily at noon. Take with meals.  24  Historical Provider, MD     Allergies   Allergen Reactions    Carvedilol Itching     \"Scratchy throat\"     Social History     Tobacco Use    Smoking status: Former     Types: Cigars     Quit date: 2022     Years since quittin.5    Smokeless tobacco: Never   Substance Use Topics    Alcohol use: Not Currently         Chemistry    Lab Results   Component Value Date/Time     2024 0559    K 4.1 2024 0559     2024 0559    CO2 27 2024 0559    BUN 54 (H) 2024 0559    CREATININE 3.55 (H) 2024 0559    Lab Results   Component Value Date/Time    CALCIUM 7.3 (L) 2024 0559    ALKPHOS 48 2024 0559    AST 12 2024 0559    ALT 11 2024 0559    BILITOT 0.3 2024 0559          Lab Results   Component Value Date/Time    WBC 8.6 2024 0630    HGB 8.6 (L) 2024 0630    HCT 27.2 (L) 2024 0630     2024 0630     Lab Results   Component Value Date/Time    PROTIME 12.6 2024    INR 1.1 2024     Encounter Date: 24   ECG 12 lead   Result Value    Ventricular Rate 69    Atrial Rate 69    WY Interval 184    QRS Duration 154    QT Interval 466    QTC Calculation(Bazett) 499    P Axis 56    R Axis 107    T Axis 69    QRS Count 11    Q " Onset 223    P Onset 131    P Offset 192    T Offset 456    QTC Fredericia 488    Narrative    Normal sinus rhythm  Right bundle branch block  Abnormal ECG  When compared with ECG of 15-APR-2024 22:59,  No significant change was found      See ED provider note for full interpretation and clinical correlation  Confirmed by Jackie Lutz ([id number]) on 5/23/2024 1:50:31 AM       Clinical information reviewed:   Tobacco  Allergies  Meds   Med Hx  Surg Hx   Fam Hx  Soc Hx        NPO Detail:  NPO/Void Status  Date of Last Liquid: 05/25/24  Time of Last Liquid: 2300  Date of Last Solid: 05/25/24  Time of Last Solid: 2300  Time of Last Void: 0556         Physical Exam    Airway  Mallampati: IV  TM distance: <3 FB  Neck ROM: full     Cardiovascular   Rhythm: regular     Dental    Pulmonary   Breath sounds clear to auscultation     Abdominal        Cardiac Testing:  -ECG 5/22: NSR with RBBB     -TTE 5/24:   1. Left ventricular systolic function is normal with a 55% estimated ejection fraction.   2. Poorly visualized anatomical structures due to suboptimal image quality.     -TTE 4/24:   1. Left ventricular systolic function is normal with a 60-65% estimated ejection fraction.   2. Poorly visualized anatomical structures due to suboptimal image quality.   3. Moderately increased left ventricular septal thickness.   4. Spectral Doppler shows an impaired relaxation pattern of left ventricular diastolic filling.   5. The left ventricular posterior wall thickness is moderately increased.   6. There is mildly reduced right ventricular systolic function.   7. Compared with the prior exam from 11/13/2023 ( [facility]) prior study was techniclly difficult with patient  bpm, but limited echocontrast images demonstrated normal LV systolic function at that time without significant wall motion abnormalities. Bernard's HR today 70 bpm.     -TTE 11/23:   1. Left ventricular systolic function is mildly decreased with a 45-50% estimated  ejection fraction.   2. Mid and apical anterior wall, mid anterolateral segment, apical lateral segment, and apex are abnormal.   3. There are multiple wall motion abnormalities.   4. Poorly visualized anatomical structures due to suboptimal image quality.   5. There is reduced right ventricular systolic function.     -TTE 7/23:  1. Left ventricular systolic function is normal with a 60-65% estimated ejection fraction.  2. Spectral Doppler shows an abnormal pattern of left ventricular diastolic filling.  3. Compared with the prior exam from 4/28/2023 ( [facility]) the Mild-Mod MR and segmental wall motion abnormalities previously seen are not appreciated on todays study. Note that OM2 was stented on 5/11/2023.     -[facility] 5/11/23 for NSTEMI:    Left Main 50% stenosis     entire  LAD       70% stenosis       mid  OM 2      95% stenosis proximal to mid  Successful OM2 stenting.     Anesthesia Plan    History of general anesthesia?: yes  History of complications of general anesthesia?: no    ASA 3     general   (GETA, standard monitors, arterial line; PIV, TSICU postop)  The patient is not a current smoker.    intravenous induction   Postoperative administration of opioids is intended.  Trial extubation is planned.  Anesthetic plan and risks discussed with patient.  Use of blood products discussed with patient who consented to blood products.    Plan discussed with resident.

## 2024-05-26 NOTE — ANESTHESIA POSTPROCEDURE EVALUATION
Patient: Humphrey Waddell    Procedure Summary       Date: 05/26/24 Room / Location: [facility] OR 11 / [facility] OR    Anesthesia Start: 0745 Anesthesia Stop: 1040    Procedure: Cholecystectomy (Abdomen) Diagnosis:       Cholecystitis      (Cholecystitis [K81.9])    Surgeons: Richmond York MD Responsible Provider: Gabriel Sosa DO    Anesthesia Type: general ASA Status: 3            Anesthesia Type: general    Vitals Value Taken Time   /65 05/26/24 1041   Temp 36C 05/26/24 1041   Pulse 61 05/26/24 1041   Resp 14 05/26/24 1041   SpO2 99% 05/26/24 1041       Anesthesia Post Evaluation    Patient location during evaluation: PACU  Patient participation: complete - patient participated  Level of consciousness: responsive to verbal stimuli and sleepy but conscious  Pain management: adequate  Multimodal analgesia pain management approach  Airway patency: patent  Cardiovascular status: hemodynamically stable and acceptable  Respiratory status: acceptable and face mask  Hydration status: acceptable  Postoperative Nausea and Vomiting: none        No notable events documented.

## 2024-05-26 NOTE — BRIEF OP NOTE
Date: 2024  OR Location: [facility] OR    Name: Humphrey Waddell YOB: 1965, Age: 59 y.o., MRN: [id number], Sex: male    Diagnosis  Pre-op Diagnosis     * Cholecystitis [K81.9] Post-op Diagnosis     * Cholecystitis [K81.9]     Procedures  Cholecystectomy  60524 - [place] CHOLECYSTECTOMY      Surgeons      * Richmond York - Primary    Resident/Fellow/Other Assistant:  Surgeons and Role:     * Emory Staley MD - Resident - Assisting    Procedure Summary  Anesthesia: General  ASA: III  Anesthesia Staff: Anesthesiologist: Gabriel Sosa DO  Anesthesia Resident: Kayden Esparza MD  Estimated Blood Loss: 25mL  Intra-op Medications:   Administrations occurring from 0730 to 1155 on 24:   Medication Name Total Dose   bupivacaine PF (Marcaine) 0.5 % (5 mg/mL) injection 30 mL   acetaminophen (Tylenol) tablet 650 mg Cannot be calculated   aspirin chewable tablet 81 mg Cannot be calculated   atorvastatin (Lipitor) tablet 80 mg Cannot be calculated   cholecalciferol (Vitamin D-3) tablet 1,000 Units Cannot be calculated   escitalopram (Lexapro) tablet 10 mg Cannot be calculated   gabapentin (Neurontin) capsule 300 mg Cannot be calculated   insulin lispro (HumaLOG) injection 0-10 Units Cannot be calculated   isosorbide dinitrate (Isordil) tablet 20 mg Cannot be calculated   lidocaine 4 % patch 2 patch Cannot be calculated   metoprolol tartrate (Lopressor) tablet 50 mg Cannot be calculated   NIFEdipine ER (Adalat CC) 24 hr tablet 60 mg Cannot be calculated   pantoprazole (ProtoNix) EC tablet 40 mg Cannot be calculated   piperacillin-tazobactam-dextrose (Zosyn) IV 2.25 g Cannot be calculated   sodium bicarbonate tablet 1,300 mg Cannot be calculated   sodium bicarbonate tablet 650 mg Cannot be calculated              Anesthesia Record               Intraprocedure I/O Totals          Intake    LR bolus 1000.00 mL    Total Intake 1000 mL       Output    Urine 190 mL    Est. Blood Loss 25 mL    Total Output 215 mL       Net    Net  Volume 785 mL          Specimen:   ID Type Source Tests Collected by Time   1 : Gallbladder Tissue GALLBLADDER CHOLECYSTECTOMY SURGICAL PATHOLOGY EXAM Richmond York MD 5/26/2024 0938   A :  Blood Blood, Venous TYPE AND SCREEN (Canceled) Susan Esparza 5/26/2024 0853        Staff:   Circulator: Wilma Andersonub Person: Blake    Findings: Fibrotic tract of prior cholecystostomy tube. Moderate adhesions in the right upper quadrant. Gallbladder thickened and mildly inflamed.     Complications:  None; patient tolerated the procedure well.     Disposition: PACU - hemodynamically stable.  Condition: stable  Specimens Collected:   ID Type Source Tests Collected by Time   1 : Gallbladder Tissue GALLBLADDER CHOLECYSTECTOMY SURGICAL PATHOLOGY EXAM Richmond York MD 5/26/2024 0938   A :  Blood Blood, Venous TYPE AND SCREEN (Canceled) Susan Esparza 5/26/2024 0853     Attending Attestation: I was present and scrubbed for the entire procedure.    Richmond York  Phone Number: [phone]

## 2024-05-26 NOTE — PERIOPERATIVE NURSING NOTE
1230- Patient transported via bed on 2LO2 UAP X 2 transport, belongings (dentures in denture cup) with patient.    1200- Telephone report to LT9 RN, transport requested, family notified.    1147- call to give report to LT9 RN, on hold 5 minutes, RN unavailable, request fro call back in 10 minutes.    1140- call to give report to LT9 RN, on hold 7 minutes.    1130- Patient meets PACU discharge criteria, Q 1hour VS/assessments initiated.

## 2024-05-26 NOTE — CARE PLAN
The patient's goals for the shift include  pain control    The clinical goals for the shift include pain management      Problem: Pain  Goal: My pain/discomfort is manageable  Outcome: Progressing     Problem: Safety  Goal: Patient will be injury free during hospitalization  Outcome: Progressing  Goal: I will remain free of falls  Outcome: Progressing     Problem: Daily Care  Goal: Daily care needs are met  Outcome: Progressing     Problem: Psychosocial Needs  Goal: Demonstrates ability to cope with hospitalization/illness  Outcome: Progressing  Goal: Collaborate with me, my family, and caregiver to identify my specific goals  Outcome: Progressing     Problem: Skin  Goal: Decreased wound size/increased tissue granulation at next dressing change  Outcome: Progressing  Goal: Participates in plan/prevention/treatment measures  Outcome: Progressing  Goal: Prevent/manage excess moisture  Outcome: Progressing  Goal: Prevent/minimize sheer/friction injuries  Outcome: Progressing  Goal: Promote/optimize nutrition  Outcome: Progressing  Goal: Promote skin healing  Outcome: Progressing

## 2024-05-26 NOTE — ANESTHESIA POSTPROCEDURE EVALUATION
Patient: Humphrey Waddell    Procedure Summary       Date: 05/26/24 Room / Location: [facility] OR 11 / Virtual [facility] OR    Anesthesia Start: 0745 Anesthesia Stop: 1040    Procedure: Cholecystectomy (Abdomen) Diagnosis:       Cholecystitis      (Cholecystitis [K81.9])    Surgeons: Richmond York MD Responsible Provider: Gabriel Sosa DO    Anesthesia Type: general ASA Status: 3            Anesthesia Type: general    Vitals Value Taken Time   /67 05/26/24 1214   Temp 36 °C (96.8 °F) 05/26/24 1200   Pulse 61 05/26/24 1229   Resp 14 05/26/24 1127   SpO2 98 % 05/26/24 1229   Vitals shown include unfiled device data.    Anesthesia Post Evaluation    Patient location during evaluation: PACU  Patient participation: complete - patient participated  Level of consciousness: awake and alert  Pain management: adequate  Airway patency: patent  Cardiovascular status: acceptable and blood pressure returned to baseline  Respiratory status: acceptable  Hydration status: acceptable  Postoperative Nausea and Vomiting: none        No notable events documented.

## 2024-05-27 LAB
ALBUMIN SERPL BCP-MCNC: 2.9 G/DL (ref 3.4–5)
ALP SERPL-CCNC: 48 U/L (ref 33–136)
ALT SERPL W P-5'-P-CCNC: 20 U/L (ref 10–52)
ANION GAP SERPL CALC-SCNC: 13 MMOL/L (ref 10–20)
AST SERPL W P-5'-P-CCNC: 29 U/L (ref 9–39)
BILIRUB SERPL-MCNC: 0.3 MG/DL (ref 0–1.2)
BUN SERPL-MCNC: 53 MG/DL (ref 6–23)
CALCIUM SERPL-MCNC: 7.4 MG/DL (ref 8.6–10.6)
CHLORIDE SERPL-SCNC: 106 MMOL/L (ref 98–107)
CO2 SERPL-SCNC: 24 MMOL/L (ref 21–32)
CREAT SERPL-MCNC: 3.52 MG/DL (ref 0.5–1.3)
EGFRCR SERPLBLD CKD-EPI 2021: 19 ML/MIN/1.73M*2
ERYTHROCYTE [DISTWIDTH] IN BLOOD BY AUTOMATED COUNT: 14.3 % (ref 11.5–14.5)
GLUCOSE BLD MANUAL STRIP-MCNC: 131 MG/DL (ref 74–99)
GLUCOSE BLD MANUAL STRIP-MCNC: 160 MG/DL (ref 74–99)
GLUCOSE BLD MANUAL STRIP-MCNC: 93 MG/DL (ref 74–99)
GLUCOSE BLD MANUAL STRIP-MCNC: 98 MG/DL (ref 74–99)
GLUCOSE BLD MANUAL STRIP-MCNC: 99 MG/DL (ref 74–99)
GLUCOSE SERPL-MCNC: 83 MG/DL (ref 74–99)
HCT VFR BLD AUTO: 28.2 % (ref 41–52)
HGB BLD-MCNC: 8.9 G/DL (ref 13.5–17.5)
MAGNESIUM SERPL-MCNC: 2.6 MG/DL (ref 1.6–2.4)
MCH RBC QN AUTO: 28.2 PG (ref 26–34)
MCHC RBC AUTO-ENTMCNC: 31.6 G/DL (ref 32–36)
MCV RBC AUTO: 89 FL (ref 80–100)
NRBC BLD-RTO: 0 /100 WBCS (ref 0–0)
PLATELET # BLD AUTO: 204 X10*3/UL (ref 150–450)
POTASSIUM SERPL-SCNC: 4.6 MMOL/L (ref 3.5–5.3)
PROT SERPL-MCNC: 6.1 G/DL (ref 6.4–8.2)
RBC # BLD AUTO: 3.16 X10*6/UL (ref 4.5–5.9)
SODIUM SERPL-SCNC: 138 MMOL/L (ref 136–145)
WBC # BLD AUTO: 10.6 X10*3/UL (ref 4.4–11.3)

## 2024-05-27 PROCEDURE — 1100000001 HC PRIVATE ROOM DAILY

## 2024-05-27 PROCEDURE — 2500000004 HC RX 250 GENERAL PHARMACY W/ HCPCS (ALT 636 FOR OP/ED): Performed by: STUDENT IN AN ORGANIZED HEALTH CARE EDUCATION/TRAINING PROGRAM

## 2024-05-27 PROCEDURE — 36415 COLL VENOUS BLD VENIPUNCTURE: CPT | Performed by: STUDENT IN AN ORGANIZED HEALTH CARE EDUCATION/TRAINING PROGRAM

## 2024-05-27 PROCEDURE — 2500000001 HC RX 250 WO HCPCS SELF ADMINISTERED DRUGS (ALT 637 FOR MEDICARE OP): Performed by: STUDENT IN AN ORGANIZED HEALTH CARE EDUCATION/TRAINING PROGRAM

## 2024-05-27 PROCEDURE — 2500000001 HC RX 250 WO HCPCS SELF ADMINISTERED DRUGS (ALT 637 FOR MEDICARE OP)

## 2024-05-27 PROCEDURE — 85027 COMPLETE CBC AUTOMATED: CPT | Performed by: STUDENT IN AN ORGANIZED HEALTH CARE EDUCATION/TRAINING PROGRAM

## 2024-05-27 PROCEDURE — 51701 INSERT BLADDER CATHETER: CPT

## 2024-05-27 PROCEDURE — 80053 COMPREHEN METABOLIC PANEL: CPT | Performed by: STUDENT IN AN ORGANIZED HEALTH CARE EDUCATION/TRAINING PROGRAM

## 2024-05-27 PROCEDURE — 82947 ASSAY GLUCOSE BLOOD QUANT: CPT

## 2024-05-27 PROCEDURE — 83735 ASSAY OF MAGNESIUM: CPT

## 2024-05-27 PROCEDURE — 2500000002 HC RX 250 W HCPCS SELF ADMINISTERED DRUGS (ALT 637 FOR MEDICARE OP, ALT 636 FOR OP/ED): Performed by: STUDENT IN AN ORGANIZED HEALTH CARE EDUCATION/TRAINING PROGRAM

## 2024-05-27 RX ORDER — OXYCODONE HYDROCHLORIDE 5 MG/1
5 TABLET ORAL EVERY 4 HOURS PRN
Status: DISCONTINUED | OUTPATIENT
Start: 2024-05-27 | End: 2024-05-28

## 2024-05-27 RX ORDER — OXYCODONE HYDROCHLORIDE 5 MG/1
10 TABLET ORAL EVERY 4 HOURS PRN
Status: DISCONTINUED | OUTPATIENT
Start: 2024-05-27 | End: 2024-05-28

## 2024-05-27 RX ORDER — OXYCODONE HCL 5 MG/5 ML
10 SOLUTION, ORAL ORAL EVERY 4 HOURS PRN
Status: DISCONTINUED | OUTPATIENT
Start: 2024-05-27 | End: 2024-05-27

## 2024-05-27 RX ORDER — METHOCARBAMOL 500 MG/1
500 TABLET, FILM COATED ORAL EVERY 6 HOURS SCHEDULED
Status: DISCONTINUED | OUTPATIENT
Start: 2024-05-27 | End: 2024-05-28

## 2024-05-27 RX ADMIN — ISOSORBIDE DINITRATE 20 MG: 20 TABLET ORAL at 08:29

## 2024-05-27 RX ADMIN — OXYCODONE HYDROCHLORIDE 10 MG: 5 TABLET ORAL at 22:58

## 2024-05-27 RX ADMIN — ATORVASTATIN CALCIUM 80 MG: 80 TABLET, FILM COATED ORAL at 08:29

## 2024-05-27 RX ADMIN — METOPROLOL TARTRATE 50 MG: 50 TABLET, FILM COATED ORAL at 20:57

## 2024-05-27 RX ADMIN — OXYCODONE HYDROCHLORIDE 10 MG: 5 TABLET ORAL at 08:29

## 2024-05-27 RX ADMIN — Medication 1000 UNITS: at 08:29

## 2024-05-27 RX ADMIN — PIPERACILLIN SODIUM AND TAZOBACTAM SODIUM 2.25 G: 2; .25 INJECTION, SOLUTION INTRAVENOUS at 01:29

## 2024-05-27 RX ADMIN — METHOCARBAMOL 500 MG: 500 TABLET ORAL at 14:14

## 2024-05-27 RX ADMIN — ISOSORBIDE DINITRATE 20 MG: 20 TABLET ORAL at 20:56

## 2024-05-27 RX ADMIN — ISOSORBIDE DINITRATE 20 MG: 20 TABLET ORAL at 14:14

## 2024-05-27 RX ADMIN — GABAPENTIN 300 MG: 300 CAPSULE ORAL at 20:57

## 2024-05-27 RX ADMIN — ACETAMINOPHEN 650 MG: 325 TABLET ORAL at 16:34

## 2024-05-27 RX ADMIN — GABAPENTIN 300 MG: 300 CAPSULE ORAL at 14:14

## 2024-05-27 RX ADMIN — OXYCODONE HYDROCHLORIDE 10 MG: 5 TABLET ORAL at 16:33

## 2024-05-27 RX ADMIN — METOPROLOL TARTRATE 50 MG: 50 TABLET, FILM COATED ORAL at 08:29

## 2024-05-27 RX ADMIN — HEPARIN SODIUM 5000 UNITS: 5000 INJECTION INTRAVENOUS; SUBCUTANEOUS at 03:58

## 2024-05-27 RX ADMIN — HEPARIN SODIUM 5000 UNITS: 5000 INJECTION INTRAVENOUS; SUBCUTANEOUS at 12:14

## 2024-05-27 RX ADMIN — HEPARIN SODIUM 5000 UNITS: 5000 INJECTION INTRAVENOUS; SUBCUTANEOUS at 20:56

## 2024-05-27 RX ADMIN — ESCITALOPRAM OXALATE 10 MG: 10 TABLET ORAL at 08:30

## 2024-05-27 RX ADMIN — MELATONIN 3 MG: 3 TAB ORAL at 20:56

## 2024-05-27 RX ADMIN — ACETAMINOPHEN 650 MG: 325 TABLET ORAL at 03:58

## 2024-05-27 RX ADMIN — PANTOPRAZOLE SODIUM 40 MG: 40 TABLET, DELAYED RELEASE ORAL at 08:29

## 2024-05-27 RX ADMIN — ASPIRIN 81 MG CHEWABLE TABLET 81 MG: 81 TABLET CHEWABLE at 08:29

## 2024-05-27 RX ADMIN — INSULIN LISPRO 2 UNITS: 100 INJECTION, SOLUTION INTRAVENOUS; SUBCUTANEOUS at 16:40

## 2024-05-27 RX ADMIN — METHOCARBAMOL 500 MG: 500 TABLET ORAL at 08:29

## 2024-05-27 RX ADMIN — GABAPENTIN 300 MG: 300 CAPSULE ORAL at 08:29

## 2024-05-27 RX ADMIN — Medication 1000 UNITS: at 20:56

## 2024-05-27 RX ADMIN — ACETAMINOPHEN 650 MG: 325 TABLET ORAL at 21:44

## 2024-05-27 RX ADMIN — METHOCARBAMOL 500 MG: 500 TABLET ORAL at 20:56

## 2024-05-27 RX ADMIN — PIPERACILLIN SODIUM AND TAZOBACTAM SODIUM 2.25 G: 2; .25 INJECTION, SOLUTION INTRAVENOUS at 08:29

## 2024-05-27 RX ADMIN — ACETAMINOPHEN 650 MG: 325 TABLET ORAL at 12:14

## 2024-05-27 ASSESSMENT — COGNITIVE AND FUNCTIONAL STATUS - GENERAL
EATING MEALS: A LOT
MOVING FROM LYING ON BACK TO SITTING ON SIDE OF FLAT BED WITH BEDRAILS: A LOT
WALKING IN HOSPITAL ROOM: TOTAL
MOVING TO AND FROM BED TO CHAIR: TOTAL
CLIMB 3 TO 5 STEPS WITH RAILING: TOTAL
DRESSING REGULAR UPPER BODY CLOTHING: TOTAL
TURNING FROM BACK TO SIDE WHILE IN FLAT BAD: TOTAL
HELP NEEDED FOR BATHING: TOTAL
TURNING FROM BACK TO SIDE WHILE IN FLAT BAD: A LOT
TOILETING: A LITTLE
PERSONAL GROOMING: A LITTLE
EATING MEALS: A LITTLE
CLIMB 3 TO 5 STEPS WITH RAILING: TOTAL
STANDING UP FROM CHAIR USING ARMS: A LOT
MOVING TO AND FROM BED TO CHAIR: A LOT
STANDING UP FROM CHAIR USING ARMS: TOTAL
PERSONAL GROOMING: A LOT
DAILY ACTIVITIY SCORE: 8
DRESSING REGULAR LOWER BODY CLOTHING: TOTAL
MOVING FROM LYING ON BACK TO SITTING ON SIDE OF FLAT BED WITH BEDRAILS: A LITTLE
HELP NEEDED FOR BATHING: A LITTLE
MOBILITY SCORE: 7
DRESSING REGULAR UPPER BODY CLOTHING: A LITTLE
WALKING IN HOSPITAL ROOM: TOTAL
DRESSING REGULAR LOWER BODY CLOTHING: A LITTLE
DAILY ACTIVITIY SCORE: 18
TOILETING: TOTAL
MOBILITY SCORE: 11

## 2024-05-27 ASSESSMENT — PAIN - FUNCTIONAL ASSESSMENT
PAIN_FUNCTIONAL_ASSESSMENT: 0-10

## 2024-05-27 ASSESSMENT — PAIN SCALES - GENERAL
PAINLEVEL_OUTOF10: 8
PAINLEVEL_OUTOF10: 0 - NO PAIN
PAINLEVEL_OUTOF10: 3
PAINLEVEL_OUTOF10: 10 - WORST POSSIBLE PAIN
PAINLEVEL_OUTOF10: 3
PAINLEVEL_OUTOF10: 10 - WORST POSSIBLE PAIN
PAINLEVEL_OUTOF10: 8
PAINLEVEL_OUTOF10: 7

## 2024-05-27 ASSESSMENT — PAIN DESCRIPTION - DESCRIPTORS: DESCRIPTORS: DISCOMFORT;SORE;TENDER

## 2024-05-27 ASSESSMENT — PAIN DESCRIPTION - LOCATION: LOCATION: ABDOMEN

## 2024-05-27 NOTE — CARE PLAN
The patient's goals for the shift include  pain control    The clinical goals for the shift include safety and pain control      Problem: Pain  Goal: My pain/discomfort is manageable  Outcome: Progressing     Problem: Safety  Goal: Patient will be injury free during hospitalization  Outcome: Progressing  Goal: I will remain free of falls  Outcome: Progressing     Problem: Daily Care  Goal: Daily care needs are met  Outcome: Progressing     Problem: Psychosocial Needs  Goal: Demonstrates ability to cope with hospitalization/illness  Outcome: Progressing  Goal: Collaborate with me, my family, and caregiver to identify my specific goals  Outcome: Progressing     Problem: Discharge Barriers  Goal: My discharge needs are met  Outcome: Progressing     Problem: Skin  Goal: Decreased wound size/increased tissue granulation at next dressing change  Outcome: Progressing  Goal: Participates in plan/prevention/treatment measures  Outcome: Progressing  Goal: Prevent/manage excess moisture  Outcome: Progressing  Goal: Prevent/minimize sheer/friction injuries  Outcome: Progressing  Goal: Promote/optimize nutrition  Outcome: Progressing  Goal: Promote skin healing  Outcome: Progressing

## 2024-05-27 NOTE — PROGRESS NOTES
[facility]  ACUTE CARE SURGERY - PROGRESS NOTE    Patient Name: Humphrey Waddell  MRN: [id number]  Admit Date: 522  : 1965  AGE: 59 y.o.   GENDER: male  ==============================================================================  TODAY'S ASSESSMENT AND PLAN OF CARE:  58yo M with PMHx of 3v CABGx3 2023, HFpEF (EF 60-65% 2024), HTN, HLD, CKD (baseline Cr ~3.1), CVA  with no deficits, T2DM, left AKA who was previously admitted to the ACS service in 3/2024 for cholecystitis and underwent percutaneous cholecystostomy tube on 4/3 (tube then fell out about a month ago). Presented now with two days of severe right sided abdominal pain and chills at home. CT with gallstone lodged at neck of cystic duct with no GB wall thickening or pericholecystic fluid.     Underwent cholecystectomy on  with no major intraoperative complications. Patient now POD1.     #Cholecystitis  - s/p cholecystectomy  - pain control, transition from PCA to oral meds due to poor compliance with button. jorge tyl, PRN oxy 5/10 q4h, robaxin, gabapentin 300 TID  - clear liquid diet  - home PPI  - discontinue zosyn today    CV #HTN #HLD  - home aspirin 81, holding plavix  - home isosorbide dinitrate  - home statin  - home lopressor  - holding home nifedipine    #T2DM  - clear diet  - SSI, continue to adjust  - BG wnl at this time  - POCT glucose checks    Renal/ #CKD  - strict Is an Os  - home sodium bicarb 650mg bid  - holding home torsemide  - dc akin, follow up TOV    #Home psych  - home lexapro 10mg    - q4h vitals  - daily CBC, BMP, Mag  - strict Is and Os  - fall precautions  - IS 10x per hour    Diet: clears  BR: not at this time  DVT ppx: SCDs, SQH  PT/OT eval: pending    Dispo: not ready, pending PT/OT, diet advancement.     Discussed with Dr. Ron.    Amanda Ochoa MD  PGY-1 VS Resident  Trauma Surgery Service  Floor:  [id number]    ==============================================================================  CHIEF COMPLAINT / EVENTS LAST 24HRS / HPI:  Patient sleeping on arrival, slow to respond to questions but answering appropriately. Does not appear to be compliant with PCA button. Will transition to oral PO meds.     MEDICAL HISTORY / ROS:   Admission history and ROS reviewed. Pertinent changes as follows:  NA    PHYSICAL EXAM:  Heart Rate:  [57-74]   Temp:  [36.4 °C (97.5 °F)-37.1 °C (98.8 °F)]   Resp:  [18]   BP: (101-153)/(54-79)   SpO2:  [95 %-99 %]   Constitutional: no acute distress, sleeping on arrival, larger man  Neuro: A/O x4,slow to respond, no focal deficits. Moving all extremities.   Psych: normal affect  HEENT: No deformities, no scleral icterus   Cardiac: RRR  Pulmonary: unlabored respirations   Abdomen: soft, non distended, non tender. Right subcostal incision clean, dry, intact.   : gerardo in place, draining yellow urine  Skin: warm and dry overall    Extremities: no swelling noted  MSK: moving all four      IMAGING SUMMARY:  (summary of new imaging findings, not a copy of dictation)  NA    LABS:  Results from last 7 days   Lab Units 05/27/24  0734 05/26/24  0559 05/25/24  0630 05/24/24  0602 05/23/24  0543 05/22/24  1402   WBC AUTO x10*3/uL 10.6 7.6 8.6   < > 7.6 9.2   HEMOGLOBIN g/dL 8.9* 8.3* 8.6*   < > 8.9* 9.5*   HEMATOCRIT % 28.2* 26.7* 27.2*   < > 29.3* 28.7*   PLATELETS AUTO x10*3/uL 204 229 227   < > 217 237   NEUTROS PCT AUTO %  --   --   --   --  66.4 71.2   LYMPHS PCT AUTO %  --   --   --   --  19.5 15.7   MONOS PCT AUTO %  --   --   --   --  8.7 7.5   EOS PCT AUTO %  --   --   --   --  4.7 4.7    < > = values in this interval not displayed.     Results from last 7 days   Lab Units 05/22/24  2235   APTT seconds 25*   INR  1.1     Results from last 7 days   Lab Units 05/27/24  0734 05/26/24  0559 05/25/24  0630   SODIUM mmol/L 138 137 138   POTASSIUM mmol/L 4.6 4.1 4.3   CHLORIDE mmol/L 106 102 103    CO2 mmol/L 24 27 26   BUN mg/dL 53* 54* 55*   CREATININE mg/dL 3.52* 3.55* 3.69*   CALCIUM mg/dL 7.4* 7.3* 7.4*   PROTEIN TOTAL g/dL 6.1* 5.9* 6.0*   BILIRUBIN TOTAL mg/dL 0.3 0.3 0.3   ALK PHOS U/L 48 48 49   ALT U/L 20 11 11   AST U/L 29 12 14   GLUCOSE mg/dL 83 128* 133*     Results from last 7 days   Lab Units 05/27/24  0734 05/26/24  0559 05/25/24  0630   BILIRUBIN TOTAL mg/dL 0.3 0.3 0.3           I have reviewed all medications, laboratory results, and imaging pertinent for today's encounter.

## 2024-05-27 NOTE — NURSING NOTE
Pt gerardo was removed this afternoon. Pt has not voided in the last 6 hours surgery team was notified (Amanda carrillo MD). Bladder scan showed 204ml was told to repeat  in 2 hours and if greater than 350 ml can straight cath pt. Next scan is do around 745pm will also inform oncoming nurse to scan and straight cath if greater than 350 ml.

## 2024-05-28 ENCOUNTER — APPOINTMENT (OUTPATIENT)
Dept: RADIOLOGY | Facility: HOSPITAL | Age: 59
End: 2024-05-28
Payer: COMMERCIAL

## 2024-05-28 LAB
ALBUMIN SERPL BCP-MCNC: 2.8 G/DL (ref 3.4–5)
ANION GAP SERPL CALC-SCNC: 14 MMOL/L (ref 10–20)
BUN SERPL-MCNC: 55 MG/DL (ref 6–23)
CA-I BLD-SCNC: 1.08 MMOL/L (ref 1.1–1.33)
CALCIUM SERPL-MCNC: 7.4 MG/DL (ref 8.6–10.6)
CHLORIDE SERPL-SCNC: 105 MMOL/L (ref 98–107)
CO2 SERPL-SCNC: 23 MMOL/L (ref 21–32)
CREAT SERPL-MCNC: 3.79 MG/DL (ref 0.5–1.3)
EGFRCR SERPLBLD CKD-EPI 2021: 17 ML/MIN/1.73M*2
ERYTHROCYTE [DISTWIDTH] IN BLOOD BY AUTOMATED COUNT: 14.4 % (ref 11.5–14.5)
GLUCOSE BLD MANUAL STRIP-MCNC: 134 MG/DL (ref 74–99)
GLUCOSE BLD MANUAL STRIP-MCNC: 92 MG/DL (ref 74–99)
GLUCOSE BLD MANUAL STRIP-MCNC: 97 MG/DL (ref 74–99)
GLUCOSE SERPL-MCNC: 83 MG/DL (ref 74–99)
HCT VFR BLD AUTO: 25.4 % (ref 41–52)
HGB BLD-MCNC: 7.8 G/DL (ref 13.5–17.5)
MAGNESIUM SERPL-MCNC: 2.67 MG/DL (ref 1.6–2.4)
MCH RBC QN AUTO: 28.6 PG (ref 26–34)
MCHC RBC AUTO-ENTMCNC: 30.7 G/DL (ref 32–36)
MCV RBC AUTO: 93 FL (ref 80–100)
NRBC BLD-RTO: 0 /100 WBCS (ref 0–0)
PHOSPHATE SERPL-MCNC: 4.9 MG/DL (ref 2.5–4.9)
PLATELET # BLD AUTO: 199 X10*3/UL (ref 150–450)
POTASSIUM SERPL-SCNC: 4.6 MMOL/L (ref 3.5–5.3)
RBC # BLD AUTO: 2.73 X10*6/UL (ref 4.5–5.9)
SODIUM SERPL-SCNC: 137 MMOL/L (ref 136–145)
WBC # BLD AUTO: 10.2 X10*3/UL (ref 4.4–11.3)

## 2024-05-28 PROCEDURE — 36415 COLL VENOUS BLD VENIPUNCTURE: CPT

## 2024-05-28 PROCEDURE — 94640 AIRWAY INHALATION TREATMENT: CPT

## 2024-05-28 PROCEDURE — 80069 RENAL FUNCTION PANEL: CPT

## 2024-05-28 PROCEDURE — 99232 SBSQ HOSP IP/OBS MODERATE 35: CPT | Performed by: NURSE PRACTITIONER

## 2024-05-28 PROCEDURE — 82330 ASSAY OF CALCIUM: CPT

## 2024-05-28 PROCEDURE — 71045 X-RAY EXAM CHEST 1 VIEW: CPT

## 2024-05-28 PROCEDURE — 83735 ASSAY OF MAGNESIUM: CPT

## 2024-05-28 PROCEDURE — 85027 COMPLETE CBC AUTOMATED: CPT

## 2024-05-28 PROCEDURE — 97161 PT EVAL LOW COMPLEX 20 MIN: CPT | Mod: GP

## 2024-05-28 PROCEDURE — 2500000004 HC RX 250 GENERAL PHARMACY W/ HCPCS (ALT 636 FOR OP/ED): Performed by: STUDENT IN AN ORGANIZED HEALTH CARE EDUCATION/TRAINING PROGRAM

## 2024-05-28 PROCEDURE — 92610 EVALUATE SWALLOWING FUNCTION: CPT | Mod: GN | Performed by: SPEECH-LANGUAGE PATHOLOGIST

## 2024-05-28 PROCEDURE — 2500000001 HC RX 250 WO HCPCS SELF ADMINISTERED DRUGS (ALT 637 FOR MEDICARE OP)

## 2024-05-28 PROCEDURE — 2500000002 HC RX 250 W HCPCS SELF ADMINISTERED DRUGS (ALT 637 FOR MEDICARE OP, ALT 636 FOR OP/ED)

## 2024-05-28 PROCEDURE — 71045 X-RAY EXAM CHEST 1 VIEW: CPT | Performed by: RADIOLOGY

## 2024-05-28 PROCEDURE — 2500000001 HC RX 250 WO HCPCS SELF ADMINISTERED DRUGS (ALT 637 FOR MEDICARE OP): Performed by: STUDENT IN AN ORGANIZED HEALTH CARE EDUCATION/TRAINING PROGRAM

## 2024-05-28 PROCEDURE — 97166 OT EVAL MOD COMPLEX 45 MIN: CPT | Mod: GO | Performed by: OCCUPATIONAL THERAPIST

## 2024-05-28 PROCEDURE — 2500000004 HC RX 250 GENERAL PHARMACY W/ HCPCS (ALT 636 FOR OP/ED)

## 2024-05-28 PROCEDURE — 1100000001 HC PRIVATE ROOM DAILY

## 2024-05-28 PROCEDURE — 82947 ASSAY GLUCOSE BLOOD QUANT: CPT

## 2024-05-28 RX ORDER — HYDROMORPHONE HYDROCHLORIDE 0.2 MG/ML
0.2 INJECTION INTRAMUSCULAR; INTRAVENOUS; SUBCUTANEOUS EVERY 2 HOUR PRN
Status: DISCONTINUED | OUTPATIENT
Start: 2024-05-28 | End: 2024-06-10

## 2024-05-28 RX ORDER — IPRATROPIUM BROMIDE AND ALBUTEROL SULFATE 2.5; .5 MG/3ML; MG/3ML
3 SOLUTION RESPIRATORY (INHALATION)
Status: DISCONTINUED | OUTPATIENT
Start: 2024-05-28 | End: 2024-06-02

## 2024-05-28 RX ORDER — OXYCODONE HYDROCHLORIDE 5 MG/1
2.5 TABLET ORAL EVERY 6 HOURS PRN
Status: DISCONTINUED | OUTPATIENT
Start: 2024-05-28 | End: 2024-05-29 | Stop reason: DRUGHIGH

## 2024-05-28 RX ORDER — OXYCODONE HYDROCHLORIDE 5 MG/1
5 TABLET ORAL EVERY 4 HOURS
Status: DISCONTINUED | OUTPATIENT
Start: 2024-05-28 | End: 2024-05-28

## 2024-05-28 RX ORDER — OXYCODONE HCL 5 MG/5 ML
5 SOLUTION, ORAL ORAL
Status: DISCONTINUED | OUTPATIENT
Start: 2024-05-28 | End: 2024-05-29

## 2024-05-28 RX ORDER — OXYCODONE HYDROCHLORIDE 5 MG/1
5 TABLET ORAL EVERY 6 HOURS PRN
Status: DISCONTINUED | OUTPATIENT
Start: 2024-05-28 | End: 2024-05-31

## 2024-05-28 RX ORDER — SODIUM CHLORIDE, SODIUM LACTATE, POTASSIUM CHLORIDE, CALCIUM CHLORIDE 600; 310; 30; 20 MG/100ML; MG/100ML; MG/100ML; MG/100ML
75 INJECTION, SOLUTION INTRAVENOUS CONTINUOUS
Status: DISCONTINUED | OUTPATIENT
Start: 2024-05-28 | End: 2024-05-29

## 2024-05-28 RX ORDER — SODIUM BICARBONATE 650 MG/1
1300 TABLET ORAL DAILY
Status: DISCONTINUED | OUTPATIENT
Start: 2024-05-28 | End: 2024-06-11 | Stop reason: HOSPADM

## 2024-05-28 RX ADMIN — ISOSORBIDE DINITRATE 20 MG: 20 TABLET ORAL at 08:03

## 2024-05-28 RX ADMIN — Medication 1000 UNITS: at 08:02

## 2024-05-28 RX ADMIN — ACETAMINOPHEN 650 MG: 325 TABLET ORAL at 18:13

## 2024-05-28 RX ADMIN — METHOCARBAMOL 500 MG: 500 TABLET ORAL at 05:36

## 2024-05-28 RX ADMIN — HEPARIN SODIUM 5000 UNITS: 5000 INJECTION INTRAVENOUS; SUBCUTANEOUS at 21:41

## 2024-05-28 RX ADMIN — OXYCODONE HYDROCHLORIDE 5 MG: 5 SOLUTION ORAL at 18:13

## 2024-05-28 RX ADMIN — ACETAMINOPHEN 650 MG: 325 TABLET ORAL at 23:26

## 2024-05-28 RX ADMIN — SODIUM BICARBONATE 1300 MG: 650 TABLET ORAL at 21:41

## 2024-05-28 RX ADMIN — ASPIRIN 81 MG CHEWABLE TABLET 81 MG: 81 TABLET CHEWABLE at 08:03

## 2024-05-28 RX ADMIN — ATORVASTATIN CALCIUM 80 MG: 80 TABLET, FILM COATED ORAL at 21:41

## 2024-05-28 RX ADMIN — HYDROMORPHONE HYDROCHLORIDE 0.2 MG: 0.2 INJECTION, SOLUTION INTRAMUSCULAR; INTRAVENOUS; SUBCUTANEOUS at 09:41

## 2024-05-28 RX ADMIN — METOPROLOL TARTRATE 50 MG: 50 TABLET, FILM COATED ORAL at 08:03

## 2024-05-28 RX ADMIN — OXYCODONE HYDROCHLORIDE 5 MG: 5 TABLET ORAL at 07:54

## 2024-05-28 RX ADMIN — ACETAMINOPHEN 650 MG: 325 TABLET ORAL at 04:18

## 2024-05-28 RX ADMIN — IPRATROPIUM BROMIDE AND ALBUTEROL SULFATE 3 ML: .5; 3 SOLUTION RESPIRATORY (INHALATION) at 21:38

## 2024-05-28 RX ADMIN — ONDANSETRON 4 MG: 2 INJECTION INTRAMUSCULAR; INTRAVENOUS at 10:46

## 2024-05-28 RX ADMIN — ESCITALOPRAM OXALATE 10 MG: 10 TABLET ORAL at 08:03

## 2024-05-28 RX ADMIN — IPRATROPIUM BROMIDE AND ALBUTEROL SULFATE 3 ML: .5; 3 SOLUTION RESPIRATORY (INHALATION) at 15:24

## 2024-05-28 RX ADMIN — MELATONIN 3 MG: 3 TAB ORAL at 21:41

## 2024-05-28 RX ADMIN — HEPARIN SODIUM 5000 UNITS: 5000 INJECTION INTRAVENOUS; SUBCUTANEOUS at 12:26

## 2024-05-28 RX ADMIN — OXYCODONE HYDROCHLORIDE 5 MG: 5 SOLUTION ORAL at 21:41

## 2024-05-28 RX ADMIN — HEPARIN SODIUM 5000 UNITS: 5000 INJECTION INTRAVENOUS; SUBCUTANEOUS at 04:18

## 2024-05-28 RX ADMIN — ISOSORBIDE DINITRATE 20 MG: 20 TABLET ORAL at 18:13

## 2024-05-28 RX ADMIN — Medication 1000 UNITS: at 21:41

## 2024-05-28 RX ADMIN — METOPROLOL TARTRATE 50 MG: 50 TABLET, FILM COATED ORAL at 21:41

## 2024-05-28 RX ADMIN — SODIUM CHLORIDE, POTASSIUM CHLORIDE, SODIUM LACTATE AND CALCIUM CHLORIDE 75 ML/HR: 600; 310; 30; 20 INJECTION, SOLUTION INTRAVENOUS at 13:34

## 2024-05-28 RX ADMIN — CALCIUM GLUCONATE 3 G: 98 INJECTION, SOLUTION INTRAVENOUS at 23:25

## 2024-05-28 RX ADMIN — PANTOPRAZOLE SODIUM 40 MG: 40 TABLET, DELAYED RELEASE ORAL at 07:54

## 2024-05-28 ASSESSMENT — COGNITIVE AND FUNCTIONAL STATUS - GENERAL
WALKING IN HOSPITAL ROOM: TOTAL
PERSONAL GROOMING: A LOT
TURNING FROM BACK TO SIDE WHILE IN FLAT BAD: TOTAL
MOBILITY SCORE: 7
EATING MEALS: A LOT
PERSONAL GROOMING: A LOT
EATING MEALS: A LOT
DAILY ACTIVITIY SCORE: 8
TOILETING: TOTAL
WALKING IN HOSPITAL ROOM: TOTAL
MOVING FROM LYING ON BACK TO SITTING ON SIDE OF FLAT BED WITH BEDRAILS: A LOT
DRESSING REGULAR UPPER BODY CLOTHING: TOTAL
DRESSING REGULAR UPPER BODY CLOTHING: A LOT
DAILY ACTIVITIY SCORE: 9
STANDING UP FROM CHAIR USING ARMS: TOTAL
DRESSING REGULAR LOWER BODY CLOTHING: TOTAL
MOBILITY SCORE: 6
DRESSING REGULAR LOWER BODY CLOTHING: TOTAL
CLIMB 3 TO 5 STEPS WITH RAILING: TOTAL
HELP NEEDED FOR BATHING: TOTAL
STANDING UP FROM CHAIR USING ARMS: TOTAL
HELP NEEDED FOR BATHING: TOTAL
MOVING FROM LYING ON BACK TO SITTING ON SIDE OF FLAT BED WITH BEDRAILS: TOTAL
MOVING TO AND FROM BED TO CHAIR: TOTAL
CLIMB 3 TO 5 STEPS WITH RAILING: TOTAL
MOVING TO AND FROM BED TO CHAIR: TOTAL
TOILETING: TOTAL
TURNING FROM BACK TO SIDE WHILE IN FLAT BAD: TOTAL

## 2024-05-28 ASSESSMENT — ACTIVITIES OF DAILY LIVING (ADL)
ADL_ASSISTANCE: NEEDS ASSISTANCE
BATHING_ASSISTANCE: MAXIMAL
ADL_ASSISTANCE: NEEDS ASSISTANCE

## 2024-05-28 ASSESSMENT — PAIN DESCRIPTION - LOCATION
LOCATION: ABDOMEN

## 2024-05-28 ASSESSMENT — PAIN - FUNCTIONAL ASSESSMENT
PAIN_FUNCTIONAL_ASSESSMENT: 0-10

## 2024-05-28 ASSESSMENT — PAIN SCALES - GENERAL
PAINLEVEL_OUTOF10: 0 - NO PAIN
PAINLEVEL_OUTOF10: 8
PAINLEVEL_OUTOF10: 10 - WORST POSSIBLE PAIN
PAINLEVEL_OUTOF10: 0 - NO PAIN
PAINLEVEL_OUTOF10: 6
PAINLEVEL_OUTOF10: 9
PAINLEVEL_OUTOF10: 9
PAINLEVEL_OUTOF10: 8
PAINLEVEL_OUTOF10: 0 - NO PAIN
PAINLEVEL_OUTOF10: 3

## 2024-05-28 ASSESSMENT — PAIN DESCRIPTION - DESCRIPTORS: DESCRIPTORS: DISCOMFORT;TENDER

## 2024-05-28 ASSESSMENT — PAIN DESCRIPTION - ORIENTATION: ORIENTATION: RIGHT;UPPER

## 2024-05-28 NOTE — PROGRESS NOTES
Physical Therapy    Physical Therapy Evaluation    Patient Name: Humphrey Waddell  MRN: [id number]  Today's Date: 5/28/2024   Time Calculation  Start Time: 1114  Stop Time: 1137  Time Calculation (min): 23 min    Assessment/Plan   PT Assessment  PT Assessment Results: Decreased strength, Decreased endurance, Impaired balance, Decreased mobility, Decreased cognition, Decreased safety awareness  Rehab Prognosis: Good  Evaluation/Treatment Tolerance: Patient limited by fatigue, Patient limited by pain (and cognition)  Medical Staff Made Aware: Yes  Strengths: Support of Caregivers  Barriers to Participation: Comorbidities  End of Session Communication: Bedside nurse  Assessment Comment: Pt limited by pain, fatigue and cognition during session. Pt with very delayed responses and with roughly 60% command following. Pt required dependent x 2 for bed mobility. Patient would benefit from skilled physical therapy to maximize functional mobility and safety. Pt is appropriate for mod intensity therapy when medically appropriate for DC.  End of Session Patient Position: Bed, 3 rail up, Alarm on (CB in reach)  IP OR SWING BED PT PLAN  Inpatient or Swing Bed: Inpatient  PT Plan  Treatment/Interventions: Bed mobility, Transfer training, Balance training, Endurance training, Therapeutic exercise, Therapeutic activity  PT Plan: Skilled PT  PT Frequency: 3 times per week  PT Discharge Recommendations: Moderate intensity level of continued care  Equipment Recommended upon Discharge:  (TBD)  PT Recommended Transfer Status: Total assist  PT - OK to Discharge: Yes (meaning pt seen and dc rec made)  RN cleared prior to session    Subjective   General Visit Information:  General  Reason for Referral: Presented with two days of severe right sided abdominal pain and chills at home. CT with gallstone lodged at neck of cystic duct. S/p cholecystectomy on 5/26  Past Medical History Relevant to Rehab: previously admitted 3/2024 for cholecystitis and  underwent percutaneous cholecystostomy tube on 4/3; Left AKA, 3v CABGx3 11/2023, HTN, HLD, CKD, CVA 2020 with no deficits, T2DM  Family/Caregiver Present: No  Prior to Session Communication: Bedside nurse  Patient Position Received: Bed, 3 rail up, Alarm on  Preferred Learning Style: auditory, verbal  General Comment: Pt with very delayed responses upon questioning and A and O to self. Pt willing to attempt mobility.  Home Living:  Home Living  Type of Home: House  Lives With: Spouse  Home Adaptive Equipment: Wheelchair-manual (per EMR)  Home Layout: One level  Home Access: Level entry  Home Living Comments: Pt unable to give full home set up and questionable historian for what was given  Prior Level of Function:  Prior Function Per Pt/Caregiver Report  Level of [place]al[place]: Needs assistance with ADLs, Needs assistance with homemaking, Needs assistance with functional transfers  Receives Help From: Family  ADL Assistance: Needs assistance  Homemaking Assistance: Needs assistance  Ambulatory Assistance: Needs assistance  Prior Function Comments: Pt questionable historian states he was mostly IND in ADLs and transfers. Pt stating he has a prostetic leg but unable to determine from pt if it is used and to what extent. Also unable to determine if pt transfers to [facility] or ambulates at home.  Precautions:  Precautions  Medical Precautions: Fall precautions, Abdominal precautions  Precautions Comment: L AKA - pt states he has a prothesis(pt questionable historian    Objective   Pain:  Pain Assessment  Pain Assessment: 0-10  Pain Score: 9  Pain Type: Acute pain  Pain Location: Abdomen  Pain Interventions: Repositioned, Ambulation/increased activity  Response to Interventions: no change  Cognition:  Cognition  Overall Cognitive Status: Impaired  Arousal/Alertness: Delayed responses to stimuli  Orientation Level: Disoriented to place, Disoriented to time, Disoriented to situation  Following Commands:  (Pt following roughly 60%  of commands during session with increased time and repetition)    General Assessments:  Activity Tolerance  Endurance: Decreased tolerance for upright activites    Sensation  Light Touch: No apparent deficits (unable to test due to cognition)    Strength  Strength Comments: Ubable to formally assess 2/2 cognition; RLE grossly 3/5  Coordination  Movements are Fluid and Coordinated:  (unable to test 2/2 command following and cognition)    Postural Control  Posture Comment: Pt retropulsive and requiring max A to maintain sitting EOB    Static Sitting Balance  Static Sitting-Balance Support: Left upper extremity supported, Feet unsupported  Static Sitting-Level of Assistance: Maximum assistance  Static Sitting-Comment/Number of Minutes: Pt not using RUE to hold self up at EOB - states it is not able to do much; Pt able to tolerate x 5 min    Static Standing Balance  Static Standing-Balance Support:  (not performed due to safety)  Functional Assessments:  Bed Mobility  Bed Mobility: Yes  Bed Mobility 1  Bed Mobility 1: Supine to sitting, Sitting to supine  Level of Assistance 1: Dependent (x2)  Bed Mobility Comments 1: max verbal and tactile cues for hand placement and sequencing  Bed Mobility 2  Bed Mobility  2: Rolling left  Level of Assistance 2: Dependent  Bed Mobility Comments 2: max verbal and tactile cues for hand placement and sequencing  Bed Mobility 3  Bed Mobility 3: Scooting  Level of Assistance 3: Dependent (x2)  Bed Mobility Comments 3: up in bed; use of draw sheet    Transfers  Transfer: No (not performed for safety due to pt impared cognition and seated balance)  Extremity/Trunk Assessments:  RLE   RLE : Exceptions to WFL  Strength RLE  RLE Overall Strength: Greater than or equal to 3/5 as evidenced by functional mobility (unable to formally assess 2/2 cognition and command following)  LLE   LLE : Exceptions to WFL (unable to formally assess 2/2 cognition and command following)  Outcome Measures:  [facility]  Basic Mobility  Turning from your back to your side while in a flat bed without using bedrails: Total  Moving from lying on your back to sitting on the side of a flat bed without using bedrails: Total  Moving to and from bed to chair (including a wheelchair): Total  Standing up from a chair using your arms (e.g. wheelchair or bedside chair): Total  To walk in hospital room: Total  Climbing 3-5 steps with railing: Total  Basic Mobility - Total Score: 6    Encounter Problems       Encounter Problems (Active)       PT Problem       Patient will complete supine to sit and sit to supine Mod Assist        Start:  05/28/24    Expected End:  06/11/24            Pt will demo bed<>chair transfer with max A x 2 and LRAD       Start:  05/28/24    Expected End:  06/11/24            Pt will demo static/dynamic sitting EOB >10 min with min A       Start:  05/28/24    Expected End:  06/11/24                   Education Documentation  Precautions, taught by Yvette Preston PT at 5/28/2024 12:47 PM.  Learner: Patient  Readiness: Acceptance  Method: Explanation, Demonstration  Response: Needs Reinforcement    Body Mechanics, taught by Yvette Preston PT at 5/28/2024 12:47 PM.  Learner: Patient  Readiness: Acceptance  Method: Explanation, Demonstration  Response: Needs Reinforcement    Mobility Training, taught by Yvette Preston PT at 5/28/2024 12:47 PM.  Learner: Patient  Readiness: Acceptance  Method: Explanation, Demonstration  Response: Needs Reinforcement    Education Comments  No comments found.

## 2024-05-28 NOTE — CARE PLAN
The patient's goals for the shift include Labs WNL    The clinical goals for the shift include Labs WNL    Problem: Pain  Goal: My pain/discomfort is manageable  Outcome: Progressing     Problem: Safety  Goal: Patient will be injury free during hospitalization  Outcome: Progressing  Goal: I will remain free of falls  Outcome: Progressing     Problem: Daily Care  Goal: Daily care needs are met  Outcome: Progressing     Problem: Psychosocial Needs  Goal: Demonstrates ability to cope with hospitalization/illness  Outcome: Progressing  Goal: Collaborate with me, my family, and caregiver to identify my specific goals  Outcome: Progressing  Flowsheets (Taken 5/27/2024 2023)  Cultural Requests During Hospitalization: Congregation  Spiritual Requests During Hospitalization: none     Problem: Discharge Barriers  Goal: My discharge needs are met  Outcome: Progressing     Problem: Skin  Goal: Decreased wound size/increased tissue granulation at next dressing change  5/28/2024 0634 by Daily Hyatt RN  Outcome: Progressing  5/27/2024 2023 by Daily Hyatt RN  Flowsheets (Taken 5/27/2024 2023)  Decreased wound size/increased tissue granulation at next dressing change:   Utilize specialty bed per algorithm   Promote sleep for wound healing   Protective dressings over bony prominences  Goal: Participates in plan/prevention/treatment measures  5/28/2024 0634 by Daily Hyatt RN  Outcome: Progressing  5/27/2024 2023 by Daily Hyatt RN  Flowsheets (Taken 5/27/2024 2023)  Participates in plan/prevention/treatment measures:   Increase activity/out of bed for meals   Discuss with provider PT/OT consult   Elevate heels  Goal: Prevent/manage excess moisture  5/28/2024 0634 by Daily Hyatt RN  Outcome: Progressing  5/27/2024 2023 by Daily Hyatt RN  Flowsheets (Taken 5/27/2024 2023)  Prevent/manage excess moisture:   Use wicking fabric (obtain order)   Moisturize dry skin   Cleanse incontinence/protect with  barrier cream   Monitor for/manage infection if present   Follow provider orders for dressing changes  Goal: Prevent/minimize sheer/friction injuries  5/28/2024 0634 by Daily Hyatt RN  Outcome: Progressing  5/27/2024 2023 by Daily Hyatt RN  Flowsheets (Taken 5/27/2024 2023)  Prevent/minimize sheer/friction injuries:   Utilize specialty bed per algorithm   Use pull sheet   Turn/reposition every 2 hours/use positioning/transfer devices   Increase activity/out of bed for meals   HOB 30 degrees or less   Complete micro-shifts as needed if patient unable. Adjust patient position to relieve pressure points, not a full turn  Goal: Promote/optimize nutrition  5/28/2024 0634 by Daily Hyatt RN  Outcome: Progressing  5/27/2024 2023 by Daily Hyatt RN  Flowsheets (Taken 5/27/2024 2023)  Promote/optimize nutrition:   Offer water/supplements/favorite foods   Discuss with provider if NPO > 2 days   Assist with feeding   Reassess MST if dietician not consulted   Monitor/record intake including meals   Consume > 50% meals/supplements  Goal: Promote skin healing  5/28/2024 0634 by Daily Hyatt RN  Outcome: Progressing  5/27/2024 2023 by Daily Hyatt RN  Flowsheets (Taken 5/27/2024 2023)  Promote skin healing:   Turn/reposition every 2 hours/use positioning/transfer devices   Rotate device position/do not position patient on device   Protective dressings over bony prominences   Ensure correct size (line/device) and apply per  instructions   Assess skin/pad under line(s)/device(s)

## 2024-05-28 NOTE — PROGRESS NOTES
Occupational Therapy    Evaluation    Patient Name: Humphrey Waddell  MRN: [id number]  Today's Date: 5/28/2024  Time Calculation  Start Time: 1114  Stop Time: 1135  Time Calculation (min): 21 min        Assessment:  OT Assessment: Pt presents to OT with increased falls risk, decreased safety and independence with functional transfers and mobility and impaired ADL performance.  Pt will continue to benefit from skilled OT services to address deficits and facilitate safe return to PLOF and home environment.   Prognosis: Fair  Barriers to Discharge:  (unknown)  Evaluation/Treatment Tolerance: Patient limited by fatigue (cognition)  Medical Staff Made Aware: Yes  End of Session Communication: Bedside nurse  End of Session Patient Position: Bed, 3 rail up, Alarm on  OT Assessment Results: Decreased ADL status, Decreased safe judgment during ADL, Decreased cognition, Decreased endurance, Decreased functional mobility, Decreased upper extremity strength, Decreased upper extremity range of motion  Prognosis: Fair  Barriers to Discharge:  (unknown)  Evaluation/Treatment Tolerance: Patient limited by fatigue (cognition)  Medical Staff Made Aware: Yes  Plan:  Treatment Interventions: ADL retraining, Functional transfer training, UE strengthening/ROM, Endurance training, Cognitive reorientation, Patient/family training, Equipment evaluation/education, Compensatory technique education, Fine motor coordination activities  OT Frequency: 3 times per week  OT Discharge Recommendations: Moderate intensity level of continued care  OT Recommended Transfer Status: Assist of 2, Dependent  OT - OK to Discharge: Yes  Treatment Interventions: ADL retraining, Functional transfer training, UE strengthening/ROM, Endurance training, Cognitive reorientation, Patient/family training, Equipment evaluation/education, Compensatory technique education, Fine motor coordination activities    Subjective   Current Problem:  1. Cholecystitis  Transthoracic Echo  (TTE) Limited    Transthoracic Echo (TTE) Limited    Case Request Operating Room: Cholecystectomy    Case Request Operating Room: Cholecystectomy    Surgical Pathology Exam    Surgical Pathology Exam    CANCELED: Type And Screen    CANCELED: Type And Screen      2. Prolonged QT interval        3. Encounter for screening for cardiovascular disorders  Transthoracic Echo (TTE) Limited        General:  General  Reason for Referral: Presented with two days of severe right sided abdominal pain and chills at home. CT with gallstone lodged at neck of cystic duct. S/p cholecystectomy on 5/26  Past Medical History Relevant to Rehab: previously admitted 3/2024 for cholecystitis and underwent percutaneous cholecystostomy tube on 4/3; Left AKA, 3v CABGx3 11/2023, HTN, HLD, CKD, CVA 2020 with no deficits, T2DM  Co-Treatment: PT  Co-Treatment Reason: to maximize pt safety and therapeutic potential in pt requiring 2 skilled A  Prior to Session Communication: Bedside nurse  Patient Position Received: Bed, 3 rail up, Alarm on  General Comment: lethargic, delayed processing, requiring max in time to participate with cues to engaged, cooperative and willing to participate  Precautions:  Medical Precautions: Fall precautions, Abdominal precautions  Precautions Comment: L AKA - pt states he has a prothesis(pt questionable historian       Pain:  Pain Assessment  Pain Assessment: 0-10  Pain Score: 9  Pain Type: Acute pain  Pain Location: Abdomen  Pain Interventions: Repositioned, Ambulation/increased activity  Response to Interventions: OT to tolerance    Objective   Cognition:  Overall Cognitive Status: Impaired  Arousal/Alertness: Delayed responses to stimuli  Orientation Level: Disoriented to place, Disoriented to time, Disoriented to situation  Following Commands:  (follows 50% of simple commands with inc time and cues)  Cognition Comments: lethargic, delayed responses, delayed processing, questionable historian           Home  Living:  Type of Home: House  Lives With: Significant other  Home Adaptive Equipment: Wheelchair-manual  Home Layout: One level  Home Access: Level entry  Home Living Comments: per pt, questionable historian with inconsistent responses  Prior Function:  Level of [place]: Needs assistance with ADLs, Needs assistance with homemaking, Needs assistance with functional transfers (anticipate)  Receives Help From: Family  ADL Assistance: Needs assistance (anticipate)  Homemaking Assistance: Needs assistance (anticipate)  Ambulatory Assistance: Needs assistance (anticipate)  Prior Function Comments: poor historian, unknown baselien, reports wearing prosthetic, primarily IND with self care     ADL:  Eating Assistance: Moderate (anticipate)  Grooming Assistance: Maximal (anticipate)  Bathing Assistance: Maximal (anticipate)  UE Dressing Assistance: Maximal (anticipate)  LE Dressing Assistance: Total  LE Dressing Deficit: Don/doff R sock  Toileting Assistance with Device: Total  Activity Tolerance:  Endurance: Decreased tolerance for upright activites  Bed Mobility/Transfers: Bed Mobility  Bed Mobility: Yes  Bed Mobility 1  Bed Mobility 1: Supine to sitting, Sitting to supine  Level of Assistance 1: Dependent, +2  Bed Mobility Comments 1: max cues, draw sheet A  Bed Mobility 2  Bed Mobility  2: Rolling left  Level of Assistance 2: Dependent  Bed Mobility Comments 2: for positioning and turning, hand over hand for UE placememnt  Bed Mobility 3  Bed Mobility 3: Scooting  Level of Assistance 3: Dependent, +2  Bed Mobility Comments 3: boost/positioning    Transfers  Transfer: No      Functional Mobility:  Functional Mobility  Functional Mobility Performed: No  Sitting Balance:  Static Sitting Balance  Static Sitting-Comment/Number of Minutes: max A to maintain sitting posture at EOB      Sensation:  Light Touch: No apparent deficits  Strength:  Strength Comments: poor command following for formal MMT to  BUE  Perception:  Inattention/Neglect: Appears intact  Initiation: Cues to initiate tasks  Motor Planning: Hand over hand to sequence tasks  Perseveration: Not present  Coordination:  Movements are Fluid and Coordinated:  (unable to formally assess)   Hand Function:  Gross Grasp: Functional  Coordination:  (unable to assess)  Extremities: RUE   RUE :  (decreased AROM, PROM WFL) and LUE   LUE:  (appears WFL)    Outcome Measures:[facility] Daily Activity  Putting on and taking off regular lower body clothing: Total  Bathing (including washing, rinsing, drying): Total  Putting on and taking off regular upper body clothing: A lot  Toileting, which includes using toilet, bedpan or urinal: Total  Taking care of personal grooming such as brushing teeth: A lot  Eating Meals: A lot  Daily Activity - Total Score: 9         and Brief Confusion Assessment Method (bCAM)  CAM Result: CAM -    Education Documentation  Precautions, taught by Erendira Martins OT at 5/28/2024  2:12 PM.  Learner: Patient  Readiness: Acceptance  Method: Explanation  Response: Needs Reinforcement    ADL Training, taught by Erendira Martins OT at 5/28/2024  2:12 PM.  Learner: Patient  Readiness: Acceptance  Method: Explanation  Response: Needs Reinforcement    Education Comments  No comments found.        Goals:  Encounter Problems       Encounter Problems (Active)       ADLs       Patient will complete daily grooming tasks  with set-up and stand by assist level of assistance and PRN adaptive equipment while supported sitting.       Start:  05/28/24    Expected End:  06/18/24            Patient will perform toileting tasks, including hygiene and clothing management with moderate assist level of assistance         Start:  05/28/24    Expected End:  06/18/24               COGNITION/SAFETY          TRANSFERS       Patient will perform bed mobility minimal assist  level of assistance  in order to improve safety and independence with mobility       Start:   05/28/24    Expected End:  06/18/24            Patient will demonstrate functional transfers with least restrictive device with moderate assist level of assistance.        Start:  05/28/24    Expected End:  06/18/24 05/28/24 at 2:22 PM   Erendira Martins OT   Rehab Office: [phone]

## 2024-05-28 NOTE — PROGRESS NOTES
[facility]  ACUTE CARE SURGERY - PROGRESS NOTE    Patient Name: Humphrey Waddell  MRN: [id number]  Admit Date: 522  : 1965  AGE: 59 y.o.   GENDER: male  ==============================================================================  TODAY'S ASSESSMENT AND PLAN OF CARE:  60yo M with PMHx of 3v CABGx3 2023, HFpEF (EF 60-65% 2024), HTN, HLD, CKD (baseline Cr ~3.1), CVA  with no deficits, T2DM, left AKA who was previously admitted to the ACS service in 3/2024 for cholecystitis and underwent percutaneous cholecystostomy tube on 4/3 (tube then fell out about a month ago). Presented now with two days of severe right sided abdominal pain and chills at home. CT with gallstone lodged at neck of cystic duct with no GB wall thickening or pericholecystic fluid.     : Cholecystectomy, right subcostal incision    Concern for altered mental status, overall lethargy. Patient unable to request pain meds, plan to schedule PO pain meds, have PT/OT evaluate and work with him, and RT. If mental status persists despite adequate pain control, will consider head CT.     #Cholecystitis  - s/p cholecystectomy  - pain control, transitioned from PCA to oral meds due to poor compliance with button. jorge tyl, jorge oxy 5 q4h, dilaudid breakthrough, robaxin  - clear liquid diet  - home PPI  - discontinue zosyn   - PT/OT  - RT    #Altered Mental Status   - poor pain control, transitioned from PCA to oral meds due to poor compliance with button. jorge tyl, jorge oxy 5 q4h, dilaudid breakthrough, robaxin  - frequent pain evaluations  - dc'd gabapentin, patient with tremors with morning  - consider CT head if symptoms persist after adequate pain control  - Ionized aries    #Anemia  - trend CBC daily  - Hemoglobin 7.7 this morning    CV #HTN #HLD  - home aspirin 81, holding plavix  - home isosorbide dinitrate  - home statin  - home lopressor  - holding home nifedipine    #T2DM  - clear diet  - SSI, continue  to adjust  - BG wnl at this time  - POCT glucose checks    Renal/ #CKD  - strict Is an Os  - home sodium bicarb 650mg bid  - holding home torsemide  - failed TOV, gerardo replaced 5/28    #Home psych  - home lexapro 10mg    - q4h vitals  - daily CBC, RFP, Mag  - strict Is and Os  - fall precautions  - IS 10x per hour    Diet: clears  BR: not at this time  DVT ppx: SCDs, SQH  PT/OT eval: pending    Dispo: not ready, pending PT/OT, diet advancement.     Discussed with Dr. Ron.    Amanda Ochoa MD  PGY-1 VS Resident  Trauma Surgery Service  Floor: [phone]    ==============================================================================  CHIEF COMPLAINT / EVENTS LAST 24HRS / HPI:  Patient awake, slowly answers questions. Seems slow, similar to yesterday. Reports pain in abdomen. No signs of bowel function yet. No emesis overnight. HDS overnight. On NC O2    MEDICAL HISTORY / ROS:   Admission history and ROS reviewed. Pertinent changes as follows:  NA    PHYSICAL EXAM:  Heart Rate:  [63-72]   Temp:  [36.5 °C (97.7 °F)-36.9 °C (98.4 °F)]   Resp:  [17-18]   BP: ()/(46-96)   Weight:  [125 kg (276 lb 0.3 oz)]   SpO2:  [92 %-97 %]   Constitutional: no acute distress, lethargic, larger man  Neuro: A/O x3, slow to respond, no focal deficits. Moving all extremities.   Psych: normal affect  HEENT: No deformities, no scleral icterus   Cardiac: RRR  Pulmonary: unlabored respirations   Abdomen: soft, non distended, non tender. Right subcostal incision dressing taken down, mild serosanguinous drainage on dressing. No active leakage. Stapes in place, no erythema.   : gerardo in place, draining pale yellow urine  Skin: warm and dry overall    Extremities: no swelling noted  MSK: moving all four      IMAGING SUMMARY:  (summary of new imaging findings, not a copy of dictation)  NA    LABS:  Results from last 7 days   Lab Units 05/28/24  0546 05/27/24  0734 05/26/24  0559 05/24/24  0602 05/23/24  0543 05/22/24  1402   WBC AUTO  x10*3/uL 10.2 10.6 7.6   < > 7.6 9.2   HEMOGLOBIN g/dL 7.8* 8.9* 8.3*   < > 8.9* 9.5*   HEMATOCRIT % 25.4* 28.2* 26.7*   < > 29.3* 28.7*   PLATELETS AUTO x10*3/uL 199 204 229   < > 217 237   NEUTROS PCT AUTO %  --   --   --   --  66.4 71.2   LYMPHS PCT AUTO %  --   --   --   --  19.5 15.7   MONOS PCT AUTO %  --   --   --   --  8.7 7.5   EOS PCT AUTO %  --   --   --   --  4.7 4.7    < > = values in this interval not displayed.     Results from last 7 days   Lab Units 05/22/24  2235   APTT seconds 25*   INR  1.1     Results from last 7 days   Lab Units 05/28/24  0546 05/27/24  0734 05/26/24  0559 05/25/24  0630   SODIUM mmol/L 137 138 137 138   POTASSIUM mmol/L 4.6 4.6 4.1 4.3   CHLORIDE mmol/L 105 106 102 103   CO2 mmol/L 23 24 27 26   BUN mg/dL 55* 53* 54* 55*   CREATININE mg/dL 3.79* 3.52* 3.55* 3.69*   CALCIUM mg/dL 7.4* 7.4* 7.3* 7.4*   PROTEIN TOTAL g/dL  --  6.1* 5.9* 6.0*   BILIRUBIN TOTAL mg/dL  --  0.3 0.3 0.3   ALK PHOS U/L  --  48 48 49   ALT U/L  --  20 11 11   AST U/L  --  29 12 14   GLUCOSE mg/dL 83 83 128* 133*     Results from last 7 days   Lab Units 05/27/24  0734 05/26/24  0559 05/25/24  0630   BILIRUBIN TOTAL mg/dL 0.3 0.3 0.3           I have reviewed all medications, laboratory results, and imaging pertinent for today's encounter.

## 2024-05-28 NOTE — PROGRESS NOTES
"Humphrey Waddell is a 59 y.o. male on day 3 of admission presenting with Cholecystitis.    Subjective   - now s/p 5/26: Cholecystectomy, right subcostal incision        Objective     Constitutional: no acute distress, lethargic, obese  Neuro: A/O x3, slow to respond, no focal deficits. Moving all extremities.   Psych: normal affect  HEENT: No deformities, no scleral icterus   Cardiac: RRR  Pulmonary: unlabored respirations   Abdomen: soft, non distended, non tender.   : gerardo in place, draining pale yellow urine  Skin: warm and dry overall    Extremities: no swelling noted  MSK: fall    Last Recorded Vitals  Blood pressure 132/62, pulse 70, temperature 37 °C (98.6 °F), resp. rate 18, height 1.93 m (6' 4\"), weight 125 kg (276 lb 0.3 oz), SpO2 96%.  Intake/Output last 3 Shifts:  I/O last 3 completed shifts:  In: 1300 (10.4 mL/kg) [P.O.:1300]  Out: 2200 (17.6 mL/kg) [Urine:2200 (0.5 mL/kg/hr)]  Weight: 125.2 kg         Assessment/Plan   Principal Problem:    Cholecystitis    58yo M with PMHx of 3v CABGx3 11/2023 (LIMA ->LAD, SVG ->OM1, SVG ->PDA in 11/23) complicated by pericardial tamponade requiring mediastinal exploration and repair of OM graft, HFpEF (EF 60-65% 4/2024), HTN, HLD, CKD (baseline Cr ~3.1), CLI 8/23 s/p PTA_DCB of the entire right superficial femoral artery (on DAPT), CVA 2020 with no deficits, T2DM, left AKA who was previously admitted to the ACS service in 3/2024 for cholecystitis and underwent percutaneous cholecystostomy tube on 4/3 (tube then fell out about a month ago). Now presents with two days of severe right sided abdominal pain and chills at home. Planned for inpatient laparoscopic cholecystectomy. Cardiology consulted for assistance with DAPT     Patient has no indication to be on plavix from a cardiology perspective. He had an OM2 stent placed on 5/11/23 and he is more than 1 year out of it. He had CLI in 8/23 for which he underwent PTA with a  DCB  -RCRI 3 points - Class IV risk. Patient is at " moderate risk for low-moderate risk procedure. No acute drop in EF on TTE. Benefit of surgery outweighs risks as patient can go into septic shock if surgery is not done. No prior preop workup required by cardiology     Now post op    -Continue ASA 81mg daily for life  -Ok to stop plavix. No indication to resume on discharge  -Continue atorva 80mg, metoprolol, and isosordil  - -142  - HR 66-71  - can resume nifedipine ER 60mg daily as tolerated   - follow up with cardiology as an outpatient, has appt with Dr. Higgins 6/4    Thank you for the consult. Cardiology will sign off    Discussed with Dr. Shima Sethi DNP, APRN-CNP  Cardiology Consults    Please call with any questions  Pager [phone] M-F 8a-5p; Saturday 8a-2p  Pager [phone] all other times

## 2024-05-28 NOTE — CARE PLAN
The clinical goals for the shift include pt will remain safe from falls and injury by the end of the shift    Over the shift, the patient did make progress toward the following goals.

## 2024-05-28 NOTE — PROGRESS NOTES
"  SLP Adult Inpatient Speech-Language Pathology Clinical Swallow Evaluation    Patient Name: Humphrey Waddell  MRN: [id number]  Today's Date: 5/28/2024   Time Calculation  Start Time: 1310  Stop Time: 1330  Time Calculation (min): 20 min           Assessment:  S/sx dysphagia suspected due to AMS  - This is a pt with no previous dysphagia history, who began exhibiting coughing with med intake today . Mentation has been waxing  from A/O x 1 to A/O  x 4.   During evaluation, pt had blunted affect, inconsistent command following, responding with \"march\" when asked the month, day or year. Coughing was noted with several sips of water suggesting s/sx of aspiration. Pt could not complete the Ashwini Protocol due to poor command following (pt drinks 3 oz of water successively- high negative predicative value for detecting aspiration).   At this time, pt presented with moderate risk of aspiration and complications from aspiration. suggest NPO and re-evaluate when mentation is appropriate. Enteral feeding may be considered if confusion persists.       Recommendations:  NPO: Allow ice chips with RN.  Ice chips provide safe consistency for stimulation of the oropharynx, assist in oral hygiene and the aspiration of small volume water-based molecules poses low infection risk, provided pt has routine oral care.         Strategies/Guidelines:  Only present PO intake when awake and alert  Upright positioning   Slow rate/small boluses         Plan:  SLP Plan: Skilled SLP  SLP Frequency: 3x per week  Duration: 30 days  SLP - OK to Discharge: Yes        Goals:          Subjective     Baseline Assessment:  Respiratory Status: Room air  History of Intubation: No  Behavior/Cognition: Alert, Cooperative, Pleasant mood  Vision: Functional for self-feeding  Hearing: Within Functional Limits  Patient Positioning: Upright in Bed  Baseline Vocal Quality: Normal    History and Physical:    Per H&P:  \" 60yo M with PMHx of 3v CABGx3 11/2023, HFpEF (EF 60-65% " "4/2024), HTN, HLD, CKD (baseline Cr ~3.1), CVA 2020 with no deficits, T2DM, left AKA who was previously admitted to the ACS service in 3/2024 for cholecystitis and underwent percutaneous cholecystostomy tube on 4/3 (tube then fell out about a month ago). Presented now with two days of severe right sided abdominal pain and chills at home. CT with gallstone lodged at neck of cystic duct with no GB wall thickening or pericholecystic fluid.  \"    Past Medical History:   Diagnosis Date    Above-knee amputation of left lower extremity (Multi)     Adverse effect of anesthesia     confusion    Anemia     CAD (coronary artery disease)     Carotid artery disease (CMS-[facility])     bilateral    CVA (cerebral vascular accident) (Multi) 2020    Depression     DM (diabetes mellitus) (Multi)     type 2 with neuropathy    GERD (gastroesophageal reflux disease)     High cholesterol     HTN (hypertension)     Leg ulcer (Multi)     chronic right lower extremity    Neuropathy     OA (osteoarthritis)     PAD (peripheral artery disease) (CMS-HCC)     Renal disorder      Family History   Problem Relation Name Age of Onset    Other (cardiac disorder) Mother      Hypertension Mother      Esophageal cancer Mother      Hearing loss Father      Hypertension Father      Heart disease Father      COPD Sister         Allergies   Allergen Reactions    Carvedilol Itching     \"Scratchy throat\"         Relevant Results  XR chest 1 view 05/22/2024      CARDIOMEDIASTINAL SILHOUETTE:  Cardiomediastinal silhouette is stable in size and configuration.    LUNGS:  There is mild blunting of the left costophrenic angle related to  small layering effusion and atelectasis as seen on CT. Right lung is  clear. No pneumothorax    ABDOMEN:  No remarkable upper abdominal findings.    BONES:  Postsurgical changes of left shoulder hemiarthroplasty. Multilevel  degenerative changes of the spine.    Impression  Retrocardiac opacity with blunting of the left costophrenic " angle  likely relates to small layering effusion and atelectasis.  Superimposed infection not excluded. Clinical correlation and  attention on continued follow-up is advised.    MACRO:  None    Signed by: Leon Tan 5/22/2024 11:34 PM  Dictation workstation:   [id number]      Vent settings:  FiO2 (%):  [28 %] 28 %      Objective     Oral/Motor Assessment:  Dentition: Poor Dental/Oral Hygiene (upper dentures loose)  Oral Motor:  (appeared grossly intact)  Vocal Quality: Within Functional Limits  Intelligibility: Intelligible  Hearing: Within Functional Limits      Clinical Observations:  Patient Positioning: Upright in Bed  Was The 3 oz Swallow Protocol Completed: Yes    Consistencies Trialed: Yes  Consistencies Trialed: Ice Chips, Thin (IDDSI Level 0) - Spoon, Thin (IDDSI Level 0) - Straw    Oral phase:  Pt with ill, loose fitting dentures. Showed decent labial seal with liquid challenges via straw    Pharyngeal Phase: Pharyngeal swallow appreciated with all presentations    The 3 oz sequential drinks of thin liquid water was utilized as a reliable, evidence based test to rule out silent aspiration and determine need for additional testing, such as the MBS or FEES (fiberoptic endoscopic evaluation of swallow), if the test is equivocal, incomplete or pt shows s/sx of aspiration,  prior to recommending a oral diet      Inpatient Education:  Adult Outpatient Education  Individual(s) Educated: Patient  Verbal Education : yes  Education Comment:  (Pt will need additional education due to confused presentation during this visit)

## 2024-05-29 ENCOUNTER — APPOINTMENT (OUTPATIENT)
Dept: RADIOLOGY | Facility: HOSPITAL | Age: 59
End: 2024-05-29
Payer: COMMERCIAL

## 2024-05-29 ENCOUNTER — HOME CARE VISIT (OUTPATIENT)
Dept: HOME HEALTH SERVICES | Facility: HOME HEALTH | Age: 59
End: 2024-05-29
Payer: COMMERCIAL

## 2024-05-29 LAB
ALBUMIN SERPL BCP-MCNC: 2.7 G/DL (ref 3.4–5)
AMMONIA PLAS-SCNC: 32 UMOL/L (ref 16–53)
ANION GAP SERPL CALC-SCNC: 12 MMOL/L (ref 10–20)
BUN SERPL-MCNC: 50 MG/DL (ref 6–23)
CALCIUM SERPL-MCNC: 8 MG/DL (ref 8.6–10.6)
CHLORIDE SERPL-SCNC: 106 MMOL/L (ref 98–107)
CO2 SERPL-SCNC: 26 MMOL/L (ref 21–32)
CREAT SERPL-MCNC: 3.28 MG/DL (ref 0.5–1.3)
EGFRCR SERPLBLD CKD-EPI 2021: 21 ML/MIN/1.73M*2
ERYTHROCYTE [DISTWIDTH] IN BLOOD BY AUTOMATED COUNT: 14.2 % (ref 11.5–14.5)
GLUCOSE BLD MANUAL STRIP-MCNC: 114 MG/DL (ref 74–99)
GLUCOSE BLD MANUAL STRIP-MCNC: 116 MG/DL (ref 74–99)
GLUCOSE BLD MANUAL STRIP-MCNC: 120 MG/DL (ref 74–99)
GLUCOSE BLD MANUAL STRIP-MCNC: 127 MG/DL (ref 74–99)
GLUCOSE SERPL-MCNC: 113 MG/DL (ref 74–99)
HCT VFR BLD AUTO: 25.3 % (ref 41–52)
HGB BLD-MCNC: 7.7 G/DL (ref 13.5–17.5)
MAGNESIUM SERPL-MCNC: 2.43 MG/DL (ref 1.6–2.4)
MCH RBC QN AUTO: 26.9 PG (ref 26–34)
MCHC RBC AUTO-ENTMCNC: 30.4 G/DL (ref 32–36)
MCV RBC AUTO: 89 FL (ref 80–100)
NRBC BLD-RTO: 0 /100 WBCS (ref 0–0)
PHOSPHATE SERPL-MCNC: 4.4 MG/DL (ref 2.5–4.9)
PLATELET # BLD AUTO: 195 X10*3/UL (ref 150–450)
POTASSIUM SERPL-SCNC: 4.6 MMOL/L (ref 3.5–5.3)
RBC # BLD AUTO: 2.86 X10*6/UL (ref 4.5–5.9)
SODIUM SERPL-SCNC: 139 MMOL/L (ref 136–145)
VIT B12 SERPL-MCNC: 544 PG/ML (ref 211–911)
WBC # BLD AUTO: 10.8 X10*3/UL (ref 4.4–11.3)

## 2024-05-29 PROCEDURE — 80069 RENAL FUNCTION PANEL: CPT

## 2024-05-29 PROCEDURE — 94667 MNPJ CHEST WALL 1ST: CPT

## 2024-05-29 PROCEDURE — 92526 ORAL FUNCTION THERAPY: CPT | Mod: GN | Performed by: SPEECH-LANGUAGE PATHOLOGIST

## 2024-05-29 PROCEDURE — 70450 CT HEAD/BRAIN W/O DYE: CPT

## 2024-05-29 PROCEDURE — 1100000001 HC PRIVATE ROOM DAILY

## 2024-05-29 PROCEDURE — 83735 ASSAY OF MAGNESIUM: CPT

## 2024-05-29 PROCEDURE — 85027 COMPLETE CBC AUTOMATED: CPT

## 2024-05-29 PROCEDURE — 94668 MNPJ CHEST WALL SBSQ: CPT

## 2024-05-29 PROCEDURE — 94640 AIRWAY INHALATION TREATMENT: CPT

## 2024-05-29 PROCEDURE — 36415 COLL VENOUS BLD VENIPUNCTURE: CPT

## 2024-05-29 PROCEDURE — 2500000001 HC RX 250 WO HCPCS SELF ADMINISTERED DRUGS (ALT 637 FOR MEDICARE OP): Performed by: STUDENT IN AN ORGANIZED HEALTH CARE EDUCATION/TRAINING PROGRAM

## 2024-05-29 PROCEDURE — 84425 ASSAY OF VITAMIN B-1: CPT

## 2024-05-29 PROCEDURE — 82140 ASSAY OF AMMONIA: CPT

## 2024-05-29 PROCEDURE — 2500000001 HC RX 250 WO HCPCS SELF ADMINISTERED DRUGS (ALT 637 FOR MEDICARE OP)

## 2024-05-29 PROCEDURE — 70450 CT HEAD/BRAIN W/O DYE: CPT | Performed by: RADIOLOGY

## 2024-05-29 PROCEDURE — 2500000001 HC RX 250 WO HCPCS SELF ADMINISTERED DRUGS (ALT 637 FOR MEDICARE OP): Performed by: NURSE PRACTITIONER

## 2024-05-29 PROCEDURE — 82607 VITAMIN B-12: CPT

## 2024-05-29 PROCEDURE — 2500000002 HC RX 250 W HCPCS SELF ADMINISTERED DRUGS (ALT 637 FOR MEDICARE OP, ALT 636 FOR OP/ED)

## 2024-05-29 PROCEDURE — 2500000004 HC RX 250 GENERAL PHARMACY W/ HCPCS (ALT 636 FOR OP/ED): Performed by: STUDENT IN AN ORGANIZED HEALTH CARE EDUCATION/TRAINING PROGRAM

## 2024-05-29 PROCEDURE — 82947 ASSAY GLUCOSE BLOOD QUANT: CPT

## 2024-05-29 RX ORDER — TORSEMIDE 20 MG/1
60 TABLET ORAL ONCE
Status: COMPLETED | OUTPATIENT
Start: 2024-05-29 | End: 2024-05-29

## 2024-05-29 RX ORDER — OXYCODONE HCL 5 MG/5 ML
5 SOLUTION, ORAL ORAL EVERY 4 HOURS PRN
Status: DISCONTINUED | OUTPATIENT
Start: 2024-05-29 | End: 2024-05-31

## 2024-05-29 RX ADMIN — IPRATROPIUM BROMIDE AND ALBUTEROL SULFATE 3 ML: .5; 3 SOLUTION RESPIRATORY (INHALATION) at 08:04

## 2024-05-29 RX ADMIN — OXYCODONE HYDROCHLORIDE 5 MG: 5 SOLUTION ORAL at 02:32

## 2024-05-29 RX ADMIN — TORSEMIDE 60 MG: 20 TABLET ORAL at 09:18

## 2024-05-29 RX ADMIN — PANTOPRAZOLE SODIUM 40 MG: 40 TABLET, DELAYED RELEASE ORAL at 06:05

## 2024-05-29 RX ADMIN — ISOSORBIDE DINITRATE 20 MG: 20 TABLET ORAL at 14:17

## 2024-05-29 RX ADMIN — OXYCODONE HYDROCHLORIDE 5 MG: 5 SOLUTION ORAL at 18:24

## 2024-05-29 RX ADMIN — MELATONIN 3 MG: 3 TAB ORAL at 21:19

## 2024-05-29 RX ADMIN — SODIUM BICARBONATE 1300 MG: 650 TABLET ORAL at 09:18

## 2024-05-29 RX ADMIN — METOPROLOL TARTRATE 50 MG: 50 TABLET, FILM COATED ORAL at 09:17

## 2024-05-29 RX ADMIN — ACETAMINOPHEN 650 MG: 325 TABLET ORAL at 12:28

## 2024-05-29 RX ADMIN — IPRATROPIUM BROMIDE AND ALBUTEROL SULFATE 3 ML: .5; 3 SOLUTION RESPIRATORY (INHALATION) at 13:14

## 2024-05-29 RX ADMIN — ACETAMINOPHEN 650 MG: 325 TABLET ORAL at 06:05

## 2024-05-29 RX ADMIN — OXYCODONE HYDROCHLORIDE 5 MG: 5 SOLUTION ORAL at 21:19

## 2024-05-29 RX ADMIN — Medication 1000 UNITS: at 09:17

## 2024-05-29 RX ADMIN — IPRATROPIUM BROMIDE AND ALBUTEROL SULFATE 3 ML: .5; 3 SOLUTION RESPIRATORY (INHALATION) at 03:33

## 2024-05-29 RX ADMIN — OXYCODONE HYDROCHLORIDE 5 MG: 5 SOLUTION ORAL at 14:17

## 2024-05-29 RX ADMIN — ASPIRIN 81 MG CHEWABLE TABLET 81 MG: 81 TABLET CHEWABLE at 09:17

## 2024-05-29 RX ADMIN — ESCITALOPRAM OXALATE 10 MG: 10 TABLET ORAL at 09:18

## 2024-05-29 RX ADMIN — HEPARIN SODIUM 5000 UNITS: 5000 INJECTION INTRAVENOUS; SUBCUTANEOUS at 12:28

## 2024-05-29 RX ADMIN — ISOSORBIDE DINITRATE 20 MG: 20 TABLET ORAL at 09:18

## 2024-05-29 RX ADMIN — IPRATROPIUM BROMIDE AND ALBUTEROL SULFATE 3 ML: .5; 3 SOLUTION RESPIRATORY (INHALATION) at 20:51

## 2024-05-29 RX ADMIN — ACETAMINOPHEN 650 MG: 325 TABLET ORAL at 18:24

## 2024-05-29 RX ADMIN — Medication 1000 UNITS: at 21:19

## 2024-05-29 RX ADMIN — ISOSORBIDE DINITRATE 20 MG: 20 TABLET ORAL at 18:24

## 2024-05-29 RX ADMIN — OXYCODONE HYDROCHLORIDE 5 MG: 5 SOLUTION ORAL at 06:05

## 2024-05-29 RX ADMIN — HEPARIN SODIUM 5000 UNITS: 5000 INJECTION INTRAVENOUS; SUBCUTANEOUS at 21:19

## 2024-05-29 RX ADMIN — OXYCODONE HYDROCHLORIDE 5 MG: 5 SOLUTION ORAL at 10:13

## 2024-05-29 RX ADMIN — METOPROLOL TARTRATE 50 MG: 50 TABLET, FILM COATED ORAL at 21:19

## 2024-05-29 RX ADMIN — HEPARIN SODIUM 5000 UNITS: 5000 INJECTION INTRAVENOUS; SUBCUTANEOUS at 04:14

## 2024-05-29 RX ADMIN — ATORVASTATIN CALCIUM 80 MG: 80 TABLET, FILM COATED ORAL at 21:19

## 2024-05-29 ASSESSMENT — COGNITIVE AND FUNCTIONAL STATUS - GENERAL
DAILY ACTIVITIY SCORE: 9
MOBILITY SCORE: 6
EATING MEALS: TOTAL
WALKING IN HOSPITAL ROOM: TOTAL
HELP NEEDED FOR BATHING: TOTAL
STANDING UP FROM CHAIR USING ARMS: TOTAL
MOVING TO AND FROM BED TO CHAIR: TOTAL
PERSONAL GROOMING: TOTAL
TURNING FROM BACK TO SIDE WHILE IN FLAT BAD: TOTAL
MOVING TO AND FROM BED TO CHAIR: TOTAL
MOVING FROM LYING ON BACK TO SITTING ON SIDE OF FLAT BED WITH BEDRAILS: TOTAL
DAILY ACTIVITIY SCORE: 6
CLIMB 3 TO 5 STEPS WITH RAILING: TOTAL
CLIMB 3 TO 5 STEPS WITH RAILING: TOTAL
MOBILITY SCORE: 6
TURNING FROM BACK TO SIDE WHILE IN FLAT BAD: TOTAL
TOILETING: TOTAL
HELP NEEDED FOR BATHING: TOTAL
CLIMB 3 TO 5 STEPS WITH RAILING: TOTAL
DAILY ACTIVITIY SCORE: 6
DRESSING REGULAR LOWER BODY CLOTHING: TOTAL
STANDING UP FROM CHAIR USING ARMS: TOTAL
MOVING FROM LYING ON BACK TO SITTING ON SIDE OF FLAT BED WITH BEDRAILS: TOTAL
DRESSING REGULAR LOWER BODY CLOTHING: TOTAL
HELP NEEDED FOR BATHING: TOTAL
WALKING IN HOSPITAL ROOM: TOTAL
PERSONAL GROOMING: A LOT
TURNING FROM BACK TO SIDE WHILE IN FLAT BAD: TOTAL
MOVING FROM LYING ON BACK TO SITTING ON SIDE OF FLAT BED WITH BEDRAILS: TOTAL
PERSONAL GROOMING: TOTAL
WALKING IN HOSPITAL ROOM: TOTAL
DRESSING REGULAR UPPER BODY CLOTHING: TOTAL
TOILETING: TOTAL
EATING MEALS: A LOT
MOVING TO AND FROM BED TO CHAIR: TOTAL
EATING MEALS: TOTAL
TOILETING: TOTAL
DRESSING REGULAR LOWER BODY CLOTHING: TOTAL
MOBILITY SCORE: 6
STANDING UP FROM CHAIR USING ARMS: TOTAL
DRESSING REGULAR UPPER BODY CLOTHING: TOTAL
DRESSING REGULAR UPPER BODY CLOTHING: A LOT

## 2024-05-29 ASSESSMENT — PAIN SCALES - GENERAL
PAINLEVEL_OUTOF10: 5 - MODERATE PAIN
PAINLEVEL_OUTOF10: 7
PAINLEVEL_OUTOF10: 10 - WORST POSSIBLE PAIN
PAINLEVEL_OUTOF10: 8
PAINLEVEL_OUTOF10: 6
PAINLEVEL_OUTOF10: 5 - MODERATE PAIN

## 2024-05-29 NOTE — NURSING NOTE
Spoke to MD, (Amanda Ochoa )once again,  re: patient mental status. States that she presented  her concerns. She still feels that the patient is alert and oriented x2. And some upper push and pulls , although weak. She will convey our concerns to the attending as soon as the attending  gets out of surgery.

## 2024-05-29 NOTE — PROGRESS NOTES
[facility]  ACUTE CARE SURGERY - PROGRESS NOTE    Patient Name: Humphrey Waddell  MRN: [id number]  Admit Date: 522  : 1965  AGE: 59 y.o.   GENDER: male  ==============================================================================  TODAY'S ASSESSMENT AND PLAN OF CARE:  58yo M with PMHx of 3v CABGx3 2023, HFpEF (EF 60-65% 2024), HTN, HLD, CKD (baseline Cr ~3.1), CVA  with no deficits, T2DM, left AKA who was previously admitted to the ACS service in 3/2024 for cholecystitis and underwent percutaneous cholecystostomy tube on 4/3 (tube then fell out about a month ago). Presented now with two days of severe right sided abdominal pain and chills at home. CT with gallstone lodged at neck of cystic duct with no GB wall thickening or pericholecystic fluid.     : Cholecystectomy, right subcostal incision    Concern for altered mental status, overall lethargy. Patient unable to request pain meds, switched to PO meds. Pain better controlled, remains on clears.     #Cholecystitis  - s/p cholecystectomy  - pain control, transitioned from PCA to oral meds due to poor compliance with button. jorge cortes jorge oxy 5 q4h, dilaudid breakthrough, robaxin  - clear liquid diet  - home PPI  - discontinue zosyn   - PT/OT - SNF  - RT - plan for vibratory BPH today    #Altered Mental Status   - poor pain control, transitioned from PCA to oral meds due to poor compliance with button. jorge cortes, jorge oxy 5 q4h, dilaudid breakthrough, robaxin  - frequent pain evaluations  - dc'd gabapentin, patient with tremors with morning  - consider CT head if symptoms persist after adequate pain control  - Ionized aries - 1.08, low. repleted    #Anemia  - trend CBC daily  - Hemoglobin 7.7 this morning from 7.8    CV #HTN #HLD  - home aspirin 81, holding plavix  - home isosorbide dinitrate  - home statin  - home lopressor  - holding home nifedipine, blood pressures 130-140s. Will restart when indicated.      #T2DM  - clear diet, ensure clears  - SSI, continue to adjust  - BG wnl at this time  - POCT glucose checks    Renal/ #CKD  - strict Is an Os  - home sodium bicarb 650mg bid  - holding home torsemide  - failed TOV, gerardo replaced 5/28    #Home psych  - home lexapro 10mg    - q4h vitals  - daily CBC, RFP, Mag  - strict Is and Os  - fall precautions  - IS 10x per hour    Diet: clears, ensure clears  BR: not at this time  DVT ppx: SCDs, SQH  PT/OT eval: moderate intensity    Dispo: not ready, placement, diet advancement, pain control    Discussed with Dr. Ron.    Amanda Ochoa MD  PGY-1 VS Resident  Trauma Surgery Service  Floor: [phone]    ==============================================================================  CHIEF COMPLAINT / EVENTS LAST 24HRS / HPI:  Pain is better controlled this morning, slept much better last night. Not feeling very hungry yet.     Hgb 7.7 from 7.8. HDS. Gerardo in. On O2.     MEDICAL HISTORY / ROS:   Admission history and ROS reviewed. Pertinent changes as follows:  NA    PHYSICAL EXAM:  Heart Rate:  [70-90]   Temp:  [35.7 °C (96.3 °F)-37 °C (98.6 °F)]   Resp:  [17-18]   BP: (116-144)/(62-81)   SpO2:  [92 %-100 %]   Constitutional: no acute distress, awake, slow to respond, larger man  Neuro: A/O x3, slow to respond, no focal deficits. Moving all extremities.   Psych: normal affect  HEENT: No deformities, no scleral icterus. Dentures in place, slightly loose  Cardiac: RRR  Pulmonary: unlabored respirations on 2L NC  Abdomen: soft, non distended, non tender. Right subcostal incision with staples, minimal serosanguinous drainage on ABD. No erythema.   : gerardo in place, draining pale yellow urine  Skin: warm and dry overall    Extremities: no swelling noted. L AKA.   MSK: moving all four      IMAGING SUMMARY:  (summary of new imaging findings, not a copy of dictation)  NA    LABS:  Results from last 7 days   Lab Units 05/29/24  0613 05/28/24  0546 05/27/24  0734 05/24/24  0602  05/23/24  0543 05/22/24  1402   WBC AUTO x10*3/uL 10.8 10.2 10.6   < > 7.6 9.2   HEMOGLOBIN g/dL 7.7* 7.8* 8.9*   < > 8.9* 9.5*   HEMATOCRIT % 25.3* 25.4* 28.2*   < > 29.3* 28.7*   PLATELETS AUTO x10*3/uL 195 199 204   < > 217 237   NEUTROS PCT AUTO %  --   --   --   --  66.4 71.2   LYMPHS PCT AUTO %  --   --   --   --  19.5 15.7   MONOS PCT AUTO %  --   --   --   --  8.7 7.5   EOS PCT AUTO %  --   --   --   --  4.7 4.7    < > = values in this interval not displayed.     Results from last 7 days   Lab Units 05/22/24  2235   APTT seconds 25*   INR  1.1     Results from last 7 days   Lab Units 05/29/24  0612 05/28/24  0546 05/27/24  0734 05/26/24  0559 05/25/24  0630   SODIUM mmol/L 139 137 138 137 138   POTASSIUM mmol/L 4.6 4.6 4.6 4.1 4.3   CHLORIDE mmol/L 106 105 106 102 103   CO2 mmol/L 26 23 24 27 26   BUN mg/dL 50* 55* 53* 54* 55*   CREATININE mg/dL 3.28* 3.79* 3.52* 3.55* 3.69*   CALCIUM mg/dL 8.0* 7.4* 7.4* 7.3* 7.4*   PROTEIN TOTAL g/dL  --   --  6.1* 5.9* 6.0*   BILIRUBIN TOTAL mg/dL  --   --  0.3 0.3 0.3   ALK PHOS U/L  --   --  48 48 49   ALT U/L  --   --  20 11 11   AST U/L  --   --  29 12 14   GLUCOSE mg/dL 113* 83 83 128* 133*     Results from last 7 days   Lab Units 05/27/24  0734 05/26/24  0559 05/25/24  0630   BILIRUBIN TOTAL mg/dL 0.3 0.3 0.3           I have reviewed all medications, laboratory results, and imaging pertinent for today's encounter.

## 2024-05-29 NOTE — NURSING NOTE
There is a concern for patient altered mental status. MD ( Bozena Ochoa ) was called with concerns. Per MD: stated that the wife says that patient waxes and wanes and this is his mental status since surgery.  Patient could only state his name and location to the RN. Did not know the date, year or who the president is. He also states that he is having no pain at this time.    Dr. Ochoa states that she will come and see the patient as soon as she can.

## 2024-05-29 NOTE — PROGRESS NOTES
"Speech-Language Pathology     Inpatient  Speech-Language Pathology Treatment     Patient Name: Humphrey Waddell  MRN: [id number]  Today's Date: 5/29/2024          Assessment/Plan:  #S/sx dysphagia suspected due to AMS   Pt presents with a suspected dysphagia d/t AMS. Pt ability to follow directions remains variable. Ashwini 3 oz protocol attempted 2x, but not completed d/t ? pt inability to follow directions. S/s of aspiration observed on thin liquids (I.e., cough). Pt continues to be a risk for aspiration d/t variable mentation and inconclusive assessment. Consider alternative source of nutrition as pt demonstrates difficulties with feeding self and aspiration risk. SLP will continue to follow.         Recommendations:  NPO:  however, primary team can consider continuing oral diet if there is low clinical indication for aspiration;  Consider enteral nutrition if waxing AMS continues.  Frequent, aggressive oral care as tolerated to improve infection control, as well as to reduce dental plaque and bacteria on oropharyngeal surfaces which may increase the risk nosocomial infections, including pneumonia.   OK for small amounts of ice chips (one at a time, 10x/hour) for oral comfort and to prevent swallow disuse atrophy, but only after aggressive oral care to avoid colonization of bacteria within the oral cavity.    Plan:  SLP Frequency: 3x per week  Duration: 30 days  SLP - OK to Discharge: Yes      Subjective   A&Ox1-2. Response time long. Responses often repeated phrases (I.e., SLP: \"Do you hate it?\" Pt: \"Hate it\")   Patient Seen During This Visit: Yes        Objective       Therapeutic Swallow:   Trialed cup sips and straw sips of thin liquid at bedside, including Ashwini 3 oz protocol 2x. S/s of aspiration noted after sequential straw sips. Did not complete Ashwini 3 oz protocol.    Oral phase:  Top dentures loose. No anterior loss of bolus. Difficulty with straw sips (I.e., sucking ability reduced).     Pharyngeal Phase: Swallow " appreciated, subjectively. One cough observed after sequential cup sips of thin liquids.            Encounter Problems       Encounter Problems (Active)       Swallowing       LTG - Patient will tolerate the least restrictive diet consistency to allow for safe consumption of daily meals (Progressing)       Start:  05/28/24    Expected End:  06/07/24            STG - Patient will participate in a repeat clinical swallow exam and complete successive sips of 3 oz of water without overt s/s of aspiration.        Start:  05/28/24    Expected End:  06/07/24                   Inpatient Education:  Adult Outpatient Education  Individual(s) Educated: Patient  Verbal Education : yes  Education Comment:  (Pt will need additional education due to confused presentation during this visit)      Inpatient:  Education Documentation  No documentation found.  Education Comments  No comments found.

## 2024-05-29 NOTE — CARE PLAN
The patient's goals for the shift include Labs WNL    The clinical goals for the shift include safety      Problem: Safety  Goal: Patient will be injury free during hospitalization  Outcome: Progressing  Goal: I will remain free of falls  Outcome: Progressing     Problem: Pain  Goal: My pain/discomfort is manageable  Outcome: Progressing     Problem: Daily Care  Goal: Daily care needs are met  Outcome: Progressing     Problem: Psychosocial Needs  Goal: Demonstrates ability to cope with hospitalization/illness  Outcome: Progressing  Goal: Collaborate with me, my family, and caregiver to identify my specific goals  Outcome: Progressing     Problem: Discharge Barriers  Goal: My discharge needs are met  Outcome: Progressing     Problem: Skin  Goal: Decreased wound size/increased tissue granulation at next dressing change  Outcome: Progressing  Goal: Participates in plan/prevention/treatment measures  Outcome: Progressing  Goal: Prevent/manage excess moisture  Outcome: Progressing  Goal: Prevent/minimize sheer/friction injuries  Outcome: Progressing  Goal: Promote/optimize nutrition  Outcome: Progressing  Goal: Promote skin healing  Outcome: Progressing

## 2024-05-29 NOTE — NURSING NOTE
Pt is only alert to self, has delayed verbal responses and can only follow simple commands. Discussed concerns of altered mental status with primary team. No new orders at this time. This RN will continue to monitor.     05/29/24 at 11:52 AM - JACLYN MAHARAJ RN

## 2024-05-30 ENCOUNTER — HOME CARE VISIT (OUTPATIENT)
Dept: HOME HEALTH SERVICES | Facility: HOME HEALTH | Age: 59
End: 2024-05-30
Payer: COMMERCIAL

## 2024-05-30 LAB
ALBUMIN SERPL BCP-MCNC: 2.7 G/DL (ref 3.4–5)
ANION GAP BLDV CALCULATED.4IONS-SCNC: 8 MMOL/L (ref 10–25)
ANION GAP SERPL CALC-SCNC: 11 MMOL/L (ref 10–20)
BASE EXCESS BLDV CALC-SCNC: -0.3 MMOL/L (ref -2–3)
BODY TEMPERATURE: 37 DEGREES CELSIUS
BUN SERPL-MCNC: 50 MG/DL (ref 6–23)
CA-I BLDV-SCNC: 1.14 MMOL/L (ref 1.1–1.33)
CALCIUM SERPL-MCNC: 7.7 MG/DL (ref 8.6–10.6)
CHLORIDE BLDV-SCNC: 109 MMOL/L (ref 98–107)
CHLORIDE SERPL-SCNC: 106 MMOL/L (ref 98–107)
CO2 SERPL-SCNC: 27 MMOL/L (ref 21–32)
CREAT SERPL-MCNC: 3.4 MG/DL (ref 0.5–1.3)
EGFRCR SERPLBLD CKD-EPI 2021: 20 ML/MIN/1.73M*2
ERYTHROCYTE [DISTWIDTH] IN BLOOD BY AUTOMATED COUNT: 14.4 % (ref 11.5–14.5)
GLUCOSE BLD MANUAL STRIP-MCNC: 104 MG/DL (ref 74–99)
GLUCOSE BLD MANUAL STRIP-MCNC: 104 MG/DL (ref 74–99)
GLUCOSE BLD MANUAL STRIP-MCNC: 110 MG/DL (ref 74–99)
GLUCOSE BLDV-MCNC: 111 MG/DL (ref 74–99)
GLUCOSE SERPL-MCNC: 99 MG/DL (ref 74–99)
HCO3 BLDV-SCNC: 26.2 MMOL/L (ref 22–26)
HCT VFR BLD AUTO: 24.9 % (ref 41–52)
HCT VFR BLD EST: 23 % (ref 41–52)
HGB BLD-MCNC: 7.7 G/DL (ref 13.5–17.5)
HGB BLDV-MCNC: 7.7 G/DL (ref 13.5–17.5)
INHALED O2 CONCENTRATION: 21 %
LACTATE BLDV-SCNC: 1 MMOL/L (ref 0.4–2)
MAGNESIUM SERPL-MCNC: 2.58 MG/DL (ref 1.6–2.4)
MCH RBC QN AUTO: 27.9 PG (ref 26–34)
MCHC RBC AUTO-ENTMCNC: 30.9 G/DL (ref 32–36)
MCV RBC AUTO: 90 FL (ref 80–100)
NRBC BLD-RTO: 0 /100 WBCS (ref 0–0)
OXYHGB MFR BLDV: 82.8 % (ref 45–75)
PCO2 BLDV: 52 MM HG (ref 41–51)
PH BLDV: 7.31 PH (ref 7.33–7.43)
PHOSPHATE SERPL-MCNC: 4.9 MG/DL (ref 2.5–4.9)
PLATELET # BLD AUTO: 214 X10*3/UL (ref 150–450)
PO2 BLDV: 54 MM HG (ref 35–45)
POTASSIUM BLDV-SCNC: 5.4 MMOL/L (ref 3.5–5.3)
POTASSIUM SERPL-SCNC: 5 MMOL/L (ref 3.5–5.3)
RBC # BLD AUTO: 2.76 X10*6/UL (ref 4.5–5.9)
SAO2 % BLDV: 84 % (ref 45–75)
SODIUM BLDV-SCNC: 138 MMOL/L (ref 136–145)
SODIUM SERPL-SCNC: 139 MMOL/L (ref 136–145)
WBC # BLD AUTO: 9.8 X10*3/UL (ref 4.4–11.3)

## 2024-05-30 PROCEDURE — 2500000001 HC RX 250 WO HCPCS SELF ADMINISTERED DRUGS (ALT 637 FOR MEDICARE OP): Performed by: STUDENT IN AN ORGANIZED HEALTH CARE EDUCATION/TRAINING PROGRAM

## 2024-05-30 PROCEDURE — 36415 COLL VENOUS BLD VENIPUNCTURE: CPT

## 2024-05-30 PROCEDURE — 84132 ASSAY OF SERUM POTASSIUM: CPT

## 2024-05-30 PROCEDURE — 83735 ASSAY OF MAGNESIUM: CPT

## 2024-05-30 PROCEDURE — 94640 AIRWAY INHALATION TREATMENT: CPT

## 2024-05-30 PROCEDURE — 97530 THERAPEUTIC ACTIVITIES: CPT | Mod: GP

## 2024-05-30 PROCEDURE — 82947 ASSAY GLUCOSE BLOOD QUANT: CPT

## 2024-05-30 PROCEDURE — 2500000004 HC RX 250 GENERAL PHARMACY W/ HCPCS (ALT 636 FOR OP/ED): Performed by: STUDENT IN AN ORGANIZED HEALTH CARE EDUCATION/TRAINING PROGRAM

## 2024-05-30 PROCEDURE — 85027 COMPLETE CBC AUTOMATED: CPT

## 2024-05-30 PROCEDURE — 2500000004 HC RX 250 GENERAL PHARMACY W/ HCPCS (ALT 636 FOR OP/ED)

## 2024-05-30 PROCEDURE — 2500000001 HC RX 250 WO HCPCS SELF ADMINISTERED DRUGS (ALT 637 FOR MEDICARE OP)

## 2024-05-30 PROCEDURE — 94668 MNPJ CHEST WALL SBSQ: CPT

## 2024-05-30 PROCEDURE — 2500000002 HC RX 250 W HCPCS SELF ADMINISTERED DRUGS (ALT 637 FOR MEDICARE OP, ALT 636 FOR OP/ED)

## 2024-05-30 PROCEDURE — 1100000001 HC PRIVATE ROOM DAILY

## 2024-05-30 RX ADMIN — ACETAMINOPHEN 650 MG: 325 TABLET ORAL at 00:07

## 2024-05-30 RX ADMIN — METOPROLOL TARTRATE 50 MG: 50 TABLET, FILM COATED ORAL at 21:12

## 2024-05-30 RX ADMIN — IPRATROPIUM BROMIDE AND ALBUTEROL SULFATE 3 ML: .5; 3 SOLUTION RESPIRATORY (INHALATION) at 21:56

## 2024-05-30 RX ADMIN — ASPIRIN 81 MG CHEWABLE TABLET 81 MG: 81 TABLET CHEWABLE at 10:35

## 2024-05-30 RX ADMIN — HEPARIN SODIUM 5000 UNITS: 5000 INJECTION INTRAVENOUS; SUBCUTANEOUS at 04:52

## 2024-05-30 RX ADMIN — IPRATROPIUM BROMIDE AND ALBUTEROL SULFATE 3 ML: .5; 3 SOLUTION RESPIRATORY (INHALATION) at 02:06

## 2024-05-30 RX ADMIN — OXYCODONE HYDROCHLORIDE 5 MG: 5 TABLET ORAL at 21:21

## 2024-05-30 RX ADMIN — ISOSORBIDE DINITRATE 20 MG: 20 TABLET ORAL at 14:41

## 2024-05-30 RX ADMIN — IPRATROPIUM BROMIDE AND ALBUTEROL SULFATE 3 ML: .5; 3 SOLUTION RESPIRATORY (INHALATION) at 15:29

## 2024-05-30 RX ADMIN — METOPROLOL TARTRATE 50 MG: 50 TABLET, FILM COATED ORAL at 10:35

## 2024-05-30 RX ADMIN — SODIUM BICARBONATE 1300 MG: 650 TABLET ORAL at 10:35

## 2024-05-30 RX ADMIN — Medication 1000 UNITS: at 10:35

## 2024-05-30 RX ADMIN — ACETAMINOPHEN 650 MG: 325 TABLET ORAL at 17:35

## 2024-05-30 RX ADMIN — HEPARIN SODIUM 5000 UNITS: 5000 INJECTION INTRAVENOUS; SUBCUTANEOUS at 21:12

## 2024-05-30 RX ADMIN — ACETAMINOPHEN 650 MG: 325 TABLET ORAL at 06:12

## 2024-05-30 RX ADMIN — MELATONIN 3 MG: 3 TAB ORAL at 21:12

## 2024-05-30 RX ADMIN — ACETAMINOPHEN 650 MG: 325 TABLET ORAL at 23:24

## 2024-05-30 RX ADMIN — Medication 1000 UNITS: at 21:12

## 2024-05-30 RX ADMIN — ESCITALOPRAM OXALATE 10 MG: 10 TABLET ORAL at 10:35

## 2024-05-30 RX ADMIN — ACETAMINOPHEN 650 MG: 325 TABLET ORAL at 12:35

## 2024-05-30 RX ADMIN — IPRATROPIUM BROMIDE AND ALBUTEROL SULFATE 3 ML: .5; 3 SOLUTION RESPIRATORY (INHALATION) at 08:43

## 2024-05-30 RX ADMIN — ATORVASTATIN CALCIUM 80 MG: 80 TABLET, FILM COATED ORAL at 21:12

## 2024-05-30 RX ADMIN — ISOSORBIDE DINITRATE 20 MG: 20 TABLET ORAL at 21:12

## 2024-05-30 RX ADMIN — ISOSORBIDE DINITRATE 20 MG: 20 TABLET ORAL at 10:36

## 2024-05-30 RX ADMIN — PANTOPRAZOLE SODIUM 40 MG: 40 TABLET, DELAYED RELEASE ORAL at 06:12

## 2024-05-30 RX ADMIN — OXYCODONE HYDROCHLORIDE 5 MG: 5 TABLET ORAL at 07:50

## 2024-05-30 ASSESSMENT — COGNITIVE AND FUNCTIONAL STATUS - GENERAL
CLIMB 3 TO 5 STEPS WITH RAILING: TOTAL
DRESSING REGULAR LOWER BODY CLOTHING: A LITTLE
TOILETING: A LITTLE
DRESSING REGULAR LOWER BODY CLOTHING: A LITTLE
HELP NEEDED FOR BATHING: A LITTLE
DRESSING REGULAR LOWER BODY CLOTHING: A LITTLE
MOBILITY SCORE: 11
DRESSING REGULAR UPPER BODY CLOTHING: A LITTLE
MOVING FROM LYING ON BACK TO SITTING ON SIDE OF FLAT BED WITH BEDRAILS: A LOT
MOVING TO AND FROM BED TO CHAIR: TOTAL
STANDING UP FROM CHAIR USING ARMS: A LOT
EATING MEALS: A LITTLE
CLIMB 3 TO 5 STEPS WITH RAILING: TOTAL
STANDING UP FROM CHAIR USING ARMS: A LOT
TURNING FROM BACK TO SIDE WHILE IN FLAT BAD: A LOT
TURNING FROM BACK TO SIDE WHILE IN FLAT BAD: A LITTLE
WALKING IN HOSPITAL ROOM: TOTAL
TOILETING: A LITTLE
DRESSING REGULAR UPPER BODY CLOTHING: A LITTLE
WALKING IN HOSPITAL ROOM: A LOT
MOVING TO AND FROM BED TO CHAIR: A LOT
TURNING FROM BACK TO SIDE WHILE IN FLAT BAD: A LITTLE
DAILY ACTIVITIY SCORE: 20
DRESSING REGULAR LOWER BODY CLOTHING: A LOT
MOVING TO AND FROM BED TO CHAIR: A LITTLE
MOBILITY SCORE: 11
MOVING FROM LYING ON BACK TO SITTING ON SIDE OF FLAT BED WITH BEDRAILS: A LOT
CLIMB 3 TO 5 STEPS WITH RAILING: TOTAL
MOBILITY SCORE: 13
DRESSING REGULAR UPPER BODY CLOTHING: A LITTLE
HELP NEEDED FOR BATHING: A LITTLE
MOBILITY SCORE: 13
STANDING UP FROM CHAIR USING ARMS: A LOT
MOVING FROM LYING ON BACK TO SITTING ON SIDE OF FLAT BED WITH BEDRAILS: A LITTLE
TURNING FROM BACK TO SIDE WHILE IN FLAT BAD: A LITTLE
WALKING IN HOSPITAL ROOM: TOTAL
CLIMB 3 TO 5 STEPS WITH RAILING: TOTAL
PERSONAL GROOMING: A LITTLE
STANDING UP FROM CHAIR USING ARMS: TOTAL
TOILETING: A LITTLE
WALKING IN HOSPITAL ROOM: TOTAL
STANDING UP FROM CHAIR USING ARMS: A LOT
DRESSING REGULAR UPPER BODY CLOTHING: A LITTLE
CLIMB 3 TO 5 STEPS WITH RAILING: TOTAL
MOVING TO AND FROM BED TO CHAIR: A LOT
MOBILITY SCORE: 8
MOVING FROM LYING ON BACK TO SITTING ON SIDE OF FLAT BED WITH BEDRAILS: A LITTLE
TURNING FROM BACK TO SIDE WHILE IN FLAT BAD: A LOT
MOVING TO AND FROM BED TO CHAIR: A LITTLE
MOBILITY SCORE: 13
MOVING FROM LYING ON BACK TO SITTING ON SIDE OF FLAT BED WITH BEDRAILS: A LITTLE
DAILY ACTIVITIY SCORE: 15
HELP NEEDED FOR BATHING: A LOT
DRESSING REGULAR UPPER BODY CLOTHING: A LOT
MOVING TO AND FROM BED TO CHAIR: A LITTLE
CLIMB 3 TO 5 STEPS WITH RAILING: TOTAL
STANDING UP FROM CHAIR USING ARMS: A LOT
MOVING TO AND FROM BED TO CHAIR: A LITTLE
MOVING FROM LYING ON BACK TO SITTING ON SIDE OF FLAT BED WITH BEDRAILS: A LOT
DAILY ACTIVITIY SCORE: 20
MOVING FROM LYING ON BACK TO SITTING ON SIDE OF FLAT BED WITH BEDRAILS: A LITTLE
DAILY ACTIVITIY SCORE: 20
WALKING IN HOSPITAL ROOM: A LOT
TOILETING: A LITTLE
TURNING FROM BACK TO SIDE WHILE IN FLAT BAD: A LOT
TURNING FROM BACK TO SIDE WHILE IN FLAT BAD: A LITTLE
TOILETING: A LITTLE
MOBILITY SCORE: 13
CLIMB 3 TO 5 STEPS WITH RAILING: TOTAL
DRESSING REGULAR LOWER BODY CLOTHING: A LITTLE
STANDING UP FROM CHAIR USING ARMS: A LOT
HELP NEEDED FOR BATHING: A LITTLE
HELP NEEDED FOR BATHING: A LITTLE
WALKING IN HOSPITAL ROOM: TOTAL
WALKING IN HOSPITAL ROOM: TOTAL
DAILY ACTIVITIY SCORE: 20

## 2024-05-30 ASSESSMENT — PAIN - FUNCTIONAL ASSESSMENT
PAIN_FUNCTIONAL_ASSESSMENT: 0-10
PAIN_FUNCTIONAL_ASSESSMENT: 0-10

## 2024-05-30 ASSESSMENT — PAIN SCALES - GENERAL
PAINLEVEL_OUTOF10: 0 - NO PAIN
PAINLEVEL_OUTOF10: 0 - NO PAIN
PAINLEVEL_OUTOF10: 10 - WORST POSSIBLE PAIN
PAINLEVEL_OUTOF10: 0 - NO PAIN

## 2024-05-30 NOTE — PROGRESS NOTES
[facility]  ACUTE CARE SURGERY - PROGRESS NOTE    Patient Name: Humphrey Waddell  MRN: [id number]  Admit Date: 522  : 1965  AGE: 59 y.o.   GENDER: male  ==============================================================================  TODAY'S ASSESSMENT AND PLAN OF CARE:  58yo M with PMHx of 3v CABGx3 2023, HFpEF (EF 60-65% 2024), HTN, HLD, CKD (baseline Cr ~3.1), CVA  with no deficits, T2DM, left AKA who was previously admitted to the ACS service in 3/2024 for cholecystitis and underwent percutaneous cholecystostomy tube on 4/3 (tube then fell out about a month ago). Presented now with two days of severe right sided abdominal pain and chills at home. CT with gallstone lodged at neck of cystic duct with no GB wall thickening or pericholecystic fluid.     : Cholecystectomy, right subcostal incision    Mental status at hospital baseline, improved today. Plan to advance to regular diet, maged gerardo, get to chair, frequent pain assessments.     #Cholecystitis  - s/p cholecystectomy  - pain control, transitioned from PCA to oral meds due to poor compliance with button. jorge tyl, PRN oxy 5 q4h, dilaudid breakthrough, robaxin  - q2h pain assessments  - advance to full liquid diet then regular diet today  - home PPI  - discontinue zosyn   - PT/OT - SNF  - RT - continue RT    #Altered Mental Status   - poor pain control, transitioned from PCA to oral meds due to poor compliance with button. jorge tyl, PRN oxy 5 q4h, dilaudid breakthrough, robaxin  - q2h pain evaluations  - CT head overnight negative  - ammonia negative, B12 negative, VBG ok  - Ionized aries - 1.08, low. repleted    #Anemia  - trend CBC daily  - Hemoglobin stable at 7.7    CV #HTN #HLD  - home aspirin 81, holding plavix  - home isosorbide dinitrate  - home statin  - home lopressor  - holding home nifedipine, blood pressures 130-140s. Will restart when indicated.     #T2DM  - advance to regular diet  - aspiration  precautions  - SSI, continue to adjust  - BG wnl at this time  - POCT glucose checks    Renal/ #CKD  - strict Is and Os  - home sodium bicarb 650mg bid  - PRN home torsemide, given one dose 5/29  - Cr 2.4 from 2.28 today. Good UOP  - TOV today    #Home psych  - home lexapro 10mg    - q4h vitals  - daily CBC, RFP, Mag  - strict Is and Os  - fall precautions  - IS 10x per hour    Diet: advance to regular diet  BR: not at this time  DVT ppx: SCDs, SQH  PT/OT eval: moderate intensity    Dispo: not ready, diet advancement, pain control. Anticipate medical readiness tomorrow. Amenable to SNF.     Discussed with Dr. Ron.    Amanda Ochoa MD  PGY-1 VS Resident  Trauma Surgery Service  Floor: [phone]    ==============================================================================  CHIEF COMPLAINT / EVENTS LAST 24HRS / HPI:  CT head overnight for concern of mental status. Negative.     On AM rounds, patient slow but conversive. Markedly improved cognition and alertness this morning. Asked question about care and plan for today. Demonstrated understanding. States he has pain and would like meds. Reports some dental pain, were able to improve by providing dentures. Also reporting some mild nausea.     MEDICAL HISTORY / ROS:   Admission history and ROS reviewed. Pertinent changes as follows:  NA    PHYSICAL EXAM:  Heart Rate:  [63-84]   Temp:  [34.9 °C (94.9 °F)-36.6 °C (97.9 °F)]   Resp:  [16-17]   BP: (116-148)/(61-85)   SpO2:  [91 %-99 %]   Constitutional: no acute distress, awake, slow to respond, larger man  Neuro: A/O x2-3 (name, place), slow to respond, no focal deficits. Motor sensory intact in BUE, RLE moving on command.   Psych: normal affect  HEENT: No deformities, no scleral icterus. Dentures in place, slightly loose  Cardiac: RRR  Pulmonary: unlabored respirations on 2L NC (NC at cheek)  Abdomen: soft, non distended, non tender. Right subcostal incision with staples, minimal serosanguinous drainage. No erythema.    : gerardo in place, draining pale yellow urine  Skin: warm and dry overall    Extremities: no swelling noted. L AKA.   MSK: moving all four      IMAGING SUMMARY:  (summary of new imaging findings, not a copy of dictation)  NA    LABS:  Results from last 7 days   Lab Units 05/30/24  0547 05/29/24  0613 05/28/24  0546   WBC AUTO x10*3/uL 9.8 10.8 10.2   HEMOGLOBIN g/dL 7.7* 7.7* 7.8*   HEMATOCRIT % 24.9* 25.3* 25.4*   PLATELETS AUTO x10*3/uL 214 195 199           Results from last 7 days   Lab Units 05/30/24  0547 05/29/24  0612 05/28/24  0546 05/27/24  0734 05/26/24  0559 05/25/24  0630   SODIUM mmol/L 139 139 137 138 137 138   POTASSIUM mmol/L 5.0 4.6 4.6 4.6 4.1 4.3   CHLORIDE mmol/L 106 106 105 106 102 103   CO2 mmol/L 27 26 23 24 27 26   BUN mg/dL 50* 50* 55* 53* 54* 55*   CREATININE mg/dL 3.40* 3.28* 3.79* 3.52* 3.55* 3.69*   CALCIUM mg/dL 7.7* 8.0* 7.4* 7.4* 7.3* 7.4*   PROTEIN TOTAL g/dL  --   --   --  6.1* 5.9* 6.0*   BILIRUBIN TOTAL mg/dL  --   --   --  0.3 0.3 0.3   ALK PHOS U/L  --   --   --  48 48 49   ALT U/L  --   --   --  20 11 11   AST U/L  --   --   --  29 12 14   GLUCOSE mg/dL 99 113* 83 83 128* 133*     Results from last 7 days   Lab Units 05/27/24  0734 05/26/24  0559 05/25/24  0630   BILIRUBIN TOTAL mg/dL 0.3 0.3 0.3           I have reviewed all medications, laboratory results, and imaging pertinent for today's encounter.

## 2024-05-30 NOTE — CARE PLAN
The patient's goals for the shift include Labs WNL    The clinical goals for the shift include safety

## 2024-05-30 NOTE — CARE PLAN
The patient's goals for the shift include Labs WNL    The clinical goals for the shift include safety      Problem: Pain  Goal: My pain/discomfort is manageable  Outcome: Progressing     Problem: Safety  Goal: Patient will be injury free during hospitalization  Outcome: Progressing  Goal: I will remain free of falls  Outcome: Progressing     Problem: Daily Care  Goal: Daily care needs are met  Outcome: Progressing     Problem: Psychosocial Needs  Goal: Demonstrates ability to cope with hospitalization/illness  Outcome: Progressing  Goal: Collaborate with me, my family, and caregiver to identify my specific goals  Outcome: Progressing     Problem: Discharge Barriers  Goal: My discharge needs are met  Outcome: Progressing     Problem: Skin  Goal: Decreased wound size/increased tissue granulation at next dressing change  Outcome: Progressing  Goal: Participates in plan/prevention/treatment measures  Outcome: Progressing  Goal: Prevent/manage excess moisture  Outcome: Progressing  Goal: Prevent/minimize sheer/friction injuries  Outcome: Progressing  Goal: Promote/optimize nutrition  Outcome: Progressing  Goal: Promote skin healing  Outcome: Progressing     Problem: Swallowing  Goal: LTG - Patient will tolerate the least restrictive diet consistency to allow for safe consumption of daily meals  Outcome: Progressing

## 2024-05-30 NOTE — SIGNIFICANT EVENT
Brief ACS update    Called to see patient because of somnolence today. 59M with CAD and CKD who is 3 Days Post-Op from open cholecystectomy. On my exam, sleeping comfortably but easily awoken. He has delayed responses to questions and instructions but suspect this is somewhat volitional. He tracked me throughout exam, did answer questions and had no difficulty staying awake and focussed through the exam.  Neuro exam non-focal. CT head normal and ammonia and B12 normal. B1 pending and will order VBG to check pCO2. Given concern for somnolence, will make his oxycodone prn.    Richmond York MD  Trauma, Critical Care, and Acute Care Surgery  Pager: [phone]

## 2024-05-31 ENCOUNTER — HOME CARE VISIT (OUTPATIENT)
Dept: HOME HEALTH SERVICES | Facility: HOME HEALTH | Age: 59
End: 2024-05-31
Payer: COMMERCIAL

## 2024-05-31 LAB
ALBUMIN SERPL BCP-MCNC: 2.6 G/DL (ref 3.4–5)
ANION GAP SERPL CALC-SCNC: 12 MMOL/L (ref 10–20)
BUN SERPL-MCNC: 46 MG/DL (ref 6–23)
CALCIUM SERPL-MCNC: 7.7 MG/DL (ref 8.6–10.6)
CHLORIDE SERPL-SCNC: 106 MMOL/L (ref 98–107)
CO2 SERPL-SCNC: 26 MMOL/L (ref 21–32)
CREAT SERPL-MCNC: 3.1 MG/DL (ref 0.5–1.3)
EGFRCR SERPLBLD CKD-EPI 2021: 22 ML/MIN/1.73M*2
ERYTHROCYTE [DISTWIDTH] IN BLOOD BY AUTOMATED COUNT: 14.5 % (ref 11.5–14.5)
GLUCOSE BLD MANUAL STRIP-MCNC: 114 MG/DL (ref 74–99)
GLUCOSE BLD MANUAL STRIP-MCNC: 124 MG/DL (ref 74–99)
GLUCOSE BLD MANUAL STRIP-MCNC: 164 MG/DL (ref 74–99)
GLUCOSE SERPL-MCNC: 123 MG/DL (ref 74–99)
HCT VFR BLD AUTO: 24.6 % (ref 41–52)
HGB BLD-MCNC: 7.7 G/DL (ref 13.5–17.5)
MAGNESIUM SERPL-MCNC: 2.37 MG/DL (ref 1.6–2.4)
MCH RBC QN AUTO: 28.1 PG (ref 26–34)
MCHC RBC AUTO-ENTMCNC: 31.3 G/DL (ref 32–36)
MCV RBC AUTO: 90 FL (ref 80–100)
NRBC BLD-RTO: 0 /100 WBCS (ref 0–0)
PHOSPHATE SERPL-MCNC: 3.6 MG/DL (ref 2.5–4.9)
PLATELET # BLD AUTO: 232 X10*3/UL (ref 150–450)
POTASSIUM SERPL-SCNC: 4.8 MMOL/L (ref 3.5–5.3)
RBC # BLD AUTO: 2.74 X10*6/UL (ref 4.5–5.9)
SODIUM SERPL-SCNC: 139 MMOL/L (ref 136–145)
WBC # BLD AUTO: 9 X10*3/UL (ref 4.4–11.3)

## 2024-05-31 PROCEDURE — 36415 COLL VENOUS BLD VENIPUNCTURE: CPT

## 2024-05-31 PROCEDURE — 2500000002 HC RX 250 W HCPCS SELF ADMINISTERED DRUGS (ALT 637 FOR MEDICARE OP, ALT 636 FOR OP/ED)

## 2024-05-31 PROCEDURE — 2500000001 HC RX 250 WO HCPCS SELF ADMINISTERED DRUGS (ALT 637 FOR MEDICARE OP)

## 2024-05-31 PROCEDURE — 85027 COMPLETE CBC AUTOMATED: CPT

## 2024-05-31 PROCEDURE — 82947 ASSAY GLUCOSE BLOOD QUANT: CPT

## 2024-05-31 PROCEDURE — 99024 POSTOP FOLLOW-UP VISIT: CPT | Performed by: PHYSICIAN ASSISTANT

## 2024-05-31 PROCEDURE — 2500000004 HC RX 250 GENERAL PHARMACY W/ HCPCS (ALT 636 FOR OP/ED)

## 2024-05-31 PROCEDURE — 80069 RENAL FUNCTION PANEL: CPT

## 2024-05-31 PROCEDURE — 94640 AIRWAY INHALATION TREATMENT: CPT

## 2024-05-31 PROCEDURE — 2500000005 HC RX 250 GENERAL PHARMACY W/O HCPCS

## 2024-05-31 PROCEDURE — 1100000001 HC PRIVATE ROOM DAILY

## 2024-05-31 PROCEDURE — 83735 ASSAY OF MAGNESIUM: CPT

## 2024-05-31 PROCEDURE — 92526 ORAL FUNCTION THERAPY: CPT | Mod: GN | Performed by: SPEECH-LANGUAGE PATHOLOGIST

## 2024-05-31 RX ORDER — AMOXICILLIN 250 MG
1 CAPSULE ORAL NIGHTLY
Status: DISCONTINUED | OUTPATIENT
Start: 2024-05-31 | End: 2024-06-11 | Stop reason: HOSPADM

## 2024-05-31 RX ORDER — TORSEMIDE 20 MG/1
60 TABLET ORAL DAILY
Status: DISCONTINUED | OUTPATIENT
Start: 2024-06-01 | End: 2024-06-11 | Stop reason: HOSPADM

## 2024-05-31 RX ORDER — TORSEMIDE 20 MG/1
20 TABLET ORAL DAILY
Status: DISCONTINUED | OUTPATIENT
Start: 2024-05-31 | End: 2024-05-31

## 2024-05-31 RX ORDER — NIFEDIPINE 60 MG/1
60 TABLET, FILM COATED, EXTENDED RELEASE ORAL
Status: DISCONTINUED | OUTPATIENT
Start: 2024-06-01 | End: 2024-06-11 | Stop reason: HOSPADM

## 2024-05-31 RX ORDER — POLYETHYLENE GLYCOL 3350 17 G/17G
17 POWDER, FOR SOLUTION ORAL DAILY
Status: DISCONTINUED | OUTPATIENT
Start: 2024-05-31 | End: 2024-06-11 | Stop reason: HOSPADM

## 2024-05-31 RX ORDER — OXYCODONE HYDROCHLORIDE 5 MG/1
5 TABLET ORAL EVERY 6 HOURS PRN
Status: DISCONTINUED | OUTPATIENT
Start: 2024-05-31 | End: 2024-06-11 | Stop reason: HOSPADM

## 2024-05-31 RX ADMIN — PANTOPRAZOLE SODIUM 40 MG: 40 TABLET, DELAYED RELEASE ORAL at 05:13

## 2024-05-31 RX ADMIN — OXYCODONE HYDROCHLORIDE 5 MG: 5 TABLET ORAL at 04:55

## 2024-05-31 RX ADMIN — ATORVASTATIN CALCIUM 80 MG: 80 TABLET, FILM COATED ORAL at 20:34

## 2024-05-31 RX ADMIN — IPRATROPIUM BROMIDE AND ALBUTEROL SULFATE 3 ML: .5; 3 SOLUTION RESPIRATORY (INHALATION) at 22:22

## 2024-05-31 RX ADMIN — SENNOSIDES AND DOCUSATE SODIUM 1 TABLET: 50; 8.6 TABLET ORAL at 20:34

## 2024-05-31 RX ADMIN — ISOSORBIDE DINITRATE 20 MG: 20 TABLET ORAL at 10:37

## 2024-05-31 RX ADMIN — INSULIN LISPRO 2 UNITS: 100 INJECTION, SOLUTION INTRAVENOUS; SUBCUTANEOUS at 12:13

## 2024-05-31 RX ADMIN — OXYCODONE HYDROCHLORIDE 5 MG: 5 TABLET ORAL at 17:49

## 2024-05-31 RX ADMIN — SODIUM BICARBONATE 1300 MG: 650 TABLET ORAL at 10:39

## 2024-05-31 RX ADMIN — ISOSORBIDE DINITRATE 20 MG: 20 TABLET ORAL at 20:36

## 2024-05-31 RX ADMIN — IPRATROPIUM BROMIDE AND ALBUTEROL SULFATE 3 ML: .5; 3 SOLUTION RESPIRATORY (INHALATION) at 03:18

## 2024-05-31 RX ADMIN — Medication 1000 UNITS: at 10:39

## 2024-05-31 RX ADMIN — METOPROLOL TARTRATE 50 MG: 50 TABLET, FILM COATED ORAL at 20:34

## 2024-05-31 RX ADMIN — IPRATROPIUM BROMIDE AND ALBUTEROL SULFATE 3 ML: .5; 3 SOLUTION RESPIRATORY (INHALATION) at 15:33

## 2024-05-31 RX ADMIN — Medication 1000 UNITS: at 20:34

## 2024-05-31 RX ADMIN — ACETAMINOPHEN 650 MG: 325 TABLET ORAL at 12:14

## 2024-05-31 RX ADMIN — HEPARIN SODIUM 5000 UNITS: 5000 INJECTION INTRAVENOUS; SUBCUTANEOUS at 20:34

## 2024-05-31 RX ADMIN — MELATONIN 3 MG: 3 TAB ORAL at 20:34

## 2024-05-31 RX ADMIN — LIDOCAINE 2 PATCH: 4 PATCH TOPICAL at 10:40

## 2024-05-31 RX ADMIN — ACETAMINOPHEN 650 MG: 325 TABLET ORAL at 05:12

## 2024-05-31 RX ADMIN — ISOSORBIDE DINITRATE 20 MG: 20 TABLET ORAL at 15:11

## 2024-05-31 RX ADMIN — METOPROLOL TARTRATE 50 MG: 50 TABLET, FILM COATED ORAL at 10:37

## 2024-05-31 RX ADMIN — HEPARIN SODIUM 5000 UNITS: 5000 INJECTION INTRAVENOUS; SUBCUTANEOUS at 12:13

## 2024-05-31 RX ADMIN — ESCITALOPRAM OXALATE 10 MG: 10 TABLET ORAL at 10:37

## 2024-05-31 RX ADMIN — ASPIRIN 81 MG CHEWABLE TABLET 81 MG: 81 TABLET CHEWABLE at 10:37

## 2024-05-31 RX ADMIN — IPRATROPIUM BROMIDE AND ALBUTEROL SULFATE 3 ML: .5; 3 SOLUTION RESPIRATORY (INHALATION) at 08:45

## 2024-05-31 ASSESSMENT — COGNITIVE AND FUNCTIONAL STATUS - GENERAL
HELP NEEDED FOR BATHING: A LITTLE
WALKING IN HOSPITAL ROOM: TOTAL
WALKING IN HOSPITAL ROOM: TOTAL
DAILY ACTIVITIY SCORE: 20
TURNING FROM BACK TO SIDE WHILE IN FLAT BAD: A LITTLE
MOVING TO AND FROM BED TO CHAIR: A LITTLE
MOVING FROM LYING ON BACK TO SITTING ON SIDE OF FLAT BED WITH BEDRAILS: A LITTLE
STANDING UP FROM CHAIR USING ARMS: A LOT
DRESSING REGULAR UPPER BODY CLOTHING: A LITTLE
STANDING UP FROM CHAIR USING ARMS: A LOT
STANDING UP FROM CHAIR USING ARMS: A LOT
HELP NEEDED FOR BATHING: A LITTLE
STANDING UP FROM CHAIR USING ARMS: A LOT
CLIMB 3 TO 5 STEPS WITH RAILING: TOTAL
HELP NEEDED FOR BATHING: A LITTLE
TURNING FROM BACK TO SIDE WHILE IN FLAT BAD: A LITTLE
WALKING IN HOSPITAL ROOM: TOTAL
DAILY ACTIVITIY SCORE: 20
WALKING IN HOSPITAL ROOM: TOTAL
DAILY ACTIVITIY SCORE: 20
MOVING FROM LYING ON BACK TO SITTING ON SIDE OF FLAT BED WITH BEDRAILS: A LITTLE
TURNING FROM BACK TO SIDE WHILE IN FLAT BAD: A LITTLE
WALKING IN HOSPITAL ROOM: TOTAL
MOBILITY SCORE: 13
MOVING FROM LYING ON BACK TO SITTING ON SIDE OF FLAT BED WITH BEDRAILS: A LITTLE
STANDING UP FROM CHAIR USING ARMS: A LOT
MOBILITY SCORE: 13
DAILY ACTIVITIY SCORE: 20
DRESSING REGULAR UPPER BODY CLOTHING: A LITTLE
HELP NEEDED FOR BATHING: A LITTLE
TURNING FROM BACK TO SIDE WHILE IN FLAT BAD: A LITTLE
TURNING FROM BACK TO SIDE WHILE IN FLAT BAD: A LITTLE
MOBILITY SCORE: 13
MOVING TO AND FROM BED TO CHAIR: A LITTLE
DRESSING REGULAR UPPER BODY CLOTHING: A LITTLE
MOBILITY SCORE: 13
MOBILITY SCORE: 13
MOVING FROM LYING ON BACK TO SITTING ON SIDE OF FLAT BED WITH BEDRAILS: A LITTLE
CLIMB 3 TO 5 STEPS WITH RAILING: TOTAL
MOVING TO AND FROM BED TO CHAIR: A LITTLE
TOILETING: A LITTLE
DRESSING REGULAR LOWER BODY CLOTHING: A LITTLE
CLIMB 3 TO 5 STEPS WITH RAILING: TOTAL
TURNING FROM BACK TO SIDE WHILE IN FLAT BAD: A LITTLE
CLIMB 3 TO 5 STEPS WITH RAILING: TOTAL
TOILETING: A LITTLE
MOVING TO AND FROM BED TO CHAIR: A LITTLE
WALKING IN HOSPITAL ROOM: TOTAL
DAILY ACTIVITIY SCORE: 20
MOVING TO AND FROM BED TO CHAIR: A LITTLE
DRESSING REGULAR UPPER BODY CLOTHING: A LITTLE
DRESSING REGULAR LOWER BODY CLOTHING: A LITTLE
TOILETING: A LITTLE
DRESSING REGULAR LOWER BODY CLOTHING: A LITTLE
DRESSING REGULAR LOWER BODY CLOTHING: A LITTLE
DAILY ACTIVITIY SCORE: 20
CLIMB 3 TO 5 STEPS WITH RAILING: TOTAL
DRESSING REGULAR UPPER BODY CLOTHING: A LITTLE
DRESSING REGULAR UPPER BODY CLOTHING: A LITTLE
MOVING TO AND FROM BED TO CHAIR: A LITTLE
MOVING FROM LYING ON BACK TO SITTING ON SIDE OF FLAT BED WITH BEDRAILS: A LITTLE
HELP NEEDED FOR BATHING: A LITTLE
TOILETING: A LITTLE
MOVING TO AND FROM BED TO CHAIR: A LITTLE
MOVING FROM LYING ON BACK TO SITTING ON SIDE OF FLAT BED WITH BEDRAILS: A LITTLE
DRESSING REGULAR LOWER BODY CLOTHING: A LITTLE
DRESSING REGULAR LOWER BODY CLOTHING: A LITTLE
HELP NEEDED FOR BATHING: A LITTLE
DAILY ACTIVITIY SCORE: 20
MOVING FROM LYING ON BACK TO SITTING ON SIDE OF FLAT BED WITH BEDRAILS: A LITTLE
DRESSING REGULAR LOWER BODY CLOTHING: A LITTLE
TOILETING: A LITTLE
MOBILITY SCORE: 13
MOBILITY SCORE: 13
CLIMB 3 TO 5 STEPS WITH RAILING: TOTAL
TOILETING: A LITTLE
HELP NEEDED FOR BATHING: A LITTLE
STANDING UP FROM CHAIR USING ARMS: A LOT
STANDING UP FROM CHAIR USING ARMS: A LOT
CLIMB 3 TO 5 STEPS WITH RAILING: TOTAL
TURNING FROM BACK TO SIDE WHILE IN FLAT BAD: A LITTLE
TOILETING: A LITTLE
DRESSING REGULAR UPPER BODY CLOTHING: A LITTLE
WALKING IN HOSPITAL ROOM: TOTAL

## 2024-05-31 ASSESSMENT — PAIN SCALES - GENERAL
PAINLEVEL_OUTOF10: 8
PAINLEVEL_OUTOF10: 8
PAINLEVEL_OUTOF10: 7
PAINLEVEL_OUTOF10: 10 - WORST POSSIBLE PAIN
PAINLEVEL_OUTOF10: 10 - WORST POSSIBLE PAIN

## 2024-05-31 ASSESSMENT — PAIN - FUNCTIONAL ASSESSMENT
PAIN_FUNCTIONAL_ASSESSMENT: 0-10
PAIN_FUNCTIONAL_ASSESSMENT: 0-10

## 2024-05-31 NOTE — PROGRESS NOTES
[facility]  ACUTE CARE SURGERY - PROGRESS NOTE    Patient Name: Humphrey Waddell  MRN: [id number]  Admit Date: 522  : 1965  AGE: 59 y.o.   GENDER: male  ==============================================================================  TODAY'S ASSESSMENT AND PLAN OF CARE:  60yo M with PMHx of 3v CABGx3 2023, HFpEF (EF 60-65% 2024), HTN, HLD, CKD (baseline Cr ~3.1), CVA  with no deficits, T2DM, left AKA who was previously admitted to the ACS service in 3/2024 for cholecystitis and underwent percutaneous cholecystostomy tube on 4/3 (tube then fell out about a month ago). Presented now with two days of severe right sided abdominal pain and chills at home. CT with gallstone lodged at neck of cystic duct with no GB wall thickening or pericholecystic fluid.     : Cholecystectomy, right subcostal incision    Mental status at hospital baseline, improved today. Plan to advance to regular diet, maged gerardo, get to chair, frequent pain assessments.     #Cholecystitis  - s/p cholecystectomy  - pain control, transitioned from PCA to oral meds due to poor compliance with button. jorge tyl, PRN oxy 5 q4h, dilaudid breakthrough, robaxin  - q2h pain assessments  - continue regular diet   - home PPI  - discontinue zosyn   - PT/OT - SNF  - RT - continue RT    #Altered Mental Status   - poor pain control, transitioned from PCA to oral meds due to poor compliance with button. jorge tyl, PRN oxy 5 q4h, dilaudid breakthrough, robaxin  - q2h pain evaluations  - Delirium workup negative - ammonia negative, B12 negative, VBG ok,  CT head negative     #Anemia  - trend CBC daily  - Hemoglobin stable at 7.7    CV #HTN #HLD  - home aspirin 81, holding plavix  - home isosorbide dinitrate  - home statin  - home lopressor  - holding home nifedipine, blood pressures 130-140s. Will restart when indicated.     #T2DM  - advance to regular diet  - aspiration precautions  - SSI, continue to adjust  - BG wnl at  this time  - POCT glucose checks    Renal/ #CKD  - strict Is and Os  - home sodium bicarb 650mg bid  - PRN home torsemide, given one dose 5/29  - Cr 3.1 (baseline)    #Home psych  - home lexapro 10mg    - q4h vitals  - daily CBC, RFP, Mag  - strict Is and Os  - fall precautions  - IS 10x per hour    Diet: advance to regular diet  BR: not at this time  DVT ppx: SCDs, SQH  PT/OT eval: moderate intensity    Dispo: Pending SNF placement. Medically ready discharge     Discussed with Dr. Ron.    Farida Rose PA-C  Acute Care Surgery   [id number]      ==============================================================================  CHIEF COMPLAINT / EVENTS LAST 24HRS / HPI:  Patient is doing well this morning. He recognized members of the team but other than was reserved.     MEDICAL HISTORY / ROS:   Admission history and ROS reviewed. Pertinent changes as follows:  NA    PHYSICAL EXAM:  Heart Rate:  []   Temp:  [36.2 °C (97.2 °F)-36.8 °C (98.2 °F)]   Resp:  [14-18]   BP: (117-167)/(63-76)   SpO2:  [91 %-97 %]   Constitutional: no acute distress, awake, slow to respond, larger man  Neuro: A/O x2-3 (name, place), mildly confused on why he is in the hospital, conversational    Psych: normal affect  HEENT: No deformities, no scleral icterus. Dentures in place, slightly loose  Cardiac: RRR  Pulmonary: unlabored respirations on 2L NC   Abdomen: soft, non distended, non tender. Right subcostal incision with staples No erythema.   : voiding on own   Skin: warm and dry   Extremities: no swelling noted. L AKA.   MSK: moving all four      IMAGING SUMMARY:  (summary of new imaging findings, not a copy of dictation)  NA    LABS:  Results from last 7 days   Lab Units 05/31/24  0600 05/30/24  0547 05/29/24  0613   WBC AUTO x10*3/uL 9.0 9.8 10.8   HEMOGLOBIN g/dL 7.7* 7.7* 7.7*   HEMATOCRIT % 24.6* 24.9* 25.3*   PLATELETS AUTO x10*3/uL 232 214 195           Results from last 7 days   Lab Units 05/31/24  0600 05/30/24  0547  05/29/24  0612 05/28/24  0546 05/27/24  0734 05/26/24  0559 05/25/24  0630   SODIUM mmol/L 139 139 139   < > 138 137 138   POTASSIUM mmol/L 4.8 5.0 4.6   < > 4.6 4.1 4.3   CHLORIDE mmol/L 106 106 106   < > 106 102 103   CO2 mmol/L 26 27 26   < > 24 27 26   BUN mg/dL 46* 50* 50*   < > 53* 54* 55*   CREATININE mg/dL 3.10* 3.40* 3.28*   < > 3.52* 3.55* 3.69*   CALCIUM mg/dL 7.7* 7.7* 8.0*   < > 7.4* 7.3* 7.4*   PROTEIN TOTAL g/dL  --   --   --   --  6.1* 5.9* 6.0*   BILIRUBIN TOTAL mg/dL  --   --   --   --  0.3 0.3 0.3   ALK PHOS U/L  --   --   --   --  48 48 49   ALT U/L  --   --   --   --  20 11 11   AST U/L  --   --   --   --  29 12 14   GLUCOSE mg/dL 123* 99 113*   < > 83 128* 133*    < > = values in this interval not displayed.     Results from last 7 days   Lab Units 05/27/24  0734 05/26/24  0559 05/25/24  0630   BILIRUBIN TOTAL mg/dL 0.3 0.3 0.3           I have reviewed all medications, laboratory results, and imaging pertinent for today's encounter.

## 2024-05-31 NOTE — PROGRESS NOTES
Speech-Language Pathology   Inpatient  Speech-Language Pathology Treatment     Patient Name: Humphrey Waddell  MRN: [id number]  Today's Date: 5/31/2024          Assessment/Plan:  #Functional oropharyngeal swallow suspected  Pt presents with a suspected functional oropharyngeal swallow, despite demonstrating difficulty tolerating PO trials during previous sessions. Pt significantly more alert than previous sessions which is likely a contributor to the pt's increased ability to tolerate PO trials. Does not appear to be an aspiration risk at this time. May continue with regular solid and thin liquid diet. SLP will sign off.       Recommendations:  Regular solid, thin liquid diet  Upright during meals  Consult SLP with future concerns      Plan:  SLP Plan: No skilled SLP  SLP Frequency: 3x per week  Duration: 30 days  SLP - OK to Discharge: Yes      Subjective   A&Ox4. Upright in bed. On room air. Significantly more alert and talkative than prior session. Pt reported that he ate breakfast without difficulty, before clinician arrival. Pt shared about family and [place] heritage. Joked around with clinicians.    Patient Seen During This Visit: Yes        Objective     Therapeutic Swallow:  Therapeutic Swallow Intervention : PO Trials    Observed pt during PO trials of ice water via straw sip and bites of pamella cracker. No overt s/s of aspiration.    Oral phase:  Top dentures loose (pt shared he lost weight, but that loose dentures not bothering him). Adequate bolus preparation, control and transport, subjectively. No anterior loss of bolus.    Pharyngeal Phase: No overt s/s of aspiration. Swallow appreciated, subjectively.      Encounter Problems       Encounter Problems (Active)       Swallowing       LTG - Patient will tolerate the least restrictive diet consistency to allow for safe consumption of daily meals (Progressing)       Start:  05/28/24    Expected End:  06/07/24            STG - Patient will participate in a repeat  clinical swallow exam and complete successive sips of 3 oz of water without overt s/s of aspiration.        Start:  05/28/24    Expected End:  06/07/24                   Inpatient Education:  Adult Outpatient Education  Individual(s) Educated: Patient  Verbal Education : yes  Patient/Caregiver Demonstrated Understanding: yes  Plan of Care Discussed and Agreed Upon: yes  Patient Response to Education: Patient/Caregiver Verbalized Understanding of Information      Inpatient:  Education Documentation  No documentation found.  Education Comments  No comments found.      Supervising SLP was present, actively participated, and made all clinical decisions during session.  Rg Hinds M.S.CCC/SLP-[employer]

## 2024-05-31 NOTE — CARE PLAN
The patient's goals for the shift include Labs WNL    The clinical goals for the shift include pain control

## 2024-05-31 NOTE — CARE PLAN
The patient's goals for the shift include Labs WNL    The clinical goals for the shift include pain control      Problem: Pain  Goal: My pain/discomfort is manageable  Outcome: Progressing     Problem: Safety  Goal: Patient will be injury free during hospitalization  Outcome: Progressing  Goal: I will remain free of falls  Outcome: Progressing     Problem: Daily Care  Goal: Daily care needs are met  Outcome: Progressing     Problem: Psychosocial Needs  Goal: Demonstrates ability to cope with hospitalization/illness  Outcome: Progressing  Goal: Collaborate with me, my family, and caregiver to identify my specific goals  Outcome: Progressing     Problem: Discharge Barriers  Goal: My discharge needs are met  Outcome: Progressing     Problem: Skin  Goal: Decreased wound size/increased tissue granulation at next dressing change  Outcome: Progressing  Goal: Participates in plan/prevention/treatment measures  Outcome: Progressing  Goal: Prevent/manage excess moisture  Outcome: Progressing  Goal: Prevent/minimize sheer/friction injuries  Outcome: Progressing  Goal: Promote/optimize nutrition  Outcome: Progressing  Goal: Promote skin healing  Outcome: Progressing     Problem: Swallowing  Goal: LTG - Patient will tolerate the least restrictive diet consistency to allow for safe consumption of daily meals  Outcome: Progressing

## 2024-05-31 NOTE — PROGRESS NOTES
Per medical team, patient is medically ready to discharge with home care. Home care referral placed. Spoke with patient's wife regarding discharge planning. Per the wife, she feels he is unsafe to come home with homecare. She stated he hasn't been out of bed and is confused. She works and isn't home 24/7 with him and she doesn't want him home until he can transfer himself from the bed to the chair at least. Stated she is upset PT/OT hasn't seen him since 5/28 and wants them to evaluate him again. She stated she needs to get him to the acute rehab as he needs that level of rehab. Discussed the differences between AR and SNF and that a patient needs to have the strength to complete 3 hours a day of therapy at an AR for insurance to approve and AR to accept. Per patient's wife if he doesn't qualify for AR there's no point in him going to SNF, but she also doesn't want him home as he isn't safe at home. Does not want a SNF list. Questions his competency to make decisions - medical team notified. Requested PT/OT to see patient ASAP for discharge planning via T[facility] columns. Fatemeh Hillman RN, T[facility]

## 2024-06-01 LAB
ALBUMIN SERPL BCP-MCNC: 2.8 G/DL (ref 3.4–5)
ANION GAP SERPL CALC-SCNC: 12 MMOL/L (ref 10–20)
BUN SERPL-MCNC: 41 MG/DL (ref 6–23)
CALCIUM SERPL-MCNC: 7.9 MG/DL (ref 8.6–10.6)
CHLORIDE SERPL-SCNC: 106 MMOL/L (ref 98–107)
CO2 SERPL-SCNC: 27 MMOL/L (ref 21–32)
CREAT SERPL-MCNC: 2.58 MG/DL (ref 0.5–1.3)
EGFRCR SERPLBLD CKD-EPI 2021: 27 ML/MIN/1.73M*2
ERYTHROCYTE [DISTWIDTH] IN BLOOD BY AUTOMATED COUNT: 14.7 % (ref 11.5–14.5)
GLUCOSE BLD MANUAL STRIP-MCNC: 106 MG/DL (ref 74–99)
GLUCOSE BLD MANUAL STRIP-MCNC: 120 MG/DL (ref 74–99)
GLUCOSE BLD MANUAL STRIP-MCNC: 127 MG/DL (ref 74–99)
GLUCOSE BLD MANUAL STRIP-MCNC: 127 MG/DL (ref 74–99)
GLUCOSE BLD MANUAL STRIP-MCNC: 129 MG/DL (ref 74–99)
GLUCOSE SERPL-MCNC: 115 MG/DL (ref 74–99)
HCT VFR BLD AUTO: 26.9 % (ref 41–52)
HGB BLD-MCNC: 8.6 G/DL (ref 13.5–17.5)
MAGNESIUM SERPL-MCNC: 2.3 MG/DL (ref 1.6–2.4)
MCH RBC QN AUTO: 28.1 PG (ref 26–34)
MCHC RBC AUTO-ENTMCNC: 32 G/DL (ref 32–36)
MCV RBC AUTO: 88 FL (ref 80–100)
NRBC BLD-RTO: 0 /100 WBCS (ref 0–0)
PHOSPHATE SERPL-MCNC: 3.1 MG/DL (ref 2.5–4.9)
PLATELET # BLD AUTO: 251 X10*3/UL (ref 150–450)
POTASSIUM SERPL-SCNC: 4.7 MMOL/L (ref 3.5–5.3)
RBC # BLD AUTO: 3.06 X10*6/UL (ref 4.5–5.9)
SODIUM SERPL-SCNC: 140 MMOL/L (ref 136–145)
WBC # BLD AUTO: 9.4 X10*3/UL (ref 4.4–11.3)

## 2024-06-01 PROCEDURE — 2500000004 HC RX 250 GENERAL PHARMACY W/ HCPCS (ALT 636 FOR OP/ED)

## 2024-06-01 PROCEDURE — 82947 ASSAY GLUCOSE BLOOD QUANT: CPT

## 2024-06-01 PROCEDURE — 2500000001 HC RX 250 WO HCPCS SELF ADMINISTERED DRUGS (ALT 637 FOR MEDICARE OP)

## 2024-06-01 PROCEDURE — 2500000002 HC RX 250 W HCPCS SELF ADMINISTERED DRUGS (ALT 637 FOR MEDICARE OP, ALT 636 FOR OP/ED)

## 2024-06-01 PROCEDURE — 83735 ASSAY OF MAGNESIUM: CPT

## 2024-06-01 PROCEDURE — 36415 COLL VENOUS BLD VENIPUNCTURE: CPT

## 2024-06-01 PROCEDURE — 94640 AIRWAY INHALATION TREATMENT: CPT

## 2024-06-01 PROCEDURE — 80069 RENAL FUNCTION PANEL: CPT

## 2024-06-01 PROCEDURE — 1100000001 HC PRIVATE ROOM DAILY

## 2024-06-01 PROCEDURE — 97530 THERAPEUTIC ACTIVITIES: CPT | Mod: GP | Performed by: PHYSICAL MEDICINE & REHABILITATION

## 2024-06-01 PROCEDURE — 94668 MNPJ CHEST WALL SBSQ: CPT

## 2024-06-01 PROCEDURE — 2500000001 HC RX 250 WO HCPCS SELF ADMINISTERED DRUGS (ALT 637 FOR MEDICARE OP): Performed by: STUDENT IN AN ORGANIZED HEALTH CARE EDUCATION/TRAINING PROGRAM

## 2024-06-01 PROCEDURE — 2500000005 HC RX 250 GENERAL PHARMACY W/O HCPCS

## 2024-06-01 PROCEDURE — 85027 COMPLETE CBC AUTOMATED: CPT

## 2024-06-01 RX ORDER — HYDRALAZINE HYDROCHLORIDE 20 MG/ML
5 INJECTION INTRAMUSCULAR; INTRAVENOUS EVERY 6 HOURS PRN
Status: DISCONTINUED | OUTPATIENT
Start: 2024-06-01 | End: 2024-06-11 | Stop reason: HOSPADM

## 2024-06-01 RX ORDER — CLOPIDOGREL BISULFATE 75 MG/1
75 TABLET ORAL DAILY
Status: DISCONTINUED | OUTPATIENT
Start: 2024-06-01 | End: 2024-06-10

## 2024-06-01 RX ADMIN — ASPIRIN 81 MG CHEWABLE TABLET 81 MG: 81 TABLET CHEWABLE at 09:07

## 2024-06-01 RX ADMIN — OXYCODONE HYDROCHLORIDE 5 MG: 5 TABLET ORAL at 11:08

## 2024-06-01 RX ADMIN — ATORVASTATIN CALCIUM 80 MG: 80 TABLET, FILM COATED ORAL at 20:25

## 2024-06-01 RX ADMIN — SODIUM BICARBONATE 1300 MG: 650 TABLET ORAL at 09:07

## 2024-06-01 RX ADMIN — MELATONIN 3 MG: 3 TAB ORAL at 20:25

## 2024-06-01 RX ADMIN — SENNOSIDES AND DOCUSATE SODIUM 1 TABLET: 50; 8.6 TABLET ORAL at 20:27

## 2024-06-01 RX ADMIN — IPRATROPIUM BROMIDE AND ALBUTEROL SULFATE 3 ML: .5; 3 SOLUTION RESPIRATORY (INHALATION) at 15:52

## 2024-06-01 RX ADMIN — HEPARIN SODIUM 5000 UNITS: 5000 INJECTION INTRAVENOUS; SUBCUTANEOUS at 12:30

## 2024-06-01 RX ADMIN — PANTOPRAZOLE SODIUM 40 MG: 40 TABLET, DELAYED RELEASE ORAL at 06:33

## 2024-06-01 RX ADMIN — LIDOCAINE 2 PATCH: 4 PATCH TOPICAL at 09:07

## 2024-06-01 RX ADMIN — Medication 1000 UNITS: at 09:07

## 2024-06-01 RX ADMIN — CLOPIDOGREL BISULFATE 75 MG: 75 TABLET ORAL at 17:17

## 2024-06-01 RX ADMIN — HEPARIN SODIUM 5000 UNITS: 5000 INJECTION INTRAVENOUS; SUBCUTANEOUS at 04:01

## 2024-06-01 RX ADMIN — OXYCODONE HYDROCHLORIDE 5 MG: 5 TABLET ORAL at 00:17

## 2024-06-01 RX ADMIN — ISOSORBIDE DINITRATE 20 MG: 20 TABLET ORAL at 18:26

## 2024-06-01 RX ADMIN — ACETAMINOPHEN 650 MG: 325 TABLET ORAL at 06:33

## 2024-06-01 RX ADMIN — IPRATROPIUM BROMIDE AND ALBUTEROL SULFATE 3 ML: .5; 3 SOLUTION RESPIRATORY (INHALATION) at 09:13

## 2024-06-01 RX ADMIN — METOPROLOL TARTRATE 50 MG: 50 TABLET, FILM COATED ORAL at 20:25

## 2024-06-01 RX ADMIN — NIFEDIPINE 60 MG: 60 TABLET, FILM COATED, EXTENDED RELEASE ORAL at 10:32

## 2024-06-01 RX ADMIN — TORSEMIDE 60 MG: 20 TABLET ORAL at 09:07

## 2024-06-01 RX ADMIN — ESCITALOPRAM OXALATE 10 MG: 10 TABLET ORAL at 09:07

## 2024-06-01 RX ADMIN — ACETAMINOPHEN 650 MG: 325 TABLET ORAL at 17:13

## 2024-06-01 RX ADMIN — ACETAMINOPHEN 650 MG: 325 TABLET ORAL at 00:17

## 2024-06-01 RX ADMIN — ACETAMINOPHEN 650 MG: 325 TABLET ORAL at 23:52

## 2024-06-01 RX ADMIN — ISOSORBIDE DINITRATE 20 MG: 20 TABLET ORAL at 09:07

## 2024-06-01 RX ADMIN — Medication 1000 UNITS: at 20:25

## 2024-06-01 RX ADMIN — IPRATROPIUM BROMIDE AND ALBUTEROL SULFATE 3 ML: .5; 3 SOLUTION RESPIRATORY (INHALATION) at 03:03

## 2024-06-01 RX ADMIN — OXYCODONE HYDROCHLORIDE 5 MG: 5 TABLET ORAL at 06:33

## 2024-06-01 RX ADMIN — HYDRALAZINE HYDROCHLORIDE 5 MG: 20 INJECTION INTRAMUSCULAR; INTRAVENOUS at 17:13

## 2024-06-01 RX ADMIN — IPRATROPIUM BROMIDE AND ALBUTEROL SULFATE 3 ML: .5; 3 SOLUTION RESPIRATORY (INHALATION) at 20:31

## 2024-06-01 RX ADMIN — ACETAMINOPHEN 650 MG: 325 TABLET ORAL at 11:08

## 2024-06-01 RX ADMIN — METOPROLOL TARTRATE 50 MG: 50 TABLET, FILM COATED ORAL at 09:07

## 2024-06-01 RX ADMIN — ISOSORBIDE DINITRATE 20 MG: 20 TABLET ORAL at 13:42

## 2024-06-01 RX ADMIN — HEPARIN SODIUM 5000 UNITS: 5000 INJECTION INTRAVENOUS; SUBCUTANEOUS at 20:25

## 2024-06-01 ASSESSMENT — COGNITIVE AND FUNCTIONAL STATUS - GENERAL
MOBILITY SCORE: 13
HELP NEEDED FOR BATHING: A LITTLE
MOVING TO AND FROM BED TO CHAIR: A LITTLE
MOVING TO AND FROM BED TO CHAIR: TOTAL
CLIMB 3 TO 5 STEPS WITH RAILING: TOTAL
TURNING FROM BACK TO SIDE WHILE IN FLAT BAD: A LITTLE
DRESSING REGULAR UPPER BODY CLOTHING: A LITTLE
DRESSING REGULAR LOWER BODY CLOTHING: A LITTLE
STANDING UP FROM CHAIR USING ARMS: TOTAL
TURNING FROM BACK TO SIDE WHILE IN FLAT BAD: A LOT
WALKING IN HOSPITAL ROOM: TOTAL
MOVING FROM LYING ON BACK TO SITTING ON SIDE OF FLAT BED WITH BEDRAILS: A LOT
MOVING FROM LYING ON BACK TO SITTING ON SIDE OF FLAT BED WITH BEDRAILS: A LITTLE
DAILY ACTIVITIY SCORE: 20
WALKING IN HOSPITAL ROOM: TOTAL
MOBILITY SCORE: 8
CLIMB 3 TO 5 STEPS WITH RAILING: TOTAL
TOILETING: A LITTLE
STANDING UP FROM CHAIR USING ARMS: A LOT

## 2024-06-01 ASSESSMENT — PAIN SCALES - GENERAL
PAINLEVEL_OUTOF10: 0 - NO PAIN
PAINLEVEL_OUTOF10: 0 - NO PAIN
PAINLEVEL_OUTOF10: 8
PAINLEVEL_OUTOF10: 6
PAINLEVEL_OUTOF10: 8
PAINLEVEL_OUTOF10: 0 - NO PAIN
PAINLEVEL_OUTOF10: 0 - NO PAIN

## 2024-06-01 ASSESSMENT — PAIN - FUNCTIONAL ASSESSMENT
PAIN_FUNCTIONAL_ASSESSMENT: 0-10

## 2024-06-01 NOTE — CARE PLAN
The patient's goals for the shift include Labs WNL    The clinical goals for the shift include pain control      Problem: Pain  Goal: My pain/discomfort is manageable  Outcome: Progressing     Problem: Safety  Goal: Patient will be injury free during hospitalization  Outcome: Progressing  Goal: I will remain free of falls  Outcome: Progressing     Problem: Daily Care  Goal: Daily care needs are met  Outcome: Progressing     Problem: Psychosocial Needs  Goal: Demonstrates ability to cope with hospitalization/illness  Outcome: Progressing  Goal: Collaborate with me, my family, and caregiver to identify my specific goals  Outcome: Progressing     Problem: Discharge Barriers  Goal: My discharge needs are met  Outcome: Progressing     Problem: Skin  Goal: Decreased wound size/increased tissue granulation at next dressing change  Outcome: Progressing  Goal: Participates in plan/prevention/treatment measures  Outcome: Progressing  Goal: Prevent/manage excess moisture  Outcome: Progressing  Goal: Prevent/minimize sheer/friction injuries  Outcome: Progressing  Goal: Promote/optimize nutrition  Outcome: Progressing  Goal: Promote skin healing  Outcome: Progressing

## 2024-06-01 NOTE — PROGRESS NOTES
06/01/24 1414   Current Planned Discharge Disposition   Current Planned Discharge Disposition Still a [facility]  (HHC vs ARF)     Patient's spouse contacted by phone and notified that PT has updated their note. Patient recommended high intensity at discharge. ARF list offered to spouse; they are requesting the referral be sent to  AR in [place]. Referral sent to facility and will update patient and spouse once decision is reached.    Jojo Keane RN  (Weekend Transitional Care Coordinator-T[facility], send Epic message)

## 2024-06-01 NOTE — PROGRESS NOTES
Acute Care Surgery Daily Progress Note      Subjective  ANA ON. Reports that his pain is well controlled today. He denies N/V. He is not yet passing stool or flatus.     Current Meds  Scheduled medications  acetaminophen, 650 mg, oral, q6h  aspirin, 81 mg, oral, Daily  atorvastatin, 80 mg, oral, Daily  cholecalciferol, 1,000 Units, oral, BID  escitalopram, 10 mg, oral, Daily  heparin (porcine), 5,000 Units, subcutaneous, q8h  insulin lispro, 0-10 Units, subcutaneous, TID  ipratropium-albuteroL, 3 mL, nebulization, q6h  isosorbide dinitrate, 20 mg, oral, TID  lidocaine, 2 patch, transdermal, Daily  melatonin, 3 mg, oral, Nightly  metoprolol tartrate, 50 mg, oral, BID  NIFEdipine ER, 60 mg, oral, Daily before breakfast  pantoprazole, 40 mg, oral, Daily before breakfast  polyethylene glycol, 17 g, oral, Daily  sennosides-docusate sodium, 1 tablet, oral, Nightly  sodium bicarbonate, 1,300 mg, oral, Daily  torsemide, 60 mg, oral, Daily      Continuous medications     PRN medications  PRN medications: dextrose, dextrose, glucagon, HYDROmorphone, naloxone, naloxone, ondansetron, oxyCODONE    Objective    Vitals:   Temp:  [36.3 °C (97.3 °F)-36.9 °C (98.4 °F)] 36.9 °C (98.4 °F)  Heart Rate:  [62-79] 62  Resp:  [17-18] 17  BP: (105-195)/(65-90) 186/83      I/O  I/O last 3 completed shifts:  In: - (0 mL/kg)   Out: 1950 (15.6 mL/kg) [Urine:1950 (0.4 mL/kg/hr)]  Weight: 125.2 kg       Physical Exam  General: laying in bed, NAD  Head: normocephalic, atraumatic  ENT: mucosa are moist   Respiratory: nonlabored breathing on room air  CV: RRR  GI: incision well approximated with staples without surrounding erythema, edema, or drainage. Abdomen is soft, nontender, nondistended  MSK: FILOMENA  Extremities: no swelling or deformity of b/l LEs   Neuro: CN II-XII grossly intact. Sensation to light touch intact    Labs:  Lab Results   Component Value Date    WBC 9.4 06/01/2024    HGB 8.6 (L) 06/01/2024    HCT 26.9 (L) 06/01/2024    MCV 88  06/01/2024     06/01/2024     Lab Results   Component Value Date    GLUCOSE 115 (H) 06/01/2024    CALCIUM 7.9 (L) 06/01/2024     06/01/2024    K 4.7 06/01/2024    CO2 27 06/01/2024     06/01/2024    BUN 41 (H) 06/01/2024    CREATININE 2.58 (H) 06/01/2024     Lab Results   Component Value Date    ALT 20 05/27/2024    AST 29 05/27/2024    ALKPHOS 48 05/27/2024    BILITOT 0.3 05/27/2024           Assessment:  Mr. Waddell, 59yoM with Hx of cholecystitis s/p perc babs tube placement, readmitted on 5/22 with recurrent symptoms after tube fell out at home. He is now s/p open babs on 5/26. Postoperative course has been c/b delirium superimposed on patient's baseline cognitive impairment after Hx of CVAs. Mental status is appropriate today. He is recovering well postoperatively. Will discuss dispo planning with wife.     Plan:   Neuro:   - tylenol, oxy, and dilaudid for pain control   - holding home gabapentin given CKD history   - continue home escitalopram and melatonin   - continue delirium precautions and sleep hygiene    CV:  - continue atorvastatin, metoprolol, nifedipine, and imdur     Heme:  - continue ASA   - resume home plavix     Resp:  - IS   - continue inhalers     GI:  - continue regular diet   - PPI   - continue bowel reg     :  - strict I/O   - will replace lytes PRN   - continue home bicarb   - continue home torsemide     Endo:  - SSI   - holding home glargine     MSK:  - OOB ad hunter   - PT/OT  - PM&R consult     Ppx:  - SCD   - SQH     Dispo:  - RNF     Discussed with Attending Physician     Lo Metcalf MD  General Surgery PGY 5  Acute Care Surgery [id number]

## 2024-06-01 NOTE — CARE PLAN
Problem: Pain  Goal: My pain/discomfort is manageable  Outcome: Progressing     Problem: Safety  Goal: Patient will be injury free during hospitalization  Outcome: Progressing  Goal: I will remain free of falls  Outcome: Progressing     Problem: Daily Care  Goal: Daily care needs are met  Outcome: Progressing     Problem: Psychosocial Needs  Goal: Demonstrates ability to cope with hospitalization/illness  Outcome: Progressing  Goal: Collaborate with me, my family, and caregiver to identify my specific goals  Outcome: Progressing     Problem: Discharge Barriers  Goal: My discharge needs are met  Outcome: Progressing     Problem: Skin  Goal: Decreased wound size/increased tissue granulation at next dressing change  Outcome: Progressing  Goal: Participates in plan/prevention/treatment measures  Outcome: Progressing  Goal: Prevent/manage excess moisture  Outcome: Progressing  Goal: Prevent/minimize sheer/friction injuries  Outcome: Progressing  Goal: Promote/optimize nutrition  Outcome: Progressing  Goal: Promote skin healing  Outcome: Progressing     Problem: Swallowing  Goal: LTG - Patient will tolerate the least restrictive diet consistency to allow for safe consumption of daily meals  Outcome: Progressing   The patient's goals for the shift include Labs WNL    The clinical goals for the shift include safety

## 2024-06-01 NOTE — PROGRESS NOTES
Physical Therapy    Physical Therapy Treatment    Patient Name: Humphrey Waddell  MRN: [id number]  Today's Date: 6/1/2024  Time Calculation  Start Time: 1051  Stop Time: 1105  Time Calculation (min): 14 min    Assessment/Plan   PT Assessment  End of Session Communication: Bedside nurse  Assessment Comment: Patient with improved tolerance and less assistance required this date for bed mobillity. At baseline, patient is able to stand pivot to motorizede wheelchair with prosthetic limb doned independently. Patient is appropriate for D/C at HIGH intensity as he has improved tolerance to treatment this date and is anticipated to progress relatively quickly with transfers given a HIGH Intensity level of PT  End of Session Patient Position: Bed, 3 rail up  PT Plan  Inpatient/Swing Bed or Outpatient: Inpatient  PT Plan  Treatment/Interventions: Bed mobility, Transfer training, Balance training, Endurance training, Therapeutic exercise, Therapeutic activity  PT Plan: Skilled PT  PT Frequency: 3 times per week  PT Discharge Recommendations: High intensity level of continued care  Equipment Recommended upon Discharge:  (TBD)  PT Recommended Transfer Status: Total assist  PT - OK to Discharge: Yes      General Visit Information:   PT  Visit  PT Received On: 06/01/24  Response to Previous Treatment: Patient with no complaints from previous session.  General  Prior to Session Communication: Bedside nurse  Patient Position Received: Bed, 3 rail up  Preferred Learning Style: verbal, visual  General Comment: Patient with improved command following and tolerance to PT session this date    Subjective   Precautions:  Precautions  LE Weight Bearing Status: Other (Comment) (chronic (L) AKA with prostthetic limb in room)         Objective   Pain:  Pain Assessment  Pain Assessment: 0-10  Pain Score: 8  Pain Type: Acute pain  Pain Location: Abdomen  Pain Interventions: Repositioned (RN notified)  Response to Interventions: Comfortable at end of  session  Cognition:  Cognition  Orientation Level: Disoriented to time    Postural Control:  Static Sitting Balance  Static Sitting-Balance Support: Bilateral upper extremity supported  Static Sitting-Level of Assistance: Moderate assistance (able to progress to CGA with cues)  Static Sitting-Comment/Number of Minutes: 5  Dynamic Sitting Balance  Dynamic Sitting-Balance Support: Feet supported, Right upper extremity supported  Dynamic Sitting-Balance: Reaching for objects, Forward lean, Lateral lean  Dynamic Sitting-Comments: Min- Mod A    Treatments:  Bed Mobility  Bed Mobility: Yes  Bed Mobility 1  Bed Mobility 1: Supine to sitting (via log roll)  Level of Assistance 1: Maximum assistance, Maximum verbal cues, Maximum tactile cues  Bed Mobility Comments 1: Mod A for rolling and Max A for sidelying to sitting. Cues for sequencing and technique with mod-max A mostly for trunk management  Bed Mobility 2  Bed Mobility  2: Sitting to supine (via reverse log roll)  Level of Assistance 2: Moderate assistance, Maximum verbal cues, Maximum tactile cues  Bed Mobility Comments 2: MAX cues for  UE placement to assist trunk. Mod A for controlled lowering of trunk and for BLEs. Patient able to sit upright at EOB ~5 min this date  Bed Mobility 3  Bed Mobility 3: Scooting  Level of Assistance 3: Moderate assistance, Moderate verbal cues, Moderate tactile cues  Bed Mobility Comments 3: to scoot  hips forward to EOB    Transfers  Transfer: No (Patient unable to clear buttocks this date and needing varying levels of assist during sitting)    Outcome Measures:  [facility] Basic Mobility  Turning from your back to your side while in a flat bed without using bedrails: A lot  Moving from lying on your back to sitting on the side of a flat bed without using bedrails: A lot  Moving to and from bed to chair (including a wheelchair): Total  Standing up from a chair using your arms (e.g. wheelchair or bedside chair): Total  To walk in hospital  room: Total  Climbing 3-5 steps with railing: Total  Basic Mobility - Total Score: 8    Education Documentation  No documentation found.  Education Comments  No comments found.        OP EDUCATION:       Encounter Problems       Encounter Problems (Active)       PT Problem       Patient will complete supine to sit and sit to supine Mod Assist  (Progressing)       Start:  05/28/24    Expected End:  06/11/24            Pt will demo bed<>chair transfer with max A x 2 and LRAD (Progressing)       Start:  05/28/24    Expected End:  06/11/24            Pt will demo static/dynamic sitting EOB >10 min with min A (Progressing)       Start:  05/28/24    Expected End:  06/11/24

## 2024-06-02 LAB
ALBUMIN SERPL BCP-MCNC: 2.9 G/DL (ref 3.4–5)
ANION GAP SERPL CALC-SCNC: 16 MMOL/L (ref 10–20)
BUN SERPL-MCNC: 38 MG/DL (ref 6–23)
CALCIUM SERPL-MCNC: 8.2 MG/DL (ref 8.6–10.6)
CHLORIDE SERPL-SCNC: 104 MMOL/L (ref 98–107)
CO2 SERPL-SCNC: 22 MMOL/L (ref 21–32)
CREAT SERPL-MCNC: 2.5 MG/DL (ref 0.5–1.3)
EGFRCR SERPLBLD CKD-EPI 2021: 29 ML/MIN/1.73M*2
ERYTHROCYTE [DISTWIDTH] IN BLOOD BY AUTOMATED COUNT: 14.8 % (ref 11.5–14.5)
GLUCOSE BLD MANUAL STRIP-MCNC: 105 MG/DL (ref 74–99)
GLUCOSE BLD MANUAL STRIP-MCNC: 115 MG/DL (ref 74–99)
GLUCOSE BLD MANUAL STRIP-MCNC: 117 MG/DL (ref 74–99)
GLUCOSE SERPL-MCNC: 86 MG/DL (ref 74–99)
HCT VFR BLD AUTO: 28.7 % (ref 41–52)
HGB BLD-MCNC: 9.1 G/DL (ref 13.5–17.5)
MAGNESIUM SERPL-MCNC: 2.09 MG/DL (ref 1.6–2.4)
MCH RBC QN AUTO: 27.7 PG (ref 26–34)
MCHC RBC AUTO-ENTMCNC: 31.7 G/DL (ref 32–36)
MCV RBC AUTO: 88 FL (ref 80–100)
NRBC BLD-RTO: 0 /100 WBCS (ref 0–0)
PHOSPHATE SERPL-MCNC: 2.8 MG/DL (ref 2.5–4.9)
PLATELET # BLD AUTO: 257 X10*3/UL (ref 150–450)
POTASSIUM SERPL-SCNC: 4.7 MMOL/L (ref 3.5–5.3)
RBC # BLD AUTO: 3.28 X10*6/UL (ref 4.5–5.9)
SODIUM SERPL-SCNC: 137 MMOL/L (ref 136–145)
WBC # BLD AUTO: 9.2 X10*3/UL (ref 4.4–11.3)

## 2024-06-02 PROCEDURE — 2500000004 HC RX 250 GENERAL PHARMACY W/ HCPCS (ALT 636 FOR OP/ED)

## 2024-06-02 PROCEDURE — 85027 COMPLETE CBC AUTOMATED: CPT

## 2024-06-02 PROCEDURE — 2500000001 HC RX 250 WO HCPCS SELF ADMINISTERED DRUGS (ALT 637 FOR MEDICARE OP)

## 2024-06-02 PROCEDURE — 82947 ASSAY GLUCOSE BLOOD QUANT: CPT

## 2024-06-02 PROCEDURE — 83735 ASSAY OF MAGNESIUM: CPT

## 2024-06-02 PROCEDURE — 2500000005 HC RX 250 GENERAL PHARMACY W/O HCPCS

## 2024-06-02 PROCEDURE — 1100000001 HC PRIVATE ROOM DAILY

## 2024-06-02 PROCEDURE — 2500000001 HC RX 250 WO HCPCS SELF ADMINISTERED DRUGS (ALT 637 FOR MEDICARE OP): Performed by: STUDENT IN AN ORGANIZED HEALTH CARE EDUCATION/TRAINING PROGRAM

## 2024-06-02 PROCEDURE — 80069 RENAL FUNCTION PANEL: CPT

## 2024-06-02 PROCEDURE — 94640 AIRWAY INHALATION TREATMENT: CPT

## 2024-06-02 PROCEDURE — 2500000002 HC RX 250 W HCPCS SELF ADMINISTERED DRUGS (ALT 637 FOR MEDICARE OP, ALT 636 FOR OP/ED)

## 2024-06-02 PROCEDURE — 36415 COLL VENOUS BLD VENIPUNCTURE: CPT

## 2024-06-02 RX ORDER — IPRATROPIUM BROMIDE AND ALBUTEROL SULFATE 2.5; .5 MG/3ML; MG/3ML
3 SOLUTION RESPIRATORY (INHALATION) EVERY 6 HOURS PRN
Status: DISCONTINUED | OUTPATIENT
Start: 2024-06-02 | End: 2024-06-11 | Stop reason: HOSPADM

## 2024-06-02 RX ADMIN — ACETAMINOPHEN 650 MG: 325 TABLET ORAL at 13:44

## 2024-06-02 RX ADMIN — ESCITALOPRAM OXALATE 10 MG: 10 TABLET ORAL at 10:10

## 2024-06-02 RX ADMIN — ATORVASTATIN CALCIUM 80 MG: 80 TABLET, FILM COATED ORAL at 20:34

## 2024-06-02 RX ADMIN — NIFEDIPINE 60 MG: 60 TABLET, FILM COATED, EXTENDED RELEASE ORAL at 10:10

## 2024-06-02 RX ADMIN — ISOSORBIDE DINITRATE 20 MG: 20 TABLET ORAL at 18:06

## 2024-06-02 RX ADMIN — OXYCODONE HYDROCHLORIDE 5 MG: 5 TABLET ORAL at 20:35

## 2024-06-02 RX ADMIN — TORSEMIDE 60 MG: 20 TABLET ORAL at 10:10

## 2024-06-02 RX ADMIN — HEPARIN SODIUM 5000 UNITS: 5000 INJECTION INTRAVENOUS; SUBCUTANEOUS at 20:35

## 2024-06-02 RX ADMIN — CLOPIDOGREL BISULFATE 75 MG: 75 TABLET ORAL at 10:10

## 2024-06-02 RX ADMIN — ISOSORBIDE DINITRATE 20 MG: 20 TABLET ORAL at 13:44

## 2024-06-02 RX ADMIN — ACETAMINOPHEN 650 MG: 325 TABLET ORAL at 18:06

## 2024-06-02 RX ADMIN — PANTOPRAZOLE SODIUM 40 MG: 40 TABLET, DELAYED RELEASE ORAL at 06:52

## 2024-06-02 RX ADMIN — LIDOCAINE 2 PATCH: 4 PATCH TOPICAL at 10:11

## 2024-06-02 RX ADMIN — ACETAMINOPHEN 650 MG: 325 TABLET ORAL at 06:52

## 2024-06-02 RX ADMIN — METOPROLOL TARTRATE 50 MG: 50 TABLET, FILM COATED ORAL at 10:10

## 2024-06-02 RX ADMIN — IPRATROPIUM BROMIDE AND ALBUTEROL SULFATE 3 ML: .5; 3 SOLUTION RESPIRATORY (INHALATION) at 02:41

## 2024-06-02 RX ADMIN — Medication 1000 UNITS: at 20:34

## 2024-06-02 RX ADMIN — HEPARIN SODIUM 5000 UNITS: 5000 INJECTION INTRAVENOUS; SUBCUTANEOUS at 13:44

## 2024-06-02 RX ADMIN — SODIUM BICARBONATE 1300 MG: 650 TABLET ORAL at 10:10

## 2024-06-02 RX ADMIN — Medication 1000 UNITS: at 10:10

## 2024-06-02 RX ADMIN — HEPARIN SODIUM 5000 UNITS: 5000 INJECTION INTRAVENOUS; SUBCUTANEOUS at 06:52

## 2024-06-02 RX ADMIN — ISOSORBIDE DINITRATE 20 MG: 20 TABLET ORAL at 10:10

## 2024-06-02 RX ADMIN — ASPIRIN 81 MG CHEWABLE TABLET 81 MG: 81 TABLET CHEWABLE at 10:10

## 2024-06-02 RX ADMIN — METOPROLOL TARTRATE 50 MG: 50 TABLET, FILM COATED ORAL at 20:35

## 2024-06-02 RX ADMIN — MELATONIN 3 MG: 3 TAB ORAL at 20:34

## 2024-06-02 RX ADMIN — SENNOSIDES AND DOCUSATE SODIUM 1 TABLET: 50; 8.6 TABLET ORAL at 20:37

## 2024-06-02 RX ADMIN — OXYCODONE HYDROCHLORIDE 5 MG: 5 TABLET ORAL at 10:16

## 2024-06-02 ASSESSMENT — PAIN DESCRIPTION - LOCATION: LOCATION: ABDOMEN

## 2024-06-02 ASSESSMENT — PAIN SCALES - GENERAL
PAINLEVEL_OUTOF10: 8
PAINLEVEL_OUTOF10: 8

## 2024-06-02 ASSESSMENT — PAIN - FUNCTIONAL ASSESSMENT
PAIN_FUNCTIONAL_ASSESSMENT: 0-10
PAIN_FUNCTIONAL_ASSESSMENT: 0-10

## 2024-06-02 NOTE — PROGRESS NOTES
Acute Care Surgery Daily Progress Note      Subjective  NAEON. Pain controlled. No acute concerns.    Current Meds  Scheduled medications  acetaminophen, 650 mg, oral, q6h  aspirin, 81 mg, oral, Daily  atorvastatin, 80 mg, oral, Daily  cholecalciferol, 1,000 Units, oral, BID  clopidogrel, 75 mg, oral, Daily  escitalopram, 10 mg, oral, Daily  heparin (porcine), 5,000 Units, subcutaneous, q8h  insulin lispro, 0-10 Units, subcutaneous, TID  isosorbide dinitrate, 20 mg, oral, TID  lidocaine, 2 patch, transdermal, Daily  melatonin, 3 mg, oral, Nightly  metoprolol tartrate, 50 mg, oral, BID  NIFEdipine ER, 60 mg, oral, Daily before breakfast  pantoprazole, 40 mg, oral, Daily before breakfast  polyethylene glycol, 17 g, oral, Daily  sennosides-docusate sodium, 1 tablet, oral, Nightly  sodium bicarbonate, 1,300 mg, oral, Daily  torsemide, 60 mg, oral, Daily      Continuous medications     PRN medications  PRN medications: dextrose, dextrose, glucagon, hydrALAZINE, HYDROmorphone, ipratropium-albuteroL, naloxone, naloxone, ondansetron, oxyCODONE    Objective    Vitals:   Temp:  [36.2 °C (97.2 °F)-36.9 °C (98.4 °F)] 36.9 °C (98.4 °F)  Heart Rate:  [61-82] 80  Resp:  [17-20] 17  BP: (131-186)/(65-83) 175/79      I/O  I/O last 3 completed shifts:  In: 460 (3.7 mL/kg) [P.O.:460]  Out: 2275 (18.2 mL/kg) [Urine:2275 (0.5 mL/kg/hr)]  Weight: 125.2 kg       Physical Exam  General: lying in bed, NAD  Head: normocephalic  ENT: mucosa are moist   Respiratory: nonlabored breathing on room air  CV: RRR  GI: incision well approximated with staples without surrounding erythema, edema, or drainage. Abdomen is soft, nontender, nondistended  MSK: FILOMENA  Extremities: no swelling or deformity of b/l LEs   Neuro: CN II-XII grossly intact. Sensation to light touch intact    Labs:  Lab Results   Component Value Date    WBC 9.2 06/02/2024    HGB 9.1 (L) 06/02/2024    HCT 28.7 (L) 06/02/2024    MCV 88 06/02/2024     06/02/2024     Lab Results    Component Value Date    GLUCOSE 86 06/02/2024    CALCIUM 8.2 (L) 06/02/2024     06/02/2024    K 4.7 06/02/2024    CO2 22 06/02/2024     06/02/2024    BUN 38 (H) 06/02/2024    CREATININE 2.50 (H) 06/02/2024     Lab Results   Component Value Date    ALT 20 05/27/2024    AST 29 05/27/2024    ALKPHOS 48 05/27/2024    BILITOT 0.3 05/27/2024           Assessment:  Mr. Waddell, 59yoM with Hx of cholecystitis s/p perc babs tube placement, readmitted on 5/22 with recurrent symptoms after tube fell out at home. He is now s/p open babs on 5/26. Postoperative course has been c/b delirium superimposed on patient's baseline cognitive impairment after Hx of CVAs. Mental status is appropriate today. He is recovering well postoperatively. Will continue dispo planning; likely dispo to acute rehab.    Plan:   Neuro:   - tylenol, oxy, and dilaudid for pain control   - holding home gabapentin given CKD history   - continue home escitalopram and melatonin   - continue delirium precautions and sleep hygiene    CV:  - continue atorvastatin, metoprolol, nifedipine, and imdur     Heme:  - continue ASA   - resume home plavix     Resp:  - IS   - continue inhalers     GI:  - continue regular diet   - PPI   - continue bowel reg     :  - strict I/O   - will replace lytes PRN   - continue home bicarb   - continue home torsemide     Endo:  - SSI   - holding home glargine     MSK:  - OOB ad hunter   - PT/OT  - PM&R consult     Ppx:  - SCD   - SQH     Dispo:  - RNF     Discussed with Attending Physician     Susan Anderson MD  Acute Care Surgery [id number]

## 2024-06-02 NOTE — CARE PLAN
The patient's goals for the shift include Labs WNL    The clinical goals for the shift include Pt will remain from pain during shift      Problem: Pain  Goal: My pain/discomfort is manageable  Outcome: Progressing     Problem: Safety  Goal: Patient will be injury free during hospitalization  Outcome: Progressing  Goal: I will remain free of falls  Outcome: Progressing     Problem: Daily Care  Goal: Daily care needs are met  Outcome: Progressing     Problem: Psychosocial Needs  Goal: Demonstrates ability to cope with hospitalization/illness  Outcome: Progressing  Goal: Collaborate with me, my family, and caregiver to identify my specific goals  Outcome: Progressing     Problem: Discharge Barriers  Goal: My discharge needs are met  Outcome: Progressing     Problem: Skin  Goal: Decreased wound size/increased tissue granulation at next dressing change  Outcome: Progressing  Goal: Participates in plan/prevention/treatment measures  Outcome: Progressing  Goal: Prevent/manage excess moisture  Outcome: Progressing  Goal: Prevent/minimize sheer/friction injuries  Outcome: Progressing  Goal: Promote/optimize nutrition  Outcome: Progressing  Goal: Promote skin healing  Outcome: Progressing     Problem: Swallowing  Goal: LTG - Patient will tolerate the least restrictive diet consistency to allow for safe consumption of daily meals  Outcome: Progressing

## 2024-06-03 LAB
ALBUMIN SERPL BCP-MCNC: 2.9 G/DL (ref 3.4–5)
ANION GAP SERPL CALC-SCNC: 14 MMOL/L (ref 10–20)
BUN SERPL-MCNC: 39 MG/DL (ref 6–23)
CALCIUM SERPL-MCNC: 8.1 MG/DL (ref 8.6–10.6)
CHLORIDE SERPL-SCNC: 106 MMOL/L (ref 98–107)
CO2 SERPL-SCNC: 23 MMOL/L (ref 21–32)
CREAT SERPL-MCNC: 2.75 MG/DL (ref 0.5–1.3)
EGFRCR SERPLBLD CKD-EPI 2021: 25 ML/MIN/1.73M*2
ERYTHROCYTE [DISTWIDTH] IN BLOOD BY AUTOMATED COUNT: 15 % (ref 11.5–14.5)
GLUCOSE BLD MANUAL STRIP-MCNC: 100 MG/DL (ref 74–99)
GLUCOSE BLD MANUAL STRIP-MCNC: 101 MG/DL (ref 74–99)
GLUCOSE BLD MANUAL STRIP-MCNC: 117 MG/DL (ref 74–99)
GLUCOSE BLD MANUAL STRIP-MCNC: 136 MG/DL (ref 74–99)
GLUCOSE SERPL-MCNC: 94 MG/DL (ref 74–99)
HCT VFR BLD AUTO: 26.7 % (ref 41–52)
HGB BLD-MCNC: 8.6 G/DL (ref 13.5–17.5)
MAGNESIUM SERPL-MCNC: 2.04 MG/DL (ref 1.6–2.4)
MCH RBC QN AUTO: 28 PG (ref 26–34)
MCHC RBC AUTO-ENTMCNC: 32.2 G/DL (ref 32–36)
MCV RBC AUTO: 87 FL (ref 80–100)
NRBC BLD-RTO: 0 /100 WBCS (ref 0–0)
PHOSPHATE SERPL-MCNC: 3.2 MG/DL (ref 2.5–4.9)
PLATELET # BLD AUTO: 269 X10*3/UL (ref 150–450)
POTASSIUM SERPL-SCNC: 4.9 MMOL/L (ref 3.5–5.3)
RBC # BLD AUTO: 3.07 X10*6/UL (ref 4.5–5.9)
SODIUM SERPL-SCNC: 138 MMOL/L (ref 136–145)
VIT B1 PYROPHOSHATE BLD-SCNC: 92 NMOL/L (ref 70–180)
WBC # BLD AUTO: 10.5 X10*3/UL (ref 4.4–11.3)

## 2024-06-03 PROCEDURE — 2500000005 HC RX 250 GENERAL PHARMACY W/O HCPCS

## 2024-06-03 PROCEDURE — 80069 RENAL FUNCTION PANEL: CPT

## 2024-06-03 PROCEDURE — 97530 THERAPEUTIC ACTIVITIES: CPT | Mod: GP | Performed by: PHYSICAL THERAPIST

## 2024-06-03 PROCEDURE — 82947 ASSAY GLUCOSE BLOOD QUANT: CPT

## 2024-06-03 PROCEDURE — 2500000002 HC RX 250 W HCPCS SELF ADMINISTERED DRUGS (ALT 637 FOR MEDICARE OP, ALT 636 FOR OP/ED)

## 2024-06-03 PROCEDURE — 99222 1ST HOSP IP/OBS MODERATE 55: CPT | Performed by: STUDENT IN AN ORGANIZED HEALTH CARE EDUCATION/TRAINING PROGRAM

## 2024-06-03 PROCEDURE — 36415 COLL VENOUS BLD VENIPUNCTURE: CPT

## 2024-06-03 PROCEDURE — 83735 ASSAY OF MAGNESIUM: CPT

## 2024-06-03 PROCEDURE — 85027 COMPLETE CBC AUTOMATED: CPT

## 2024-06-03 PROCEDURE — 97535 SELF CARE MNGMENT TRAINING: CPT | Mod: GO

## 2024-06-03 PROCEDURE — 1100000001 HC PRIVATE ROOM DAILY

## 2024-06-03 PROCEDURE — 2500000004 HC RX 250 GENERAL PHARMACY W/ HCPCS (ALT 636 FOR OP/ED)

## 2024-06-03 PROCEDURE — 97530 THERAPEUTIC ACTIVITIES: CPT | Mod: GO

## 2024-06-03 PROCEDURE — 2500000001 HC RX 250 WO HCPCS SELF ADMINISTERED DRUGS (ALT 637 FOR MEDICARE OP): Performed by: STUDENT IN AN ORGANIZED HEALTH CARE EDUCATION/TRAINING PROGRAM

## 2024-06-03 PROCEDURE — 2500000001 HC RX 250 WO HCPCS SELF ADMINISTERED DRUGS (ALT 637 FOR MEDICARE OP)

## 2024-06-03 RX ADMIN — HEPARIN SODIUM 5000 UNITS: 5000 INJECTION INTRAVENOUS; SUBCUTANEOUS at 19:43

## 2024-06-03 RX ADMIN — SENNOSIDES AND DOCUSATE SODIUM 1 TABLET: 50; 8.6 TABLET ORAL at 20:47

## 2024-06-03 RX ADMIN — ISOSORBIDE DINITRATE 20 MG: 20 TABLET ORAL at 15:49

## 2024-06-03 RX ADMIN — HEPARIN SODIUM 5000 UNITS: 5000 INJECTION INTRAVENOUS; SUBCUTANEOUS at 04:09

## 2024-06-03 RX ADMIN — Medication 1000 UNITS: at 08:25

## 2024-06-03 RX ADMIN — METOPROLOL TARTRATE 50 MG: 50 TABLET, FILM COATED ORAL at 08:25

## 2024-06-03 RX ADMIN — ESCITALOPRAM OXALATE 10 MG: 10 TABLET ORAL at 08:25

## 2024-06-03 RX ADMIN — TORSEMIDE 60 MG: 20 TABLET ORAL at 08:26

## 2024-06-03 RX ADMIN — ACETAMINOPHEN 650 MG: 325 TABLET ORAL at 12:07

## 2024-06-03 RX ADMIN — NIFEDIPINE 60 MG: 60 TABLET, FILM COATED, EXTENDED RELEASE ORAL at 08:25

## 2024-06-03 RX ADMIN — Medication 1000 UNITS: at 20:47

## 2024-06-03 RX ADMIN — PANTOPRAZOLE SODIUM 40 MG: 40 TABLET, DELAYED RELEASE ORAL at 06:00

## 2024-06-03 RX ADMIN — ACETAMINOPHEN 650 MG: 325 TABLET ORAL at 06:00

## 2024-06-03 RX ADMIN — ACETAMINOPHEN 650 MG: 325 TABLET ORAL at 18:20

## 2024-06-03 RX ADMIN — METOPROLOL TARTRATE 50 MG: 50 TABLET, FILM COATED ORAL at 20:47

## 2024-06-03 RX ADMIN — ATORVASTATIN CALCIUM 80 MG: 80 TABLET, FILM COATED ORAL at 20:47

## 2024-06-03 RX ADMIN — ISOSORBIDE DINITRATE 20 MG: 20 TABLET ORAL at 08:26

## 2024-06-03 RX ADMIN — HEPARIN SODIUM 5000 UNITS: 5000 INJECTION INTRAVENOUS; SUBCUTANEOUS at 12:08

## 2024-06-03 RX ADMIN — ASPIRIN 81 MG CHEWABLE TABLET 81 MG: 81 TABLET CHEWABLE at 08:25

## 2024-06-03 RX ADMIN — ISOSORBIDE DINITRATE 20 MG: 20 TABLET ORAL at 19:42

## 2024-06-03 RX ADMIN — MELATONIN 3 MG: 3 TAB ORAL at 20:47

## 2024-06-03 RX ADMIN — LIDOCAINE 2 PATCH: 4 PATCH TOPICAL at 08:26

## 2024-06-03 RX ADMIN — CLOPIDOGREL BISULFATE 75 MG: 75 TABLET ORAL at 08:25

## 2024-06-03 RX ADMIN — SODIUM BICARBONATE 1300 MG: 650 TABLET ORAL at 08:25

## 2024-06-03 RX ADMIN — OXYCODONE HYDROCHLORIDE 5 MG: 5 TABLET ORAL at 20:46

## 2024-06-03 ASSESSMENT — COGNITIVE AND FUNCTIONAL STATUS - GENERAL
CLIMB 3 TO 5 STEPS WITH RAILING: TOTAL
DRESSING REGULAR UPPER BODY CLOTHING: A LITTLE
WALKING IN HOSPITAL ROOM: TOTAL
MOBILITY SCORE: 13
TOILETING: A LITTLE
HELP NEEDED FOR BATHING: A LITTLE
CLIMB 3 TO 5 STEPS WITH RAILING: TOTAL
DAILY ACTIVITIY SCORE: 20
CLIMB 3 TO 5 STEPS WITH RAILING: TOTAL
CLIMB 3 TO 5 STEPS WITH RAILING: TOTAL
DAILY ACTIVITIY SCORE: 20
TURNING FROM BACK TO SIDE WHILE IN FLAT BAD: A LITTLE
DRESSING REGULAR LOWER BODY CLOTHING: A LITTLE
HELP NEEDED FOR BATHING: A LOT
MOVING FROM LYING ON BACK TO SITTING ON SIDE OF FLAT BED WITH BEDRAILS: A LITTLE
DAILY ACTIVITIY SCORE: 20
DRESSING REGULAR LOWER BODY CLOTHING: A LITTLE
MOVING FROM LYING ON BACK TO SITTING ON SIDE OF FLAT BED WITH BEDRAILS: A LITTLE
WALKING IN HOSPITAL ROOM: TOTAL
MOVING TO AND FROM BED TO CHAIR: A LOT
DRESSING REGULAR UPPER BODY CLOTHING: A LITTLE
WALKING IN HOSPITAL ROOM: TOTAL
MOBILITY SCORE: 12
MOBILITY SCORE: 13
TURNING FROM BACK TO SIDE WHILE IN FLAT BAD: A LITTLE
DRESSING REGULAR LOWER BODY CLOTHING: A LITTLE
MOVING FROM LYING ON BACK TO SITTING ON SIDE OF FLAT BED WITH BEDRAILS: A LITTLE
MOVING TO AND FROM BED TO CHAIR: A LITTLE
MOBILITY SCORE: 13
MOBILITY SCORE: 13
CLIMB 3 TO 5 STEPS WITH RAILING: TOTAL
DRESSING REGULAR LOWER BODY CLOTHING: A LITTLE
TOILETING: A LITTLE
CLIMB 3 TO 5 STEPS WITH RAILING: TOTAL
TOILETING: A LITTLE
DAILY ACTIVITIY SCORE: 16
STANDING UP FROM CHAIR USING ARMS: A LOT
TOILETING: A LITTLE
HELP NEEDED FOR BATHING: A LITTLE
TURNING FROM BACK TO SIDE WHILE IN FLAT BAD: A LITTLE
WALKING IN HOSPITAL ROOM: TOTAL
HELP NEEDED FOR BATHING: A LITTLE
DRESSING REGULAR LOWER BODY CLOTHING: A LITTLE
MOBILITY SCORE: 13
TOILETING: A LOT
DRESSING REGULAR UPPER BODY CLOTHING: A LITTLE
MOVING TO AND FROM BED TO CHAIR: A LITTLE
STANDING UP FROM CHAIR USING ARMS: A LOT
TURNING FROM BACK TO SIDE WHILE IN FLAT BAD: A LITTLE
WALKING IN HOSPITAL ROOM: TOTAL
DRESSING REGULAR LOWER BODY CLOTHING: A LOT
STANDING UP FROM CHAIR USING ARMS: A LOT
DRESSING REGULAR UPPER BODY CLOTHING: A LITTLE
MOVING TO AND FROM BED TO CHAIR: A LITTLE
HELP NEEDED FOR BATHING: A LITTLE
DAILY ACTIVITIY SCORE: 20
TURNING FROM BACK TO SIDE WHILE IN FLAT BAD: A LITTLE
DRESSING REGULAR LOWER BODY CLOTHING: A LITTLE
MOVING FROM LYING ON BACK TO SITTING ON SIDE OF FLAT BED WITH BEDRAILS: A LITTLE
MOVING TO AND FROM BED TO CHAIR: A LITTLE
TURNING FROM BACK TO SIDE WHILE IN FLAT BAD: A LITTLE
MOVING FROM LYING ON BACK TO SITTING ON SIDE OF FLAT BED WITH BEDRAILS: A LITTLE
CLIMB 3 TO 5 STEPS WITH RAILING: TOTAL
DRESSING REGULAR UPPER BODY CLOTHING: A LITTLE
TURNING FROM BACK TO SIDE WHILE IN FLAT BAD: A LITTLE
DRESSING REGULAR UPPER BODY CLOTHING: A LITTLE
HELP NEEDED FOR BATHING: A LITTLE
WALKING IN HOSPITAL ROOM: TOTAL
DRESSING REGULAR UPPER BODY CLOTHING: A LITTLE
MOVING TO AND FROM BED TO CHAIR: A LITTLE
STANDING UP FROM CHAIR USING ARMS: A LOT
MOVING TO AND FROM BED TO CHAIR: A LITTLE
TOILETING: A LITTLE
DAILY ACTIVITIY SCORE: 20
STANDING UP FROM CHAIR USING ARMS: A LOT
CLIMB 3 TO 5 STEPS WITH RAILING: TOTAL
TURNING FROM BACK TO SIDE WHILE IN FLAT BAD: A LITTLE
HELP NEEDED FOR BATHING: A LITTLE
WALKING IN HOSPITAL ROOM: TOTAL
MOBILITY SCORE: 13
STANDING UP FROM CHAIR USING ARMS: A LOT
STANDING UP FROM CHAIR USING ARMS: A LOT
DAILY ACTIVITIY SCORE: 20
MOBILITY SCORE: 13
DRESSING REGULAR LOWER BODY CLOTHING: A LITTLE
HELP NEEDED FOR BATHING: A LITTLE
DRESSING REGULAR UPPER BODY CLOTHING: A LITTLE
STANDING UP FROM CHAIR USING ARMS: A LOT
TOILETING: A LITTLE
TOILETING: A LITTLE
DAILY ACTIVITIY SCORE: 20
WALKING IN HOSPITAL ROOM: TOTAL
PERSONAL GROOMING: A LITTLE
MOVING TO AND FROM BED TO CHAIR: A LITTLE
MOVING FROM LYING ON BACK TO SITTING ON SIDE OF FLAT BED WITH BEDRAILS: A LITTLE

## 2024-06-03 ASSESSMENT — PAIN - FUNCTIONAL ASSESSMENT
PAIN_FUNCTIONAL_ASSESSMENT: 0-10

## 2024-06-03 ASSESSMENT — PAIN SCALES - GENERAL
PAINLEVEL_OUTOF10: 8
PAINLEVEL_OUTOF10: 7
PAINLEVEL_OUTOF10: 3
PAINLEVEL_OUTOF10: 7

## 2024-06-03 ASSESSMENT — PAIN DESCRIPTION - LOCATION: LOCATION: ABDOMEN

## 2024-06-03 ASSESSMENT — PAIN DESCRIPTION - DESCRIPTORS: DESCRIPTORS: DISCOMFORT;TENDER

## 2024-06-03 ASSESSMENT — ACTIVITIES OF DAILY LIVING (ADL): HOME_MANAGEMENT_TIME_ENTRY: 14

## 2024-06-03 NOTE — CONSULTS
PM&R Consult Note  Patient: Humphrey Waddell  Age/sex: 59 y.o. male  Medical Record #: [id number]  ?  Referring physician: Richmond York MD  Consulting physician: Dr. Duvall  Primary care provider: Mily Leung, DO  ?  Procedures performed on/related to this admission:  5/26: open babs    Chief complaint:   Impairments and acitivities limitations in ADLs, mobility secondary to generalized weakness in the setting of cholecystitis    HPI:   Humphrey Waddell is a 59 y.o. male with past medical history significant for 3v CABGx3 11/2023, HFpEF (EF 60-65% 4/2024), HTN, HLD, CKD (baseline Cr ~3.1), CVA 2020 with cog deficits, T2DM, and left AKA who presented initially to [facility] on 5/22/2024 due to abdominal pain.  Now s/p open cholecystecomy on 5/26.  ?  Hospital course was complicated by delerium .   Present status: stable  Pain: 0/10 at rest  PO intake: regular diet   BM: LBM 6/1  Micturition: volitional   DVT prophylaxis with heparin.   Weight bearing status: NWB:LLE   ?  Past Medical History:   Diagnosis Date    Above-knee amputation of left lower extremity (Multi)     Adverse effect of anesthesia     confusion    Anemia     CAD (coronary artery disease)     Carotid artery disease (CMS-[facility])     bilateral    CVA (cerebral vascular accident) (Multi) 2020    Depression     DM (diabetes mellitus) (Multi)     type 2 with neuropathy    GERD (gastroesophageal reflux disease)     High cholesterol     HTN (hypertension)     Leg ulcer (Multi)     chronic right lower extremity    Neuropathy     OA (osteoarthritis)     PAD (peripheral artery disease) (CMS-[facility])     Renal disorder      Past Surgical History:   Procedure Laterality Date    CARDIAC SURGERY      CABG 11/2023    CT ANGIO NECK  06/14/2021    CT NECK ANGIO W AND WO IV CONTRAST 6/14/2021 [facility] INPATIENT LEGACY    CT HEAD ANGIO W AND WO IV CONTRAST  06/14/2021    CT HEAD ANGIO W AND WO IV CONTRAST 6/14/2021 [facility] INPATIENT LEGACY    LEG SURGERY Right     MR HEAD ANGIO WO IV CONTRAST   "12/11/2012    MR HEAD ANGIO WO IV CONTRAST [facility]    MR HEAD ANGIO WO IV CONTRAST  06/12/2021    MR HEAD ANGIO WO IV CONTRAST [facility]    MR HEAD ANGIO WO IV CONTRAST  05/25/2021    MR HEAD ANGIO WO IV CONTRAST [facility]IENT [facility]GAC[facility]    MR NECK ANGIO WO IV CONTRAST  07/18/2023    MR NECK ANGIO WO IV CONTRAST 7/18/2023 [facility] [id number] MRI    OTHER SURGICAL HISTORY  07/07/2021    Knee reconstruction    OTHER SURGICAL HISTORY  07/07/2021    Lower extremity amputation above knee    TOTAL SHOULDER ARTHROPLASTY Left 2020     Family History   Problem Relation Name Age of Onset    Other (cardiac disorder) Mother      Hypertension Mother      Esophageal cancer Mother      Hearing loss Father      Hypertension Father      Heart disease Father      COPD Sister       Allergies   Allergen Reactions    Carvedilol Itching     \"Scratchy throat\"     Scheduled Meds:  acetaminophen, 650 mg, q6h  aspirin, 81 mg, Daily  atorvastatin, 80 mg, Daily  cholecalciferol, 1,000 Units, BID  clopidogrel, 75 mg, Daily  escitalopram, 10 mg, Daily  heparin (porcine), 5,000 Units, q8h  insulin lispro, 0-10 Units, TID  isosorbide dinitrate, 20 mg, TID  lidocaine, 2 patch, Daily  melatonin, 3 mg, Nightly  metoprolol tartrate, 50 mg, BID  NIFEdipine ER, 60 mg, Daily before breakfast  pantoprazole, 40 mg, Daily before breakfast  polyethylene glycol, 17 g, Daily  sennosides-docusate sodium, 1 tablet, Nightly  sodium bicarbonate, 1,300 mg, Daily  torsemide, 60 mg, Daily      PRN Meds  dextrose, 12.5 g, q15 min PRN  dextrose, 25 g, q15 min PRN  glucagon, 1 mg, q15 min PRN  hydrALAZINE, 5 mg, q6h PRN  HYDROmorphone, 0.2 mg, q2h PRN  ipratropium-albuteroL, 3 mL, q6h PRN  naloxone, 0.2 mg, q5 min PRN  naloxone, 0.2 mg, PRN  ondansetron, 4 mg, q6h PRN  oxyCODONE, 5 mg, q6h PRN      Social History:  Social History     Socioeconomic History    Marital status:      Spouse name: Not on file    Number of children: Not on file    Years of " education: Not on file    Highest education level: Not on file   Occupational History    Not on file   Tobacco Use    Smoking status: Former     Types: Cigars     Quit date: 2022     Years since quittin.5    Smokeless tobacco: Never   Vaping Use    Vaping status: Never Used   Substance and Sexual Activity    Alcohol use: Not Currently    Drug use: Not Currently     Types: Marijuana    Sexual activity: Defer   Other Topics Concern    Not on file   Social History Narrative    Not on file     Social Determinants of Health     Financial Resource Strain: Low Risk  (2024)    Overall Financial Resource Strain (CARDIA)     Difficulty of Paying Living Expenses: Not hard at all   Food Insecurity: No Food Insecurity (2024)    Hunger Vital Sign     Worried About Running Out of Food in the Last Year: Never true     Ran Out of Food in the Last Year: Never true   Transportation Needs: No Transportation Needs (2024)    PRAPARE - Transportation     Lack of Transportation (Medical): No     Lack of Transportation (Non-Medical): No   Recent Concern: Transportation Needs - Unmet Transportation Needs (2024)    OASIS : Transportation     Lack of Transportation (Medical): Yes     Lack of Transportation (Non-Medical): No     Patient Unable or Declines to Respond: No   Physical Activity: Inactive (2024)    Exercise Vital Sign     Days of Exercise per Week: 0 days     Minutes of Exercise per Session: 0 min   Stress: Stress Concern Present (2024)    [employer] [place] [employer] - Occupational Stress Questionnaire     Feeling of Stress : Very much   Social Connections: Moderately Integrated (2024)    Social Connection and Isolation Panel [NHANES]     Frequency of Communication with Friends and Family: Three times a week     Frequency of Social Gatherings with Friends and Family: Three times a week     Attends Temple Services: 1 to 4 times per year     Active Member of Clubs or  Organizations: No     Attends Club or Organization Meetings: Never     Marital Status:    Recent Concern: Social Connections - Feeling Socially Isolated (4/12/2024)    OASIS : Social Isolation     Frequency of experiencing loneliness or isolation: Always   Intimate Partner Violence: Not At Risk (4/16/2024)    Humiliation, Afraid, Rape, and Kick questionnaire     Fear of Current or Ex-Partner: No     Emotionally Abused: No     Physically Abused: No     Sexually Abused: No   Housing Stability: Low Risk  (5/25/2024)    Housing Stability Vital Sign     Unable to Pay for Housing in the Last Year: No     Number of Places Lived in the Last Year: 1     Unstable Housing in the Last Year: No     Tobacco/EtOh/Illicits:Denies all  Working History: retired  Social supports: live with sig other, 24 hour assistance may not be available  Home situation: Lives in house, with 0 stairs to enter and 0 stairs to B/B  ?  Functional History:  Home DME: LLE prosthesis, manual wheelchair  Premorbid ADL's: Needs assistance with ADLs, Needs assistance with homemaking, Premorbid Mobility: Needs assistance with functional transfers   Present: decrease in mobility and function and decreased cognition.    PT (6/1): Bed mobilitly: Mod A (w/Max A for sidelying to sitting), Transfers: not attempted (Patient unable to clear buttocks this date and needing varying levels of assist during sitting)      OT (5/28) Eating Assistance: Moderate (anticipate), Grooming Assistance: Maximal (anticipate), Bathing Assistance: Maximal (anticipate),UE Dressing Assistance: Maximal (anticipate),LE Dressing Assistance: Total,LE Dressing Deficit: Don/doff R sock,Toileting Assistance with Device: Total. (6/03): UE dressing: min A, Toiletting: Max A  ?  Review Of Systems:   Contsitutional: No Fever, chills  HEENT: No HA, blurry vision  Respiratory: No Cough, SOB  Cardiovascular: No CP, palpitations   Gastrointestinal: No nausea, vomiting, diarrhea,  constipation, abdominal discomfort; continent of bowel  Genitourinary: No Dysuria, frequency, urgency; continent of bladder  Musculoskeletal: as per HPI  Neurologic: as per HPI  Endocrine: denies DM, thyroid dz  Psychiatric: No Depression, No anxiety  ?  Physical Examination:  Vitals:   Vitals:    06/02/24 2031 06/03/24 0000 06/03/24 0400 06/03/24 0827   BP: 125/73 121/65 150/76 (!) 188/76   BP Location: Left arm Left arm Left arm Left arm   Patient Position: Lying Lying Lying Lying   Pulse: 98 72 64 72   Resp: 18 16 16 16   Temp: 37.1 °C (98.8 °F)  36.4 °C (97.5 °F) 37 °C (98.6 °F)   TempSrc: Temporal  Temporal Temporal   SpO2: 93% 95% 93% 94%   Weight:       Height:         GEN: NAD   HEENT: NC/AT, PERRL, EOMI, No LAD  RESP: CTAB, no R/R/W  CV: +S1 S2, RRR  ABD: soft, NT, ND, normal BS   : no gerardo  Ext: no edema/clubbing/cyanosis. 2+ pulses.   Neuro: CN II-XII grossly intact, sensation intact to LT   ?  Motor Findings  Strength: Right  Strength: Left    Deltoid  4/5  4/5    Biceps  4/5  4/5    Triceps  4/5  4/5    Wrist Extensors  4/5  4/5    FDP 4/5 4/5   Finger abductors 4/5 4/5   Iliospoas  4/5  4/5    Quadriceps  4/5  4/5    Tibialis anterior  4/5     EHL 4/5    Gastroc soleus  4/5       DTRs:  Reflex Right Left   Biceps 2 2   Triceps 2 2   Brachioradialis 2 2   Patella 2    Achilles  2    Babinski 2              ?  Laboratory:  Lab Results   Component Value Date    WBC 10.5 06/03/2024    HGB 8.6 (L) 06/03/2024    HCT 26.7 (L) 06/03/2024    MCV 87 06/03/2024     06/03/2024     Lab Results   Component Value Date    GLUCOSE 94 06/03/2024    CALCIUM 8.1 (L) 06/03/2024     06/03/2024    K 4.9 06/03/2024    CO2 23 06/03/2024     06/03/2024    BUN 39 (H) 06/03/2024    CREATININE 2.75 (H) 06/03/2024     Lab Results   Component Value Date    ALT 20 05/27/2024    AST 29 05/27/2024    ALKPHOS 48 05/27/2024    BILITOT 0.3 05/27/2024       Pertinent Imaging:  CT head wo IV contrast    Result Date:  5/29/2024  Interpreted By:  Torres Henson, STUDY: CT HEAD WO IV CONTRAST;  5/29/2024 6:04 pm   INDICATION: Signs/Symptoms:STAT CT head non con, AMS, evaluate for stroke.   COMPARISON: None.   ACCESSION NUMBER(S): [id number]   ORDERING CLINICIAN: MARTIN CAMACHO   TECHNIQUE: Noncontrast enhanced CT was performed from the skullbase to the vertex.   FINDINGS: There is minimal prominence of the cortical sulci and sylvian fissures without hydrocephalus. There is no evidence of intracranial mass or extra-axial collection. The skull base, paranasal sinuses and orbital structures are normal. The calvarium is normal.       Minimal cortical volume loss without hydrocephalus, hemorrhage or extra-axial collection.   MACRO: none   Signed by: Torres Henson 5/29/2024 6:08 PM Dictation workstation:   [id number]      IMPRESSION:  Humphrey Waddell is a 59 y.o. male with PMH significant for 3v CABGx3 11/2023, HFpEF (EF 60-65% 4/2024), HTN, HLD, CKD (baseline Cr ~3.1), CVA 2020 with no deficits, T2DM,  left AKA  who presented on 5/22/2024 due to abdominal pain, found to have gallstone lodged at neck of cystic duct with no GB wall thickening or pericholecystic fluid on CT. He ultimately underwent open cholecystectomy on 5/26 with ACS. Hospital course was noted for delirium on prior baseline CI.   Demonstrating impairments in mobility, ADLs and cognition.   ?  Recommendations:  # Immobility   - Prevent secondary complications   - Skin protection: low air loss mattress, turn q 2 hours in bed, maintain bowel and bladder continence/containment  - Prevention of pulmonary complications : incentive spirometry and pulmonary toilet  - Maintain adequate nutrition and hydration  - Q shift PROM of upper and lower extremities to prevent contracture    # Dispo  - Patient would benefit from an acute inpatient rehab stay to improve functional outcome of impaired mobility, ADL's, transfers, and self-care.  Treatment plan will include a comprehensive  rehabilitation program with intense physical therapy, occupational therapy, and speech language pathology for 3 hours/day, 5 days/week.  Patient also requires physician supervision for monitoring incision, prosthesis fit, Delirium.  - Post rehab destination: anticipated home  - For questions, please contact via EpicChat    Thank you for the consult.    Kota Duvall MD  FAAPM&R

## 2024-06-03 NOTE — PROGRESS NOTES
[facility]  ACUTE CARE SURGERY - PROGRESS NOTE    Patient Name: Humphrey Waddell  MRN: [id number]  Admit Date: 522  : 1965  AGE: 59 y.o.   GENDER: male  ==============================================================================  TODAY'S ASSESSMENT AND PLAN OF CARE:  Mr. Waddell, 59yoM with Hx of cholecystitis s/p perc babs tube placement, readmitted on  with recurrent symptoms after tube fell out at home. He is now s/p open babs on . Postoperative course has been c/b delirium superimposed on patient's baseline cognitive impairment after Hx of CVAs. Mental status is appropriate today. He is recovering well postoperatively. Will continue dispo planning; likely dispo to acute rehab.     Plan:   Neuro:   - tylenol, oxy, and dilaudid for pain control   - holding home gabapentin given CKD history   - continue home escitalopram and melatonin   - continue delirium precautions and sleep hygiene     CV:  - continue atorvastatin, metoprolol, nifedipine, and imdur      Heme:  - continue ASA   - resume home plavix      Resp:  - IS   - continue inhalers      GI:  - continue regular diet   - PPI   - continue bowel reg      :  - strict I/O   - will replace lytes PRN   - continue home bicarb   - continue home torsemide      Endo:  - SSI   - holding home glargine      MSK:  - OOB ad hunter   - PT/OT  - PM&R consult      Ppx:  - SCD   - SQH      Dispo:  - Medically ready for discharge, pending SNF    Patient discussed with Attending Dr. Joselyn Aldana APRN-[facility]  Acute Care Surgery  Pager [phone]     ==============================================================================  CHIEF COMPLAINT / EVENTS LAST 24HRS / HPI:  No acute events overnight, pain controlled and tolerating a diet     MEDICAL HISTORY / ROS:   Admission history and ROS reviewed. Pertinent changes as follows:  No new changes    PHYSICAL EXAM:  Heart Rate:  [64-98]   Temp:  [36.4 °C (97.5 °F)-37.1 °C (98.8 °F)]    Resp:  [16-19]   BP: (121-188)/(65-76)   SpO2:  [93 %-95 %]   Physical Exam  Constitutional:       Appearance: Normal appearance.   HENT:      Head: Normocephalic.   Eyes:      Extraocular Movements: Extraocular movements intact.   Cardiovascular:      Comments: Non cyanotic   Pulmonary:      Effort: Pulmonary effort is normal.   Abdominal:      Comments: Obese, soft, non distended, appropriately tender to palpitation. Subcostal incision well approximated with staples.    Musculoskeletal:         General: Normal range of motion.   Skin:     General: Skin is warm and dry.   Neurological:      Mental Status: He is alert and oriented to person, place, and time.   Psychiatric:         Behavior: Behavior normal.       LABS:  Results for orders placed or performed during the hospital encounter of 05/22/24 (from the past 24 hour(s))   POCT GLUCOSE   Result Value Ref Range    POCT Glucose 115 (H) 74 - 99 mg/dL   POCT GLUCOSE   Result Value Ref Range    POCT Glucose 117 (H) 74 - 99 mg/dL   CBC   Result Value Ref Range    WBC 10.5 4.4 - 11.3 x10*3/uL    nRBC 0.0 0.0 - 0.0 /100 WBCs    RBC 3.07 (L) 4.50 - 5.90 x10*6/uL    Hemoglobin 8.6 (L) 13.5 - 17.5 g/dL    Hematocrit 26.7 (L) 41.0 - 52.0 %    MCV 87 80 - 100 fL    MCH 28.0 26.0 - 34.0 pg    MCHC 32.2 32.0 - 36.0 g/dL    RDW 15.0 (H) 11.5 - 14.5 %    Platelets 269 150 - 450 x10*3/uL   Renal Function Panel   Result Value Ref Range    Glucose 94 74 - 99 mg/dL    Sodium 138 136 - 145 mmol/L    Potassium 4.9 3.5 - 5.3 mmol/L    Chloride 106 98 - 107 mmol/L    Bicarbonate 23 21 - 32 mmol/L    Anion Gap 14 10 - 20 mmol/L    Urea Nitrogen 39 (H) 6 - 23 mg/dL    Creatinine 2.75 (H) 0.50 - 1.30 mg/dL    eGFR 25 (L) >60 mL/min/1.73m*2    Calcium 8.1 (L) 8.6 - 10.6 mg/dL    Phosphorus 3.2 2.5 - 4.9 mg/dL    Albumin 2.9 (L) 3.4 - 5.0 g/dL   Magnesium   Result Value Ref Range    Magnesium 2.04 1.60 - 2.40 mg/dL   POCT GLUCOSE   Result Value Ref Range    POCT Glucose 100 (H) 74 - 99  mg/dL     MEDICATIONS:  Current Facility-Administered Medications   Medication Dose Route Frequency Provider Last Rate Last Admin    acetaminophen (Tylenol) tablet 650 mg  650 mg oral q6h Amanda Ochoa MD   650 mg at 06/03/24 0600    aspirin chewable tablet 81 mg  81 mg oral Daily Amanda Ochoa MD   81 mg at 06/03/24 0825    atorvastatin (Lipitor) tablet 80 mg  80 mg oral Daily Amanda Ochoa MD   80 mg at 06/02/24 2034    cholecalciferol (Vitamin D-3) tablet 1,000 Units  1,000 Units oral BID Amanad cOhoa MD   1,000 Units at 06/03/24 0825    clopidogrel (Plavix) tablet 75 mg  75 mg oral Daily Cici Metcalf MD   75 mg at 06/03/24 0825    dextrose 50 % injection 12.5 g  12.5 g intravenous q15 min PRN Amanda Ochoa MD        dextrose 50 % injection 25 g  25 g intravenous q15 min PRN Amanda Ochoa MD        escitalopram (Lexapro) tablet 10 mg  10 mg oral Daily Amanda Ochoa MD   10 mg at 06/03/24 0825    glucagon (Glucagen) injection 1 mg  1 mg intramuscular q15 min PRN Amanda Ochoa MD        heparin (porcine) injection 5,000 Units  5,000 Units subcutaneous q8h Amanda Ochoa MD   5,000 Units at 06/03/24 0409    hydrALAZINE (Apresoline) injection 5 mg  5 mg intravenous q6h PRN Dario Nicole MD   5 mg at 06/01/24 1713    HYDROmorphone PF (Dilaudid) injection 0.2 mg  0.2 mg intravenous q2h PRN Amanda Ochoa MD   0.2 mg at 05/28/24 0941    insulin lispro (HumaLOG) injection 0-10 Units  0-10 Units subcutaneous TID Amanda Ochoa MD   2 Units at 05/31/24 1213    ipratropium-albuteroL (Duo-Neb) 0.5-2.5 mg/3 mL nebulizer solution 3 mL  3 mL nebulization q6h PRN Richmond York MD        isosorbide dinitrate (Isordil) tablet 20 mg  20 mg oral TID Amanda Ochoa MD   20 mg at 06/03/24 0826    lidocaine 4 % patch 2 patch  2 patch transdermal Daily Amanda Ochoa MD   2 patch at 06/03/24 0826    melatonin tablet 3 mg  3 mg oral Nightly Amanda Ochoa MD   3 mg at 06/02/24 2034    metoprolol tartrate (Lopressor) tablet 50 mg  50 mg oral  BID Amanda Ochoa MD   50 mg at 06/03/24 0825    naloxone (Narcan) injection 0.2 mg  0.2 mg intravenous q5 min PRN Amanda Ochoa MD        naloxone (Narcan) injection 0.2 mg  0.2 mg intravenous PRN Amanda Ochoa MD        NIFEdipine ER (Adalat CC) 24 hr tablet 60 mg  60 mg oral Daily before breakfast Amanda Ochoa MD   60 mg at 06/03/24 0825    ondansetron (Zofran) injection 4 mg  4 mg intravenous q6h PRN Amanda Ochoa MD   4 mg at 05/28/24 1046    oxyCODONE (Roxicodone) immediate release tablet 5 mg  5 mg oral q6h PRN Amanda Ochoa MD   5 mg at 06/02/24 2035    pantoprazole (ProtoNix) EC tablet 40 mg  40 mg oral Daily before breakfast Amanda Ochoa MD   40 mg at 06/03/24 0600    polyethylene glycol (Glycolax, Miralax) packet 17 g  17 g oral Daily Amanda Ochoa MD        sennosides-docusate sodium (Alexandra-Colace) 8.6-50 mg per tablet 1 tablet  1 tablet oral Nightly Amanda Ochoa MD   1 tablet at 06/02/24 2037    sodium bicarbonate tablet 1,300 mg  1,300 mg oral Daily Amanda Ochoa MD   1,300 mg at 06/03/24 0825    torsemide (Demadex) tablet 60 mg  60 mg oral Daily Amanda Ochoa MD   60 mg at 06/03/24 0826       IMAGING SUMMARY:  (summary of new imaging findings, not a copy of dictation)  No new imaging

## 2024-06-03 NOTE — PROGRESS NOTES
Occupational Therapy    OT Treatment    Patient Name: Humphrey Waddell  MRN: [id number]  Today's Date: 6/3/2024  Time Calculation  Start Time: 1048  Stop Time: 1114  Time Calculation (min): 26 min       Assessment:  OT Assessment: Pt will benefit from continued skilled OT to increase independence in ADL, functional mobility, activity tolerance, safety, and cognition.  Prognosis: Good  Barriers to Discharge: None  Evaluation/Treatment Tolerance: Patient tolerated treatment well  Medical Staff Made Aware: Yes  End of Session Communication: Bedside nurse  End of Session Patient Position: Bed, 3 rail up  OT Assessment Results: Decreased ADL status, Decreased safe judgment during ADL, Decreased cognition, Decreased endurance, Decreased functional mobility, Decreased upper extremity strength, Decreased upper extremity range of motion  Prognosis: Good  Barriers to Discharge: None  Evaluation/Treatment Tolerance: Patient tolerated treatment well  Medical Staff Made Aware: Yes  Strengths: Attitude of self  Barriers to Participation: Comorbidities  Plan:  Treatment Interventions: ADL retraining, Functional transfer training, UE strengthening/ROM, Endurance training, Cognitive reorientation, Patient/family training, Equipment evaluation/education, Compensatory technique education  OT Frequency: 3 times per week  OT Discharge Recommendations: Moderate intensity level of continued care  Equipment Recommended upon Discharge:  (TBD)  OT Recommended Transfer Status: Assist of 2  OT - OK to Discharge: Yes  Treatment Interventions: ADL retraining, Functional transfer training, UE strengthening/ROM, Endurance training, Cognitive reorientation, Patient/family training, Equipment evaluation/education, Compensatory technique education    Subjective   Previous Visit Info:  OT Last Visit  OT Received On: 06/03/24  General:  General  Reason for Referral: Presented with two days of severe right sided abdominal pain and chills at home. CT with  gallstone lodged at neck of cystic duct. S/p cholecystectomy on 5/26  Past Medical History Relevant to Rehab: previously admitted 3/2024 for cholecystitis and underwent percutaneous cholecystostomy tube on 4/3; Left AKA, 3v CABGx3 11/2023, HTN, HLD, CKD, CVA 2020 with no deficits, T2DM  Family/Caregiver Present: No  Co-Treatment: PT  Co-Treatment Reason: to maximize pt safety and therapeutic potential in pt requiring 2 skilled A  Prior to Session Communication: Bedside nurse  Patient Position Received: Bed, 3 rail up, Alarm on  Preferred Learning Style: verbal, visual  General Comment: Pt supine in bed upon arrival, agreeable to OT  Precautions:  LE Weight Bearing Status: Other (Comment) (chronic L AKA, prosthetic limb in room)  Medical Precautions: Fall precautions  Vital Signs:  Vital Signs  Heart Rate: 62  Heart Rate Source: Monitor  SpO2: 96 %  BP: 178/84  BP Location: Right arm  BP Method: Automatic  Patient Position: Sitting  Pain:  Pain Assessment  Pain Assessment: 0-10  Pain Score: 7  Pain Type: Surgical pain, Acute pain  Pain Location: Abdomen  Pain Interventions: Repositioned, Distraction    Objective    Cognition:  Cognition  Overall Cognitive Status: Impaired  Orientation Level: Disoriented to situation  Following Commands: Follows one step commands with increased time  Insight: Mild  Processing Speed: Delayed    Activities of Daily Living:      UE Dressing  UE Dressing Level of Assistance: Minimum assistance  UE Dressing Where Assessed: Edge of bed  UE Dressing Comments: donned/doffed gown seated EOB min A      Toileting  Toileting Level of Assistance: Maximum assistance  Where Assessed: Bed level  Toileting Comments: [facility] supine in bed max A    Bed Mobility/Transfers: Bed Mobility  Bed Mobility: Yes  Bed Mobility 1  Bed Mobility 1: Rolling right, Rolling left  Level of Assistance 1: Minimum assistance, Minimal verbal cues  Bed Mobility Comments 1: VCs for hand placement  Bed Mobility 2  Bed  Mobility  2: Supine to sitting, Sitting to supine  Level of Assistance 2: Maximum assistance (x2)  Bed Mobility Comments 2: HOB elevated, VCs for hand placement  Bed Mobility 3  Bed Mobility 3: Scooting  Bed Mobility Comments 3: att L lateral scoot EOB toward HOB, unable to complete    Transfers  Transfer: No    Functional Mobility:  Functional Mobility  Functional Mobility Performed: No  Sitting Balance:  Static Sitting Balance  Static Sitting-Balance Support: Feet supported  Static Sitting-Level of Assistance: Contact guard  Dynamic Sitting Balance  Dynamic Sitting-Balance Support: Feet supported  Dynamic Sitting-Comments: mod A    Modalities:  Modalities Used: No    Therapy/Activity:      Therapeutic Activity  Therapeutic Activity Performed: Yes  Therapeutic Activity 1: sup/sit, rolling R/L, dymanic sitting EOB ~10 mins CGA-mod A    Outcome Measures:[facility] Daily Activity  Putting on and taking off regular lower body clothing: A lot  Bathing (including washing, rinsing, drying): A lot  Putting on and taking off regular upper body clothing: A little  Toileting, which includes using toilet, bedpan or urinal: A lot  Taking care of personal grooming such as brushing teeth: A little  Eating Meals: None  Daily Activity - Total Score: 16    Education Documentation  Body Mechanics, taught by Cyrus Markham OT at 6/3/2024 12:28 PM.  Learner: Patient  Readiness: Acceptance  Method: Explanation  Response: Verbalizes Understanding, Needs Reinforcement    Precautions, taught by Cyrus Markham OT at 6/3/2024 12:28 PM.  Learner: Patient  Readiness: Acceptance  Method: Explanation  Response: Verbalizes Understanding, Needs Reinforcement    ADL Training, taught by Cyrus Markham OT at 6/3/2024 12:28 PM.  Learner: Patient  Readiness: Acceptance  Method: Explanation  Response: Verbalizes Understanding, Needs Reinforcement    Education Comments  No comments found.      Goals:  Encounter Problems       Encounter Problems (Active)        ADLs       Patient will complete daily grooming tasks  with set-up and stand by assist level of assistance and PRN adaptive equipment while supported sitting. (Progressing)       Start:  05/28/24    Expected End:  06/18/24            Patient will perform toileting tasks, including hygiene and clothing management with moderate assist level of assistance   (Progressing)       Start:  05/28/24    Expected End:  06/18/24               COGNITION/SAFETY          TRANSFERS       Patient will perform bed mobility minimal assist  level of assistance  in order to improve safety and independence with mobility (Progressing)       Start:  05/28/24    Expected End:  06/18/24            Patient will demonstrate functional transfers with least restrictive device with moderate assist level of assistance.  (Progressing)       Start:  05/28/24    Expected End:  06/18/24                 Cyrus Markham OTR/L

## 2024-06-03 NOTE — PROGRESS NOTES
Transitional Care Coordination Progress Note:  This TCC was notified by [facility], unable to accept patient. This TCC updated patient and provided patient with lists from [facility] of Acute Rehab facilities and SNF facilities within patients demographic area. Patient states he would prefer to go to [facility] on [facility]. This TCC also contacted patients wife and updated on denial. Wife states she would prefer to attempt [facility] as patient was there in the past and did well. This TCC sent referral, will continue to follow.   Alcides Matrin RN Transitional Care Coordinator [phone]

## 2024-06-03 NOTE — PROGRESS NOTES
Physical Therapy    Physical Therapy Treatment    Patient Name: Humphrey Waddell  MRN: [id number]  Today's Date: 6/3/2024  Time Calculation  Start Time: 1049  Stop Time: 1113  Time Calculation (min): 24 min    Assessment/Plan   PT Assessment  PT Assessment Results: Decreased strength, Decreased endurance, Impaired balance, Decreased mobility, Decreased cognition, Decreased safety awareness  Rehab Prognosis: Good  Barriers to Participation: Comorbidities  End of Session Communication: Bedside nurse  Assessment Comment: pt with improved tolerance and is still a good candidate for skilled services to improve strength , balance and mobility  End of Session Patient Position: Bed, 3 rail up  PT Plan  Inpatient/Swing Bed or Outpatient: Inpatient  PT Plan  Treatment/Interventions: Bed mobility, Transfer training, Gait training, Strengthening, Endurance training, Range of motion, Therapeutic exercise, Therapeutic activity, Home exercise program  PT Plan: Skilled PT  PT Frequency: 3 times per week  PT Discharge Recommendations: High intensity level of continued care  Equipment Recommended upon Discharge:  (TBD)  PT Recommended Transfer Status: Assist x2  PT - OK to Discharge: Yes      General Visit Information:   PT  Visit  PT Received On: 06/03/24  Response to Previous Treatment: Patient with no complaints from previous session.  General  Reason for Referral: Presented with two days of severe right sided abdominal pain and chills at home. CT with gallstone lodged at neck of cystic duct. S/p cholecystectomy on 5/26  Past Medical History Relevant to Rehab: previously admitted 3/2024 for cholecystitis and underwent percutaneous cholecystostomy tube on 4/3; Left AKA, 3v CABGx3 11/2023, HTN, HLD, CKD, CVA 2020 with no deficits, T2DM  Family/Caregiver Present: No  Co-Treatment: OT  Co-Treatment Reason: to maximize pt safety and therapeutic potential in pt requiring 2 skilled A  Prior to Session Communication: Bedside nurse  Patient  Position Received: Bed, 3 rail up, Alarm on  Preferred Learning Style: verbal, visual  General Comment: pt agreeable to therapy. pt supine in bed.    Subjective   Precautions:  Precautions  LE Weight Bearing Status: Other (Comment) (L AKA, prosthetic limb in room)  Medical Precautions: Fall precautions  Vital Signs:  Vital Signs  Heart Rate: 62  Heart Rate Source: Monitor  SpO2: 96 %  BP: 178/84  BP Location: Right arm  BP Method: Automatic  Patient Position: Sitting    Objective   Pain:  Pain Assessment  Pain Assessment: 0-10  Pain Score: 7  Pain Type: Surgical pain, Acute pain  Pain Location: Abdomen  Pain Interventions: Repositioned  Cognition:  Cognition  Overall Cognitive Status: Impaired  Orientation Level: Disoriented to situation  Insight: Mild  Processing Speed: Delayed  Coordination:     Postural Control:  Postural Control  Postural Control: Impaired  Posture Comment: Pt continues to demonstrated impaired trunk strength/control. Require max assist to position patient, improved to min assist for short duration then back to max assist as pt fatigued.  Static Sitting Balance  Static Sitting-Balance Support: Bilateral upper extremity supported  Static Sitting-Level of Assistance: Contact guard (originally mod assist to gain neutral posutre.)  Static Sitting-Comment/Number of Minutes: 10 mins  Dynamic Sitting Balance  Dynamic Sitting-Comments: min to mod assist    Activity Tolerance:  Activity Tolerance  Endurance: Decreased tolerance for upright activites  Activity Tolerance Comments: improved sitting tolerance  Treatments:  Therapeutic Exercise  Therapeutic Exercise Performed: Yes  Therapeutic Exercise Activity 1: sitting: SAQ, marches 1 x 10 each B B shoulder flexion 1 x 5 each within pain free motion    Therapeutic Activity  Therapeutic Activity Performed: Yes  Therapeutic Activity 1: bed mobiltiy and dynamic exercises in sitting.  Therapeutic Activity 2: attempted scooting in sitting but unable to clear  bottom due to pain in shoulders and unable to weight bear through B UE due to pain    Bed Mobility  Bed Mobility: Yes  Bed Mobility 1  Bed Mobility 1: Rolling right, Rolling left  Level of Assistance 1: Minimum assistance, Minimal verbal cues  Bed Mobility Comments 1: vcs for hand placement  Bed Mobility 2  Bed Mobility  2: Supine to sitting, Sitting to supine  Level of Assistance 2: Maximum assistance (x2)  Bed Mobility Comments 2: HOB elevated  Bed Mobility 3  Bed Mobility 3: Scooting  Level of Assistance 3: Moderate assistance       Transfers  Transfer: No         Outcome Measures:  [facility] Basic Mobility  Turning from your back to your side while in a flat bed without using bedrails: A little  Moving from lying on your back to sitting on the side of a flat bed without using bedrails: A little  Moving to and from bed to chair (including a wheelchair): A little  Standing up from a chair using your arms (e.g. wheelchair or bedside chair): A lot  To walk in hospital room: Total  Climbing 3-5 steps with railing: Total  Basic Mobility - Total Score: 13    Education Documentation  Body Mechanics, taught by Morelia Jama, PT at 6/3/2024 12:47 PM.  Learner: Patient  Readiness: Acceptance  Method: Demonstration  Response: Verbalizes Understanding    Mobility Training, taught by Morelia Jama, PT at 6/3/2024 12:47 PM.  Learner: Patient  Readiness: Acceptance  Method: Demonstration  Response: Verbalizes Understanding    Education Comments  No comments found.        OP EDUCATION:       Encounter Problems       Encounter Problems (Active)       PT Problem       Patient will complete supine to sit and sit to supine Mod Assist  (Progressing)       Start:  05/28/24    Expected End:  06/11/24            Pt will demo bed<>chair transfer with max A x 2 and LRAD (Progressing)       Start:  05/28/24    Expected End:  06/11/24            Pt will demo static/dynamic sitting EOB >10 min with min A (Progressing)       Start:   05/28/24    Expected End:  06/11/24

## 2024-06-03 NOTE — CARE PLAN
The patient's goals for the shift include Labs WNL    The clinical goals for the shift include Patient have relief of pain during the shift

## 2024-06-04 ENCOUNTER — APPOINTMENT (OUTPATIENT)
Dept: CARDIOLOGY | Facility: CLINIC | Age: 59
End: 2024-06-04
Payer: COMMERCIAL

## 2024-06-04 LAB
GLUCOSE BLD MANUAL STRIP-MCNC: 104 MG/DL (ref 74–99)
GLUCOSE BLD MANUAL STRIP-MCNC: 117 MG/DL (ref 74–99)
GLUCOSE BLD MANUAL STRIP-MCNC: 119 MG/DL (ref 74–99)
GLUCOSE BLD MANUAL STRIP-MCNC: 150 MG/DL (ref 74–99)

## 2024-06-04 PROCEDURE — 2500000001 HC RX 250 WO HCPCS SELF ADMINISTERED DRUGS (ALT 637 FOR MEDICARE OP)

## 2024-06-04 PROCEDURE — 2500000004 HC RX 250 GENERAL PHARMACY W/ HCPCS (ALT 636 FOR OP/ED)

## 2024-06-04 PROCEDURE — 82947 ASSAY GLUCOSE BLOOD QUANT: CPT

## 2024-06-04 PROCEDURE — 2500000002 HC RX 250 W HCPCS SELF ADMINISTERED DRUGS (ALT 637 FOR MEDICARE OP, ALT 636 FOR OP/ED)

## 2024-06-04 PROCEDURE — 1100000001 HC PRIVATE ROOM DAILY

## 2024-06-04 PROCEDURE — 2500000005 HC RX 250 GENERAL PHARMACY W/O HCPCS

## 2024-06-04 PROCEDURE — 2500000001 HC RX 250 WO HCPCS SELF ADMINISTERED DRUGS (ALT 637 FOR MEDICARE OP): Performed by: STUDENT IN AN ORGANIZED HEALTH CARE EDUCATION/TRAINING PROGRAM

## 2024-06-04 RX ADMIN — SODIUM BICARBONATE 1300 MG: 650 TABLET ORAL at 08:30

## 2024-06-04 RX ADMIN — ASPIRIN 81 MG CHEWABLE TABLET 81 MG: 81 TABLET CHEWABLE at 08:30

## 2024-06-04 RX ADMIN — ACETAMINOPHEN 650 MG: 325 TABLET ORAL at 13:41

## 2024-06-04 RX ADMIN — Medication 1000 UNITS: at 22:06

## 2024-06-04 RX ADMIN — OXYCODONE HYDROCHLORIDE 5 MG: 5 TABLET ORAL at 22:06

## 2024-06-04 RX ADMIN — ISOSORBIDE DINITRATE 20 MG: 20 TABLET ORAL at 08:31

## 2024-06-04 RX ADMIN — HEPARIN SODIUM 5000 UNITS: 5000 INJECTION INTRAVENOUS; SUBCUTANEOUS at 04:20

## 2024-06-04 RX ADMIN — NIFEDIPINE 60 MG: 60 TABLET, FILM COATED, EXTENDED RELEASE ORAL at 08:31

## 2024-06-04 RX ADMIN — HEPARIN SODIUM 5000 UNITS: 5000 INJECTION INTRAVENOUS; SUBCUTANEOUS at 13:41

## 2024-06-04 RX ADMIN — MELATONIN 3 MG: 3 TAB ORAL at 22:06

## 2024-06-04 RX ADMIN — LIDOCAINE 2 PATCH: 4 PATCH TOPICAL at 08:32

## 2024-06-04 RX ADMIN — SENNOSIDES AND DOCUSATE SODIUM 1 TABLET: 50; 8.6 TABLET ORAL at 22:06

## 2024-06-04 RX ADMIN — TORSEMIDE 60 MG: 20 TABLET ORAL at 08:31

## 2024-06-04 RX ADMIN — ACETAMINOPHEN 650 MG: 325 TABLET ORAL at 06:15

## 2024-06-04 RX ADMIN — ESCITALOPRAM OXALATE 10 MG: 10 TABLET ORAL at 08:31

## 2024-06-04 RX ADMIN — METOPROLOL TARTRATE 50 MG: 50 TABLET, FILM COATED ORAL at 22:06

## 2024-06-04 RX ADMIN — ISOSORBIDE DINITRATE 20 MG: 20 TABLET ORAL at 22:06

## 2024-06-04 RX ADMIN — PANTOPRAZOLE SODIUM 40 MG: 40 TABLET, DELAYED RELEASE ORAL at 06:15

## 2024-06-04 RX ADMIN — HEPARIN SODIUM 5000 UNITS: 5000 INJECTION INTRAVENOUS; SUBCUTANEOUS at 22:06

## 2024-06-04 RX ADMIN — ISOSORBIDE DINITRATE 20 MG: 20 TABLET ORAL at 15:20

## 2024-06-04 RX ADMIN — Medication 1000 UNITS: at 08:30

## 2024-06-04 RX ADMIN — ACETAMINOPHEN 650 MG: 325 TABLET ORAL at 22:06

## 2024-06-04 RX ADMIN — CLOPIDOGREL BISULFATE 75 MG: 75 TABLET ORAL at 08:30

## 2024-06-04 RX ADMIN — METOPROLOL TARTRATE 50 MG: 50 TABLET, FILM COATED ORAL at 08:30

## 2024-06-04 RX ADMIN — ATORVASTATIN CALCIUM 80 MG: 80 TABLET, FILM COATED ORAL at 22:06

## 2024-06-04 ASSESSMENT — COGNITIVE AND FUNCTIONAL STATUS - GENERAL
CLIMB 3 TO 5 STEPS WITH RAILING: TOTAL
TOILETING: A LITTLE
MOVING FROM LYING ON BACK TO SITTING ON SIDE OF FLAT BED WITH BEDRAILS: A LITTLE
HELP NEEDED FOR BATHING: A LITTLE
TOILETING: A LITTLE
TURNING FROM BACK TO SIDE WHILE IN FLAT BAD: A LITTLE
MOVING FROM LYING ON BACK TO SITTING ON SIDE OF FLAT BED WITH BEDRAILS: A LITTLE
CLIMB 3 TO 5 STEPS WITH RAILING: TOTAL
CLIMB 3 TO 5 STEPS WITH RAILING: TOTAL
TURNING FROM BACK TO SIDE WHILE IN FLAT BAD: A LITTLE
STANDING UP FROM CHAIR USING ARMS: A LOT
WALKING IN HOSPITAL ROOM: TOTAL
TURNING FROM BACK TO SIDE WHILE IN FLAT BAD: A LITTLE
WALKING IN HOSPITAL ROOM: TOTAL
MOVING TO AND FROM BED TO CHAIR: A LITTLE
MOBILITY SCORE: 13
DRESSING REGULAR LOWER BODY CLOTHING: A LITTLE
STANDING UP FROM CHAIR USING ARMS: A LOT
MOBILITY SCORE: 13
TOILETING: A LITTLE
DRESSING REGULAR LOWER BODY CLOTHING: A LITTLE
DRESSING REGULAR UPPER BODY CLOTHING: A LITTLE
MOBILITY SCORE: 13
DAILY ACTIVITIY SCORE: 20
WALKING IN HOSPITAL ROOM: TOTAL
DAILY ACTIVITIY SCORE: 20
HELP NEEDED FOR BATHING: A LITTLE
DRESSING REGULAR UPPER BODY CLOTHING: A LITTLE
MOVING FROM LYING ON BACK TO SITTING ON SIDE OF FLAT BED WITH BEDRAILS: A LITTLE
DRESSING REGULAR UPPER BODY CLOTHING: A LITTLE
MOVING TO AND FROM BED TO CHAIR: A LITTLE
STANDING UP FROM CHAIR USING ARMS: A LOT
STANDING UP FROM CHAIR USING ARMS: A LOT
DAILY ACTIVITIY SCORE: 20
DRESSING REGULAR UPPER BODY CLOTHING: A LITTLE
DAILY ACTIVITIY SCORE: 20
DRESSING REGULAR LOWER BODY CLOTHING: A LITTLE
MOBILITY SCORE: 13
DRESSING REGULAR UPPER BODY CLOTHING: A LITTLE
WALKING IN HOSPITAL ROOM: TOTAL
MOVING TO AND FROM BED TO CHAIR: A LITTLE
MOVING FROM LYING ON BACK TO SITTING ON SIDE OF FLAT BED WITH BEDRAILS: A LITTLE
HELP NEEDED FOR BATHING: A LITTLE
DAILY ACTIVITIY SCORE: 20
WALKING IN HOSPITAL ROOM: TOTAL
TURNING FROM BACK TO SIDE WHILE IN FLAT BAD: A LITTLE
TOILETING: A LITTLE
DRESSING REGULAR LOWER BODY CLOTHING: A LITTLE
MOBILITY SCORE: 13
MOVING FROM LYING ON BACK TO SITTING ON SIDE OF FLAT BED WITH BEDRAILS: A LITTLE
STANDING UP FROM CHAIR USING ARMS: A LOT
TOILETING: A LITTLE
MOVING TO AND FROM BED TO CHAIR: A LITTLE
DRESSING REGULAR LOWER BODY CLOTHING: A LITTLE
CLIMB 3 TO 5 STEPS WITH RAILING: TOTAL
TURNING FROM BACK TO SIDE WHILE IN FLAT BAD: A LITTLE
CLIMB 3 TO 5 STEPS WITH RAILING: TOTAL
HELP NEEDED FOR BATHING: A LITTLE
HELP NEEDED FOR BATHING: A LITTLE
MOVING TO AND FROM BED TO CHAIR: A LITTLE

## 2024-06-04 ASSESSMENT — PAIN SCALES - GENERAL
PAINLEVEL_OUTOF10: 8
PAINLEVEL_OUTOF10: 3
PAINLEVEL_OUTOF10: 5 - MODERATE PAIN
PAINLEVEL_OUTOF10: 2
PAINLEVEL_OUTOF10: 8

## 2024-06-04 ASSESSMENT — PAIN - FUNCTIONAL ASSESSMENT
PAIN_FUNCTIONAL_ASSESSMENT: 0-10

## 2024-06-04 ASSESSMENT — PAIN DESCRIPTION - LOCATION: LOCATION: ABDOMEN

## 2024-06-04 ASSESSMENT — PAIN DESCRIPTION - DESCRIPTORS
DESCRIPTORS: DISCOMFORT
DESCRIPTORS: ACHING;DISCOMFORT

## 2024-06-04 NOTE — PROGRESS NOTES
Transitional Care Coordination Progress Note:  PLAN PER MEDICAL TEAM: Patient medically ready for discharge.     PAYOR: Dee    DISPO: Patient pending acceptance at [facility]. Stated they wanted to see patient attempt a transfer prior to acceptance. Notified PT/OT via [facility] Communication columns. Will send updates when available. Will need precert once accepted.    SUPPORT/CONTACT: Wife, Charline, [phone]    Fatemeh Hillman RN, [facility]

## 2024-06-04 NOTE — CARE PLAN
The patient's goals for the shift include Labs WNL    The clinical goals for the shift include Labs WNL    Problem: Pain  Goal: My pain/discomfort is manageable  Outcome: Progressing     Problem: Safety  Goal: Patient will be injury free during hospitalization  Outcome: Progressing  Goal: I will remain free of falls  Outcome: Progressing     Problem: Daily Care  Goal: Daily care needs are met  Outcome: Progressing     Problem: Psychosocial Needs  Goal: Demonstrates ability to cope with hospitalization/illness  Outcome: Progressing  Goal: Collaborate with me, my family, and caregiver to identify my specific goals  Outcome: Progressing  Flowsheets (Taken 6/3/2024 1947)  Cultural Requests During Hospitalization: Buddhist  Spiritual Requests During Hospitalization: none     Problem: Discharge Barriers  Goal: My discharge needs are met  Outcome: Progressing     Problem: Skin  Goal: Decreased wound size/increased tissue granulation at next dressing change  6/4/2024 0617 by Daily Hyatt RN  Outcome: Progressing  6/3/2024 1947 by Daily Hyatt RN  Flowsheets (Taken 6/3/2024 1947)  Decreased wound size/increased tissue granulation at next dressing change:   Utilize specialty bed per algorithm   Promote sleep for wound healing   Protective dressings over bony prominences  Goal: Participates in plan/prevention/treatment measures  6/4/2024 0617 by Daily Hyatt RN  Outcome: Progressing  6/3/2024 1947 by Daily Hyatt RN  Flowsheets (Taken 6/3/2024 1947)  Participates in plan/prevention/treatment measures:   Increase activity/out of bed for meals   Discuss with provider PT/OT consult   Elevate heels  Goal: Prevent/manage excess moisture  6/4/2024 0617 by Daily Hyatt RN  Outcome: Progressing  6/3/2024 1947 by Daily Hyatt RN  Flowsheets (Taken 6/3/2024 1947)  Prevent/manage excess moisture:   Use wicking fabric (obtain order)   Moisturize dry skin   Cleanse incontinence/protect with barrier  cream   Monitor for/manage infection if present   Follow provider orders for dressing changes  Goal: Prevent/minimize sheer/friction injuries  6/4/2024 0617 by Daily Hyatt RN  Outcome: Progressing  6/3/2024 1947 by Daily Hyatt RN  Flowsheets (Taken 6/3/2024 1947)  Prevent/minimize sheer/friction injuries:   Utilize specialty bed per algorithm   Use pull sheet   Turn/reposition every 2 hours/use positioning/transfer devices   Increase activity/out of bed for meals   HOB 30 degrees or less   Complete micro-shifts as needed if patient unable. Adjust patient position to relieve pressure points, not a full turn  Goal: Promote/optimize nutrition  6/4/2024 0617 by Daily Hyatt RN  Outcome: Progressing  6/3/2024 1947 by Daily Hyatt RN  Flowsheets (Taken 6/3/2024 1947)  Promote/optimize nutrition:   Offer water/supplements/favorite foods   Discuss with provider if NPO > 2 days   Assist with feeding   Reassess MST if dietician not consulted   Monitor/record intake including meals   Consume > 50% meals/supplements  Goal: Promote skin healing  6/4/2024 0617 by Daily Hyatt RN  Outcome: Progressing  6/3/2024 1947 by Daily Hyatt RN  Flowsheets (Taken 6/3/2024 1947)  Promote skin healing:   Turn/reposition every 2 hours/use positioning/transfer devices   Rotate device position/do not position patient on device   Protective dressings over bony prominences   Ensure correct size (line/device) and apply per  instructions   Assess skin/pad under line(s)/device(s)     Problem: Swallowing  Goal: LTG - Patient will tolerate the least restrictive diet consistency to allow for safe consumption of daily meals  Outcome: Progressing

## 2024-06-04 NOTE — PROGRESS NOTES
[facility]  ACUTE CARE SURGERY - PROGRESS NOTE    Patient Name: Humphrey Waddell  MRN: [id number]  Admit Date: 522  : 1965  AGE: 59 y.o.   GENDER: male  ==============================================================================  TODAY'S ASSESSMENT AND PLAN OF CARE:  Mr. Waddell, 59yoM with Hx of cholecystitis s/p perc babs tube placement, readmitted on  with recurrent symptoms after tube fell out at home. He is now s/p open babs on . Postoperative course has been c/b delirium superimposed on patient's baseline cognitive impairment after Hx of CVAs. Mental status is appropriate today. He is recovering well postoperatively. Will continue dispo planning; likely dispo to acute rehab.     Plan:   Neuro:   - tylenol, oxy, and dilaudid for pain control   - holding home gabapentin given CKD history   - continue home escitalopram and melatonin   - continue delirium precautions and sleep hygiene     CV:  - continue atorvastatin, metoprolol, nifedipine, and imdur      Heme:  - continue ASA   - resume home plavix      Resp:  - IS   - continue inhalers      GI:  - continue regular diet   - PPI   - continue bowel reg      :  - strict I/O   - will replace lytes PRN   - continue home bicarb   - continue home torsemide      Endo:  - SSI   - holding home glargine (euglycemic)     MSK:  - OOB ad hunter   - PT/OT     Ppx:  - SCD   - SQH      Dispo:  - Medically ready for discharge, pending SNF    Patient discussed with Attending Dr. Joselyn Aldana APRN-[facility]  Acute Care Surgery  Pager [phone]     ==============================================================================  CHIEF COMPLAINT / EVENTS LAST 24HRS / HPI:  No acute events overnight    MEDICAL HISTORY / ROS:   Admission history and ROS reviewed. Pertinent changes as follows:  No new changes    PHYSICAL EXAM:  Heart Rate:  [57-98]   Temp:  [36.7 °C (98.1 °F)-37.1 °C (98.8 °F)]   Resp:  [15-18]   BP: (138-183)/(66-80)   SpO2:   [92 %-98 %]   Physical Exam  Constitutional:       Appearance: Normal appearance.   HENT:      Head: Normocephalic.   Eyes:      Extraocular Movements: Extraocular movements intact.   Cardiovascular:      Comments: Non cyanotic   Pulmonary:      Effort: Pulmonary effort is normal.   Abdominal:      Comments: Obese, soft, non distended, appropriately tender to palpitation. Subcostal incision well approximated with staples.    Musculoskeletal:         General: Normal range of motion.   Skin:     General: Skin is warm and dry.   Neurological:      Mental Status: He is alert and oriented to person, place, and time.   Psychiatric:         Behavior: Behavior normal.       LABS:  Results for orders placed or performed during the hospital encounter of 05/22/24 (from the past 24 hour(s))   POCT GLUCOSE   Result Value Ref Range    POCT Glucose 117 (H) 74 - 99 mg/dL   POCT GLUCOSE   Result Value Ref Range    POCT Glucose 101 (H) 74 - 99 mg/dL   POCT GLUCOSE   Result Value Ref Range    POCT Glucose 136 (H) 74 - 99 mg/dL   POCT GLUCOSE   Result Value Ref Range    POCT Glucose 104 (H) 74 - 99 mg/dL   POCT GLUCOSE   Result Value Ref Range    POCT Glucose 119 (H) 74 - 99 mg/dL     MEDICATIONS:  Current Facility-Administered Medications   Medication Dose Route Frequency Provider Last Rate Last Admin    acetaminophen (Tylenol) tablet 650 mg  650 mg oral q6h Amanda Ochoa MD   650 mg at 06/04/24 0615    aspirin chewable tablet 81 mg  81 mg oral Daily Amanda Ochoa MD   81 mg at 06/04/24 0830    atorvastatin (Lipitor) tablet 80 mg  80 mg oral Daily Amanda Ochoa MD   80 mg at 06/03/24 2047    cholecalciferol (Vitamin D-3) tablet 1,000 Units  1,000 Units oral BID Amanda Ochoa MD   1,000 Units at 06/04/24 0830    clopidogrel (Plavix) tablet 75 mg  75 mg oral Daily Cici Metcalf MD   75 mg at 06/04/24 0830    dextrose 50 % injection 12.5 g  12.5 g intravenous q15 min PRN Amanda Ochoa MD        dextrose 50 % injection 25 g  25 g  intravenous q15 min PRN Amanda Ochoa MD        escitalopram (Lexapro) tablet 10 mg  10 mg oral Daily Amanda Ochoa MD   10 mg at 06/04/24 0831    glucagon (Glucagen) injection 1 mg  1 mg intramuscular q15 min PRN Amanda Ochoa MD        heparin (porcine) injection 5,000 Units  5,000 Units subcutaneous q8h Amanda Ochoa MD   5,000 Units at 06/04/24 0420    hydrALAZINE (Apresoline) injection 5 mg  5 mg intravenous q6h PRN Dario Nicole MD   5 mg at 06/01/24 1713    HYDROmorphone PF (Dilaudid) injection 0.2 mg  0.2 mg intravenous q2h PRN Amanda Ochoa MD   0.2 mg at 05/28/24 0941    insulin lispro (HumaLOG) injection 0-10 Units  0-10 Units subcutaneous TID Amanda Ochoa MD   2 Units at 05/31/24 1213    ipratropium-albuteroL (Duo-Neb) 0.5-2.5 mg/3 mL nebulizer solution 3 mL  3 mL nebulization q6h PRN Richmond York MD        isosorbide dinitrate (Isordil) tablet 20 mg  20 mg oral TID Amanda Ochoa MD   20 mg at 06/04/24 0831    lidocaine 4 % patch 2 patch  2 patch transdermal Daily Amanda Ochoa MD   2 patch at 06/04/24 0832    melatonin tablet 3 mg  3 mg oral Nightly Amanda Ochoa MD   3 mg at 06/03/24 2047    metoprolol tartrate (Lopressor) tablet 50 mg  50 mg oral BID Amanda Ochoa MD   50 mg at 06/04/24 0830    naloxone (Narcan) injection 0.2 mg  0.2 mg intravenous q5 min PRN Amanda Ochoa MD        naloxone (Narcan) injection 0.2 mg  0.2 mg intravenous PRN Amanda Ochoa MD        NIFEdipine ER (Adalat CC) 24 hr tablet 60 mg  60 mg oral Daily before breakfast Amanda Ochoa MD   60 mg at 06/04/24 0831    ondansetron (Zofran) injection 4 mg  4 mg intravenous q6h PRN Amanda Ochoa MD   4 mg at 05/28/24 1046    oxyCODONE (Roxicodone) immediate release tablet 5 mg  5 mg oral q6h PRN Amanda Ochoa MD   5 mg at 06/03/24 2046    pantoprazole (ProtoNix) EC tablet 40 mg  40 mg oral Daily before breakfast Amanda Ochoa MD   40 mg at 06/04/24 0615    polyethylene glycol (Glycolax, Miralax) packet 17 g  17 g oral Daily Amanda Ochoa MD         sennosides-docusate sodium (Alexandra-Colace) 8.6-50 mg per tablet 1 tablet  1 tablet oral Nightly Amanda Ochoa MD   1 tablet at 06/03/24 2047    sodium bicarbonate tablet 1,300 mg  1,300 mg oral Daily Amanda Ochoa MD   1,300 mg at 06/04/24 0830    torsemide (Demadex) tablet 60 mg  60 mg oral Daily Amanda Ochoa MD   60 mg at 06/04/24 0831       IMAGING SUMMARY:  (summary of new imaging findings, not a copy of dictation)  No new imaging

## 2024-06-04 NOTE — CARE PLAN
The patient's goals for the shift include Labs WNL    The clinical goals for the shift include Labs WNL

## 2024-06-05 LAB
GLUCOSE BLD MANUAL STRIP-MCNC: 108 MG/DL (ref 74–99)
GLUCOSE BLD MANUAL STRIP-MCNC: 117 MG/DL (ref 74–99)
GLUCOSE BLD MANUAL STRIP-MCNC: 129 MG/DL (ref 74–99)
GLUCOSE BLD MANUAL STRIP-MCNC: 138 MG/DL (ref 74–99)
GLUCOSE BLD MANUAL STRIP-MCNC: 140 MG/DL (ref 74–99)

## 2024-06-05 PROCEDURE — 97530 THERAPEUTIC ACTIVITIES: CPT | Mod: GO

## 2024-06-05 PROCEDURE — 82947 ASSAY GLUCOSE BLOOD QUANT: CPT

## 2024-06-05 PROCEDURE — 2500000004 HC RX 250 GENERAL PHARMACY W/ HCPCS (ALT 636 FOR OP/ED)

## 2024-06-05 PROCEDURE — 2500000001 HC RX 250 WO HCPCS SELF ADMINISTERED DRUGS (ALT 637 FOR MEDICARE OP): Performed by: STUDENT IN AN ORGANIZED HEALTH CARE EDUCATION/TRAINING PROGRAM

## 2024-06-05 PROCEDURE — 2500000005 HC RX 250 GENERAL PHARMACY W/O HCPCS

## 2024-06-05 PROCEDURE — 2500000001 HC RX 250 WO HCPCS SELF ADMINISTERED DRUGS (ALT 637 FOR MEDICARE OP)

## 2024-06-05 PROCEDURE — 1100000001 HC PRIVATE ROOM DAILY

## 2024-06-05 PROCEDURE — 97530 THERAPEUTIC ACTIVITIES: CPT | Mod: GP

## 2024-06-05 PROCEDURE — 2500000002 HC RX 250 W HCPCS SELF ADMINISTERED DRUGS (ALT 637 FOR MEDICARE OP, ALT 636 FOR OP/ED)

## 2024-06-05 RX ADMIN — SODIUM BICARBONATE 1300 MG: 650 TABLET ORAL at 08:51

## 2024-06-05 RX ADMIN — ISOSORBIDE DINITRATE 20 MG: 20 TABLET ORAL at 14:16

## 2024-06-05 RX ADMIN — CLOPIDOGREL BISULFATE 75 MG: 75 TABLET ORAL at 08:51

## 2024-06-05 RX ADMIN — Medication 1000 UNITS: at 08:51

## 2024-06-05 RX ADMIN — MELATONIN 3 MG: 3 TAB ORAL at 22:12

## 2024-06-05 RX ADMIN — METOPROLOL TARTRATE 50 MG: 50 TABLET, FILM COATED ORAL at 08:51

## 2024-06-05 RX ADMIN — PANTOPRAZOLE SODIUM 40 MG: 40 TABLET, DELAYED RELEASE ORAL at 05:47

## 2024-06-05 RX ADMIN — Medication 1000 UNITS: at 20:40

## 2024-06-05 RX ADMIN — LIDOCAINE 2 PATCH: 4 PATCH TOPICAL at 08:52

## 2024-06-05 RX ADMIN — HEPARIN SODIUM 5000 UNITS: 5000 INJECTION INTRAVENOUS; SUBCUTANEOUS at 20:41

## 2024-06-05 RX ADMIN — HEPARIN SODIUM 5000 UNITS: 5000 INJECTION INTRAVENOUS; SUBCUTANEOUS at 05:47

## 2024-06-05 RX ADMIN — ONDANSETRON 4 MG: 2 INJECTION INTRAMUSCULAR; INTRAVENOUS at 09:05

## 2024-06-05 RX ADMIN — ISOSORBIDE DINITRATE 20 MG: 20 TABLET ORAL at 08:51

## 2024-06-05 RX ADMIN — NIFEDIPINE 60 MG: 60 TABLET, FILM COATED, EXTENDED RELEASE ORAL at 08:51

## 2024-06-05 RX ADMIN — ASPIRIN 81 MG CHEWABLE TABLET 81 MG: 81 TABLET CHEWABLE at 08:51

## 2024-06-05 RX ADMIN — TORSEMIDE 60 MG: 20 TABLET ORAL at 08:51

## 2024-06-05 RX ADMIN — HYDRALAZINE HYDROCHLORIDE 5 MG: 20 INJECTION INTRAMUSCULAR; INTRAVENOUS at 09:21

## 2024-06-05 RX ADMIN — ACETAMINOPHEN 650 MG: 325 TABLET ORAL at 01:20

## 2024-06-05 RX ADMIN — ACETAMINOPHEN 650 MG: 325 TABLET ORAL at 05:47

## 2024-06-05 RX ADMIN — ESCITALOPRAM OXALATE 10 MG: 10 TABLET ORAL at 08:51

## 2024-06-05 RX ADMIN — HEPARIN SODIUM 5000 UNITS: 5000 INJECTION INTRAVENOUS; SUBCUTANEOUS at 12:26

## 2024-06-05 RX ADMIN — ISOSORBIDE DINITRATE 20 MG: 20 TABLET ORAL at 20:40

## 2024-06-05 RX ADMIN — ATORVASTATIN CALCIUM 80 MG: 80 TABLET, FILM COATED ORAL at 20:41

## 2024-06-05 RX ADMIN — METOPROLOL TARTRATE 50 MG: 50 TABLET, FILM COATED ORAL at 20:41

## 2024-06-05 ASSESSMENT — COGNITIVE AND FUNCTIONAL STATUS - GENERAL
STANDING UP FROM CHAIR USING ARMS: TOTAL
WALKING IN HOSPITAL ROOM: TOTAL
CLIMB 3 TO 5 STEPS WITH RAILING: TOTAL
MOVING FROM LYING ON BACK TO SITTING ON SIDE OF FLAT BED WITH BEDRAILS: A LITTLE
TOILETING: TOTAL
STANDING UP FROM CHAIR USING ARMS: A LOT
DRESSING REGULAR LOWER BODY CLOTHING: A LOT
DAILY ACTIVITIY SCORE: 20
WALKING IN HOSPITAL ROOM: TOTAL
TURNING FROM BACK TO SIDE WHILE IN FLAT BAD: A LOT
EATING MEALS: A LITTLE
HELP NEEDED FOR BATHING: A LOT
TURNING FROM BACK TO SIDE WHILE IN FLAT BAD: A LITTLE
DRESSING REGULAR UPPER BODY CLOTHING: A LITTLE
MOVING TO AND FROM BED TO CHAIR: TOTAL
MOVING TO AND FROM BED TO CHAIR: TOTAL
DRESSING REGULAR UPPER BODY CLOTHING: A LITTLE
DRESSING REGULAR LOWER BODY CLOTHING: A LITTLE
MOBILITY SCORE: 8
DAILY ACTIVITIY SCORE: 15
MOVING TO AND FROM BED TO CHAIR: A LITTLE
MOBILITY SCORE: 8
WALKING IN HOSPITAL ROOM: TOTAL
DRESSING REGULAR UPPER BODY CLOTHING: A LITTLE
HELP NEEDED FOR BATHING: A LITTLE
TURNING FROM BACK TO SIDE WHILE IN FLAT BAD: A LOT
PERSONAL GROOMING: A LITTLE
DRESSING REGULAR LOWER BODY CLOTHING: A LOT
DAILY ACTIVITIY SCORE: 14
CLIMB 3 TO 5 STEPS WITH RAILING: TOTAL
HELP NEEDED FOR BATHING: A LOT
CLIMB 3 TO 5 STEPS WITH RAILING: TOTAL
STANDING UP FROM CHAIR USING ARMS: TOTAL
MOVING FROM LYING ON BACK TO SITTING ON SIDE OF FLAT BED WITH BEDRAILS: A LOT
TOILETING: TOTAL
TOILETING: A LITTLE
PERSONAL GROOMING: A LITTLE
MOVING FROM LYING ON BACK TO SITTING ON SIDE OF FLAT BED WITH BEDRAILS: A LOT
MOBILITY SCORE: 13

## 2024-06-05 ASSESSMENT — PAIN SCALES - GENERAL
PAINLEVEL_OUTOF10: 7
PAINLEVEL_OUTOF10: 0 - NO PAIN

## 2024-06-05 ASSESSMENT — PAIN - FUNCTIONAL ASSESSMENT
PAIN_FUNCTIONAL_ASSESSMENT: 0-10

## 2024-06-05 ASSESSMENT — PAIN DESCRIPTION - LOCATION: LOCATION: SHOULDER

## 2024-06-05 ASSESSMENT — PAIN DESCRIPTION - ORIENTATION: ORIENTATION: LEFT;RIGHT

## 2024-06-05 NOTE — PROGRESS NOTES
"Occupational Therapy    Occupational Therapy Treatment    Name: Humphrey Waddell  MRN: [id number]  : 1965  Date: 24  Room: [address] [place] [address]           Assessment:  OT Assessment: Pt presents with decreased cognition, strength, balance, and activity tolerance impairing occupational performance of ADLs and functional mobility  Evaluation/Treatment Tolerance:  (nausea)  End of Session Communication: Bedside nurse  End of Session Patient Position: Bed, 3 rail up, Alarm on  Plan:  Treatment Interventions: ADL retraining, Functional transfer training, UE strengthening/ROM, Endurance training, Cognitive reorientation  OT Frequency: 3 times per week  OT Discharge Recommendations: Moderate intensity level of continued care  Equipment Recommended upon Discharge:  (TBD)  OT Recommended Transfer Status: Assist of 2  OT - OK to Discharge: Yes    Subjective   General:  OT Last Visit  OT Received On: 24  Reason for Referral: Presented with two days of severe right sided abdominal pain and chills at home. CT with gallstone lodged at neck of cystic duct. S/p cholecystectomy on   Past Medical History Relevant to Rehab: previously admitted 3/2024 for cholecystitis and underwent percutaneous cholecystostomy tube on 4/3; Left AKA, 3v CABGx3 2023, HTN, HLD, CKD, CVA  with no deficits, T2DM  Co-Treatment: PT  Co-Treatment Reason: maximize pt safety and therapeutic success  Prior to Session Communication: Bedside nurse  Patient Position Received: Bed, 3 rail up, Alarm on  General Comment: pleasantly confused and cooperative, agreeable to OT   Precautions:  Hearing/Visual Limitations: WFL  Medical Precautions: Fall precautions  Post-Surgical Precautions: Abdominal surgery precautions  Vitals:     Lines/Tubes/Drains:       Cognition:  Overall Cognitive Status: Impaired  Arousal/Alertness: Delayed responses to stimuli  Orientation Level: Disoriented to time, Disoriented to situation (oriented to self, , year, \"hospital\" with " choices)  Following Commands: Follows one step commands with increased time  Cognition Comments: delayed response and processing  Insight: Mild  Impulsive: Mildly    Pain Assessment:  Pain Assessment  Pain Assessment: 0-10  Pain Score:  (stomach pain related to nausea)     Objective   Activities of Daily Living:   Toileting  Toileting Comments: Pt performed rolling L and R for dependent pericare d/t incontinent BM, extended time required       Bed Mobility/Transfers:     Bed Mobility  Bed Mobility: Yes  Bed Mobility 1  Bed Mobility 1: Supine to sitting, Sitting to supine, Log roll  Level of Assistance 1: Maximum assistance, +2, Moderate verbal cues, Minimal tactile cues  Bed Mobility Comments 1: HOB elevated, use of bed rails, VC for sequencing and body mechanics  Bed Mobility 2  Bed Mobility  2: Rolling right, Rolling left  Level of Assistance 2: Moderate assistance, Minimal verbal cues  Bed Mobility Comments 2: use of bed rails, VC for hand placement       Therapy/Activity:      Therapeutic Activity  Therapeutic Activity Performed: Yes  Therapeutic Activity 1: Pt performed bed mobiliy max A x2. Pt tolerate sitting EOB ~2-3 minutes with mod A for sitting balance. Pt limited by nausea     Outcome Measures:  [facility] Daily Activity  Putting on and taking off regular lower body clothing: A lot  Bathing (including washing, rinsing, drying): A lot  Putting on and taking off regular upper body clothing: A little  Toileting, which includes using toilet, bedpan or urinal: Total  Taking care of personal grooming such as brushing teeth: A little  Eating Meals: A little  Daily Activity - Total Score: 14     Education Documentation  Body Mechanics, taught by Irene Zee OT at 6/5/2024 12:02 PM.  Learner: Patient  Readiness: Acceptance  Method: Explanation  Response: Verbalizes Understanding    Precautions, taught by Irene Zee OT at 6/5/2024 12:02 PM.  Learner: Patient  Readiness: Acceptance  Method:  Explanation  Response: Verbalizes Understanding    ADL Training, taught by Irene Zee OT at 6/5/2024 12:02 PM.  Learner: Patient  Readiness: Acceptance  Method: Explanation  Response: Verbalizes Understanding    Goals:  Encounter Problems       Encounter Problems (Active)       ADLs       Patient will complete daily grooming tasks  with set-up and stand by assist level of assistance and PRN adaptive equipment while supported sitting. (Progressing)       Start:  05/28/24    Expected End:  06/18/24            Patient will perform toileting tasks, including hygiene and clothing management with moderate assist level of assistance   (Progressing)       Start:  05/28/24    Expected End:  06/18/24               COGNITION/SAFETY          TRANSFERS       Patient will perform bed mobility minimal assist  level of assistance  in order to improve safety and independence with mobility (Progressing)       Start:  05/28/24    Expected End:  06/18/24            Patient will demonstrate functional transfers with least restrictive device with moderate assist level of assistance.  (Progressing)       Start:  05/28/24    Expected End:  06/18/24 06/05/24 at 12:03 PM   Irene Zee OT   [phone]

## 2024-06-05 NOTE — PROGRESS NOTES
[facility]  ACUTE CARE SURGERY - PROGRESS NOTE    Patient Name: Humphrey Waddell  MRN: [id number]  Admit Date: 522  : 1965  AGE: 59 y.o.   GENDER: male  ==============================================================================  TODAY'S ASSESSMENT AND PLAN OF CARE:  Mr. Waddell, 59yoM with Hx of cholecystitis s/p perc babs tube placement, readmitted on  with recurrent symptoms after tube fell out at home. He is now s/p open babs on . Postoperative course has been c/b delirium superimposed on patient's baseline cognitive impairment after Hx of CVAs. Mental status is appropriate today. He is recovering well postoperatively. Pending dispo to acute rehab.     Plan:   Neuro:   - tylenol, oxy, and dilaudid for pain control   - holding home gabapentin given CKD history   - continue home escitalopram and melatonin   - continue delirium precautions and sleep hygiene     CV:  - continue atorvastatin, metoprolol, nifedipine, and imdur      Heme:  - continue ASA   - resume home plavix      Resp:  - IS   - continue inhalers      GI:  - continue regular diet   - PPI   - continue bowel reg      :  - strict I/O   - will replace lytes PRN   - continue home bicarb   - continue home torsemide      Endo:  - SSI   - holding home glargine (euglycemic)     MSK:  - OOB ad hunter   - PT/OT     Ppx:  - SCD   - SQH      Dispo:  - Medically ready for discharge, pending SNF    Patient discussed with Attending Dr. Joselyn Castaneda MD  [facility]  [id number]    ==============================================================================  CHIEF COMPLAINT / EVENTS LAST 24HRS / HPI:  No acute events overnight. Pain well controlled. Tolerating a diet without nausea or vomiting.    MEDICAL HISTORY / ROS:   Admission history and ROS reviewed. Pertinent changes as follows:  No new changes    PHYSICAL EXAM:  Heart Rate:  []   Temp:  [36.3 °C (97.3 °F)-37.2 °C (99 °F)]   Resp:  [16-18]   BP:  (122-182)/(70-85)   SpO2:  [93 %-95 %]   Physical Exam  Constitutional:       Appearance: Normal appearance.   HENT:      Head: Normocephalic.   Eyes:      Extraocular Movements: Extraocular movements intact.   Cardiovascular:      Comments: Non cyanotic   Pulmonary:      Effort: Pulmonary effort is normal.   Abdominal:      Comments: Obese, soft, non distended, appropriately tender to palpitation. Subcostal incision well approximated with staples.    Musculoskeletal:         General: Normal range of motion.   Skin:     General: Skin is warm and dry.   Neurological:      Mental Status: He is alert and oriented to person, place, and time.   Psychiatric:         Behavior: Behavior normal.       LABS:  Results for orders placed or performed during the hospital encounter of 05/22/24 (from the past 24 hour(s))   POCT GLUCOSE   Result Value Ref Range    POCT Glucose 150 (H) 74 - 99 mg/dL   POCT GLUCOSE   Result Value Ref Range    POCT Glucose 138 (H) 74 - 99 mg/dL   POCT GLUCOSE   Result Value Ref Range    POCT Glucose 108 (H) 74 - 99 mg/dL   POCT GLUCOSE   Result Value Ref Range    POCT Glucose 129 (H) 74 - 99 mg/dL   POCT GLUCOSE   Result Value Ref Range    POCT Glucose 140 (H) 74 - 99 mg/dL   POCT GLUCOSE   Result Value Ref Range    POCT Glucose 117 (H) 74 - 99 mg/dL     MEDICATIONS:  Current Facility-Administered Medications   Medication Dose Route Frequency Provider Last Rate Last Admin    acetaminophen (Tylenol) tablet 650 mg  650 mg oral q6h Amanda Ochoa MD   650 mg at 06/05/24 0547    aspirin chewable tablet 81 mg  81 mg oral Daily Amanda Ochoa MD   81 mg at 06/05/24 0851    atorvastatin (Lipitor) tablet 80 mg  80 mg oral Daily Amanda Ochoa MD   80 mg at 06/04/24 2206    cholecalciferol (Vitamin D-3) tablet 1,000 Units  1,000 Units oral BID Amanda Ochoa MD   1,000 Units at 06/05/24 0851    clopidogrel (Plavix) tablet 75 mg  75 mg oral Daily Cici Metcalf MD   75 mg at 06/05/24 0851    dextrose 50 % injection  12.5 g  12.5 g intravenous q15 min PRN Amanda Ochoa MD        dextrose 50 % injection 25 g  25 g intravenous q15 min PRN Amanda Ochoa MD        escitalopram (Lexapro) tablet 10 mg  10 mg oral Daily Amanda Ochoa MD   10 mg at 06/05/24 0851    glucagon (Glucagen) injection 1 mg  1 mg intramuscular q15 min PRN Amanda Ochoa MD        heparin (porcine) injection 5,000 Units  5,000 Units subcutaneous q8h Amanda Ochoa MD   5,000 Units at 06/05/24 1226    hydrALAZINE (Apresoline) injection 5 mg  5 mg intravenous q6h PRN Dario Nicole MD   5 mg at 06/05/24 0921    HYDROmorphone PF (Dilaudid) injection 0.2 mg  0.2 mg intravenous q2h PRN Amanda Ochoa MD   0.2 mg at 05/28/24 0941    insulin lispro (HumaLOG) injection 0-10 Units  0-10 Units subcutaneous TID Amanda Ochoa MD   2 Units at 05/31/24 1213    ipratropium-albuteroL (Duo-Neb) 0.5-2.5 mg/3 mL nebulizer solution 3 mL  3 mL nebulization q6h PRN Richmond York MD        isosorbide dinitrate (Isordil) tablet 20 mg  20 mg oral TID Amanda Ochoa MD   20 mg at 06/05/24 1416    lidocaine 4 % patch 2 patch  2 patch transdermal Daily Amanda Ochoa MD   2 patch at 06/05/24 0852    melatonin tablet 3 mg  3 mg oral Nightly Amanda Ochoa MD   3 mg at 06/04/24 2206    metoprolol tartrate (Lopressor) tablet 50 mg  50 mg oral BID Amanda Ochoa MD   50 mg at 06/05/24 0851    naloxone (Narcan) injection 0.2 mg  0.2 mg intravenous q5 min PRN Amanda Ochoa MD        naloxone (Narcan) injection 0.2 mg  0.2 mg intravenous PRN Amanda Ochoa MD        NIFEdipine ER (Adalat CC) 24 hr tablet 60 mg  60 mg oral Daily before breakfast Amanda Ochoa MD   60 mg at 06/05/24 0851    ondansetron (Zofran) injection 4 mg  4 mg intravenous q6h PRN Amanda Ochoa MD   4 mg at 06/05/24 0905    oxyCODONE (Roxicodone) immediate release tablet 5 mg  5 mg oral q6h PRN Amanda Ochoa MD   5 mg at 06/04/24 2206    pantoprazole (ProtoNix) EC tablet 40 mg  40 mg oral Daily before breakfast Amanda Ochoa MD   40 mg at  06/05/24 0547    polyethylene glycol (Glycolax, Miralax) packet 17 g  17 g oral Daily Amanda Ochoa MD        sennosides-docusate sodium (Alexandra-Colace) 8.6-50 mg per tablet 1 tablet  1 tablet oral Nightly Amanda Ochoa MD   1 tablet at 06/04/24 2206    sodium bicarbonate tablet 1,300 mg  1,300 mg oral Daily Amanda Ochoa MD   1,300 mg at 06/05/24 0851    torsemide (Demadex) tablet 60 mg  60 mg oral Daily Amanda Ochoa MD   60 mg at 06/05/24 0851       IMAGING SUMMARY:  (summary of new imaging findings, not a copy of dictation)  No new imaging

## 2024-06-05 NOTE — PROGRESS NOTES
Physical Therapy    Physical Therapy Treatment    Patient Name: Humphrey Waddell  MRN: [id number]  Today's Date: 6/5/2024  Time Calculation  Start Time: 1058 (Simultaneous filing. User may not have seen previous data.)  Stop Time: 1121 (Simultaneous filing. User may not have seen previous data.)  Time Calculation (min): 23 min (Simultaneous filing. User may not have seen previous data.)    Assessment/Plan   PT Assessment  PT Assessment Results: Decreased strength, Decreased endurance, Impaired balance, Decreased mobility, Decreased cognition, Decreased safety awareness  Barriers to Discharge: none  End of Session Communication: Bedside nurse  Assessment Comment: Pt continues to demonstrate impaired strength, balance and function. Remains appropriate for continued PT in house and after discharge.  End of Session Patient Position: Bed, 3 rail up  PT Plan  Inpatient/Swing Bed or Outpatient: Inpatient  PT Plan  Treatment/Interventions: Bed mobility, Transfer training, Gait training, Strengthening, Endurance training, Range of motion, Therapeutic exercise, Therapeutic activity, Home exercise program  PT Plan: Skilled PT  PT Frequency: 3 times per week  PT Discharge Recommendations:  (Pt remains appropriate for continued post acute PT after discharge)  Equipment Recommended upon Discharge:  (TBD)  PT Recommended Transfer Status: Assist x2  PT - OK to Discharge: Yes    General Visit Information:   PT  Visit  PT Received On: 06/05/24  General  Co-Treatment: OT  Co-Treatment Reason: Maximize patient safety and therapies in patient w ampac <10  Prior to Session Communication: Bedside nurse  Patient Position Received: Bed, 3 rail up  Preferred Learning Style: verbal, auditory  General Comment: Pt resting in bed upon entry. Appeearing more alert/awake from previous visits though continues to have some confusion. Upon patient returning to supine. Pt found to have experienced incontinence and required increased time for  cleaning/hygiene.    Subjective   Precautions:  Precautions  Medical Precautions: Fall precautions    Objective   Pain:  Pain Assessment  Pain Assessment: 0-10  Pain Score:  (Pt noting more nausea/feeling sick then pain on this date. RN aware. Pt reportedly up to date/time on nausea medications.)  Cognition:  Cognition  Overall Cognitive Status: Impaired  Orientation Level: Disoriented to place, Disoriented to person (Oriented to year but not month/date/time.)  Following Commands: Follows one step commands with increased time  Insight: Mild  Coordination:  Movements are Fluid and Coordinated: No (Delayed)  Postural Control:  Postural Control  Postural Control: Impaired  Posture Comment: Leaning posteriorly with static sit. Requiring mod assist to maintain upright posture.  Static Sitting Balance  Static Sitting-Level of Assistance: Minimum assistance  Static Sitting-Comment/Number of Minutes: Appproxmately 3 minutes total with mod assist prior to patient noting need to return to supine due to persistent nausea.  Static Standing Balance  Static Standing-Comment/Number of Minutes: Pt unable to tolerate attempts to stand though anticipate would require max assist x 2 and a walker    Activity Tolerance:  Activity Tolerance  Endurance: Tolerates less than 10 min exercise with changes in vital signs  Treatments:    Therapeutic Activity  Therapeutic Activity Performed: Yes  Therapeutic Activity 1: bed mobility/supine-sit, static sit, rolling    Bed Mobility  Bed Mobility: Yes  Bed Mobility 1  Bed Mobility 1: Rolling right, Rolling left  Level of Assistance 1: Moderate assistance  Bed Mobility 2  Bed Mobility  2: Supine to sitting, Sitting to supine  Level of Assistance 2: Maximum assistance (x2)    Ambulation/Gait Training  Ambulation/Gait Training Performed: No (Pt unable to safely tolerate due to impaired strength, balance, and without prosthetic for AKA)  Transfers  Transfer: No (Not performed due to impaired strength  and balance though anticipate would require max assist of 2 to perform.)    Outcome Measures:  [facility] Basic Mobility  Turning from your back to your side while in a flat bed without using bedrails: A lot  Moving from lying on your back to sitting on the side of a flat bed without using bedrails: A lot  Moving to and from bed to chair (including a wheelchair): Total  Standing up from a chair using your arms (e.g. wheelchair or bedside chair): Total  To walk in hospital room: Total  Climbing 3-5 steps with railing: Total  Basic Mobility - Total Score: 8    Education Documentation  Precautions, taught by Shade Stinson PT at 6/5/2024 12:05 PM.  Learner: Patient  Readiness: Acceptance  Method: Explanation  Response: Verbalizes Understanding    Mobility Training, taught by Shade Stinson PT at 6/5/2024 12:05 PM.  Learner: Patient  Readiness: Acceptance  Method: Explanation  Response: Verbalizes Understanding    Education Comments  No comments found.      Encounter Problems       Encounter Problems (Active)       PT Problem       Patient will complete supine to sit and sit to supine Mod Assist  (Progressing)       Start:  05/28/24    Expected End:  06/11/24            Pt will demo bed<>chair transfer with max A x 2 and LRAD (Progressing)       Start:  05/28/24    Expected End:  06/11/24            Pt will demo static/dynamic sitting EOB >10 min with min A (Progressing)       Start:  05/28/24    Expected End:  06/11/24               Pain - Adult

## 2024-06-06 LAB
GLUCOSE BLD MANUAL STRIP-MCNC: 100 MG/DL (ref 74–99)
GLUCOSE BLD MANUAL STRIP-MCNC: 111 MG/DL (ref 74–99)
GLUCOSE BLD MANUAL STRIP-MCNC: 132 MG/DL (ref 74–99)

## 2024-06-06 PROCEDURE — 82947 ASSAY GLUCOSE BLOOD QUANT: CPT

## 2024-06-06 PROCEDURE — 1100000001 HC PRIVATE ROOM DAILY

## 2024-06-06 PROCEDURE — 2500000002 HC RX 250 W HCPCS SELF ADMINISTERED DRUGS (ALT 637 FOR MEDICARE OP, ALT 636 FOR OP/ED)

## 2024-06-06 PROCEDURE — 2500000004 HC RX 250 GENERAL PHARMACY W/ HCPCS (ALT 636 FOR OP/ED)

## 2024-06-06 PROCEDURE — 94668 MNPJ CHEST WALL SBSQ: CPT

## 2024-06-06 PROCEDURE — 2500000001 HC RX 250 WO HCPCS SELF ADMINISTERED DRUGS (ALT 637 FOR MEDICARE OP)

## 2024-06-06 PROCEDURE — 99024 POSTOP FOLLOW-UP VISIT: CPT | Performed by: PHYSICIAN ASSISTANT

## 2024-06-06 PROCEDURE — 2500000005 HC RX 250 GENERAL PHARMACY W/O HCPCS

## 2024-06-06 PROCEDURE — 2500000001 HC RX 250 WO HCPCS SELF ADMINISTERED DRUGS (ALT 637 FOR MEDICARE OP): Performed by: STUDENT IN AN ORGANIZED HEALTH CARE EDUCATION/TRAINING PROGRAM

## 2024-06-06 RX ADMIN — ACETAMINOPHEN 650 MG: 325 TABLET ORAL at 12:24

## 2024-06-06 RX ADMIN — NIFEDIPINE 60 MG: 60 TABLET, FILM COATED, EXTENDED RELEASE ORAL at 08:49

## 2024-06-06 RX ADMIN — ACETAMINOPHEN 650 MG: 325 TABLET ORAL at 00:17

## 2024-06-06 RX ADMIN — MELATONIN 3 MG: 3 TAB ORAL at 22:36

## 2024-06-06 RX ADMIN — PANTOPRAZOLE SODIUM 40 MG: 40 TABLET, DELAYED RELEASE ORAL at 06:00

## 2024-06-06 RX ADMIN — ATORVASTATIN CALCIUM 80 MG: 80 TABLET, FILM COATED ORAL at 22:36

## 2024-06-06 RX ADMIN — SENNOSIDES AND DOCUSATE SODIUM 1 TABLET: 50; 8.6 TABLET ORAL at 22:50

## 2024-06-06 RX ADMIN — ACETAMINOPHEN 650 MG: 325 TABLET ORAL at 18:44

## 2024-06-06 RX ADMIN — HEPARIN SODIUM 5000 UNITS: 5000 INJECTION INTRAVENOUS; SUBCUTANEOUS at 22:36

## 2024-06-06 RX ADMIN — SODIUM BICARBONATE 1300 MG: 650 TABLET ORAL at 08:50

## 2024-06-06 RX ADMIN — Medication 1000 UNITS: at 08:49

## 2024-06-06 RX ADMIN — ISOSORBIDE DINITRATE 20 MG: 20 TABLET ORAL at 08:50

## 2024-06-06 RX ADMIN — HEPARIN SODIUM 5000 UNITS: 5000 INJECTION INTRAVENOUS; SUBCUTANEOUS at 04:41

## 2024-06-06 RX ADMIN — Medication 1000 UNITS: at 22:36

## 2024-06-06 RX ADMIN — TORSEMIDE 60 MG: 20 TABLET ORAL at 08:49

## 2024-06-06 RX ADMIN — ACETAMINOPHEN 650 MG: 325 TABLET ORAL at 06:00

## 2024-06-06 RX ADMIN — OXYCODONE HYDROCHLORIDE 5 MG: 5 TABLET ORAL at 04:41

## 2024-06-06 RX ADMIN — METOPROLOL TARTRATE 50 MG: 50 TABLET, FILM COATED ORAL at 22:52

## 2024-06-06 RX ADMIN — ASPIRIN 81 MG CHEWABLE TABLET 81 MG: 81 TABLET CHEWABLE at 08:49

## 2024-06-06 RX ADMIN — METOPROLOL TARTRATE 50 MG: 50 TABLET, FILM COATED ORAL at 08:49

## 2024-06-06 RX ADMIN — CLOPIDOGREL BISULFATE 75 MG: 75 TABLET ORAL at 08:50

## 2024-06-06 RX ADMIN — HEPARIN SODIUM 5000 UNITS: 5000 INJECTION INTRAVENOUS; SUBCUTANEOUS at 12:24

## 2024-06-06 RX ADMIN — ESCITALOPRAM OXALATE 10 MG: 10 TABLET ORAL at 08:49

## 2024-06-06 RX ADMIN — ISOSORBIDE DINITRATE 20 MG: 20 TABLET ORAL at 22:36

## 2024-06-06 RX ADMIN — LIDOCAINE 2 PATCH: 4 PATCH TOPICAL at 08:48

## 2024-06-06 RX ADMIN — ISOSORBIDE DINITRATE 20 MG: 20 TABLET ORAL at 14:19

## 2024-06-06 ASSESSMENT — COGNITIVE AND FUNCTIONAL STATUS - GENERAL
STANDING UP FROM CHAIR USING ARMS: A LOT
MOVING FROM LYING ON BACK TO SITTING ON SIDE OF FLAT BED WITH BEDRAILS: A LITTLE
MOVING FROM LYING ON BACK TO SITTING ON SIDE OF FLAT BED WITH BEDRAILS: A LITTLE
WALKING IN HOSPITAL ROOM: TOTAL
WALKING IN HOSPITAL ROOM: TOTAL
CLIMB 3 TO 5 STEPS WITH RAILING: TOTAL
DRESSING REGULAR UPPER BODY CLOTHING: A LITTLE
HELP NEEDED FOR BATHING: A LITTLE
TOILETING: A LITTLE
DRESSING REGULAR UPPER BODY CLOTHING: A LITTLE
MOVING FROM LYING ON BACK TO SITTING ON SIDE OF FLAT BED WITH BEDRAILS: A LITTLE
TURNING FROM BACK TO SIDE WHILE IN FLAT BAD: A LITTLE
DRESSING REGULAR UPPER BODY CLOTHING: A LITTLE
DAILY ACTIVITIY SCORE: 20
TURNING FROM BACK TO SIDE WHILE IN FLAT BAD: A LITTLE
CLIMB 3 TO 5 STEPS WITH RAILING: TOTAL
MOVING TO AND FROM BED TO CHAIR: A LITTLE
TOILETING: A LITTLE
WALKING IN HOSPITAL ROOM: TOTAL
MOBILITY SCORE: 13
WALKING IN HOSPITAL ROOM: TOTAL
MOVING TO AND FROM BED TO CHAIR: A LITTLE
HELP NEEDED FOR BATHING: A LITTLE
HELP NEEDED FOR BATHING: A LITTLE
DRESSING REGULAR LOWER BODY CLOTHING: A LITTLE
MOVING FROM LYING ON BACK TO SITTING ON SIDE OF FLAT BED WITH BEDRAILS: A LITTLE
DRESSING REGULAR UPPER BODY CLOTHING: A LITTLE
MOBILITY SCORE: 13
TURNING FROM BACK TO SIDE WHILE IN FLAT BAD: A LITTLE
DRESSING REGULAR LOWER BODY CLOTHING: A LITTLE
CLIMB 3 TO 5 STEPS WITH RAILING: TOTAL
DRESSING REGULAR LOWER BODY CLOTHING: A LITTLE
HELP NEEDED FOR BATHING: A LITTLE
WALKING IN HOSPITAL ROOM: TOTAL
CLIMB 3 TO 5 STEPS WITH RAILING: TOTAL
DRESSING REGULAR LOWER BODY CLOTHING: A LITTLE
STANDING UP FROM CHAIR USING ARMS: A LOT
TURNING FROM BACK TO SIDE WHILE IN FLAT BAD: A LITTLE
TOILETING: A LITTLE
STANDING UP FROM CHAIR USING ARMS: A LOT
MOVING FROM LYING ON BACK TO SITTING ON SIDE OF FLAT BED WITH BEDRAILS: A LITTLE
MOVING TO AND FROM BED TO CHAIR: A LITTLE
TURNING FROM BACK TO SIDE WHILE IN FLAT BAD: A LITTLE
MOVING TO AND FROM BED TO CHAIR: A LITTLE
WALKING IN HOSPITAL ROOM: TOTAL
DAILY ACTIVITIY SCORE: 20
STANDING UP FROM CHAIR USING ARMS: A LOT
DRESSING REGULAR UPPER BODY CLOTHING: A LITTLE
STANDING UP FROM CHAIR USING ARMS: A LOT
CLIMB 3 TO 5 STEPS WITH RAILING: TOTAL
MOVING TO AND FROM BED TO CHAIR: A LITTLE
DRESSING REGULAR LOWER BODY CLOTHING: A LITTLE
STANDING UP FROM CHAIR USING ARMS: A LOT
TOILETING: A LITTLE
MOVING FROM LYING ON BACK TO SITTING ON SIDE OF FLAT BED WITH BEDRAILS: A LITTLE
TOILETING: A LITTLE
TOILETING: A LITTLE
CLIMB 3 TO 5 STEPS WITH RAILING: TOTAL
DRESSING REGULAR UPPER BODY CLOTHING: A LITTLE
DRESSING REGULAR LOWER BODY CLOTHING: A LITTLE
MOVING TO AND FROM BED TO CHAIR: A LITTLE
TURNING FROM BACK TO SIDE WHILE IN FLAT BAD: A LITTLE

## 2024-06-06 ASSESSMENT — PAIN SCALES - GENERAL
PAINLEVEL_OUTOF10: 8

## 2024-06-06 ASSESSMENT — PAIN SCALES - PAIN ASSESSMENT IN ADVANCED DEMENTIA (PAINAD)
TOTALSCORE: 0
BODYLANGUAGE: RELAXED
CONSOLABILITY: NO NEED TO CONSOLE
BREATHING: NORMAL
FACIALEXPRESSION: SMILING OR INEXPRESSIVE

## 2024-06-06 ASSESSMENT — PAIN SCALES - WONG BAKER: WONGBAKER_NUMERICALRESPONSE: HURTS LITTLE BIT

## 2024-06-06 NOTE — PROGRESS NOTES
[facility]  ACUTE CARE SURGERY - PROGRESS NOTE    Patient Name: Humphrey Waddell  MRN: [id number]  Admit Date: 522  : 1965  AGE: 59 y.o.   GENDER: male  ==============================================================================  TODAY'S ASSESSMENT AND PLAN OF CARE:  Mr. Waddell, 59yoM with Hx of cholecystitis s/p perc babs tube placement, readmitted on  with recurrent symptoms after tube fell out at home. He is now s/p open babs on . Postoperative course has been c/b delirium superimposed on patient's baseline cognitive impairment after Hx of CVAs. Mental status is appropriate today. He is recovering well postoperatively. Pending dispo to acute rehab.     Plan:   Neuro:   - tylenol, oxy, and dilaudid for pain control   - holding home gabapentin given CKD history   - continue home escitalopram and melatonin   - continue delirium precautions and sleep hygiene     CV:  - continue atorvastatin, metoprolol, nifedipine, and imdur      Heme:  - continue ASA   - resume home plavix      Resp:  - IS   - continue inhalers      GI:  - continue regular diet   - PPI   - continue bowel reg      :  - strict I/O   - will replace lytes PRN   - continue home bicarb   - continue home torsemide      Endo:  - SSI   - holding home glargine (euglycemic)     MSK:  - OOB ad hunter   - PT/OT     Ppx:  - SCD   - SQH      Dispo:  - Medically ready for discharge, pending SNF    Patient discussed with Attending LUZ WhiteC    ACS  [id number]    ==============================================================================  CHIEF COMPLAINT / EVENTS LAST 24HRS / HPI:  No acute events overnight. Pain well controlled. Tolerating a diet without nausea or vomiting.    MEDICAL HISTORY / ROS:   Admission history and ROS reviewed. Pertinent changes as follows:  No new changes    PHYSICAL EXAM:  Heart Rate:  [56-68]   Temp:  [36 °C (96.8 °F)-36.7 °C (98.1 °F)]   Resp:  [16-20]   BP:  (122-187)/(61-85)   SpO2:  [93 %-98 %]   Physical Exam  Constitutional:       Appearance: Normal appearance.   HENT:      Head: Normocephalic.   Eyes:      Extraocular Movements: Extraocular movements intact.   Cardiovascular:      Comments: Non cyanotic   Pulmonary:      Effort: Pulmonary effort is normal.   Abdominal:      Comments: Obese, soft, non distended, appropriately tender to palpitation. Subcostal incision well approximated with staples.    Musculoskeletal:         General: Normal range of motion.   Skin:     General: Skin is warm and dry.   Neurological:      Mental Status: He is alert and oriented to person, place, and time.   Psychiatric:         Behavior: Behavior normal.       LABS:  Results for orders placed or performed during the hospital encounter of 05/22/24 (from the past 24 hour(s))   POCT GLUCOSE   Result Value Ref Range    POCT Glucose 117 (H) 74 - 99 mg/dL   POCT GLUCOSE   Result Value Ref Range    POCT Glucose 111 (H) 74 - 99 mg/dL   POCT GLUCOSE   Result Value Ref Range    POCT Glucose 132 (H) 74 - 99 mg/dL   POCT GLUCOSE   Result Value Ref Range    POCT Glucose 100 (H) 74 - 99 mg/dL     MEDICATIONS:  Current Facility-Administered Medications   Medication Dose Route Frequency Provider Last Rate Last Admin    acetaminophen (Tylenol) tablet 650 mg  650 mg oral q6h Amanda Ochoa MD   650 mg at 06/06/24 1224    aspirin chewable tablet 81 mg  81 mg oral Daily Amanda Ochoa MD   81 mg at 06/06/24 0849    atorvastatin (Lipitor) tablet 80 mg  80 mg oral Daily Amanda Ochoa MD   80 mg at 06/05/24 2041    cholecalciferol (Vitamin D-3) tablet 1,000 Units  1,000 Units oral BID Amanda Ochoa MD   1,000 Units at 06/06/24 0849    clopidogrel (Plavix) tablet 75 mg  75 mg oral Daily Cici Metcalf MD   75 mg at 06/06/24 0850    dextrose 50 % injection 12.5 g  12.5 g intravenous q15 min PRN Amanda Ochoa MD        dextrose 50 % injection 25 g  25 g intravenous q15 min PRN Amanda Ochoa MD         escitalopram (Lexapro) tablet 10 mg  10 mg oral Daily Amanda Ochoa MD   10 mg at 06/06/24 0849    glucagon (Glucagen) injection 1 mg  1 mg intramuscular q15 min PRN Amanda Ochoa MD        heparin (porcine) injection 5,000 Units  5,000 Units subcutaneous q8h Amanda Ochoa MD   5,000 Units at 06/06/24 1224    hydrALAZINE (Apresoline) injection 5 mg  5 mg intravenous q6h PRN Dario Nicole MD   5 mg at 06/05/24 0921    HYDROmorphone PF (Dilaudid) injection 0.2 mg  0.2 mg intravenous q2h PRN Amanda Ochoa MD   0.2 mg at 05/28/24 0941    insulin lispro (HumaLOG) injection 0-10 Units  0-10 Units subcutaneous TID Amanda Ochoa MD   2 Units at 05/31/24 1213    ipratropium-albuteroL (Duo-Neb) 0.5-2.5 mg/3 mL nebulizer solution 3 mL  3 mL nebulization q6h PRN Richmond York MD        isosorbide dinitrate (Isordil) tablet 20 mg  20 mg oral TID Amanda Ochoa MD   20 mg at 06/06/24 1419    lidocaine 4 % patch 2 patch  2 patch transdermal Daily Amanda Ochoa MD   2 patch at 06/06/24 0848    melatonin tablet 3 mg  3 mg oral Nightly Amanda Ochoa MD   3 mg at 06/05/24 2212    metoprolol tartrate (Lopressor) tablet 50 mg  50 mg oral BID Amanda Ochoa MD   50 mg at 06/06/24 0849    naloxone (Narcan) injection 0.2 mg  0.2 mg intravenous q5 min PRN Amanda Ochoa MD        naloxone (Narcan) injection 0.2 mg  0.2 mg intravenous PRN Amanda Ochoa MD        NIFEdipine ER (Adalat CC) 24 hr tablet 60 mg  60 mg oral Daily before breakfast Amanda Ochoa MD   60 mg at 06/06/24 0849    ondansetron (Zofran) injection 4 mg  4 mg intravenous q6h PRN Amanda Ochoa MD   4 mg at 06/05/24 0905    oxyCODONE (Roxicodone) immediate release tablet 5 mg  5 mg oral q6h PRN Amanda Ochoa MD   5 mg at 06/06/24 0441    pantoprazole (ProtoNix) EC tablet 40 mg  40 mg oral Daily before breakfast Amanda Ochoa MD   40 mg at 06/06/24 0600    polyethylene glycol (Glycolax, Miralax) packet 17 g  17 g oral Daily Amanda Ochoa MD        sennosides-docusate sodium (Alexandra-Colace)  8.6-50 mg per tablet 1 tablet  1 tablet oral Nightly Amanda Ochoa MD   1 tablet at 06/04/24 2206    sodium bicarbonate tablet 1,300 mg  1,300 mg oral Daily Amanda Ochoa MD   1,300 mg at 06/06/24 0850    torsemide (Demadex) tablet 60 mg  60 mg oral Daily Amanda Ochoa MD   60 mg at 06/06/24 0849       IMAGING SUMMARY:  (summary of new imaging findings, not a copy of dictation)  No new imaging

## 2024-06-06 NOTE — CARE PLAN
The patient's goals for the shift include Labs WNL    The clinical goals for the shift include Patient will remain hemodynamically stable throughtout shift

## 2024-06-07 LAB
GLUCOSE BLD MANUAL STRIP-MCNC: 109 MG/DL (ref 74–99)
GLUCOSE BLD MANUAL STRIP-MCNC: 117 MG/DL (ref 74–99)
GLUCOSE BLD MANUAL STRIP-MCNC: 128 MG/DL (ref 74–99)

## 2024-06-07 PROCEDURE — 1100000001 HC PRIVATE ROOM DAILY

## 2024-06-07 PROCEDURE — 97530 THERAPEUTIC ACTIVITIES: CPT | Mod: GO

## 2024-06-07 PROCEDURE — 2500000001 HC RX 250 WO HCPCS SELF ADMINISTERED DRUGS (ALT 637 FOR MEDICARE OP)

## 2024-06-07 PROCEDURE — 2500000005 HC RX 250 GENERAL PHARMACY W/O HCPCS

## 2024-06-07 PROCEDURE — 99024 POSTOP FOLLOW-UP VISIT: CPT | Performed by: PHYSICIAN ASSISTANT

## 2024-06-07 PROCEDURE — 2500000004 HC RX 250 GENERAL PHARMACY W/ HCPCS (ALT 636 FOR OP/ED)

## 2024-06-07 PROCEDURE — 97530 THERAPEUTIC ACTIVITIES: CPT | Mod: GP

## 2024-06-07 PROCEDURE — 82947 ASSAY GLUCOSE BLOOD QUANT: CPT

## 2024-06-07 PROCEDURE — 2500000001 HC RX 250 WO HCPCS SELF ADMINISTERED DRUGS (ALT 637 FOR MEDICARE OP): Performed by: STUDENT IN AN ORGANIZED HEALTH CARE EDUCATION/TRAINING PROGRAM

## 2024-06-07 PROCEDURE — 2500000002 HC RX 250 W HCPCS SELF ADMINISTERED DRUGS (ALT 637 FOR MEDICARE OP, ALT 636 FOR OP/ED)

## 2024-06-07 RX ADMIN — ACETAMINOPHEN 650 MG: 325 TABLET ORAL at 00:00

## 2024-06-07 RX ADMIN — METOPROLOL TARTRATE 50 MG: 50 TABLET, FILM COATED ORAL at 08:42

## 2024-06-07 RX ADMIN — HEPARIN SODIUM 5000 UNITS: 5000 INJECTION INTRAVENOUS; SUBCUTANEOUS at 23:14

## 2024-06-07 RX ADMIN — ACETAMINOPHEN 650 MG: 325 TABLET ORAL at 07:03

## 2024-06-07 RX ADMIN — HYDRALAZINE HYDROCHLORIDE 5 MG: 20 INJECTION INTRAMUSCULAR; INTRAVENOUS at 08:41

## 2024-06-07 RX ADMIN — SENNOSIDES AND DOCUSATE SODIUM 1 TABLET: 50; 8.6 TABLET ORAL at 23:02

## 2024-06-07 RX ADMIN — Medication 1000 UNITS: at 08:42

## 2024-06-07 RX ADMIN — ACETAMINOPHEN 650 MG: 325 TABLET ORAL at 23:02

## 2024-06-07 RX ADMIN — ASPIRIN 81 MG CHEWABLE TABLET 81 MG: 81 TABLET CHEWABLE at 08:42

## 2024-06-07 RX ADMIN — HYDRALAZINE HYDROCHLORIDE 5 MG: 20 INJECTION INTRAMUSCULAR; INTRAVENOUS at 17:59

## 2024-06-07 RX ADMIN — CLOPIDOGREL BISULFATE 75 MG: 75 TABLET ORAL at 08:42

## 2024-06-07 RX ADMIN — SODIUM BICARBONATE 1300 MG: 650 TABLET ORAL at 08:41

## 2024-06-07 RX ADMIN — ISOSORBIDE DINITRATE 20 MG: 20 TABLET ORAL at 14:09

## 2024-06-07 RX ADMIN — NIFEDIPINE 60 MG: 60 TABLET, FILM COATED, EXTENDED RELEASE ORAL at 08:42

## 2024-06-07 RX ADMIN — ATORVASTATIN CALCIUM 80 MG: 80 TABLET, FILM COATED ORAL at 23:02

## 2024-06-07 RX ADMIN — PANTOPRAZOLE SODIUM 40 MG: 40 TABLET, DELAYED RELEASE ORAL at 07:04

## 2024-06-07 RX ADMIN — OXYCODONE HYDROCHLORIDE 5 MG: 5 TABLET ORAL at 23:09

## 2024-06-07 RX ADMIN — METOPROLOL TARTRATE 50 MG: 50 TABLET, FILM COATED ORAL at 21:00

## 2024-06-07 RX ADMIN — Medication 1000 UNITS: at 23:02

## 2024-06-07 RX ADMIN — ISOSORBIDE DINITRATE 20 MG: 20 TABLET ORAL at 08:42

## 2024-06-07 RX ADMIN — HEPARIN SODIUM 5000 UNITS: 5000 INJECTION INTRAVENOUS; SUBCUTANEOUS at 07:04

## 2024-06-07 RX ADMIN — TORSEMIDE 60 MG: 20 TABLET ORAL at 08:42

## 2024-06-07 RX ADMIN — HEPARIN SODIUM 5000 UNITS: 5000 INJECTION INTRAVENOUS; SUBCUTANEOUS at 14:09

## 2024-06-07 RX ADMIN — ISOSORBIDE DINITRATE 20 MG: 20 TABLET ORAL at 18:00

## 2024-06-07 RX ADMIN — LIDOCAINE 2 PATCH: 4 PATCH TOPICAL at 08:41

## 2024-06-07 RX ADMIN — ESCITALOPRAM OXALATE 10 MG: 10 TABLET ORAL at 08:42

## 2024-06-07 RX ADMIN — MELATONIN 3 MG: 3 TAB ORAL at 23:02

## 2024-06-07 ASSESSMENT — PAIN - FUNCTIONAL ASSESSMENT
PAIN_FUNCTIONAL_ASSESSMENT: 0-10

## 2024-06-07 ASSESSMENT — PAIN SCALES - GENERAL
PAINLEVEL_OUTOF10: 0 - NO PAIN
PAINLEVEL_OUTOF10: 0 - NO PAIN
PAINLEVEL_OUTOF10: 3
PAINLEVEL_OUTOF10: 8
PAINLEVEL_OUTOF10: 3
PAINLEVEL_OUTOF10: 0 - NO PAIN

## 2024-06-07 ASSESSMENT — COGNITIVE AND FUNCTIONAL STATUS - GENERAL
DRESSING REGULAR LOWER BODY CLOTHING: TOTAL
PERSONAL GROOMING: A LITTLE
EATING MEALS: A LITTLE
CLIMB 3 TO 5 STEPS WITH RAILING: TOTAL
HELP NEEDED FOR BATHING: A LOT
TOILETING: TOTAL
TURNING FROM BACK TO SIDE WHILE IN FLAT BAD: A LOT
STANDING UP FROM CHAIR USING ARMS: A LOT
DAILY ACTIVITIY SCORE: 13
MOVING FROM LYING ON BACK TO SITTING ON SIDE OF FLAT BED WITH BEDRAILS: A LITTLE
DRESSING REGULAR UPPER BODY CLOTHING: A LITTLE
MOBILITY SCORE: 11
MOVING TO AND FROM BED TO CHAIR: A LOT
WALKING IN HOSPITAL ROOM: TOTAL

## 2024-06-07 ASSESSMENT — PAIN DESCRIPTION - DESCRIPTORS: DESCRIPTORS: CRAMPING

## 2024-06-07 NOTE — PROGRESS NOTES
Physical Therapy    Physical Therapy Treatment    Patient Name: Humphrey Waddell  MRN: [id number]  Today's Date: 6/7/2024  Time Calculation  Start Time: 1007  Stop Time: 1030  Time Calculation (min): 23 min    Assessment/Plan   PT Assessment  PT Assessment Results: Decreased strength, Decreased endurance, Impaired balance, Decreased mobility, Decreased cognition, Decreased safety awareness  Rehab Prognosis: Good  Barriers to Discharge: none  End of Session Communication: Bedside nurse  Assessment Comment: Pt with improved activity tolerance from previous visits. Pt remains appropriate for continued PT while in house and after discharge to restore function.  End of Session Patient Position: Bed, 3 rail up, Alarm on  PT Plan  Inpatient/Swing Bed or Outpatient: Inpatient  PT Plan  Treatment/Interventions: Bed mobility, Transfer training, Gait training, Strengthening, Endurance training, Range of motion, Therapeutic exercise, Therapeutic activity, Home exercise program  PT Plan: Skilled PT  PT Frequency: 3 times per week  PT Discharge Recommendations: High intensity level of continued care  Equipment Recommended upon Discharge:  (TBD)  PT Recommended Transfer Status: Assist x2  PT - OK to Discharge: Yes      General Visit Information:   PT  Visit  PT Received On: 06/07/24  General  Co-Treatment: OT  Co-Treatment Reason: Pt with h/o requiring heavier assist w ampac <10 in previous visits.  Patient Position Received: Bed, 3 rail up  General Comment: Pt resting in bed upon entry. Much more awake and alert this visit. Significant improvement from previous visits.    Subjective   Precautions:  Precautions  Medical Precautions: Fall precautions    Objective   Pain:  Pain Assessment  Pain Assessment: 0-10  Pain Score: 3  Pain Type:  (cramping)  Pain Location: Abdomen  Cognition:  Cognition  Overall Cognitive Status: Within Functional Limits  Orientation Level: Oriented X4  Following Commands: Follows one step commands with  repetition  Insight: Mild    Postural Control:  Postural Control  Postural Control: Impaired  Posture Comment: tends to lean posteriorly at times. Requiring CGA/min assist as needed with sitting posture.  Static Sitting Balance  Static Sitting-Balance Support: Bilateral upper extremity supported  Static Sitting-Level of Assistance: Contact guard, Minimum assistance  Static Sitting-Comment/Number of Minutes: EOB approximately 12-13 minutes total  Static Standing Balance  Static Standing-Balance Support: Bilateral upper extremity supported (on a wheeled walker)  Static Standing-Level of Assistance: Maximum assistance (x2)  Static Standing-Comment/Number of Minutes: Two trials of statcic stance. Not able to achieve full extension, approximately 75% of full erect posture for approximately 15 seconds each.    Activity Tolerance:  Activity Tolerance  Endurance: Tolerates 10 - 20 min exercise with multiple rests  Treatments:    Therapeutic Activity  Therapeutic Activity Performed: Yes  Therapeutic Activity 1: bed mobility, transfers, static sit static stance.    Bed Mobility  Bed Mobility: Yes  Bed Mobility 1  Bed Mobility 1: Supine to sitting, Sitting to supine  Level of Assistance 1: Moderate assistance    Ambulation/Gait Training  Ambulation/Gait Training Performed: No  Transfers  Transfer: Yes  Transfer 1  Transfer From 1: Sit to, Stand to  Transfer to 1: Stand, Sit  Transfer Device 1: Walker  Transfer Level of Assistance 1: Maximum assistance, Moderate verbal cues (x2)    Outcome Measures:  [facility] Basic Mobility  Turning from your back to your side while in a flat bed without using bedrails: A little  Moving from lying on your back to sitting on the side of a flat bed without using bedrails: A lot  Moving to and from bed to chair (including a wheelchair): A lot  Standing up from a chair using your arms (e.g. wheelchair or bedside chair): A lot  To walk in hospital room: Total  Climbing 3-5 steps with railing:  Total  Basic Mobility - Total Score: 11    Education Documentation  Precautions, taught by Shade Stinson PT at 6/7/2024 11:08 AM.  Learner: Patient  Readiness: Acceptance  Method: Explanation  Response: Verbalizes Understanding    Body Mechanics, taught by Shade Stinson PT at 6/7/2024 11:08 AM.  Learner: Patient  Readiness: Acceptance  Method: Explanation  Response: Verbalizes Understanding    Mobility Training, taught by Shade Stinson, PT at 6/7/2024 11:08 AM.  Learner: Patient  Readiness: Acceptance  Method: Explanation  Response: Verbalizes Understanding    Education Comments  No comments found.      Encounter Problems       Encounter Problems (Active)       PT Problem       Patient will complete supine to sit and sit to supine Mod Assist  (Progressing)       Start:  05/28/24    Expected End:  06/11/24            Pt will demo bed<>chair transfer with max A x 2 and LRAD (Progressing)       Start:  05/28/24    Expected End:  06/11/24            Pt will demo static/dynamic sitting EOB >10 min with min A (Progressing)       Start:  05/28/24    Expected End:  06/11/24               Pain - Adult

## 2024-06-07 NOTE — PROGRESS NOTES
Occupational Therapy    OT Treatment    Patient Name: Humphrey Waddell  MRN: [id number]  Today's Date: 6/7/2024  Time Calculation  Start Time: 1007  Stop Time: 1030  Time Calculation (min): 23 min         Assessment:  Prognosis: Good  Barriers to Discharge: None  Evaluation/Treatment Tolerance: Patient tolerated treatment well  Medical Staff Made Aware: Yes  End of Session Communication: Bedside nurse  End of Session Patient Position: Bed, 3 rail up, Alarm on  OT Assessment Results: Decreased ADL status, Decreased safe judgment during ADL, Decreased cognition, Decreased endurance, Decreased functional mobility, Decreased upper extremity strength, Decreased upper extremity range of motion  Prognosis: Good  Barriers to Discharge: None  Evaluation/Treatment Tolerance: Patient tolerated treatment well  Medical Staff Made Aware: Yes  Strengths: Attitude of self  Barriers to Participation: Insight into problems, Comorbidities  Plan:  Treatment Interventions: ADL retraining, Functional transfer training, Endurance training, Patient/family training, Equipment evaluation/education, Compensatory technique education  OT Frequency: 3 times per week  OT Discharge Recommendations: Moderate intensity level of continued care  Equipment Recommended upon Discharge:  (TBD)  OT Recommended Transfer Status: Assist of 2  OT - OK to Discharge: Yes  Treatment Interventions: ADL retraining, Functional transfer training, Endurance training, Patient/family training, Equipment evaluation/education, Compensatory technique education    Subjective   Previous Visit Info:  OT Last Visit  OT Received On: 06/07/24  General:  General  Reason for Referral: Presented with two days of severe right sided abdominal pain and chills at home. CT with gallstone lodged at neck of cystic duct. 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, HTN, HLD, CKD, CVA 2020 with no deficits, T2DM  Family/Caregiver Present: No  Co-Treatment: PT  Co-Treatment Reason: Co-treatment with PT for pt safety and to optimize pt's therapeutic potential  Prior to Session Communication: Bedside nurse  Patient Position Received: Bed, 3 rail up, Alarm on  Preferred Learning Style: visual, verbal  General Comment: Pt motivated to participate in therapy  Precautions:  Medical Precautions: Fall precautions  Precautions Comment: Pt does not have prosthetic present at this time    Pain:  Pain Assessment  Pain Assessment: 0-10  Pain Score: 3  Pain Type: Acute pain  Pain Location: Abdomen  Pain Descriptors: Cramping  Pain Interventions: Repositioned    Objective    Cognition:  Cognition  Overall Cognitive Status: Impaired  Arousal/Alertness: Delayed responses to stimuli  Orientation Level: Disoriented to situation  Following Commands: Follows one step commands with increased time  Safety Judgment: Decreased awareness of need for safety precautions (mildly impulsive at times)  Insight: Mild  Impulsive: Mildly    Activities of Daily Living:    LE Dressing  LE Dressing: Yes  Sock Level of Assistance: Dependent  LE Dressing Where Assessed: Bed level  LE Dressing Comments: R sock  Toileting  Toileting Level of Assistance: Dependent  Where Assessed: Bed level  Toileting Comments: Pt incontinent, requires total A for bed level [facility]    Bed Mobility/Transfers: Bed Mobility  Bed Mobility: Yes  Bed Mobility 1  Bed Mobility 1: Supine to sitting, Sitting to supine  Level of Assistance 1: Moderate assistance  Bed Mobility Comments 1: HOB elevated, instructions for mechanics and positioning  Bed Mobility 2  Bed Mobility  2: Rolling right, Rolling left  Level of Assistance 2: Moderate assistance  Bed Mobility Comments 2: Verbal cueing for use of bed rail to assist  Bed Mobility 3  Bed Mobility 3: Scooting  Level of Assistance 3: Moderate assistance  Bed Mobility Comments 3:  Verbal cueing for mechanics to laterally scoot along EOB toward HOB with use of RLE to assist    Transfers  Transfer: Yes  Transfer 1  Transfer From 1: Bed to  Transfer to 1: Sit, Stand  Technique 1: Sit to stand, Stand to sit  Transfer Device 1: Walker  Transfer Level of Assistance 1: Maximum assistance, +2  Trials/Comments 1: Attempts x2, bed height elevated to assist. Instructions for hand placement and use of momentum. Pt achieved ~75% stand with each attempt.    Therapy/Activity: Therapeutic Activity  Therapeutic Activity Performed: Yes  Therapeutic Activity 1: Facilitated seated balance at EOB, pt tolerated ~12 min of EOB sitting. Overall CGA to min A. Pt with intermittent periods of retropulsion and leaning to R. Verbal/tactile cueing for weightshifting and achieving midline.    Outcome Measures:[facility] Daily Activity  Putting on and taking off regular lower body clothing: Total  Bathing (including washing, rinsing, drying): A lot  Putting on and taking off regular upper body clothing: A little  Toileting, which includes using toilet, bedpan or urinal: Total  Taking care of personal grooming such as brushing teeth: A little  Eating Meals: A little  Daily Activity - Total Score: 13    Education Documentation  Body Mechanics, taught by Margot Mcdonough OT at 6/7/2024 12:41 PM.  Learner: Patient  Readiness: Acceptance  Method: Explanation, Demonstration  Response: Verbalizes Understanding, Demonstrated Understanding, Needs Reinforcement    Precautions, taught by Margot Mcdonough OT at 6/7/2024 12:41 PM.  Learner: Patient  Readiness: Acceptance  Method: Explanation, Demonstration  Response: Verbalizes Understanding, Demonstrated Understanding, Needs Reinforcement    ADL Training, taught by Margot Mcdonough OT at 6/7/2024 12:41 PM.  Learner: Patient  Readiness: Acceptance  Method: Explanation, Demonstration  Response: Verbalizes Understanding, Demonstrated Understanding, Needs  Reinforcement    EDUCATION:  Education  Individual(s) Educated: Patient  Education Provided: Diagnosis & Precautions, Fall precautons, Risk and benefits of OT discussed with patient or other, POC discussed and agreed upon  Patient Response to Education: Patient/Caregiver Verbalized Understanding of Information    Goals:  Encounter Problems       Encounter Problems (Active)       ADLs       Patient will complete daily grooming tasks  with set-up and stand by assist level of assistance and PRN adaptive equipment while supported sitting. (Progressing)       Start:  05/28/24    Expected End:  06/18/24            Patient will perform toileting tasks, including hygiene and clothing management with moderate assist level of assistance   (Progressing)       Start:  05/28/24    Expected End:  06/18/24               COGNITION/SAFETY          TRANSFERS       Patient will perform bed mobility minimal assist  level of assistance  in order to improve safety and independence with mobility (Progressing)       Start:  05/28/24    Expected End:  06/18/24            Patient will demonstrate functional transfers with least restrictive device with moderate assist level of assistance.  (Progressing)       Start:  05/28/24    Expected End:  06/18/24

## 2024-06-07 NOTE — PROGRESS NOTES
[facility]  ACUTE CARE SURGERY - PROGRESS NOTE    Patient Name: Humphrey Waddell  MRN: [id number]  Admit Date: 522  : 1965  AGE: 59 y.o.   GENDER: male  ==============================================================================  TODAY'S ASSESSMENT AND PLAN OF CARE:  Mr. Waddell, 59yoM with Hx of cholecystitis s/p perc babs tube placement, readmitted on  with recurrent symptoms after tube fell out at home. He is now s/p open babs on . Postoperative course has been c/b delirium superimposed on patient's baseline cognitive impairment after Hx of CVAs. Mental status is appropriate today. He is recovering well postoperatively. Pending dispo to acute rehab.     Plan:   Neuro:   - tylenol, oxy, and dilaudid for pain control   - holding home gabapentin given CKD history   - continue home escitalopram and melatonin   - continue delirium precautions and sleep hygiene     CV:  - continue atorvastatin, metoprolol, nifedipine, and imdur      Heme:  - continue ASA   - resume home plavix      Resp:  - IS   - continue inhalers      GI:  - continue regular diet   - PPI   - continue bowel reg      :  - strict I/O   - will replace lytes PRN   - continue home bicarb   - continue home torsemide      Endo:  - SSI   - holding home glargine (euglycemic)     MSK:  - OOB ad hunter   - PT/OT     Ppx:  - SCD   - SQH      Dispo:  - Medically ready for discharge, pending SNF. Precert pending at [facility] rehab.     Patient discussed with Attending Dr. Joselyn Rose PA-C    ACS  [id number]    ==============================================================================  CHIEF COMPLAINT / EVENTS LAST 24HRS / HPI:  No acute events overnight. Pain well controlled. Tolerating a diet without nausea or vomiting.    MEDICAL HISTORY / ROS:   Admission history and ROS reviewed. Pertinent changes as follows:  No new changes    PHYSICAL EXAM:  Heart Rate:  [55-76]   Temp:  [36.2 °C (97.2 °F)-36.6 °C (97.9 °F)]    Resp:  [16-18]   BP: (115-183)/(70-88)   SpO2:  [94 %-98 %]   Physical Exam  Constitutional:       Appearance: Normal appearance.   HENT:      Head: Normocephalic.   Eyes:      Extraocular Movements: Extraocular movements intact.   Cardiovascular:      Rate and Rhythm: Normal rate and regular rhythm.      Comments: Non cyanotic   Pulmonary:      Effort: Pulmonary effort is normal.   Abdominal:      Comments: Obese, soft, non distended, appropriately tender to palpitation. Subcostal incision well approximated with staples c/d/i   Musculoskeletal:         General: Normal range of motion.   Skin:     General: Skin is warm and dry.   Neurological:      Mental Status: He is alert and oriented to person, place, and time.   Psychiatric:         Behavior: Behavior normal.       LABS:  Results for orders placed or performed during the hospital encounter of 05/22/24 (from the past 24 hour(s))   POCT GLUCOSE   Result Value Ref Range    POCT Glucose 100 (H) 74 - 99 mg/dL   POCT GLUCOSE   Result Value Ref Range    POCT Glucose 117 (H) 74 - 99 mg/dL   POCT GLUCOSE   Result Value Ref Range    POCT Glucose 128 (H) 74 - 99 mg/dL     MEDICATIONS:  Current Facility-Administered Medications   Medication Dose Route Frequency Provider Last Rate Last Admin    acetaminophen (Tylenol) tablet 650 mg  650 mg oral q6h Amanda Ochoa MD   650 mg at 06/07/24 0703    aspirin chewable tablet 81 mg  81 mg oral Daily Amanda Ochoa MD   81 mg at 06/07/24 0842    atorvastatin (Lipitor) tablet 80 mg  80 mg oral Daily Amanda Ochoa MD   80 mg at 06/06/24 2236    cholecalciferol (Vitamin D-3) tablet 1,000 Units  1,000 Units oral BID Amanda Ochoa MD   1,000 Units at 06/07/24 0842    clopidogrel (Plavix) tablet 75 mg  75 mg oral Daily Cici Metcalf MD   75 mg at 06/07/24 0842    dextrose 50 % injection 12.5 g  12.5 g intravenous q15 min PRN Amanda Ochoa MD        dextrose 50 % injection 25 g  25 g intravenous q15 min PRN Amanda Ochoa MD         escitalopram (Lexapro) tablet 10 mg  10 mg oral Daily Amanda Ochoa MD   10 mg at 06/07/24 0842    glucagon (Glucagen) injection 1 mg  1 mg intramuscular q15 min PRN Amanda Ochoa MD        heparin (porcine) injection 5,000 Units  5,000 Units subcutaneous q8h Amanda Ochoa MD   5,000 Units at 06/07/24 1409    hydrALAZINE (Apresoline) injection 5 mg  5 mg intravenous q6h PRN Dario Nicole MD   5 mg at 06/07/24 0841    HYDROmorphone PF (Dilaudid) injection 0.2 mg  0.2 mg intravenous q2h PRN Amanda Ochoa MD   0.2 mg at 05/28/24 0941    insulin lispro (HumaLOG) injection 0-10 Units  0-10 Units subcutaneous TID Amanda Ochoa MD   2 Units at 05/31/24 1213    ipratropium-albuteroL (Duo-Neb) 0.5-2.5 mg/3 mL nebulizer solution 3 mL  3 mL nebulization q6h PRN Richmond York MD        isosorbide dinitrate (Isordil) tablet 20 mg  20 mg oral TID Amanda Ochoa MD   20 mg at 06/07/24 1409    lidocaine 4 % patch 2 patch  2 patch transdermal Daily Amanda Ochoa MD   2 patch at 06/07/24 0841    melatonin tablet 3 mg  3 mg oral Nightly Amanda Ochoa MD   3 mg at 06/06/24 2236    metoprolol tartrate (Lopressor) tablet 50 mg  50 mg oral BID Amanda Ochoa MD   50 mg at 06/07/24 0842    naloxone (Narcan) injection 0.2 mg  0.2 mg intravenous q5 min PRN Amanda Ochoa MD        naloxone (Narcan) injection 0.2 mg  0.2 mg intravenous PRN Amanda Ochoa MD        NIFEdipine ER (Adalat CC) 24 hr tablet 60 mg  60 mg oral Daily before breakfast Amanda Ochoa MD   60 mg at 06/07/24 0842    ondansetron (Zofran) injection 4 mg  4 mg intravenous q6h PRN Amanda Ochoa MD   4 mg at 06/05/24 0905    oxyCODONE (Roxicodone) immediate release tablet 5 mg  5 mg oral q6h PRN Amanda Ochoa MD   5 mg at 06/06/24 0441    pantoprazole (ProtoNix) EC tablet 40 mg  40 mg oral Daily before breakfast Amanda Ochoa MD   40 mg at 06/07/24 0704    polyethylene glycol (Glycolax, Miralax) packet 17 g  17 g oral Daily Amanda Ochoa MD        sennosides-docusate sodium (Alexandra-Colace)  8.6-50 mg per tablet 1 tablet  1 tablet oral Nightly Amanda Ochoa MD   1 tablet at 06/06/24 2250    sodium bicarbonate tablet 1,300 mg  1,300 mg oral Daily Amanda Ochoa MD   1,300 mg at 06/07/24 0841    torsemide (Demadex) tablet 60 mg  60 mg oral Daily Amanda Ochoa MD   60 mg at 06/07/24 0842       IMAGING SUMMARY:  (summary of new imaging findings, not a copy of dictation)  No new imaging

## 2024-06-08 LAB
GLUCOSE BLD MANUAL STRIP-MCNC: 110 MG/DL (ref 74–99)
GLUCOSE BLD MANUAL STRIP-MCNC: 119 MG/DL (ref 74–99)
GLUCOSE BLD MANUAL STRIP-MCNC: 124 MG/DL (ref 74–99)

## 2024-06-08 PROCEDURE — 2500000004 HC RX 250 GENERAL PHARMACY W/ HCPCS (ALT 636 FOR OP/ED)

## 2024-06-08 PROCEDURE — 2500000002 HC RX 250 W HCPCS SELF ADMINISTERED DRUGS (ALT 637 FOR MEDICARE OP, ALT 636 FOR OP/ED)

## 2024-06-08 PROCEDURE — 1100000001 HC PRIVATE ROOM DAILY

## 2024-06-08 PROCEDURE — 82947 ASSAY GLUCOSE BLOOD QUANT: CPT

## 2024-06-08 PROCEDURE — 2500000001 HC RX 250 WO HCPCS SELF ADMINISTERED DRUGS (ALT 637 FOR MEDICARE OP)

## 2024-06-08 PROCEDURE — 2500000001 HC RX 250 WO HCPCS SELF ADMINISTERED DRUGS (ALT 637 FOR MEDICARE OP): Performed by: STUDENT IN AN ORGANIZED HEALTH CARE EDUCATION/TRAINING PROGRAM

## 2024-06-08 PROCEDURE — 2500000005 HC RX 250 GENERAL PHARMACY W/O HCPCS

## 2024-06-08 RX ADMIN — METOPROLOL TARTRATE 50 MG: 50 TABLET, FILM COATED ORAL at 20:01

## 2024-06-08 RX ADMIN — PANTOPRAZOLE SODIUM 40 MG: 40 TABLET, DELAYED RELEASE ORAL at 07:05

## 2024-06-08 RX ADMIN — ATORVASTATIN CALCIUM 80 MG: 80 TABLET, FILM COATED ORAL at 20:01

## 2024-06-08 RX ADMIN — CLOPIDOGREL BISULFATE 75 MG: 75 TABLET ORAL at 08:14

## 2024-06-08 RX ADMIN — ISOSORBIDE DINITRATE 20 MG: 20 TABLET ORAL at 08:15

## 2024-06-08 RX ADMIN — TORSEMIDE 60 MG: 20 TABLET ORAL at 08:15

## 2024-06-08 RX ADMIN — ACETAMINOPHEN 650 MG: 325 TABLET ORAL at 07:04

## 2024-06-08 RX ADMIN — HEPARIN SODIUM 5000 UNITS: 5000 INJECTION INTRAVENOUS; SUBCUTANEOUS at 07:04

## 2024-06-08 RX ADMIN — HYDRALAZINE HYDROCHLORIDE 5 MG: 20 INJECTION INTRAMUSCULAR; INTRAVENOUS at 17:31

## 2024-06-08 RX ADMIN — HEPARIN SODIUM 5000 UNITS: 5000 INJECTION INTRAVENOUS; SUBCUTANEOUS at 14:18

## 2024-06-08 RX ADMIN — Medication 1000 UNITS: at 08:14

## 2024-06-08 RX ADMIN — METOPROLOL TARTRATE 50 MG: 50 TABLET, FILM COATED ORAL at 08:14

## 2024-06-08 RX ADMIN — SODIUM BICARBONATE 1300 MG: 650 TABLET ORAL at 08:14

## 2024-06-08 RX ADMIN — ISOSORBIDE DINITRATE 20 MG: 20 TABLET ORAL at 14:18

## 2024-06-08 RX ADMIN — ASPIRIN 81 MG CHEWABLE TABLET 81 MG: 81 TABLET CHEWABLE at 08:15

## 2024-06-08 RX ADMIN — MELATONIN 3 MG: 3 TAB ORAL at 20:02

## 2024-06-08 RX ADMIN — NIFEDIPINE 60 MG: 60 TABLET, FILM COATED, EXTENDED RELEASE ORAL at 08:15

## 2024-06-08 RX ADMIN — SENNOSIDES AND DOCUSATE SODIUM 1 TABLET: 50; 8.6 TABLET ORAL at 20:01

## 2024-06-08 RX ADMIN — ESCITALOPRAM OXALATE 10 MG: 10 TABLET ORAL at 08:15

## 2024-06-08 RX ADMIN — ISOSORBIDE DINITRATE 20 MG: 20 TABLET ORAL at 18:31

## 2024-06-08 RX ADMIN — HEPARIN SODIUM 5000 UNITS: 5000 INJECTION INTRAVENOUS; SUBCUTANEOUS at 20:01

## 2024-06-08 RX ADMIN — HYDRALAZINE HYDROCHLORIDE 5 MG: 20 INJECTION INTRAMUSCULAR; INTRAVENOUS at 11:49

## 2024-06-08 RX ADMIN — Medication 1000 UNITS: at 20:01

## 2024-06-08 ASSESSMENT — COGNITIVE AND FUNCTIONAL STATUS - GENERAL
MOVING TO AND FROM BED TO CHAIR: A LOT
CLIMB 3 TO 5 STEPS WITH RAILING: TOTAL
EATING MEALS: A LITTLE
DRESSING REGULAR LOWER BODY CLOTHING: TOTAL
DAILY ACTIVITIY SCORE: 13
PERSONAL GROOMING: A LITTLE
HELP NEEDED FOR BATHING: A LOT
MOVING FROM LYING ON BACK TO SITTING ON SIDE OF FLAT BED WITH BEDRAILS: A LITTLE
STANDING UP FROM CHAIR USING ARMS: A LOT
MOBILITY SCORE: 11
WALKING IN HOSPITAL ROOM: TOTAL
TURNING FROM BACK TO SIDE WHILE IN FLAT BAD: A LOT
TOILETING: TOTAL
DRESSING REGULAR UPPER BODY CLOTHING: A LITTLE

## 2024-06-08 ASSESSMENT — PAIN - FUNCTIONAL ASSESSMENT
PAIN_FUNCTIONAL_ASSESSMENT: 0-10
PAIN_FUNCTIONAL_ASSESSMENT: 0-10

## 2024-06-08 ASSESSMENT — PAIN SCALES - GENERAL
PAINLEVEL_OUTOF10: 0 - NO PAIN
PAINLEVEL_OUTOF10: 0 - NO PAIN

## 2024-06-08 NOTE — PROGRESS NOTES
[facility]  ACUTE CARE SURGERY - PROGRESS NOTE    Patient Name: Humphrey Waddell  MRN: [id number]  Admit Date: 522  : 1965  AGE: 59 y.o.   GENDER: male  ==============================================================================  TODAY'S ASSESSMENT AND PLAN OF CARE:  Mr. Waddell, 59yoM with Hx of cholecystitis s/p perc babs tube placement, readmitted on  with recurrent symptoms after tube fell out at home. He is now s/p open babs on . Postoperative course has been c/b delirium superimposed on patient's baseline cognitive impairment after Hx of CVAs. Mental status is appropriate today. He is recovering well postoperatively. Pending dispo to acute rehab.     Plan:   Neuro:   - tylenol, oxy, and dilaudid for pain control   - holding home gabapentin given CKD history   - continue home escitalopram and melatonin   - continue delirium precautions and sleep hygiene     CV:  - continue atorvastatin, metoprolol, nifedipine, and imdur      Heme:  - continue ASA   - continue home plavix      Resp:  - IS   - continue inhalers      GI:  - continue regular diet   - PPI   - continue bowel reg      :  - strict I/O   - will replace lytes PRN   - continue home bicarb   - continue home torsemide      Endo:  - SSI   - holding home glargine (euglycemic)     MSK:  - OOB ad hunter   - PT/OT     Ppx:  - SCD   - SQH      Dispo:  - Medically ready for discharge, pending SNF. Precert pending at [facility] rehab.     Patient discussed with Attending Dr. Joselyn Anderson MD  ACS [id number]    ==============================================================================  CHIEF COMPLAINT / EVENTS LAST 24HRS / HPI:  No acute events overnight. Pain well controlled. Tolerating a diet without nausea or vomiting.    MEDICAL HISTORY / ROS:   Admission history and ROS reviewed. Pertinent changes as follows:  No new changes    PHYSICAL EXAM:  Heart Rate:  [59-73]   Temp:  [36.2 °C (97.2 °F)-36.7 °C (98.1  °F)]   Resp:  [17-18]   BP: (143-189)/(57-78)   SpO2:  [94 %-97 %]   Physical Exam  Constitutional:       Appearance: Normal appearance.   HENT:      Head: Normocephalic.   Eyes:      Extraocular Movements: Extraocular movements intact.   Cardiovascular:      Rate and Rhythm: Normal rate and regular rhythm.      Comments: Non cyanotic   Pulmonary:      Effort: Pulmonary effort is normal.   Abdominal:      Comments: Obese, soft, non distended, appropriately tender to palpitation. Subcostal incision well approximated with staples c/d/i   Musculoskeletal:         General: Normal range of motion.   Skin:     General: Skin is warm and dry.   Neurological:      Mental Status: He is alert and oriented to person, place, and time.   Psychiatric:         Behavior: Behavior normal.       LABS:  Results for orders placed or performed during the hospital encounter of 05/22/24 (from the past 24 hour(s))   POCT GLUCOSE   Result Value Ref Range    POCT Glucose 109 (H) 74 - 99 mg/dL   POCT GLUCOSE   Result Value Ref Range    POCT Glucose 119 (H) 74 - 99 mg/dL     MEDICATIONS:  Current Facility-Administered Medications   Medication Dose Route Frequency Provider Last Rate Last Admin    acetaminophen (Tylenol) tablet 650 mg  650 mg oral q6h Amanda Ochoa MD   650 mg at 06/08/24 0704    aspirin chewable tablet 81 mg  81 mg oral Daily Amanda Ochoa MD   81 mg at 06/08/24 0815    atorvastatin (Lipitor) tablet 80 mg  80 mg oral Daily Amanda Ochoa MD   80 mg at 06/07/24 2302    cholecalciferol (Vitamin D-3) tablet 1,000 Units  1,000 Units oral BID Amanda Ochoa MD   1,000 Units at 06/08/24 0814    clopidogrel (Plavix) tablet 75 mg  75 mg oral Daily Cici Metcalf MD   75 mg at 06/08/24 0814    dextrose 50 % injection 12.5 g  12.5 g intravenous q15 min PRN Amanda Ochoa MD        dextrose 50 % injection 25 g  25 g intravenous q15 min PRN Amanda Ochoa MD        escitalopram (Lexapro) tablet 10 mg  10 mg oral Daily Amanda Ochoa MD   10 mg at  06/08/24 0815    glucagon (Glucagen) injection 1 mg  1 mg intramuscular q15 min PRN Amanda Ochoa MD        heparin (porcine) injection 5,000 Units  5,000 Units subcutaneous q8h Amanda Ochoa MD   5,000 Units at 06/08/24 0704    hydrALAZINE (Apresoline) injection 5 mg  5 mg intravenous q6h PRN Dario Nicole MD   5 mg at 06/08/24 1149    HYDROmorphone PF (Dilaudid) injection 0.2 mg  0.2 mg intravenous q2h PRN Amanda Ochoa MD   0.2 mg at 05/28/24 0941    insulin lispro (HumaLOG) injection 0-10 Units  0-10 Units subcutaneous TID Amanda Ochoa MD   2 Units at 05/31/24 1213    ipratropium-albuteroL (Duo-Neb) 0.5-2.5 mg/3 mL nebulizer solution 3 mL  3 mL nebulization q6h PRN Richmond York MD        isosorbide dinitrate (Isordil) tablet 20 mg  20 mg oral TID Amanda Ochoa MD   20 mg at 06/08/24 0815    lidocaine 4 % patch 2 patch  2 patch transdermal Daily Amanda Ochoa MD   2 patch at 06/07/24 0841    melatonin tablet 3 mg  3 mg oral Nightly Amanda Ochoa MD   3 mg at 06/07/24 2302    metoprolol tartrate (Lopressor) tablet 50 mg  50 mg oral BID Amanda Ochoa MD   50 mg at 06/08/24 0814    naloxone (Narcan) injection 0.2 mg  0.2 mg intravenous q5 min PRN Amanda Ochoa MD        naloxone (Narcan) injection 0.2 mg  0.2 mg intravenous PRN Amanda Ochoa MD        NIFEdipine ER (Adalat CC) 24 hr tablet 60 mg  60 mg oral Daily before breakfast Amanda Ochoa MD   60 mg at 06/08/24 0815    ondansetron (Zofran) injection 4 mg  4 mg intravenous q6h PRN Amanda Ochoa MD   4 mg at 06/05/24 0905    oxyCODONE (Roxicodone) immediate release tablet 5 mg  5 mg oral q6h PRN Amanda Ochoa MD   5 mg at 06/07/24 2309    pantoprazole (ProtoNix) EC tablet 40 mg  40 mg oral Daily before breakfast Amanda Ochoa MD   40 mg at 06/08/24 0705    polyethylene glycol (Glycolax, Miralax) packet 17 g  17 g oral Daily Amanda Ochoa MD        sennosides-docusate sodium (Alexandra-Colace) 8.6-50 mg per tablet 1 tablet  1 tablet oral Nightly Amanda Ochoa MD   1 tablet  at 06/07/24 2302    sodium bicarbonate tablet 1,300 mg  1,300 mg oral Daily Amanda Ochoa MD   1,300 mg at 06/08/24 0814    torsemide (Demadex) tablet 60 mg  60 mg oral Daily Amanda Ochoa MD   60 mg at 06/08/24 0815       IMAGING SUMMARY:  (summary of new imaging findings, not a copy of dictation)  No new imaging

## 2024-06-09 LAB
GLUCOSE BLD MANUAL STRIP-MCNC: 147 MG/DL (ref 74–99)
GLUCOSE BLD MANUAL STRIP-MCNC: 175 MG/DL (ref 74–99)
GLUCOSE BLD MANUAL STRIP-MCNC: 195 MG/DL (ref 74–99)
GLUCOSE BLD MANUAL STRIP-MCNC: 85 MG/DL (ref 74–99)

## 2024-06-09 PROCEDURE — 1100000001 HC PRIVATE ROOM DAILY

## 2024-06-09 PROCEDURE — 2500000001 HC RX 250 WO HCPCS SELF ADMINISTERED DRUGS (ALT 637 FOR MEDICARE OP)

## 2024-06-09 PROCEDURE — 82947 ASSAY GLUCOSE BLOOD QUANT: CPT

## 2024-06-09 PROCEDURE — 2500000004 HC RX 250 GENERAL PHARMACY W/ HCPCS (ALT 636 FOR OP/ED)

## 2024-06-09 PROCEDURE — 2500000002 HC RX 250 W HCPCS SELF ADMINISTERED DRUGS (ALT 637 FOR MEDICARE OP, ALT 636 FOR OP/ED)

## 2024-06-09 PROCEDURE — 94668 MNPJ CHEST WALL SBSQ: CPT

## 2024-06-09 PROCEDURE — 2500000001 HC RX 250 WO HCPCS SELF ADMINISTERED DRUGS (ALT 637 FOR MEDICARE OP): Performed by: STUDENT IN AN ORGANIZED HEALTH CARE EDUCATION/TRAINING PROGRAM

## 2024-06-09 RX ADMIN — TORSEMIDE 60 MG: 20 TABLET ORAL at 09:44

## 2024-06-09 RX ADMIN — NIFEDIPINE 60 MG: 60 TABLET, FILM COATED, EXTENDED RELEASE ORAL at 09:44

## 2024-06-09 RX ADMIN — CLOPIDOGREL BISULFATE 75 MG: 75 TABLET ORAL at 09:44

## 2024-06-09 RX ADMIN — ESCITALOPRAM OXALATE 10 MG: 10 TABLET ORAL at 09:44

## 2024-06-09 RX ADMIN — PANTOPRAZOLE SODIUM 40 MG: 40 TABLET, DELAYED RELEASE ORAL at 07:06

## 2024-06-09 RX ADMIN — INSULIN LISPRO 2 UNITS: 100 INJECTION, SOLUTION INTRAVENOUS; SUBCUTANEOUS at 09:44

## 2024-06-09 RX ADMIN — SODIUM BICARBONATE 1300 MG: 650 TABLET ORAL at 09:44

## 2024-06-09 RX ADMIN — HEPARIN SODIUM 5000 UNITS: 5000 INJECTION INTRAVENOUS; SUBCUTANEOUS at 14:08

## 2024-06-09 RX ADMIN — Medication 1000 UNITS: at 20:37

## 2024-06-09 RX ADMIN — ATORVASTATIN CALCIUM 80 MG: 80 TABLET, FILM COATED ORAL at 20:37

## 2024-06-09 RX ADMIN — METOPROLOL TARTRATE 50 MG: 50 TABLET, FILM COATED ORAL at 09:44

## 2024-06-09 RX ADMIN — HEPARIN SODIUM 5000 UNITS: 5000 INJECTION INTRAVENOUS; SUBCUTANEOUS at 20:37

## 2024-06-09 RX ADMIN — ISOSORBIDE DINITRATE 20 MG: 20 TABLET ORAL at 14:08

## 2024-06-09 RX ADMIN — ISOSORBIDE DINITRATE 20 MG: 20 TABLET ORAL at 18:32

## 2024-06-09 RX ADMIN — ASPIRIN 81 MG CHEWABLE TABLET 81 MG: 81 TABLET CHEWABLE at 09:44

## 2024-06-09 RX ADMIN — ACETAMINOPHEN 650 MG: 325 TABLET ORAL at 07:06

## 2024-06-09 RX ADMIN — ISOSORBIDE DINITRATE 20 MG: 20 TABLET ORAL at 09:44

## 2024-06-09 RX ADMIN — HEPARIN SODIUM 5000 UNITS: 5000 INJECTION INTRAVENOUS; SUBCUTANEOUS at 04:26

## 2024-06-09 RX ADMIN — MELATONIN 3 MG: 3 TAB ORAL at 20:37

## 2024-06-09 RX ADMIN — METOPROLOL TARTRATE 50 MG: 50 TABLET, FILM COATED ORAL at 20:37

## 2024-06-09 RX ADMIN — Medication 1000 UNITS: at 09:54

## 2024-06-09 ASSESSMENT — COGNITIVE AND FUNCTIONAL STATUS - GENERAL
TURNING FROM BACK TO SIDE WHILE IN FLAT BAD: A LOT
PERSONAL GROOMING: A LOT
CLIMB 3 TO 5 STEPS WITH RAILING: TOTAL
PERSONAL GROOMING: A LOT
DRESSING REGULAR LOWER BODY CLOTHING: A LOT
DRESSING REGULAR UPPER BODY CLOTHING: A LOT
STANDING UP FROM CHAIR USING ARMS: TOTAL
TURNING FROM BACK TO SIDE WHILE IN FLAT BAD: A LOT
EATING MEALS: A LITTLE
DRESSING REGULAR LOWER BODY CLOTHING: A LOT
STANDING UP FROM CHAIR USING ARMS: TOTAL
TOILETING: A LOT
WALKING IN HOSPITAL ROOM: TOTAL
HELP NEEDED FOR BATHING: A LOT
TOILETING: A LOT
PERSONAL GROOMING: A LOT
PERSONAL GROOMING: A LOT
DRESSING REGULAR LOWER BODY CLOTHING: A LOT
CLIMB 3 TO 5 STEPS WITH RAILING: TOTAL
DRESSING REGULAR UPPER BODY CLOTHING: A LOT
EATING MEALS: A LITTLE
DRESSING REGULAR LOWER BODY CLOTHING: A LOT
MOVING FROM LYING ON BACK TO SITTING ON SIDE OF FLAT BED WITH BEDRAILS: A LITTLE
WALKING IN HOSPITAL ROOM: TOTAL
MOVING FROM LYING ON BACK TO SITTING ON SIDE OF FLAT BED WITH BEDRAILS: A LITTLE
EATING MEALS: A LITTLE
MOVING TO AND FROM BED TO CHAIR: A LOT
TOILETING: A LOT
MOVING TO AND FROM BED TO CHAIR: A LOT
HELP NEEDED FOR BATHING: A LOT
CLIMB 3 TO 5 STEPS WITH RAILING: TOTAL
WALKING IN HOSPITAL ROOM: TOTAL
TURNING FROM BACK TO SIDE WHILE IN FLAT BAD: A LOT
DRESSING REGULAR UPPER BODY CLOTHING: A LOT
MOBILITY SCORE: 10
TURNING FROM BACK TO SIDE WHILE IN FLAT BAD: A LOT
HELP NEEDED FOR BATHING: A LOT
EATING MEALS: A LITTLE
DRESSING REGULAR UPPER BODY CLOTHING: A LOT
CLIMB 3 TO 5 STEPS WITH RAILING: TOTAL
DAILY ACTIVITIY SCORE: 13
MOVING FROM LYING ON BACK TO SITTING ON SIDE OF FLAT BED WITH BEDRAILS: A LITTLE
STANDING UP FROM CHAIR USING ARMS: TOTAL
HELP NEEDED FOR BATHING: A LOT
WALKING IN HOSPITAL ROOM: TOTAL
STANDING UP FROM CHAIR USING ARMS: TOTAL
MOVING TO AND FROM BED TO CHAIR: A LOT
MOVING FROM LYING ON BACK TO SITTING ON SIDE OF FLAT BED WITH BEDRAILS: A LITTLE
TOILETING: A LOT
MOVING TO AND FROM BED TO CHAIR: A LOT

## 2024-06-09 ASSESSMENT — PAIN SCALES - GENERAL
PAINLEVEL_OUTOF10: 0 - NO PAIN

## 2024-06-09 ASSESSMENT — PAIN - FUNCTIONAL ASSESSMENT
PAIN_FUNCTIONAL_ASSESSMENT: 0-10

## 2024-06-10 ENCOUNTER — DOCUMENTATION (OUTPATIENT)
Dept: CARE COORDINATION | Facility: CLINIC | Age: 59
End: 2024-06-10
Payer: COMMERCIAL

## 2024-06-10 LAB
GLUCOSE BLD MANUAL STRIP-MCNC: 123 MG/DL (ref 74–99)
GLUCOSE BLD MANUAL STRIP-MCNC: 129 MG/DL (ref 74–99)
GLUCOSE BLD MANUAL STRIP-MCNC: 165 MG/DL (ref 74–99)

## 2024-06-10 PROCEDURE — 82947 ASSAY GLUCOSE BLOOD QUANT: CPT

## 2024-06-10 PROCEDURE — 2500000001 HC RX 250 WO HCPCS SELF ADMINISTERED DRUGS (ALT 637 FOR MEDICARE OP): Performed by: STUDENT IN AN ORGANIZED HEALTH CARE EDUCATION/TRAINING PROGRAM

## 2024-06-10 PROCEDURE — 2500000004 HC RX 250 GENERAL PHARMACY W/ HCPCS (ALT 636 FOR OP/ED)

## 2024-06-10 PROCEDURE — 1100000001 HC PRIVATE ROOM DAILY

## 2024-06-10 PROCEDURE — 2500000002 HC RX 250 W HCPCS SELF ADMINISTERED DRUGS (ALT 637 FOR MEDICARE OP, ALT 636 FOR OP/ED)

## 2024-06-10 PROCEDURE — 2500000001 HC RX 250 WO HCPCS SELF ADMINISTERED DRUGS (ALT 637 FOR MEDICARE OP)

## 2024-06-10 PROCEDURE — 94668 MNPJ CHEST WALL SBSQ: CPT

## 2024-06-10 PROCEDURE — 2500000005 HC RX 250 GENERAL PHARMACY W/O HCPCS

## 2024-06-10 RX ADMIN — METOPROLOL TARTRATE 50 MG: 50 TABLET, FILM COATED ORAL at 20:44

## 2024-06-10 RX ADMIN — HEPARIN SODIUM 5000 UNITS: 5000 INJECTION INTRAVENOUS; SUBCUTANEOUS at 05:18

## 2024-06-10 RX ADMIN — OXYCODONE HYDROCHLORIDE 5 MG: 5 TABLET ORAL at 18:32

## 2024-06-10 RX ADMIN — SODIUM BICARBONATE 1300 MG: 650 TABLET ORAL at 08:50

## 2024-06-10 RX ADMIN — LIDOCAINE 2 PATCH: 4 PATCH TOPICAL at 08:51

## 2024-06-10 RX ADMIN — ISOSORBIDE DINITRATE 20 MG: 20 TABLET ORAL at 18:29

## 2024-06-10 RX ADMIN — ISOSORBIDE DINITRATE 20 MG: 20 TABLET ORAL at 08:50

## 2024-06-10 RX ADMIN — ACETAMINOPHEN 650 MG: 325 TABLET ORAL at 13:42

## 2024-06-10 RX ADMIN — ATORVASTATIN CALCIUM 80 MG: 80 TABLET, FILM COATED ORAL at 20:44

## 2024-06-10 RX ADMIN — METOPROLOL TARTRATE 50 MG: 50 TABLET, FILM COATED ORAL at 08:50

## 2024-06-10 RX ADMIN — NIFEDIPINE 60 MG: 60 TABLET, FILM COATED, EXTENDED RELEASE ORAL at 08:50

## 2024-06-10 RX ADMIN — PANTOPRAZOLE SODIUM 40 MG: 40 TABLET, DELAYED RELEASE ORAL at 05:18

## 2024-06-10 RX ADMIN — ASPIRIN 81 MG CHEWABLE TABLET 81 MG: 81 TABLET CHEWABLE at 08:50

## 2024-06-10 RX ADMIN — Medication 1000 UNITS: at 20:45

## 2024-06-10 RX ADMIN — ACETAMINOPHEN 650 MG: 325 TABLET ORAL at 18:29

## 2024-06-10 RX ADMIN — OXYCODONE HYDROCHLORIDE 5 MG: 5 TABLET ORAL at 09:10

## 2024-06-10 RX ADMIN — ESCITALOPRAM OXALATE 10 MG: 10 TABLET ORAL at 08:50

## 2024-06-10 RX ADMIN — ISOSORBIDE DINITRATE 20 MG: 20 TABLET ORAL at 13:42

## 2024-06-10 RX ADMIN — MELATONIN 3 MG: 3 TAB ORAL at 20:45

## 2024-06-10 RX ADMIN — CLOPIDOGREL BISULFATE 75 MG: 75 TABLET ORAL at 08:50

## 2024-06-10 RX ADMIN — HEPARIN SODIUM 5000 UNITS: 5000 INJECTION INTRAVENOUS; SUBCUTANEOUS at 13:42

## 2024-06-10 RX ADMIN — TORSEMIDE 60 MG: 20 TABLET ORAL at 08:51

## 2024-06-10 RX ADMIN — Medication 1000 UNITS: at 08:50

## 2024-06-10 RX ADMIN — HEPARIN SODIUM 5000 UNITS: 5000 INJECTION INTRAVENOUS; SUBCUTANEOUS at 20:44

## 2024-06-10 ASSESSMENT — COGNITIVE AND FUNCTIONAL STATUS - GENERAL
TURNING FROM BACK TO SIDE WHILE IN FLAT BAD: A LOT
WALKING IN HOSPITAL ROOM: TOTAL
MOVING TO AND FROM BED TO CHAIR: A LOT
CLIMB 3 TO 5 STEPS WITH RAILING: TOTAL
HELP NEEDED FOR BATHING: A LOT
EATING MEALS: A LITTLE
EATING MEALS: A LITTLE
DRESSING REGULAR LOWER BODY CLOTHING: A LOT
PERSONAL GROOMING: A LOT
DRESSING REGULAR LOWER BODY CLOTHING: A LOT
DRESSING REGULAR LOWER BODY CLOTHING: A LOT
DRESSING REGULAR UPPER BODY CLOTHING: A LOT
TURNING FROM BACK TO SIDE WHILE IN FLAT BAD: A LOT
MOVING FROM LYING ON BACK TO SITTING ON SIDE OF FLAT BED WITH BEDRAILS: A LITTLE
TOILETING: A LOT
STANDING UP FROM CHAIR USING ARMS: TOTAL
MOVING TO AND FROM BED TO CHAIR: A LOT
TURNING FROM BACK TO SIDE WHILE IN FLAT BAD: A LOT
STANDING UP FROM CHAIR USING ARMS: TOTAL
HELP NEEDED FOR BATHING: A LOT
STANDING UP FROM CHAIR USING ARMS: TOTAL
PERSONAL GROOMING: A LOT
EATING MEALS: A LITTLE
TURNING FROM BACK TO SIDE WHILE IN FLAT BAD: A LOT
WALKING IN HOSPITAL ROOM: TOTAL
TOILETING: A LOT
PERSONAL GROOMING: A LOT
TOILETING: A LOT
MOVING FROM LYING ON BACK TO SITTING ON SIDE OF FLAT BED WITH BEDRAILS: A LITTLE
CLIMB 3 TO 5 STEPS WITH RAILING: TOTAL
PERSONAL GROOMING: A LOT
WALKING IN HOSPITAL ROOM: TOTAL
EATING MEALS: A LITTLE
PERSONAL GROOMING: A LOT
TOILETING: A LITTLE
MOVING TO AND FROM BED TO CHAIR: A LOT
TURNING FROM BACK TO SIDE WHILE IN FLAT BAD: A LOT
STANDING UP FROM CHAIR USING ARMS: TOTAL
TOILETING: A LOT
TOILETING: A LOT
PERSONAL GROOMING: A LOT
DRESSING REGULAR UPPER BODY CLOTHING: A LOT
MOVING FROM LYING ON BACK TO SITTING ON SIDE OF FLAT BED WITH BEDRAILS: A LITTLE
DRESSING REGULAR UPPER BODY CLOTHING: A LOT
TURNING FROM BACK TO SIDE WHILE IN FLAT BAD: A LOT
EATING MEALS: A LITTLE
WALKING IN HOSPITAL ROOM: TOTAL
TURNING FROM BACK TO SIDE WHILE IN FLAT BAD: A LOT
CLIMB 3 TO 5 STEPS WITH RAILING: TOTAL
CLIMB 3 TO 5 STEPS WITH RAILING: TOTAL
DRESSING REGULAR LOWER BODY CLOTHING: A LOT
CLIMB 3 TO 5 STEPS WITH RAILING: TOTAL
DRESSING REGULAR LOWER BODY CLOTHING: A LOT
EATING MEALS: A LITTLE
STANDING UP FROM CHAIR USING ARMS: TOTAL
TOILETING: A LOT
HELP NEEDED FOR BATHING: A LOT
MOVING FROM LYING ON BACK TO SITTING ON SIDE OF FLAT BED WITH BEDRAILS: A LITTLE
HELP NEEDED FOR BATHING: A LOT
CLIMB 3 TO 5 STEPS WITH RAILING: TOTAL
DRESSING REGULAR LOWER BODY CLOTHING: A LOT
MOVING TO AND FROM BED TO CHAIR: A LOT
STANDING UP FROM CHAIR USING ARMS: TOTAL
WALKING IN HOSPITAL ROOM: TOTAL
WALKING IN HOSPITAL ROOM: TOTAL
DRESSING REGULAR UPPER BODY CLOTHING: A LOT
WALKING IN HOSPITAL ROOM: TOTAL
EATING MEALS: A LITTLE
DRESSING REGULAR UPPER BODY CLOTHING: A LOT
DRESSING REGULAR UPPER BODY CLOTHING: A LOT
EATING MEALS: A LITTLE
MOVING FROM LYING ON BACK TO SITTING ON SIDE OF FLAT BED WITH BEDRAILS: A LITTLE
MOVING TO AND FROM BED TO CHAIR: A LOT
MOVING FROM LYING ON BACK TO SITTING ON SIDE OF FLAT BED WITH BEDRAILS: A LITTLE
DRESSING REGULAR UPPER BODY CLOTHING: A LOT
TOILETING: A LOT
HELP NEEDED FOR BATHING: A LOT
MOVING TO AND FROM BED TO CHAIR: A LOT
WALKING IN HOSPITAL ROOM: TOTAL
DRESSING REGULAR LOWER BODY CLOTHING: A LOT
CLIMB 3 TO 5 STEPS WITH RAILING: TOTAL
STANDING UP FROM CHAIR USING ARMS: TOTAL
PERSONAL GROOMING: A LOT
DRESSING REGULAR UPPER BODY CLOTHING: A LOT
TOILETING: A LOT
WALKING IN HOSPITAL ROOM: TOTAL
PERSONAL GROOMING: A LOT
DRESSING REGULAR UPPER BODY CLOTHING: A LOT
STANDING UP FROM CHAIR USING ARMS: TOTAL
CLIMB 3 TO 5 STEPS WITH RAILING: TOTAL
MOVING TO AND FROM BED TO CHAIR: A LOT
HELP NEEDED FOR BATHING: A LOT
DRESSING REGULAR LOWER BODY CLOTHING: A LOT
EATING MEALS: A LITTLE
MOBILITY SCORE: 10
DRESSING REGULAR LOWER BODY CLOTHING: A LOT
MOVING FROM LYING ON BACK TO SITTING ON SIDE OF FLAT BED WITH BEDRAILS: A LITTLE
HELP NEEDED FOR BATHING: A LOT
DAILY ACTIVITIY SCORE: 14
HELP NEEDED FOR BATHING: A LOT
CLIMB 3 TO 5 STEPS WITH RAILING: TOTAL
MOVING FROM LYING ON BACK TO SITTING ON SIDE OF FLAT BED WITH BEDRAILS: A LITTLE
MOVING TO AND FROM BED TO CHAIR: A LOT
MOVING FROM LYING ON BACK TO SITTING ON SIDE OF FLAT BED WITH BEDRAILS: A LITTLE
STANDING UP FROM CHAIR USING ARMS: TOTAL
PERSONAL GROOMING: A LOT
HELP NEEDED FOR BATHING: A LOT
TURNING FROM BACK TO SIDE WHILE IN FLAT BAD: A LOT
TURNING FROM BACK TO SIDE WHILE IN FLAT BAD: A LOT
MOVING TO AND FROM BED TO CHAIR: A LOT

## 2024-06-10 ASSESSMENT — PAIN - FUNCTIONAL ASSESSMENT
PAIN_FUNCTIONAL_ASSESSMENT: 0-10

## 2024-06-10 ASSESSMENT — PAIN SCALES - GENERAL
PAINLEVEL_OUTOF10: 8
PAINLEVEL_OUTOF10: 0 - NO PAIN
PAINLEVEL_OUTOF10: 2

## 2024-06-10 ASSESSMENT — PAIN DESCRIPTION - LOCATION: LOCATION: SHOULDER

## 2024-06-10 NOTE — PROGRESS NOTES
Patient referred to [facility] 6/4/24. Patient remains hospitalized. Will continue to monitor and outreach upon discharge.    ARLEY Herrera   III  [facility]  Office Phone: [phone]  [email or web address]

## 2024-06-10 NOTE — CARE PLAN
Problem: Pain  Goal: My pain/discomfort is manageable  Outcome: Progressing     Problem: Safety  Goal: Patient will be injury free during hospitalization  Outcome: Progressing  Goal: I will remain free of falls  Outcome: Progressing     Problem: Daily Care  Goal: Daily care needs are met  Outcome: Progressing     Problem: Psychosocial Needs  Goal: Demonstrates ability to cope with hospitalization/illness  Outcome: Progressing  Goal: Collaborate with me, my family, and caregiver to identify my specific goals  Outcome: Progressing     Problem: Discharge Barriers  Goal: My discharge needs are met  Outcome: Progressing     Problem: Skin  Goal: Decreased wound size/increased tissue granulation at next dressing change  Outcome: Progressing  Goal: Participates in plan/prevention/treatment measures  Outcome: Progressing  Goal: Prevent/manage excess moisture  Outcome: Progressing  Goal: Prevent/minimize sheer/friction injuries  Outcome: Progressing  Goal: Promote/optimize nutrition  Outcome: Progressing  Goal: Promote skin healing  Outcome: Progressing     Problem: Swallowing  Goal: LTG - Patient will tolerate the least restrictive diet consistency to allow for safe consumption of daily meals  Outcome: Progressing     Problem: Pain - Adult  Goal: Verbalizes/displays adequate comfort level or baseline comfort level  Outcome: Progressing     Problem: Safety - Adult  Goal: Free from fall injury  Outcome: Progressing     Problem: Discharge Planning  Goal: Discharge to home or other facility with appropriate resources  Outcome: Progressing     Problem: Chronic Conditions and Co-morbidities  Goal: Patient's chronic conditions and co-morbidity symptoms are monitored and maintained or improved  Outcome: Progressing     Problem: Diabetes  Goal: Achieve decreasing blood glucose levels by end of shift  Outcome: Progressing  Goal: Increase stability of blood glucose readings by end of shift  Outcome: Progressing  Goal: Decrease in  ketones present in urine by end of shift  Outcome: Progressing  Goal: Maintain electrolyte levels within acceptable range throughout shift  Outcome: Progressing  Goal: Maintain glucose levels >70mg/dl to <250mg/dl throughout shift  Outcome: Progressing  Goal: No changes in neurological exam by end of shift  Outcome: Progressing  Goal: Learn about and adhere to nutrition recommendations by end of shift  Outcome: Progressing  Goal: Vital signs within normal range for age by end of shift  Outcome: Progressing  Goal: Increase self care and/or family involovement by end of shift  Outcome: Progressing  Goal: Receive DSME education by end of shift  Outcome: Progressing     Problem: Pain  Goal: Takes deep breaths with improved pain control throughout the shift  Outcome: Progressing  Goal: Turns in bed with improved pain control throughout the shift  Outcome: Progressing  Goal: Walks with improved pain control throughout the shift  Outcome: Progressing  Goal: Performs ADL's with improved pain control throughout shift  Outcome: Progressing  Goal: Participates in PT with improved pain control throughout the shift  Outcome: Progressing  Goal: Free from opioid side effects throughout the shift  Outcome: Progressing  Goal: Free from acute confusion related to pain meds throughout the shift  Outcome: Progressing   The patient's goals for the shift include Labs WNL    The clinical goals for the shift include Safety.

## 2024-06-10 NOTE — CARE PLAN
The patient's goals for the shift include Labs WNL    The clinical goals for the shift include pain management score <7      Problem: Pain  Goal: My pain/discomfort is manageable  Outcome: Progressing     Problem: Safety  Goal: Patient will be injury free during hospitalization  Outcome: Progressing  Goal: I will remain free of falls  Outcome: Progressing     Problem: Daily Care  Goal: Daily care needs are met  Outcome: Progressing     Problem: Psychosocial Needs  Goal: Demonstrates ability to cope with hospitalization/illness  Outcome: Progressing  Goal: Collaborate with me, my family, and caregiver to identify my specific goals  Outcome: Progressing     Problem: Discharge Barriers  Goal: My discharge needs are met  Outcome: Progressing     Problem: Skin  Goal: Decreased wound size/increased tissue granulation at next dressing change  Outcome: Progressing  Goal: Participates in plan/prevention/treatment measures  Outcome: Progressing  Goal: Prevent/manage excess moisture  Outcome: Progressing  Goal: Prevent/minimize sheer/friction injuries  Outcome: Progressing  Goal: Promote/optimize nutrition  Outcome: Progressing  Goal: Promote skin healing  Outcome: Progressing     Problem: Swallowing  Goal: LTG - Patient will tolerate the least restrictive diet consistency to allow for safe consumption of daily meals  Outcome: Progressing     Problem: Pain - Adult  Goal: Verbalizes/displays adequate comfort level or baseline comfort level  Outcome: Progressing     Problem: Safety - Adult  Goal: Free from fall injury  Outcome: Progressing     Problem: Discharge Planning  Goal: Discharge to home or other facility with appropriate resources  Outcome: Progressing     Problem: Chronic Conditions and Co-morbidities  Goal: Patient's chronic conditions and co-morbidity symptoms are monitored and maintained or improved  Outcome: Progressing     Problem: Diabetes  Goal: Achieve decreasing blood glucose levels by end of shift  Outcome:  Progressing  Goal: Increase stability of blood glucose readings by end of shift  Outcome: Progressing  Goal: Decrease in ketones present in urine by end of shift  Outcome: Progressing  Goal: Maintain electrolyte levels within acceptable range throughout shift  Outcome: Progressing  Goal: Maintain glucose levels >70mg/dl to <250mg/dl throughout shift  Outcome: Progressing  Goal: No changes in neurological exam by end of shift  Outcome: Progressing  Goal: Learn about and adhere to nutrition recommendations by end of shift  Outcome: Progressing  Goal: Vital signs within normal range for age by end of shift  Outcome: Progressing  Goal: Increase self care and/or family involovement by end of shift  Outcome: Progressing  Goal: Receive DSME education by end of shift  Outcome: Progressing     Problem: Pain  Goal: Takes deep breaths with improved pain control throughout the shift  Outcome: Progressing  Goal: Turns in bed with improved pain control throughout the shift  Outcome: Progressing  Goal: Walks with improved pain control throughout the shift  Outcome: Progressing  Goal: Performs ADL's with improved pain control throughout shift  Outcome: Progressing  Goal: Participates in PT with improved pain control throughout the shift  Outcome: Progressing  Goal: Free from opioid side effects throughout the shift  Outcome: Progressing  Goal: Free from acute confusion related to pain meds throughout the shift  Outcome: Progressing

## 2024-06-10 NOTE — PROGRESS NOTES
[facility]  ACUTE CARE SURGERY - PROGRESS NOTE    Patient Name: Humphrey Waddell  MRN: [id number]  Admit Date: 522  : 1965  AGE: 59 y.o.   GENDER: male  ==============================================================================  TODAY'S ASSESSMENT AND PLAN OF CARE:  Mr. Waddell, 59yoM with Hx of cholecystitis s/p perc babs tube placement, readmitted on  with recurrent symptoms after tube fell out at home. He is now s/p open babs on . Postoperative course has been c/b delirium superimposed on patient's baseline cognitive impairment after Hx of CVAs. Mental status is appropriate today. He is recovering well postoperatively. Pending dispo to acute rehab. Pre cert initiated.      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 Now greater than 1 year out. history of CVA 2020)     Resp:  - IS   - continue inhalers      GI:  - continue regular diet   - PPI   - continue bowel reg      :  - strict I/O   - will replace lytes PRN   - continue home bicarb   - continue home torsemide      Endo:  - SSI   - holding home glargine (euglycemic)     MSK:  - OOB ad hunter   - PT/OT     Ppx:  - SCD   - SQH      Dispo:  - Medically ready for discharge, pending SNF. Precert pending.  - staples removal possibly prior to discharge    Patient discussed with Attending Dr. Harshal Ochoa MD  [facility] [id number]    ==============================================================================  CHIEF COMPLAINT / EVENTS LAST 24HRS / HPI:  No acute events overnight. Pain well controlled. Did not have breakfast due  to poor hunger but will try lunch. No other new symptoms.       PHYSICAL EXAM:  Heart Rate:  [53-70]   Temp:  [36.3 °C (97.3 °F)-36.6 °C (97.9 °F)]   Resp:  [16-18]   BP: (143-162)/(68-82)   SpO2:  [94 %-96 %]   Physical Exam  Constitutional:       Appearance: Normal appearance.   HENT:      Head: Normocephalic.   Eyes:      Extraocular Movements: Extraocular movements intact.   Cardiovascular:      Rate and Rhythm: Normal rate and regular rhythm.      Comments: Non cyanotic   Pulmonary:      Effort: Pulmonary effort is normal.   Abdominal:      Comments: Obese, soft, non distended, appropriately tender to palpitation. Subcostal incision well approximated with staples c/d/i   Musculoskeletal:         General: Normal range of motion.   Skin:     General: Skin is warm and dry.   Neurological:      Mental Status: He is alert and oriented to person, place, and time.   Psychiatric:         Behavior: Behavior normal.       LABS:  Results for orders placed or performed during the hospital encounter of 05/22/24 (from the past 24 hour(s))   POCT GLUCOSE   Result Value Ref Range    POCT Glucose 85 74 - 99 mg/dL   POCT GLUCOSE   Result Value Ref Range    POCT Glucose 175 (H) 74 - 99 mg/dL   POCT GLUCOSE   Result Value Ref Range    POCT Glucose 129 (H) 74 - 99 mg/dL     MEDICATIONS:  Current Facility-Administered Medications   Medication Dose Route Frequency Provider Last Rate Last Admin    acetaminophen (Tylenol) tablet 650 mg  650 mg oral q6h Amanda Ochoa MD   650 mg at 06/09/24 0706    aspirin chewable tablet 81 mg  81 mg oral Daily Amanda Ochoa MD   81 mg at 06/10/24 0850    atorvastatin (Lipitor) tablet 80 mg  80 mg oral Daily Amanda Ochoa MD   80 mg at 06/09/24 2037    cholecalciferol (Vitamin D-3) tablet 1,000 Units  1,000 Units oral BID Amanda Ochoa MD   1,000 Units at 06/10/24 0850    clopidogrel (Plavix) tablet 75 mg  75 mg oral Daily Cici Metcalf MD   75 mg at 06/10/24 0850    dextrose 50 % injection 12.5 g   12.5 g intravenous q15 min PRN Amanda Ochoa MD        dextrose 50 % injection 25 g  25 g intravenous q15 min PRN Amanda Ochoa MD        escitalopram (Lexapro) tablet 10 mg  10 mg oral Daily Amanda Ochoa MD   10 mg at 06/10/24 0850    glucagon (Glucagen) injection 1 mg  1 mg intramuscular q15 min PRN Amanda Ochoa MD        heparin (porcine) injection 5,000 Units  5,000 Units subcutaneous q8h Amanda Ochoa MD   5,000 Units at 06/10/24 0518    hydrALAZINE (Apresoline) injection 5 mg  5 mg intravenous q6h PRN Dario Nicole MD   5 mg at 06/08/24 1731    HYDROmorphone PF (Dilaudid) injection 0.2 mg  0.2 mg intravenous q2h PRN Amanda Ochoa MD   0.2 mg at 05/28/24 0941    insulin lispro (HumaLOG) injection 0-10 Units  0-10 Units subcutaneous TID Amanda Ochoa MD   2 Units at 06/09/24 0944    ipratropium-albuteroL (Duo-Neb) 0.5-2.5 mg/3 mL nebulizer solution 3 mL  3 mL nebulization q6h PRN Richmond York MD        isosorbide dinitrate (Isordil) tablet 20 mg  20 mg oral TID Amanda Ochoa MD   20 mg at 06/10/24 0850    lidocaine 4 % patch 2 patch  2 patch transdermal Daily Amanda Ochoa MD   2 patch at 06/10/24 0851    melatonin tablet 3 mg  3 mg oral Nightly Amanda Ochoa MD   3 mg at 06/09/24 2037    metoprolol tartrate (Lopressor) tablet 50 mg  50 mg oral BID Amanda Ochoa MD   50 mg at 06/10/24 0850    naloxone (Narcan) injection 0.2 mg  0.2 mg intravenous q5 min PRN Amanda Ochoa MD        naloxone (Narcan) injection 0.2 mg  0.2 mg intravenous PRN Amanda Ochoa MD        NIFEdipine ER (Adalat CC) 24 hr tablet 60 mg  60 mg oral Daily before breakfast Amanda Ochoa MD   60 mg at 06/10/24 0850    ondansetron (Zofran) injection 4 mg  4 mg intravenous q6h PRN Amanda Ochoa MD   4 mg at 06/05/24 0905    oxyCODONE (Roxicodone) immediate release tablet 5 mg  5 mg oral q6h PRN Amanda Ochoa MD   5 mg at 06/10/24 0910    pantoprazole (ProtoNix) EC tablet 40 mg  40 mg oral Daily before breakfast Amanda Ochoa MD   40 mg at 06/10/24 0518     polyethylene glycol (Glycolax, Miralax) packet 17 g  17 g oral Daily Amanda Ochoa MD        sennosides-docusate sodium (Alexandra-Colace) 8.6-50 mg per tablet 1 tablet  1 tablet oral Nightly Aamnda Ochoa MD   1 tablet at 06/08/24 2001    sodium bicarbonate tablet 1,300 mg  1,300 mg oral Daily Amanda Ochoa MD   1,300 mg at 06/10/24 0850    torsemide (Demadex) tablet 60 mg  60 mg oral Daily Amanda Ochoa MD   60 mg at 06/10/24 0851       IMAGING SUMMARY:  (summary of new imaging findings, not a copy of dictation)  No new imaging

## 2024-06-11 VITALS
HEIGHT: 76 IN | BODY MASS INDEX: 33.61 KG/M2 | OXYGEN SATURATION: 97 % | RESPIRATION RATE: 16 BRPM | WEIGHT: 276.02 LBS | HEART RATE: 53 BPM | DIASTOLIC BLOOD PRESSURE: 66 MMHG | SYSTOLIC BLOOD PRESSURE: 155 MMHG | TEMPERATURE: 98.1 F

## 2024-06-11 LAB
GLUCOSE BLD MANUAL STRIP-MCNC: 135 MG/DL (ref 74–99)
GLUCOSE BLD MANUAL STRIP-MCNC: 160 MG/DL (ref 74–99)
GLUCOSE BLD MANUAL STRIP-MCNC: 97 MG/DL (ref 74–99)
LABORATORY COMMENT REPORT: NORMAL
PATH REPORT.FINAL DX SPEC: NORMAL
PATH REPORT.GROSS SPEC: NORMAL
PATH REPORT.RELEVANT HX SPEC: NORMAL
PATH REPORT.TOTAL CANCER: NORMAL

## 2024-06-11 PROCEDURE — 97530 THERAPEUTIC ACTIVITIES: CPT | Mod: GP

## 2024-06-11 PROCEDURE — 94668 MNPJ CHEST WALL SBSQ: CPT

## 2024-06-11 PROCEDURE — 2500000004 HC RX 250 GENERAL PHARMACY W/ HCPCS (ALT 636 FOR OP/ED)

## 2024-06-11 PROCEDURE — 2500000002 HC RX 250 W HCPCS SELF ADMINISTERED DRUGS (ALT 637 FOR MEDICARE OP, ALT 636 FOR OP/ED)

## 2024-06-11 PROCEDURE — 82947 ASSAY GLUCOSE BLOOD QUANT: CPT

## 2024-06-11 PROCEDURE — 2500000001 HC RX 250 WO HCPCS SELF ADMINISTERED DRUGS (ALT 637 FOR MEDICARE OP)

## 2024-06-11 RX ORDER — GABAPENTIN 300 MG/1
300 CAPSULE ORAL 3 TIMES DAILY
Status: DISCONTINUED | OUTPATIENT
Start: 2024-06-11 | End: 2024-06-11 | Stop reason: HOSPADM

## 2024-06-11 RX ORDER — SODIUM BICARBONATE 650 MG/1
1300 TABLET ORAL DAILY
Start: 2024-06-12 | End: 2024-07-12

## 2024-06-11 RX ORDER — ACETAMINOPHEN 325 MG/1
325 TABLET ORAL EVERY 6 HOURS PRN
Start: 2024-06-11 | End: 2024-06-25

## 2024-06-11 RX ADMIN — Medication 1000 UNITS: at 08:52

## 2024-06-11 RX ADMIN — HEPARIN SODIUM 5000 UNITS: 5000 INJECTION INTRAVENOUS; SUBCUTANEOUS at 12:56

## 2024-06-11 RX ADMIN — ASPIRIN 81 MG CHEWABLE TABLET 81 MG: 81 TABLET CHEWABLE at 08:52

## 2024-06-11 RX ADMIN — GABAPENTIN 300 MG: 300 CAPSULE ORAL at 15:25

## 2024-06-11 RX ADMIN — INSULIN LISPRO 2 UNITS: 100 INJECTION, SOLUTION INTRAVENOUS; SUBCUTANEOUS at 13:02

## 2024-06-11 RX ADMIN — ISOSORBIDE DINITRATE 20 MG: 20 TABLET ORAL at 15:25

## 2024-06-11 RX ADMIN — SODIUM BICARBONATE 1300 MG: 650 TABLET ORAL at 08:51

## 2024-06-11 RX ADMIN — METOPROLOL TARTRATE 50 MG: 50 TABLET, FILM COATED ORAL at 08:51

## 2024-06-11 RX ADMIN — OXYCODONE HYDROCHLORIDE 5 MG: 5 TABLET ORAL at 10:00

## 2024-06-11 RX ADMIN — ISOSORBIDE DINITRATE 20 MG: 20 TABLET ORAL at 08:52

## 2024-06-11 RX ADMIN — ESCITALOPRAM OXALATE 10 MG: 10 TABLET ORAL at 08:52

## 2024-06-11 RX ADMIN — GABAPENTIN 300 MG: 300 CAPSULE ORAL at 08:52

## 2024-06-11 RX ADMIN — TORSEMIDE 60 MG: 20 TABLET ORAL at 08:51

## 2024-06-11 RX ADMIN — NIFEDIPINE 60 MG: 60 TABLET, FILM COATED, EXTENDED RELEASE ORAL at 08:52

## 2024-06-11 ASSESSMENT — COGNITIVE AND FUNCTIONAL STATUS - GENERAL
MOVING TO AND FROM BED TO CHAIR: A LOT
HELP NEEDED FOR BATHING: A LOT
MOVING FROM LYING ON BACK TO SITTING ON SIDE OF FLAT BED WITH BEDRAILS: A LOT
CLIMB 3 TO 5 STEPS WITH RAILING: TOTAL
TURNING FROM BACK TO SIDE WHILE IN FLAT BAD: A LOT
MOVING TO AND FROM BED TO CHAIR: A LOT
MOVING FROM LYING ON BACK TO SITTING ON SIDE OF FLAT BED WITH BEDRAILS: A LITTLE
EATING MEALS: A LITTLE
DRESSING REGULAR LOWER BODY CLOTHING: A LOT
TOILETING: A LOT
STANDING UP FROM CHAIR USING ARMS: A LOT
HELP NEEDED FOR BATHING: A LOT
TURNING FROM BACK TO SIDE WHILE IN FLAT BAD: A LOT
WALKING IN HOSPITAL ROOM: TOTAL
WALKING IN HOSPITAL ROOM: TOTAL
HELP NEEDED FOR BATHING: A LOT
HELP NEEDED FOR BATHING: A LOT
TURNING FROM BACK TO SIDE WHILE IN FLAT BAD: A LOT
MOVING FROM LYING ON BACK TO SITTING ON SIDE OF FLAT BED WITH BEDRAILS: A LITTLE
MOVING TO AND FROM BED TO CHAIR: A LOT
EATING MEALS: A LITTLE
DRESSING REGULAR LOWER BODY CLOTHING: A LOT
MOVING FROM LYING ON BACK TO SITTING ON SIDE OF FLAT BED WITH BEDRAILS: A LITTLE
WALKING IN HOSPITAL ROOM: TOTAL
DRESSING REGULAR LOWER BODY CLOTHING: A LOT
MOBILITY SCORE: 10
MOVING TO AND FROM BED TO CHAIR: A LOT
TOILETING: A LOT
STANDING UP FROM CHAIR USING ARMS: TOTAL
PERSONAL GROOMING: A LOT
TURNING FROM BACK TO SIDE WHILE IN FLAT BAD: A LOT
DRESSING REGULAR LOWER BODY CLOTHING: A LOT
PERSONAL GROOMING: A LOT
TOILETING: A LOT
WALKING IN HOSPITAL ROOM: TOTAL
WALKING IN HOSPITAL ROOM: TOTAL
STANDING UP FROM CHAIR USING ARMS: TOTAL
DRESSING REGULAR UPPER BODY CLOTHING: A LOT
CLIMB 3 TO 5 STEPS WITH RAILING: TOTAL
PERSONAL GROOMING: A LOT
TURNING FROM BACK TO SIDE WHILE IN FLAT BAD: A LOT
CLIMB 3 TO 5 STEPS WITH RAILING: TOTAL
MOVING TO AND FROM BED TO CHAIR: A LOT
TOILETING: A LOT
EATING MEALS: A LITTLE
TURNING FROM BACK TO SIDE WHILE IN FLAT BAD: A LOT
CLIMB 3 TO 5 STEPS WITH RAILING: TOTAL
DRESSING REGULAR UPPER BODY CLOTHING: A LOT
CLIMB 3 TO 5 STEPS WITH RAILING: TOTAL
WALKING IN HOSPITAL ROOM: TOTAL
PERSONAL GROOMING: A LOT
MOVING TO AND FROM BED TO CHAIR: A LOT
TURNING FROM BACK TO SIDE WHILE IN FLAT BAD: A LOT
MOVING FROM LYING ON BACK TO SITTING ON SIDE OF FLAT BED WITH BEDRAILS: A LITTLE
MOVING TO AND FROM BED TO CHAIR: A LOT
CLIMB 3 TO 5 STEPS WITH RAILING: TOTAL
TOILETING: A LOT
MOVING FROM LYING ON BACK TO SITTING ON SIDE OF FLAT BED WITH BEDRAILS: A LITTLE
HELP NEEDED FOR BATHING: A LOT
DRESSING REGULAR UPPER BODY CLOTHING: A LOT
EATING MEALS: A LITTLE
MOVING FROM LYING ON BACK TO SITTING ON SIDE OF FLAT BED WITH BEDRAILS: A LITTLE
STANDING UP FROM CHAIR USING ARMS: TOTAL
DRESSING REGULAR LOWER BODY CLOTHING: A LOT
HELP NEEDED FOR BATHING: A LOT
PERSONAL GROOMING: A LOT
CLIMB 3 TO 5 STEPS WITH RAILING: TOTAL
STANDING UP FROM CHAIR USING ARMS: TOTAL
TURNING FROM BACK TO SIDE WHILE IN FLAT BAD: A LOT
STANDING UP FROM CHAIR USING ARMS: TOTAL
DRESSING REGULAR UPPER BODY CLOTHING: A LOT
WALKING IN HOSPITAL ROOM: TOTAL
DRESSING REGULAR UPPER BODY CLOTHING: A LOT
MOVING FROM LYING ON BACK TO SITTING ON SIDE OF FLAT BED WITH BEDRAILS: A LITTLE
HELP NEEDED FOR BATHING: A LOT
EATING MEALS: A LITTLE
STANDING UP FROM CHAIR USING ARMS: TOTAL
TOILETING: A LOT
DRESSING REGULAR LOWER BODY CLOTHING: A LOT
DRESSING REGULAR UPPER BODY CLOTHING: A LOT
STANDING UP FROM CHAIR USING ARMS: TOTAL
PERSONAL GROOMING: A LOT
TOILETING: A LOT
WALKING IN HOSPITAL ROOM: TOTAL
DRESSING REGULAR LOWER BODY CLOTHING: A LOT
MOVING TO AND FROM BED TO CHAIR: A LOT
EATING MEALS: A LITTLE
CLIMB 3 TO 5 STEPS WITH RAILING: TOTAL
PERSONAL GROOMING: A LOT
DRESSING REGULAR UPPER BODY CLOTHING: A LOT
EATING MEALS: A LITTLE

## 2024-06-11 ASSESSMENT — PAIN - FUNCTIONAL ASSESSMENT
PAIN_FUNCTIONAL_ASSESSMENT: 0-10

## 2024-06-11 ASSESSMENT — PAIN SCALES - GENERAL
PAINLEVEL_OUTOF10: 0 - NO PAIN
PAINLEVEL_OUTOF10: 0 - NO PAIN
PAINLEVEL_OUTOF10: 5 - MODERATE PAIN
PAINLEVEL_OUTOF10: 9

## 2024-06-11 NOTE — PROGRESS NOTES
[facility]  ACUTE CARE SURGERY - PROGRESS NOTE    Patient Name: Humphrey Waddell  MRN: [id number]  Admit Date: 522  : 1965  AGE: 59 y.o.   GENDER: male  ==============================================================================  TODAY'S ASSESSMENT AND PLAN OF CARE:  Mr. Waddell, 59yoM with Hx of cholecystitis s/p perc babs tube placement, readmitted on  with recurrent symptoms after tube fell out at home. He is now s/p open babs on . Postoperative course has been c/b delirium superimposed on patient's baseline cognitive impairment after Hx of CVAs. Mental status is appropriate today. He is recovering well postoperatively. Pending dispo to acute rehab. Pre cert initiated.      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 Now greater than 1 year out. history of CVA 2020)     Resp:  - IS   - continue inhalers      GI:  - continue regular diet   - PPI   - continue bowel reg      :  - strict I/O   - will replace lytes PRN   - continue home bicarb   - continue home torsemide      Endo:  - SSI   - holding home glargine (euglycemic)     MSK:  - OOB ad hunter   - PT/OT     Ppx:  - SCD   - SQH      Dispo:  - Medically ready for discharge, pending SNF. Precert pending.  - staples removal possibly prior to discharge    Patient discussed with Attending Dr. Harshal Ochoa MD  ACS [id number]    ==============================================================================  CHIEF COMPLAINT / EVENTS LAST 24HRS / HPI:  No acute events overnight. Pain well controlled. Had lunch and dinner yesterday. Feeling sleepy this morning, but  amenable to sitting in chair later. Mentating well.     PHYSICAL EXAM:  Heart Rate:  [53-68]   Temp:  [36.2 °C (97.2 °F)-36.7 °C (98.1 °F)]   Resp:  [18]   BP: (126-143)/(68-79)   SpO2:  [93 %-97 %]   Physical Exam  Constitutional:       Appearance: Normal appearance.   HENT:      Head: Normocephalic.   Eyes:      Extraocular Movements: Extraocular movements intact.   Cardiovascular:      Rate and Rhythm: Normal rate and regular rhythm.      Comments: Non cyanotic   Pulmonary:      Effort: Pulmonary effort is normal.   Abdominal:      Comments: Obese, soft, non distended, appropriately tender to palpitation. Subcostal incision well approximated with staples c/d/i   Musculoskeletal:         General: Normal range of motion.   Skin:     General: Skin is warm and dry.   Neurological:      Mental Status: He is alert and oriented to person, place, and time.   Psychiatric:         Behavior: Behavior normal.       LABS:  Results for orders placed or performed during the hospital encounter of 05/22/24 (from the past 24 hour(s))   POCT GLUCOSE   Result Value Ref Range    POCT Glucose 129 (H) 74 - 99 mg/dL   POCT GLUCOSE   Result Value Ref Range    POCT Glucose 123 (H) 74 - 99 mg/dL   POCT GLUCOSE   Result Value Ref Range    POCT Glucose 165 (H) 74 - 99 mg/dL     MEDICATIONS:  Current Facility-Administered Medications   Medication Dose Route Frequency Provider Last Rate Last Admin    acetaminophen (Tylenol) tablet 650 mg  650 mg oral q6h Amanda Ochoa MD   650 mg at 06/10/24 1829    aspirin chewable tablet 81 mg  81 mg oral Daily Amanda Ochoa MD   81 mg at 06/10/24 0850    atorvastatin (Lipitor) tablet 80 mg  80 mg oral Daily Amanda Ochoa MD   80 mg at 06/10/24 2044    cholecalciferol (Vitamin D-3) tablet 1,000 Units  1,000 Units oral BID Amanda Ochoa MD   1,000 Units at 06/10/24 2045    dextrose 50 % injection 12.5 g  12.5 g intravenous q15 min PRN Amanda Ochoa MD        dextrose 50 % injection 25 g  25 g intravenous q15 min  PRN Amanda Ochoa MD        escitalopram (Lexapro) tablet 10 mg  10 mg oral Daily Amanda Ochoa MD   10 mg at 06/10/24 0850    glucagon (Glucagen) injection 1 mg  1 mg intramuscular q15 min PRN Amanda Ochoa MD        heparin (porcine) injection 5,000 Units  5,000 Units subcutaneous q8h Amanda Ochoa MD   5,000 Units at 06/10/24 2044    hydrALAZINE (Apresoline) injection 5 mg  5 mg intravenous q6h PRN Dario Nicole MD   5 mg at 06/08/24 1731    insulin lispro (HumaLOG) injection 0-10 Units  0-10 Units subcutaneous TID Amanda Ochoa MD   2 Units at 06/09/24 0944    ipratropium-albuteroL (Duo-Neb) 0.5-2.5 mg/3 mL nebulizer solution 3 mL  3 mL nebulization q6h PRN Richmond York MD        isosorbide dinitrate (Isordil) tablet 20 mg  20 mg oral TID Amanda Ochoa MD   20 mg at 06/10/24 1829    lidocaine 4 % patch 2 patch  2 patch transdermal Daily Amanda Ochoa MD   2 patch at 06/10/24 0851    melatonin tablet 3 mg  3 mg oral Nightly Amanda Ochoa MD   3 mg at 06/10/24 2045    metoprolol tartrate (Lopressor) tablet 50 mg  50 mg oral BID Amanda Ochoa MD   50 mg at 06/10/24 2044    naloxone (Narcan) injection 0.2 mg  0.2 mg intravenous q5 min PRN Amanda Ochoa MD        naloxone (Narcan) injection 0.2 mg  0.2 mg intravenous PRN Amanda Ochoa MD        NIFEdipine ER (Adalat CC) 24 hr tablet 60 mg  60 mg oral Daily before breakfast Amanda Ochoa MD   60 mg at 06/10/24 0850    ondansetron (Zofran) injection 4 mg  4 mg intravenous q6h PRN Amanda Ochoa MD   4 mg at 06/05/24 0905    oxyCODONE (Roxicodone) immediate release tablet 5 mg  5 mg oral q6h PRN Amanda Ochoa MD   5 mg at 06/10/24 1832    pantoprazole (ProtoNix) EC tablet 40 mg  40 mg oral Daily before breakfast Amanda Ochoa MD   40 mg at 06/10/24 0518    polyethylene glycol (Glycolax, Miralax) packet 17 g  17 g oral Daily Amanda Ochoa MD        sennosides-docusate sodium (Alexandra-Colace) 8.6-50 mg per tablet 1 tablet  1 tablet oral Nightly Amanda cOhoa MD   1 tablet at 06/08/24  2001    sodium bicarbonate tablet 1,300 mg  1,300 mg oral Daily Amanda Ochoa MD   1,300 mg at 06/10/24 0850    torsemide (Demadex) tablet 60 mg  60 mg oral Daily Amanda Ochoa MD   60 mg at 06/10/24 0851       IMAGING SUMMARY:  (summary of new imaging findings, not a copy of dictation)  No new imaging

## 2024-06-11 NOTE — DISCHARGE SUMMARY
Discharge Diagnosis  Cholecystitis    Issues Requiring Follow-Up  - follow up in [facility] clinic for postoperative visit in two weeks    Test Results Pending At Discharge  Pending Labs       Order Current Status    VERIFY ABO/Rh Group Test Collected (05/26/24 0841)            Hospital Course  59yoM with Hx of cholecystitis s/p perc babs tube placement, readmitted on 5/22 with recurrent symptoms after tube fell out at home. He is now s/p open babs on 5/26. Postoperative course has been c/b delirium superimposed on patient's baseline cognitive impairment after Hx of CVAs. Stroke work up is negative. He was back to his baseline mental status. He is recovering well postoperatively. Staples removed POD #16. Pain is well controlled on oral analgesics. He is tolerating a regular diet.     Pertinent Physical Exam At Time of Discharge  Physical Exam  Constitutional:       Appearance: Normal appearance.   HENT:      Head: Normocephalic.   Eyes:      Extraocular Movements: Extraocular movements intact.   Cardiovascular:      Rate and Rhythm: Normal rate and regular rhythm.      Comments: Non cyanotic   Pulmonary:      Effort: Pulmonary effort is normal.   Abdominal:      Comments: Obese, soft, non distended, appropriately tender to palpitation. Subcostal incision well healing and well approximated  Musculoskeletal:         General: Normal range of motion.   Skin:     General: Skin is warm and dry.   Neurological:      Mental Status: He is alert and oriented to person, place, and time.   Psychiatric:         Behavior: Behavior normal.   Home Medications     Medication List      START taking these medications     acetaminophen 325 mg tablet; Commonly known as: Tylenol; Take 1 tablet   (325 mg) by mouth every 6 hours if needed for mild pain (1 - 3) for up to   14 days.; Replaces: acetaminophen 650 mg ER tablet     CHANGE how you take these medications     sodium bicarbonate 650 mg tablet; Take 2 tablets (1,300 mg) by mouth   once  daily.; Start taking on: June 12, 2024; What changed: when to take   this     CONTINUE taking these medications     aspirin 81 mg chewable tablet   atorvastatin 80 mg tablet; Commonly known as: Lipitor; Take 1 tablet (80   mg) by mouth once daily. Last rx prior to next refills   Blood Glucose Test strip; Generic drug: blood sugar diagnostic; 1 strip   3 times a day.   escitalopram 10 mg tablet; Commonly known as: Lexapro; TAKE 1 TABLET BY   MOUTH EVERY DAY   FreeStyle Suzy 2 [employer] misc; Generic drug: flash glucose scanning   reader; Use as instructed   FreeStyle Suzy 2 Sensor kit; Generic drug: flash glucose sensor kit;   Use as instructed   gabapentin 300 mg capsule; Commonly known as: Neurontin; Take 1 capsule   (300 mg) by mouth 3 times a day.   insulin lispro 100 unit/mL injection; Commonly known as: HumaLOG; Inject   0-0.1 mL (0-10 Units) under the skin 3 times a day with meals. Take as   directed per insulin instructions. Do not start before March 30, 2024.   isosorbide dinitrate 20 mg tablet; Commonly known as: Isordil; Take 1   tablet (20 mg) by mouth 3 times a day.   Lantus Solostar U-100 Insulin 100 unit/mL (3 mL) pen; Generic drug:   insulin glargine   lidocaine 4 % patch; Place 1 patch over 12 hours on the skin once daily.   Remove & discard patch within 12 hours or as directed by MD.   melatonin 3 mg tablet   metoprolol tartrate 50 mg tablet; Commonly known as: Lopressor; Take 1   tablet by mouth 2 times a day.   NIFEdipine ER 60 mg 24 hr tablet; Commonly known as: Adalat CC; Take 1   tablet (60 mg) by mouth once daily in the morning. Take before meals. Do   not crush, chew, or split.   pantoprazole 40 mg EC tablet; Commonly known as: ProtoNix; Take 1 tablet   (40 mg) by mouth once daily in the morning. Take before meals. Do not   crush, chew, or split.   polyethylene glycol 17 gram packet; Commonly known as: Glycolax,   Miralax; Take 17 g by mouth every 12 hours if needed (constipation).   torsemide  20 mg tablet; Commonly known as: Demadex; Take 3 tablets (60   mg) by mouth once daily.   Vitamin D3 25 MCG (1000 UT) tablet; Generic drug: cholecalciferol     STOP taking these medications     acetaminophen 650 mg ER tablet; Commonly known as: Tylenol 8 HOUR;   Replaced by: acetaminophen 325 mg tablet   clopidogrel 75 mg tablet; Commonly known as: Plavix   traMADol 50 mg tablet; Commonly known as: Ultram       Outpatient Follow-Up  Future Appointments   Date Time Provider Department Center   7/2/2024  8:40 AM Mily Leung DO [id number] [facility]   10/11/2024  8:00 AM TATIANA CARVAJAL 1 [facility]   10/11/2024  9:00 AM Agusto Elkins MD [id number] [facility]       Saida Castaneda MD

## 2024-06-11 NOTE — CARE PLAN
The patient's goals for the shift include Labs WNL    The clinical goals for the shift include patient remain HDS throughout this shift      Problem: Pain  Goal: My pain/discomfort is manageable  Outcome: Progressing     Problem: Safety  Goal: Patient will be injury free during hospitalization  Outcome: Progressing  Goal: I will remain free of falls  Outcome: Progressing     Problem: Daily Care  Goal: Daily care needs are met  Outcome: Progressing     Problem: Psychosocial Needs  Goal: Demonstrates ability to cope with hospitalization/illness  Outcome: Progressing  Goal: Collaborate with me, my family, and caregiver to identify my specific goals  Outcome: Progressing     Problem: Discharge Barriers  Goal: My discharge needs are met  Outcome: Progressing     Problem: Skin  Goal: Decreased wound size/increased tissue granulation at next dressing change  Outcome: Progressing  Goal: Participates in plan/prevention/treatment measures  Outcome: Progressing  Goal: Prevent/manage excess moisture  Outcome: Progressing  Goal: Prevent/minimize sheer/friction injuries  Outcome: Progressing  Goal: Promote/optimize nutrition  Outcome: Progressing  Goal: Promote skin healing  Outcome: Progressing     Problem: Swallowing  Goal: LTG - Patient will tolerate the least restrictive diet consistency to allow for safe consumption of daily meals  Outcome: Progressing     Problem: Pain - Adult  Goal: Verbalizes/displays adequate comfort level or baseline comfort level  Outcome: Progressing     Problem: Safety - Adult  Goal: Free from fall injury  Outcome: Progressing     Problem: Discharge Planning  Goal: Discharge to home or other facility with appropriate resources  Outcome: Progressing     Problem: Chronic Conditions and Co-morbidities  Goal: Patient's chronic conditions and co-morbidity symptoms are monitored and maintained or improved  Outcome: Progressing     Problem: Diabetes  Goal: Achieve decreasing blood glucose levels by end of  shift  Outcome: Progressing  Goal: Increase stability of blood glucose readings by end of shift  Outcome: Progressing  Goal: Decrease in ketones present in urine by end of shift  Outcome: Progressing  Goal: Maintain electrolyte levels within acceptable range throughout shift  Outcome: Progressing  Goal: Maintain glucose levels >70mg/dl to <250mg/dl throughout shift  Outcome: Progressing  Goal: No changes in neurological exam by end of shift  Outcome: Progressing  Goal: Learn about and adhere to nutrition recommendations by end of shift  Outcome: Progressing  Goal: Vital signs within normal range for age by end of shift  Outcome: Progressing  Goal: Increase self care and/or family involovement by end of shift  Outcome: Progressing  Goal: Receive DSME education by end of shift  Outcome: Progressing     Problem: Pain  Goal: Takes deep breaths with improved pain control throughout the shift  Outcome: Progressing  Goal: Turns in bed with improved pain control throughout the shift  Outcome: Progressing  Goal: Walks with improved pain control throughout the shift  Outcome: Progressing  Goal: Performs ADL's with improved pain control throughout shift  Outcome: Progressing  Goal: Participates in PT with improved pain control throughout the shift  Outcome: Progressing  Goal: Free from opioid side effects throughout the shift  Outcome: Progressing  Goal: Free from acute confusion related to pain meds throughout the shift  Outcome: Progressing

## 2024-06-11 NOTE — NURSING NOTE
Patient refused q2 turns, am medications, and only voided 250ml in urinal overnight. Patient refused to let this RN check him for incontinence. Nish Resident notified. No new orders at this time. Will continue to monitor.

## 2024-06-11 NOTE — CARE PLAN
Problem: Pain  Goal: My pain/discomfort is manageable  Outcome: Progressing     Problem: Safety  Goal: Patient will be injury free during hospitalization  Outcome: Progressing  Goal: I will remain free of falls  Outcome: Progressing     Problem: Daily Care  Goal: Daily care needs are met  Outcome: Progressing     Problem: Psychosocial Needs  Goal: Demonstrates ability to cope with hospitalization/illness  Outcome: Progressing  Goal: Collaborate with me, my family, and caregiver to identify my specific goals  Outcome: Progressing     Problem: Discharge Barriers  Goal: My discharge needs are met  Outcome: Progressing     Problem: Skin  Goal: Decreased wound size/increased tissue granulation at next dressing change  Outcome: Progressing  Goal: Participates in plan/prevention/treatment measures  Outcome: Progressing  Goal: Prevent/manage excess moisture  Outcome: Progressing  Goal: Prevent/minimize sheer/friction injuries  Outcome: Progressing  Goal: Promote/optimize nutrition  Outcome: Progressing  Goal: Promote skin healing  Outcome: Progressing     Problem: Swallowing  Goal: LTG - Patient will tolerate the least restrictive diet consistency to allow for safe consumption of daily meals  Outcome: Progressing     Problem: Pain - Adult  Goal: Verbalizes/displays adequate comfort level or baseline comfort level  Outcome: Progressing     Problem: Safety - Adult  Goal: Free from fall injury  Outcome: Progressing     Problem: Discharge Planning  Goal: Discharge to home or other facility with appropriate resources  Outcome: Progressing     Problem: Chronic Conditions and Co-morbidities  Goal: Patient's chronic conditions and co-morbidity symptoms are monitored and maintained or improved  Outcome: Progressing     Problem: Diabetes  Goal: Achieve decreasing blood glucose levels by end of shift  Outcome: Progressing  Goal: Increase stability of blood glucose readings by end of shift  Outcome: Progressing  Goal: Decrease in  ketones present in urine by end of shift  Outcome: Progressing  Goal: Maintain electrolyte levels within acceptable range throughout shift  Outcome: Progressing  Goal: Maintain glucose levels >70mg/dl to <250mg/dl throughout shift  Outcome: Progressing  Goal: No changes in neurological exam by end of shift  Outcome: Progressing  Goal: Learn about and adhere to nutrition recommendations by end of shift  Outcome: Progressing  Goal: Vital signs within normal range for age by end of shift  Outcome: Progressing  Goal: Increase self care and/or family involovement by end of shift  Outcome: Progressing  Goal: Receive DSME education by end of shift  Outcome: Progressing     Problem: Pain  Goal: Takes deep breaths with improved pain control throughout the shift  Outcome: Progressing  Goal: Turns in bed with improved pain control throughout the shift  Outcome: Progressing  Goal: Walks with improved pain control throughout the shift  Outcome: Progressing  Goal: Performs ADL's with improved pain control throughout shift  Outcome: Progressing  Goal: Participates in PT with improved pain control throughout the shift  Outcome: Progressing  Goal: Free from opioid side effects throughout the shift  Outcome: Progressing  Goal: Free from acute confusion related to pain meds throughout the shift  Outcome: Progressing   The patient's goals for the shift include Labs WNL    The clinical goals for the shift include pain management score <7

## 2024-06-11 NOTE — PROGRESS NOTES
Physical Therapy    Physical Therapy Treatment    Patient Name: Humphrey Waddell  MRN: [id number]  Today's Date: 6/11/2024  Time Calculation  Start Time: 0938  Stop Time: 0956  Time Calculation (min): 18 min    Assessment/Plan   PT Assessment  PT Assessment Results: Decreased strength, Decreased endurance, Impaired balance, Decreased mobility, Decreased cognition, Impaired vision  Rehab Prognosis: Good  Barriers to Discharge: none  Evaluation/Treatment Tolerance: Patient tolerated treatment well  End of Session Communication: Bedside nurse  Assessment Comment: Pt continues to demonstrate impaired strength and function and remains highly appropriate for continued PT in house and after discharge at HIGH intensity.  End of Session Patient Position: Bed, 3 rail up, Alarm off, not on at start of session  PT Plan  Inpatient/Swing Bed or Outpatient: Inpatient  PT Plan  Treatment/Interventions: Bed mobility, Transfer training, Gait training, Strengthening, Endurance training, Range of motion, Therapeutic exercise, Therapeutic activity, Home exercise program  PT Plan: Skilled PT  PT Frequency: 3 times per week  PT Discharge Recommendations: High intensity level of continued care  Equipment Recommended upon Discharge:  (TBD)  PT Recommended Transfer Status: Assist x2  PT - OK to Discharge: Yes      General Visit Information:   PT  Visit  PT Received On: 06/11/24  General  Family/Caregiver Present: No  Prior to Session Communication: Bedside nurse  Patient Position Received: Bed, 3 rail up, Alarm off, caregiver present  Preferred Learning Style: auditory, verbal  General Comment: Pt in bed upon entry. Reports generalized fatigue and joint discomfort on this date. Pt noting he can feel temperature changes in joints. Pt though otherwise receptive to PT and will to participate.    Subjective   Precautions:  Precautions  Medical Precautions: Fall precautions  Precautions Comment: Pt with AKA. Pt has prosthetic now, though unable to don on  this visit as pt developed abdominal muscle cramps/spasms when attempting to don.    Objective   Pain:  Pain Assessment  Pain Assessment: 0-10  Pain Score: 5 - Moderate pain  Pain Type: Acute pain  Pain Location: Abdomen  Cognition:  Cognition  Overall Cognitive Status: Impaired  Arousal/Alertness: Delayed responses to stimuli  Orientation Level: Disoriented to time (Reoriented to specific date.)  Insight: Within function limits  Coordination:  Movements are Fluid and Coordinated: Yes  Postural Control:  Postural Control  Postural Control: Impaired  Posture Comment: Retropulsive at times requiring min assist as needed with static sit.  Static Sitting Balance  Static Sitting-Level of Assistance: Minimum assistance, Contact guard  Static Sitting-Comment/Number of Minutes: Approximately 11-12 minutes total.  Static Standing Balance  Static Standing-Balance Support: Bilateral upper extremity supported (On a wheeled walker)  Static Standing-Level of Assistance: Maximum assistance (x2)  Static Standing-Comment/Number of Minutes: Pt tolerated 3 trials of standing though unable to completely extend hip and trunk. Achieved approximately 50-75% of full standing posture.    Activity Tolerance:  Activity Tolerance  Endurance: Decreased tolerance for upright activites, Tolerates 10 - 20 min exercise with multiple rests  Treatments:  Therapeutic Exercise  Therapeutic Exercise Performed: Yes  Therapeutic Exercise Activity 1: LAQ's x 15 RLE    Therapeutic Activity  Therapeutic Activity 1: bed mobility, static sit, trials of standing    Bed Mobility  Bed Mobility: Yes  Bed Mobility 1  Bed Mobility 1: Supine to sitting, Sitting to supine  Level of Assistance 1: Moderate verbal cues    Ambulation/Gait Training  Ambulation/Gait Training Performed: No  Transfers  Transfer: Yes  Transfer 1  Transfer From 1: Sit to, Stand to  Transfer to 1: Stand, Sit  Transfer Device 1: Walker  Transfer Level of Assistance 1: Maximum assistance  (x2)  Trials/Comments 1: Pt not able to achieve full sit-stand. Anticipate would require max assist of 2 pivot transfer to bedside chair.    Outcome Measures:  [facility] Basic Mobility  Turning from your back to your side while in a flat bed without using bedrails: A lot  Moving from lying on your back to sitting on the side of a flat bed without using bedrails: A lot  Moving to and from bed to chair (including a wheelchair): A lot  Standing up from a chair using your arms (e.g. wheelchair or bedside chair): A lot  To walk in hospital room: Total  Climbing 3-5 steps with railing: Total  Basic Mobility - Total Score: 10    Education Documentation  Precautions, taught by Shade Stinson PT at 6/11/2024 10:44 AM.  Learner: Patient  Readiness: Acceptance  Method: Explanation  Response: Verbalizes Understanding    Mobility Training, taught by Shade Stinson PT at 6/11/2024 10:44 AM.  Learner: Patient  Readiness: Acceptance  Method: Explanation  Response: Verbalizes Understanding    Education Comments  No comments found.      Encounter Problems       Encounter Problems (Active)       PT Problem       Patient will complete supine to sit and sit to supine Mod Assist  (Progressing)       Start:  05/28/24    Expected End:  06/11/24            Pt will demo bed<>chair transfer with max A x 2 and LRAD (Progressing)       Start:  05/28/24    Expected End:  06/11/24            Pt will demo static/dynamic sitting EOB >10 min with min A (Progressing)       Start:  05/28/24    Expected End:  06/11/24               Pain - Adult

## 2024-06-12 ENCOUNTER — DOCUMENTATION (OUTPATIENT)
Dept: CARE COORDINATION | Facility: CLINIC | Age: 59
End: 2024-06-12
Payer: COMMERCIAL

## 2024-06-12 NOTE — PROGRESS NOTES
Patient referred to [facility] 6/4/24. Patient discharged to [facility] rehabilitation facility. No outreach required at this time.     ARLEY Herrera   III  [facility]  Office Phone: [phone]  [email or web address]

## 2024-07-02 ENCOUNTER — APPOINTMENT (OUTPATIENT)
Dept: PRIMARY CARE | Facility: CLINIC | Age: 59
End: 2024-07-02
Payer: COMMERCIAL

## 2024-07-09 DIAGNOSIS — I25.10 CORONARY ARTERY DISEASE INVOLVING NATIVE CORONARY ARTERY OF NATIVE HEART WITHOUT ANGINA PECTORIS: ICD-10-CM

## 2024-07-09 DIAGNOSIS — I63.9 CEREBRAL INFARCTION, UNSPECIFIED (MULTI): ICD-10-CM

## 2024-07-09 RX ORDER — ATORVASTATIN CALCIUM 80 MG/1
80 TABLET, FILM COATED ORAL DAILY
Qty: 90 TABLET | Refills: 1 | Status: SHIPPED | OUTPATIENT
Start: 2024-07-09 | End: 2025-01-05

## 2024-07-10 ENCOUNTER — DOCUMENTATION (OUTPATIENT)
Dept: HOME HEALTH SERVICES | Facility: HOME HEALTH | Age: 59
End: 2024-07-10

## 2024-07-10 ENCOUNTER — APPOINTMENT (OUTPATIENT)
Dept: PRIMARY CARE | Facility: CLINIC | Age: 59
End: 2024-07-10
Payer: COMMERCIAL

## 2024-07-10 ENCOUNTER — HOME HEALTH ADMISSION (OUTPATIENT)
Dept: HOME HEALTH SERVICES | Facility: HOME HEALTH | Age: 59
End: 2024-07-10
Payer: COMMERCIAL

## 2024-07-10 NOTE — HH CARE COORDINATION
Home Care received a Referral for Nursing, Physical Therapy, and Occupational Therapy. We have processed the referral for a Start of Care on 7/12.     If you have any questions or concerns, please feel free to contact us at [phone]. Follow the prompts, enter your five digit zip code, and you will be directed to your care team on EAST 1.

## 2024-07-14 ENCOUNTER — HOME CARE VISIT (OUTPATIENT)
Dept: HOME HEALTH SERVICES | Facility: HOME HEALTH | Age: 59
End: 2024-07-14
Payer: COMMERCIAL

## 2024-07-14 VITALS
WEIGHT: 250 LBS | SYSTOLIC BLOOD PRESSURE: 118 MMHG | OXYGEN SATURATION: 94 % | BODY MASS INDEX: 30.44 KG/M2 | RESPIRATION RATE: 14 BRPM | TEMPERATURE: 97.9 F | HEART RATE: 79 BPM | HEIGHT: 76 IN | DIASTOLIC BLOOD PRESSURE: 74 MMHG

## 2024-07-14 PROCEDURE — G0299 HHS/HOSPICE OF RN EA 15 MIN: HCPCS

## 2024-07-14 SDOH — ECONOMIC STABILITY: FOOD INSECURITY: MEALS PER DAY: 3

## 2024-07-14 ASSESSMENT — PAIN SCALES - PAIN ASSESSMENT IN ADVANCED DEMENTIA (PAINAD)
BODYLANGUAGE: 0 - RELAXED.
CONSOLABILITY: 0
CONSOLABILITY: 0 - NO NEED TO CONSOLE.
BREATHING: 0
FACIALEXPRESSION: 0 - SMILING OR INEXPRESSIVE.
TOTALSCORE: 0
NEGVOCALIZATION: 0
FACIALEXPRESSION: 0
NEGVOCALIZATION: 0 - NONE.
BODYLANGUAGE: 0

## 2024-07-14 ASSESSMENT — ENCOUNTER SYMPTOMS
PAIN LOCATION - PAIN QUALITY: ACHE
ABDOMINAL PAIN: 1
PAIN: 1
PAIN SEVERITY GOAL: 3/10
PAIN LOCATION - PAIN DURATION: DAILY
PAIN LOCATION: BACK
PAIN LOCATION - PAIN QUALITY: ACHE
PAIN LOCATION - PAIN QUALITY: ACHE
LAST BOWEL MOVEMENT: 67032
PAIN LOCATION - EXACERBATING FACTORS: INJURY
PAIN LOCATION: RIGHT SHOULDER
HIGHEST PAIN SEVERITY IN PAST 24 HOURS: 8/10
PAIN LOCATION: LEFT SHOULDER
APPETITE LEVEL: FAIR
SUBJECTIVE PAIN PROGRESSION: WAXING AND WANING
BLURRED VISION: 1
CONSTIPATION: 1
LOWEST PAIN SEVERITY IN PAST 24 HOURS: 5/10
CHANGE IN APPETITE: INCREASED
PAIN LOCATION - PAIN SEVERITY: 8/10
STOOL FREQUENCY: DAILY
PAIN LOCATION - PAIN SEVERITY: 8/10
PAIN LOCATION - EXACERBATING FACTORS: INJURY
PAIN LOCATION - PAIN FREQUENCY: INTERMITTENT
LOSS OF SENSATION IN FEET: 1
PAIN LOCATION - EXACERBATING FACTORS: INJURY
PAIN LOCATION - PAIN FREQUENCY: INTERMITTENT
PAIN LOCATION - PAIN SEVERITY: 8/10
PAIN LOCATION - PAIN FREQUENCY: INTERMITTENT
DEPRESSION: 1
PAIN LOCATION - PAIN DURATION: DAILY
FATIGUE: 1
HEADACHES: 1
PAIN LOCATION - PAIN DURATION: DAILY
OCCASIONAL FEELINGS OF UNSTEADINESS: 1

## 2024-07-14 ASSESSMENT — ACTIVITIES OF DAILY LIVING (ADL)
ENTERING_EXITING_HOME: MODERATE ASSIST
CURRENT_FUNCTION: STAND BY ASSIST
CURRENT_FUNCTION: ONE PERSON
AMBULATION ASSISTANCE: ONE PERSON
OASIS_M1830: 06

## 2024-07-16 ENCOUNTER — HOME CARE VISIT (OUTPATIENT)
Dept: HOME HEALTH SERVICES | Facility: HOME HEALTH | Age: 59
End: 2024-07-16
Payer: COMMERCIAL

## 2024-07-16 PROCEDURE — G0156 HHCP-SVS OF AIDE,EA 15 MIN: HCPCS

## 2024-07-17 ENCOUNTER — HOME CARE VISIT (OUTPATIENT)
Dept: HOME HEALTH SERVICES | Facility: HOME HEALTH | Age: 59
End: 2024-07-17
Payer: COMMERCIAL

## 2024-07-17 VITALS
SYSTOLIC BLOOD PRESSURE: 116 MMHG | DIASTOLIC BLOOD PRESSURE: 64 MMHG | RESPIRATION RATE: 18 BRPM | TEMPERATURE: 98.4 F | HEART RATE: 62 BPM | OXYGEN SATURATION: 96 %

## 2024-07-17 PROCEDURE — G0152 HHCP-SERV OF OT,EA 15 MIN: HCPCS

## 2024-07-17 ASSESSMENT — PAIN SCALES - PAIN ASSESSMENT IN ADVANCED DEMENTIA (PAINAD)
FACIALEXPRESSION: 0
CONSOLABILITY: 0 - NO NEED TO CONSOLE.
BREATHING: 0
BODYLANGUAGE: 0
NEGVOCALIZATION: 0 - NONE.
TOTALSCORE: 0
BODYLANGUAGE: 0 - RELAXED.
NEGVOCALIZATION: 0
FACIALEXPRESSION: 0 - SMILING OR INEXPRESSIVE.
CONSOLABILITY: 0

## 2024-07-17 ASSESSMENT — ACTIVITIES OF DAILY LIVING (ADL)
GROOMING_WITHIN_DEFINED_LIMITS: 1
FEEDING_WITHIN_DEFINED_LIMITS: 1

## 2024-07-17 ASSESSMENT — ENCOUNTER SYMPTOMS
PERSON REPORTING PAIN: PATIENT
PAIN SEVERITY GOAL: 0/10
HIGHEST PAIN SEVERITY IN PAST 24 HOURS: 8/10
SUBJECTIVE PAIN PROGRESSION: WAXING AND WANING
LOWEST PAIN SEVERITY IN PAST 24 HOURS: 4/10
PAIN: 1

## 2024-07-18 ENCOUNTER — HOME CARE VISIT (OUTPATIENT)
Dept: HOME HEALTH SERVICES | Facility: HOME HEALTH | Age: 59
End: 2024-07-18
Payer: COMMERCIAL

## 2024-07-18 VITALS
DIASTOLIC BLOOD PRESSURE: 60 MMHG | RESPIRATION RATE: 18 BRPM | SYSTOLIC BLOOD PRESSURE: 144 MMHG | HEART RATE: 60 BPM | OXYGEN SATURATION: 97 % | TEMPERATURE: 98.1 F

## 2024-07-18 PROCEDURE — G0299 HHS/HOSPICE OF RN EA 15 MIN: HCPCS

## 2024-07-18 ASSESSMENT — ENCOUNTER SYMPTOMS
DEPRESSION: 1
STOOL FREQUENCY: LESS THAN DAILY
HIGHEST PAIN SEVERITY IN PAST 24 HOURS: 10/10
PERSON REPORTING PAIN: PATIENT
PAIN: 1
SUBJECTIVE PAIN PROGRESSION: UNCHANGED
PAIN LOCATION - PAIN FREQUENCY: CONSTANT
DESCRIPTION OF MEMORY LOSS: IMMEDIATE
PAIN LOCATION - PAIN SEVERITY: 8/10
PAIN LOCATION - PAIN SEVERITY: 8/10
BOWEL INCONTINENCE: 1
CHEST PAIN QUALITY: ACHING
PAIN LOCATION - RELIEVING FACTORS: PAIN PATCH
APPETITE LEVEL: FAIR
PAIN LOCATION: LEFT SHOULDER
MUSCLE WEAKNESS: 1
FORGETFULNESS: 1
DEPRESSED MOOD: 1
PAIN LOCATION - PAIN QUALITY: SHARP ACHY
PAIN LOCATION - RELIEVING FACTORS: PAIN PATCH
PAIN LOCATION - PAIN FREQUENCY: CONSTANT
PAIN LOCATION - PAIN QUALITY: SHARP ACHY
LIMITED RANGE OF MOTION: 1
PAIN SEVERITY GOAL: 0/10
FATIGUE: 1
DESCRIPTION OF MEMORY LOSS: SHORT TERM
LAST BOWEL MOVEMENT: 67038
FATIGUES EASILY: 1
CHANGE IN APPETITE: UNCHANGED
PAIN LOCATION - PAIN DURATION: CONTINUOUS
DIZZINESS: 1
PAIN LOCATION: RIGHT SHOULDER
LOWEST PAIN SEVERITY IN PAST 24 HOURS: 8/10

## 2024-07-18 ASSESSMENT — ACTIVITIES OF DAILY LIVING (ADL)
CURRENT_FUNCTION: TWO PERSON
AMBULATION ASSISTANCE: NON-AMBULATORY

## 2024-07-18 NOTE — CASE COMMUNICATION
AEV  pt still does not have a  for his Dexcom monitor- his phone being new doesn't work on it they tell us but they won't send him a .  He was taken off the Lantus, do you want him to restart?  Pt now has a phillip lift  Thank you

## 2024-07-19 ENCOUNTER — HOME CARE VISIT (OUTPATIENT)
Dept: HOME HEALTH SERVICES | Facility: HOME HEALTH | Age: 59
End: 2024-07-19
Payer: COMMERCIAL

## 2024-07-19 PROCEDURE — G0151 HHCP-SERV OF PT,EA 15 MIN: HCPCS

## 2024-07-19 ASSESSMENT — ENCOUNTER SYMPTOMS
HIGHEST PAIN SEVERITY IN PAST 24 HOURS: 8/10
PAIN LOCATION - PAIN SEVERITY: 8/10
PAIN LOCATION: CHEST
SUBJECTIVE PAIN PROGRESSION: GRADUALLY IMPROVING
PAIN LOCATION: RIGHT SHOULDER
PAIN SEVERITY GOAL: 0/10
PAIN LOCATION - PAIN SEVERITY: 8/10
PAIN: 1
PAIN LOCATION: LEFT SHOULDER
PAIN LOCATION - PAIN SEVERITY: 8/10
PERSON REPORTING PAIN: PATIENT
LOWEST PAIN SEVERITY IN PAST 24 HOURS: 8/10

## 2024-07-19 ASSESSMENT — ACTIVITIES OF DAILY LIVING (ADL)
TRANSPORTATION: NEEDS ASSISTANCE
AMBULATION ASSISTANCE: 1
TRANSPORTATION ASSESSED: 1
AMBULATION ASSISTANCE: INDEPENDENT

## 2024-07-19 NOTE — HOME HEALTH
Anesthesia was brutal.  Gall bladder removal.  This last hospital stay was pretty brutal.  I was very weakened by the surgery.  I never got to outpatient.  I went to the hospital and had surgery.    Now phillip lift transfers.  Needs human assistance for all transfers.  Pressure ulcer to sacrum being changed with bandages, etc.      Surgery took place at [facility], rehab in [place].    Therapy visits with TWO people recommended, phillip lift, bed mobility, possible scooter use.  Ambualtion ONLY after significant improvement in function.

## 2024-07-22 ENCOUNTER — APPOINTMENT (OUTPATIENT)
Dept: PRIMARY CARE | Facility: CLINIC | Age: 59
End: 2024-07-22
Payer: COMMERCIAL

## 2024-07-23 ENCOUNTER — HOME CARE VISIT (OUTPATIENT)
Dept: HOME HEALTH SERVICES | Facility: HOME HEALTH | Age: 59
End: 2024-07-23
Payer: COMMERCIAL

## 2024-07-23 PROCEDURE — G0299 HHS/HOSPICE OF RN EA 15 MIN: HCPCS

## 2024-07-24 ENCOUNTER — LAB (OUTPATIENT)
Dept: LAB | Facility: LAB | Age: 59
End: 2024-07-24
Payer: COMMERCIAL

## 2024-07-24 ENCOUNTER — HOME CARE VISIT (OUTPATIENT)
Dept: HOME HEALTH SERVICES | Facility: HOME HEALTH | Age: 59
End: 2024-07-24
Payer: COMMERCIAL

## 2024-07-24 ENCOUNTER — OFFICE VISIT (OUTPATIENT)
Dept: PRIMARY CARE | Facility: CLINIC | Age: 59
End: 2024-07-24
Payer: COMMERCIAL

## 2024-07-24 VITALS
HEART RATE: 65 BPM | TEMPERATURE: 98.1 F | SYSTOLIC BLOOD PRESSURE: 128 MMHG | DIASTOLIC BLOOD PRESSURE: 88 MMHG | RESPIRATION RATE: 18 BRPM | OXYGEN SATURATION: 96 %

## 2024-07-24 VITALS — HEART RATE: 66 BPM | SYSTOLIC BLOOD PRESSURE: 138 MMHG | OXYGEN SATURATION: 95 % | DIASTOLIC BLOOD PRESSURE: 62 MMHG

## 2024-07-24 DIAGNOSIS — Z09 HOSPITAL DISCHARGE FOLLOW-UP: ICD-10-CM

## 2024-07-24 DIAGNOSIS — Z90.49 S/P CHOLECYSTECTOMY: ICD-10-CM

## 2024-07-24 DIAGNOSIS — D50.0 IRON DEFICIENCY ANEMIA DUE TO CHRONIC BLOOD LOSS: ICD-10-CM

## 2024-07-24 DIAGNOSIS — K81.9 CHOLECYSTITIS: ICD-10-CM

## 2024-07-24 DIAGNOSIS — L98.421 SKIN ULCER OF SACRUM, LIMITED TO BREAKDOWN OF SKIN (MULTI): ICD-10-CM

## 2024-07-24 DIAGNOSIS — Z09 HOSPITAL DISCHARGE FOLLOW-UP: Primary | ICD-10-CM

## 2024-07-24 LAB
ALBUMIN SERPL BCP-MCNC: 3 G/DL (ref 3.4–5)
ALP SERPL-CCNC: 64 U/L (ref 33–136)
ALT SERPL W P-5'-P-CCNC: 22 U/L (ref 10–52)
ANION GAP SERPL CALC-SCNC: 13 MMOL/L (ref 10–20)
AST SERPL W P-5'-P-CCNC: 14 U/L (ref 9–39)
BASOPHILS # BLD AUTO: 0.05 X10*3/UL (ref 0–0.1)
BASOPHILS NFR BLD AUTO: 0.4 %
BILIRUB SERPL-MCNC: 0.3 MG/DL (ref 0–1.2)
BUN SERPL-MCNC: 59 MG/DL (ref 6–23)
CALCIUM SERPL-MCNC: 8.1 MG/DL (ref 8.6–10.3)
CHLORIDE SERPL-SCNC: 102 MMOL/L (ref 98–107)
CO2 SERPL-SCNC: 28 MMOL/L (ref 21–32)
CREAT SERPL-MCNC: 2.95 MG/DL (ref 0.5–1.3)
EGFRCR SERPLBLD CKD-EPI 2021: 23 ML/MIN/1.73M*2
EOSINOPHIL # BLD AUTO: 0.55 X10*3/UL (ref 0–0.7)
EOSINOPHIL NFR BLD AUTO: 4.8 %
ERYTHROCYTE [DISTWIDTH] IN BLOOD BY AUTOMATED COUNT: 14.9 % (ref 11.5–14.5)
GLUCOSE SERPL-MCNC: 131 MG/DL (ref 74–99)
HCT VFR BLD AUTO: 30.4 % (ref 41–52)
HGB BLD-MCNC: 9.1 G/DL (ref 13.5–17.5)
IMM GRANULOCYTES # BLD AUTO: 0.05 X10*3/UL (ref 0–0.7)
IMM GRANULOCYTES NFR BLD AUTO: 0.4 % (ref 0–0.9)
LYMPHOCYTES # BLD AUTO: 1.12 X10*3/UL (ref 1.2–4.8)
LYMPHOCYTES NFR BLD AUTO: 9.9 %
MCH RBC QN AUTO: 28.8 PG (ref 26–34)
MCHC RBC AUTO-ENTMCNC: 29.9 G/DL (ref 32–36)
MCV RBC AUTO: 96 FL (ref 80–100)
MONOCYTES # BLD AUTO: 0.94 X10*3/UL (ref 0.1–1)
MONOCYTES NFR BLD AUTO: 8.3 %
NEUTROPHILS # BLD AUTO: 8.64 X10*3/UL (ref 1.2–7.7)
NEUTROPHILS NFR BLD AUTO: 76.2 %
NRBC BLD-RTO: 0 /100 WBCS (ref 0–0)
PLATELET # BLD AUTO: 303 X10*3/UL (ref 150–450)
POTASSIUM SERPL-SCNC: 5.6 MMOL/L (ref 3.5–5.3)
PROT SERPL-MCNC: 6.2 G/DL (ref 6.4–8.2)
RBC # BLD AUTO: 3.16 X10*6/UL (ref 4.5–5.9)
SODIUM SERPL-SCNC: 137 MMOL/L (ref 136–145)
WBC # BLD AUTO: 11.4 X10*3/UL (ref 4.4–11.3)

## 2024-07-24 PROCEDURE — G0156 HHCP-SVS OF AIDE,EA 15 MIN: HCPCS

## 2024-07-24 PROCEDURE — 99214 OFFICE O/P EST MOD 30 MIN: CPT | Performed by: STUDENT IN AN ORGANIZED HEALTH CARE EDUCATION/TRAINING PROGRAM

## 2024-07-24 PROCEDURE — 1036F TOBACCO NON-USER: CPT | Performed by: STUDENT IN AN ORGANIZED HEALTH CARE EDUCATION/TRAINING PROGRAM

## 2024-07-24 PROCEDURE — 3044F HG A1C LEVEL LT 7.0%: CPT | Performed by: STUDENT IN AN ORGANIZED HEALTH CARE EDUCATION/TRAINING PROGRAM

## 2024-07-24 PROCEDURE — 85025 COMPLETE CBC W/AUTO DIFF WBC: CPT

## 2024-07-24 PROCEDURE — 80053 COMPREHEN METABOLIC PANEL: CPT

## 2024-07-24 PROCEDURE — 3078F DIAST BP <80 MM HG: CPT | Performed by: STUDENT IN AN ORGANIZED HEALTH CARE EDUCATION/TRAINING PROGRAM

## 2024-07-24 PROCEDURE — 3075F SYST BP GE 130 - 139MM HG: CPT | Performed by: STUDENT IN AN ORGANIZED HEALTH CARE EDUCATION/TRAINING PROGRAM

## 2024-07-24 PROCEDURE — 3060F POS MICROALBUMINURIA REV: CPT | Performed by: STUDENT IN AN ORGANIZED HEALTH CARE EDUCATION/TRAINING PROGRAM

## 2024-07-24 PROCEDURE — 36415 COLL VENOUS BLD VENIPUNCTURE: CPT

## 2024-07-24 RX ORDER — FLUDROCORTISONE ACETATE 0.1 MG/1
TABLET ORAL
COMMUNITY

## 2024-07-24 RX ORDER — ZINC OXIDE 20 G/100G
1 OINTMENT TOPICAL AS NEEDED
Qty: 85 G | Refills: 1 | Status: SHIPPED | OUTPATIENT
Start: 2024-07-24

## 2024-07-24 ASSESSMENT — ENCOUNTER SYMPTOMS
PAIN: 1
PAIN LOCATION - PAIN QUALITY: SHARP THROBBING
DESCRIPTION OF MEMORY LOSS: SHORT TERM
FATIGUES EASILY: 1
PAIN LOCATION - PAIN DURATION: HOURS
PAIN LOCATION - PAIN DURATION: HOURS
DESCRIPTION OF MEMORY LOSS: IMMEDIATE
LIMITED RANGE OF MOTION: 1
CHANGE IN APPETITE: DECREASED
DEPRESSION: 1
PAIN LOCATION - EXACERBATING FACTORS: SITTING
PAIN LOCATION - RELIEVING FACTORS: REST
PAIN LOCATION - PAIN QUALITY: SHARP THROBBING
PAIN LOCATION - PAIN SEVERITY: 0/10
PAIN LOCATION: LEFT SHOULDER
FATIGUE: 1
PAIN LOCATION: COCCYX
PAIN SEVERITY GOAL: 0/10
DESCRIPTION OF MEMORY LOSS: LONG TERM
PAIN LOCATION - PAIN FREQUENCY: FREQUENT
PAIN LOCATION - RELIEVING FACTORS: REST
PAIN LOCATION - PAIN FREQUENCY: WITH ACTIVITY
LOWEST PAIN SEVERITY IN PAST 24 HOURS: 8/10
FORGETFULNESS: 1
PAIN LOCATION - PAIN DURATION: HOURS
PAIN LOCATION: RIGHT SHOULDER
PAIN LOCATION - PAIN SEVERITY: 8/10
PAIN LOCATION - PAIN FREQUENCY: WITH ACTIVITY
MUSCLE WEAKNESS: 1
HIGHEST PAIN SEVERITY IN PAST 24 HOURS: 10/10
PERSON REPORTING PAIN: PATIENT
SUBJECTIVE PAIN PROGRESSION: GRADUALLY WORSENING
DEPRESSED MOOD: 1
PAIN LOCATION - PAIN SEVERITY: 8/10

## 2024-07-24 ASSESSMENT — ACTIVITIES OF DAILY LIVING (ADL)
CURRENT_FUNCTION: TWO PERSON
AMBULATION ASSISTANCE: NON-AMBULATORY

## 2024-07-24 NOTE — PATIENT INSTRUCTIONS
Thank you for coming in today!    A goal for you is to get your strength up for transfers. Working with physical therapy and home health.     Also concentrate on your diet and nutrition. Foods that are high with iron are recommended due to your anemia.

## 2024-07-24 NOTE — PROGRESS NOTES
Subjective   Patient ID: Humphrey Waddell is a 59 y.o. male who presents for Hospital Follow-up (Pt is here for a hospital follow up.).    HPI   Pt was admitted from 5/22/24 to 6/11/24.  s/p perc babs tube placement, readmitted on 5/22 with recurrent symptoms after tube fell out at home. He is now s/p open babs on 5/26.   Patient was discharged from a rehabilitation center for toxic metabolic encephalitis, acute cholecystitis he is status post open cholecystectomy on 5/26/2024.  Patient was admitted on 6/11/2024 was discharged on 7/9/2024  Postop complication of delirium superimposed on patient's cognitive impairment at baseline due to his history of CVAs.    Changes from patient's baseline: patient is now a complete assist and is currently using a Saurav lift    Note: no medications were taken this morning.     Esotalopram was stopped due to tremor.     Patient has a sacral skin tear that has been well-healing.  This happened due to wife helping with transfer with a board and accidentally pinched the skin.  Stage I caused ulceration which zinc oxide cream for barrier    Objective   Physical Exam  Abdominal:           Assessment/Plan   Diagnoses and all orders for this visit:  Skin ulcer of sacrum, limited to breakdown of skin (Multi)  -     zinc oxide 20 % ointment; Apply 1 Application topically if needed for irritation.  Hospital discharge follow-up  -     CBC and Auto Differential; Future  -     Comprehensive metabolic panel; Future    3v CABGx3 11/2023, HFpEF (EF 60-65% 4/2024), HTN, HLD, CKD (baseline Cr ~3.1), CVA 2020 with no deficits, T2DM, left AKA     Hospital discharge follow-up  Patient status post open cholecystectomy  Med Rec completed   Postop complication of delirium  Wound healing doing well clean dry and intact  Patient's anemia postsurgical will be followed up with his CBC today  Patient is back to baseline mentation  Due to weakness of being in the hospital/rehab for the last 2 months patient will be  working with physical therapy through home health care.  This is already started and will be continued.  Sacral ulceration: Stage I, zinc oxide given as a continued barrier improving      RTC in 2 months          Mily Leung DO 07/24/24 7:36 AM

## 2024-07-25 ENCOUNTER — HOME CARE VISIT (OUTPATIENT)
Dept: HOME HEALTH SERVICES | Facility: HOME HEALTH | Age: 59
End: 2024-07-25
Payer: COMMERCIAL

## 2024-07-25 VITALS
RESPIRATION RATE: 18 BRPM | OXYGEN SATURATION: 96 % | SYSTOLIC BLOOD PRESSURE: 128 MMHG | DIASTOLIC BLOOD PRESSURE: 68 MMHG | HEART RATE: 70 BPM | TEMPERATURE: 97.6 F

## 2024-07-25 PROCEDURE — G0151 HHCP-SERV OF PT,EA 15 MIN: HCPCS

## 2024-07-25 PROCEDURE — G0152 HHCP-SERV OF OT,EA 15 MIN: HCPCS

## 2024-07-25 ASSESSMENT — ACTIVITIES OF DAILY LIVING (ADL)
BATHING_CURRENT_FUNCTION: MAXIMUM ASSIST
FEEDING_WITHIN_DEFINED_LIMITS: 1
TOILETING: 1
BATHING ASSESSED: 1
DRESSING_LB_CURRENT_FUNCTION: MAXIMUM ASSIST
TOILETING: MAXIMUM ASSIST

## 2024-07-25 ASSESSMENT — ENCOUNTER SYMPTOMS
DENIES PAIN: 1
PERSON REPORTING PAIN: PATIENT

## 2024-07-25 ASSESSMENT — PAIN SCALES - PAIN ASSESSMENT IN ADVANCED DEMENTIA (PAINAD)
FACIALEXPRESSION: 0
NEGVOCALIZATION: 0 - NONE.
BREATHING: 0
BODYLANGUAGE: 0
FACIALEXPRESSION: 0 - SMILING OR INEXPRESSIVE.
BODYLANGUAGE: 0 - RELAXED.
CONSOLABILITY: 0 - NO NEED TO CONSOLE.
TOTALSCORE: 0
CONSOLABILITY: 0
NEGVOCALIZATION: 0

## 2024-07-25 NOTE — HOME HEALTH
"Arms, abs.  Patient is in a great mood today.     Taking advantage of the presence of the OT, Ashanti Nesha, patient assisted to move from sit to stand with gait belt and walker in place x 2.  His weight bearing was poor on his R LE (keep in mind he is a L AKA) but all of his set up, hand positions and weight shifts were done correctly.  He simply lacked power to stand up and to remain standing for more than about 20 seconds each time.  Everyone in the room was very motivated for the patient to do what he had done, however, most remarking that we \"didn't expect\" him to be able to do that today.  Indeed, I shared texts with the patient's wife who expressed her delight at what I reported that he had done today.    After Ms. Jeffries departed, I challenged the patient to perform some seated exercises vs. hand weights that patient's wife had requested that he do.  He responded by performing 5 pound resisted bicep curls, shoulder flexion in sitting about 2 minutes each.     I proposed that he \"get over to the scooter\" but patient reminded me that his wife will be home \"between 5 and 530\" and that she will help him from this point forward.    Overall, good session with patient showing much better potential than he did during that first visit."

## 2024-07-26 ENCOUNTER — HOME CARE VISIT (OUTPATIENT)
Dept: HOME HEALTH SERVICES | Facility: HOME HEALTH | Age: 59
End: 2024-07-26
Payer: COMMERCIAL

## 2024-07-26 VITALS
OXYGEN SATURATION: 95 % | RESPIRATION RATE: 18 BRPM | DIASTOLIC BLOOD PRESSURE: 66 MMHG | SYSTOLIC BLOOD PRESSURE: 122 MMHG | TEMPERATURE: 97.8 F | HEART RATE: 58 BPM

## 2024-07-26 PROCEDURE — G0299 HHS/HOSPICE OF RN EA 15 MIN: HCPCS

## 2024-07-26 ASSESSMENT — ENCOUNTER SYMPTOMS
FORGETFULNESS: 1
LAST BOWEL MOVEMENT: 67046
PERSON REPORTING PAIN: PATIENT
FATIGUES EASILY: 1
FATIGUE: 1
DESCRIPTION OF MEMORY LOSS: SHORT TERM
MUSCLE WEAKNESS: 1
APPETITE LEVEL: GOOD
LOWER EXTREMITY EDEMA: 1
DESCRIPTION OF MEMORY LOSS: LONG TERM
DESCRIPTION OF MEMORY LOSS: IMMEDIATE
LIMITED RANGE OF MOTION: 1
DENIES PAIN: 1

## 2024-07-26 ASSESSMENT — ACTIVITIES OF DAILY LIVING (ADL)
CURRENT_FUNCTION: TWO PERSON
AMBULATION ASSISTANCE: NON-AMBULATORY

## 2024-07-29 ENCOUNTER — HOME CARE VISIT (OUTPATIENT)
Dept: HOME HEALTH SERVICES | Facility: HOME HEALTH | Age: 59
End: 2024-07-29
Payer: COMMERCIAL

## 2024-07-29 VITALS
RESPIRATION RATE: 20 BRPM | TEMPERATURE: 98 F | HEART RATE: 58 BPM | DIASTOLIC BLOOD PRESSURE: 80 MMHG | SYSTOLIC BLOOD PRESSURE: 126 MMHG | OXYGEN SATURATION: 93 %

## 2024-07-29 DIAGNOSIS — N17.9 AKI (ACUTE KIDNEY INJURY) (CMS-HCC): Primary | ICD-10-CM

## 2024-07-29 PROCEDURE — G0299 HHS/HOSPICE OF RN EA 15 MIN: HCPCS

## 2024-07-29 ASSESSMENT — ENCOUNTER SYMPTOMS
APPETITE LEVEL: GOOD
PERSON REPORTING PAIN: PATIENT
DEPRESSED MOOD: 1
DESCRIPTION OF MEMORY LOSS: SHORT TERM
DESCRIPTION OF MEMORY LOSS: LONG TERM
FATIGUES EASILY: 1
DENIES PAIN: 1
LIMITED RANGE OF MOTION: 1
LOSS OF SENSATION: 1
DESCRIPTION OF MEMORY LOSS: IMMEDIATE
FATIGUE: 1
FORGETFULNESS: 1
MUSCLE WEAKNESS: 1

## 2024-07-29 ASSESSMENT — ACTIVITIES OF DAILY LIVING (ADL)
CURRENT_FUNCTION: ONE PERSON
AMBULATION ASSISTANCE: NON-AMBULATORY

## 2024-07-31 ENCOUNTER — APPOINTMENT (OUTPATIENT)
Dept: RADIOLOGY | Facility: HOSPITAL | Age: 59
End: 2024-07-31
Payer: COMMERCIAL

## 2024-07-31 ENCOUNTER — HOME CARE VISIT (OUTPATIENT)
Dept: HOME HEALTH SERVICES | Facility: HOME HEALTH | Age: 59
End: 2024-07-31
Payer: COMMERCIAL

## 2024-07-31 ENCOUNTER — HOSPITAL ENCOUNTER (EMERGENCY)
Facility: HOSPITAL | Age: 59
Discharge: HOME | End: 2024-07-31
Payer: COMMERCIAL

## 2024-07-31 VITALS
WEIGHT: 275.57 LBS | DIASTOLIC BLOOD PRESSURE: 66 MMHG | RESPIRATION RATE: 17 BRPM | TEMPERATURE: 98.1 F | HEART RATE: 90 BPM | HEIGHT: 76 IN | BODY MASS INDEX: 33.56 KG/M2 | SYSTOLIC BLOOD PRESSURE: 137 MMHG | OXYGEN SATURATION: 99 %

## 2024-07-31 DIAGNOSIS — M19.012 ARTHRITIS OF LEFT ACROMIOCLAVICULAR JOINT: ICD-10-CM

## 2024-07-31 DIAGNOSIS — M19.011 ARTHRITIS OF RIGHT ACROMIOCLAVICULAR JOINT: Primary | ICD-10-CM

## 2024-07-31 PROCEDURE — 73030 X-RAY EXAM OF SHOULDER: CPT | Mod: 50

## 2024-07-31 PROCEDURE — 2500000001 HC RX 250 WO HCPCS SELF ADMINISTERED DRUGS (ALT 637 FOR MEDICARE OP): Performed by: PHYSICIAN ASSISTANT

## 2024-07-31 PROCEDURE — 73030 X-RAY EXAM OF SHOULDER: CPT | Mod: BILATERAL PROCEDURE | Performed by: RADIOLOGY

## 2024-07-31 PROCEDURE — G0151 HHCP-SERV OF PT,EA 15 MIN: HCPCS

## 2024-07-31 PROCEDURE — 99283 EMERGENCY DEPT VISIT LOW MDM: CPT

## 2024-07-31 RX ORDER — ACETAMINOPHEN 500 MG
1000 TABLET ORAL ONCE
Status: COMPLETED | OUTPATIENT
Start: 2024-07-31 | End: 2024-07-31

## 2024-07-31 ASSESSMENT — ACTIVITIES OF DAILY LIVING (ADL)
TRANSPORTATION: DEPENDENT
TRANSPORTATION ASSESSED: 1
AMBULATION ASSISTANCE: 1

## 2024-07-31 ASSESSMENT — COLUMBIA-SUICIDE SEVERITY RATING SCALE - C-SSRS
1. IN THE PAST MONTH, HAVE YOU WISHED YOU WERE DEAD OR WISHED YOU COULD GO TO SLEEP AND NOT WAKE UP?: NO
2. HAVE YOU ACTUALLY HAD ANY THOUGHTS OF KILLING YOURSELF?: NO
6. HAVE YOU EVER DONE ANYTHING, STARTED TO DO ANYTHING, OR PREPARED TO DO ANYTHING TO END YOUR LIFE?: NO

## 2024-07-31 ASSESSMENT — PAIN - FUNCTIONAL ASSESSMENT: PAIN_FUNCTIONAL_ASSESSMENT: 0-10

## 2024-07-31 ASSESSMENT — PAIN SCALES - GENERAL
PAINLEVEL_OUTOF10: 0 - NO PAIN
PAINLEVEL_OUTOF10: 10 - WORST POSSIBLE PAIN
PAINLEVEL_OUTOF10: 10 - WORST POSSIBLE PAIN

## 2024-07-31 ASSESSMENT — ENCOUNTER SYMPTOMS
HIGHEST PAIN SEVERITY IN PAST 24 HOURS: 10/10
SUBJECTIVE PAIN PROGRESSION: RAPIDLY WORSENING
PAIN SEVERITY GOAL: 0/10
PAIN: 1
PAIN LOCATION: LEFT SHOULDER
PAIN LOCATION - PAIN SEVERITY: 10/10
PAIN LOCATION: RIGHT SHOULDER
PERSON REPORTING PAIN: PATIENT
LOWEST PAIN SEVERITY IN PAST 24 HOURS: 6/10
PAIN LOCATION - PAIN SEVERITY: 10/10

## 2024-07-31 ASSESSMENT — PAIN DESCRIPTION - PAIN TYPE: TYPE: CHRONIC PAIN

## 2024-07-31 ASSESSMENT — PAIN DESCRIPTION - LOCATION: LOCATION: SHOULDER

## 2024-07-31 ASSESSMENT — PAIN DESCRIPTION - ORIENTATION: ORIENTATION: LEFT;RIGHT

## 2024-07-31 NOTE — DISCHARGE INSTRUCTIONS
He is to use over-the-counter Tylenol as needed for shoulder pain.  You do have significant arthritis in both of your AC joints of the shoulder.  This is where your pain was on physical exam today.  There is no evidence of dislocation or fracture of either shoulder.  Please follow-up with orthopedics listed on your discharge paperwork.  Please return to the emergency department with new or worsening symptoms or the onset of new symptoms.

## 2024-07-31 NOTE — ED PROVIDER NOTES
HPI   Chief Complaint   Patient presents with    Shoulder Pain     Pt to ER via EMS for eval of chronic right shoulder pain made worse by wife today when she lifted him up with his right arm. Pt states he needs surgery on right arm and currently uses a phillip lift that wife did not use this AM. Pt states pain is 10/10 non radiating, denies any recent injury/falls, SOB or chest pain       This is a 59-year-old male presents to the emergency department bilateral shoulder pain.  Patient states that he has a history of arthritic shoulders bilaterally.  He did have his left shoulder replaced in spring 2023.  Patient states that this morning his wife was placing him up underneath his armpits and this led to acute pain of both shoulders.  He denies 1 shoulder being more painful than the other.  His at home physical therapist came to his home this morning to do PT, and patient told physical therapist that his shoulders were hurting and he was concerned they were dislocated and wanted PT to call 911 for patient to be evaluated in the emergency department.  No other trauma or injury other than wife lifting patient underneath his armpits this morning around 6 AM.  No chest pain, or shortness of breath reported.  No overlying skin changes of the shoulders bilaterally.  No blunt trauma to the shoulders.      Please see HPI for pertinent positive and negative ROS.       Patient History   Past Medical History:   Diagnosis Date    Above-knee amputation of left lower extremity (Multi)     Adverse effect of anesthesia     confusion    Anemia     CAD (coronary artery disease)     Carotid artery disease (CMS-[facility])     bilateral    CVA (cerebral vascular accident) (Multi) 2020    Depression     DM (diabetes mellitus) (Multi)     type 2 with neuropathy    GERD (gastroesophageal reflux disease)     High cholesterol     HTN (hypertension)     Leg ulcer (Multi)     chronic right lower extremity    Neuropathy     OA (osteoarthritis)     PAD  (peripheral artery disease) (CMS-HCC)     Renal disorder      Past Surgical History:   Procedure Laterality Date    CARDIAC SURGERY      CABG 2023    CT ANGIO NECK  2021    CT NECK ANGIO W AND WO IV CONTRAST 2021 [facility] INPATIENT LEGACY    CT HEAD ANGIO W AND WO IV CONTRAST  2021    CT HEAD ANGIO W AND WO IV CONTRAST 2021 [facility] INPATIENT LEGACY    LEG SURGERY Right     MR HEAD ANGIO WO IV CONTRAST  2012    MR HEAD ANGIO WO IV CONTRAST [facility]    MR HEAD ANGIO WO IV CONTRAST  2021    MR HEAD ANGIO WO IV CONTRAST [facility]    MR HEAD ANGIO WO IV CONTRAST  2021    MR HEAD ANGIO WO IV CONTRAST [facility] INPATIENT LEGAC[facility]    MR NECK ANGIO WO IV CONTRAST  2023    MR NECK ANGIO WO IV CONTRAST 2023 [id number] MRI    OTHER SURGICAL HISTORY  2021    Knee reconstruction    OTHER SURGICAL HISTORY  2021    Lower extremity amputation above knee    TOTAL SHOULDER ARTHROPLASTY Left      Family History   Problem Relation Name Age of Onset    Other (cardiac disorder) Mother      Hypertension Mother      Esophageal cancer Mother      Hearing loss Father      Hypertension Father      Heart disease Father      COPD Sister       Social History     Tobacco Use    Smoking status: Former     Types: Cigars     Quit date: 2022     Years since quittin.7    Smokeless tobacco: Never   Vaping Use    Vaping status: Never Used   Substance Use Topics    Alcohol use: Not Currently    Drug use: Not Currently     Types: Marijuana       Physical Exam   ED Triage Vitals [24 1016]   Temperature Heart Rate Respirations BP   36.7 °C (98.1 °F) 93 16 136/56      Pulse Ox Temp src Heart Rate Source Patient Position   99 % -- -- --      BP Location FiO2 (%)     -- --       Physical Exam  GENERAL APPEARANCE: Awake and alert. No acute respiratory distress.   VITAL SIGNS: As per the nurses' triage record.  HEENT: Normocephalic, atraumatic.  NECK: Soft, nontender and  supple, full gross ROM, no meningeal signs.  No tenderness palpation over the cervical spinous processes.  CHEST: Nontender to palpation. Clear to auscultation bilaterally. No rales, rhonchi, or wheezing. Symmetric rise and fall of chest wall.   HEART: Clear S1 and S2. Regular rate and rhythm.   Strong and equal pulses in the extremities.  ABDOMEN: Soft, nontender, nondistended  MUSCULOSKELETAL: Patient does have full active range of motion of shoulders bilaterally including flexion, extension, abduction and adduction.  2+ radial pulses bilaterally.  Decree sensation of the upper extremities bilaterally, however, patient states this is his baseline as he has neuropathy.  Brisk capillary refill of the hands bilaterally.  No tenderness palpation over the sternoclavicular joint, clavicle bilaterally.  Patient does have significant tenderness palpation over the AC joint bilaterally.  No tenderness to palpation over the bicipital groove.  No tense palpation over the posterior shoulder bilaterally.  No overlying skin changes of the shoulder including redness, erythema or ecchymosis.  No tenderness palpation in the axillary region bilaterally.  NEUROLOGICAL: Awake, alert and oriented x 3. Motor power intact in the upper and lower extremities. Sensation is intact to light touch in the upper and lower extremities. Patient answering questions appropriately.   IMMUNOLOGICAL: No lymphatic streaking noted  DERMATOLOGIC: Warm and dry   PYSCH: Cooperative with appropriate mood and affect.    ED Course & MDM   Diagnoses as of 07/31/24 1957   Arthritis of right acromioclavicular joint   Arthritis of left acromioclavicular joint                       [place] Coma Scale Score: 15                        Medical Decision Making  Parts of this chart have been completed using voice recognition software. Please excuse any errors of transcription.  My thought process and reason for plan has been formulated from the time that I saw the  patient until the time of disposition and is not specific to one specific moment during their visit and furthermore my MDM encompasses this entire chart and not only this text box.      HPI: Detailed above.    Exam: A medically appropriate exam performed, outlined above, given the known history and presentation.    History obtained from: Patient    Medications given during visit:  Medications   acetaminophen (Tylenol) tablet 1,000 mg (1,000 mg oral Given 7/31/24 1120)        Diagnostic/tests  Labs Reviewed - No data to display   XR shoulder 2+ views bilateral   Final Result   No definite acute findings. See discussion above.             MACRO:   None        Signed by: Joseph Schoenberger 7/31/2024 11:17 AM   Dictation workstation:   [id number]           Considerations/further MDM:    Blood pressure 136/56, temperature 98.1 °F, heart rate 93 bpm, respirations 16, 99% room air    Differential diagnosis includes but is not limited to shoulder dislocation versus acute fracture versus osteoarthritis versus sternoclavicular dislocation versus clavicular injury versus rotator cuff injury    X-ray of the shoulders bilaterally do show significant AC joint arthritis bilaterally.  Patient does have a loose bursal body superiorly to the right AC joint.  In 2023, patient did have similar findings on x-ray of right shoulder with heterotrophic ossification.     Patient was treated with oral Tylenol.  He remains to have full active range of motion.  I do not feel CT imaging is indicated at this time with clinical exam findings.  He was discharged with a referral to orthopedics.  Educated to use Tylenol.  Educated to use ice.  Educated to continue with physical therapy at home.  Educated to return to the emergency department he has new or worsening symptoms or the onset new symptoms.  Patient and his wife was not present at bedside felt comfortable discharge plan home.  He was discharged in stable  condition.    Procedure  Procedures     Camryn Raines PA-C  07/31/24 1957
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
## 2024-08-01 ENCOUNTER — HOME CARE VISIT (OUTPATIENT)
Dept: HOME HEALTH SERVICES | Facility: HOME HEALTH | Age: 59
End: 2024-08-01
Payer: COMMERCIAL

## 2024-08-02 ENCOUNTER — HOME CARE VISIT (OUTPATIENT)
Dept: HOME HEALTH SERVICES | Facility: HOME HEALTH | Age: 59
End: 2024-08-02
Payer: COMMERCIAL

## 2024-08-02 VITALS
HEART RATE: 66 BPM | SYSTOLIC BLOOD PRESSURE: 144 MMHG | RESPIRATION RATE: 18 BRPM | DIASTOLIC BLOOD PRESSURE: 62 MMHG | TEMPERATURE: 98.2 F | OXYGEN SATURATION: 94 %

## 2024-08-02 PROCEDURE — G0299 HHS/HOSPICE OF RN EA 15 MIN: HCPCS

## 2024-08-02 ASSESSMENT — ENCOUNTER SYMPTOMS
DESCRIPTION OF MEMORY LOSS: IMMEDIATE
CHANGE IN APPETITE: UNCHANGED
PAIN LOCATION - PAIN DURATION: CONTINUOUS
DYSPNEA ON EXERTION: 1
SHORTNESS OF BREATH: 1
LAST BOWEL MOVEMENT: 67054
LIMITED RANGE OF MOTION: 1
FATIGUES EASILY: 1
LOWEST PAIN SEVERITY IN PAST 24 HOURS: 4/10
PAIN LOCATION - PAIN DURATION: CONTINUOUS
FATIGUE: 1
DEPRESSION: 1
BLURRED VISION: 1
PAIN LOCATION - PAIN SEVERITY: 4/10
DESCRIPTION OF MEMORY LOSS: SHORT TERM
PAIN LOCATION - PAIN FREQUENCY: CONSTANT
PAIN SEVERITY GOAL: 0/10
FORGETFULNESS: 1
DEPRESSED MOOD: 1
PAIN: 1
MUSCLE WEAKNESS: 1
PAIN LOCATION - RELIEVING FACTORS: TYLENOL #3
PERSON REPORTING PAIN: PATIENT
TINGLING: 1
HIGHEST PAIN SEVERITY IN PAST 24 HOURS: 8/10
SUBJECTIVE PAIN PROGRESSION: UNCHANGED
PAIN LOCATION: RIGHT SHOULDER
PAIN LOCATION - RELIEVING FACTORS: TYLENOL #3
PAIN LOCATION: LEFT SHOULDER
APPETITE LEVEL: FAIR
DYSPNEA ACTIVITY LEVEL: AT REST
PAIN LOCATION - PAIN SEVERITY: 4/10
PAIN LOCATION - PAIN FREQUENCY: CONSTANT
DESCRIPTION OF MEMORY LOSS: LONG TERM

## 2024-08-02 ASSESSMENT — ACTIVITIES OF DAILY LIVING (ADL)
CURRENT_FUNCTION: TWO PERSON
AMBULATION ASSISTANCE: NON-AMBULATORY

## 2024-08-05 ENCOUNTER — HOME CARE VISIT (OUTPATIENT)
Dept: HOME HEALTH SERVICES | Facility: HOME HEALTH | Age: 59
End: 2024-08-05
Payer: COMMERCIAL

## 2024-08-05 VITALS
OXYGEN SATURATION: 92 % | RESPIRATION RATE: 18 BRPM | DIASTOLIC BLOOD PRESSURE: 72 MMHG | HEART RATE: 80 BPM | TEMPERATURE: 98.7 F | SYSTOLIC BLOOD PRESSURE: 152 MMHG

## 2024-08-05 PROCEDURE — G0299 HHS/HOSPICE OF RN EA 15 MIN: HCPCS

## 2024-08-05 ASSESSMENT — ENCOUNTER SYMPTOMS
DEPRESSED MOOD: 1
FATIGUES EASILY: 1
LAST BOWEL MOVEMENT: 67056
FATIGUE: 1
DENIES PAIN: 1
PERSON REPORTING PAIN: PATIENT
DESCRIPTION OF MEMORY LOSS: IMMEDIATE
MUSCLE WEAKNESS: 1
APPETITE LEVEL: GOOD
LIMITED RANGE OF MOTION: 1
DESCRIPTION OF MEMORY LOSS: SHORT TERM
DESCRIPTION OF MEMORY LOSS: LONG TERM
FORGETFULNESS: 1

## 2024-08-05 ASSESSMENT — ACTIVITIES OF DAILY LIVING (ADL)
CURRENT_FUNCTION: ONE PERSON
AMBULATION ASSISTANCE: NON-AMBULATORY

## 2024-08-06 ENCOUNTER — HOME CARE VISIT (OUTPATIENT)
Dept: HOME HEALTH SERVICES | Facility: HOME HEALTH | Age: 59
End: 2024-08-06
Payer: COMMERCIAL

## 2024-08-06 PROCEDURE — G0156 HHCP-SVS OF AIDE,EA 15 MIN: HCPCS

## 2024-08-08 ENCOUNTER — HOME CARE VISIT (OUTPATIENT)
Dept: HOME HEALTH SERVICES | Facility: HOME HEALTH | Age: 59
End: 2024-08-08
Payer: COMMERCIAL

## 2024-08-08 VITALS — RESPIRATION RATE: 18 BRPM | TEMPERATURE: 97.3 F | OXYGEN SATURATION: 96 % | HEART RATE: 72 BPM

## 2024-08-08 VITALS
DIASTOLIC BLOOD PRESSURE: 60 MMHG | SYSTOLIC BLOOD PRESSURE: 102 MMHG | TEMPERATURE: 98.3 F | RESPIRATION RATE: 20 BRPM | OXYGEN SATURATION: 93 % | HEART RATE: 62 BPM

## 2024-08-08 PROCEDURE — G0299 HHS/HOSPICE OF RN EA 15 MIN: HCPCS

## 2024-08-08 PROCEDURE — G0157 HHC PT ASSISTANT EA 15: HCPCS | Mod: CQ

## 2024-08-08 ASSESSMENT — ENCOUNTER SYMPTOMS
PAIN LOCATION: RIGHT SHOULDER
FATIGUES EASILY: 1
PAIN SEVERITY GOAL: 0/10
PAIN LOCATION - PAIN QUALITY: SHARP
PAIN LOCATION - RELIEVING FACTORS: REST, ICE
PAIN LOCATION - PAIN FREQUENCY: INTERMITTENT
SUBJECTIVE PAIN PROGRESSION: UNCHANGED
PAIN SEVERITY GOAL: 0/10
SUBJECTIVE PAIN PROGRESSION: UNCHANGED
PAIN: 1
LOWEST PAIN SEVERITY IN PAST 24 HOURS: 2/10
DESCRIPTION OF MEMORY LOSS: IMMEDIATE
DESCRIPTION OF MEMORY LOSS: LONG TERM
PAIN LOCATION - EXACERBATING FACTORS: ANY USE
COUGH: 1
PAIN LOCATION - PAIN DURATION: CONTINUOUS
PERSON REPORTING PAIN: PATIENT
PAIN LOCATION - PAIN DURATION: CONTINUOUS
PAIN LOCATION - RELIEVING FACTORS: REST, MEDICATION
FORGETFULNESS: 1
FATIGUE: 1
PERSON REPORTING PAIN: PATIENT
PAIN LOCATION - PAIN FREQUENCY: CONSTANT
COUGH CHARACTERISTICS: PRODUCTIVE
PAIN LOCATION - EXACERBATING FACTORS: ANY USE
HIGHEST PAIN SEVERITY IN PAST 24 HOURS: 9/10
APPETITE LEVEL: GOOD
PAIN LOCATION: LEFT SHOULDER
HIGHEST PAIN SEVERITY IN PAST 24 HOURS: 7/10
DESCRIPTION OF MEMORY LOSS: SHORT TERM
PAIN LOCATION - PAIN SEVERITY: 9/10
PAIN LOCATION - PAIN DURATION: INTERMITTENT
MUSCLE WEAKNESS: 1
LIMITED RANGE OF MOTION: 1
PAIN LOCATION - PAIN FREQUENCY: CONSTANT
PAIN LOCATION - PAIN SEVERITY: 9/10
PAIN LOCATION - PAIN SEVERITY: 7/10
PAIN LOCATION - RELIEVING FACTORS: REST, ICE
PAIN LOCATION: RIGHT SHOULDER
PAIN LOCATION - EXACERBATING FACTORS: ACTIVITY
PAIN: 1
LOWEST PAIN SEVERITY IN PAST 24 HOURS: 6/10

## 2024-08-08 ASSESSMENT — ACTIVITIES OF DAILY LIVING (ADL)
AMBULATION ASSISTANCE: NON-AMBULATORY
CURRENT_FUNCTION: TWO PERSON

## 2024-08-09 ENCOUNTER — HOME CARE VISIT (OUTPATIENT)
Dept: HOME HEALTH SERVICES | Facility: HOME HEALTH | Age: 59
End: 2024-08-09
Payer: COMMERCIAL

## 2024-08-09 PROCEDURE — G0152 HHCP-SERV OF OT,EA 15 MIN: HCPCS

## 2024-08-10 ASSESSMENT — PAIN SCALES - PAIN ASSESSMENT IN ADVANCED DEMENTIA (PAINAD)
TOTALSCORE: 0
FACIALEXPRESSION: 0
BODYLANGUAGE: 0 - RELAXED.
NEGVOCALIZATION: 0
CONSOLABILITY: 0 - NO NEED TO CONSOLE.
NEGVOCALIZATION: 0 - NONE.
BREATHING: 0
FACIALEXPRESSION: 0 - SMILING OR INEXPRESSIVE.
BODYLANGUAGE: 0
CONSOLABILITY: 0

## 2024-08-10 ASSESSMENT — ACTIVITIES OF DAILY LIVING (ADL)
DRESSING_LB_CURRENT_FUNCTION: MAXIMUM ASSIST
TOILETING: 1
TOILETING: MAXIMUM ASSIST
BATHING ASSESSED: 1
BATHING_CURRENT_FUNCTION: MAXIMUM ASSIST

## 2024-08-10 ASSESSMENT — ENCOUNTER SYMPTOMS
LOWEST PAIN SEVERITY IN PAST 24 HOURS: 4/10
PERSON REPORTING PAIN: PATIENT
HIGHEST PAIN SEVERITY IN PAST 24 HOURS: 8/10
SUBJECTIVE PAIN PROGRESSION: WAXING AND WANING
PAIN SEVERITY GOAL: 0/10
PAIN: 1

## 2024-08-12 DIAGNOSIS — I95.0 IDIOPATHIC HYPOTENSION: Primary | ICD-10-CM

## 2024-08-12 DIAGNOSIS — F33.1 MAJOR DEPRESSIVE DISORDER, RECURRENT, MODERATE (MULTI): ICD-10-CM

## 2024-08-12 DIAGNOSIS — I10 PRIMARY HYPERTENSION: ICD-10-CM

## 2024-08-12 RX ORDER — FLUDROCORTISONE ACETATE 0.1 MG/1
0.1 TABLET ORAL DAILY
Qty: 90 TABLET | Refills: 1 | Status: SHIPPED | OUTPATIENT
Start: 2024-08-12

## 2024-08-12 RX ORDER — AMLODIPINE BESYLATE 10 MG/1
10 TABLET ORAL DAILY
Qty: 90 TABLET | Refills: 1 | Status: SHIPPED | OUTPATIENT
Start: 2024-08-12

## 2024-08-12 RX ORDER — CALCIUM CARBONATE 300MG(750)
400 TABLET,CHEWABLE ORAL 2 TIMES DAILY
Qty: 180 TABLET | Refills: 1 | Status: SHIPPED | OUTPATIENT
Start: 2024-08-12

## 2024-08-12 RX ORDER — VENLAFAXINE HYDROCHLORIDE 150 MG/1
150 CAPSULE, EXTENDED RELEASE ORAL DAILY
Qty: 90 CAPSULE | Refills: 1 | Status: SHIPPED | OUTPATIENT
Start: 2024-08-12

## 2024-08-13 ENCOUNTER — HOME CARE VISIT (OUTPATIENT)
Dept: HOME HEALTH SERVICES | Facility: HOME HEALTH | Age: 59
End: 2024-08-13
Payer: COMMERCIAL

## 2024-08-13 PROCEDURE — G0299 HHS/HOSPICE OF RN EA 15 MIN: HCPCS

## 2024-08-14 ENCOUNTER — HOME CARE VISIT (OUTPATIENT)
Dept: HOME HEALTH SERVICES | Facility: HOME HEALTH | Age: 59
End: 2024-08-14
Payer: COMMERCIAL

## 2024-08-14 VITALS
RESPIRATION RATE: 20 BRPM | OXYGEN SATURATION: 95 % | DIASTOLIC BLOOD PRESSURE: 66 MMHG | SYSTOLIC BLOOD PRESSURE: 140 MMHG | HEART RATE: 60 BPM | TEMPERATURE: 98 F

## 2024-08-14 PROCEDURE — G0156 HHCP-SVS OF AIDE,EA 15 MIN: HCPCS

## 2024-08-14 ASSESSMENT — ENCOUNTER SYMPTOMS
DEPRESSED MOOD: 1
APPETITE LEVEL: FAIR
PERSON REPORTING PAIN: PATIENT
FORGETFULNESS: 1
AGITATION: 1
LAST BOWEL MOVEMENT: 67064
DENIES PAIN: 1
DESCRIPTION OF MEMORY LOSS: LONG TERM
DESCRIPTION OF MEMORY LOSS: SHORT TERM
DESCRIPTION OF MEMORY LOSS: IMMEDIATE
BOWEL INCONTINENCE: 1

## 2024-08-14 ASSESSMENT — ACTIVITIES OF DAILY LIVING (ADL)
CURRENT_FUNCTION: TWO PERSON
AMBULATION ASSISTANCE: NON-AMBULATORY
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
## 2024-08-16 ENCOUNTER — HOME CARE VISIT (OUTPATIENT)
Dept: HOME HEALTH SERVICES | Facility: HOME HEALTH | Age: 59
End: 2024-08-16
Payer: COMMERCIAL

## 2024-08-16 PROCEDURE — G0299 HHS/HOSPICE OF RN EA 15 MIN: HCPCS

## 2024-08-17 VITALS
DIASTOLIC BLOOD PRESSURE: 76 MMHG | RESPIRATION RATE: 92 BRPM | TEMPERATURE: 97.9 F | SYSTOLIC BLOOD PRESSURE: 136 MMHG | HEART RATE: 67 BPM

## 2024-08-17 ASSESSMENT — ENCOUNTER SYMPTOMS
PAIN LOCATION: BACK
APPETITE LEVEL: FAIR
PAIN LOCATION - PAIN QUALITY: SHARP
HIGHEST PAIN SEVERITY IN PAST 24 HOURS: 9/10
DENIES PAIN: 1
SUBJECTIVE PAIN PROGRESSION: UNCHANGED
LOWEST PAIN SEVERITY IN PAST 24 HOURS: 0/10
CHANGE IN APPETITE: DECREASED
BLURRED VISION: 1
PERSON REPORTING PAIN: PATIENT
PAIN SEVERITY GOAL: 0/10
PAIN LOCATION - PAIN SEVERITY: 9/10
PAIN LOCATION - EXACERBATING FACTORS: SITTING

## 2024-08-17 ASSESSMENT — ACTIVITIES OF DAILY LIVING (ADL): AMBULATION ASSISTANCE: TWO PERSON

## 2024-08-20 ENCOUNTER — HOME CARE VISIT (OUTPATIENT)
Dept: HOME HEALTH SERVICES | Facility: HOME HEALTH | Age: 59
End: 2024-08-20
Payer: COMMERCIAL

## 2024-08-20 VITALS
SYSTOLIC BLOOD PRESSURE: 144 MMHG | TEMPERATURE: 97.5 F | OXYGEN SATURATION: 95 % | HEART RATE: 65 BPM | DIASTOLIC BLOOD PRESSURE: 82 MMHG

## 2024-08-20 PROCEDURE — G0299 HHS/HOSPICE OF RN EA 15 MIN: HCPCS

## 2024-08-20 ASSESSMENT — ENCOUNTER SYMPTOMS
PAIN LOCATION - PAIN QUALITY: ACHY, DULL
FORGETFULNESS: 1
PAIN LOCATION - PAIN FREQUENCY: CONSTANT
PAIN SEVERITY GOAL: 0/10
CHANGE IN APPETITE: UNCHANGED
AGITATION: 1
HIGHEST PAIN SEVERITY IN PAST 24 HOURS: 8/10
PAIN LOCATION - PAIN SEVERITY: 6/10
DEPRESSED MOOD: 1
PERSON REPORTING PAIN: PATIENT
APPETITE LEVEL: FAIR
PAIN: 1
SUBJECTIVE PAIN PROGRESSION: UNCHANGED
LOWEST PAIN SEVERITY IN PAST 24 HOURS: 4/10
PAIN LOCATION: BACK

## 2024-08-20 ASSESSMENT — ACTIVITIES OF DAILY LIVING (ADL)
CURRENT_FUNCTION: ONE PERSON
AMBULATION ASSISTANCE: ONE PERSON

## 2024-08-21 ENCOUNTER — HOME CARE VISIT (OUTPATIENT)
Dept: HOME HEALTH SERVICES | Facility: HOME HEALTH | Age: 59
End: 2024-08-21
Payer: COMMERCIAL

## 2024-08-21 PROCEDURE — G0156 HHCP-SVS OF AIDE,EA 15 MIN: HCPCS

## 2024-08-22 ENCOUNTER — HOME CARE VISIT (OUTPATIENT)
Dept: HOME HEALTH SERVICES | Facility: HOME HEALTH | Age: 59
End: 2024-08-22
Payer: COMMERCIAL

## 2024-08-22 DIAGNOSIS — N17.9 AKI (ACUTE KIDNEY INJURY) (CMS-HCC): Primary | ICD-10-CM

## 2024-08-22 DIAGNOSIS — N17.9 AKI (ACUTE KIDNEY INJURY) (CMS-HCC): ICD-10-CM

## 2024-08-22 DIAGNOSIS — I10 BENIGN ESSENTIAL HYPERTENSION: ICD-10-CM

## 2024-08-22 PROCEDURE — G0151 HHCP-SERV OF PT,EA 15 MIN: HCPCS

## 2024-08-22 RX ORDER — HYDRALAZINE HYDROCHLORIDE 50 MG/1
50 TABLET, FILM COATED ORAL 3 TIMES DAILY
Qty: 270 TABLET | Refills: 1 | Status: SHIPPED | OUTPATIENT
Start: 2024-08-22

## 2024-08-22 RX ORDER — SODIUM BICARBONATE 650 MG/1
650 TABLET ORAL 2 TIMES DAILY
Qty: 180 TABLET | Refills: 1 | Status: SHIPPED | OUTPATIENT
Start: 2024-08-22

## 2024-08-22 ASSESSMENT — ENCOUNTER SYMPTOMS
HIGHEST PAIN SEVERITY IN PAST 24 HOURS: 8/10
PAIN LOCATION: LEFT SHOULDER
SUBJECTIVE PAIN PROGRESSION: GRADUALLY IMPROVING
PAIN LOCATION - PAIN SEVERITY: 8/10
PERSON REPORTING PAIN: PATIENT
LOWEST PAIN SEVERITY IN PAST 24 HOURS: 6/10
PAIN LOCATION - PAIN SEVERITY: 8/10
PAIN: 1
PAIN LOCATION: RIGHT SHOULDER
PAIN SEVERITY GOAL: 0/10

## 2024-08-22 ASSESSMENT — ACTIVITIES OF DAILY LIVING (ADL)
AMBULATION ASSISTANCE: 1
TRANSPORTATION ASSESSED: 1
TRANSPORTATION: NEEDS ASSISTANCE
AMBULATION ASSISTANCE: INDEPENDENT

## 2024-08-22 NOTE — HOME HEALTH
Had Junior, now going with [facility].  They have a pump up prosthetic with an articulating heel.  Next time I have contact with them after my wife Charline calls them.    Spoke at length with patient about getting out of his apartment via his wheelchair or his scooter for mental health reasons and, while he agreed, he indicated that he has not done in ...in months...

## 2024-08-23 ENCOUNTER — HOME CARE VISIT (OUTPATIENT)
Dept: HOME HEALTH SERVICES | Facility: HOME HEALTH | Age: 59
End: 2024-08-23
Payer: COMMERCIAL

## 2024-08-23 ENCOUNTER — TELEPHONE (OUTPATIENT)
Dept: PRIMARY CARE | Facility: CLINIC | Age: 59
End: 2024-08-23
Payer: COMMERCIAL

## 2024-08-23 VITALS
DIASTOLIC BLOOD PRESSURE: 84 MMHG | TEMPERATURE: 98.2 F | HEART RATE: 68 BPM | SYSTOLIC BLOOD PRESSURE: 166 MMHG | OXYGEN SATURATION: 93 % | RESPIRATION RATE: 18 BRPM

## 2024-08-23 PROCEDURE — G0299 HHS/HOSPICE OF RN EA 15 MIN: HCPCS

## 2024-08-23 ASSESSMENT — ENCOUNTER SYMPTOMS
LIMITED RANGE OF MOTION: 1
DENIES PAIN: 1
COUGH: 1
LAST BOWEL MOVEMENT: 67073
COUGH CHARACTERISTICS: PRODUCTIVE
APPETITE LEVEL: GOOD
HYPERTENSION: 1
DESCRIPTION OF MEMORY LOSS: LONG TERM
FORGETFULNESS: 1
DESCRIPTION OF MEMORY LOSS: SHORT TERM
PERSON REPORTING PAIN: PATIENT
ONSET QUALITY: INTERMITTENT
FATIGUES EASILY: 1
PALPITATIONS: 1
FATIGUE: 1
MUSCLE WEAKNESS: 1

## 2024-08-23 ASSESSMENT — ACTIVITIES OF DAILY LIVING (ADL)
CURRENT_FUNCTION: ONE PERSON
AMBULATION ASSISTANCE: NON-AMBULATORY

## 2024-08-24 NOTE — CASE COMMUNICATION
"LEELEEI   BP elevated today 166/82 pt reports \"I can hear my heart beat sometimes\"  Pt believes he has a cardiology appt but none is on calendar. Instructed him to call and make sure he has an appt next month. He agreed.  He was also wheezing and his Albuterol is not reordered but he took two puffs also noted it was  and could use a refill if you could reorder. Noted this may have elevated his BP   POx was 93%.  Thank you"

## 2024-08-27 ENCOUNTER — HOME CARE VISIT (OUTPATIENT)
Dept: HOME HEALTH SERVICES | Facility: HOME HEALTH | Age: 59
End: 2024-08-27
Payer: COMMERCIAL

## 2024-08-27 ENCOUNTER — TELEPHONE (OUTPATIENT)
Dept: PRIMARY CARE | Facility: CLINIC | Age: 59
End: 2024-08-27
Payer: COMMERCIAL

## 2024-08-27 VITALS
OXYGEN SATURATION: 93 % | RESPIRATION RATE: 18 BRPM | DIASTOLIC BLOOD PRESSURE: 68 MMHG | TEMPERATURE: 98.7 F | SYSTOLIC BLOOD PRESSURE: 146 MMHG | HEART RATE: 78 BPM

## 2024-08-27 DIAGNOSIS — I69.351 HEMIPLEGIA AND HEMIPARESIS FOLLOWING CEREBRAL INFARCTION AFFECTING RIGHT DOMINANT SIDE (MULTI): ICD-10-CM

## 2024-08-27 DIAGNOSIS — Z89.612 HX OF AKA (ABOVE KNEE AMPUTATION), LEFT (MULTI): Primary | ICD-10-CM

## 2024-08-27 DIAGNOSIS — R06.02 SOB (SHORTNESS OF BREATH): ICD-10-CM

## 2024-08-27 DIAGNOSIS — I69.351 HEMIPLEGIA AND HEMIPARESIS FOLLOWING CEREBRAL INFARCTION AFFECTING RIGHT DOMINANT SIDE (MULTI): Primary | ICD-10-CM

## 2024-08-27 DIAGNOSIS — Z89.612 HX OF AKA (ABOVE KNEE AMPUTATION), LEFT (MULTI): ICD-10-CM

## 2024-08-27 PROCEDURE — G0299 HHS/HOSPICE OF RN EA 15 MIN: HCPCS

## 2024-08-27 RX ORDER — ALBUTEROL SULFATE 90 UG/1
2 INHALANT RESPIRATORY (INHALATION) EVERY 4 HOURS PRN
Qty: 8.5 G | Refills: 5 | Status: SHIPPED | OUTPATIENT
Start: 2024-08-27 | End: 2025-08-27

## 2024-08-27 ASSESSMENT — ENCOUNTER SYMPTOMS
LIMITED RANGE OF MOTION: 1
DESCRIPTION OF MEMORY LOSS: LONG TERM
FORGETFULNESS: 1
PAIN LOCATION - PAIN QUALITY: INTENSE
MUSCLE WEAKNESS: 1
PAIN LOCATION - RELIEVING FACTORS: NOT MOVING
PAIN: 1
PAIN LOCATION - PAIN SEVERITY: 8/10
PAIN LOCATION - RELIEVING FACTORS: NOT MOVING
PAIN LOCATION - PAIN SEVERITY: 8/10
DESCRIPTION OF MEMORY LOSS: SHORT TERM
PAIN LOCATION - PAIN DURATION: CONTINUOUS
FATIGUES EASILY: 1
PAIN LOCATION - PAIN FREQUENCY: CONSTANT
CHANGE IN APPETITE: DECREASED
PAIN LOCATION - EXACERBATING FACTORS: ANYTHING
APPETITE LEVEL: FAIR
PAIN LOCATION - PAIN QUALITY: INTENSE
DEPRESSED MOOD: 1
PAIN SEVERITY GOAL: 0/10
HIGHEST PAIN SEVERITY IN PAST 24 HOURS: 10/10
PERSON REPORTING PAIN: PATIENT
PAIN LOCATION - PAIN FREQUENCY: CONSTANT
PAIN LOCATION - PAIN DURATION: CONTINUOUS
DESCRIPTION OF MEMORY LOSS: IMMEDIATE
DEPRESSION: 1
PAIN LOCATION: LEFT SHOULDER
PAIN LOCATION: RIGHT SHOULDER
PAIN LOCATION - EXACERBATING FACTORS: ANYTHING
FATIGUE: 1
SUBJECTIVE PAIN PROGRESSION: GRADUALLY WORSENING

## 2024-08-27 ASSESSMENT — ACTIVITIES OF DAILY LIVING (ADL)
AMBULATION ASSISTANCE: NON-AMBULATORY
CURRENT_FUNCTION: ONE PERSON

## 2024-08-28 ENCOUNTER — HOME CARE VISIT (OUTPATIENT)
Dept: HOME HEALTH SERVICES | Facility: HOME HEALTH | Age: 59
End: 2024-08-28
Payer: COMMERCIAL

## 2024-08-28 PROCEDURE — G0156 HHCP-SVS OF AIDE,EA 15 MIN: HCPCS

## 2024-08-28 RX ORDER — SODIUM BICARBONATE 650 MG/1
TABLET ORAL
Qty: 360 TABLET | Refills: 0 | Status: SHIPPED | OUTPATIENT
Start: 2024-08-28

## 2024-08-30 ENCOUNTER — HOME CARE VISIT (OUTPATIENT)
Dept: HOME HEALTH SERVICES | Facility: HOME HEALTH | Age: 59
End: 2024-08-30
Payer: COMMERCIAL

## 2024-08-30 VITALS
TEMPERATURE: 98.6 F | OXYGEN SATURATION: 94 % | DIASTOLIC BLOOD PRESSURE: 58 MMHG | HEART RATE: 70 BPM | RESPIRATION RATE: 20 BRPM | SYSTOLIC BLOOD PRESSURE: 168 MMHG

## 2024-08-30 PROCEDURE — G0299 HHS/HOSPICE OF RN EA 15 MIN: HCPCS

## 2024-08-30 ASSESSMENT — ENCOUNTER SYMPTOMS
PAIN LOCATION: RIGHT SHOULDER
DESCRIPTION OF MEMORY LOSS: SHORT TERM
PAIN LOCATION - PAIN SEVERITY: 8/10
PAIN SEVERITY GOAL: 0/10
DYSPNEA ON EXERTION: 1
LOWEST PAIN SEVERITY IN PAST 24 HOURS: 7/10
LIMITED RANGE OF MOTION: 1
FATIGUE: 1
HIGHEST PAIN SEVERITY IN PAST 24 HOURS: 8/10
PAIN LOCATION: LEFT SHOULDER
PAIN LOCATION - PAIN DURATION: CONTINUOUS
CHANGE IN APPETITE: UNCHANGED
MUSCLE WEAKNESS: 1
FORGETFULNESS: 1
PAIN LOCATION - EXACERBATING FACTORS: ANY USE
LAST BOWEL MOVEMENT: 67082
HYPERTENSION: 1
SUBJECTIVE PAIN PROGRESSION: GRADUALLY WORSENING
PAIN LOCATION - PAIN FREQUENCY: CONSTANT
FATIGUES EASILY: 1
COUGH CHARACTERISTICS: DRY
PAIN: 1
PAIN LOCATION - PAIN QUALITY: SHARP AND DULL
DESCRIPTION OF MEMORY LOSS: IMMEDIATE
PAIN LOCATION - PAIN DURATION: CONTINUOUS
FLUID RETENTION: 1
LOWER EXTREMITY EDEMA: 1
PERSON REPORTING PAIN: PATIENT
PAIN LOCATION - EXACERBATING FACTORS: ANY USE
COUGH: 1
PAIN LOCATION - PAIN SEVERITY: 8/10
PAIN LOCATION - PAIN FREQUENCY: CONSTANT
PAIN LOCATION - PAIN QUALITY: SHARP AND DULL
APPETITE LEVEL: FAIR
DEPRESSED MOOD: 1

## 2024-08-30 ASSESSMENT — ACTIVITIES OF DAILY LIVING (ADL)
CURRENT_FUNCTION: ONE PERSON
AMBULATION ASSISTANCE: NON-AMBULATORY

## 2024-08-31 NOTE — CASE COMMUNICATION
Pt took COVID test  not feeling well, result NEG  Next visit discharging due to no further skilled needs.

## 2024-09-03 ENCOUNTER — OFFICE VISIT (OUTPATIENT)
Dept: ORTHOPEDIC SURGERY | Facility: CLINIC | Age: 59
End: 2024-09-03
Payer: COMMERCIAL

## 2024-09-03 ENCOUNTER — HOME CARE VISIT (OUTPATIENT)
Dept: HOME HEALTH SERVICES | Facility: HOME HEALTH | Age: 59
End: 2024-09-03
Payer: COMMERCIAL

## 2024-09-03 ENCOUNTER — TELEPHONE (OUTPATIENT)
Dept: PRIMARY CARE | Facility: CLINIC | Age: 59
End: 2024-09-03
Payer: COMMERCIAL

## 2024-09-03 ENCOUNTER — HOSPITAL ENCOUNTER (OUTPATIENT)
Dept: RADIOLOGY | Facility: CLINIC | Age: 59
Discharge: HOME | End: 2024-09-03
Payer: COMMERCIAL

## 2024-09-03 DIAGNOSIS — G89.29 CHRONIC PAIN OF BOTH SHOULDERS: ICD-10-CM

## 2024-09-03 DIAGNOSIS — M25.511 CHRONIC PAIN OF BOTH SHOULDERS: ICD-10-CM

## 2024-09-03 DIAGNOSIS — M25.512 ACUTE PAIN OF LEFT SHOULDER: ICD-10-CM

## 2024-09-03 DIAGNOSIS — M25.512 CHRONIC PAIN OF BOTH SHOULDERS: ICD-10-CM

## 2024-09-03 PROCEDURE — 73030 X-RAY EXAM OF SHOULDER: CPT | Mod: LEFT SIDE | Performed by: RADIOLOGY

## 2024-09-03 PROCEDURE — 73030 X-RAY EXAM OF SHOULDER: CPT | Mod: LT

## 2024-09-03 PROCEDURE — 3060F POS MICROALBUMINURIA REV: CPT | Performed by: STUDENT IN AN ORGANIZED HEALTH CARE EDUCATION/TRAINING PROGRAM

## 2024-09-03 PROCEDURE — 99213 OFFICE O/P EST LOW 20 MIN: CPT | Performed by: STUDENT IN AN ORGANIZED HEALTH CARE EDUCATION/TRAINING PROGRAM

## 2024-09-03 PROCEDURE — 3044F HG A1C LEVEL LT 7.0%: CPT | Performed by: STUDENT IN AN ORGANIZED HEALTH CARE EDUCATION/TRAINING PROGRAM

## 2024-09-04 ASSESSMENT — ACTIVITIES OF DAILY LIVING (ADL)
TRANSPORTATION ASSESSED: 1
SHOPPING ASSESSED: 1
BATHING_CURRENT_FUNCTION: MODERATE ASSIST
OASIS_M1830: 03
CURRENT_FUNCTION: TWO PERSON
SHOPPING: DEPENDENT
DRESSING_LB_CURRENT_FUNCTION: MAXIMUM ASSIST
HOME_HEALTH_OASIS: 01
TRANSPORTATION: DEPENDENT
PREPARING MEALS: DEPENDENT
DRESSING_UB_CURRENT_FUNCTION: MODERATE ASSIST
TOILETING: ONE PERSON
AMBULATION ASSISTANCE: 1
LAUNDRY: DEPENDENT
LIGHT HOUSEKEEPING: DEPENDENT
HOUSEKEEPING ASSESSED: 1
CURRENT_FUNCTION: MAXIMUM ASSIST
TELEPHONE USE ASSESSED: 1
FEEDING: INDEPENDENT
GROOMING_CURRENT_FUNCTION: MODERATE ASSIST
AMBULATION ASSISTANCE: TWO PERSON
ORAL_CARE_CURRENT_FUNCTION: NEEDS ASSISTANCE
TOILETING: 1
LAUNDRY ASSESSED: 1
GROOMING ASSESSED: 1
BATHING_CURRENT_FUNCTION: ONE PERSON
ORAL_CARE_ASSESSED: 1
USING THE TELPHONE: NEEDS ASSISTANCE
FEEDING ASSESSED: 1
BATHING ASSESSED: 1
TOILETING: MODERATE ASSIST
PHYSICAL TRANSFERS ASSESSED: 1
AMBULATION ASSISTANCE: MAXIMUM ASSIST

## 2024-09-04 NOTE — PROGRESS NOTES
"Physical Therapy Evaluation and Treatment     Patient Name: Humphrey Waddell  MRN: [id number]  Encounter date: 9/5/2024  Time Calculation  Start Time: 1000  Stop Time: 1045  Time Calculation (min): 45 min  PT Evaluation Time Entry  PT Evaluation (Moderate) Time Entry: 30  Non-Billable Time  Non-billable time: 15  Non-billable time reason: eval only    Visit # 1 of pending  Visits/Dates Authorized: EVAL ONLY - [facility] / AUTH REQ    Current Problem:   Problem List Items Addressed This Visit             ICD-10-CM    Status post total replacement of left shoulder Z96.612    Chronic pain of both shoulders - Primary M25.511, G89.29, M25.512     Precautions:  Precautions  Precautions Comment: L TSR 3/2022, recently healed sacral wound  Past Medical History Relevant to Rehab: CABG 11/2023, 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 Returned to surgeon, xrays this week show no damage to L anatomic TSR (3/2022 surgery). Pt anticipates R shoulder will require replacement. Unable to use manual w/c due to shoulder pain, has an order for a w/c eval, not yet scheduled. To have new L AKA prosthetic made, process has not started yet. Until new prosthetic made, using motorized scooter (scooter too small, pt is 6'4\"), easier to transfer to scooter than w/c. Sleeps in regular bed. Has not has well-fitting AKA prosthesis since emergent L AKA 3/2021 with necrotizing fasciitis, lots of demands on UE for transfers, uses slide board. Spouse assists often.    Pt with hospitalization 5/22/24 to 6/11/24 with open cholecystectomy, complicated by post op delirium due to metabolic encephalopathy. Rehab stay 6/11/24 to 7/9/24. HHC started 7/12/24, per 7/25/24 visit:  \"Taking advantage of the " "presence of the OT, patient assisted to move from sit to stand with gait belt and walker in place x 2.  His weight bearing was poor on his R LE (keep in mind he is a L AKA) but all of his set up, hand positions and weight shifts were done correctly.  He simply lacked power to stand up and to remain standing for more than about 20 seconds each time.\"    Pain  Current pain ratin  At best pain ratin  At worst pain rating: 10  Location: Diffuse B shoulder pain  Progression: no change (since 24 exacerbation)    Social Support  Lives in: apartment  Lives with: spouse    Diagnostic Tests  X-ray: abnormal (9/3/24 L shoulder Anatomic TSR without hardware complication. Mild AC joint osteoarthrosis similar to prior; 2023 R shoulder: Mild AC jt DJD. Heterotopic ossification is seen AC joint. GH joint space is grossly preserved.  No acute findings.)    Treatments  Discharged from (in last 30 days): home health care       Objective     Static Posture     Comments  Frequently shifts from L/R leaning on arm rest of scooter (scooter seat too low and depth inadequate, pt is 6'4\")  Pt with moderate FW head, flexed Tspine, protracted and depressed shoulder girdles    Palpation     Additional Palpation Details  Diffuse B hypertonicity periscapular mm including levator scap    Tenderness     Left Shoulder   Tenderness in the biceps tendon (proximal), bicipital groove, coracoid process, scapular spine and supraspinatus tendon.     Right Shoulder  Tenderness in the biceps tendon (proximal), bicipital groove, coracoid process, scapular spine, subacromial bursa and supraspinatus tendon.     Additional Tenderness Details  Pt with diffuse B tenderness    Active Range of Motion   Left Shoulder   Flexion: 105 degrees   Abduction: 65 degrees   External rotation BTH: C4   Internal rotation BTB: sacrum     Right Shoulder   Flexion: 95 degrees   Abduction: 75 degrees   External rotation BTH: C4   Internal rotation BTB: sacrum " "    Additional Active Range of Motion Details  Seated    Passive Range of Motion     Additional Passive Range of Motion Details  Improved B shoulder ROM with passive elevation and retraction of shoulder girdles    Tightness B pectorals  Overlengthening B UT    Joint Play   Left Shoulder  Hypomobile in the thoracic spine.    Right Shoulder  Hypomobile in the thoracic spine.     Strength/Myotome Testing     Left Shoulder     Planes of Motion   Flexion: 4-   Abduction: 3+   External rotation at 0°: 4-   Internal rotation at 0°: 4+     Isolated Muscles   Biceps: 5   Triceps: 5     Right Shoulder     Planes of Motion   Flexion: 4-   Abduction: 3+   External rotation at 0°: 4-   Internal rotation at 0°: 4+     Isolated Muscles   Biceps: 5   Triceps: 5     Additional Strength Details  Grimaces at times with MMT due to pain    Tests     Left Shoulder   Negative drop arm.     Right Shoulder   Negative drop arm and sulcus sign.      Other Measures  Other Outcome Measures: SPADI 107/130, 90% pain score, 78% disability score    Treatments:   EVAL ONLY  Therapeutic Exercise:   Will benefit from thoracic and scapular strengthening, Tspine ROM    Modalities:   Will benefit from light therapy    Assessment & Plan     Assessment  Impairments: abnormal gait, abnormal or restricted ROM, activity intolerance, impaired balance, impaired physical strength, lacks appropriate home exercise program, pain with function and weight-bearing intolerance  Assessment details:     Pt is a 60 y/o male with B shoulder pain (L s/p TSR 3/2022) limiting function. Pt with excessive demands on UE's for transfers due to lack of L AKA prosthetic and lack of adequate w/c seating. Unable to tolerate manual w/c due to shoulder pain. Motorized scooter grossly undersized including height and seat depth (pt is 6'4\") which yields frequent weight shifting and leaning on UE's. Pt has orders for a new prosthetic and w/c eval. Obtaining the prosthetic will require " altered demands on UE's for gait training. Obtaining adequate w/c seating will reduce UE demands with transfers. Pt presents with postural faults and scapular dyskinesia yielding excessive forces on shoulder mm. Pt expected to benefit from PT POC including addressing postural faults and improving transfers and mobility to reduce demands on UE's.     Moderate complexity due to patient's clinical presentation being evolving with changing characteristics, with comorbidities/complexities to include extensive medical hx, lacking adequate L LE prosthetic since 3/2021 emergency L AKA, lacking appropriately fitting seated, all of which may negatively impact rehab tolerance and progression.  Prognosis: good    Plan  Therapy options: will be seen for skilled physical therapy services  Planned modality interventions: electrical stimulation/Russian stimulation and thermotherapy (hydrocollator packs)  Other planned modality interventions: light therapy  Planned therapy interventions: abdominal trunk stabilization, manual therapy, neuromuscular re-education, postural training, stretching, strengthening, therapeutic activities, transfer training, joint mobilization, home exercise program, gait training, functional ROM exercises, flexibility, body mechanics training, balance/weight-bearing training and bed mobility training  Frequency: 2x week  Duration in visits: 20  Treatment plan discussed with: patient            Post-Treatment Symptoms:  Response to Interventions: Eval only    Goals:   Active       PT Problem       PT Goals       Start:  09/05/24    Expected End:  12/04/24       1) Indep HEP and progression.  2) SPADI to < 40/130 from 107/130 at eval to indicate improved function and pain.  3) Adequate shoulder strength and ROM for sit to stand transfers with Juancarlos x1.  4) Adequate shoulder strength and ROM for stand pivot transfers to/from bed with minAx1.           Patient Stated Goals       Start:  09/05/24    Expected End:   12/04/24       1) Build strength for transfers.  2) Lessen pain for shoulder mobility.

## 2024-09-05 ENCOUNTER — EVALUATION (OUTPATIENT)
Dept: PHYSICAL THERAPY | Facility: CLINIC | Age: 59
End: 2024-09-05
Payer: COMMERCIAL

## 2024-09-05 DIAGNOSIS — G89.29 CHRONIC PAIN OF BOTH SHOULDERS: Primary | ICD-10-CM

## 2024-09-05 DIAGNOSIS — M25.511 CHRONIC PAIN OF BOTH SHOULDERS: Primary | ICD-10-CM

## 2024-09-05 DIAGNOSIS — M25.512 CHRONIC PAIN OF BOTH SHOULDERS: Primary | ICD-10-CM

## 2024-09-05 DIAGNOSIS — Z96.612 STATUS POST TOTAL REPLACEMENT OF LEFT SHOULDER: ICD-10-CM

## 2024-09-05 PROCEDURE — 97162 PT EVAL MOD COMPLEX 30 MIN: CPT | Mod: GP

## 2024-09-06 PROBLEM — M25.512 CHRONIC PAIN OF BOTH SHOULDERS: Status: ACTIVE | Noted: 2024-09-06

## 2024-09-06 PROBLEM — M25.511 CHRONIC PAIN OF BOTH SHOULDERS: Status: ACTIVE | Noted: 2024-09-06

## 2024-09-06 PROBLEM — Z95.1 S/P CABG (CORONARY ARTERY BYPASS GRAFT): Status: ACTIVE | Noted: 2023-11-13

## 2024-09-06 PROBLEM — Z96.612 STATUS POST TOTAL REPLACEMENT OF LEFT SHOULDER: Status: ACTIVE | Noted: 2022-03-16

## 2024-09-06 PROBLEM — G89.29 CHRONIC PAIN OF BOTH SHOULDERS: Status: ACTIVE | Noted: 2024-09-06

## 2024-09-06 ASSESSMENT — ACTIVITIES OF DAILY LIVING (ADL): POOR_BALANCE: 1

## 2024-09-06 ASSESSMENT — ENCOUNTER SYMPTOMS
DEPRESSION: 1
LOSS OF SENSATION IN FEET: 1
PAIN SCALE AT HIGHEST: 10
PAIN SCALE: 8
PAIN SCALE AT LOWEST: 8

## 2024-09-06 ASSESSMENT — PAIN - FUNCTIONAL ASSESSMENT: PAIN_FUNCTIONAL_ASSESSMENT: 0-10

## 2024-09-09 NOTE — PROGRESS NOTES
Humphrey Waddell is a 59 y.o. year-old  male  he is a new patient to our office and presents with a chief complaint of Bilateral shoulder pain.  He has a history of left total shoulder remotely with us.  He has had a lot of medical issues recently including hospital stay.  He has not been able to ambulate with his prosthesis.  He comes in today with a motorized scooter.  He is having considerable bilateral shoulder pain.      Past Medical, Family, and Social History reviewed     Review of Systems  A complete review of systems was conducted, pertinent only to the HPI noted above.    Physical Exam  GEN: Alert and Oriented x 3  Constitutional: Well appearing, in no apparent distress.  Eyes: sclera anicteric  ENT: hearing appropriate for normal conversation, neck appears symmetric with no gross thyromegaly  Pulm: No labored breathing, no wheezing  CVS: Regular rate and rhythm  PSY: normal mood and affect  Skin: No rashes, erythema, or induration around shoulder     Focused Musculoskeletal Exam:     Side: Bilateral shoulder:  PROM:   FE (170)   ER 60 ABER/ABIR: 90/90  AROM:   FE (170)   ER 45 IR T8  Strength:  Supra [5/5] Infra [5/5] Subscap [5/5]  Abd [5/5]    Special Tests  Shoulder  Positive Neer and Arevalo no redness warmth erythema over bilateral shoulder surgical incision is well-healed      ACJ:  AC TTP: [neg]  Cross Arm [neg]  AC prominence [no]      [Sensation intact Ax/median/ulnar/radial distributions  Motor intact Ax/median/radial/ulnar/AIN/PIN    X-rays of the shoulder independently viewed and interpreted: Left total shoulder arthroplasty without evidence of loosening or dislocation right shoulder with mild degenerative findings    The patient history, physical examination and imaging studies are consistent with the diagnosis above.    After a thorough discussion with the patient including expectations, I would recommend we continue a conservative program for now.  We discussed home/formal PT (deltoid isometrics,  RTC strengthening, scapular stabilizers, stretching) and activity modification including avoidance of the positions and activities that provoke symptoms, including athletics.  We also discussed the ice and NSAIDS/tylenol.  All questions were answered he is in agreement with this plan

## 2024-09-16 ENCOUNTER — APPOINTMENT (OUTPATIENT)
Dept: CARDIOLOGY | Facility: HOSPITAL | Age: 59
End: 2024-09-16
Payer: MEDICAID

## 2024-09-16 ENCOUNTER — APPOINTMENT (OUTPATIENT)
Dept: RADIOLOGY | Facility: HOSPITAL | Age: 59
End: 2024-09-16
Payer: MEDICAID

## 2024-09-16 ENCOUNTER — HOSPITAL ENCOUNTER (INPATIENT)
Facility: HOSPITAL | Age: 59
LOS: 1 days | Discharge: ANOTHER HOSPITAL | End: 2024-09-17
Attending: STUDENT IN AN ORGANIZED HEALTH CARE EDUCATION/TRAINING PROGRAM | Admitting: INTERNAL MEDICINE
Payer: MEDICAID

## 2024-09-16 DIAGNOSIS — R06.02 SHORTNESS OF BREATH: ICD-10-CM

## 2024-09-16 DIAGNOSIS — J90 PLEURAL EFFUSION: ICD-10-CM

## 2024-09-16 DIAGNOSIS — G93.40 ACUTE ENCEPHALOPATHY: Primary | ICD-10-CM

## 2024-09-16 LAB
ALBUMIN SERPL-MCNC: 3.1 G/DL (ref 3.5–5)
ALP BLD-CCNC: 91 U/L (ref 35–125)
ALT SERPL-CCNC: 71 U/L (ref 5–40)
ANION GAP SERPL CALC-SCNC: 14 MMOL/L
APPEARANCE UR: ABNORMAL
AST SERPL-CCNC: 17 U/L (ref 5–40)
BASOPHILS # BLD AUTO: 0.05 X10*3/UL (ref 0–0.1)
BASOPHILS NFR BLD AUTO: 0.4 %
BILIRUB SERPL-MCNC: 0.8 MG/DL (ref 0.1–1.2)
BILIRUB UR STRIP.AUTO-MCNC: NEGATIVE MG/DL
BUN SERPL-MCNC: 42 MG/DL (ref 8–25)
CALCIUM SERPL-MCNC: 8.3 MG/DL (ref 8.5–10.4)
CHLORIDE SERPL-SCNC: 103 MMOL/L (ref 97–107)
CO2 SERPL-SCNC: 28 MMOL/L (ref 24–31)
COLOR UR: YELLOW
CREAT SERPL-MCNC: 3.3 MG/DL (ref 0.4–1.6)
EGFRCR SERPLBLD CKD-EPI 2021: 20 ML/MIN/1.73M*2
EOSINOPHIL # BLD AUTO: 0.22 X10*3/UL (ref 0–0.7)
EOSINOPHIL NFR BLD AUTO: 1.8 %
ERYTHROCYTE [DISTWIDTH] IN BLOOD BY AUTOMATED COUNT: 16.2 % (ref 11.5–14.5)
FLUAV RNA RESP QL NAA+PROBE: NOT DETECTED
FLUBV RNA RESP QL NAA+PROBE: NOT DETECTED
GLUCOSE SERPL-MCNC: 119 MG/DL (ref 65–99)
GLUCOSE UR STRIP.AUTO-MCNC: ABNORMAL MG/DL
HCT VFR BLD AUTO: 27.5 % (ref 41–52)
HGB BLD-MCNC: 8.1 G/DL (ref 13.5–17.5)
IMM GRANULOCYTES # BLD AUTO: 0.05 X10*3/UL (ref 0–0.7)
IMM GRANULOCYTES NFR BLD AUTO: 0.4 % (ref 0–0.9)
KETONES UR STRIP.AUTO-MCNC: ABNORMAL MG/DL
LEUKOCYTE ESTERASE UR QL STRIP.AUTO: NEGATIVE
LYMPHOCYTES # BLD AUTO: 1.04 X10*3/UL (ref 1.2–4.8)
LYMPHOCYTES NFR BLD AUTO: 8.7 %
MAGNESIUM SERPL-MCNC: 1.8 MG/DL (ref 1.6–3.1)
MCH RBC QN AUTO: 26.4 PG (ref 26–34)
MCHC RBC AUTO-ENTMCNC: 29.5 G/DL (ref 32–36)
MCV RBC AUTO: 90 FL (ref 80–100)
MONOCYTES # BLD AUTO: 1.01 X10*3/UL (ref 0.1–1)
MONOCYTES NFR BLD AUTO: 8.5 %
NEUTROPHILS # BLD AUTO: 9.57 X10*3/UL (ref 1.2–7.7)
NEUTROPHILS NFR BLD AUTO: 80.2 %
NITRITE UR QL STRIP.AUTO: NEGATIVE
NRBC BLD-RTO: 0 /100 WBCS (ref 0–0)
PH UR STRIP.AUTO: 6 [PH]
PLATELET # BLD AUTO: 348 X10*3/UL (ref 150–450)
POTASSIUM SERPL-SCNC: 4 MMOL/L (ref 3.4–5.1)
PROT SERPL-MCNC: 7.8 G/DL (ref 5.9–7.9)
PROT UR STRIP.AUTO-MCNC: ABNORMAL MG/DL
RBC # BLD AUTO: 3.07 X10*6/UL (ref 4.5–5.9)
RBC # UR STRIP.AUTO: ABNORMAL /UL
RBC #/AREA URNS AUTO: NORMAL /HPF
SARS-COV-2 RNA RESP QL NAA+PROBE: NOT DETECTED
SODIUM SERPL-SCNC: 145 MMOL/L (ref 133–145)
SP GR UR STRIP.AUTO: 1.01
SQUAMOUS #/AREA URNS AUTO: NORMAL /HPF
UROBILINOGEN UR STRIP.AUTO-MCNC: NORMAL MG/DL
WBC # BLD AUTO: 11.9 X10*3/UL (ref 4.4–11.3)
WBC #/AREA URNS AUTO: NORMAL /HPF

## 2024-09-16 PROCEDURE — 83735 ASSAY OF MAGNESIUM: CPT | Performed by: STUDENT IN AN ORGANIZED HEALTH CARE EDUCATION/TRAINING PROGRAM

## 2024-09-16 PROCEDURE — 71045 X-RAY EXAM CHEST 1 VIEW: CPT | Performed by: RADIOLOGY

## 2024-09-16 PROCEDURE — 2500000004 HC RX 250 GENERAL PHARMACY W/ HCPCS (ALT 636 FOR OP/ED): Performed by: STUDENT IN AN ORGANIZED HEALTH CARE EDUCATION/TRAINING PROGRAM

## 2024-09-16 PROCEDURE — 2500000002 HC RX 250 W HCPCS SELF ADMINISTERED DRUGS (ALT 637 FOR MEDICARE OP, ALT 636 FOR OP/ED): Performed by: STUDENT IN AN ORGANIZED HEALTH CARE EDUCATION/TRAINING PROGRAM

## 2024-09-16 PROCEDURE — 84132 ASSAY OF SERUM POTASSIUM: CPT | Performed by: STUDENT IN AN ORGANIZED HEALTH CARE EDUCATION/TRAINING PROGRAM

## 2024-09-16 PROCEDURE — 70450 CT HEAD/BRAIN W/O DYE: CPT | Performed by: RADIOLOGY

## 2024-09-16 PROCEDURE — 85025 COMPLETE CBC W/AUTO DIFF WBC: CPT | Performed by: STUDENT IN AN ORGANIZED HEALTH CARE EDUCATION/TRAINING PROGRAM

## 2024-09-16 PROCEDURE — 96375 TX/PRO/DX INJ NEW DRUG ADDON: CPT | Mod: 59

## 2024-09-16 PROCEDURE — 81001 URINALYSIS AUTO W/SCOPE: CPT | Performed by: STUDENT IN AN ORGANIZED HEALTH CARE EDUCATION/TRAINING PROGRAM

## 2024-09-16 PROCEDURE — 99285 EMERGENCY DEPT VISIT HI MDM: CPT

## 2024-09-16 PROCEDURE — 87636 SARSCOV2 & INF A&B AMP PRB: CPT | Performed by: STUDENT IN AN ORGANIZED HEALTH CARE EDUCATION/TRAINING PROGRAM

## 2024-09-16 PROCEDURE — 71045 X-RAY EXAM CHEST 1 VIEW: CPT

## 2024-09-16 PROCEDURE — 36415 COLL VENOUS BLD VENIPUNCTURE: CPT | Performed by: STUDENT IN AN ORGANIZED HEALTH CARE EDUCATION/TRAINING PROGRAM

## 2024-09-16 PROCEDURE — 70450 CT HEAD/BRAIN W/O DYE: CPT

## 2024-09-16 PROCEDURE — 94640 AIRWAY INHALATION TREATMENT: CPT

## 2024-09-16 PROCEDURE — 93005 ELECTROCARDIOGRAM TRACING: CPT

## 2024-09-16 PROCEDURE — 80053 COMPREHEN METABOLIC PANEL: CPT | Performed by: STUDENT IN AN ORGANIZED HEALTH CARE EDUCATION/TRAINING PROGRAM

## 2024-09-16 PROCEDURE — 5A09357 ASSISTANCE WITH RESPIRATORY VENTILATION, LESS THAN 24 CONSECUTIVE HOURS, CONTINUOUS POSITIVE AIRWAY PRESSURE: ICD-10-PCS | Performed by: INTERNAL MEDICINE

## 2024-09-16 PROCEDURE — 84484 ASSAY OF TROPONIN QUANT: CPT | Performed by: STUDENT IN AN ORGANIZED HEALTH CARE EDUCATION/TRAINING PROGRAM

## 2024-09-16 PROCEDURE — 51702 INSERT TEMP BLADDER CATH: CPT

## 2024-09-16 PROCEDURE — 83880 ASSAY OF NATRIURETIC PEPTIDE: CPT | Performed by: STUDENT IN AN ORGANIZED HEALTH CARE EDUCATION/TRAINING PROGRAM

## 2024-09-16 PROCEDURE — 36600 WITHDRAWAL OF ARTERIAL BLOOD: CPT

## 2024-09-16 RX ORDER — FUROSEMIDE 10 MG/ML
60 INJECTION INTRAMUSCULAR; INTRAVENOUS ONCE
Status: COMPLETED | OUTPATIENT
Start: 2024-09-16 | End: 2024-09-16

## 2024-09-16 RX ORDER — IPRATROPIUM BROMIDE AND ALBUTEROL SULFATE 2.5; .5 MG/3ML; MG/3ML
3 SOLUTION RESPIRATORY (INHALATION) ONCE
Status: COMPLETED | OUTPATIENT
Start: 2024-09-16 | End: 2024-09-16

## 2024-09-16 RX ORDER — DEXAMETHASONE SODIUM PHOSPHATE 10 MG/ML
10 INJECTION INTRAMUSCULAR; INTRAVENOUS ONCE
Status: COMPLETED | OUTPATIENT
Start: 2024-09-16 | End: 2024-09-16

## 2024-09-16 ASSESSMENT — PAIN - FUNCTIONAL ASSESSMENT: PAIN_FUNCTIONAL_ASSESSMENT: 0-10

## 2024-09-16 ASSESSMENT — PAIN SCALES - GENERAL: PAINLEVEL_OUTOF10: 0 - NO PAIN

## 2024-09-17 ENCOUNTER — APPOINTMENT (OUTPATIENT)
Dept: RADIOLOGY | Facility: HOSPITAL | Age: 59
End: 2024-09-17
Payer: MEDICAID

## 2024-09-17 VITALS
HEART RATE: 100 BPM | RESPIRATION RATE: 16 BRPM | BODY MASS INDEX: 38.49 KG/M2 | WEIGHT: 284.2 LBS | OXYGEN SATURATION: 95 % | DIASTOLIC BLOOD PRESSURE: 80 MMHG | TEMPERATURE: 99.1 F | SYSTOLIC BLOOD PRESSURE: 180 MMHG | HEIGHT: 72 IN

## 2024-09-17 PROBLEM — G93.40 ACUTE ENCEPHALOPATHY: Status: ACTIVE | Noted: 2024-09-17

## 2024-09-17 LAB
ALBUMIN SERPL BCP-MCNC: 3.1 G/DL (ref 3.4–5)
ALP SERPL-CCNC: 72 U/L (ref 33–136)
ALT SERPL W P-5'-P-CCNC: 55 U/L (ref 10–52)
AMMONIA PLAS-SCNC: 22 UMOL/L (ref 16–53)
ANION GAP BLDA CALCULATED.4IONS-SCNC: 5 MMO/L (ref 10–25)
ANION GAP BLDA CALCULATED.4IONS-SCNC: 8 MMO/L (ref 10–25)
ANION GAP SERPL CALCULATED.3IONS-SCNC: 18 MMOL/L (ref 10–20)
APPARATUS: ABNORMAL
ARTERIAL PATENCY WRIST A: POSITIVE
AST SERPL W P-5'-P-CCNC: 15 U/L (ref 9–39)
BASE EXCESS BLDA CALC-SCNC: 10.3 MMOL/L (ref -2–3)
BASE EXCESS BLDA CALC-SCNC: 5.1 MMOL/L (ref -2–3)
BASE EXCESS BLDA CALC-SCNC: 5.8 MMOL/L (ref -2–3)
BASE EXCESS BLDA CALC-SCNC: 6.1 MMOL/L (ref -2–3)
BILIRUB SERPL-MCNC: 0.5 MG/DL (ref 0–1.2)
BODY TEMPERATURE: 37 DEGREES CELSIUS
BUN SERPL-MCNC: 46 MG/DL (ref 6–23)
CA-I BLDA-SCNC: 1.11 MMOL/L (ref 1.1–1.33)
CA-I BLDA-SCNC: 1.11 MMOL/L (ref 1.1–1.33)
CA-I BLDA-SCNC: 1.12 MMOL/L (ref 1.1–1.33)
CA-I BLDA-SCNC: 1.13 MMOL/L (ref 1.1–1.33)
CALCIUM SERPL-MCNC: 8.2 MG/DL (ref 8.6–10.3)
CHLORIDE BLDA-SCNC: 108 MMOL/L (ref 98–107)
CHLORIDE BLDA-SCNC: 109 MMOL/L (ref 98–107)
CHLORIDE SERPL-SCNC: 102 MMOL/L (ref 98–107)
CO2 SERPL-SCNC: 27 MMOL/L (ref 21–32)
CREAT SERPL-MCNC: 3.32 MG/DL (ref 0.5–1.3)
EGFRCR SERPLBLD CKD-EPI 2021: 20 ML/MIN/1.73M*2
EPAP CMH2O: 8 CM H2O
EPAP CMH2O: 8 CM H2O
GLUCOSE BLD MANUAL STRIP-MCNC: 127 MG/DL (ref 74–99)
GLUCOSE BLD MANUAL STRIP-MCNC: 130 MG/DL (ref 74–99)
GLUCOSE BLD MANUAL STRIP-MCNC: 137 MG/DL (ref 74–99)
GLUCOSE BLD MANUAL STRIP-MCNC: 139 MG/DL (ref 74–99)
GLUCOSE BLDA-MCNC: 113 MG/DL (ref 74–99)
GLUCOSE BLDA-MCNC: 136 MG/DL (ref 74–99)
GLUCOSE BLDA-MCNC: 146 MG/DL (ref 74–99)
GLUCOSE BLDA-MCNC: 150 MG/DL (ref 74–99)
GLUCOSE SERPL-MCNC: 153 MG/DL (ref 74–99)
HCO3 BLDA-SCNC: 30.4 MMOL/L (ref 22–26)
HCO3 BLDA-SCNC: 30.6 MMOL/L (ref 22–26)
HCO3 BLDA-SCNC: 31.5 MMOL/L (ref 22–26)
HCO3 BLDA-SCNC: 35.8 MMOL/L (ref 22–26)
HCT VFR BLD EST: 23 % (ref 41–52)
HCT VFR BLD EST: 24 % (ref 41–52)
HCT VFR BLD EST: 25 % (ref 41–52)
HCT VFR BLD EST: 30 % (ref 41–52)
HGB BLDA-MCNC: 10 G/DL (ref 13.5–17.5)
HGB BLDA-MCNC: 7.8 G/DL (ref 13.5–17.5)
HGB BLDA-MCNC: 8 G/DL (ref 13.5–17.5)
HGB BLDA-MCNC: 8.3 G/DL (ref 13.5–17.5)
INHALED O2 CONCENTRATION: 40 %
INHALED O2 CONCENTRATION: 40 %
INHALED O2 CONCENTRATION: 44 %
INHALED O2 CONCENTRATION: 44 %
IPAP CMH2O: 18 CM H2O
IPAP CMH2O: 18 CM H2O
LACTATE BLDA-SCNC: 0.7 MMOL/L (ref 0.4–2)
LACTATE BLDA-SCNC: 0.7 MMOL/L (ref 0.4–2)
LACTATE BLDA-SCNC: 0.8 MMOL/L (ref 0.4–2)
LACTATE BLDA-SCNC: 0.8 MMOL/L (ref 0.4–2)
NT-PROBNP SERPL-MCNC: ABNORMAL PG/ML (ref 0–177)
OXYHGB MFR BLDA: 91.6 % (ref 94–98)
OXYHGB MFR BLDA: 94.3 % (ref 94–98)
OXYHGB MFR BLDA: 96.5 % (ref 94–98)
OXYHGB MFR BLDA: 96.9 % (ref 94–98)
PCO2 BLDA: 43 MM HG (ref 38–42)
PCO2 BLDA: 48 MM HG (ref 38–42)
PCO2 BLDA: 52 MM HG (ref 38–42)
PCO2 BLDA: 54 MM HG (ref 38–42)
PH BLDA: 7.39 PH (ref 7.38–7.42)
PH BLDA: 7.41 PH (ref 7.38–7.42)
PH BLDA: 7.43 PH (ref 7.38–7.42)
PH BLDA: 7.46 PH (ref 7.38–7.42)
PO2 BLDA: 119 MM HG (ref 85–95)
PO2 BLDA: 71 MM HG (ref 85–95)
PO2 BLDA: 81 MM HG (ref 85–95)
PO2 BLDA: 91 MM HG (ref 85–95)
POTASSIUM BLDA-SCNC: 3.7 MMOL/L (ref 3.5–5.3)
POTASSIUM BLDA-SCNC: 3.7 MMOL/L (ref 3.5–5.3)
POTASSIUM BLDA-SCNC: 3.9 MMOL/L (ref 3.5–5.3)
POTASSIUM BLDA-SCNC: 4 MMOL/L (ref 3.5–5.3)
POTASSIUM SERPL-SCNC: 3.8 MMOL/L (ref 3.5–5.3)
PROT SERPL-MCNC: 7 G/DL (ref 6.4–8.2)
SAO2 % BLDA: 93 % (ref 94–100)
SAO2 % BLDA: 95 % (ref 94–100)
SAO2 % BLDA: 97 % (ref 94–100)
SAO2 % BLDA: 98 % (ref 94–100)
SODIUM BLDA-SCNC: 140 MMOL/L (ref 136–145)
SODIUM BLDA-SCNC: 142 MMOL/L (ref 136–145)
SODIUM BLDA-SCNC: 142 MMOL/L (ref 136–145)
SODIUM BLDA-SCNC: 145 MMOL/L (ref 136–145)
SODIUM SERPL-SCNC: 143 MMOL/L (ref 136–145)
SPECIMEN DRAWN FROM PATIENT: ABNORMAL
TROPONIN T SERPL-MCNC: 212 NG/L
TROPONIN T SERPL-MCNC: 213 NG/L
VENTILATOR RATE: 16 BPM

## 2024-09-17 PROCEDURE — 84132 ASSAY OF SERUM POTASSIUM: CPT | Performed by: INTERNAL MEDICINE

## 2024-09-17 PROCEDURE — 82947 ASSAY GLUCOSE BLOOD QUANT: CPT

## 2024-09-17 PROCEDURE — 74176 CT ABD & PELVIS W/O CONTRAST: CPT | Mod: FOREIGN READ | Performed by: RADIOLOGY

## 2024-09-17 PROCEDURE — 84484 ASSAY OF TROPONIN QUANT: CPT | Performed by: STUDENT IN AN ORGANIZED HEALTH CARE EDUCATION/TRAINING PROGRAM

## 2024-09-17 PROCEDURE — 2500000004 HC RX 250 GENERAL PHARMACY W/ HCPCS (ALT 636 FOR OP/ED): Performed by: STUDENT IN AN ORGANIZED HEALTH CARE EDUCATION/TRAINING PROGRAM

## 2024-09-17 PROCEDURE — 36600 WITHDRAWAL OF ARTERIAL BLOOD: CPT

## 2024-09-17 PROCEDURE — 99254 IP/OBS CNSLTJ NEW/EST MOD 60: CPT | Performed by: NURSE PRACTITIONER

## 2024-09-17 PROCEDURE — 36415 COLL VENOUS BLD VENIPUNCTURE: CPT | Performed by: STUDENT IN AN ORGANIZED HEALTH CARE EDUCATION/TRAINING PROGRAM

## 2024-09-17 PROCEDURE — 2500000005 HC RX 250 GENERAL PHARMACY W/O HCPCS: Performed by: STUDENT IN AN ORGANIZED HEALTH CARE EDUCATION/TRAINING PROGRAM

## 2024-09-17 PROCEDURE — 82140 ASSAY OF AMMONIA: CPT | Performed by: INTERNAL MEDICINE

## 2024-09-17 PROCEDURE — 94660 CPAP INITIATION&MGMT: CPT

## 2024-09-17 PROCEDURE — 71250 CT THORAX DX C-: CPT | Mod: FOREIGN READ | Performed by: RADIOLOGY

## 2024-09-17 PROCEDURE — 94762 N-INVAS EAR/PLS OXIMTRY CONT: CPT

## 2024-09-17 PROCEDURE — 2500000005 HC RX 250 GENERAL PHARMACY W/O HCPCS: Performed by: INTERNAL MEDICINE

## 2024-09-17 PROCEDURE — 2500000004 HC RX 250 GENERAL PHARMACY W/ HCPCS (ALT 636 FOR OP/ED): Performed by: INTERNAL MEDICINE

## 2024-09-17 PROCEDURE — 82435 ASSAY OF BLOOD CHLORIDE: CPT | Performed by: INTERNAL MEDICINE

## 2024-09-17 PROCEDURE — 71045 X-RAY EXAM CHEST 1 VIEW: CPT

## 2024-09-17 PROCEDURE — 2500000002 HC RX 250 W HCPCS SELF ADMINISTERED DRUGS (ALT 637 FOR MEDICARE OP, ALT 636 FOR OP/ED): Performed by: INTERNAL MEDICINE

## 2024-09-17 PROCEDURE — P9047 ALBUMIN (HUMAN), 25%, 50ML: HCPCS | Performed by: INTERNAL MEDICINE

## 2024-09-17 PROCEDURE — 94640 AIRWAY INHALATION TREATMENT: CPT

## 2024-09-17 PROCEDURE — 71250 CT THORAX DX C-: CPT

## 2024-09-17 PROCEDURE — 9420000001 HC RT PATIENT EDUCATION 5 MIN

## 2024-09-17 PROCEDURE — 2020000001 HC ICU ROOM DAILY

## 2024-09-17 PROCEDURE — 94664 DEMO&/EVAL PT USE INHALER: CPT

## 2024-09-17 PROCEDURE — 71045 X-RAY EXAM CHEST 1 VIEW: CPT | Performed by: RADIOLOGY

## 2024-09-17 PROCEDURE — 99236 HOSP IP/OBS SAME DATE HI 85: CPT | Performed by: INTERNAL MEDICINE

## 2024-09-17 PROCEDURE — 94760 N-INVAS EAR/PLS OXIMETRY 1: CPT

## 2024-09-17 PROCEDURE — 2500000001 HC RX 250 WO HCPCS SELF ADMINISTERED DRUGS (ALT 637 FOR MEDICARE OP): Performed by: INTERNAL MEDICINE

## 2024-09-17 PROCEDURE — 36415 COLL VENOUS BLD VENIPUNCTURE: CPT | Performed by: INTERNAL MEDICINE

## 2024-09-17 PROCEDURE — 96365 THER/PROPH/DIAG IV INF INIT: CPT | Mod: 59

## 2024-09-17 PROCEDURE — 84145 PROCALCITONIN (PCT): CPT | Mod: TRILAB,WESLAB | Performed by: INTERNAL MEDICINE

## 2024-09-17 PROCEDURE — 2500000002 HC RX 250 W HCPCS SELF ADMINISTERED DRUGS (ALT 637 FOR MEDICARE OP, ALT 636 FOR OP/ED): Performed by: NURSE PRACTITIONER

## 2024-09-17 RX ORDER — INSULIN LISPRO 100 [IU]/ML
0-10 INJECTION, SOLUTION INTRAVENOUS; SUBCUTANEOUS
Status: DISCONTINUED | OUTPATIENT
Start: 2024-09-17 | End: 2024-09-18 | Stop reason: HOSPADM

## 2024-09-17 RX ORDER — NIFEDIPINE 60 MG/1
60 TABLET, FILM COATED, EXTENDED RELEASE ORAL
Status: DISCONTINUED | OUTPATIENT
Start: 2024-09-18 | End: 2024-09-17

## 2024-09-17 RX ORDER — FUROSEMIDE 10 MG/ML
40 INJECTION INTRAMUSCULAR; INTRAVENOUS 2 TIMES DAILY
Status: DISCONTINUED | OUTPATIENT
Start: 2024-09-17 | End: 2024-09-18 | Stop reason: HOSPADM

## 2024-09-17 RX ORDER — ISOSORBIDE DINITRATE 10 MG/1
20 TABLET ORAL
Status: DISCONTINUED | OUTPATIENT
Start: 2024-09-17 | End: 2024-09-18 | Stop reason: HOSPADM

## 2024-09-17 RX ORDER — LANOLIN ALCOHOL/MO/W.PET/CERES
400 CREAM (GRAM) TOPICAL 2 TIMES DAILY
Status: DISCONTINUED | OUTPATIENT
Start: 2024-09-17 | End: 2024-09-18 | Stop reason: HOSPADM

## 2024-09-17 RX ORDER — AMLODIPINE BESYLATE 10 MG/1
10 TABLET ORAL DAILY
Status: DISCONTINUED | OUTPATIENT
Start: 2024-09-17 | End: 2024-09-18 | Stop reason: HOSPADM

## 2024-09-17 RX ORDER — ENOXAPARIN SODIUM 100 MG/ML
40 INJECTION SUBCUTANEOUS DAILY
Status: DISCONTINUED | OUTPATIENT
Start: 2024-09-17 | End: 2024-09-18 | Stop reason: HOSPADM

## 2024-09-17 RX ORDER — TRAZODONE HYDROCHLORIDE 50 MG/1
50 TABLET ORAL NIGHTLY
Status: DISCONTINUED | OUTPATIENT
Start: 2024-09-17 | End: 2024-09-18 | Stop reason: HOSPADM

## 2024-09-17 RX ORDER — HYDRALAZINE HYDROCHLORIDE 50 MG/1
50 TABLET, FILM COATED ORAL 3 TIMES DAILY
Status: DISCONTINUED | OUTPATIENT
Start: 2024-09-17 | End: 2024-09-18 | Stop reason: HOSPADM

## 2024-09-17 RX ORDER — TALC
3 POWDER (GRAM) TOPICAL NIGHTLY
Status: DISCONTINUED | OUTPATIENT
Start: 2024-09-17 | End: 2024-09-18 | Stop reason: HOSPADM

## 2024-09-17 RX ORDER — ALBUTEROL SULFATE 0.83 MG/ML
2.5 SOLUTION RESPIRATORY (INHALATION) EVERY 4 HOURS PRN
Status: DISCONTINUED | OUTPATIENT
Start: 2024-09-17 | End: 2024-09-18 | Stop reason: HOSPADM

## 2024-09-17 RX ORDER — ALBUMIN HUMAN 250 G/1000ML
25 SOLUTION INTRAVENOUS 2 TIMES DAILY
Status: DISCONTINUED | OUTPATIENT
Start: 2024-09-17 | End: 2024-09-17

## 2024-09-17 RX ORDER — FUROSEMIDE 10 MG/ML
40 INJECTION INTRAMUSCULAR; INTRAVENOUS 2 TIMES DAILY
Status: DISCONTINUED | OUTPATIENT
Start: 2024-09-17 | End: 2024-09-17

## 2024-09-17 RX ORDER — GABAPENTIN 300 MG/1
300 CAPSULE ORAL 3 TIMES DAILY
Status: DISCONTINUED | OUTPATIENT
Start: 2024-09-17 | End: 2024-09-18 | Stop reason: HOSPADM

## 2024-09-17 RX ORDER — FLUDROCORTISONE ACETATE 0.1 MG/1
0.1 TABLET ORAL DAILY
Status: DISCONTINUED | OUTPATIENT
Start: 2024-09-17 | End: 2024-09-17

## 2024-09-17 RX ORDER — PANTOPRAZOLE SODIUM 40 MG/1
40 TABLET, DELAYED RELEASE ORAL
Status: DISCONTINUED | OUTPATIENT
Start: 2024-09-18 | End: 2024-09-18 | Stop reason: HOSPADM

## 2024-09-17 RX ORDER — ATORVASTATIN CALCIUM 80 MG/1
80 TABLET, FILM COATED ORAL NIGHTLY
Status: DISCONTINUED | OUTPATIENT
Start: 2024-09-17 | End: 2024-09-18 | Stop reason: HOSPADM

## 2024-09-17 RX ORDER — CLONIDINE 0.1 MG/24H
1 PATCH, EXTENDED RELEASE TRANSDERMAL WEEKLY
Status: DISCONTINUED | OUTPATIENT
Start: 2024-09-17 | End: 2024-09-18 | Stop reason: HOSPADM

## 2024-09-17 RX ORDER — IPRATROPIUM BROMIDE AND ALBUTEROL SULFATE 2.5; .5 MG/3ML; MG/3ML
3 SOLUTION RESPIRATORY (INHALATION) EVERY 8 HOURS
Status: DISCONTINUED | OUTPATIENT
Start: 2024-09-17 | End: 2024-09-17

## 2024-09-17 RX ORDER — CEFTRIAXONE 1 G/50ML
1 INJECTION, SOLUTION INTRAVENOUS ONCE
Status: COMPLETED | OUTPATIENT
Start: 2024-09-17 | End: 2024-09-17

## 2024-09-17 RX ORDER — VENLAFAXINE HYDROCHLORIDE 150 MG/1
150 CAPSULE, EXTENDED RELEASE ORAL DAILY
Status: DISCONTINUED | OUTPATIENT
Start: 2024-09-17 | End: 2024-09-18 | Stop reason: HOSPADM

## 2024-09-17 RX ORDER — HYDRALAZINE HYDROCHLORIDE 20 MG/ML
10 INJECTION INTRAMUSCULAR; INTRAVENOUS EVERY 8 HOURS PRN
Status: DISCONTINUED | OUTPATIENT
Start: 2024-09-17 | End: 2024-09-18 | Stop reason: HOSPADM

## 2024-09-17 RX ORDER — METOPROLOL TARTRATE 50 MG/1
50 TABLET ORAL 2 TIMES DAILY
Status: DISCONTINUED | OUTPATIENT
Start: 2024-09-17 | End: 2024-09-18 | Stop reason: HOSPADM

## 2024-09-17 RX ORDER — INSULIN GLARGINE 100 [IU]/ML
2 INJECTION, SOLUTION SUBCUTANEOUS NIGHTLY
Status: DISCONTINUED | OUTPATIENT
Start: 2024-09-17 | End: 2024-09-18 | Stop reason: HOSPADM

## 2024-09-17 RX ORDER — SODIUM BICARBONATE 650 MG/1
1300 TABLET ORAL 2 TIMES DAILY
Status: DISCONTINUED | OUTPATIENT
Start: 2024-09-17 | End: 2024-09-18 | Stop reason: HOSPADM

## 2024-09-17 RX ORDER — POLYETHYLENE GLYCOL 3350 17 G/17G
17 POWDER, FOR SOLUTION ORAL DAILY
Status: DISCONTINUED | OUTPATIENT
Start: 2024-09-17 | End: 2024-09-18 | Stop reason: HOSPADM

## 2024-09-17 RX ORDER — IPRATROPIUM BROMIDE AND ALBUTEROL SULFATE 2.5; .5 MG/3ML; MG/3ML
3 SOLUTION RESPIRATORY (INHALATION)
Status: DISCONTINUED | OUTPATIENT
Start: 2024-09-17 | End: 2024-09-18 | Stop reason: HOSPADM

## 2024-09-17 RX ORDER — NAPROXEN SODIUM 220 MG/1
81 TABLET, FILM COATED ORAL DAILY
Status: DISCONTINUED | OUTPATIENT
Start: 2024-09-17 | End: 2024-09-18 | Stop reason: HOSPADM

## 2024-09-17 RX ORDER — POLYETHYLENE GLYCOL 3350 17 G/17G
17 POWDER, FOR SOLUTION ORAL EVERY 12 HOURS PRN
Status: DISCONTINUED | OUTPATIENT
Start: 2024-09-17 | End: 2024-09-18 | Stop reason: HOSPADM

## 2024-09-17 SDOH — ECONOMIC STABILITY: INCOME INSECURITY: IN THE PAST 12 MONTHS, HAS THE ELECTRIC, GAS, OIL, OR WATER COMPANY THREATENED TO SHUT OFF SERVICE IN YOUR HOME?: NO

## 2024-09-17 SDOH — SOCIAL STABILITY: SOCIAL INSECURITY: HAS ANYONE EVER THREATENED TO HURT YOUR FAMILY OR YOUR PETS?: UNABLE TO ASSESS

## 2024-09-17 SDOH — ECONOMIC STABILITY: INCOME INSECURITY: IN THE LAST 12 MONTHS, WAS THERE A TIME WHEN YOU WERE NOT ABLE TO PAY THE MORTGAGE OR RENT ON TIME?: NO

## 2024-09-17 SDOH — SOCIAL STABILITY: SOCIAL INSECURITY: HAVE YOU HAD THOUGHTS OF HARMING ANYONE ELSE?: UNABLE TO ASSESS

## 2024-09-17 SDOH — ECONOMIC STABILITY: FOOD INSECURITY: WITHIN THE PAST 12 MONTHS, YOU WORRIED THAT YOUR FOOD WOULD RUN OUT BEFORE YOU GOT MONEY TO BUY MORE.: NEVER TRUE

## 2024-09-17 SDOH — SOCIAL STABILITY: SOCIAL INSECURITY: DO YOU FEEL UNSAFE GOING BACK TO THE PLACE WHERE YOU ARE LIVING?: UNABLE TO ASSESS

## 2024-09-17 SDOH — SOCIAL STABILITY: SOCIAL INSECURITY: WITHIN THE LAST YEAR, HAVE YOU BEEN HUMILIATED OR EMOTIONALLY ABUSED IN OTHER WAYS BY YOUR PARTNER OR EX-PARTNER?: NO

## 2024-09-17 SDOH — ECONOMIC STABILITY: FOOD INSECURITY: WITHIN THE PAST 12 MONTHS, THE FOOD YOU BOUGHT JUST DIDN'T LAST AND YOU DIDN'T HAVE MONEY TO GET MORE.: NEVER TRUE

## 2024-09-17 SDOH — SOCIAL STABILITY: SOCIAL INSECURITY: HAVE YOU HAD ANY THOUGHTS OF HARMING ANYONE ELSE?: UNABLE TO ASSESS

## 2024-09-17 SDOH — SOCIAL STABILITY: SOCIAL INSECURITY: ARE YOU OR HAVE YOU BEEN THREATENED OR ABUSED PHYSICALLY, EMOTIONALLY, OR SEXUALLY BY ANYONE?: UNABLE TO ASSESS

## 2024-09-17 SDOH — SOCIAL STABILITY: SOCIAL INSECURITY: ARE THERE ANY APPARENT SIGNS OF INJURIES/BEHAVIORS THAT COULD BE RELATED TO ABUSE/NEGLECT?: UNABLE TO ASSESS

## 2024-09-17 SDOH — SOCIAL STABILITY: SOCIAL INSECURITY: WITHIN THE LAST YEAR, HAVE YOU BEEN AFRAID OF YOUR PARTNER OR EX-PARTNER?: NO

## 2024-09-17 SDOH — ECONOMIC STABILITY: INCOME INSECURITY: HOW HARD IS IT FOR YOU TO PAY FOR THE VERY BASICS LIKE FOOD, HOUSING, MEDICAL CARE, AND HEATING?: NOT HARD AT ALL

## 2024-09-17 SDOH — ECONOMIC STABILITY: HOUSING INSECURITY: IN THE PAST 12 MONTHS, HOW MANY TIMES HAVE YOU MOVED WHERE YOU WERE LIVING?: 0

## 2024-09-17 SDOH — ECONOMIC STABILITY: HOUSING INSECURITY: AT ANY TIME IN THE PAST 12 MONTHS, WERE YOU HOMELESS OR LIVING IN A SHELTER (INCLUDING NOW)?: NO

## 2024-09-17 SDOH — SOCIAL STABILITY: SOCIAL INSECURITY: ABUSE: ADULT

## 2024-09-17 SDOH — SOCIAL STABILITY: SOCIAL INSECURITY: DO YOU FEEL ANYONE HAS EXPLOITED OR TAKEN ADVANTAGE OF YOU FINANCIALLY OR OF YOUR PERSONAL PROPERTY?: UNABLE TO ASSESS

## 2024-09-17 SDOH — SOCIAL STABILITY: SOCIAL INSECURITY: DOES ANYONE TRY TO KEEP YOU FROM HAVING/CONTACTING OTHER FRIENDS OR DOING THINGS OUTSIDE YOUR HOME?: UNABLE TO ASSESS

## 2024-09-17 ASSESSMENT — LIFESTYLE VARIABLES
HOW MANY STANDARD DRINKS CONTAINING ALCOHOL DO YOU HAVE ON A TYPICAL DAY: PATIENT UNABLE TO ANSWER
AUDIT-C TOTAL SCORE: -1
HOW OFTEN DO YOU HAVE 6 OR MORE DRINKS ON ONE OCCASION: PATIENT UNABLE TO ANSWER
HOW OFTEN DO YOU HAVE A DRINK CONTAINING ALCOHOL: PATIENT UNABLE TO ANSWER
AUDIT-C TOTAL SCORE: -1
SKIP TO QUESTIONS 9-10: 0

## 2024-09-17 ASSESSMENT — COGNITIVE AND FUNCTIONAL STATUS - GENERAL
HELP NEEDED FOR BATHING: TOTAL
MOVING TO AND FROM BED TO CHAIR: TOTAL
TURNING FROM BACK TO SIDE WHILE IN FLAT BAD: TOTAL
MOVING TO AND FROM BED TO CHAIR: TOTAL
DRESSING REGULAR LOWER BODY CLOTHING: TOTAL
WALKING IN HOSPITAL ROOM: TOTAL
PERSONAL GROOMING: TOTAL
EATING MEALS: TOTAL
MOVING FROM LYING ON BACK TO SITTING ON SIDE OF FLAT BED WITH BEDRAILS: TOTAL
DRESSING REGULAR UPPER BODY CLOTHING: TOTAL
STANDING UP FROM CHAIR USING ARMS: TOTAL
DAILY ACTIVITIY SCORE: 6
MOVING FROM LYING ON BACK TO SITTING ON SIDE OF FLAT BED WITH BEDRAILS: TOTAL
WALKING IN HOSPITAL ROOM: TOTAL
CLIMB 3 TO 5 STEPS WITH RAILING: TOTAL
DRESSING REGULAR UPPER BODY CLOTHING: TOTAL
EATING MEALS: TOTAL
DAILY ACTIVITIY SCORE: 6
MOBILITY SCORE: 6
HELP NEEDED FOR BATHING: TOTAL
TOILETING: TOTAL
PERSONAL GROOMING: TOTAL
CLIMB 3 TO 5 STEPS WITH RAILING: TOTAL
DRESSING REGULAR LOWER BODY CLOTHING: TOTAL
TOILETING: TOTAL
PATIENT BASELINE BEDBOUND: NO
MOBILITY SCORE: 6
STANDING UP FROM CHAIR USING ARMS: TOTAL
TURNING FROM BACK TO SIDE WHILE IN FLAT BAD: TOTAL

## 2024-09-17 ASSESSMENT — ACTIVITIES OF DAILY LIVING (ADL)
LACK_OF_TRANSPORTATION: NO
WALKS IN HOME: UNABLE TO ASSESS
HEARING - RIGHT EAR: UNABLE TO ASSESS
ADEQUATE_TO_COMPLETE_ADL: YES
JUDGMENT_ADEQUATE_SAFELY_COMPLETE_DAILY_ACTIVITIES: UNABLE TO ASSESS
HEARING - LEFT EAR: UNABLE TO ASSESS
GROOMING: UNABLE TO ASSESS
PATIENT'S MEMORY ADEQUATE TO SAFELY COMPLETE DAILY ACTIVITIES?: UNABLE TO ASSESS
ASSISTIVE_DEVICE: OTHER (COMMENT)
DRESSING YOURSELF: UNABLE TO ASSESS
BATHING: UNABLE TO ASSESS
FEEDING YOURSELF: UNABLE TO ASSESS
TOILETING: UNABLE TO ASSESS

## 2024-09-17 ASSESSMENT — PAIN SCALES - GENERAL
PAINLEVEL_OUTOF10: 0 - NO PAIN
PAINLEVEL_OUTOF10: 0 - NO PAIN

## 2024-09-17 ASSESSMENT — ENCOUNTER SYMPTOMS
ACTIVITY CHANGE: 1
DIZZINESS: 0
NUMBNESS: 0
ALLERGIC/IMMUNOLOGIC NEGATIVE: 1
FREQUENCY: 0
GASTROINTESTINAL NEGATIVE: 1
CONFUSION: 1
AGITATION: 0
FATIGUE: 1
DIFFICULTY URINATING: 0
COLOR CHANGE: 0
APPETITE CHANGE: 1
WEAKNESS: 1
SHORTNESS OF BREATH: 0
HEMATOLOGIC/LYMPHATIC NEGATIVE: 1
ARTHRALGIAS: 0
HEADACHES: 0
SHORTNESS OF BREATH: 1
DECREASED CONCENTRATION: 1
EYES NEGATIVE: 1
MUSCULOSKELETAL NEGATIVE: 1
ENDOCRINE NEGATIVE: 1
CARDIOVASCULAR NEGATIVE: 1
ABDOMINAL PAIN: 0
PSYCHIATRIC NEGATIVE: 1

## 2024-09-17 ASSESSMENT — PAIN - FUNCTIONAL ASSESSMENT: PAIN_FUNCTIONAL_ASSESSMENT: WONG-BAKER FACES

## 2024-09-17 ASSESSMENT — PATIENT HEALTH QUESTIONNAIRE - PHQ9
1. LITTLE INTEREST OR PLEASURE IN DOING THINGS: SEVERAL DAYS
SUM OF ALL RESPONSES TO PHQ9 QUESTIONS 1 & 2: 4
2. FEELING DOWN, DEPRESSED OR HOPELESS: NEARLY EVERY DAY

## 2024-09-17 ASSESSMENT — PAIN SCALES - WONG BAKER: WONGBAKER_NUMERICALRESPONSE: NO HURT

## 2024-09-17 NOTE — ED PROVIDER NOTES
Patient was initially seen by my colleague and endorsed to me on signout.    Briefly, patient is a 59-year-old male that presented to the emergency room for evaluation of shortness of breath.  He was found to be encephalopathic with congestive heart failure, bilateral pleural effusions as well as a superimposed pneumonia.  He was given antibiotics and placed on BiPAP with improvement of his work of breathing.  Patient was accepted for transfer to [facility] and was signed out to me pending transfer.      Exam  General: Drowsy however opens eyes to voice no obvious respiratory distress  HEENT: Moist mucous membranes, head is normocephalic, atraumatic, pupils equal, round, active to light  Neck: Trachea midline, no asymmetry  Pulmonary: Respirations easy, nonlabored, there are diminished breath sounds bilaterally  Cardiac: Regular rate and rhythm  Abdomen: Mildly distended, nontender    After 4 hours of waiting patient still does not have a bed available at [facility] and will be admitted here for continued antibiotic treatment.  I discussed case with hospitalist who excepted patient for admission.         Richmond Langford,   09/17/24 1118

## 2024-09-17 NOTE — PROGRESS NOTES
Pharmacy Medication History Review    Humphrey Waddell is a 59 y.o. male admitted for Acute encephalopathy. Pharmacy reviewed the patient's [id number] medications and allergies for accuracy.    The list below reflectives the updated PTA list. Please review each medication in order reconciliation for additional clarification and justification.  Prior to Admission Medications   Prescriptions Last Dose Informant Patient Reported? Taking?   FreeStyle Suzy reader (FreeStyle Suzy 2 [place]) misc  Self No No   Sig: Use as instructed   NIFEdipine ER (Adalat CC) 60 mg 24 hr tablet  Self No No   Sig: Take 1 tablet (60 mg) by mouth once daily in the morning. Take before meals. Do not crush, chew, or split.   albuterol (ProAir HFA) 90 mcg/actuation inhaler Past Week  No Yes   Sig: Inhale 2 puffs every 4 hours if needed for wheezing or shortness of breath.   amLODIPine (Norvasc) 10 mg tablet 2024 at am  No Yes   Sig: Take 1 tablet (10 mg) by mouth once daily.   aspirin 81 mg chewable tablet 2024 at am Self Yes Yes   Sig: Chew 1 tablet (81 mg) once daily.   atorvastatin (Lipitor) 80 mg tablet Past Week  No Yes   Sig: TAKE 1 TABLET (80 MG) BY MOUTH ONCE DAILY. LAST RX PRIOR TO NEXT REFILLS   blood sugar diagnostic (Blood Glucose Test) strip  Self No No   Si strip 3 times a day.   escitalopram (Lexapro) 10 mg tablet  Self No No   Sig: TAKE 1 TABLET BY MOUTH EVERY DAY   flash glucose sensor kit (FreeStyle Suzy 2 Sensor) kit  Self No No   Sig: Use as instructed   fludrocortisone (Florinef) 0.1 mg tablet 2024 at am  No Yes   Sig: Take 1 tablet (0.1 mg) by mouth once daily.   gabapentin (Neurontin) 300 mg capsule 2024 at am Self No Yes   Sig: Take 1 capsule (300 mg) by mouth 3 times a day.   hydrALAZINE (Apresoline) 50 mg tablet 2024 at am  No Yes   Sig: Take 1 tablet (50 mg) by mouth 3 times a day.   insulin glargine (Lantus Solostar U-100 Insulin) 100 unit/mL (3 mL) pen Past Month Self Yes Yes   Sig:  Inject 2 Units under the skin once daily at bedtime.   insulin lispro (HumaLOG) 100 unit/mL injection  Self No No   Sig: Inject 0-0.1 mL (0-10 Units) under the skin 3 times a day with meals. Take as directed per insulin instructions. Do not start before March 30, 2024.   isosorbide dinitrate (Isordil) 20 mg tablet 9/16/2024 at am Self No Yes   Sig: Take 1 tablet (20 mg) by mouth 3 times a day.   lidocaine 4 % patch Past Week Self No Yes   Sig: Place 1 patch over 12 hours on the skin once daily. Remove & discard patch within 12 hours or as directed by MD.   magnesium oxide (Mag-Ox) 400 mg tablet   No No   Sig: Take 1 tablet (400 mg) by mouth 2 times a day.   melatonin 3 mg tablet Past Week Self Yes Yes   Sig: Take 1 tablet (3 mg) by mouth once daily at bedtime.   metoprolol tartrate (Lopressor) 50 mg tablet 9/16/2024 at am Self No Yes   Sig: Take 1 tablet by mouth 2 times a day.   pantoprazole (ProtoNix) 40 mg EC tablet 9/16/2024 at am Self No Yes   Sig: Take 1 tablet (40 mg) by mouth once daily in the morning. Take before meals. Do not crush, chew, or split.   polyethylene glycol (Glycolax, Miralax) 17 gram packet  Self No No   Sig: Take 17 g by mouth every 12 hours if needed (constipation).   sodium bicarbonate 650 mg tablet Past Month  No Yes   Sig: Take 2 tablets (1,300 mg total) by mouth in the morning and 2 tablets (1,300 mg total) before bedtime.   torsemide (Demadex) 20 mg tablet 9/16/2024 at am Self No Yes   Sig: Take 3 tablets (60 mg) by mouth once daily.   traZODone (Desyrel) 50 mg tablet Past Week  Yes Yes   Sig: Take 1 tablet (50 mg) by mouth once daily at bedtime.   venlafaxine XR (Effexor-XR) 150 mg 24 hr capsule 9/16/2024 at am  No Yes   Sig: Take 1 capsule (150 mg) by mouth once daily.   zinc oxide 20 % ointment Past Week  No Yes   Sig: Apply 1 Application topically if needed for irritation.      Facility-Administered Medications: None          The list below reflectives the updated allergy list.  "Please review each documented allergy for additional clarification and justification.  Allergies  Reviewed by Melissa Oleary CPhT on 9/17/2024        Severity Reactions Comments    Carvedilol Low Itching \"Scratchy throat\"            Below are additional concerns with the patient's PTA list.  - FLAGGED FOR REVIEW TO BE DELETED:  Lexapro  Humalog  Mag ox  Adalat   Glycolax        MELISSA OLEARY CPhT    "

## 2024-09-17 NOTE — PROGRESS NOTES
09/17/24 1531   Discharge Planning   Living Arrangements Spouse/significant other   Support Systems Spouse/significant other   Assistance Needed Wife assists with ADLs as needed, and provides for IADLs.   Type of Residence Private residence   Number of Stairs to Enter Residence 0   Number of Stairs Within Residence 0   Do you have animals or pets at home? Yes   Type of Animals or Pets dog   Who is requesting discharge planning? Provider   Home or Post Acute Services In home services   Type of Home Care Services Home nursing visits;Home PT;Home OT   Expected Discharge Disposition Othe  (To be determined)   Does the patient need discharge transport arranged? No   Financial Resource Strain   How hard is it for you to pay for the very basics like food, housing, medical care, and heating? Not hard   Housing Stability   In the last 12 months, was there a time when you were not able to pay the mortgage or rent on time? N   In the past 12 months, how many times have you moved where you were living? 0   At any time in the past 12 months, were you homeless or living in a shelter (including now)? N   Transportation Needs   In the past 12 months, has lack of transportation kept you from medical appointments or from getting medications? no   In the past 12 months, has lack of transportation kept you from meetings, work, or from getting things needed for daily living? No     MSW called and spoke to the patient's wife, Charline. She confirmed patient was living at home with her, and reports Medicaid had just met with them for services through [facility] but nothing had started yet. Wife would like a nurse and aides for the patient, but states she can manage the housework herself. However, as patient was just admitted she is uncertain of needs at this time. Care Transitions role explained and will continue to follow.

## 2024-09-17 NOTE — CONSULTS
Consults    Reason For Consult  Acute hypoxic respiratory failure    History Of Present Illness  Humphrey Waddell is a 59 y.o. male with a past medical history that includes but is not limited to coronary artery disease; status post CABG, congestive heart failure, history of CVA and diabetes mellitus type 2 with neuropathy who presented to [facility] Emergency Department yesterday for evaluation of dyspnea, weakness, change in mental status, weight gain and poor oral intake.  He was hypoxic upon arrival and placed on 3 L nasal cannula oxygen to maintain O2 sats greater than or equal to 92% which has since been escalated to 6 L nasal cannula with intermittent BiPAP.  He denies cough, fevers, chills nausea, vomiting, diarrhea, chest or abdominal pain.     Past Medical History  Past Medical History:   Diagnosis Date    Above-knee amputation of left lower extremity (Multi) 03/07/2021    emergent amputation, necrotizing fasciitis, revision 3/10/2021    Adverse effect of anesthesia     confusion    Anemia     CAD (coronary artery disease)     Carotid artery disease (CMS-HCC)     bilateral    CVA (cerebral vascular accident) (Multi) 2020    Depression     DM (diabetes mellitus) (Multi)     type 2 with neuropathy    GERD (gastroesophageal reflux disease)     High cholesterol     HTN (hypertension)     Leg ulcer (Multi)     chronic right lower extremity    Neuropathy     OA (osteoarthritis)     PAD (peripheral artery disease) (CMS-HCC)     Renal disorder     S/P CABG (coronary artery bypass graft) 11/13/2023    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       Surgical History  Past Surgical History:   Procedure Laterality Date    CARDIAC SURGERY      CABG 11/2023    CT ANGIO NECK  06/14/2021    CT NECK ANGIO W AND WO IV CONTRAST 6/14/20[place] [facility]    CT HEAD ANGIO W AND WO IV CONTRAST  06/14/2021    CT HEAD ANGIO W AND WO IV CONTRAST 6/14/2021 [facility]    LEG SURGERY Right       HEAD ANGIO WO IV CONTRAST  2012    MR HEAD ANGIO WO IV CONTRAST [facility]    MR HEAD ANGIO WO IV CONTRAST  2021    MR HEAD ANGIO WO IV CONTRAST [facility]    MR HEAD ANGIO WO IV CONTRAST  2021    MR HEAD ANGIO WO IV CONTRAST [facility]    MR NECK ANGIO WO IV CONTRAST  2023    MR NECK ANGIO WO IV CONTRAST 2023 [facility] [id number] MRI    OTHER SURGICAL HISTORY  2021    Knee reconstruction    OTHER SURGICAL HISTORY  2021    Lower extremity amputation above knee    TOTAL SHOULDER ARTHROPLASTY Left         Social History  Social History     Tobacco Use    Smoking status: Former     Types: Cigars     Quit date: 2022     Years since quittin.8    Smokeless tobacco: Never   Vaping Use    Vaping status: Never Used   Substance Use Topics    Alcohol use: Not Currently    Drug use: Not Currently     Types: Marijuana       Family History  Family History   Problem Relation Name Age of Onset    Other (cardiac disorder) Mother      Hypertension Mother      Esophageal cancer Mother      Hearing loss Father      Hypertension Father      Heart disease Father      COPD Sister            Allergies  Carvedilol    Review of Systems   Constitutional:  Positive for appetite change and fatigue.   HENT: Negative.     Eyes: Negative.    Respiratory:  Positive for shortness of breath.    Cardiovascular: Negative.    Gastrointestinal: Negative.    Endocrine: Negative.    Genitourinary: Negative.    Musculoskeletal: Negative.    Allergic/Immunologic: Negative.    Neurological:  Positive for weakness.   Hematological: Negative.    Psychiatric/Behavioral: Negative.          Physical Exam  Vitals and nursing note reviewed.   Constitutional:       Appearance: He is obese. He is ill-appearing.   HENT:      Head: Normocephalic and atraumatic.      Nose: Nose normal.      Mouth/Throat:      Mouth: Mucous membranes are dry.   Eyes:      Extraocular Movements: Extraocular  "movements intact.      Conjunctiva/sclera: Conjunctivae normal.      Pupils: Pupils are equal, round, and reactive to light.   Cardiovascular:      Rate and Rhythm: Normal rate and regular rhythm.      Pulses: Normal pulses.      Heart sounds: Normal heart sounds.   Pulmonary:      Effort: Pulmonary effort is normal.      Comments: Lungs diminished but clear.  Abdominal:      General: There is distension.      Palpations: Abdomen is soft.   Musculoskeletal:         General: Normal range of motion.      Comments: Left above-the-knee amputation.   Skin:     General: Skin is warm and dry.      Capillary Refill: Capillary refill takes less than 2 seconds.   Neurological:      General: No focal deficit present.      Mental Status: He is alert.      Comments: Somnolent, opens eyes to verbal stimuli. Oriented to person, place.   Psychiatric:         Mood and Affect: Mood normal.         Behavior: Behavior normal.          Vital Signs  Blood pressure 177/76, pulse 92, temperature 37.3 °C (99.1 °F), resp. rate 20, height 1.84 m (6' 0.44\"), weight 129 kg (284 lb 6.3 oz), SpO2 97%.  Oxygen Therapy  SpO2: 97 %  Medical Gas Therapy: Supplemental oxygen  Medical Gas Delivery Method: CPAP/Bi-PAP mask  FiO2 (%): 44 %     Intake/Output Summary (Last 24 hours) at 9/17/2024 1439  Last data filed at 9/17/2024 1140  Gross per 24 hour   Intake 50 ml   Output 775 ml   Net -725 ml      Scheduled medications:  albumin human, 25 g, intravenous, BID  amLODIPine, 10 mg, oral, Daily  aspirin, 81 mg, oral, Daily  atorvastatin, 80 mg, oral, Nightly  enoxaparin, 40 mg, subcutaneous, Daily  fludrocortisone, 0.1 mg, oral, Daily  furosemide, 40 mg, intravenous, BID  gabapentin, 300 mg, oral, TID  hydrALAZINE, 50 mg, oral, TID  insulin glargine, 2 Units, subcutaneous, Nightly  insulin lispro, 0-10 Units, subcutaneous, TID  isosorbide dinitrate, 20 mg, oral, TID  magnesium oxide, 400 mg, oral, BID  melatonin, 3 mg, oral, Nightly  metoprolol tartrate, " 50 mg, oral, BID  oxygen, , inhalation, q4h  [START ON 9/18/2024] pantoprazole, 40 mg, oral, Daily before breakfast  piperacillin-tazobactam, 3.375 g, intravenous, q6h  polyethylene glycol, 17 g, oral, Daily  sodium bicarbonate, 1,300 mg, oral, BID  traZODone, 50 mg, oral, Nightly  venlafaxine XR, 150 mg, oral, Daily     PRN medications: albuterol, polyethylene glycol       Relevant Results  Results for orders placed or performed during the hospital encounter of 09/16/24 (from the past 24 hour(s))   Influenza A, and B PCR   Result Value Ref Range    Flu A Result Not Detected Not Detected    Flu B Result Not Detected Not Detected   Sars-CoV-2 PCR   Result Value Ref Range    Coronavirus 2019, PCR Not Detected Not Detected   ECG 12 lead   Result Value Ref Range    Ventricular Rate 85 BPM    Atrial Rate 85 BPM    NY Interval 188 ms    QRS Duration 150 ms    QT Interval 428 ms    QTC Calculation(Bazett) 509 ms    P Axis 64 degrees    R Axis 92 degrees    T Axis 35 degrees    QRS Count 13 beats    Q Onset 229 ms    P Onset 135 ms    P Offset 194 ms    T Offset 443 ms    QTC Fredericia 480 ms   CBC and Auto Differential   Result Value Ref Range    WBC 11.9 (H) 4.4 - 11.3 x10*3/uL    nRBC 0.0 0.0 - 0.0 /100 WBCs    RBC 3.07 (L) 4.50 - 5.90 x10*6/uL    Hemoglobin 8.1 (L) 13.5 - 17.5 g/dL    Hematocrit 27.5 (L) 41.0 - 52.0 %    MCV 90 80 - 100 fL    MCH 26.4 26.0 - 34.0 pg    MCHC 29.5 (L) 32.0 - 36.0 g/dL    RDW 16.2 (H) 11.5 - 14.5 %    Platelets 348 150 - 450 x10*3/uL    Neutrophils % 80.2 40.0 - 80.0 %    Immature Granulocytes %, Automated 0.4 0.0 - 0.9 %    Lymphocytes % 8.7 13.0 - 44.0 %    Monocytes % 8.5 2.0 - 10.0 %    Eosinophils % 1.8 0.0 - 6.0 %    Basophils % 0.4 0.0 - 2.0 %    Neutrophils Absolute 9.57 (H) 1.20 - 7.70 x10*3/uL    Immature Granulocytes Absolute, Automated 0.05 0.00 - 0.70 x10*3/uL    Lymphocytes Absolute 1.04 (L) 1.20 - 4.80 x10*3/uL    Monocytes Absolute 1.01 (H) 0.10 - 1.00 x10*3/uL     Eosinophils Absolute 0.22 0.00 - 0.70 x10*3/uL    Basophils Absolute 0.05 0.00 - 0.10 x10*3/uL   Comprehensive metabolic panel   Result Value Ref Range    Glucose 119 (H) 65 - 99 mg/dL    Sodium 145 133 - 145 mmol/L    Potassium 4.0 3.4 - 5.1 mmol/L    Chloride 103 97 - 107 mmol/L    Bicarbonate 28 24 - 31 mmol/L    Urea Nitrogen 42 (H) 8 - 25 mg/dL    Creatinine 3.30 (H) 0.40 - 1.60 mg/dL    eGFR 20 (L) >60 mL/min/1.73m*2    Calcium 8.3 (L) 8.5 - 10.4 mg/dL    Albumin 3.1 (L) 3.5 - 5.0 g/dL    Alkaline Phosphatase 91 35 - 125 U/L    Total Protein 7.8 5.9 - 7.9 g/dL    AST 17 5 - 40 U/L    Bilirubin, Total 0.8 0.1 - 1.2 mg/dL    ALT 71 (H) 5 - 40 U/L    Anion Gap 14 <=19 mmol/L   Magnesium   Result Value Ref Range    Magnesium 1.80 1.60 - 3.10 mg/dL   NT Pro-BNP   Result Value Ref Range    PROBNP 28,635 (H) 0 - 177 pg/mL   Serial Troponin, Initial ([facility])   Result Value Ref Range    Troponin T, High Sensitivity 213 (HH) <=14 ng/L   Urinalysis with Reflex Microscopic   Result Value Ref Range    Color, Urine Yellow Light-Yellow, Yellow, Dark-Yellow    Appearance, Urine Turbid (N) Clear    Specific Gravity, Urine 1.015 1.005 - 1.035    pH, Urine 6.0 5.0, 5.5, 6.0, 6.5, 7.0, 7.5, 8.0    Protein, Urine 600 (3+) (A) NEGATIVE, 10 (TRACE), 20 (TRACE) mg/dL    Glucose, Urine 70 (1+) (A) Normal mg/dL    Blood, Urine 0.03 (TRACE) (A) NEGATIVE    Ketones, Urine TRACE (A) NEGATIVE mg/dL    Bilirubin, Urine NEGATIVE NEGATIVE    Urobilinogen, Urine Normal Normal mg/dL    Nitrite, Urine NEGATIVE NEGATIVE    Leukocyte Esterase, Urine NEGATIVE NEGATIVE   Microscopic Only, Urine   Result Value Ref Range    WBC, Urine 1-5 1-5, NONE /HPF    RBC, Urine 1-2 NONE, 1-2, 3-5 /HPF    Squamous Epithelial Cells, Urine 1-9 (SPARSE) Reference range not established. /HPF   Blood Gas Arterial Full Panel   Result Value Ref Range    POCT pH, Arterial 7.39 7.38 - 7.42 pH    POCT pCO2, Arterial 52 (H) 38 - 42 mm Hg    POCT pO2, Arterial 71 (L) 85 - 95  mm Hg    POCT SO2, Arterial 93 (L) 94 - 100 %    POCT Oxy Hemoglobin, Arterial 91.6 (L) 94.0 - 98.0 %    POCT Hematocrit Calculated, Arterial 24.0 (L) 41.0 - 52.0 %    POCT Sodium, Arterial 142 136 - 145 mmol/L    POCT Potassium, Arterial 3.7 3.5 - 5.3 mmol/L    POCT Chloride, Arterial 109 (H) 98 - 107 mmol/L    POCT Ionized Calcium, Arterial 1.11 1.10 - 1.33 mmol/L    POCT Glucose, Arterial 113 (H) 74 - 99 mg/dL    POCT Lactate, Arterial 0.7 0.4 - 2.0 mmol/L    POCT Base Excess, Arterial 5.8 (H) -2.0 - 3.0 mmol/L    POCT HCO3 Calculated, Arterial 31.5 (H) 22.0 - 26.0 mmol/L    POCT Hemoglobin, Arterial 8.0 (L) 13.5 - 17.5 g/dL    POCT Anion Gap, Arterial 5 (L) 10 - 25 mmo/L    Patient Temperature 37.0 degrees Celsius    FiO2 44 %    Site of Arterial Puncture Radial Right     Alejandro's Test Positive    Troponin T   Result Value Ref Range    Troponin T, High Sensitivity 212 (HH) <=14 ng/L   POCT GLUCOSE   Result Value Ref Range    POCT Glucose 127 (H) 74 - 99 mg/dL   Blood Gas Arterial Full Panel   Result Value Ref Range    POCT pH, Arterial 7.41 7.38 - 7.42 pH    POCT pCO2, Arterial 48 (H) 38 - 42 mm Hg    POCT pO2, Arterial 81 (L) 85 - 95 mm Hg    POCT SO2, Arterial 95 94 - 100 %    POCT Oxy Hemoglobin, Arterial 94.3 94.0 - 98.0 %    POCT Hematocrit Calculated, Arterial 25.0 (L) 41.0 - 52.0 %    POCT Sodium, Arterial 142 136 - 145 mmol/L    POCT Potassium, Arterial 3.9 3.5 - 5.3 mmol/L    POCT Chloride, Arterial 108 (H) 98 - 107 mmol/L    POCT Ionized Calcium, Arterial 1.11 1.10 - 1.33 mmol/L    POCT Glucose, Arterial 136 (H) 74 - 99 mg/dL    POCT Lactate, Arterial 0.8 0.4 - 2.0 mmol/L    POCT Base Excess, Arterial 5.1 (H) -2.0 - 3.0 mmol/L    POCT HCO3 Calculated, Arterial 30.4 (H) 22.0 - 26.0 mmol/L    POCT Hemoglobin, Arterial 8.3 (L) 13.5 - 17.5 g/dL    POCT Anion Gap, Arterial 8 (L) 10 - 25 mmo/L    Patient Temperature 37.0 degrees Celsius    FiO2 40 %    Ventilator Rate 16 bpm    Ipap CMH2O 18.0 cm H2O    Epap  CMH2O 8.0 cm H2O    Site of Arterial Puncture Radial Right     Alejandro's Test Positive    Comprehensive metabolic panel   Result Value Ref Range    Glucose 153 (H) 74 - 99 mg/dL    Sodium 143 136 - 145 mmol/L    Potassium 3.8 3.5 - 5.3 mmol/L    Chloride 102 98 - 107 mmol/L    Bicarbonate 27 21 - 32 mmol/L    Anion Gap 18 10 - 20 mmol/L    Urea Nitrogen 46 (H) 6 - 23 mg/dL    Creatinine 3.32 (H) 0.50 - 1.30 mg/dL    eGFR 20 (L) >60 mL/min/1.73m*2    Calcium 8.2 (L) 8.6 - 10.3 mg/dL    Albumin 3.1 (L) 3.4 - 5.0 g/dL    Alkaline Phosphatase 72 33 - 136 U/L    Total Protein 7.0 6.4 - 8.2 g/dL    AST 15 9 - 39 U/L    Bilirubin, Total 0.5 0.0 - 1.2 mg/dL    ALT 55 (H) 10 - 52 U/L   POCT GLUCOSE   Result Value Ref Range    POCT Glucose 130 (H) 74 - 99 mg/dL   Blood Gas Arterial Full Panel   Result Value Ref Range    POCT pH, Arterial 7.46 (H) 7.38 - 7.42 pH    POCT pCO2, Arterial 43 (H) 38 - 42 mm Hg    POCT pO2, Arterial 119 (H) 85 - 95 mm Hg    POCT SO2, Arterial 98 94 - 100 %    POCT Oxy Hemoglobin, Arterial 96.5 94.0 - 98.0 %    POCT Hematocrit Calculated, Arterial 30.0 (L) 41.0 - 52.0 %    POCT Sodium, Arterial 140 136 - 145 mmol/L    POCT Potassium, Arterial 4.0 3.5 - 5.3 mmol/L    POCT Chloride, Arterial 108 (H) 98 - 107 mmol/L    POCT Ionized Calcium, Arterial 1.13 1.10 - 1.33 mmol/L    POCT Glucose, Arterial 150 (H) 74 - 99 mg/dL    POCT Lactate, Arterial 0.8 0.4 - 2.0 mmol/L    POCT Base Excess, Arterial 6.1 (H) -2.0 - 3.0 mmol/L    POCT HCO3 Calculated, Arterial 30.6 (H) 22.0 - 26.0 mmol/L    POCT Hemoglobin, Arterial 10.0 (L) 13.5 - 17.5 g/dL    POCT Anion Gap, Arterial 5 (L) 10 - 25 mmo/L    Patient Temperature 37.0 degrees Celsius    FiO2 40 %    Ipap CMH2O 18.0 cm H2O    Epap CMH2O 8.0 cm H2O    Site of Arterial Puncture Radial Right     Alejandro's Test Positive    POCT GLUCOSE   Result Value Ref Range    POCT Glucose 139 (H) 74 - 99 mg/dL       CT chest abdomen pelvis wo IV contrast  Result Date:  9/17/2024  FINDINGS: Please note that the evaluation of vessels, lymph nodes and organs is limited without intravenous contrast.  Image quality is limited by extensive beam hardening artifact in the chest and upper abdomen due to the patient's upper extremities by the patient's side.  CHEST: MEDIASTINUM: Cardiac size is enlarged without pericardial effusion.  Cardiac blood pool appears slightly low in attenuation.  Mild calcified coronary plaque is noted.  LUNGS/PLEURA: There is a large right pleural effusion layering dependently.  Small left-sided pleural effusion is noted, loculated at the left apex. There is no pleural thickening, or pneumothorax.  The airways are patent. Mild pulmonary edema is suspected with mosaic attenuation the lungs suggesting chronic small airway disease and air trapping.  Relaxation atelectasis is seen in the dependent portion of the right lower lobe. There is an airspace disease in the left upper lobe and left lower lobe with significant volume loss of the left lung due to the pleural effusion, as well as the enlarged cardiac size.  LYMPH NODES: Thoracic lymph nodes are not enlarged. ABDOMEN:  LIVER: No hepatomegaly.  Smooth surface contour.  Normal attenuation.  BILE DUCTS: No intrahepatic or extrahepatic biliary ductal dilatation.  GALLBLADDER: Gallbladder is absent.  STOMACH: No abnormalities identified.  PANCREAS: No masses or ductal dilatation.  SPLEEN: No splenomegaly or focal splenic lesion.  ADRENAL GLANDS: No thickening or nodules.  KIDNEYS AND URETERS: Kidneys are normal in size and location.  No renal or ureteral calculi.  Moderate perinephric stranding is seen bilaterally.   PELVIS:  BLADDER: Urinary bladder is decompressed by Lee catheter with a small amount of air in the lumen of the urinary bladder and mild urinary bladder wall thickening.  REPRODUCTIVE ORGANS: No abnormalities identified.  BOWEL: Appendix is unremarkable.  Terminal ileum appears normal.  Cecum is  located in the right midabdomen.  There is moderate concentric wall thickening of the rectosigmoid colon.  VESSELS: Mild calcified plaque is seen in the abdominal aorta and iliac arteries. PERITONEUM/RETROPERITONEUM/LYMPH NODES: No free fluid.  No pneumoperitoneum. No lymphadenopathy.  ABDOMINAL WALL: No abnormalities identified. SOFT TISSUES: Fluid-filled indirect left inguinal hernia is noted.  There is a small fat-containing indirect right inguinal hernia.  BONES: No acute fracture or aggressive osseous lesion.  Moderate multilevel disc disease is seen in the mid and lower thoracic spine as well as at L1-L2.  There is mild disc disease and vacuum phenomenon at L5-S1. Mild dextroconvex curvature is seen of the thoracolumbar spine. Large right pleural effusion and small loculated left pleural effusion at the left lung apex with multifocal airspace disease in the left lung, likely atelectasis and possibly pneumonia in the appropriate clinical setting. Pronounced cardiomegaly with question of mild systemic anemia. Mild to moderate concentric wall thickening of the rectosigmoid colon suggesting a low-grade acute infectious or inflammatory distal colitis/proctitis. Decompressed urinary bladder with mild concentric wall thickening which may be due to underdistention however mild cystitis is a possibility in the upper clinical setting, correlate with urinalysis. Moderate-sized fluid filled left inguinal hernia along with a small fat-containing right inguinal hernia.     CT head wo IV contrast  Result Date: 9/16/2024  FINDINGS: BRAIN PARENCHYMA: Generalized parenchymal volume loss noted with concordant ventricular enlargement. Non-specific white matter changes noted, which may be related to small vessel disease.  No mass effect or midline shift. Low-attenuation foci within the left medial thalamus likely sequela of small remote lacunar infarcts, similar to prior imaging.   HEMORRHAGE: No acute intracranial hemorrhage.  VENTRICLES and EXTRA-AXIAL SPACES: Prominence of the lateral and 3rd ventricles slightly greater than expected for sulcal size however similar to prior imaging. EXTRACRANIAL SOFT TISSUES: Within normal limits. PARANASAL SINUSES/MASTOIDS: Mucosal thickening of the maxillary and right frontal sinuses. Minimal opacification mastoid air cells. Partial opacification of the left middle ear cavity. CALVARIUM: No depressed skull fracture. No destructive osseous lesion.   OTHER FINDINGS: Periapical lucencies and dental caries.   No acute intracranial hemorrhage or mass effect.   Periapical lucencies noted consistent with abscesses. Dental follow-up suggested.   Mild prominence of the lateral and 3rd ventricles which could be related to central white matter loss versus normal pressure hydrocephalus. Clinical correlation suggested.   Correlation for left-sided otitis media suggested.   Remote left thalamic lacunar infarct.     XR chest 1 view  Result Date: 9/16/2024  FINDINGS: There is enlargement of the cardiac silhouette with moderate to severe pulmonary edema. Bilateral small effusions. Sternal wires present. Bibasilar airspace disease   Moderate to severe pulmonary edema. Enlarged cardiac silhouette. Superimposed pneumonia would be difficult to exclude.          Assessment/Plan   Acute hypoxic, hypercapnic respiratory failure  Wean oxygen as sats allow  BiPAP nightly and as needed  Continuous pulse oximetry  Incentive spirometry/pulmonary hygiene    Acute metabolic encephalopathy  CT scan head without acute intracranial hemorrhage or mass effect    Bilateral pleural effusions  Large right-sided pleural effusion layering independently, small left-sided pleural effusion is noted, loculated at the left apex  PA lateral chest x-ray in the a.m.  Monitor response to diuresis-may need thoracentesis    COPD  Not an exacerbation  Will add scheduled nebulized bronchodilators  FEV1 when optimized    Acute on chronic diastolic  congestive heart failure  proBNP: 28,635  IV Lasix  Consider Cardiology consultation    Acute kidney injury on chronic kidney disease stage IV  Consider Nephrology consultation    Possible colitis  Empiric Zosyn    Obesity  Polysomnogram as outpatient    Robyn Sheffield, APRN-CNP

## 2024-09-17 NOTE — H&P
History Of Present Illness  Humphrey Waddell is a 59 y.o. male presenting with weakness and change in mentation. Per wife patient has been having reduced appetite. She also noted that patient has been gaining weight around the abdomen. She stated that last night patient was more lethargic and not able to take his medication. This prompted her to bring patient to the hospital.  She reported that patient was supposed to be on 60mg of torsemide daily, but has only been taking 20 mg.   In the ER, there was concern for fluid overload and patient transferred to [facility], but due to lack of bed availability, she was admitted here for continuation of care.      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    Surgical History  He has a past surgical history that includes Other surgical history (07/07/2021); Other surgical history (07/07/2021); CT angio head w and wo IV contrast (06/14/2021); CT angio neck (06/14/2021); MR angio neck wo IV contrast (07/18/2023); MR angio head wo IV contrast (12/11/2012); MR angio head wo IV contrast (06/12/2021); MR angio head wo IV contrast (05/25/2021); Total shoulder arthroplasty (Left, 2020); Leg Surgery (Right); and Cardiac surgery.     Social History  He reports that he quit smoking about 22 months ago. His smoking use included cigars. He has never used smokeless tobacco. He reports that he does not currently use alcohol. He  reports that he does not currently use drugs after having used the following drugs: Marijuana.    Family History  Family History   Problem Relation Name Age of Onset    Other (cardiac disorder) Mother      Hypertension Mother      Esophageal cancer Mother      Hearing loss Father      Hypertension Father      Heart disease Father      COPD Sister          Allergies  Carvedilol    Review of Systems   Constitutional:  Positive for activity change and appetite change.   HENT:  Positive for congestion.    Respiratory:  Negative for shortness of breath.    Cardiovascular:  Negative for chest pain and leg swelling.   Gastrointestinal:  Negative for abdominal pain.   Endocrine: Negative for cold intolerance.   Genitourinary:  Negative for difficulty urinating and frequency.   Musculoskeletal:  Negative for arthralgias.   Skin:  Negative for color change.   Neurological:  Negative for dizziness, numbness and headaches.   Psychiatric/Behavioral:  Positive for confusion and decreased concentration. Negative for agitation.         Physical Exam  Constitutional:       Appearance: Normal appearance. He is obese. He is ill-appearing.   HENT:      Head: Normocephalic.      Nose: Nose normal.      Mouth/Throat:      Mouth: Mucous membranes are moist.   Eyes:      Extraocular Movements: Extraocular movements intact.      Pupils: Pupils are equal, round, and reactive to light.   Cardiovascular:      Rate and Rhythm: Normal rate and regular rhythm.      Pulses: Normal pulses.      Heart sounds: Normal heart sounds.   Pulmonary:      Effort: Pulmonary effort is normal.      Breath sounds: Normal breath sounds.      Comments: Diminished lung sounds  Abdominal:      General: Bowel sounds are normal.      Palpations: Abdomen is soft.   Musculoskeletal:      Comments: Left AKA   Skin:     General: Skin is warm.   Neurological:      General: No focal deficit present.      Mental Status: He is alert and oriented to person, place, and time.    Psychiatric:         Mood and Affect: Mood normal.          Last Recorded Vitals  /76   Pulse 92   Temp 37.3 °C (99.1 °F)   Resp 20   Wt 129 kg (284 lb 6.3 oz)   SpO2 99%     Relevant Results           Assessment/Plan   Acute metabolic encephalopathy: this is probably due to chf exacerbation.   Acute respiratory failure due to hypercapnia and Hypoxia: continue Bipap as ordered. Consult placed to pulm with help with management  Bilateral Pleural effusion:  appreciate consult to pul. Will continue diureses with IV lasix 40 mg BID. Awaiting transfer to [facility]. For now medical management until patient is evaluated by pulm to determine if he will benefit from thoracentesis.  Congestive heart failure with diastolic dysfunction:  this is decompensated as evidenced by large pleural effusion, elevated BNP. Lasix as noted above. Will add albumin x 2 times for large volume diuresis. Possible repeat echo. Will need cardiology consult.   Hypertension Essential:  resume home med as tolerated.   DM type 2 in obese patient: resume home dose lantus 2 units at bedtime and sliding scale. Diabetic and cardiac diet.   Acute on chronic kidney disease:  possible cardiorenal. Monitor renal function.   Perinephric stranding and possible colitis per CT Scan:  order procalcitonin. Noted mild elevated wbc. Will start empiric zosyn.     Unsure why patient is on Florinef. Will continue for now. This may be contributing to patient's elevated bp       Srikanth Metz MD

## 2024-09-17 NOTE — ED PROVIDER NOTES
History of Present Illness     History provided by: Patient, EMS, and Nursing Staff  Limitations to History: Patient Acuity  External Records Reviewed with Brief Summary:  Prior ED notes    HPI:  Humphrey Waddell is a 59 y.o. male presents with shortness of breath.  Patient reportedly coming from home with 24 hours of shortness of breath.  Patient appears fatigued, speaking 1-2 word sentences, mostly yes and no.  Patient denies any chest pain, abdominal pain, nausea, vomiting, diarrhea.  No reported illnesses.  No recent sick contacts.  Admits to history of COPD, states he has been compliant with his inhalers.    Physical Exam   Triage vitals:  T 37.3 °C (99.1 °F)  HR 87  /88  RR 14  O2 94 % Supplemental oxygen (3 L NC)    Physical Exam  Vitals and nursing note reviewed.   Constitutional:       General: He is not in acute distress.     Appearance: He is obese. He is not ill-appearing.      Comments: Somnolent   HENT:      Head: Normocephalic and atraumatic.      Right Ear: There is impacted cerumen.      Left Ear: There is impacted cerumen.      Mouth/Throat:      Mouth: Mucous membranes are moist.      Pharynx: Oropharynx is clear.   Eyes:      Extraocular Movements: Extraocular movements intact.      Conjunctiva/sclera: Conjunctivae normal.      Pupils: Pupils are equal, round, and reactive to light.   Cardiovascular:      Rate and Rhythm: Normal rate and regular rhythm.   Pulmonary:      Effort: Pulmonary effort is normal. No respiratory distress.      Breath sounds: Normal breath sounds. No wheezing or rales.   Abdominal:      General: There is no distension.      Palpations: Abdomen is soft.      Tenderness: There is no abdominal tenderness.   Musculoskeletal:         General: No swelling or deformity. Normal range of motion.      Cervical back: Normal range of motion and neck supple.   Skin:     General: Skin is warm and dry.      Capillary Refill: Capillary refill takes less than 2 seconds.   Neurological:       General: No focal deficit present.      Mental Status: He is oriented to person, place, and time. Mental status is at baseline.      Comments: Patient is somnolent, but opens eyes to voice and answers questions appropriately, following commands.   Psychiatric:         Mood and Affect: Mood normal.         Behavior: Behavior normal.          Medical Decision Making & ED Course   Medical Decision Makin y.o. male presents with SOB.  I have considered the following conditions in my assessment of this patient's condition:  cough, asthma, atrial fibrillation, congestive heart failure, pulmonary edema, acute MI, pneumonia, pneumothorax, pulmonary embolus, sepsis, SIRS, URI, bronchitis.   Decreased mentation noted on arrival to the emergency department, mostly somnolent but able to wake up to voice and tactile stimuli.  Troponin elevated but flat in the 200s.  Patient without active chest pain.  Initially suspected COPD exacerbation, treated with DuoNeb and dexamethasone.  BNP returned at 28,000, now suspect CHF exacerbation.  Chart review shows patient is supposed to be on torsemide, given IV Lasix and Lee catheter placed.  IDANIA noted on labs.  Mild leukocytosis to 11.9.  Initial ABG shows poor oxygenation but compensating in terms of pCO2.  Patient placed briefly on BiPAP with repeat ABG showing minimal improvement or change.  Head CT obtained to assess patient's mental state, evidence of possible dental abscess and otitis media.  Attempted to look in patient's ear but unable to visualize TM secondary to cerumen impaction.  Patient covered with IV ceftriaxone out of precaution.    Spoke with [facility] hospitalist regarding admission.  At this time believes that the patient is too sick to stay at [facility] given limited resources.  Internal transfer initiated, patient accepted for transfer to [facility].  Patient accepted pending beds.    I personally spent a total of 55 minutes of critical care time in obtaining  history, performing a physical exam, bedside monitoring of interventions, collecting and interpreting tests and discussion with consultants but excluding time spent performing procedures, treating other patients and teaching time.           Clinical Concern SOB, pleural effusion       ----  Social Determinants of Health which Significantly Impact Care: None identified     EKG Independent Interpretation: EKG interpreted by myself. Please see ED Course for full interpretation.    Independent Result Review and Interpretation: Relevant laboratory and radiographic results were reviewed and independently interpreted by myself.  As necessary, they are commented on in the ED Course.    Chronic conditions affecting the patient's care: As documented above in [facility]    The patient was discussed with the following consultants/services:  [facility] Hospitalist, [facility] hospitalist    Care Considerations: As documented above in [facility]    ED Course:  ED Course as of 09/18/24 0347   Mon Sep 16, 2024   2216 ECG 12 lead  Performed at  2216, HR of 85, NSR, NAD, QTc 509, no sign of STEMI, RBBB    Similar to most recent EKG on May 28, 2024    Reviewed and interpreted by me at time performed   []   2233 WBC(!): 11.9  Mildly elevated 1 month ago as well []   2233 HEMOGLOBIN(!): 8.1  baseline []   2241 XR chest 1 view  IMPRESSION:  Moderate to severe pulmonary edema. Enlarged cardiac silhouette.  Superimposed pneumonia would be difficult to exclude.   []   2243 Patient on 60mg of Torsemide, denies noncompliance or lack of medication []   2305 Coronavirus 2019, PCR: Not Detected []   2305 Flu A Result: Not Detected []   2335 Creatinine(!): 3.30  Mildly up from baseline []   Tue Sep 17, 2024   0024 CT head wo IV contrast  IMPRESSION:  No acute intracranial hemorrhage or mass effect.      Periapical lucencies noted consistent with abscesses. Dental  follow-up suggested.      Mild prominence of the lateral and 3rd ventricles which could  be  related to central white matter loss versus normal pressure  hydrocephalus. Clinical correlation suggested.      Correlation for left-sided otitis media suggested.      Remote left thalamic lacunar infarct.   []   0254 Troponin T, High Sensitivity(!!): 213  Initial from first blood draw []   0255 Troponin T, High Sensitivity(!!): 212  Repeat, stable []   0328 PROBNP(!): 28,635  Patient was given 60mg of Lasix []      ED Course User Index  [] Fatemeh Sanchez MD         Diagnoses as of 09/18/24 0347   Shortness of breath   Acute encephalopathy   Pleural effusion       Procedures   Procedures    Fatemeh Sanchez MD  Emergency Medicine     Fatemeh Sanchez MD  09/18/24 0356       Fatemeh Sanchez MD  10/10/24 0584

## 2024-09-18 ENCOUNTER — APPOINTMENT (OUTPATIENT)
Dept: RADIOLOGY | Facility: HOSPITAL | Age: 59
End: 2024-09-18
Payer: MEDICAID

## 2024-09-18 ENCOUNTER — HOSPITAL ENCOUNTER (INPATIENT)
Facility: HOSPITAL | Age: 59
End: 2024-09-18
Attending: INTERNAL MEDICINE | Admitting: INTERNAL MEDICINE
Payer: MEDICAID

## 2024-09-18 DIAGNOSIS — M25.512 CHRONIC PAIN OF BOTH SHOULDERS: ICD-10-CM

## 2024-09-18 DIAGNOSIS — M25.511 CHRONIC PAIN OF BOTH SHOULDERS: ICD-10-CM

## 2024-09-18 DIAGNOSIS — G93.40 ACUTE ENCEPHALOPATHY: ICD-10-CM

## 2024-09-18 DIAGNOSIS — E11.69 TYPE 2 DIABETES MELLITUS WITH OTHER SPECIFIED COMPLICATION, WITHOUT LONG-TERM CURRENT USE OF INSULIN: ICD-10-CM

## 2024-09-18 DIAGNOSIS — G89.29 CHRONIC PAIN OF BOTH SHOULDERS: ICD-10-CM

## 2024-09-18 DIAGNOSIS — M79.89 OTHER SPECIFIED SOFT TISSUE DISORDERS: ICD-10-CM

## 2024-09-18 DIAGNOSIS — I21.4 NSTEMI, INITIAL EPISODE OF CARE (MULTI): ICD-10-CM

## 2024-09-18 DIAGNOSIS — N17.9 AKI (ACUTE KIDNEY INJURY) (CMS-HCC): ICD-10-CM

## 2024-09-18 DIAGNOSIS — J90 PLEURAL EFFUSION ON RIGHT: ICD-10-CM

## 2024-09-18 DIAGNOSIS — I50.43 CHF (CONGESTIVE HEART FAILURE), NYHA CLASS I, ACUTE ON CHRONIC, COMBINED: Primary | ICD-10-CM

## 2024-09-18 DIAGNOSIS — I15.9 SECONDARY HYPERTENSION: ICD-10-CM

## 2024-09-18 LAB
ANION GAP BLDV CALCULATED.4IONS-SCNC: 5 MMOL/L (ref 10–25)
ANION GAP SERPL CALC-SCNC: 14 MMOL/L (ref 10–20)
ATRIAL RATE: 85 BPM
BASE EXCESS BLDV CALC-SCNC: 10.6 MMOL/L (ref -2–3)
BNP SERPL-MCNC: 671 PG/ML (ref 0–99)
BODY TEMPERATURE: 37 DEGREES CELSIUS
BUN SERPL-MCNC: 45 MG/DL (ref 6–23)
CA-I BLDV-SCNC: 1.09 MMOL/L (ref 1.1–1.33)
CALCIUM SERPL-MCNC: 7.7 MG/DL (ref 8.6–10.3)
CARDIAC TROPONIN I PNL SERPL HS: 62 NG/L (ref 0–20)
CHLORIDE BLDV-SCNC: 107 MMOL/L (ref 98–107)
CHLORIDE SERPL-SCNC: 103 MMOL/L (ref 98–107)
CO2 SERPL-SCNC: 32 MMOL/L (ref 21–32)
CREAT SERPL-MCNC: 3.09 MG/DL (ref 0.5–1.3)
EGFRCR SERPLBLD CKD-EPI 2021: 22 ML/MIN/1.73M*2
ERYTHROCYTE [DISTWIDTH] IN BLOOD BY AUTOMATED COUNT: 16.2 % (ref 11.5–14.5)
GLUCOSE BLD MANUAL STRIP-MCNC: 135 MG/DL (ref 74–99)
GLUCOSE BLD MANUAL STRIP-MCNC: 138 MG/DL (ref 74–99)
GLUCOSE BLD MANUAL STRIP-MCNC: 207 MG/DL (ref 74–99)
GLUCOSE BLD MANUAL STRIP-MCNC: 273 MG/DL (ref 74–99)
GLUCOSE BLDV-MCNC: 133 MG/DL (ref 74–99)
GLUCOSE SERPL-MCNC: 118 MG/DL (ref 74–99)
HCO3 BLDV-SCNC: 35.6 MMOL/L (ref 22–26)
HCT VFR BLD AUTO: 24.2 % (ref 41–52)
HCT VFR BLD EST: 24 % (ref 41–52)
HGB BLD-MCNC: 7 G/DL (ref 13.5–17.5)
HGB BLDV-MCNC: 8.1 G/DL (ref 13.5–17.5)
INHALED O2 CONCENTRATION: 22 %
INR PPP: 1.3 (ref 0.9–1.1)
LACTATE BLDV-SCNC: 0.7 MMOL/L (ref 0.4–2)
LDH SERPL L TO P-CCNC: 166 U/L (ref 84–246)
MCH RBC QN AUTO: 26.5 PG (ref 26–34)
MCHC RBC AUTO-ENTMCNC: 28.9 G/DL (ref 32–36)
MCV RBC AUTO: 92 FL (ref 80–100)
NRBC BLD-RTO: 0 /100 WBCS (ref 0–0)
OXYHGB MFR BLDV: 94.1 % (ref 45–75)
P AXIS: 64 DEGREES
P OFFSET: 194 MS
P ONSET: 135 MS
PCO2 BLDV: 50 MM HG (ref 41–51)
PH BLDV: 7.46 PH (ref 7.33–7.43)
PLATELET # BLD AUTO: 328 X10*3/UL (ref 150–450)
PO2 BLDV: 76 MM HG (ref 35–45)
POTASSIUM BLDV-SCNC: 3.4 MMOL/L (ref 3.5–5.3)
POTASSIUM SERPL-SCNC: 3.5 MMOL/L (ref 3.5–5.3)
PR INTERVAL: 188 MS
PROCALCITONIN SERPL-MCNC: 0.25 NG/ML
PROT SERPL-MCNC: 6 G/DL (ref 6.4–8.2)
PROTHROMBIN TIME: 14.2 SECONDS (ref 9.8–12.8)
Q ONSET: 229 MS
QRS COUNT: 13 BEATS
QRS DURATION: 150 MS
QT INTERVAL: 428 MS
QTC CALCULATION(BAZETT): 509 MS
QTC FREDERICIA: 480 MS
R AXIS: 92 DEGREES
RBC # BLD AUTO: 2.64 X10*6/UL (ref 4.5–5.9)
SAO2 % BLDV: 96 % (ref 45–75)
SODIUM BLDV-SCNC: 144 MMOL/L (ref 136–145)
SODIUM SERPL-SCNC: 145 MMOL/L (ref 136–145)
T AXIS: 35 DEGREES
T OFFSET: 443 MS
VENTRICULAR RATE: 85 BPM
WBC # BLD AUTO: 11 X10*3/UL (ref 4.4–11.3)

## 2024-09-18 PROCEDURE — 94640 AIRWAY INHALATION TREATMENT: CPT

## 2024-09-18 PROCEDURE — 82947 ASSAY GLUCOSE BLOOD QUANT: CPT

## 2024-09-18 PROCEDURE — 2500000001 HC RX 250 WO HCPCS SELF ADMINISTERED DRUGS (ALT 637 FOR MEDICARE OP): Performed by: INTERNAL MEDICINE

## 2024-09-18 PROCEDURE — 2500000005 HC RX 250 GENERAL PHARMACY W/O HCPCS: Performed by: INTERNAL MEDICINE

## 2024-09-18 PROCEDURE — 99255 IP/OBS CONSLTJ NEW/EST HI 80: CPT | Performed by: INTERNAL MEDICINE

## 2024-09-18 PROCEDURE — 36415 COLL VENOUS BLD VENIPUNCTURE: CPT | Performed by: INTERNAL MEDICINE

## 2024-09-18 PROCEDURE — 86038 ANTINUCLEAR ANTIBODIES: CPT | Mod: AHULAB | Performed by: INTERNAL MEDICINE

## 2024-09-18 PROCEDURE — 99222 1ST HOSP IP/OBS MODERATE 55: CPT | Performed by: INTERNAL MEDICINE

## 2024-09-18 PROCEDURE — 85610 PROTHROMBIN TIME: CPT

## 2024-09-18 PROCEDURE — 83615 LACTATE (LD) (LDH) ENZYME: CPT | Performed by: INTERNAL MEDICINE

## 2024-09-18 PROCEDURE — 86036 ANCA SCREEN EACH ANTIBODY: CPT | Performed by: INTERNAL MEDICINE

## 2024-09-18 PROCEDURE — 94668 MNPJ CHEST WALL SBSQ: CPT

## 2024-09-18 PROCEDURE — 84155 ASSAY OF PROTEIN SERUM: CPT | Performed by: INTERNAL MEDICINE

## 2024-09-18 PROCEDURE — 85027 COMPLETE CBC AUTOMATED: CPT | Performed by: INTERNAL MEDICINE

## 2024-09-18 PROCEDURE — 9420000001 HC RT PATIENT EDUCATION 5 MIN

## 2024-09-18 PROCEDURE — 2500000004 HC RX 250 GENERAL PHARMACY W/ HCPCS (ALT 636 FOR OP/ED): Performed by: INTERNAL MEDICINE

## 2024-09-18 PROCEDURE — 83516 IMMUNOASSAY NONANTIBODY: CPT | Performed by: INTERNAL MEDICINE

## 2024-09-18 PROCEDURE — 83880 ASSAY OF NATRIURETIC PEPTIDE: CPT | Performed by: INTERNAL MEDICINE

## 2024-09-18 PROCEDURE — 84132 ASSAY OF SERUM POTASSIUM: CPT | Performed by: INTERNAL MEDICINE

## 2024-09-18 PROCEDURE — 99255 IP/OBS CONSLTJ NEW/EST HI 80: CPT | Performed by: CLINICAL NURSE SPECIALIST

## 2024-09-18 PROCEDURE — 84484 ASSAY OF TROPONIN QUANT: CPT | Performed by: INTERNAL MEDICINE

## 2024-09-18 PROCEDURE — 2500000002 HC RX 250 W HCPCS SELF ADMINISTERED DRUGS (ALT 637 FOR MEDICARE OP, ALT 636 FOR OP/ED): Performed by: INTERNAL MEDICINE

## 2024-09-18 PROCEDURE — 80048 BASIC METABOLIC PNL TOTAL CA: CPT | Performed by: INTERNAL MEDICINE

## 2024-09-18 PROCEDURE — 1200000002 HC GENERAL ROOM WITH TELEMETRY DAILY

## 2024-09-18 RX ORDER — LANOLIN ALCOHOL/MO/W.PET/CERES
400 CREAM (GRAM) TOPICAL 2 TIMES DAILY
Status: DISCONTINUED | OUTPATIENT
Start: 2024-09-18 | End: 2024-09-18

## 2024-09-18 RX ORDER — ALBUTEROL SULFATE 0.83 MG/ML
2.5 SOLUTION RESPIRATORY (INHALATION)
Status: DISCONTINUED | OUTPATIENT
Start: 2024-09-18 | End: 2024-09-27 | Stop reason: HOSPADM

## 2024-09-18 RX ORDER — FUROSEMIDE 10 MG/ML
80 INJECTION INTRAMUSCULAR; INTRAVENOUS EVERY 8 HOURS
Status: DISCONTINUED | OUTPATIENT
Start: 2024-09-18 | End: 2024-09-19

## 2024-09-18 RX ORDER — DEXTROSE 50 % IN WATER (D50W) INTRAVENOUS SYRINGE
25
Status: DISCONTINUED | OUTPATIENT
Start: 2024-09-18 | End: 2024-09-18 | Stop reason: SDUPTHER

## 2024-09-18 RX ORDER — ONDANSETRON HYDROCHLORIDE 2 MG/ML
4 INJECTION, SOLUTION INTRAVENOUS EVERY 8 HOURS PRN
Status: DISCONTINUED | OUTPATIENT
Start: 2024-09-18 | End: 2024-09-27 | Stop reason: HOSPADM

## 2024-09-18 RX ORDER — ALPRAZOLAM 0.5 MG/1
0.5 TABLET ORAL NIGHTLY PRN
Status: DISCONTINUED | OUTPATIENT
Start: 2024-09-18 | End: 2024-09-21

## 2024-09-18 RX ORDER — PANTOPRAZOLE SODIUM 40 MG/1
40 TABLET, DELAYED RELEASE ORAL
Status: DISCONTINUED | OUTPATIENT
Start: 2024-09-18 | End: 2024-09-18

## 2024-09-18 RX ORDER — ACETAMINOPHEN 325 MG/1
650 TABLET ORAL EVERY 4 HOURS PRN
Status: DISCONTINUED | OUTPATIENT
Start: 2024-09-18 | End: 2024-09-22

## 2024-09-18 RX ORDER — NAPROXEN SODIUM 220 MG/1
81 TABLET, FILM COATED ORAL DAILY
Status: DISCONTINUED | OUTPATIENT
Start: 2024-09-18 | End: 2024-09-27 | Stop reason: HOSPADM

## 2024-09-18 RX ORDER — LIDOCAINE 560 MG/1
1 PATCH PERCUTANEOUS; TOPICAL; TRANSDERMAL DAILY
Status: DISCONTINUED | OUTPATIENT
Start: 2024-09-18 | End: 2024-09-27 | Stop reason: HOSPADM

## 2024-09-18 RX ORDER — TALC
3 POWDER (GRAM) TOPICAL NIGHTLY
Status: DISCONTINUED | OUTPATIENT
Start: 2024-09-18 | End: 2024-09-20

## 2024-09-18 RX ORDER — FUROSEMIDE 10 MG/ML
40 INJECTION INTRAMUSCULAR; INTRAVENOUS EVERY 12 HOURS
Status: DISCONTINUED | OUTPATIENT
Start: 2024-09-18 | End: 2024-09-18

## 2024-09-18 RX ORDER — ESCITALOPRAM OXALATE 10 MG/1
10 TABLET ORAL DAILY
Status: DISCONTINUED | OUTPATIENT
Start: 2024-09-18 | End: 2024-09-18

## 2024-09-18 RX ORDER — ONDANSETRON 4 MG/1
4 TABLET, FILM COATED ORAL EVERY 8 HOURS PRN
Status: DISCONTINUED | OUTPATIENT
Start: 2024-09-18 | End: 2024-09-27 | Stop reason: HOSPADM

## 2024-09-18 RX ORDER — ALBUTEROL SULFATE 0.83 MG/ML
2.5 SOLUTION RESPIRATORY (INHALATION) EVERY 4 HOURS PRN
Status: DISCONTINUED | OUTPATIENT
Start: 2024-09-18 | End: 2024-09-18

## 2024-09-18 RX ORDER — ACETAMINOPHEN 650 MG/1
650 SUPPOSITORY RECTAL EVERY 4 HOURS PRN
Status: DISCONTINUED | OUTPATIENT
Start: 2024-09-18 | End: 2024-09-22

## 2024-09-18 RX ORDER — INSULIN GLARGINE 100 [IU]/ML
2 INJECTION, SOLUTION SUBCUTANEOUS NIGHTLY
Status: DISCONTINUED | OUTPATIENT
Start: 2024-09-18 | End: 2024-09-27

## 2024-09-18 RX ORDER — ENOXAPARIN SODIUM 100 MG/ML
40 INJECTION SUBCUTANEOUS EVERY 24 HOURS
Status: DISCONTINUED | OUTPATIENT
Start: 2024-09-18 | End: 2024-09-20

## 2024-09-18 RX ORDER — DEXTROSE 50 % IN WATER (D50W) INTRAVENOUS SYRINGE
12.5
Status: DISCONTINUED | OUTPATIENT
Start: 2024-09-18 | End: 2024-09-27 | Stop reason: HOSPADM

## 2024-09-18 RX ORDER — VENLAFAXINE HYDROCHLORIDE 75 MG/1
150 CAPSULE, EXTENDED RELEASE ORAL DAILY
Status: DISCONTINUED | OUTPATIENT
Start: 2024-09-18 | End: 2024-09-27 | Stop reason: HOSPADM

## 2024-09-18 RX ORDER — METOPROLOL TARTRATE 50 MG/1
50 TABLET ORAL 2 TIMES DAILY
Status: DISCONTINUED | OUTPATIENT
Start: 2024-09-18 | End: 2024-09-27 | Stop reason: HOSPADM

## 2024-09-18 RX ORDER — TORSEMIDE 20 MG/1
60 TABLET ORAL DAILY
Status: DISCONTINUED | OUTPATIENT
Start: 2024-09-18 | End: 2024-09-18

## 2024-09-18 RX ORDER — ACETAMINOPHEN 160 MG/5ML
650 SOLUTION ORAL EVERY 4 HOURS PRN
Status: DISCONTINUED | OUTPATIENT
Start: 2024-09-18 | End: 2024-09-22

## 2024-09-18 RX ORDER — FLUDROCORTISONE ACETATE 0.1 MG/1
0.1 TABLET ORAL DAILY
Status: DISCONTINUED | OUTPATIENT
Start: 2024-09-18 | End: 2024-09-27 | Stop reason: HOSPADM

## 2024-09-18 RX ORDER — ISOSORBIDE DINITRATE 10 MG/1
20 TABLET ORAL
Status: DISCONTINUED | OUTPATIENT
Start: 2024-09-18 | End: 2024-09-27 | Stop reason: HOSPADM

## 2024-09-18 RX ORDER — SODIUM BICARBONATE 650 MG/1
1300 TABLET ORAL EVERY 12 HOURS
Status: DISCONTINUED | OUTPATIENT
Start: 2024-09-18 | End: 2024-09-23

## 2024-09-18 RX ORDER — NIFEDIPINE 30 MG/1
60 TABLET, FILM COATED, EXTENDED RELEASE ORAL
Status: DISCONTINUED | OUTPATIENT
Start: 2024-09-18 | End: 2024-09-18

## 2024-09-18 RX ORDER — POLYETHYLENE GLYCOL 3350 17 G/17G
17 POWDER, FOR SOLUTION ORAL EVERY 12 HOURS PRN
Status: DISCONTINUED | OUTPATIENT
Start: 2024-09-18 | End: 2024-09-27 | Stop reason: HOSPADM

## 2024-09-18 RX ORDER — DEXTROSE 50 % IN WATER (D50W) INTRAVENOUS SYRINGE
25
Status: DISCONTINUED | OUTPATIENT
Start: 2024-09-18 | End: 2024-09-27 | Stop reason: HOSPADM

## 2024-09-18 RX ORDER — IPRATROPIUM BROMIDE AND ALBUTEROL SULFATE 2.5; .5 MG/3ML; MG/3ML
3 SOLUTION RESPIRATORY (INHALATION) EVERY 4 HOURS PRN
Status: DISCONTINUED | OUTPATIENT
Start: 2024-09-18 | End: 2024-09-18

## 2024-09-18 RX ORDER — ALBUTEROL SULFATE 0.83 MG/ML
2.5 SOLUTION RESPIRATORY (INHALATION) EVERY 2 HOUR PRN
Status: DISCONTINUED | OUTPATIENT
Start: 2024-09-18 | End: 2024-09-27 | Stop reason: HOSPADM

## 2024-09-18 RX ORDER — HYDRALAZINE HYDROCHLORIDE 50 MG/1
50 TABLET, FILM COATED ORAL 3 TIMES DAILY
Status: DISCONTINUED | OUTPATIENT
Start: 2024-09-18 | End: 2024-09-27 | Stop reason: HOSPADM

## 2024-09-18 RX ORDER — PANTOPRAZOLE SODIUM 40 MG/10ML
40 INJECTION, POWDER, LYOPHILIZED, FOR SOLUTION INTRAVENOUS
Status: DISCONTINUED | OUTPATIENT
Start: 2024-09-18 | End: 2024-09-27 | Stop reason: HOSPADM

## 2024-09-18 RX ORDER — ATORVASTATIN CALCIUM 80 MG/1
80 TABLET, FILM COATED ORAL DAILY
Status: DISCONTINUED | OUTPATIENT
Start: 2024-09-18 | End: 2024-09-27 | Stop reason: HOSPADM

## 2024-09-18 RX ORDER — TRAZODONE HYDROCHLORIDE 50 MG/1
50 TABLET ORAL NIGHTLY
Status: DISCONTINUED | OUTPATIENT
Start: 2024-09-18 | End: 2024-09-20

## 2024-09-18 RX ORDER — GABAPENTIN 300 MG/1
300 CAPSULE ORAL 3 TIMES DAILY
Status: DISCONTINUED | OUTPATIENT
Start: 2024-09-18 | End: 2024-09-20

## 2024-09-18 RX ORDER — AMLODIPINE BESYLATE 10 MG/1
10 TABLET ORAL DAILY
Status: DISCONTINUED | OUTPATIENT
Start: 2024-09-18 | End: 2024-09-27 | Stop reason: HOSPADM

## 2024-09-18 RX ORDER — CEFTRIAXONE 1 G/50ML
1 INJECTION, SOLUTION INTRAVENOUS EVERY 24 HOURS
Status: DISCONTINUED | OUTPATIENT
Start: 2024-09-18 | End: 2024-09-18

## 2024-09-18 RX ORDER — DEXTROSE 50 % IN WATER (D50W) INTRAVENOUS SYRINGE
12.5
Status: DISCONTINUED | OUTPATIENT
Start: 2024-09-18 | End: 2024-09-18 | Stop reason: SDUPTHER

## 2024-09-18 RX ORDER — TALC
3 POWDER (GRAM) TOPICAL NIGHTLY
Status: DISCONTINUED | OUTPATIENT
Start: 2024-09-18 | End: 2024-09-18

## 2024-09-18 RX ORDER — PREDNISONE 20 MG/1
40 TABLET ORAL DAILY
Status: DISCONTINUED | OUTPATIENT
Start: 2024-09-18 | End: 2024-09-19

## 2024-09-18 RX ORDER — PANTOPRAZOLE SODIUM 40 MG/1
40 TABLET, DELAYED RELEASE ORAL
Status: DISCONTINUED | OUTPATIENT
Start: 2024-09-18 | End: 2024-09-27 | Stop reason: HOSPADM

## 2024-09-18 SDOH — ECONOMIC STABILITY: INCOME INSECURITY: HOW HARD IS IT FOR YOU TO PAY FOR THE VERY BASICS LIKE FOOD, HOUSING, MEDICAL CARE, AND HEATING?: NOT HARD AT ALL

## 2024-09-18 SDOH — ECONOMIC STABILITY: HOUSING INSECURITY: AT ANY TIME IN THE PAST 12 MONTHS, WERE YOU HOMELESS OR LIVING IN A SHELTER (INCLUDING NOW)?: NO

## 2024-09-18 SDOH — SOCIAL STABILITY: SOCIAL INSECURITY: WERE YOU ABLE TO COMPLETE ALL THE BEHAVIORAL HEALTH SCREENINGS?: YES

## 2024-09-18 SDOH — ECONOMIC STABILITY: INCOME INSECURITY: HOW HARD IS IT FOR YOU TO PAY FOR THE VERY BASICS LIKE FOOD, HOUSING, MEDICAL CARE, AND HEATING?: PATIENT DECLINED

## 2024-09-18 SDOH — ECONOMIC STABILITY: TRANSPORTATION INSECURITY
IN THE PAST 12 MONTHS, HAS THE LACK OF TRANSPORTATION KEPT YOU FROM MEDICAL APPOINTMENTS OR FROM GETTING MEDICATIONS?: PATIENT DECLINED

## 2024-09-18 SDOH — ECONOMIC STABILITY: INCOME INSECURITY: IN THE LAST 12 MONTHS, WAS THERE A TIME WHEN YOU WERE NOT ABLE TO PAY THE MORTGAGE OR RENT ON TIME?: NO

## 2024-09-18 SDOH — ECONOMIC STABILITY: HOUSING INSECURITY: IN THE PAST 12 MONTHS, HOW MANY TIMES HAVE YOU MOVED WHERE YOU WERE LIVING?: 0

## 2024-09-18 SDOH — HEALTH STABILITY: MENTAL HEALTH: HOW OFTEN DO YOU HAVE A DRINK CONTAINING ALCOHOL?: NEVER

## 2024-09-18 SDOH — SOCIAL STABILITY: SOCIAL INSECURITY: ARE YOU OR HAVE YOU BEEN THREATENED OR ABUSED PHYSICALLY, EMOTIONALLY, OR SEXUALLY BY ANYONE?: NO

## 2024-09-18 SDOH — SOCIAL STABILITY: SOCIAL INSECURITY: DOES ANYONE TRY TO KEEP YOU FROM HAVING/CONTACTING OTHER FRIENDS OR DOING THINGS OUTSIDE YOUR HOME?: NO

## 2024-09-18 SDOH — SOCIAL STABILITY: SOCIAL INSECURITY: ARE THERE ANY APPARENT SIGNS OF INJURIES/BEHAVIORS THAT COULD BE RELATED TO ABUSE/NEGLECT?: NO

## 2024-09-18 SDOH — SOCIAL STABILITY: SOCIAL INSECURITY: DO YOU FEEL UNSAFE GOING BACK TO THE PLACE WHERE YOU ARE LIVING?: NO

## 2024-09-18 SDOH — HEALTH STABILITY: MENTAL HEALTH: HOW OFTEN DO YOU HAVE 6 OR MORE DRINKS ON ONE OCCASION?: NEVER

## 2024-09-18 SDOH — SOCIAL STABILITY: SOCIAL INSECURITY: HAVE YOU HAD THOUGHTS OF HARMING ANYONE ELSE?: NO

## 2024-09-18 SDOH — ECONOMIC STABILITY: TRANSPORTATION INSECURITY
IN THE PAST 12 MONTHS, HAS LACK OF TRANSPORTATION KEPT YOU FROM MEETINGS, WORK, OR FROM GETTING THINGS NEEDED FOR DAILY LIVING?: PATIENT DECLINED

## 2024-09-18 SDOH — HEALTH STABILITY: MENTAL HEALTH: HOW MANY STANDARD DRINKS CONTAINING ALCOHOL DO YOU HAVE ON A TYPICAL DAY?: PATIENT DOES NOT DRINK

## 2024-09-18 SDOH — ECONOMIC STABILITY: HOUSING INSECURITY: IN THE PAST 12 MONTHS, HOW MANY TIMES HAVE YOU MOVED WHERE YOU WERE LIVING?: 1

## 2024-09-18 SDOH — HEALTH STABILITY: PHYSICAL HEALTH: ON AVERAGE, HOW MANY DAYS PER WEEK DO YOU ENGAGE IN MODERATE TO STRENUOUS EXERCISE (LIKE A BRISK WALK)?: 0 DAYS

## 2024-09-18 SDOH — ECONOMIC STABILITY: INCOME INSECURITY: IN THE LAST 12 MONTHS, WAS THERE A TIME WHEN YOU WERE NOT ABLE TO PAY THE MORTGAGE OR RENT ON TIME?: PATIENT DECLINED

## 2024-09-18 SDOH — SOCIAL STABILITY: SOCIAL INSECURITY: HAS ANYONE EVER THREATENED TO HURT YOUR FAMILY OR YOUR PETS?: NO

## 2024-09-18 SDOH — SOCIAL STABILITY: SOCIAL INSECURITY: HAVE YOU HAD ANY THOUGHTS OF HARMING ANYONE ELSE?: NO

## 2024-09-18 SDOH — SOCIAL STABILITY: SOCIAL INSECURITY: DO YOU FEEL ANYONE HAS EXPLOITED OR TAKEN ADVANTAGE OF YOU FINANCIALLY OR OF YOUR PERSONAL PROPERTY?: NO

## 2024-09-18 SDOH — SOCIAL STABILITY: SOCIAL INSECURITY: ABUSE: ADULT

## 2024-09-18 SDOH — ECONOMIC STABILITY: HOUSING INSECURITY: AT ANY TIME IN THE PAST 12 MONTHS, WERE YOU HOMELESS OR LIVING IN A SHELTER (INCLUDING NOW)?: PATIENT DECLINED

## 2024-09-18 SDOH — HEALTH STABILITY: PHYSICAL HEALTH: ON AVERAGE, HOW MANY MINUTES DO YOU ENGAGE IN EXERCISE AT THIS LEVEL?: 0 MIN

## 2024-09-18 ASSESSMENT — COGNITIVE AND FUNCTIONAL STATUS - GENERAL
DAILY ACTIVITIY SCORE: 17
DRESSING REGULAR UPPER BODY CLOTHING: A LITTLE
MOVING TO AND FROM BED TO CHAIR: A LOT
MOVING TO AND FROM BED TO CHAIR: A LOT
STANDING UP FROM CHAIR USING ARMS: A LOT
EATING MEALS: A LITTLE
EATING MEALS: A LITTLE
HELP NEEDED FOR BATHING: A LITTLE
DAILY ACTIVITIY SCORE: 15
PERSONAL GROOMING: A LOT
MOVING FROM LYING ON BACK TO SITTING ON SIDE OF FLAT BED WITH BEDRAILS: A LOT
TOILETING: A LOT
PATIENT BASELINE BEDBOUND: NO
DRESSING REGULAR LOWER BODY CLOTHING: A LOT
DRESSING REGULAR UPPER BODY CLOTHING: A LITTLE
DRESSING REGULAR LOWER BODY CLOTHING: A LOT
HELP NEEDED FOR BATHING: A LITTLE
CLIMB 3 TO 5 STEPS WITH RAILING: A LOT
STANDING UP FROM CHAIR USING ARMS: A LOT
TOILETING: A LOT
MOBILITY SCORE: 12
CLIMB 3 TO 5 STEPS WITH RAILING: A LOT
DAILY ACTIVITIY SCORE: 15
WALKING IN HOSPITAL ROOM: A LOT
MOBILITY SCORE: 11
MOBILITY SCORE: 12
HELP NEEDED FOR BATHING: A LITTLE
DRESSING REGULAR UPPER BODY CLOTHING: A LITTLE
MOVING FROM LYING ON BACK TO SITTING ON SIDE OF FLAT BED WITH BEDRAILS: A LOT
HELP NEEDED FOR BATHING: A LITTLE
TURNING FROM BACK TO SIDE WHILE IN FLAT BAD: A LOT
WALKING IN HOSPITAL ROOM: TOTAL
WALKING IN HOSPITAL ROOM: A LOT
PERSONAL GROOMING: A LITTLE
TURNING FROM BACK TO SIDE WHILE IN FLAT BAD: A LOT
EATING MEALS: A LITTLE
STANDING UP FROM CHAIR USING ARMS: A LOT
CLIMB 3 TO 5 STEPS WITH RAILING: TOTAL
MOVING TO AND FROM BED TO CHAIR: A LOT
TURNING FROM BACK TO SIDE WHILE IN FLAT BAD: A LOT
DAILY ACTIVITIY SCORE: 16
DRESSING REGULAR LOWER BODY CLOTHING: A LOT
TOILETING: A LOT
DRESSING REGULAR UPPER BODY CLOTHING: A LITTLE
MOBILITY SCORE: 10
PERSONAL GROOMING: A LITTLE
MOVING TO AND FROM BED TO CHAIR: A LOT
MOVING FROM LYING ON BACK TO SITTING ON SIDE OF FLAT BED WITH BEDRAILS: A LOT
MOVING FROM LYING ON BACK TO SITTING ON SIDE OF FLAT BED WITH BEDRAILS: A LOT
STANDING UP FROM CHAIR USING ARMS: A LOT
TURNING FROM BACK TO SIDE WHILE IN FLAT BAD: A LOT
CLIMB 3 TO 5 STEPS WITH RAILING: TOTAL
TOILETING: A LOT
PERSONAL GROOMING: A LOT
DRESSING REGULAR LOWER BODY CLOTHING: A LOT
WALKING IN HOSPITAL ROOM: A LOT

## 2024-09-18 ASSESSMENT — ENCOUNTER SYMPTOMS
PALPITATIONS: 0
CONSTIPATION: 1
RHINORRHEA: 1
APPETITE CHANGE: 1
EYES NEGATIVE: 1
HEMATOLOGIC/LYMPHATIC NEGATIVE: 1
TROUBLE SWALLOWING: 0
PSYCHIATRIC NEGATIVE: 1
BACK PAIN: 1
SLEEP DISTURBANCE: 1
NEUROLOGICAL NEGATIVE: 1
CONFUSION: 1
VOMITING: 0
DIARRHEA: 0
ENDOCRINE NEGATIVE: 1
FEVER: 0
FATIGUE: 1
ACTIVITY CHANGE: 1
CHILLS: 0
SHORTNESS OF BREATH: 1
COUGH: 1
GASTROINTESTINAL NEGATIVE: 1
NAUSEA: 1
MUSCULOSKELETAL NEGATIVE: 1
DIAPHORESIS: 0
ALLERGIC/IMMUNOLOGIC NEGATIVE: 1

## 2024-09-18 ASSESSMENT — ACTIVITIES OF DAILY LIVING (ADL)
GROOMING: INDEPENDENT
TOILETING: NEEDS ASSISTANCE
BATHING: NEEDS ASSISTANCE
ADEQUATE_TO_COMPLETE_ADL: YES
FEEDING YOURSELF: INDEPENDENT
LACK_OF_TRANSPORTATION: NO
HEARING - RIGHT EAR: FUNCTIONAL
LACK_OF_TRANSPORTATION: PATIENT DECLINED
JUDGMENT_ADEQUATE_SAFELY_COMPLETE_DAILY_ACTIVITIES: YES
HEARING - LEFT EAR: FUNCTIONAL
PATIENT'S MEMORY ADEQUATE TO SAFELY COMPLETE DAILY ACTIVITIES?: YES
DRESSING YOURSELF: INDEPENDENT
WALKS IN HOME: DEPENDENT

## 2024-09-18 ASSESSMENT — PATIENT HEALTH QUESTIONNAIRE - PHQ9
SUM OF ALL RESPONSES TO PHQ9 QUESTIONS 1 & 2: 2
1. LITTLE INTEREST OR PLEASURE IN DOING THINGS: SEVERAL DAYS
2. FEELING DOWN, DEPRESSED OR HOPELESS: SEVERAL DAYS

## 2024-09-18 ASSESSMENT — LIFESTYLE VARIABLES
HOW OFTEN DO YOU HAVE 6 OR MORE DRINKS ON ONE OCCASION: NEVER
HOW OFTEN DO YOU HAVE A DRINK CONTAINING ALCOHOL: NEVER
AUDIT-C TOTAL SCORE: 0
AUDIT-C TOTAL SCORE: 0
HOW MANY STANDARD DRINKS CONTAINING ALCOHOL DO YOU HAVE ON A TYPICAL DAY: PATIENT DOES NOT DRINK
SKIP TO QUESTIONS 9-10: 1
AUDIT-C TOTAL SCORE: 0
SKIP TO QUESTIONS 9-10: 1

## 2024-09-18 ASSESSMENT — PAIN - FUNCTIONAL ASSESSMENT
PAIN_FUNCTIONAL_ASSESSMENT: 0-10

## 2024-09-18 ASSESSMENT — PAIN SCALES - GENERAL
PAINLEVEL_OUTOF10: 0 - NO PAIN
PAINLEVEL_OUTOF10: 0 - NO PAIN
PAINLEVEL_OUTOF10: 9
PAINLEVEL_OUTOF10: 9

## 2024-09-18 ASSESSMENT — PAIN DESCRIPTION - LOCATION: LOCATION: SHOULDER

## 2024-09-18 ASSESSMENT — PAIN DESCRIPTION - DESCRIPTORS: DESCRIPTORS: ACHING

## 2024-09-18 NOTE — CONSULTS
"Reason For Consult   Right Pleural effusion    History Of Present Illness  Humphrey Waddell is a 59 y.o. male medical history CAD (s/p CABG), carotid artery disease, HTN, HLD, DMT2, CKD 4 , GERD, PAD (s/p lt AKA), depression who presented to [facility] ED on 9/16 with dyspnea x 1 day with accompanying fatigue, reduced conversational ability.  Evaluation revealed encephalopathy with acute congestive heart failure (BNP 28, 635) with bilateral pleural effusions & superimposed pneumonia. Additionally found to be hypoxic and placed on 3 L to obtain 92% saturation, ultimately requiring increased to 6 L NC with intermittent BiPAP.  He was admitted to [facility] treated with DuoNebs, dexamethasone, lasix, ceftriaxone, doxycycline and Zosyn.  On the morning of 9/18 he was transferred to [facility] SDU.  He arrived on 6 L NC with labs significant for , hsTrop 62, Cr 3.09, h/h 7/24.2. He was started on Unasyn, albuterol nebs and oral prednisone and able to be weaned to 4 L NC.  Pulmonology is consulted for right pleural effusion.    Patient seen and examined with wife at bedside.  Presentation as above.  Patient reports doing significantly better as he has been \"peeing a lot\".  He is currently down to 1.3 L since coming to [facility].  Patient is able to converse and give answers but they often conflict with his wife's recall.  His wife reports that over the last 3 weeks he had become increasingly lethargic with poor appetite and not very talkative which is very unusual for him.  At home he does not use home O2, does not have any reported pulmonary disease and takes no pulmonary medications (although albuterol appears on his med list).  Since his left AKA he is less mobile and spends much of his day in the recliner while his wife is at work.  They are currently working to acquire a motorized scooter to assist with mobility.  Patient reports that his cough is improving and his wife reports it is \"less moist\".  Generally he swallows the sputum " but he did report that today he coughed up a quarter size dark brown/red bit of mucus.  His oxygen needs have been weaned to 4 L down from intermittent BiPAP.  He reports no fevers or chills, nausea or emesis.  He denies chest pain but does report chronic low back pain.      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    Surgical History  He has a past surgical history that includes Other surgical history (07/07/2021); Other surgical history (07/07/2021); CT angio head w and wo IV contrast (06/14/2021); CT angio neck (06/14/2021); MR angio neck wo IV contrast (07/18/2023); MR angio head wo IV contrast (12/11/2012); MR angio head wo IV contrast (06/12/2021); MR angio head wo IV contrast (05/25/2021); Total shoulder arthroplasty (Left, 2020); Leg Surgery (Right); and Cardiac surgery.     Social History  He reports that he quit smoking about 22 months ago. His smoking use included cigars. He has never used smokeless tobacco. He reports that he does not currently use alcohol. He reports that he does not currently use drugs after having used the following drugs: Marijuana.  Patient reports 10-year history of smoking the black and tan cigars roughly half a pack a day quitting in November 2023.  Prior to that he smoked a pipe.  Patient lives with his wife.  She works each day and he is home alone for the majority of the day.  His mobility is greatly  limited given his left AKA.    Family History  Family History   Problem Relation Name Age of Onset    Other (cardiac disorder) Mother      Hypertension Mother      Esophageal cancer Mother      Hearing loss Father      Hypertension Father      Heart disease Father      COPD Sister          Allergies  Carvedilol    Review of Systems  Review of Systems   Constitutional:  Positive for appetite change and fatigue. Negative for chills, diaphoresis and fever.   HENT:  Positive for postnasal drip and rhinorrhea. Negative for trouble swallowing.    Respiratory:  Positive for cough and shortness of breath.    Cardiovascular:  Positive for leg swelling. Negative for palpitations.   Gastrointestinal:  Positive for constipation and nausea. Negative for diarrhea and vomiting.   Musculoskeletal:  Positive for back pain.   Psychiatric/Behavioral:  Positive for confusion and sleep disturbance.          Physical Exam    Constitutional:   Obese, ill-appearing, NAD, cooperative  HENT: Atraumatic, semimoist mucous membranes  Eyes: Nonicteric  Neck: Supple, unable to assess JVD  Cardiovascular: S1, S2 normal, regular, HR 70s, no murmur appreciated  Pulmonary: Left lung fields with fair air entry & posterior mild crackles, otherwise clear; right side with poor to fair air entry with clear upper fields, diminished anterior/lateral midlung to base with posterior mild crackles; no rhonchi or wheezing noted  Abdominal: Obese, soft, nontender, + BS  Musculoskeletal: Left AKA, fair range of motion bilateral upper extremities with fair strength, minimal right lower extremity movement  Extremities:   +1 BLE pitting edema  Lymphadenopathy: No nuchal LAP  Skin: Warm and dry  Neurological: Awake and oriented x 3 with speech that is slow, clear and appropriate in general conversation with periods of forgetfulness and confusion; no focal deficits noted  Psychiatric:     Appropriate mood and behavior    Medications:  albuterol, 2.5 mg, nebulization,  "TID  amLODIPine, 10 mg, oral, Daily  ampicillin-sulbactam, 3 g, intravenous, q6h  aspirin, 81 mg, oral, Daily  atorvastatin, 80 mg, oral, Daily  enoxaparin, 40 mg, subcutaneous, q24h  fludrocortisone, 0.1 mg, oral, Daily  furosemide, 80 mg, intravenous, q8h  gabapentin, 300 mg, oral, TID  hydrALAZINE, 50 mg, oral, TID  insulin glargine, 2 Units, subcutaneous, Nightly  insulin regular, 0-10 Units, subcutaneous, TID  isosorbide dinitrate, 20 mg, oral, TID  lidocaine, 1 patch, transdermal, Daily  melatonin, 3 mg, oral, Nightly  metoprolol tartrate, 50 mg, oral, BID  oxygen, , inhalation, Continuous - Inhalation  pantoprazole, 40 mg, oral, Daily before breakfast   Or  pantoprazole, 40 mg, intravenous, Daily before breakfast  perflutren lipid microspheres, 0.5-10 mL of dilution, intravenous, Once in imaging  perflutren lipid microspheres, 0.5-10 mL of dilution, intravenous, Once in imaging  perflutren protein A microsphere, 0.5 mL, intravenous, Once in imaging  perflutren protein A microsphere, 0.5 mL, intravenous, Once in imaging  predniSONE, 40 mg, oral, Daily  sodium bicarbonate, 1,300 mg, oral, q12h  sulfur hexafluoride microsphr, 2 mL, intravenous, Once in imaging  sulfur hexafluoride microsphr, 2 mL, intravenous, Once in imaging  traZODone, 50 mg, oral, Nightly  venlafaxine XR, 150 mg, oral, Daily     PRN medications: acetaminophen **OR** acetaminophen **OR** acetaminophen, albuterol, dextrose, dextrose, glucagon, glucagon, glucagon, ondansetron **OR** ondansetron, polyethylene glycol      Last Recorded Vitals  Blood pressure 127/50, pulse 74, temperature 36.7 °C (98.1 °F), temperature source Skin, resp. rate 18, height 1.93 m (6' 4\"), weight 127 kg (279 lb 15.8 oz), SpO2 92%.    Relevant Results  Results for orders placed or performed during the hospital encounter of 09/18/24 (from the past 24 hour(s))   Protein, Total   Result Value Ref Range    Total Protein 6.0 (L) 6.4 - 8.2 g/dL   Lactate Dehydrogenase "   Result Value Ref Range     84 - 246 U/L   CBC   Result Value Ref Range    WBC 11.0 4.4 - 11.3 x10*3/uL    nRBC 0.0 0.0 - 0.0 /100 WBCs    RBC 2.64 (L) 4.50 - 5.90 x10*6/uL    Hemoglobin 7.0 (L) 13.5 - 17.5 g/dL    Hematocrit 24.2 (L) 41.0 - 52.0 %    MCV 92 80 - 100 fL    MCH 26.5 26.0 - 34.0 pg    MCHC 28.9 (L) 32.0 - 36.0 g/dL    RDW 16.2 (H) 11.5 - 14.5 %    Platelets 328 150 - 450 x10*3/uL   Basic metabolic panel   Result Value Ref Range    Glucose 118 (H) 74 - 99 mg/dL    Sodium 145 136 - 145 mmol/L    Potassium 3.5 3.5 - 5.3 mmol/L    Chloride 103 98 - 107 mmol/L    Bicarbonate 32 21 - 32 mmol/L    Anion Gap 14 10 - 20 mmol/L    Urea Nitrogen 45 (H) 6 - 23 mg/dL    Creatinine 3.09 (H) 0.50 - 1.30 mg/dL    eGFR 22 (L) >60 mL/min/1.73m*2    Calcium 7.7 (L) 8.6 - 10.3 mg/dL   Troponin I, High Sensitivity   Result Value Ref Range    Troponin I, High Sensitivity 62 (HH) 0 - 20 ng/L   B-type natriuretic peptide   Result Value Ref Range     (H) 0 - 99 pg/mL   Blood Gas Venous Full Panel   Result Value Ref Range    POCT pH, Venous 7.46 (H) 7.33 - 7.43 pH    POCT pCO2, Venous 50 41 - 51 mm Hg    POCT pO2, Venous 76 (H) 35 - 45 mm Hg    POCT SO2, Venous 96 (H) 45 - 75 %    POCT Oxy Hemoglobin, Venous 94.1 (H) 45.0 - 75.0 %    POCT Hematocrit Calculated, Venous 24.0 (L) 41.0 - 52.0 %    POCT Sodium, Venous 144 136 - 145 mmol/L    POCT Potassium, Venous 3.4 (L) 3.5 - 5.3 mmol/L    POCT Chloride, Venous 107 98 - 107 mmol/L    POCT Ionized Calicum, Venous 1.09 (L) 1.10 - 1.33 mmol/L    POCT Glucose, Venous 133 (H) 74 - 99 mg/dL    POCT Lactate, Venous 0.7 0.4 - 2.0 mmol/L    POCT Base Excess, Venous 10.6 (H) -2.0 - 3.0 mmol/L    POCT HCO3 Calculated, Venous 35.6 (H) 22.0 - 26.0 mmol/L    POCT Hemoglobin, Venous 8.1 (L) 13.5 - 17.5 g/dL    POCT Anion Gap, Venous 5.0 (L) 10.0 - 25.0 mmol/L    Patient Temperature 37.0 degrees Celsius    FiO2 22 %   Protime-INR   Result Value Ref Range    Protime 14.2 (H) 9.8 -  12.8 seconds    INR 1.3 (H) 0.9 - 1.1   POCT GLUCOSE   Result Value Ref Range    POCT Glucose 138 (H) 74 - 99 mg/dL   POCT GLUCOSE   Result Value Ref Range    POCT Glucose 135 (H) 74 - 99 mg/dL        XR chest 1 view  Result Date: 9/17/2024  Interpreted By:  Heather Aguilar, STUDY: XR CHEST 1 VIEW;  9/17/2024 9:44 pm   INDICATION: Signs/Symptoms:resp fail     COMPARISON: Chest x-ray 09/16/2024   ACCESSION NUMBER(S): [id number]   ORDERING CLINICIAN: ELIZABETH MITCHELL   TECHNIQUE: Semi-erect frontal view of the chest was obtained .   FINDINGS: Monitoring wires are overlying the patient.   The heart is enlarged. The patient is status post open heart surgery. There is vascular congestion and interstitial edema.   There are small bilateral pleural effusions. Airspace opacities are noted at the right perihilar region and left lung base. No pneumothorax.       1. Cardiomegaly. Vascular congestion and interstitial edema. Small bilateral pleural effusions. Bibasilar airspace opacities, may be secondary to atelectasis or pneumonia.       MACRO: None.   Signed by: Heather Aguilar 9/17/2024 10:49 PM Dictation workstation:   [id number]    CT chest abdomen pelvis wo IV contrast  Result Date: 9/17/2024  STUDY: CT Chest, Abdomen, and Pelvis without IV Contrast; 9/17/2024 3:33 AM INDICATION: Altered mental status.  Pulmonary edema.  Tactile fevers. COMPARISON: CXR 9/16/2024.  US gallbladder 5/23/2024.  CT AP 5/22/2024.  CT CAP 3/28/2024. ACCESSION NUMBER(S): [id number] ORDERING CLINICIAN: MELISSA VERMA TECHNIQUE: CT of the chest, abdomen, and pelvis was performed.  Contiguous axial images were obtained at 3 mm slice thickness through the chest, abdomen, and pelvis.  Coronal and sagittal reconstructions at 3 mm slice thickness were performed.  No intravenous contrast was administered.  FINDINGS: Please note that the evaluation of vessels, lymph nodes and organs is limited without intravenous contrast.  Image quality  is limited by extensive beam hardening artifact in the chest and upper abdomen due to the patient's upper extremities by the patient's side.  CHEST: MEDIASTINUM: Cardiac size is enlarged without pericardial effusion.  Cardiac blood pool appears slightly low in attenuation.  Mild calcified coronary plaque is noted.  LUNGS/PLEURA: There is a large right pleural effusion layering dependently.  Small left-sided pleural effusion is noted, loculated at the left apex. There is no pleural thickening, or pneumothorax.  The airways are patent. Mild pulmonary edema is suspected with mosaic attenuation the lungs suggesting chronic small airway disease and air trapping.  Relaxation atelectasis is seen in the dependent portion of the right lower lobe. There is an airspace disease in the left upper lobe and left lower lobe with significant volume loss of the left lung due to the pleural effusion, as well as the enlarged cardiac size.  LYMPH NODES: Thoracic lymph nodes are not enlarged. ABDOMEN:  LIVER: No hepatomegaly.  Smooth surface contour.  Normal attenuation.  BILE DUCTS: No intrahepatic or extrahepatic biliary ductal dilatation.  GALLBLADDER: Gallbladder is absent.  STOMACH: No abnormalities identified.  PANCREAS: No masses or ductal dilatation.  SPLEEN: No splenomegaly or focal splenic lesion.  ADRENAL GLANDS: No thickening or nodules.  KIDNEYS AND URETERS: Kidneys are normal in size and location.  No renal or ureteral calculi.  Moderate perinephric stranding is seen bilaterally.   PELVIS:  BLADDER: Urinary bladder is decompressed by Lee catheter with a small amount of air in the lumen of the urinary bladder and mild urinary bladder wall thickening.  REPRODUCTIVE ORGANS: No abnormalities identified.  BOWEL: Appendix is unremarkable.  Terminal ileum appears normal.  Cecum is located in the right midabdomen.  There is moderate concentric wall thickening of the rectosigmoid colon.  VESSELS: Mild calcified plaque is seen in  the abdominal aorta and iliac arteries.   PERITONEUM/RETROPERITONEUM/LYMPH NODES: No free fluid.  No pneumoperitoneum. No lymphadenopathy.  ABDOMINAL WALL: No abnormalities identified. SOFT TISSUES: Fluid-filled indirect left inguinal hernia is noted.  There is a small fat-containing indirect right inguinal hernia.  BONES: No acute fracture or aggressive osseous lesion.  Moderate multilevel disc disease is seen in the mid and lower thoracic spine as well as at L1-L2.  There is mild disc disease and vacuum phenomenon at L5-S1. Mild dextroconvex curvature is seen of the thoracolumbar spine.    Large right pleural effusion and small loculated left pleural effusion at the left lung apex with multifocal airspace disease in the left lung, likely atelectasis and possibly pneumonia in the appropriate clinical setting. Pronounced cardiomegaly with question of mild systemic anemia. Mild to moderate concentric wall thickening of the rectosigmoid colon suggesting a low-grade acute infectious or inflammatory distal colitis/proctitis. Decompressed urinary bladder with mild concentric wall thickening which may be due to underdistention however mild cystitis is a possibility in the upper clinical setting, correlate with urinalysis. Moderate-sized fluid filled left inguinal hernia along with a small fat-containing right inguinal hernia. Signed by Tristen Livingston    CT head wo IV contrast  Result Date: 9/16/2024  Interpreted By:  Nunu Keith, STUDY: CT HEAD WO IV CONTRAST;  9/16/2024 11:30 pm   INDICATION: Signs/Symptoms:AMS.   COMPARISON: 05/29/2024   ACCESSION NUMBER(S): [id number]   ORDERING CLINICIAN: MELISSA VERMA   TECHNIQUE: Axial noncontrast CT images of the head.   FINDINGS: BRAIN PARENCHYMA: Generalized parenchymal volume loss noted with concordant ventricular enlargement. Non-specific white matter changes noted, which may be related to small vessel disease.  No mass effect or midline shift. Low-attenuation foci  within the left medial thalamus likely sequela of small remote lacunar infarcts, similar to prior imaging.   HEMORRHAGE: No acute intracranial hemorrhage. VENTRICLES and EXTRA-AXIAL SPACES: Prominence of the lateral and 3rd ventricles slightly greater than expected for sulcal size however similar to prior imaging. EXTRACRANIAL SOFT TISSUES: Within normal limits. PARANASAL SINUSES/MASTOIDS: Mucosal thickening of the maxillary and right frontal sinuses. Minimal opacification mastoid air cells. Partial opacification of the left middle ear cavity. CALVARIUM: No depressed skull fracture. No destructive osseous lesion.   OTHER FINDINGS: Periapical lucencies and dental caries.       No acute intracranial hemorrhage or mass effect.   Periapical lucencies noted consistent with abscesses. Dental follow-up suggested.   Mild prominence of the lateral and 3rd ventricles which could be related to central white matter loss versus normal pressure hydrocephalus. Clinical correlation suggested.   Correlation for left-sided otitis media suggested.   Remote left thalamic lacunar infarct.   MACRO: None.   Signed by: Nunu Keith 9/16/2024 11:55 PM Dictation workstation:   [id number]    XR chest 1 view  Result Date: 9/16/2024  Interpreted By:  Rudy Gil, STUDY: XR CHEST 1 VIEW;  9/16/2024 10:26 pm   INDICATION: Signs/Symptoms:Shortness of breath, AMS.     COMPARISON: 05/28/2024   ACCESSION NUMBER(S): [id number]   ORDERING CLINICIAN: MELISSA VERMA   FINDINGS: There is enlargement of the cardiac silhouette with moderate to severe pulmonary edema. Bilateral small effusions. Sternal wires present. Bibasilar airspace disease       Moderate to severe pulmonary edema. Enlarged cardiac silhouette. Superimposed pneumonia would be difficult to exclude.     MACRO: None   Signed by: Rudy Gil 9/16/2024 10:38 PM Dictation workstation:   [id number]     Assessment/Plan     Humphrey Waddell is a 59 y.o. male medical history CAD  (s/p CABG), HFpEF, carotid artery disease, CVAs (cognitive impairment), HTN, HLD, DMT2, CKD 4, GERD, PAD (s/p lt AKA), depression who presented to [facility] ED on 9/16 with dyspnea x 1 day with accompanying fatigue, reduced conversational ability.  Evaluation revealed encephalopathy with acute congestive heart failure (BNP 28, 635) with bilateral pleural effusions & superimposed pneumonia. Additionally found to be hypoxic and placed on 3 L to obtain 92% saturation, ultimately requiring increased to 6 L NC with intermittent BiPAP.  He was admitted to [facility] treated with DuoNebs, dexamethasone, lasix, ceftriaxone, doxycycline and Zosyn.  On the morning of 9/18 he was transferred to [facility] SDU.  He arrived on 6 L NC with labs significant for , hsTrop 62, Cr 3.09, h/h 7/24.2. He was started on Unasyn, albuterol nebs and oral prednisone and able to be weaned to 4 L NC.  Pulmonology is consulted for right pleural effusion.    Impression/recommendations:    # Acute hypoxic respiratory failure: Multifactorial secondary to acute on chronic HFpEF, bilateral pleural effusions, pneumonia versus atelectasis; baseline is room air, required intermittent BiPAP wean to nasal cannula, improving currently on 4 L  -Continue supplemental oxygen, wean for saturation 92 to 95%  -If patient goes home, home O2 eval prior to discharge  -Bronchopulmonary hygiene with incentive spirometer and Acapella  -Continue scheduled albuterol    # ?  COPD: Patient and wife denied history of pulmonary disorders including COPD, asthma; no PFTs on file; patient does have recent smoking history  -No indication for steroids at this time, consider discontinuing  -Would benefit from outpatient PFTs when he is back to baseline    # Pleural effusion, bilateral: Right greater than left on CT; left effusion is loculated at the apex  -Diuresis as renal function and hemodynamics allows  -IR consulted by primary team for possible thoracentesis on 9/19; please  send for all lab studies, cultures and cytology (serum+ protein and LDH)    #Pneumonia: CT chest with noted left multifocal airspace disease concerning for atelectasis versus pneumonia; must also consider possible aspiration given patient's fluctuating mental status over the past 3 weeks  -Continue coverage with Unasyn  -Consider speech-language evaluation  -Will need repeat chest CT in 8 to 12 weeks to ensure resolution of opacities    # Heart Failure: HFpEF with 4/2024 echo showing EF 60 to 65% with impaired relaxation pattern of LV diastolic filling and mildly reduced RV systolic function; CT chest showing mild pulmonary edema; BNP at [facility] > 28,000, on admit to [place] [id number]  -Diuresis per cardiology, renal  -Agree with repeat echocardiogram    #Likely obstructive sleep apnea/obesity hypoventilation syndrome: BMI 34.08 with neck/trunk weight foci; wife reports snoring, daytime napping  -Would benefit from outpatient evaluation for sleep apnea, discussed with wife and patient reviewing with PCP and possibly seeing if he qualified for home study   -Can consider adding CPAP at night if patient requires additional support    DVT prophylaxis with Lovenox      Thank you for the consult.  Pulmonology will follow.    I spent 85 minutes in the professional and overall care of this patient.   Priscila Denton, CONG-CNS

## 2024-09-18 NOTE — H&P
History Of Present Illness  Humphrey Waddell is a 59 y.o. male with a past medical history of PAD s/p left above-the-knee amputation, anemia, coronary artery disease, s/p CABG carotid artery disease, CVA, depression, type 2 diabetes mellitus, GERD, hyperlipidemia, hypertension, osteoarthritis, peripheral arterial disease, CKD stage IV who presented to [facility] yesterday for evaluation of dyspnea on exertion, generalized weakness altered mental status, weight gain and hypoxia.  He denies cough fever chills nausea vomiting abdominal pain diarrhea or chest pain     Past Medical History  Past Medical History:   Diagnosis Date    Above-knee amputation of left lower extremity (Multi) 03/07/2021    emergent amputation, necrotizing fasciitis, revision 3/10/2021    Adverse effect of anesthesia     confusion    Anemia     CAD (coronary artery disease)     Carotid artery disease (CMS-HCC)     bilateral    CVA (cerebral vascular accident) (Multi) 2020    Depression     DM (diabetes mellitus) (Multi)     type 2 with neuropathy    GERD (gastroesophageal reflux disease)     High cholesterol     HTN (hypertension)     Leg ulcer (Multi)     chronic right lower extremity    Neuropathy     OA (osteoarthritis)     PAD (peripheral artery disease) (CMS-HCC)     Renal disorder     S/P CABG (coronary artery bypass graft) 11/13/2023    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        Surgical History  Past Surgical History:   Procedure Laterality Date    CARDIAC SURGERY      CABG 11/2023    CT ANGIO NECK  06/14/2021    CT NECK ANGIO W AND WO IV CONTRAST 6/14/2021 [facility] INPATIENT LEGACY    CT HEAD ANGIO W AND WO IV CONTRAST  06/14/2021    CT HEAD ANGIO W AND WO IV CONTRAST 6/14/2021 [facility] INPATIENT LEGACY    LEG SURGERY Right     MR HEAD ANGIO WO IV CONTRAST  12/11/2012    MR HEAD ANGIO WO IV CONTRAST [facility] CLINICAL LEGACY    MR HEAD ANGIO WO IV CONTRAST  06/12/2021    MR HEAD ANGIO WO IV CONTRAST [facility] EMERGENCY LEGACY    MR HEAD  ANGIO WO IV CONTRAST  05/25/2021    MR HEAD ANGIO WO IV CONTRAST [facility] INPATIENT LEGACY    MR NECK ANGIO WO IV CONTRAST  07/18/2023    MR NECK ANGIO WO IV CONTRAST 7/18/2023 [facility] [id number] MRI    OTHER SURGICAL HISTORY  07/07/2021    Knee reconstruction    OTHER SURGICAL HISTORY  07/07/2021    Lower extremity amputation above knee    TOTAL SHOULDER ARTHROPLASTY Left 2020         Social History  He reports that he quit smoking about 22 months ago. His smoking use included cigars. He has never used smokeless tobacco. He reports that he does not currently use alcohol. He reports that he does not currently use drugs after having used the following drugs: Marijuana.    Family History  Family History   Problem Relation Name Age of Onset    Other (cardiac disorder) Mother      Hypertension Mother      Esophageal cancer Mother      Hearing loss Father      Hypertension Father      Heart disease Father      COPD Sister          Allergies  Carvedilol    Review of Systems   Constitutional:  Positive for activity change, appetite change and fatigue.   HENT: Negative.     Eyes: Negative.    Respiratory:  Positive for shortness of breath.         Hypoxia   Cardiovascular:  Positive for leg swelling.        Dyspnea on exertion   Gastrointestinal: Negative.    Endocrine: Negative.    Genitourinary: Negative.    Musculoskeletal: Negative.    Skin: Negative.    Allergic/Immunologic: Negative.    Neurological: Negative.    Hematological: Negative.    Psychiatric/Behavioral: Negative.     All other systems reviewed and are negative.       Physical Exam  Vitals and nursing note reviewed.   Constitutional:       Appearance: Normal appearance. He is obese. He is ill-appearing.   HENT:      Head: Normocephalic.      Right Ear: External ear normal.      Left Ear: External ear normal.      Nose: Nose normal.      Mouth/Throat:      Mouth: Mucous membranes are dry.      Pharynx: Oropharynx is clear.   Eyes:      Extraocular Movements: Extraocular  "movements intact.      Conjunctiva/sclera: Conjunctivae normal.      Pupils: Pupils are equal, round, and reactive to light.   Cardiovascular:      Rate and Rhythm: Normal rate and regular rhythm.   Pulmonary:      Effort: Pulmonary effort is normal.      Breath sounds: Wheezing present.   Abdominal:      General: Abdomen is flat. Bowel sounds are normal. There is distension.      Palpations: Abdomen is soft.   Musculoskeletal:         General: Normal range of motion.      Right lower leg: Edema present.      Comments: Left AKA   Lymphadenopathy:      Cervical: Cervical adenopathy present.   Skin:     General: Skin is warm and dry.   Neurological:      Mental Status: He is alert.      Comments: Follow simple commands then dozes off  Wearing oxygen  Oriented to person and place   Psychiatric:         Mood and Affect: Mood normal.         Behavior: Behavior normal.          Last Recorded Vitals  Blood pressure (!) 161/95, pulse 85, temperature 36.8 °C (98.2 °F), temperature source Temporal, resp. rate 18, height 1.93 m (6' 4\"), weight 126 kg (278 lb 11.2 oz), SpO2 95%.    Relevant Results  Meds:  Scheduled medications  albuterol, 2.5 mg, nebulization, TID  amLODIPine, 10 mg, oral, Daily  ampicillin-sulbactam, 3 g, intravenous, q6h  aspirin, 81 mg, oral, Daily  atorvastatin, 80 mg, oral, Daily  enoxaparin, 40 mg, subcutaneous, q24h  fludrocortisone, 0.1 mg, oral, Daily  furosemide, 40 mg, intravenous, q12h  gabapentin, 300 mg, oral, TID  hydrALAZINE, 50 mg, oral, TID  insulin glargine, 2 Units, subcutaneous, Nightly  insulin regular, 0-10 Units, subcutaneous, TID  isosorbide dinitrate, 20 mg, oral, TID  lidocaine, 1 patch, transdermal, Daily  melatonin, 3 mg, oral, Nightly  metoprolol tartrate, 50 mg, oral, BID  oxygen, , inhalation, Continuous - Inhalation  pantoprazole, 40 mg, oral, Daily before breakfast   Or  pantoprazole, 40 mg, intravenous, Daily before breakfast  perflutren lipid microspheres, 0.5-10 mL of " dilution, intravenous, Once in imaging  perflutren lipid microspheres, 0.5-10 mL of dilution, intravenous, Once in imaging  perflutren protein A microsphere, 0.5 mL, intravenous, Once in imaging  perflutren protein A microsphere, 0.5 mL, intravenous, Once in imaging  sodium bicarbonate, 1,300 mg, oral, q12h  sulfur hexafluoride microsphr, 2 mL, intravenous, Once in imaging  sulfur hexafluoride microsphr, 2 mL, intravenous, Once in imaging  traZODone, 50 mg, oral, Nightly  venlafaxine XR, 150 mg, oral, Daily      Continuous medications     PRN medications  PRN medications: acetaminophen **OR** acetaminophen **OR** acetaminophen, albuterol, dextrose, dextrose, glucagon, glucagon, glucagon, ondansetron **OR** ondansetron, polyethylene glycol   Current Outpatient Medications   Medication Instructions    albuterol (ProAir HFA) 90 mcg/actuation inhaler 2 puffs, inhalation, Every 4 hours PRN    amLODIPine (NORVASC) 10 mg, oral, Daily    aspirin 81 mg chewable tablet 1 tablet, oral, Daily    atorvastatin (LIPITOR) 80 mg, oral, Daily, Last rx prior to next refills    blood sugar diagnostic (Blood Glucose Test) strip 1 strip, miscellaneous, 3 times daily    escitalopram (LEXAPRO) 10 mg, oral, Daily    flash glucose sensor kit (FreeStyle Suzy 2 Sensor) kit Use as instructed    fludrocortisone (FLORINEF) 0.1 mg, oral, Daily    FreeStyle Suzy reader (FreeStyle Suzy 2 [place]) misc Use as instructed    gabapentin (NEURONTIN) 300 mg, oral, 3 times daily    hydrALAZINE (APRESOLINE) 50 mg, oral, 3 times daily    insulin glargine (LANTUS SOLOSTAR U-100 INSULIN) 2 Units, subcutaneous, Nightly    insulin lispro (HUMALOG) 0-10 Units, subcutaneous, 3 times daily (morning, midday, late afternoon), Take as directed per insulin instructions.    isosorbide dinitrate (ISORDIL) 20 mg, oral, 3 times daily (0900,1400,1900)    lidocaine 4 % patch 1 patch, transdermal, Daily, Remove & discard patch within 12 hours or as directed by MD.     magnesium oxide (MAG-OX) 400 mg, oral, 2 times daily    melatonin 3 mg tablet 1 tablet, oral, Nightly    metoprolol tartrate (LOPRESSOR) 50 mg, oral, 2 times daily    NIFEdipine ER (ADALAT CC) 60 mg, oral, Daily before breakfast, Do not crush, chew, or split.    pantoprazole (PROTONIX) 40 mg, oral, Daily before breakfast, Do not crush, chew, or split.    polyethylene glycol (GLYCOLAX, MIRALAX) 17 g, oral, Every 12 hours PRN    sodium bicarbonate 650 mg tablet Take 2 tablets (1,300 mg total) by mouth in the morning and 2 tablets (1,300 mg total) before bedtime.    torsemide (DEMADEX) 60 mg, oral, Daily    traZODone (DESYREL) 50 mg, oral, Nightly    venlafaxine XR (EFFEXOR-XR) 150 mg, oral, Daily    zinc oxide 20 % ointment 1 Application, Topical, As needed        Labs:  Results for orders placed or performed during the hospital encounter of 09/16/24 (from the past 24 hour(s))   Comprehensive metabolic panel   Result Value Ref Range    Glucose 153 (H) 74 - 99 mg/dL    Sodium 143 136 - 145 mmol/L    Potassium 3.8 3.5 - 5.3 mmol/L    Chloride 102 98 - 107 mmol/L    Bicarbonate 27 21 - 32 mmol/L    Anion Gap 18 10 - 20 mmol/L    Urea Nitrogen 46 (H) 6 - 23 mg/dL    Creatinine 3.32 (H) 0.50 - 1.30 mg/dL    eGFR 20 (L) >60 mL/min/1.73m*2    Calcium 8.2 (L) 8.6 - 10.3 mg/dL    Albumin 3.1 (L) 3.4 - 5.0 g/dL    Alkaline Phosphatase 72 33 - 136 U/L    Total Protein 7.0 6.4 - 8.2 g/dL    AST 15 9 - 39 U/L    Bilirubin, Total 0.5 0.0 - 1.2 mg/dL    ALT 55 (H) 10 - 52 U/L   POCT GLUCOSE   Result Value Ref Range    POCT Glucose 130 (H) 74 - 99 mg/dL   Blood Gas Arterial Full Panel   Result Value Ref Range    POCT pH, Arterial 7.46 (H) 7.38 - 7.42 pH    POCT pCO2, Arterial 43 (H) 38 - 42 mm Hg    POCT pO2, Arterial 119 (H) 85 - 95 mm Hg    POCT SO2, Arterial 98 94 - 100 %    POCT Oxy Hemoglobin, Arterial 96.5 94.0 - 98.0 %    POCT Hematocrit Calculated, Arterial 30.0 (L) 41.0 - 52.0 %    POCT Sodium, Arterial 140 136 - 145 mmol/L     POCT Potassium, Arterial 4.0 3.5 - 5.3 mmol/L    POCT Chloride, Arterial 108 (H) 98 - 107 mmol/L    POCT Ionized Calcium, Arterial 1.13 1.10 - 1.33 mmol/L    POCT Glucose, Arterial 150 (H) 74 - 99 mg/dL    POCT Lactate, Arterial 0.8 0.4 - 2.0 mmol/L    POCT Base Excess, Arterial 6.1 (H) -2.0 - 3.0 mmol/L    POCT HCO3 Calculated, Arterial 30.6 (H) 22.0 - 26.0 mmol/L    POCT Hemoglobin, Arterial 10.0 (L) 13.5 - 17.5 g/dL    POCT Anion Gap, Arterial 5 (L) 10 - 25 mmo/L    Patient Temperature 37.0 degrees Celsius    FiO2 40 %    Ipap CMH2O 18.0 cm H2O    Epap CMH2O 8.0 cm H2O    Site of Arterial Puncture Radial Right     Alejandro's Test Positive    Ammonia   Result Value Ref Range    Ammonia 22 16 - 53 umol/L   POCT GLUCOSE   Result Value Ref Range    POCT Glucose 139 (H) 74 - 99 mg/dL   POCT GLUCOSE   Result Value Ref Range    POCT Glucose 137 (H) 74 - 99 mg/dL   Blood Gas Arterial Full Panel   Result Value Ref Range    POCT pH, Arterial 7.43 (H) 7.38 - 7.42 pH    POCT pCO2, Arterial 54 (H) 38 - 42 mm Hg    POCT pO2, Arterial 91 85 - 95 mm Hg    POCT SO2, Arterial 97 94 - 100 %    POCT Oxy Hemoglobin, Arterial 96.9 94.0 - 98.0 %    POCT Hematocrit Calculated, Arterial 23.0 (L) 41.0 - 52.0 %    POCT Sodium, Arterial 145 136 - 145 mmol/L    POCT Potassium, Arterial 3.7 3.5 - 5.3 mmol/L    POCT Chloride, Arterial 108 (H) 98 - 107 mmol/L    POCT Ionized Calcium, Arterial 1.12 1.10 - 1.33 mmol/L    POCT Glucose, Arterial 146 (H) 74 - 99 mg/dL    POCT Lactate, Arterial 0.7 0.4 - 2.0 mmol/L    POCT Base Excess, Arterial 10.3 (H) -2.0 - 3.0 mmol/L    POCT HCO3 Calculated, Arterial 35.8 (H) 22.0 - 26.0 mmol/L    POCT Hemoglobin, Arterial 7.8 (L) 13.5 - 17.5 g/dL    POCT Anion Gap, Arterial 5 (L) 10 - 25 mmo/L    Patient Temperature 37.0 degrees Celsius    FiO2 44 %    Apparatus CANNULA     Site of Arterial Puncture Radial Right     Alejandro's Test Positive       Imaging:       Assessment/Plan   Acute respiratory failure with  hypoxia and hypercapnia   Plan:  Lasix 40 mg IV every 12  Markedly elevated BNP  Supplemental O2 as needed  Check VBG  Pulmonary consultation  Thoracentesis right    Bilateral pleural effusions right greater than left  Plan:  Thoracentesis will send fluid for culture and fluid analysis    Acute metabolic encephalopathy  plan:  Secondary to IDANIA, respiratory failure and CHF    Exacerbation of COPD  Plan:  DuoNeb aerosols  Prednisone 40 mg orally daily x 5 days    Acute on chronic diastolic CHF  BNP was 28,000  Telemetry  IV Lasix 40 mg every 12 hours  Cardiology consultation    IDANIA on CKD 4  Trend creatinine especially with diuresis  Nephrology to see    Possible colitis  Zosyn    Class I obesity  Lifestyle modification  Pulmonary plans outpatient polysomnogram    DVT prophylaxis  Heparin 5000 units subcu every 8 hours  SCDs to the right lower extremity    PAD  Status post left AKA    CAD  Status post CABG  I spent 60 minutes in the professional and overall care of this patient.      Martin Gant DO

## 2024-09-18 NOTE — SIGNIFICANT EVENT
Received a phone call from the nurse about the patient's elevated blood pressure.  She describes him as unable to be awake enough to take oral medications which is not unlike when I saw him briefly last night in ER.  She told me the patient is being transferred to a tertiary care center waiting currently on a bed.  Although metoprolol was listed as home medication he does have listed allergy to Coreg.  Will try clonidine patch instead.  Discussed with nurse

## 2024-09-18 NOTE — CONSULTS
Reason For Consult  IDANIA on top of CKD stage IV    History Of Present Illness  Humphrey Waddell is a 59 y.o. male presenting with generalized weakness, mental status changes, decreased appetite and progressively worsening shortness of breath and gaining weight as per wife.  Patient got more lethargic the day of presentation to the hospital that was when the wife decided to bring the patient to the hospital.  Patient history of PAD, diabetes mellitus, status post CABG with coronary artery disease, depression, GERD, hypertension, with leg ulcer and neuropathy, patient has PAD as mentioned with left above-knee amputation.  Nephrology was consulted for IDANIA on top of chronic kidney disease stage IV with congestive heart failure and fluid overload.  Reports of hemoptysis while coughing yesterday.     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    Surgical History  He has a past surgical history that includes Other surgical history (07/07/2021); Other surgical history (07/07/2021); CT angio head w and wo IV contrast (06/14/2021); CT angio neck (06/14/2021); MR angio neck wo IV contrast (07/18/2023); MR angio head wo IV contrast (12/11/2012); MR angio head wo IV contrast (06/12/2021); MR angio head wo IV contrast (05/25/2021); Total shoulder arthroplasty (Left, 2020); Leg Surgery (Right); and Cardiac surgery.     Social History  He reports that he quit smoking  "about 22 months ago. His smoking use included cigars. He has never used smokeless tobacco. He reports that he does not currently use alcohol. He reports that he does not currently use drugs after having used the following drugs: Marijuana.    Family History  Family History   Problem Relation Name Age of Onset    Other (cardiac disorder) Mother      Hypertension Mother      Esophageal cancer Mother      Hearing loss Father      Hypertension Father      Heart disease Father      COPD Sister          Allergies  Carvedilol    Review of Systems  All systems were reviewed     Physical Exam  General appearance: Awake and alert and in mild respiratory distress  Head and ENT; normocephalic/atraumatic/supple neck/no JVD  Lungs; bilateral lower lobe crackles up to mid lung fields  Heart; RRR  Abdomen: Obese/distended  Extremities; left left above-knee amputation  Mild edema in the right side  Neurologic: Physiologic         I&O 24HR    Intake/Output Summary (Last 24 hours) at 9/18/2024 1138  Last data filed at 9/18/2024 0624  Gross per 24 hour   Intake 100 ml   Output 800 ml   Net -700 ml       Vitals 24HR  Heart Rate:  []   Temp:  [36.8 °C (98.2 °F)-37.7 °C (99.9 °F)]   Resp:  [15-21]   BP: (147-181)/()   Height:  [193 cm (6' 4\")]   Weight:  [126 kg (278 lb 11.2 oz)-129 kg (284 lb 3.2 oz)]   SpO2:  [92 %-99 %]         Relevant Results  Results from last 7 days   Lab Units 09/18/24  0434 09/17/24  1300 09/16/24  2227   SODIUM mmol/L 145 143 145   POTASSIUM mmol/L 3.5 3.8 4.0   CHLORIDE mmol/L 103 102 103   CO2 mmol/L 32 27 28   BUN mg/dL 45* 46* 42*   CREATININE mg/dL 3.09* 3.32* 3.30*   GLUCOSE mg/dL 118* 153* 119*   CALCIUM mg/dL 7.7* 8.2* 8.3*     Results from last 7 days   Lab Units 09/18/24  0434 09/16/24  2227   WBC AUTO x10*3/uL 11.0 11.9*   HEMOGLOBIN g/dL 7.0* 8.1*   HEMATOCRIT % 24.2* 27.5*   PLATELETS AUTO x10*3/uL 328 348   ECG 12 lead    Result Date: 9/18/2024   Poor data quality, interpretation may be " adversely affected Normal sinus rhythm with 1st degree AV block Right bundle branch block Abnormal ECG When compared with ECG of 28-MAY-2024 10:40, Nonspecific T wave abnormality now evident in Inferior leads T wave inversion now evident in Anterior leads Confirmed by Troy Vásquez ([id number]) on 9/18/2024 8:18:41 AM    XR chest 1 view    Result Date: 9/17/2024  Interpreted By:  Heather Aguilar, STUDY: XR CHEST 1 VIEW;  9/17/2024 9:44 pm   INDICATION: Signs/Symptoms:resp fail     COMPARISON: Chest x-ray 09/16/2024   ACCESSION NUMBER(S): [id number]   ORDERING CLINICIAN: ELIZABETH MITCHELL   TECHNIQUE: Semi-erect frontal view of the chest was obtained .   FINDINGS: Monitoring wires are overlying the patient.   The heart is enlarged. The patient is status post open heart surgery. There is vascular congestion and interstitial edema.   There are small bilateral pleural effusions. Airspace opacities are noted at the right perihilar region and left lung base. No pneumothorax.       1. Cardiomegaly. Vascular congestion and interstitial edema. Small bilateral pleural effusions. Bibasilar airspace opacities, may be secondary to atelectasis or pneumonia.       MACRO: None.   Signed by: Heather Aguilar 9/17/2024 10:49 PM Dictation workstation:   [id number]    CT chest abdomen pelvis wo IV contrast    Result Date: 9/17/2024  STUDY: CT Chest, Abdomen, and Pelvis without IV Contrast; 9/17/2024 3:33 AM INDICATION: Altered mental status.  Pulmonary edema.  Tactile fevers. COMPARISON: CXR 9/16/2024.  US gallbladder 5/23/2024.  CT AP 5/22/2024.  CT CAP 3/28/2024. ACCESSION NUMBER(S): [id number] ORDERING CLINICIAN: MELISSA VERMA TECHNIQUE: CT of the chest, abdomen, and pelvis was performed.  Contiguous axial images were obtained at 3 mm slice thickness through the chest, abdomen, and pelvis.  Coronal and sagittal reconstructions at 3 mm slice thickness were performed.  No intravenous contrast was administered.  FINDINGS:  Please note that the evaluation of vessels, lymph nodes and organs is limited without intravenous contrast.  Image quality is limited by extensive beam hardening artifact in the chest and upper abdomen due to the patient's upper extremities by the patient's side.  CHEST: MEDIASTINUM: Cardiac size is enlarged without pericardial effusion.  Cardiac blood pool appears slightly low in attenuation.  Mild calcified coronary plaque is noted.  LUNGS/PLEURA: There is a large right pleural effusion layering dependently.  Small left-sided pleural effusion is noted, loculated at the left apex. There is no pleural thickening, or pneumothorax.  The airways are patent. Mild pulmonary edema is suspected with mosaic attenuation the lungs suggesting chronic small airway disease and air trapping.  Relaxation atelectasis is seen in the dependent portion of the right lower lobe. There is an airspace disease in the left upper lobe and left lower lobe with significant volume loss of the left lung due to the pleural effusion, as well as the enlarged cardiac size.  LYMPH NODES: Thoracic lymph nodes are not enlarged. ABDOMEN:  LIVER: No hepatomegaly.  Smooth surface contour.  Normal attenuation.  BILE DUCTS: No intrahepatic or extrahepatic biliary ductal dilatation.  GALLBLADDER: Gallbladder is absent.  STOMACH: No abnormalities identified.  PANCREAS: No masses or ductal dilatation.  SPLEEN: No splenomegaly or focal splenic lesion.  ADRENAL GLANDS: No thickening or nodules.  KIDNEYS AND URETERS: Kidneys are normal in size and location.  No renal or ureteral calculi.  Moderate perinephric stranding is seen bilaterally.   PELVIS:  BLADDER: Urinary bladder is decompressed by Lee catheter with a small amount of air in the lumen of the urinary bladder and mild urinary bladder wall thickening.  REPRODUCTIVE ORGANS: No abnormalities identified.  BOWEL: Appendix is unremarkable.  Terminal ileum appears normal.  Cecum is located in the right  midabdomen.  There is moderate concentric wall thickening of the rectosigmoid colon.  VESSELS: Mild calcified plaque is seen in the abdominal aorta and iliac arteries.   PERITONEUM/RETROPERITONEUM/LYMPH NODES: No free fluid.  No pneumoperitoneum. No lymphadenopathy.  ABDOMINAL WALL: No abnormalities identified. SOFT TISSUES: Fluid-filled indirect left inguinal hernia is noted.  There is a small fat-containing indirect right inguinal hernia.  BONES: No acute fracture or aggressive osseous lesion.  Moderate multilevel disc disease is seen in the mid and lower thoracic spine as well as at L1-L2.  There is mild disc disease and vacuum phenomenon at L5-S1. Mild dextroconvex curvature is seen of the thoracolumbar spine.    Large right pleural effusion and small loculated left pleural effusion at the left lung apex with multifocal airspace disease in the left lung, likely atelectasis and possibly pneumonia in the appropriate clinical setting. Pronounced cardiomegaly with question of mild systemic anemia. Mild to moderate concentric wall thickening of the rectosigmoid colon suggesting a low-grade acute infectious or inflammatory distal colitis/proctitis. Decompressed urinary bladder with mild concentric wall thickening which may be due to underdistention however mild cystitis is a possibility in the upper clinical setting, correlate with urinalysis. Moderate-sized fluid filled left inguinal hernia along with a small fat-containing right inguinal hernia. Signed by Tristen Livingston    CT head wo IV contrast    Result Date: 9/16/2024  Interpreted By:  Nunu Keith, STUDY: CT HEAD WO IV CONTRAST;  9/16/2024 11:30 pm   INDICATION: Signs/Symptoms:AMS.   COMPARISON: 05/29/2024   ACCESSION NUMBER(S): [id number]   ORDERING CLINICIAN: MELISSA VERMA   TECHNIQUE: Axial noncontrast CT images of the head.   FINDINGS: BRAIN PARENCHYMA: Generalized parenchymal volume loss noted with concordant ventricular enlargement. Non-specific  white matter changes noted, which may be related to small vessel disease.  No mass effect or midline shift. Low-attenuation foci within the left medial thalamus likely sequela of small remote lacunar infarcts, similar to prior imaging.   HEMORRHAGE: No acute intracranial hemorrhage. VENTRICLES and EXTRA-AXIAL SPACES: Prominence of the lateral and 3rd ventricles slightly greater than expected for sulcal size however similar to prior imaging. EXTRACRANIAL SOFT TISSUES: Within normal limits. PARANASAL SINUSES/MASTOIDS: Mucosal thickening of the maxillary and right frontal sinuses. Minimal opacification mastoid air cells. Partial opacification of the left middle ear cavity. CALVARIUM: No depressed skull fracture. No destructive osseous lesion.   OTHER FINDINGS: Periapical lucencies and dental caries.       No acute intracranial hemorrhage or mass effect.   Periapical lucencies noted consistent with abscesses. Dental follow-up suggested.   Mild prominence of the lateral and 3rd ventricles which could be related to central white matter loss versus normal pressure hydrocephalus. Clinical correlation suggested.   Correlation for left-sided otitis media suggested.   Remote left thalamic lacunar infarct.   MACRO: None.   Signed by: Nunu Keith 9/16/2024 11:55 PM Dictation workstation:   [id number]    XR chest 1 view    Result Date: 9/16/2024  Interpreted By:  Rudy Gil, STUDY: XR CHEST 1 VIEW;  9/16/2024 10:26 pm   INDICATION: Signs/Symptoms:Shortness of breath, AMS.     COMPARISON: 05/28/2024   ACCESSION NUMBER(S): [id number]   ORDERING CLINICIAN: MELISSA VERMA   FINDINGS: There is enlargement of the cardiac silhouette with moderate to severe pulmonary edema. Bilateral small effusions. Sternal wires present. Bibasilar airspace disease       Moderate to severe pulmonary edema. Enlarged cardiac silhouette. Superimposed pneumonia would be difficult to exclude.     MACRO: None   Signed by: Rudy Gil  9/16/2024 10:38 PM Dictation workstation:   [id number]    XR shoulder left 2+ views    Result Date: 9/5/2024  Interpreted By:  Alisson Paz, STUDY: Left shoulder, 3 views.   INDICATION: Signs/Symptoms:left shoulder sx..   COMPARISON: 07/31/2024.   ACCESSION NUMBER(S): [id number]   ORDERING CLINICIAN: MEGHANN DELANEY   FINDINGS: No acute fracture or malalignment. Anatomic shoulder arthroplasty changes noted. Hardware is intact without perihardware fractures or lucencies. Mild AC joint osteoarthrosis with osteophytes noted, unchanged.   Soft tissues are unremarkable.       1. Anatomic shoulder arthroplasty without hardware complication. 2. Mild AC joint osteoarthrosis similar to prior.   MACRO:   None.   Signed by: Alisson Paz 9/5/2024 5:46 AM Dictation workstation:   [id number]         Assessment/Plan   1.  IDANIA on top of CKD stage IV  2.  Acute congestive heart failure with pleural effusion, with complaints of hemoptysis  Will increase diuretics to Lasix 80 mg IV push Q8  Will check both anti-GBM and ANCA levels today.  3.  PAD status post left AKA  4.  Coronary disease status post CABG  5.  Diabetes mellitus continue current management  6.  Hypertension continue current management    Will continue monitoring patient daily reviewing labs and other consultants input  Assessment & Plan  CHF (congestive heart failure), NYHA class I, acute on chronic, combined (Multi)      I spent 54 minutes in the professional and overall care of this patient.      Austin Tracy MD

## 2024-09-18 NOTE — SIGNIFICANT EVENT
Patient CKD is close to his baseline but requiring 4 L of nasal cannula oxygen appreciated cardiology input continue with diuresis with Lasix twice a day, echocardiogram is pending.

## 2024-09-18 NOTE — PROGRESS NOTES
09/18/24 1228   Discharge Planning   Living Arrangements Spouse/significant other   Support Systems Spouse/significant other   Assistance Needed Wife assists   Type of Residence Private residence   Number of Stairs to Enter Residence 0   Number of Stairs Within Residence 0   Do you have animals or pets at home? Yes   Type of Animals or Pets one dog   Who is requesting discharge planning? Provider   Home or Post Acute Services Post acute facilities (Rehab/SNF/etc)   Type of Post Acute Facility Services Skilled nursing   Expected Discharge Disposition SNF   Does the patient need discharge transport arranged? Yes   RoundTrip coordination needed? Yes   Has discharge transport been arranged? No   Financial Resource Strain   How hard is it for you to pay for the very basics like food, housing, medical care, and heating? Not hard   Housing Stability   In the last 12 months, was there a time when you were not able to pay the mortgage or rent on time? N   In the past 12 months, how many times have you moved where you were living? 0   At any time in the past 12 months, were you homeless or living in a shelter (including now)? N   Transportation Needs   In the past 12 months, has lack of transportation kept you from medical appointments or from getting medications? no   In the past 12 months, has lack of transportation kept you from meetings, work, or from getting things needed for daily living? No   Patient Choice   Provider Choice list and CMS website (https://medicare.gov/care-compare#search) for post-acute Quality and Resource Measure Data were provided and reviewed with: Patient;Family   Patient / Family choosing to utilize agency / facility established prior to hospitalization No          Met with patient at bedside and explained my role as care coordinator. Patient lives in the house with his wife. Wife assists with hid ADL's. He has left AKA. He uses a wheelchair and wife helps him. No oxygen in use at home, no HD.   His PCCP is Dr. Mily Leung and he seen her one month ago. Pharmacy he uses  is [facility] in [place]. He is able to afford medications and wife transports him to doctors appointments.    0725    Patient's wife called and confirmed information that was given per her . She also said that he just finished with [facility]. Wife stated if he qualifies for SNF she wouldn't mind.   Requested from Dr. Palmer for order for PT/OT to see what his needs might be. Will continue to follow for discharge needs. Patient came in with CHF and is diuresing.

## 2024-09-18 NOTE — CARE PLAN
Problem: Nutrition  Goal: Less than 5 days NPO/clear liquids  Outcome: Progressing  Goal: Oral intake greater than 50%  Outcome: Progressing  Goal: Oral intake greater 75%  Outcome: Progressing  Goal: Consume prescribed supplement  Outcome: Progressing  Goal: Adequate PO fluid intake  Outcome: Progressing  Goal: Nutrition support goals are met within 48 hrs  Outcome: Progressing  Goal: Nutrition support is meeting 75% of nutrient needs  Outcome: Progressing  Goal: Tube feed tolerance  Outcome: Progressing  Goal: BG  mg/dL  Outcome: Progressing  Goal: Lab values WNL  Outcome: Progressing  Goal: Electrolytes WNL  Outcome: Progressing  Goal: Promote healing  Outcome: Progressing  Goal: Maintain stable weight  Outcome: Progressing  Goal: Reduce weight from edema/fluid  Outcome: Progressing  Goal: Gradual weight gain  Outcome: Progressing  Goal: Improve ostomy output  Outcome: Progressing   The patient's goals for the shift include      The clinical goals for the shift include decrease oxygen demand    Over the shift, the patient did not make progress toward the following goals. Barriers to progression include . Recommendations to address these barriers include   Problem: Nutrition  Goal: Less than 5 days NPO/clear liquids  9/18/2024 1222 by Desirae Tran RN  Outcome: Progressing  9/18/2024 1137 by Desirae Tran RN  Outcome: Progressing  Goal: Oral intake greater than 50%  9/18/2024 1222 by Desirae Tran RN  Outcome: Progressing  9/18/2024 1137 by Desirae Tran RN  Outcome: Progressing  Goal: Oral intake greater 75%  9/18/2024 1222 by Desirae Tran RN  Outcome: Progressing  9/18/2024 1137 by Desirae Tran RN  Outcome: Progressing  Goal: Consume prescribed supplement  9/18/2024 1222 by Desirae Tran RN  Outcome: Progressing  9/18/2024 1137 by Desirae Tran RN  Outcome: Progressing  Goal: Adequate PO fluid intake  9/18/2024 1222 by Desirae Tran RN  Outcome: Progressing  9/18/2024 1137 by Desirae Tran RN  Outcome:  Progressing  Goal: Nutrition support goals are met within 48 hrs  9/18/2024 1222 by Desirae Tran RN  Outcome: Progressing  9/18/2024 1137 by Desirae Tran RN  Outcome: Progressing  Goal: Nutrition support is meeting 75% of nutrient needs  9/18/2024 1222 by Desirae Tran RN  Outcome: Progressing  9/18/2024 1137 by Desirae Tran RN  Outcome: Progressing  Goal: Tube feed tolerance  9/18/2024 1222 by Desirae Tran RN  Outcome: Progressing  9/18/2024 1137 by Desirae Tran RN  Outcome: Progressing  Goal: BG  mg/dL  9/18/2024 1222 by Desirae Tran RN  Outcome: Progressing  9/18/2024 1137 by Desirae Tran RN  Outcome: Progressing  Goal: Lab values WNL  9/18/2024 1222 by Desirae Tran RN  Outcome: Progressing  9/18/2024 1137 by Desirae Tran RN  Outcome: Progressing  Goal: Electrolytes WNL  9/18/2024 1222 by Desirae Tran RN  Outcome: Progressing  9/18/2024 1137 by Desirae Tran RN  Outcome: Progressing  Goal: Promote healing  9/18/2024 1222 by Desirae Tran RN  Outcome: Progressing  9/18/2024 1137 by Desirae Tran RN  Outcome: Progressing  Goal: Maintain stable weight  9/18/2024 1222 by Desirae Tran RN  Outcome: Progressing  9/18/2024 1137 by Desirae Tran RN  Outcome: Progressing  Goal: Reduce weight from edema/fluid  9/18/2024 1222 by Desirae Tran RN  Outcome: Progressing  9/18/2024 1137 by Desirae Tran RN  Outcome: Progressing  Goal: Gradual weight gain  9/18/2024 1222 by Desirae Tran RN  Outcome: Progressing  9/18/2024 1137 by Desirae Tran RN  Outcome: Progressing  Goal: Improve ostomy output  9/18/2024 1222 by Desirae Tran RN  Outcome: Progressing  9/18/2024 1137 by Desirae Tran RN  Outcome: Progressing   .

## 2024-09-18 NOTE — DISCHARGE SUMMARY
Discharge Diagnosis  Acute encephalopathy  Pleural effusion  Probable Pneumonia  CHF exacerbation with diastolic dysfunction  Hypertensive Urgency/crisis  Left AKA   Acute on chronic kidney disease stage 3  Diabetes mellitus type 2 in obese patient    Issues Requiring Follow-Up  Transfer to [facility]    Discharge Meds     Medication List      CONTINUE taking these medications     FreeStyle Suzy 2 [place] misc; Generic drug: flash glucose scanning   reader; Use as instructed   FreeStyle Suzy 2 Sensor kit; Generic drug: flash glucose sensor kit;   Use as instructed     ASK your doctor about these medications     albuterol 90 mcg/actuation inhaler; Commonly known as: ProAir HFA;   Inhale 2 puffs every 4 hours if needed for wheezing or shortness of   breath.   amLODIPine 10 mg tablet; Commonly known as: Norvasc; Take 1 tablet (10   mg) by mouth once daily.   aspirin 81 mg chewable tablet   atorvastatin 80 mg tablet; Commonly known as: Lipitor; TAKE 1 TABLET (80   MG) BY MOUTH ONCE DAILY. LAST RX PRIOR TO NEXT REFILLS   Blood Glucose Test strip; Generic drug: blood sugar diagnostic; 1 strip   3 times a day.   escitalopram 10 mg tablet; Commonly known as: Lexapro; TAKE 1 TABLET BY   MOUTH EVERY DAY   fludrocortisone 0.1 mg tablet; Commonly known as: Florinef; Take 1   tablet (0.1 mg) by mouth once daily.   gabapentin 300 mg capsule; Commonly known as: Neurontin; Take 1 capsule   (300 mg) by mouth 3 times a day.   hydrALAZINE 50 mg tablet; Commonly known as: Apresoline; Take 1 tablet   (50 mg) by mouth 3 times a day.   insulin lispro 100 unit/mL injection; Commonly known as: HumaLOG; Inject   0-0.1 mL (0-10 Units) under the skin 3 times a day with meals. Take as   directed per insulin instructions. Do not start before March 30, 2024.   isosorbide dinitrate 20 mg tablet; Commonly known as: Isordil; Take 1   tablet (20 mg) by mouth 3 times a day.   Lantus Solostar U-100 Insulin 100 unit/mL (3 mL) pen; Generic drug:   insulin  glargine   lidocaine 4 % patch; Place 1 patch over 12 hours on the skin once daily.   Remove & discard patch within 12 hours or as directed by MD.; Ask about:   Which instructions should I use?   magnesium oxide 400 mg tablet; Commonly known as: Mag-Ox; Take 1 tablet   (400 mg) by mouth 2 times a day.   melatonin 3 mg tablet   metoprolol tartrate 50 mg tablet; Commonly known as: Lopressor; Take 1   tablet by mouth 2 times a day.   NIFEdipine ER 60 mg 24 hr tablet; Commonly known as: Adalat CC; Take 1   tablet (60 mg) by mouth once daily in the morning. Take before meals. Do   not crush, chew, or split.   pantoprazole 40 mg EC tablet; Commonly known as: ProtoNix; Take 1 tablet   (40 mg) by mouth once daily in the morning. Take before meals. Do not   crush, chew, or split.   polyethylene glycol 17 gram packet; Commonly known as: Glycolax,   Miralax; Take 17 g by mouth every 12 hours if needed (constipation).   sodium bicarbonate 650 mg tablet; Take 2 tablets (1,300 mg total) by   mouth in the morning and 2 tablets (1,300 mg total) before bedtime.   torsemide 20 mg tablet; Commonly known as: Demadex; Take 3 tablets (60   mg) by mouth once daily.   traZODone 50 mg tablet; Commonly known as: Desyrel   venlafaxine  mg 24 hr capsule; Commonly known as: Effexor-XR; Take   1 capsule (150 mg) by mouth once daily.   zinc oxide 20 % ointment; Apply 1 Application topically if needed for   irritation.       Test Results Pending At Discharge  Pending Labs       Order Current Status    Procalcitonin In process            Hospital Course   Mr. Waddell was admitted to the hospital with change in mentation. He was found to have pleural effusion and there was concern for Pna. He was placed on antibiotics and started on diuresis with IV lasix. He responded well to diuresis. However, transfer to [facility] was initiated in the ER and patient was transfer to [facility] when bed became available.        Pertinent Physical Exam At Time of  Discharge  Physical Exam  I did not see patient on 9/18 as patient has left for [facility] overnight.    The amount of time spent in care is writing of this discharge note.   Less than 30 minutes of discharge time was spent in discharging patient.     Outpatient Follow-Up  Future Appointments   Date Time Provider Department Center   9/24/2024  1:20 PM Mily Leung DO [id number] [facility]   10/11/2024  8:00 AM [facility] VASC 1 [facility]   10/11/2024  9:00 AM Agusto Elkins MD [id number] [facility]         Srikanth Metz MD

## 2024-09-18 NOTE — CONSULTS
"Inpatient consult to Cardiology  Consult performed by: Junior Araujo DO  Consult ordered by: Martin Gant DO  Reason for consult: CHF        History Of Present Illness:    Humphrey Waddell is a 59 y.o. male with a pertinent medical Hx notable for  PAD s/p left above-the-knee amputation, anemia, coronary artery disease, s/p CABG carotid artery disease, CVA, depression, type 2 diabetes mellitus, GERD, hyperlipidemia, hypertension, osteoarthritis, peripheral arterial disease, CKD stage IV who was transferred from [facility] to [facility] due to AMS.  Cardiology has been consulted for \"CHF\"     History obtained from chart review and discussion with patient. He apparently presented to outside medical facility for evaluation of shortness of breath.  He was encephalopathic with evidence of decompensated heart failure with preserved ejection fraction given bilateral pleural effusions with a possible superimposed bacterial pneumonia.  He was given antibiotics, placed on BiPAP therapy.  He was started on furosemide IV 40 mg twice daily.  Pulmonary medicine was consulted for consideration of thoracentesis.  Given encephalopathy, there was concern for neurological decompensation and he was subsequently transferred to [facility].    When seen today for consultation, he appears much more cogent.  He is able to answer questions.  He reports that he has been on torsemide, but does note that he has not had significant urinary output with this at home.  He has had increased swelling, weight gain, weeping sores from his left lower extremity stump.  He has not been able to wear his compression stockings because of this.  From his discharge summary 7/9/2024 he is at least 25 pounds above their discharge weight of 254 ( 115.2 kg) he is on supplemental oxygen.  A Lee catheter is currently placed.  There is light yellow urine within the collection system.      Patient denies chest pain and angina.  Pt reports shortness of breath, dyspnea on " exertion, orthopnea, and paroxysmal nocturnal dyspnea.  Pt reports worsening lower extremity edema.  Pt denies palpitations or syncope.  No recent falls.  No fever or chills.  No cough.  No change in bowel or bladder habits.  No travel.  No sick contacts.  No recent travel    Last Recorded Vitals:  Vitals:    09/18/24 0409 09/18/24 0534 09/18/24 0824 09/18/24 0905   BP: (!) 161/95   159/87   BP Location: Left arm   Left arm   Patient Position: Lying   Lying   Pulse: 85   103   Resp: 18   18   Temp: 36.8 °C (98.2 °F)   36.9 °C (98.4 °F)   TempSrc: Temporal   Skin   SpO2: 95%  92% 92%   Weight:  127 kg (279 lb 15.8 oz)     Height:           Last Labs:  Results from last 7 days   Lab Units 09/18/24  0434 09/16/24  2227   WBC AUTO x10*3/uL 11.0 11.9*   HEMOGLOBIN g/dL 7.0* 8.1*   HEMATOCRIT % 24.2* 27.5*   PLATELETS AUTO x10*3/uL 328 348     Results from last 7 days   Lab Units 09/18/24  0434 09/17/24  1300 09/16/24  2227   SODIUM mmol/L 145 143 145   POTASSIUM mmol/L 3.5 3.8 4.0   CHLORIDE mmol/L 103 102 103   CO2 mmol/L 32 27 28   BUN mg/dL 45* 46* 42*   CREATININE mg/dL 3.09* 3.32* 3.30*   CALCIUM mg/dL 7.7* 8.2* 8.3*   PROTEIN TOTAL g/dL 6.0* 7.0 7.8   BILIRUBIN TOTAL mg/dL  --  0.5 0.8   ALK PHOS U/L  --  72 91   ALT U/L  --  55* 71*   AST U/L  --  15 17   GLUCOSE mg/dL 118* 153* 119*     Troponin I, High Sensitivity   Date/Time Value Ref Range Status   09/18/2024 04:34 AM 62 (HH) 0 - 20 ng/L Final   05/22/2024 11:56 PM 20 0 - 53 ng/L Final   05/22/2024 10:35 PM 19 0 - 53 ng/L Final     BNP   Date/Time Value Ref Range Status   09/18/2024 04:34  (H) 0 - 99 pg/mL Final   04/01/2024 06:02  (H) 0 - 99 pg/mL Final     Hemoglobin A1C   Date/Time Value Ref Range Status   03/28/2024 02:12 PM 6.1 (H) See below % Final   10/26/2023 02:14 PM 5.3 see below % Final     VLDL   Date/Time Value Ref Range Status   04/29/2023 06:54 AM 22 0 - 40 mg/dL Final   08/15/2022 07:00 AM 36 0 - 40 mg/dL Final   03/18/2022 06:23 AM  23 0 - 40 mg/dL Final      Last I/O:  I/O last 3 completed shifts:  In: 100 (0.8 mL/kg) [IV Piggyback:100]  Out: 800 (6.3 mL/kg) [Urine:800 (0.2 mL/kg/hr)]  Weight: 127 kg     Past Cardiology Tests (Last 3 Years):  EKG:  ECG 12 lead 09/16/2024      ECG 12 lead 05/22/2024      ECG 12 lead 04/15/2024      ECG 12 lead 04/15/2024      ECG 12 Lead 04/01/2024      ECG 12 lead 03/28/2024      ECG 12 lead 03/28/2024      ECG 12 lead (Ancillary Performed) 12/19/2023      Electrocardiogram, 12-lead PRN ACS symptoms 11/14/2023      Electrocardiogram, 12-lead PRN ACS symptoms 11/14/2023      ECG 12 lead (Clinic Performed) 10/26/2023    Echo:  Transthoracic Echo (TTE) Limited 05/23/2024  CONCLUSIONS:   1. Left ventricular systolic function is normal with a 55% estimated ejection fraction.   2. Poorly visualized anatomical structures due to suboptimal image quality      Transthoracic Echo (TTE) Limited 04/03/2024  1. Left ventricular systolic function is normal with a 60-65% estimated ejection fraction.   2. Poorly visualized anatomical structures due to suboptimal image quality.   3. Moderately increased left ventricular septal thickness.   4. Spectral Doppler shows an impaired relaxation pattern of left ventricular diastolic filling.   5. The left ventricular posterior wall thickness is moderately increased.   6. There is mildly reduced right ventricular systolic function.   7. Compared with the prior exam from 11/13/2023 ( [facility]) prior study was techniclly difficult with patient  bpm, but limited echocontrast images demonstrated normal LV systolic function at that time without significant wall motion abnormalities. Patinet's HR today 70 bpm.      Transthoracic Echo (TTE) Complete 11/24/2023   1. Left ventricular systolic function is mildly decreased with a 45-50% estimated ejection fraction.   2. Mid and apical anterior wall, mid anterolateral segment, apical lateral segment, and apex are abnormal.   3. There are  "multiple wall motion abnormalities.   4. Poorly visualized anatomical structures due to suboptimal image quality.   5. There is reduced right ventricular systolic function.      Anesthesia Intraoperative Transesophageal Echocardiogram 11/13/2023      Anesthesia Intraoperative Transesophageal Echocardiogram 11/13/2023    Ejection Fractions:  No results found for: \"EF\"  Cath:  No results found for this or any previous visit from the past 1095 days.    Stress Test:  No results found for this or any previous visit from the past 1095 days.    Cardiac Imaging:  XR chest abdomen for OG NG placement 11/13/2023      Past Medical History:  He has a past medical history of Above-knee amputation of left lower extremity (Multi) (03/07/2021), Adverse effect of anesthesia, Anemia, CAD (coronary artery disease), Carotid artery disease (CMS-HCC), CVA (cerebral vascular accident) (Multi) (2020), Depression, DM (diabetes mellitus) (Multi), GERD (gastroesophageal reflux disease), High cholesterol, HTN (hypertension), Leg ulcer (Multi), Neuropathy, OA (osteoarthritis), PAD (peripheral artery disease) (CMS-HCC), Renal disorder, S/P CABG (coronary artery bypass graft) (11/13/2023), and 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    Past Surgical History:  He has a past surgical history that includes Other surgical history (07/07/2021); Other surgical history (07/07/2021); CT angio head w and wo IV contrast (06/14/2021); CT angio neck (06/14/2021); MR angio neck wo IV contrast (07/18/2023); MR angio head wo IV contrast (12/11/2012); MR angio head wo IV contrast (06/12/2021); MR angio head wo IV contrast (05/25/2021); Total shoulder arthroplasty (Left, 2020); Leg Surgery (Right); and Cardiac surgery.      Social History:  He reports that he quit smoking about 22 months ago. His smoking use included cigars. He has never used smokeless tobacco. He reports that he does not currently use alcohol. He reports that he does not " currently use drugs after having used the following drugs: Marijuana.    Family History:  Family History   Problem Relation Name Age of Onset    Other (cardiac disorder) Mother      Hypertension Mother      Esophageal cancer Mother      Hearing loss Father      Hypertension Father      Heart disease Father      COPD Sister          Allergies:  Carvedilol    Inpatient Medications:  Scheduled medications   Medication Dose Route Frequency    albuterol  2.5 mg nebulization TID    amLODIPine  10 mg oral Daily    ampicillin-sulbactam  3 g intravenous q6h    aspirin  81 mg oral Daily    atorvastatin  80 mg oral Daily    enoxaparin  40 mg subcutaneous q24h    fludrocortisone  0.1 mg oral Daily    furosemide  40 mg intravenous q12h    gabapentin  300 mg oral TID    hydrALAZINE  50 mg oral TID    insulin glargine  2 Units subcutaneous Nightly    insulin regular  0-10 Units subcutaneous TID    isosorbide dinitrate  20 mg oral TID    lidocaine  1 patch transdermal Daily    melatonin  3 mg oral Nightly    metoprolol tartrate  50 mg oral BID    oxygen   inhalation Continuous - Inhalation    pantoprazole  40 mg oral Daily before breakfast    Or    pantoprazole  40 mg intravenous Daily before breakfast    perflutren lipid microspheres  0.5-10 mL of dilution intravenous Once in imaging    perflutren lipid microspheres  0.5-10 mL of dilution intravenous Once in imaging    perflutren protein A microsphere  0.5 mL intravenous Once in imaging    perflutren protein A microsphere  0.5 mL intravenous Once in imaging    predniSONE  40 mg oral Daily    sodium bicarbonate  1,300 mg oral q12h    sulfur hexafluoride microsphr  2 mL intravenous Once in imaging    sulfur hexafluoride microsphr  2 mL intravenous Once in imaging    traZODone  50 mg oral Nightly    venlafaxine XR  150 mg oral Daily     PRN medications   Medication    acetaminophen    Or    acetaminophen    Or    acetaminophen    albuterol    dextrose    dextrose    glucagon     glucagon    glucagon    ondansetron    Or    ondansetron    polyethylene glycol     Continuous Medications   Medication Dose Last Rate     Outpatient Medications:  Current Outpatient Medications   Medication Instructions    albuterol (ProAir HFA) 90 mcg/actuation inhaler 2 puffs, inhalation, Every 4 hours PRN    amLODIPine (NORVASC) 10 mg, oral, Daily    aspirin 81 mg chewable tablet 1 tablet, oral, Daily    atorvastatin (LIPITOR) 80 mg, oral, Daily, Last rx prior to next refills    blood sugar diagnostic (Blood Glucose Test) strip 1 strip, miscellaneous, 3 times daily    escitalopram (LEXAPRO) 10 mg, oral, Daily    flash glucose sensor kit (FreeStyle Suzy 2 Sensor) kit Use as instructed    fludrocortisone (FLORINEF) 0.1 mg, oral, Daily    FreeStyle Suzy reader (FreeStyle Suzy 2 [place]) misc Use as instructed    gabapentin (NEURONTIN) 300 mg, oral, 3 times daily    hydrALAZINE (APRESOLINE) 50 mg, oral, 3 times daily    insulin glargine (LANTUS SOLOSTAR U-100 INSULIN) 2 Units, subcutaneous, Nightly    insulin lispro (HUMALOG) 0-10 Units, subcutaneous, 3 times daily (morning, midday, late afternoon), Take as directed per insulin instructions.    isosorbide dinitrate (ISORDIL) 20 mg, oral, 3 times daily (0900,1400,1900)    lidocaine 4 % patch 1 patch, transdermal, Daily, Remove & discard patch within 12 hours or as directed by MD.    magnesium oxide (MAG-OX) 400 mg, oral, 2 times daily    melatonin 3 mg tablet 1 tablet, oral, Nightly    metoprolol tartrate (LOPRESSOR) 50 mg, oral, 2 times daily    NIFEdipine ER (ADALAT CC) 60 mg, oral, Daily before breakfast, Do not crush, chew, or split.    pantoprazole (PROTONIX) 40 mg, oral, Daily before breakfast, Do not crush, chew, or split.    polyethylene glycol (GLYCOLAX, MIRALAX) 17 g, oral, Every 12 hours PRN    sodium bicarbonate 650 mg tablet Take 2 tablets (1,300 mg total) by mouth in the morning and 2 tablets (1,300 mg total) before bedtime.    torsemide (DEMADEX) 60  "mg, oral, Daily    traZODone (DESYREL) 50 mg, oral, Nightly    venlafaxine XR (EFFEXOR-XR) 150 mg, oral, Daily    zinc oxide 20 % ointment 1 Application, Topical, As needed       Physical Exam:  Documented Vital Signs   Heart Rate:  []   Temp:  [36.8 °C (98.2 °F)-37.7 °C (99.9 °F)]   Resp:  [15-21]   BP: (147-181)/()   Height:  [193 cm (6' 4\")]   Weight:  [126 kg (278 lb 11.2 oz)-129 kg (284 lb 3.2 oz)]   SpO2:  [92 %-99 %]   Temp:  [36.8 °C (98.2 °F)-37.7 °C (99.9 °F)] 36.9 °C (98.4 °F)  Heart Rate:  [] 103  Resp:  [15-21] 18  BP: (147-181)/() 159/87  FiO2 (%):  [40 %-44 %] 40 %      Oxygen Dose: *4 L/min    Documented Fluid Status     Intake/Output Summary (Last 24 hours) at 9/18/2024 1128  Last data filed at 9/18/2024 0624  Gross per 24 hour   Intake 100 ml   Output 800 ml   Net -700 ml     Net IO Since Admission: -700 mL [09/18/24 1128]       Assessment/Plan   Humphrey Waddell is a 59 y.o. male with a pertinent medical Hx notable for  PAD s/p left above-the-knee amputation, anemia, coronary artery disease, s/p CABG carotid artery disease, CVA, depression, type 2 diabetes mellitus, GERD, hyperlipidemia, hypertension, osteoarthritis, peripheral arterial disease, CKD stage IV who was transferred from [facility] to [facility] due to AMS.  Cardiology has been consulted for \"CHF\"     I would plan to diurese with Lasix 40 mg IV twice daily for goal diuresis of 1.5-2 L per 24 hour period.  Electrolytes will need to be monitored closely and repleted.  Patient will need accurate I/O's and daily standing weights. Patient should be on a salt restricted diet. Free water should be restricted to 1500 cc per day. Thuis far, this dose appears to be effective for him.  For now, will continue    Continue chronic home medications at this time     Follow H/H, Transfuse as needed to maintain H > 7. Give additional lasix following each pRBC    We will obtain a transthoracic echocardiogram for structural evaluation " including ejection fraction, assessment of regional wall motion abnormalities or valvular disease, and further evaluation of hemodynamics.    Will need appropriate cardiac follow up --> Lives in [place]           Urethral Catheter Straight-tip 16 Fr. (Active)   Output (mL) 550 mL 09/18/24 0624   Number of days: 2       Code Status:  Full Code      Junior Araujo DO   Division of Cardiovascular Medicine  [facility]

## 2024-09-18 NOTE — SIGNIFICANT EVENT
Unable to perform the thoracentesis today. Will plan for a thoracentesis tomorrow morning. He can eat and does not need to hold anticoagulation.     Please reach out to IR with any questions.

## 2024-09-18 NOTE — CARE PLAN
Problem: Nutrition  Goal: Less than 5 days NPO/clear liquids  Outcome: Not Progressing  Goal: Oral intake greater than 50%  Outcome: Not Progressing  Goal: Oral intake greater 75%  Outcome: Not Progressing  Goal: Consume prescribed supplement  Outcome: Not Progressing  Goal: Adequate PO fluid intake  Outcome: Not Progressing  Goal: Nutrition support goals are met within 48 hrs  Outcome: Not Progressing  Goal: Nutrition support is meeting 75% of nutrient needs  Outcome: Not Progressing  Goal: Tube feed tolerance  Outcome: Not Progressing  Goal: BG  mg/dL  Outcome: Not Progressing  Goal: Lab values WNL  Outcome: Not Progressing  Goal: Electrolytes WNL  Outcome: Not Progressing  Goal: Promote healing  Outcome: Not Progressing  Goal: Maintain stable weight  Outcome: Not Progressing  Goal: Reduce weight from edema/fluid  Outcome: Not Progressing  Goal: Gradual weight gain  Outcome: Not Progressing  Goal: Improve ostomy output  Outcome: Not Progressing     Problem: Diabetes  Goal: Achieve decreasing blood glucose levels by end of shift  Outcome: Not Progressing  Goal: Increase stability of blood glucose readings by end of shift  Outcome: Not Progressing  Goal: Decrease in ketones present in urine by end of shift  Outcome: Not Progressing  Goal: Maintain electrolyte levels within acceptable range throughout shift  Outcome: Not Progressing  Goal: Maintain glucose levels >70mg/dl to <250mg/dl throughout shift  Outcome: Not Progressing  Goal: No changes in neurological exam by end of shift  Outcome: Not Progressing  Goal: Learn about and adhere to nutrition recommendations by end of shift  Outcome: Not Progressing  Goal: Vital signs within normal range for age by end of shift  Outcome: Not Progressing  Goal: Increase self care and/or family involovement by end of shift  Outcome: Not Progressing  Goal: Receive DSME education by end of shift  Outcome: Not Progressing     Problem: Pain - Adult  Goal: Verbalizes/displays  adequate comfort level or baseline comfort level  Outcome: Not Progressing     Problem: Safety - Adult  Goal: Free from fall injury  Outcome: Not Progressing     Problem: Discharge Planning  Goal: Discharge to home or other facility with appropriate resources  Outcome: Not Progressing     Problem: Chronic Conditions and Co-morbidities  Goal: Patient's chronic conditions and co-morbidity symptoms are monitored and maintained or improved  Outcome: Not Progressing     Problem: Respiratory  Goal: Clear secretions with interventions this shift  Outcome: Not Progressing  Goal: Minimize anxiety/maximize coping throughout shift  Outcome: Not Progressing  Goal: Minimal/no exertional discomfort or dyspnea this shift  Outcome: Not Progressing  Goal: No signs of respiratory distress (eg. Use of accessory muscles. Peds grunting)  Outcome: Not Progressing  Goal: Patent airway maintained this shift  Outcome: Not Progressing  Goal: Verbalize decreased shortness of breath this shift  Outcome: Not Progressing  Goal: Wean oxygen to maintain O2 saturation per order/standard this shift  Outcome: Not Progressing  Goal: Increase self care and/or family involvement in next 24 hours  Outcome: Not Progressing     Problem: Diabetes  Goal: Achieve decreasing blood glucose levels by end of shift  Outcome: Not Progressing     Problem: Pain - Adult  Goal: Verbalizes/displays adequate comfort level or baseline comfort level  Outcome: Not Progressing     Problem: Safety - Adult  Goal: Free from fall injury  Outcome: Not Progressing     Problem: Chronic Conditions and Co-morbidities  Goal: Patient's chronic conditions and co-morbidity symptoms are monitored and maintained or improved  Outcome: Not Progressing     Problem: Respiratory  Goal: Clear secretions with interventions this shift  Outcome: Not Progressing   The patient's goals for the shift include      The clinical goals for the shift include decrease oxygen demand    Over the shift, the  patient did not make progress toward the following goals due to being a new admission    Problem: Nutrition  Goal: Less than 5 days NPO/clear liquids  Outcome: Not Progressing  Goal: Oral intake greater than 50%  Outcome: Not Progressing  Goal: Oral intake greater 75%  Outcome: Not Progressing  Goal: Consume prescribed supplement  Outcome: Not Progressing  Goal: Adequate PO fluid intake  Outcome: Not Progressing  Goal: Nutrition support goals are met within 48 hrs  Outcome: Not Progressing  Goal: Nutrition support is meeting 75% of nutrient needs  Outcome: Not Progressing  Goal: Tube feed tolerance  Outcome: Not Progressing  Goal: BG  mg/dL  Outcome: Not Progressing  Goal: Lab values WNL  Outcome: Not Progressing  Goal: Electrolytes WNL  Outcome: Not Progressing  Goal: Promote healing  Outcome: Not Progressing  Goal: Maintain stable weight  Outcome: Not Progressing  Goal: Reduce weight from edema/fluid  Outcome: Not Progressing  Goal: Gradual weight gain  Outcome: Not Progressing  Goal: Improve ostomy output  Outcome: Not Progressing     Problem: Diabetes  Goal: Achieve decreasing blood glucose levels by end of shift  Outcome: Not Progressing  Goal: Increase stability of blood glucose readings by end of shift  Outcome: Not Progressing  Goal: Decrease in ketones present in urine by end of shift  Outcome: Not Progressing  Goal: Maintain electrolyte levels within acceptable range throughout shift  Outcome: Not Progressing  Goal: Maintain glucose levels >70mg/dl to <250mg/dl throughout shift  Outcome: Not Progressing  Goal: No changes in neurological exam by end of shift  Outcome: Not Progressing  Goal: Learn about and adhere to nutrition recommendations by end of shift  Outcome: Not Progressing  Goal: Vital signs within normal range for age by end of shift  Outcome: Not Progressing  Goal: Increase self care and/or family involovement by end of shift  Outcome: Not Progressing  Goal: Receive DSME education by end  of shift  Outcome: Not Progressing     Problem: Pain - Adult  Goal: Verbalizes/displays adequate comfort level or baseline comfort level  Outcome: Not Progressing     Problem: Safety - Adult  Goal: Free from fall injury  Outcome: Not Progressing     Problem: Discharge Planning  Goal: Discharge to home or other facility with appropriate resources  Outcome: Not Progressing     Problem: Chronic Conditions and Co-morbidities  Goal: Patient's chronic conditions and co-morbidity symptoms are monitored and maintained or improved  Outcome: Not Progressing     Problem: Respiratory  Goal: Clear secretions with interventions this shift  Outcome: Not Progressing  Goal: Minimize anxiety/maximize coping throughout shift  Outcome: Not Progressing  Goal: Minimal/no exertional discomfort or dyspnea this shift  Outcome: Not Progressing  Goal: No signs of respiratory distress (eg. Use of accessory muscles. Peds grunting)  Outcome: Not Progressing  Goal: Patent airway maintained this shift  Outcome: Not Progressing  Goal: Verbalize decreased shortness of breath this shift  Outcome: Not Progressing  Goal: Wean oxygen to maintain O2 saturation per order/standard this shift  Outcome: Not Progressing  Goal: Increase self care and/or family involvement in next 24 hours  Outcome: Not Progressing

## 2024-09-19 ENCOUNTER — APPOINTMENT (OUTPATIENT)
Dept: CARDIOLOGY | Facility: HOSPITAL | Age: 59
End: 2024-09-19
Payer: MEDICAID

## 2024-09-19 ENCOUNTER — APPOINTMENT (OUTPATIENT)
Dept: RADIOLOGY | Facility: HOSPITAL | Age: 59
End: 2024-09-19
Payer: MEDICAID

## 2024-09-19 LAB
ANION GAP SERPL CALC-SCNC: 10 MMOL/L (ref 10–20)
BASOPHILS NFR FLD MANUAL: 0 %
BLASTS NFR FLD MANUAL: 0 %
BODY SURFACE AREA: 2.62 M2
BUN SERPL-MCNC: 44 MG/DL (ref 6–23)
CALCIUM SERPL-MCNC: 7.4 MG/DL (ref 8.6–10.3)
CHLORIDE SERPL-SCNC: 101 MMOL/L (ref 98–107)
CLARITY FLD: ABNORMAL
CO2 SERPL-SCNC: 35 MMOL/L (ref 21–32)
COLOR FLD: ABNORMAL
CREAT SERPL-MCNC: 3.32 MG/DL (ref 0.5–1.3)
EGFRCR SERPLBLD CKD-EPI 2021: 20 ML/MIN/1.73M*2
EJECTION FRACTION APICAL 4 CHAMBER: 58.3
EJECTION FRACTION: 59 %
EOSINOPHIL NFR FLD MANUAL: 1 %
ERYTHROCYTE [DISTWIDTH] IN BLOOD BY AUTOMATED COUNT: 15.8 % (ref 11.5–14.5)
GLUCOSE BLD MANUAL STRIP-MCNC: 159 MG/DL (ref 74–99)
GLUCOSE BLD MANUAL STRIP-MCNC: 182 MG/DL (ref 74–99)
GLUCOSE BLD MANUAL STRIP-MCNC: 185 MG/DL (ref 74–99)
GLUCOSE BLD MANUAL STRIP-MCNC: 265 MG/DL (ref 74–99)
GLUCOSE SERPL-MCNC: 150 MG/DL (ref 74–99)
HCT VFR BLD AUTO: 24.7 % (ref 41–52)
HGB BLD-MCNC: 7.2 G/DL (ref 13.5–17.5)
IMMATURE GRANULOCYTES IN FLUID: 0 %
LDH FLD L TO P-CCNC: 95 U/L
LEFT VENTRICLE INTERNAL DIMENSION DIASTOLE: 5.34 CM (ref 3.5–6)
LYMPHOCYTES NFR FLD MANUAL: 10 %
MAGNESIUM SERPL-MCNC: 1.7 MG/DL (ref 1.6–2.4)
MCH RBC QN AUTO: 26.8 PG (ref 26–34)
MCHC RBC AUTO-ENTMCNC: 29.1 G/DL (ref 32–36)
MCV RBC AUTO: 92 FL (ref 80–100)
MITRAL VALVE E/A RATIO: 1.54
MONOS+MACROS NFR FLD MANUAL: 86 %
NEUTROPHILS NFR FLD MANUAL: 3 %
NRBC BLD-RTO: 0 /100 WBCS (ref 0–0)
OTHER CELLS NFR FLD MANUAL: 0 %
PATH REVIEW-CELL CT,FLUID: NORMAL
PLASMA CELLS NFR FLD MANUAL: 0 %
PLATELET # BLD AUTO: 296 X10*3/UL (ref 150–450)
POTASSIUM SERPL-SCNC: 3.3 MMOL/L (ref 3.5–5.3)
PROT FLD-MCNC: 2.3 G/DL
RBC # BLD AUTO: 2.69 X10*6/UL (ref 4.5–5.9)
RBC # FLD AUTO: 6000 /UL
RIGHT VENTRICLE FREE WALL PEAK S': 9.14 CM/S
RIGHT VENTRICLE PEAK SYSTOLIC PRESSURE: 34.8 MMHG
SODIUM SERPL-SCNC: 143 MMOL/L (ref 136–145)
TOTAL CELLS COUNTED FLD: 100
TRICUSPID ANNULAR PLANE SYSTOLIC EXCURSION: 2 CM
WBC # BLD AUTO: 11 X10*3/UL (ref 4.4–11.3)
WBC # FLD AUTO: 320 /UL

## 2024-09-19 PROCEDURE — 99233 SBSQ HOSP IP/OBS HIGH 50: CPT | Performed by: INTERNAL MEDICINE

## 2024-09-19 PROCEDURE — 85027 COMPLETE CBC AUTOMATED: CPT | Performed by: NURSE PRACTITIONER

## 2024-09-19 PROCEDURE — 88305 TISSUE EXAM BY PATHOLOGIST: CPT | Performed by: PATHOLOGY

## 2024-09-19 PROCEDURE — 2500000005 HC RX 250 GENERAL PHARMACY W/O HCPCS: Performed by: NURSE PRACTITIONER

## 2024-09-19 PROCEDURE — 99232 SBSQ HOSP IP/OBS MODERATE 35: CPT | Performed by: CLINICAL NURSE SPECIALIST

## 2024-09-19 PROCEDURE — 83735 ASSAY OF MAGNESIUM: CPT | Performed by: NURSE PRACTITIONER

## 2024-09-19 PROCEDURE — 1200000002 HC GENERAL ROOM WITH TELEMETRY DAILY

## 2024-09-19 PROCEDURE — 0W993ZZ DRAINAGE OF RIGHT PLEURAL CAVITY, PERCUTANEOUS APPROACH: ICD-10-PCS | Performed by: INTERNAL MEDICINE

## 2024-09-19 PROCEDURE — 93321 DOPPLER ECHO F-UP/LMTD STD: CPT | Performed by: INTERNAL MEDICINE

## 2024-09-19 PROCEDURE — 93325 DOPPLER ECHO COLOR FLOW MAPG: CPT | Performed by: INTERNAL MEDICINE

## 2024-09-19 PROCEDURE — 93308 TTE F-UP OR LMTD: CPT | Performed by: INTERNAL MEDICINE

## 2024-09-19 PROCEDURE — 2500000004 HC RX 250 GENERAL PHARMACY W/ HCPCS (ALT 636 FOR OP/ED): Performed by: INTERNAL MEDICINE

## 2024-09-19 PROCEDURE — 36415 COLL VENOUS BLD VENIPUNCTURE: CPT | Performed by: NURSE PRACTITIONER

## 2024-09-19 PROCEDURE — 2500000005 HC RX 250 GENERAL PHARMACY W/O HCPCS: Performed by: INTERNAL MEDICINE

## 2024-09-19 PROCEDURE — 94640 AIRWAY INHALATION TREATMENT: CPT

## 2024-09-19 PROCEDURE — 2500000001 HC RX 250 WO HCPCS SELF ADMINISTERED DRUGS (ALT 637 FOR MEDICARE OP): Performed by: INTERNAL MEDICINE

## 2024-09-19 PROCEDURE — 82947 ASSAY GLUCOSE BLOOD QUANT: CPT

## 2024-09-19 PROCEDURE — 2500000002 HC RX 250 W HCPCS SELF ADMINISTERED DRUGS (ALT 637 FOR MEDICARE OP, ALT 636 FOR OP/ED): Performed by: INTERNAL MEDICINE

## 2024-09-19 PROCEDURE — 83615 LACTATE (LD) (LDH) ENZYME: CPT | Mod: AHULAB | Performed by: INTERNAL MEDICINE

## 2024-09-19 PROCEDURE — 99232 SBSQ HOSP IP/OBS MODERATE 35: CPT | Performed by: INTERNAL MEDICINE

## 2024-09-19 PROCEDURE — 80048 BASIC METABOLIC PNL TOTAL CA: CPT | Performed by: NURSE PRACTITIONER

## 2024-09-19 PROCEDURE — 88112 CYTOPATH CELL ENHANCE TECH: CPT | Performed by: PATHOLOGY

## 2024-09-19 PROCEDURE — 84157 ASSAY OF PROTEIN OTHER: CPT | Mod: AHULAB | Performed by: INTERNAL MEDICINE

## 2024-09-19 PROCEDURE — 32555 ASPIRATE PLEURA W/ IMAGING: CPT

## 2024-09-19 PROCEDURE — 93325 DOPPLER ECHO COLOR FLOW MAPG: CPT

## 2024-09-19 PROCEDURE — 94668 MNPJ CHEST WALL SBSQ: CPT

## 2024-09-19 PROCEDURE — 88104 CYTOPATH FL NONGYN SMEARS: CPT | Performed by: INTERNAL MEDICINE

## 2024-09-19 PROCEDURE — 88112 CYTOPATH CELL ENHANCE TECH: CPT | Mod: TC | Performed by: INTERNAL MEDICINE

## 2024-09-19 PROCEDURE — 89051 BODY FLUID CELL COUNT: CPT | Performed by: INTERNAL MEDICINE

## 2024-09-19 PROCEDURE — 87205 SMEAR GRAM STAIN: CPT | Mod: AHULAB | Performed by: INTERNAL MEDICINE

## 2024-09-19 RX ORDER — POTASSIUM CHLORIDE 20 MEQ/1
20 TABLET, EXTENDED RELEASE ORAL ONCE
Status: COMPLETED | OUTPATIENT
Start: 2024-09-19 | End: 2024-09-19

## 2024-09-19 RX ORDER — FUROSEMIDE 10 MG/ML
80 INJECTION INTRAMUSCULAR; INTRAVENOUS EVERY 12 HOURS
Status: DISCONTINUED | OUTPATIENT
Start: 2024-09-20 | End: 2024-09-20

## 2024-09-19 RX ORDER — LIDOCAINE HYDROCHLORIDE 10 MG/ML
INJECTION, SOLUTION EPIDURAL; INFILTRATION; INTRACAUDAL; PERINEURAL
Status: COMPLETED | OUTPATIENT
Start: 2024-09-19 | End: 2024-09-19

## 2024-09-19 ASSESSMENT — COGNITIVE AND FUNCTIONAL STATUS - GENERAL
PERSONAL GROOMING: A LOT
MOBILITY SCORE: 10
HELP NEEDED FOR BATHING: A LITTLE
TOILETING: A LOT
TOILETING: A LOT
MOVING TO AND FROM BED TO CHAIR: A LOT
EATING MEALS: A LITTLE
STANDING UP FROM CHAIR USING ARMS: A LOT
STANDING UP FROM CHAIR USING ARMS: A LOT
MOVING TO AND FROM BED TO CHAIR: A LOT
MOBILITY SCORE: 10
CLIMB 3 TO 5 STEPS WITH RAILING: TOTAL
TURNING FROM BACK TO SIDE WHILE IN FLAT BAD: A LOT
EATING MEALS: A LITTLE
DRESSING REGULAR UPPER BODY CLOTHING: A LITTLE
DRESSING REGULAR UPPER BODY CLOTHING: A LITTLE
MOVING FROM LYING ON BACK TO SITTING ON SIDE OF FLAT BED WITH BEDRAILS: A LOT
CLIMB 3 TO 5 STEPS WITH RAILING: TOTAL
WALKING IN HOSPITAL ROOM: TOTAL
DRESSING REGULAR LOWER BODY CLOTHING: A LOT
DRESSING REGULAR LOWER BODY CLOTHING: A LOT
PERSONAL GROOMING: A LOT
TURNING FROM BACK TO SIDE WHILE IN FLAT BAD: A LOT
DAILY ACTIVITIY SCORE: 15
HELP NEEDED FOR BATHING: A LITTLE
DAILY ACTIVITIY SCORE: 15
WALKING IN HOSPITAL ROOM: TOTAL
MOVING FROM LYING ON BACK TO SITTING ON SIDE OF FLAT BED WITH BEDRAILS: A LOT

## 2024-09-19 ASSESSMENT — PAIN - FUNCTIONAL ASSESSMENT
PAIN_FUNCTIONAL_ASSESSMENT: 0-10

## 2024-09-19 ASSESSMENT — PAIN SCALES - GENERAL
PAINLEVEL_OUTOF10: 0 - NO PAIN
PAINLEVEL_OUTOF10: 0 - NO PAIN
PAINLEVEL_OUTOF10: 3
PAINLEVEL_OUTOF10: 0 - NO PAIN

## 2024-09-19 NOTE — PROGRESS NOTES
09/19/24 1500   Discharge Planning   Expected Discharge Disposition SNF          Patient had right thoracentesis done this morning with 1L drained.  He is still diuresing. Waiting on order for PT/OT and yolette, doctor aware. Will continue to follow for discharge needs.

## 2024-09-19 NOTE — PROGRESS NOTES
Humphrey Waddell is a 59 y.o. male on day 1 of admission presenting with CHF (congestive heart failure), NYHA class I, acute on chronic, combined (Multi).    Subjective   Patient seen and examined.  Wife at bedside. S/p right thoracentesis for 1000 mL clear madalyn fluid.  Patient reports feeling significantly better with improved respiratory pattern. O2 weaned to 2L. Patient is more awake and more reportedly interactive today continues to have pain in his back as well as his right lateral chest after the Thora.  Denies active chest pain, fever, chills and nausea.  Reports rare coughing today with no sputum.     Objective   Physical Exam     Constitutional:   Obese,  NAD, cooperative  HENT: Atraumatic, semimoist mucous membranes  Eyes: Nonicteric  Neck: Supple, unable to assess JVD  Cardiovascular: S1, S2 normal, regular, HR 70s, no murmur appreciated  Pulmonary: Fair air entry with posterior bibasilar mild crackles, slightly diminished right anterior base; no rhonchi or wheezing noted; no conversational dyspnea noted  Abdominal: Obese, soft, nontender, + BS  Musculoskeletal: Left AKA, fair range of motion bilateral upper extremities with fair strength, moderate right lower extremity movement  Extremities:   trace pitting edema RLE  Lymphadenopathy: No nuchal LAP  Skin: Warm and dry  Neurological: Awake and oriented x 3 with speech that is slow, clear and appropriate in general conversation with periods of forgetfulness and confusion (improved today); no focal deficits noted  Psychiatric:     Appropriate mood and behavior    Medications:  albuterol, 2.5 mg, nebulization, TID  amLODIPine, 10 mg, oral, Daily  ampicillin-sulbactam, 3 g, intravenous, q6h  aspirin, 81 mg, oral, Daily  atorvastatin, 80 mg, oral, Daily  enoxaparin, 40 mg, subcutaneous, q24h  fludrocortisone, 0.1 mg, oral, Daily  [START ON 9/20/2024] furosemide, 80 mg, intravenous, q12h  gabapentin, 300 mg, oral, TID  hydrALAZINE, 50 mg, oral, TID  insulin glargine,  "2 Units, subcutaneous, Nightly  insulin regular, 0-10 Units, subcutaneous, TID  isosorbide dinitrate, 20 mg, oral, TID  lidocaine, 1 patch, transdermal, Daily  melatonin, 3 mg, oral, Nightly  metoprolol tartrate, 50 mg, oral, BID  pantoprazole, 40 mg, oral, Daily before breakfast   Or  pantoprazole, 40 mg, intravenous, Daily before breakfast  perflutren lipid microspheres, 0.5-10 mL of dilution, intravenous, Once in imaging  perflutren lipid microspheres, 0.5-10 mL of dilution, intravenous, Once in imaging  perflutren protein A microsphere, 0.5 mL, intravenous, Once in imaging  perflutren protein A microsphere, 0.5 mL, intravenous, Once in imaging  sodium bicarbonate, 1,300 mg, oral, q12h  sulfur hexafluoride microsphr, 2 mL, intravenous, Once in imaging  sulfur hexafluoride microsphr, 2 mL, intravenous, Once in imaging  traZODone, 50 mg, oral, Nightly  venlafaxine XR, 150 mg, oral, Daily       PRN medications: acetaminophen **OR** acetaminophen **OR** acetaminophen, albuterol, ALPRAZolam, dextrose, dextrose, glucagon, glucagon, glucagon, ondansetron **OR** ondansetron, oxygen, polyethylene glycol     Last Recorded Vitals  Blood pressure 141/58, pulse 69, temperature 36.7 °C (98.1 °F), temperature source Temporal, resp. rate 18, height 1.93 m (6' 4\"), weight 128 kg (282 lb), SpO2 95%.  Intake/Output last 3 Shifts:  I/O last 3 completed shifts:  In: 500 (3.9 mL/kg) [IV Piggyback:500]  Out: 1750 (13.7 mL/kg) [Urine:1750 (0.4 mL/kg/hr)]  Weight: 127.9 kg     Relevant Results  Results for orders placed or performed during the hospital encounter of 09/18/24 (from the past 24 hour(s))   POCT GLUCOSE   Result Value Ref Range    POCT Glucose 273 (H) 74 - 99 mg/dL   POCT GLUCOSE   Result Value Ref Range    POCT Glucose 182 (H) 74 - 99 mg/dL   Lactate Dehydrogenase, Fluid   Result Value Ref Range    LD, Fluid 95 Not established. U/L   Body Fluid Cell Count   Result Value Ref Range    Color, Fluid Lisette (A) Colorless, Straw, " Yellow    Clarity, Fluid Cloudy (A) Clear    WBC, Fluid 320 See Comment /uL    RBC, Fluid 6,000 0  /uL /uL   Body Fluid Differential   Result Value Ref Range    Neutrophils %, Manual, Fluid 3 <25 % %    Lymphocytes %, Manual, Fluid 10 <75 % %    Mono/Macrophages %, Manual, Fluid 86 <70 % %    Eosinophils %, Manual, Fluid 1 0 % %    Basophils %, Manual, Fluid 0 0 % %    Immature Granulocytes %, Manual, Fluid 0 0 % %    Blasts %, Manual, Fluid 0 0 % %    Unclassified Cells %, Manual, Fluid 0 0 % %    Plasma Cells %, Manual, Fluid 0 0 % %    Total Cells Counted, Fluid 100    Protein, Total Fluid   Result Value Ref Range    Protein, Total Fluid 2.3 Not established g/dL   Pathologist Review-Cell Count,Fluid   Result Value Ref Range    Pathologist Review-Cell Count, Fluid       Review of body fluid smear shows reactive mesothelial cells in the background of blood.  Clinical correlation is recommended.   Basic Metabolic Panel   Result Value Ref Range    Glucose 150 (H) 74 - 99 mg/dL    Sodium 143 136 - 145 mmol/L    Potassium 3.3 (L) 3.5 - 5.3 mmol/L    Chloride 101 98 - 107 mmol/L    Bicarbonate 35 (H) 21 - 32 mmol/L    Anion Gap 10 10 - 20 mmol/L    Urea Nitrogen 44 (H) 6 - 23 mg/dL    Creatinine 3.32 (H) 0.50 - 1.30 mg/dL    eGFR 20 (L) >60 mL/min/1.73m*2    Calcium 7.4 (L) 8.6 - 10.3 mg/dL   CBC   Result Value Ref Range    WBC 11.0 4.4 - 11.3 x10*3/uL    nRBC 0.0 0.0 - 0.0 /100 WBCs    RBC 2.69 (L) 4.50 - 5.90 x10*6/uL    Hemoglobin 7.2 (L) 13.5 - 17.5 g/dL    Hematocrit 24.7 (L) 41.0 - 52.0 %    MCV 92 80 - 100 fL    MCH 26.8 26.0 - 34.0 pg    MCHC 29.1 (L) 32.0 - 36.0 g/dL    RDW 15.8 (H) 11.5 - 14.5 %    Platelets 296 150 - 450 x10*3/uL   Magnesium   Result Value Ref Range    Magnesium 1.70 1.60 - 2.40 mg/dL   POCT GLUCOSE   Result Value Ref Range    POCT Glucose 159 (H) 74 - 99 mg/dL   Transthoracic Echo (TTE) Complete   Result Value Ref Range    MV E/A ratio 1.54     Tricuspid annular plane systolic excursion 2.0  cm    LV EF 59 %    RV free wall pk S' 9.14 cm/s    LVIDd 5.34 cm    RVSP 34.8 mmHg    LV A4C EF 58.3     BSA 2.62 m2   POCT GLUCOSE   Result Value Ref Range    POCT Glucose 185 (H) 74 - 99 mg/dL      Transthoracic Echo (TTE) Complete  Result Date: 9/19/2024   [facility], [address] [place] [address]              Tel [phone] and Fax [phone] TRANSTHORACIC ECHOCARDIOGRAM REPORT  Patient Name:      LEXIE NULL            Reading Physician:    [id number] Araujo DO Study Date:        9/19/2024            Ordering Provider:    [id number] ARAUJO MRN/PID:           [id number]             Fellow: Accession#:        [id number]         Nurse: Date of Birth/Age: 1965 / 59 years  Sonographer:          Sofia Ferguson RDCS Gender:            M                    Additional Staff: Height:            193.04 cm            Admit Date:           9/18/2024 Weight:            127.92 kg            Admission Status:     Inpatient -                                                               Routine BSA / BMI:         2.56 m2 / 34.33      Encounter#:           [id number]                    kg/m2 Blood Pressure:    132/70 mmHg          Department Location:  [facility] Non                                                               Invasive Study Type:    TRANSTHORACIC ECHO (TTE) COMPLETE Diagnosis/ICD: Non ST elevation (NSTEMI) myocardial infarction-I21.4 Indication:    NSTEMI, Congestive Heart Failure CPT Code:      Echo Limited-86741 Patient History: MI Location/Type:  Non-ST Elevation MI Diabetes:          Yes Pertinent History: HTN, CVA, CHF, CAD, Chest Pain, Hyperlipidemia and Dyspnea. Study Detail: The following Echo studies were performed: 2D, M-Mode, Doppler and               color flow. Technically challenging study due to body habitus and               patient lying  in supine position.  PHYSICIAN INTERPRETATION: Left Ventricle: Left ventricular ejection fraction is normal, calculated by Kirkpatrick's biplane at 59%. There are no regional left ventricular wall motion abnormalities. The left ventricular cavity size is normal. The left ventricular septal wall thickness is moderately increased. There is moderately increased left ventricular posterior wall thickness. There is moderate concentric left ventricular hypertrophy. Abnormal (paradoxical) septal motion consistent with post-operative status. Spectral Doppler shows a restrictive pattern of left ventricular diastolic filling. There is an elevated mean left atrial pressure. Left Atrium: The left atrium was not assessed. Right Ventricle: The right ventricle was not assessed. There is normal right ventricular global systolic function. Right Atrium: The right atrium was not assessed. Aortic Valve: The aortic valve is trileaflet. Aortic valve regurgitation was not assessed. Mitral Valve: The mitral valve is mildly thickened. There is trace to mild mitral valve regurgitation. Tricuspid Valve: The tricuspid valve is structurally normal. There is mild tricuspid regurgitation. The Doppler estimated RVSP is slightly elevated at 34.8 mmHg. Pulmonic Valve: The pulmonic valve is structurally normal. Pulmonic valve regurgitation was not assessed. Pericardium: There is no pericardial effusion noted. Aorta: The aortic root is normal. Systemic Veins: The inferior vena cava appears severely dilated, with IVC inspiratory collapse less than 50%. In comparison to the previous echocardiogram(s): Compared with study dated 5/23/2024, Winin the limitations of both studies, no significant changes were noted.  CONCLUSIONS:  1. Left ventricular ejection fraction is normal, calculated by Kirkpatrick's biplane at 59%.  2. Spectral Doppler shows a restrictive pattern of left ventricular diastolic filling.  3. There is an elevated mean left atrial pressure.  4.  Abnormal septal motion consistent with post-operative status.  5. There is moderate concentric left ventricular hypertrophy.  6. There is normal right ventricular global systolic function.  7. Slightly elevated right ventricular systolic pressure.  8. The inferior vena cava appears severely dilated, with IVC inspiratory collapse less than 50%. QUANTITATIVE DATA SUMMARY:  2D MEASUREMENTS:          Normal Ranges: IVSd:            1.45 cm  (0.6-1.1cm) LVPWd:           1.47 cm  (0.6-1.1cm) LVIDd:           5.34 cm  (3.9-5.9cm) LVIDs:           3.27 cm LV Mass Index:   134 g/m2 LVEDV Index:     56 ml/m2 LV % FS          38.8 %  LV SYSTOLIC FUNCTION BY 2D PLANIMETRY (MOD):                      Normal Ranges: EF-A4C View:    58 % (>=55%) EF-A2C View:    61 % EF-Biplane:     59 % EF-3DQ:         55 % LV EF Reported: 59 %  LV DIASTOLIC FUNCTION:             Normal Ranges: MV Peak E:             1.34 m/s    (0.7-1.2 m/s) MV Peak A:             0.87 m/s    (0.42-0.7 m/s) E/A Ratio:             1.54        (1.0-2.2) MV e'                  0.064 m/s   (>8.0) MV lateral e'          0.07 m/s MV medial e'           0.06 m/s E/e' Ratio:            20.87       (<8.0) a'                     0.05 m/s PulmV Sys Jose:         76.30 cm/s PulmV Ch Jose:        100.00 cm/s PulmV S/D Jose:         0.80 PulmV A Revs Jose:      25.70 cm/s PulmV A Revs Dur:      106.00 msec  MITRAL VALVE:          Normal Ranges: MV DT:        292 msec (150-240msec)  RIGHT VENTRICLE: TAPSE: 20.3 mm RV s'  0.09 m/s  TRICUSPID VALVE/RVSP:          Normal Ranges: Peak TR Velocity:     2.82 m/s Est. RA Pressure:     3 mmHg RV Syst Pressure:     35 mmHg  (< 30mmHg) IVC Diam:             2.74 cm  Pulmonary Veins: PulmV A Revs Dur: 106.00 msec PulmV A Revs Jose: 25.70 cm/s PulmV Ch Jose:   100.00 cm/s PulmV S/D Jose:    0.80 PulmV Sys Jose:    76.30 cm/s  [id number] Junior Araujo DO Electronically signed on 9/19/2024 at 3:44:06 PM  ** Final **     XR chest 1 view  Result Date:  9/17/2024  Interpreted By:  Heather Aguilar, STUDY: XR CHEST 1 VIEW;  9/17/2024 9:44 pm   INDICATION: Signs/Symptoms:resp fail     COMPARISON: Chest x-ray 09/16/2024   ACCESSION NUMBER(S): [id number]   ORDERING CLINICIAN: ELIZABETH MITCHELL   TECHNIQUE: Semi-erect frontal view of the chest was obtained .   FINDINGS: Monitoring wires are overlying the patient.   The heart is enlarged. The patient is status post open heart surgery. There is vascular congestion and interstitial edema.   There are small bilateral pleural effusions. Airspace opacities are noted at the right perihilar region and left lung base. No pneumothorax.       1. Cardiomegaly. Vascular congestion and interstitial edema. Small bilateral pleural effusions. Bibasilar airspace opacities, may be secondary to atelectasis or pneumonia.       MACRO: None.   Signed by: Heather Aguilar 9/17/2024 10:49 PM Dictation workstation:   [id number]    CT chest abdomen pelvis wo IV contrast  Result Date: 9/17/2024  STUDY: CT Chest, Abdomen, and Pelvis without IV Contrast; 9/17/2024 3:33 AM INDICATION: Altered mental status.  Pulmonary edema.  Tactile fevers. COMPARISON: CXR 9/16/2024.  US gallbladder 5/23/2024.  CT AP 5/22/2024.  CT CAP 3/28/2024. ACCESSION NUMBER(S): [id number] ORDERING CLINICIAN: MELISSA VERMA TECHNIQUE: CT of the chest, abdomen, and pelvis was performed.  Contiguous axial images were obtained at 3 mm slice thickness through the chest, abdomen, and pelvis.  Coronal and sagittal reconstructions at 3 mm slice thickness were performed.  No intravenous contrast was administered.  FINDINGS: Please note that the evaluation of vessels, lymph nodes and organs is limited without intravenous contrast.  Image quality is limited by extensive beam hardening artifact in the chest and upper abdomen due to the patient's upper extremities by the patient's side.  CHEST: MEDIASTINUM: Cardiac size is enlarged without pericardial effusion.  Cardiac blood  pool appears slightly low in attenuation.  Mild calcified coronary plaque is noted.  LUNGS/PLEURA: There is a large right pleural effusion layering dependently.  Small left-sided pleural effusion is noted, loculated at the left apex. There is no pleural thickening, or pneumothorax.  The airways are patent. Mild pulmonary edema is suspected with mosaic attenuation the lungs suggesting chronic small airway disease and air trapping.  Relaxation atelectasis is seen in the dependent portion of the right lower lobe. There is an airspace disease in the left upper lobe and left lower lobe with significant volume loss of the left lung due to the pleural effusion, as well as the enlarged cardiac size.  LYMPH NODES: Thoracic lymph nodes are not enlarged. ABDOMEN:  LIVER: No hepatomegaly.  Smooth surface contour.  Normal attenuation.  BILE DUCTS: No intrahepatic or extrahepatic biliary ductal dilatation.  GALLBLADDER: Gallbladder is absent.  STOMACH: No abnormalities identified.  PANCREAS: No masses or ductal dilatation.  SPLEEN: No splenomegaly or focal splenic lesion.  ADRENAL GLANDS: No thickening or nodules.  KIDNEYS AND URETERS: Kidneys are normal in size and location.  No renal or ureteral calculi.  Moderate perinephric stranding is seen bilaterally.   PELVIS:  BLADDER: Urinary bladder is decompressed by Lee catheter with a small amount of air in the lumen of the urinary bladder and mild urinary bladder wall thickening.  REPRODUCTIVE ORGANS: No abnormalities identified.  BOWEL: Appendix is unremarkable.  Terminal ileum appears normal.  Cecum is located in the right midabdomen.  There is moderate concentric wall thickening of the rectosigmoid colon.  VESSELS: Mild calcified plaque is seen in the abdominal aorta and iliac arteries.   PERITONEUM/RETROPERITONEUM/LYMPH NODES: No free fluid.  No pneumoperitoneum. No lymphadenopathy.  ABDOMINAL WALL: No abnormalities identified. SOFT TISSUES: Fluid-filled indirect left  inguinal hernia is noted.  There is a small fat-containing indirect right inguinal hernia.  BONES: No acute fracture or aggressive osseous lesion.  Moderate multilevel disc disease is seen in the mid and lower thoracic spine as well as at L1-L2.  There is mild disc disease and vacuum phenomenon at L5-S1. Mild dextroconvex curvature is seen of the thoracolumbar spine.    Large right pleural effusion and small loculated left pleural effusion at the left lung apex with multifocal airspace disease in the left lung, likely atelectasis and possibly pneumonia in the appropriate clinical setting. Pronounced cardiomegaly with question of mild systemic anemia. Mild to moderate concentric wall thickening of the rectosigmoid colon suggesting a low-grade acute infectious or inflammatory distal colitis/proctitis. Decompressed urinary bladder with mild concentric wall thickening which may be due to underdistention however mild cystitis is a possibility in the upper clinical setting, correlate with urinalysis. Moderate-sized fluid filled left inguinal hernia along with a small fat-containing right inguinal hernia. Signed by Tristen Livingston      Assessment/Plan   Assessment & Plan  CHF (congestive heart failure), NYHA class I, acute on chronic, combined (Multi)    Humphrey Waddell is a 59 y.o. male medical history CAD (s/p CABG), HFpEF, carotid artery disease, CVAs (cognitive impairment), HTN, HLD, DMT2, CKD 4, GERD, PAD (s/p lt AKA), depression who presented to [facility] ED on 9/16 with dyspnea x 1 day with accompanying fatigue, reduced conversational ability.  Evaluation revealed encephalopathy with acute congestive heart failure (BNP 28, 635) with bilateral pleural effusions & superimposed pneumonia. Additionally found to be hypoxic and placed on 3 L to obtain 92% saturation, ultimately requiring increased to 6 L NC with intermittent BiPAP.  He was admitted to [facility] treated with DuoNebs, dexamethasone, lasix, ceftriaxone, doxycycline and  Zosyn.  On the morning of 9/18 he was transferred to [facility] SDU.  He arrived on 6 L NC with labs significant for , hsTrop 62, Cr 3.09, h/h 7/24.2. He was started on Unasyn, albuterol nebs and oral prednisone and able to be weaned to 4 L NC.  Pulmonology is consulted for right pleural effusion.     Impression/recommendations:     # Acute hypoxic respiratory failure: Multifactorial secondary to acute on chronic HFpEF, bilateral pleural effusions, pneumonia versus atelectasis; baseline is room air, required intermittent BiPAP wean to nasal cannula, improving currently on 2 L  -Continue supplemental oxygen, wean for saturation 92 to 95%  -If patient goes home, home O2 eval prior to discharge  -Bronchopulmonary hygiene with incentive spirometer and Acapella  -Continue scheduled albuterol     # ?  COPD: Patient and wife denied history of pulmonary disorders including COPD, asthma; no PFTs on file; patient does have recent smoking history  -No indication for steroids at this time, consider discontinuing  -Would benefit from outpatient PFTs when he is back to baseline     # Pleural effusion, bilateral: Right greater than left on CT; left effusion is loculated at the apex  -Diuresis as renal function and hemodynamics allows  -S/p rt thora for 1000ml removed (-) Light's criteria for transudative effusion likely 2/2 heart failure exacerbation     #Pneumonia: CT chest with noted left multifocal airspace disease concerning for atelectasis versus pneumonia; must also consider possible aspiration given patient's fluctuating mental status over the past 3 weeks  -Continue coverage with Unasyn  -Speech-language evaluation pending  -Will need repeat chest CT in 8 to 12 weeks to ensure resolution of opacities     # Heart Failure: HFpEF with 4/2024 echo showing EF 60 to 65% with impaired relaxation pattern of LV diastolic filling and mildly reduced RV systolic function; CT chest showing mild pulmonary edema; BNP at tripoint >  28,000, on admit to [place] [id number]  -Diuresis per cardiology, renal  -Echo complete (results above); similar to echo 4/2024 with noted restrictive relaxation pattern, EF 59%     #Likely obstructive sleep apnea/obesity hypoventilation syndrome: BMI 34.08 with neck/trunk weight foci; wife reports snoring, daytime napping  -Would benefit from outpatient evaluation for sleep apnea, discussed with wife and patient reviewing with PCP and possibly seeing if he qualified for home study   -Can consider adding CPAP at night if patient requires additional support     DVT prophylaxis with Lovenox      Thank you for the consult.  Pulmonology will follow.     I spent 35 minutes in the professional and overall care of this patient.   Priscila Denton, APRN-CNS

## 2024-09-19 NOTE — PROGRESS NOTES
"Humphrey Waddell is a 59 y.o. male on day 1 of admission presenting with CHF (congestive heart failure), NYHA class I, acute on chronic, combined (Multi).      Subjective   Doing much better with thoracentesis completed       Objective        Vitals 24HR  Heart Rate:  [62-89]   Temp:  [36.4 °C (97.5 °F)-37.2 °C (99 °F)]   Resp:  [16-19]   BP: (119-153)/(54-75)   Height:  [193 cm (6' 4\")]   Weight:  [128 kg (282 lb)-128 kg (282 lb 9.6 oz)]   SpO2:  [90 %-100 %]     Intake/Output last 3 Shifts:    Intake/Output Summary (Last 24 hours) at 9/19/2024 1535  Last data filed at 9/19/2024 0646  Gross per 24 hour   Intake 300 ml   Output 900 ml   Net -600 ml       Physical Exam        General appearance: Awake and alert and in mild respiratory distress  Head and ENT; normocephalic/atraumatic/supple neck/no JVD  Lungs; bilateral lower lobe crackles up to mid lung fields  Heart; RRR  Abdomen: Obese/distended  Extremities; left left above-knee amputation  Mild edema in the right side  Neurologic: Physiologic         Relevant Results     Results from last 7 days   Lab Units 09/19/24  1022 09/18/24  0434 09/16/24  2227   WBC AUTO x10*3/uL 11.0 11.0 11.9*   HEMOGLOBIN g/dL 7.2* 7.0* 8.1*   HEMATOCRIT % 24.7* 24.2* 27.5*   PLATELETS AUTO x10*3/uL 296 328 348      Results from last 7 days   Lab Units 09/19/24  1022 09/18/24  0434 09/17/24  1300   SODIUM mmol/L 143 145 143   POTASSIUM mmol/L 3.3* 3.5 3.8   CHLORIDE mmol/L 101 103 102   CO2 mmol/L 35* 32 27   BUN mg/dL 44* 45* 46*   CREATININE mg/dL 3.32* 3.09* 3.32*   GLUCOSE mg/dL 150* 118* 153*   CALCIUM mg/dL 7.4* 7.7* 8.2*        Current Facility-Administered Medications:     acetaminophen (Tylenol) tablet 650 mg, 650 mg, oral, q4h PRN **OR** acetaminophen (Tylenol) oral liquid 650 mg, 650 mg, oral, q4h PRN **OR** acetaminophen (Tylenol) suppository 650 mg, 650 mg, rectal, q4h PRN, Martin G DO Alfreda    albuterol 2.5 mg /3 mL (0.083 %) nebulizer solution 2.5 mg, 2.5 mg, nebulization, " TID, Martin G Salomone, DO, 2.5 mg at 09/19/24 1325    albuterol 2.5 mg /3 mL (0.083 %) nebulizer solution 2.5 mg, 2.5 mg, nebulization, q2h PRN, Martin G Salomone, DO    ALPRAZolam (Xanax) tablet 0.5 mg, 0.5 mg, oral, Nightly PRN, Martin G Salomone, DO, 0.5 mg at 09/19/24 0014    amLODIPine (Norvasc) tablet 10 mg, 10 mg, oral, Daily, Martin G Salomone, DO, 10 mg at 09/19/24 0858    ampicillin-sulbactam (Unasyn) in sodium chloride 0.9 % 100 mL 3 g, 3 g, intravenous, q6h, Martin G Salomone, DO, Stopped at 09/19/24 1237    aspirin chewable tablet 81 mg, 81 mg, oral, Daily, Martin G Salomone, DO, 81 mg at 09/19/24 0858    atorvastatin (Lipitor) tablet 80 mg, 80 mg, oral, Daily, Martin G Salomone, DO, 80 mg at 09/19/24 0858    dextrose 50 % injection 12.5 g, 12.5 g, intravenous, q15 min PRN, Martin G Salomone, DO    dextrose 50 % injection 25 g, 25 g, intravenous, q15 min PRN, Martin G Salomone, DO    enoxaparin (Lovenox) syringe 40 mg, 40 mg, subcutaneous, q24h, Martin G Salomone, DO, 40 mg at 09/18/24 2148    fludrocortisone (Florinef) tablet 0.1 mg, 0.1 mg, oral, Daily, Martin G Salomone, DO, 0.1 mg at 09/19/24 0857    furosemide (Lasix) injection 80 mg, 80 mg, intravenous, q8h, Austin Tracy MD, 80 mg at 09/19/24 1316    gabapentin (Neurontin) capsule 300 mg, 300 mg, oral, TID, Martin G Salomone, DO, 300 mg at 09/19/24 0857    glucagon (Glucagen) injection 1 mg, 1 mg, intramuscular, q15 min PRN, Martin G Salomone, DO    glucagon (Glucagen) injection 1 mg, 1 mg, intramuscular, q15 min PRN, Martin Gant DO    glucagon (Glucagen) injection 1 mg, 1 mg, intramuscular, q15 min PRN, Martin Gant DO    hydrALAZINE (Apresoline) tablet 50 mg, 50 mg, oral, TID, Martin Gant DO, 50 mg at 09/19/24 0857    insulin glargine (Lantus) injection 2 Units, 2 Units, subcutaneous, Nightly, Martin Gant DO, 2 Units at 09/18/24 2149    insulin regular (HumuLIN R,NovoLIN R) injection 0-10 Units, 0-10 Units, subcutaneous, TID, Martin OSMAN  Salomone, DO, 2 Units at 09/19/24 1310    isosorbide dinitrate (Isordil) tablet 20 mg, 20 mg, oral, TID, Martin OSMAN Salomone DO, 20 mg at 09/19/24 0858    lidocaine 4 % patch 1 patch, 1 patch, transdermal, Daily, Martin OSMAN Salomone DO, 1 patch at 09/19/24 0856    melatonin tablet 3 mg, 3 mg, oral, Nightly, Martin OSMAN Salomone, DO, 3 mg at 09/18/24 2149    metoprolol tartrate (Lopressor) tablet 50 mg, 50 mg, oral, BID, Martin OSMAN Salomone, DO, 50 mg at 09/19/24 0858    ondansetron (Zofran) tablet 4 mg, 4 mg, oral, q8h PRN **OR** ondansetron (Zofran) injection 4 mg, 4 mg, intravenous, q8h PRN, Martin JACQUI Michaele, DO    oxygen (O2) therapy, , inhalation, Continuous PRN - O2/gases, Gloria Palmer MD, 2 L/min at 09/19/24 1325    pantoprazole (ProtoNix) EC tablet 40 mg, 40 mg, oral, Daily before breakfast, 40 mg at 09/19/24 0624 **OR** pantoprazole (ProtoNix) injection 40 mg, 40 mg, intravenous, Daily before breakfast, Martin OSMAN Michaele, DO    perflutren lipid microspheres (Definity) injection 0.5-10 mL of dilution, 0.5-10 mL of dilution, intravenous, Once in imaging, Martin OSMAN Salomone, DO    perflutren lipid microspheres (Definity) injection 0.5-10 mL of dilution, 0.5-10 mL of dilution, intravenous, Once in imaging, Martin OSMAN Salomone, DO    perflutren protein A microsphere (Optison) injection 0.5 mL, 0.5 mL, intravenous, Once in imaging, Martin JACQUI Salomone, DO    perflutren protein A microsphere (Optison) injection 0.5 mL, 0.5 mL, intravenous, Once in imaging, Martin Rodrigueze, DO    polyethylene glycol (Glycolax, Miralax) packet 17 g, 17 g, oral, q12h PRN, Martin Rodrigueze, DO    potassium chloride CR (Klor-Con M20) ER tablet 20 mEq, 20 mEq, oral, Once, Gloria Palmer MD    sodium bicarbonate tablet 1,300 mg, 1,300 mg, oral, q12h, Martin Gant DO, 1,300 mg at 09/19/24 0346    sulfur hexafluoride microsphr (Lumason) injection 24.28 mg, 2 mL, intravenous, Once in imaging, Martin Gant DO    sulfur hexafluoride microsphr (Lumason)  injection 24.28 mg, 2 mL, intravenous, Once in imaging, Martin OSMAN Michaele, DO    traZODone (Desyrel) tablet 50 mg, 50 mg, oral, Nightly, Martin Rodrigueze, DO, 50 mg at 09/18/24 2148    venlafaxine XR (Effexor-XR) 24 hr capsule 150 mg, 150 mg, oral, Daily, Martin Rodrigueze, DO, 150 mg at 09/19/24 0858           Assessment/Plan   This patient currently has cardiac telemetry ordered; if you would like to modify or discontinue the telemetry order, click here to go to the orders activity to modify/discontinue the order.        1.  IDANIA on top of CKD stage IV  2.  Acute congestive heart failure with pleural effusion, with complaints of hemoptysis    Will check both anti-GBM and ANCA levels today.  Thoracentesis completed this morning with continuing on diuretics, feeling much better since admission  With taper of diuretics to Lasix 80 mg IVP  twice daily  3.  PAD status post left AKA  4.  Coronary disease status post CABG  5.  Diabetes mellitus continue current management  6.  Hypertension continue current management     Will continue monitoring patient daily reviewing labs and other consultants input                I spent 35 minutes in the professional and overall care of this patient.      Austin Tracy MD

## 2024-09-19 NOTE — CARE PLAN
The patient's goals for the shift include Pt. will have a safe, restful and uneventful evening    The clinical goals for the shift include keep pt. HDS    Problem: Nutrition  Goal: Less than 5 days NPO/clear liquids  Outcome: Progressing  Goal: Oral intake greater than 50%  Outcome: Progressing  Goal: Oral intake greater 75%  Outcome: Progressing  Goal: Consume prescribed supplement  Outcome: Progressing  Goal: Adequate PO fluid intake  Outcome: Progressing  Goal: Nutrition support goals are met within 48 hrs  Outcome: Progressing  Goal: Nutrition support is meeting 75% of nutrient needs  Outcome: Progressing  Goal: Tube feed tolerance  Outcome: Progressing  Goal: BG  mg/dL  Outcome: Progressing  Goal: Lab values WNL  Outcome: Progressing  Goal: Electrolytes WNL  Outcome: Progressing  Goal: Promote healing  Outcome: Progressing  Goal: Maintain stable weight  Outcome: Progressing  Goal: Reduce weight from edema/fluid  Outcome: Progressing  Goal: Gradual weight gain  Outcome: Progressing  Goal: Improve ostomy output  Outcome: Progressing     Problem: Diabetes  Goal: Achieve decreasing blood glucose levels by end of shift  Outcome: Progressing  Goal: Increase stability of blood glucose readings by end of shift  Outcome: Progressing  Goal: Decrease in ketones present in urine by end of shift  Outcome: Progressing  Goal: Maintain electrolyte levels within acceptable range throughout shift  Outcome: Progressing  Goal: Maintain glucose levels >70mg/dl to <250mg/dl throughout shift  Outcome: Progressing  Goal: No changes in neurological exam by end of shift  Outcome: Progressing  Goal: Learn about and adhere to nutrition recommendations by end of shift  Outcome: Progressing  Goal: Vital signs within normal range for age by end of shift  Outcome: Progressing  Goal: Increase self care and/or family involovement by end of shift  Outcome: Progressing  Goal: Receive DSME education by end of shift  Outcome: Progressing      Problem: Pain - Adult  Goal: Verbalizes/displays adequate comfort level or baseline comfort level  Outcome: Progressing     Problem: Safety - Adult  Goal: Free from fall injury  Outcome: Progressing     Problem: Discharge Planning  Goal: Discharge to home or other facility with appropriate resources  Outcome: Progressing     Problem: Chronic Conditions and Co-morbidities  Goal: Patient's chronic conditions and co-morbidity symptoms are monitored and maintained or improved  Outcome: Progressing     Problem: Respiratory  Goal: Clear secretions with interventions this shift  Outcome: Progressing  Goal: Minimize anxiety/maximize coping throughout shift  Outcome: Progressing  Goal: Minimal/no exertional discomfort or dyspnea this shift  Outcome: Progressing  Goal: No signs of respiratory distress (eg. Use of accessory muscles. Peds grunting)  Outcome: Progressing  Goal: Patent airway maintained this shift  Outcome: Progressing  Goal: Verbalize decreased shortness of breath this shift  Outcome: Progressing  Goal: Wean oxygen to maintain O2 saturation per order/standard this shift  Outcome: Progressing  Goal: Increase self care and/or family involvement in next 24 hours  Outcome: Progressing     Problem: Skin  Goal: Decreased wound size/increased tissue granulation at next dressing change  Outcome: Progressing  Goal: Participates in plan/prevention/treatment measures  Outcome: Progressing  Goal: Prevent/manage excess moisture  Outcome: Progressing  Goal: Prevent/minimize sheer/friction injuries  Outcome: Progressing  Goal: Promote/optimize nutrition  Outcome: Progressing  Goal: Promote skin healing  Outcome: Progressing     Problem: Fall/Injury  Goal: Not fall by end of shift  Outcome: Progressing  Goal: Be free from injury by end of the shift  Outcome: Progressing  Goal: Verbalize understanding of personal risk factors for fall in the hospital  Outcome: Progressing  Goal: Verbalize understanding of risk factor reduction  measures to prevent injury from fall in the home  Outcome: Progressing  Goal: Use assistive devices by end of the shift  Outcome: Progressing  Goal: Pace activities to prevent fatigue by end of the shift  Outcome: Progressing

## 2024-09-19 NOTE — POST-PROCEDURE NOTE
Interventional Radiology Brief Postprocedure Note    Attending: Diana Serrano CNP    Assistant: none    Diagnosis: Pleural Effusion    Description of procedure: Ultrasound guided thoracentesis was preformed on the right.  1000mL of clear madalyn fluid was drained.      Anesthesia:  Local    Complications: None    Estimated Blood Loss: minimal    Specimens: Yes     See detailed result report with images in PACS.    The patient tolerated the procedure well without incident or complication and is in stable condition.

## 2024-09-19 NOTE — INTERVAL H&P NOTE
H&P reviewed. The patient was examined and there are no changes to the H&P. Right pleural effusion for US guided thoracentesis.

## 2024-09-19 NOTE — PROGRESS NOTES
"Subjective Data:  Reports no shortness of breath   S/p right thoracentesis with 1 liter removed   Echo pending   Negative 1250 ml (no oral intake documented)  K 3.3 BUN 44 creatinine 3.32 hemoglobin 7.2    Overnight Events:    N/A     Objective Data:  Last Recorded Vitals:  Vitals:    09/19/24 0946 09/19/24 1000 09/19/24 1200 09/19/24 1325   BP: 132/70 151/66 148/75    BP Location:  Right arm Right arm    Patient Position:  Lying Lying    Pulse: 68 69 62    Resp: 17 18 18    Temp:  37 °C (98.6 °F) 36.4 °C (97.5 °F)    TempSrc:  Temporal Oral    SpO2: 97% 100% 98% 96%   Weight:       Height:           Last Labs:  LABS:  CMP:  Results from last 7 days   Lab Units 09/19/24  1022 09/18/24  0434 09/17/24  1300 09/16/24  2227   SODIUM mmol/L 143 145 143 145   POTASSIUM mmol/L 3.3* 3.5 3.8 4.0   CHLORIDE mmol/L 101 103 102 103   CO2 mmol/L 35* 32 27 28   ANION GAP mmol/L 10 14 18 14   BUN mg/dL 44* 45* 46* 42*   CREATININE mg/dL 3.32* 3.09* 3.32* 3.30*   EGFR mL/min/1.73m*2 20* 22* 20* 20*   MAGNESIUM mg/dL 1.70  --   --  1.80   ALBUMIN g/dL  --   --  3.1* 3.1*   ALT U/L  --   --  55* 71*   AST U/L  --   --  15 17   BILIRUBIN TOTAL mg/dL  --   --  0.5 0.8     CBC:  Results from last 7 days   Lab Units 09/19/24  1022 09/18/24  0434 09/16/24  2227   WBC AUTO x10*3/uL 11.0 11.0 11.9*   HEMOGLOBIN g/dL 7.2* 7.0* 8.1*   HEMATOCRIT % 24.7* 24.2* 27.5*   PLATELETS AUTO x10*3/uL 296 328 348   MCV fL 92 92 90     COAG:   Results from last 7 days   Lab Units 09/18/24  0709   INR  1.3*     ABO: No results found for: \"ABO\"  HEME/ENDO:     CARDIAC:   Results from last 7 days   Lab Units 09/18/24  0434   LD U/L 166   TROPHS ng/L 62*   BNP pg/mL 671*     Recent Labs     04/29/23  0654 08/15/22  0700 03/18/22  0623   CHOL 181 145 150   LDLF 127* 77 90   HDL 32.8* 31.6* 36.7*   TRIG 108 180* 115      US thoracentesis   Final Result   Uneventful thoracentesis, as detailed above: Right Pleural space,   1000 mL        Performed and dictated " [facility].        Signed by: Diana Serrano 9/19/2024 11:37 AM   Dictation workstation:   [id number]      Transthoracic Echo (TTE) Complete    (Results Pending)         Last I/O:  I/O last 3 completed shifts:  In: 600 (4.7 mL/kg) [IV Piggyback:600]  Out: 2550 (19.9 mL/kg) [Urine:2550 (0.6 mL/kg/hr)]  Weight: 128.2 kg     Past Cardiology Tests (Last 3 Years):  EKG:  ECG 12 lead 09/16/2024    Echo:    Echo:  Transthoracic Echo (TTE) Limited 05/23/2024  CONCLUSIONS:   1. Left ventricular systolic function is normal with a 55% estimated ejection fraction.   2. Poorly visualized anatomical structures due to suboptimal image quality        Transthoracic Echo (TTE) Limited 04/03/2024  1. Left ventricular systolic function is normal with a 60-65% estimated ejection fraction.   2. Poorly visualized anatomical structures due to suboptimal image quality.   3. Moderately increased left ventricular septal thickness.   4. Spectral Doppler shows an impaired relaxation pattern of left ventricular diastolic filling.   5. The left ventricular posterior wall thickness is moderately increased.   6. There is mildly reduced right ventricular systolic function.   7. Compared with the prior exam from 11/13/2023 ( [facility]) prior study was techniclly difficult with patient  bpm, but limited echocontrast images demonstrated normal LV systolic function at that time without significant wall motion abnormalities. Patinet's HR today 70 bpm.        Transthoracic Echo (TTE) Complete 11/24/2023   1. Left ventricular systolic function is mildly decreased with a 45-50% estimated ejection fraction.   2. Mid and apical anterior wall, mid anterolateral segment, apical lateral segment, and apex are abnormal.   3. There are multiple wall motion abnormalities.   4. Poorly visualized anatomical structures due to suboptimal image quality.   5. There is reduced right ventricular systolic function.    Anesthesia  Intraoperative Transesophageal Echocardiogram 11/13/2023      Anesthesia Intraoperative Transesophageal Echocardiogram 11/13/2023      Cath:  Peripheral Diagnostic Angiography  9/14/2023  CONCLUSIONS:  1. Right critical limb ischemia, Johnny class V.  2. Successful PTA_DCB of the entire right superficial femoral artery.  3. Aspirin 81 mg daily and clopidogrel 75 mg daily.    [facility] 5/19/2023  CONCLUSIONS:  1. Successful OM2 stenting.    [facility] 5/3/2023  Coronary Interventions:     Coronary Lesion Summary:  Vessel       Stenosis   Vessel Segment  Left Main  30% stenosis     entire  LAD        40% stenosis    proximal  LAD        75% stenosis      mid  Circumflex 95% stenosis      mid  RCA        60% stenosis    proximal    CONCLUSIONS:  1. Left main: 30% diffuse disease.  2. LAD: 40-50% proximal stenosis, 75% md-vessel disease.  3. LCx: 95% mid-vessel disease.  4. RCA: 60% proximal disease, 40% mid-vessel disease.  5. LVEDP 34mmHg, no aortic stenosis on LV-Ao gradient.       Stress Test:  9/25/2018    Cardiac Imaging:  XR chest abdomen for OG NG placement 11/13/2023      Inpatient Medications:  Scheduled medications   Medication Dose Route Frequency    albuterol  2.5 mg nebulization TID    amLODIPine  10 mg oral Daily    ampicillin-sulbactam  3 g intravenous q6h    aspirin  81 mg oral Daily    atorvastatin  80 mg oral Daily    enoxaparin  40 mg subcutaneous q24h    fludrocortisone  0.1 mg oral Daily    furosemide  80 mg intravenous q8h    gabapentin  300 mg oral TID    hydrALAZINE  50 mg oral TID    insulin glargine  2 Units subcutaneous Nightly    insulin regular  0-10 Units subcutaneous TID    isosorbide dinitrate  20 mg oral TID    lidocaine  1 patch transdermal Daily    melatonin  3 mg oral Nightly    metoprolol tartrate  50 mg oral BID    pantoprazole  40 mg oral Daily before breakfast    Or    pantoprazole  40 mg intravenous Daily before breakfast    perflutren lipid microspheres  0.5-10 mL of dilution  "intravenous Once in imaging    perflutren lipid microspheres  0.5-10 mL of dilution intravenous Once in imaging    perflutren protein A microsphere  0.5 mL intravenous Once in imaging    perflutren protein A microsphere  0.5 mL intravenous Once in imaging    sodium bicarbonate  1,300 mg oral q12h    sulfur hexafluoride microsphr  2 mL intravenous Once in imaging    sulfur hexafluoride microsphr  2 mL intravenous Once in imaging    traZODone  50 mg oral Nightly    venlafaxine XR  150 mg oral Daily     PRN medications   Medication    acetaminophen    Or    acetaminophen    Or    acetaminophen    albuterol    ALPRAZolam    dextrose    dextrose    glucagon    glucagon    glucagon    ondansetron    Or    ondansetron    oxygen    polyethylene glycol     Continuous Medications   Medication Dose Last Rate       Physical Exam:  GENERAL: Lethargic this today   SKIN: warm, dry  NECK: Difficult to assess due to body habitus   CARDIAC: Regular rate and rhythm no murmurs  PULMONARY: Bibasilar rales on supplemental oxygen   ABDOMEN: soft, nondistended  EXTREMITIES: left AKA +1-2 right lower extremity edema   NEURO: Alert and oriented x 3.  Grossly normal.  Moves all 4 extremities.    I reviewed telemetry which showed sinus rhythm       Assessment/Plan   Humphrey Waddell is a 59 y.o. male with a pertinent medical Hx notable for  PAD s/p left above-the-knee amputation, anemia, coronary artery disease, s/p CABG carotid artery disease, CVA, depression, type 2 diabetes mellitus, GERD, hyperlipidemia, hypertension, osteoarthritis, peripheral arterial disease, CKD stage IV who was transferred from [facility] to [facility] due to AMS.  Cardiology has been consulted for \"CHF\"      S/p right thoracentesis with 1 liter removed 9/19/2024    Currently on furosemide 80 mg every 8 hours per nephrology. Goal diuresis of 1.5-2 L per 24 hour period.  Electrolytes will need to be monitored closely and repleted.  Patient will need accurate I/O's and daily " standing weights. Patient should be on a salt restricted diet. Free water should be restricted to 1500 cc per day.      Continue chronic home medications at this time      Follow H/H, Transfuse as needed to maintain H > 7. Give additional lasix following each pRBC     We will obtain a transthoracic echocardiogram for structural evaluation including ejection fraction, assessment of regional wall motion abnormalities or valvular disease, and further evaluation of hemodynamics.     Will need appropriate cardiac follow up --> Lives in [place]          Code Status:  Full Code    Anisa Borrego, APRN-CNP    ======================================================  Attending note   =======================================================  Both the JENNIFER and I have had a face to face encounter with the patient today. I have examined the patient and edited the documented physical examination as necessary.  I personally reviewed the patient's vital signs, telemetry, recent labs, medications, orders, EKGs, and pertinent cardiac imaging/ echocardiography.  I have reviewed the JENNIFER's encounter note, approve the JENNIFER's documentation and have edited the note to reflect my diagnostic and therapeutic plan.      Patient seen and examined, chart reviewed   -- seems slower today, more confused and less interactive than yesterday.   -- He has had a Hx of Encephalopathy per chart review   -- S/P US Guided Thoracentesis with 1L fluid removed     Documented Vital Signs   Heart Rate:  [62-89]   Temp:  [36.4 °C (97.5 °F)-37.2 °C (99 °F)]   Resp:  [16-19]   BP: (119-153)/(54-75)   Weight:  [128 kg (282 lb 9.6 oz)]   SpO2:  [90 %-100 %]   Temp:  [36.4 °C (97.5 °F)-37.2 °C (99 °F)] 36.4 °C (97.5 °F)  Heart Rate:  [62-89] 62  Resp:  [16-19] 18  BP: (119-153)/(54-75) 148/75      Oxygen Dose: *2 L/min    Documented Fluid Status     Intake/Output Summary (Last 24 hours) at 9/19/2024 1428  Last data filed at 9/19/2024 0646  Gross per 24 hour  "  Intake 300 ml   Output 900 ml   Net -600 ml     Net IO Since Admission: -1,950 mL [09/19/24 1428]  /75 (BP Location: Right arm, Patient Position: Lying)   Pulse 62   Temp 36.4 °C (97.5 °F) (Oral)   Resp 18   Ht 1.93 m (6' 4\")   Wt 128 kg (282 lb 9.6 oz)   SpO2 96%   BMI 34.40 kg/m²   General:  Patient is awake, he seems \"Slower\" with processing and more confused today than yesterday.,   Patient is in no acute distress.  HEENT:  Pupils equal and reactive.  Normocephalic.  Moist mucosa.    Neck:  No thyromegaly.  Nneck Veins remain elevated ~ 14 degress with HOB 45 degrees.   Cardiovascular:  Regular rate and rhythm.  Normal S1 and S2.  1/6 LARS.  Pulmonary:  Clear to auscultation bilaterally.  Abdomen:  Soft. Non-tender.   Non-distended.  Positive bowel sounds.  Lower Extremities:  2+ pedal pulses of the Right lower Extremity with S/P AKA Left.  Improved edema on the Rt.   Neurologic:  Cranial nerves intact.  No focal deficit.   Skin: Skin warm and dry, normal skin turgor.   Psychiatric: more confused today.     Humphrey Waddell is a 59 y.o. male with a pertinent medical Hx notable for  PAD s/p left above-the-knee amputation, anemia, coronary artery disease, s/p CABG carotid artery disease, CVA, depression, type 2 diabetes mellitus, GERD, hyperlipidemia, hypertension, osteoarthritis, peripheral arterial disease, CKD stage IV who was transferred from [facility] to [facility] due to AMS.  Cardiology has been consulted for \"CHF\"      S/p right thoracentesis with 1 liter removed 9/19/2024    Currently on furosemide 80 mg every 8 hours per nephrology. Goal diuresis of 1.5-2 L per 24 hour period.  Electrolytes will need to be monitored closely and repleted.  Patient will need accurate I/O's and daily standing weights. Patient should be on a salt restricted diet. Free water should be restricted to 1500 cc per day.      Continue chronic home medications at this time      Follow H/H, Transfuse as needed to maintain H > " 7. Give additional lasix following each pRBC     We will obtain a transthoracic echocardiogram for structural evaluation including ejection fraction, assessment of regional wall motion abnormalities or valvular disease, and further evaluation of hemodynamics.     Will need appropriate cardiac follow up --> Lives in [place]        Junior Araujo DO   Division of Cardiovascular Medicine  [facility] [place]

## 2024-09-19 NOTE — PROGRESS NOTES
"Humphrey Waddell is a 59 y.o. male on day 1 of admission presenting with CHF (congestive heart failure), NYHA class I, acute on chronic, combined (Multi).    Subjective   Drained 1 L out of his right lung, down to 3-1/2 L feels better, does still have right lower extremity edema.  IV Lasix were increased to 80 mg every 8 hours.  Did on IV Unasyn for possible aspiration pneumonia.       Objective     Physical Exam  Vitals reviewed.   Constitutional:       Appearance: Normal appearance.   HENT:      Head: Normocephalic.      Right Ear: Tympanic membrane normal.      Nose: Nose normal.      Mouth/Throat:      Mouth: Mucous membranes are moist.   Cardiovascular:      Rate and Rhythm: Normal rate and regular rhythm.   Pulmonary:      Comments: Bibasilar crackles.  Abdominal:      General: Abdomen is flat. Bowel sounds are normal.      Palpations: Abdomen is soft.   Musculoskeletal:      Comments: Left AKA, right lower extremity with edema   Skin:     General: Skin is warm.      Capillary Refill: Capillary refill takes less than 2 seconds.   Neurological:      General: No focal deficit present.      Mental Status: He is alert.         Last Recorded Vitals  Blood pressure 151/66, pulse 68, temperature 37 °C (98.6 °F), temperature source Temporal, resp. rate 18, height 1.93 m (6' 4\"), weight 128 kg (282 lb 9.6 oz), SpO2 100%.  Intake/Output last 3 Shifts:  I/O last 3 completed shifts:  In: 600 (4.7 mL/kg) [IV Piggyback:600]  Out: 2550 (19.9 mL/kg) [Urine:2550 (0.6 mL/kg/hr)]  Weight: 128.2 kg     Relevant Results  Results for orders placed or performed during the hospital encounter of 09/18/24 (from the past 24 hour(s))   POCT GLUCOSE   Result Value Ref Range    POCT Glucose 207 (H) 74 - 99 mg/dL   POCT GLUCOSE   Result Value Ref Range    POCT Glucose 273 (H) 74 - 99 mg/dL   POCT GLUCOSE   Result Value Ref Range    POCT Glucose 182 (H) 74 - 99 mg/dL   Body Fluid Cell Count   Result Value Ref Range    Color, Fluid Lisette (A) " Colorless, Straw, Yellow    Clarity, Fluid Cloudy (A) Clear    WBC, Fluid 320 See Comment /uL    RBC, Fluid 6,000 0  /uL /uL   Body Fluid Differential   Result Value Ref Range    Neutrophils %, Manual, Fluid 3 <25 % %    Lymphocytes %, Manual, Fluid 10 <75 % %    Mono/Macrophages %, Manual, Fluid 86 <70 % %    Eosinophils %, Manual, Fluid 1 0 % %    Basophils %, Manual, Fluid 0 0 % %    Immature Granulocytes %, Manual, Fluid 0 0 % %    Blasts %, Manual, Fluid 0 0 % %    Unclassified Cells %, Manual, Fluid 0 0 % %    Plasma Cells %, Manual, Fluid 0 0 % %    Total Cells Counted, Fluid 100    Basic Metabolic Panel   Result Value Ref Range    Glucose 150 (H) 74 - 99 mg/dL    Sodium 143 136 - 145 mmol/L    Potassium 3.3 (L) 3.5 - 5.3 mmol/L    Chloride 101 98 - 107 mmol/L    Bicarbonate 35 (H) 21 - 32 mmol/L    Anion Gap 10 10 - 20 mmol/L    Urea Nitrogen 44 (H) 6 - 23 mg/dL    Creatinine 3.32 (H) 0.50 - 1.30 mg/dL    eGFR 20 (L) >60 mL/min/1.73m*2    Calcium 7.4 (L) 8.6 - 10.3 mg/dL   CBC   Result Value Ref Range    WBC 11.0 4.4 - 11.3 x10*3/uL    nRBC 0.0 0.0 - 0.0 /100 WBCs    RBC 2.69 (L) 4.50 - 5.90 x10*6/uL    Hemoglobin 7.2 (L) 13.5 - 17.5 g/dL    Hematocrit 24.7 (L) 41.0 - 52.0 %    MCV 92 80 - 100 fL    MCH 26.8 26.0 - 34.0 pg    MCHC 29.1 (L) 32.0 - 36.0 g/dL    RDW 15.8 (H) 11.5 - 14.5 %    Platelets 296 150 - 450 x10*3/uL   Magnesium   Result Value Ref Range    Magnesium 1.70 1.60 - 2.40 mg/dL   POCT GLUCOSE   Result Value Ref Range    POCT Glucose 159 (H) 74 - 99 mg/dL       Imaging Results    Ct chest/abd/pelvis:  IMPRESSION:  Large right pleural effusion and small loculated left pleural effusion  at the left lung apex with multifocal airspace disease in the left  lung, likely atelectasis and possibly pneumonia in the appropriate  clinical setting.  Pronounced cardiomegaly with question of mild systemic anemia.  Mild to moderate concentric wall thickening of the rectosigmoid colon  suggesting a low-grade  acute infectious or inflammatory distal  colitis/proctitis.  Decompressed urinary bladder with mild concentric wall thickening  which may be due to underdistention however mild cystitis is a  possibility in the upper clinical setting, correlate with urinalysis.  Moderate-sized fluid filled left inguinal hernia along with a small  fat-containing right inguinal hernia.    Head ct:  IMPRESSION:  No acute intracranial hemorrhage or mass effect.      Periapical lucencies noted consistent with abscesses. Dental  follow-up suggested.      Mild prominence of the lateral and 3rd ventricles which could be  related to central white matter loss versus normal pressure  hydrocephalus. Clinical correlation suggested.      Correlation for left-sided otitis media suggested.      Remote left thalamic lacunar infarct.    Medications:  albuterol, 2.5 mg, nebulization, TID  amLODIPine, 10 mg, oral, Daily  ampicillin-sulbactam, 3 g, intravenous, q6h  aspirin, 81 mg, oral, Daily  atorvastatin, 80 mg, oral, Daily  enoxaparin, 40 mg, subcutaneous, q24h  fludrocortisone, 0.1 mg, oral, Daily  furosemide, 80 mg, intravenous, q8h  gabapentin, 300 mg, oral, TID  hydrALAZINE, 50 mg, oral, TID  insulin glargine, 2 Units, subcutaneous, Nightly  insulin regular, 0-10 Units, subcutaneous, TID  isosorbide dinitrate, 20 mg, oral, TID  lidocaine, 1 patch, transdermal, Daily  melatonin, 3 mg, oral, Nightly  metoprolol tartrate, 50 mg, oral, BID  pantoprazole, 40 mg, oral, Daily before breakfast   Or  pantoprazole, 40 mg, intravenous, Daily before breakfast  perflutren lipid microspheres, 0.5-10 mL of dilution, intravenous, Once in imaging  perflutren lipid microspheres, 0.5-10 mL of dilution, intravenous, Once in imaging  perflutren protein A microsphere, 0.5 mL, intravenous, Once in imaging  perflutren protein A microsphere, 0.5 mL, intravenous, Once in imaging  predniSONE, 40 mg, oral, Daily  sodium bicarbonate, 1,300 mg, oral, q12h  sulfur  hexafluoride microsphr, 2 mL, intravenous, Once in imaging  sulfur hexafluoride microsphr, 2 mL, intravenous, Once in imaging  traZODone, 50 mg, oral, Nightly  venlafaxine XR, 150 mg, oral, Daily       PRN medications: acetaminophen **OR** acetaminophen **OR** acetaminophen, albuterol, ALPRAZolam, dextrose, dextrose, glucagon, glucagon, glucagon, ondansetron **OR** ondansetron, oxygen, polyethylene glycol     Assessment/Plan   1.  Acute heart failure with preserved EF, most recent echo in May 2024 had an EF of 55%  -cardiology on board strict intake and output continue diuresis with Lasix 80 mg every 8 hours    2.CAD status post CABG  -continue asa/atorvastatin/metoprolol/imdur    3. DM  -continue lantus and SSI    4. Possible aspiration pna  -on iv unasyn  -get speech eval    5. CKDV  -at baseline    6. PVD with left AKA  -continue asa/atorvastatin    7. Bilateral effusion suspect secondary to #1  -had right thoracentesis drained 1L this am  -await fluid studies    8. Normocytic anemia  -hgb 7.2  -no evidence of bleeding check iron studies        DVT Prophylaxis:  Lovenox subq    Gloria Palmer MD  [facility]

## 2024-09-20 ENCOUNTER — APPOINTMENT (OUTPATIENT)
Dept: VASCULAR MEDICINE | Facility: HOSPITAL | Age: 59
End: 2024-09-20
Payer: MEDICAID

## 2024-09-20 LAB
ANA SER QL HEP2 SUBST: NEGATIVE
ANCA AB PATTERN SER IF-IMP: NORMAL
ANCA IGG TITR SER IF: NORMAL {TITER}
ANION GAP BLDA CALCULATED.4IONS-SCNC: 2 MMO/L (ref 10–25)
ANION GAP SERPL CALC-SCNC: 12 MMOL/L (ref 10–20)
APPARATUS: ABNORMAL
ARTERIAL PATENCY WRIST A: POSITIVE
BASE EXCESS BLDA CALC-SCNC: 8.8 MMOL/L (ref -2–3)
BASOPHILS # BLD AUTO: 0.01 X10*3/UL (ref 0–0.1)
BASOPHILS NFR BLD AUTO: 0.1 %
BM IGG SER IF-ACNC: 0 AU/ML (ref 0–19)
BODY TEMPERATURE: 37 DEGREES CELSIUS
BUN SERPL-MCNC: 51 MG/DL (ref 6–23)
CA-I BLDA-SCNC: 1.06 MMOL/L (ref 1.1–1.33)
CALCIUM SERPL-MCNC: 7.1 MG/DL (ref 8.6–10.3)
CHLORIDE BLDA-SCNC: 102 MMOL/L (ref 98–107)
CHLORIDE SERPL-SCNC: 99 MMOL/L (ref 98–107)
CO2 SERPL-SCNC: 33 MMOL/L (ref 21–32)
CREAT SERPL-MCNC: 4.01 MG/DL (ref 0.5–1.3)
EGFRCR SERPLBLD CKD-EPI 2021: 16 ML/MIN/1.73M*2
EOSINOPHIL # BLD AUTO: 0 X10*3/UL (ref 0–0.7)
EOSINOPHIL NFR BLD AUTO: 0 %
ERYTHROCYTE [DISTWIDTH] IN BLOOD BY AUTOMATED COUNT: 15.4 % (ref 11.5–14.5)
GLUCOSE BLD MANUAL STRIP-MCNC: 178 MG/DL (ref 74–99)
GLUCOSE BLD MANUAL STRIP-MCNC: 202 MG/DL (ref 74–99)
GLUCOSE BLD MANUAL STRIP-MCNC: 207 MG/DL (ref 74–99)
GLUCOSE BLD MANUAL STRIP-MCNC: 214 MG/DL (ref 74–99)
GLUCOSE BLDA-MCNC: 245 MG/DL (ref 74–99)
GLUCOSE SERPL-MCNC: 214 MG/DL (ref 74–99)
HCO3 BLDA-SCNC: 34.7 MMOL/L (ref 22–26)
HCT VFR BLD AUTO: 25.4 % (ref 41–52)
HCT VFR BLD EST: 24 % (ref 41–52)
HGB BLD-MCNC: 7.3 G/DL (ref 13.5–17.5)
HGB BLDA-MCNC: 8 G/DL (ref 13.5–17.5)
HGB RETIC QN: 24 PG (ref 28–38)
IMM GRANULOCYTES # BLD AUTO: 0.06 X10*3/UL (ref 0–0.7)
IMM GRANULOCYTES NFR BLD AUTO: 0.6 % (ref 0–0.9)
IMMATURE RETIC FRACTION: 16.9 %
INHALED O2 CONCENTRATION: 100 %
IRON SATN MFR SERPL: 12 % (ref 25–45)
IRON SERPL-MCNC: 20 UG/DL (ref 35–150)
LACTATE BLDA-SCNC: 1.6 MMOL/L (ref 0.4–2)
LDH SERPL L TO P-CCNC: 144 U/L (ref 84–246)
LYMPHOCYTES # BLD AUTO: 0.52 X10*3/UL (ref 1.2–4.8)
LYMPHOCYTES NFR BLD AUTO: 5.3 %
MCH RBC QN AUTO: 25.5 PG (ref 26–34)
MCHC RBC AUTO-ENTMCNC: 28.7 G/DL (ref 32–36)
MCV RBC AUTO: 89 FL (ref 80–100)
MONOCYTES # BLD AUTO: 0.45 X10*3/UL (ref 0.1–1)
MONOCYTES NFR BLD AUTO: 4.6 %
MYELOPEROXIDASE AB SER-ACNC: 0 AU/ML (ref 0–19)
NEUTROPHILS # BLD AUTO: 8.68 X10*3/UL (ref 1.2–7.7)
NEUTROPHILS NFR BLD AUTO: 89.4 %
NRBC BLD-RTO: 0 /100 WBCS (ref 0–0)
OXYHGB MFR BLDA: 96.5 % (ref 94–98)
PCO2 BLDA: 56 MM HG (ref 38–42)
PH BLDA: 7.4 PH (ref 7.38–7.42)
PLATELET # BLD AUTO: 273 X10*3/UL (ref 150–450)
PO2 BLDA: 210 MM HG (ref 85–95)
POTASSIUM BLDA-SCNC: 4.1 MMOL/L (ref 3.5–5.3)
POTASSIUM SERPL-SCNC: 3.9 MMOL/L (ref 3.5–5.3)
PROTEINASE3 AB SER-ACNC: 2 AU/ML (ref 0–19)
RBC # BLD AUTO: 2.86 X10*6/UL (ref 4.5–5.9)
RETICS #: 0.11 X10*6/UL (ref 0.02–0.12)
RETICS/RBC NFR AUTO: 4 % (ref 0.5–2)
SAO2 % BLDA: 99 % (ref 94–100)
SODIUM BLDA-SCNC: 135 MMOL/L (ref 136–145)
SODIUM SERPL-SCNC: 140 MMOL/L (ref 136–145)
SPECIMEN DRAWN FROM PATIENT: ABNORMAL
TIBC SERPL-MCNC: 167 UG/DL (ref 240–445)
UIBC SERPL-MCNC: 147 UG/DL (ref 110–370)
WBC # BLD AUTO: 9.7 X10*3/UL (ref 4.4–11.3)

## 2024-09-20 PROCEDURE — 85025 COMPLETE CBC W/AUTO DIFF WBC: CPT | Performed by: INTERNAL MEDICINE

## 2024-09-20 PROCEDURE — 94668 MNPJ CHEST WALL SBSQ: CPT

## 2024-09-20 PROCEDURE — 99233 SBSQ HOSP IP/OBS HIGH 50: CPT | Performed by: INTERNAL MEDICINE

## 2024-09-20 PROCEDURE — 2500000001 HC RX 250 WO HCPCS SELF ADMINISTERED DRUGS (ALT 637 FOR MEDICARE OP): Performed by: INTERNAL MEDICINE

## 2024-09-20 PROCEDURE — 2500000002 HC RX 250 W HCPCS SELF ADMINISTERED DRUGS (ALT 637 FOR MEDICARE OP, ALT 636 FOR OP/ED): Performed by: INTERNAL MEDICINE

## 2024-09-20 PROCEDURE — 2500000005 HC RX 250 GENERAL PHARMACY W/O HCPCS: Performed by: INTERNAL MEDICINE

## 2024-09-20 PROCEDURE — 93971 EXTREMITY STUDY: CPT | Performed by: INTERNAL MEDICINE

## 2024-09-20 PROCEDURE — 99232 SBSQ HOSP IP/OBS MODERATE 35: CPT | Performed by: INTERNAL MEDICINE

## 2024-09-20 PROCEDURE — 83540 ASSAY OF IRON: CPT | Performed by: INTERNAL MEDICINE

## 2024-09-20 PROCEDURE — 80048 BASIC METABOLIC PNL TOTAL CA: CPT | Performed by: INTERNAL MEDICINE

## 2024-09-20 PROCEDURE — 85045 AUTOMATED RETICULOCYTE COUNT: CPT | Performed by: INTERNAL MEDICINE

## 2024-09-20 PROCEDURE — 82947 ASSAY GLUCOSE BLOOD QUANT: CPT

## 2024-09-20 PROCEDURE — 93971 EXTREMITY STUDY: CPT

## 2024-09-20 PROCEDURE — 36415 COLL VENOUS BLD VENIPUNCTURE: CPT | Performed by: INTERNAL MEDICINE

## 2024-09-20 PROCEDURE — 84132 ASSAY OF SERUM POTASSIUM: CPT | Performed by: INTERNAL MEDICINE

## 2024-09-20 PROCEDURE — 2500000004 HC RX 250 GENERAL PHARMACY W/ HCPCS (ALT 636 FOR OP/ED): Performed by: INTERNAL MEDICINE

## 2024-09-20 PROCEDURE — 94762 N-INVAS EAR/PLS OXIMTRY CONT: CPT

## 2024-09-20 PROCEDURE — 92610 EVALUATE SWALLOWING FUNCTION: CPT | Mod: GN

## 2024-09-20 PROCEDURE — 83615 LACTATE (LD) (LDH) ENZYME: CPT | Performed by: INTERNAL MEDICINE

## 2024-09-20 PROCEDURE — 94640 AIRWAY INHALATION TREATMENT: CPT

## 2024-09-20 PROCEDURE — 99232 SBSQ HOSP IP/OBS MODERATE 35: CPT | Performed by: CLINICAL NURSE SPECIALIST

## 2024-09-20 PROCEDURE — 2060000001 HC INTERMEDIATE ICU ROOM DAILY

## 2024-09-20 RX ORDER — GABAPENTIN 300 MG/1
300 CAPSULE ORAL NIGHTLY
Status: DISCONTINUED | OUTPATIENT
Start: 2024-09-20 | End: 2024-09-27 | Stop reason: HOSPADM

## 2024-09-20 RX ORDER — SODIUM CHLORIDE 9 MG/ML
100 INJECTION, SOLUTION INTRAVENOUS CONTINUOUS
Status: DISCONTINUED | OUTPATIENT
Start: 2024-09-20 | End: 2024-09-20

## 2024-09-20 RX ORDER — SODIUM CHLORIDE 9 MG/ML
100 INJECTION, SOLUTION INTRAVENOUS CONTINUOUS
Status: ACTIVE | OUTPATIENT
Start: 2024-09-20 | End: 2024-09-21

## 2024-09-20 RX ORDER — FUROSEMIDE 10 MG/ML
60 INJECTION INTRAMUSCULAR; INTRAVENOUS EVERY 12 HOURS
Status: DISCONTINUED | OUTPATIENT
Start: 2024-09-20 | End: 2024-09-23

## 2024-09-20 RX ORDER — ENOXAPARIN SODIUM 100 MG/ML
30 INJECTION SUBCUTANEOUS EVERY 24 HOURS
Status: DISCONTINUED | OUTPATIENT
Start: 2024-09-20 | End: 2024-09-23

## 2024-09-20 RX ORDER — ACETAZOLAMIDE 125 MG/1
250 TABLET ORAL DAILY
Status: DISCONTINUED | OUTPATIENT
Start: 2024-09-20 | End: 2024-09-23

## 2024-09-20 ASSESSMENT — PAIN SCALES - GENERAL
PAINLEVEL_OUTOF10: 0 - NO PAIN

## 2024-09-20 ASSESSMENT — PAIN - FUNCTIONAL ASSESSMENT
PAIN_FUNCTIONAL_ASSESSMENT: 0-10

## 2024-09-20 ASSESSMENT — COGNITIVE AND FUNCTIONAL STATUS - GENERAL
PERSONAL GROOMING: A LOT
DAILY ACTIVITIY SCORE: 15
HELP NEEDED FOR BATHING: A LITTLE
WALKING IN HOSPITAL ROOM: TOTAL
TOILETING: A LOT
TURNING FROM BACK TO SIDE WHILE IN FLAT BAD: A LOT
CLIMB 3 TO 5 STEPS WITH RAILING: TOTAL
MOVING TO AND FROM BED TO CHAIR: A LOT
DRESSING REGULAR UPPER BODY CLOTHING: A LITTLE
STANDING UP FROM CHAIR USING ARMS: A LOT
EATING MEALS: A LITTLE
MOVING FROM LYING ON BACK TO SITTING ON SIDE OF FLAT BED WITH BEDRAILS: A LOT
MOBILITY SCORE: 10
DRESSING REGULAR LOWER BODY CLOTHING: A LOT

## 2024-09-20 NOTE — PROGRESS NOTES
"Subjective Data:  Breathing significantly improving. Remains on supplemental oxygen via NC  BUN 51 creatinine 4.01- renal following.       Overnight Events:    N/A     Objective Data:  Last Recorded Vitals:  Vitals:    09/20/24 0409 09/20/24 0535 09/20/24 0821 09/20/24 0954   BP: 151/71  120/79    BP Location: Left arm  Left arm    Patient Position: Lying  Lying    Pulse: 69  65    Resp: 17  15    Temp: 36.6 °C (97.9 °F)      TempSrc: Temporal      SpO2: 95%  93% 95%   Weight:  130 kg (287 lb 0.6 oz)     Height:           Last Labs:  LABS:  CMP:  Results from last 7 days   Lab Units 09/20/24  0531 09/19/24  1022 09/18/24  0434 09/17/24  1300 09/16/24  2227   SODIUM mmol/L 140 143 145 143 145   POTASSIUM mmol/L 3.9 3.3* 3.5 3.8 4.0   CHLORIDE mmol/L 99 101 103 102 103   CO2 mmol/L 33* 35* 32 27 28   ANION GAP mmol/L 12 10 14 18 14   BUN mg/dL 51* 44* 45* 46* 42*   CREATININE mg/dL 4.01* 3.32* 3.09* 3.32* 3.30*   EGFR mL/min/1.73m*2 16* 20* 22* 20* 20*   MAGNESIUM mg/dL  --  1.70  --   --  1.80   ALBUMIN g/dL  --   --   --  3.1* 3.1*   ALT U/L  --   --   --  55* 71*   AST U/L  --   --   --  15 17   BILIRUBIN TOTAL mg/dL  --   --   --  0.5 0.8     CBC:  Results from last 7 days   Lab Units 09/20/24  0531 09/19/24  1022 09/18/24  0434 09/16/24  2227   WBC AUTO x10*3/uL 9.7 11.0 11.0 11.9*   HEMOGLOBIN g/dL 7.3* 7.2* 7.0* 8.1*   HEMATOCRIT % 25.4* 24.7* 24.2* 27.5*   PLATELETS AUTO x10*3/uL 273 296 328 348   MCV fL 89 92 92 90     COAG:   Results from last 7 days   Lab Units 09/18/24  0709   INR  1.3*     ABO: No results found for: \"ABO\"  HEME/ENDO:     CARDIAC:   Results from last 7 days   Lab Units 09/18/24  0434   LD U/L 166   TROPHS ng/L 62*   BNP pg/mL 671*     Recent Labs     04/29/23  0654 08/15/22  0700 03/18/22  0623   CHOL 181 145 150   LDLF 127* 77 90   HDL 32.8* 31.6* 36.7*   TRIG 108 180* 115      Transthoracic Echo (TTE) Complete   Final Result      US thoracentesis   Final Result   Uneventful " thoracentesis, as detailed above: Right Pleural space,   1000 mL        Performed and dictated at [facility].        Signed by: Diana Serrano 9/19/2024 11:37 AM   Dictation workstation:   [id number]            Last I/O:  I/O last 3 completed shifts:  In: 300 (2.3 mL/kg) [IV Piggyback:300]  Out: 1350 (10.4 mL/kg) [Urine:1350 (0.3 mL/kg/hr)]  Weight: 130.2 kg     Past Cardiology Tests (Last 3 Years):  EKG:  ECG 12 lead 09/16/2024    Echo:  Echo 9/19/2024   1. Left ventricular ejection fraction is normal, calculated by Kirkpatrick's biplane at 59%.   2. Spectral Doppler shows a restrictive pattern of left ventricular diastolic filling.   3. There is an elevated mean left atrial pressure.   4. Abnormal septal motion consistent with post-operative status.   5. There is moderate concentric left ventricular hypertrophy.   6. There is normal right ventricular global systolic function.   7. Slightly elevated right ventricular systolic pressure.   8. The inferior vena cava appears severely dilated, with IVC inspiratory collapse less than 50%.    Echo:  Transthoracic Echo (TTE) Limited 05/23/2024  CONCLUSIONS:   1. Left ventricular systolic function is normal with a 55% estimated ejection fraction.   2. Poorly visualized anatomical structures due to suboptimal image quality        Transthoracic Echo (TTE) Limited 04/03/2024  1. Left ventricular systolic function is normal with a 60-65% estimated ejection fraction.   2. Poorly visualized anatomical structures due to suboptimal image quality.   3. Moderately increased left ventricular septal thickness.   4. Spectral Doppler shows an impaired relaxation pattern of left ventricular diastolic filling.   5. The left ventricular posterior wall thickness is moderately increased.   6. There is mildly reduced right ventricular systolic function.   7. Compared with the prior exam from 11/13/2023 ( [facility]) prior study was techniclly difficult with patient   bpm, but limited echocontrast images demonstrated normal LV systolic function at that time without significant wall motion abnormalities. Bernard's HR today 70 bpm.        Transthoracic Echo (TTE) Complete 11/24/2023   1. Left ventricular systolic function is mildly decreased with a 45-50% estimated ejection fraction.   2. Mid and apical anterior wall, mid anterolateral segment, apical lateral segment, and apex are abnormal.   3. There are multiple wall motion abnormalities.   4. Poorly visualized anatomical structures due to suboptimal image quality.   5. There is reduced right ventricular systolic function.    Anesthesia Intraoperative Transesophageal Echocardiogram 11/13/2023      Anesthesia Intraoperative Transesophageal Echocardiogram 11/13/2023      Cath:  Peripheral Diagnostic Angiography  9/14/2023  CONCLUSIONS:  1. Right critical limb ischemia, [place] class V.  2. Successful PTA_DCB of the entire right superficial femoral artery.  3. Aspirin 81 mg daily and clopidogrel 75 mg daily.    [facility] 5/19/2023  CONCLUSIONS:  1. Successful OM2 stenting.    [facility] 5/3/2023  Coronary Interventions:     Coronary Lesion Summary:  Vessel       Stenosis   Vessel Segment  Left Main  30% stenosis     entire  LAD        40% stenosis    proximal  LAD        75% stenosis      mid  Circumflex 95% stenosis      mid  RCA        60% stenosis    proximal    CONCLUSIONS:  1. Left main: 30% diffuse disease.  2. LAD: 40-50% proximal stenosis, 75% md-vessel disease.  3. LCx: 95% mid-vessel disease.  4. RCA: 60% proximal disease, 40% mid-vessel disease.  5. LVEDP 34mmHg, no aortic stenosis on LV-Ao gradient.       Stress Test:  9/25/2018    Cardiac Imaging:  XR chest abdomen for OG NG placement 11/13/2023      Inpatient Medications:  Scheduled medications   Medication Dose Route Frequency    albuterol  2.5 mg nebulization TID    amLODIPine  10 mg oral Daily    ampicillin-sulbactam  3 g intravenous q6h    aspirin  81 mg oral Daily     atorvastatin  80 mg oral Daily    enoxaparin  40 mg subcutaneous q24h    fludrocortisone  0.1 mg oral Daily    furosemide  80 mg intravenous q12h    gabapentin  300 mg oral TID    hydrALAZINE  50 mg oral TID    insulin glargine  2 Units subcutaneous Nightly    insulin regular  0-10 Units subcutaneous TID    isosorbide dinitrate  20 mg oral TID    lidocaine  1 patch transdermal Daily    melatonin  3 mg oral Nightly    metoprolol tartrate  50 mg oral BID    pantoprazole  40 mg oral Daily before breakfast    Or    pantoprazole  40 mg intravenous Daily before breakfast    perflutren lipid microspheres  0.5-10 mL of dilution intravenous Once in imaging    perflutren lipid microspheres  0.5-10 mL of dilution intravenous Once in imaging    perflutren protein A microsphere  0.5 mL intravenous Once in imaging    perflutren protein A microsphere  0.5 mL intravenous Once in imaging    sodium bicarbonate  1,300 mg oral q12h    sulfur hexafluoride microsphr  2 mL intravenous Once in imaging    sulfur hexafluoride microsphr  2 mL intravenous Once in imaging    traZODone  50 mg oral Nightly    venlafaxine XR  150 mg oral Daily     PRN medications   Medication    acetaminophen    Or    acetaminophen    Or    acetaminophen    albuterol    ALPRAZolam    dextrose    dextrose    glucagon    glucagon    glucagon    ondansetron    Or    ondansetron    oxygen    polyethylene glycol     Continuous Medications   Medication Dose Last Rate       Physical Exam:  GENERAL: Lethargic this today   SKIN: warm, dry  NECK: Difficult to assess due to body habitus   CARDIAC: Regular rate and rhythm no murmurs  PULMONARY: Bibasilar rales on supplemental oxygen   ABDOMEN: soft, nondistended  EXTREMITIES: left AKA +1-2 right lower extremity edema   NEURO: Alert and oriented x 3.  Grossly normal.  Moves all 4 extremities.    I reviewed telemetry which showed sinus rhythm       Assessment/Plan   Humphrey Waddell is a 59 y.o. male with a pertinent medical Hx  "notable for  PAD s/p left above-the-knee amputation, anemia, coronary artery disease, s/p CABG carotid artery disease, CVA, depression, type 2 diabetes mellitus, GERD, hyperlipidemia, hypertension, osteoarthritis, peripheral arterial disease, CKD stage IV who was transferred [facility] to [facility] due to AMS.  Cardiology has been consulted for \"CHF\"      S/p right thoracentesis with 1 liter removed 9/19/2024    Currently on furosemide 80 mg every 12 hours  per nephrology. Goal diuresis of 1.5-2 L per 24 hour period.  Electrolytes will need to be monitored closely and repleted.  Patient will need accurate I/O's and daily standing weights. Patient should be on a salt restricted diet. Free water should be restricted to 1500 cc per day.      Echocardiogram showed LVEF 59%  restrictive LV diastolic filling elevated mean left atrial pressure RVSP 35 mmHg    Continue chronic home medications at this time      Follow H/H, Transfuse as needed to maintain H > 7. Give additional lasix following each pRBC     Will need appropriate cardiac follow up --> Lives in [place]          Code Status:  Full Code    Anisa Borrego, APRN-CNP      =========================================  Attending Note   =========================================      Patient seen and examined, chart reviewed   -- Continues to diurese and is breathing easier.     Documented Vital Signs   Heart Rate:  [64-70]   Temp:  [36.6 °C (97.9 °F)-36.7 °C (98.1 °F)]   Resp:  [15-18]   BP: (120-168)/(58-88)   Weight:  [130 kg (287 lb 0.6 oz)]   SpO2:  [93 %-96 %]   Temp:  [36.6 °C (97.9 °F)-36.7 °C (98.1 °F)] 36.6 °C (97.9 °F)  Heart Rate:  [64-70] 64  Resp:  [15-18] 15  BP: (120-168)/(58-88) 168/88      Oxygen Dose: *2 L/min    Documented Fluid Status     Intake/Output Summary (Last 24 hours) at 9/20/2024 1418  Last data filed at 9/20/2024 1200  Gross per 24 hour   Intake 360 ml   Output 450 ml   Net -90 ml     Net IO Since Admission: -2,040 mL [09/20/24 " "1418]                Humphrey Waddell is a 59 y.o. male with a pertinent medical Hx notable for  PAD s/p left above-the-knee amputation, anemia, coronary artery disease, s/p CABG carotid artery disease, CVA, depression, type 2 diabetes mellitus, GERD, hyperlipidemia, hypertension, osteoarthritis, peripheral arterial disease, CKD stage IV who was transferred from [facility] to [facility] due to AMS.  Cardiology has been consulted for \"CHF\"      S/p right thoracentesis with 1 liter removed 9/19/2024    Ongoing Diuresis as per Nephrology management at this time   Goal diuresis of 1.5-2 L per 24 hour period.  Electrolytes will need to be monitored closely and repleted.  Patient will need accurate I/O's and daily standing weights. Patient should be on a salt restricted diet. Free water should be restricted to 1500 cc per day.      Echocardiogram showed LVEF 59%  restrictive LV diastolic filling elevated mean left atrial pressure RVSP 35 mmHg    Continue chronic home medications at this time      Follow H/H, Transfuse as needed to maintain H > 7. Give additional lasix following each pRBC     Will need appropriate cardiac follow up --> Lives in [place]   -- Dr Lund or Dr Peres would likely be the closest providers to Mr Waddell.       Ongoing disposition planning as per primary service  Appears to be improving and my be reaching euvolemia        Junior Araujo DO   Division of Cardiovascular Medicine  [facility] [place]        "

## 2024-09-20 NOTE — CARE PLAN
Problem: Nutrition  Goal: Less than 5 days NPO/clear liquids  Outcome: Progressing  Goal: Oral intake greater than 50%  Outcome: Progressing  Goal: Oral intake greater 75%  Outcome: Progressing  Goal: Consume prescribed supplement  Outcome: Progressing  Goal: Adequate PO fluid intake  Outcome: Progressing  Goal: Nutrition support goals are met within 48 hrs  Outcome: Progressing  Goal: Nutrition support is meeting 75% of nutrient needs  Outcome: Progressing  Goal: Tube feed tolerance  Outcome: Progressing  Goal: BG  mg/dL  Outcome: Progressing  Goal: Lab values WNL  Outcome: Progressing  Goal: Electrolytes WNL  Outcome: Progressing  Goal: Promote healing  Outcome: Progressing  Goal: Maintain stable weight  Outcome: Progressing  Goal: Reduce weight from edema/fluid  Outcome: Progressing  Goal: Gradual weight gain  Outcome: Progressing  Goal: Improve ostomy output  Outcome: Progressing     Problem: Diabetes  Goal: Achieve decreasing blood glucose levels by end of shift  Outcome: Progressing  Goal: Increase stability of blood glucose readings by end of shift  Outcome: Progressing  Goal: Decrease in ketones present in urine by end of shift  Outcome: Progressing  Goal: Maintain electrolyte levels within acceptable range throughout shift  Outcome: Progressing  Goal: Maintain glucose levels >70mg/dl to <250mg/dl throughout shift  Outcome: Progressing  Goal: No changes in neurological exam by end of shift  Outcome: Progressing  Goal: Learn about and adhere to nutrition recommendations by end of shift  Outcome: Progressing  Goal: Vital signs within normal range for age by end of shift  Outcome: Progressing  Goal: Increase self care and/or family involovement by end of shift  Outcome: Progressing  Goal: Receive DSME education by end of shift  Outcome: Progressing     Problem: Pain - Adult  Goal: Verbalizes/displays adequate comfort level or baseline comfort level  Outcome: Progressing     Problem: Safety -  Adult  Goal: Free from fall injury  Outcome: Progressing     Problem: Discharge Planning  Goal: Discharge to home or other facility with appropriate resources  Outcome: Progressing     Problem: Chronic Conditions and Co-morbidities  Goal: Patient's chronic conditions and co-morbidity symptoms are monitored and maintained or improved  Outcome: Progressing     Problem: Respiratory  Goal: Clear secretions with interventions this shift  Outcome: Progressing  Goal: Minimize anxiety/maximize coping throughout shift  Outcome: Progressing  Goal: Minimal/no exertional discomfort or dyspnea this shift  Outcome: Progressing  Goal: No signs of respiratory distress (eg. Use of accessory muscles. Peds grunting)  Outcome: Progressing  Goal: Patent airway maintained this shift  Outcome: Progressing  Goal: Verbalize decreased shortness of breath this shift  Outcome: Progressing  Goal: Wean oxygen to maintain O2 saturation per order/standard this shift  Outcome: Progressing  Goal: Increase self care and/or family involvement in next 24 hours  Outcome: Progressing     Problem: Skin  Goal: Decreased wound size/increased tissue granulation at next dressing change  Outcome: Progressing  Goal: Participates in plan/prevention/treatment measures  Outcome: Progressing  Goal: Prevent/manage excess moisture  Outcome: Progressing  Goal: Prevent/minimize sheer/friction injuries  Outcome: Progressing  Goal: Promote/optimize nutrition  Outcome: Progressing  Goal: Promote skin healing  Outcome: Progressing     Problem: Fall/Injury  Goal: Not fall by end of shift  Outcome: Progressing  Goal: Be free from injury by end of the shift  Outcome: Progressing  Goal: Verbalize understanding of personal risk factors for fall in the hospital  Outcome: Progressing  Goal: Verbalize understanding of risk factor reduction measures to prevent injury from fall in the home  Outcome: Progressing  Goal: Use assistive devices by end of the shift  Outcome:  Progressing  Goal: Pace activities to prevent fatigue by end of the shift  Outcome: Progressing     Problem: Pain  Goal: Takes deep breaths with improved pain control throughout the shift  Outcome: Progressing  Goal: Turns in bed with improved pain control throughout the shift  Outcome: Progressing  Goal: Walks with improved pain control throughout the shift  Outcome: Progressing  Goal: Performs ADL's with improved pain control throughout shift  Outcome: Progressing  Goal: Participates in PT with improved pain control throughout the shift  Outcome: Progressing  Goal: Free from opioid side effects throughout the shift  Outcome: Progressing  Goal: Free from acute confusion related to pain meds throughout the shift  Outcome: Progressing     Problem: Heart Failure  Goal: Improved gas exchange this shift  Outcome: Progressing  Goal: Improved urinary output this shift  Outcome: Progressing  Goal: Reduction in peripheral edema within 24 hours  Outcome: Progressing  Goal: Report improvement of dyspnea/breathlessness this shift  Outcome: Progressing  Goal: Weight from fluid excess reduced over 2-3 days, then stabilize  Outcome: Progressing  Goal: Increase self care and/or family involvement in 24 hours  Outcome: Progressing   The patient's goals for the shift include Pt. will have a safe, restful and uneventful evening    The clinical goals for the shift include Pt. will remain alert and stay HDS this shift    Over the shift, the patient did not make progress toward the following goals. Barriers to progression include . Recommendations to address these barriers include .

## 2024-09-20 NOTE — PROGRESS NOTES
Speech-Language Pathology    Inpatient Speech-Language Pathology Clinical Swallow Evaluation    Patient Name: Humphrey Waddell  MRN: [id number]  : 1965  Today's Date: 24   Time Calculation  Start Time: 840  Stop Time: 922  Time Calculation (min): 42 min        RECOMMENDATIONS:    Solid Diet Recommendations : Regular (IDDSI Level 7)  2.   Liquid Diet Recommendations: Thin (IDDSI Level 0)  3.   Medication Administration Recommendations: Whole, With Liquid (as tolerated)  4.    NO FURTHER SLP SERVICES REQUIRED AT THIS TIME    Assessment:  Assessment Results: Patient seen to assess swallowing function. Patient alert and verbal with O2 via NC. HOB elevated to achieve full upright position for po trials. Speech clear. Ice chip and water boluses presented in sequential fashion with progression to 3 oz water challenge. No overt s/s aspiration detected, therefore transitioned to puree and solid bolus presentations. Active mastication of solids achieved with full oral clearance. Patient c/o oral residue with solids, which reportedly cleared with liquid wash. Voice and respirations remained clear post swallows with no overt difficulty identified. Patient denied difficulty with deglutition at time of assessment. Do not feel further therapy required at this time as patient exhibiting a functional swallowing mechanism. Reviewed safe swallowing strategies with patient along with s/s aspiration to be aware of. Advised patient to notify RN or MD in the event dysphagia symptoms develop.    Baseline Assessment:  Respiratory Status: Oxygen via nasal cannula  History of Intubation: No        Behavior/Cognition: Alert, Cooperative, Pleasant mood  Patient Positioning: Upright in Bed  Baseline Vocal Quality: Normal    Oral-Motor Assessment:  Dentition: Dentures (Upper Partial ) (missing lower dentition)  Oral Motor: Impaired Function (reduced lingual strength and ROM)    Plan:  SLP Plan: No skilled SLP        SLP Discharge  "Recommendations: D/C as patient is functional for this level of care             Goals:   N/A    General Visit Information:  Patient admitted: 9/18/24    Past Medical History: CHF, CAD s/p CABG, DM, CKD, HTN, HLD, GERD, depression, PVD with left AKA    Chief Complaint/Reason for admission: admitted 2/2 dyspnea and fatigue.    Relevant Imaging Results: CXR 9/17/24> \" Cardiomegaly. Vascular congestion and interstitial edema. Small bilateral pleural effusions. Bibasilar airspace opacities, may be secondary to atelectasis or pneumonia.\"    Living Environment: Home  Reason for Referral: assess for dysphagia  Ordering Physician: Dr. Palmer  Current Diet : regular/thin diet ordered    Pain:  Pain Assessment: 0-10  0-10 (Numeric) Pain Score: 0 - No pain    Treatment:    N/A    Following completion of session:  *Bed alarm: on  *Bed position: lowered                                           "

## 2024-09-20 NOTE — PROGRESS NOTES
"Humphrey Waddell is a 59 y.o. male on day 2 of admission presenting with CHF (congestive heart failure), NYHA class I, acute on chronic, combined (Multi).    Subjective   Breathing much better Lasix 80 mg IV every 12 hours for today, down to 2 L of nasal cannula oxygen.  Hemoglobin stable, iron panel pending, repeat chest x-ray in the morning.  Patient overall states he is feeling better.       Objective     Physical Exam  Vitals reviewed.   Constitutional:       Appearance: Normal appearance.   HENT:      Head: Normocephalic.      Right Ear: Tympanic membrane normal.      Nose: Nose normal.      Mouth/Throat:      Mouth: Mucous membranes are moist.   Cardiovascular:      Rate and Rhythm: Normal rate and regular rhythm.   Pulmonary:      Comments: Bibasilar crackles.  Abdominal:      General: Abdomen is flat. Bowel sounds are normal.      Palpations: Abdomen is soft.   Musculoskeletal:      Comments: Left AKA, right lower extremity with edema   Skin:     General: Skin is warm.      Capillary Refill: Capillary refill takes less than 2 seconds.   Neurological:      General: No focal deficit present.      Mental Status: He is alert.         Last Recorded Vitals  Blood pressure 120/79, pulse 65, temperature 36.6 °C (97.9 °F), temperature source Temporal, resp. rate 15, height 1.93 m (6' 4\"), weight 130 kg (287 lb 0.6 oz), SpO2 95%.  Intake/Output last 3 Shifts:  I/O last 3 completed shifts:  In: 300 (2.3 mL/kg) [IV Piggyback:300]  Out: 1350 (10.4 mL/kg) [Urine:1350 (0.3 mL/kg/hr)]  Weight: 130.2 kg     Relevant Results  Results for orders placed or performed during the hospital encounter of 09/18/24 (from the past 24 hour(s))   POCT GLUCOSE   Result Value Ref Range    POCT Glucose 159 (H) 74 - 99 mg/dL   Transthoracic Echo (TTE) Complete   Result Value Ref Range    MV E/A ratio 1.54     Tricuspid annular plane systolic excursion 2.0 cm    LV EF 59 %    RV free wall pk S' 9.14 cm/s    LVIDd 5.34 cm    RVSP 34.8 mmHg    LV A4C " EF 58.3     BSA 2.62 m2   POCT GLUCOSE   Result Value Ref Range    POCT Glucose 185 (H) 74 - 99 mg/dL   POCT GLUCOSE   Result Value Ref Range    POCT Glucose 265 (H) 74 - 99 mg/dL   CBC and Auto Differential   Result Value Ref Range    WBC 9.7 4.4 - 11.3 x10*3/uL    nRBC 0.0 0.0 - 0.0 /100 WBCs    RBC 2.86 (L) 4.50 - 5.90 x10*6/uL    Hemoglobin 7.3 (L) 13.5 - 17.5 g/dL    Hematocrit 25.4 (L) 41.0 - 52.0 %    MCV 89 80 - 100 fL    MCH 25.5 (L) 26.0 - 34.0 pg    MCHC 28.7 (L) 32.0 - 36.0 g/dL    RDW 15.4 (H) 11.5 - 14.5 %    Platelets 273 150 - 450 x10*3/uL    Neutrophils % 89.4 40.0 - 80.0 %    Immature Granulocytes %, Automated 0.6 0.0 - 0.9 %    Lymphocytes % 5.3 13.0 - 44.0 %    Monocytes % 4.6 2.0 - 10.0 %    Eosinophils % 0.0 0.0 - 6.0 %    Basophils % 0.1 0.0 - 2.0 %    Neutrophils Absolute 8.68 (H) 1.20 - 7.70 x10*3/uL    Immature Granulocytes Absolute, Automated 0.06 0.00 - 0.70 x10*3/uL    Lymphocytes Absolute 0.52 (L) 1.20 - 4.80 x10*3/uL    Monocytes Absolute 0.45 0.10 - 1.00 x10*3/uL    Eosinophils Absolute 0.00 0.00 - 0.70 x10*3/uL    Basophils Absolute 0.01 0.00 - 0.10 x10*3/uL   Basic metabolic panel   Result Value Ref Range    Glucose 214 (H) 74 - 99 mg/dL    Sodium 140 136 - 145 mmol/L    Potassium 3.9 3.5 - 5.3 mmol/L    Chloride 99 98 - 107 mmol/L    Bicarbonate 33 (H) 21 - 32 mmol/L    Anion Gap 12 10 - 20 mmol/L    Urea Nitrogen 51 (H) 6 - 23 mg/dL    Creatinine 4.01 (H) 0.50 - 1.30 mg/dL    eGFR 16 (L) >60 mL/min/1.73m*2    Calcium 7.1 (L) 8.6 - 10.3 mg/dL   Reticulocytes   Result Value Ref Range    Retic % 4.0 (H) 0.5 - 2.0 %    Retic Absolute 0.112 0.022 - 0.118 x10*6/uL    Reticulocyte Hemoglobin 24 (L) 28 - 38 pg    Immature Retic fraction 16.9 (H) <=16.0 %   POCT GLUCOSE   Result Value Ref Range    POCT Glucose 178 (H) 74 - 99 mg/dL       Imaging Results    Ct chest/abd/pelvis:  IMPRESSION:  Large right pleural effusion and small loculated left pleural effusion  at the left lung apex with  multifocal airspace disease in the left  lung, likely atelectasis and possibly pneumonia in the appropriate  clinical setting.  Pronounced cardiomegaly with question of mild systemic anemia.  Mild to moderate concentric wall thickening of the rectosigmoid colon  suggesting a low-grade acute infectious or inflammatory distal  colitis/proctitis.  Decompressed urinary bladder with mild concentric wall thickening  which may be due to underdistention however mild cystitis is a  possibility in the upper clinical setting, correlate with urinalysis.  Moderate-sized fluid filled left inguinal hernia along with a small  fat-containing right inguinal hernia.    Head ct:  IMPRESSION:  No acute intracranial hemorrhage or mass effect.      Periapical lucencies noted consistent with abscesses. Dental  follow-up suggested.      Mild prominence of the lateral and 3rd ventricles which could be  related to central white matter loss versus normal pressure  hydrocephalus. Clinical correlation suggested.      Correlation for left-sided otitis media suggested.      Remote left thalamic lacunar infarct.    Medications:  albuterol, 2.5 mg, nebulization, TID  amLODIPine, 10 mg, oral, Daily  ampicillin-sulbactam, 3 g, intravenous, q6h  aspirin, 81 mg, oral, Daily  atorvastatin, 80 mg, oral, Daily  enoxaparin, 40 mg, subcutaneous, q24h  fludrocortisone, 0.1 mg, oral, Daily  furosemide, 80 mg, intravenous, q12h  gabapentin, 300 mg, oral, TID  hydrALAZINE, 50 mg, oral, TID  insulin glargine, 2 Units, subcutaneous, Nightly  insulin regular, 0-10 Units, subcutaneous, TID  isosorbide dinitrate, 20 mg, oral, TID  lidocaine, 1 patch, transdermal, Daily  melatonin, 3 mg, oral, Nightly  metoprolol tartrate, 50 mg, oral, BID  pantoprazole, 40 mg, oral, Daily before breakfast   Or  pantoprazole, 40 mg, intravenous, Daily before breakfast  perflutren lipid microspheres, 0.5-10 mL of dilution, intravenous, Once in imaging  perflutren lipid microspheres,  0.5-10 mL of dilution, intravenous, Once in imaging  perflutren protein A microsphere, 0.5 mL, intravenous, Once in imaging  perflutren protein A microsphere, 0.5 mL, intravenous, Once in imaging  sodium bicarbonate, 1,300 mg, oral, q12h  sulfur hexafluoride microsphr, 2 mL, intravenous, Once in imaging  sulfur hexafluoride microsphr, 2 mL, intravenous, Once in imaging  traZODone, 50 mg, oral, Nightly  venlafaxine XR, 150 mg, oral, Daily       PRN medications: acetaminophen **OR** acetaminophen **OR** acetaminophen, albuterol, ALPRAZolam, dextrose, dextrose, glucagon, glucagon, glucagon, ondansetron **OR** ondansetron, oxygen, polyethylene glycol     Assessment/Plan   1.  Acute heart failure with preserved EF, most recent echo in May 2024 had an EF of 55%  -cardiology on board strict intake and output continue diuresis with Lasix 80 mg every 12  hours    2.CAD status post CABG  -continue asa/atorvastatin/metoprolol/imdur    3. DM  -continue lantus and SSI    4. Possible aspiration pna  -on iv unasyn  -get speech eval    5. CKDV  -at baseline    6. PVD with left AKA  -continue asa/atorvastatin    7. Bilateral effusion suspect secondary to #1  -had right thoracentesis drained 1L this am  -transudate     8. Normocytic anemia  -hgb 7.3  -no evidence of bleeding check iron studies    9.  Acute hypoxic respiratory failure secondary to #1 and 4  -Oxygen being weaned down to 2 L, home oxygen eval as needed but patient also needs a sleep study as an outpatient.    DVT Prophylaxis:  Lovenox subq    Gloria Palmer MD  [facility]

## 2024-09-20 NOTE — PROGRESS NOTES
"Humphrey Waddell is a 59 y.o. male on day 2 of admission presenting with CHF (congestive heart failure), NYHA class I, acute on chronic, combined (Multi).      Subjective   Overall doing better, with worsening IDANIA probably from overdiuresis and and worsening metabolic alkalosis.  Will adjust diuretic dosage and give   Diamox today, for metabolic alkalosis       Objective        Vitals 24HR  Heart Rate:  [64-70]   Temp:  [36.6 °C (97.9 °F)-36.7 °C (98.1 °F)]   Resp:  [15-18]   BP: (120-168)/(58-88)   Height:  [193 cm (6' 4\")]   Weight:  [128 kg (282 lb)-130 kg (287 lb 0.6 oz)]   SpO2:  [93 %-96 %]     Intake/Output last 3 Shifts:    Intake/Output Summary (Last 24 hours) at 9/20/2024 1310  Last data filed at 9/20/2024 1200  Gross per 24 hour   Intake 360 ml   Output 450 ml   Net -90 ml       Physical Exam      General appearance: Awake and alert and in mild respiratory distress  Head and ENT; normocephalic/atraumatic/supple neck/no JVD  Lungs; bilateral lower lobe crackles up to mid lung fields  Heart; RRR  Abdomen: Obese/distended  Extremities; left left above-knee amputation  Mild edema in the right side  Neurologic: Physiologic           Relevant Results     Results from last 7 days   Lab Units 09/20/24  0531 09/19/24  1022 09/18/24  0434   WBC AUTO x10*3/uL 9.7 11.0 11.0   HEMOGLOBIN g/dL 7.3* 7.2* 7.0*   HEMATOCRIT % 25.4* 24.7* 24.2*   PLATELETS AUTO x10*3/uL 273 296 328      Results from last 7 days   Lab Units 09/20/24  0531 09/19/24  1022 09/18/24  0434   SODIUM mmol/L 140 143 145   POTASSIUM mmol/L 3.9 3.3* 3.5   CHLORIDE mmol/L 99 101 103   CO2 mmol/L 33* 35* 32   BUN mg/dL 51* 44* 45*   CREATININE mg/dL 4.01* 3.32* 3.09*   GLUCOSE mg/dL 214* 150* 118*   CALCIUM mg/dL 7.1* 7.4* 7.7*        Current Facility-Administered Medications:     acetaminophen (Tylenol) tablet 650 mg, 650 mg, oral, q4h PRN, 650 mg at 09/19/24 2109 **OR** acetaminophen (Tylenol) oral liquid 650 mg, 650 mg, oral, q4h PRN **OR** acetaminophen " (Tylenol) suppository 650 mg, 650 mg, rectal, q4h PRN, Martin G Salomone, DO    albuterol 2.5 mg /3 mL (0.083 %) nebulizer solution 2.5 mg, 2.5 mg, nebulization, TID, Martin G Salomone, DO, 2.5 mg at 09/20/24 0954    albuterol 2.5 mg /3 mL (0.083 %) nebulizer solution 2.5 mg, 2.5 mg, nebulization, q2h PRN, Martin G Salomone, DO    ALPRAZolam (Xanax) tablet 0.5 mg, 0.5 mg, oral, Nightly PRN, Martin G Salomone, DO, 0.5 mg at 09/19/24 2158    amLODIPine (Norvasc) tablet 10 mg, 10 mg, oral, Daily, Martin G Salomone, DO, 10 mg at 09/20/24 0926    ampicillin-sulbactam (Unasyn) in sodium chloride 0.9 % 100 mL 3 g, 3 g, intravenous, q12h, Gloria Palmer MD    aspirin chewable tablet 81 mg, 81 mg, oral, Daily, Martin G Salomone, DO, 81 mg at 09/20/24 0927    atorvastatin (Lipitor) tablet 80 mg, 80 mg, oral, Daily, Martin G Salomone, DO, 80 mg at 09/20/24 0927    dextrose 50 % injection 12.5 g, 12.5 g, intravenous, q15 min PRN, Martin G Salomone, DO    dextrose 50 % injection 25 g, 25 g, intravenous, q15 min PRN, Martin G Salomone, DO    enoxaparin (Lovenox) syringe 30 mg, 30 mg, subcutaneous, q24h, Gloria Palmer MD    fludrocortisone (Florinef) tablet 0.1 mg, 0.1 mg, oral, Daily, Martin G Salomone, DO, 0.1 mg at 09/20/24 0926    furosemide (Lasix) injection 80 mg, 80 mg, intravenous, q12h, Austin Tracy MD, 80 mg at 09/20/24 0524    gabapentin (Neurontin) capsule 300 mg, 300 mg, oral, Nightly, Gloria Palmer MD    glucagon (Glucagen) injection 1 mg, 1 mg, intramuscular, q15 min PRN, Martin Gant DO    glucagon (Glucagen) injection 1 mg, 1 mg, intramuscular, q15 min PRN, Martin Gant DO    glucagon (Glucagen) injection 1 mg, 1 mg, intramuscular, q15 min PRN, Martin Gant DO    hydrALAZINE (Apresoline) tablet 50 mg, 50 mg, oral, TID, Martin Gant DO, 50 mg at 09/19/24 2048    insulin glargine (Lantus) injection 2 Units, 2 Units, subcutaneous, Nightly, Martin Gant DO, 2 Units at 09/19/24 2046    insulin regular  (HumuLIN R,NovoLIN R) injection 0-10 Units, 0-10 Units, subcutaneous, TID, Martin OSMAN Salomone, DO, 4 Units at 09/20/24 1233    isosorbide dinitrate (Isordil) tablet 20 mg, 20 mg, oral, TID, Martin Keeneomone, DO, 20 mg at 09/20/24 0929    lidocaine 4 % patch 1 patch, 1 patch, transdermal, Daily, Martin OSMAN Salomone, DO, 1 patch at 09/20/24 0928    melatonin tablet 3 mg, 3 mg, oral, Nightly, Martin OSMAN Salomone, DO, 3 mg at 09/19/24 2048    metoprolol tartrate (Lopressor) tablet 50 mg, 50 mg, oral, BID, Martin OSMAN Salomone, DO, 50 mg at 09/20/24 0927    ondansetron (Zofran) tablet 4 mg, 4 mg, oral, q8h PRN **OR** ondansetron (Zofran) injection 4 mg, 4 mg, intravenous, q8h PRN, Martin OSMAN Michaele, DO    oxygen (O2) therapy, , inhalation, Continuous PRN - O2/gases, Gloria Palmer MD, 2 L/min at 09/20/24 0954    pantoprazole (ProtoNix) EC tablet 40 mg, 40 mg, oral, Daily before breakfast, 40 mg at 09/20/24 0513 **OR** pantoprazole (ProtoNix) injection 40 mg, 40 mg, intravenous, Daily before breakfast, Martin OSMAN Saldylane, DO    perflutren lipid microspheres (Definity) injection 0.5-10 mL of dilution, 0.5-10 mL of dilution, intravenous, Once in imaging, Martin OSMAN Salomone, DO    perflutren lipid microspheres (Definity) injection 0.5-10 mL of dilution, 0.5-10 mL of dilution, intravenous, Once in imaging, Martin OSMAN Salomone, DO    perflutren protein A microsphere (Optison) injection 0.5 mL, 0.5 mL, intravenous, Once in imaging, Martin OSMAN Salomone, DO    perflutren protein A microsphere (Optison) injection 0.5 mL, 0.5 mL, intravenous, Once in imaging, Martin OSMAN Salomone, DO    polyethylene glycol (Glycolax, Miralax) packet 17 g, 17 g, oral, q12h PRN, Martin G Salomone, DO    sodium bicarbonate tablet 1,300 mg, 1,300 mg, oral, q12h, Martin G Salomone, DO, 1,300 mg at 09/20/24 0304    sulfur hexafluoride microsphr (Lumason) injection 24.28 mg, 2 mL, intravenous, Once in imaging, Martin G Salomone, DO    sulfur hexafluoride microsphr (Lumason) injection 24.28 mg, 2  mL, intravenous, Once in imaging, Martin OSMAN Alfreda, DO    traZODone (Desyrel) tablet 50 mg, 50 mg, oral, Nightly, Martin Keenedylanconor DO, 50 mg at 09/19/24 2048    venlafaxine XR (Effexor-XR) 24 hr capsule 150 mg, 150 mg, oral, Daily, Martin Gant DO, 150 mg at 09/20/24 0925           Assessment/Plan   This patient currently has cardiac telemetry ordered; if you would like to modify or discontinue the telemetry order, click here to go to the orders activity to modify/discontinue the order.      1.  IDANIA on top of CKD stage IV  2.  Acute congestive heart failure with pleural effusion, with complaints of hemoptysis     Will check both anti-GBM and ANCA levels today.  Thoracentesis completed this morning with continuing on diuretics, feeling much better since admission  With taper of diuretics to Lasix 60 mg IVP  twice daily  Will adjust diuretic dosage and give Diamox today x 1   250 mg daily  3.  PAD status post left AKA  4.  Coronary disease status post CABG  5.  Diabetes mellitus continue current management  6.  Hypertension continue current management     Will continue monitoring patient daily reviewing labs and other consultants input                I spent 35 minutes in the professional and overall care of this patient.      Austin Tracy MD

## 2024-09-20 NOTE — PROGRESS NOTES
09/20/24 1021   Discharge Planning   Expected Discharge Disposition SNF        Patient is still diuresing and he is on IV abx. Still waiting for PT/OT eval to see what his home going needs are. Will continue to follow for discharge needs.

## 2024-09-20 NOTE — PROGRESS NOTES
"Humphrey Waddell is a 59 y.o. male on day 2 of admission presenting with CHF (congestive heart failure), NYHA class I, acute on chronic, combined (Multi).    Subjective   Patient seen and examined with bedside RN Yandy.  Patient is more lethargic today with a bump in his creatinine and reduced urine output.  Yandy reported that RT found him hypoxic and he was placed on nonrebreather.  ABG was drawn showing compensated metabolic alkalosis with a PaO2 of 210.  Patient was able to be engaged in conversation and although he gave some answers that were incorrect he did stop and correct himself.  He does report feeling more fatigued and feels \"like I am sick\".  Denied fever, chills, nausea.  Denied feeling short of breath and is saturating mid 90s on 2 L.    Objective   Physical Exam     Constitutional:   Lethargic, obese, NAD, cooperative  HENT: Atraumatic, semimoist mucous membranes  Eyes: Nonicteric  Neck: Supple, unable to assess JVD  Cardiovascular: S1, S2 normal, regular, HR 60s, no murmur appreciated  Pulmonary: Fair air entry with posterior bibasilar crackles, slightly diminished right anterior base; no rhonchi or wheezing noted; no increased work of breathing noted  Abdominal: Obese, soft, nontender, + BS  Musculoskeletal: Left AKA, fair range of motion bilateral upper extremities with fair strength, moderate right lower extremity movement  Extremities:   +1-2  pitting edema RLE; edematous upper extremities +1 on the left +2 on the right (right arm is more edematous than yesterday)  Lymphadenopathy: No nuchal LAP  Skin: Warm and dry  Neurological: Lethargic, oriented x 3 with speech that is slow, clear and appropriate in general conversation with periods of forgetfulness and confusion (improved today); no focal deficits noted  Psychiatric:     Appropriate mood and behavior    Medications:  acetaZOLAMIDE, 250 mg, oral, Daily  albuterol, 2.5 mg, nebulization, TID  amLODIPine, 10 mg, oral, Daily  ampicillin-sulbactam, 3 g, " "intravenous, q12h  aspirin, 81 mg, oral, Daily  atorvastatin, 80 mg, oral, Daily  enoxaparin, 30 mg, subcutaneous, q24h  fludrocortisone, 0.1 mg, oral, Daily  furosemide, 60 mg, intravenous, q12h  gabapentin, 300 mg, oral, Nightly  hydrALAZINE, 50 mg, oral, TID  insulin glargine, 2 Units, subcutaneous, Nightly  insulin regular, 0-10 Units, subcutaneous, TID  isosorbide dinitrate, 20 mg, oral, TID  lidocaine, 1 patch, transdermal, Daily  melatonin, 3 mg, oral, Nightly  metoprolol tartrate, 50 mg, oral, BID  pantoprazole, 40 mg, oral, Daily before breakfast   Or  pantoprazole, 40 mg, intravenous, Daily before breakfast  perflutren lipid microspheres, 0.5-10 mL of dilution, intravenous, Once in imaging  perflutren lipid microspheres, 0.5-10 mL of dilution, intravenous, Once in imaging  perflutren protein A microsphere, 0.5 mL, intravenous, Once in imaging  perflutren protein A microsphere, 0.5 mL, intravenous, Once in imaging  sodium bicarbonate, 1,300 mg, oral, q12h  sulfur hexafluoride microsphr, 2 mL, intravenous, Once in imaging  sulfur hexafluoride microsphr, 2 mL, intravenous, Once in imaging  traZODone, 50 mg, oral, Nightly  venlafaxine XR, 150 mg, oral, Daily       PRN medications: acetaminophen **OR** acetaminophen **OR** acetaminophen, albuterol, ALPRAZolam, dextrose, dextrose, glucagon, glucagon, glucagon, ondansetron **OR** ondansetron, oxygen, oxygen, polyethylene glycol     Last Recorded Vitals  Blood pressure 168/88, pulse 64, temperature 36.6 °C (97.9 °F), temperature source Temporal, resp. rate 15, height 1.93 m (6' 4\"), weight 130 kg (287 lb 0.6 oz), SpO2 97%.  Intake/Output last 3 Shifts:  I/O last 3 completed shifts:  In: 300 (2.3 mL/kg) [IV Piggyback:300]  Out: 1350 (10.4 mL/kg) [Urine:1350 (0.3 mL/kg/hr)]  Weight: 130.2 kg     Relevant Results  Results for orders placed or performed during the hospital encounter of 09/18/24 (from the past 24 hour(s))   Transthoracic Echo (TTE) Complete   Result " Value Ref Range    MV E/A ratio 1.54     Tricuspid annular plane systolic excursion 2.0 cm    LV EF 59 %    RV free wall pk S' 9.14 cm/s    LVIDd 5.34 cm    RVSP 34.8 mmHg    LV A4C EF 58.3     BSA 2.62 m2   POCT GLUCOSE   Result Value Ref Range    POCT Glucose 185 (H) 74 - 99 mg/dL   POCT GLUCOSE   Result Value Ref Range    POCT Glucose 265 (H) 74 - 99 mg/dL   CBC and Auto Differential   Result Value Ref Range    WBC 9.7 4.4 - 11.3 x10*3/uL    nRBC 0.0 0.0 - 0.0 /100 WBCs    RBC 2.86 (L) 4.50 - 5.90 x10*6/uL    Hemoglobin 7.3 (L) 13.5 - 17.5 g/dL    Hematocrit 25.4 (L) 41.0 - 52.0 %    MCV 89 80 - 100 fL    MCH 25.5 (L) 26.0 - 34.0 pg    MCHC 28.7 (L) 32.0 - 36.0 g/dL    RDW 15.4 (H) 11.5 - 14.5 %    Platelets 273 150 - 450 x10*3/uL    Neutrophils % 89.4 40.0 - 80.0 %    Immature Granulocytes %, Automated 0.6 0.0 - 0.9 %    Lymphocytes % 5.3 13.0 - 44.0 %    Monocytes % 4.6 2.0 - 10.0 %    Eosinophils % 0.0 0.0 - 6.0 %    Basophils % 0.1 0.0 - 2.0 %    Neutrophils Absolute 8.68 (H) 1.20 - 7.70 x10*3/uL    Immature Granulocytes Absolute, Automated 0.06 0.00 - 0.70 x10*3/uL    Lymphocytes Absolute 0.52 (L) 1.20 - 4.80 x10*3/uL    Monocytes Absolute 0.45 0.10 - 1.00 x10*3/uL    Eosinophils Absolute 0.00 0.00 - 0.70 x10*3/uL    Basophils Absolute 0.01 0.00 - 0.10 x10*3/uL   Basic metabolic panel   Result Value Ref Range    Glucose 214 (H) 74 - 99 mg/dL    Sodium 140 136 - 145 mmol/L    Potassium 3.9 3.5 - 5.3 mmol/L    Chloride 99 98 - 107 mmol/L    Bicarbonate 33 (H) 21 - 32 mmol/L    Anion Gap 12 10 - 20 mmol/L    Urea Nitrogen 51 (H) 6 - 23 mg/dL    Creatinine 4.01 (H) 0.50 - 1.30 mg/dL    eGFR 16 (L) >60 mL/min/1.73m*2    Calcium 7.1 (L) 8.6 - 10.3 mg/dL   Iron and TIBC   Result Value Ref Range    Iron 20 (L) 35 - 150 ug/dL    UIBC 147 110 - 370 ug/dL    TIBC 167 (L) 240 - 445 ug/dL    % Saturation 12 (L) 25 - 45 %   Reticulocytes   Result Value Ref Range    Retic % 4.0 (H) 0.5 - 2.0 %    Retic Absolute 0.112 0.022  - 0.118 x10*6/uL    Reticulocyte Hemoglobin 24 (L) 28 - 38 pg    Immature Retic fraction 16.9 (H) <=16.0 %   Lactate Dehydrogenase   Result Value Ref Range     84 - 246 U/L   POCT GLUCOSE   Result Value Ref Range    POCT Glucose 178 (H) 74 - 99 mg/dL   POCT GLUCOSE   Result Value Ref Range    POCT Glucose 214 (H) 74 - 99 mg/dL   Blood Gas Arterial Full Panel   Result Value Ref Range    POCT pH, Arterial 7.40 7.38 - 7.42 pH    POCT pCO2, Arterial 56 (H) 38 - 42 mm Hg    POCT pO2, Arterial 210 (H) 85 - 95 mm Hg    POCT SO2, Arterial 99 94 - 100 %    POCT Oxy Hemoglobin, Arterial 96.5 94.0 - 98.0 %    POCT Hematocrit Calculated, Arterial 24.0 (L) 41.0 - 52.0 %    POCT Sodium, Arterial 135 (L) 136 - 145 mmol/L    POCT Potassium, Arterial 4.1 3.5 - 5.3 mmol/L    POCT Chloride, Arterial 102 98 - 107 mmol/L    POCT Ionized Calcium, Arterial 1.06 (L) 1.10 - 1.33 mmol/L    POCT Glucose, Arterial 245 (H) 74 - 99 mg/dL    POCT Lactate, Arterial 1.6 0.4 - 2.0 mmol/L    POCT Base Excess, Arterial 8.8 (H) -2.0 - 3.0 mmol/L    POCT HCO3 Calculated, Arterial 34.7 (H) 22.0 - 26.0 mmol/L    POCT Hemoglobin, Arterial 8.0 (L) 13.5 - 17.5 g/dL    POCT Anion Gap, Arterial 2 (L) 10 - 25 mmo/L    Patient Temperature 37.0 degrees Celsius    FiO2 100 %    Apparatus NON REBREATHER     Site of Arterial Puncture Radial Right     Alejandro's Test Positive       Transthoracic Echo (TTE) Complete  Result Date: 9/19/2024   [facility], [address] [place] [address]              Tel [phone] and Fax [phone] TRANSTHORACIC ECHOCARDIOGRAM REPORT  Patient Name:      LEXIE NULL            Reading Physician:    [id number] Araujo DO Study Date:        9/19/2024            Ordering Provider:    [id number] ARAUJO MRN/PID:           [id number]             Fellow: Accession#:        [id number]          Nurse: Date of Birth/Age: 1965 / 59 years  Sonographer:          Sofia Ferguson RDCS Gender:            M                    Additional Staff: Height:            193.04 cm            Admit Date:           9/18/2024 Weight:            127.92 kg            Admission Status:     Inpatient -                                                               Routine BSA / BMI:         2.56 m2 / 34.33      Encounter#:           [id number]                    kg/m2 Blood Pressure:    132/70 mmHg          Department Location:  [facility] Non                                                               Invasive Study Type:    TRANSTHORACIC ECHO (TTE) COMPLETE Diagnosis/ICD: Non ST elevation (NSTEMI) myocardial infarction-I21.4 Indication:    NSTEMI, Congestive Heart Failure CPT Code:      Echo Limited-33382 Patient History: MI Location/Type:  Non-ST Elevation MI Diabetes:          Yes Pertinent History: HTN, CVA, CHF, CAD, Chest Pain, Hyperlipidemia and Dyspnea. Study Detail: The following Echo studies were performed: 2D, M-Mode, Doppler and               color flow. Technically challenging study due to body habitus and               patient lying in supine position.  PHYSICIAN INTERPRETATION: Left Ventricle: Left ventricular ejection fraction is normal, calculated by Kirkpatrick's biplane at 59%. There are no regional left ventricular wall motion abnormalities. The left ventricular cavity size is normal. The left ventricular septal wall thickness is moderately increased. There is moderately increased left ventricular posterior wall thickness. There is moderate concentric left ventricular hypertrophy. Abnormal (paradoxical) septal motion consistent with post-operative status. Spectral Doppler shows a restrictive pattern of left ventricular diastolic filling. There is an elevated mean left atrial pressure. Left Atrium: The left atrium was not assessed. Right Ventricle: The right ventricle was not assessed. There is normal right  ventricular global systolic function. Right Atrium: The right atrium was not assessed. Aortic Valve: The aortic valve is trileaflet. Aortic valve regurgitation was not assessed. Mitral Valve: The mitral valve is mildly thickened. There is trace to mild mitral valve regurgitation. Tricuspid Valve: The tricuspid valve is structurally normal. There is mild tricuspid regurgitation. The Doppler estimated RVSP is slightly elevated at 34.8 mmHg. Pulmonic Valve: The pulmonic valve is structurally normal. Pulmonic valve regurgitation was not assessed. Pericardium: There is no pericardial effusion noted. Aorta: The aortic root is normal. Systemic Veins: The inferior vena cava appears severely dilated, with IVC inspiratory collapse less than 50%. In comparison to the previous echocardiogram(s): Compared with study dated 5/23/2024, Winin the limitations of both studies, no significant changes were noted.  CONCLUSIONS:  1. Left ventricular ejection fraction is normal, calculated by Kirkpatrick's biplane at 59%.  2. Spectral Doppler shows a restrictive pattern of left ventricular diastolic filling.  3. There is an elevated mean left atrial pressure.  4. Abnormal septal motion consistent with post-operative status.  5. There is moderate concentric left ventricular hypertrophy.  6. There is normal right ventricular global systolic function.  7. Slightly elevated right ventricular systolic pressure.  8. The inferior vena cava appears severely dilated, with IVC inspiratory collapse less than 50%. QUANTITATIVE DATA SUMMARY:  2D MEASUREMENTS:          Normal Ranges: IVSd:            1.45 cm  (0.6-1.1cm) LVPWd:           1.47 cm  (0.6-1.1cm) LVIDd:           5.34 cm  (3.9-5.9cm) LVIDs:           3.27 cm LV Mass Index:   134 g/m2 LVEDV Index:     56 ml/m2 LV % FS          38.8 %  LV SYSTOLIC FUNCTION BY 2D PLANIMETRY (MOD):                      Normal Ranges: EF-A4C View:    58 % (>=55%) EF-A2C View:    61 % EF-Biplane:     59 % EF-3DQ:          55 % LV EF Reported: 59 %  LV DIASTOLIC FUNCTION:             Normal Ranges: MV Peak E:             1.34 m/s    (0.7-1.2 m/s) MV Peak A:             0.87 m/s    (0.42-0.7 m/s) E/A Ratio:             1.54        (1.0-2.2) MV e'                  0.064 m/s   (>8.0) MV lateral e'          0.07 m/s MV medial e'           0.06 m/s E/e' Ratio:            20.87       (<8.0) a'                     0.05 m/s PulmV Sys Jose:         76.30 cm/s PulmV Ch Jose:        100.00 cm/s PulmV S/D Jose:         0.80 PulmV A Revs Jose:      25.70 cm/s PulmV A Revs Dur:      106.00 msec  MITRAL VALVE:          Normal Ranges: MV DT:        292 msec (150-240msec)  RIGHT VENTRICLE: TAPSE: 20.3 mm RV s'  0.09 m/s  TRICUSPID VALVE/RVSP:          Normal Ranges: Peak TR Velocity:     2.82 m/s Est. RA Pressure:     3 mmHg RV Syst Pressure:     35 mmHg  (< 30mmHg) IVC Diam:             2.74 cm  Pulmonary Veins: PulmV A Revs Dur: 106.00 msec PulmV A Revs Jose: 25.70 cm/s PulmV Ch Jose:   100.00 cm/s PulmV S/D Jose:    0.80 PulmV Sys Jose:    76.30 cm/s  [id number] Junior Araujo DO Electronically signed on 9/19/2024 at 3:44:06 PM  ** Final **     XR chest 1 view  Result Date: 9/17/2024  Interpreted By:  Heather Aguilar, STUDY: XR CHEST 1 VIEW;  9/17/2024 9:44 pm   INDICATION: Signs/Symptoms:resp fail     COMPARISON: Chest x-ray 09/16/2024   ACCESSION NUMBER(S): [id number]   ORDERING CLINICIAN: ELIZABETH MITCHELL   TECHNIQUE: Semi-erect frontal view of the chest was obtained .   FINDINGS: Monitoring wires are overlying the patient.   The heart is enlarged. The patient is status post open heart surgery. There is vascular congestion and interstitial edema.   There are small bilateral pleural effusions. Airspace opacities are noted at the right perihilar region and left lung base. No pneumothorax.       1. Cardiomegaly. Vascular congestion and interstitial edema. Small bilateral pleural effusions. Bibasilar airspace opacities, may be secondary to  atelectasis or pneumonia.       MACRO: None.   Signed by: Heather Aguilar 9/17/2024 10:49 PM Dictation workstation:   [id number]    CT chest abdomen pelvis wo IV contrast  Result Date: 9/17/2024  STUDY: CT Chest, Abdomen, and Pelvis without IV Contrast; 9/17/2024 3:33 AM INDICATION: Altered mental status.  Pulmonary edema.  Tactile fevers. COMPARISON: CXR 9/16/2024.  US gallbladder 5/23/2024.  CT AP 5/22/2024.  CT CAP 3/28/2024. ACCESSION NUMBER(S): [id number] ORDERING CLINICIAN: MELISSA VERMA TECHNIQUE: CT of the chest, abdomen, and pelvis was performed.  Contiguous axial images were obtained at 3 mm slice thickness through the chest, abdomen, and pelvis.  Coronal and sagittal reconstructions at 3 mm slice thickness were performed.  No intravenous contrast was administered.  FINDINGS: Please note that the evaluation of vessels, lymph nodes and organs is limited without intravenous contrast.  Image quality is limited by extensive beam hardening artifact in the chest and upper abdomen due to the patient's upper extremities by the patient's side.  CHEST: MEDIASTINUM: Cardiac size is enlarged without pericardial effusion.  Cardiac blood pool appears slightly low in attenuation.  Mild calcified coronary plaque is noted.  LUNGS/PLEURA: There is a large right pleural effusion layering dependently.  Small left-sided pleural effusion is noted, loculated at the left apex. There is no pleural thickening, or pneumothorax.  The airways are patent. Mild pulmonary edema is suspected with mosaic attenuation the lungs suggesting chronic small airway disease and air trapping.  Relaxation atelectasis is seen in the dependent portion of the right lower lobe. There is an airspace disease in the left upper lobe and left lower lobe with significant volume loss of the left lung due to the pleural effusion, as well as the enlarged cardiac size.  LYMPH NODES: Thoracic lymph nodes are not enlarged. ABDOMEN:  LIVER: No  hepatomegaly.  Smooth surface contour.  Normal attenuation.  BILE DUCTS: No intrahepatic or extrahepatic biliary ductal dilatation.  GALLBLADDER: Gallbladder is absent.  STOMACH: No abnormalities identified.  PANCREAS: No masses or ductal dilatation.  SPLEEN: No splenomegaly or focal splenic lesion.  ADRENAL GLANDS: No thickening or nodules.  KIDNEYS AND URETERS: Kidneys are normal in size and location.  No renal or ureteral calculi.  Moderate perinephric stranding is seen bilaterally.   PELVIS:  BLADDER: Urinary bladder is decompressed by Lee catheter with a small amount of air in the lumen of the urinary bladder and mild urinary bladder wall thickening.  REPRODUCTIVE ORGANS: No abnormalities identified.  BOWEL: Appendix is unremarkable.  Terminal ileum appears normal.  Cecum is located in the right midabdomen.  There is moderate concentric wall thickening of the rectosigmoid colon.  VESSELS: Mild calcified plaque is seen in the abdominal aorta and iliac arteries.   PERITONEUM/RETROPERITONEUM/LYMPH NODES: No free fluid.  No pneumoperitoneum. No lymphadenopathy.  ABDOMINAL WALL: No abnormalities identified. SOFT TISSUES: Fluid-filled indirect left inguinal hernia is noted.  There is a small fat-containing indirect right inguinal hernia.  BONES: No acute fracture or aggressive osseous lesion.  Moderate multilevel disc disease is seen in the mid and lower thoracic spine as well as at L1-L2.  There is mild disc disease and vacuum phenomenon at L5-S1. Mild dextroconvex curvature is seen of the thoracolumbar spine.    Large right pleural effusion and small loculated left pleural effusion at the left lung apex with multifocal airspace disease in the left lung, likely atelectasis and possibly pneumonia in the appropriate clinical setting. Pronounced cardiomegaly with question of mild systemic anemia. Mild to moderate concentric wall thickening of the rectosigmoid colon suggesting a low-grade acute infectious or  inflammatory distal colitis/proctitis. Decompressed urinary bladder with mild concentric wall thickening which may be due to underdistention however mild cystitis is a possibility in the upper clinical setting, correlate with urinalysis. Moderate-sized fluid filled left inguinal hernia along with a small fat-containing right inguinal hernia. Signed by Tristen Livingston      Assessment/Plan   Assessment & Plan  CHF (congestive heart failure), NYHA class I, acute on chronic, combined (Multi)    Humphrey Waddell is a 59 y.o. male medical history CAD (s/p CABG), HFpEF, carotid artery disease, CVAs (cognitive impairment), HTN, HLD, DMT2, CKD 4, GERD, PAD (s/p lt AKA), depression who presented to [facility] ED on 9/16 with dyspnea x 1 day with accompanying fatigue, reduced conversational ability.  Evaluation revealed encephalopathy with acute congestive heart failure (BNP 28, 635) with bilateral pleural effusions & superimposed pneumonia. Additionally found to be hypoxic and placed on 3 L to obtain 92% saturation, ultimately requiring increased to 6 L NC with intermittent BiPAP.  He was admitted to [facility] treated with DuoNebs, dexamethasone, lasix, ceftriaxone, doxycycline and Zosyn.  On the morning of 9/18 he was transferred to [facility] SDU.  He arrived on 6 L NC with labs significant for , hsTrop 62, Cr 3.09, h/h 7/24.2. He was started on Unasyn, albuterol nebs and oral prednisone and able to be weaned to 4 L NC.  Pulmonology is consulted for right pleural effusion.     Impression/recommendations:     # Acute hypoxic respiratory failure: Multifactorial secondary to acute on chronic HFpEF, bilateral pleural effusions, pneumonia versus atelectasis; baseline is room air, required intermittent BiPAP wean to nasal cannula, improving currently on 2 L  -Continue supplemental oxygen, wean for saturation 92 to 95%  -If patient goes home, home O2 eval prior to discharge  -Bronchopulmonary hygiene with incentive spirometer and  Acapella  -Continue scheduled albuterol     # ?  COPD: Patient and wife denied history of pulmonary disorders including COPD, asthma; no PFTs on file; patient does have recent smoking history  -No indication for steroids at this time  -Would benefit from outpatient PFTs when he is back to baseline     # Pleural effusion, bilateral: Right greater than left on CT; left effusion is loculated at the apex  -Diuresis as renal function and hemodynamics allows  -S/p rt thora for 1000ml removed (-) Light's criteria for transudative effusion likely 2/2 heart failure exacerbation     #Pneumonia: CT chest with noted left multifocal airspace disease concerning for atelectasis versus pneumonia; must also consider possible aspiration given patient's fluctuating mental status over the past 3 weeks  -Continue coverage with Unasyn  -Speech-language evaluation pleated; regular diet with thin liquids recommended  -Will need repeat chest CT in 8 to 12 weeks to ensure resolution of opacities     # Heart Failure: HFpEF with 4/2024 echo showing EF 60 to 65% with impaired relaxation pattern of LV diastolic filling and mildly reduced RV systolic function; CT chest showing mild pulmonary edema; BNP at [facility] > 28,000, on admit to [place] [id number]  -Diuresis per cardiology, renal  -Echo complete (results above); similar to echo 4/2024 with noted restrictive relaxation pattern, EF 59%     #Likely obstructive sleep apnea/obesity hypoventilation syndrome: BMI 34.08 with neck/trunk weight foci; wife reports snoring, daytime napping  -Would benefit from outpatient evaluation for sleep apnea, discussed with wife and patient reviewing with PCP and possibly seeing if he qualified for home study   -Can consider adding CPAP at night if patient requires additional support     DVT prophylaxis with Lovenox      Thank you for the consult.  Pulmonology will follow.     I spent 35 minutes in the professional and overall care of this patient.   Priscila Denton,  APRN-CNS

## 2024-09-21 ENCOUNTER — APPOINTMENT (OUTPATIENT)
Dept: RADIOLOGY | Facility: HOSPITAL | Age: 59
End: 2024-09-21
Payer: MEDICAID

## 2024-09-21 LAB
ALBUMIN SERPL BCP-MCNC: 2.3 G/DL (ref 3.4–5)
ANION GAP SERPL CALC-SCNC: 17 MMOL/L (ref 10–20)
BASOPHILS # BLD AUTO: 0.02 X10*3/UL (ref 0–0.1)
BASOPHILS NFR BLD AUTO: 0.2 %
BUN SERPL-MCNC: 54 MG/DL (ref 6–23)
CALCIUM SERPL-MCNC: 6.7 MG/DL (ref 8.6–10.3)
CHLORIDE SERPL-SCNC: 98 MMOL/L (ref 98–107)
CO2 SERPL-SCNC: 26 MMOL/L (ref 21–32)
CREAT SERPL-MCNC: 4.49 MG/DL (ref 0.5–1.3)
CREAT UR-MCNC: 122.1 MG/DL (ref 20–370)
EGFRCR SERPLBLD CKD-EPI 2021: 14 ML/MIN/1.73M*2
EOSINOPHIL # BLD AUTO: 0.39 X10*3/UL (ref 0–0.7)
EOSINOPHIL NFR BLD AUTO: 3.1 %
ERYTHROCYTE [DISTWIDTH] IN BLOOD BY AUTOMATED COUNT: 15.4 % (ref 11.5–14.5)
GLUCOSE BLD MANUAL STRIP-MCNC: 162 MG/DL (ref 74–99)
GLUCOSE BLD MANUAL STRIP-MCNC: 166 MG/DL (ref 74–99)
GLUCOSE BLD MANUAL STRIP-MCNC: 192 MG/DL (ref 74–99)
GLUCOSE SERPL-MCNC: 135 MG/DL (ref 74–99)
HCT VFR BLD AUTO: 28 % (ref 41–52)
HGB BLD-MCNC: 8.1 G/DL (ref 13.5–17.5)
IMM GRANULOCYTES # BLD AUTO: 0.08 X10*3/UL (ref 0–0.7)
IMM GRANULOCYTES NFR BLD AUTO: 0.6 % (ref 0–0.9)
LYMPHOCYTES # BLD AUTO: 1.36 X10*3/UL (ref 1.2–4.8)
LYMPHOCYTES NFR BLD AUTO: 10.7 %
MAGNESIUM SERPL-MCNC: 1.8 MG/DL (ref 1.6–2.4)
MCH RBC QN AUTO: 25.8 PG (ref 26–34)
MCHC RBC AUTO-ENTMCNC: 28.9 G/DL (ref 32–36)
MCV RBC AUTO: 89 FL (ref 80–100)
MONOCYTES # BLD AUTO: 1.07 X10*3/UL (ref 0.1–1)
MONOCYTES NFR BLD AUTO: 8.4 %
NEUTROPHILS # BLD AUTO: 9.8 X10*3/UL (ref 1.2–7.7)
NEUTROPHILS NFR BLD AUTO: 77 %
NRBC BLD-RTO: 0 /100 WBCS (ref 0–0)
PHOSPHATE SERPL-MCNC: 5.2 MG/DL (ref 2.5–4.9)
PLATELET # BLD AUTO: 303 X10*3/UL (ref 150–450)
POTASSIUM SERPL-SCNC: 4.9 MMOL/L (ref 3.5–5.3)
RBC # BLD AUTO: 3.14 X10*6/UL (ref 4.5–5.9)
SODIUM SERPL-SCNC: 136 MMOL/L (ref 136–145)
SODIUM UR-SCNC: 30 MMOL/L
SODIUM/CREAT UR-RTO: 25 MMOL/G CREAT
WBC # BLD AUTO: 12.7 X10*3/UL (ref 4.4–11.3)

## 2024-09-21 PROCEDURE — 99232 SBSQ HOSP IP/OBS MODERATE 35: CPT | Performed by: CLINICAL NURSE SPECIALIST

## 2024-09-21 PROCEDURE — 2500000005 HC RX 250 GENERAL PHARMACY W/O HCPCS: Performed by: INTERNAL MEDICINE

## 2024-09-21 PROCEDURE — 76377 3D RENDER W/INTRP POSTPROCES: CPT

## 2024-09-21 PROCEDURE — 99232 SBSQ HOSP IP/OBS MODERATE 35: CPT | Performed by: HOSPITALIST

## 2024-09-21 PROCEDURE — 2500000001 HC RX 250 WO HCPCS SELF ADMINISTERED DRUGS (ALT 637 FOR MEDICARE OP): Performed by: INTERNAL MEDICINE

## 2024-09-21 PROCEDURE — 76376 3D RENDER W/INTRP POSTPROCES: CPT | Performed by: RADIOLOGY

## 2024-09-21 PROCEDURE — 82947 ASSAY GLUCOSE BLOOD QUANT: CPT

## 2024-09-21 PROCEDURE — 2500000004 HC RX 250 GENERAL PHARMACY W/ HCPCS (ALT 636 FOR OP/ED): Performed by: HOSPITALIST

## 2024-09-21 PROCEDURE — 2500000004 HC RX 250 GENERAL PHARMACY W/ HCPCS (ALT 636 FOR OP/ED): Performed by: INTERNAL MEDICINE

## 2024-09-21 PROCEDURE — 2500000002 HC RX 250 W HCPCS SELF ADMINISTERED DRUGS (ALT 637 FOR MEDICARE OP, ALT 636 FOR OP/ED): Performed by: INTERNAL MEDICINE

## 2024-09-21 PROCEDURE — 80069 RENAL FUNCTION PANEL: CPT | Performed by: HOSPITALIST

## 2024-09-21 PROCEDURE — 9420000001 HC RT PATIENT EDUCATION 5 MIN

## 2024-09-21 PROCEDURE — 94668 MNPJ CHEST WALL SBSQ: CPT

## 2024-09-21 PROCEDURE — 83735 ASSAY OF MAGNESIUM: CPT | Performed by: HOSPITALIST

## 2024-09-21 PROCEDURE — 36415 COLL VENOUS BLD VENIPUNCTURE: CPT | Performed by: HOSPITALIST

## 2024-09-21 PROCEDURE — 70486 CT MAXILLOFACIAL W/O DYE: CPT

## 2024-09-21 PROCEDURE — 82570 ASSAY OF URINE CREATININE: CPT | Performed by: HOSPITALIST

## 2024-09-21 PROCEDURE — 85025 COMPLETE CBC W/AUTO DIFF WBC: CPT | Performed by: HOSPITALIST

## 2024-09-21 PROCEDURE — 94660 CPAP INITIATION&MGMT: CPT

## 2024-09-21 PROCEDURE — 99232 SBSQ HOSP IP/OBS MODERATE 35: CPT | Performed by: INTERNAL MEDICINE

## 2024-09-21 PROCEDURE — 94640 AIRWAY INHALATION TREATMENT: CPT

## 2024-09-21 PROCEDURE — 70486 CT MAXILLOFACIAL W/O DYE: CPT | Performed by: RADIOLOGY

## 2024-09-21 PROCEDURE — 2060000001 HC INTERMEDIATE ICU ROOM DAILY

## 2024-09-21 RX ORDER — SODIUM CHLORIDE 9 MG/ML
100 INJECTION, SOLUTION INTRAVENOUS CONTINUOUS
Status: DISCONTINUED | OUTPATIENT
Start: 2024-09-21 | End: 2024-09-21

## 2024-09-21 ASSESSMENT — PAIN - FUNCTIONAL ASSESSMENT
PAIN_FUNCTIONAL_ASSESSMENT: 0-10

## 2024-09-21 ASSESSMENT — COGNITIVE AND FUNCTIONAL STATUS - GENERAL
TURNING FROM BACK TO SIDE WHILE IN FLAT BAD: A LOT
MOVING TO AND FROM BED TO CHAIR: A LOT
CLIMB 3 TO 5 STEPS WITH RAILING: TOTAL
STANDING UP FROM CHAIR USING ARMS: A LOT
DRESSING REGULAR LOWER BODY CLOTHING: A LOT
DAILY ACTIVITIY SCORE: 13
PERSONAL GROOMING: A LOT
TOILETING: A LOT
TURNING FROM BACK TO SIDE WHILE IN FLAT BAD: A LITTLE
DRESSING REGULAR UPPER BODY CLOTHING: A LOT
CLIMB 3 TO 5 STEPS WITH RAILING: TOTAL
MOBILITY SCORE: 10
DAILY ACTIVITIY SCORE: 13
DRESSING REGULAR UPPER BODY CLOTHING: A LITTLE
DRESSING REGULAR UPPER BODY CLOTHING: A LOT
TOILETING: A LOT
WALKING IN HOSPITAL ROOM: TOTAL
EATING MEALS: A LITTLE
TOILETING: A LOT
DRESSING REGULAR LOWER BODY CLOTHING: A LOT
HELP NEEDED FOR BATHING: A LOT
MOVING TO AND FROM BED TO CHAIR: A LOT
MOVING FROM LYING ON BACK TO SITTING ON SIDE OF FLAT BED WITH BEDRAILS: A LOT
CLIMB 3 TO 5 STEPS WITH RAILING: TOTAL
DRESSING REGULAR LOWER BODY CLOTHING: A LOT
HELP NEEDED FOR BATHING: A LOT
MOBILITY SCORE: 10
HELP NEEDED FOR BATHING: A LOT
STANDING UP FROM CHAIR USING ARMS: A LOT
STANDING UP FROM CHAIR USING ARMS: A LOT
WALKING IN HOSPITAL ROOM: TOTAL
MOVING FROM LYING ON BACK TO SITTING ON SIDE OF FLAT BED WITH BEDRAILS: A LOT
STANDING UP FROM CHAIR USING ARMS: A LOT
MOVING TO AND FROM BED TO CHAIR: A LOT
WALKING IN HOSPITAL ROOM: TOTAL
EATING MEALS: A LITTLE
PERSONAL GROOMING: A LOT
TURNING FROM BACK TO SIDE WHILE IN FLAT BAD: A LOT
TURNING FROM BACK TO SIDE WHILE IN FLAT BAD: A LOT
DAILY ACTIVITIY SCORE: 18
CLIMB 3 TO 5 STEPS WITH RAILING: TOTAL
PERSONAL GROOMING: A LOT
MOVING FROM LYING ON BACK TO SITTING ON SIDE OF FLAT BED WITH BEDRAILS: A LOT
TOILETING: A LOT
MOBILITY SCORE: 13
DRESSING REGULAR LOWER BODY CLOTHING: A LITTLE
WALKING IN HOSPITAL ROOM: TOTAL
MOVING FROM LYING ON BACK TO SITTING ON SIDE OF FLAT BED WITH BEDRAILS: A LITTLE
DRESSING REGULAR UPPER BODY CLOTHING: A LOT
MOVING TO AND FROM BED TO CHAIR: A LITTLE
WALKING IN HOSPITAL ROOM: TOTAL
TOILETING: A LOT
PERSONAL GROOMING: A LITTLE
EATING MEALS: A LITTLE
MOBILITY SCORE: 10
HELP NEEDED FOR BATHING: A LITTLE
PERSONAL GROOMING: A LOT
DAILY ACTIVITIY SCORE: 13
HELP NEEDED FOR BATHING: A LOT
MOVING FROM LYING ON BACK TO SITTING ON SIDE OF FLAT BED WITH BEDRAILS: A LOT
TURNING FROM BACK TO SIDE WHILE IN FLAT BAD: A LOT
DRESSING REGULAR UPPER BODY CLOTHING: A LOT
STANDING UP FROM CHAIR USING ARMS: A LOT
DRESSING REGULAR LOWER BODY CLOTHING: A LOT
MOVING TO AND FROM BED TO CHAIR: A LOT
CLIMB 3 TO 5 STEPS WITH RAILING: TOTAL
EATING MEALS: A LITTLE

## 2024-09-21 ASSESSMENT — PAIN SCALES - GENERAL
PAINLEVEL_OUTOF10: 0 - NO PAIN

## 2024-09-21 NOTE — SIGNIFICANT EVENT
Overnight continuous pulse ox study started at 22:45 with pt on RA. SpO2 dropped as low as 83% on RA, highest was 88% but did not maintain. Pt placed back on 2L. Overnight pulse ox study continues.

## 2024-09-21 NOTE — PROGRESS NOTES
Humphrey Waddell is a 59 y.o. male on day 3 of admission presenting with CHF (congestive heart failure), NYHA class I, acute on chronic, combined (Multi).      Subjective   Overall stable no new complaints       Objective        Vitals 24HR  Heart Rate:  [61-66]   Temp:  [36 °C (96.8 °F)-37 °C (98.6 °F)]   Resp:  [16-19]   BP: (114-163)/(73-84)   Weight:  [131 kg (288 lb 12.8 oz)]   SpO2:  [84 %-100 %]     Intake/Output last 3 Shifts:    Intake/Output Summary (Last 24 hours) at 9/21/2024 1349  Last data filed at 9/21/2024 0600  Gross per 24 hour   Intake --   Output 175 ml   Net -175 ml       Physical Exam      General appearance: Awake and alert and in mild respiratory distress  Head and ENT; normocephalic/atraumatic/supple neck/no JVD  Lungs; bilateral lower lobe crackles up to mid lung fields  Heart; RRR  Abdomen: Obese/distended  Extremities; left left above-knee amputation  Mild edema in the right side  Neurologic: Physiologic            Relevant Results     Results from last 7 days   Lab Units 09/21/24  0904 09/20/24  0531 09/19/24  1022   WBC AUTO x10*3/uL 12.7* 9.7 11.0   HEMOGLOBIN g/dL 8.1* 7.3* 7.2*   HEMATOCRIT % 28.0* 25.4* 24.7*   PLATELETS AUTO x10*3/uL 303 273 296      Results from last 7 days   Lab Units 09/21/24  0904 09/20/24  0531 09/19/24  1022   SODIUM mmol/L 136 140 143   POTASSIUM mmol/L 4.9 3.9 3.3*   CHLORIDE mmol/L 98 99 101   CO2 mmol/L 26 33* 35*   BUN mg/dL 54* 51* 44*   CREATININE mg/dL 4.49* 4.01* 3.32*   GLUCOSE mg/dL 135* 214* 150*   CALCIUM mg/dL 6.7* 7.1* 7.4*        Current Facility-Administered Medications:     acetaminophen (Tylenol) tablet 650 mg, 650 mg, oral, q4h PRN, 650 mg at 09/19/24 2104 **OR** acetaminophen (Tylenol) oral liquid 650 mg, 650 mg, oral, q4h PRN **OR** acetaminophen (Tylenol) suppository 650 mg, 650 mg, rectal, q4h PRN, Martin Gant DO    acetaZOLAMIDE (Diamox) tablet 250 mg, 250 mg, oral, Daily, Austin Tracy MD, 250 mg at 09/21/24 0831    albuterol  2.5 mg /3 mL (0.083 %) nebulizer solution 2.5 mg, 2.5 mg, nebulization, TID, Martin G Salomone, DO, 2.5 mg at 09/21/24 1320    albuterol 2.5 mg /3 mL (0.083 %) nebulizer solution 2.5 mg, 2.5 mg, nebulization, q2h PRN, Martin G Salomone, DO    amLODIPine (Norvasc) tablet 10 mg, 10 mg, oral, Daily, Martin G Salomone, DO, 10 mg at 09/21/24 0831    ampicillin-sulbactam (Unasyn) in sodium chloride 0.9 % 100 mL 3 g, 3 g, intravenous, q12h, Gloria Palmer MD, Stopped at 09/21/24 1129    aspirin chewable tablet 81 mg, 81 mg, oral, Daily, Martin G Salomone, DO, 81 mg at 09/21/24 0832    atorvastatin (Lipitor) tablet 80 mg, 80 mg, oral, Daily, Martin G Salomone, DO, 80 mg at 09/21/24 0832    dextrose 50 % injection 12.5 g, 12.5 g, intravenous, q15 min PRN, Martin G Salomone, DO    dextrose 50 % injection 25 g, 25 g, intravenous, q15 min PRN, Martin G Salomone, DO    enoxaparin (Lovenox) syringe 30 mg, 30 mg, subcutaneous, q24h, Gloria Palmer MD, 30 mg at 09/20/24 2038    fludrocortisone (Florinef) tablet 0.1 mg, 0.1 mg, oral, Daily, Martin G Salomone, DO, 0.1 mg at 09/21/24 0832    [Held by provider] furosemide (Lasix) injection 60 mg, 60 mg, intravenous, q12h, Austin Tracy MD, 60 mg at 09/20/24 1700    gabapentin (Neurontin) capsule 300 mg, 300 mg, oral, Nightly, Gloria Palmer MD, 300 mg at 09/20/24 2038    glucagon (Glucagen) injection 1 mg, 1 mg, intramuscular, q15 min PRN, Martin G Salomone, DO    glucagon (Glucagen) injection 1 mg, 1 mg, intramuscular, q15 min PRN, Martin Gant, DO    glucagon (Glucagen) injection 1 mg, 1 mg, intramuscular, q15 min PRN, Martin Rodrigueze, DO    hydrALAZINE (Apresoline) tablet 50 mg, 50 mg, oral, TID, Martin Gant DO, 50 mg at 09/21/24 0832    insulin glargine (Lantus) injection 2 Units, 2 Units, subcutaneous, Nightly, Martin Gant DO, 2 Units at 09/20/24 2039    insulin regular (HumuLIN R,NovoLIN R) injection 0-10 Units, 0-10 Units, subcutaneous, TID, Martin Gant DO, 2 Units  at 09/21/24 1254    isosorbide dinitrate (Isordil) tablet 20 mg, 20 mg, oral, TID, Martin OSMAN Salomone, DO, 20 mg at 09/21/24 0832    lidocaine 4 % patch 1 patch, 1 patch, transdermal, Daily, Martin G Salomone, DO, 1 patch at 09/21/24 0831    metoprolol tartrate (Lopressor) tablet 50 mg, 50 mg, oral, BID, Martin OSMAN Salomone, DO, 50 mg at 09/21/24 0832    ondansetron (Zofran) tablet 4 mg, 4 mg, oral, q8h PRN **OR** ondansetron (Zofran) injection 4 mg, 4 mg, intravenous, q8h PRN, Martin Keeneomone, DO    oxygen (O2) therapy, , inhalation, Continuous PRN - O2/gases, Gloria Palmer MD, 2 L/min at 09/21/24 0801    oxygen (O2) therapy, , inhalation, Continuous PRN - O2/gases, Gloria Palmer MD, 100 percent at 09/20/24 1426    pantoprazole (ProtoNix) EC tablet 40 mg, 40 mg, oral, Daily before breakfast, 40 mg at 09/21/24 0635 **OR** pantoprazole (ProtoNix) injection 40 mg, 40 mg, intravenous, Daily before breakfast, Martin G Salomone, DO    polyethylene glycol (Glycolax, Miralax) packet 17 g, 17 g, oral, q12h PRN, Martin G Salomone, DO    sodium bicarbonate tablet 1,300 mg, 1,300 mg, oral, q12h, Martin G Salomone, DO, 1,300 mg at 09/21/24 0332    sodium chloride 0.9% infusion, 100 mL/hr, intravenous, Continuous, Deep Allison, DO, Last Rate: 100 mL/hr at 09/21/24 1339, 100 mL/hr at 09/21/24 1339    venlafaxine XR (Effexor-XR) 24 hr capsule 150 mg, 150 mg, oral, Daily, Martin G Salomone, DO, 150 mg at 09/21/24 0832           Assessment/Plan   This patient currently has cardiac telemetry ordered; if you would like to modify or discontinue the telemetry order, click here to go to the orders activity to modify/discontinue the order.      1.  IDANIA on top of CKD stage IV  2.  Acute congestive heart failure with pleural effusion, with complaints of hemoptysis     Will check both anti-GBM and ANCA levels today.  Thoracentesis completed this morning with continuing on diuretics, feeling much better since admission  With taper of diuretics to Lasix 60  mg IVP  twice daily  Will adjust diuretic dosage and give Diamox today x 1   250 mg daily  IDANIA worsening noted which could be multifactorial as stated with other physicians, metabolic alkalosis corrected.  Will follow labs tomorrow and if renal function test worsens I will cut down the diuretics more to 40 mg twice a day  3.  PAD status post left AKA  4.  Coronary disease status post CABG  5.  Diabetes mellitus continue current management  6.  Hypertension continue current management     Will continue monitoring patient daily reviewing labs and other consultants input                 I spent 35 minutes in the professional and overall care of this patient.      Austin Tracy MD

## 2024-09-21 NOTE — CARE PLAN
Problem: Nutrition  Goal: Less than 5 days NPO/clear liquids  Outcome: Progressing  Goal: Oral intake greater than 50%  Outcome: Progressing  Goal: Oral intake greater 75%  Outcome: Progressing  Goal: Consume prescribed supplement  Outcome: Progressing  Goal: Adequate PO fluid intake  Outcome: Progressing  Goal: Nutrition support goals are met within 48 hrs  Outcome: Progressing  Goal: Nutrition support is meeting 75% of nutrient needs  Outcome: Progressing  Goal: Tube feed tolerance  Outcome: Progressing  Goal: BG  mg/dL  Outcome: Progressing  Goal: Lab values WNL  Outcome: Progressing  Goal: Electrolytes WNL  Outcome: Progressing  Goal: Promote healing  Outcome: Progressing  Goal: Maintain stable weight  Outcome: Progressing  Goal: Reduce weight from edema/fluid  Outcome: Progressing  Goal: Gradual weight gain  Outcome: Progressing  Goal: Improve ostomy output  Outcome: Progressing     Problem: Diabetes  Goal: Achieve decreasing blood glucose levels by end of shift  Outcome: Progressing  Goal: Increase stability of blood glucose readings by end of shift  Outcome: Progressing  Goal: Decrease in ketones present in urine by end of shift  Outcome: Progressing  Goal: Maintain electrolyte levels within acceptable range throughout shift  Outcome: Progressing  Goal: Maintain glucose levels >70mg/dl to <250mg/dl throughout shift  Outcome: Progressing  Goal: No changes in neurological exam by end of shift  Outcome: Progressing  Goal: Learn about and adhere to nutrition recommendations by end of shift  Outcome: Progressing  Goal: Vital signs within normal range for age by end of shift  Outcome: Progressing  Goal: Increase self care and/or family involovement by end of shift  Outcome: Progressing  Goal: Receive DSME education by end of shift  Outcome: Progressing     Problem: Pain - Adult  Goal: Verbalizes/displays adequate comfort level or baseline comfort level  Outcome: Progressing     Problem: Safety -  Adult  Goal: Free from fall injury  Outcome: Progressing     Problem: Discharge Planning  Goal: Discharge to home or other facility with appropriate resources  Outcome: Progressing     Problem: Chronic Conditions and Co-morbidities  Goal: Patient's chronic conditions and co-morbidity symptoms are monitored and maintained or improved  Outcome: Progressing     Problem: Respiratory  Goal: Clear secretions with interventions this shift  Outcome: Progressing  Goal: Minimize anxiety/maximize coping throughout shift  Outcome: Progressing  Goal: Minimal/no exertional discomfort or dyspnea this shift  Outcome: Progressing  Goal: No signs of respiratory distress (eg. Use of accessory muscles. Peds grunting)  Outcome: Progressing  Goal: Patent airway maintained this shift  Outcome: Progressing  Goal: Verbalize decreased shortness of breath this shift  Outcome: Progressing  Goal: Wean oxygen to maintain O2 saturation per order/standard this shift  Outcome: Progressing  Goal: Increase self care and/or family involvement in next 24 hours  Outcome: Progressing     Problem: Skin  Goal: Decreased wound size/increased tissue granulation at next dressing change  Outcome: Progressing  Goal: Participates in plan/prevention/treatment measures  Outcome: Progressing  Goal: Prevent/manage excess moisture  Outcome: Progressing  Goal: Prevent/minimize sheer/friction injuries  Outcome: Progressing  Goal: Promote/optimize nutrition  Outcome: Progressing  Goal: Promote skin healing  Outcome: Progressing     Problem: Fall/Injury  Goal: Not fall by end of shift  Outcome: Progressing  Goal: Be free from injury by end of the shift  Outcome: Progressing  Goal: Verbalize understanding of personal risk factors for fall in the hospital  Outcome: Progressing  Goal: Verbalize understanding of risk factor reduction measures to prevent injury from fall in the home  Outcome: Progressing  Goal: Use assistive devices by end of the shift  Outcome:  Progressing  Goal: Pace activities to prevent fatigue by end of the shift  Outcome: Progressing     Problem: Pain  Goal: Takes deep breaths with improved pain control throughout the shift  Outcome: Progressing  Goal: Turns in bed with improved pain control throughout the shift  Outcome: Progressing  Goal: Walks with improved pain control throughout the shift  Outcome: Progressing  Goal: Performs ADL's with improved pain control throughout shift  Outcome: Progressing  Goal: Participates in PT with improved pain control throughout the shift  Outcome: Progressing  Goal: Free from opioid side effects throughout the shift  Outcome: Progressing  Goal: Free from acute confusion related to pain meds throughout the shift  Outcome: Progressing     Problem: Heart Failure  Goal: Improved gas exchange this shift  Outcome: Progressing  Goal: Improved urinary output this shift  Outcome: Progressing  Goal: Reduction in peripheral edema within 24 hours  Outcome: Progressing  Goal: Report improvement of dyspnea/breathlessness this shift  Outcome: Progressing  Goal: Weight from fluid excess reduced over 2-3 days, then stabilize  Outcome: Progressing  Goal: Increase self care and/or family involvement in 24 hours  Outcome: Progressing   The patient's goals for the shift include Pt. will have a safe, restful and uneventful evening    The clinical goals for the shift include pt will remain free of falls throughout shift    Over the shift, the patient did not make progress toward the following goals. Barriers to progression include . Recommendations to address these barriers include .

## 2024-09-21 NOTE — PROGRESS NOTES
"Subjective Data:  Breathing significantly improving. Remains on supplemental oxygen via NC  Renal indices continue to increase.  His GFR remains in the 15-20 range      Overnight Events:    N/A     Objective Data:  Last Recorded Vitals:  Vitals:    09/21/24 0824 09/21/24 1235 09/21/24 1320 09/21/24 1554   BP: 163/80 150/73  130/77   BP Location: Left arm Left arm  Left arm   Patient Position: Lying Lying  Lying   Pulse:       Resp:  19 18   Temp: 36.2 °C (97.2 °F) 36.8 °C (98.2 °F)  37.1 °C (98.8 °F)   TempSrc:  Temporal  Temporal   SpO2: 94% 94% 96% 94%   Weight:       Height:           Last Labs:  LABS:  CMP:  Results from last 7 days   Lab Units 09/21/24  0904 09/20/24  0531 09/19/24  1022 09/18/24  0434 09/17/24  1300 09/16/24  2227   SODIUM mmol/L 136 140 143 145 143 145   POTASSIUM mmol/L 4.9 3.9 3.3* 3.5 3.8 4.0   CHLORIDE mmol/L 98 99 101 103 102 103   CO2 mmol/L 26 33* 35* 32 27 28   ANION GAP mmol/L 17 12 10 14 18 14   BUN mg/dL 54* 51* 44* 45* 46* 42*   CREATININE mg/dL 4.49* 4.01* 3.32* 3.09* 3.32* 3.30*   EGFR mL/min/1.73m*2 14* 16* 20* 22* 20* 20*   MAGNESIUM mg/dL 1.80  --  1.70  --   --  1.80   ALBUMIN g/dL 2.3*  --   --   --  3.1* 3.1*   ALT U/L  --   --   --   --  55* 71*   AST U/L  --   --   --   --  15 17   BILIRUBIN TOTAL mg/dL  --   --   --   --  0.5 0.8     CBC:  Results from last 7 days   Lab Units 09/21/24  0904 09/20/24  0531 09/19/24  1022 09/18/24  0434 09/16/24  2227   WBC AUTO x10*3/uL 12.7* 9.7 11.0 11.0 11.9*   HEMOGLOBIN g/dL 8.1* 7.3* 7.2* 7.0* 8.1*   HEMATOCRIT % 28.0* 25.4* 24.7* 24.2* 27.5*   PLATELETS AUTO x10*3/uL 303 273 296 328 348   MCV fL 89 89 92 92 90     COAG:   Results from last 7 days   Lab Units 09/18/24  0709   INR  1.3*     ABO: No results found for: \"ABO\"  HEME/ENDO:  Results from last 7 days   Lab Units 09/20/24  0531   IRON SATURATION % 12*      CARDIAC:   Results from last 7 days   Lab Units 09/20/24  0531 09/18/24  0434   LD U/L 144 166   TROPHS ng/L  --  62* "   BNP pg/mL  --  671*     Recent Labs     04/29/23  0654 08/15/22  0700 03/18/22  0623   CHOL 181 145 150   LDLF 127* 77 90   HDL 32.8* 31.6* 36.7*   TRIG 108 180* 115      CT maxillofacial bones wo IV contrast   Final Result   Slight fullness of the right lacrimal gland without evidence of fluid   collection. CT of the paranasal sinuses, mastoid sinuses and orbits   is within normal limits.        MACRO:   none        Signed by: Torres Henson 9/21/2024 10:50 AM   Dictation workstation:   [id number]      CT 3D reconstruction   Final Result   Slight fullness of the right lacrimal gland without evidence of fluid   collection. CT of the paranasal sinuses, mastoid sinuses and orbits   is within normal limits.        MACRO:   none        Signed by: Torres Henson 9/21/2024 10:50 AM   Dictation workstation:   [id number]      Vascular US upper extremity venous duplex right   Final Result      Transthoracic Echo (TTE) Complete   Final Result      US thoracentesis   Final Result   Uneventful thoracentesis, as detailed above: Right Pleural space,   1000 mL        Performed and dictated at [facility].        Signed by: Diana Serrano 9/19/2024 11:37 AM   Dictation workstation:   [id number]            Last I/O:  I/O last 3 completed shifts:  In: 360 (2.7 mL/kg) [P.O.:360]  Out: 625 (4.8 mL/kg) [Urine:625 (0.1 mL/kg/hr)]  Weight: 131 kg     Past Cardiology Tests (Last 3 Years):  EKG:  ECG 12 lead 09/16/2024    Echo:  Echo 9/19/2024   1. Left ventricular ejection fraction is normal, calculated by Kirkpatrick's biplane at 59%.   2. Spectral Doppler shows a restrictive pattern of left ventricular diastolic filling.   3. There is an elevated mean left atrial pressure.   4. Abnormal septal motion consistent with post-operative status.   5. There is moderate concentric left ventricular hypertrophy.   6. There is normal right ventricular global systolic function.   7. Slightly elevated right  ventricular systolic pressure.   8. The inferior vena cava appears severely dilated, with IVC inspiratory collapse less than 50%.    Echo:  Transthoracic Echo (TTE) Limited 05/23/2024  CONCLUSIONS:   1. Left ventricular systolic function is normal with a 55% estimated ejection fraction.   2. Poorly visualized anatomical structures due to suboptimal image quality        Transthoracic Echo (TTE) Limited 04/03/2024  1. Left ventricular systolic function is normal with a 60-65% estimated ejection fraction.   2. Poorly visualized anatomical structures due to suboptimal image quality.   3. Moderately increased left ventricular septal thickness.   4. Spectral Doppler shows an impaired relaxation pattern of left ventricular diastolic filling.   5. The left ventricular posterior wall thickness is moderately increased.   6. There is mildly reduced right ventricular systolic function.   7. Compared with the prior exam from 11/13/2023 ( [facility]) prior study was techniclly difficult with patient  bpm, but limited echocontrast images demonstrated normal LV systolic function at that time without significant wall motion abnormalities. Patinet's HR today 70 bpm.        Transthoracic Echo (TTE) Complete 11/24/2023   1. Left ventricular systolic function is mildly decreased with a 45-50% estimated ejection fraction.   2. Mid and apical anterior wall, mid anterolateral segment, apical lateral segment, and apex are abnormal.   3. There are multiple wall motion abnormalities.   4. Poorly visualized anatomical structures due to suboptimal image quality.   5. There is reduced right ventricular systolic function.    Anesthesia Intraoperative Transesophageal Echocardiogram 11/13/2023      Anesthesia Intraoperative Transesophageal Echocardiogram 11/13/2023      Cath:  Peripheral Diagnostic Angiography  9/14/2023  CONCLUSIONS:  1. Right critical limb ischemia, Johnny class V.  2. Successful PTA_DCB of the entire right superficial  femoral artery.  3. Aspirin 81 mg daily and clopidogrel 75 mg daily.    [facility] 5/19/2023  CONCLUSIONS:  1. Successful OM2 stenting.    [facility] 5/3/2023  Coronary Interventions:     Coronary Lesion Summary:  Vessel       Stenosis   Vessel Segment  Left Main  30% stenosis     entire  LAD        40% stenosis    proximal  LAD        75% stenosis      mid  Circumflex 95% stenosis      mid  RCA        60% stenosis    proximal    CONCLUSIONS:  1. Left main: 30% diffuse disease.  2. LAD: 40-50% proximal stenosis, 75% md-vessel disease.  3. LCx: 95% mid-vessel disease.  4. RCA: 60% proximal disease, 40% mid-vessel disease.  5. LVEDP 34mmHg, no aortic stenosis on LV-Ao gradient.       Stress Test:  9/25/2018    Cardiac Imaging:  XR chest abdomen for OG NG placement 11/13/2023      Inpatient Medications:  Scheduled medications   Medication Dose Route Frequency    acetaZOLAMIDE  250 mg oral Daily    albuterol  2.5 mg nebulization TID    amLODIPine  10 mg oral Daily    ampicillin-sulbactam  3 g intravenous q12h    aspirin  81 mg oral Daily    atorvastatin  80 mg oral Daily    enoxaparin  30 mg subcutaneous q24h    fludrocortisone  0.1 mg oral Daily    [Held by provider] furosemide  60 mg intravenous q12h    gabapentin  300 mg oral Nightly    hydrALAZINE  50 mg oral TID    insulin glargine  2 Units subcutaneous Nightly    insulin regular  0-10 Units subcutaneous TID    isosorbide dinitrate  20 mg oral TID    lidocaine  1 patch transdermal Daily    metoprolol tartrate  50 mg oral BID    pantoprazole  40 mg oral Daily before breakfast    Or    pantoprazole  40 mg intravenous Daily before breakfast    sodium bicarbonate  1,300 mg oral q12h    venlafaxine XR  150 mg oral Daily     PRN medications   Medication    acetaminophen    Or    acetaminophen    Or    acetaminophen    albuterol    dextrose    dextrose    glucagon    glucagon    glucagon    ondansetron    Or    ondansetron    oxygen    oxygen    polyethylene glycol     Continuous  "Medications   Medication Dose Last Rate    sodium chloride 0.9%  100 mL/hr 100 mL/hr (09/21/24 1339)       Physical Exam:  Documented Vital Signs   Heart Rate:  [61-66]   Temp:  [36 °C (96.8 °F)-37.1 °C (98.8 °F)]   Resp:  [16-19]   BP: (114-163)/(73-81)   Weight:  [131 kg (288 lb 12.8 oz)]   SpO2:  [84 %-97 %]   Temp:  [36 °C (96.8 °F)-37.1 °C (98.8 °F)] 37.1 °C (98.8 °F)  Heart Rate:  [61-66] 66  Resp:  [16-19] 18  BP: (114-163)/(73-81) 130/77  FiO2 (%):  [24 %-28 %] 24 %      Oxygen Dose: *2 L/min    Documented Fluid Status     Intake/Output Summary (Last 24 hours) at 9/21/2024 1607  Last data filed at 9/21/2024 1450  Gross per 24 hour   Intake --   Output 200 ml   Net -200 ml     Net IO Since Admission: -2,240 mL [09/21/24 1607]    GENERAL: Lethargic this today   SKIN: warm, dry  NECK: Difficult to assess due to body habitus   CARDIAC: Regular rate and rhythm no murmurs  PULMONARY: Bibasilar rales on supplemental oxygen   ABDOMEN: soft, nondistended  EXTREMITIES: left AKA +1-2 right lower extremity edema   NEURO: Alert and oriented x 3.  Grossly normal.  Moves all 4 extremities.    I reviewed telemetry which showed sinus rhythm       Humphrey Waddell is a 59 y.o. male with a pertinent medical Hx notable for  PAD s/p left above-the-knee amputation, anemia, coronary artery disease, s/p CABG carotid artery disease, CVA, depression, type 2 diabetes mellitus, GERD, hyperlipidemia, hypertension, osteoarthritis, peripheral arterial disease, CKD stage IV who was transferred from [facility] to [facility] due to AMS.  Cardiology has been consulted for \"CHF\"      S/p right thoracentesis with 1 liter removed 9/19/2024    Ongoing Diuresis as per Nephrology management at this time        Echocardiogram showed LVEF 59%  restrictive LV diastolic filling elevated mean left atrial pressure RVSP 35 mmHg    Continue chronic home medications at this time      Follow H/H, Transfuse as needed to maintain H > 7. Give additional lasix following " each pRBC     Will need appropriate cardiac follow up --> [facility]   -- Dr Lund or Dr Peres would likely be the closest providers to Mr Waddell.       Ongoing disposition planning as per primary service  Appears to be improving and my be reaching euvolemia        Junior Araujo DO   Division of Cardiovascular Medicine  [facility]

## 2024-09-22 VITALS
HEART RATE: 69 BPM | RESPIRATION RATE: 17 BRPM | HEIGHT: 76 IN | BODY MASS INDEX: 35.17 KG/M2 | SYSTOLIC BLOOD PRESSURE: 121 MMHG | TEMPERATURE: 98.1 F | DIASTOLIC BLOOD PRESSURE: 63 MMHG | WEIGHT: 288.8 LBS | OXYGEN SATURATION: 97 %

## 2024-09-22 LAB
ALBUMIN SERPL BCP-MCNC: 2.4 G/DL (ref 3.4–5)
ANION GAP SERPL CALC-SCNC: 14 MMOL/L (ref 10–20)
BASOPHILS # BLD AUTO: 0.03 X10*3/UL (ref 0–0.1)
BASOPHILS NFR BLD AUTO: 0.2 %
BUN SERPL-MCNC: 61 MG/DL (ref 6–23)
CALCIUM SERPL-MCNC: 6.8 MG/DL (ref 8.6–10.3)
CHLORIDE SERPL-SCNC: 99 MMOL/L (ref 98–107)
CO2 SERPL-SCNC: 29 MMOL/L (ref 21–32)
CREAT SERPL-MCNC: 5.3 MG/DL (ref 0.5–1.3)
EGFRCR SERPLBLD CKD-EPI 2021: 12 ML/MIN/1.73M*2
EOSINOPHIL # BLD AUTO: 1.17 X10*3/UL (ref 0–0.7)
EOSINOPHIL NFR BLD AUTO: 9.6 %
ERYTHROCYTE [DISTWIDTH] IN BLOOD BY AUTOMATED COUNT: 15.3 % (ref 11.5–14.5)
GLUCOSE BLD MANUAL STRIP-MCNC: 110 MG/DL (ref 74–99)
GLUCOSE BLD MANUAL STRIP-MCNC: 138 MG/DL (ref 74–99)
GLUCOSE BLD MANUAL STRIP-MCNC: 154 MG/DL (ref 74–99)
GLUCOSE BLD MANUAL STRIP-MCNC: 177 MG/DL (ref 74–99)
GLUCOSE SERPL-MCNC: 110 MG/DL (ref 74–99)
HCT VFR BLD AUTO: 28.2 % (ref 41–52)
HGB BLD-MCNC: 8.3 G/DL (ref 13.5–17.5)
IMM GRANULOCYTES # BLD AUTO: 0.05 X10*3/UL (ref 0–0.7)
IMM GRANULOCYTES NFR BLD AUTO: 0.4 % (ref 0–0.9)
LYMPHOCYTES # BLD AUTO: 0.93 X10*3/UL (ref 1.2–4.8)
LYMPHOCYTES NFR BLD AUTO: 7.6 %
MCH RBC QN AUTO: 26.1 PG (ref 26–34)
MCHC RBC AUTO-ENTMCNC: 29.4 G/DL (ref 32–36)
MCV RBC AUTO: 89 FL (ref 80–100)
MONOCYTES # BLD AUTO: 1.1 X10*3/UL (ref 0.1–1)
MONOCYTES NFR BLD AUTO: 9 %
NEUTROPHILS # BLD AUTO: 8.96 X10*3/UL (ref 1.2–7.7)
NEUTROPHILS NFR BLD AUTO: 73.2 %
NRBC BLD-RTO: 0 /100 WBCS (ref 0–0)
PHOSPHATE SERPL-MCNC: 5.5 MG/DL (ref 2.5–4.9)
PLATELET # BLD AUTO: 270 X10*3/UL (ref 150–450)
POTASSIUM SERPL-SCNC: 3.7 MMOL/L (ref 3.5–5.3)
RBC # BLD AUTO: 3.18 X10*6/UL (ref 4.5–5.9)
SODIUM SERPL-SCNC: 138 MMOL/L (ref 136–145)
WBC # BLD AUTO: 12.2 X10*3/UL (ref 4.4–11.3)

## 2024-09-22 PROCEDURE — 2500000001 HC RX 250 WO HCPCS SELF ADMINISTERED DRUGS (ALT 637 FOR MEDICARE OP): Performed by: HOSPITALIST

## 2024-09-22 PROCEDURE — 36415 COLL VENOUS BLD VENIPUNCTURE: CPT | Performed by: HOSPITALIST

## 2024-09-22 PROCEDURE — 97165 OT EVAL LOW COMPLEX 30 MIN: CPT | Mod: GO

## 2024-09-22 PROCEDURE — 2500000004 HC RX 250 GENERAL PHARMACY W/ HCPCS (ALT 636 FOR OP/ED): Performed by: INTERNAL MEDICINE

## 2024-09-22 PROCEDURE — 82947 ASSAY GLUCOSE BLOOD QUANT: CPT

## 2024-09-22 PROCEDURE — 80069 RENAL FUNCTION PANEL: CPT | Performed by: HOSPITALIST

## 2024-09-22 PROCEDURE — 94640 AIRWAY INHALATION TREATMENT: CPT

## 2024-09-22 PROCEDURE — 2500000001 HC RX 250 WO HCPCS SELF ADMINISTERED DRUGS (ALT 637 FOR MEDICARE OP): Performed by: INTERNAL MEDICINE

## 2024-09-22 PROCEDURE — 2500000002 HC RX 250 W HCPCS SELF ADMINISTERED DRUGS (ALT 637 FOR MEDICARE OP, ALT 636 FOR OP/ED): Performed by: INTERNAL MEDICINE

## 2024-09-22 PROCEDURE — 97161 PT EVAL LOW COMPLEX 20 MIN: CPT | Mod: GP

## 2024-09-22 PROCEDURE — 99232 SBSQ HOSP IP/OBS MODERATE 35: CPT | Performed by: HOSPITALIST

## 2024-09-22 PROCEDURE — 2060000001 HC INTERMEDIATE ICU ROOM DAILY

## 2024-09-22 PROCEDURE — 2500000005 HC RX 250 GENERAL PHARMACY W/O HCPCS: Performed by: INTERNAL MEDICINE

## 2024-09-22 PROCEDURE — 2500000004 HC RX 250 GENERAL PHARMACY W/ HCPCS (ALT 636 FOR OP/ED): Performed by: HOSPITALIST

## 2024-09-22 PROCEDURE — 94660 CPAP INITIATION&MGMT: CPT

## 2024-09-22 PROCEDURE — 85025 COMPLETE CBC W/AUTO DIFF WBC: CPT | Performed by: HOSPITALIST

## 2024-09-22 RX ORDER — OXYCODONE HYDROCHLORIDE 5 MG/1
5 TABLET ORAL EVERY 6 HOURS PRN
Status: DISCONTINUED | OUTPATIENT
Start: 2024-09-22 | End: 2024-09-27 | Stop reason: HOSPADM

## 2024-09-22 RX ORDER — ACETAMINOPHEN 325 MG/1
975 TABLET ORAL 3 TIMES DAILY
Status: DISCONTINUED | OUTPATIENT
Start: 2024-09-22 | End: 2024-09-26

## 2024-09-22 ASSESSMENT — PAIN SCALES - GENERAL
PAINLEVEL_OUTOF10: 0 - NO PAIN
PAINLEVEL_OUTOF10: 8
PAINLEVEL_OUTOF10: 3
PAINLEVEL_OUTOF10: 8
PAINLEVEL_OUTOF10: 8
PAINLEVEL_OUTOF10: 0 - NO PAIN
PAINLEVEL_OUTOF10: 8
PAINLEVEL_OUTOF10: 2
PAINLEVEL_OUTOF10: 8

## 2024-09-22 ASSESSMENT — COGNITIVE AND FUNCTIONAL STATUS - GENERAL
WALKING IN HOSPITAL ROOM: TOTAL
CLIMB 3 TO 5 STEPS WITH RAILING: TOTAL
TURNING FROM BACK TO SIDE WHILE IN FLAT BAD: A LITTLE
EATING MEALS: A LITTLE
HELP NEEDED FOR BATHING: A LITTLE
MOVING FROM LYING ON BACK TO SITTING ON SIDE OF FLAT BED WITH BEDRAILS: A LITTLE
MOBILITY SCORE: 12
WALKING IN HOSPITAL ROOM: TOTAL
DRESSING REGULAR LOWER BODY CLOTHING: TOTAL
DRESSING REGULAR UPPER BODY CLOTHING: A LITTLE
DAILY ACTIVITIY SCORE: 18
MOVING FROM LYING ON BACK TO SITTING ON SIDE OF FLAT BED WITH BEDRAILS: A LITTLE
DAILY ACTIVITIY SCORE: 18
PERSONAL GROOMING: A LITTLE
MOVING TO AND FROM BED TO CHAIR: A LOT
DAILY ACTIVITIY SCORE: 10
PERSONAL GROOMING: TOTAL
CLIMB 3 TO 5 STEPS WITH RAILING: TOTAL
TURNING FROM BACK TO SIDE WHILE IN FLAT BAD: TOTAL
MOBILITY SCORE: 6
STANDING UP FROM CHAIR USING ARMS: A LOT
DRESSING REGULAR LOWER BODY CLOTHING: A LITTLE
HELP NEEDED FOR BATHING: A LOT
PERSONAL GROOMING: A LOT
MOVING FROM LYING ON BACK TO SITTING ON SIDE OF FLAT BED WITH BEDRAILS: TOTAL
DRESSING REGULAR UPPER BODY CLOTHING: A LITTLE
HELP NEEDED FOR BATHING: A LITTLE
TOILETING: A LOT
TOILETING: A LITTLE
CLIMB 3 TO 5 STEPS WITH RAILING: TOTAL
TURNING FROM BACK TO SIDE WHILE IN FLAT BAD: A LITTLE
TOILETING: TOTAL
MOVING TO AND FROM BED TO CHAIR: TOTAL
MOVING TO AND FROM BED TO CHAIR: A LITTLE
DRESSING REGULAR LOWER BODY CLOTHING: A LITTLE
STANDING UP FROM CHAIR USING ARMS: A LOT
WALKING IN HOSPITAL ROOM: TOTAL
STANDING UP FROM CHAIR USING ARMS: TOTAL
MOBILITY SCORE: 13
DRESSING REGULAR UPPER BODY CLOTHING: A LOT

## 2024-09-22 ASSESSMENT — PAIN DESCRIPTION - ORIENTATION: ORIENTATION: MID

## 2024-09-22 ASSESSMENT — PAIN - FUNCTIONAL ASSESSMENT
PAIN_FUNCTIONAL_ASSESSMENT: 0-10

## 2024-09-22 ASSESSMENT — PAIN DESCRIPTION - DESCRIPTORS: DESCRIPTORS: ACHING

## 2024-09-22 ASSESSMENT — ACTIVITIES OF DAILY LIVING (ADL)
ADL_ASSISTANCE: NEEDS ASSISTANCE
ADL_ASSISTANCE: NEEDS ASSISTANCE
EFFECT OF PAIN ON DAILY ACTIVITIES: OT TO TOLERANCE

## 2024-09-22 ASSESSMENT — PAIN DESCRIPTION - LOCATION: LOCATION: BACK

## 2024-09-22 NOTE — CARE PLAN
The patient's goals for the shift include safety and become comfortable.    The clinical goals for the shift include patient's safety and manage pain    Goal: Oral intake greater 75%  Outcome: Progressing  Goal: Consume prescribed supplement  Outcome: Progressing  Goal: Adequate PO fluid intake  Outcome: Progressing  Goal: Nutrition support goals are met within 48 hrs  Outcome: Progressing  Goal: Nutrition support is meeting 75% of nutrient needs  Outcome: Progressing  Goal: BG  mg/dL  Outcome: Progressing  Goal: Lab values WNL  Outcome: Progressing  Goal: Electrolytes WNL  Outcome: Progressing  Goal: Promote healing  Outcome: Progressing  Goal: Maintain stable weight  Outcome: Progressing  Goal: Reduce weight from edema/fluid  Outcome: Progressing  Goal: Gradual weight gain  Outcome: Progressing  Goal: Improve ostomy output  Outcome: Progressing     Problem: Diabetes  Goal: Achieve decreasing blood glucose levels by end of shift  Outcome: Progressing  Goal: Increase stability of blood glucose readings by end of shift  Outcome: Progressing  Goal: Decrease in ketones present in urine by end of shift  Outcome: Progressing  Goal: Maintain electrolyte levels within acceptable range throughout shift  Outcome: Progressing  Goal: Maintain glucose levels >70mg/dl to <250mg/dl throughout shift  Outcome: Progressing  Goal: No changes in neurological exam by end of shift  Outcome: Progressing  Goal: Learn about and adhere to nutrition recommendations by end of shift  Outcome: Progressing  Goal: Vital signs within normal range for age by end of shift  Outcome: Progressing  Goal: Increase self care and/or family involovement by end of shift  Outcome: Progressing  Goal: Receive DSME education by end of shift  Outcome: Progressing     Problem: Pain - Adult  Goal: Verbalizes/displays adequate comfort level or baseline comfort level  Outcome: Progressing     Problem: Safety - Adult  Goal: Free from fall injury  Outcome:  Progressing     Problem: Discharge Planning  Goal: Discharge to home or other facility with appropriate resources  Outcome: Progressing     Problem: Chronic Conditions and Co-morbidities  Goal: Patient's chronic conditions and co-morbidity symptoms are monitored and maintained or improved  Outcome: Progressing     Problem: Respiratory  Goal: Clear secretions with interventions this shift  Outcome: Progressing  Goal: Minimize anxiety/maximize coping throughout shift  Outcome: Progressing  Goal: Minimal/no exertional discomfort or dyspnea this shift  Outcome: Progressing  Goal: No signs of respiratory distress (eg. Use of accessory muscles. Peds grunting)  Outcome: Progressing  Goal: Patent airway maintained this shift  Outcome: Progressing  Goal: Verbalize decreased shortness of breath this shift  Outcome: Progressing  Goal: Wean oxygen to maintain O2 saturation per order/standard this shift  Outcome: Progressing  Goal: Increase self care and/or family involvement in next 24 hours  Outcome: Progressing     Problem: Skin  Goal: Decreased wound size/increased tissue granulation at next dressing change  Outcome: Progressing  Goal: Participates in plan/prevention/treatment measures  Outcome: Progressing  Goal: Prevent/manage excess moisture  Outcome: Progressing  Goal: Prevent/minimize sheer/friction injuries  Outcome: Progressing  Goal: Promote/optimize nutrition  Outcome: Progressing  Goal: Promote skin healing  Outcome: Progressing     Problem: Fall/Injury  Goal: Not fall by end of shift  Outcome: Progressing  Goal: Be free from injury by end of the shift  Outcome: Progressing  Goal: Verbalize understanding of personal risk factors for fall in the hospital  Outcome: Progressing  Goal: Verbalize understanding of risk factor reduction measures to prevent injury from fall in the home  Outcome: Progressing  Goal: Use assistive devices by end of the shift  Outcome: Progressing  Goal: Pace activities to prevent fatigue by  end of the shift  Outcome: Progressing     Problem: Pain  Goal: Takes deep breaths with improved pain control throughout the shift  Outcome: Progressing  Goal: Turns in bed with improved pain control throughout the shift  Outcome: Progressing  Goal: Walks with improved pain control throughout the shift  Outcome: Progressing  Goal: Performs ADL's with improved pain control throughout shift  Outcome: Progressing  Goal: Participates in PT with improved pain control throughout the shift  Outcome: Progressing  Goal: Free from opioid side effects throughout the shift  Outcome: Progressing  Goal: Free from acute confusion related to pain meds throughout the shift  Outcome: Progressing

## 2024-09-22 NOTE — PROGRESS NOTES
Humphrey Waddell is a 59 y.o. male on day 4 of admission presenting with CHF (congestive heart failure), NYHA class I, acute on chronic, combined (Multi).      Subjective   Doing better no new complaints       Objective        Vitals 24HR  Heart Rate:  [61-73]   Temp:  [36.4 °C (97.6 °F)-37.2 °C (98.9 °F)]   Resp:  [14-18]   BP: (115-156)/(64-94)   SpO2:  [92 %-98 %]     Intake/Output last 3 Shifts:    Intake/Output Summary (Last 24 hours) at 9/22/2024 1353  Last data filed at 9/22/2024 1200  Gross per 24 hour   Intake 300 ml   Output 545 ml   Net -245 ml       Physical Exam      General appearance: Awake and alert and in mild respiratory distress  Head and ENT; normocephalic/atraumatic/supple neck/no JVD  Lungs; bilateral lower lobe crackles up to mid lung fields  Heart; RRR  Abdomen: Obese/distended  Extremities; left left above-knee amputation  Mild edema in the right side  Neurologic: Physiologic             Relevant Results     Results from last 7 days   Lab Units 09/22/24  1239 09/21/24  0904 09/20/24  0531   WBC AUTO x10*3/uL 12.2* 12.7* 9.7   HEMOGLOBIN g/dL 8.3* 8.1* 7.3*   HEMATOCRIT % 28.2* 28.0* 25.4*   PLATELETS AUTO x10*3/uL 270 303 273      Results from last 7 days   Lab Units 09/22/24  1239 09/21/24  0904 09/20/24  0531   SODIUM mmol/L 138 136 140   POTASSIUM mmol/L 3.7 4.9 3.9   CHLORIDE mmol/L 99 98 99   CO2 mmol/L 29 26 33*   BUN mg/dL 61* 54* 51*   CREATININE mg/dL 5.30* 4.49* 4.01*   GLUCOSE mg/dL 110* 135* 214*   CALCIUM mg/dL 6.8* 6.7* 7.1*        Current Facility-Administered Medications:     acetaminophen (Tylenol) tablet 650 mg, 650 mg, oral, q4h PRN, 650 mg at 09/22/24 0957 **OR** acetaminophen (Tylenol) oral liquid 650 mg, 650 mg, oral, q4h PRN **OR** acetaminophen (Tylenol) suppository 650 mg, 650 mg, rectal, q4h PRN, Martin Gant DO    acetaZOLAMIDE (Diamox) tablet 250 mg, 250 mg, oral, Daily, Austin Tracy MD, 250 mg at 09/22/24 0932    albuterol 2.5 mg /3 mL (0.083 %) nebulizer  solution 2.5 mg, 2.5 mg, nebulization, TID, Martin G Salomone, DO, 2.5 mg at 09/21/24 2049    albuterol 2.5 mg /3 mL (0.083 %) nebulizer solution 2.5 mg, 2.5 mg, nebulization, q2h PRN, Martin G Salomone, DO    amLODIPine (Norvasc) tablet 10 mg, 10 mg, oral, Daily, Martin G Salomone, DO, 10 mg at 09/22/24 0932    ampicillin-sulbactam (Unasyn) in sodium chloride 0.9 % 100 mL 3 g, 3 g, intravenous, q12h, Gloria Palmer MD, Stopped at 09/22/24 1108    aspirin chewable tablet 81 mg, 81 mg, oral, Daily, Martin G Salomone, DO, 81 mg at 09/22/24 0932    atorvastatin (Lipitor) tablet 80 mg, 80 mg, oral, Daily, Martin G Salomone, DO, 80 mg at 09/22/24 0932    dextrose 50 % injection 12.5 g, 12.5 g, intravenous, q15 min PRN, Martin G Salomone, DO    dextrose 50 % injection 25 g, 25 g, intravenous, q15 min PRN, Martin G Salomone, DO    enoxaparin (Lovenox) syringe 30 mg, 30 mg, subcutaneous, q24h, Gloria Palmer MD, 30 mg at 09/21/24 2055    fludrocortisone (Florinef) tablet 0.1 mg, 0.1 mg, oral, Daily, Martin G Salomone, DO, 0.1 mg at 09/22/24 0932    [Held by provider] furosemide (Lasix) injection 60 mg, 60 mg, intravenous, q12h, Austin Tracy MD, 60 mg at 09/20/24 1700    gabapentin (Neurontin) capsule 300 mg, 300 mg, oral, Nightly, Gloria Palmer MD, 300 mg at 09/21/24 2056    glucagon (Glucagen) injection 1 mg, 1 mg, intramuscular, q15 min PRN, Martin G Salomone, DO    glucagon (Glucagen) injection 1 mg, 1 mg, intramuscular, q15 min PRN, Martin G Salomone, DO    glucagon (Glucagen) injection 1 mg, 1 mg, intramuscular, q15 min PRN, Martin Gant DO    hydrALAZINE (Apresoline) tablet 50 mg, 50 mg, oral, TID, Martin Gant DO, 50 mg at 09/22/24 0932    insulin glargine (Lantus) injection 2 Units, 2 Units, subcutaneous, Nightly, Martin Gant DO, 2 Units at 09/21/24 2056    insulin regular (HumuLIN R,NovoLIN R) injection 0-10 Units, 0-10 Units, subcutaneous, TID, Martin Gant DO, 2 Units at 09/22/24 0931    iron sucrose  (Venofer) 300 mg in sodium chloride 0.9% 282 mL IV, 300 mg, intravenous, Daily, Deep Allison, DO, Stopped at 09/22/24 0919    isosorbide dinitrate (Isordil) tablet 20 mg, 20 mg, oral, TID, Martin OSMAN Salomone, DO, 20 mg at 09/22/24 0932    lidocaine 4 % patch 1 patch, 1 patch, transdermal, Daily, Martin OSMAN Salomone, DO, 1 patch at 09/22/24 0932    metoprolol tartrate (Lopressor) tablet 50 mg, 50 mg, oral, BID, Martin OSMAN Salomone, DO, 50 mg at 09/22/24 0932    ondansetron (Zofran) tablet 4 mg, 4 mg, oral, q8h PRN **OR** ondansetron (Zofran) injection 4 mg, 4 mg, intravenous, q8h PRN, Martin Rodrigueze, DO    oxygen (O2) therapy, , inhalation, Continuous PRN - O2/gases, Gloria Palmer MD, 4 L/min at 09/22/24 0404    oxygen (O2) therapy, , inhalation, Continuous PRN - O2/gases, Gloria Palmer MD, 100 percent at 09/20/24 1426    pantoprazole (ProtoNix) EC tablet 40 mg, 40 mg, oral, Daily before breakfast, 40 mg at 09/22/24 0613 **OR** pantoprazole (ProtoNix) injection 40 mg, 40 mg, intravenous, Daily before breakfast, Martin OSMAN Jassiomone, DO    polyethylene glycol (Glycolax, Miralax) packet 17 g, 17 g, oral, q12h PRN, Martin OSMAN Salomone, DO    sodium bicarbonate tablet 1,300 mg, 1,300 mg, oral, q12h, Martin OSMAN Salomone, DO, 1,300 mg at 09/22/24 0424    venlafaxine XR (Effexor-XR) 24 hr capsule 150 mg, 150 mg, oral, Daily, Martin OSMAN Salomone, DO, 150 mg at 09/22/24 0932           Assessment/Plan           1.  IDANIA on top of CKD stage IV  Worsening IDANIA compared to yesterday  Lasix is on hold following clinical status  2.  Acute congestive heart failure with pleural effusion, with complaints of hemoptysis     Will check both anti-GBM and ANCA levels today.  Thoracentesis completed this morning with continuing on diuretics, feeling much better since admission    Will adjust diuretic dosage and give Diamox today x 1   250 mg daily  IDANIA worsening noted which could be multifactorial as stated with other physicians, metabolic alkalosis corrected.  Will  follow labs tomorrow and if renal function test worsens I will cut down the diuretics more to 40 mg twice a day  3.  PAD status post left AKA  4.  Coronary disease status post CABG  5.  Diabetes mellitus continue current management  6.  Hypertension continue current management     Will continue monitoring patient daily reviewing labs and other consultants input            I spent 35 minutes in the professional and overall care of this patient.      Austin Tracy MD

## 2024-09-22 NOTE — PROGRESS NOTES
Occupational Therapy    Evaluation    Patient Name: Humphrey Waddell  MRN: [id number]  Department: [place] [id number]  Room: [address] [place], [place] [address]  Today's Date: 9/22/2024  Time Calculation  Start Time: 0857  Stop Time: 0928  Time Calculation (min): 31 min        Assessment:  OT Assessment: Pt with decreased endurance, mobility, strength, and sitting balance requiring assist for ADLs/transfers.  Prognosis: Good  Barriers to Discharge: None  Evaluation/Treatment Tolerance: Patient limited by fatigue  Medical Staff Made Aware: Yes  End of Session Communication: Bedside nurse  End of Session Patient Position: Bed, 3 rail up, Alarm on  OT Assessment Results: Decreased ADL status, Decreased upper extremity strength, Decreased safe judgment during ADL, Decreased endurance, Decreased IADLs, Decreased functional mobility  Prognosis: Good  Barriers to Discharge: None  Evaluation/Treatment Tolerance: Patient limited by fatigue  Medical Staff Made Aware: Yes  Strengths: Ability to acquire knowledge, Insight into problems  Barriers to Participation: Capable of completing ADLs semi/independent, Attitude of self, Access to adaptive/assistive products, Support of Caregivers  Plan:  Treatment Interventions: ADL retraining, Functional transfer training, UE strengthening/ROM, Endurance training, Compensatory technique education  OT Frequency: 3 times per week  OT Discharge Recommendations: Moderate intensity level of continued care  Equipment Recommended upon Discharge: Lift  OT Recommended Transfer Status: Assist of 2, Maximum assist  OT - OK to Discharge: Yes  Treatment Interventions: ADL retraining, Functional transfer training, UE strengthening/ROM, Endurance training, Compensatory technique education    Subjective   Current Problem:  1. CHF (congestive heart failure), NYHA class I, acute on chronic, combined (Multi)        2. Chronic pain of both shoulders  Vascular US upper extremity venous duplex right    Vascular US upper extremity venous duplex right       3. NSTEMI, initial episode of care ([facility])  Transthoracic Echo (TTE) Complete    Transthoracic Echo (TTE) Complete    CANCELED: Transthoracic Echo (TTE) Complete    CANCELED: Transthoracic Echo (TTE) Complete      4. Pleural effusion on right  Thoracentesis      5. Other specified soft tissue disorders  Vascular US upper extremity venous duplex right        General:  General  Reason for Referral: To ED with SOB. Pt found to have desmond pleural effusions, PNA, and CHF exacerbation.  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    emergent amputation, necrotizing fasciitis, revision 3/10/2021    Adverse effect of anesthesia     confusion    Anemia     CAD (coronary artery disease)     Carotid artery disease (CMS-HCC)     bilateral    CVA (cerebral vascular accident) (Multi) 2020    Depression     DM (diabetes mellitus) (Multi)     type 2 with neuropathy    GERD (gastroesophageal reflux disease)     High cholesterol     HTN (hypertension)     Leg ulcer (Multi)     chronic right lower extremity    Neuropathy     OA (osteoarthritis)     PAD (peripheral artery disease) (CMS-HCC)     Renal disorder     S/P CABG (coronary artery bypass graft) 11/13/2023    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  Prior to Session Communication: Bedside nurse  Patient Position Received: Bed, 3 rail up, Alarm on  General Comment: Pt pleasant and agreeable to PT Eval.  Precautions:  Medical Precautions: Fall precautions    Pain:  Pain Assessment  Pain Assessment: 0-10  0-10 (Numeric) Pain Score: 8  Pain Location:  (back and shoulders)  Pain Descriptors: Aching  Effect of Pain on Daily Activities: OT to tolerance  Pain Interventions: Repositioned, Distraction    Objective    Cognition:  Overall Cognitive Status: Impaired  Orientation Level: Oriented X4  Cognition Comments: pt with some confusion    Home Living:  Type of Home: Condo  Lives With: Spouse  Home Adaptive Equipment: Wheelchair-manual, Walker rolling or standard  Home Layout: One level  Home Access: Level entry  Bathroom Equipment: Shower chair with back  Prior Function:  Level of [place]: Needs assistance with ADLs, Needs assistance with homemaking  Receives Help From: Family (wife)  ADL Assistance: Needs assistance  Homemaking Assistance: Needs assistance  Ambulatory Assistance:  (Pt non-ambulatory at baseline. Reports he has not began using a prosthetic.)  Prior Function Comments: Pt performs stand pivot transfers with assist from his wife at baseline. Pt's wife works 12 hour shifts- reports when she is at work he is confined to his . Reports 3 falls in the past 6 months attempting to transfer to his . Completes bed mobility independently with use of bedrails.     ADL:  Toileting Deficit: Incontinent  Functional Assistance: Total (pt requiring assist to clean [place] area after BM while in bed, pt unaware of BM)  Activity Tolerance:  Endurance: Tolerates 10 - 20 min exercise with multiple rests  Bed Mobility/Transfers: Bed Mobility  Bed Mobility: Yes  Bed Mobility 1  Bed Mobility 1: Supine to sitting, Sitting to supine  Level of Assistance 1: Dependent, +2  Bed Mobility Comments 1: cues for sequencing and hand placement; assist at trunk and B LE's. max A to sit EOB  Bed Mobility 2  Bed Mobility  2: Rolling left, Rolling right  Level of Assistance 2: Dependent  Bed Mobility Comments 2: Rolling completed to address hygiene needs. Max assist to maintain sidelying position. Max cues for sequencing and hand placement.    Transfers  Transfer: No    Sitting Balance:  Static Sitting Balance  Static Sitting-Balance Support: Feet unsupported, Bilateral upper extremity supported (R LE supported)  Static Sitting-Level of  Assistance: Maximum assistance (max A at trunk and assist infront for safety)  Static Sitting-Comment/Number of Minutes: 5 minutes   Modalities:     Vision:Vision - Basic Assessment  Current Vision: No visual deficits (wears glasses as needed)  Sensation:  Light Touch: No apparent deficits  Strength:  Strength Comments: decreased strength, 3/5 MMT  Perception:     Coordination:      Hand Function:  Gross Grasp: Functional  Coordination: Functional  Extremities: RUE   RUE : Within Functional Limits and LUE   LUE: Within Functional Limits    Outcome Measures:[facility] Daily Activity  Putting on and taking off regular lower body clothing: Total  Bathing (including washing, rinsing, drying): A lot  Putting on and taking off regular upper body clothing: A lot  Toileting, which includes using toilet, bedpan or urinal: Total  Taking care of personal grooming such as brushing teeth: Total  Eating Meals: A little  Daily Activity - Total Score: 10      Education Documentation  ADL Training, taught by Jess Schwarz OT at 9/22/2024 11:42 AM.  Learner: Patient  Readiness: Acceptance  Method: Explanation  Response: Needs Reinforcement    Education Comments  No comments found.           Goals:  Encounter Problems       Encounter Problems (Active)       ADLs       Patient will perform UB and LB bathing with moderate assist level of assistance.       Start:  09/22/24    Expected End:  10/06/24            Patient with complete upper body dressing with set-up level of assistance donning and doffing all UE clothes.       Start:  09/22/24    Expected End:  10/06/24            Patient with complete lower body dressing with maximal assist level of assistance donning and doffing all LE clothes.       Start:  09/22/24    Expected End:  10/06/24            Patient will feed self with modified independent level of assistance.       Start:  09/22/24    Expected End:  10/06/24            Patient will complete daily grooming tasks brushing teeth  and washing face/hair with set-up level of assistance and PRN adaptive equipment.       Start:  09/22/24    Expected End:  10/06/24            Patient will complete toileting including hygiene clothing management/hygiene with moderate assist level of assistance.       Start:  09/22/24    Expected End:  10/06/24               TRANSFERS       Patient will perform bed mobility moderate assist level of assistance and bed rails in order to improve safety and independence with mobility       Start:  09/22/24    Expected End:  10/06/24

## 2024-09-22 NOTE — PROGRESS NOTES
Physical Therapy    Physical Therapy Evaluation    Patient Name: Humphrey Waddell  MRN: [id number]  Department: [facility] [place] [id number]  Room: [address] [place] [address]  Today's Date: 9/22/2024   Time Calculation  Start Time: 0856  Stop Time: 0927  Time Calculation (min): 31 min    Assessment/Plan   PT Assessment  PT Assessment Results: Decreased strength, Decreased range of motion, Decreased endurance, Impaired balance, Decreased mobility, Pain  Rehab Prognosis: Fair  End of Session Communication: Bedside nurse  Assessment Comment: PT Evaluation Completed. The patient presented with decreased mobility, balance, endurance, strength, safety awareness, and increased pain and fatigue. These impairments are negatively impacting his ability to perform at baseline functional level, in home environment. The patient is at risk of falls & injury. This patient would benefit from skilled therapy intervention to address limitations and progress towards the PT goals.  Anticipate moderate frequency PT needs at discharge  End of Session Patient Position: Bed, 3 rail up, Alarm on  IP OR SWING BED PT PLAN  Inpatient or Swing Bed: Inpatient  PT Plan  Treatment/Interventions: Bed mobility, Transfer training, Balance training, Strengthening, Endurance training, Range of motion, Therapeutic exercise, Therapeutic activity, Home exercise program, Positioning  PT Plan: Ongoing PT  PT Frequency: 3 times per week  PT Discharge Recommendations: Moderate intensity level of continued care  Equipment Recommended upon Discharge: Lift  PT Recommended Transfer Status: Total assist  PT - OK to Discharge: Yes (PT POC established.)      Subjective   General Visit Information:  General  Reason for Referral: To ED with SOB. Pt found to have desmond pleural effusions, PNA, and CHF exacerbation.  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    emergent amputation,  necrotizing fasciitis, revision 3/10/2021    Adverse effect of anesthesia     confusion    Anemia     CAD (coronary artery disease)     Carotid artery disease (CMS-HCC)     bilateral    CVA (cerebral vascular accident) (Multi) 2020    Depression     DM (diabetes mellitus) (Multi)     type 2 with neuropathy    GERD (gastroesophageal reflux disease)     High cholesterol     HTN (hypertension)     Leg ulcer (Multi)     chronic right lower extremity    Neuropathy     OA (osteoarthritis)     PAD (peripheral artery disease) (CMS-HCC)     Renal disorder     S/P CABG (coronary artery bypass graft) 11/13/2023    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  Prior to Session Communication: Bedside nurse  Patient Position Received: Bed, 3 rail up, Alarm on  General Comment: Pt pleasant and agreeable to PT Eval.  Home Living:  Home Living  Type of Home: Condo  Lives With: Spouse  Home Adaptive Equipment: Wheelchair-manual, Walker rolling or standard  Home Layout: One level  Home Access: Level entry  Bathroom Equipment: Shower chair with back  Prior Level of Function:  Prior Function Per Pt/Caregiver Report  Level of [place]: Needs assistance with ADLs, Needs assistance with homemaking  Receives Help From: Family  ADL Assistance: Needs assistance  Homemaking Assistance: Needs assistance  Ambulatory Assistance:  (Pt non-ambulatory at baseline. Reports he has not began using a prosthetic.)  Prior Function Comments: Pt performs stand pivot transfers with assist from his wife at baseline. Pt's wife works 12 hour shifts- reports when she is at work he is confined to his WC. Reports 3 falls in the past 6 months attempting to transfer to his . Completes bed mobility independently with use of bedrails.  Precautions:  Precautions  Medical Precautions: Fall precautions    Vital Signs (Past 2hrs)         Date/Time Vitals Session Patient Position Pulse Resp SpO2 BP MAP (mmHg)    09/22/24 0930 --  --  66  16  94 %  145/94  111     09/22/24 0932 --  --  68  --  --  --  --     09/22/24 0957 --  --  66  --  --  --  --     09/22/24 1046 --  --  --  --  --  127/71  90                   Vital Signs Comment: O2 monitored throughout session with the patient remaining >90% with all mobility.     Objective   Pain:  Pain Assessment  Pain Assessment: 0-10  0-10 (Numeric) Pain Score: 8  Pain Location:  (Back and shoulders)  Pain Interventions:  (RN notified.)  Cognition:  Cognition  Overall Cognitive Status:  (Pt appears mildly confused.)  Orientation Level: Oriented X4  Insight: Mild    General Assessments:  Activity Tolerance  Endurance: Tolerates 10 - 20 min exercise with multiple rests    Sensation  Sensation Comment: Endorses intermittent phantom limb pain.         Postural Control  Postural Control: Impaired    Static Sitting Balance  Static Sitting-Balance Support: Bilateral upper extremity supported (R foot supported)  Static Sitting-Level of Assistance: Maximum assistance, Dependent  Static Sitting-Comment/Number of Minutes: Sat EOB x5 mins.       Functional Assessments:  Bed Mobility  Bed Mobility: Yes  Bed Mobility 1  Bed Mobility 1: Supine to sitting, Sitting to supine  Level of Assistance 1: Dependent, +2  Bed Mobility Comments 1: Max cues for sequencing and hand placement. Assist at trunk and LEs.  Bed Mobility 2  Bed Mobility  2: Rolling left, Rolling right  Level of Assistance 2: Dependent  Bed Mobility Comments 2: Rolling completed to address hygiene needs. Max assist to maintain sidelying position. Max cues for sequencing and hand placement.    Transfers  Transfer: No       Extremity/Trunk Assessments:  RUE   RUE : Within Functional Limits  LUE   LUE: Within Functional Limits  RLE   RLE :  (Strength grossly 2/5, ROM appears WFL.)  LLE   LLE :  (L AKA, hip ROM WFL. Strength >3/5 based on observation.)  Outcome  Measures:  [facility] Basic Mobility  Turning from your back to your side while in a flat bed without using bedrails: Total  Moving from lying on your back to sitting on the side of a flat bed without using bedrails: Total  Moving to and from bed to chair (including a wheelchair): Total  Standing up from a chair using your arms (e.g. wheelchair or bedside chair): Total  To walk in hospital room: Total  Climbing 3-5 steps with railing: Total  Basic Mobility - Total Score: 6    Encounter Problems       Encounter Problems (Active)       Balance       STG - Maintains dynamic sitting balance with upper extremity support x10 mins with min A or less.        Start:  09/22/24    Expected End:  10/06/24               Mobility       Pt will tolerate 15 reps of each LE exercise in order to improve strength and endurance.        Start:  09/22/24    Expected End:  10/06/24               PT Transfers       STG - Patient will perform bed mobility with mod A       Start:  09/22/24    Expected End:  10/06/24            STG - Patient will transfer sit to and from stand with max A x2       Start:  09/22/24    Expected End:  10/06/24               Pain - Adult              Education Documentation  Body Mechanics, taught by Cris Adkins, PT at 9/22/2024 10:55 AM.  Learner: Patient  Readiness: Acceptance  Method: Explanation  Response: Needs Reinforcement    Mobility Training, taught by Cris Adkins, PT at 9/22/2024 10:55 AM.  Learner: Patient  Readiness: Acceptance  Method: Explanation  Response: Needs Reinforcement    Education Comments  No comments found.

## 2024-09-22 NOTE — CARE PLAN
The patient's goals for the shift include Pt. will have a safe, restful and uneventful evening    The clinical goals for the shift include patient will be able to remain free of harm and injury by the end of the shift    Problem: Nutrition  Goal: Less than 5 days NPO/clear liquids  Outcome: Progressing  Goal: Oral intake greater than 50%  Outcome: Progressing  Goal: Oral intake greater 75%  Outcome: Progressing  Goal: Consume prescribed supplement  Outcome: Progressing  Goal: Adequate PO fluid intake  Outcome: Progressing  Goal: Nutrition support goals are met within 48 hrs  Outcome: Progressing  Goal: Nutrition support is meeting 75% of nutrient needs  Outcome: Progressing  Goal: Tube feed tolerance  Outcome: Progressing  Goal: BG  mg/dL  Outcome: Progressing  Goal: Lab values WNL  Outcome: Progressing  Goal: Electrolytes WNL  Outcome: Progressing  Goal: Promote healing  Outcome: Progressing  Goal: Maintain stable weight  Outcome: Progressing  Goal: Reduce weight from edema/fluid  Outcome: Progressing  Goal: Gradual weight gain  Outcome: Progressing  Goal: Improve ostomy output  Outcome: Progressing     Problem: Pain - Adult  Goal: Verbalizes/displays adequate comfort level or baseline comfort level  Outcome: Progressing     Problem: Safety - Adult  Goal: Free from fall injury  Outcome: Progressing     Problem: Respiratory  Goal: Clear secretions with interventions this shift  Outcome: Progressing  Goal: Minimize anxiety/maximize coping throughout shift  Outcome: Progressing  Goal: Minimal/no exertional discomfort or dyspnea this shift  Outcome: Progressing  Goal: No signs of respiratory distress (eg. Use of accessory muscles. Peds grunting)  Outcome: Progressing  Goal: Patent airway maintained this shift  Outcome: Progressing  Goal: Verbalize decreased shortness of breath this shift  Outcome: Progressing  Goal: Wean oxygen to maintain O2 saturation per order/standard this shift  Outcome: Progressing  Goal:  Increase self care and/or family involvement in next 24 hours  Outcome: Progressing     Problem: Fall/Injury  Goal: Not fall by end of shift  Outcome: Progressing  Goal: Be free from injury by end of the shift  Outcome: Progressing  Goal: Verbalize understanding of personal risk factors for fall in the hospital  Outcome: Progressing  Goal: Verbalize understanding of risk factor reduction measures to prevent injury from fall in the home  Outcome: Progressing  Goal: Use assistive devices by end of the shift  Outcome: Progressing  Goal: Pace activities to prevent fatigue by end of the shift  Outcome: Progressing     Problem: Skin  Goal: Decreased wound size/increased tissue granulation at next dressing change  Outcome: Progressing  Goal: Participates in plan/prevention/treatment measures  Outcome: Progressing  Goal: Prevent/manage excess moisture  Outcome: Progressing  Goal: Prevent/minimize sheer/friction injuries  Outcome: Progressing  Goal: Promote/optimize nutrition  Outcome: Progressing  Goal: Promote skin healing  Outcome: Progressing  Flowsheets (Taken 9/22/2024 0006)  Promote skin healing:   Assess skin/pad under line(s)/device(s)   Turn/reposition every 2 hours/use positioning/transfer devices

## 2024-09-22 NOTE — PROGRESS NOTES
"Humphrey Waddell is a 59 y.o. male on day 4 of admission presenting with CHF (congestive heart failure), NYHA class I, acute on chronic, combined (Multi).    Subjective   Feels \"okay\".  Admits to shortness of breath and head pain, but denies cough.  On auto CPAP plus 4 L oxygen bleed with an oxygen saturation of 93% at night (and presently), and room air-2 L nasal cannula oxygen during the day.  BNP was 671.  Venous blood gas showed a pH of 7.46, pCO2 50 and a bicarbonate of 36.     Objective   Physical Exam  Vitals  Blood pressure 144/76, pulse 73, temperature 36.8 °C (98.2 °F), temperature source Temporal, resp. rate 14, height 1.93 m (6' 4\"), weight 131 kg (288 lb 12.8 oz), SpO2 94%.  Intake/Output last 3 Shifts:  I/O last 3 completed shifts:  In: 300 (2.3 mL/kg) [IV Piggyback:300]  Out: 420 (3.2 mL/kg) [Urine:420 (0.1 mL/kg/hr)]  Weight: 131 kg     General-drowsy but arousable; on auto CPAP plus oxygen.  Lungs-clear, without wheezes, rales or rhonchi.  Heart-regular rate and rhythm.  Abdomen-positive bowel sounds.  Extremities-trace lower extremity edema.  Skin-no rashes.    Scheduled medications  acetaZOLAMIDE, 250 mg, oral, Daily  albuterol, 2.5 mg, nebulization, TID  amLODIPine, 10 mg, oral, Daily  ampicillin-sulbactam, 3 g, intravenous, q12h  aspirin, 81 mg, oral, Daily  atorvastatin, 80 mg, oral, Daily  enoxaparin, 30 mg, subcutaneous, q24h  fludrocortisone, 0.1 mg, oral, Daily  [Held by provider] furosemide, 60 mg, intravenous, q12h  gabapentin, 300 mg, oral, Nightly  hydrALAZINE, 50 mg, oral, TID  insulin glargine, 2 Units, subcutaneous, Nightly  insulin regular, 0-10 Units, subcutaneous, TID  iron sucrose, 300 mg, intravenous, Daily  isosorbide dinitrate, 20 mg, oral, TID  lidocaine, 1 patch, transdermal, Daily  metoprolol tartrate, 50 mg, oral, BID  pantoprazole, 40 mg, oral, Daily before breakfast   Or  pantoprazole, 40 mg, intravenous, Daily before breakfast  sodium bicarbonate, 1,300 mg, oral, " q12h  venlafaxine XR, 150 mg, oral, Daily      Continuous medications     PRN medications  PRN medications: acetaminophen **OR** acetaminophen **OR** acetaminophen, albuterol, dextrose, dextrose, glucagon, glucagon, glucagon, ondansetron **OR** ondansetron, oxygen, oxygen, polyethylene glycol     Results for orders placed or performed during the hospital encounter of 09/18/24 (from the past 24 hour(s))   Renal Function Panel   Result Value Ref Range    Glucose 135 (H) 74 - 99 mg/dL    Sodium 136 136 - 145 mmol/L    Potassium 4.9 3.5 - 5.3 mmol/L    Chloride 98 98 - 107 mmol/L    Bicarbonate 26 21 - 32 mmol/L    Anion Gap 17 10 - 20 mmol/L    Urea Nitrogen 54 (H) 6 - 23 mg/dL    Creatinine 4.49 (H) 0.50 - 1.30 mg/dL    eGFR 14 (L) >60 mL/min/1.73m*2    Calcium 6.7 (L) 8.6 - 10.3 mg/dL    Phosphorus 5.2 (H) 2.5 - 4.9 mg/dL    Albumin 2.3 (L) 3.4 - 5.0 g/dL   CBC and Auto Differential   Result Value Ref Range    WBC 12.7 (H) 4.4 - 11.3 x10*3/uL    nRBC 0.0 0.0 - 0.0 /100 WBCs    RBC 3.14 (L) 4.50 - 5.90 x10*6/uL    Hemoglobin 8.1 (L) 13.5 - 17.5 g/dL    Hematocrit 28.0 (L) 41.0 - 52.0 %    MCV 89 80 - 100 fL    MCH 25.8 (L) 26.0 - 34.0 pg    MCHC 28.9 (L) 32.0 - 36.0 g/dL    RDW 15.4 (H) 11.5 - 14.5 %    Platelets 303 150 - 450 x10*3/uL    Neutrophils % 77.0 40.0 - 80.0 %    Immature Granulocytes %, Automated 0.6 0.0 - 0.9 %    Lymphocytes % 10.7 13.0 - 44.0 %    Monocytes % 8.4 2.0 - 10.0 %    Eosinophils % 3.1 0.0 - 6.0 %    Basophils % 0.2 0.0 - 2.0 %    Neutrophils Absolute 9.80 (H) 1.20 - 7.70 x10*3/uL    Immature Granulocytes Absolute, Automated 0.08 0.00 - 0.70 x10*3/uL    Lymphocytes Absolute 1.36 1.20 - 4.80 x10*3/uL    Monocytes Absolute 1.07 (H) 0.10 - 1.00 x10*3/uL    Eosinophils Absolute 0.39 0.00 - 0.70 x10*3/uL    Basophils Absolute 0.02 0.00 - 0.10 x10*3/uL   Magnesium   Result Value Ref Range    Magnesium 1.80 1.60 - 2.40 mg/dL   POCT GLUCOSE   Result Value Ref Range    POCT Glucose 192 (H) 74 - 99  mg/dL   POCT GLUCOSE   Result Value Ref Range    POCT Glucose 162 (H) 74 - 99 mg/dL   Sodium, Urine Random   Result Value Ref Range    Sodium, Urine Random 30 mmol/L    Creatinine, Urine Random 122.1 20.0 - 370.0 mg/dL    Sodium/Creatinine Ratio 25 Not established. mmol/g Creat      Assessment:  59-year-old man with a history of coronary disease, CABG, CHF, CVA, diabetes, left AKA due to necrotizing fasciitis, peripheral vascular disease, anemia, hypertension, DJD and possible/probable COPD, admitted with shortness of breath, weakness, hypoxia and change in mental status, and found to have bilateral pleural effusions, left greater than right (transudative by R thoracentesis), pneumonia (?aspiration), and congestive heart failure.  There is no bronchospasm.    Recommend:   Continue albuterol, Unasyn, fludrocortisone and Lovenox.  Gentle diuresis with Lasix, as kidney function and blood pressure allow.  Keep oxygen saturation approximately 92 to 95%; home oxygen evaluation prior to discharge.  Incentive spirometry and Acapella treatment, if the patient is able to cooperate.  Continue auto CPAP at night.  Check pleural fluid cultures and cytology--pending.  Follow-up with pulmonary and sleep medicine after discharge.    Joe Argueta MD

## 2024-09-22 NOTE — PROGRESS NOTES
09/22/24 1358   Discharge Planning   Expected Discharge Disposition SNF     Met with patient and wife at bedside. Discussed PT recommendation of moderate intensity. Patient reports he would really like to go home. He has been to [facility] previously and was not happy. Wife expressed concerns about taking him home if he is unable to transfer from bed to chair. Patient requested to meet with palliative care, consult placed. SW will continue to follow for social/ discharge planning needs.

## 2024-09-22 NOTE — PROGRESS NOTES
"Humphrey Waddell is a 59 y.o. male on day 3 of admission presenting with CHF (congestive heart failure), NYHA class I, acute on chronic, combined (Multi).    Subjective   Concern for encephalopathy per staff. New to me today but staff report he is slower to answer questions. Patient denies chest pain or shortness of breath. Feels weak. No fevers, chills, cough.       Objective     Physical Exam  Vitals reviewed.   Constitutional:       Appearance: Normal appearance.   HENT:      Head: Normocephalic.      Right Ear: Tympanic membrane normal.      Nose: Nose normal.      Mouth/Throat:      Mouth: Mucous membranes are moist.   Cardiovascular:      Rate and Rhythm: Normal rate and regular rhythm.   Pulmonary:      Comments: Bibasilar crackles.  Abdominal:      General: Abdomen is flat. Bowel sounds are normal.      Palpations: Abdomen is soft.   Musculoskeletal:      Comments: Left AKA, right lower extremity with edema   Skin:     General: Skin is warm.      Capillary Refill: Capillary refill takes less than 2 seconds.   Neurological:      General: No focal deficit present.      Mental Status: He is alert.         Last Recorded Vitals  Blood pressure 156/70, pulse 73, temperature 36.4 °C (97.6 °F), temperature source Temporal, resp. rate 14, height 1.93 m (6' 4\"), weight 131 kg (288 lb 12.8 oz), SpO2 92%.  Intake/Output last 3 Shifts:  I/O last 3 completed shifts:  In: 360 (2.7 mL/kg) [P.O.:360]  Out: 200 (1.5 mL/kg) [Urine:200 (0 mL/kg/hr)]  Weight: 131 kg     Relevant Results  Results for orders placed or performed during the hospital encounter of 09/18/24 (from the past 24 hour(s))   POCT GLUCOSE   Result Value Ref Range    POCT Glucose 166 (H) 74 - 99 mg/dL   Renal Function Panel   Result Value Ref Range    Glucose 135 (H) 74 - 99 mg/dL    Sodium 136 136 - 145 mmol/L    Potassium 4.9 3.5 - 5.3 mmol/L    Chloride 98 98 - 107 mmol/L    Bicarbonate 26 21 - 32 mmol/L    Anion Gap 17 10 - 20 mmol/L    Urea Nitrogen 54 (H) 6 - " 23 mg/dL    Creatinine 4.49 (H) 0.50 - 1.30 mg/dL    eGFR 14 (L) >60 mL/min/1.73m*2    Calcium 6.7 (L) 8.6 - 10.3 mg/dL    Phosphorus 5.2 (H) 2.5 - 4.9 mg/dL    Albumin 2.3 (L) 3.4 - 5.0 g/dL   CBC and Auto Differential   Result Value Ref Range    WBC 12.7 (H) 4.4 - 11.3 x10*3/uL    nRBC 0.0 0.0 - 0.0 /100 WBCs    RBC 3.14 (L) 4.50 - 5.90 x10*6/uL    Hemoglobin 8.1 (L) 13.5 - 17.5 g/dL    Hematocrit 28.0 (L) 41.0 - 52.0 %    MCV 89 80 - 100 fL    MCH 25.8 (L) 26.0 - 34.0 pg    MCHC 28.9 (L) 32.0 - 36.0 g/dL    RDW 15.4 (H) 11.5 - 14.5 %    Platelets 303 150 - 450 x10*3/uL    Neutrophils % 77.0 40.0 - 80.0 %    Immature Granulocytes %, Automated 0.6 0.0 - 0.9 %    Lymphocytes % 10.7 13.0 - 44.0 %    Monocytes % 8.4 2.0 - 10.0 %    Eosinophils % 3.1 0.0 - 6.0 %    Basophils % 0.2 0.0 - 2.0 %    Neutrophils Absolute 9.80 (H) 1.20 - 7.70 x10*3/uL    Immature Granulocytes Absolute, Automated 0.08 0.00 - 0.70 x10*3/uL    Lymphocytes Absolute 1.36 1.20 - 4.80 x10*3/uL    Monocytes Absolute 1.07 (H) 0.10 - 1.00 x10*3/uL    Eosinophils Absolute 0.39 0.00 - 0.70 x10*3/uL    Basophils Absolute 0.02 0.00 - 0.10 x10*3/uL   Magnesium   Result Value Ref Range    Magnesium 1.80 1.60 - 2.40 mg/dL   POCT GLUCOSE   Result Value Ref Range    POCT Glucose 192 (H) 74 - 99 mg/dL   POCT GLUCOSE   Result Value Ref Range    POCT Glucose 162 (H) 74 - 99 mg/dL   Sodium, Urine Random   Result Value Ref Range    Sodium, Urine Random 30 mmol/L    Creatinine, Urine Random 122.1 20.0 - 370.0 mg/dL    Sodium/Creatinine Ratio 25 Not established. mmol/g Creat       Imaging Results    Ct chest/abd/pelvis:  IMPRESSION:  Large right pleural effusion and small loculated left pleural effusion  at the left lung apex with multifocal airspace disease in the left  lung, likely atelectasis and possibly pneumonia in the appropriate  clinical setting.  Pronounced cardiomegaly with question of mild systemic anemia.  Mild to moderate concentric wall thickening of  the rectosigmoid colon  suggesting a low-grade acute infectious or inflammatory distal  colitis/proctitis.  Decompressed urinary bladder with mild concentric wall thickening  which may be due to underdistention however mild cystitis is a  possibility in the upper clinical setting, correlate with urinalysis.  Moderate-sized fluid filled left inguinal hernia along with a small  fat-containing right inguinal hernia.    Head ct:  IMPRESSION:  No acute intracranial hemorrhage or mass effect.      Periapical lucencies noted consistent with abscesses. Dental  follow-up suggested.      Mild prominence of the lateral and 3rd ventricles which could be  related to central white matter loss versus normal pressure  hydrocephalus. Clinical correlation suggested.      Correlation for left-sided otitis media suggested.      Remote left thalamic lacunar infarct.    Medications:  acetaZOLAMIDE, 250 mg, oral, Daily  albuterol, 2.5 mg, nebulization, TID  amLODIPine, 10 mg, oral, Daily  ampicillin-sulbactam, 3 g, intravenous, q12h  aspirin, 81 mg, oral, Daily  atorvastatin, 80 mg, oral, Daily  enoxaparin, 30 mg, subcutaneous, q24h  fludrocortisone, 0.1 mg, oral, Daily  [Held by provider] furosemide, 60 mg, intravenous, q12h  gabapentin, 300 mg, oral, Nightly  hydrALAZINE, 50 mg, oral, TID  insulin glargine, 2 Units, subcutaneous, Nightly  insulin regular, 0-10 Units, subcutaneous, TID  isosorbide dinitrate, 20 mg, oral, TID  lidocaine, 1 patch, transdermal, Daily  metoprolol tartrate, 50 mg, oral, BID  pantoprazole, 40 mg, oral, Daily before breakfast   Or  pantoprazole, 40 mg, intravenous, Daily before breakfast  sodium bicarbonate, 1,300 mg, oral, q12h  venlafaxine XR, 150 mg, oral, Daily       PRN medications: acetaminophen **OR** acetaminophen **OR** acetaminophen, albuterol, dextrose, dextrose, glucagon, glucagon, glucagon, ondansetron **OR** ondansetron, oxygen, oxygen, polyethylene glycol     Assessment/Plan   1.  Acute heart  failure with preserved EF, most recent echo in May 2024 had an EF of 55%  -cardiology on board strict intake and output. Holding diuretics due to worsening renal fxn.    2.CAD status post CABG  -continue asa/atorvastatin/metoprolol/imdur    3. DM  -continue lantus and SSI    4. Pneumonia  - on iv unasyn; SLP without evidence of aspiration; rec regular diet  - Will need repeat chest CT in 8 to 12 weeks to ensure resolution of opacities     5. IDANIA on CKDV  -nephrology following; plan to give back fluids and monitor BMP    6. PVD with left AKA  -continue asa/atorvastatin    7. Bilateral effusion suspect secondary to #1  -had right thoracentesis drained 1L this am  -transudate likely due to CHF    8. Normocytic anemia  -hgb 7.3  -Iron sat 12% -> IV venofer    9.  Acute hypoxic respiratory failure secondary to #1 and 4  -Oxygen being weaned down to 2 L, home oxygen eval as needed but patient also needs a sleep study as an outpatient.    10. Likely PAUL/OHS  - nightly cpap    11. Goals of care- initially stated he would want to be DNR/DNI but then later stated wishes to remain full code for now; consult palliative care to assist with ongoing goals of care discussions    DVT Prophylaxis:  Lovenox felipa Allison DO  Hospital Medicine

## 2024-09-23 LAB
ALBUMIN SERPL BCP-MCNC: 2.5 G/DL (ref 3.4–5)
ANION GAP SERPL CALC-SCNC: 16 MMOL/L (ref 10–20)
BASOPHILS # BLD AUTO: 0.02 X10*3/UL (ref 0–0.1)
BASOPHILS NFR BLD AUTO: 0.2 %
BUN SERPL-MCNC: 65 MG/DL (ref 6–23)
CALCIUM SERPL-MCNC: 7 MG/DL (ref 8.6–10.3)
CHLORIDE SERPL-SCNC: 100 MMOL/L (ref 98–107)
CO2 SERPL-SCNC: 28 MMOL/L (ref 21–32)
CREAT SERPL-MCNC: 5.49 MG/DL (ref 0.5–1.3)
EGFRCR SERPLBLD CKD-EPI 2021: 11 ML/MIN/1.73M*2
EOSINOPHIL # BLD AUTO: 1.01 X10*3/UL (ref 0–0.7)
EOSINOPHIL NFR BLD AUTO: 9.8 %
ERYTHROCYTE [DISTWIDTH] IN BLOOD BY AUTOMATED COUNT: 15.5 % (ref 11.5–14.5)
GLUCOSE BLD MANUAL STRIP-MCNC: 130 MG/DL (ref 74–99)
GLUCOSE BLD MANUAL STRIP-MCNC: 140 MG/DL (ref 74–99)
GLUCOSE BLD MANUAL STRIP-MCNC: 141 MG/DL (ref 74–99)
GLUCOSE BLD MANUAL STRIP-MCNC: 154 MG/DL (ref 74–99)
GLUCOSE BLD MANUAL STRIP-MCNC: 97 MG/DL (ref 74–99)
GLUCOSE SERPL-MCNC: 101 MG/DL (ref 74–99)
HCT VFR BLD AUTO: 27.6 % (ref 41–52)
HGB BLD-MCNC: 8 G/DL (ref 13.5–17.5)
IMM GRANULOCYTES # BLD AUTO: 0.05 X10*3/UL (ref 0–0.7)
IMM GRANULOCYTES NFR BLD AUTO: 0.5 % (ref 0–0.9)
LABORATORY COMMENT REPORT: NORMAL
LABORATORY COMMENT REPORT: NORMAL
LYMPHOCYTES # BLD AUTO: 1.06 X10*3/UL (ref 1.2–4.8)
LYMPHOCYTES NFR BLD AUTO: 10.3 %
MCH RBC QN AUTO: 26.3 PG (ref 26–34)
MCHC RBC AUTO-ENTMCNC: 29 G/DL (ref 32–36)
MCV RBC AUTO: 91 FL (ref 80–100)
MONOCYTES # BLD AUTO: 0.96 X10*3/UL (ref 0.1–1)
MONOCYTES NFR BLD AUTO: 9.4 %
NEUTROPHILS # BLD AUTO: 7.16 X10*3/UL (ref 1.2–7.7)
NEUTROPHILS NFR BLD AUTO: 69.8 %
NRBC BLD-RTO: 0 /100 WBCS (ref 0–0)
PATH REPORT.FINAL DX SPEC: NORMAL
PATH REPORT.GROSS SPEC: NORMAL
PATH REPORT.RELEVANT HX SPEC: NORMAL
PATH REPORT.TOTAL CANCER: NORMAL
PHOSPHATE SERPL-MCNC: 5.9 MG/DL (ref 2.5–4.9)
PLATELET # BLD AUTO: 224 X10*3/UL (ref 150–450)
POTASSIUM SERPL-SCNC: 3.7 MMOL/L (ref 3.5–5.3)
RBC # BLD AUTO: 3.04 X10*6/UL (ref 4.5–5.9)
SODIUM SERPL-SCNC: 140 MMOL/L (ref 136–145)
WBC # BLD AUTO: 10.3 X10*3/UL (ref 4.4–11.3)

## 2024-09-23 PROCEDURE — 82947 ASSAY GLUCOSE BLOOD QUANT: CPT

## 2024-09-23 PROCEDURE — 94668 MNPJ CHEST WALL SBSQ: CPT

## 2024-09-23 PROCEDURE — 2500000001 HC RX 250 WO HCPCS SELF ADMINISTERED DRUGS (ALT 637 FOR MEDICARE OP): Performed by: INTERNAL MEDICINE

## 2024-09-23 PROCEDURE — 80069 RENAL FUNCTION PANEL: CPT | Performed by: HOSPITALIST

## 2024-09-23 PROCEDURE — 36415 COLL VENOUS BLD VENIPUNCTURE: CPT | Performed by: HOSPITALIST

## 2024-09-23 PROCEDURE — 2500000004 HC RX 250 GENERAL PHARMACY W/ HCPCS (ALT 636 FOR OP/ED): Performed by: INTERNAL MEDICINE

## 2024-09-23 PROCEDURE — 99232 SBSQ HOSP IP/OBS MODERATE 35: CPT | Performed by: HOSPITALIST

## 2024-09-23 PROCEDURE — 94640 AIRWAY INHALATION TREATMENT: CPT

## 2024-09-23 PROCEDURE — 99232 SBSQ HOSP IP/OBS MODERATE 35: CPT | Performed by: CLINICAL NURSE SPECIALIST

## 2024-09-23 PROCEDURE — 2500000004 HC RX 250 GENERAL PHARMACY W/ HCPCS (ALT 636 FOR OP/ED): Performed by: HOSPITALIST

## 2024-09-23 PROCEDURE — 36415 COLL VENOUS BLD VENIPUNCTURE: CPT | Performed by: INTERNAL MEDICINE

## 2024-09-23 PROCEDURE — 2500000005 HC RX 250 GENERAL PHARMACY W/O HCPCS: Performed by: INTERNAL MEDICINE

## 2024-09-23 PROCEDURE — 85025 COMPLETE CBC W/AUTO DIFF WBC: CPT | Performed by: HOSPITALIST

## 2024-09-23 PROCEDURE — 83970 ASSAY OF PARATHORMONE: CPT | Mod: AHULAB | Performed by: INTERNAL MEDICINE

## 2024-09-23 PROCEDURE — 99497 ADVNCD CARE PLAN 30 MIN: CPT | Performed by: NURSE PRACTITIONER

## 2024-09-23 PROCEDURE — 9420000001 HC RT PATIENT EDUCATION 5 MIN

## 2024-09-23 PROCEDURE — 99223 1ST HOSP IP/OBS HIGH 75: CPT | Performed by: NURSE PRACTITIONER

## 2024-09-23 PROCEDURE — 99232 SBSQ HOSP IP/OBS MODERATE 35: CPT | Performed by: INTERNAL MEDICINE

## 2024-09-23 PROCEDURE — 2060000001 HC INTERMEDIATE ICU ROOM DAILY

## 2024-09-23 PROCEDURE — 2500000002 HC RX 250 W HCPCS SELF ADMINISTERED DRUGS (ALT 637 FOR MEDICARE OP, ALT 636 FOR OP/ED): Performed by: INTERNAL MEDICINE

## 2024-09-23 RX ORDER — HEPARIN SODIUM 5000 [USP'U]/ML
5000 INJECTION, SOLUTION INTRAVENOUS; SUBCUTANEOUS EVERY 8 HOURS
Status: DISCONTINUED | OUTPATIENT
Start: 2024-09-23 | End: 2024-09-27 | Stop reason: HOSPADM

## 2024-09-23 RX ORDER — TORSEMIDE 20 MG/1
60 TABLET ORAL
Status: DISCONTINUED | OUTPATIENT
Start: 2024-09-23 | End: 2024-09-25

## 2024-09-23 ASSESSMENT — COGNITIVE AND FUNCTIONAL STATUS - GENERAL
DRESSING REGULAR LOWER BODY CLOTHING: A LITTLE
PERSONAL GROOMING: A LITTLE
DRESSING REGULAR UPPER BODY CLOTHING: A LITTLE
STANDING UP FROM CHAIR USING ARMS: TOTAL
WALKING IN HOSPITAL ROOM: TOTAL
TURNING FROM BACK TO SIDE WHILE IN FLAT BAD: A LOT
MOVING FROM LYING ON BACK TO SITTING ON SIDE OF FLAT BED WITH BEDRAILS: A LOT
TOILETING: A LOT
CLIMB 3 TO 5 STEPS WITH RAILING: TOTAL
TURNING FROM BACK TO SIDE WHILE IN FLAT BAD: A LITTLE
WALKING IN HOSPITAL ROOM: TOTAL
MOVING TO AND FROM BED TO CHAIR: A LITTLE
DAILY ACTIVITIY SCORE: 18
HELP NEEDED FOR BATHING: A LITTLE
STANDING UP FROM CHAIR USING ARMS: A LOT
TOILETING: A LOT
DAILY ACTIVITIY SCORE: 18
CLIMB 3 TO 5 STEPS WITH RAILING: TOTAL
HELP NEEDED FOR BATHING: A LITTLE
MOBILITY SCORE: 13
MOVING TO AND FROM BED TO CHAIR: A LOT
DRESSING REGULAR UPPER BODY CLOTHING: A LITTLE
MOBILITY SCORE: 9
MOVING FROM LYING ON BACK TO SITTING ON SIDE OF FLAT BED WITH BEDRAILS: A LITTLE
DRESSING REGULAR LOWER BODY CLOTHING: A LITTLE
PERSONAL GROOMING: A LITTLE

## 2024-09-23 ASSESSMENT — PAIN SCALES - GENERAL
PAINLEVEL_OUTOF10: 0 - NO PAIN
PAINLEVEL_OUTOF10: 5 - MODERATE PAIN
PAINLEVEL_OUTOF10: 9
PAINLEVEL_OUTOF10: 0 - NO PAIN

## 2024-09-23 ASSESSMENT — PAIN - FUNCTIONAL ASSESSMENT
PAIN_FUNCTIONAL_ASSESSMENT: 0-10
PAIN_FUNCTIONAL_ASSESSMENT: 0-10

## 2024-09-23 NOTE — PROGRESS NOTES
"Humphrey Waddell is a 59 y.o. male on day 5 of admission presenting with CHF (congestive heart failure), NYHA class I, acute on chronic, combined.    Subjective   Seen at bedside. Wife present as well. Cr continues to worsen despite IV fluids and holding diuretics. Patient more drowsy and confused today.       Objective     Physical Exam  Vitals reviewed.   Constitutional:       Comments: Drowsy   HENT:      Head: Normocephalic.      Right Ear: Tympanic membrane normal.      Nose: Nose normal.      Mouth/Throat:      Mouth: Mucous membranes are moist.   Cardiovascular:      Rate and Rhythm: Normal rate and regular rhythm.   Pulmonary:      Comments: Bibasilar crackles.  Abdominal:      General: Abdomen is flat. Bowel sounds are normal.      Palpations: Abdomen is soft.   Musculoskeletal:      Comments: Left AKA, right lower extremity with edema   Skin:     General: Skin is warm.      Capillary Refill: Capillary refill takes less than 2 seconds.   Neurological:      General: No focal deficit present.      Mental Status: He is disoriented.       Last Recorded Vitals  Blood pressure 146/76, pulse 84, temperature 36.7 °C (98.1 °F), temperature source Temporal, resp. rate 18, height 1.93 m (6' 4\"), weight 131 kg (288 lb 12.8 oz), SpO2 92%.  Intake/Output last 3 Shifts:  I/O last 3 completed shifts:  In: 865 (6.6 mL/kg) [P.O.:100; IV Piggyback:765]  Out: 1105 (8.4 mL/kg) [Urine:1105 (0.2 mL/kg/hr)]  Weight: 131 kg     Relevant Results  Results for orders placed or performed during the hospital encounter of 09/18/24 (from the past 24 hour(s))   POCT GLUCOSE   Result Value Ref Range    POCT Glucose 177 (H) 74 - 99 mg/dL   CBC and Auto Differential   Result Value Ref Range    WBC 10.3 4.4 - 11.3 x10*3/uL    nRBC 0.0 0.0 - 0.0 /100 WBCs    RBC 3.04 (L) 4.50 - 5.90 x10*6/uL    Hemoglobin 8.0 (L) 13.5 - 17.5 g/dL    Hematocrit 27.6 (L) 41.0 - 52.0 %    MCV 91 80 - 100 fL    MCH 26.3 26.0 - 34.0 pg    MCHC 29.0 (L) 32.0 - 36.0 g/dL    " RDW 15.5 (H) 11.5 - 14.5 %    Platelets 224 150 - 450 x10*3/uL    Neutrophils % 69.8 40.0 - 80.0 %    Immature Granulocytes %, Automated 0.5 0.0 - 0.9 %    Lymphocytes % 10.3 13.0 - 44.0 %    Monocytes % 9.4 2.0 - 10.0 %    Eosinophils % 9.8 0.0 - 6.0 %    Basophils % 0.2 0.0 - 2.0 %    Neutrophils Absolute 7.16 1.20 - 7.70 x10*3/uL    Immature Granulocytes Absolute, Automated 0.05 0.00 - 0.70 x10*3/uL    Lymphocytes Absolute 1.06 (L) 1.20 - 4.80 x10*3/uL    Monocytes Absolute 0.96 0.10 - 1.00 x10*3/uL    Eosinophils Absolute 1.01 (H) 0.00 - 0.70 x10*3/uL    Basophils Absolute 0.02 0.00 - 0.10 x10*3/uL   Renal Function Panel   Result Value Ref Range    Glucose 101 (H) 74 - 99 mg/dL    Sodium 140 136 - 145 mmol/L    Potassium 3.7 3.5 - 5.3 mmol/L    Chloride 100 98 - 107 mmol/L    Bicarbonate 28 21 - 32 mmol/L    Anion Gap 16 10 - 20 mmol/L    Urea Nitrogen 65 (H) 6 - 23 mg/dL    Creatinine 5.49 (H) 0.50 - 1.30 mg/dL    eGFR 11 (L) >60 mL/min/1.73m*2    Calcium 7.0 (L) 8.6 - 10.3 mg/dL    Phosphorus 5.9 (H) 2.5 - 4.9 mg/dL    Albumin 2.5 (L) 3.4 - 5.0 g/dL   POCT GLUCOSE   Result Value Ref Range    POCT Glucose 97 74 - 99 mg/dL   POCT GLUCOSE   Result Value Ref Range    POCT Glucose 154 (H) 74 - 99 mg/dL   POCT GLUCOSE   Result Value Ref Range    POCT Glucose 141 (H) 74 - 99 mg/dL   POCT GLUCOSE   Result Value Ref Range    POCT Glucose 140 (H) 74 - 99 mg/dL       Imaging Results    Ct chest/abd/pelvis:  IMPRESSION:  Large right pleural effusion and small loculated left pleural effusion  at the left lung apex with multifocal airspace disease in the left  lung, likely atelectasis and possibly pneumonia in the appropriate  clinical setting.  Pronounced cardiomegaly with question of mild systemic anemia.  Mild to moderate concentric wall thickening of the rectosigmoid colon  suggesting a low-grade acute infectious or inflammatory distal  colitis/proctitis.  Decompressed urinary bladder with mild concentric wall  thickening  which may be due to underdistention however mild cystitis is a  possibility in the upper clinical setting, correlate with urinalysis.  Moderate-sized fluid filled left inguinal hernia along with a small  fat-containing right inguinal hernia.    Head ct:  IMPRESSION:  No acute intracranial hemorrhage or mass effect.      Periapical lucencies noted consistent with abscesses. Dental  follow-up suggested.      Mild prominence of the lateral and 3rd ventricles which could be  related to central white matter loss versus normal pressure  hydrocephalus. Clinical correlation suggested.      Correlation for left-sided otitis media suggested.      Remote left thalamic lacunar infarct.    Medications:  acetaminophen, 975 mg, oral, TID  albuterol, 2.5 mg, nebulization, TID  amLODIPine, 10 mg, oral, Daily  ampicillin-sulbactam, 3 g, intravenous, q12h  aspirin, 81 mg, oral, Daily  atorvastatin, 80 mg, oral, Daily  fludrocortisone, 0.1 mg, oral, Daily  gabapentin, 300 mg, oral, Nightly  heparin, 5,000 Units, subcutaneous, q8h  hydrALAZINE, 50 mg, oral, TID  insulin glargine, 2 Units, subcutaneous, Nightly  insulin regular, 0-10 Units, subcutaneous, TID  iron sucrose, 300 mg, intravenous, Daily  isosorbide dinitrate, 20 mg, oral, TID  lidocaine, 1 patch, transdermal, Daily  metoprolol tartrate, 50 mg, oral, BID  pantoprazole, 40 mg, oral, Daily before breakfast   Or  pantoprazole, 40 mg, intravenous, Daily before breakfast  torsemide, 60 mg, oral, BID  venlafaxine XR, 150 mg, oral, Daily       PRN medications: albuterol, dextrose, dextrose, glucagon, glucagon, glucagon, ondansetron **OR** ondansetron, oxyCODONE, oxygen, oxygen, polyethylene glycol     Assessment/Plan   1.  Acute heart failure with preserved EF, most recent echo in May 2024 had an EF of 55%  -Cardiology and Nephrology consulted. Plan to attempt diuresis with torsemide 60 mg bid. Monitor I/Os, daily weight, bmp.    2.CAD status post CABG  -continue  asa/atorvastatin/metoprolol/imdur    3. DM  -continue lantus and SSI    4. Pneumonia  - on iv unasyn; SLP without evidence of aspiration; rec regular diet  - Will need repeat chest CT in 8 to 12 weeks to ensure resolution of opacities     5. IDANIA on CKDV  -nephrology following; Cr worsening despite IV fluids and holding diuretics. Continue to hold diuretics. No further fluids. Monitor BMP. If Cr worsening, nephrology states will plan to discuss starting dialysis tomorrow.  - on florinef for recurrent hyperkalemia     6. PVD with left AKA  -continue asa/atorvastatin    7. Bilateral effusion suspect secondary to #1  -had right thoracentesis drained 1L this admit  -transudate likely due to CHF    8. Normocytic anemia  -hgb 7.3  -Iron sat 12% -> IV venofer    9.  Acute hypoxic respiratory failure secondary to #1 and 4  -Oxygen being weaned down to 2 L, home oxygen eval as needed but patient also needs a sleep study as an outpatient.    10. Likely PAUL/OHS  - nightly cpap    11. Goals of care- Would want dialysis if offered by nephrology. DNR/DNI per discussion with palliative care and wife today.     DVT Prophylaxis:  Brandie Allison,   [facility]

## 2024-09-23 NOTE — PROGRESS NOTES
Humphrey Waddell is a 59 y.o. male on day 5 of admission presenting with CHF (congestive heart failure), NYHA class I, acute on chronic, combined.      Subjective   Seen and examined. Remains on supplemental O2, 2-4L NC.  ANA.  He does not offer specific complaints.  Chart/labs/meds/notes/imaging/VS reviewed.         Objective          Vitals 24HR  Heart Rate:  []   Temp:  [36 °C (96.8 °F)-37.2 °C (99 °F)]   Resp:  [17-20]   BP: (121-159)/(63-84)   SpO2:  [92 %-97 %]     Intake/Output last 3 Shifts:    Intake/Output Summary (Last 24 hours) at 9/23/2024 1422  Last data filed at 9/23/2024 1037  Gross per 24 hour   Intake 200 ml   Output 960 ml   Net -760 ml       Physical Exam  Constitutional:       Comments: Awake, alert and oriented x 3.  In no acute distress.  HENT:      Head: Normocephalic and atraumatic.      Nose: Nose normal.      Mouth: Mucous membranes are moist.      Pharynx: Oropharynx is clear.   Eyes:      Conjunctiva/sclera: Conjunctivae normal.      Pupils: Pupils are equal, round, and reactive to light.   Cardiovascular:      Rate and Rhythm: Regular rhythm.      Heart sounds: Normal heart sounds.   Pulmonary:      Effort: Diminished bibasilar breath sounds.  Abdominal:      General: Bowel sounds are normal.      Palpations: Abdomen is soft.   Genitourinary:     Comments: Lee catheter draining yellow urine  Musculoskeletal:         General: No joint swelling. Lower limb edema. L AKA.   Skin:     General: Skin is warm and dry.  No rashes or lesions to exposed skin.  Neurological:      Mental Status: Cranial nerves II through XII grossly intact.  No asterixis.  Psychiatric:         Mood: Normal mood.        Behavior: Behavior is normal.       Scheduled Medications  acetaminophen, 975 mg, oral, TID  [Held by provider] acetaZOLAMIDE, 250 mg, oral, Daily  albuterol, 2.5 mg, nebulization, TID  amLODIPine, 10 mg, oral, Daily  ampicillin-sulbactam, 3 g, intravenous, q12h  aspirin, 81 mg, oral,  Daily  atorvastatin, 80 mg, oral, Daily  fludrocortisone, 0.1 mg, oral, Daily  [Held by provider] furosemide, 60 mg, intravenous, q12h  gabapentin, 300 mg, oral, Nightly  heparin, 5,000 Units, subcutaneous, q8h  hydrALAZINE, 50 mg, oral, TID  insulin glargine, 2 Units, subcutaneous, Nightly  insulin regular, 0-10 Units, subcutaneous, TID  iron sucrose, 300 mg, intravenous, Daily  isosorbide dinitrate, 20 mg, oral, TID  lidocaine, 1 patch, transdermal, Daily  metoprolol tartrate, 50 mg, oral, BID  pantoprazole, 40 mg, oral, Daily before breakfast   Or  pantoprazole, 40 mg, intravenous, Daily before breakfast  sodium bicarbonate, 1,300 mg, oral, q12h  venlafaxine XR, 150 mg, oral, Daily      Continuous medications       PRN medications: albuterol, dextrose, dextrose, glucagon, glucagon, glucagon, ondansetron **OR** ondansetron, oxyCODONE, oxygen, oxygen, polyethylene glycol     Relevant Results  Results from last 7 days   Lab Units 09/23/24  0649 09/22/24  1239 09/21/24  0904   WBC AUTO x10*3/uL 10.3 12.2* 12.7*   HEMOGLOBIN g/dL 8.0* 8.3* 8.1*   HEMATOCRIT % 27.6* 28.2* 28.0*   PLATELETS AUTO x10*3/uL 224 270 303   NEUTROS PCT AUTO % 69.8 73.2 77.0   LYMPHS PCT AUTO % 10.3 7.6 10.7   MONOS PCT AUTO % 9.4 9.0 8.4   EOS PCT AUTO % 9.8 9.6 3.1     Results from last 7 days   Lab Units 09/23/24  0649 09/22/24  1239 09/21/24  0904   SODIUM mmol/L 140 138 136   POTASSIUM mmol/L 3.7 3.7 4.9   CHLORIDE mmol/L 100 99 98   CO2 mmol/L 28 29 26   BUN mg/dL 65* 61* 54*   CREATININE mg/dL 5.49* 5.30* 4.49*   GLUCOSE mg/dL 101* 110* 135*   CALCIUM mg/dL 7.0* 6.8* 6.7*       CT maxillofacial bones wo IV contrast   Final Result   Slight fullness of the right lacrimal gland without evidence of fluid   collection. CT of the paranasal sinuses, mastoid sinuses and orbits   is within normal limits.        MACRO:   none        Signed by: Torres Henson 9/21/2024 10:50 AM   Dictation workstation:   [id number]      CT 3D reconstruction    Final Result   Slight fullness of the right lacrimal gland without evidence of fluid   collection. CT of the paranasal sinuses, mastoid sinuses and orbits   is within normal limits.        MACRO:   none        Signed by: Torres Henson 9/21/2024 10:50 AM   Dictation workstation:   [id number]      Vascular US upper extremity venous duplex right   Final Result      Transthoracic Echo (TTE) Complete   Final Result      US thoracentesis   Final Result   Uneventful thoracentesis, as detailed above: Right Pleural space,   1000 mL        Performed and dictated at [facility].        Signed by: Diana Serrano 9/19/2024 11:37 AM   Dictation workstation:   [id number]               Assessment/Plan   This patient currently has cardiac telemetry ordered; if you would like to modify or discontinue the telemetry order, click here to go to the orders activity to modify/discontinue the order.    Humphrey Waddell is a 59 y.o. male with a past medical history of left above-the-knee amputation due to PAD, coronary artery disease with a history of CABG, carotid artery disease, stroke, depression, type 2 diabetes, GERD, hyperlipidemia, hypertension, osteoarthritis, anemia of chronic disease, and stage G4 chronic kidney disease with a fluctuating creatinine baseline between 2.5 to just above 3 mg/deciliter, multiple acute kidney injuries and prior nephrotic range proteinuria who presented to an outside hospital on 9/16 for evaluation of shortness of breath.      Per the notes, he reportedly was taking 20 mg of torsemide instead of the prescribed 60 mg daily.  His weight is up 34 pounds to 288 now from 254 lbs in early July.  There was a large right pleural effusion and small loculation on the left seen on CT imaging.  Multifocal airspace disease concerning for pneumonia as well.  No hydronephrosis with perinephric stranding noted.  He was transferred from [facility] to [facility].  He underwent a 1 L  paracentesis with clear madalyn fluid removed on 9/19.  He received IVP Lasix up until 9/20.  Nephrology is consulted for renal care.    Mr. Waddell is at high risk to progress to kidney replacement therapy having nephrotic range proteinuria + multiple acute kidney injuries.  His creatinine was 3.3 mg as a deciliter when he presented.  This is at the top end of his dilma.  He received IV Lasix.  His creatinine did rise in the setting of diuresis up to 4. IV Lasix were held.  We see that his creatinine continued to rise with oliguria off the diuretic.  He remains in heart failure.  I will place him on oral torsemide 60 mg twice daily for the time being.  I will send iron stores, ferritin and check an intact PTH and vitamin D.  Of note, he does not have a paraprotein.  No acute indication for kidney replacement therapy at the moment. Otherwise, I will hold the sodium bicarbonate supplement and acetazolamide. Trend his RP.  Will follow.    Principal Problem:    CHF (congestive heart failure), NYHA class I, acute on chronic, combined (Multi)       I spent 50 minutes in the professional and overall care of this patient.      Darryl Lopez, DO

## 2024-09-23 NOTE — CARE PLAN
The patient's goals for the shift include Pt. will have a safe, restful and uneventful evening    The clinical goals for the shift include patient will be able to maintain hemodynamic stability by the end of the shift      Problem: Nutrition  Goal: Less than 5 days NPO/clear liquids  Outcome: Progressing  Goal: Oral intake greater than 50%  Outcome: Progressing  Goal: Oral intake greater 75%  Outcome: Progressing  Goal: Consume prescribed supplement  Outcome: Progressing  Goal: Adequate PO fluid intake  Outcome: Progressing  Goal: Nutrition support goals are met within 48 hrs  Outcome: Progressing  Goal: Nutrition support is meeting 75% of nutrient needs  Outcome: Progressing  Goal: Tube feed tolerance  Outcome: Progressing  Goal: BG  mg/dL  Outcome: Progressing  Goal: Lab values WNL  Outcome: Progressing  Goal: Electrolytes WNL  Outcome: Progressing  Goal: Promote healing  Outcome: Progressing  Goal: Maintain stable weight  Outcome: Progressing  Goal: Reduce weight from edema/fluid  Outcome: Progressing  Goal: Gradual weight gain  Outcome: Progressing  Goal: Improve ostomy output  Outcome: Progressing     Problem: Diabetes  Goal: Achieve decreasing blood glucose levels by end of shift  Outcome: Progressing  Goal: Increase stability of blood glucose readings by end of shift  Outcome: Progressing  Goal: Decrease in ketones present in urine by end of shift  Outcome: Progressing  Goal: Maintain electrolyte levels within acceptable range throughout shift  Outcome: Progressing  Goal: Maintain glucose levels >70mg/dl to <250mg/dl throughout shift  Outcome: Progressing  Goal: No changes in neurological exam by end of shift  Outcome: Progressing  Goal: Learn about and adhere to nutrition recommendations by end of shift  Outcome: Progressing  Goal: Vital signs within normal range for age by end of shift  Outcome: Progressing  Goal: Increase self care and/or family involovement by end of shift  Outcome:  Progressing  Goal: Receive DSME education by end of shift  Outcome: Progressing     Problem: Pain - Adult  Goal: Verbalizes/displays adequate comfort level or baseline comfort level  Outcome: Progressing     Problem: Safety - Adult  Goal: Free from fall injury  Outcome: Progressing     Problem: Discharge Planning  Goal: Discharge to home or other facility with appropriate resources  Outcome: Progressing     Problem: Chronic Conditions and Co-morbidities  Goal: Patient's chronic conditions and co-morbidity symptoms are monitored and maintained or improved  Outcome: Progressing     Problem: Respiratory  Goal: Clear secretions with interventions this shift  Outcome: Progressing  Goal: Minimize anxiety/maximize coping throughout shift  Outcome: Progressing  Goal: Minimal/no exertional discomfort or dyspnea this shift  Outcome: Progressing  Goal: No signs of respiratory distress (eg. Use of accessory muscles. Peds grunting)  Outcome: Progressing  Goal: Patent airway maintained this shift  Outcome: Progressing  Goal: Verbalize decreased shortness of breath this shift  Outcome: Progressing  Goal: Wean oxygen to maintain O2 saturation per order/standard this shift  Outcome: Progressing  Goal: Increase self care and/or family involvement in next 24 hours  Outcome: Progressing     Problem: Skin  Goal: Decreased wound size/increased tissue granulation at next dressing change  Outcome: Progressing  Goal: Participates in plan/prevention/treatment measures  Outcome: Progressing  Goal: Prevent/manage excess moisture  Outcome: Progressing  Goal: Prevent/minimize sheer/friction injuries  Outcome: Progressing  Goal: Promote/optimize nutrition  Outcome: Progressing  Goal: Promote skin healing  Outcome: Progressing     Problem: Fall/Injury  Goal: Not fall by end of shift  Outcome: Progressing  Goal: Be free from injury by end of the shift  Outcome: Progressing  Goal: Verbalize understanding of personal risk factors for fall in the  hospital  Outcome: Progressing  Goal: Verbalize understanding of risk factor reduction measures to prevent injury from fall in the home  Outcome: Progressing  Goal: Use assistive devices by end of the shift  Outcome: Progressing  Goal: Pace activities to prevent fatigue by end of the shift  Outcome: Progressing     Problem: Pain  Goal: Takes deep breaths with improved pain control throughout the shift  Outcome: Progressing  Goal: Turns in bed with improved pain control throughout the shift  Outcome: Progressing  Goal: Walks with improved pain control throughout the shift  Outcome: Progressing  Goal: Performs ADL's with improved pain control throughout shift  Outcome: Progressing  Goal: Participates in PT with improved pain control throughout the shift  Outcome: Progressing  Goal: Free from opioid side effects throughout the shift  Outcome: Progressing  Goal: Free from acute confusion related to pain meds throughout the shift  Outcome: Progressing     Problem: Heart Failure  Goal: Improved gas exchange this shift  Outcome: Progressing  Goal: Improved urinary output this shift  Outcome: Progressing  Goal: Reduction in peripheral edema within 24 hours  Outcome: Progressing  Goal: Report improvement of dyspnea/breathlessness this shift  Outcome: Progressing  Goal: Weight from fluid excess reduced over 2-3 days, then stabilize  Outcome: Progressing  Goal: Increase self care and/or family involvement in 24 hours  Outcome: Progressing

## 2024-09-23 NOTE — PROGRESS NOTES
"Humphrey Waddell is a 59 y.o. male on day 4 of admission presenting with CHF (congestive heart failure), NYHA class I, acute on chronic, combined (Multi).    Subjective   Seen at bedside. Wife present as well. Cr continues to worsen despite IV fluids and holding diuretics. Remains on 2L NC. No chest pain or shortness of breath. No fevers or chills.        Objective     Physical Exam  Vitals reviewed.   Constitutional:       Appearance: Normal appearance.   HENT:      Head: Normocephalic.      Right Ear: Tympanic membrane normal.      Nose: Nose normal.      Mouth/Throat:      Mouth: Mucous membranes are moist.   Cardiovascular:      Rate and Rhythm: Normal rate and regular rhythm.   Pulmonary:      Comments: Bibasilar crackles.  Abdominal:      General: Abdomen is flat. Bowel sounds are normal.      Palpations: Abdomen is soft.   Musculoskeletal:      Comments: Left AKA, right lower extremity with edema   Skin:     General: Skin is warm.      Capillary Refill: Capillary refill takes less than 2 seconds.   Neurological:      General: No focal deficit present.      Mental Status: He is alert.         Last Recorded Vitals  Blood pressure 121/63, pulse 69, temperature 36.7 °C (98.1 °F), temperature source Oral, resp. rate 17, height 1.93 m (6' 4\"), weight 131 kg (288 lb 12.8 oz), SpO2 93%.  Intake/Output last 3 Shifts:  I/O last 3 completed shifts:  In: 300 (2.3 mL/kg) [IV Piggyback:300]  Out: 580 (4.4 mL/kg) [Urine:580 (0.1 mL/kg/hr)]  Weight: 131 kg     Relevant Results  Results for orders placed or performed during the hospital encounter of 09/18/24 (from the past 24 hour(s))   POCT GLUCOSE   Result Value Ref Range    POCT Glucose 154 (H) 74 - 99 mg/dL   POCT GLUCOSE   Result Value Ref Range    POCT Glucose 110 (H) 74 - 99 mg/dL   Renal Function Panel   Result Value Ref Range    Glucose 110 (H) 74 - 99 mg/dL    Sodium 138 136 - 145 mmol/L    Potassium 3.7 3.5 - 5.3 mmol/L    Chloride 99 98 - 107 mmol/L    Bicarbonate 29 21 " - 32 mmol/L    Anion Gap 14 10 - 20 mmol/L    Urea Nitrogen 61 (H) 6 - 23 mg/dL    Creatinine 5.30 (H) 0.50 - 1.30 mg/dL    eGFR 12 (L) >60 mL/min/1.73m*2    Calcium 6.8 (L) 8.6 - 10.3 mg/dL    Phosphorus 5.5 (H) 2.5 - 4.9 mg/dL    Albumin 2.4 (L) 3.4 - 5.0 g/dL   CBC and Auto Differential   Result Value Ref Range    WBC 12.2 (H) 4.4 - 11.3 x10*3/uL    nRBC 0.0 0.0 - 0.0 /100 WBCs    RBC 3.18 (L) 4.50 - 5.90 x10*6/uL    Hemoglobin 8.3 (L) 13.5 - 17.5 g/dL    Hematocrit 28.2 (L) 41.0 - 52.0 %    MCV 89 80 - 100 fL    MCH 26.1 26.0 - 34.0 pg    MCHC 29.4 (L) 32.0 - 36.0 g/dL    RDW 15.3 (H) 11.5 - 14.5 %    Platelets 270 150 - 450 x10*3/uL    Neutrophils % 73.2 40.0 - 80.0 %    Immature Granulocytes %, Automated 0.4 0.0 - 0.9 %    Lymphocytes % 7.6 13.0 - 44.0 %    Monocytes % 9.0 2.0 - 10.0 %    Eosinophils % 9.6 0.0 - 6.0 %    Basophils % 0.2 0.0 - 2.0 %    Neutrophils Absolute 8.96 (H) 1.20 - 7.70 x10*3/uL    Immature Granulocytes Absolute, Automated 0.05 0.00 - 0.70 x10*3/uL    Lymphocytes Absolute 0.93 (L) 1.20 - 4.80 x10*3/uL    Monocytes Absolute 1.10 (H) 0.10 - 1.00 x10*3/uL    Eosinophils Absolute 1.17 (H) 0.00 - 0.70 x10*3/uL    Basophils Absolute 0.03 0.00 - 0.10 x10*3/uL   POCT GLUCOSE   Result Value Ref Range    POCT Glucose 138 (H) 74 - 99 mg/dL   POCT GLUCOSE   Result Value Ref Range    POCT Glucose 177 (H) 74 - 99 mg/dL       Imaging Results    Ct chest/abd/pelvis:  IMPRESSION:  Large right pleural effusion and small loculated left pleural effusion  at the left lung apex with multifocal airspace disease in the left  lung, likely atelectasis and possibly pneumonia in the appropriate  clinical setting.  Pronounced cardiomegaly with question of mild systemic anemia.  Mild to moderate concentric wall thickening of the rectosigmoid colon  suggesting a low-grade acute infectious or inflammatory distal  colitis/proctitis.  Decompressed urinary bladder with mild concentric wall thickening  which may be due to  underdistention however mild cystitis is a  possibility in the upper clinical setting, correlate with urinalysis.  Moderate-sized fluid filled left inguinal hernia along with a small  fat-containing right inguinal hernia.    Head ct:  IMPRESSION:  No acute intracranial hemorrhage or mass effect.      Periapical lucencies noted consistent with abscesses. Dental  follow-up suggested.      Mild prominence of the lateral and 3rd ventricles which could be  related to central white matter loss versus normal pressure  hydrocephalus. Clinical correlation suggested.      Correlation for left-sided otitis media suggested.      Remote left thalamic lacunar infarct.    Medications:  acetaminophen, 975 mg, oral, TID  acetaZOLAMIDE, 250 mg, oral, Daily  albuterol, 2.5 mg, nebulization, TID  amLODIPine, 10 mg, oral, Daily  ampicillin-sulbactam, 3 g, intravenous, q12h  aspirin, 81 mg, oral, Daily  atorvastatin, 80 mg, oral, Daily  enoxaparin, 30 mg, subcutaneous, q24h  fludrocortisone, 0.1 mg, oral, Daily  [Held by provider] furosemide, 60 mg, intravenous, q12h  gabapentin, 300 mg, oral, Nightly  hydrALAZINE, 50 mg, oral, TID  insulin glargine, 2 Units, subcutaneous, Nightly  insulin regular, 0-10 Units, subcutaneous, TID  iron sucrose, 300 mg, intravenous, Daily  isosorbide dinitrate, 20 mg, oral, TID  lidocaine, 1 patch, transdermal, Daily  metoprolol tartrate, 50 mg, oral, BID  pantoprazole, 40 mg, oral, Daily before breakfast   Or  pantoprazole, 40 mg, intravenous, Daily before breakfast  sodium bicarbonate, 1,300 mg, oral, q12h  venlafaxine XR, 150 mg, oral, Daily       PRN medications: albuterol, dextrose, dextrose, glucagon, glucagon, glucagon, ondansetron **OR** ondansetron, oxyCODONE, oxygen, oxygen, polyethylene glycol     Assessment/Plan   1.  Acute heart failure with preserved EF, most recent echo in May 2024 had an EF of 55%  -cardiology on board strict intake and output. Holding diuretics due to worsening renal fxn.      2.CAD status post CABG  -continue asa/atorvastatin/metoprolol/imdur    3. DM  -continue lantus and SSI    4. Pneumonia  - on iv unasyn; SLP without evidence of aspiration; rec regular diet  - Will need repeat chest CT in 8 to 12 weeks to ensure resolution of opacities     5. IDANIA on CKDV  -nephrology following; Cr worsening despite IV fluids and holding diuretics. Continue to hold diuretics. No further fluids. Monitor BMP. If Cr worsening, nephrology states will plan to discuss starting dialysis tomorrow.    6. PVD with left AKA  -continue asa/atorvastatin    7. Bilateral effusion suspect secondary to #1  -had right thoracentesis drained 1L this am  -transudate likely due to CHF    8. Normocytic anemia  -hgb 7.3  -Iron sat 12% -> IV venofer    9.  Acute hypoxic respiratory failure secondary to #1 and 4  -Oxygen being weaned down to 2 L, home oxygen eval as needed but patient also needs a sleep study as an outpatient.    10. Likely PAUL/OHS  - nightly cpap    11. Goals of care- full code, desires aggressive care. Would want dialysis if offered by nephrology.     DVT Prophylaxis:  Lovenox felipa Allison DO  [facility]

## 2024-09-23 NOTE — PROGRESS NOTES
"Humphrey Waddell is a 59 y.o. male on day 5 of admission presenting with CHF (congestive heart failure), NYHA class I, acute on chronic, combined.    Subjective   Patient seen and examined with wife at bedside.  Patient is lethargic with minimal verbalizations today.  Patient's wearing CPAP at night with 4 L bleed in; wore ~ 4 hours last night.  During the day has been weaned to 2 L nasal cannula.  He stated \"I just don't feel well\".  He is complaining of generalized pain.  His wife Charline and I repositioned him.  He denied dyspnea and cough.  Dipika reports that he has been eating adequately.     Objective   Physical Exam   Constitutional:   Lethargic, obese, NAD, cooperative  HENT: Atraumatic, semimoist mucous membranes  Eyes: Nonicteric  Neck: Supple, unable to assess JVD  Cardiovascular: S1, S2 normal, regular, HR 80s, no murmur appreciated  Pulmonary: Fair air entry with posterior bibasilar crackles, slightly diminished right anterior base; no rhonchi or wheezing noted; no increased work of breathing noted  Abdominal: Obese, soft, nontender, + BS  Musculoskeletal: Left AKA, fair range of motion bilateral upper extremities with poor strength, minimal  Extremities:   +2-3  pitting edema RLE; edematous upper extremities +1-2 on the left +2 on the right  Lymphadenopathy: No nuchal LAP  Skin: Warm and dry  Neurological: Lethargic, oriented x 3 with speech that is slow, clear and appropriate in general conversation with periods of forgetfulness and confusion; no focal deficits noted  Psychiatric:     Appropriate mood and behavior    Medications:  acetaminophen, 975 mg, oral, TID  albuterol, 2.5 mg, nebulization, TID  amLODIPine, 10 mg, oral, Daily  ampicillin-sulbactam, 3 g, intravenous, q12h  aspirin, 81 mg, oral, Daily  atorvastatin, 80 mg, oral, Daily  fludrocortisone, 0.1 mg, oral, Daily  gabapentin, 300 mg, oral, Nightly  heparin, 5,000 Units, subcutaneous, q8h  hydrALAZINE, 50 mg, oral, TID  insulin glargine, 2 " "Units, subcutaneous, Nightly  insulin regular, 0-10 Units, subcutaneous, TID  iron sucrose, 300 mg, intravenous, Daily  isosorbide dinitrate, 20 mg, oral, TID  lidocaine, 1 patch, transdermal, Daily  metoprolol tartrate, 50 mg, oral, BID  pantoprazole, 40 mg, oral, Daily before breakfast   Or  pantoprazole, 40 mg, intravenous, Daily before breakfast  torsemide, 60 mg, oral, BID  venlafaxine XR, 150 mg, oral, Daily       PRN medications: albuterol, dextrose, dextrose, glucagon, glucagon, glucagon, ondansetron **OR** ondansetron, oxyCODONE, oxygen, oxygen, polyethylene glycol     Last Recorded Vitals  Blood pressure 132/80, pulse 84, temperature 36.8 °C (98.2 °F), temperature source Oral, resp. rate 18, height 1.93 m (6' 4\"), weight 131 kg (288 lb 12.8 oz), SpO2 92%.  Intake/Output last 3 Shifts:  I/O last 3 completed shifts:  In: 865 (6.6 mL/kg) [P.O.:100; IV Piggyback:765]  Out: 1105 (8.4 mL/kg) [Urine:1105 (0.2 mL/kg/hr)]  Weight: 131 kg     Relevant Results  Results for orders placed or performed during the hospital encounter of 09/18/24 (from the past 24 hour(s))   POCT GLUCOSE   Result Value Ref Range    POCT Glucose 138 (H) 74 - 99 mg/dL   POCT GLUCOSE   Result Value Ref Range    POCT Glucose 177 (H) 74 - 99 mg/dL   CBC and Auto Differential   Result Value Ref Range    WBC 10.3 4.4 - 11.3 x10*3/uL    nRBC 0.0 0.0 - 0.0 /100 WBCs    RBC 3.04 (L) 4.50 - 5.90 x10*6/uL    Hemoglobin 8.0 (L) 13.5 - 17.5 g/dL    Hematocrit 27.6 (L) 41.0 - 52.0 %    MCV 91 80 - 100 fL    MCH 26.3 26.0 - 34.0 pg    MCHC 29.0 (L) 32.0 - 36.0 g/dL    RDW 15.5 (H) 11.5 - 14.5 %    Platelets 224 150 - 450 x10*3/uL    Neutrophils % 69.8 40.0 - 80.0 %    Immature Granulocytes %, Automated 0.5 0.0 - 0.9 %    Lymphocytes % 10.3 13.0 - 44.0 %    Monocytes % 9.4 2.0 - 10.0 %    Eosinophils % 9.8 0.0 - 6.0 %    Basophils % 0.2 0.0 - 2.0 %    Neutrophils Absolute 7.16 1.20 - 7.70 x10*3/uL    Immature Granulocytes Absolute, Automated 0.05 0.00 - " 0.70 x10*3/uL    Lymphocytes Absolute 1.06 (L) 1.20 - 4.80 x10*3/uL    Monocytes Absolute 0.96 0.10 - 1.00 x10*3/uL    Eosinophils Absolute 1.01 (H) 0.00 - 0.70 x10*3/uL    Basophils Absolute 0.02 0.00 - 0.10 x10*3/uL   Renal Function Panel   Result Value Ref Range    Glucose 101 (H) 74 - 99 mg/dL    Sodium 140 136 - 145 mmol/L    Potassium 3.7 3.5 - 5.3 mmol/L    Chloride 100 98 - 107 mmol/L    Bicarbonate 28 21 - 32 mmol/L    Anion Gap 16 10 - 20 mmol/L    Urea Nitrogen 65 (H) 6 - 23 mg/dL    Creatinine 5.49 (H) 0.50 - 1.30 mg/dL    eGFR 11 (L) >60 mL/min/1.73m*2    Calcium 7.0 (L) 8.6 - 10.3 mg/dL    Phosphorus 5.9 (H) 2.5 - 4.9 mg/dL    Albumin 2.5 (L) 3.4 - 5.0 g/dL   POCT GLUCOSE   Result Value Ref Range    POCT Glucose 97 74 - 99 mg/dL   POCT GLUCOSE   Result Value Ref Range    POCT Glucose 154 (H) 74 - 99 mg/dL   POCT GLUCOSE   Result Value Ref Range    POCT Glucose 141 (H) 74 - 99 mg/dL      Transthoracic Echo (TTE) Complete  Result Date: 9/19/2024   [facility], [address] [place] [address]              Tel [phone] and Fax [phone] TRANSTHORACIC ECHOCARDIOGRAM REPORT  Patient Name:      LEXIE Rees Physician:    [id number] Araujo DO Study Date:        9/19/2024            Ordering Provider:    [id number] ARAUJO MRN/PID:           [id number]             Fellow: Accession#:        [id number]         Nurse: Date of Birth/Age: 1965 / 59 years  Sonographer:          Sofia Ferguson RDCS Gender:            M                    Additional Staff: Height:            193.04 cm            Admit Date:           9/18/2024 Weight:            127.92 kg            Admission Status:     Inpatient -                                                               Routine BSA / BMI:         2.56 m2 / 34.33      Encounter#:           [id number]                     kg/m2 Blood Pressure:    132/70 mmHg          Department Location:  [facility] Non                                                               Invasive Study Type:    TRANSTHORACIC ECHO (TTE) COMPLETE Diagnosis/ICD: Non ST elevation (NSTEMI) myocardial infarction-I21.4 Indication:    NSTEMI, Congestive Heart Failure CPT Code:      Echo Limited-84821 Patient History: MI Location/Type:  Non-ST Elevation MI Diabetes:          Yes Pertinent History: HTN, CVA, CHF, CAD, Chest Pain, Hyperlipidemia and Dyspnea. Study Detail: The following Echo studies were performed: 2D, M-Mode, Doppler and               color flow. Technically challenging study due to body habitus and               patient lying in supine position.  PHYSICIAN INTERPRETATION: Left Ventricle: Left ventricular ejection fraction is normal, calculated by Kirkpatrick's biplane at 59%. There are no regional left ventricular wall motion abnormalities. The left ventricular cavity size is normal. The left ventricular septal wall thickness is moderately increased. There is moderately increased left ventricular posterior wall thickness. There is moderate concentric left ventricular hypertrophy. Abnormal (paradoxical) septal motion consistent with post-operative status. Spectral Doppler shows a restrictive pattern of left ventricular diastolic filling. There is an elevated mean left atrial pressure. Left Atrium: The left atrium was not assessed. Right Ventricle: The right ventricle was not assessed. There is normal right ventricular global systolic function. Right Atrium: The right atrium was not assessed. Aortic Valve: The aortic valve is trileaflet. Aortic valve regurgitation was not assessed. Mitral Valve: The mitral valve is mildly thickened. There is trace to mild mitral valve regurgitation. Tricuspid Valve: The tricuspid valve is structurally normal. There is mild tricuspid regurgitation. The Doppler estimated RVSP is slightly elevated at 34.8 mmHg.  Pulmonic Valve: The pulmonic valve is structurally normal. Pulmonic valve regurgitation was not assessed. Pericardium: There is no pericardial effusion noted. Aorta: The aortic root is normal. Systemic Veins: The inferior vena cava appears severely dilated, with IVC inspiratory collapse less than 50%. In comparison to the previous echocardiogram(s): Compared with study dated 5/23/2024, Winin the limitations of both studies, no significant changes were noted.  CONCLUSIONS:  1. Left ventricular ejection fraction is normal, calculated by Kirkpatrick's biplane at 59%.  2. Spectral Doppler shows a restrictive pattern of left ventricular diastolic filling.  3. There is an elevated mean left atrial pressure.  4. Abnormal septal motion consistent with post-operative status.  5. There is moderate concentric left ventricular hypertrophy.  6. There is normal right ventricular global systolic function.  7. Slightly elevated right ventricular systolic pressure.  8. The inferior vena cava appears severely dilated, with IVC inspiratory collapse less than 50%. QUANTITATIVE DATA SUMMARY:  2D MEASUREMENTS:          Normal Ranges: IVSd:            1.45 cm  (0.6-1.1cm) LVPWd:           1.47 cm  (0.6-1.1cm) LVIDd:           5.34 cm  (3.9-5.9cm) LVIDs:           3.27 cm LV Mass Index:   134 g/m2 LVEDV Index:     56 ml/m2 LV % FS          38.8 %  LV SYSTOLIC FUNCTION BY 2D PLANIMETRY (MOD):                      Normal Ranges: EF-A4C View:    58 % (>=55%) EF-A2C View:    61 % EF-Biplane:     59 % EF-3DQ:         55 % LV EF Reported: 59 %  LV DIASTOLIC FUNCTION:             Normal Ranges: MV Peak E:             1.34 m/s    (0.7-1.2 m/s) MV Peak A:             0.87 m/s    (0.42-0.7 m/s) E/A Ratio:             1.54        (1.0-2.2) MV e'                  0.064 m/s   (>8.0) MV lateral e'          0.07 m/s MV medial e'           0.06 m/s E/e' Ratio:            20.87       (<8.0) a'                     0.05 m/s PulmV Sys Jose:         76.30 cm/s  PulmV Ch Jose:        100.00 cm/s PulmV S/D Jose:         0.80 PulmV A Revs Jose:      25.70 cm/s PulmV A Revs Dur:      106.00 msec  MITRAL VALVE:          Normal Ranges: MV DT:        292 msec (150-240msec)  RIGHT VENTRICLE: TAPSE: 20.3 mm RV s'  0.09 m/s  TRICUSPID VALVE/RVSP:          Normal Ranges: Peak TR Velocity:     2.82 m/s Est. RA Pressure:     3 mmHg RV Syst Pressure:     35 mmHg  (< 30mmHg) IVC Diam:             2.74 cm  Pulmonary Veins: PulmV A Revs Dur: 106.00 msec PulmV A Revs Jose: 25.70 cm/s PulmV Ch Jose:   100.00 cm/s PulmV S/D Jose:    0.80 PulmV Sys Jose:    76.30 cm/s  [id number] Junior Araujo DO Electronically signed on 9/19/2024 at 3:44:06 PM  ** Final **     XR chest 1 view  Result Date: 9/17/2024  Interpreted By:  Heather Aguilar, STUDY: XR CHEST 1 VIEW;  9/17/2024 9:44 pm   INDICATION: Signs/Symptoms:resp fail     COMPARISON: Chest x-ray 09/16/2024   ACCESSION NUMBER(S): [id number]   ORDERING CLINICIAN: ELIZABETH MITCHELL   TECHNIQUE: Semi-erect frontal view of the chest was obtained .   FINDINGS: Monitoring wires are overlying the patient.   The heart is enlarged. The patient is status post open heart surgery. There is vascular congestion and interstitial edema.   There are small bilateral pleural effusions. Airspace opacities are noted at the right perihilar region and left lung base. No pneumothorax.       1. Cardiomegaly. Vascular congestion and interstitial edema. Small bilateral pleural effusions. Bibasilar airspace opacities, may be secondary to atelectasis or pneumonia.       MACRO: None.   Signed by: Heather Aguilar 9/17/2024 10:49 PM Dictation workstation:   [id number]    CT chest abdomen pelvis wo IV contrast  Result Date: 9/17/2024  STUDY: CT Chest, Abdomen, and Pelvis without IV Contrast; 9/17/2024 3:33 AM INDICATION: Altered mental status.  Pulmonary edema.  Tactile fevers. COMPARISON: CXR 9/16/2024.  US gallbladder 5/23/2024.  CT AP 5/22/2024.  CT CAP 3/28/2024. ACCESSION  NUMBER(S): [id number] ORDERING CLINICIAN: MELISSA VERMA TECHNIQUE: CT of the chest, abdomen, and pelvis was performed.  Contiguous axial images were obtained at 3 mm slice thickness through the chest, abdomen, and pelvis.  Coronal and sagittal reconstructions at 3 mm slice thickness were performed.  No intravenous contrast was administered.  FINDINGS: Please note that the evaluation of vessels, lymph nodes and organs is limited without intravenous contrast.  Image quality is limited by extensive beam hardening artifact in the chest and upper abdomen due to the patient's upper extremities by the patient's side.  CHEST: MEDIASTINUM: Cardiac size is enlarged without pericardial effusion.  Cardiac blood pool appears slightly low in attenuation.  Mild calcified coronary plaque is noted.  LUNGS/PLEURA: There is a large right pleural effusion layering dependently.  Small left-sided pleural effusion is noted, loculated at the left apex. There is no pleural thickening, or pneumothorax.  The airways are patent. Mild pulmonary edema is suspected with mosaic attenuation the lungs suggesting chronic small airway disease and air trapping.  Relaxation atelectasis is seen in the dependent portion of the right lower lobe. There is an airspace disease in the left upper lobe and left lower lobe with significant volume loss of the left lung due to the pleural effusion, as well as the enlarged cardiac size.  LYMPH NODES: Thoracic lymph nodes are not enlarged. ABDOMEN:  LIVER: No hepatomegaly.  Smooth surface contour.  Normal attenuation.  BILE DUCTS: No intrahepatic or extrahepatic biliary ductal dilatation.  GALLBLADDER: Gallbladder is absent.  STOMACH: No abnormalities identified.  PANCREAS: No masses or ductal dilatation.  SPLEEN: No splenomegaly or focal splenic lesion.  ADRENAL GLANDS: No thickening or nodules.  KIDNEYS AND URETERS: Kidneys are normal in size and location.  No renal or ureteral calculi.  Moderate  perinephric stranding is seen bilaterally.   PELVIS:  BLADDER: Urinary bladder is decompressed by Lee catheter with a small amount of air in the lumen of the urinary bladder and mild urinary bladder wall thickening.  REPRODUCTIVE ORGANS: No abnormalities identified.  BOWEL: Appendix is unremarkable.  Terminal ileum appears normal.  Cecum is located in the right midabdomen.  There is moderate concentric wall thickening of the rectosigmoid colon.  VESSELS: Mild calcified plaque is seen in the abdominal aorta and iliac arteries.   PERITONEUM/RETROPERITONEUM/LYMPH NODES: No free fluid.  No pneumoperitoneum. No lymphadenopathy.  ABDOMINAL WALL: No abnormalities identified. SOFT TISSUES: Fluid-filled indirect left inguinal hernia is noted.  There is a small fat-containing indirect right inguinal hernia.  BONES: No acute fracture or aggressive osseous lesion.  Moderate multilevel disc disease is seen in the mid and lower thoracic spine as well as at L1-L2.  There is mild disc disease and vacuum phenomenon at L5-S1. Mild dextroconvex curvature is seen of the thoracolumbar spine.    Large right pleural effusion and small loculated left pleural effusion at the left lung apex with multifocal airspace disease in the left lung, likely atelectasis and possibly pneumonia in the appropriate clinical setting. Pronounced cardiomegaly with question of mild systemic anemia. Mild to moderate concentric wall thickening of the rectosigmoid colon suggesting a low-grade acute infectious or inflammatory distal colitis/proctitis. Decompressed urinary bladder with mild concentric wall thickening which may be due to underdistention however mild cystitis is a possibility in the upper clinical setting, correlate with urinalysis. Moderate-sized fluid filled left inguinal hernia along with a small fat-containing right inguinal hernia. Signed by Tristen Livingtson      Assessment/Plan   Assessment & Plan  CHF (congestive heart failure), NYHA class I,  acute on chronic, combined    Humphrey Waddell is a 59 y.o. male medical history CAD (s/p CABG), HFpEF, carotid artery disease, CVAs (cognitive impairment), HTN, HLD, DMT2, CKD 4, GERD, PAD (s/p lt AKA), depression who presented to [facility] ED on 9/16 with dyspnea x 1 day with accompanying fatigue, reduced conversational ability.  Evaluation revealed encephalopathy with acute congestive heart failure (BNP 28, 635) with bilateral pleural effusions & superimposed pneumonia. Additionally found to be hypoxic and placed on 3 L to obtain 92% saturation, ultimately requiring increased to 6 L NC with intermittent BiPAP.  He was admitted to [facility] treated with DuoNebs, dexamethasone, lasix, ceftriaxone, doxycycline and Zosyn.  On the morning of 9/18 he was transferred to [facility] SDU.  He arrived on 6 L NC with labs significant for , hsTrop 62, Cr 3.09, h/h 7/24.2. He was started on Unasyn, albuterol nebs and oral prednisone and able to be weaned to 4 L NC.  Pulmonology is consulted for right pleural effusion.     Impression/recommendations:     # Acute hypoxic respiratory failure: Multifactorial secondary to acute on chronic HFpEF, bilateral pleural effusions, pneumonia versus atelectasis; baseline is room air, required intermittent BiPAP wean to nasal cannula, improving currently on 2 L  -Continue supplemental oxygen, wean for saturation 92 to 95%  -If patient goes home, home O2 eval prior to discharge  -Bronchopulmonary hygiene with incentive spirometer and Acapella  -Continue scheduled albuterol     # ?  COPD: Patient and wife denied history of pulmonary disorders including COPD, asthma; no PFTs on file; patient does have recent smoking history  -No indication for steroids at this time  -Would benefit from outpatient PFTs when he is back to baseline     # Pleural effusion, bilateral: Right greater than left on CT; left effusion is loculated at the apex  -Diuresis as renal function and hemodynamics allows  -S/p rt thora  for 1000ml removed (-) Light's criteria for transudative effusion likely 2/2 heart failure exacerbation     #Pneumonia: CT chest with noted left multifocal airspace disease concerning for atelectasis versus pneumonia; must also consider possible aspiration given patient's fluctuating mental status over the past 3 weeks  -Continue coverage with Unasyn  -Speech-language evaluation pleated; regular diet with thin liquids recommended  -Will need repeat chest CT in 8 to 12 weeks to ensure resolution of opacities     # Heart Failure: HFpEF with 4/2024 echo showing EF 60 to 65% with impaired relaxation pattern of LV diastolic filling and mildly reduced RV systolic function; CT chest showing mild pulmonary edema; BNP at [facility] [place] > 28,000, on admit to [place] [address]  -Diuresis per cardiology, renal  -Echo complete (results above); similar to echo 4/2024 with noted restrictive relaxation pattern, EF 59%     #Likely obstructive sleep apnea/obesity hypoventilation syndrome: BMI 34.08 with neck/trunk weight foci; wife reports snoring, daytime napping  -Would benefit from outpatient evaluation for sleep apnea, discussed with wife and patient reviewing with PCP and possibly seeing if he qualified for home study   -Adding CPAP at night and with naps; 8-20 cmH2O autopap     DVT prophylaxis with Lovenox      Thank you for the consult.  Pulmonology will follow.     I spent 35 minutes in the professional and overall care of this patient.   Priscila Denton, CONG-CNS

## 2024-09-23 NOTE — PROGRESS NOTES
09/23/24 1714   Discharge Planning   Expected Discharge Disposition SNF       Patient is still diuresing. Renal is following. Trending renal function. Revisited patient at bedside and explained PT/OT rec for rehab. Patient agreed. SNF list printed according to geographic location and insurance acceptance and given to patient for reviewing. Will follow up on choices and discharge needs.

## 2024-09-23 NOTE — PROGRESS NOTES
"Subjective Data:  Oxygen at 2L via NC  Renal following  His GFR down to 11    Tele:  SR 82  Overnight Events:    N/A     Objective Data:  Last Recorded Vitals:  Vitals:    09/23/24 0300 09/23/24 0842 09/23/24 0848 09/23/24 1031   BP: 158/84  159/74    BP Location: Left arm  Left arm    Patient Position: Lying  Lying    Pulse:   102 85   Resp: 18  20    Temp: 36.2 °C (97.2 °F)  36.8 °C (98.2 °F)    TempSrc: Skin  Skin    SpO2: 96% 95% 96%    Weight:       Height:           Last Labs:  LABS:  CMP:  Results from last 7 days   Lab Units 09/23/24  0649 09/22/24  1239 09/21/24  0904 09/20/24  0531 09/19/24  1022 09/18/24  0434 09/17/24  1300 09/16/24  2227   SODIUM mmol/L 140 138 136 140 143 145 143 145   POTASSIUM mmol/L 3.7 3.7 4.9 3.9 3.3* 3.5 3.8 4.0   CHLORIDE mmol/L 100 99 98 99 101 103 102 103   CO2 mmol/L 28 29 26 33* 35* 32 27 28   ANION GAP mmol/L 16 14 17 12 10 14 18 14   BUN mg/dL 65* 61* 54* 51* 44* 45* 46* 42*   CREATININE mg/dL 5.49* 5.30* 4.49* 4.01* 3.32* 3.09* 3.32* 3.30*   EGFR mL/min/1.73m*2 11* 12* 14* 16* 20* 22* 20* 20*   MAGNESIUM mg/dL  --   --  1.80  --  1.70  --   --  1.80   ALBUMIN g/dL 2.5* 2.4* 2.3*  --   --   --  3.1* 3.1*   ALT U/L  --   --   --   --   --   --  55* 71*   AST U/L  --   --   --   --   --   --  15 17   BILIRUBIN TOTAL mg/dL  --   --   --   --   --   --  0.5 0.8     CBC:  Results from last 7 days   Lab Units 09/23/24  0649 09/22/24  1239 09/21/24  0904 09/20/24  0531 09/19/24  1022 09/18/24  0434 09/16/24  2227   WBC AUTO x10*3/uL 10.3 12.2* 12.7* 9.7 11.0 11.0 11.9*   HEMOGLOBIN g/dL 8.0* 8.3* 8.1* 7.3* 7.2* 7.0* 8.1*   HEMATOCRIT % 27.6* 28.2* 28.0* 25.4* 24.7* 24.2* 27.5*   PLATELETS AUTO x10*3/uL 224 270 303 273 296 328 348   MCV fL 91 89 89 89 92 92 90     COAG:   Results from last 7 days   Lab Units 09/18/24  0709   INR  1.3*     ABO: No results found for: \"ABO\"  HEME/ENDO:  Results from last 7 days   Lab Units 09/20/24  0531   IRON SATURATION % 12*      CARDIAC: "   Results from last 7 days   Lab Units 09/20/24  0531 09/18/24  0434   LD U/L 144 166   TROPHS ng/L  --  62*   BNP pg/mL  --  671*     Recent Labs     04/29/23  0654 08/15/22  0700 03/18/22  0623   CHOL 181 145 150   LDLF 127* 77 90   HDL 32.8* 31.6* 36.7*   TRIG 108 180* 115      CT maxillofacial bones wo IV contrast   Final Result   Slight fullness of the right lacrimal gland without evidence of fluid   collection. CT of the paranasal sinuses, mastoid sinuses and orbits   is within normal limits.        MACRO:   none        Signed by: Torres Henson 9/21/2024 10:50 AM   Dictation workstation:   [id number]      CT 3D reconstruction   Final Result   Slight fullness of the right lacrimal gland without evidence of fluid   collection. CT of the paranasal sinuses, mastoid sinuses and orbits   is within normal limits.        MACRO:   none        Signed by: Torres Henson 9/21/2024 10:50 AM   Dictation workstation:   [id number]      Vascular US upper extremity venous duplex right   Final Result      Transthoracic Echo (TTE) Complete   Final Result      US thoracentesis   Final Result   Uneventful thoracentesis, as detailed above: Right Pleural space,   1000 mL        Performed and dictated at [facility].        Signed by: Diana Serrano 9/19/2024 11:37 AM   Dictation workstation:   [id number]            Last I/O:  I/O last 3 completed shifts:  In: 865 (6.6 mL/kg) [P.O.:100; IV Piggyback:765]  Out: 1105 (8.4 mL/kg) [Urine:1105 (0.2 mL/kg/hr)]  Weight: 131 kg     Past Cardiology Tests (Last 3 Years):  EKG:  ECG 12 lead 09/16/2024    Echo:  Echo 9/19/2024   1. Left ventricular ejection fraction is normal, calculated by Kirkpatrick's biplane at 59%.   2. Spectral Doppler shows a restrictive pattern of left ventricular diastolic filling.   3. There is an elevated mean left atrial pressure.   4. Abnormal septal motion consistent with post-operative status.   5. There is moderate  concentric left ventricular hypertrophy.   6. There is normal right ventricular global systolic function.   7. Slightly elevated right ventricular systolic pressure.   8. The inferior vena cava appears severely dilated, with IVC inspiratory collapse less than 50%.    Echo:  Transthoracic Echo (TTE) Limited 05/23/2024  CONCLUSIONS:   1. Left ventricular systolic function is normal with a 55% estimated ejection fraction.   2. Poorly visualized anatomical structures due to suboptimal image quality        Transthoracic Echo (TTE) Limited 04/03/2024  1. Left ventricular systolic function is normal with a 60-65% estimated ejection fraction.   2. Poorly visualized anatomical structures due to suboptimal image quality.   3. Moderately increased left ventricular septal thickness.   4. Spectral Doppler shows an impaired relaxation pattern of left ventricular diastolic filling.   5. The left ventricular posterior wall thickness is moderately increased.   6. There is mildly reduced right ventricular systolic function.   7. Compared with the prior exam from 11/13/2023 ( [facility] prior study was techniclly difficult with patient  bpm, but limited echocontrast images demonstrated normal LV systolic function at that time without significant wall motion abnormalities. Patinet's HR today 70 bpm.        Transthoracic Echo (TTE) Complete 11/24/2023   1. Left ventricular systolic function is mildly decreased with a 45-50% estimated ejection fraction.   2. Mid and apical anterior wall, mid anterolateral segment, apical lateral segment, and apex are abnormal.   3. There are multiple wall motion abnormalities.   4. Poorly visualized anatomical structures due to suboptimal image quality.   5. There is reduced right ventricular systolic function.    Anesthesia Intraoperative Transesophageal Echocardiogram 11/13/2023      Anesthesia Intraoperative Transesophageal Echocardiogram 11/13/2023      Cath:  Peripheral Diagnostic Angiography   9/14/2023  CONCLUSIONS:  1. Right critical limb ischemia, Converse class V.  2. Successful PTA_DCB of the entire right superficial femoral artery.  3. Aspirin 81 mg daily and clopidogrel 75 mg daily.    [facility] 5/19/2023  CONCLUSIONS:  1. Successful OM2 stenting.    [facility] 5/3/2023  Coronary Interventions:     Coronary Lesion Summary:  Vessel       Stenosis   Vessel Segment  Left Main  30% stenosis     entire  LAD        40% stenosis    proximal  LAD        75% stenosis      mid  Circumflex 95% stenosis      mid  RCA        60% stenosis    proximal    CONCLUSIONS:  1. Left main: 30% diffuse disease.  2. LAD: 40-50% proximal stenosis, 75% md-vessel disease.  3. LCx: 95% mid-vessel disease.  4. RCA: 60% proximal disease, 40% mid-vessel disease.  5. LVEDP 34mmHg, no aortic stenosis on LV-Ao gradient.       Stress Test:  9/25/2018    Cardiac Imaging:  XR chest abdomen for OG NG placement 11/13/2023      Inpatient Medications:  Scheduled medications   Medication Dose Route Frequency    acetaminophen  975 mg oral TID    [Held by provider] acetaZOLAMIDE  250 mg oral Daily    albuterol  2.5 mg nebulization TID    amLODIPine  10 mg oral Daily    ampicillin-sulbactam  3 g intravenous q12h    aspirin  81 mg oral Daily    atorvastatin  80 mg oral Daily    enoxaparin  30 mg subcutaneous q24h    fludrocortisone  0.1 mg oral Daily    [Held by provider] furosemide  60 mg intravenous q12h    gabapentin  300 mg oral Nightly    hydrALAZINE  50 mg oral TID    insulin glargine  2 Units subcutaneous Nightly    insulin regular  0-10 Units subcutaneous TID    iron sucrose  300 mg intravenous Daily    isosorbide dinitrate  20 mg oral TID    lidocaine  1 patch transdermal Daily    metoprolol tartrate  50 mg oral BID    pantoprazole  40 mg oral Daily before breakfast    Or    pantoprazole  40 mg intravenous Daily before breakfast    sodium bicarbonate  1,300 mg oral q12h    venlafaxine XR  150 mg oral Daily     PRN medications   Medication     "albuterol    dextrose    dextrose    glucagon    glucagon    glucagon    ondansetron    Or    ondansetron    oxyCODONE    oxygen    oxygen    polyethylene glycol     Continuous Medications   Medication Dose Last Rate       Physical Exam:  Documented Vital Signs   Heart Rate:  []   Temp:  [36 °C (96.8 °F)-37.2 °C (99 °F)]   Resp:  [17-20]   BP: (115-159)/(63-84)   SpO2:  [92 %-98 %]   Temp:  [36 °C (96.8 °F)-37.2 °C (99 °F)] 36.8 °C (98.2 °F)  Heart Rate:  [] 85  Resp:  [17-20] 20  BP: (115-159)/(63-84) 159/74  FiO2 (%):  [28 %] 28 %      Oxygen Dose: *2 L/min    Documented Fluid Status     Intake/Output Summary (Last 24 hours) at 9/23/2024 1120  Last data filed at 9/23/2024 1037  Gross per 24 hour   Intake 200 ml   Output 1110 ml   Net -910 ml     Net IO Since Admission: -2,805 mL [09/23/24 1120]    GENERAL: Lethargic this today   SKIN: warm, dry  NECK: Difficult to assess due to body habitus   CARDIAC: Regular rate and rhythm no murmurs  PULMONARY: Bibasilar rales on supplemental oxygen   ABDOMEN: soft, nondistended  EXTREMITIES: left AKA +1-2 right lower extremity edema   NEURO: Alert and oriented x 3.  Grossly normal.  Moves all 4 extremities.    I reviewed telemetry which showed sinus rhythm       Humphrey Waddell is a 59 y.o. male with a pertinent medical Hx notable for  PAD s/p left above-the-knee amputation, anemia, coronary artery disease, s/p CABG carotid artery disease, CVA, depression, type 2 diabetes mellitus, GERD, hyperlipidemia, hypertension, osteoarthritis, peripheral arterial disease, CKD stage IV who was transferred from [facility] to [facility] due to AMS.  Cardiology has been consulted for \"CHF\"      S/p right thoracentesis with 1 liter removed 9/19/2024    Ongoing Diuresis as per Nephrology management at this time      Echocardiogram showed LVEF 59%  restrictive LV diastolic filling elevated mean left atrial pressure RVSP 35 mmHg    Continue chronic home medications at this time      Follow " H/H, Transfuse as needed to maintain H > 7. Give additional lasix following each pRBC     Will need appropriate cardiac follow up --> Lives in [place]   -- Dr Lund or Dr Peres would likely be the closest providers to Mr Waddell.   -- His wife reports that they had previously followed with Dr Higgins, but have been seeing his [facility].       Ongoing disposition planning as per primary service    No cardiac barriers to discharge      Junior Araujo DO   Division of Cardiovascular Medicine  [facility]

## 2024-09-23 NOTE — CARE PLAN
The patient's goals for the shift include Pt. will have a safe, restful and uneventful evening    The clinical goals for the shift include patient will be able to maintain hemodynamic stability by the end of the shift      Problem: Pain - Adult  Goal: Verbalizes/displays adequate comfort level or baseline comfort level  Outcome: Progressing     Problem: Respiratory  Goal: Clear secretions with interventions this shift  Outcome: Progressing  Goal: Minimize anxiety/maximize coping throughout shift  Outcome: Progressing  Goal: Minimal/no exertional discomfort or dyspnea this shift  Outcome: Progressing  Goal: No signs of respiratory distress (eg. Use of accessory muscles. Peds grunting)  Outcome: Progressing  Goal: Patent airway maintained this shift  Outcome: Progressing  Goal: Verbalize decreased shortness of breath this shift  Outcome: Progressing  Goal: Wean oxygen to maintain O2 saturation per order/standard this shift  Outcome: Progressing  Goal: Increase self care and/or family involvement in next 24 hours  Outcome: Progressing     Problem: Fall/Injury  Goal: Not fall by end of shift  Outcome: Progressing  Goal: Be free from injury by end of the shift  Outcome: Progressing  Goal: Verbalize understanding of personal risk factors for fall in the hospital  Outcome: Progressing  Goal: Verbalize understanding of risk factor reduction measures to prevent injury from fall in the home  Outcome: Progressing  Goal: Use assistive devices by end of the shift  Outcome: Progressing  Goal: Pace activities to prevent fatigue by end of the shift  Outcome: Progressing     Problem: Pain  Goal: Takes deep breaths with improved pain control throughout the shift  Outcome: Progressing  Goal: Turns in bed with improved pain control throughout the shift  Outcome: Progressing  Goal: Walks with improved pain control throughout the shift  Outcome: Progressing  Goal: Performs ADL's with improved pain control throughout shift  Outcome:  Progressing  Goal: Participates in PT with improved pain control throughout the shift  Outcome: Progressing  Goal: Free from opioid side effects throughout the shift  Outcome: Progressing  Goal: Free from acute confusion related to pain meds throughout the shift  Outcome: Progressing

## 2024-09-23 NOTE — CONSULTS
Inpatient consult to Palliative Care  Consult performed by: Mikaela Garcia, APRN-CNP  Consult ordered by: Biju Allison DO          Reason For Consult  Reason for Consult: communication / medical decision making     History Of Present Illness  Humphrey Waddell is a 59 y.o. male with past medical history PAD s/p left above-the-knee amputation, anemia, coronary artery disease, s/p CABG, carotid artery disease, CVA with cognitive impairment, depression, type 2 diabetes mellitus, GERD, hyperlipidemia, hypertension, osteoarthritis, peripheral arterial disease, CKD stage IV. He presented to [facility] ED 9/18 with c/o dyspnea and weakness.  He was found to have bilateral pleural effusions, right greater than left, metabolic encephalopathy, COPD and CHF exacerbations, and idania.  [facility] with dx acute hypoxic and hypercapnic respiratory failure.   Patient's respiratory status did start improving, but now diuresis impacted due to worsening IDANIA on CKD. Nephrology is following and considering dialysis.  Patient had initially wanted to be DNR/DNI, but then later stated he wanted to remain full code, hence palliative med consult    Patient underwent thoracentesis on 9/19 for 1 liters    No HCPOA documents on file.  Wife Charline is at bedside.       Personal/Social History  , lives in [facility]  He reports that he quit smoking about 22 months ago. His smoking use included cigars. He has never used smokeless tobacco. He reports that he does not currently use alcohol. He reports that he does not currently use drugs after having used the following drugs: Marijuana.       Past Medical History  He has a past medical history of Above-knee amputation of left lower extremity (Multi) (03/07/2021), Adverse effect of anesthesia, Anemia, CAD (coronary artery disease), Carotid artery disease (CMS-[facility]), CVA (cerebral vascular accident) (Multi) (2020), Depression, DM (diabetes mellitus) (Multi), GERD (gastroesophageal reflux  disease), High cholesterol, HTN (hypertension), Leg ulcer (Multi), Neuropathy, OA (osteoarthritis), PAD (peripheral artery disease) (CMS-[facility]), Renal disorder, S/P CABG (coronary artery bypass graft) (11/13/2023), and 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    Surgical History  He has a past surgical history that includes Other surgical history (07/07/2021); Other surgical history (07/07/2021); CT angio head w and wo IV contrast (06/14/2021); CT angio neck (06/14/2021); MR angio neck wo IV contrast (07/18/2023); MR angio head wo IV contrast (12/11/2012); MR angio head wo IV contrast (06/12/2021); MR angio head wo IV contrast (05/25/2021); Total shoulder arthroplasty (Left, 2020); Leg Surgery (Right); and Cardiac surgery.     Family History  Family History   Problem Relation Name Age of Onset    Other (cardiac disorder) Mother      Hypertension Mother      Esophageal cancer Mother      Hearing loss Father      Hypertension Father      Heart disease Father      COPD Sister       Allergies  Carvedilol    Review of Systems   Unable to perform ROS: Acuity of condition        Physical Exam  Constitutional:       Comments: Lethargic, continues to drop off to sleep.  Not answering questions consistently, appears confused   HENT:      Head: Normocephalic and atraumatic.      Nose: Nose normal.      Mouth/Throat:      Mouth: Mucous membranes are moist.      Pharynx: Oropharynx is clear.   Eyes:      Conjunctiva/sclera: Conjunctivae normal.      Pupils: Pupils are equal, round, and reactive to light.   Cardiovascular:      Rate and Rhythm: Regular rhythm.      Heart sounds: Normal heart sounds.   Pulmonary:      Effort: Pulmonary effort is normal.      Comments: Diminished at bases  Abdominal:      General: Bowel sounds are normal.      Palpations: Abdomen is soft.   Genitourinary:     Comments: Lee with clear yellow urine  Musculoskeletal:         General: Swelling present.      Cervical back: Normal  "range of motion.      Comments: + 3 pitting RLE, L AKA   Skin:     General: Skin is warm and dry.      Coloration: Skin is pale.   Neurological:      Mental Status: He is disoriented.      Comments: Knows he is at [facility], it is 2024 but appears confused and does not answer beyond yes/no to other questions about sx/goals   Psychiatric:         Attention and Perception: He is inattentive.         Speech: Speech is delayed.         Behavior: Behavior is slowed.         Cognition and Memory: Cognition is impaired.         Last Recorded Vitals  Blood pressure 159/74, pulse 102, temperature 36.8 °C (98.2 °F), temperature source Skin, resp. rate 20, height 1.93 m (6' 4\"), weight 131 kg (288 lb 12.8 oz), SpO2 96%.    Relevant Results  CT maxillofacial bones wo IV contrast    Result Date: 9/21/2024  Interpreted By:  Torres Henson, STUDY: CT paranasal sinuses   INDICATION: Signs/Symptoms:right eye periorbital swelling. hx of dental abscess   COMPARISON: none   ACCESSION NUMBER(S): [id number]   ORDERING CLINICIAN: DIEGO HDZ   TECHNIQUE: CT of the paranasal sinuses was performed with multiplanar reconstruction and 3D reconstruction   FINDINGS: *Nasal fossa normal *Frontal sinuses:  Normal *Ethmoid sinuses:  Normal *Maxillary sinuses:  Normal *Sphenoid sinuses:  Normal *Mastoid sinuses: Normal *Maxilla and mandible: There are abundant fractured, missing dental elements with periapical lucency. These can not be further characterized. ORBITS The bony orbits are normal. The globes are normal. Optic nerves and extraocular muscles are normal. Orbital apices normal. Slight enlargement of the right lacrimal gland of uncertain cause and significance. No evidence of fluid collection.       Slight fullness of the right lacrimal gland without evidence of fluid collection. CT of the paranasal sinuses, mastoid sinuses and orbits is within normal limits.   MACRO: none   Signed by: Torres Henson 9/21/2024 10:50 AM Dictation " workstation:   [id number]    CT 3D reconstruction    Result Date: 9/21/2024  Interpreted By:  Torres Henson, STUDY: CT paranasal sinuses   INDICATION: Signs/Symptoms:right eye periorbital swelling. hx of dental abscess   COMPARISON: none   ACCESSION NUMBER(S): [id number]   ORDERING CLINICIAN: DIEGO HDZ   TECHNIQUE: CT of the paranasal sinuses was performed with multiplanar reconstruction and 3D reconstruction   FINDINGS: *Nasal fossa normal *Frontal sinuses:  Normal *Ethmoid sinuses:  Normal *Maxillary sinuses:  Normal *Sphenoid sinuses:  Normal *Mastoid sinuses: Normal *Maxilla and mandible: There are abundant fractured, missing dental elements with periapical lucency. These can not be further characterized. ORBITS The bony orbits are normal. The globes are normal. Optic nerves and extraocular muscles are normal. Orbital apices normal. Slight enlargement of the right lacrimal gland of uncertain cause and significance. No evidence of fluid collection.       Slight fullness of the right lacrimal gland without evidence of fluid collection. CT of the paranasal sinuses, mastoid sinuses and orbits is within normal limits.   MACRO: none   Signed by: Torres Henson 9/21/2024 10:50 AM Dictation workstation:   [id number]       Results for orders placed or performed during the hospital encounter of 09/18/24 (from the past 24 hour(s))   POCT GLUCOSE   Result Value Ref Range    POCT Glucose 110 (H) 74 - 99 mg/dL   Renal Function Panel   Result Value Ref Range    Glucose 110 (H) 74 - 99 mg/dL    Sodium 138 136 - 145 mmol/L    Potassium 3.7 3.5 - 5.3 mmol/L    Chloride 99 98 - 107 mmol/L    Bicarbonate 29 21 - 32 mmol/L    Anion Gap 14 10 - 20 mmol/L    Urea Nitrogen 61 (H) 6 - 23 mg/dL    Creatinine 5.30 (H) 0.50 - 1.30 mg/dL    eGFR 12 (L) >60 mL/min/1.73m*2    Calcium 6.8 (L) 8.6 - 10.3 mg/dL    Phosphorus 5.5 (H) 2.5 - 4.9 mg/dL    Albumin 2.4 (L) 3.4 - 5.0 g/dL   CBC and Auto Differential   Result Value Ref  Range    WBC 12.2 (H) 4.4 - 11.3 x10*3/uL    nRBC 0.0 0.0 - 0.0 /100 WBCs    RBC 3.18 (L) 4.50 - 5.90 x10*6/uL    Hemoglobin 8.3 (L) 13.5 - 17.5 g/dL    Hematocrit 28.2 (L) 41.0 - 52.0 %    MCV 89 80 - 100 fL    MCH 26.1 26.0 - 34.0 pg    MCHC 29.4 (L) 32.0 - 36.0 g/dL    RDW 15.3 (H) 11.5 - 14.5 %    Platelets 270 150 - 450 x10*3/uL    Neutrophils % 73.2 40.0 - 80.0 %    Immature Granulocytes %, Automated 0.4 0.0 - 0.9 %    Lymphocytes % 7.6 13.0 - 44.0 %    Monocytes % 9.0 2.0 - 10.0 %    Eosinophils % 9.6 0.0 - 6.0 %    Basophils % 0.2 0.0 - 2.0 %    Neutrophils Absolute 8.96 (H) 1.20 - 7.70 x10*3/uL    Immature Granulocytes Absolute, Automated 0.05 0.00 - 0.70 x10*3/uL    Lymphocytes Absolute 0.93 (L) 1.20 - 4.80 x10*3/uL    Monocytes Absolute 1.10 (H) 0.10 - 1.00 x10*3/uL    Eosinophils Absolute 1.17 (H) 0.00 - 0.70 x10*3/uL    Basophils Absolute 0.03 0.00 - 0.10 x10*3/uL   POCT GLUCOSE   Result Value Ref Range    POCT Glucose 138 (H) 74 - 99 mg/dL   POCT GLUCOSE   Result Value Ref Range    POCT Glucose 177 (H) 74 - 99 mg/dL   CBC and Auto Differential   Result Value Ref Range    WBC 10.3 4.4 - 11.3 x10*3/uL    nRBC 0.0 0.0 - 0.0 /100 WBCs    RBC 3.04 (L) 4.50 - 5.90 x10*6/uL    Hemoglobin 8.0 (L) 13.5 - 17.5 g/dL    Hematocrit 27.6 (L) 41.0 - 52.0 %    MCV 91 80 - 100 fL    MCH 26.3 26.0 - 34.0 pg    MCHC 29.0 (L) 32.0 - 36.0 g/dL    RDW 15.5 (H) 11.5 - 14.5 %    Platelets 224 150 - 450 x10*3/uL    Neutrophils % 69.8 40.0 - 80.0 %    Immature Granulocytes %, Automated 0.5 0.0 - 0.9 %    Lymphocytes % 10.3 13.0 - 44.0 %    Monocytes % 9.4 2.0 - 10.0 %    Eosinophils % 9.8 0.0 - 6.0 %    Basophils % 0.2 0.0 - 2.0 %    Neutrophils Absolute 7.16 1.20 - 7.70 x10*3/uL    Immature Granulocytes Absolute, Automated 0.05 0.00 - 0.70 x10*3/uL    Lymphocytes Absolute 1.06 (L) 1.20 - 4.80 x10*3/uL    Monocytes Absolute 0.96 0.10 - 1.00 x10*3/uL    Eosinophils Absolute 1.01 (H) 0.00 - 0.70 x10*3/uL    Basophils Absolute  0.02 0.00 - 0.10 x10*3/uL   Renal Function Panel   Result Value Ref Range    Glucose 101 (H) 74 - 99 mg/dL    Sodium 140 136 - 145 mmol/L    Potassium 3.7 3.5 - 5.3 mmol/L    Chloride 100 98 - 107 mmol/L    Bicarbonate 28 21 - 32 mmol/L    Anion Gap 16 10 - 20 mmol/L    Urea Nitrogen 65 (H) 6 - 23 mg/dL    Creatinine 5.49 (H) 0.50 - 1.30 mg/dL    eGFR 11 (L) >60 mL/min/1.73m*2    Calcium 7.0 (L) 8.6 - 10.3 mg/dL    Phosphorus 5.9 (H) 2.5 - 4.9 mg/dL    Albumin 2.5 (L) 3.4 - 5.0 g/dL   POCT GLUCOSE   Result Value Ref Range    POCT Glucose 97 74 - 99 mg/dL    Scheduled medications  acetaminophen, 975 mg, oral, TID  [Held by provider] acetaZOLAMIDE, 250 mg, oral, Daily  albuterol, 2.5 mg, nebulization, TID  amLODIPine, 10 mg, oral, Daily  ampicillin-sulbactam, 3 g, intravenous, q12h  aspirin, 81 mg, oral, Daily  atorvastatin, 80 mg, oral, Daily  enoxaparin, 30 mg, subcutaneous, q24h  fludrocortisone, 0.1 mg, oral, Daily  [Held by provider] furosemide, 60 mg, intravenous, q12h  gabapentin, 300 mg, oral, Nightly  hydrALAZINE, 50 mg, oral, TID  insulin glargine, 2 Units, subcutaneous, Nightly  insulin regular, 0-10 Units, subcutaneous, TID  iron sucrose, 300 mg, intravenous, Daily  isosorbide dinitrate, 20 mg, oral, TID  lidocaine, 1 patch, transdermal, Daily  metoprolol tartrate, 50 mg, oral, BID  pantoprazole, 40 mg, oral, Daily before breakfast   Or  pantoprazole, 40 mg, intravenous, Daily before breakfast  sodium bicarbonate, 1,300 mg, oral, q12h  venlafaxine XR, 150 mg, oral, Daily      Continuous medications     PRN medications  PRN medications: albuterol, dextrose, dextrose, glucagon, glucagon, glucagon, ondansetron **OR** ondansetron, oxyCODONE, oxygen, oxygen, polyethylene glycol     Assessment/Plan      59 year old male with multiple medical issues, acute hypoxic and hypercapnic respiratory failure, severe IDANIA on CKD IV, felt to be related to cardiorenal syndrome.      Palliative goals:  wife reports patient's  "health began to decline about 4 years ago with his amputation but really worsened starting at about May 2023 after gallbladder surgery.  From hospital he went to rehab, and has not really been ambulatory since.  He can only transfer with assist and nothing else.  She reports they have had discussions in the past and he reported he never wanted to be \"hooked up to machines\" or be brought back with cognitive impairment.  Patient's mental state today is waxing and waning and he cannot follow complex conversations.  Wife agreeable to DNR/DNI  -code status changed to DNR/DNI  -will re-address with patient should he become more alert    IDANIA on top of CKD IV:  diuretics had been on hold with worsening of number, renal now adding them back  -defer tx to primary and renal team  -happy to assist with any discussions with family about dialysis     I spent 30 minutes in the professional and overall care of this patient related to ACP in addition to other time spent in assessment, chart review, and coordination of care       Mikaela Garcia, APRN-CNP  "

## 2024-09-24 ENCOUNTER — DOCUMENTATION (OUTPATIENT)
Dept: PHYSICAL THERAPY | Facility: CLINIC | Age: 59
End: 2024-09-24

## 2024-09-24 ENCOUNTER — APPOINTMENT (OUTPATIENT)
Dept: PRIMARY CARE | Facility: CLINIC | Age: 59
End: 2024-09-24
Payer: COMMERCIAL

## 2024-09-24 DIAGNOSIS — M25.511 CHRONIC PAIN OF BOTH SHOULDERS: ICD-10-CM

## 2024-09-24 DIAGNOSIS — G89.29 CHRONIC PAIN OF BOTH SHOULDERS: ICD-10-CM

## 2024-09-24 DIAGNOSIS — Z96.612 STATUS POST TOTAL REPLACEMENT OF LEFT SHOULDER: Primary | ICD-10-CM

## 2024-09-24 DIAGNOSIS — M25.512 CHRONIC PAIN OF BOTH SHOULDERS: ICD-10-CM

## 2024-09-24 LAB
25(OH)D3 SERPL-MCNC: 45 NG/ML (ref 30–100)
ALBUMIN SERPL BCP-MCNC: 2.7 G/DL (ref 3.4–5)
ANION GAP SERPL CALC-SCNC: 14 MMOL/L (ref 10–20)
BASOPHILS # BLD AUTO: 0.02 X10*3/UL (ref 0–0.1)
BASOPHILS NFR BLD AUTO: 0.2 %
BUN SERPL-MCNC: 64 MG/DL (ref 6–23)
CALCIUM SERPL-MCNC: 7 MG/DL (ref 8.6–10.3)
CHLORIDE SERPL-SCNC: 101 MMOL/L (ref 98–107)
CO2 SERPL-SCNC: 29 MMOL/L (ref 21–32)
CREAT SERPL-MCNC: 5.11 MG/DL (ref 0.5–1.3)
EGFRCR SERPLBLD CKD-EPI 2021: 12 ML/MIN/1.73M*2
EOSINOPHIL # BLD AUTO: 0.87 X10*3/UL (ref 0–0.7)
EOSINOPHIL NFR BLD AUTO: 7.7 %
ERYTHROCYTE [DISTWIDTH] IN BLOOD BY AUTOMATED COUNT: 15.8 % (ref 11.5–14.5)
FERRITIN SERPL-MCNC: 388 NG/ML (ref 20–300)
GLUCOSE BLD MANUAL STRIP-MCNC: 87 MG/DL (ref 74–99)
GLUCOSE BLD MANUAL STRIP-MCNC: 96 MG/DL (ref 74–99)
GLUCOSE BLD MANUAL STRIP-MCNC: 99 MG/DL (ref 74–99)
GLUCOSE SERPL-MCNC: 91 MG/DL (ref 74–99)
HCT VFR BLD AUTO: 24.3 % (ref 41–52)
HGB BLD-MCNC: 7.1 G/DL (ref 13.5–17.5)
IMM GRANULOCYTES # BLD AUTO: 0.05 X10*3/UL (ref 0–0.7)
IMM GRANULOCYTES NFR BLD AUTO: 0.4 % (ref 0–0.9)
IRON SATN MFR SERPL: 55 % (ref 25–45)
IRON SERPL-MCNC: 93 UG/DL (ref 35–150)
LYMPHOCYTES # BLD AUTO: 1.12 X10*3/UL (ref 1.2–4.8)
LYMPHOCYTES NFR BLD AUTO: 9.9 %
MCH RBC QN AUTO: 26.7 PG (ref 26–34)
MCHC RBC AUTO-ENTMCNC: 29.2 G/DL (ref 32–36)
MCV RBC AUTO: 91 FL (ref 80–100)
MONOCYTES # BLD AUTO: 1.16 X10*3/UL (ref 0.1–1)
MONOCYTES NFR BLD AUTO: 10.3 %
NEUTROPHILS # BLD AUTO: 8.04 X10*3/UL (ref 1.2–7.7)
NEUTROPHILS NFR BLD AUTO: 71.5 %
NRBC BLD-RTO: 0 /100 WBCS (ref 0–0)
PHOSPHATE SERPL-MCNC: 5.2 MG/DL (ref 2.5–4.9)
PLATELET # BLD AUTO: 205 X10*3/UL (ref 150–450)
POTASSIUM SERPL-SCNC: 3.5 MMOL/L (ref 3.5–5.3)
PTH-INTACT SERPL-MCNC: 231.8 PG/ML (ref 18.5–88)
RBC # BLD AUTO: 2.66 X10*6/UL (ref 4.5–5.9)
SODIUM SERPL-SCNC: 140 MMOL/L (ref 136–145)
TIBC SERPL-MCNC: 169 UG/DL (ref 240–445)
UIBC SERPL-MCNC: 76 UG/DL (ref 110–370)
WBC # BLD AUTO: 11.3 X10*3/UL (ref 4.4–11.3)

## 2024-09-24 PROCEDURE — 2500000001 HC RX 250 WO HCPCS SELF ADMINISTERED DRUGS (ALT 637 FOR MEDICARE OP): Performed by: INTERNAL MEDICINE

## 2024-09-24 PROCEDURE — 2500000005 HC RX 250 GENERAL PHARMACY W/O HCPCS: Performed by: INTERNAL MEDICINE

## 2024-09-24 PROCEDURE — 99232 SBSQ HOSP IP/OBS MODERATE 35: CPT | Performed by: NURSE PRACTITIONER

## 2024-09-24 PROCEDURE — 85025 COMPLETE CBC W/AUTO DIFF WBC: CPT | Performed by: HOSPITALIST

## 2024-09-24 PROCEDURE — 94668 MNPJ CHEST WALL SBSQ: CPT

## 2024-09-24 PROCEDURE — 1200000002 HC GENERAL ROOM WITH TELEMETRY DAILY

## 2024-09-24 PROCEDURE — 9420000001 HC RT PATIENT EDUCATION 5 MIN

## 2024-09-24 PROCEDURE — 82306 VITAMIN D 25 HYDROXY: CPT | Mod: AHULAB | Performed by: INTERNAL MEDICINE

## 2024-09-24 PROCEDURE — 36415 COLL VENOUS BLD VENIPUNCTURE: CPT | Performed by: HOSPITALIST

## 2024-09-24 PROCEDURE — 80069 RENAL FUNCTION PANEL: CPT | Performed by: HOSPITALIST

## 2024-09-24 PROCEDURE — 2500000002 HC RX 250 W HCPCS SELF ADMINISTERED DRUGS (ALT 637 FOR MEDICARE OP, ALT 636 FOR OP/ED): Performed by: INTERNAL MEDICINE

## 2024-09-24 PROCEDURE — 94640 AIRWAY INHALATION TREATMENT: CPT

## 2024-09-24 PROCEDURE — 2500000004 HC RX 250 GENERAL PHARMACY W/ HCPCS (ALT 636 FOR OP/ED): Performed by: INTERNAL MEDICINE

## 2024-09-24 PROCEDURE — 83540 ASSAY OF IRON: CPT | Performed by: INTERNAL MEDICINE

## 2024-09-24 PROCEDURE — 2500000001 HC RX 250 WO HCPCS SELF ADMINISTERED DRUGS (ALT 637 FOR MEDICARE OP): Performed by: HOSPITALIST

## 2024-09-24 PROCEDURE — 82728 ASSAY OF FERRITIN: CPT | Mod: AHULAB | Performed by: INTERNAL MEDICINE

## 2024-09-24 PROCEDURE — 82947 ASSAY GLUCOSE BLOOD QUANT: CPT

## 2024-09-24 PROCEDURE — 99232 SBSQ HOSP IP/OBS MODERATE 35: CPT | Performed by: CLINICAL NURSE SPECIALIST

## 2024-09-24 PROCEDURE — 2500000004 HC RX 250 GENERAL PHARMACY W/ HCPCS (ALT 636 FOR OP/ED): Performed by: HOSPITALIST

## 2024-09-24 PROCEDURE — 99232 SBSQ HOSP IP/OBS MODERATE 35: CPT | Performed by: HOSPITALIST

## 2024-09-24 ASSESSMENT — COGNITIVE AND FUNCTIONAL STATUS - GENERAL
WALKING IN HOSPITAL ROOM: TOTAL
MOBILITY SCORE: 9
PERSONAL GROOMING: A LITTLE
STANDING UP FROM CHAIR USING ARMS: TOTAL
TOILETING: A LOT
MOVING FROM LYING ON BACK TO SITTING ON SIDE OF FLAT BED WITH BEDRAILS: A LOT
DAILY ACTIVITIY SCORE: 10
DRESSING REGULAR LOWER BODY CLOTHING: A LITTLE
MOVING TO AND FROM BED TO CHAIR: TOTAL
PERSONAL GROOMING: A LITTLE
HELP NEEDED FOR BATHING: A LITTLE
STANDING UP FROM CHAIR USING ARMS: TOTAL
MOVING TO AND FROM BED TO CHAIR: A LOT
STANDING UP FROM CHAIR USING ARMS: TOTAL
TURNING FROM BACK TO SIDE WHILE IN FLAT BAD: A LOT
CLIMB 3 TO 5 STEPS WITH RAILING: TOTAL
MOVING FROM LYING ON BACK TO SITTING ON SIDE OF FLAT BED WITH BEDRAILS: A LOT
CLIMB 3 TO 5 STEPS WITH RAILING: TOTAL
PERSONAL GROOMING: A LITTLE
MOVING FROM LYING ON BACK TO SITTING ON SIDE OF FLAT BED WITH BEDRAILS: A LOT
TOILETING: A LOT
DAILY ACTIVITIY SCORE: 18
DRESSING REGULAR UPPER BODY CLOTHING: TOTAL
WALKING IN HOSPITAL ROOM: TOTAL
HELP NEEDED FOR BATHING: TOTAL
MOVING TO AND FROM BED TO CHAIR: A LOT
TURNING FROM BACK TO SIDE WHILE IN FLAT BAD: TOTAL
EATING MEALS: A LITTLE
DAILY ACTIVITIY SCORE: 18
CLIMB 3 TO 5 STEPS WITH RAILING: TOTAL
TOILETING: TOTAL
DRESSING REGULAR LOWER BODY CLOTHING: A LITTLE
DRESSING REGULAR UPPER BODY CLOTHING: A LITTLE
DRESSING REGULAR UPPER BODY CLOTHING: A LITTLE
WALKING IN HOSPITAL ROOM: TOTAL
HELP NEEDED FOR BATHING: A LITTLE
MOBILITY SCORE: 7
MOBILITY SCORE: 9
DRESSING REGULAR LOWER BODY CLOTHING: TOTAL
TURNING FROM BACK TO SIDE WHILE IN FLAT BAD: A LOT

## 2024-09-24 ASSESSMENT — PAIN - FUNCTIONAL ASSESSMENT
PAIN_FUNCTIONAL_ASSESSMENT: 0-10
PAIN_FUNCTIONAL_ASSESSMENT: WONG-BAKER FACES
PAIN_FUNCTIONAL_ASSESSMENT: 0-10

## 2024-09-24 ASSESSMENT — PAIN SCALES - WONG BAKER: WONGBAKER_NUMERICALRESPONSE: NO HURT

## 2024-09-24 ASSESSMENT — PAIN SCALES - GENERAL
PAINLEVEL_OUTOF10: 0 - NO PAIN
PAINLEVEL_OUTOF10: 2
PAINLEVEL_OUTOF10: 10 - WORST POSSIBLE PAIN
PAINLEVEL_OUTOF10: 3

## 2024-09-24 ASSESSMENT — PAIN DESCRIPTION - ORIENTATION: ORIENTATION: MID

## 2024-09-24 ASSESSMENT — PAIN DESCRIPTION - LOCATION: LOCATION: BACK

## 2024-09-24 NOTE — NURSING NOTE
Four eye skin check completed with [facility] CTA, notes state there is a pressure injury to his buttock but his skin looks intact and no dressing applied, double nchecked by rolling patient both ways and still didn't see anything on his buttocks.

## 2024-09-24 NOTE — PROGRESS NOTES
09/24/24 4691   Discharge Planning   Expected Discharge Disposition SNF        Revisited patient at bedside to see if he made decision about SNF choices. He was waiting for his wife to come in and will make choices. Will follow up for choices.

## 2024-09-24 NOTE — PROGRESS NOTES
Occupational Therapy                 Therapy Communication Note    Patient Name: Humphrey Waddell  MRN: [id number]  Department: [place] [address]  Room: [address] [place] [address]  Today's Date: 9/24/2024     Discipline: Occupational Therapy    Missed Visit Reason: Missed Visit Reason: Patient refused (Per pt not feeling well, does not want therapy at this time)    Missed Time: Attempt

## 2024-09-24 NOTE — PROGRESS NOTES
"Nutrition Note    Late entry:   RDN consulted per admission risk screen.     Chart reviewed events noted.   Dietary Orders (From admission, onward)       Start     Ordered    09/18/24 0250  Adult diet Cardiac, Consistent Carb; CCD 90 gm/meal; 70 gm fat; 2 - 3 grams Sodium  Diet effective now        Question Answer Comment   Diet type Cardiac    Diet type Consistent Carb    Carb diet selection: CCD 90 gm/meal    Fat restriction: 70 gm fat    Sodium restriction: 2 - 3 grams Sodium        09/18/24 0251                  Oral intake overall greater than 75% of meals per chart review, however more lethargic recently.   Skin: Intact   Edema: RLE  (L AKA)   Skin: intact     Visit Vitals  /68 (BP Location: Left arm, Patient Position: Lying)   Pulse 77   Temp 36.6 °C (97.8 °F) (Oral)   Resp 19   Ht 1.93 m (6' 4\")   Wt 131 kg (288 lb 12.8 oz)   SpO2 96%   BMI 35.15 kg/m²   Smoking Status Former   BSA 2.65 m²     Wt: 115.2 kg 7/9/24      Latest Reference Range & Units 09/24/24 04:58   GLUCOSE 74 - 99 mg/dL 91   SODIUM 136 - 145 mmol/L 140   POTASSIUM 3.5 - 5.3 mmol/L 3.5   CHLORIDE 98 - 107 mmol/L 101   Bicarbonate 21 - 32 mmol/L 29   Anion Gap 10 - 20 mmol/L 14   Blood Urea Nitrogen 6 - 23 mg/dL 64 (H)   Creatinine 0.50 - 1.30 mg/dL 5.11 (H)   EGFR >60 mL/min/1.73m*2 12 (L)   Calcium 8.6 - 10.3 mg/dL 7.0 (L)   PHOSPHORUS 2.5 - 4.9 mg/dL 5.2 (H)   Albumin 3.4 - 5.0 g/dL 2.7 (L)   IRON 35 - 150 ug/dL 93   TIBC 240 - 445 ug/dL 169 (L)   UIBC 110 - 370 ug/dL 76 (L)   % Saturation 25 - 45 % 55 (H)   (H): Data is abnormally high  (L): Data is abnormally low    Recommend liberalize diet, discontinue cardiac restriction and change to 2 g Na restriction. Also recommend add oral nutrition supplement Ensure Clear with meals if oral intake suboptimal.     RDN to follow peripherally, full assessment not warranted at this time. Please reconsult if indicated.   "

## 2024-09-24 NOTE — PROGRESS NOTES
Humphrey Waddell is a 59 y.o. male on day 6 of admission presenting with CHF (congestive heart failure), NYHA class I, acute on chronic, combined.      Subjective   Seen and examined.  ANA. UOP of 3.025 L with oral torsemide twice daily.  Chart/labs/meds/notes/imaging/VS reviewed.         Objective          Vitals 24HR  Heart Rate:  [77-84]   Temp:  [36.6 °C (97.8 °F)-37.2 °C (99 °F)]   Resp:  [16-20]   BP: (122-174)/(68-79)   SpO2:  [92 %-98 %]     Intake/Output last 3 Shifts:    Intake/Output Summary (Last 24 hours) at 9/24/2024 1142  Last data filed at 9/24/2024 1042  Gross per 24 hour   Intake 200 ml   Output 4050 ml   Net -3850 ml       Physical Exam  Constitutional:       Comments: Awake, alert and oriented x 3.  In no acute distress.  HENT:      Head: Normocephalic and atraumatic.      Nose: Nose normal.      Mouth: Mucous membranes are moist.      Pharynx: Oropharynx is clear.   Eyes:      Conjunctiva/sclera: Conjunctivae normal.      Pupils: Pupils are equal, round, and reactive to light.   Cardiovascular:      Rate and Rhythm: Regular rhythm.      Heart sounds: Normal heart sounds.   Pulmonary:      Effort: Diminished bibasilar breath sounds.  Abdominal:      General: Bowel sounds are normal.      Palpations: Abdomen is soft.   Genitourinary:     Comments: Lee catheter draining yellow urine  Musculoskeletal:         General: No joint swelling. Lower limb edema. L AKA.   Skin:     General: Skin is warm and dry.  No rashes or lesions to exposed skin.  Neurological:      Mental Status: Cranial nerves II through XII grossly intact.  No asterixis.  Psychiatric:         Mood: Normal mood.        Behavior: Behavior is normal.       Scheduled Medications  acetaminophen, 975 mg, oral, TID  albuterol, 2.5 mg, nebulization, TID  amLODIPine, 10 mg, oral, Daily  ampicillin-sulbactam, 3 g, intravenous, q12h  aspirin, 81 mg, oral, Daily  atorvastatin, 80 mg, oral, Daily  fludrocortisone, 0.1 mg, oral, Daily  gabapentin, 300  mg, oral, Nightly  heparin, 5,000 Units, subcutaneous, q8h  hydrALAZINE, 50 mg, oral, TID  insulin glargine, 2 Units, subcutaneous, Nightly  insulin regular, 0-10 Units, subcutaneous, TID  isosorbide dinitrate, 20 mg, oral, TID  lidocaine, 1 patch, transdermal, Daily  metoprolol tartrate, 50 mg, oral, BID  pantoprazole, 40 mg, oral, Daily before breakfast   Or  pantoprazole, 40 mg, intravenous, Daily before breakfast  torsemide, 60 mg, oral, BID  venlafaxine XR, 150 mg, oral, Daily      Continuous medications       PRN medications: albuterol, dextrose, dextrose, glucagon, glucagon, glucagon, ondansetron **OR** ondansetron, oxyCODONE, oxygen, oxygen, polyethylene glycol     Relevant Results  Results from last 7 days   Lab Units 09/24/24  0458 09/23/24  0649 09/22/24  1239   WBC AUTO x10*3/uL 11.3 10.3 12.2*   HEMOGLOBIN g/dL 7.1* 8.0* 8.3*   HEMATOCRIT % 24.3* 27.6* 28.2*   PLATELETS AUTO x10*3/uL 205 224 270   NEUTROS PCT AUTO % 71.5 69.8 73.2   LYMPHS PCT AUTO % 9.9 10.3 7.6   MONOS PCT AUTO % 10.3 9.4 9.0   EOS PCT AUTO % 7.7 9.8 9.6     Results from last 7 days   Lab Units 09/24/24  0458 09/23/24  0649 09/22/24  1239   SODIUM mmol/L 140 140 138   POTASSIUM mmol/L 3.5 3.7 3.7   CHLORIDE mmol/L 101 100 99   CO2 mmol/L 29 28 29   BUN mg/dL 64* 65* 61*   CREATININE mg/dL 5.11* 5.49* 5.30*   GLUCOSE mg/dL 91 101* 110*   CALCIUM mg/dL 7.0* 7.0* 6.8*       CT maxillofacial bones wo IV contrast   Final Result   Slight fullness of the right lacrimal gland without evidence of fluid   collection. CT of the paranasal sinuses, mastoid sinuses and orbits   is within normal limits.        MACRO:   none        Signed by: Torres Henson 9/21/2024 10:50 AM   Dictation workstation:   [id number]      CT 3D reconstruction   Final Result   Slight fullness of the right lacrimal gland without evidence of fluid   collection. CT of the paranasal sinuses, mastoid sinuses and orbits   is within normal limits.        MACRO:   none         Signed by: Torres Henson 9/21/2024 10:50 AM   Dictation workstation:   [id number]      Vascular US upper extremity venous duplex right   Final Result      Transthoracic Echo (TTE) Complete   Final Result      US thoracentesis   Final Result   Uneventful thoracentesis, as detailed above: Right Pleural space,   1000 mL        Performed and dictated at [facility].        Signed by: Diana Serrano 9/19/2024 11:37 AM   Dictation workstation:   [id number]               Assessment/Plan   This patient currently has cardiac telemetry ordered; if you would like to modify or discontinue the telemetry order, click here to go to the orders activity to modify/discontinue the order.    Humphrey Waddell is a 59 y.o. male with a past medical history of left above-the-knee amputation due to PAD, coronary artery disease with a history of CABG, carotid artery disease, stroke, depression, type 2 diabetes, GERD, hyperlipidemia, hypertension, osteoarthritis, anemia of chronic disease, and stage G4 chronic kidney disease with a fluctuating creatinine baseline between 2.5 to just above 3 mg/deciliter, multiple acute kidney injuries and prior nephrotic range proteinuria who presented to an outside hospital on 9/16 for evaluation of shortness of breath.      Per the notes, he reportedly was taking 20 mg of torsemide instead of the prescribed 60 mg daily.  His weight is up 34 pounds to 288 now from 254 lbs in early July.  There was a large right pleural effusion and small loculation on the left seen on CT imaging.  Multifocal airspace disease concerning for pneumonia as well.  No hydronephrosis with perinephric stranding noted.  He was transferred from [facility] to [facility].  He underwent a 1 L paracentesis with clear madalyn fluid removed on 9/19.  He received IVP Lasix up until 9/20.  Nephrology is consulted for renal care.    Mr. Waddell is at high risk to progress to kidney replacement therapy having nephrotic  range proteinuria + multiple acute kidney injuries.  His creatinine was 3.3 mg as a deciliter when he presented.  This is the top end of his dilma.  He received IV Lasix.  His creatinine did rise with diuresis up to 4 mg/dL. IV Lasix were held.  We see that his creatinine continued to rise with associated oliguria off the diuretic.  He remains in heart failure. Thus I placed him on oral torsemide 60 mg twice daily for the time being (usual dose is 60 mg daily). I sent iron stores, ferritin, intact PTH and vitamin D. Of note, he does not have a paraprotein.  No acute indication for kidney replacement therapy at the moment. Otherwise, I held the sodium bicarbonate supplement and acetazolamide. His creatinine shows some improvement today with diuresis. Trend his RP.  Will follow.    Principal Problem:    CHF (congestive heart failure), NYHA class I, acute on chronic, combined (Multi)       I spent 45 minutes in the professional and overall care of this patient.      Darryl Lopez, DO

## 2024-09-24 NOTE — PROGRESS NOTES
"Humphrey Waddell is a 59 y.o. male on day 6 of admission presenting with CHF (congestive heart failure), NYHA class I, acute on chronic, combined.    Subjective   Seen at bedside. Wife not here, spoke with her via phone call. 3L UOP. Cr improving. More awake and alert today.       Objective     Physical Exam  Vitals reviewed.   HENT:      Head: Normocephalic.      Right Ear: Tympanic membrane normal.      Nose: Nose normal.      Mouth/Throat:      Mouth: Mucous membranes are moist.   Cardiovascular:      Rate and Rhythm: Normal rate and regular rhythm.   Pulmonary:      Comments: Bibasilar crackles.  Abdominal:      General: Abdomen is flat. Bowel sounds are normal.      Palpations: Abdomen is soft.   Musculoskeletal:      Comments: Left AKA, right lower extremity with edema   Skin:     General: Skin is warm.      Capillary Refill: Capillary refill takes less than 2 seconds.   Neurological:      General: No focal deficit present.      Mental Status: He is alert and oriented to person, place, and time.         Last Recorded Vitals  Blood pressure 136/66, pulse 67, temperature 36.1 °C (97 °F), temperature source Temporal, resp. rate 20, height 1.93 m (6' 4\"), weight 131 kg (288 lb 12.8 oz), SpO2 95%.  Intake/Output last 3 Shifts:  I/O last 3 completed shifts:  In: 400 (3.1 mL/kg) [P.O.:100; IV Piggyback:300]  Out: 3575 (27.3 mL/kg) [Urine:3575 (0.8 mL/kg/hr)]  Weight: 131 kg     Relevant Results  Results for orders placed or performed during the hospital encounter of 09/18/24 (from the past 24 hour(s))   Parathyroid Hormone, Intact   Result Value Ref Range    Parathyroid Hormone, Intact 231.8 (H) 18.5 - 88.0 pg/mL   POCT GLUCOSE   Result Value Ref Range    POCT Glucose 140 (H) 74 - 99 mg/dL   POCT GLUCOSE   Result Value Ref Range    POCT Glucose 130 (H) 74 - 99 mg/dL   CBC and Auto Differential   Result Value Ref Range    WBC 11.3 4.4 - 11.3 x10*3/uL    nRBC 0.0 0.0 - 0.0 /100 WBCs    RBC 2.66 (L) 4.50 - 5.90 x10*6/uL    " Hemoglobin 7.1 (L) 13.5 - 17.5 g/dL    Hematocrit 24.3 (L) 41.0 - 52.0 %    MCV 91 80 - 100 fL    MCH 26.7 26.0 - 34.0 pg    MCHC 29.2 (L) 32.0 - 36.0 g/dL    RDW 15.8 (H) 11.5 - 14.5 %    Platelets 205 150 - 450 x10*3/uL    Neutrophils % 71.5 40.0 - 80.0 %    Immature Granulocytes %, Automated 0.4 0.0 - 0.9 %    Lymphocytes % 9.9 13.0 - 44.0 %    Monocytes % 10.3 2.0 - 10.0 %    Eosinophils % 7.7 0.0 - 6.0 %    Basophils % 0.2 0.0 - 2.0 %    Neutrophils Absolute 8.04 (H) 1.20 - 7.70 x10*3/uL    Immature Granulocytes Absolute, Automated 0.05 0.00 - 0.70 x10*3/uL    Lymphocytes Absolute 1.12 (L) 1.20 - 4.80 x10*3/uL    Monocytes Absolute 1.16 (H) 0.10 - 1.00 x10*3/uL    Eosinophils Absolute 0.87 (H) 0.00 - 0.70 x10*3/uL    Basophils Absolute 0.02 0.00 - 0.10 x10*3/uL   Renal Function Panel   Result Value Ref Range    Glucose 91 74 - 99 mg/dL    Sodium 140 136 - 145 mmol/L    Potassium 3.5 3.5 - 5.3 mmol/L    Chloride 101 98 - 107 mmol/L    Bicarbonate 29 21 - 32 mmol/L    Anion Gap 14 10 - 20 mmol/L    Urea Nitrogen 64 (H) 6 - 23 mg/dL    Creatinine 5.11 (H) 0.50 - 1.30 mg/dL    eGFR 12 (L) >60 mL/min/1.73m*2    Calcium 7.0 (L) 8.6 - 10.3 mg/dL    Phosphorus 5.2 (H) 2.5 - 4.9 mg/dL    Albumin 2.7 (L) 3.4 - 5.0 g/dL   Iron and TIBC   Result Value Ref Range    Iron 93 35 - 150 ug/dL    UIBC 76 (L) 110 - 370 ug/dL    TIBC 169 (L) 240 - 445 ug/dL    % Saturation 55 (H) 25 - 45 %   POCT GLUCOSE   Result Value Ref Range    POCT Glucose 99 74 - 99 mg/dL   POCT GLUCOSE   Result Value Ref Range    POCT Glucose 96 74 - 99 mg/dL       Imaging Results    Ct chest/abd/pelvis:  IMPRESSION:  Large right pleural effusion and small loculated left pleural effusion  at the left lung apex with multifocal airspace disease in the left  lung, likely atelectasis and possibly pneumonia in the appropriate  clinical setting.  Pronounced cardiomegaly with question of mild systemic anemia.  Mild to moderate concentric wall thickening of the  rectosigmoid colon  suggesting a low-grade acute infectious or inflammatory distal  colitis/proctitis.  Decompressed urinary bladder with mild concentric wall thickening  which may be due to underdistention however mild cystitis is a  possibility in the upper clinical setting, correlate with urinalysis.  Moderate-sized fluid filled left inguinal hernia along with a small  fat-containing right inguinal hernia.    Head ct:  IMPRESSION:  No acute intracranial hemorrhage or mass effect.      Periapical lucencies noted consistent with abscesses. Dental  follow-up suggested.      Mild prominence of the lateral and 3rd ventricles which could be  related to central white matter loss versus normal pressure  hydrocephalus. Clinical correlation suggested.      Correlation for left-sided otitis media suggested.      Remote left thalamic lacunar infarct.    Medications:  acetaminophen, 975 mg, oral, TID  albuterol, 2.5 mg, nebulization, TID  amLODIPine, 10 mg, oral, Daily  ampicillin-sulbactam, 3 g, intravenous, q12h  aspirin, 81 mg, oral, Daily  atorvastatin, 80 mg, oral, Daily  fludrocortisone, 0.1 mg, oral, Daily  gabapentin, 300 mg, oral, Nightly  heparin, 5,000 Units, subcutaneous, q8h  hydrALAZINE, 50 mg, oral, TID  insulin glargine, 2 Units, subcutaneous, Nightly  insulin regular, 0-10 Units, subcutaneous, TID  isosorbide dinitrate, 20 mg, oral, TID  lidocaine, 1 patch, transdermal, Daily  metoprolol tartrate, 50 mg, oral, BID  pantoprazole, 40 mg, oral, Daily before breakfast   Or  pantoprazole, 40 mg, intravenous, Daily before breakfast  torsemide, 60 mg, oral, BID  venlafaxine XR, 150 mg, oral, Daily       PRN medications: albuterol, dextrose, dextrose, glucagon, glucagon, glucagon, ondansetron **OR** ondansetron, oxyCODONE, oxygen, oxygen, polyethylene glycol     Assessment/Plan   1.  Acute heart failure with preserved EF, most recent echo in May 2024 had an EF of 55%  -Cardiology and Nephrology consulted. Continue  diuresis with torsemide 60 mg bid. Monitor I/Os, daily weight, bmp.    2.CAD status post CABG  -continue asa/atorvastatin/metoprolol/imdur    3. DM  -continue lantus and SSI    4. Pneumonia  - on iv unasyn; SLP without evidence of aspiration; rec regular diet  - Will need repeat chest CT in 8 to 12 weeks to ensure resolution of opacities     5. IDANIA on CKDV  -nephrology following; Cr worsening with IV fluids and holding diuretics. Was discussed with nephrology/cardiology on 9/23 and plan was to restart diuretics. Cr improving with diuresis.  - on florinef for recurrent hyperkalemia     6. PVD with left AKA  -continue asa/atorvastatin    7. Bilateral effusion suspect secondary to #1  -had right thoracentesis drained 1L this admit  -transudate likely due to CHF    8. Normocytic anemia  -hgb 7.3  -Iron sat 12% -> IV venofer    9.  Acute hypoxic respiratory failure secondary to #1 and 4  -Oxygen being weaned down to 2 L, home oxygen eval as needed but patient also needs a sleep study as an outpatient.    10. Likely PAUL/OHS  - nightly cpap    11. Goals of care- Would want dialysis if offered by nephrology. DNR/DNI per discussion with palliative care and wife today.     DVT Prophylaxis:  Lovenomiguel angel Allison DO  [facility]

## 2024-09-24 NOTE — PROGRESS NOTES
TCC followed up with patient and patients wife Charline regarding top 3 SNF choices. Patient said to call wife who was not in room. Charline will [place] and label top 3 SNF choices this evening. TCC/SW to follow up in morning. Charline will leave list at bedside for TCC to review.      09/24/24 1183   Discharge Planning   Expected Discharge Disposition SNF

## 2024-09-24 NOTE — PROGRESS NOTES
"Humphrey Waddell is a 59 y.o. male with past medical history PAD s/p left above-the-knee amputation, anemia, coronary artery disease, s/p CABG, carotid artery disease, CVA with cognitive impairment, depression, type 2 diabetes mellitus, GERD, hyperlipidemia, hypertension, osteoarthritis, peripheral arterial disease, CKD stage IV. He presented to [facility] ED 9/18 with c/o dyspnea and weakness.  He was found to have bilateral pleural effusions, right greater than left, metabolic encephalopathy, COPD and CHF exacerbations, and idania.  He was transferred to [facility] on 9/18 with dx acute hypoxic and hypercapnic respiratory failure.   Patient's respiratory status did start improving, but now diuresis impacted due to worsening IDANIA on CKD. Nephrology is following and considering dialysis.  Patient had initially wanted to be DNR/DNI, but then later stated he wanted to remain full code, hence palliative med consult     Patient underwent thoracentesis on 9/19 for 1 liters    Interval Hx and Subjective    No acute events overnight.  This morning patient reports \"my kidney's hurt\" but cannot elaborate further.  When I reposition him he states that moving hurt his kidneys, I think he may be conflating what he is hearing about his kidneys with back pain.  He denies other issues but not very conversant.   Imaging of kidneys from the other day do not show any abnormalities but do show disc disease    Objective    Blood pressure 174/68, pulse 77, temperature 36.6 °C (97.8 °F), temperature source Oral, resp. rate 19, height 1.93 m (6' 4\"), weight 131 kg (288 lb 12.8 oz), SpO2 98%.    General:  appears mildly comfortable, NAD  Neuro: alert, oriented to situation but cognition still impaired  Psych:  face appears worried  HEENT:  oral mucosa moist without exudate, tongue midline.  PERRLA, no discharge.  No thyromegaly, no LAD  Resps:  resps unlabored, lungs CTA, no cough noted  Card:  RRR, no murmurs, rubs, or gallops.  No JVD noted.  Sinus rhythm per " monitor  GI:  normal bowel sounds, soft and non tender  :  gerardo with clear yellow urine  MS:  left AKA.  Pitting edema to RLE but a bit less than yesterday  Integ:  skin pale but warm and dry overall    No results found.   Results for orders placed or performed during the hospital encounter of 09/18/24 (from the past 24 hour(s))   POCT GLUCOSE   Result Value Ref Range    POCT Glucose 154 (H) 74 - 99 mg/dL   POCT GLUCOSE   Result Value Ref Range    POCT Glucose 141 (H) 74 - 99 mg/dL   Parathyroid Hormone, Intact   Result Value Ref Range    Parathyroid Hormone, Intact 231.8 (H) 18.5 - 88.0 pg/mL   POCT GLUCOSE   Result Value Ref Range    POCT Glucose 140 (H) 74 - 99 mg/dL   POCT GLUCOSE   Result Value Ref Range    POCT Glucose 130 (H) 74 - 99 mg/dL   CBC and Auto Differential   Result Value Ref Range    WBC 11.3 4.4 - 11.3 x10*3/uL    nRBC 0.0 0.0 - 0.0 /100 WBCs    RBC 2.66 (L) 4.50 - 5.90 x10*6/uL    Hemoglobin 7.1 (L) 13.5 - 17.5 g/dL    Hematocrit 24.3 (L) 41.0 - 52.0 %    MCV 91 80 - 100 fL    MCH 26.7 26.0 - 34.0 pg    MCHC 29.2 (L) 32.0 - 36.0 g/dL    RDW 15.8 (H) 11.5 - 14.5 %    Platelets 205 150 - 450 x10*3/uL    Neutrophils % 71.5 40.0 - 80.0 %    Immature Granulocytes %, Automated 0.4 0.0 - 0.9 %    Lymphocytes % 9.9 13.0 - 44.0 %    Monocytes % 10.3 2.0 - 10.0 %    Eosinophils % 7.7 0.0 - 6.0 %    Basophils % 0.2 0.0 - 2.0 %    Neutrophils Absolute 8.04 (H) 1.20 - 7.70 x10*3/uL    Immature Granulocytes Absolute, Automated 0.05 0.00 - 0.70 x10*3/uL    Lymphocytes Absolute 1.12 (L) 1.20 - 4.80 x10*3/uL    Monocytes Absolute 1.16 (H) 0.10 - 1.00 x10*3/uL    Eosinophils Absolute 0.87 (H) 0.00 - 0.70 x10*3/uL    Basophils Absolute 0.02 0.00 - 0.10 x10*3/uL   Renal Function Panel   Result Value Ref Range    Glucose 91 74 - 99 mg/dL    Sodium 140 136 - 145 mmol/L    Potassium 3.5 3.5 - 5.3 mmol/L    Chloride 101 98 - 107 mmol/L    Bicarbonate 29 21 - 32 mmol/L    Anion Gap 14 10 - 20 mmol/L    Urea Nitrogen  64 (H) 6 - 23 mg/dL    Creatinine 5.11 (H) 0.50 - 1.30 mg/dL    eGFR 12 (L) >60 mL/min/1.73m*2    Calcium 7.0 (L) 8.6 - 10.3 mg/dL    Phosphorus 5.2 (H) 2.5 - 4.9 mg/dL    Albumin 2.7 (L) 3.4 - 5.0 g/dL   Iron and TIBC   Result Value Ref Range    Iron 93 35 - 150 ug/dL    UIBC 76 (L) 110 - 370 ug/dL    TIBC 169 (L) 240 - 445 ug/dL    % Saturation 55 (H) 25 - 45 %      Scheduled medications  acetaminophen, 975 mg, oral, TID  albuterol, 2.5 mg, nebulization, TID  amLODIPine, 10 mg, oral, Daily  ampicillin-sulbactam, 3 g, intravenous, q12h  aspirin, 81 mg, oral, Daily  atorvastatin, 80 mg, oral, Daily  fludrocortisone, 0.1 mg, oral, Daily  gabapentin, 300 mg, oral, Nightly  heparin, 5,000 Units, subcutaneous, q8h  hydrALAZINE, 50 mg, oral, TID  insulin glargine, 2 Units, subcutaneous, Nightly  insulin regular, 0-10 Units, subcutaneous, TID  isosorbide dinitrate, 20 mg, oral, TID  lidocaine, 1 patch, transdermal, Daily  metoprolol tartrate, 50 mg, oral, BID  pantoprazole, 40 mg, oral, Daily before breakfast   Or  pantoprazole, 40 mg, intravenous, Daily before breakfast  torsemide, 60 mg, oral, BID  venlafaxine XR, 150 mg, oral, Daily      Continuous medications     PRN medications  PRN medications: albuterol, dextrose, dextrose, glucagon, glucagon, glucagon, ondansetron **OR** ondansetron, oxyCODONE, oxygen, oxygen, polyethylene glycol     Dietary Orders (From admission, onward)       Start     Ordered    09/18/24 0250  Adult diet Cardiac, Consistent Carb; CCD 90 gm/meal; 70 gm fat; 2 - 3 grams Sodium  Diet effective now        Question Answer Comment   Diet type Cardiac    Diet type Consistent Carb    Carb diet selection: CCD 90 gm/meal    Fat restriction: 70 gm fat    Sodium restriction: 2 - 3 grams Sodium        09/18/24 0251                     Assessment/Plan    59 year old male with multiple medical issues; acute on chronic HFpEF, CAD, DM, PNA acute hypoxic and hypercapnic respiratory failure, severe IDANIA on CKD  IV, felt to be related to cardiorenal syndrome.       Palliative goals:  Patient still unable to participate In discussions about code status, wife elected to make him DNR/DNI  -code status changed to DNR/DNI  -will re-address with patient should he become more alert     IDANIA on top of CKD IV:  diuresing now.  Cr is slightly better, patient still confused but more alert than yesterday.  Patient reports his kidneys hurt but I think he is conflating his severe kidney disease with degenerative disc disease.  Renal following, no plan for dialysis today  -defer tx to primary and renal team  -happy to assist with any discussions with family about dialysis     Degenerative disc disease to lower back with pain:    -continue gabapentin 300 mg at HS  -continue acetaminophen 975 mg PO TID  -continue oxycodone 5 mg Q 6 hours PRN, nurse alerted to give dose  -will monitor in case presentation skews to more renal cause    Wife updated on current status via phone.    Reema Garcia APRN CNP

## 2024-09-24 NOTE — CARE PLAN
The patient's goals for the shift include Pt. will have a safe, restful and uneventful evening    The clinical goals for the shift include patient will be able to maintain hemodynamic stability by the end of the shift      Problem: Pain - Adult  Goal: Verbalizes/displays adequate comfort level or baseline comfort level  Outcome: Progressing     Problem: Skin  Goal: Decreased wound size/increased tissue granulation at next dressing change  Outcome: Progressing  Goal: Participates in plan/prevention/treatment measures  Outcome: Progressing  Goal: Prevent/manage excess moisture  Outcome: Progressing  Goal: Prevent/minimize sheer/friction injuries  Outcome: Progressing  Goal: Promote/optimize nutrition  Outcome: Progressing  Goal: Promote skin healing  Outcome: Progressing  Flowsheets (Taken 9/23/2024 2462)  Promote skin healing:   Assess skin/pad under line(s)/device(s)   Turn/reposition every 2 hours/use positioning/transfer devices     Problem: Fall/Injury  Goal: Not fall by end of shift  Outcome: Progressing  Goal: Be free from injury by end of the shift  Outcome: Progressing  Goal: Verbalize understanding of personal risk factors for fall in the hospital  Outcome: Progressing  Goal: Verbalize understanding of risk factor reduction measures to prevent injury from fall in the home  Outcome: Progressing  Goal: Use assistive devices by end of the shift  Outcome: Progressing  Goal: Pace activities to prevent fatigue by end of the shift  Outcome: Progressing

## 2024-09-24 NOTE — PROGRESS NOTES
Pt currently hospitalized, messaged  in secure chat re need for w/c eval, see PT eval 9/5/24 for details.

## 2024-09-24 NOTE — PROGRESS NOTES
"Humphrey Waddell is a 59 y.o. male on day 6 of admission presenting with CHF (congestive heart failure), NYHA class I, acute on chronic, combined.    Subjective     Patient seen and examined.  Patient remains lethargic but is answering less appropriately today.  He does report pain all over and especially his right wrist (IV site).  When asked if it was hard to breathe he nodded yes.  He was unable to verbalize how.  Was able to tell me his wife is Charline and when I asked him where he was he opened his eyes and looked around the room and repeated \"Charline\".  He could never say hospital and fell asleep when I asked him the year.  He continued to fall asleep frequently throughout the exam.  Repositioned him with the assistance of an RN.  Oxygen at 2 L with saturation mid 90s.  He is now making increased urine with a very slight decrease in BUN/Cr.     Came back and he is slightly more awake. Occasional smile. RN is trying to get IV access and VBG. IV access obtained but unable to get VBG. Patient is waking up now and more appropriate. Will re-assess in the morning.     Objective   Physical Exam   Constitutional:   Lethargic, minimal verbalizations, obese, NAD, cooperative  HENT: Atraumatic, semimoist mucous membranes  Eyes: Nonicteric  Neck: Supple, unable to assess JVD  Cardiovascular: S1, S2 normal, regular, HR 60s, no murmur appreciated  Pulmonary: Fair air entry with posterior bibasilar crackles, diminished right anterior base; no rhonchi or wheezing noted; no increased work of breathing noted  Abdominal: Obese, soft, nontender, + BS  Musculoskeletal: Left AKA, minimal active movement of BUEs; no movement of RLE, left BKA, generalized weakness  Extremities:   +3  pitting edema RLE; +1 edema BUEs  Lymphadenopathy: No nuchal LAP  Skin: Warm and dry  Neurological: Lethargic, oriented x 1 with minimal, slow speech, intermittently appropriate  Psychiatric:  Appropriate mood and behavior    Medications:  acetaminophen, 975 mg, " "oral, TID  albuterol, 2.5 mg, nebulization, TID  amLODIPine, 10 mg, oral, Daily  ampicillin-sulbactam, 3 g, intravenous, q12h  aspirin, 81 mg, oral, Daily  atorvastatin, 80 mg, oral, Daily  fludrocortisone, 0.1 mg, oral, Daily  gabapentin, 300 mg, oral, Nightly  heparin, 5,000 Units, subcutaneous, q8h  hydrALAZINE, 50 mg, oral, TID  insulin glargine, 2 Units, subcutaneous, Nightly  insulin regular, 0-10 Units, subcutaneous, TID  isosorbide dinitrate, 20 mg, oral, TID  lidocaine, 1 patch, transdermal, Daily  metoprolol tartrate, 50 mg, oral, BID  pantoprazole, 40 mg, oral, Daily before breakfast   Or  pantoprazole, 40 mg, intravenous, Daily before breakfast  torsemide, 60 mg, oral, BID  venlafaxine XR, 150 mg, oral, Daily       PRN medications: albuterol, dextrose, dextrose, glucagon, glucagon, glucagon, ondansetron **OR** ondansetron, oxyCODONE, oxygen, oxygen, polyethylene glycol     Last Recorded Vitals  Blood pressure 136/66, pulse 67, temperature 36.1 °C (97 °F), temperature source Temporal, resp. rate 20, height 1.93 m (6' 4\"), weight 131 kg (288 lb 12.8 oz), SpO2 95%.  Intake/Output last 3 Shifts:  I/O last 3 completed shifts:  In: 400 (3.1 mL/kg) [P.O.:100; IV Piggyback:300]  Out: 3575 (27.3 mL/kg) [Urine:3575 (0.8 mL/kg/hr)]  Weight: 131 kg     Relevant Results  Results for orders placed or performed during the hospital encounter of 09/18/24 (from the past 24 hour(s))   Parathyroid Hormone, Intact   Result Value Ref Range    Parathyroid Hormone, Intact 231.8 (H) 18.5 - 88.0 pg/mL   POCT GLUCOSE   Result Value Ref Range    POCT Glucose 140 (H) 74 - 99 mg/dL   POCT GLUCOSE   Result Value Ref Range    POCT Glucose 130 (H) 74 - 99 mg/dL   CBC and Auto Differential   Result Value Ref Range    WBC 11.3 4.4 - 11.3 x10*3/uL    nRBC 0.0 0.0 - 0.0 /100 WBCs    RBC 2.66 (L) 4.50 - 5.90 x10*6/uL    Hemoglobin 7.1 (L) 13.5 - 17.5 g/dL    Hematocrit 24.3 (L) 41.0 - 52.0 %    MCV 91 80 - 100 fL    MCH 26.7 26.0 - 34.0 pg "    MCHC 29.2 (L) 32.0 - 36.0 g/dL    RDW 15.8 (H) 11.5 - 14.5 %    Platelets 205 150 - 450 x10*3/uL    Neutrophils % 71.5 40.0 - 80.0 %    Immature Granulocytes %, Automated 0.4 0.0 - 0.9 %    Lymphocytes % 9.9 13.0 - 44.0 %    Monocytes % 10.3 2.0 - 10.0 %    Eosinophils % 7.7 0.0 - 6.0 %    Basophils % 0.2 0.0 - 2.0 %    Neutrophils Absolute 8.04 (H) 1.20 - 7.70 x10*3/uL    Immature Granulocytes Absolute, Automated 0.05 0.00 - 0.70 x10*3/uL    Lymphocytes Absolute 1.12 (L) 1.20 - 4.80 x10*3/uL    Monocytes Absolute 1.16 (H) 0.10 - 1.00 x10*3/uL    Eosinophils Absolute 0.87 (H) 0.00 - 0.70 x10*3/uL    Basophils Absolute 0.02 0.00 - 0.10 x10*3/uL   Renal Function Panel   Result Value Ref Range    Glucose 91 74 - 99 mg/dL    Sodium 140 136 - 145 mmol/L    Potassium 3.5 3.5 - 5.3 mmol/L    Chloride 101 98 - 107 mmol/L    Bicarbonate 29 21 - 32 mmol/L    Anion Gap 14 10 - 20 mmol/L    Urea Nitrogen 64 (H) 6 - 23 mg/dL    Creatinine 5.11 (H) 0.50 - 1.30 mg/dL    eGFR 12 (L) >60 mL/min/1.73m*2    Calcium 7.0 (L) 8.6 - 10.3 mg/dL    Phosphorus 5.2 (H) 2.5 - 4.9 mg/dL    Albumin 2.7 (L) 3.4 - 5.0 g/dL   Iron and TIBC   Result Value Ref Range    Iron 93 35 - 150 ug/dL    UIBC 76 (L) 110 - 370 ug/dL    TIBC 169 (L) 240 - 445 ug/dL    % Saturation 55 (H) 25 - 45 %   POCT GLUCOSE   Result Value Ref Range    POCT Glucose 99 74 - 99 mg/dL   POCT GLUCOSE   Result Value Ref Range    POCT Glucose 96 74 - 99 mg/dL      Transthoracic Echo (TTE) Complete  Result Date: 9/19/2024  PHYSICIAN INTERPRETATION: Left Ventricle: Left ventricular ejection fraction is normal, calculated by Kirkpatrick's biplane at 59%. There are no regional left ventricular wall motion abnormalities. The left ventricular cavity size is normal. The left ventricular septal wall thickness is moderately increased. There is moderately increased left ventricular posterior wall thickness. There is moderate concentric left ventricular hypertrophy. Abnormal (paradoxical)  septal motion consistent with post-operative status. Spectral Doppler shows a restrictive pattern of left ventricular diastolic filling. There is an elevated mean left atrial pressure. Left Atrium: The left atrium was not assessed. Right Ventricle: The right ventricle was not assessed. There is normal right ventricular global systolic function. Right Atrium: The right atrium was not assessed. Aortic Valve: The aortic valve is trileaflet. Aortic valve regurgitation was not assessed. Mitral Valve: The mitral valve is mildly thickened. There is trace to mild mitral valve regurgitation. Tricuspid Valve: The tricuspid valve is structurally normal. There is mild tricuspid regurgitation. The Doppler estimated RVSP is slightly elevated at 34.8 mmHg. Pulmonic Valve: The pulmonic valve is structurally normal. Pulmonic valve regurgitation was not assessed. Pericardium: There is no pericardial effusion noted. Aorta: The aortic root is normal. Systemic Veins: The inferior vena cava appears severely dilated, with IVC inspiratory collapse less than 50%. In comparison to the previous echocardiogram(s): Compared with study dated 5/23/2024, Winin the limitations of both studies, no significant changes were noted.  CONCLUSIONS:  1. Left ventricular ejection fraction is normal, calculated by Kirkpatrick's biplane at 59%.  2. Spectral Doppler shows a restrictive pattern of left ventricular diastolic filling.  3. There is an elevated mean left atrial pressure.  4. Abnormal septal motion consistent with post-operative status.  5. There is moderate concentric left ventricular hypertrophy.  6. There is normal right ventricular global systolic function.  7. Slightly elevated right ventricular systolic pressure.  8. The inferior vena cava appears severely dilated, with IVC inspiratory collapse less than 50%.     [id number] Junior Araujo DO Electronically signed on 9/19/2024 at 3:44:06 PM  ** Final **     XR chest 1 view  Result Date:  9/17/2024  Interpreted By:  Heather Aguilar, STUDY: XR CHEST 1 VIEW;  9/17/2024 9:44 pm   INDICATION: Signs/Symptoms:resp fail     COMPARISON: Chest x-ray 09/16/2024   ACCESSION NUMBER(S): [id number]   ORDERING CLINICIAN: ELIZABETH MITCHELL   TECHNIQUE: Semi-erect frontal view of the chest was obtained .   FINDINGS: Monitoring wires are overlying the patient.   The heart is enlarged. The patient is status post open heart surgery. There is vascular congestion and interstitial edema.   There are small bilateral pleural effusions. Airspace opacities are noted at the right perihilar region and left lung base. No pneumothorax.       1. Cardiomegaly. Vascular congestion and interstitial edema. Small bilateral pleural effusions. Bibasilar airspace opacities, may be secondary to atelectasis or pneumonia.       MACRO: None.   Signed by: Heather Aguilar 9/17/2024 10:49 PM Dictation workstation:   [id number]    CT chest abdomen pelvis wo IV contrast  Result Date: 9/17/2024  STUDY: CT Chest, Abdomen, and Pelvis without IV Contrast; 9/17/2024 3:33 AM INDICATION: Altered mental status.  Pulmonary edema.  Tactile fevers. COMPARISON: CXR 9/16/2024.  US gallbladder 5/23/2024.  CT AP 5/22/2024.  CT CAP 3/28/2024. ACCESSION NUMBER(S): [id number] ORDERING CLINICIAN: MELISSA VERMA TECHNIQUE: CT of the chest, abdomen, and pelvis was performed.  Contiguous axial images were obtained at 3 mm slice thickness through the chest, abdomen, and pelvis.  Coronal and sagittal reconstructions at 3 mm slice thickness were performed.  No intravenous contrast was administered.  FINDINGS: Please note that the evaluation of vessels, lymph nodes and organs is limited without intravenous contrast.  Image quality is limited by extensive beam hardening artifact in the chest and upper abdomen due to the patient's upper extremities by the patient's side.  CHEST: MEDIASTINUM: Cardiac size is enlarged without pericardial effusion.  Cardiac blood  pool appears slightly low in attenuation.  Mild calcified coronary plaque is noted.  LUNGS/PLEURA: There is a large right pleural effusion layering dependently.  Small left-sided pleural effusion is noted, loculated at the left apex. There is no pleural thickening, or pneumothorax.  The airways are patent. Mild pulmonary edema is suspected with mosaic attenuation the lungs suggesting chronic small airway disease and air trapping.  Relaxation atelectasis is seen in the dependent portion of the right lower lobe. There is an airspace disease in the left upper lobe and left lower lobe with significant volume loss of the left lung due to the pleural effusion, as well as the enlarged cardiac size.  LYMPH NODES: Thoracic lymph nodes are not enlarged. ABDOMEN:  LIVER: No hepatomegaly.  Smooth surface contour.  Normal attenuation.  BILE DUCTS: No intrahepatic or extrahepatic biliary ductal dilatation.  GALLBLADDER: Gallbladder is absent.  STOMACH: No abnormalities identified.  PANCREAS: No masses or ductal dilatation.  SPLEEN: No splenomegaly or focal splenic lesion.  ADRENAL GLANDS: No thickening or nodules.  KIDNEYS AND URETERS: Kidneys are normal in size and location.  No renal or ureteral calculi.  Moderate perinephric stranding is seen bilaterally.   PELVIS:  BLADDER: Urinary bladder is decompressed by Lee catheter with a small amount of air in the lumen of the urinary bladder and mild urinary bladder wall thickening.  REPRODUCTIVE ORGANS: No abnormalities identified.  BOWEL: Appendix is unremarkable.  Terminal ileum appears normal.  Cecum is located in the right midabdomen.  There is moderate concentric wall thickening of the rectosigmoid colon.  VESSELS: Mild calcified plaque is seen in the abdominal aorta and iliac arteries.   PERITONEUM/RETROPERITONEUM/LYMPH NODES: No free fluid.  No pneumoperitoneum. No lymphadenopathy.  ABDOMINAL WALL: No abnormalities identified. SOFT TISSUES: Fluid-filled indirect left  inguinal hernia is noted.  There is a small fat-containing indirect right inguinal hernia.  BONES: No acute fracture or aggressive osseous lesion.  Moderate multilevel disc disease is seen in the mid and lower thoracic spine as well as at L1-L2.  There is mild disc disease and vacuum phenomenon at L5-S1. Mild dextroconvex curvature is seen of the thoracolumbar spine.    Large right pleural effusion and small loculated left pleural effusion at the left lung apex with multifocal airspace disease in the left lung, likely atelectasis and possibly pneumonia in the appropriate clinical setting. Pronounced cardiomegaly with question of mild systemic anemia. Mild to moderate concentric wall thickening of the rectosigmoid colon suggesting a low-grade acute infectious or inflammatory distal colitis/proctitis. Decompressed urinary bladder with mild concentric wall thickening which may be due to underdistention however mild cystitis is a possibility in the upper clinical setting, correlate with urinalysis. Moderate-sized fluid filled left inguinal hernia along with a small fat-containing right inguinal hernia. Signed by Tristen Livingston      Assessment/Plan   Assessment & Plan  CHF (congestive heart failure), NYHA class I, acute on chronic, combined    Humphrey Waddell is a 59 y.o. male medical history CAD (s/p CABG), HFpEF, carotid artery disease, CVAs (cognitive impairment), HTN, HLD, DMT2, CKD 4, GERD, PAD (s/p lt AKA), depression who presented to [facility]note 3[facility] ED on 9/16 with dyspnea x 1 day with accompanying fatigue, reduced conversational ability.  Evaluation revealed encephalopathy with acute congestive heart failure (BNP 28, 635) with bilateral pleural effusions & superimposed pneumonia. Additionally found to be hypoxic and placed on 3 L to obtain 92% saturation, ultimately requiring increased to 6 L NC with intermittent BiPAP.  He was admitted to [facility]note 3[facility] treated with DuoNebs, dexamethasone, lasix, ceftriaxone, doxycycline and Zosyn.   On the morning of 9/18 he was transferred to [facility].  He arrived on 6 L NC with labs significant for , hsTrop 62, Cr 3.09, h/h 7/24.2. He was started on Unasyn, albuterol nebs and oral prednisone and able to be weaned to 4 L NC.  Pulmonology is consulted for right pleural effusion.     Impression/recommendations:     # Acute hypoxic respiratory failure: Multifactorial secondary to acute on chronic HFpEF, bilateral pleural effusions, pneumonia versus atelectasis; baseline is room air, required intermittent BiPAP wean to nasal cannula, improving currently on 2 L  -Continue supplemental oxygen, wean for saturation 92 to 95%  -If patient goes home, home O2 eval prior to discharge  -Bronchopulmonary hygiene with incentive spirometer and Acapella  -Continue scheduled albuterol     # ?  COPD: Patient and wife denied history of pulmonary disorders including COPD, asthma; no PFTs on file; patient does have recent smoking history  -No indication for steroids at this time  -Would benefit from outpatient PFTs when he is back to baseline  -checking      # Pleural effusion, bilateral: Right greater than left on CT; left effusion is loculated at the apex  -Diuresis as renal function and hemodynamics allows   -S/p rt thora for 1000ml removed (-) Light's criteria for transudative effusion likely 2/2 heart failure exacerbation     #Pneumonia: CT chest with noted left multifocal airspace disease concerning for atelectasis versus pneumonia; must also consider possible aspiration given patient's fluctuating mental status over the past 3 weeks  -Continue coverage with Unasyn  -Speech-language evaluation pleated; regular diet with thin liquids recommended  -Will need repeat chest CT in 8 to 12 weeks to ensure resolution of opacities     # Heart Failure: HFpEF with 4/2024 echo showing EF 60 to 65% with impaired relaxation pattern of LV diastolic filling and mildly reduced RV systolic function; CT chest showing mild pulmonary  edema; BNP at [facility] > 28,000, on admit to [place] [id number]  -Diuresis per cardiology, renal (~ 3L down since last night; -7L for the stay)  -Echo complete (results above); similar to echo 4/2024 with noted restrictive relaxation pattern, EF 59%     #Likely obstructive sleep apnea/obesity hypoventilation syndrome: BMI 34.08 with neck/trunk weight foci; wife reports snoring, daytime napping  -Would benefit from outpatient evaluation for sleep apnea, discussed with wife and patient reviewing with PCP and possibly seeing if he qualified for home study   -Adding CPAP at night and with naps; 8-20 cmH2O autopap    DVT prophylaxis with Lovenox      Thank you for the consult.  Pulmonology will follow.     I spent 35 minutes in the professional and overall care of this patient.   Priscila Denton, APRN-CNS

## 2024-09-25 ENCOUNTER — APPOINTMENT (OUTPATIENT)
Dept: RADIOLOGY | Facility: HOSPITAL | Age: 59
End: 2024-09-25
Payer: MEDICAID

## 2024-09-25 LAB
ALBUMIN SERPL BCP-MCNC: 2.8 G/DL (ref 3.4–5)
ANION GAP BLDA CALCULATED.4IONS-SCNC: 7 MMO/L (ref 10–25)
ANION GAP SERPL CALC-SCNC: 13 MMOL/L (ref 10–20)
APPARATUS: ABNORMAL
BASE EXCESS BLDA CALC-SCNC: 3.9 MMOL/L (ref -2–3)
BASE EXCESS BLDV CALC-SCNC: 1.7 MMOL/L (ref -2–3)
BASOPHILS # BLD AUTO: 0.02 X10*3/UL (ref 0–0.1)
BASOPHILS NFR BLD AUTO: 0.2 %
BODY TEMPERATURE: 37 DEGREES CELSIUS
BODY TEMPERATURE: 37 DEGREES CELSIUS
BUN SERPL-MCNC: 59 MG/DL (ref 6–23)
CA-I BLDA-SCNC: 1.08 MMOL/L (ref 1.1–1.33)
CALCIUM SERPL-MCNC: 7.3 MG/DL (ref 8.6–10.3)
CHLORIDE BLDA-SCNC: 108 MMOL/L (ref 98–107)
CHLORIDE SERPL-SCNC: 103 MMOL/L (ref 98–107)
CO2 SERPL-SCNC: 28 MMOL/L (ref 21–32)
CREAT SERPL-MCNC: 4.7 MG/DL (ref 0.5–1.3)
EGFRCR SERPLBLD CKD-EPI 2021: 13 ML/MIN/1.73M*2
EOSINOPHIL # BLD AUTO: 0.73 X10*3/UL (ref 0–0.7)
EOSINOPHIL NFR BLD AUTO: 7.2 %
ERYTHROCYTE [DISTWIDTH] IN BLOOD BY AUTOMATED COUNT: 15.9 % (ref 11.5–14.5)
GLUCOSE BLD MANUAL STRIP-MCNC: 129 MG/DL (ref 74–99)
GLUCOSE BLD MANUAL STRIP-MCNC: 74 MG/DL (ref 74–99)
GLUCOSE BLD MANUAL STRIP-MCNC: 78 MG/DL (ref 74–99)
GLUCOSE BLD MANUAL STRIP-MCNC: 80 MG/DL (ref 74–99)
GLUCOSE BLD MANUAL STRIP-MCNC: 89 MG/DL (ref 74–99)
GLUCOSE BLD MANUAL STRIP-MCNC: 92 MG/DL (ref 74–99)
GLUCOSE BLDA-MCNC: 91 MG/DL (ref 74–99)
GLUCOSE SERPL-MCNC: 78 MG/DL (ref 74–99)
HCO3 BLDA-SCNC: 29.1 MMOL/L (ref 22–26)
HCO3 BLDV-SCNC: 27.7 MMOL/L (ref 22–26)
HCT VFR BLD AUTO: 24.1 % (ref 41–52)
HCT VFR BLD EST: 22 % (ref 41–52)
HGB BLD-MCNC: 7.1 G/DL (ref 13.5–17.5)
HGB BLDA-MCNC: 7.4 G/DL (ref 13.5–17.5)
IMM GRANULOCYTES # BLD AUTO: 0.05 X10*3/UL (ref 0–0.7)
IMM GRANULOCYTES NFR BLD AUTO: 0.5 % (ref 0–0.9)
INHALED O2 CONCENTRATION: 28 %
INHALED O2 CONCENTRATION: 28 %
LACTATE BLDA-SCNC: 0.7 MMOL/L (ref 0.4–2)
LYMPHOCYTES # BLD AUTO: 1.06 X10*3/UL (ref 1.2–4.8)
LYMPHOCYTES NFR BLD AUTO: 10.5 %
MCH RBC QN AUTO: 26.6 PG (ref 26–34)
MCHC RBC AUTO-ENTMCNC: 29.5 G/DL (ref 32–36)
MCV RBC AUTO: 90 FL (ref 80–100)
MONOCYTES # BLD AUTO: 1.07 X10*3/UL (ref 0.1–1)
MONOCYTES NFR BLD AUTO: 10.6 %
NEUTROPHILS # BLD AUTO: 7.14 X10*3/UL (ref 1.2–7.7)
NEUTROPHILS NFR BLD AUTO: 71 %
NRBC BLD-RTO: 0 /100 WBCS (ref 0–0)
OXYHGB MFR BLDA: 74.8 % (ref 94–98)
OXYHGB MFR BLDV: 92.9 % (ref 45–75)
PCO2 BLDA: 47 MM HG (ref 38–42)
PCO2 BLDV: 48 MM HG (ref 41–51)
PH BLDA: 7.4 PH (ref 7.38–7.42)
PH BLDV: 7.37 PH (ref 7.33–7.43)
PHOSPHATE SERPL-MCNC: 4.9 MG/DL (ref 2.5–4.9)
PLATELET # BLD AUTO: 194 X10*3/UL (ref 150–450)
PO2 BLDA: 46 MM HG (ref 85–95)
PO2 BLDV: 69 MM HG (ref 35–45)
POTASSIUM BLDA-SCNC: 4 MMOL/L (ref 3.5–5.3)
POTASSIUM SERPL-SCNC: 3.4 MMOL/L (ref 3.5–5.3)
RBC # BLD AUTO: 2.67 X10*6/UL (ref 4.5–5.9)
SAO2 % BLDA: 77 % (ref 94–100)
SAO2 % BLDV: 95 % (ref 45–75)
SODIUM BLDA-SCNC: 140 MMOL/L (ref 136–145)
SODIUM SERPL-SCNC: 141 MMOL/L (ref 136–145)
WBC # BLD AUTO: 10.1 X10*3/UL (ref 4.4–11.3)

## 2024-09-25 PROCEDURE — 2500000005 HC RX 250 GENERAL PHARMACY W/O HCPCS: Performed by: INTERNAL MEDICINE

## 2024-09-25 PROCEDURE — 70450 CT HEAD/BRAIN W/O DYE: CPT | Performed by: STUDENT IN AN ORGANIZED HEALTH CARE EDUCATION/TRAINING PROGRAM

## 2024-09-25 PROCEDURE — 84132 ASSAY OF SERUM POTASSIUM: CPT | Performed by: INTERNAL MEDICINE

## 2024-09-25 PROCEDURE — 70450 CT HEAD/BRAIN W/O DYE: CPT

## 2024-09-25 PROCEDURE — 2500000004 HC RX 250 GENERAL PHARMACY W/ HCPCS (ALT 636 FOR OP/ED): Performed by: HOSPITALIST

## 2024-09-25 PROCEDURE — 99233 SBSQ HOSP IP/OBS HIGH 50: CPT | Performed by: CLINICAL NURSE SPECIALIST

## 2024-09-25 PROCEDURE — 82805 BLOOD GASES W/O2 SATURATION: CPT | Performed by: CLINICAL NURSE SPECIALIST

## 2024-09-25 PROCEDURE — 82947 ASSAY GLUCOSE BLOOD QUANT: CPT

## 2024-09-25 PROCEDURE — 6350000001 HC RX 635 EPOETIN >10,000 UNITS: Mod: JZ | Performed by: INTERNAL MEDICINE

## 2024-09-25 PROCEDURE — 2500000001 HC RX 250 WO HCPCS SELF ADMINISTERED DRUGS (ALT 637 FOR MEDICARE OP): Performed by: INTERNAL MEDICINE

## 2024-09-25 PROCEDURE — 97535 SELF CARE MNGMENT TRAINING: CPT | Mod: GO

## 2024-09-25 PROCEDURE — 99233 SBSQ HOSP IP/OBS HIGH 50: CPT | Performed by: INTERNAL MEDICINE

## 2024-09-25 PROCEDURE — 1200000002 HC GENERAL ROOM WITH TELEMETRY DAILY

## 2024-09-25 PROCEDURE — 85025 COMPLETE CBC W/AUTO DIFF WBC: CPT | Performed by: HOSPITALIST

## 2024-09-25 PROCEDURE — 94660 CPAP INITIATION&MGMT: CPT

## 2024-09-25 PROCEDURE — 2500000002 HC RX 250 W HCPCS SELF ADMINISTERED DRUGS (ALT 637 FOR MEDICARE OP, ALT 636 FOR OP/ED): Performed by: INTERNAL MEDICINE

## 2024-09-25 PROCEDURE — 94640 AIRWAY INHALATION TREATMENT: CPT

## 2024-09-25 PROCEDURE — 36415 COLL VENOUS BLD VENIPUNCTURE: CPT | Performed by: CLINICAL NURSE SPECIALIST

## 2024-09-25 PROCEDURE — 94668 MNPJ CHEST WALL SBSQ: CPT

## 2024-09-25 PROCEDURE — 80069 RENAL FUNCTION PANEL: CPT | Performed by: HOSPITALIST

## 2024-09-25 PROCEDURE — 2500000004 HC RX 250 GENERAL PHARMACY W/ HCPCS (ALT 636 FOR OP/ED): Performed by: INTERNAL MEDICINE

## 2024-09-25 PROCEDURE — 36415 COLL VENOUS BLD VENIPUNCTURE: CPT | Performed by: HOSPITALIST

## 2024-09-25 RX ORDER — CALCIUM ACETATE 667 MG/1
667 CAPSULE ORAL
Status: DISCONTINUED | OUTPATIENT
Start: 2024-09-25 | End: 2024-09-27 | Stop reason: HOSPADM

## 2024-09-25 RX ORDER — POTASSIUM CHLORIDE 1.5 G/1.58G
40 POWDER, FOR SOLUTION ORAL ONCE
Status: COMPLETED | OUTPATIENT
Start: 2024-09-25 | End: 2024-09-25

## 2024-09-25 RX ORDER — TORSEMIDE 20 MG/1
60 TABLET ORAL DAILY
Status: DISCONTINUED | OUTPATIENT
Start: 2024-09-26 | End: 2024-09-27 | Stop reason: HOSPADM

## 2024-09-25 ASSESSMENT — ACTIVITIES OF DAILY LIVING (ADL): HOME_MANAGEMENT_TIME_ENTRY: 13

## 2024-09-25 ASSESSMENT — COGNITIVE AND FUNCTIONAL STATUS - GENERAL
WALKING IN HOSPITAL ROOM: TOTAL
CLIMB 3 TO 5 STEPS WITH RAILING: TOTAL
DRESSING REGULAR UPPER BODY CLOTHING: TOTAL
TOILETING: TOTAL
MOBILITY SCORE: 6
WALKING IN HOSPITAL ROOM: TOTAL
DAILY ACTIVITIY SCORE: 10
DRESSING REGULAR LOWER BODY CLOTHING: TOTAL
EATING MEALS: A LITTLE
MOVING FROM LYING ON BACK TO SITTING ON SIDE OF FLAT BED WITH BEDRAILS: TOTAL
TOILETING: TOTAL
HELP NEEDED FOR BATHING: TOTAL
DAILY ACTIVITIY SCORE: 7
CLIMB 3 TO 5 STEPS WITH RAILING: TOTAL
DRESSING REGULAR UPPER BODY CLOTHING: TOTAL
PERSONAL GROOMING: A LITTLE
TURNING FROM BACK TO SIDE WHILE IN FLAT BAD: TOTAL
MOVING TO AND FROM BED TO CHAIR: TOTAL
MOBILITY SCORE: 7
HELP NEEDED FOR BATHING: TOTAL
MOVING FROM LYING ON BACK TO SITTING ON SIDE OF FLAT BED WITH BEDRAILS: A LOT
DAILY ACTIVITIY SCORE: 6
PERSONAL GROOMING: TOTAL
DRESSING REGULAR LOWER BODY CLOTHING: TOTAL
EATING MEALS: A LOT
STANDING UP FROM CHAIR USING ARMS: TOTAL
PERSONAL GROOMING: TOTAL
TURNING FROM BACK TO SIDE WHILE IN FLAT BAD: TOTAL
MOVING TO AND FROM BED TO CHAIR: TOTAL
TOILETING: TOTAL
DRESSING REGULAR UPPER BODY CLOTHING: TOTAL
DRESSING REGULAR LOWER BODY CLOTHING: TOTAL
EATING MEALS: TOTAL
HELP NEEDED FOR BATHING: TOTAL
STANDING UP FROM CHAIR USING ARMS: TOTAL

## 2024-09-25 ASSESSMENT — PAIN SCALES - PAIN ASSESSMENT IN ADVANCED DEMENTIA (PAINAD)
TOTALSCORE: 3
CONSOLABILITY: NO NEED TO CONSOLE
BODYLANGUAGE: TENSE, DISTRESSED PACING, FIDGETING
BREATHING: NORMAL
FACIALEXPRESSION: FACIAL GRIMACING

## 2024-09-25 ASSESSMENT — PAIN SCALES - GENERAL: PAINLEVEL_OUTOF10: 0 - NO PAIN

## 2024-09-25 ASSESSMENT — PAIN DESCRIPTION - LOCATION: LOCATION: BACK

## 2024-09-25 ASSESSMENT — PAIN - FUNCTIONAL ASSESSMENT: PAIN_FUNCTIONAL_ASSESSMENT: WONG-BAKER FACES

## 2024-09-25 NOTE — PROGRESS NOTES
"Humphrey Waddell is a 59 y.o. male on day 7 of admission presenting with CHF (congestive heart failure), NYHA class I, acute on chronic, combined.    Subjective   Pt on 2L, not talking very much, doesn't seem to be a good historian. Afebrile. No distress. No overnight events reported.        Objective     Physical Exam  Vitals reviewed.   HENT:      Head: Normocephalic.      Right Ear: Tympanic membrane normal.      Nose: Nose normal.      Mouth/Throat:      Mouth: Mucous membranes are moist.   Cardiovascular:      Rate and Rhythm: Normal rate and regular rhythm.   Pulmonary:      Comments: Bibasilar crackles.  Abdominal:      General: Abdomen is flat. Bowel sounds are normal.      Palpations: Abdomen is soft.   Musculoskeletal:      Comments: Left AKA, right lower extremity with edema   Skin:     General: Skin is warm.      Capillary Refill: Capillary refill takes less than 2 seconds.   Neurological:      General: No focal deficit present.      Mental Status: He is alert. He is disoriented.      Comments: Not talking very much.          Last Recorded Vitals  Blood pressure 153/83, pulse 77, temperature 37.1 °C (98.7 °F), temperature source Temporal, resp. rate 17, height 1.93 m (6' 4\"), weight 131 kg (288 lb 12.8 oz), SpO2 95%.  Intake/Output last 3 Shifts:  I/O last 3 completed shifts:  In: 320 (2.4 mL/kg) [P.O.:120; IV Piggyback:200]  Out: 5830 (44.5 mL/kg) [Urine:5830 (1.2 mL/kg/hr)]  Weight: 131 kg     Relevant Results  Results for orders placed or performed during the hospital encounter of 09/18/24 (from the past 24 hour(s))   POCT GLUCOSE   Result Value Ref Range    POCT Glucose 96 74 - 99 mg/dL   POCT GLUCOSE   Result Value Ref Range    POCT Glucose 87 74 - 99 mg/dL   POCT GLUCOSE   Result Value Ref Range    POCT Glucose 89 74 - 99 mg/dL   Blood Gas Venous   Result Value Ref Range    POCT pH, Venous 7.37 7.33 - 7.43 pH    POCT pCO2, Venous 48 41 - 51 mm Hg    POCT pO2, Venous 69 (H) 35 - 45 mm Hg    POCT SO2, " Venous 95 (H) 45 - 75 %    POCT Oxy Hemoglobin, Venous 92.9 (H) 45.0 - 75.0 %    POCT Base Excess, Venous 1.7 -2.0 - 3.0 mmol/L    POCT HCO3 Calculated, Venous 27.7 (H) 22.0 - 26.0 mmol/L    Patient Temperature 37.0 degrees Celsius    FiO2 28 %   CBC and Auto Differential   Result Value Ref Range    WBC 10.1 4.4 - 11.3 x10*3/uL    nRBC 0.0 0.0 - 0.0 /100 WBCs    RBC 2.67 (L) 4.50 - 5.90 x10*6/uL    Hemoglobin 7.1 (L) 13.5 - 17.5 g/dL    Hematocrit 24.1 (L) 41.0 - 52.0 %    MCV 90 80 - 100 fL    MCH 26.6 26.0 - 34.0 pg    MCHC 29.5 (L) 32.0 - 36.0 g/dL    RDW 15.9 (H) 11.5 - 14.5 %    Platelets 194 150 - 450 x10*3/uL    Neutrophils % 71.0 40.0 - 80.0 %    Immature Granulocytes %, Automated 0.5 0.0 - 0.9 %    Lymphocytes % 10.5 13.0 - 44.0 %    Monocytes % 10.6 2.0 - 10.0 %    Eosinophils % 7.2 0.0 - 6.0 %    Basophils % 0.2 0.0 - 2.0 %    Neutrophils Absolute 7.14 1.20 - 7.70 x10*3/uL    Immature Granulocytes Absolute, Automated 0.05 0.00 - 0.70 x10*3/uL    Lymphocytes Absolute 1.06 (L) 1.20 - 4.80 x10*3/uL    Monocytes Absolute 1.07 (H) 0.10 - 1.00 x10*3/uL    Eosinophils Absolute 0.73 (H) 0.00 - 0.70 x10*3/uL    Basophils Absolute 0.02 0.00 - 0.10 x10*3/uL   Renal Function Panel   Result Value Ref Range    Glucose 78 74 - 99 mg/dL    Sodium 141 136 - 145 mmol/L    Potassium 3.4 (L) 3.5 - 5.3 mmol/L    Chloride 103 98 - 107 mmol/L    Bicarbonate 28 21 - 32 mmol/L    Anion Gap 13 10 - 20 mmol/L    Urea Nitrogen 59 (H) 6 - 23 mg/dL    Creatinine 4.70 (H) 0.50 - 1.30 mg/dL    eGFR 13 (L) >60 mL/min/1.73m*2    Calcium 7.3 (L) 8.6 - 10.3 mg/dL    Phosphorus 4.9 2.5 - 4.9 mg/dL    Albumin 2.8 (L) 3.4 - 5.0 g/dL   POCT GLUCOSE   Result Value Ref Range    POCT Glucose 74 74 - 99 mg/dL       Imaging Results    Ct chest/abd/pelvis:  IMPRESSION:  Large right pleural effusion and small loculated left pleural effusion  at the left lung apex with multifocal airspace disease in the left  lung, likely atelectasis and possibly  pneumonia in the appropriate  clinical setting.  Pronounced cardiomegaly with question of mild systemic anemia.  Mild to moderate concentric wall thickening of the rectosigmoid colon  suggesting a low-grade acute infectious or inflammatory distal  colitis/proctitis.  Decompressed urinary bladder with mild concentric wall thickening  which may be due to underdistention however mild cystitis is a  possibility in the upper clinical setting, correlate with urinalysis.  Moderate-sized fluid filled left inguinal hernia along with a small  fat-containing right inguinal hernia.    Head ct:  IMPRESSION:  No acute intracranial hemorrhage or mass effect.      Periapical lucencies noted consistent with abscesses. Dental  follow-up suggested.      Mild prominence of the lateral and 3rd ventricles which could be  related to central white matter loss versus normal pressure  hydrocephalus. Clinical correlation suggested.      Correlation for left-sided otitis media suggested.      Remote left thalamic lacunar infarct.    Medications:  acetaminophen, 975 mg, oral, TID  albuterol, 2.5 mg, nebulization, TID  amLODIPine, 10 mg, oral, Daily  ampicillin-sulbactam, 3 g, intravenous, q12h  aspirin, 81 mg, oral, Daily  atorvastatin, 80 mg, oral, Daily  fludrocortisone, 0.1 mg, oral, Daily  gabapentin, 300 mg, oral, Nightly  heparin, 5,000 Units, subcutaneous, q8h  hydrALAZINE, 50 mg, oral, TID  insulin glargine, 2 Units, subcutaneous, Nightly  insulin regular, 0-10 Units, subcutaneous, TID  isosorbide dinitrate, 20 mg, oral, TID  lidocaine, 1 patch, transdermal, Daily  metoprolol tartrate, 50 mg, oral, BID  pantoprazole, 40 mg, oral, Daily before breakfast   Or  pantoprazole, 40 mg, intravenous, Daily before breakfast  potassium chloride, 40 mEq, oral, Once  torsemide, 60 mg, oral, BID  venlafaxine XR, 150 mg, oral, Daily       PRN medications: albuterol, dextrose, dextrose, glucagon, glucagon, glucagon, ondansetron **OR** ondansetron,  oxyCODONE, oxygen, oxygen, polyethylene glycol     Assessment/Plan     9/25:  Asp PNA... 2L, reg diet per speech/swallow... cont iv abx, wean to RA as tolerated, change to PO meds at dc.   Needs OP sleep study. Cont nocturnal cpap.     HFpEF... cont PO torsemide. He's on bb, isordil.   HypoK... replace, monitor.   Kody/ckd... improving and nearing baseline.   DM... lantus 2U, ss.   Home regimen for chronic conditions  Pt/ot  Dvt px with heparin  Dc is to snf pending placement.             9/24:  1.  Acute heart failure with preserved EF, most recent echo in May 2024 had an EF of 55%  -Cardiology and Nephrology consulted. Continue diuresis with torsemide 60 mg bid. Monitor I/Os, daily weight, bmp.    2.CAD status post CABG  -continue asa/atorvastatin/metoprolol/imdur    3. DM  -continue lantus and SSI    4. Pneumonia  - on iv unasyn; SLP without evidence of aspiration; rec regular diet  - Will need repeat chest CT in 8 to 12 weeks to ensure resolution of opacities     5. KODY on CKDV  -nephrology following; Cr worsening with IV fluids and holding diuretics. Was discussed with nephrology/cardiology on 9/23 and plan was to restart diuretics. Cr improving with diuresis.  - on florinef for recurrent hyperkalemia     6. PVD with left AKA  -continue asa/atorvastatin    7. Bilateral effusion suspect secondary to #1  -had right thoracentesis drained 1L this admit  -transudate likely due to CHF    8. Normocytic anemia  -hgb 7.3  -Iron sat 12% -> IV venofer    9.  Acute hypoxic respiratory failure secondary to #1 and 4  -Oxygen being weaned down to 2 L, home oxygen eval as needed but patient also needs a sleep study as an outpatient.    10. Likely PAUL/OHS  - nightly cpap    11. Goals of care- Would want dialysis if offered by nephrology. DNR/DNI per discussion with palliative care and wife today.     DVT Prophylaxis:  Lovenox subq    Delgado Solorzano MD  [facility]

## 2024-09-25 NOTE — SIGNIFICANT EVENT
Response was called, NP from pulmonary was evaluating the patient noted he was off oxygen not sure how long has been on 2 L.  Patient seemed a little out of it, according to the NP patient was not able to form words when I entered the room patient was able to tell me his name followed most commands, nothing focal on exam and aggressive focal deficits hemodynamically he is stable updated wife at bedside, will send down for stat head CT unable to have an MRI due to pacemaker, he would benefit from a neurology evaluation is patient has waxing and waning episodes where he is not responsive,abg pending

## 2024-09-25 NOTE — PROGRESS NOTES
Humphrey Waddell is a 59 y.o. male on day 7 of admission presenting with CHF (congestive heart failure), NYHA class I, acute on chronic, combined.      Subjective   Seen and examined.  ANA. UOP of 4.98 L with oral torsemide twice daily.  Chart/labs/meds/notes/imaging/VS reviewed.         Objective          Vitals 24HR  Heart Rate:  [55-77]   Temp:  [36.6 °C (97.9 °F)-37.1 °C (98.8 °F)]   Resp:  [17-19]   BP: (109-170)/(58-83)   SpO2:  [93 %-98 %]     Intake/Output last 3 Shifts:    Intake/Output Summary (Last 24 hours) at 9/25/2024 1541  Last data filed at 9/25/2024 1419  Gross per 24 hour   Intake 120 ml   Output 4280 ml   Net -4160 ml       Physical Exam  Constitutional:       Comments: Awake, alert and oriented x 3.  In no acute distress.  HENT:      Head: Normocephalic and atraumatic.      Nose: Nose normal.      Mouth: Mucous membranes are moist.      Pharynx: Oropharynx is clear.   Eyes:      Conjunctiva/sclera: Conjunctivae normal.      Pupils: Pupils are equal, round, and reactive to light.   Cardiovascular:      Rate and Rhythm: Regular rhythm.      Heart sounds: Normal heart sounds.   Pulmonary:      Effort: Diminished bibasilar breath sounds.  Abdominal:      General: Bowel sounds are normal.      Palpations: Abdomen is soft.   Genitourinary:     Comments: Lee catheter draining yellow urine  Musculoskeletal:         General: No joint swelling. Lower limb edema. L AKA.   Skin:     General: Skin is warm and dry.  No rashes or lesions to exposed skin.  Neurological:      Mental Status: Cranial nerves II through XII grossly intact.  No asterixis.  Psychiatric:         Mood: Normal mood.        Behavior: Behavior is normal.       Scheduled Medications  acetaminophen, 975 mg, oral, TID  albuterol, 2.5 mg, nebulization, TID  amLODIPine, 10 mg, oral, Daily  ampicillin-sulbactam, 3 g, intravenous, q12h  aspirin, 81 mg, oral, Daily  atorvastatin, 80 mg, oral, Daily  fludrocortisone, 0.1 mg, oral, Daily  gabapentin, 300  mg, oral, Nightly  heparin, 5,000 Units, subcutaneous, q8h  hydrALAZINE, 50 mg, oral, TID  insulin glargine, 2 Units, subcutaneous, Nightly  insulin regular, 0-10 Units, subcutaneous, TID  isosorbide dinitrate, 20 mg, oral, TID  lidocaine, 1 patch, transdermal, Daily  metoprolol tartrate, 50 mg, oral, BID  pantoprazole, 40 mg, oral, Daily before breakfast   Or  pantoprazole, 40 mg, intravenous, Daily before breakfast  [START ON 9/26/2024] torsemide, 60 mg, oral, Daily  venlafaxine XR, 150 mg, oral, Daily      Continuous medications       PRN medications: albuterol, dextrose, dextrose, glucagon, glucagon, glucagon, ondansetron **OR** ondansetron, oxyCODONE, oxygen, oxygen, polyethylene glycol     Relevant Results  Results from last 7 days   Lab Units 09/25/24  0623 09/24/24  0458 09/23/24  0649   WBC AUTO x10*3/uL 10.1 11.3 10.3   HEMOGLOBIN g/dL 7.1* 7.1* 8.0*   HEMATOCRIT % 24.1* 24.3* 27.6*   PLATELETS AUTO x10*3/uL 194 205 224   NEUTROS PCT AUTO % 71.0 71.5 69.8   LYMPHS PCT AUTO % 10.5 9.9 10.3   MONOS PCT AUTO % 10.6 10.3 9.4   EOS PCT AUTO % 7.2 7.7 9.8     Results from last 7 days   Lab Units 09/25/24  0623 09/24/24  0458 09/23/24  0649   SODIUM mmol/L 141 140 140   POTASSIUM mmol/L 3.4* 3.5 3.7   CHLORIDE mmol/L 103 101 100   CO2 mmol/L 28 29 28   BUN mg/dL 59* 64* 65*   CREATININE mg/dL 4.70* 5.11* 5.49*   GLUCOSE mg/dL 78 91 101*   CALCIUM mg/dL 7.3* 7.0* 7.0*       CT maxillofacial bones wo IV contrast   Final Result   Slight fullness of the right lacrimal gland without evidence of fluid   collection. CT of the paranasal sinuses, mastoid sinuses and orbits   is within normal limits.        MACRO:   none        Signed by: Torres Henson 9/21/2024 10:50 AM   Dictation workstation:   [id number]      CT 3D reconstruction   Final Result   Slight fullness of the right lacrimal gland without evidence of fluid   collection. CT of the paranasal sinuses, mastoid sinuses and orbits   is within normal limits.         MACRO:   none        Signed by: Torres Henson 9/21/2024 10:50 AM   Dictation workstation:   [id number]      Vascular US upper extremity venous duplex right   Final Result      Transthoracic Echo (TTE) Complete   Final Result      US thoracentesis   Final Result   Uneventful thoracentesis, as detailed above: Right Pleural space,   1000 mL        Performed and dictated at [facility].        Signed by: Diana Serrano 9/19/2024 11:37 AM   Dictation workstation:   [id number]               Assessment/Plan        Humphrey Waddell is a 59 y.o. male with a past medical history of left above-the-knee amputation due to PAD, coronary artery disease with a history of CABG, carotid artery disease, stroke, depression, type 2 diabetes, GERD, hyperlipidemia, hypertension, osteoarthritis, anemia of chronic disease, and stage G4 chronic kidney disease with a fluctuating creatinine baseline between 2.5 to just above 3 mg/deciliter, multiple acute kidney injuries and prior nephrotic range proteinuria who presented to an outside hospital on 9/16 for evaluation of shortness of breath.      Per the notes, he reportedly was taking 20 mg of torsemide instead of the prescribed 60 mg daily.  His weight is up 34 pounds to 288 now from 254 lbs in early July.  There was a large right pleural effusion and small loculation on the left seen on CT imaging.  Multifocal airspace disease concerning for pneumonia as well.  No hydronephrosis with perinephric stranding noted.  He was transferred from [facility] to [facility].  He underwent a 1 L paracentesis with clear madalyn fluid removed on 9/19.  He received IVP Lasix up until 9/20.  Nephrology is consulted for renal care.    Mr. Waddell is at high risk to progress to kidney replacement therapy having nephrotic range proteinuria + multiple acute kidney injuries.  His creatinine was 3.3 mg as a deciliter when he presented.  This is the top end of his dilma.  He received IV  Lasix.  His creatinine did rise with diuresis up to 4 mg/dL. IV Lasix was held.  We see that his creatinine continued to rise with associated oliguria off the diuretic.  He remains in heart failure. Thus I placed him on oral torsemide 60 mg twice daily for the time being (usual dose is 60 mg daily). He has responded to the oral diuretic with improved volume status. His creatinine is improving. Given the large volume diuresis, I will cut the torsemide back to 60 mg 1 x daily starting tomorrow.  I sent iron stores, ferritin, intact PTH and vitamin D. I will give him a dose of erythropoietin. I will start him on a binder for the elevated iPTH. 25 vitamin D is sufficient. Of note, he does not have a paraprotein.  No acute indication for kidney replacement therapy. Otherwise, I held the sodium bicarbonate supplement and acetazolamide. Trend his RP.  Will follow.    Principal Problem:    CHF (congestive heart failure), NYHA class I, acute on chronic, combined (Multi)       I spent 40 minutes in the professional and overall care of this patient.      Darryl Lopez, DO

## 2024-09-25 NOTE — PROGRESS NOTES
Physical Therapy                 Therapy Communication Note    Patient Name: Humphrey Waddell  MRN: [id number]  Department: [place] [id number]  Room: [address] [place] [address]  Today's Date: 9/25/2024     Discipline: Physical Therapy    Missed Visit Reason: Missed Visit Reason: Other (Comment) (2 CTAs performing pt care.  Pt not available to participate at this time.)    Missed Time: Attempt

## 2024-09-25 NOTE — CARE PLAN
The patient's goals for the shift include Pt. will have a safe, restful and uneventful evening    The clinical goals for the shift include Will remain hemodynamically stabel throughout shift ending 9/25/24 @ 0700 hrs.    Goal met. Skin integrity maintained. No acute events overnight.

## 2024-09-25 NOTE — PROGRESS NOTES
Humphrey Waddell is a 59 y.o. male on day 7 of admission presenting with CHF (congestive heart failure), NYHA class I, acute on chronic, combined.    Subjective   Seen and examined with wife at bedside.  No overnight events reported.  Patient was not placed on CPAP last night unclear why.  Patient found on room air.  Spot checked pulse ox patient's 85 to 86%, placed back on 2 L saturation improved to 95 to 96%.  Patient has waxed and waned over the past few days with sometimes very limited speech.  I am unable to get the patient to verbalize any words.  He nodded appropriately to ID his wife and could not nod appropriately to any other questions, requiring multiple prompts to keep focus.  Asked him to make any verbalization and all and he appeared to be attempting to make words but was unable to do so.  Asked him to stick his tongue out and he was unable to open his mouth. Stroke alert called.    ABG done as part of stroke alert.  On 28% by O2 his pO2 was 46.  Message with bedside nursing and with respiratory therapy to ensure that he is checked frequently to maintain his nasal cannula and to make sure that he is placed on CPAP for sleep.  CT head was negative for acute stroke.     Objective   Physical Exam   Constitutional:   Lethargic, unable to verbalize, cooperative to ability  HENT: Atraumatic, semimoist mucous membranes  Eyes: Nonicteric  Neck: Supple, unable to assess JVD  Cardiovascular: S1, S2 normal, regular, HR 70s, no murmur appreciated  Pulmonary: Fair air entry with posterior bibasilar crackles, diminished right anterior base; no rhonchi or wheezing noted; no increased work of breathing noted  Abdominal: Obese, soft, nontender, + BS  Musculoskeletal: Left AKA, minimal active movement of BUEs; no movement of RLE, left BKA, generalized weakness  Extremities:   +2  pitting edema RLE; +1 dependent edema BUEs  Lymphadenopathy: No nuchal LAP  Skin: Warm and dry  Neurological: Lethargic see subjective  "above  Psychiatric:  Appropriate mood and behavior    Medications:  acetaminophen, 975 mg, oral, TID  albuterol, 2.5 mg, nebulization, TID  amLODIPine, 10 mg, oral, Daily  ampicillin-sulbactam, 3 g, intravenous, q12h  aspirin, 81 mg, oral, Daily  atorvastatin, 80 mg, oral, Daily  calcium acetate, 667 mg, oral, TID  fludrocortisone, 0.1 mg, oral, Daily  gabapentin, 300 mg, oral, Nightly  heparin, 5,000 Units, subcutaneous, q8h  hydrALAZINE, 50 mg, oral, TID  insulin glargine, 2 Units, subcutaneous, Nightly  insulin regular, 0-10 Units, subcutaneous, TID  isosorbide dinitrate, 20 mg, oral, TID  lidocaine, 1 patch, transdermal, Daily  metoprolol tartrate, 50 mg, oral, BID  pantoprazole, 40 mg, oral, Daily before breakfast   Or  pantoprazole, 40 mg, intravenous, Daily before breakfast  [START ON 9/26/2024] torsemide, 60 mg, oral, Daily  venlafaxine XR, 150 mg, oral, Daily       PRN medications: albuterol, dextrose, dextrose, glucagon, glucagon, glucagon, ondansetron **OR** ondansetron, oxyCODONE, oxygen, oxygen, polyethylene glycol     Last Recorded Vitals  Blood pressure 110/72, pulse 82, temperature 37.1 °C (98.7 °F), temperature source Temporal, resp. rate 19, height 1.93 m (6' 4\"), weight 131 kg (288 lb 12.8 oz), SpO2 94%.  Intake/Output last 3 Shifts:  I/O last 3 completed shifts:  In: 120 (0.9 mL/kg) [P.O.:120]  Out: 5980 (45.6 mL/kg) [Urine:5980 (1.3 mL/kg/hr)]  Weight: 131 kg     Relevant Results  Results for orders placed or performed during the hospital encounter of 09/18/24 (from the past 24 hour(s))   POCT GLUCOSE   Result Value Ref Range    POCT Glucose 89 74 - 99 mg/dL   Blood Gas Venous   Result Value Ref Range    POCT pH, Venous 7.37 7.33 - 7.43 pH    POCT pCO2, Venous 48 41 - 51 mm Hg    POCT pO2, Venous 69 (H) 35 - 45 mm Hg    POCT SO2, Venous 95 (H) 45 - 75 %    POCT Oxy Hemoglobin, Venous 92.9 (H) 45.0 - 75.0 %    POCT Base Excess, Venous 1.7 -2.0 - 3.0 mmol/L    POCT HCO3 Calculated, Venous 27.7 (H) " 22.0 - 26.0 mmol/L    Patient Temperature 37.0 degrees Celsius    FiO2 28 %   CBC and Auto Differential   Result Value Ref Range    WBC 10.1 4.4 - 11.3 x10*3/uL    nRBC 0.0 0.0 - 0.0 /100 WBCs    RBC 2.67 (L) 4.50 - 5.90 x10*6/uL    Hemoglobin 7.1 (L) 13.5 - 17.5 g/dL    Hematocrit 24.1 (L) 41.0 - 52.0 %    MCV 90 80 - 100 fL    MCH 26.6 26.0 - 34.0 pg    MCHC 29.5 (L) 32.0 - 36.0 g/dL    RDW 15.9 (H) 11.5 - 14.5 %    Platelets 194 150 - 450 x10*3/uL    Neutrophils % 71.0 40.0 - 80.0 %    Immature Granulocytes %, Automated 0.5 0.0 - 0.9 %    Lymphocytes % 10.5 13.0 - 44.0 %    Monocytes % 10.6 2.0 - 10.0 %    Eosinophils % 7.2 0.0 - 6.0 %    Basophils % 0.2 0.0 - 2.0 %    Neutrophils Absolute 7.14 1.20 - 7.70 x10*3/uL    Immature Granulocytes Absolute, Automated 0.05 0.00 - 0.70 x10*3/uL    Lymphocytes Absolute 1.06 (L) 1.20 - 4.80 x10*3/uL    Monocytes Absolute 1.07 (H) 0.10 - 1.00 x10*3/uL    Eosinophils Absolute 0.73 (H) 0.00 - 0.70 x10*3/uL    Basophils Absolute 0.02 0.00 - 0.10 x10*3/uL   Renal Function Panel   Result Value Ref Range    Glucose 78 74 - 99 mg/dL    Sodium 141 136 - 145 mmol/L    Potassium 3.4 (L) 3.5 - 5.3 mmol/L    Chloride 103 98 - 107 mmol/L    Bicarbonate 28 21 - 32 mmol/L    Anion Gap 13 10 - 20 mmol/L    Urea Nitrogen 59 (H) 6 - 23 mg/dL    Creatinine 4.70 (H) 0.50 - 1.30 mg/dL    eGFR 13 (L) >60 mL/min/1.73m*2    Calcium 7.3 (L) 8.6 - 10.3 mg/dL    Phosphorus 4.9 2.5 - 4.9 mg/dL    Albumin 2.8 (L) 3.4 - 5.0 g/dL   POCT GLUCOSE   Result Value Ref Range    POCT Glucose 74 74 - 99 mg/dL   POCT GLUCOSE   Result Value Ref Range    POCT Glucose 78 74 - 99 mg/dL   POCT GLUCOSE   Result Value Ref Range    POCT Glucose 80 74 - 99 mg/dL   POCT GLUCOSE   Result Value Ref Range    POCT Glucose 92 74 - 99 mg/dL   Blood Gas Arterial Full Panel   Result Value Ref Range    POCT pH, Arterial 7.40 7.38 - 7.42 pH    POCT pCO2, Arterial 47 (H) 38 - 42 mm Hg    POCT pO2, Arterial 46 (L) 85 - 95 mm Hg    POCT  SO2, Arterial 77 (L) 94 - 100 %    POCT Oxy Hemoglobin, Arterial 74.8 (L) 94.0 - 98.0 %    POCT Hematocrit Calculated, Arterial 22.0 (L) 41.0 - 52.0 %    POCT Sodium, Arterial 140 136 - 145 mmol/L    POCT Potassium, Arterial 4.0 3.5 - 5.3 mmol/L    POCT Chloride, Arterial 108 (H) 98 - 107 mmol/L    POCT Ionized Calcium, Arterial 1.08 (L) 1.10 - 1.33 mmol/L    POCT Glucose, Arterial 91 74 - 99 mg/dL    POCT Lactate, Arterial 0.7 0.4 - 2.0 mmol/L    POCT Base Excess, Arterial 3.9 (H) -2.0 - 3.0 mmol/L    POCT HCO3 Calculated, Arterial 29.1 (H) 22.0 - 26.0 mmol/L    POCT Hemoglobin, Arterial 7.4 (L) 13.5 - 17.5 g/dL    POCT Anion Gap, Arterial 7 (L) 10 - 25 mmo/L    Patient Temperature 37.0 degrees Celsius    FiO2 28 %    Apparatus CANNULA       Transthoracic Echo (TTE) Complete  Result Date: 9/19/2024  PHYSICIAN INTERPRETATION: Left Ventricle: Left ventricular ejection fraction is normal, calculated by Kirkpatrick's biplane at 59%. There are no regional left ventricular wall motion abnormalities. The left ventricular cavity size is normal. The left ventricular septal wall thickness is moderately increased. There is moderately increased left ventricular posterior wall thickness. There is moderate concentric left ventricular hypertrophy. Abnormal (paradoxical) septal motion consistent with post-operative status. Spectral Doppler shows a restrictive pattern of left ventricular diastolic filling. There is an elevated mean left atrial pressure. Left Atrium: The left atrium was not assessed. Right Ventricle: The right ventricle was not assessed. There is normal right ventricular global systolic function. Right Atrium: The right atrium was not assessed. Aortic Valve: The aortic valve is trileaflet. Aortic valve regurgitation was not assessed. Mitral Valve: The mitral valve is mildly thickened. There is trace to mild mitral valve regurgitation. Tricuspid Valve: The tricuspid valve is structurally normal. There is mild tricuspid  regurgitation. The Doppler estimated RVSP is slightly elevated at 34.8 mmHg. Pulmonic Valve: The pulmonic valve is structurally normal. Pulmonic valve regurgitation was not assessed. Pericardium: There is no pericardial effusion noted. Aorta: The aortic root is normal. Systemic Veins: The inferior vena cava appears severely dilated, with IVC inspiratory collapse less than 50%. In comparison to the previous echocardiogram(s): Compared with study dated 5/23/2024, Winin the limitations of both studies, no significant changes were noted.  CONCLUSIONS:  1. Left ventricular ejection fraction is normal, calculated by Kirkpatrick's biplane at 59%.  2. Spectral Doppler shows a restrictive pattern of left ventricular diastolic filling.  3. There is an elevated mean left atrial pressure.  4. Abnormal septal motion consistent with post-operative status.  5. There is moderate concentric left ventricular hypertrophy.  6. There is normal right ventricular global systolic function.  7. Slightly elevated right ventricular systolic pressure.  8. The inferior vena cava appears severely dilated, with IVC inspiratory collapse less than 50%.     [id number] Junior Araujo DO Electronically signed on 9/19/2024 at 3:44:06 PM  ** Final **     XR chest 1 view  Result Date: 9/17/2024  Interpreted By:  Heather Aguilar, STUDY: XR CHEST 1 VIEW;  9/17/2024 9:44 pm   INDICATION: Signs/Symptoms:resp fail     COMPARISON: Chest x-ray 09/16/2024   ACCESSION NUMBER(S): [id number]   ORDERING CLINICIAN: ELIZABETH MITCHELL   TECHNIQUE: Semi-erect frontal view of the chest was obtained .   FINDINGS: Monitoring wires are overlying the patient.   The heart is enlarged. The patient is status post open heart surgery. There is vascular congestion and interstitial edema.   There are small bilateral pleural effusions. Airspace opacities are noted at the right perihilar region and left lung base. No pneumothorax.       1. Cardiomegaly. Vascular congestion and  interstitial edema. Small bilateral pleural effusions. Bibasilar airspace opacities, may be secondary to atelectasis or pneumonia.       MACRO: None.   Signed by: Heather Aguilar 9/17/2024 10:49 PM Dictation workstation:   [id number]    CT chest abdomen pelvis wo IV contrast  Result Date: 9/17/2024  STUDY: CT Chest, Abdomen, and Pelvis without IV Contrast; 9/17/2024 3:33 AM INDICATION: Altered mental status.  Pulmonary edema.  Tactile fevers. COMPARISON: CXR 9/16/2024.  US gallbladder 5/23/2024.  CT AP 5/22/2024.  CT CAP 3/28/2024. ACCESSION NUMBER(S): [id number] ORDERING CLINICIAN: MELISSA VERMA TECHNIQUE: CT of the chest, abdomen, and pelvis was performed.  Contiguous axial images were obtained at 3 mm slice thickness through the chest, abdomen, and pelvis.  Coronal and sagittal reconstructions at 3 mm slice thickness were performed.  No intravenous contrast was administered.  FINDINGS: Please note that the evaluation of vessels, lymph nodes and organs is limited without intravenous contrast.  Image quality is limited by extensive beam hardening artifact in the chest and upper abdomen due to the patient's upper extremities by the patient's side.  CHEST: MEDIASTINUM: Cardiac size is enlarged without pericardial effusion.  Cardiac blood pool appears slightly low in attenuation.  Mild calcified coronary plaque is noted.  LUNGS/PLEURA: There is a large right pleural effusion layering dependently.  Small left-sided pleural effusion is noted, loculated at the left apex. There is no pleural thickening, or pneumothorax.  The airways are patent. Mild pulmonary edema is suspected with mosaic attenuation the lungs suggesting chronic small airway disease and air trapping.  Relaxation atelectasis is seen in the dependent portion of the right lower lobe. There is an airspace disease in the left upper lobe and left lower lobe with significant volume loss of the left lung due to the pleural effusion, as well as the  enlarged cardiac size.  LYMPH NODES: Thoracic lymph nodes are not enlarged. ABDOMEN:  LIVER: No hepatomegaly.  Smooth surface contour.  Normal attenuation.  BILE DUCTS: No intrahepatic or extrahepatic biliary ductal dilatation.  GALLBLADDER: Gallbladder is absent.  STOMACH: No abnormalities identified.  PANCREAS: No masses or ductal dilatation.  SPLEEN: No splenomegaly or focal splenic lesion.  ADRENAL GLANDS: No thickening or nodules.  KIDNEYS AND URETERS: Kidneys are normal in size and location.  No renal or ureteral calculi.  Moderate perinephric stranding is seen bilaterally.   PELVIS:  BLADDER: Urinary bladder is decompressed by Lee catheter with a small amount of air in the lumen of the urinary bladder and mild urinary bladder wall thickening.  REPRODUCTIVE ORGANS: No abnormalities identified.  BOWEL: Appendix is unremarkable.  Terminal ileum appears normal.  Cecum is located in the right midabdomen.  There is moderate concentric wall thickening of the rectosigmoid colon.  VESSELS: Mild calcified plaque is seen in the abdominal aorta and iliac arteries.   PERITONEUM/RETROPERITONEUM/LYMPH NODES: No free fluid.  No pneumoperitoneum. No lymphadenopathy.  ABDOMINAL WALL: No abnormalities identified. SOFT TISSUES: Fluid-filled indirect left inguinal hernia is noted.  There is a small fat-containing indirect right inguinal hernia.  BONES: No acute fracture or aggressive osseous lesion.  Moderate multilevel disc disease is seen in the mid and lower thoracic spine as well as at L1-L2.  There is mild disc disease and vacuum phenomenon at L5-S1. Mild dextroconvex curvature is seen of the thoracolumbar spine.    Large right pleural effusion and small loculated left pleural effusion at the left lung apex with multifocal airspace disease in the left lung, likely atelectasis and possibly pneumonia in the appropriate clinical setting. Pronounced cardiomegaly with question of mild systemic anemia. Mild to moderate  concentric wall thickening of the rectosigmoid colon suggesting a low-grade acute infectious or inflammatory distal colitis/proctitis. Decompressed urinary bladder with mild concentric wall thickening which may be due to underdistention however mild cystitis is a possibility in the upper clinical setting, correlate with urinalysis. Moderate-sized fluid filled left inguinal hernia along with a small fat-containing right inguinal hernia. Signed by Tristen Livingston      Assessment/Plan   Assessment & Plan  CHF (congestive heart failure), NYHA class I, acute on chronic, combined    Humphrey Waddell is a 59 y.o. male medical history CAD (s/p CABG), HFpEF, carotid artery disease, CVAs (cognitive impairment), HTN, HLD, DMT2, CKD 4, GERD, PAD (s/p lt AKA), depression who presented to [facility] ED on 9/16 with dyspnea x 1 day with accompanying fatigue, reduced conversational ability.  Evaluation revealed encephalopathy with acute congestive heart failure (BNP 28, 635) with bilateral pleural effusions & superimposed pneumonia. Additionally found to be hypoxic and placed on 3 L to obtain 92% saturation, ultimately requiring increased to 6 L NC with intermittent BiPAP.  He was admitted to [facility] treated with DuoNebs, dexamethasone, lasix, ceftriaxone, doxycycline and Zosyn.  On the morning of 9/18 he was transferred to [facility] SDU.  He arrived on 6 L NC with labs significant for , hsTrop 62, Cr 3.09, h/h 7/24.2. He was started on Unasyn, albuterol nebs and oral prednisone and able to be weaned to 4 L NC.  Pulmonology is consulted for right pleural effusion.     Impression/recommendations:     # Acute hypoxic respiratory failure: Multifactorial secondary to acute on chronic HFpEF, bilateral pleural effusions, pneumonia versus atelectasis; baseline is room air, required intermittent BiPAP wean to nasal cannula, improving currently on 2 L  -Continue supplemental oxygen, wean for saturation 92 to 95%  -If patient goes home, home O2 eval  prior to discharge  -Bronchopulmonary hygiene with incentive spirometer and Acapella  -Continue scheduled albuterol     # ?  COPD: Patient and wife denied history of pulmonary disorders including COPD, asthma; no PFTs on file; patient does have recent smoking history  -No indication for steroids at this time  -Would benefit from outpatient PFTs when he is back to baseline     # Pleural effusion, bilateral: Right greater than left on CT; left effusion is loculated at the apex  -Diuresis as renal function and hemodynamics allows   -S/p rt thora for 1000ml removed (-) Light's criteria for transudative effusion likely 2/2 heart failure exacerbation     #Pneumonia: CT chest with noted left multifocal airspace disease concerning for atelectasis versus pneumonia; must also consider possible aspiration given patient's fluctuating mental status over the past 3 weeks  -Continue coverage with Unasyn  -Speech-language evaluation pleated; regular diet with thin liquids recommended  -Will need repeat chest CT in 8 to 12 weeks to ensure resolution of opacities     # Heart Failure: HFpEF with 4/2024 echo showing EF 60 to 65% with impaired relaxation pattern of LV diastolic filling and mildly reduced RV systolic function; CT chest showing mild pulmonary edema; BNP at [facility] > 28,000, on admit to [place] [id number]  -Diuresis per cardiology, renal (~ 3L down since last night; -7L for the stay)  -Echo complete (results above); similar to echo 4/2024 with noted restrictive relaxation pattern, EF 59%     #Likely obstructive sleep apnea/obesity hypoventilation syndrome: BMI 34.08 with neck/trunk weight foci; wife reports snoring, daytime napping  -Would benefit from outpatient evaluation for sleep apnea, discussed with wife and patient reviewing with PCP and possibly seeing if he qualified for home study   -Adding CPAP at night and with naps; 8-20 cmH2O autopap    DVT prophylaxis with Lovenox      Thank you for the consult.  Pulmonology will  follow.     I spent 50 minutes in the professional and overall care of this patient.   Priscila Denton, APRN-CNS

## 2024-09-25 NOTE — PROGRESS NOTES
Occupational Therapy    OT Treatment    Patient Name: Humphrey Waddell  MRN: [id number]  Department: [facility]   Room: [facility]  Today's Date: 9/25/2024  Time Calculation  Start Time: 0840  Stop Time: 0853  Time Calculation (min): 13 min        Assessment:  OT Assessment: Patient demonstrates littel volitional movement and limited participation in skilled OT today. He appears lethargic and requires max cues to open eyes and attend to self care tasks.  Prognosis: Poor  Barriers to Discharge: None  Evaluation/Treatment Tolerance: Patient limited by fatigue  Medical Staff Made Aware: Yes  End of Session Communication: Bedside nurse  End of Session Patient Position: Bed, 3 rail up, Alarm on  Prognosis: Poor  Barriers to Discharge: None  Evaluation/Treatment Tolerance: Patient limited by fatigue  Medical Staff Made Aware: Yes  Plan:  Treatment Interventions: ADL retraining, Functional transfer training, UE strengthening/ROM, Endurance training, Compensatory technique education  OT Frequency: 3 times per week  OT Discharge Recommendations: Moderate intensity level of continued care  Equipment Recommended upon Discharge: Lift  OT Recommended Transfer Status: Dependent  OT - OK to Discharge: Yes  Treatment Interventions: ADL retraining, Functional transfer training, UE strengthening/ROM, Endurance training, Compensatory technique education    Subjective   Previous Visit Info:     General:  General  Reason for Referral: To ED with SOB. Pt found to have desmond pleural effusions, PNA, and CHF exacerbation.  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    emergent amputation, necrotizing fasciitis, revision 3/10/2021    Adverse effect of anesthesia     confusion    Anemia     CAD (coronary artery disease)     Carotid artery disease (CMS-HCC)     bilateral    CVA (cerebral vascular accident) (Multi) 2020     Depression     DM (diabetes mellitus) (Multi)     type 2 with neuropathy    GERD (gastroesophageal reflux disease)     High cholesterol     HTN (hypertension)     Leg ulcer (Multi)     chronic right lower extremity    Neuropathy     OA (osteoarthritis)     PAD (peripheral artery disease) (CMS-HCC)     Renal disorder     S/P CABG (coronary artery bypass graft) 11/13/2023    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   ,  Prior to Session Communication: Bedside nurse  Patient Position Received: Bed, 3 rail up, Alarm on  General Comment: watching TV upon OT arrival, minimal engagement with OT, closes eyes when spoken to, appears lethargic, requires max cues to open eyes and participate this date  Precautions:  LE Weight Bearing Status: Other (Comment) (LLE AKA)  Medical Precautions: Fall precautions    Pain:  Pain Assessment  Pain Assessment: Pena-Baker FACES  0-10 (Numeric) Pain Score: 0 - No pain    Objective    Cognition:  Cognition  Orientation Level: Unable to assess (does not respond verbally or with gestures, only opens and closes eyes)  Insight: Severe  Coordination:     Activities of Daily Living: Feeding  Feeding Where Assessed: Bed level  Feeding Comments: OT s/u breakfast tray, attempted [place] assist for self feeding, pt does not participate, does not take utensil from OT, no active movement of UEs or demonstration of efforts to attempt feeding. Does not open mouth when offered a bite of food.    Grooming  Grooming Level of Assistance: Maximum assistance  Grooming Where Assessed: Bed level  Grooming Comments: washing face- When handed washcloth, brings to face but does not effectlively complete any steps of grooming, requires max cues to open eyes and participate, limited activity.  Functional Standing Tolerance:     Bed Mobility/Transfers: Bed Mobility 1  Bed Mobility 1: Forward lean  Level of Assistance 1: Dependent, +2  Bed Mobility Comments 1: OT positions pt in bed at midline for self  feeding. No participation noted from patient this date despite increased processing time and max cues for hand placement and keeping eyes open.    Outcome Measures:[facility] Daily Activity  Putting on and taking off regular lower body clothing: Total  Bathing (including washing, rinsing, drying): Total  Putting on and taking off regular upper body clothing: Total  Toileting, which includes using toilet, bedpan or urinal: Total  Taking care of personal grooming such as brushing teeth: Total  Eating Meals: Total  Daily Activity - Total Score: 6    Education Documentation  Body Mechanics, taught by Maria Dolores Lama OT at 9/25/2024  9:05 AM.  Learner: Patient  Readiness: Nonacceptance  Method: Explanation, Demonstration  Response: Needs Reinforcement, No Evidence of Learning    ADL Training, taught by Maria Dolores Lama OT at 9/25/2024  9:05 AM.  Learner: Patient  Readiness: Nonacceptance  Method: Explanation, Demonstration  Response: Needs Reinforcement, No Evidence of Learning    Education Comments  No comments found.      Goals:  Encounter Problems       Encounter Problems (Active)       ADLs       Patient will perform UB and LB bathing with moderate assist level of assistance. (Not Progressing)       Start:  09/22/24    Expected End:  10/06/24            Patient with complete upper body dressing with set-up level of assistance donning and doffing all UE clothes. (Not Progressing)       Start:  09/22/24    Expected End:  10/06/24            Patient with complete lower body dressing with maximal assist level of assistance donning and doffing all LE clothes. (Not Progressing)       Start:  09/22/24    Expected End:  10/06/24            Patient will feed self with modified independent level of assistance. (Not Progressing)       Start:  09/22/24    Expected End:  10/06/24            Patient will complete daily grooming tasks brushing teeth and washing face/hair with set-up level of assistance and PRN adaptive equipment. (Not  Progressing)       Start:  09/22/24    Expected End:  10/06/24            Patient will complete toileting including hygiene clothing management/hygiene with moderate assist level of assistance. (Not Progressing)       Start:  09/22/24    Expected End:  10/06/24               TRANSFERS       Patient will perform bed mobility moderate assist level of assistance and bed rails in order to improve safety and independence with mobility (Not Progressing)       Start:  09/22/24    Expected End:  10/06/24

## 2024-09-25 NOTE — PROGRESS NOTES
09/25/24 0907   Discharge Planning   Home or Post Acute Services Post acute facilities (Rehab/SNF/etc)   Type of Post Acute Facility Services Skilled nursing   Expected Discharge Disposition SNF  (Referrals placed, awaiting for an accepting facility then will need auth)   Does the patient need discharge transport arranged? Yes   RoundTrip coordination needed? Yes     Obtained SNF choices from patient's room.  Requested DSC send referrals to:  1. [facility] ([facility])  2. [facility]  Patient will need insurance auth.  Ree Gong RN

## 2024-09-25 NOTE — PROGRESS NOTES
09/25/24 3981   Discharge Planning   Assistance Needed SW spoke with pt wife Charline who would like pt to go to [facility]. They are able to accept. SW requested that precert be started.

## 2024-09-25 NOTE — PROGRESS NOTES
"Humphrey Waddell is a 59 y.o. male with past medical history PAD s/p left above-the-knee amputation, anemia, coronary artery disease, s/p CABG, carotid artery disease, CVA with cognitive impairment, depression, type 2 diabetes mellitus, GERD, hyperlipidemia, hypertension, osteoarthritis, peripheral arterial disease, CKD stage IV. He presented to [facility] ED 9/18 with c/o dyspnea and weakness.  He was found to have bilateral pleural effusions, right greater than left, metabolic encephalopathy, COPD and CHF exacerbations, and idania.  He was transferred to [facility] on 9/18 with dx acute hypoxic and hypercapnic respiratory failure.   Patient's respiratory status did start improving, but now diuresis impacted due to worsening IDANIA on CKD. Nephrology is following and considering dialysis.  Patient had initially wanted to be DNR/DNI, but then later stated he wanted to remain full code, hence palliative med consult     Patient underwent thoracentesis on 9/19 for 1 liters       Interval Hx and Subjective     Transferred out of SDU yesterday.  Cr improved today although still very elevated.  Patient reports feeling short of breath but does not appear dyspnea.  Cites ongoing low back pain but no other complaints.  Very slow to answer questions.  Breakfast tray untouched when I visited.  Patient does not answer any questions about appetite.       Objective    Blood pressure 153/83, pulse 77, temperature 37.1 °C (98.7 °F), temperature source Temporal, resp. rate 17, height 1.93 m (6' 4\"), weight 131 kg (288 lb 12.8 oz), SpO2 95%.    General:  appears mildly uncomfortable, NAD  Neuro: alert, oriented to situation but cognition still impaired, slow responses  Psych:  face appears mildly anxious  HEENT:  oral mucosa moist without exudate   Resps:  resps unlabored, lungs with some basilar crackles, no cough noted.  On supplemental 02  Card:  RRR, no murmurs, rubs, or gallops.  No JVD noted.  POX 93%  GI:  normal bowel sounds, soft and non tender  : "  gerardo with clear yellow urine  MS:  left AKA.  Pitting edema to RLE    Integ:  skin pale but warm and dry overall         Results for orders placed or performed during the hospital encounter of 09/18/24 (from the past 24 hour(s))   POCT GLUCOSE   Result Value Ref Range    POCT Glucose 96 74 - 99 mg/dL   POCT GLUCOSE   Result Value Ref Range    POCT Glucose 87 74 - 99 mg/dL   POCT GLUCOSE   Result Value Ref Range    POCT Glucose 89 74 - 99 mg/dL   Blood Gas Venous   Result Value Ref Range    POCT pH, Venous 7.37 7.33 - 7.43 pH    POCT pCO2, Venous 48 41 - 51 mm Hg    POCT pO2, Venous 69 (H) 35 - 45 mm Hg    POCT SO2, Venous 95 (H) 45 - 75 %    POCT Oxy Hemoglobin, Venous 92.9 (H) 45.0 - 75.0 %    POCT Base Excess, Venous 1.7 -2.0 - 3.0 mmol/L    POCT HCO3 Calculated, Venous 27.7 (H) 22.0 - 26.0 mmol/L    Patient Temperature 37.0 degrees Celsius    FiO2 28 %   CBC and Auto Differential   Result Value Ref Range    WBC 10.1 4.4 - 11.3 x10*3/uL    nRBC 0.0 0.0 - 0.0 /100 WBCs    RBC 2.67 (L) 4.50 - 5.90 x10*6/uL    Hemoglobin 7.1 (L) 13.5 - 17.5 g/dL    Hematocrit 24.1 (L) 41.0 - 52.0 %    MCV 90 80 - 100 fL    MCH 26.6 26.0 - 34.0 pg    MCHC 29.5 (L) 32.0 - 36.0 g/dL    RDW 15.9 (H) 11.5 - 14.5 %    Platelets 194 150 - 450 x10*3/uL    Neutrophils % 71.0 40.0 - 80.0 %    Immature Granulocytes %, Automated 0.5 0.0 - 0.9 %    Lymphocytes % 10.5 13.0 - 44.0 %    Monocytes % 10.6 2.0 - 10.0 %    Eosinophils % 7.2 0.0 - 6.0 %    Basophils % 0.2 0.0 - 2.0 %    Neutrophils Absolute 7.14 1.20 - 7.70 x10*3/uL    Immature Granulocytes Absolute, Automated 0.05 0.00 - 0.70 x10*3/uL    Lymphocytes Absolute 1.06 (L) 1.20 - 4.80 x10*3/uL    Monocytes Absolute 1.07 (H) 0.10 - 1.00 x10*3/uL    Eosinophils Absolute 0.73 (H) 0.00 - 0.70 x10*3/uL    Basophils Absolute 0.02 0.00 - 0.10 x10*3/uL   Renal Function Panel   Result Value Ref Range    Glucose 78 74 - 99 mg/dL    Sodium 141 136 - 145 mmol/L    Potassium 3.4 (L) 3.5 - 5.3 mmol/L     Chloride 103 98 - 107 mmol/L    Bicarbonate 28 21 - 32 mmol/L    Anion Gap 13 10 - 20 mmol/L    Urea Nitrogen 59 (H) 6 - 23 mg/dL    Creatinine 4.70 (H) 0.50 - 1.30 mg/dL    eGFR 13 (L) >60 mL/min/1.73m*2    Calcium 7.3 (L) 8.6 - 10.3 mg/dL    Phosphorus 4.9 2.5 - 4.9 mg/dL    Albumin 2.8 (L) 3.4 - 5.0 g/dL   POCT GLUCOSE   Result Value Ref Range    POCT Glucose 74 74 - 99 mg/dL      Scheduled medications  acetaminophen, 975 mg, oral, TID  albuterol, 2.5 mg, nebulization, TID  amLODIPine, 10 mg, oral, Daily  ampicillin-sulbactam, 3 g, intravenous, q12h  aspirin, 81 mg, oral, Daily  atorvastatin, 80 mg, oral, Daily  fludrocortisone, 0.1 mg, oral, Daily  gabapentin, 300 mg, oral, Nightly  heparin, 5,000 Units, subcutaneous, q8h  hydrALAZINE, 50 mg, oral, TID  insulin glargine, 2 Units, subcutaneous, Nightly  insulin regular, 0-10 Units, subcutaneous, TID  isosorbide dinitrate, 20 mg, oral, TID  lidocaine, 1 patch, transdermal, Daily  metoprolol tartrate, 50 mg, oral, BID  pantoprazole, 40 mg, oral, Daily before breakfast   Or  pantoprazole, 40 mg, intravenous, Daily before breakfast  potassium chloride, 40 mEq, oral, Once  torsemide, 60 mg, oral, BID  venlafaxine XR, 150 mg, oral, Daily      Continuous medications     PRN medications  PRN medications: albuterol, dextrose, dextrose, glucagon, glucagon, glucagon, ondansetron **OR** ondansetron, oxyCODONE, oxygen, oxygen, polyethylene glycol     Dietary Orders (From admission, onward)       Start     Ordered    09/18/24 0250  Adult diet Cardiac, Consistent Carb; CCD 90 gm/meal; 70 gm fat; 2 - 3 grams Sodium  Diet effective now        Question Answer Comment   Diet type Cardiac    Diet type Consistent Carb    Carb diet selection: CCD 90 gm/meal    Fat restriction: 70 gm fat    Sodium restriction: 2 - 3 grams Sodium        09/18/24 0251                     Assessment/Plan    59 year old male with multiple medical issues; acute on chronic HFpEF, CAD, DM, PNA acute hypoxic  and hypercapnic respiratory failure, severe IDANIA on CKD IV, felt to be related to cardiorenal syndrome.       Palliative goals:  Patient still unable to participate In higher level discussions about code status, wife elected to make him DNR/DNI  -code status changed to DNR/DNI  -will re-address with patient should he become more alert     IDANIA on top of CKD IV:  diuresing now.  Cr is slightly better, patient still confused but more alert.     -defer tx to primary and renal team  -happy to assist with any discussions with family about dialysis should his renal function worsen     Degenerative disc disease to lower back with pain:    -continue gabapentin 300 mg at HS. Max dose for current renal status  -continue acetaminophen 975 mg PO TID  -continue oxycodone 5 mg Q 6 hours PRN     Reema Garcia APRN CNP

## 2024-09-25 NOTE — CARE PLAN
The patient's goals for the shift include Pt. will have a safe, restful and uneventful evening    The clinical goals for the shift include patient safety    Over the shift, the patient did not make progress toward the following goals. Barriers to progression include N/A. Recommendations to address these barriers include N/A.

## 2024-09-26 ENCOUNTER — APPOINTMENT (OUTPATIENT)
Dept: RADIOLOGY | Facility: HOSPITAL | Age: 59
End: 2024-09-26
Payer: MEDICAID

## 2024-09-26 LAB
ANION GAP SERPL CALC-SCNC: 13 MMOL/L (ref 10–20)
BUN SERPL-MCNC: 50 MG/DL (ref 6–23)
CALCIUM SERPL-MCNC: 7.6 MG/DL (ref 8.6–10.3)
CHLORIDE SERPL-SCNC: 106 MMOL/L (ref 98–107)
CO2 SERPL-SCNC: 28 MMOL/L (ref 21–32)
CREAT SERPL-MCNC: 4.32 MG/DL (ref 0.5–1.3)
EGFRCR SERPLBLD CKD-EPI 2021: 15 ML/MIN/1.73M*2
ERYTHROCYTE [DISTWIDTH] IN BLOOD BY AUTOMATED COUNT: 15.7 % (ref 11.5–14.5)
GLUCOSE BLD MANUAL STRIP-MCNC: 105 MG/DL (ref 74–99)
GLUCOSE BLD MANUAL STRIP-MCNC: 122 MG/DL (ref 74–99)
GLUCOSE BLD MANUAL STRIP-MCNC: 149 MG/DL (ref 74–99)
GLUCOSE BLD MANUAL STRIP-MCNC: 86 MG/DL (ref 74–99)
GLUCOSE SERPL-MCNC: 84 MG/DL (ref 74–99)
HCT VFR BLD AUTO: 26.1 % (ref 41–52)
HGB BLD-MCNC: 7.6 G/DL (ref 13.5–17.5)
MCH RBC QN AUTO: 26.4 PG (ref 26–34)
MCHC RBC AUTO-ENTMCNC: 29.1 G/DL (ref 32–36)
MCV RBC AUTO: 91 FL (ref 80–100)
NRBC BLD-RTO: 0 /100 WBCS (ref 0–0)
PLATELET # BLD AUTO: 195 X10*3/UL (ref 150–450)
POTASSIUM SERPL-SCNC: 3.8 MMOL/L (ref 3.5–5.3)
RBC # BLD AUTO: 2.88 X10*6/UL (ref 4.5–5.9)
SODIUM SERPL-SCNC: 143 MMOL/L (ref 136–145)
WBC # BLD AUTO: 8.6 X10*3/UL (ref 4.4–11.3)

## 2024-09-26 PROCEDURE — 99232 SBSQ HOSP IP/OBS MODERATE 35: CPT | Performed by: CLINICAL NURSE SPECIALIST

## 2024-09-26 PROCEDURE — 99233 SBSQ HOSP IP/OBS HIGH 50: CPT | Performed by: INTERNAL MEDICINE

## 2024-09-26 PROCEDURE — 94640 AIRWAY INHALATION TREATMENT: CPT

## 2024-09-26 PROCEDURE — 97535 SELF CARE MNGMENT TRAINING: CPT | Mod: GO

## 2024-09-26 PROCEDURE — 71045 X-RAY EXAM CHEST 1 VIEW: CPT

## 2024-09-26 PROCEDURE — 36415 COLL VENOUS BLD VENIPUNCTURE: CPT | Performed by: INTERNAL MEDICINE

## 2024-09-26 PROCEDURE — 85027 COMPLETE CBC AUTOMATED: CPT | Performed by: INTERNAL MEDICINE

## 2024-09-26 PROCEDURE — 2500000005 HC RX 250 GENERAL PHARMACY W/O HCPCS: Performed by: INTERNAL MEDICINE

## 2024-09-26 PROCEDURE — 2500000004 HC RX 250 GENERAL PHARMACY W/ HCPCS (ALT 636 FOR OP/ED): Performed by: INTERNAL MEDICINE

## 2024-09-26 PROCEDURE — 2500000004 HC RX 250 GENERAL PHARMACY W/ HCPCS (ALT 636 FOR OP/ED): Performed by: HOSPITALIST

## 2024-09-26 PROCEDURE — 2500000005 HC RX 250 GENERAL PHARMACY W/O HCPCS: Performed by: NURSE PRACTITIONER

## 2024-09-26 PROCEDURE — 99232 SBSQ HOSP IP/OBS MODERATE 35: CPT | Performed by: NURSE PRACTITIONER

## 2024-09-26 PROCEDURE — 80048 BASIC METABOLIC PNL TOTAL CA: CPT | Performed by: INTERNAL MEDICINE

## 2024-09-26 PROCEDURE — 1100000001 HC PRIVATE ROOM DAILY

## 2024-09-26 PROCEDURE — 2500000001 HC RX 250 WO HCPCS SELF ADMINISTERED DRUGS (ALT 637 FOR MEDICARE OP): Performed by: INTERNAL MEDICINE

## 2024-09-26 PROCEDURE — 82947 ASSAY GLUCOSE BLOOD QUANT: CPT

## 2024-09-26 PROCEDURE — 2500000002 HC RX 250 W HCPCS SELF ADMINISTERED DRUGS (ALT 637 FOR MEDICARE OP, ALT 636 FOR OP/ED): Performed by: INTERNAL MEDICINE

## 2024-09-26 PROCEDURE — 71045 X-RAY EXAM CHEST 1 VIEW: CPT | Performed by: RADIOLOGY

## 2024-09-26 PROCEDURE — 94660 CPAP INITIATION&MGMT: CPT

## 2024-09-26 PROCEDURE — 94668 MNPJ CHEST WALL SBSQ: CPT

## 2024-09-26 RX ORDER — ACETAMINOPHEN 325 MG/1
975 TABLET ORAL EVERY 8 HOURS
Status: DISCONTINUED | OUTPATIENT
Start: 2024-09-26 | End: 2024-09-27 | Stop reason: HOSPADM

## 2024-09-26 RX ORDER — LIDOCAINE 560 MG/1
1 PATCH PERCUTANEOUS; TOPICAL; TRANSDERMAL DAILY
Status: DISCONTINUED | OUTPATIENT
Start: 2024-09-26 | End: 2024-09-27 | Stop reason: HOSPADM

## 2024-09-26 ASSESSMENT — COGNITIVE AND FUNCTIONAL STATUS - GENERAL
STANDING UP FROM CHAIR USING ARMS: A LOT
EATING MEALS: A LITTLE
MOVING FROM LYING ON BACK TO SITTING ON SIDE OF FLAT BED WITH BEDRAILS: A LITTLE
DRESSING REGULAR UPPER BODY CLOTHING: A LOT
HELP NEEDED FOR BATHING: TOTAL
WALKING IN HOSPITAL ROOM: A LOT
MOVING FROM LYING ON BACK TO SITTING ON SIDE OF FLAT BED WITH BEDRAILS: TOTAL
PERSONAL GROOMING: TOTAL
MOBILITY SCORE: 12
HELP NEEDED FOR BATHING: A LOT
WALKING IN HOSPITAL ROOM: TOTAL
CLIMB 3 TO 5 STEPS WITH RAILING: TOTAL
PERSONAL GROOMING: A LITTLE
DRESSING REGULAR LOWER BODY CLOTHING: TOTAL
STANDING UP FROM CHAIR USING ARMS: TOTAL
TOILETING: A LOT
DAILY ACTIVITIY SCORE: 7
CLIMB 3 TO 5 STEPS WITH RAILING: TOTAL
TOILETING: TOTAL
DAILY ACTIVITIY SCORE: 13
TURNING FROM BACK TO SIDE WHILE IN FLAT BAD: A LOT
HELP NEEDED FOR BATHING: TOTAL
MOBILITY SCORE: 6
MOVING TO AND FROM BED TO CHAIR: A LOT
MOVING TO AND FROM BED TO CHAIR: TOTAL
TURNING FROM BACK TO SIDE WHILE IN FLAT BAD: TOTAL
DRESSING REGULAR UPPER BODY CLOTHING: TOTAL
DRESSING REGULAR LOWER BODY CLOTHING: TOTAL
EATING MEALS: A LITTLE
PERSONAL GROOMING: A LITTLE
DAILY ACTIVITIY SCORE: 10
DRESSING REGULAR UPPER BODY CLOTHING: TOTAL
DRESSING REGULAR LOWER BODY CLOTHING: TOTAL
TOILETING: TOTAL
EATING MEALS: A LOT

## 2024-09-26 ASSESSMENT — PAIN SCALES - GENERAL
PAINLEVEL_OUTOF10: 0 - NO PAIN
PAINLEVEL_OUTOF10: 0 - NO PAIN

## 2024-09-26 ASSESSMENT — PAIN SCALES - PAIN ASSESSMENT IN ADVANCED DEMENTIA (PAINAD)
BREATHING: NORMAL
CONSOLABILITY: NO NEED TO CONSOLE
FACIALEXPRESSION: FACIAL GRIMACING
TOTALSCORE: 3
BODYLANGUAGE: TENSE, DISTRESSED PACING, FIDGETING

## 2024-09-26 ASSESSMENT — PAIN DESCRIPTION - ORIENTATION
ORIENTATION: RIGHT
ORIENTATION: RIGHT

## 2024-09-26 ASSESSMENT — PAIN - FUNCTIONAL ASSESSMENT
PAIN_FUNCTIONAL_ASSESSMENT: 0-10
PAIN_FUNCTIONAL_ASSESSMENT: 0-10

## 2024-09-26 ASSESSMENT — ACTIVITIES OF DAILY LIVING (ADL): HOME_MANAGEMENT_TIME_ENTRY: 23

## 2024-09-26 ASSESSMENT — PAIN DESCRIPTION - LOCATION: LOCATION: SHOULDER

## 2024-09-26 NOTE — PROGRESS NOTES
Occupational Therapy    OT Treatment    Patient Name: uHmphrey Waddell  MRN: [id number]  Department: [facility] 7  Room: [facility]  Today's Date: 9/26/2024  Time Calculation  Start Time: 1549  Stop Time: 1615  Time Calculation (min): 26 min        Assessment:  OT Assessment: Pt able to complete feeding and grooming tasks bed level during OT session with setup assist. Pt requires encouragement to complete tasks, pt nodding yes/no in response to questions throughout session.  Prognosis: Fair  Barriers to Discharge: Decreased caregiver support  Evaluation/Treatment Tolerance: Patient tolerated treatment well, Patient limited by fatigue  Medical Staff Made Aware: Yes  End of Session Communication: Bedside nurse  End of Session Patient Position: Bed, 3 rail up, Alarm on  OT Assessment Results: Decreased ADL status, Decreased upper extremity strength, Decreased safe judgment during ADL, Decreased endurance, Decreased IADLs, Decreased functional mobility  Prognosis: Fair  Barriers to Discharge: Decreased caregiver support  Evaluation/Treatment Tolerance: Patient tolerated treatment well, Patient limited by fatigue  Medical Staff Made Aware: Yes  Barriers to Participation: Attitude of self, Comorbidities, Insight into problems  Plan:  Treatment Interventions: ADL retraining, Functional transfer training, UE strengthening/ROM, Endurance training, Equipment evaluation/education, Compensatory technique education  OT Frequency: 3 times per week  OT Discharge Recommendations: Moderate intensity level of continued care  Equipment Recommended upon Discharge: Lift  OT Recommended Transfer Status: Maximum assist, Assist of 2, Dependent  OT - OK to Discharge: Yes  Treatment Interventions: ADL retraining, Functional transfer training, UE strengthening/ROM, Endurance training, Equipment evaluation/education, Compensatory technique education    Subjective   Previous Visit Info:  OT Last Visit  OT Received On: 09/26/24  General:  General  Reason for  Referral: To ED with SOB. Pt found to have desmond pleural effusions, PNA, and CHF exacerbation.  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    emergent amputation, necrotizing fasciitis, revision 3/10/2021    Adverse effect of anesthesia     confusion    Anemia     CAD (coronary artery disease)     Carotid artery disease (CMS-HCC)     bilateral    CVA (cerebral vascular accident) (Multi) 2020    Depression     DM (diabetes mellitus) (Multi)     type 2 with neuropathy    GERD (gastroesophageal reflux disease)     High cholesterol     HTN (hypertension)     Leg ulcer (Multi)     chronic right lower extremity    Neuropathy     OA (osteoarthritis)     PAD (peripheral artery disease) (CMS-HCC)     Renal disorder     S/P CABG (coronary artery bypass graft) 11/13/2023    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  RN requesting for therapy to attempt patient at a later time when medically appropriate and pt able to participate.)  Prior to Session Communication: Bedside nurse  Patient Position Received: Alarm on, Bed, 3 rail up  Preferred Learning Style: written, visual, verbal, kinesthetic, auditory  General Comment: RN cleared OT session, pt sleeping upon arrival, increasing alertness throughout session, requesting to eat lunch  Precautions:  LE Weight Bearing Status: Other (Comment) (L LE AKA)  Medical Precautions: Fall precautions      Pain:  Pain Assessment  Pain Assessment: 0-10  0-10 (Numeric) Pain Score: 0 - No pain    Objective    Cognition:  Cognition  Orientation Level: Unable to assess  Coordination:  Movements are Fluid and Coordinated: Yes  Activities of Daily Living: Feeding  Feeding Level of Assistance: Setup  Feeding Where Assessed: Bed  level  Feeding Comments: Pt requiring setup assist and cues to hold fork and bring to mouth, pt able to place peaches onto fork, pt encouraged to hold cup of water and bring to mouth with cues to do it himself    Grooming  Grooming Level of Assistance: Setup  Grooming Where Assessed: Bed level  Grooming Comments: pt able to wash face with R UE with cues  Functional Standing Tolerance:     Bed Mobility/Transfers: Bed Mobility  Bed Mobility: Yes  Bed Mobility 1  Bed Mobility 1:  (positioning to midline)  Level of Assistance 1: Moderate assistance  Bed Mobility Comments 1: pt requiring mod A to sit midline for feeding tasks     Strength:  Strength Comments: 2+/5 B UE's  Other Activity:       RUE   RUE : Within Functional Limits and LUE   LUE: Within Functional Limits    Outcome Measures:[facility] Daily Activity  Putting on and taking off regular lower body clothing: Total  Bathing (including washing, rinsing, drying): Total  Putting on and taking off regular upper body clothing: Total  Toileting, which includes using toilet, bedpan or urinal: Total  Taking care of personal grooming such as brushing teeth: A little  Eating Meals: A little  Daily Activity - Total Score: 10    Education Documentation  ADL Training, taught by Jess Schwarz OT at 9/26/2024  4:26 PM.  Learner: Patient  Readiness: Acceptance  Method: Explanation, Demonstration  Response: Needs Reinforcement    Education Comments  No comments found.           Goals:  Encounter Problems       Encounter Problems (Active)       ADLs       Patient will perform UB and LB bathing with moderate assist level of assistance. (Progressing)       Start:  09/22/24    Expected End:  10/06/24            Patient with complete upper body dressing with set-up level of assistance donning and doffing all UE clothes. (Progressing)       Start:  09/22/24    Expected End:  10/06/24            Patient with complete lower body dressing with maximal assist level of assistance donning and  doffing all LE clothes. (Progressing)       Start:  09/22/24    Expected End:  10/06/24            Patient will feed self with modified independent level of assistance. (Progressing)       Start:  09/22/24    Expected End:  10/06/24            Patient will complete daily grooming tasks brushing teeth and washing face/hair with set-up level of assistance and PRN adaptive equipment. (Progressing)       Start:  09/22/24    Expected End:  10/06/24            Patient will complete toileting including hygiene clothing management/hygiene with moderate assist level of assistance. (Progressing)       Start:  09/22/24    Expected End:  10/06/24               TRANSFERS       Patient will perform bed mobility moderate assist level of assistance and bed rails in order to improve safety and independence with mobility (Progressing)       Start:  09/22/24    Expected End:  10/06/24

## 2024-09-26 NOTE — PROGRESS NOTES
Humphrey Waddell is a 59 y.o. male on day 8 of admission presenting with CHF (congestive heart failure), NYHA class I, acute on chronic, combined.      Subjective   Seen and examined.  ANA. UOP of dropped to 1.3 L in 24 hrs with once daily torsemide 60 mg.   Chart/labs/meds/notes/imaging/VS reviewed.         Objective          Vitals 24HR  Heart Rate:  [69-83]   Temp:  [36.4 °C (97.6 °F)-36.8 °C (98.2 °F)]   Resp:  [16-22]   BP: (110-173)/(70-79)   Weight:  [133 kg (293 lb 9.6 oz)]   SpO2:  [85 %-98 %]     Intake/Output last 3 Shifts:    Intake/Output Summary (Last 24 hours) at 9/26/2024 1759  Last data filed at 9/26/2024 1429  Gross per 24 hour   Intake 600 ml   Output 1800 ml   Net -1200 ml       Physical Exam  Constitutional:       Comments: Awake, alert and oriented x 3.  In no acute distress.  HENT:      Head: Normocephalic and atraumatic.      Nose: Nose normal.      Mouth: Mucous membranes are moist.      Pharynx: Oropharynx is clear.   Eyes:      Conjunctiva/sclera: Conjunctivae normal.      Pupils: Pupils are equal, round, and reactive to light.   Cardiovascular:      Rate and Rhythm: Regular rhythm.      Heart sounds: Normal heart sounds.   Pulmonary:      Effort: Diminished bibasilar breath sounds.  Abdominal:      General: Bowel sounds are normal.      Palpations: Abdomen is soft.   Genitourinary:     Comments: Lee catheter draining yellow urine  Musculoskeletal:         General: No joint swelling. Lower limb edema. L AKA.   Skin:     General: Skin is warm and dry.  No rashes or lesions to exposed skin.  Neurological:      Mental Status: Cranial nerves II through XII grossly intact.  No asterixis.  Psychiatric:         Mood: Normal mood.        Behavior: Behavior is normal.       Scheduled Medications  acetaminophen, 975 mg, oral, q8h  albuterol, 2.5 mg, nebulization, TID  amLODIPine, 10 mg, oral, Daily  ampicillin-sulbactam, 3 g, intravenous, q12h  aspirin, 81 mg, oral, Daily  atorvastatin, 80 mg, oral,  Daily  calcium acetate, 667 mg, oral, TID  fludrocortisone, 0.1 mg, oral, Daily  gabapentin, 300 mg, oral, Nightly  heparin, 5,000 Units, subcutaneous, q8h  hydrALAZINE, 50 mg, oral, TID  insulin glargine, 2 Units, subcutaneous, Nightly  insulin regular, 0-10 Units, subcutaneous, TID  isosorbide dinitrate, 20 mg, oral, TID  lidocaine, 1 patch, transdermal, Daily  lidocaine, 1 patch, transdermal, Daily  metoprolol tartrate, 50 mg, oral, BID  pantoprazole, 40 mg, oral, Daily before breakfast   Or  pantoprazole, 40 mg, intravenous, Daily before breakfast  torsemide, 60 mg, oral, Daily  venlafaxine XR, 150 mg, oral, Daily      Continuous medications       PRN medications: albuterol, dextrose, dextrose, glucagon, glucagon, glucagon, ondansetron **OR** ondansetron, [Held by provider] oxyCODONE, oxygen, oxygen, polyethylene glycol     Relevant Results  Results from last 7 days   Lab Units 09/26/24 0812 09/25/24 0623 09/24/24 0458 09/23/24  0649   WBC AUTO x10*3/uL 8.6 10.1 11.3 10.3   HEMOGLOBIN g/dL 7.6* 7.1* 7.1* 8.0*   HEMATOCRIT % 26.1* 24.1* 24.3* 27.6*   PLATELETS AUTO x10*3/uL 195 194 205 224   NEUTROS PCT AUTO %  --  71.0 71.5 69.8   LYMPHS PCT AUTO %  --  10.5 9.9 10.3   MONOS PCT AUTO %  --  10.6 10.3 9.4   EOS PCT AUTO %  --  7.2 7.7 9.8     Results from last 7 days   Lab Units 09/26/24 0812 09/25/24 0623 09/24/24  0458   SODIUM mmol/L 143 141 140   POTASSIUM mmol/L 3.8 3.4* 3.5   CHLORIDE mmol/L 106 103 101   CO2 mmol/L 28 28 29   BUN mg/dL 50* 59* 64*   CREATININE mg/dL 4.32* 4.70* 5.11*   GLUCOSE mg/dL 84 78 91   CALCIUM mg/dL 7.6* 7.3* 7.0*       XR chest 1 view   Final Result   Mild nonspecific diffuse left-sided pleural thickening. Scant   horizontal linear opacities at the left base could be due to   atelectasis or scarring.        Previous CABG. Cardiomegaly without indication of ongoing CHF.        MACRO:   None        Signed by: Juancho Valencia 9/26/2024 1:05 PM   Dictation workstation:    [id number]      CT brain attack head wo IV contrast   Final Result   No CT evidence of acute large vessel territory infarct or   intracranial hemorrhage. Consider correlating with CT angiogram as   warranted.        MACRO:   Moreno Cheema discussed the significance and urgency of this   critical finding by Epic secure chat with  JASON MALIK on   9/25/2024 at 6:41 pm.  (**-RCF-**) Findings:  See findings.        Signed by: Moreno Cheema 9/25/2024 7:28 PM   Dictation workstation:   [id number]      CT maxillofacial bones wo IV contrast   Final Result   Slight fullness of the right lacrimal gland without evidence of fluid   collection. CT of the paranasal sinuses, mastoid sinuses and orbits   is within normal limits.        MACRO:   none        Signed by: Torres Henson 9/21/2024 10:50 AM   Dictation workstation:   [id number]      CT 3D reconstruction   Final Result   Slight fullness of the right lacrimal gland without evidence of fluid   collection. CT of the paranasal sinuses, mastoid sinuses and orbits   is within normal limits.        MACRO:   none        Signed by: Torres Henson 9/21/2024 10:50 AM   Dictation workstation:   [id number]      Vascular US upper extremity venous duplex right   Final Result      Transthoracic Echo (TTE) Complete   Final Result      US thoracentesis   Final Result   Uneventful thoracentesis, as detailed above: Right Pleural space,   1000 mL        Performed and dictated at [facility].        Signed by: Diana Serrano 9/19/2024 11:37 AM   Dictation workstation:   [id number]               Assessment/Plan        Humphrey Waddell is a 59 y.o. male with a past medical history of left above-the-knee amputation due to PAD, coronary artery disease with a history of CABG, carotid artery disease, stroke, depression, type 2 diabetes, GERD, hyperlipidemia, hypertension, osteoarthritis, anemia of chronic disease, and stage G4 chronic kidney disease  with a fluctuating creatinine baseline between 2.5 to just above 3 mg/deciliter, multiple acute kidney injuries and prior nephrotic range proteinuria who presented to an outside hospital on 9/16 for evaluation of shortness of breath.      Per the notes, he reportedly was taking 20 mg of torsemide instead of the prescribed 60 mg daily.  His weight is up 34 pounds to 288 now from 254 lbs in early July.  There was a large right pleural effusion and small loculation on the left seen on CT imaging.  Multifocal airspace disease concerning for pneumonia as well.  No hydronephrosis with perinephric stranding noted.  He was transferred from [facility] to [facility].  He underwent a 1 L paracentesis with clear madalyn fluid removed on 9/19.  He received IVP Lasix up until 9/20.  Nephrology is consulted for renal care.    Mr. Waddell is at high risk to progress to kidney replacement therapy having nephrotic range proteinuria + multiple acute kidney injuries.  His creatinine was 3.3 mg as a deciliter when he presented.  This is the top end of his dilma.  He received IV Lasix.  His creatinine did rise with diuresis up to 4 mg/dL. IV Lasix was held.  We see that his creatinine continued to rise with associated oliguria off the diuretic.  He remains in heart failure. Thus I placed him on oral torsemide 60 mg twice daily for the time being (usual dose is 60 mg daily). He has responded to the oral diuretic with improved volume status. His creatinine is improving. Given the large volume diuresis, I cut the torsemide back to 60 mg 1 x daily. But his urine output declined more than expected. I anticipate adjusting him to BID dosing. We will work toward removing the Lee.  I otherwise sent iron stores, ferritin, intact PTH and vitamin D. I gave him a dose of erythropoietin. also started a binder for hyperphosphatemia, the iPTH is elevated. 25 vitamin D is sufficient. Of note, he does not have a paraprotein.  No acute indication for kidney  replacement therapy. Otherwise, I held the sodium bicarbonate supplement and acetazolamide. Trend his RFP.  Will follow.    Principal Problem:    CHF (congestive heart failure), NYHA class I, acute on chronic, combined (Multi)       I spent 40 minutes in the professional and overall care of this patient.      Darryl Lopez, DO

## 2024-09-26 NOTE — PROGRESS NOTES
Occupational Therapy                 Therapy Communication Note    Patient Name: Humphrey Waddell  MRN: [id number]  Department: [place] [address]  Room: [facility]  Today's Date: 9/26/2024     Discipline: Occupational Therapy    Missed Visit Reason: Missed Visit Reason: Other (Comment) (Per RN pt with high BP this morning, pt recently medicated.  RN requesting for therapy to attempt patient at a later time when medically appropriate and pt able to participate.)    Missed Time: Attempt

## 2024-09-26 NOTE — PROGRESS NOTES
Physical Therapy                 Therapy Communication Note    Patient Name: Humphrey Waddell  MRN: [id number]  Department: [place] [address]  Room: [address] [place] [address]  Today's Date: 9/26/2024     Discipline: Physical Therapy    Missed Visit Reason: Missed Visit Reason: Other (Comment) (RN reporting pt's BP is too high for therapy participation at this time.)    Missed Time: Attempt

## 2024-09-26 NOTE — PROGRESS NOTES
"Humphrey Waddell is a 59 y.o. male on day 8 of admission presenting with CHF (congestive heart failure), NYHA class I, acute on chronic, combined.    Subjective   Pt seen this morning. Still not talking much but is responding to my questions and did speak a bit. On 2L. Afebrile. Says his gerardo is chronic. Denies pain.        Objective     Physical Exam  Vitals reviewed.   HENT:      Head: Normocephalic.      Right Ear: Tympanic membrane normal.      Nose: Nose normal.      Mouth/Throat:      Mouth: Mucous membranes are moist.   Cardiovascular:      Rate and Rhythm: Normal rate and regular rhythm.   Pulmonary:      Comments: Bibasilar crackles.  Abdominal:      General: Abdomen is flat. Bowel sounds are normal.      Palpations: Abdomen is soft.   Musculoskeletal:      Comments: Left AKA, right lower extremity with edema   Skin:     General: Skin is warm.      Capillary Refill: Capillary refill takes less than 2 seconds.   Neurological:      General: No focal deficit present.      Mental Status: He is alert. He is disoriented.      Comments: Not talking very much.          Last Recorded Vitals  Blood pressure 159/70, pulse 83, temperature 36.4 °C (97.6 °F), temperature source Oral, resp. rate 16, height 1.93 m (6' 4\"), weight 133 kg (293 lb 9.6 oz), SpO2 96%.  Intake/Output last 3 Shifts:  I/O last 3 completed shifts:  In: 620 (4.7 mL/kg) [P.O.:520; IV Piggyback:100]  Out: 3300 (24.8 mL/kg) [Urine:3300 (0.7 mL/kg/hr)]  Weight: 133.2 kg     Relevant Results  Results for orders placed or performed during the hospital encounter of 09/18/24 (from the past 24 hour(s))   POCT GLUCOSE   Result Value Ref Range    POCT Glucose 92 74 - 99 mg/dL   Blood Gas Arterial Full Panel   Result Value Ref Range    POCT pH, Arterial 7.40 7.38 - 7.42 pH    POCT pCO2, Arterial 47 (H) 38 - 42 mm Hg    POCT pO2, Arterial 46 (L) 85 - 95 mm Hg    POCT SO2, Arterial 77 (L) 94 - 100 %    POCT Oxy Hemoglobin, Arterial 74.8 (L) 94.0 - 98.0 %    POCT " Hematocrit Calculated, Arterial 22.0 (L) 41.0 - 52.0 %    POCT Sodium, Arterial 140 136 - 145 mmol/L    POCT Potassium, Arterial 4.0 3.5 - 5.3 mmol/L    POCT Chloride, Arterial 108 (H) 98 - 107 mmol/L    POCT Ionized Calcium, Arterial 1.08 (L) 1.10 - 1.33 mmol/L    POCT Glucose, Arterial 91 74 - 99 mg/dL    POCT Lactate, Arterial 0.7 0.4 - 2.0 mmol/L    POCT Base Excess, Arterial 3.9 (H) -2.0 - 3.0 mmol/L    POCT HCO3 Calculated, Arterial 29.1 (H) 22.0 - 26.0 mmol/L    POCT Hemoglobin, Arterial 7.4 (L) 13.5 - 17.5 g/dL    POCT Anion Gap, Arterial 7 (L) 10 - 25 mmo/L    Patient Temperature 37.0 degrees Celsius    FiO2 28 %    Apparatus CANNULA    POCT GLUCOSE   Result Value Ref Range    POCT Glucose 129 (H) 74 - 99 mg/dL   Basic Metabolic Panel   Result Value Ref Range    Glucose 84 74 - 99 mg/dL    Sodium 143 136 - 145 mmol/L    Potassium 3.8 3.5 - 5.3 mmol/L    Chloride 106 98 - 107 mmol/L    Bicarbonate 28 21 - 32 mmol/L    Anion Gap 13 10 - 20 mmol/L    Urea Nitrogen 50 (H) 6 - 23 mg/dL    Creatinine 4.32 (H) 0.50 - 1.30 mg/dL    eGFR 15 (L) >60 mL/min/1.73m*2    Calcium 7.6 (L) 8.6 - 10.3 mg/dL   CBC   Result Value Ref Range    WBC 8.6 4.4 - 11.3 x10*3/uL    nRBC 0.0 0.0 - 0.0 /100 WBCs    RBC 2.88 (L) 4.50 - 5.90 x10*6/uL    Hemoglobin 7.6 (L) 13.5 - 17.5 g/dL    Hematocrit 26.1 (L) 41.0 - 52.0 %    MCV 91 80 - 100 fL    MCH 26.4 26.0 - 34.0 pg    MCHC 29.1 (L) 32.0 - 36.0 g/dL    RDW 15.7 (H) 11.5 - 14.5 %    Platelets 195 150 - 450 x10*3/uL   POCT GLUCOSE   Result Value Ref Range    POCT Glucose 86 74 - 99 mg/dL   POCT GLUCOSE   Result Value Ref Range    POCT Glucose 105 (H) 74 - 99 mg/dL   POCT GLUCOSE   Result Value Ref Range    POCT Glucose 122 (H) 74 - 99 mg/dL       Imaging Results    Ct chest/abd/pelvis:  IMPRESSION:  Large right pleural effusion and small loculated left pleural effusion  at the left lung apex with multifocal airspace disease in the left  lung, likely atelectasis and possibly pneumonia  in the appropriate  clinical setting.  Pronounced cardiomegaly with question of mild systemic anemia.  Mild to moderate concentric wall thickening of the rectosigmoid colon  suggesting a low-grade acute infectious or inflammatory distal  colitis/proctitis.  Decompressed urinary bladder with mild concentric wall thickening  which may be due to underdistention however mild cystitis is a  possibility in the upper clinical setting, correlate with urinalysis.  Moderate-sized fluid filled left inguinal hernia along with a small  fat-containing right inguinal hernia.    Head ct:  IMPRESSION:  No acute intracranial hemorrhage or mass effect.      Periapical lucencies noted consistent with abscesses. Dental  follow-up suggested.      Mild prominence of the lateral and 3rd ventricles which could be  related to central white matter loss versus normal pressure  hydrocephalus. Clinical correlation suggested.      Correlation for left-sided otitis media suggested.      Remote left thalamic lacunar infarct.    Medications:  acetaminophen, 975 mg, oral, q8h  albuterol, 2.5 mg, nebulization, TID  amLODIPine, 10 mg, oral, Daily  ampicillin-sulbactam, 3 g, intravenous, q12h  aspirin, 81 mg, oral, Daily  atorvastatin, 80 mg, oral, Daily  calcium acetate, 667 mg, oral, TID  fludrocortisone, 0.1 mg, oral, Daily  gabapentin, 300 mg, oral, Nightly  heparin, 5,000 Units, subcutaneous, q8h  hydrALAZINE, 50 mg, oral, TID  insulin glargine, 2 Units, subcutaneous, Nightly  insulin regular, 0-10 Units, subcutaneous, TID  isosorbide dinitrate, 20 mg, oral, TID  lidocaine, 1 patch, transdermal, Daily  lidocaine, 1 patch, transdermal, Daily  metoprolol tartrate, 50 mg, oral, BID  pantoprazole, 40 mg, oral, Daily before breakfast   Or  pantoprazole, 40 mg, intravenous, Daily before breakfast  torsemide, 60 mg, oral, Daily  venlafaxine XR, 150 mg, oral, Daily       PRN medications: albuterol, dextrose, dextrose, glucagon, glucagon, glucagon,  ondansetron **OR** ondansetron, oxyCODONE, oxygen, oxygen, polyethylene glycol     Assessment/Plan       9/26:  Cont iv abx  Cont o2 for hypoxia.   HTN.. a bit better... cont norvasc, hydralazine, isordil, bb, torsemide.   Appreciate palliative recs... pt with depression, withdrawn... cont effexor. Should have close psych f/up... will request at [facility].   Kody/ckd... continues to impove... f/up renal recs  DM... well controlled with lantus and ss.   Recurrent hyperkalemia... on Florinef... will discuss with renal team.   PT/ot  Dispo to snf possibly tomorrow.       9/25:  Asp PNA... 2L, reg diet per speech/swallow... cont iv abx, wean to RA as tolerated, change to PO meds at NC.   Needs OP sleep study. Cont nocturnal cpap.     HFpEF... cont PO torsemide. He's on bb, isordil.   HypoK... replace, monitor.   Kody/ckd... improving and nearing baseline.   DM... lantus 2U, ss.   Home regimen for chronic conditions  Pt/ot  Dvt px with heparin  Dc is to snf pending placement.             9/24:  1.  Acute heart failure with preserved EF, most recent echo in May 2024 had an EF of 55%  -Cardiology and Nephrology consulted. Continue diuresis with torsemide 60 mg bid. Monitor I/Os, daily weight, bmp.    2.CAD status post CABG  -continue asa/atorvastatin/metoprolol/imdur    3. DM  -continue lantus and SSI    4. Pneumonia  - on iv unasyn; SLP without evidence of aspiration; rec regular diet  - Will need repeat chest CT in 8 to 12 weeks to ensure resolution of opacities     5. KODY on CKDV  -nephrology following; Cr worsening with IV fluids and holding diuretics. Was discussed with nephrology/cardiology on 9/23 and plan was to restart diuretics. Cr improving with diuresis.  - on florinef for recurrent hyperkalemia     6. PVD with left AKA  -continue asa/atorvastatin    7. Bilateral effusion suspect secondary to #1  -had right thoracentesis drained 1L this admit  -transudate likely due to CHF    8. Normocytic anemia  -hgb 7.3  -Iron sat  12% -> IV venofer    9.  Acute hypoxic respiratory failure secondary to #1 and 4  -Oxygen being weaned down to 2 L, home oxygen eval as needed but patient also needs a sleep study as an outpatient.    10. Likely PAUL/OHS  - nightly cpap    11. Goals of care- Would want dialysis if offered by nephrology. DNR/DNI per discussion with palliative care and wife today.     DVT Prophylaxis:  Lovenox subq    Delgado Solorzano MD  [facility]

## 2024-09-26 NOTE — PROGRESS NOTES
Music Therapy Note        Therapy Session  Referral Type: Referral from previous admission  Visit Type: Follow-up visit  Session Start Time: 1418  Intervention Delivery: In-person  Conflict of Service: Working with other staff  Number of staff members present: 2               Treatment/Interventions            Narrative  Assessment Detail: Upon arrival of MT, pt was seen working with staff.  Follow-up: MT will follow up with pt as able.    Education Documentation  No documentation found.

## 2024-09-26 NOTE — PROGRESS NOTES
9/26/2024 1:46 PM  I spoke with patient's wife and updated that patient is accepted at [facility] and discharge is planned for tomorrow. Irais PARKS

## 2024-09-26 NOTE — NURSING NOTE
Rapid Response Nurse Note:     Humphrey Waddell is a 59 y.o. male on day 8 of admission presenting with CHF (congestive heart failure), NYHA class I, acute on chronic, combined.    Rounded on patient due to recent stroke alert, reviewed patient's chart and checked with bedside nurse for any questions or concerns. There were none voiced at this time. Bedside RN reports pt has been non-verbal thus far this shift. No other change in neuro exam. Pt has been having these episodes since admission, VSS, tolerating BIPAP.     The patient's current RADAR score is 2    /77 (BP Location: Right arm, Patient Position: Lying)   Pulse 69   Temp 36.8 °C (98.2 °F) (Temporal)   Resp 18   Wt 131 kg (288 lb 12.8 oz)   SpO2 94%     No signs/symptoms of distress at this time. Bedside nurse notified to escalate concerns if patient condition changes      Kelsy Mclean RN

## 2024-09-26 NOTE — CARE PLAN
The patient's goals for the shift include Pt. will have a safe, restful and uneventful evening    The clinical goals for the shift include Patient will be hemodynamically stable and oxygenation will improve throughout shift      Problem: Skin  Goal: Decreased wound size/increased tissue granulation at next dressing change  Outcome: Progressing  Flowsheets (Taken 9/26/2024 1751)  Decreased wound size/increased tissue granulation at next dressing change: Protective dressings over bony prominences  Goal: Participates in plan/prevention/treatment measures  Outcome: Progressing  Flowsheets (Taken 9/21/2024 0243 by Beth Beal, RN)  Participates in plan/prevention/treatment measures: Elevate heels  Goal: Prevent/manage excess moisture  Outcome: Progressing  Flowsheets (Taken 9/26/2024 1751)  Prevent/manage excess moisture: Cleanse incontinence/protect with barrier cream  Goal: Prevent/minimize sheer/friction injuries  Outcome: Progressing  Flowsheets (Taken 9/24/2024 1104 by Dong Morrison RN)  Prevent/minimize sheer/friction injuries:   HOB 30 degrees or less   Turn/reposition every 2 hours/use positioning/transfer devices   Use pull sheet  Goal: Promote/optimize nutrition  Outcome: Progressing  Flowsheets (Taken 9/26/2024 1751)  Promote/optimize nutrition: Consume > 50% meals/supplements  Goal: Promote skin healing  Outcome: Progressing  Flowsheets (Taken 9/26/2024 0358 by Chip Alexander RN)  Promote skin healing:   Turn/reposition every 2 hours/use positioning/transfer devices   Assess skin/pad under line(s)/device(s)

## 2024-09-26 NOTE — PROGRESS NOTES
"Humphrey Waddell is a 59 y.o. male with past medical history PAD s/p left above-the-knee amputation, anemia, coronary artery disease, s/p CABG, carotid artery disease, CVA with cognitive impairment, depression, type 2 diabetes mellitus, GERD, hyperlipidemia, hypertension, osteoarthritis, peripheral arterial disease, CKD stage IV. He presented to [facility] ED 9/18 with c/o dyspnea and weakness.  He was found to have bilateral pleural effusions, right greater than left, metabolic encephalopathy, COPD and CHF exacerbations, and idania.  He was transferred to [facility] on 9/18 with dx acute hypoxic and hypercapnic respiratory failure.   Patient's respiratory status did start improving, but now diuresis impacted due to worsening IDANIA on CKD.       Patient underwent thoracentesis on 9/19 for 1 liters    Interval Hx and Subjective     Had Stroke Alert yesterday afternoon for change in mental status, was off of supplemental 02 at the time.  He went for emergent CT (could not get MRI due to pacemaker)  CT did not show any acute changes.  CPAP was added at night and with naps per pulmonary.  Nephrology continues to follow as well.  This morning patient drowsy, alerts to name, when asked if anything was bothering him he repeated the word \"my\" and seemed to look towards his legs but could not elaborate further.  I asked again if he was having pain an he nodded no.   Nursing states patient was c/o knee pain yesterday.    Objective    Blood pressure 141/77, pulse 69, temperature 36.8 °C (98.2 °F), temperature source Temporal, resp. rate 18, height 1.93 m (6' 4\"), weight 133 kg (293 lb 9.6 oz), SpO2 97%.    General:  snoring upon my arrival, appears comfortable at rest  Neuro: alert, word finding difficulty  Psych:  keeps eyes closes most of visit, possibly a  bit withdrawn  HEENT:  oral mucosa moist without exudate, tongue midline.  PERRLA, no discharge   Resps:  resps unlabored, lungs diminished, no cough noted, on supplemental 02 via cannula  Card: "  RRR, no murmurs, rubs, or gallops.  No JVD noted  GI:  normal bowel sounds, soft and non tender  :  gerardo with clear yellow urine  MS:  left AKA.  Edema to RLE decreased from yesterday, now about +1.  RLE without trauma or deformity  Integ:  skin pale, warm and dry overall      CT brain attack head wo IV contrast    Result Date: 9/25/2024  Interpreted By:  Moreno Cheema, STUDY: CT BRAIN ATTACK HEAD WO IV CONTRAST;  9/25/2024 6:34 pm   INDICATION: Signs/Symptoms:stroke alert.  Word-finding difficulty.   COMPARISON: CT head 09/16/2024.   ACCESSION NUMBER(S): [id number]   ORDERING CLINICIAN: JASON MALIK   TECHNIQUE: Noncontrast axial CT scan of head was performed.   FINDINGS: Parenchyma: No evidence of acute large vessel territory infarct. No acute intracranial hemorrhage. No mass effect or midline shift.Patchy periventricular white matter hypodensities, likely chronic microvascular ischemic change. Unchanged hypodensity in the left thalamus related to remote lacunar infarct.Moderate parenchymal atrophy.   CSF Spaces: Stable ventriculomegaly which could be related to central white matter loss versus normal pressure hydrocephalus.. The sulci and basal cisterns are within normal limits for age.   Extra-Axial Fluid: None.   Calvarium: Unremarkable.   Paranasal sinuses: Visualized paranasal sinuses are clear.   Mastoids: Clear.   Orbits: No acute abnormality.   Soft tissues: No abnormality.       No CT evidence of acute large vessel territory infarct or intracranial hemorrhage. Consider correlating with CT angiogram as warranted.   MACRO: Moreno Cheema discussed the significance and urgency of this critical finding by Epic secure chat with  JASON MALIK on 9/25/2024 at 6:41 pm.  (**-RCF-**) Findings:  See findings.   Signed by: Moreno Cheema 9/25/2024 7:28 PM Dictation workstation:   [id number]       Results for orders placed or performed during the hospital encounter of 09/18/24 (from the past 24  hour(s))   POCT GLUCOSE   Result Value Ref Range    POCT Glucose 78 74 - 99 mg/dL   POCT GLUCOSE   Result Value Ref Range    POCT Glucose 80 74 - 99 mg/dL   POCT GLUCOSE   Result Value Ref Range    POCT Glucose 92 74 - 99 mg/dL   Blood Gas Arterial Full Panel   Result Value Ref Range    POCT pH, Arterial 7.40 7.38 - 7.42 pH    POCT pCO2, Arterial 47 (H) 38 - 42 mm Hg    POCT pO2, Arterial 46 (L) 85 - 95 mm Hg    POCT SO2, Arterial 77 (L) 94 - 100 %    POCT Oxy Hemoglobin, Arterial 74.8 (L) 94.0 - 98.0 %    POCT Hematocrit Calculated, Arterial 22.0 (L) 41.0 - 52.0 %    POCT Sodium, Arterial 140 136 - 145 mmol/L    POCT Potassium, Arterial 4.0 3.5 - 5.3 mmol/L    POCT Chloride, Arterial 108 (H) 98 - 107 mmol/L    POCT Ionized Calcium, Arterial 1.08 (L) 1.10 - 1.33 mmol/L    POCT Glucose, Arterial 91 74 - 99 mg/dL    POCT Lactate, Arterial 0.7 0.4 - 2.0 mmol/L    POCT Base Excess, Arterial 3.9 (H) -2.0 - 3.0 mmol/L    POCT HCO3 Calculated, Arterial 29.1 (H) 22.0 - 26.0 mmol/L    POCT Hemoglobin, Arterial 7.4 (L) 13.5 - 17.5 g/dL    POCT Anion Gap, Arterial 7 (L) 10 - 25 mmo/L    Patient Temperature 37.0 degrees Celsius    FiO2 28 %    Apparatus CANNULA    POCT GLUCOSE   Result Value Ref Range    POCT Glucose 129 (H) 74 - 99 mg/dL   Basic Metabolic Panel   Result Value Ref Range    Glucose 84 74 - 99 mg/dL    Sodium 143 136 - 145 mmol/L    Potassium 3.8 3.5 - 5.3 mmol/L    Chloride 106 98 - 107 mmol/L    Bicarbonate 28 21 - 32 mmol/L    Anion Gap 13 10 - 20 mmol/L    Urea Nitrogen 50 (H) 6 - 23 mg/dL    Creatinine 4.32 (H) 0.50 - 1.30 mg/dL    eGFR 15 (L) >60 mL/min/1.73m*2    Calcium 7.6 (L) 8.6 - 10.3 mg/dL   CBC   Result Value Ref Range    WBC 8.6 4.4 - 11.3 x10*3/uL    nRBC 0.0 0.0 - 0.0 /100 WBCs    RBC 2.88 (L) 4.50 - 5.90 x10*6/uL    Hemoglobin 7.6 (L) 13.5 - 17.5 g/dL    Hematocrit 26.1 (L) 41.0 - 52.0 %    MCV 91 80 - 100 fL    MCH 26.4 26.0 - 34.0 pg    MCHC 29.1 (L) 32.0 - 36.0 g/dL    RDW 15.7 (H) 11.5 -  14.5 %    Platelets 195 150 - 450 x10*3/uL   POCT GLUCOSE   Result Value Ref Range    POCT Glucose 86 74 - 99 mg/dL      Scheduled medications  acetaminophen, 975 mg, oral, TID  albuterol, 2.5 mg, nebulization, TID  amLODIPine, 10 mg, oral, Daily  ampicillin-sulbactam, 3 g, intravenous, q12h  aspirin, 81 mg, oral, Daily  atorvastatin, 80 mg, oral, Daily  calcium acetate, 667 mg, oral, TID  fludrocortisone, 0.1 mg, oral, Daily  gabapentin, 300 mg, oral, Nightly  heparin, 5,000 Units, subcutaneous, q8h  hydrALAZINE, 50 mg, oral, TID  insulin glargine, 2 Units, subcutaneous, Nightly  insulin regular, 0-10 Units, subcutaneous, TID  isosorbide dinitrate, 20 mg, oral, TID  lidocaine, 1 patch, transdermal, Daily  metoprolol tartrate, 50 mg, oral, BID  pantoprazole, 40 mg, oral, Daily before breakfast   Or  pantoprazole, 40 mg, intravenous, Daily before breakfast  torsemide, 60 mg, oral, Daily  venlafaxine XR, 150 mg, oral, Daily      Continuous medications     PRN medications  PRN medications: albuterol, dextrose, dextrose, glucagon, glucagon, glucagon, ondansetron **OR** ondansetron, oxyCODONE, oxygen, oxygen, polyethylene glycol     Dietary Orders (From admission, onward)       Start     Ordered    09/18/24 0250  Adult diet Cardiac, Consistent Carb; CCD 90 gm/meal; 70 gm fat; 2 - 3 grams Sodium  Diet effective now        Question Answer Comment   Diet type Cardiac    Diet type Consistent Carb    Carb diet selection: CCD 90 gm/meal    Fat restriction: 70 gm fat    Sodium restriction: 2 - 3 grams Sodium        09/18/24 0251                     Assessment/Plan    59 year old male with multiple medical issues; acute on chronic HFpEF, CAD, DM, PNA acute hypoxic and hypercapnic respiratory failure, severe IDANIA on CKD IV, felt to be related to cardiorenal syndrome.       Palliative goals:  Patient still unable to participate In higher level discussions about code status, wife elected to make him DNR/DNI  -code status changed to  DNR/DNI  -will re-address with patient should he become more alert     IDANIA on top of CKD IV:  diuresing now.  Cr is slowly improving  -defer tx to primary and renal team  -happy to assist with any discussions with family about dialysis should his renal function worsen     Degenerative disc disease to lower back with pain:    -continue gabapentin 300 mg at HS. Max dose for current renal status  -continue acetaminophen 975 mg PO TID  -continue oxycodone 5 mg Q 6 hours PRN     Knee pain  -adding lidocaine patch to right knee    Depression:  not a new problem, seems withdrawn, not eating much  -continue venlafaxine  mg daily  -adding music therapy         Reema Garcia APRN CNP

## 2024-09-26 NOTE — PROGRESS NOTES
9/26/2024 (:50 AM Wife prefers [facility]. I have contacted to the facility to confirm that they can accept patient. Irais PARKS

## 2024-09-26 NOTE — CARE PLAN
The patient's goals for the shift include Pt. will have a safe, restful and uneventful evening    The clinical goals for the shift include Pt ox sat will remain greater than 92%    Over the shift, the patient did make progress toward the following goals. PT noted resting in bed with head of bed elevated and call light in reach. PT noted with ox via nasal cannula In place, pt tolerating well. No s.s of pain,distress,or discomfort.

## 2024-09-26 NOTE — PROGRESS NOTES
Humphrey Waddell is a 59 y.o. male on day 8 of admission presenting with CHF (congestive heart failure), NYHA class I, acute on chronic, combined.    Subjective   Patient seen and examined today.  Patient was on CPAP for a few hours last night, currently on 3 L NC.  Patient is more awake and nodding appropriately to his name and to being in the hospital.  He is still unable to verbalize, although bedside RN said that he spoke some words on night shift.  When asked if he was having discomfort he pointed to the center of his chest and nodded yes to his back.  He nodded yes to the pain being in the center of his chest and sharp and did grimace when pressure is applied to his sternum.  Additionally he nodded yes to nausea and know that he did not want to have any breakfast.  Bedside RN notified of the nausea/pain.    CXR checked today showing mild left diffuse pleural thickening with linear opacities in the left base, likely atelectasis or scarring, no indication of ongoing CHF.  Chest x-ray is improved from previous chest x-ray on 9/17.    Objective   Physical Exam   Constitutional:   Lethargic, unable to verbalize, cooperative to ability  HENT: Atraumatic, semimoist mucous membranes  Eyes: Nonicteric  Neck: Supple, unable to assess JVD  Cardiovascular: S1, S2 normal, regular, HR 80s, no murmur appreciated  Pulmonary: Fair air entry with minimal posterior bibasilar crackles; no rhonchi or wheezing noted; no increased work of breathing noted  Abdominal: Obese, soft, nontender, + BS  Musculoskeletal: Left AKA, minimal active movement of BUEs; no movement of RLE, left BKA, generalized weakness  Extremities:   +2  pitting edema RLE; +1 dependent edema BUEs (R>L)  Lymphadenopathy: No nuchal LAP  Skin: Warm and dry  Neurological: Lethargic see subjective above  Psychiatric:  Appropriate mood and behavior    Medications:  acetaminophen, 975 mg, oral, TID  albuterol, 2.5 mg, nebulization, TID  amLODIPine, 10 mg, oral,  "Daily  ampicillin-sulbactam, 3 g, intravenous, q12h  aspirin, 81 mg, oral, Daily  atorvastatin, 80 mg, oral, Daily  calcium acetate, 667 mg, oral, TID  fludrocortisone, 0.1 mg, oral, Daily  gabapentin, 300 mg, oral, Nightly  heparin, 5,000 Units, subcutaneous, q8h  hydrALAZINE, 50 mg, oral, TID  insulin glargine, 2 Units, subcutaneous, Nightly  insulin regular, 0-10 Units, subcutaneous, TID  isosorbide dinitrate, 20 mg, oral, TID  lidocaine, 1 patch, transdermal, Daily  lidocaine, 1 patch, transdermal, Daily  metoprolol tartrate, 50 mg, oral, BID  pantoprazole, 40 mg, oral, Daily before breakfast   Or  pantoprazole, 40 mg, intravenous, Daily before breakfast  torsemide, 60 mg, oral, Daily  venlafaxine XR, 150 mg, oral, Daily       PRN medications: albuterol, dextrose, dextrose, glucagon, glucagon, glucagon, ondansetron **OR** ondansetron, oxyCODONE, oxygen, oxygen, polyethylene glycol     Last Recorded Vitals  Blood pressure 159/70, pulse 83, temperature 36.4 °C (97.6 °F), temperature source Oral, resp. rate 16, height 1.93 m (6' 4\"), weight 133 kg (293 lb 9.6 oz), SpO2 98%.  Intake/Output last 3 Shifts:  I/O last 3 completed shifts:  In: 620 (4.7 mL/kg) [P.O.:520; IV Piggyback:100]  Out: 3300 (24.8 mL/kg) [Urine:3300 (0.7 mL/kg/hr)]  Weight: 133.2 kg     Relevant Results  Results for orders placed or performed during the hospital encounter of 09/18/24 (from the past 24 hour(s))   POCT GLUCOSE   Result Value Ref Range    POCT Glucose 78 74 - 99 mg/dL   POCT GLUCOSE   Result Value Ref Range    POCT Glucose 80 74 - 99 mg/dL   POCT GLUCOSE   Result Value Ref Range    POCT Glucose 92 74 - 99 mg/dL   Blood Gas Arterial Full Panel   Result Value Ref Range    POCT pH, Arterial 7.40 7.38 - 7.42 pH    POCT pCO2, Arterial 47 (H) 38 - 42 mm Hg    POCT pO2, Arterial 46 (L) 85 - 95 mm Hg    POCT SO2, Arterial 77 (L) 94 - 100 %    POCT Oxy Hemoglobin, Arterial 74.8 (L) 94.0 - 98.0 %    POCT Hematocrit Calculated, Arterial 22.0 " (L) 41.0 - 52.0 %    POCT Sodium, Arterial 140 136 - 145 mmol/L    POCT Potassium, Arterial 4.0 3.5 - 5.3 mmol/L    POCT Chloride, Arterial 108 (H) 98 - 107 mmol/L    POCT Ionized Calcium, Arterial 1.08 (L) 1.10 - 1.33 mmol/L    POCT Glucose, Arterial 91 74 - 99 mg/dL    POCT Lactate, Arterial 0.7 0.4 - 2.0 mmol/L    POCT Base Excess, Arterial 3.9 (H) -2.0 - 3.0 mmol/L    POCT HCO3 Calculated, Arterial 29.1 (H) 22.0 - 26.0 mmol/L    POCT Hemoglobin, Arterial 7.4 (L) 13.5 - 17.5 g/dL    POCT Anion Gap, Arterial 7 (L) 10 - 25 mmo/L    Patient Temperature 37.0 degrees Celsius    FiO2 28 %    Apparatus CANNULA    POCT GLUCOSE   Result Value Ref Range    POCT Glucose 129 (H) 74 - 99 mg/dL   Basic Metabolic Panel   Result Value Ref Range    Glucose 84 74 - 99 mg/dL    Sodium 143 136 - 145 mmol/L    Potassium 3.8 3.5 - 5.3 mmol/L    Chloride 106 98 - 107 mmol/L    Bicarbonate 28 21 - 32 mmol/L    Anion Gap 13 10 - 20 mmol/L    Urea Nitrogen 50 (H) 6 - 23 mg/dL    Creatinine 4.32 (H) 0.50 - 1.30 mg/dL    eGFR 15 (L) >60 mL/min/1.73m*2    Calcium 7.6 (L) 8.6 - 10.3 mg/dL   CBC   Result Value Ref Range    WBC 8.6 4.4 - 11.3 x10*3/uL    nRBC 0.0 0.0 - 0.0 /100 WBCs    RBC 2.88 (L) 4.50 - 5.90 x10*6/uL    Hemoglobin 7.6 (L) 13.5 - 17.5 g/dL    Hematocrit 26.1 (L) 41.0 - 52.0 %    MCV 91 80 - 100 fL    MCH 26.4 26.0 - 34.0 pg    MCHC 29.1 (L) 32.0 - 36.0 g/dL    RDW 15.7 (H) 11.5 - 14.5 %    Platelets 195 150 - 450 x10*3/uL   POCT GLUCOSE   Result Value Ref Range    POCT Glucose 86 74 - 99 mg/dL      Transthoracic Echo (TTE) Complete  Result Date: 9/19/2024  PHYSICIAN INTERPRETATION: Left Ventricle: Left ventricular ejection fraction is normal, calculated by Kirkpatrick's biplane at 59%. There are no regional left ventricular wall motion abnormalities. The left ventricular cavity size is normal. The left ventricular septal wall thickness is moderately increased. There is moderately increased left ventricular posterior wall  thickness. There is moderate concentric left ventricular hypertrophy. Abnormal (paradoxical) septal motion consistent with post-operative status. Spectral Doppler shows a restrictive pattern of left ventricular diastolic filling. There is an elevated mean left atrial pressure. Left Atrium: The left atrium was not assessed. Right Ventricle: The right ventricle was not assessed. There is normal right ventricular global systolic function. Right Atrium: The right atrium was not assessed. Aortic Valve: The aortic valve is trileaflet. Aortic valve regurgitation was not assessed. Mitral Valve: The mitral valve is mildly thickened. There is trace to mild mitral valve regurgitation. Tricuspid Valve: The tricuspid valve is structurally normal. There is mild tricuspid regurgitation. The Doppler estimated RVSP is slightly elevated at 34.8 mmHg. Pulmonic Valve: The pulmonic valve is structurally normal. Pulmonic valve regurgitation was not assessed. Pericardium: There is no pericardial effusion noted. Aorta: The aortic root is normal. Systemic Veins: The inferior vena cava appears severely dilated, with IVC inspiratory collapse less than 50%. In comparison to the previous echocardiogram(s): Compared with study dated 5/23/2024, Winin the limitations of both studies, no significant changes were noted.  CONCLUSIONS:  1. Left ventricular ejection fraction is normal, calculated by Kirkpatrick's biplane at 59%.  2. Spectral Doppler shows a restrictive pattern of left ventricular diastolic filling.  3. There is an elevated mean left atrial pressure.  4. Abnormal septal motion consistent with post-operative status.  5. There is moderate concentric left ventricular hypertrophy.  6. There is normal right ventricular global systolic function.  7. Slightly elevated right ventricular systolic pressure.  8. The inferior vena cava appears severely dilated, with IVC inspiratory collapse less than 50%.     [id number] Junior Araujo DO Electronically  signed on 9/19/2024 at 3:44:06 PM  ** Final **     XR chest 1 view  Result Date: 9/17/2024  Interpreted By:  Heather Aguilar, STUDY: XR CHEST 1 VIEW;  9/17/2024 9:44 pm   INDICATION: Signs/Symptoms:resp fail     COMPARISON: Chest x-ray 09/16/2024   ACCESSION NUMBER(S): [id number]   ORDERING CLINICIAN: ELIZABETH MITCHELL   TECHNIQUE: Semi-erect frontal view of the chest was obtained .   FINDINGS: Monitoring wires are overlying the patient.   The heart is enlarged. The patient is status post open heart surgery. There is vascular congestion and interstitial edema.   There are small bilateral pleural effusions. Airspace opacities are noted at the right perihilar region and left lung base. No pneumothorax.       1. Cardiomegaly. Vascular congestion and interstitial edema. Small bilateral pleural effusions. Bibasilar airspace opacities, may be secondary to atelectasis or pneumonia.       MACRO: None.   Signed by: Heather Aguilar 9/17/2024 10:49 PM Dictation workstation:   [id number]    CT chest abdomen pelvis wo IV contrast  Result Date: 9/17/2024  STUDY: CT Chest, Abdomen, and Pelvis without IV Contrast; 9/17/2024 3:33 AM INDICATION: Altered mental status.  Pulmonary edema.  Tactile fevers. COMPARISON: CXR 9/16/2024.  US gallbladder 5/23/2024.  CT AP 5/22/2024.  CT CAP 3/28/2024. ACCESSION NUMBER(S): [id number] ORDERING CLINICIAN: MELISSA VERMA TECHNIQUE: CT of the chest, abdomen, and pelvis was performed.  Contiguous axial images were obtained at 3 mm slice thickness through the chest, abdomen, and pelvis.  Coronal and sagittal reconstructions at 3 mm slice thickness were performed.  No intravenous contrast was administered.  FINDINGS: Please note that the evaluation of vessels, lymph nodes and organs is limited without intravenous contrast.  Image quality is limited by extensive beam hardening artifact in the chest and upper abdomen due to the patient's upper extremities by the patient's side.  CHEST:  MEDIASTINUM: Cardiac size is enlarged without pericardial effusion.  Cardiac blood pool appears slightly low in attenuation.  Mild calcified coronary plaque is noted.  LUNGS/PLEURA: There is a large right pleural effusion layering dependently.  Small left-sided pleural effusion is noted, loculated at the left apex. There is no pleural thickening, or pneumothorax.  The airways are patent. Mild pulmonary edema is suspected with mosaic attenuation the lungs suggesting chronic small airway disease and air trapping.  Relaxation atelectasis is seen in the dependent portion of the right lower lobe. There is an airspace disease in the left upper lobe and left lower lobe with significant volume loss of the left lung due to the pleural effusion, as well as the enlarged cardiac size.  LYMPH NODES: Thoracic lymph nodes are not enlarged. ABDOMEN:  LIVER: No hepatomegaly.  Smooth surface contour.  Normal attenuation.  BILE DUCTS: No intrahepatic or extrahepatic biliary ductal dilatation.  GALLBLADDER: Gallbladder is absent.  STOMACH: No abnormalities identified.  PANCREAS: No masses or ductal dilatation.  SPLEEN: No splenomegaly or focal splenic lesion.  ADRENAL GLANDS: No thickening or nodules.  KIDNEYS AND URETERS: Kidneys are normal in size and location.  No renal or ureteral calculi.  Moderate perinephric stranding is seen bilaterally.   PELVIS:  BLADDER: Urinary bladder is decompressed by Lee catheter with a small amount of air in the lumen of the urinary bladder and mild urinary bladder wall thickening.  REPRODUCTIVE ORGANS: No abnormalities identified.  BOWEL: Appendix is unremarkable.  Terminal ileum appears normal.  Cecum is located in the right midabdomen.  There is moderate concentric wall thickening of the rectosigmoid colon.  VESSELS: Mild calcified plaque is seen in the abdominal aorta and iliac arteries.   PERITONEUM/RETROPERITONEUM/LYMPH NODES: No free fluid.  No pneumoperitoneum. No lymphadenopathy.  ABDOMINAL  WALL: No abnormalities identified. SOFT TISSUES: Fluid-filled indirect left inguinal hernia is noted.  There is a small fat-containing indirect right inguinal hernia.  BONES: No acute fracture or aggressive osseous lesion.  Moderate multilevel disc disease is seen in the mid and lower thoracic spine as well as at L1-L2.  There is mild disc disease and vacuum phenomenon at L5-S1. Mild dextroconvex curvature is seen of the thoracolumbar spine.    Large right pleural effusion and small loculated left pleural effusion at the left lung apex with multifocal airspace disease in the left lung, likely atelectasis and possibly pneumonia in the appropriate clinical setting. Pronounced cardiomegaly with question of mild systemic anemia. Mild to moderate concentric wall thickening of the rectosigmoid colon suggesting a low-grade acute infectious or inflammatory distal colitis/proctitis. Decompressed urinary bladder with mild concentric wall thickening which may be due to underdistention however mild cystitis is a possibility in the upper clinical setting, correlate with urinalysis. Moderate-sized fluid filled left inguinal hernia along with a small fat-containing right inguinal hernia. Signed by Tristen Livingston      Assessment/Plan   Assessment & Plan  CHF (congestive heart failure), NYHA class I, acute on chronic, combined    Humphrey Waddell is a 59 y.o. male medical history CAD (s/p CABG), HFpEF, carotid artery disease, CVAs (cognitive impairment), HTN, HLD, DMT2, CKD 4, GERD, PAD (s/p lt AKA), depression who presented to [facility] ED on 9/16 with dyspnea x 1 day with accompanying fatigue, reduced conversational ability.  Evaluation revealed encephalopathy with acute congestive heart failure (BNP 28, 635) with bilateral pleural effusions & superimposed pneumonia. Additionally found to be hypoxic and placed on 3 L to obtain 92% saturation, ultimately requiring increased to 6 L NC with intermittent BiPAP.  He was admitted to [facility]  treated with DuoNebs, dexamethasone, lasix, ceftriaxone, doxycycline and Zosyn.  On the morning of 9/18 he was transferred to [facility] SDU.  He arrived on 6 L NC with labs significant for , hsTrop 62, Cr 3.09, h/h 7/24.2. He was started on Unasyn, albuterol nebs and oral prednisone and able to be weaned to 4 L NC.  Pulmonology is consulted for right pleural effusion.     Impression/recommendations:     # Acute hypoxic respiratory failure: Multifactorial secondary to acute on chronic HFpEF, bilateral pleural effusions, pneumonia versus atelectasis; baseline is room air, required intermittent BiPAP wean to nasal cannula, improving currently on 2 L  -Continue supplemental oxygen, wean for saturation 92 to 95%  -If patient goes home, home O2 eval prior to discharge  -Bronchopulmonary hygiene with incentive spirometer and Acapella  -Continue scheduled albuterol     # ?  COPD: Patient and wife denied history of pulmonary disorders including COPD, asthma; no PFTs on file; patient does have recent smoking history  -No indication for steroids at this time  -Would benefit from outpatient PFTs when he is back to baseline     # Pleural effusion, bilateral: Right greater than left on CT; left effusion is loculated at the apex  -Diuresis as renal function and hemodynamics allows   -S/p rt thora for 1000ml removed (-) Light's criteria for transudative effusion likely 2/2 heart failure exacerbation     #Pneumonia: CT chest with noted left multifocal airspace disease concerning for atelectasis versus pneumonia; must also consider possible aspiration given patient's fluctuating mental status over the past 3 weeks  -Continue coverage with Unasyn (after full dosing on 9/27 patient will have completed 10-day course)  -Speech-language evaluation pleated; regular diet with thin liquids recommended  -Will need repeat chest CT in 8 to 12 weeks to ensure resolution of opacities     # Heart Failure: HFpEF with 4/2024 echo showing EF 60 to  65% with impaired relaxation pattern of LV diastolic filling and mildly reduced RV systolic function; CT chest showing mild pulmonary edema; BNP at tripoint > 28,000, on admit to [place] [id number]  -Diuresis per cardiology, renal (~ 3L down since last night; -7L for the stay)  -Echo complete (results above); similar to echo 4/2024 with noted restrictive relaxation pattern, EF 59%     #Likely obstructive sleep apnea/obesity hypoventilation syndrome: BMI 34.08 with neck/trunk weight foci; wife reports snoring, daytime napping  -Would benefit from outpatient evaluation for sleep apnea, discussed with wife and patient reviewing with PCP and possibly seeing if he qualified for home study   -Adding CPAP at night and with naps; 8-20 cmH2O autopap  -If patient will be discharged to skilled facility, please continue AutoPap at night while in the facility    DVT prophylaxis with Lovenox      Thank you for the consult.  Pulmonology will follow.     I spent 40 minutes in the professional and overall care of this patient.   CONG Rae-CNS

## 2024-09-27 VITALS
TEMPERATURE: 97.1 F | RESPIRATION RATE: 14 BRPM | DIASTOLIC BLOOD PRESSURE: 72 MMHG | SYSTOLIC BLOOD PRESSURE: 137 MMHG | HEART RATE: 78 BPM | OXYGEN SATURATION: 92 % | WEIGHT: 293.6 LBS | HEIGHT: 76 IN | BODY MASS INDEX: 35.75 KG/M2

## 2024-09-27 LAB
ANION GAP SERPL CALC-SCNC: 14 MMOL/L (ref 10–20)
BACTERIA FLD CULT: NORMAL
BUN SERPL-MCNC: 44 MG/DL (ref 6–23)
CALCIUM SERPL-MCNC: 7.5 MG/DL (ref 8.6–10.3)
CHLORIDE SERPL-SCNC: 104 MMOL/L (ref 98–107)
CO2 SERPL-SCNC: 26 MMOL/L (ref 21–32)
CREAT SERPL-MCNC: 3.92 MG/DL (ref 0.5–1.3)
EGFRCR SERPLBLD CKD-EPI 2021: 17 ML/MIN/1.73M*2
ERYTHROCYTE [DISTWIDTH] IN BLOOD BY AUTOMATED COUNT: 15.6 % (ref 11.5–14.5)
GLUCOSE BLD MANUAL STRIP-MCNC: 103 MG/DL (ref 74–99)
GLUCOSE BLD MANUAL STRIP-MCNC: 104 MG/DL (ref 74–99)
GLUCOSE BLD MANUAL STRIP-MCNC: 98 MG/DL (ref 74–99)
GLUCOSE SERPL-MCNC: 99 MG/DL (ref 74–99)
GRAM STN SPEC: NORMAL
GRAM STN SPEC: NORMAL
HCT VFR BLD AUTO: 26.8 % (ref 41–52)
HGB BLD-MCNC: 7.9 G/DL (ref 13.5–17.5)
MCH RBC QN AUTO: 27.1 PG (ref 26–34)
MCHC RBC AUTO-ENTMCNC: 29.5 G/DL (ref 32–36)
MCV RBC AUTO: 92 FL (ref 80–100)
NRBC BLD-RTO: 0 /100 WBCS (ref 0–0)
PLATELET # BLD AUTO: 212 X10*3/UL (ref 150–450)
POTASSIUM SERPL-SCNC: 3.7 MMOL/L (ref 3.5–5.3)
RBC # BLD AUTO: 2.92 X10*6/UL (ref 4.5–5.9)
SODIUM SERPL-SCNC: 140 MMOL/L (ref 136–145)
WBC # BLD AUTO: 9.8 X10*3/UL (ref 4.4–11.3)

## 2024-09-27 PROCEDURE — 80048 BASIC METABOLIC PNL TOTAL CA: CPT | Performed by: INTERNAL MEDICINE

## 2024-09-27 PROCEDURE — 2500000005 HC RX 250 GENERAL PHARMACY W/O HCPCS: Performed by: NURSE PRACTITIONER

## 2024-09-27 PROCEDURE — 82947 ASSAY GLUCOSE BLOOD QUANT: CPT

## 2024-09-27 PROCEDURE — 2500000004 HC RX 250 GENERAL PHARMACY W/ HCPCS (ALT 636 FOR OP/ED): Performed by: HOSPITALIST

## 2024-09-27 PROCEDURE — 2500000004 HC RX 250 GENERAL PHARMACY W/ HCPCS (ALT 636 FOR OP/ED): Performed by: INTERNAL MEDICINE

## 2024-09-27 PROCEDURE — 94640 AIRWAY INHALATION TREATMENT: CPT

## 2024-09-27 PROCEDURE — 94664 DEMO&/EVAL PT USE INHALER: CPT

## 2024-09-27 PROCEDURE — 97110 THERAPEUTIC EXERCISES: CPT | Mod: GP,CQ

## 2024-09-27 PROCEDURE — 2500000001 HC RX 250 WO HCPCS SELF ADMINISTERED DRUGS (ALT 637 FOR MEDICARE OP): Performed by: INTERNAL MEDICINE

## 2024-09-27 PROCEDURE — 99231 SBSQ HOSP IP/OBS SF/LOW 25: CPT | Performed by: NURSE PRACTITIONER

## 2024-09-27 PROCEDURE — 94668 MNPJ CHEST WALL SBSQ: CPT

## 2024-09-27 PROCEDURE — 85027 COMPLETE CBC AUTOMATED: CPT | Performed by: INTERNAL MEDICINE

## 2024-09-27 PROCEDURE — 97530 THERAPEUTIC ACTIVITIES: CPT | Mod: GP,CQ

## 2024-09-27 PROCEDURE — 2500000005 HC RX 250 GENERAL PHARMACY W/O HCPCS: Performed by: INTERNAL MEDICINE

## 2024-09-27 PROCEDURE — 36415 COLL VENOUS BLD VENIPUNCTURE: CPT | Performed by: INTERNAL MEDICINE

## 2024-09-27 PROCEDURE — 99233 SBSQ HOSP IP/OBS HIGH 50: CPT | Performed by: INTERNAL MEDICINE

## 2024-09-27 PROCEDURE — 2500000002 HC RX 250 W HCPCS SELF ADMINISTERED DRUGS (ALT 637 FOR MEDICARE OP, ALT 636 FOR OP/ED): Performed by: INTERNAL MEDICINE

## 2024-09-27 RX ORDER — TORSEMIDE 20 MG/1
80 TABLET ORAL DAILY
Qty: 120 TABLET | Refills: 0 | Status: SHIPPED | OUTPATIENT
Start: 2024-09-27 | End: 2024-10-27

## 2024-09-27 RX ORDER — CALCIUM ACETATE 667 MG/1
667 CAPSULE ORAL
Qty: 90 CAPSULE | Refills: 1 | Status: SHIPPED | OUTPATIENT
Start: 2024-09-27 | End: 2024-11-26

## 2024-09-27 RX ORDER — GABAPENTIN 300 MG/1
300 CAPSULE ORAL NIGHTLY
Qty: 30 CAPSULE | Refills: 1 | Status: SHIPPED | OUTPATIENT
Start: 2024-09-27 | End: 2024-11-26

## 2024-09-27 RX ORDER — TORSEMIDE 20 MG/1
60 TABLET ORAL DAILY
Qty: 90 TABLET | Refills: 1 | Status: SHIPPED | OUTPATIENT
Start: 2024-09-27 | End: 2024-09-27

## 2024-09-27 ASSESSMENT — COGNITIVE AND FUNCTIONAL STATUS - GENERAL
CLIMB 3 TO 5 STEPS WITH RAILING: TOTAL
MOVING FROM LYING ON BACK TO SITTING ON SIDE OF FLAT BED WITH BEDRAILS: TOTAL
STANDING UP FROM CHAIR USING ARMS: A LOT
DAILY ACTIVITIY SCORE: 13
CLIMB 3 TO 5 STEPS WITH RAILING: TOTAL
WALKING IN HOSPITAL ROOM: TOTAL
TURNING FROM BACK TO SIDE WHILE IN FLAT BAD: A LOT
MOBILITY SCORE: 12
DRESSING REGULAR UPPER BODY CLOTHING: A LOT
STANDING UP FROM CHAIR USING ARMS: TOTAL
MOBILITY SCORE: 6
EATING MEALS: A LITTLE
WALKING IN HOSPITAL ROOM: A LOT
MOVING TO AND FROM BED TO CHAIR: TOTAL
MOVING FROM LYING ON BACK TO SITTING ON SIDE OF FLAT BED WITH BEDRAILS: A LITTLE
PERSONAL GROOMING: A LITTLE
MOVING TO AND FROM BED TO CHAIR: A LOT
HELP NEEDED FOR BATHING: A LOT
TURNING FROM BACK TO SIDE WHILE IN FLAT BAD: TOTAL
TOILETING: A LOT
DRESSING REGULAR LOWER BODY CLOTHING: TOTAL

## 2024-09-27 ASSESSMENT — PAIN DESCRIPTION - ORIENTATION: ORIENTATION: RIGHT

## 2024-09-27 ASSESSMENT — PAIN DESCRIPTION - LOCATION: LOCATION: KNEE

## 2024-09-27 ASSESSMENT — PAIN SCALES - GENERAL
PAINLEVEL_OUTOF10: 0 - NO PAIN
PAINLEVEL_OUTOF10: 0 - NO PAIN
PAINLEVEL_OUTOF10: 8

## 2024-09-27 ASSESSMENT — PAIN - FUNCTIONAL ASSESSMENT: PAIN_FUNCTIONAL_ASSESSMENT: 0-10

## 2024-09-27 NOTE — PROGRESS NOTES
Humphrey Waddell is a 59 y.o. male on day 9 of admission presenting with CHF (congestive heart failure), NYHA class I, acute on chronic, combined.      Subjective   Seen and examined.  ANA. Still edematous albeit much less edema.   UO - 1.5 L on 60 of torsemide.   Chart/labs/meds/notes/imaging/VS reviewed.         Objective          Vitals 24HR  Heart Rate:  [67-83]   Temp:  [36.2 °C (97.1 °F)-36.6 °C (97.9 °F)]   Resp:  [12-18]   BP: (137-175)/(72-82)   SpO2:  [90 %-97 %]     Intake/Output last 3 Shifts:    Intake/Output Summary (Last 24 hours) at 9/27/2024 1545  Last data filed at 9/26/2024 2219  Gross per 24 hour   Intake 220 ml   Output --   Net 220 ml       Physical Exam  Constitutional:       Comments: Awake, alert and oriented x 3.  In no acute distress.  HENT:      Head: Normocephalic and atraumatic.      Nose: Nose normal.      Mouth: Mucous membranes are moist.      Pharynx: Oropharynx is clear.   Eyes:      Conjunctiva/sclera: Conjunctivae normal.      Pupils: Pupils are equal, round, and reactive to light.   Cardiovascular:      Rate and Rhythm: Regular rhythm.      Heart sounds: Normal heart sounds.   Pulmonary:      Effort: Diminished bibasilar breath sounds.  Abdominal:      General: Bowel sounds are normal.      Palpations: Abdomen is soft.   Genitourinary:     Comments: Lee catheter draining yellow urine  Musculoskeletal:         General: No joint swelling. Lower limb edema. L AKA.   Skin:     General: Skin is warm and dry.  No rashes or lesions to exposed skin.  Neurological:      Mental Status: Cranial nerves II through XII grossly intact.  No asterixis.  Psychiatric:         Mood: Normal mood.        Behavior: Behavior is normal.       Scheduled Medications  acetaminophen, 975 mg, oral, q8h  albuterol, 2.5 mg, nebulization, TID  amLODIPine, 10 mg, oral, Daily  ampicillin-sulbactam, 3 g, intravenous, q12h  aspirin, 81 mg, oral, Daily  atorvastatin, 80 mg, oral, Daily  calcium acetate, 667 mg, oral,  TID  fludrocortisone, 0.1 mg, oral, Daily  gabapentin, 300 mg, oral, Nightly  heparin, 5,000 Units, subcutaneous, q8h  hydrALAZINE, 50 mg, oral, TID  insulin regular, 0-10 Units, subcutaneous, TID  isosorbide dinitrate, 20 mg, oral, TID  lidocaine, 1 patch, transdermal, Daily  lidocaine, 1 patch, transdermal, Daily  metoprolol tartrate, 50 mg, oral, BID  pantoprazole, 40 mg, oral, Daily before breakfast   Or  pantoprazole, 40 mg, intravenous, Daily before breakfast  torsemide, 60 mg, oral, Daily  venlafaxine XR, 150 mg, oral, Daily      Continuous medications       PRN medications: albuterol, dextrose, dextrose, glucagon, glucagon, glucagon, ondansetron **OR** ondansetron, [Held by provider] oxyCODONE, oxygen, oxygen, polyethylene glycol     Relevant Results  Results from last 7 days   Lab Units 09/27/24  0708 09/26/24  0812 09/25/24  0623 09/24/24  0458 09/23/24  0649   WBC AUTO x10*3/uL 9.8 8.6 10.1 11.3 10.3   HEMOGLOBIN g/dL 7.9* 7.6* 7.1* 7.1* 8.0*   HEMATOCRIT % 26.8* 26.1* 24.1* 24.3* 27.6*   PLATELETS AUTO x10*3/uL 212 195 194 205 224   NEUTROS PCT AUTO %  --   --  71.0 71.5 69.8   LYMPHS PCT AUTO %  --   --  10.5 9.9 10.3   MONOS PCT AUTO %  --   --  10.6 10.3 9.4   EOS PCT AUTO %  --   --  7.2 7.7 9.8     Results from last 7 days   Lab Units 09/27/24  0708 09/26/24  0812 09/25/24  0623   SODIUM mmol/L 140 143 141   POTASSIUM mmol/L 3.7 3.8 3.4*   CHLORIDE mmol/L 104 106 103   CO2 mmol/L 26 28 28   BUN mg/dL 44* 50* 59*   CREATININE mg/dL 3.92* 4.32* 4.70*   GLUCOSE mg/dL 99 84 78   CALCIUM mg/dL 7.5* 7.6* 7.3*       XR chest 1 view   Final Result   Mild nonspecific diffuse left-sided pleural thickening. Scant   horizontal linear opacities at the left base could be due to   atelectasis or scarring.        Previous CABG. Cardiomegaly without indication of ongoing CHF.        MACRO:   None        Signed by: Juancho Valencia 9/26/2024 1:05 PM   Dictation workstation:   [id number]      CT brain attack head wo IV  contrast   Final Result   No CT evidence of acute large vessel territory infarct or   intracranial hemorrhage. Consider correlating with CT angiogram as   warranted.        MACRO:   Moreno Cheema discussed the significance and urgency of this   critical finding by Epic secure chat with  JASON MALIK on   9/25/2024 at 6:41 pm.  (**-RCF-**) Findings:  See findings.        Signed by: Moreno Cheema 9/25/2024 7:28 PM   Dictation workstation:   [id number]      CT maxillofacial bones wo IV contrast   Final Result   Slight fullness of the right lacrimal gland without evidence of fluid   collection. CT of the paranasal sinuses, mastoid sinuses and orbits   is within normal limits.        MACRO:   none        Signed by: Torres Henson 9/21/2024 10:50 AM   Dictation workstation:   [id number]      CT 3D reconstruction   Final Result   Slight fullness of the right lacrimal gland without evidence of fluid   collection. CT of the paranasal sinuses, mastoid sinuses and orbits   is within normal limits.        MACRO:   none        Signed by: Torres Henson 9/21/2024 10:50 AM   Dictation workstation:   [id number]      Vascular US upper extremity venous duplex right   Final Result      Transthoracic Echo (TTE) Complete   Final Result      US thoracentesis   Final Result   Uneventful thoracentesis, as detailed above: Right Pleural space,   1000 mL        Performed and dictated at [facility].        Signed by: Diana Serrano 9/19/2024 11:37 AM   Dictation workstation:   [id number]               Assessment/Plan        Humphrey Waddell is a 59 y.o. male with a past medical history of left above-the-knee amputation due to PAD, coronary artery disease with a history of CABG, carotid artery disease, stroke, depression, type 2 diabetes, GERD, hyperlipidemia, hypertension, osteoarthritis, anemia of chronic disease, and stage G4 chronic kidney disease with a fluctuating creatinine baseline between  2.5 to just above 3 mg/deciliter, multiple acute kidney injuries and prior nephrotic range proteinuria who presented to an outside hospital on 9/16 for evaluation of shortness of breath.      Per the notes, he reportedly was taking 20 mg of torsemide instead of the prescribed 60 mg daily.  His weight is up 34 pounds to 288 now from 254 lbs in early July.  There was a large right pleural effusion and small loculation on the left seen on CT imaging.  Multifocal airspace disease concerning for pneumonia as well.  No hydronephrosis with perinephric stranding noted.  [facility] was [facility] from [facility].  He underwent a 1 L paracentesis with clear madalyn fluid removed on 9/19.  He received IVP Lasix up until 9/20.  Nephrology is consulted for renal care.    Mr. Waddell is at high risk to progress to kidney replacement therapy having nephrotic range proteinuria + multiple acute kidney injuries.  His creatinine was 3.3 mg as a deciliter when he presented.  This is the top end of his dilma.  He received IV Lasix.  His creatinine did rise with diuresis up to 4 mg/dL. IV Lasix was held.  We see that his creatinine continued to rise with associated oliguria off the diuretic.  He remains in heart failure. Thus I placed him on oral torsemide 60 mg twice daily for the time being (usual dose is 60 mg daily). He has responded to the oral diuretic with improved volume status. His creatinine is improving. Given the large volume diuresis, I cut the torsemide back to 60 mg 1 x daily. But his urine output declined more than expected. I anticipate adjusting him to BID dosing. We will work toward removing the Lee.  I otherwise sent iron stores, ferritin, intact PTH and vitamin D. I gave him a dose of erythropoietin and also started a binder for hyperphosphatemia, the iPTH is elevated. 25 vitamin D is sufficient. Of note, he does not have a paraprotein.  No acute indication for kidney replacement therapy. I will bump his torsemide up to  80 mg daily given suboptimal output on 60 mg x 1 daily. Trend his RFP.  Will follow.    Principal Problem:    CHF (congestive heart failure), NYHA class I, acute on chronic, combined (Multi)       I spent 40 minutes in the professional and overall care of this patient.      Darryl Lopez, DO

## 2024-09-27 NOTE — PROGRESS NOTES
Physical Therapy    Physical Therapy Treatment    Patient Name: Humphrey Waddell  MRN: [id number]  Department: [place] [address]  Room: [facility]  Today's Date: 9/27/2024  Time Calculation  Start Time: 1021  Stop Time: 1052  Time Calculation (min): 31 min         Assessment/Plan   PT Assessment  PT Assessment Results: Decreased strength, Decreased range of motion, Decreased endurance, Impaired balance, Decreased mobility, Pain  Rehab Prognosis: Fair  Evaluation/Treatment Tolerance: Patient limited by fatigue  Medical Staff Made Aware: Yes  End of Session Communication: Bedside nurse  Assessment Comment: Patient demonstrated increased tolerance and endurance this tx session.  He continues to require a high level of assist, though progress noted in bed mobility and sitting balance.  Patient would benefit from skilled therapy intervention to address limitations, and to continue to progress towards the PT goals.  End of Session Patient Position: Bed, 2 rail up (Pt left with CTA and [place]ed[place] nurse to complete bed linen change and clean up.)     PT Plan  Treatment/Interventions: Bed mobility, Balance training, Strengthening, Endurance training, Therapeutic exercise, Therapeutic activity  PT Plan: Ongoing PT  PT Frequency: 3 times per week  PT Discharge Recommendations: Moderate intensity level of continued care  Equipment Recommended upon Discharge: Lift  PT Recommended Transfer Status: Total assist  PT - OK to Discharge: Yes (per PT POC)      General Visit Information:   PT  Visit  PT Received On: 09/27/24  General  Reason for Referral: To ED with SOB. Pt found to have desmond pleural effusions, PNA, and CHF exacerbation.  Referred By: Gloria Palmer MD  Prior to Session Communication: Bedside nurse  Patient Position Received: Bed, 3 rail up, Alarm on  Preferred Learning Style: auditory, kinesthetic, verbal, written, visual  General Comment: Pt agreeable to, and participating well in therapy.  Pt's bed wet with urine, pure wick leaking.   Initial bed mobility done to address issue. While sitting at EOB pt had a BM, was returned to supine and again bed mobility training /instruction done with assist to address issue.    Subjective   Precautions:  Precautions  LE Weight Bearing Status: Other (Comment) (L LE AKA)  Medical Precautions: Fall precautions  Post-Surgical Precautions: Abdominal surgery precautions    Vital Signs (Past 2hrs)        Date/Time Vitals Session Patient Position Pulse Resp SpO2 BP MAP (mmHg)    09/27/24 1021 --  --  83  --  95 %  --  --                         Objective   Pain:  Pain Assessment  Pain Assessment: 0-10  0-10 (Numeric) Pain Score: 0 - No pain (pain patch on R knee)  Cognition:  Cognition  Orientation Level: Unable to assess (Pt responding to his name with a few small nods of his head. Following commands with cues and assist.  His eyes engaged.)  Coordination:  Movements are Fluid and Coordinated: Yes  Postural Control:  Postural Control  Postural Control: Impaired  Static Sitting Balance  Static Sitting-Balance Support: Bilateral upper extremity supported (R foot on floor)  Static Sitting-Level of Assistance:  (Assist varied from Max to CGA.)  Static Sitting-Comment/Number of Minutes: at EOB  Dynamic Sitting Balance  Dynamic Sitting-Balance Support: Bilateral upper extremity supported, Feet unsupported (with R LE exercise.)  Dynamic Sitting-Level of Assistance:  (varied from Max A to Mod A of 1.)  Dynamic Sitting-Balance: Lateral lean (LE excercises.)  Dynamic Sitting-Comments: Pt sat EOB x 16 minutes. He made two minor attempts to return to supine,  though with cues and assist he continued to sit EOB.    Activity Tolerance:  Activity Tolerance  Endurance: Decreased tolerance for upright activites  Treatments:  Therapeutic Exercise  Therapeutic Exercise Performed: Yes  Therapeutic Exercise Activity 1: Pt instructed in ther ex to increase strength and endurance. Seated at EOB, AP, LAQ, R LE  2 x 15 reps each.  Cues  needed to improve technique for max benefits.              Bed Mobility  Bed Mobility: Yes  Bed Mobility 1  Bed Mobility 1: Supine to sitting  Level of Assistance 1: Maximum assistance, +2, Minimal verbal cues  Bed Mobility Comments 1: Pt following cues well for hand placement and sequencing. HOB elevated.  Bed Mobility 2  Bed Mobility  2: Sitting to supine  Level of Assistance 2: Maximum assistance, Moderate assistance, +2  Bed Mobility Comments 2: assist with trunck and B LE. (L AKA)  Bed Mobility 3  Bed Mobility 3: Scooting  Level of Assistance 3: Dependent, +2  Bed Mobility 4  Bed Mobility 4: Rolling right, Rolling left  Level of Assistance 4: Maximum assistance (of one)  Bed Mobility Comments 4: Pt following cues given for sequencing and hand placement.  PT able to complete  roll with Max a of one but difficulty maintaining side lyiing position.    Outcome Measures:  [facility] Basic Mobility  Turning from your back to your side while in a flat bed without using bedrails: Total  Moving from lying on your back to sitting on the side of a flat bed without using bedrails: Total  Moving to and from bed to chair (including a wheelchair): Total  Standing up from a chair using your arms (e.g. wheelchair or bedside chair): Total  To walk in hospital room: Total  Climbing 3-5 steps with railing: Total  Basic Mobility - Total Score: 6    Education Documentation  Precautions, taught by Kyra Gregory PTA at 9/27/2024 11:58 AM.  Learner: Patient  Readiness: Acceptance  Method: Explanation, Demonstration  Response: Demonstrated Understanding, Needs Reinforcement    Body Mechanics, taught by Kyra Gregory PTA at 9/27/2024 11:58 AM.  Learner: Patient  Readiness: Acceptance  Method: Explanation, Demonstration  Response: Demonstrated Understanding, Needs Reinforcement    Mobility Training, taught by Kyra Gregory PTA at 9/27/2024 11:58 AM.  Learner: Patient  Readiness: Acceptance  Method: Explanation, Demonstration  Response:  Demonstrated Understanding, Needs Reinforcement    Education Comments  No comments found.        OP EDUCATION:       Encounter Problems       Encounter Problems (Active)       Balance       STG - Maintains dynamic sitting balance with upper extremity support x10 mins with min A or less.  (Progressing)       Start:  09/22/24    Expected End:  10/06/24               Mobility       Pt will tolerate 15 reps of each LE exercise in order to improve strength and endurance.  (Progressing)       Start:  09/22/24    Expected End:  10/06/24               PT Transfers       STG - Patient will perform bed mobility with mod A (Progressing)       Start:  09/22/24    Expected End:  10/06/24            STG - Patient will transfer sit to and from stand with max A x2 (Not Progressing)       Start:  09/22/24    Expected End:  10/06/24               Pain - Adult

## 2024-09-27 NOTE — PROGRESS NOTES
09/27/24 1344   Discharge Planning   Type of Post Acute Facility Services Skilled nursing   Expected Discharge Disposition SNF  ([facility])   Does the patient need discharge transport arranged? Yes   RoundTrip coordination needed? Yes   Has discharge transport been arranged? Yes   What day is the transport expected? 09/27/24     Patient has been medically cleared for discharge to SNF.  [facility] can accept patient today.  Will have DSC set up transport.  Ree Gong RN

## 2024-09-27 NOTE — PROGRESS NOTES
9/27/2024 3:01 PM Wife informed that discharge transport is arranged for 4:00 PM to [facility]. Irais PARKS

## 2024-09-27 NOTE — PROGRESS NOTES
"Humphrey Waddell is a 59 y.o. male on day 9 of admission presenting with CHF (congestive heart failure), NYHA class I, acute on chronic, combined.    Subjective   On 2L, he has no complaints, talking sometimes, afebrile, void s/p gerardo removal.        Objective     Physical Exam  Vitals reviewed.   HENT:      Head: Normocephalic.      Right Ear: Tympanic membrane normal.      Nose: Nose normal.      Mouth/Throat:      Mouth: Mucous membranes are moist.   Cardiovascular:      Rate and Rhythm: Normal rate and regular rhythm.   Pulmonary:      Comments: Bibasilar crackles.  Abdominal:      General: Abdomen is flat. Bowel sounds are normal.      Palpations: Abdomen is soft.   Musculoskeletal:      Comments: Left AKA, right lower extremity with edema   Skin:     General: Skin is warm.      Capillary Refill: Capillary refill takes less than 2 seconds.   Neurological:      General: No focal deficit present.      Mental Status: He is alert.      Comments: Not talking very much.          Last Recorded Vitals  Blood pressure 175/75, pulse 83, temperature 36.6 °C (97.9 °F), temperature source Oral, resp. rate 12, height 1.93 m (6' 4\"), weight 133 kg (293 lb 9.6 oz), SpO2 95%.  Intake/Output last 3 Shifts:  I/O last 3 completed shifts:  In: 820 (6.2 mL/kg) [P.O.:620; IV Piggyback:200]  Out: 1800 (13.5 mL/kg) [Urine:1800 (0.4 mL/kg/hr)]  Weight: 133.2 kg     Relevant Results  Results for orders placed or performed during the hospital encounter of 09/18/24 (from the past 24 hour(s))   POCT GLUCOSE   Result Value Ref Range    POCT Glucose 122 (H) 74 - 99 mg/dL   POCT GLUCOSE   Result Value Ref Range    POCT Glucose 149 (H) 74 - 99 mg/dL   Basic Metabolic Panel   Result Value Ref Range    Glucose 99 74 - 99 mg/dL    Sodium 140 136 - 145 mmol/L    Potassium 3.7 3.5 - 5.3 mmol/L    Chloride 104 98 - 107 mmol/L    Bicarbonate 26 21 - 32 mmol/L    Anion Gap 14 10 - 20 mmol/L    Urea Nitrogen 44 (H) 6 - 23 mg/dL    Creatinine 3.92 (H) 0.50 - " 1.30 mg/dL    eGFR 17 (L) >60 mL/min/1.73m*2    Calcium 7.5 (L) 8.6 - 10.3 mg/dL   CBC   Result Value Ref Range    WBC 9.8 4.4 - 11.3 x10*3/uL    nRBC 0.0 0.0 - 0.0 /100 WBCs    RBC 2.92 (L) 4.50 - 5.90 x10*6/uL    Hemoglobin 7.9 (L) 13.5 - 17.5 g/dL    Hematocrit 26.8 (L) 41.0 - 52.0 %    MCV 92 80 - 100 fL    MCH 27.1 26.0 - 34.0 pg    MCHC 29.5 (L) 32.0 - 36.0 g/dL    RDW 15.6 (H) 11.5 - 14.5 %    Platelets 212 150 - 450 x10*3/uL   POCT GLUCOSE   Result Value Ref Range    POCT Glucose 98 74 - 99 mg/dL   POCT GLUCOSE   Result Value Ref Range    POCT Glucose 103 (H) 74 - 99 mg/dL       Imaging Results    Ct chest/abd/pelvis:  IMPRESSION:  Large right pleural effusion and small loculated left pleural effusion  at the left lung apex with multifocal airspace disease in the left  lung, likely atelectasis and possibly pneumonia in the appropriate  clinical setting.  Pronounced cardiomegaly with question of mild systemic anemia.  Mild to moderate concentric wall thickening of the rectosigmoid colon  suggesting a low-grade acute infectious or inflammatory distal  colitis/proctitis.  Decompressed urinary bladder with mild concentric wall thickening  which may be due to underdistention however mild cystitis is a  possibility in the upper clinical setting, correlate with urinalysis.  Moderate-sized fluid filled left inguinal hernia along with a small  fat-containing right inguinal hernia.    Head ct:  IMPRESSION:  No acute intracranial hemorrhage or mass effect.      Periapical lucencies noted consistent with abscesses. Dental  follow-up suggested.      Mild prominence of the lateral and 3rd ventricles which could be  related to central white matter loss versus normal pressure  hydrocephalus. Clinical correlation suggested.      Correlation for left-sided otitis media suggested.      Remote left thalamic lacunar infarct.    Medications:  acetaminophen, 975 mg, oral, q8h  albuterol, 2.5 mg, nebulization, TID  amLODIPine, 10  mg, oral, Daily  ampicillin-sulbactam, 3 g, intravenous, q12h  aspirin, 81 mg, oral, Daily  atorvastatin, 80 mg, oral, Daily  calcium acetate, 667 mg, oral, TID  fludrocortisone, 0.1 mg, oral, Daily  gabapentin, 300 mg, oral, Nightly  heparin, 5,000 Units, subcutaneous, q8h  hydrALAZINE, 50 mg, oral, TID  insulin regular, 0-10 Units, subcutaneous, TID  isosorbide dinitrate, 20 mg, oral, TID  lidocaine, 1 patch, transdermal, Daily  lidocaine, 1 patch, transdermal, Daily  metoprolol tartrate, 50 mg, oral, BID  pantoprazole, 40 mg, oral, Daily before breakfast   Or  pantoprazole, 40 mg, intravenous, Daily before breakfast  torsemide, 60 mg, oral, Daily  venlafaxine XR, 150 mg, oral, Daily       PRN medications: albuterol, dextrose, dextrose, glucagon, glucagon, glucagon, ondansetron **OR** ondansetron, [Held by provider] oxyCODONE, oxygen, oxygen, polyethylene glycol     Assessment/Plan       9/27:  Asp PNA... he completes iv abx course today, comfortable on 2L... will plan to dc with 2L.     HTN... monitor and titrate up meds if needed.     Kody/ckd... continues to improve.     DM, A1c 6.1%... well controlled, will stop lantus 2U. Cont farxiga.     Dc to snf pending placement.       9/26:  Cont iv abx  Cont o2 for hypoxia.   HTN.. a bit better... cont norvasc, hydralazine, isordil, bb, torsemide.   Appreciate palliative recs... pt with depression, withdrawn... cont effexor. Should have close psych f/up... will request at Dc.   Kody/ckd... continues to impove... f/up renal recs  DM... well controlled with lantus and ss.   Recurrent hyperkalemia... on Florinef... will discuss with renal team.   PT/ot  Dispo to snf possibly tomorrow.       9/25:  Asp PNA... 2L, reg diet per speech/swallow... cont iv abx, wean to RA as tolerated, change to PO meds at MD.   Needs OP sleep study. Cont nocturnal cpap.     HFpEF... cont PO torsemide. He's on bb, isordil.   HypoK... replace, monitor.   Kody/ckd... improving and nearing baseline.    DM... lantus 2U, ss.   Home regimen for chronic conditions  Pt/ot  Dvt px with heparin  Dc is to snf pending placement.             9/24:  1.  Acute heart failure with preserved EF, most recent echo in May 2024 had an EF of 55%  -Cardiology and Nephrology consulted. Continue diuresis with torsemide 60 mg bid. Monitor I/Os, daily weight, bmp.    2.CAD status post CABG  -continue asa/atorvastatin/metoprolol/imdur    3. DM  -continue lantus and SSI    4. Pneumonia  - on iv unasyn; SLP without evidence of aspiration; rec regular diet  - Will need repeat chest CT in 8 to 12 weeks to ensure resolution of opacities     5. IDANIA on CKDV  -nephrology following; Cr worsening with IV fluids and holding diuretics. Was discussed with nephrology/cardiology on 9/23 and plan was to restart diuretics. Cr improving with diuresis.  - on florinef for recurrent hyperkalemia     6. PVD with left AKA  -continue asa/atorvastatin    7. Bilateral effusion suspect secondary to #1  -had right thoracentesis drained 1L this admit  -transudate likely due to CHF    8. Normocytic anemia  -hgb 7.3  -Iron sat 12% -> IV venofer    9.  Acute hypoxic respiratory failure secondary to #1 and 4  -Oxygen being weaned down to 2 L, home oxygen eval as needed but patient also needs a sleep study as an outpatient.    10. Likely PAUL/OHS  - nightly cpap    11. Goals of care- Would want dialysis if offered by nephrology. DNR/DNI per discussion with palliative care and wife today.     DVT Prophylaxis:  Lovenox subq    Delgado Solorzano MD  [facility]

## 2024-09-27 NOTE — PROGRESS NOTES
"Spiritual Care Visit    Clinical Encounter Type  Visited With: Patient  Routine Visit: Introduction  Continue Visiting: No (Preparing for discharge)  Referral From: Physician  Referral To:     The pt stated that he was not pleased with having to go to a facility. \"Whenever I go to one of those places I stay for a week.\" The nurse told him that his time to leave was about 4 pm.  The pt discussed justice and freedom for everyone, but himself. He was encouraged and seemingly accepted that forgiveness was essential for himself.  \"Thank you for coming to visit with me.\"    Chaplain Jarrod Leiva                                               "

## 2024-09-27 NOTE — CONSULTS
Wound Care Consult     Visit Date: 9/27/2024      Patient Name: Humphrey Waddell         MRN: [id number]           YOB: 1965     Reason for Consult: patient presents with MASD with erosions to the perirectal region.        Wound History: patient states the he has had an increase in frequency of loose stools and to region had been more difficult to keep clean and dry.      Pertinent Labs:   [place], Urine   Date Value Ref Range Status   12/03/2022 2,435 (H) 0 - 23 MG/L Final     Comment:     RESULT CHECKED     Albumin   Date Value Ref Range Status   09/25/2024 2.8 (L) 3.4 - 5.0 g/dL Final   07/24/2023 3.1 (L) 3.4 - 5.0 g/dL Final     Albumin, SPE   Date Value Ref Range Status   12/04/2022 2.4  Reference range: 3.4  to  5.0  Unit: g/dL   (L)  Final       Wound Assessment:  Wound 06/04/24 Moisture Associated Skin Damage Buttocks Medial (Active)   Site Assessment Blanchable erythema 09/27/24 1030   Cornell-Wound Assessment Clean;Dry;Intact 09/27/24 1030   Non-staged Wound Description Partial thickness 09/27/24 1030   Pressure Injury Stage 1 09/27/24 0730   Wound Length (cm) 2.5 cm 09/17/24 1604   Wound Width (cm) 0.5 cm 09/17/24 1604   Wound Surface Area (cm^2) 1.25 cm^2 09/17/24 1604   Margins Well-defined edges 09/27/24 1030   Drainage Description None 09/27/24 1030   Drainage Amount None 09/27/24 1030   Dressing Open to air 09/27/24 1030   Dressing Changed Changed 09/27/24 1030   Dressing Status Clean;Dry 09/27/24 1030       Wound Team Summary Assessment: assessment complete and recommendations below.  Small episode of incontinence (stool)- cornell care provided, Cavilon advanced applied and Triad barrier cream applied. Sacrum pad and protected with Mepilex foam border dressing- region is clear, blanches and has no redness- patient turns independently.     Perirectal: MASD with erosions  2x Daily: Bedside RN/LPN to complete wound care.  Cleanse with purple bath wipes and pat dry.  Apply Coloplast Triad Wound  Dressing (orange tube) in dime thickness to region.  Strict q2 turns to offload sacrum and buttock.     While in bed patient should only be on one fitted sheet, and one chux. Please, do not use brief while patient is resting in bed. Elevate heels off the bed surface at all times. Turn and reposition at least every 2 hours.    Wound Team Plan: Thank you for this consult. Assessment has been completed and orders have been placed. Wound care to be completed by nursing per orders. Please contact [facility] nurse with any questions and place new consult if there is a change in wound status.      Madhavi Padilla RN, BSN, [facility]  Phone: [phone]  9/27/2024  3:45 PM,

## 2024-09-27 NOTE — CARE PLAN
The patient's goals for the shift include Pt. will have a safe, restful and uneventful evening    The clinical goals for the shift include Patient to be Q2 turned for shift      Problem: Nutrition  Goal: Less than 5 days NPO/clear liquids  Outcome: Progressing  Goal: Oral intake greater than 50%  Outcome: Progressing  Goal: Oral intake greater 75%  Outcome: Progressing  Goal: Consume prescribed supplement  Outcome: Progressing  Goal: Adequate PO fluid intake  Outcome: Progressing  Goal: Nutrition support goals are met within 48 hrs  Outcome: Progressing  Goal: Nutrition support is meeting 75% of nutrient needs  Outcome: Progressing  Goal: Tube feed tolerance  Outcome: Progressing  Goal: BG  mg/dL  Outcome: Progressing  Goal: Lab values WNL  Outcome: Progressing  Goal: Electrolytes WNL  Outcome: Progressing  Goal: Promote healing  Outcome: Progressing  Goal: Maintain stable weight  Outcome: Progressing  Goal: Reduce weight from edema/fluid  Outcome: Progressing  Goal: Gradual weight gain  Outcome: Progressing  Goal: Improve ostomy output  Outcome: Progressing     Problem: Diabetes  Goal: Achieve decreasing blood glucose levels by end of shift  Outcome: Progressing  Goal: Increase stability of blood glucose readings by end of shift  Outcome: Progressing  Goal: Decrease in ketones present in urine by end of shift  Outcome: Progressing  Goal: Maintain electrolyte levels within acceptable range throughout shift  Outcome: Progressing  Goal: Maintain glucose levels >70mg/dl to <250mg/dl throughout shift  Outcome: Progressing  Goal: No changes in neurological exam by end of shift  Outcome: Progressing  Goal: Learn about and adhere to nutrition recommendations by end of shift  Outcome: Progressing  Goal: Vital signs within normal range for age by end of shift  Outcome: Progressing  Goal: Increase self care and/or family involovement by end of shift  Outcome: Progressing  Goal: Receive DSME education by end of  shift  Outcome: Progressing     Problem: Pain - Adult  Goal: Verbalizes/displays adequate comfort level or baseline comfort level  Outcome: Progressing     Problem: Safety - Adult  Goal: Free from fall injury  Outcome: Progressing     Problem: Discharge Planning  Goal: Discharge to home or other facility with appropriate resources  Outcome: Progressing     Problem: Chronic Conditions and Co-morbidities  Goal: Patient's chronic conditions and co-morbidity symptoms are monitored and maintained or improved  Outcome: Progressing     Problem: Respiratory  Goal: Clear secretions with interventions this shift  Outcome: Progressing  Goal: Minimize anxiety/maximize coping throughout shift  Outcome: Progressing  Goal: Minimal/no exertional discomfort or dyspnea this shift  Outcome: Progressing  Goal: No signs of respiratory distress (eg. Use of accessory muscles. Peds grunting)  Outcome: Progressing  Goal: Patent airway maintained this shift  Outcome: Progressing  Goal: Verbalize decreased shortness of breath this shift  Outcome: Progressing  Goal: Wean oxygen to maintain O2 saturation per order/standard this shift  Outcome: Progressing  Goal: Increase self care and/or family involvement in next 24 hours  Outcome: Progressing     Problem: Skin  Goal: Decreased wound size/increased tissue granulation at next dressing change  Outcome: Progressing  Goal: Participates in plan/prevention/treatment measures  Outcome: Progressing  Goal: Prevent/manage excess moisture  Outcome: Progressing  Goal: Prevent/minimize sheer/friction injuries  Outcome: Progressing  Flowsheets (Taken 9/27/2024 0236)  Prevent/minimize sheer/friction injuries:   HOB 30 degrees or less   Use pull sheet  Goal: Promote/optimize nutrition  Outcome: Progressing  Goal: Promote skin healing  Outcome: Progressing     Problem: Fall/Injury  Goal: Not fall by end of shift  Outcome: Progressing  Goal: Be free from injury by end of the shift  Outcome: Progressing  Goal:  Verbalize understanding of personal risk factors for fall in the hospital  Outcome: Progressing  Goal: Verbalize understanding of risk factor reduction measures to prevent injury from fall in the home  Outcome: Progressing  Goal: Use assistive devices by end of the shift  Outcome: Progressing  Goal: Pace activities to prevent fatigue by end of the shift  Outcome: Progressing     Problem: Pain  Goal: Takes deep breaths with improved pain control throughout the shift  Outcome: Progressing  Goal: Turns in bed with improved pain control throughout the shift  Outcome: Progressing  Goal: Walks with improved pain control throughout the shift  Outcome: Progressing  Goal: Performs ADL's with improved pain control throughout shift  Outcome: Progressing  Goal: Participates in PT with improved pain control throughout the shift  Outcome: Progressing  Goal: Free from opioid side effects throughout the shift  Outcome: Progressing  Goal: Free from acute confusion related to pain meds throughout the shift  Outcome: Progressing     Problem: Heart Failure  Goal: Improved gas exchange this shift  Outcome: Progressing  Goal: Improved urinary output this shift  Outcome: Progressing  Goal: Reduction in peripheral edema within 24 hours  Outcome: Progressing  Goal: Report improvement of dyspnea/breathlessness this shift  Outcome: Progressing  Goal: Weight from fluid excess reduced over 2-3 days, then stabilize  Outcome: Progressing  Goal: Increase self care and/or family involvement in 24 hours  Outcome: Progressing

## 2024-09-27 NOTE — PROGRESS NOTES
"Humphrey Waddell is a 59 y.o. male with past medical history PAD s/p left above-the-knee amputation, anemia, coronary artery disease, s/p CABG, carotid artery disease, CVA with cognitive impairment, depression, type 2 diabetes mellitus, GERD, hyperlipidemia, hypertension, osteoarthritis, peripheral arterial disease, CKD stage IV. He presented to [facility] ED 9/18 with c/o dyspnea and weakness. He was found to have bilateral pleural effusions, right greater than left, metabolic encephalopathy, COPD and CHF exacerbations, and edward. He was transferred to [facility] on 9/18 with dx acute hypoxic and hypercapnic respiratory failure. Patient's respiratory status did start improving, but at expense of renal function.  Palliative care consulted for goals of care    Interval Hx and Subjective     No acute events overnight.  Patient more alert today, denies any complaints.         Objective    Blood pressure 163/82, pulse 75, temperature 36.6 °C (97.9 °F), temperature source Oral, resp. rate 12, height 1.93 m (6' 4\"), weight 133 kg (293 lb 9.6 oz), SpO2 96%.     General:  appears comfortable at rest  Neuro: alert, word finding difficulty  Psych:  good eye contact today, still seems flat but does engage  HEENT:  oral mucosa moist without exudate, tongue midline.  PERRLA, no discharge   Resps:  resps unlabored, lungs diminished, no cough noted, on supplemental 02 via cannula  Card:  RRR, no murmurs, rubs, or gallops.  No JVD noted  GI:  normal bowel sounds, soft and non tender  :  deferred  MS:  left AKA.  Edema to RLE decreased from yesterday, now about +1.  RLE without trauma or deformity  Integ:  skin pale, warm and dry overall    XR chest 1 view    Result Date: 9/26/2024  Interpreted By:  Juancho Valencia, STUDY: XR CHEST 1 VIEW;  9/26/2024 12:42 pm   INDICATION: Signs/Symptoms:bilateral pleural effusions.   COMPARISON: CT scan from 09/17/2024. Most recent prior chest x-ray is from 09/17/2024.   ACCESSION NUMBER(S): [id number]   ORDERING " CLINICIAN: BLANCA SHETH   TECHNIQUE: Single AP portable view of the chest was obtained.   FINDINGS: MEDIASTINUM/ LUNGS/ MELINDA:   Previous heart surgery. Stable cardiomegaly. Currently without gross vascular congestion. No blunting of the costophrenic sulci. There is diffuse mild left-sided pleural thickening. There are horizontal linear opacities laterally at the left base. The right lung is grossly clear. No pneumothorax. No tracheal deviation. No abnormal hilar fullness or gross mass on either side.   BONES: No lytic or blastic destructive bone lesion. Previous left shoulder arthroplasty.   UPPER ABDOMEN: Grossly intact.       Mild nonspecific diffuse left-sided pleural thickening. Scant horizontal linear opacities at the left base could be due to atelectasis or scarring.   Previous CABG. Cardiomegaly without indication of ongoing CHF.   MACRO: None   Signed by: Juancho Valencia 9/26/2024 1:05 PM Dictation workstation:   [id number]    CT brain attack head wo IV contrast    Result Date: 9/25/2024  Interpreted By:  Moreno Cheema, STUDY: CT BRAIN ATTACK HEAD WO IV CONTRAST;  9/25/2024 6:34 pm   INDICATION: Signs/Symptoms:stroke alert.  Word-finding difficulty.   COMPARISON: CT head 09/16/2024.   ACCESSION NUMBER(S): [id number]   ORDERING CLINICIAN: JASON MALIK   TECHNIQUE: Noncontrast axial CT scan of head was performed.   FINDINGS: Parenchyma: No evidence of acute large vessel territory infarct. No acute intracranial hemorrhage. No mass effect or midline shift.Patchy periventricular white matter hypodensities, likely chronic microvascular ischemic change. Unchanged hypodensity in the left thalamus related to remote lacunar infarct.Moderate parenchymal atrophy.   CSF Spaces: Stable ventriculomegaly which could be related to central white matter loss versus normal pressure hydrocephalus.. The sulci and basal cisterns are within normal limits for age.   Extra-Axial Fluid: None.   Calvarium: Unremarkable.    Paranasal sinuses: Visualized paranasal sinuses are clear.   Mastoids: Clear.   Orbits: No acute abnormality.   Soft tissues: No abnormality.       No CT evidence of acute large vessel territory infarct or intracranial hemorrhage. Consider correlating with CT angiogram as warranted.   MACRO: Moreno Cheema discussed the significance and urgency of this critical finding by Epic secure chat with  JASON MALIK on 9/25/2024 at 6:41 pm.  (**-RCF-**) Findings:  See findings.   Signed by: Moreno Cheema 9/25/2024 7:28 PM Dictation workstation:   [id number]    Results for orders placed or performed during the hospital encounter of 09/18/24 (from the past 24 hour(s))   POCT GLUCOSE   Result Value Ref Range    POCT Glucose 105 (H) 74 - 99 mg/dL   POCT GLUCOSE   Result Value Ref Range    POCT Glucose 122 (H) 74 - 99 mg/dL   POCT GLUCOSE   Result Value Ref Range    POCT Glucose 149 (H) 74 - 99 mg/dL   Basic Metabolic Panel   Result Value Ref Range    Glucose 99 74 - 99 mg/dL    Sodium 140 136 - 145 mmol/L    Potassium 3.7 3.5 - 5.3 mmol/L    Chloride 104 98 - 107 mmol/L    Bicarbonate 26 21 - 32 mmol/L    Anion Gap 14 10 - 20 mmol/L    Urea Nitrogen 44 (H) 6 - 23 mg/dL    Creatinine 3.92 (H) 0.50 - 1.30 mg/dL    eGFR 17 (L) >60 mL/min/1.73m*2    Calcium 7.5 (L) 8.6 - 10.3 mg/dL   CBC   Result Value Ref Range    WBC 9.8 4.4 - 11.3 x10*3/uL    nRBC 0.0 0.0 - 0.0 /100 WBCs    RBC 2.92 (L) 4.50 - 5.90 x10*6/uL    Hemoglobin 7.9 (L) 13.5 - 17.5 g/dL    Hematocrit 26.8 (L) 41.0 - 52.0 %    MCV 92 80 - 100 fL    MCH 27.1 26.0 - 34.0 pg    MCHC 29.5 (L) 32.0 - 36.0 g/dL    RDW 15.6 (H) 11.5 - 14.5 %    Platelets 212 150 - 450 x10*3/uL   POCT GLUCOSE   Result Value Ref Range    POCT Glucose 98 74 - 99 mg/dL      Scheduled medications  acetaminophen, 975 mg, oral, q8h  albuterol, 2.5 mg, nebulization, TID  amLODIPine, 10 mg, oral, Daily  ampicillin-sulbactam, 3 g, intravenous, q12h  aspirin, 81 mg, oral, Daily  atorvastatin, 80 mg,  oral, Daily  calcium acetate, 667 mg, oral, TID  fludrocortisone, 0.1 mg, oral, Daily  gabapentin, 300 mg, oral, Nightly  heparin, 5,000 Units, subcutaneous, q8h  hydrALAZINE, 50 mg, oral, TID  insulin regular, 0-10 Units, subcutaneous, TID  isosorbide dinitrate, 20 mg, oral, TID  lidocaine, 1 patch, transdermal, Daily  lidocaine, 1 patch, transdermal, Daily  metoprolol tartrate, 50 mg, oral, BID  pantoprazole, 40 mg, oral, Daily before breakfast   Or  pantoprazole, 40 mg, intravenous, Daily before breakfast  torsemide, 60 mg, oral, Daily  venlafaxine XR, 150 mg, oral, Daily      Continuous medications     PRN medications  PRN medications: albuterol, dextrose, dextrose, glucagon, glucagon, glucagon, ondansetron **OR** ondansetron, [Held by provider] oxyCODONE, oxygen, oxygen, polyethylene glycol     Dietary Orders (From admission, onward)       Start     Ordered    09/18/24 0250  Adult diet Cardiac, Consistent Carb; CCD 90 gm/meal; 70 gm fat; 2 - 3 grams Sodium  Diet effective now        Question Answer Comment   Diet type Cardiac    Diet type Consistent Carb    Carb diet selection: CCD 90 gm/meal    Fat restriction: 70 gm fat    Sodium restriction: 2 - 3 grams Sodium        09/18/24 0251                     Assessment/Plan    59 year old male with multiple medical issues; acute on chronic HFpEF, CAD, DM, PNA acute hypoxic and hypercapnic respiratory failure, severe IDANIA on CKD IV, felt to be related to cardiorenal syndrome.       Palliative goals:  Patient still unable to participate In higher level discussions about code status, wife elected to make him DNR/DNI  -code status changed to DNR/DNI  -will re-address with patient should he become more alert  -OH Portable DNRCCA placed at bedside for transport     IDANIA on top of CKD IV:  diuresing now.  Cr is slowly improving, edema seems less  -defer tx to primary and renal team  -happy to assist with any discussions with family about dialysis should his renal function  worsen     Degenerative disc disease to lower back with pain:    -continue gabapentin 300 mg at HS. Max dose for current renal status  -continue acetaminophen 975 mg PO TID  -continue oxycodone 5 mg Q 6 hours PRN      Knee pain:  denies today  -continue lidocaine patch to right knee     Depression:  not a new problem, seems withdrawn, not eating much  -continue venlafaxine  mg daily  -adding music therapy     Reema Garcia APRN CNP

## 2024-09-27 NOTE — PROGRESS NOTES
"Humphrey Waddell is a 59 y.o. male on day 9 of admission presenting with CHF (congestive heart failure), NYHA class I, acute on chronic, combined.    Subjective   Feels \"okay\".  Denies shortness of breath or cough, but does admit to abdominal discomfort.  On 2 L nasal cannula oxygen during the day and CPAP at night.  Chest x-ray yesterday showed left pleural thickening with left basilar atelectasis/scarring and cardiomegaly.  Arterial blood gas on September 25, 2024 while breathing 28% FiO2 showed a pH of 7.40, pCO2 47, pO2 46, oxygen saturation 77% and a bicarbonate of 29.  Left pleural fluid showed a transudate with negative cultures and malignancy.     Objective   Physical Exam  Vitals  Blood pressure 175/75, pulse 83, temperature 36.6 °C (97.9 °F), temperature source Oral, resp. rate 12, height 1.93 m (6' 4\"), weight 133 kg (293 lb 9.6 oz), SpO2 95%.  Intake/Output last 3 Shifts:  I/O last 3 completed shifts:  In: 820 (6.2 mL/kg) [P.O.:620; IV Piggyback:200]  Out: 1800 (13.5 mL/kg) [Urine:1800 (0.4 mL/kg/hr)]  Weight: 133.2 kg     General-awake, alert and in no acute distress.  Lungs-clear, without wheezes, rales or rhonchi.  Heart-regular rate and rhythm.  Abdomen-positive bowel sounds.  Extremities-no edema.  Skin-no rashes.    Scheduled medications  acetaminophen, 975 mg, oral, q8h  albuterol, 2.5 mg, nebulization, TID  amLODIPine, 10 mg, oral, Daily  ampicillin-sulbactam, 3 g, intravenous, q12h  aspirin, 81 mg, oral, Daily  atorvastatin, 80 mg, oral, Daily  calcium acetate, 667 mg, oral, TID  fludrocortisone, 0.1 mg, oral, Daily  gabapentin, 300 mg, oral, Nightly  heparin, 5,000 Units, subcutaneous, q8h  hydrALAZINE, 50 mg, oral, TID  insulin regular, 0-10 Units, subcutaneous, TID  isosorbide dinitrate, 20 mg, oral, TID  lidocaine, 1 patch, transdermal, Daily  lidocaine, 1 patch, transdermal, Daily  metoprolol tartrate, 50 mg, oral, BID  pantoprazole, 40 mg, oral, Daily before breakfast   Or  pantoprazole, 40 mg, " intravenous, Daily before breakfast  torsemide, 60 mg, oral, Daily  venlafaxine XR, 150 mg, oral, Daily      Continuous medications     PRN medications  PRN medications: albuterol, dextrose, dextrose, glucagon, glucagon, glucagon, ondansetron **OR** ondansetron, [Held by provider] oxyCODONE, oxygen, oxygen, polyethylene glycol     Results for orders placed or performed during the hospital encounter of 09/18/24 (from the past 24 hour(s))   POCT GLUCOSE   Result Value Ref Range    POCT Glucose 105 (H) 74 - 99 mg/dL   POCT GLUCOSE   Result Value Ref Range    POCT Glucose 122 (H) 74 - 99 mg/dL   POCT GLUCOSE   Result Value Ref Range    POCT Glucose 149 (H) 74 - 99 mg/dL   Basic Metabolic Panel   Result Value Ref Range    Glucose 99 74 - 99 mg/dL    Sodium 140 136 - 145 mmol/L    Potassium 3.7 3.5 - 5.3 mmol/L    Chloride 104 98 - 107 mmol/L    Bicarbonate 26 21 - 32 mmol/L    Anion Gap 14 10 - 20 mmol/L    Urea Nitrogen 44 (H) 6 - 23 mg/dL    Creatinine 3.92 (H) 0.50 - 1.30 mg/dL    eGFR 17 (L) >60 mL/min/1.73m*2    Calcium 7.5 (L) 8.6 - 10.3 mg/dL   CBC   Result Value Ref Range    WBC 9.8 4.4 - 11.3 x10*3/uL    nRBC 0.0 0.0 - 0.0 /100 WBCs    RBC 2.92 (L) 4.50 - 5.90 x10*6/uL    Hemoglobin 7.9 (L) 13.5 - 17.5 g/dL    Hematocrit 26.8 (L) 41.0 - 52.0 %    MCV 92 80 - 100 fL    MCH 27.1 26.0 - 34.0 pg    MCHC 29.5 (L) 32.0 - 36.0 g/dL    RDW 15.6 (H) 11.5 - 14.5 %    Platelets 212 150 - 450 x10*3/uL   POCT GLUCOSE   Result Value Ref Range    POCT Glucose 98 74 - 99 mg/dL      Assessment:  59-year-old man with a history of coronary disease, CABG, congestive heart failure, CVA, diabetes, left AKA for necrotizing fasciitis, peripheral vascular disease, anemia, hypertension, DJD and possible COPD, who was admitted with shortness of breath, weakness, hypoxia, hypercapnia and a change in mental status, and found to have bilateral pleural effusions, left greater than right, congestive heart failure, pneumonia and possible  obstructive sleep apnea.  A right thoracentesis showed a transudative effusion with negative cultures and cytology.  There is no bronchospasm presently.    Recommend:  1.  Continue albuterol, fludrocortisone, Unasyn, and subcutaneous heparin.  2.  Wean FiO2 to keep oxygen saturation approximately 92 to 95%; home oxygen evaluation prior to discharge; continue nocturnal CPAP.  3.  Incentive spirometry and Acapella treatment.  4.  Diuresis as blood pressure and kidney function allow.  5.  Follow-up with pulmonary medicine after discharge for pulmonary function tests and a sleep study.    Joe Argueta MD

## 2024-09-27 NOTE — DISCHARGE INSTRUCTIONS
Recommended wound care:  Perirectal: Moisture associated with erosions  2x Daily: Cleanse with purple bath wipes or soap and water and pat dry- a shallow sitz bath with warm water can assist.  Apply Coloplast Triad Wound Dressing (orange tube) in dime thickness to region.  Keep buttock clean and dry.

## 2024-10-10 ENCOUNTER — HOME HEALTH ADMISSION (OUTPATIENT)
Dept: HOME HEALTH SERVICES | Facility: HOME HEALTH | Age: 59
End: 2024-10-10
Payer: MEDICAID

## 2024-10-10 ENCOUNTER — DOCUMENTATION (OUTPATIENT)
Dept: HOME HEALTH SERVICES | Facility: HOME HEALTH | Age: 59
End: 2024-10-10
Payer: MEDICAID

## 2024-10-10 NOTE — HH CARE COORDINATION
Home Care received a Referral for Nursing and Physical Therapy. We have processed the referral for a Start of Care on 10-12-24, 24-48 hours.     If you have any questions or concerns, please feel free to contact us at [phone]. Follow the prompts, enter your five digit zip code, and you will be directed to your care team on [facility].

## 2024-10-11 ENCOUNTER — PATIENT OUTREACH (OUTPATIENT)
Dept: PRIMARY CARE | Facility: CLINIC | Age: 59
End: 2024-10-11
Payer: MEDICAID

## 2024-10-11 ENCOUNTER — APPOINTMENT (OUTPATIENT)
Dept: CARDIOLOGY | Facility: HOSPITAL | Age: 59
End: 2024-10-11
Payer: COMMERCIAL

## 2024-10-11 ENCOUNTER — DOCUMENTATION (OUTPATIENT)
Dept: PRIMARY CARE | Facility: CLINIC | Age: 59
End: 2024-10-11
Payer: MEDICAID

## 2024-10-11 ENCOUNTER — APPOINTMENT (OUTPATIENT)
Dept: VASCULAR MEDICINE | Facility: HOSPITAL | Age: 59
End: 2024-10-11
Payer: COMMERCIAL

## 2024-10-11 DIAGNOSIS — G93.40 ACUTE ENCEPHALOPATHY: ICD-10-CM

## 2024-10-11 DIAGNOSIS — Z09 HOSPITAL DISCHARGE FOLLOW-UP: ICD-10-CM

## 2024-10-11 DIAGNOSIS — I50.43 CHF (CONGESTIVE HEART FAILURE), NYHA CLASS I, ACUTE ON CHRONIC, COMBINED: ICD-10-CM

## 2024-10-11 NOTE — PROGRESS NOTES
TCM completed 10/11/24  Discharge Facility:   [facility]  Discharge Diagnosis: CHF (congestive heart failure), NYHA class I, acute on chronic, combined. IDANIA, Right sided pleural effusion. Acute Encephalopathy.   Admission Date:  9/18/24  Discharge Date: 9/27/24  [facility]- [facility] from 9/27/24- 10/10/24    PCP Appointment Date: Tasked to office  Specialist Appointment Date: n/a  Hospital Encounter and Summary Linked: Yes  --See discharge assessment below for further details--     Engagement  Call Start Time: 1437 (Spoke to the patients spouse/ primary caregiver Charline) (10/11/2024  3:36 PM)    Medications  Medications reviewed with patient/caregiver?: Not applicable (10/11/2024  3:36 PM)  Is the patient having any side effects they believe may be caused by any medication additions or changes?: No (10/11/2024  3:36 PM)  Does the patient have all medications ordered at discharge?: Yes (10/11/2024  3:36 PM)  Care Management Interventions: No intervention needed; Provided patient education (10/11/2024  3:36 PM)  Prescription Comments: START taking:  Calcium acetate (Phoslo)667 mg capsule  Commonly known as: Phoslo  Take 1 capsule (667 mg) by mouth 3 times daily  (morning, midday, late afternoon).            CHANGE how you take:  Gabapentin (Neurontin)          STOP taking:  Escitalopram 10 mg tablet (Lexapro),   Lantus Solostar U-100 Insulin 100 unit/mL (3 mL)  pen (insulin glargine),   Magnesium oxide 400 mg tablet (Mag-Ox),   NIFEdipine ER 60 mg 24 hr tablet (Adalat CC),   Polyethylene glycol 17 gram packet (Glycolax, Miralax),  Sodium bicarbonate 650 mg tablet  AND  Zinc oxide 20 % ointment (10/11/2024  3:36 PM)  Is the patient taking all medications as directed (includes completed medication regime)?: Yes (10/11/2024  3:36 PM)  Care Management Interventions: Provided patient education (10/11/2024  3:36 PM)  Medication Comments: See medication list (10/11/2024  3:36 PM)    Appointments  Does the patient have a primary  "care provider?: Yes (10/11/2024  3:36 PM)  Care Management Interventions: Educated patient on importance of making appointment; Advised patient to make appointment; Urgent appointment needed (10/11/2024  3:36 PM)  Urgent Appointment Interventions: Facilitated patient appointment (10/11/2024  3:36 PM)  Has the patient kept scheduled appointments due by today?: Not applicable (10/11/2024  3:36 PM)  Care Management Interventions: Advised patient to keep appointment; Advised to schedule with specialist; Educated on importance of keeping appointment (10/11/2024  3:36 PM)    Self Management  What is the home health agency?: [facility] (10/11/2024  3:36 PM)  Has home health visited the patient within 72 hours of discharge?: Yes (10/11/2024  3:36 PM)  What Durable Medical Equipment (DME) was ordered?: n/a (10/11/2024  3:36 PM)    Patient Teaching  Does the patient have access to their discharge instructions?: Yes (10/11/2024  3:36 PM)  Care Management Interventions: Reviewed instructions with patient; Educated on MyChart (10/11/2024  3:36 PM)  What is the patient's perception of their health status since discharge?: Same (10/11/2024  3:36 PM)  Is the patient/caregiver able to teach back the hierarchy of who to call/visit for symptoms/problems? PCP, Specialist, Home Health nurse, Urgent Care, ED, 911: Yes (10/11/2024  3:36 PM)  Patient/Caregiver Education Comments: TCM initial outreach post discharge from the SNF completed successfully. Spoke to the patient's spouse Charline who is the patient's primary caregiver.  Charline states the patient is home and doing \"okay\"  post discharge. Charline denied any new or worsening symptoms since returning home, but states the patient is not as strong/independent after this last hospitalization/ SNF stay. Charline states the patient seems to have declined and has not improved with rehab; she has to use a phillip now when tranferring the patient. Confirmed the patient received his discharge summary " and has all medications needed in the home. Patient has help in the home from family 24/7. Charline denied need for DME or assistance obtaining transportation on behalf of the patient. [facility] has called and scheduled their first visit. Charline requested an urgent appt with the patients PCP- no available appts. Task sent to PCP office to assist w/scheduling. Charline denied any further questions or concerns at this time. TCM phone number provided to the patients spouse with the patient/caregiver encouraged to call with any needs or questions that may arise to help prevent another hospitalization. Patient's spouse verbalized her understanding and states she will call back if needed. (10/11/2024  3:36 PM)    Wrap Up  Call End Time: 1450 (10/11/2024  3:36 PM)    Hospital Course:   59 y.o. male with a past medical history of left above-the-knee amputation due to PAD, coronary artery disease with a history of CABG, carotid artery disease, stroke, depression, type 2 diabetes, GERD, hyperlipidemia, hypertension, osteoarthritis, anemia of chronic disease, and stage G4 chronic kidney disease with a fluctuating creatinine baseline between 2.5 to just above 3 mg/deciliter, multiple acute kidney injuries and prior nephrotic range proteinuria who presented to an outside hospital on 9/16 for evaluation of shortness of breath. Per the notes, he reportedly was taking 20 mg of torsemide instead of the prescribed 60 mg daily. His weight is up 34 pounds to 288 now from 254 lbs in early July. There was a large right pleural effusion and small loculation on the left seen on CT imaging. Multifocal airspace disease concerning for pneumonia as well. He was transferred from [facility]. He underwent a 1 L paracentesis with clear madalyn fluid removed on 9/19. He received IVP Lasix up until 9/20. Nephrology is consulted for renal care.

## 2024-10-14 ENCOUNTER — HOME CARE VISIT (OUTPATIENT)
Dept: HOME HEALTH SERVICES | Facility: HOME HEALTH | Age: 59
End: 2024-10-14
Payer: MEDICAID

## 2024-10-14 ENCOUNTER — TELEPHONE (OUTPATIENT)
Dept: PRIMARY CARE | Facility: CLINIC | Age: 59
End: 2024-10-14
Payer: MEDICAID

## 2024-10-14 VITALS
OXYGEN SATURATION: 91 % | TEMPERATURE: 98.1 F | DIASTOLIC BLOOD PRESSURE: 72 MMHG | RESPIRATION RATE: 17 BRPM | BODY MASS INDEX: 31.66 KG/M2 | WEIGHT: 260 LBS | HEART RATE: 71 BPM | HEIGHT: 76 IN | SYSTOLIC BLOOD PRESSURE: 122 MMHG

## 2024-10-14 PROCEDURE — G0299 HHS/HOSPICE OF RN EA 15 MIN: HCPCS

## 2024-10-14 SDOH — ECONOMIC STABILITY: FOOD INSECURITY: MEALS PER DAY: 2

## 2024-10-14 ASSESSMENT — ENCOUNTER SYMPTOMS
DESCRIPTION OF MEMORY LOSS: SHORT TERM
AGITATION: 1
MUSCLE WEAKNESS: 1
PAIN LOCATION: RIGHT SHOULDER
HYPERTENSION: 1
PAIN LOCATION - PAIN SEVERITY: 8/10
COUGH CHARACTERISTICS: PRODUCTIVE
PAIN LOCATION: LEFT SHOULDER
PAIN LOCATION - PAIN SEVERITY: 8/10
OCCASIONAL FEELINGS OF UNSTEADINESS: 1
LOSS OF SENSATION IN FEET: 1
STOOL FREQUENCY: DAILY
LIMITED RANGE OF MOTION: 1
COUGH: 1
PERSON REPORTING PAIN: PATIENT
PAIN LOCATION: BACK
PAIN LOCATION - PAIN SEVERITY: 8/10
ARTHRALGIAS: 1
SPUTUM AMOUNT: MODERATE
LAST BOWEL MOVEMENT: 67126
LOWER EXTREMITY EDEMA: 1
DEPRESSION: 1
LOWEST PAIN SEVERITY IN PAST 24 HOURS: 6/10
DEPRESSED MOOD: 1
SUBJECTIVE PAIN PROGRESSION: UNCHANGED
PAIN: 1
SPUTUM PRODUCTION: 1
HIGHEST PAIN SEVERITY IN PAST 24 HOURS: 8/10
APPETITE LEVEL: FAIR

## 2024-10-14 ASSESSMENT — ACTIVITIES OF DAILY LIVING (ADL)
ENTERING_EXITING_HOME: MAXIMUM ASSIST
CURRENT_FUNCTION: STAND BY ASSIST
AMBULATION ASSISTANCE: STAND BY ASSIST
AMBULATION ASSISTANCE: ONE PERSON
OASIS_M1830: 03

## 2024-10-14 ASSESSMENT — PAIN SCALES - PAIN ASSESSMENT IN ADVANCED DEMENTIA (PAINAD)
NEGVOCALIZATION: 0 - NONE.
CONSOLABILITY: 0 - NO NEED TO CONSOLE.
FACIALEXPRESSION: 0
BREATHING: 0
NEGVOCALIZATION: 0
BODYLANGUAGE: 0
BODYLANGUAGE: 0 - RELAXED.
TOTALSCORE: 0
FACIALEXPRESSION: 0 - SMILING OR INEXPRESSIVE.
CONSOLABILITY: 0

## 2024-10-14 ASSESSMENT — LIFESTYLE VARIABLES: SMOKING_STATUS: 0

## 2024-10-14 NOTE — TELEPHONE ENCOUNTER
----- Message from Mily Leung sent at 10/14/2024  8:53 AM EDT -----  Regarding: FW: Urgent appt requested    ----- Message -----  From: Ita Leavitt  Sent: 10/11/2024   3:36 PM EDT  To: Mily Leung, DO  Subject: Urgent appt requested                            Spoke to this patients wife Charline post discharge, who requested a PCP follow up appt for as soon as possible to discuss several concerns she has for the patient. There are no available appts. Can someone please reach out to Charline to assist with this? Thank you. To qualify for TCM, patient needs to be seen by 10/25/24.     ADRIAN Olivier also mentioned a fax should be coming to the office today/Monday regarding orders needed for the patients prothesis. Charlien just wanted the office to be aware of this and plans to discuss at follow up appt.

## 2024-10-14 NOTE — TELEPHONE ENCOUNTER
Spoke with patient's wife offered 10/15 but unablt to take it, transportation company needs 48 hr notice. Appt scheduled for 10/22

## 2024-10-16 ENCOUNTER — HOME CARE VISIT (OUTPATIENT)
Dept: HOME HEALTH SERVICES | Facility: HOME HEALTH | Age: 59
End: 2024-10-16
Payer: MEDICAID

## 2024-10-18 NOTE — DISCHARGE SUMMARY
Discharge Diagnosis  CHF (congestive heart failure), NYHA class I, acute on chronic, combined      Discharge Meds     Medication List      START taking these medications     calcium acetate 667 mg capsule; Commonly known as: Phoslo; Take 1   capsule (667 mg) by mouth 3 times daily (morning, midday, late afternoon).     CHANGE how you take these medications     gabapentin 300 mg capsule; Commonly known as: Neurontin; Take 1 capsule   (300 mg) by mouth once daily at bedtime.; What changed: when to take this   torsemide 20 mg tablet; Commonly known as: Demadex; Take 4 tablets (80   mg) by mouth once daily.; What changed: how much to take     CONTINUE taking these medications     albuterol 90 mcg/actuation inhaler; Commonly known as: ProAir HFA;   Inhale 2 puffs every 4 hours if needed for wheezing or shortness of   breath.   amLODIPine 10 mg tablet; Commonly known as: Norvasc; Take 1 tablet (10   mg) by mouth once daily.   aspirin 81 mg chewable tablet   atorvastatin 80 mg tablet; Commonly known as: Lipitor; TAKE 1 TABLET (80   MG) BY MOUTH ONCE DAILY. LAST RX PRIOR TO NEXT REFILLS   Blood Glucose Test strip; Generic drug: blood sugar diagnostic; 1 strip   3 times a day.   fludrocortisone 0.1 mg tablet; Commonly known as: Florinef; Take 1   tablet (0.1 mg) by mouth once daily.   FreeStyle Suzy 2 [place] misc; Generic drug: flash glucose scanning   reader; Use as instructed   FreeStyle Suzy 2 Sensor kit; Generic drug: flash glucose sensor kit;   Use as instructed   hydrALAZINE 50 mg tablet; Commonly known as: Apresoline; Take 1 tablet   (50 mg) by mouth 3 times a day.   isosorbide dinitrate 20 mg tablet; Commonly known as: Isordil; Take 1   tablet (20 mg) by mouth 3 times a day.   lidocaine 4 % patch; Place 1 patch over 12 hours on the skin once daily.   Remove & discard patch within 12 hours or as directed by MD.   melatonin 3 mg tablet   metoprolol tartrate 50 mg tablet; Commonly known as: Lopressor; Take 1   tablet  by mouth 2 times a day.   pantoprazole 40 mg EC tablet; Commonly known as: ProtoNix; Take 1 tablet   (40 mg) by mouth once daily in the morning. Take before meals. Do not   crush, chew, or split.   traZODone 50 mg tablet; Commonly known as: Desyrel   venlafaxine  mg 24 hr capsule; Commonly known as: Effexor-XR; Take   1 capsule (150 mg) by mouth once daily.     STOP taking these medications     escitalopram 10 mg tablet; Commonly known as: Lexapro   Lantus Solostar U-100 Insulin 100 unit/mL (3 mL) pen; Generic drug:   insulin glargine   magnesium oxide 400 mg tablet; Commonly known as: Mag-Ox   NIFEdipine ER 60 mg 24 hr tablet; Commonly known as: Adalat CC   polyethylene glycol 17 gram packet; Commonly known as: Glycolax, Miralax   sodium bicarbonate 650 mg tablet   zinc oxide 20 % ointment     ASK your doctor about these medications     insulin lispro 100 unit/mL injection; Commonly known as: HumaLOG; Inject   0-0.1 mL (0-10 Units) under the skin 3 times a day with meals. Take as   directed per insulin instructions. Do not start before March 30, 2024.       Test Results Pending At Discharge  Pending Labs       No current pending labs.            Hospital Course   9/27:  Asp PNA... he completes iv abx course today, comfortable on 2L... will plan to dc with 2L.      HTN... monitor and titrate up meds if needed.      Kody/ckd... continues to improve.      DM, A1c 6.1%... well controlled, will stop lantus 2U. Cont farxiga.      Dc to snf pending placement.     Pt clinically and hemodynamically stable, tolerating PO intake and room air.   Instructed to F/U with PCP within 5 days.   He understands and agrees with the dc plan.     More than 30 min spent on dc.           9/26:  Cont iv abx  Cont o2 for hypoxia.   HTN.. a bit better... cont norvasc, hydralazine, isordil, bb, torsemide.   Appreciate palliative recs... pt with depression, withdrawn... cont effexor. Should have close psych f/up... [facility].    Kody/ckd... continues to impove... f/up renal recs  DM... well controlled with lantus and ss.   Recurrent hyperkalemia... on Florinef... will discuss with renal team.   PT/ot  Dispo to snf possibly tomorrow.         9/25:  Asp PNA... 2L, reg diet per speech/swallow... cont iv abx, wean to RA as tolerated, change to PO meds at dc.   Needs OP sleep study. Cont nocturnal cpap.      HFpEF... cont PO torsemide. He's on bb, isordil.   HypoK... replace, monitor.   Kody/ckd... improving and nearing baseline.   DM... lantus 2U, ss.   Home regimen for chronic conditions  Pt/ot  Dvt px with heparin  Dc is to snf pending placement.                  9/24:  1.  Acute heart failure with preserved EF, most recent echo in May 2024 had an EF of 55%  -Cardiology and Nephrology consulted. Continue diuresis with torsemide 60 mg bid. Monitor I/Os, daily weight, bmp.     2.CAD status post CABG  -continue asa/atorvastatin/metoprolol/imdur     3. DM  -continue lantus and SSI     4. Pneumonia  - on iv unasyn; SLP without evidence of aspiration; rec regular diet  - Will need repeat chest CT in 8 to 12 weeks to ensure resolution of opacities      5. KODY on CKDV  -nephrology following; Cr worsening with IV fluids and holding diuretics. Was discussed with nephrology/cardiology on 9/23 and plan was to restart diuretics. Cr improving with diuresis.  - on florinef for recurrent hyperkalemia      6. PVD with left AKA  -continue asa/atorvastatin     7. Bilateral effusion suspect secondary to #1  -had right thoracentesis drained 1L this admit  -transudate likely due to CHF     8. Normocytic anemia  -hgb 7.3  -Iron sat 12% -> IV venofer     9.  Acute hypoxic respiratory failure secondary to #1 and 4  -Oxygen being weaned down to 2 L, home oxygen eval as needed but patient also needs a sleep study as an outpatient.     10. Likely PAUL/OHS  - nightly cpap     11. Goals of care- Would want dialysis if offered by nephrology. DNR/DNI per discussion with  "palliative care and wife today.     Pertinent Physical Exam At Time of Discharge  Physical Exam  Physical Exam  Vitals reviewed.   HENT:      Head: Normocephalic.      Right Ear: Tympanic membrane normal.      Nose: Nose normal.      Mouth/Throat:      Mouth: Mucous membranes are moist.   Cardiovascular:      Rate and Rhythm: Normal rate and regular rhythm.   Pulmonary:      Comments: Bibasilar crackles.  Abdominal:      General: Abdomen is flat. Bowel sounds are normal.      Palpations: Abdomen is soft.   Musculoskeletal:      Comments: Left AKA, right lower extremity with edema   Skin:     General: Skin is warm.      Capillary Refill: Capillary refill takes less than 2 seconds.   Neurological:      General: No focal deficit present.      Mental Status: He is alert.      Comments: Not talking very much.       /72 (BP Location: Right arm, Patient Position: Lying)   Pulse 78   Temp 36.2 °C (97.1 °F) (Temporal)   Resp 14   Ht 1.93 m (6' 4\")   Wt 133 kg (293 lb 9.6 oz)   SpO2 92%   BMI 35.74 kg/m²     Outpatient Follow-Up  Future Appointments   Date Time Provider Department Center   10/21/2024 To Be Determined Donis Willis PT [facility]   10/22/2024 10:00 AM Mily Leung DO [id number] [facility]   10/23/2024 To Be Determined Sera Richter RN [facility]   10/31/2024 To Be Determined Sera Richter RN [facility]         Delgado Solorzano MD  "

## 2024-10-21 ENCOUNTER — HOME CARE VISIT (OUTPATIENT)
Dept: HOME HEALTH SERVICES | Facility: HOME HEALTH | Age: 59
End: 2024-10-21
Payer: MEDICAID

## 2024-10-21 ENCOUNTER — TELEPHONE (OUTPATIENT)
Dept: PRIMARY CARE | Facility: CLINIC | Age: 59
End: 2024-10-21
Payer: MEDICAID

## 2024-10-22 ENCOUNTER — APPOINTMENT (OUTPATIENT)
Dept: PRIMARY CARE | Facility: CLINIC | Age: 59
End: 2024-10-22
Payer: MEDICAID

## (undated) DEVICE — DRESSING, WOUND, ALLEVYN LIFE, SACRUM, 17.2 X 17.5 CM

## (undated) DEVICE — COVER, LIGHT HANDLE, SURGICAL, DISPOSABLE, STERILE

## (undated) DEVICE — BATTERY, VARISPEED

## (undated) DEVICE — CONNECTOR, Y, CARDIOPULMONARY, 0.375 X 0.25 X 0.25 IN

## (undated) DEVICE — PENCIL, ELECTROSURG BUTTON SWITCH

## (undated) DEVICE — SUTURE, PROLENE, 4-0, 36 IN, RB1, DA, BLUE

## (undated) DEVICE — KNIFE, OPHTHALMIC, SLIT, 22.5 DEG

## (undated) DEVICE — KIT, CELL SAVER, W/COLLECTION SET, 225ML WASH SET

## (undated) DEVICE — SUTURE, VICRYL, 0, 36 IN, CT-1, UNDYED

## (undated) DEVICE — TUBING, INSUFFLATION, LAPAROSCOPIC, FILTER, 10 FT

## (undated) DEVICE — Device

## (undated) DEVICE — TUBE SET, PNEUMOCLEAR, SMOKE EVACU, HIGH-FLOW

## (undated) DEVICE — SPONGE, HEMOSTATIC, CELLULOSE, SURGICEL, FIBRILLAR, 2 X 4 IN

## (undated) DEVICE — SENSOR, OXYGEN, CEREBRAL, SOMASENSOR, ADULT

## (undated) DEVICE — SUTURE, PROLENE 4-0, TAPER POINT, SH-1 BLUE 30 INCH

## (undated) DEVICE — COLLECTION UNIT, DRAINAGE, THORACIC, SINGLE TUBE, DRY SUCTION, ATS COMPATIBLE, OASIS 3600, LF

## (undated) DEVICE — COVER PROBE, SOFT FLEX W/ GEL, 5 X 48 IN (13X122CM)

## (undated) DEVICE — FILTER, IV, BLOOD, MICROAGGREGATE, 40 MIC, RBC TRANSFUSION

## (undated) DEVICE — PERFUSION SERVICES

## (undated) DEVICE — COVER HANDLE LIGHT, STERIS, BLUE, STERILE

## (undated) DEVICE — GEL, ULTRASOUND, AQUASONIC 100, 20 GM, STERILE

## (undated) DEVICE — BLOWER MISTER KIT, CLEARVIEW, MALLEABLE SHAFT, W/TUBING, 16.5 CM

## (undated) DEVICE — CLIP, SPRING, BULLDOG, FOGARTY, SOFT JAW, 6 MM, LF

## (undated) DEVICE — SCOPE WARMER, LAPAROSCOPE, BAG ONLY, LF

## (undated) DEVICE — SUTURE, SURGICAL STEEL, STERNUM 7, 18 IN, KV40, SINGL-WIRE

## (undated) DEVICE — PUNCH, AORTIC 4MM, BULLET TIP

## (undated) DEVICE — TUBING, SUCTION, CONNECTING, STERILE 0.25 X 120 IN., LF

## (undated) DEVICE — DRAPE, SHEET, ENDOSCOPY, GENERAL, FENESTRATED, ARMBOARD COVER, 98 X 123.5 IN, DISPOSABLE, LF, STERILE

## (undated) DEVICE — BONE WAX, 2.5G ABSORBABLE, OSTENE

## (undated) DEVICE — DRESSING, ADHESIVE, ISLAND, TELFA, 2 X 3.75 IN, LF

## (undated) DEVICE — PACING CABLE, MYO LEAD, WHITE, 10FT

## (undated) DEVICE — BANDAGE, ELASTIC, FLEXMASTER, 6 IN, DBL LENGTH, STERILE

## (undated) DEVICE — SYRINGE, 20 CC, LUER LOCK, MONOJECT, W/O CAP, LF

## (undated) DEVICE — SUTURE, PROLENE, 7-0, 30 IN, BV1, DA, BLUE

## (undated) DEVICE — SUTURE, PROLENE, 3-0, 36 IN, SH, DA, BLUE

## (undated) DEVICE — ELECTRODE, ELECTROSURGICAL, BLADE, STANDARD, 2.75 IN

## (undated) DEVICE — BLADE, SAW STERNUM, STERILE

## (undated) DEVICE — MICROCOAGULATION TEST, ACT+ TEST CUVETTE

## (undated) DEVICE — CLIP, LIGATING, W/ADHESIVE PAD, MEDIUM, TITANIUM

## (undated) DEVICE — RETRIEVAL SYSTEM, MONARCH, 10MM DISP ENDOSCOPIC

## (undated) DEVICE — CLIP, LIGATING, W/ADHESIVE, WIDE SLOT, SMALL, TITANIUM

## (undated) DEVICE — TUBING PACK, OXYGENATOR, ADULT

## (undated) DEVICE — SUTURE, NUROLON, 0, 18 IN, CT1, DETACHABLE, MULTIPACK, BLACK

## (undated) DEVICE — SUTURE, PROLENE, 5-0, 36 IN, C-1, CV-11, BLUE

## (undated) DEVICE — TROCAR, KII OPTICAL BLADELESS 5MM Z THREAD 100MM LNGTH

## (undated) DEVICE — CANNULA, VENOUS, 2-STAGE, 32/40 ROUND

## (undated) DEVICE — DRAPE, FLUID WARMER

## (undated) DEVICE — DRAPE, INSTRUMENT, W/POUCH, STERI DRAPE, 9 5/8 X 18 LONG

## (undated) DEVICE — SUTURE, ETHIBOND, XTRA, 30 IN, 0, CT-1, GREEN

## (undated) DEVICE — TROCAR SYSTEM, BALLOON, KII GELPORT, 12 X 100MM

## (undated) DEVICE — CAUTERY, PENCIL, PUSH BUTTON, SMOKE EVAC, 70MM

## (undated) DEVICE — CONNECTOR, 3/8 X 1/4, STRAIGHT, STERILE

## (undated) DEVICE — OXYGENATOR FX 25, W/HR, ARTERIAL FILTER

## (undated) DEVICE — CANNULA, VESSEL, FREE FLOW, BLUNT TIP, 3 MM X 5 CM

## (undated) DEVICE — TIP,  ELECTRODE COATED INSULATED, EXTENDED, LF

## (undated) DEVICE — MARKER, SKIN, RULER AND LABEL PACK, CUSTOM

## (undated) DEVICE — MANIFOLD, 4 PORT NEPTUNE STANDARD

## (undated) DEVICE — RETRACTOR, SUTURE, HOLDING, INSERT, OCTOBASE, DISPOSABLE

## (undated) DEVICE — DEVICE, ENDOSCOPIC VESSEL HARVESTING, SAPHENA VENAPAX

## (undated) DEVICE — CANNULA, AORTIC ROOT, 12G

## (undated) DEVICE — SPONGE, HEMOSTATIC, CELLULOSE, SURGICEL, 2 X 14 IN

## (undated) DEVICE — INSERT, CLAMP, SURGICAL, SOFT/TRACTION, STEALTH, 5 MM

## (undated) DEVICE — ANTIFOG, SOLUTION, FOG-OUT

## (undated) DEVICE — CANNULA, EOPA 20F W/O GUIDEWIRE

## (undated) DEVICE — CLIP, ENDO APPLIER LIGAMAX 5MM

## (undated) DEVICE — SUTURE, MONOCRYL, 3-0, 18 IN, PS2, UNDYED

## (undated) DEVICE — DRESSING, ALLEVYN, GENTAL LITE BORDER, 2 X 2

## (undated) DEVICE — COVER, CART, 45 X 27 X 48 IN, CLEAR

## (undated) DEVICE — DRESSING, ISLAND, TELFA, 4 X 5 IN

## (undated) DEVICE — TUBE, SALEM SUMP, 16 FR X 48IN, ENFIT

## (undated) DEVICE — GOWN, SURGICAL, SMARTGOWN, XLARGE, STERILE

## (undated) DEVICE — CONNECTOR, Y, 3/8 X 3/8 X 3/8 IN

## (undated) DEVICE — CASSETTE, BLOOD, PLEGIC SET

## (undated) DEVICE — SUTURE, PROLENE, 6-0, 30 IN, C-1, CV-11, BLUE

## (undated) DEVICE — CONNECTOR, STRAIGHT, 0.5 X 0.5 IN